# Patient Record
Sex: MALE | Race: BLACK OR AFRICAN AMERICAN | Employment: OTHER | URBAN - METROPOLITAN AREA
[De-identification: names, ages, dates, MRNs, and addresses within clinical notes are randomized per-mention and may not be internally consistent; named-entity substitution may affect disease eponyms.]

---

## 2017-03-21 ENCOUNTER — CLINICAL SUPPORT (OUTPATIENT)
Dept: CARDIOLOGY CLINIC | Age: 82
End: 2017-03-21

## 2017-03-21 DIAGNOSIS — I49.5 SSS (SICK SINUS SYNDROME) (HCC): ICD-10-CM

## 2017-03-21 DIAGNOSIS — Z95.0 S/P CARDIAC PACEMAKER PROCEDURE: Primary | ICD-10-CM

## 2017-03-21 DIAGNOSIS — I47.1 SVT (SUPRAVENTRICULAR TACHYCARDIA) (HCC): ICD-10-CM

## 2017-05-02 ENCOUNTER — HOSPITAL ENCOUNTER (OUTPATIENT)
Dept: CT IMAGING | Age: 82
Discharge: HOME | End: 2017-05-02
Attending: INTERNAL MEDICINE
Payer: MEDICARE

## 2017-05-02 DIAGNOSIS — R10.9 ABDOMINAL PAIN: ICD-10-CM

## 2017-05-02 LAB — CREAT BLD-MCNC: 1.7 MG/DL (ref 0.6–1.3)

## 2017-05-02 PROCEDURE — 82565 ASSAY OF CREATININE: CPT

## 2017-05-02 PROCEDURE — 74176 CT ABD & PELVIS W/O CONTRAST: CPT

## 2017-05-02 PROCEDURE — 74011000255 HC RX REV CODE- 255: Performed by: INTERNAL MEDICINE

## 2017-05-02 RX ORDER — SODIUM CHLORIDE 0.9 % (FLUSH) 0.9 %
10 SYRINGE (ML) INJECTION
Status: DISCONTINUED | OUTPATIENT
Start: 2017-05-02 | End: 2017-05-02

## 2017-05-02 RX ORDER — BARIUM SULFATE 20 MG/ML
900 SUSPENSION ORAL
Status: COMPLETED | OUTPATIENT
Start: 2017-05-02 | End: 2017-05-02

## 2017-05-02 RX ORDER — SODIUM CHLORIDE 9 MG/ML
50 INJECTION, SOLUTION INTRAVENOUS
Status: DISCONTINUED | OUTPATIENT
Start: 2017-05-02 | End: 2017-05-02

## 2017-05-02 RX ADMIN — BARIUM SULFATE 900 ML: 21 SUSPENSION ORAL at 19:09

## 2017-06-22 ENCOUNTER — HOSPITAL ENCOUNTER (OUTPATIENT)
Age: 82
Setting detail: OUTPATIENT SURGERY
Discharge: HOME | End: 2017-06-22
Attending: INTERNAL MEDICINE | Admitting: INTERNAL MEDICINE
Payer: MEDICARE

## 2017-06-22 ENCOUNTER — ANESTHESIA EVENT (OUTPATIENT)
Dept: ENDOSCOPY | Age: 82
End: 2017-06-22
Payer: MEDICARE

## 2017-06-22 ENCOUNTER — ANESTHESIA (OUTPATIENT)
Dept: ENDOSCOPY | Age: 82
End: 2017-06-22
Payer: MEDICARE

## 2017-06-22 VITALS
OXYGEN SATURATION: 98 % | RESPIRATION RATE: 10 BRPM | TEMPERATURE: 97.5 F | BODY MASS INDEX: 22.65 KG/M2 | DIASTOLIC BLOOD PRESSURE: 66 MMHG | HEIGHT: 67 IN | HEART RATE: 61 BPM | WEIGHT: 144.31 LBS | SYSTOLIC BLOOD PRESSURE: 128 MMHG

## 2017-06-22 PROCEDURE — 77030013992 HC SNR POLYP ENDOSC BSC -B: Performed by: INTERNAL MEDICINE

## 2017-06-22 PROCEDURE — 76060000032 HC ANESTHESIA 0.5 TO 1 HR: Performed by: INTERNAL MEDICINE

## 2017-06-22 PROCEDURE — 88342 IMHCHEM/IMCYTCHM 1ST ANTB: CPT | Performed by: INTERNAL MEDICINE

## 2017-06-22 PROCEDURE — 76040000007: Performed by: INTERNAL MEDICINE

## 2017-06-22 PROCEDURE — 74011000250 HC RX REV CODE- 250

## 2017-06-22 PROCEDURE — 74011250636 HC RX REV CODE- 250/636

## 2017-06-22 PROCEDURE — 74011250636 HC RX REV CODE- 250/636: Performed by: INTERNAL MEDICINE

## 2017-06-22 PROCEDURE — 88305 TISSUE EXAM BY PATHOLOGIST: CPT | Performed by: INTERNAL MEDICINE

## 2017-06-22 PROCEDURE — 77030019988 HC FCPS ENDOSC DISP BSC -B: Performed by: INTERNAL MEDICINE

## 2017-06-22 RX ORDER — FLUMAZENIL 0.1 MG/ML
0.2 INJECTION INTRAVENOUS
Status: DISCONTINUED | OUTPATIENT
Start: 2017-06-22 | End: 2017-06-22 | Stop reason: HOSPADM

## 2017-06-22 RX ORDER — DEXTROMETHORPHAN/PSEUDOEPHED 2.5-7.5/.8
1.2 DROPS ORAL
Status: DISCONTINUED | OUTPATIENT
Start: 2017-06-22 | End: 2017-06-22 | Stop reason: HOSPADM

## 2017-06-22 RX ORDER — GLYCOPYRROLATE 0.2 MG/ML
INJECTION INTRAMUSCULAR; INTRAVENOUS AS NEEDED
Status: DISCONTINUED | OUTPATIENT
Start: 2017-06-22 | End: 2017-06-22 | Stop reason: HOSPADM

## 2017-06-22 RX ORDER — MIDAZOLAM HYDROCHLORIDE 1 MG/ML
.25-5 INJECTION, SOLUTION INTRAMUSCULAR; INTRAVENOUS
Status: DISCONTINUED | OUTPATIENT
Start: 2017-06-22 | End: 2017-06-22 | Stop reason: HOSPADM

## 2017-06-22 RX ORDER — NALOXONE HYDROCHLORIDE 0.4 MG/ML
0.4 INJECTION, SOLUTION INTRAMUSCULAR; INTRAVENOUS; SUBCUTANEOUS
Status: DISCONTINUED | OUTPATIENT
Start: 2017-06-22 | End: 2017-06-22 | Stop reason: HOSPADM

## 2017-06-22 RX ORDER — EPINEPHRINE 0.1 MG/ML
1 INJECTION INTRACARDIAC; INTRAVENOUS
Status: DISCONTINUED | OUTPATIENT
Start: 2017-06-22 | End: 2017-06-22 | Stop reason: HOSPADM

## 2017-06-22 RX ORDER — FENTANYL CITRATE 50 UG/ML
25 INJECTION, SOLUTION INTRAMUSCULAR; INTRAVENOUS
Status: DISCONTINUED | OUTPATIENT
Start: 2017-06-22 | End: 2017-06-22 | Stop reason: HOSPADM

## 2017-06-22 RX ORDER — SODIUM CHLORIDE 0.9 % (FLUSH) 0.9 %
5-10 SYRINGE (ML) INJECTION AS NEEDED
Status: DISCONTINUED | OUTPATIENT
Start: 2017-06-22 | End: 2017-06-22 | Stop reason: HOSPADM

## 2017-06-22 RX ORDER — ATROPINE SULFATE 0.1 MG/ML
0.5 INJECTION INTRAVENOUS
Status: DISCONTINUED | OUTPATIENT
Start: 2017-06-22 | End: 2017-06-22 | Stop reason: HOSPADM

## 2017-06-22 RX ORDER — SODIUM CHLORIDE 9 MG/ML
75 INJECTION, SOLUTION INTRAVENOUS CONTINUOUS
Status: DISCONTINUED | OUTPATIENT
Start: 2017-06-22 | End: 2017-06-22 | Stop reason: HOSPADM

## 2017-06-22 RX ORDER — SODIUM CHLORIDE 0.9 % (FLUSH) 0.9 %
5-10 SYRINGE (ML) INJECTION EVERY 8 HOURS
Status: DISCONTINUED | OUTPATIENT
Start: 2017-06-22 | End: 2017-06-22 | Stop reason: HOSPADM

## 2017-06-22 RX ORDER — LIDOCAINE HYDROCHLORIDE 20 MG/ML
INJECTION, SOLUTION EPIDURAL; INFILTRATION; INTRACAUDAL; PERINEURAL AS NEEDED
Status: DISCONTINUED | OUTPATIENT
Start: 2017-06-22 | End: 2017-06-22 | Stop reason: HOSPADM

## 2017-06-22 RX ORDER — PHENYLEPHRINE HCL IN 0.9% NACL 0.4MG/10ML
SYRINGE (ML) INTRAVENOUS AS NEEDED
Status: DISCONTINUED | OUTPATIENT
Start: 2017-06-22 | End: 2017-06-22 | Stop reason: HOSPADM

## 2017-06-22 RX ORDER — PROPOFOL 10 MG/ML
INJECTION, EMULSION INTRAVENOUS AS NEEDED
Status: DISCONTINUED | OUTPATIENT
Start: 2017-06-22 | End: 2017-06-22 | Stop reason: HOSPADM

## 2017-06-22 RX ORDER — FENTANYL CITRATE 50 UG/ML
50 INJECTION, SOLUTION INTRAMUSCULAR; INTRAVENOUS
Status: DISCONTINUED | OUTPATIENT
Start: 2017-06-22 | End: 2017-06-22 | Stop reason: HOSPADM

## 2017-06-22 RX ADMIN — GLYCOPYRROLATE 0.1 MG: 0.2 INJECTION INTRAMUSCULAR; INTRAVENOUS at 08:32

## 2017-06-22 RX ADMIN — Medication 40 MCG: at 08:54

## 2017-06-22 RX ADMIN — SODIUM CHLORIDE 75 ML/HR: 900 INJECTION, SOLUTION INTRAVENOUS at 08:23

## 2017-06-22 RX ADMIN — LIDOCAINE HYDROCHLORIDE 50 MG: 20 INJECTION, SOLUTION EPIDURAL; INFILTRATION; INTRACAUDAL; PERINEURAL at 08:32

## 2017-06-22 RX ADMIN — PROPOFOL 210 MG: 10 INJECTION, EMULSION INTRAVENOUS at 09:01

## 2017-06-22 NOTE — PROCEDURES
NAME:  Santos Farfan Sr.   :   1931   MRN:   [id number]     Date/Time:  2017 9:27 AM    Colonoscopy Operative Report    Procedure Type:   Colonoscopy with polypectomy (cold snare)      Indications:     Abdominal pain, generalized  Pre-operative Diagnosis: see indication above  Post-operative Diagnosis:  See findings below  :  Andrew Mathur MD  Referring Provider:             -Hermelindo Sauer MD     Exam:  Airway: clear, no airway problems anticipated  Heart: RRR, without gallops or rubs  Lungs: clear bilaterally without wheezes, crackles, or rhonchi  Abdomen: soft, nontender, nondistended, bowel sounds present  Mental Status: awake, alert and oriented to person, place and time     Sedation:  MAC anesthesia Propofol 210mg IV  Procedure Details:  After informed consent was obtained with all risks and benefits of procedure explained and preoperative exam completed, the patient was taken to the endoscopy suite and placed in the left lateral decubitus position. Upon sequential sedation as per above, a digital rectal exam was performed demonstrating internal hemorrhoids. The Olympus videocolonoscope  was inserted in the rectum and carefully advanced to the cecum, which was identified by the ileocecal valve and appendiceal orifice. The distal terminal ileum was evaluated. The quality of preparation was adequate. The colonoscope was slowly withdrawn with careful evaluation between folds. Retroflexion in the rectum was completed demonstrating internal hemorrhoids.      Findings:     -Normal terminal ileum  -Two diminutive 2-3mm sessile polyps in ascending colon; removed with cold snare  -Single 3mm sessile polyp in transverse colon; removed with cold snare  -Extensive diverticulosis throughout colon  -Small internal hemorrhoids      Specimen Removed:  #3 transverse colon polyp; #4 asc colon polyp  Complications: None.    EBL:  None.     Impression:     -Normal terminal ileum  -Two diminutive 2-3mm sessile polyps in ascending colon; removed with cold snare  -Single 3mm sessile polyp in transverse colon; removed with cold snare  -Extensive diverticulosis throughout colon  -Small internal hemorrhoids      Recommendations: --Await pathology. , -Follow up with primary care physician. High fiber diet. Resume normal medication(s). NO aspirin for 10 days. You will receive a letter about the biopsy results in about 10 days. You may be asked to call your doctor's office for the results.        Discharge Disposition:  Home in the company of a  when able to ambulate.       Bernie Banegas MD

## 2017-06-22 NOTE — PROGRESS NOTES
The risks and benefits of the bite block have been explained to patient. Patient verbalizes understanding. Patient with various small chipped areas on his front teeth.

## 2017-06-22 NOTE — PROCEDURES
NAME:  Daron Farfan Sr.   :   1931   MRN:   [id number]     Date/Time:  2017 9:26 AM    Esophagogastroduodenoscopy (EGD) Procedure Note    Procedure: Esophagogastroduodenoscopy with biopsy     Indication:                 Abnormal weight loss  Pre-operative Diagnosis: see indication above  Post-operative Diagnosis: see findings below  :  Radha Loza MD  Referring Provider:   -Hamzah Bowden MD     Exam:  Airway: clear, no airway problems anticipated  Heart: RRR, without gallops or rubs  Lungs: clear bilaterally without wheezes, crackles, or rhonchi  Abdomen: soft, nontender, nondistended, bowel sounds present  Mental Status: awake, alert and oriented to person, place and time      Anethesia/Sedation:  MAC anesthesia Propofol as per colonoscopy  Procedure Details   After informed consent was obtained for the procedure, with all risks and benefits of procedure explained the patient was taken to the endoscopy suite and placed in the left lateral decubitus position. Following sequential administration of sedation as per above, the [id number] gastroscope was inserted into the mouth and advanced under direct vision to second portion of the duodenum. A careful inspection was made as the gastroscope was withdrawn, including a retroflexed view of the proximal stomach; findings and interventions are described below.                                                                                                                                                                                                                                                                                                                       Findings:    -Mild distal esophageal erythema to suggest esophagitis; biopsied  -Small sliding hiatal hernia from 37-39cm  -Normal gastric mucosa; biopsied to exclude inflammation  -Normal duodenal mucosa     Therapies:  biopsy of esophagus; biopsy of stomach   Specimens: #1 gastric; #2 g-e jxn  EBL:  None.          Complications:   None; patient tolerated the procedure well.      Impression:    -Mild distal esophageal erythema to suggest esophagitis; biopsied  -Small sliding hiatal hernia from 37-39cm  -Normal gastric mucosa; biopsied to exclude inflammation  -Normal duodenal mucosa     Recommendations:  -Await pathology.     Discharge disposition:  Home in the company of  when able to ambulate after colonoscopy    Tej Sylvester MD

## 2017-06-22 NOTE — ROUTINE PROCESS
Benito Smartalisa .  1/21/1931  [id number]    Situation:  Verbal report received from: David Weber Rn  Procedure: Procedure(s):  COLONOSCOPY  ESOPHAGOGASTRODUODENOSCOPY (EGD)  ESOPHAGOGASTRODUODENAL (EGD) BIOPSY  ENDOSCOPIC POLYPECTOMY    Background:    Preoperative diagnosis: ABDOMINAL/EPIGASTRIC PAIN/ABNORMAL WEIGHT LOSS/CHANGE IN BOWEL HABITS  Postoperative diagnosis: EGD - hiatal hernia, esophagitis  COLON - diverticulosis, colon polyps, hemorrhoids    :  Dr. Shayy Carlson  Assistant(s): Endoscopy Technician-1: Salomón Gutierrez  Endoscopy RN-1: Ignacia Maddox RN    Specimens:   ID Type Source Tests Collected by Time Destination   1 : Gastric bx Preservative Gastric  Milan Kurtz MD 6/22/2017 5008 Pathology   2 : GE Junction bx Preservative   Milan Kurtz MD 6/22/2017 4281 Pathology   3 : Transverse Colon Polyp Preservative   Milan Kurtz MD 6/22/2017 5757 Pathology   4 : Ascending Colon Polyps Preservative Colon, Ascending  Milan Kurtz MD 6/22/2017 4123 Pathology     H. Pylori  no    Assessment:  Intra-procedure medications       Anesthesia gave intra-procedure sedation and medications, see anesthesia flow sheet yes    Intravenous fluids: NS@ KVO     Vital signs stable       Abdominal assessment: round and soft       Recommendation:  Discharge patient per MD order  .     Family or Friend  Wife Adria Alexander to share finding with family or friend yes

## 2017-06-22 NOTE — DISCHARGE INSTRUCTIONS
Sonu Carias .  [id number]  1/21/1931    EGD/COLON DISCHARGE INSTRUCTIONS  Discomfort:  Redness at IV site- apply warm compress to area; if redness or soreness persist- contact your physician  There may be a slight amount of blood passed from the rectum  Gaseous discomfort- walking, belching will help relieve any discomfort  You may not operate a vehicle for 12 hours  You may not engage in an occupation involving machinery or appliances for rest of today  You may not drink alcoholic beverages for at least 12 hours  Avoid making any critical decisions for at least 24 hour  DIET:   High fiber diet. - however -  remember your colon is empty and a heavy meal will produce gas. Avoid these foods:  vegetables, fried / greasy foods, carbonated drinks for today  MEDICATION:         ACTIVITY:  You may not resume your normal daily activities until tomorrow AM; it is recommended that you spend the remainder of the day resting -  avoid any strenuous activity. CALL M.D. ANY SIGN OF:   Increasing pain, nausea, vomiting  Abdominal distension (swelling)  New increased bleeding (oral or rectal)  Fever (chills)  Pain in chest area  Bloody discharge from nose or mouth  Shortness of breath    You may not  take any Advil, Aspirin, Ibuprofen, Motrin, Aleve, or Goodys for 10 days, ONLY  Tylenol as needed for pain.     IMPRESSION:  -Normal terminal ileum  -Two diminutive 2-3mm sessile polyps in ascending colon; removed with cold snare  -Single 3mm sessile polyp in transverse colon; removed with cold snare  -Extensive diverticulosis throughout colon  -Small internal hemorrhoids   -Mild distal esophageal erythema to suggest esophagitis; biopsied  -Small sliding hiatal hernia from 37-39cm  -Normal gastric mucosa; biopsied to exclude inflammation  -Normal duodenal mucosa    Follow-up Instructions:   Call Dr. Kehinde Mendoza for the results of procedure / biopsy in 7-10 days  Telephone # [phone]    MD Reinaldo Griffin Activation    Thank you for requesting access to [employer]. Please follow the instructions below to securely access and download your online medical record. [employer] allows you to send messages to your doctor, view your test results, renew your prescriptions, schedule appointments, and more. How Do I Sign Up? 1. In your internet browser, go to [email or web address]  2. Click on the First Time User? Click Here link in the Sign In box. You will be redirect to the New Member Sign Up page. 3. Enter your [employer] Access Code exactly as it appears below. You will not need to use this code after youve completed the sign-up process. If you do not sign up before the expiration date, you must request a new code. [employer] Access Code: -[email or web address]  Expires: 2017  6:52 AM (This is the date your [employer] access code will )    4. Enter the last four digits of your Social Security Number (xxxx) and Date of Birth (mm/dd/yyyy) as indicated and click Submit. You will be taken to the next sign-up page. 5. Create a [employer] ID. This will be your [employer] login ID and cannot be changed, so think of one that is secure and easy to remember. 6. Create a [employer] password. You can change your password at any time. 7. Enter your Password Reset Question and Answer. This can be used at a later time if you forget your password. 8. Enter your e-mail address. You will receive e-mail notification when new information is available in [address] 9. Click Sign Up. You can now view and download portions of your medical record. 10. Click the Download Summary menu link to download a portable copy of your medical information. Additional Information    If you have questions, please visit the Frequently Asked Questions section of the [employer] website at [email or web address] Remember, [employer] is NOT to be used for urgent needs. For medical emergencies, dial 911.

## 2017-06-22 NOTE — ANESTHESIA PREPROCEDURE EVALUATION
Anesthetic History               Review of Systems / Medical History      Pulmonary                   Neuro/Psych              Cardiovascular    Hypertension        Dysrhythmias   Pacemaker and CAD         GI/Hepatic/Renal     GERD           Endo/Other        Arthritis     Other Findings              Physical Exam    Airway  Mallampati: II  TM Distance: 4 - 6 cm  Neck ROM: normal range of motion   Mouth opening: Normal     Cardiovascular  Regular rate and rhythm,  S1 and S2 normal,  no murmur, click, rub, or gallop             Dental    Dentition: Poor dentition     Pulmonary  Breath sounds clear to auscultation               Abdominal  GI exam deferred       Other Findings            Anesthetic Plan    ASA: 3  Anesthesia type: general and total IV anesthesia          Induction: Intravenous  Anesthetic plan and risks discussed with: Patient

## 2017-06-22 NOTE — ANESTHESIA POSTPROCEDURE EVALUATION
Post-Anesthesia Evaluation and Assessment    Patient: Ruddy Farfan Sr. MRN: [id number]  SSN: [id number]    YOB: 1931  Age: 80 y.o. Sex: male       Cardiovascular Function/Vital Signs  Visit Vitals    /66    Pulse 61    Temp 36.4 °C (97.5 °F)    Resp 10    Ht 5' 7\" (1.702 m)    Wt 65.5 kg (144 lb 5 oz)    SpO2 98%    BMI 22.6 kg/m2       Patient is status post general, total IV anesthesia anesthesia for Procedure(s):  COLONOSCOPY  ESOPHAGOGASTRODUODENOSCOPY (EGD)  ESOPHAGOGASTRODUODENAL (EGD) BIOPSY  ENDOSCOPIC POLYPECTOMY. Nausea/Vomiting: None    Postoperative hydration reviewed and adequate. Pain:  Pain Scale 1: Numeric (0 - 10) (06/22/17 0933)  Pain Intensity 1: 0 (06/22/17 4456)   Managed    Neurological Status: At baseline    Mental Status and Level of Consciousness: Arousable    Pulmonary Status:   O2 Device: Room air (06/22/17 9360)   Adequate oxygenation and airway patent    Complications related to anesthesia: None    Post-anesthesia assessment completed.  No concerns    Signed By: Mando Michaud MD     June 22, 2017

## 2017-06-22 NOTE — IP AVS SNAPSHOT
[address] [facility] 
[phone] Patient: Erinn Farfan Sr. MRN: [id number] NAD:6/76/6695 You are allergic to the following No active allergies Recent Documentation Height Weight BMI Smoking Status 1.702 m 65.5 kg 22.6 kg/m2 Former Smoker Emergency Contacts Name Discharge Info Relation Home Work Mobile Carmita Farfan  Spouse [3] [phone] Tina Johnson 1137  Other Relative [6] [phone] About your hospitalization You were admitted on:  June 22, 2017 You last received care in the:  [facility] ENDOSCOPY You were discharged on:  June 22, 2017 Unit phone number:  [phone] Why you were hospitalized Your primary diagnosis was:  Not on File Providers Seen During Your Hospitalizations Provider Role Specialty Primary office phone Turner Machado MD Attending Provider Gastroenterology [phone] Your Primary Care Physician (PCP) Primary Care Physician Office Phone Office Fax Yusuf Ross [phone] Follow-up Information None Your Appointments Tuesday July 18, 2017  1:20 PM EDT ROUTINE CARE with Meron El MD  
[facility] [place], [place] ([facility]) [address] [place] [phone] Current Discharge Medication List  
  
CONTINUE these medications which have NOT CHANGED Dose & Instructions Dispensing Information Comments Morning Noon Evening Bedtime  
 amLODIPine 10 mg tablet Commonly known as:  Monique Ferris Your last dose was: Your next dose is:    
   
   
 Dose:  10 mg Take 10 mg by mouth daily. Refills:  0  
     
   
   
   
  
 ASPIR-LOW 81 mg tablet Generic drug:  aspirin delayed-release Your last dose was: Your next dose is: Take  by mouth daily. Refills:  0  
     
   
   
   
  
 donepezil 10 mg tablet Commonly known as:  ARICEPT Your last dose was: Your next dose is:    
   
   
 Dose:  10 mg Take 10 mg by mouth nightly. Refills:  0  
     
   
   
   
  
 enalapril 20 mg tablet Commonly known as:  Melanie Dill Your last dose was: Your next dose is:    
   
   
 Dose:  20 mg Take 20 mg by mouth daily. Refills:  0  
     
   
   
   
  
 losartan-hydroCHLOROthiazide 100-25 mg per tablet Commonly known as:  HYZAAR Your last dose was: Your next dose is:    
   
   
 Dose:  1 Tab Take 1 Tab by mouth daily. Refills:  1 Discharge Instructions Lou Sosa . 
[id number] 
1/21/1931 EGD/COLON DISCHARGE INSTRUCTIONS Discomfort: 
Redness at IV site- apply warm compress to area; if redness or soreness persist- contact your physician There may be a slight amount of blood passed from the rectum Gaseous discomfort- walking, belching will help relieve any discomfort You may not operate a vehicle for 12 hours You may not engage in an occupation involving machinery or appliances for rest of today You may not drink alcoholic beverages for at least 12 hours Avoid making any critical decisions for at least 24 hour DIET: 
 High fiber diet.  however -  remember your colon is empty and a heavy meal will produce gas. Avoid these foods:  vegetables, fried / greasy foods, carbonated drinks for today MEDICATION: 
 
 
  
ACTIVITY: 
You may not resume your normal daily activities until tomorrow AM; it is recommended that you spend the remainder of the day resting -  avoid any strenuous activity. CALL M.D. ANY SIGN OF: Increasing pain, nausea, vomiting Abdominal distension (swelling) New increased bleeding (oral or rectal) Fever (chills) Pain in chest area Bloody discharge from nose or mouth Shortness of breath You may not  take any Advil, Aspirin, Ibuprofen, Motrin, Aleve, or Goodys for 10 days, ONLY  Tylenol as needed for pain. IMPRESSION: 
-Normal terminal ileum 
-Two diminutive 2-3mm sessile polyps in ascending colon; removed with cold snare 
-Single 3mm sessile polyp in transverse colon; removed with cold snare 
-Extensive diverticulosis throughout colon 
-Small internal hemorrhoids  
-Mild distal esophageal erythema to suggest esophagitis; biopsied 
-Small sliding hiatal hernia from 37-39cm 
-Normal gastric mucosa; biopsied to exclude inflammation 
-Normal duodenal mucosa Follow-up Instructions: 
 Call Dr. Dillon Faulkner for the results of procedure / biopsy in 7-10 days Telephone # [phone] Larose Bence, MD 
 
[employer] Activation Thank you for requesting access to [employer]. Please follow the instructions below to securely access and download your online medical record. [employer] allows you to send messages to your doctor, view your test results, renew your prescriptions, schedule appointments, and more. How Do I Sign Up? 1. In your internet browser, go to [email or web address] 
2. Click on the First Time User? Click Here link in the Sign In box. You will be redirect to the New Member Sign Up page. 3. Enter your [employer] Access Code exactly as it appears below. You will not need to use this code after youve completed the sign-up process. If you do not sign up before the expiration date, you must request a new code. [employer] Access Code: [email or web address] Expires: 2017  6:52 AM (This is the date your [employer] access code will ) 4. Enter the last four digits of your Social Security Number (xxxx) and Date of Birth (mm/dd/yyyy) as indicated and click Submit. You will be taken to the next sign-up page. 5. Create a [employer] ID. This will be your [employer] login ID and cannot be changed, so think of one that is secure and easy to remember. 6. Create a [employer] password. You can change your password at any time. 7. Enter your Password Reset Question and Answer. This can be used at a later time if you forget your password. 8. Enter your e-mail address. You will receive e-mail notification when new information is available in [address] 9. Click Sign Up. You can now view and download portions of your medical record. 10. Click the Download Summary menu link to download a portable copy of your medical information. Additional Information If you have questions, please visit the Frequently Asked Questions section of the [employer] website at [email or web address] Remember, [employer] is NOT to be used for urgent needs. For medical emergencies, dial 911. Discharge Orders None Introducing [facility]! Tom Martines introduces [employer] patient portal. Now you can access parts of your medical record, email your doctor's office, and request medication refills online. 1. In your internet browser, go to [email or web address] 2. Click on the First Time User? Click Here link in the Sign In box. You will see the New Member Sign Up page. 3. Enter your [employer] Access Code exactly as it appears below. You will not need to use this code after youve completed the sign-up process. If you do not sign up before the expiration date, you must request a new code. · [employer] Access Code: [email or web address] Expires: 9/20/2017  6:52 AM 
 
4. Enter the last four digits of your Social Security Number (xxxx) and Date of Birth (mm/dd/yyyy) as indicated and click Submit. You will be taken to the next sign-up page. 5. Create a [employer] ID. This will be your [employer] login ID and cannot be changed, so think of one that is secure and easy to remember. 6. Create a [employer] password. You can change your password at any time. 7. Enter your Password Reset Question and Answer.  This can be used at a later time if you forget your password. 8. Enter your e-mail address. You will receive e-mail notification when new information is available in [address] 9. Click Sign Up. You can now view and download portions of your medical record. 10. Click the Download Summary menu link to download a portable copy of your medical information. If you have questions, please visit the Frequently Asked Questions section of the [employer] website. Remember, [employer] is NOT to be used for urgent needs. For medical emergencies, dial 911. Now available from your iPhone and Android! General Information Please provide this summary of care documentation to your next provider. Patient Signature:  ____________________________________________________________ Date:  ____________________________________________________________  
  
Anitra Sherwood Provider Signature:  ____________________________________________________________ Date:  ____________________________________________________________

## 2017-07-18 PROBLEM — K29.90 GASTRITIS AND DUODENITIS: Status: ACTIVE | Noted: 2017-07-18

## 2017-07-18 PROBLEM — N18.2 CKD (CHRONIC KIDNEY DISEASE), STAGE II: Status: ACTIVE | Noted: 2017-07-18

## 2017-07-18 PROBLEM — R10.9 ABDOMINAL PAIN: Status: ACTIVE | Noted: 2017-07-18

## 2017-07-18 PROBLEM — K92.2 GI BLEED: Status: ACTIVE | Noted: 2017-07-18

## 2017-07-18 PROBLEM — K57.90 DIVERTICULOSIS: Status: ACTIVE | Noted: 2017-07-18

## 2017-07-18 PROBLEM — F02.80 ALZHEIMER DISEASE (HCC): Status: ACTIVE | Noted: 2017-07-18

## 2017-07-18 PROBLEM — E78.5 HYPERLIPIDEMIA: Status: ACTIVE | Noted: 2017-07-18

## 2017-07-18 PROBLEM — R00.1 BRADYCARDIA: Status: ACTIVE | Noted: 2017-07-18

## 2017-07-18 PROBLEM — F32.A DEPRESSION: Status: ACTIVE | Noted: 2017-07-18

## 2017-07-18 PROBLEM — K64.8 INTERNAL HEMORRHOIDS: Status: ACTIVE | Noted: 2017-07-18

## 2017-07-18 PROBLEM — G30.9 ALZHEIMER DISEASE (HCC): Status: ACTIVE | Noted: 2017-07-18

## 2017-07-18 PROBLEM — I12.9 HYPERTENSION WITH RENAL DISEASE: Status: ACTIVE | Noted: 2017-07-18

## 2017-07-18 PROBLEM — M19.90 DJD (DEGENERATIVE JOINT DISEASE): Status: ACTIVE | Noted: 2017-07-18

## 2017-07-18 PROBLEM — M54.9 BACK PAIN: Status: ACTIVE | Noted: 2017-07-18

## 2017-07-18 PROBLEM — Z79.899 ENCOUNTER FOR LONG-TERM (CURRENT) USE OF OTHER MEDICATIONS: Status: ACTIVE | Noted: 2017-07-18

## 2017-07-18 PROBLEM — D64.9 ANEMIA: Status: ACTIVE | Noted: 2017-07-18

## 2017-07-18 PROBLEM — E11.9 DIABETES MELLITUS (HCC): Status: ACTIVE | Noted: 2017-07-18

## 2017-07-18 RX ORDER — MEMANTINE HYDROCHLORIDE 10 MG/1
TABLET ORAL DAILY
COMMUNITY
End: 2017-09-01 | Stop reason: ALTCHOICE

## 2017-07-18 RX ORDER — OMEPRAZOLE 20 MG/1
20 CAPSULE, DELAYED RELEASE ORAL DAILY
COMMUNITY
End: 2017-11-01 | Stop reason: SDUPTHER

## 2017-09-01 ENCOUNTER — OFFICE VISIT (OUTPATIENT)
Dept: INTERNAL MEDICINE CLINIC | Age: 82
End: 2017-09-01

## 2017-09-01 VITALS
WEIGHT: 141.2 LBS | DIASTOLIC BLOOD PRESSURE: 74 MMHG | OXYGEN SATURATION: 98 % | BODY MASS INDEX: 22.16 KG/M2 | RESPIRATION RATE: 18 BRPM | HEART RATE: 64 BPM | HEIGHT: 67 IN | SYSTOLIC BLOOD PRESSURE: 122 MMHG

## 2017-09-01 DIAGNOSIS — I49.5 SSS (SICK SINUS SYNDROME) (HCC): ICD-10-CM

## 2017-09-01 DIAGNOSIS — G30.9 ALZHEIMER'S DEMENTIA WITHOUT BEHAVIORAL DISTURBANCE, UNSPECIFIED TIMING OF DEMENTIA ONSET: ICD-10-CM

## 2017-09-01 DIAGNOSIS — I25.10 ASCVD (ARTERIOSCLEROTIC CARDIOVASCULAR DISEASE): ICD-10-CM

## 2017-09-01 DIAGNOSIS — E11.9 TYPE 2 DIABETES MELLITUS WITHOUT COMPLICATION, WITHOUT LONG-TERM CURRENT USE OF INSULIN (HCC): ICD-10-CM

## 2017-09-01 DIAGNOSIS — E78.2 MIXED HYPERLIPIDEMIA: ICD-10-CM

## 2017-09-01 DIAGNOSIS — Z00.00 MEDICARE ANNUAL WELLNESS VISIT, INITIAL: ICD-10-CM

## 2017-09-01 DIAGNOSIS — F02.80 ALZHEIMER'S DEMENTIA WITHOUT BEHAVIORAL DISTURBANCE, UNSPECIFIED TIMING OF DEMENTIA ONSET: ICD-10-CM

## 2017-09-01 DIAGNOSIS — I12.9 HYPERTENSION WITH RENAL DISEASE: Primary | ICD-10-CM

## 2017-09-01 DIAGNOSIS — N18.2 CKD (CHRONIC KIDNEY DISEASE), STAGE II: ICD-10-CM

## 2017-09-01 DIAGNOSIS — Z95.0 S/P CARDIAC PACEMAKER PROCEDURE: ICD-10-CM

## 2017-09-01 LAB
ALBUMIN SERPL-MCNC: 3.9 G/DL (ref 3.9–5.4)
ALKALINE PHOS POC: 84 U/L (ref 38–126)
ALT SERPL-CCNC: 23 U/L (ref 9–52)
AST SERPL-CCNC: 27 U/L (ref 14–36)
BUN BLD-MCNC: 23 MG/DL (ref 9–20)
CALCIUM BLD-MCNC: 9.6 MG/DL (ref 8.4–10.2)
CHLORIDE BLD-SCNC: 105 MMOL/L (ref 98–107)
CHOLEST SERPL-MCNC: 185 MG/DL (ref 0–200)
CK (CPK) POC: 104 U/L (ref 30–135)
CO2 POC: 27 MMOL/L (ref 22–32)
CREAT BLD-MCNC: 1.7 MG/DL (ref 0.8–1.5)
EGFR (POC): 35.7
GLUCOSE POC: 77 MG/DL (ref 75–110)
HBA1C MFR BLD HPLC: 5.7 % (ref 4.5–5.7)
HDLC SERPL-MCNC: 58 MG/DL (ref 35–130)
LDL CHOLESTEROL POC: 99.4 MG/DL (ref 0–130)
MICROALBUMIN UR TEST STR-MCNC: 20 MG/L (ref 0–20)
POTASSIUM SERPL-SCNC: 4.4 MMOL/L (ref 3.6–5)
PROT SERPL-MCNC: 6.7 G/DL (ref 6.3–8.2)
SODIUM SERPL-SCNC: 146 MMOL/L (ref 137–145)
TCHOL/HDL RATIO (POC): 3.2 (ref 0–4)
TOTAL BILIRUBIN POC: 0.4 MG/DL (ref 0.2–1.3)
TRIGL SERPL-MCNC: 138 MG/DL (ref 0–200)
VLDLC SERPL CALC-MCNC: 27.6 MG/DL

## 2017-09-01 NOTE — MR AVS SNAPSHOT
Visit Information Date & Time Provider Department Dept. Phone Encounter #  
 9/1/2017  1:20 PM Krystyna Jones MD 20 [facility] [phone] [id number] Follow-up Instructions Return in about 3 months (around 12/1/2017). Follow-up and Disposition History Your Appointments 9/21/2017  1:45 PM  
PACEMAKER with PACEMAKER, [facility] Appt Note: MDT BiV PM 6mo check, annual with [facility] [address] [facility]  
[phone] [address] [facility]  
  
    
 9/21/2017  2:00 PM  
ANNUAL with Stephanie Dye NP [facility]) Appt Note: MDT BiV PM 6mo check, annual with [facility] [address] [facility]  
[phone] [address] [facility]  
  
    
 12/8/2017  1:20 PM  
FOLLOW UP 10 with Krystyna Jones MD  
[facility] ([facility]) Appt Note: [address] [facility] [address] [address] [address] [facility] [address] Upcoming Health Maintenance Date Due  
 EYE EXAM RETINAL OR DILATED Q1 1/21/1941 DTaP/Tdap/Td series (1 - Tdap) 1/21/1952 ZOSTER VACCINE AGE 60> 11/21/1990 GLAUCOMA SCREENING Q2Y 1/21/1996 INFLUENZA AGE 9 TO ADULT 8/1/2017 HEMOGLOBIN A1C Q6M 3/1/2018 FOOT EXAM Q1 9/1/2018 MICROALBUMIN Q1 9/1/2018 LIPID PANEL Q1 9/1/2018 MEDICARE YEARLY EXAM 9/2/2018 Allergies as of 9/1/2017  Review Complete On: 9/1/2017 By: Krystyna Jones MD  
 No Known Allergies Current Immunizations  Reviewed on 5/4/2015 Name Date Influenza Vaccine 1/18/2017, 10/18/2016, 12/15/2015 Pneumococcal Conjugate (PCV-13) 9/30/2015 Pneumococcal Polysaccharide (PPSV-23) 10/13/2006 Pneumococcal Vaccine (Unspecified Type) 10/13/2006 Not reviewed this visit You Were Diagnosed With   
  
 Codes Comments Hypertension with renal disease    -  Primary ICD-10-CM: I12.9 ICD-9-CM: 403.90 Type 2 diabetes mellitus without complication, without long-term current use of insulin (HCC)     ICD-10-CM: E11.9 ICD-9-CM: 250.00 ASCVD (arteriosclerotic cardiovascular disease)     ICD-10-CM: I25.10 ICD-9-CM: 429.2, 440.9 Mixed hyperlipidemia     ICD-10-CM: E78.2 ICD-9-CM: 272.2 Alzheimer's dementia without behavioral disturbance, unspecified timing of dementia onset     ICD-10-CM: G30.9, F02.80 ICD-9-CM: 331.0, 294.10 CKD (chronic kidney disease), stage II     ICD-10-CM: N18.2 ICD-9-CM: 585.2 SSS (sick sinus syndrome) ([facility])     ICD-10-CM: I49.5 ICD-9-CM: 427.81 S/P cardiac pacemaker procedure     ICD-10-CM: Z95.0 ICD-9-CM: V45.01 Medicare annual wellness visit, initial     ICD-10-CM: Z00.00 ICD-9-CM: V70.0 Vitals BP Pulse Resp Height(growth percentile) Weight(growth percentile) SpO2  
 122/74 (BP 1 Location: Right arm, BP Patient Position: Sitting) 64 18 5' 7\" (1.702 m) 141 lb 3.2 oz (64 kg) 98% BMI Smoking Status 22.12 kg/m2 Former Smoker BMI and BSA Data Body Mass Index Body Surface Area  
 22.12 kg/m 2 1.74 m 2 Your Updated Medication List  
  
   
This list is accurate as of: 9/1/17  2:44 PM.  Always use your most recent med list. amLODIPine 10 mg tablet Commonly known as:  Haig Jay Take 10 mg by mouth daily. donepezil 10 mg tablet Commonly known as:  ARICEPT Take 10 mg by mouth nightly. enalapril 20 mg tablet Commonly known as:  Melven Barter Take 20 mg by mouth daily. losartan-hydroCHLOROthiazide 100-25 mg per tablet Commonly known as:  HYZAAR Take 1 Tab by mouth daily. omeprazole 20 mg capsule Commonly known as:  PRILOSEC Take 20 mg by mouth daily. We Performed the Following AMB POC CK (CPK) [74273 CPT(R)] AMB POC COMPREHENSIVE METABOLIC PANEL [04010 CPT(R)] AMB POC HEMOGLOBIN A1C [87095 CPT(R)] AMB POC LIPID PROFILE [85514 CPT(R)] AMB POC URINE, MICROALBUMIN, SEMIQUANT (1 RESULT) [75489 CPT(R)] Follow-up Instructions Return in about 3 months (around 12/1/2017). Introducing [facility]! [facility] introduces [employer] patient portal. Now you can access parts of your medical record, email your doctor's office, and request medication refills online. 1. In your internet browser, go to [email or web address] 2. Click on the First Time User? Click Here link in the Sign In box. You will see the New Member Sign Up page. 3. Enter your [employer]healthrecord Access Code exactly as it appears below. You will not need to use this code after youve completed the sign-up process. If you do not sign up before the expiration date, you must request a new code. · [employer] Access Code: [email or web address] Expires: 9/20/2017  6:52 AM 
 
4. Enter the last four digits of your Social Security Number (xxxx) and Date of Birth (mm/dd/yyyy) as indicated and click Submit. You will be taken to the next sign-up page. 5. Create a [employer]healthrecord ID. This will be your [employer]healthrecord login ID and cannot be changed, so think of one that is secure and easy to remember. 6. Create a [employer] password. You can change your password at any time. 7. Enter your Password Reset Question and Answer. This can be used at a later time if you forget your password. 8. Enter your e-mail address. You will receive e-mail notification when new information is available in [address] 9. Click Sign Up. You can now view and download portions of your medical record. 10. Click the Download Summary menu link to download a portable copy of your medical information.  
 
If you have questions, please visit the Frequently Asked Questions section of the [employer]. Remember, [employer] is NOT to be used for urgent needs. For medical emergencies, dial 911. Now available from your iPhone and Android! Please provide this summary of care documentation to your next provider. Your primary care clinician is listed as Thuy. If you have any questions after today's visit, please call [phone].

## 2017-09-01 NOTE — PROGRESS NOTES
This is an Initial Medicare Annual Wellness Exam (AWV) (Performed 12 months after IPPE or effective date of Medicare Part B enrollment, Once in a lifetime)    I have reviewed the patient's medical history in detail and updated the computerized patient record. He presents today for his initial annual Medicare wellness examination. He is also here in follow-up of his medical problems include hypertension, diabetes, hyperlipidemia, ASCVD, GERD, DJD, and Alzheimer's dementia. His memory seems to be about the same according to his wife who is present with him today. He did stop the Namenda because of the expense and replaced it with Aricept which seems to be working okay according to both he and his wife. He denies any chest pain shortness of breath or cardiorespiratory complaints. There are no GI/ complaints. There are no other neurologic complaints other than the memory deficit. There are no other complaints on complete review of systems. He is taking all his medications with the changes noted above trying to follow his diet and get some exercise.     History     Past Medical History:   Diagnosis Date    Abdominal pain 7/18/2017    Alzheimer disease 7/18/2017    Anemia 7/18/2017    Arthritis     Back pain 7/18/2017    Bradycardia 7/18/2017    CAD (coronary artery disease)     hx of MI    CKD (chronic kidney disease), stage II 7/18/2017    constipation     Depression 7/18/2017    Diabetes mellitus ([facility] [address] 7/18/2017    Diverticulosis 7/18/2017    DJD (degenerative joint disease) 7/18/2017    Encounter for long-term (current) use of other medications 7/18/2017    Fatigue     Gastritis and duodenitis 7/18/2017    GERD (gastroesophageal reflux disease)     GI bleed 7/18/2017    Hearing loss     Hyperlipidemia 7/18/2017    Hypertension     Hypertension with renal disease 7/18/2017    Ill-defined condition     Dementia    Internal hemorrhoids 7/18/2017    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    S/P cardiac pacemaker procedure 2015    5/4/15 Medtronic dual chamber pacemaker implant     Weight loss     lost over 40 pounds last year- has no appetite      Past Surgical History:   Procedure Laterality Date    COLONOSCOPY N/A 2017    COLONOSCOPY performed by Suzy Godinez MD at [address] [facility]  2017         [address]  7/10/2014    right inguinal    HX OTHER SURGICAL      cystectomy from back    OR EGD TRANSORAL BIOPSY SINGLE/MULTIPLE  2012         UPPER GI ENDOSCOPY,BIOPSY  2017          Current Outpatient Prescriptions   Medication Sig Dispense Refill    omeprazole (PRILOSEC) 20 mg capsule Take 20 mg by mouth daily.  amLODIPine (NORVASC) 10 mg tablet Take 10 mg by mouth daily.  losartan-hydrochlorothiazide (HYZAAR) 100-25 mg per tablet Take 1 Tab by mouth daily. 1    donepezil (ARICEPT) 10 mg tablet Take 10 mg by mouth nightly.  enalapril (VASOTEC) 20 mg tablet Take 20 mg by mouth daily.        No Known Allergies  Family History   Problem Relation Age of Onset    Hypertension Mother     Heart Disease Father     Cancer Other      son-cancer? unknown what type      Social History   Substance Use Topics    Smoking status: Former Smoker     Packs/day: 0.50     Years: 10.00     Start date: 1991    Smokeless tobacco: Never Used      Comment: quit 50 years ago    Alcohol use 3.5 oz/week     7 Standard drinks or equivalent per week      Comment: 7/week     Patient Active Problem List   Diagnosis Code    Right inguinal hernia K40.90    ASCVD (arteriosclerotic cardiovascular disease) I25.10    Hypokalemia E87.6    Near syncope R55    SVT (supraventricular tachycardia) ([facility])--s/p RFA I47.1    S/P cardiac pacemaker procedure Z95.0    S/P ablation of accessory bypass tract Z98.890    SSS (sick sinus syndrome) ([facility] [address] I49.5    Diverticulosis K57.90    Gastritis and duodenitis K29.90    Internal hemorrhoids K64.8    Hypertension with renal disease I12.9    Hyperlipidemia E78.5    GI bleed K92.2    DJD (degenerative joint disease) M19.90    Diabetes mellitus (HCC) E11.9    Depression F32.9    CKD (chronic kidney disease), stage II N18.2    Back pain M54.9    Anemia D64.9    Alzheimer disease G30.9    Medicare annual wellness visit, initial Z00.00       Depression Risk Factor Screening:     PHQ over the last two weeks 9/1/2017   Little interest or pleasure in doing things Not at all   Feeling down, depressed or hopeless Not at all   Total Score PHQ 2 0     Alcohol Risk Factor Screening: You do not drink alcohol or very rarely. Functional Ability and Level of Safety:     Hearing Loss  Hearing is good. Activities of Daily Living  The home contains: no safety equipment  Patient does total self care    Fall RiskFall Risk Assessment, last 12 mths 9/1/2017   Able to walk? Yes   Fall in past 12 months? No       Abuse Screen  Patient is not abused    Cognitive Screening   Evaluation of Cognitive Function:  Has your family/caregiver stated any concerns about your memory: yes  Normal, Abnormal     ROS:    Constitutional: He denies fevers, weight loss, sweats. Eyes: No blurred or double vision. ENT: No difficulty with swallowing, taste, speech or smell. Neck: no stiffness or swelling  Respiratory: No cough wheezing or shortness of breath. Cardiovascular: Denies chest pain, palpitations, unexplained indigestion or syncope. Gastrointestinal:  No changes in bowel movements, no abdominal pain, no bloating. Genitourinary:  He denies frequency, nocturia or stranguria. Extremities: No joint pain, stiffness or swelling. Neurological:  No numbness, tingling, burring paresthesias or loss of motor strength.   No syncope, dizziness or frequent headache  Lymphatic: no adenopathy noted  Hematologic: no easy bruising or bleeding gums  Skin:  No recent rashes or mole changes. Psychiatric/Behavioral:  Negative for depression. Vitals:    09/01/17 1406   BP: 122/74   Pulse: 64   Resp: 18   SpO2: 98%   Weight: 141 lb 3.2 oz (64 kg)   Height: 5' 7\" (1.702 m)   PainSc:   4   PainLoc: Back        PHYSICAL EXAM:    General appearance - alert, well appearing, and in no distress  Mental status - alert, oriented to person, place, and time he was slow in the year but was able to get it he was able to remember 2 of 3 objects. He could not name the current president. HEENT:  Ears - bilateral TM's and external ear canals clear  Eyes - pupillary responses were normal.  Extraocular muscle function intact. Lids and conjunctiva not injected. Fundoscopic exam revealed sharp disc margins. eye movements intact  Pharynx- clear with teeth in good repair. No masses were noted  Neck - supple without thyromegaly or burit. No JVD noted  Lungs - clear to auscultation and percussion  Cardiac- normal rate, regular rhythm without murmurs. PMI not displaced. No gallop, rub or click  Abdomen - flat, soft, non-tender without palpable organomegaly or mass. No pulsatile mass was felt, and not bruit was heard. Bowel sounds were active  : Circumcised, Testes descended w/o masses  Rectal: Deferred at his request  Extremities -  no clubbing cyanosis or edema, no diabetic foot changes are noted  Lymphatics - no palpable lymphadenopathy, no hepatosplenomegaly  Hematologic: no petechiae or purpura  Peripheral vascular -Femoral, Dorsalis pedis and posterior tibial pulses felt without difficulty  Skin - no rash or unusual mole change noted  Neurological - Cranial nerves II-XII grossly intact. Motor strength 5/5. DTR's 2+ and symmetric.   Station and gait normal, normal distal sensation and proprioception in all toes  Back exam - full range of motion, no tenderness, palpable spasm or pain on motion  Musculoskeletal - no joint tenderness, deformity or swelling      Patient Care Team   Patient Care Team:  Krupa Cho MD as PCP - General (Internal Medicine)  Keith Rust MD as Physician (Cardiology)  Mo Velarde MD as Physician (Cardiology)    Assessment/Plan   Education and counseling provided:  Are appropriate based on today's review and evaluation     ASSESSMENT:   1. Hypertension with renal disease    2. Type 2 diabetes mellitus without complication, without long-term current use of insulin ([facility] [address]    3. ASCVD (arteriosclerotic cardiovascular disease)    4. Mixed hyperlipidemia    5. Alzheimer's dementia without behavioral disturbance, unspecified timing of dementia onset    6. CKD (chronic kidney disease), stage II    7. SSS (sick sinus syndrome) ([facility] [address]    8. S/P cardiac pacemaker procedure    9. Medicare annual wellness visit, initial      Impression  1. Hypertension that is adequately controlled  2. Diabetes we will see what that status is and make adjustments if necessary with the medications his last numbers were reviewed with he and his wife  3. Hyperlipidemia that status was reviewed from last visit repeat status is pending we will make adjustments if necessary once I see the lab  4. ASCVD clinically stable  5. Alzheimer's dementia he does have some memory deficit but is still able to carry on normal conversation he is not agitated as noted he could not tolerate the expense of Namenda and assess taken Aricept which he and his wife feel is helping  6. Chronic kidney disease status pending  7. Sick sinus syndrome status post pacemaker seems to be in a paced rhythm today  Medicare annual wellness examination and questionnaire performed on patient today results were reviewed with he and his wife and their questions were answered. Lifestyle recommendations modifications discussed and recommendations made. We will call her labs and make further recommendations based upon those.   Follow stable with the lab will make no changes in current medicines which were reviewed we will recheck him in 3 months unless is a problem I will see him sooner. PLAN:  .  Orders Placed This Encounter    AMB POC LIPID PROFILE    AMB POC HEMOGLOBIN A1C    AMB POC COMPREHENSIVE METABOLIC PANEL    AMB POC CK (CPK)    AMB POC URINE, MICROALBUMIN, SEMIQUANT (1 RESULT)         ATTENTION:   This medical record was transcribed using an electronic medical records system. Although proofread, it may and can contain electronic and spelling errors. Other human spelling and other errors may be present. Corrections may be executed at a later time. Please feel free to contact us for any clarifications as needed. Follow-up Disposition:  Return in about 3 months (around 12/1/2017).       Casi Marquez MD    Health Maintenance Due   Topic Date Due    EYE EXAM RETINAL OR DILATED Q1  01[birth year]/1941    DTaP/Tdap/Td series (1 - Tdap) 01/21/1952    ZOSTER VACCINE AGE 60>  11/21/1990    GLAUCOMA SCREENING Q2Y  01/21/1996    INFLUENZA AGE 9 TO ADULT  08/01/2017

## 2017-09-21 ENCOUNTER — CLINICAL SUPPORT (OUTPATIENT)
Dept: CARDIOLOGY CLINIC | Age: 82
End: 2017-09-21

## 2017-09-21 ENCOUNTER — OFFICE VISIT (OUTPATIENT)
Dept: CARDIOLOGY CLINIC | Age: 82
End: 2017-09-21

## 2017-09-21 VITALS
DIASTOLIC BLOOD PRESSURE: 62 MMHG | SYSTOLIC BLOOD PRESSURE: 110 MMHG | BODY MASS INDEX: 22.03 KG/M2 | OXYGEN SATURATION: 100 % | WEIGHT: 140.4 LBS | HEIGHT: 67 IN | HEART RATE: 76 BPM

## 2017-09-21 DIAGNOSIS — Z95.0 S/P CARDIAC PACEMAKER PROCEDURE: Primary | ICD-10-CM

## 2017-09-21 DIAGNOSIS — I49.5 SSS (SICK SINUS SYNDROME) (HCC): ICD-10-CM

## 2017-09-21 DIAGNOSIS — I49.5 SSS (SICK SINUS SYNDROME) (HCC): Primary | ICD-10-CM

## 2017-09-21 DIAGNOSIS — I47.1 SVT (SUPRAVENTRICULAR TACHYCARDIA) (HCC): ICD-10-CM

## 2017-09-21 DIAGNOSIS — Z95.0 S/P CARDIAC PACEMAKER PROCEDURE: ICD-10-CM

## 2017-09-21 DIAGNOSIS — Z98.890 S/P ABLATION OF ACCESSORY BYPASS TRACT: ICD-10-CM

## 2017-09-21 DIAGNOSIS — I12.9 HYPERTENSION WITH RENAL DISEASE: ICD-10-CM

## 2017-09-21 DIAGNOSIS — I25.10 ASCVD (ARTERIOSCLEROTIC CARDIOVASCULAR DISEASE): ICD-10-CM

## 2017-09-21 DIAGNOSIS — E78.2 MIXED HYPERLIPIDEMIA: ICD-10-CM

## 2017-09-21 DIAGNOSIS — E11.8 TYPE 2 DIABETES MELLITUS WITH COMPLICATION, WITHOUT LONG-TERM CURRENT USE OF INSULIN (HCC): ICD-10-CM

## 2017-09-21 NOTE — PROGRESS NOTES
Subjective/HPI:     July Yañez Sr is a 80 y.o. male is here for f/u appt regarding hx of SSS with pacemaker. He reports he has been more sob with exertion and tired. He has noticed dizziness often. He can be bending over and stands back up and will be dizzy. He can be walking and it will come on. It does seem to be related to position changes. The patient denies chest pain, orthopnea, PND, LE edema, denies worsening palpitations, syncope, presyncope.          PCP Provider  Damari Cabral MD  Past Medical History:   Diagnosis Date    Abdominal pain 7/18/2017    Alzheimer disease 7/18/2017    Anemia 7/18/2017    Arthritis     Back pain 7/18/2017    Bradycardia 7/18/2017    CAD (coronary artery disease)     hx of MI    CKD (chronic kidney disease), stage II 7/18/2017    constipation     Depression 7/18/2017    Diabetes mellitus ([facility] [address] [address] 7/18/2017    Diverticulosis 7/18/2017    DJD (degenerative joint disease) 7/18/2017    Encounter for long-term (current) use of other medications 7/18/2017    Fatigue     Gastritis and duodenitis 7/18/2017    GERD (gastroesophageal reflux disease)     GI bleed 7/18/2017    Hearing loss     Hyperlipidemia 7/18/2017    Hypertension     Hypertension with renal disease 7/18/2017    Ill-defined condition     Dementia    Internal hemorrhoids 7/18/2017    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    S/P cardiac pacemaker procedure 5/4/2015    5/4/15 Medtronic dual chamber pacemaker implant     Weight loss     lost over 40 pounds last year- has no appetite      Past Surgical History:   Procedure Laterality Date    COLONOSCOPY N/A 6/22/2017    COLONOSCOPY performed by Marysol Pratt MD at [address] [place] [address]  6/22/2017         [address]  7/10/2014    right inguinal    HX OTHER SURGICAL      cystectomy from back    LA EGD TRANSORAL BIOPSY SINGLE/MULTIPLE  2/14/2012         UPPER GI ENDOSCOPY,BIOPSY  2017          No Known Allergies   Family History   Problem Relation Age of Onset    Hypertension Mother     Heart Disease Father     Cancer Other      son-cancer? unknown what type       Current Outpatient Prescriptions   Medication Sig    omeprazole (PRILOSEC) 20 mg capsule Take 20 mg by mouth daily.  amLODIPine (NORVASC) 10 mg tablet Take 10 mg by mouth daily.  losartan-hydrochlorothiazide (HYZAAR) 100-25 mg per tablet Take 1 Tab by mouth daily.  donepezil (ARICEPT) 10 mg tablet Take 10 mg by mouth nightly.  enalapril (VASOTEC) 20 mg tablet Take 20 mg by mouth daily. No current facility-administered medications for this visit. Vitals:    17 1342   BP: 110/62   Pulse: 76   SpO2: 100%   Weight: 140 lb 6.4 oz (63.7 kg)   Height: 5' 7\" (1.702 m)     Social History     Social History    Marital status:      Spouse name: N/A    Number of children: N/A    Years of education: N/A     Occupational History    Not on file. Social History Main Topics    Smoking status: Former Smoker     Packs/day: 0.50     Years: 10.00     Start date: 1991    Smokeless tobacco: Never Used      Comment: quit 50 years ago    Alcohol use No    Drug use: No    Sexual activity: Not on file     Other Topics Concern    Not on file     Social History Narrative       I have reviewed the nurses notes, vitals, problem list, allergy list, medical history, family, social history and medications. Review of Symptoms:    General: Pt denies excessive weight gain or loss. Pt is able to conduct ADL's.+fatigue  HEENT: Denies blurred vision, headaches, epistaxis and difficulty swallowing. Respiratory: Denies shortness of breath, +GONGORA, denies wheezing or stridor.   Cardiovascular: Denies precordial pain, palpitations, edema or PND  Gastrointestinal: Denies poor appetite, indigestion, abdominal pain or blood in stool  Urinary: Denies dysuria, pyuria  Musculoskeletal: Denies pain or swelling from muscles or joints  Neurologic: Denies tremor, paresthesias, +dizziness  Skin: Denies rash, itching or texture change. Psych: Denies depression        Physical Exam:      General: Well developed, in no acute distress, cooperative and alert  HEENT: No carotid bruits, no JVD, trach is midline. Neck Supple, PEERL, EOM intact. Heart:  Normal S1/S2 negative S3 or S4. Regular,+NIKUNJ, no gallop or rub.   Respiratory: Clear bilaterally x 4, no wheezing or rales  Abdomen:   Soft, non-tender, no masses, bowel sounds are active.   Extremities:  No edema, normal cap refill, no cyanosis, atraumatic. Neuro: A&Ox3, speech clear, gait stable. Skin: Skin color is normal. No rashes or lesions.  Non diaphoretic  Vascular: 2+ pulses symmetric in all extremities    Cardiographics    ECG: V paced    Medtronic BIV pacemaker- 75% A paced 99.9% V paced  NSVT x2 lasting 1-4 sec  5 year battery life    Results for orders placed or performed during the hospital encounter of 05/06/15   EKG, 12 LEAD, INITIAL   Result Value Ref Range    Ventricular Rate 65 BPM    Atrial Rate 65 BPM    P-R Interval 150 ms    QRS Duration 166 ms    Q-T Interval 448 ms    QTC Calculation (Bezet) 465 ms    Calculated P Axis 28 degrees    Calculated R Axis 132 degrees    Calculated T Axis 32 degrees    Diagnosis         Atrial-sensed ventricular-paced rhythm  Confirmed by Rica Kang ([id number]) on 5/7/2015 10:30:21 PM           Cardiology Labs:  No results found for: CHOL, CHOLX, CHLST, CHOLV, 856437, HDL, LDL, LDLC, DLDLP, TGLX, TRIGL, TRIGP, CHHD, [facility]    Lab Results   Component Value Date/Time    Sodium 142 05/06/2015 06:37 PM    Potassium 3.6 05/06/2015 06:37 PM    Chloride 104 05/06/2015 06:37 PM    CO2 29 05/06/2015 06:37 PM    Anion gap 9 05/06/2015 06:37 PM    Glucose 85 05/06/2015 06:37 PM    BUN 20 05/06/2015 06:37 PM    Creatinine 1.19 05/06/2015 06:37 PM    BUN/Creatinine ratio 17 05/06/2015 06:37 PM    GFR est AA >60 05/06/2015 06:37 PM    GFR est non-AA 58 05/06/2015 06:37 PM    Calcium 8.8 05/06/2015 06:37 PM    AST (SGOT) 20 04/28/2015 03:03 PM    Alk. phosphatase 72 04/28/2015 03:03 PM    Protein, total 6.5 04/28/2015 03:03 PM    Albumin 3.9 04/28/2015 03:03 PM    A-G Ratio 1.5 04/28/2015 03:03 PM    ALT (SGPT) 13 04/28/2015 03:03 PM           Assessment:     Assessment:     Diagnoses and all orders for this visit:    1. SSS (sick sinus syndrome) ([facility] [address]    2. S/P cardiac pacemaker procedure    3. ASCVD (arteriosclerotic cardiovascular disease)  -     AMB POC EKG ROUTINE W/ 12 LEADS, INTER & REP  -     2D ECHO COMPLETE ADULT (TTE) W OR WO CONTR; Future  -     STRESS TEST LEXISCAN/CARDIOLITE; Future    4. Mixed hyperlipidemia    5. SVT (supraventricular tachycardia) (HCC)--s/p RFA    6. S/P ablation of accessory bypass tract    7. Hypertension with renal disease    8. Type 2 diabetes mellitus with complication, without long-term current use of insulin ([facility] [address]        ICD-10-CM ICD-9-CM    1. SSS (sick sinus syndrome) (HCC) I49.5 427.81    2. S/P cardiac pacemaker procedure Z95.0 V45.01    3. ASCVD (arteriosclerotic cardiovascular disease) I25.10 429.2 AMB POC EKG ROUTINE W/ 12 LEADS, INTER & REP     440.9 2D ECHO COMPLETE ADULT (TTE) W OR WO CONTR      STRESS TEST LEXISCAN/CARDIOLITE   4. Mixed hyperlipidemia E78.2 272.2    5. SVT (supraventricular tachycardia) (HCC)--s/p RFA I47.1 427.89    6. S/P ablation of accessory bypass tract Z98.890 V45.89    7. Hypertension with renal disease I12.9 403.90    8.  Type 2 diabetes mellitus with complication, without long-term current use of insulin (HCC) E11.8 250.90      Orders Placed This Encounter    AMB POC EKG ROUTINE W/ 12 LEADS, INTER & REP     Order Specific Question:   Reason for Exam:     Answer:   routine    STRESS TEST LEXISCAN/CARDIOLITE     Standing Status:   Future     Standing Expiration Date:   3/21/2018     Order Specific Question:   Reason for Exam:     Answer:   GONGORA, fatigue, dizziness    2D ECHO COMPLETE ADULT (TTE) W OR WO CONTR     Standing Status:   Future     Standing Expiration Date:   3/23/2018     Order Specific Question:   Reason for Exam:     Answer:   fatigue, GONGORA, dizziness        Plan:     Mr Tj Carranza is here today for f/u regarding history of SSS with pacemaker. He is reporting GONGORA, fatigue, and dizziness. He had no events on his device interrogation outside of 1-4 sec NSVT x2 episodes. I will evaluate with an echo and lexiscan stress test with heart murmur and hx of MI/CAD.  F/U with Dr Gonzalo Nicholson if results abnormal. If normal f/u with him per his previous instructions and  f/u in 1 year with Dr Meghann Petit, device clinic in 6 months    Magda Gamboa NP

## 2017-09-21 NOTE — MR AVS SNAPSHOT
Visit Information Date & Time Provider Department Dept. Phone Encounter #  
 9/21/2017  2:00 PM Magda Gamboa, [address] [facility] [id number] Your Appointments 10/19/2017  8:00 AM  
ECHO CARDIOGRAMS 2D with ECHO, [facility]) Appt Note: Angelica STRESS TEST LEXISCAN/CARDIOLITE [[id number]] (Order [id number]) 140pds  [address]  
[phone] [address] [facility] [address] [address]  
  
    
 10/19/2017  9:00 AM  
STRESS TEST with NUCLEAR, [facility]) Appt Note: Angelica STRESS TEST LEXISCAN/CARDIOLITE [[id number]] (Order [id number]) 140pds  [facility] [address]  
[phone] [address]  
  
    
 12/8/2017  1:20 PM  
FOLLOW UP 10 with MD EVA Joseph [facility] ([facility]) Appt Note: [address] [facility] [phone] [address] [address]  
  
    
 3/27/2018 11:30 AM  
PACEMAKER with PACEMAKER, [facility]) Appt Note: RADHA BiV PM 6mo [facility] [address] [facility] [address]  
[phone] [address] [facility] [address] Upcoming Health Maintenance Date Due  
 EYE EXAM RETINAL OR DILATED Q1 1/21/1941 DTaP/Tdap/Td series (1 - Tdap) 1/21/1952 ZOSTER VACCINE AGE 60> 11/21/1990 GLAUCOMA SCREENING Q2Y 1/21/1996 INFLUENZA AGE 9 TO ADULT 8/1/2017 HEMOGLOBIN A1C Q6M 3/1/2018 FOOT EXAM Q1 9/1/2018 MICROALBUMIN Q1 9/1/2018 LIPID PANEL Q1 9/1/2018 MEDICARE YEARLY EXAM 9/2/2018 Allergies as of 9/21/2017  Review Complete On: 9/21/2017 By: Magda Gamboa NP No Known Allergies Current Immunizations  Reviewed on 5/4/2015 Name Date Influenza Vaccine 1/18/2017, 10/18/2016, 12/15/2015 Pneumococcal Conjugate (PCV-13) 9/30/2015 Pneumococcal Polysaccharide (PPSV-23) 10/13/2006 Pneumococcal Vaccine (Unspecified Type) 10/13/2006 Not reviewed this visit You Were Diagnosed With   
  
 Codes Comments SSS (sick sinus syndrome) ([facility])    -  Primary ICD-10-CM: I49.5 ICD-9-CM: 427.81   
 ASCVD (arteriosclerotic cardiovascular disease)     ICD-10-CM: I25.10 ICD-9-CM: 429.2, 440.9 Mixed hyperlipidemia     ICD-10-CM: E78.2 ICD-9-CM: 272.2 SVT (supraventricular tachycardia) ([facility])     ICD-10-CM: I47.1 ICD-9-CM: 427.89 S/P ablation of accessory bypass tract     ICD-10-CM: Z98.890 ICD-9-CM: V45.89 Vitals BP Pulse Height(growth percentile) Weight(growth percentile) SpO2 BMI  
 110/62 (BP 1 Location: Right arm, BP Patient Position: Sitting) 76 5' 7\" (1.702 m) 140 lb 6.4 oz (63.7 kg) 100% 21.99 kg/m2 Smoking Status Former Smoker Vitals History BMI and BSA Data Body Mass Index Body Surface Area  
 21.99 kg/m 2 1.74 m 2 Your Updated Medication List  
  
   
This list is accurate as of: 9/21/17  2:22 PM.  Always use your most recent med list. amLODIPine 10 mg tablet Commonly known as:  Jean Baptiste Inks Take 10 mg by mouth daily. donepezil 10 mg tablet Commonly known as:  ARICEPT Take 10 mg by mouth nightly. enalapril 20 mg tablet Commonly known as:  Nevis Dings Take 20 mg by mouth daily. losartan-hydroCHLOROthiazide 100-25 mg per tablet Commonly known as:  HYZAAR Take 1 Tab by mouth daily. omeprazole 20 mg capsule Commonly known as:  PRILOSEC Take 20 mg by mouth daily. We Performed the Following AMB POC EKG ROUTINE W/ 12 LEADS, INTER & REP [77867 CPT(R)] To-Do List   
 Around 09/21/2017   ECG:  STRESS TEST LEXISCAN/CARDIOLITE   
  
 Around 09/24/2017 ECHO:  2D ECHO COMPLETE ADULT (TTE) W OR WO CONTR Introducing [facility] & HEALTH SERVICES! Yi Turner introduces [employer] patient portal. Now you can access parts of your medical record, email your doctor's office, and request medication refills online. 1. In your internet browser, go to [email or web address] 2. Click on the First Time User? Click Here link in the Sign In box. You will see the New Member Sign Up page. 3. Enter your [employer] Access Code exactly as it appears below. You will not need to use this code after youve completed the sign-up process. If you do not sign up before the expiration date, you must request a new code. · [employer] Access Code: [email or web address] Expires: 12/20/2017  1:40 PM 
 
4. Enter the last four digits of your Social Security Number (xxxx) and Date of Birth (mm/dd/yyyy) as indicated and click Submit. You will be taken to the next sign-up page. 5. Create a Sontra ID. This will be your Sontra login ID and cannot be changed, so think of one that is secure and easy to remember. 6. Create a S[employer]ra password. You can change your password at any time. 7. Enter your Password Reset Question and Answer. This can be used at a later time if you forget your password. 8. Enter your e-mail address. You will receive e-mail notification when new information is available in [address] 9. Click Sign Up. You can now view and download portions of your medical record. 10. Click the Download Summary menu link to download a portable copy of your medical information. If you have questions, please visit the Frequently Asked Questions section of the S[employer] website. Remember, S[employer]ra is NOT to be used for urgent needs. For medical emergencies, dial 911. Now available from your iPhone and Android! Please provide this summary of care documentation to your next provider. Your primary care clinician is listed as Thuy. If you have any questions after today's visit, please call [phone].

## 2017-10-19 ENCOUNTER — CLINICAL SUPPORT (OUTPATIENT)
Dept: CARDIOLOGY CLINIC | Age: 82
End: 2017-10-19

## 2017-10-19 DIAGNOSIS — I25.10 ASCVD (ARTERIOSCLEROTIC CARDIOVASCULAR DISEASE): ICD-10-CM

## 2017-10-20 ENCOUNTER — TELEPHONE (OUTPATIENT)
Dept: CARDIOLOGY CLINIC | Age: 82
End: 2017-10-20

## 2017-10-20 NOTE — TELEPHONE ENCOUNTER
----- Message from Meir Cedillo MD sent at 10/20/2017 10:36 AM EDT -----  pls let him know that his stress test was wnl    ----- Message -----     From: Federico Cabrera MD     Sent: 10/19/2017   4:24 PM       To:  Meir Cedillo MD

## 2017-10-23 NOTE — PROGRESS NOTES
Called patient reference stress test results. Patient not available until after 11am, will callback.

## 2017-10-23 NOTE — PROGRESS NOTES
Spoke with patient  Verified patient with 2 patient identifier  Informed per Hoang Jennings NP stress test is normal       Patient verbalized understanding.

## 2017-10-24 ENCOUNTER — TELEPHONE (OUTPATIENT)
Dept: CARDIOLOGY CLINIC | Age: 82
End: 2017-10-24

## 2017-10-24 NOTE — TELEPHONE ENCOUNTER
----- Message from Neema Chairez MD sent at 10/23/2017  2:41 PM EDT -----  pls let him know that his stress did not demonstrate ischemia but his EF is lower 45-50 and may be cause of fatigue. Would benefit from upgrade to biv pacemaker. pls schedule    ----- Message -----     From: Jorge Thibodeaux Result In     Sent: 10/23/2017  12:01 PM       To:  Neema Chairez MD

## 2017-10-24 NOTE — TELEPHONE ENCOUNTER
Called to discuss results with patient - no answer on home phone - unable to leave a message. Cell phone listed is an invalid number. Will try again at a later time.

## 2017-10-27 NOTE — TELEPHONE ENCOUNTER
Spoke with patient - scheduled next available with Dr. Monse Heck to discuss upgdrade to BiV PM. Mailed appt to patient per his request.

## 2017-11-01 DIAGNOSIS — K21.9 GASTROESOPHAGEAL REFLUX DISEASE WITHOUT ESOPHAGITIS: Primary | ICD-10-CM

## 2017-11-01 RX ORDER — OMEPRAZOLE 20 MG/1
20 CAPSULE, DELAYED RELEASE ORAL DAILY
Qty: 90 CAP | Refills: 3 | Status: SHIPPED | OUTPATIENT
Start: 2017-11-01 | End: 2020-10-22 | Stop reason: SDUPTHER

## 2017-11-01 NOTE — TELEPHONE ENCOUNTER
Requested Prescriptions     Pending Prescriptions Disp Refills    omeprazole (PRILOSEC) 20 mg capsule 90 Cap 3     Sig: Take 1 Cap by mouth daily.

## 2017-12-01 DIAGNOSIS — I10 ESSENTIAL HYPERTENSION: ICD-10-CM

## 2017-12-01 DIAGNOSIS — R63.4 UNEXPLAINED WEIGHT LOSS: ICD-10-CM

## 2017-12-01 DIAGNOSIS — R00.2 RAPID PALPITATIONS: ICD-10-CM

## 2017-12-01 DIAGNOSIS — R63.4 RAPID WEIGHT LOSS: ICD-10-CM

## 2017-12-01 DIAGNOSIS — I47.1 ATRIAL TACHYCARDIA (HCC): ICD-10-CM

## 2017-12-01 RX ORDER — LOSARTAN POTASSIUM AND HYDROCHLOROTHIAZIDE 25; 100 MG/1; MG/1
TABLET ORAL
Qty: 90 TAB | Refills: 1 | Status: SHIPPED | OUTPATIENT
Start: 2017-12-01 | End: 2018-04-16 | Stop reason: SDUPTHER

## 2017-12-01 RX ORDER — ENALAPRIL MALEATE 20 MG/1
TABLET ORAL
Qty: 90 TAB | Refills: 1 | Status: SHIPPED | OUTPATIENT
Start: 2017-12-01 | End: 2018-04-15

## 2017-12-01 NOTE — TELEPHONE ENCOUNTER
Requested Prescriptions     Pending Prescriptions Disp Refills    losartan-hydroCHLOROthiazide (HYZAAR) 100-25 mg per tablet [Pharmacy Med Name: LOSARTAN-HCTZ 100-25 MG TAB] 90 Tab 1     Sig: TAKE 1 TABLET EVERY DAY    enalapril (VASOTEC) 20 mg tablet [Pharmacy Med Name: ENALAPRIL MALEATE 20 MG TAB] 90 Tab 1     Sig: TAKE 1 TABLET BY MOUTH EVERY DAY

## 2017-12-07 PROBLEM — E78.2 MIXED HYPERLIPIDEMIA: Status: ACTIVE | Noted: 2017-07-18

## 2017-12-08 ENCOUNTER — OFFICE VISIT (OUTPATIENT)
Dept: INTERNAL MEDICINE CLINIC | Age: 82
End: 2017-12-08

## 2017-12-08 VITALS
BODY MASS INDEX: 21.82 KG/M2 | DIASTOLIC BLOOD PRESSURE: 72 MMHG | HEART RATE: 56 BPM | SYSTOLIC BLOOD PRESSURE: 139 MMHG | WEIGHT: 139 LBS | OXYGEN SATURATION: 98 % | RESPIRATION RATE: 16 BRPM | HEIGHT: 67 IN | TEMPERATURE: 97.5 F

## 2017-12-08 DIAGNOSIS — M15.9 PRIMARY OSTEOARTHRITIS INVOLVING MULTIPLE JOINTS: ICD-10-CM

## 2017-12-08 DIAGNOSIS — E11.8 TYPE 2 DIABETES MELLITUS WITH COMPLICATION, WITHOUT LONG-TERM CURRENT USE OF INSULIN (HCC): ICD-10-CM

## 2017-12-08 DIAGNOSIS — G30.9 ALZHEIMER'S DEMENTIA WITHOUT BEHAVIORAL DISTURBANCE, UNSPECIFIED TIMING OF DEMENTIA ONSET: ICD-10-CM

## 2017-12-08 DIAGNOSIS — F02.80 ALZHEIMER'S DEMENTIA WITHOUT BEHAVIORAL DISTURBANCE, UNSPECIFIED TIMING OF DEMENTIA ONSET: ICD-10-CM

## 2017-12-08 DIAGNOSIS — E78.2 MIXED HYPERLIPIDEMIA: ICD-10-CM

## 2017-12-08 DIAGNOSIS — N18.2 CKD (CHRONIC KIDNEY DISEASE), STAGE II: ICD-10-CM

## 2017-12-08 DIAGNOSIS — I12.9 HYPERTENSION WITH RENAL DISEASE: Primary | ICD-10-CM

## 2017-12-08 DIAGNOSIS — Z23 ENCOUNTER FOR IMMUNIZATION: ICD-10-CM

## 2017-12-08 DIAGNOSIS — I25.10 ASCVD (ARTERIOSCLEROTIC CARDIOVASCULAR DISEASE): ICD-10-CM

## 2017-12-08 LAB
ALBUMIN SERPL-MCNC: 4 G/DL (ref 3.9–5.4)
ALKALINE PHOS POC: 84 U/L (ref 38–126)
ALT SERPL-CCNC: 19 U/L (ref 9–52)
AST SERPL-CCNC: 27 U/L (ref 14–36)
BUN BLD-MCNC: 24 MG/DL (ref 9–20)
CALCIUM BLD-MCNC: 9.7 MG/DL (ref 8.4–10.2)
CHLORIDE BLD-SCNC: 103 MMOL/L (ref 98–107)
CHOLEST SERPL-MCNC: 214 MG/DL (ref 0–200)
CO2 POC: 28 MMOL/L (ref 22–32)
CREAT BLD-MCNC: 1.9 MG/DL (ref 0.8–1.5)
EGFR (POC): 31.2
GLUCOSE POC: 89 MG/DL (ref 75–110)
HBA1C MFR BLD HPLC: 5.4 % (ref 4.5–5.7)
HDLC SERPL-MCNC: 70 MG/DL (ref 35–130)
LDL CHOLESTEROL POC: 124.2 MG/DL (ref 0–130)
POTASSIUM SERPL-SCNC: 4.1 MMOL/L (ref 3.6–5)
PROT SERPL-MCNC: 7.3 G/DL (ref 6.3–8.2)
SODIUM SERPL-SCNC: 143 MMOL/L (ref 137–145)
TCHOL/HDL RATIO (POC): 3.1 (ref 0–4)
TOTAL BILIRUBIN POC: 0.8 MG/DL (ref 0.2–1.3)
TRIGL SERPL-MCNC: 99 MG/DL (ref 0–200)
VLDLC SERPL CALC-MCNC: 19.8 MG/DL

## 2017-12-08 RX ORDER — RISPERIDONE 0.5 MG/1
0.5 TABLET, FILM COATED ORAL
Qty: 30 TAB | Refills: 0 | Status: SHIPPED | OUTPATIENT
Start: 2017-12-08 | End: 2018-01-05 | Stop reason: SDUPTHER

## 2017-12-08 NOTE — MR AVS SNAPSHOT
Visit Information Date & Time Provider Department Dept. Phone Encounter #  
 12/8/2017  1:20 PM Samra Jones MD [facility] [id number] Your Appointments 12/21/2017 10:30 AM  
ESTABLISHED PATIENT with MD Marcell[facility]) Appt Note: Discuss upgrade to BiV PM from echo results [address] [facility]  
[phone] [address] [facility]  
  
    
 3/9/2018  1:10 PM  
FOLLOW UP 10 with MD FORREST Kurtz [facility] ([facility]) Appt Note: [address] [facility] [address] [phone] [address] [facility] [address]  
  
    
 3/27/2018 11:30 AM  
PACEMAKER with PACEMAKER, [facility] Appt Note: MDT BiV PM 6mo check [address] [facility]  
[phone] [address] [facility] Upcoming Health Maintenance Date Due  
 EYE EXAM RETINAL OR DILATED Q1 1/21/1941 DTaP/Tdap/Td series (1 - Tdap) 1/21/1952 ZOSTER VACCINE AGE 60> 11/21/1990 GLAUCOMA SCREENING Q2Y 1/21/1996 Influenza Age 5 to Adult 8/1/2017 HEMOGLOBIN A1C Q6M 3/1/2018 FOOT EXAM Q1 9/1/2018 MICROALBUMIN Q1 9/1/2018 LIPID PANEL Q1 9/1/2018 MEDICARE YEARLY EXAM 9/2/2018 Allergies as of 12/8/2017  Review Complete On: 12/8/2017 By: Jaylan BORGES Rash, LPN No Known Allergies Current Immunizations  Reviewed on 5/4/2015 Name Date Influenza Vaccine 1/18/2017, 10/18/2016, 12/15/2015 Pneumococcal Conjugate (PCV-13) 9/30/2015 Pneumococcal Polysaccharide (PPSV-23) 10/13/2006 Pneumococcal Vaccine (Unspecified Type) 10/13/2006 Not reviewed this visit Vitals BP Pulse Temp Resp Height(growth percentile) Weight(growth percentile) 139/72 (BP 1 Location: Left arm, BP Patient Position: Sitting) (!) 56 97.5 °F (36.4 °C) (Oral) 16 5' 7\" (1.702 m) 139 lb (63 kg) SpO2 BMI Smoking Status 98% 21.77 kg/m2 Former Smoker BMI and BSA Data Body Mass Index Body Surface Area 21.77 kg/m 2 1.73 m 2 Preferred Pharmacy Pharmacy Name Phone [id number] Phillips, [address] [place] [address] [phone] Your Updated Medication List  
  
   
This list is accurate as of: 12/8/17  1:54 PM.  Always use your most recent med list. amLODIPine 10 mg tablet Commonly known as:  Juan Ruths Take 10 mg by mouth daily. donepezil 10 mg tablet Commonly known as:  ARICEPT Take 10 mg by mouth nightly. enalapril 20 mg tablet Commonly known as:  VASOTEC  
TAKE 1 TABLET BY MOUTH EVERY DAY  
  
 losartan-hydroCHLOROthiazide 100-25 mg per tablet Commonly known as:  HYZAAR  
TAKE 1 TABLET EVERY DAY  
  
 omeprazole 20 mg capsule Commonly known as:  PRILOSEC Take 1 Cap by mouth daily. Introducing [facility] & HE[facility] SERVICES! Ann Mora introduces [employer] patient portal. Now you can access parts of your medical record, email your doctor's office, and request medication refills online. 1. In your internet browser, go to [email or web address] 2. Click on the First Time User? Click Here link in the Sign In box. You will see the New Member Sign Up page. 3. Enter your [employer] Access Code exactly as it appears below. You will not need to use this code after youve completed the sign-up process. If you do not sign up before the expiration date, you must request a new code. · [employer] Access Code: [email or web address] Expires: 12/20/2017 12:40 PM 
 
4.  Enter the last four digits of your Social Security Number (xxxx) and Date of Birth (mm/dd/yyyy) as indicated and click Submit. You will be taken to the next sign-up page. 5. Create a [employer] ID. This will be your [employer] login ID and cannot be changed, so think of one that is secure and easy to remember. 6. Create a [employer] password. You can change your password at any time. 7. Enter your Password Reset Question and Answer. This can be used at a later time if you forget your password. 8. Enter your e-mail address. You will receive e-mail notification when new information is available in [address] 9. Click Sign Up. You can now view and download portions of your medical record. 10. Click the Download Summary menu link to download a portable copy of your medical information. If you have questions, please visit the Frequently Asked Questions section of the [employer] website. Remember, [employer] is NOT to be used for urgent needs. For medical emergencies, dial 911. Now available from your iPhone and Android! Please provide this summary of care documentation to your next provider. Your primary care clinician is listed as Thuy. If you have any questions after today's visit, please call [phone].

## 2017-12-08 NOTE — PROGRESS NOTES
Bernabe Carmen is a 80 y.o. male  Chief Complaint   Patient presents with    Hypertension    ED Follow-up     ED [facility] ED, abdominal pain. 1. Have you been to an emergency room, urgent clinic, or hospitalized since your last visit? YES  If yes, where when, and reason for visit? See above note    2. Have seen or consulted any other health care provider since your last visit? NO  Please include any pap smears or colon screening in this section  If yes, where when, and reason for visit?

## 2017-12-08 NOTE — PROGRESS NOTES
Chief Complaint   Patient presents with    Hypertension    ED Follow-up     ED [facility] ED, abdominal pain. SUBJECTIVE:    Alexus Foster Sr is a 80 y.o. male who presents today in follow-up of his medical problems to include hypertension, diabetes, hyperlipidemia, CKD, Alzheimer's dementia, ASCVD, and history of SVT. He currently denies chest pain palpitations shortness of breath other cardiovascular complaints. There are no GI/ complaints. He denies any headaches or change in neurologic complaints. He says his memory stable I will not talk to his wife she does note that he says he might have some hallucinations at nighttime. He denies this. He denies any arthritic complaints other complaints complete review of systems. He is taking his medications and trying to follow his diet. Current Outpatient Prescriptions   Medication Sig Dispense Refill    risperiDONE (RISPERDAL) 0.5 mg tablet Take 1 Tab by mouth nightly. 30 Tab 0    losartan-hydroCHLOROthiazide (HYZAAR) 100-25 mg per tablet TAKE 1 TABLET EVERY DAY 90 Tab 1    enalapril (VASOTEC) 20 mg tablet TAKE 1 TABLET BY MOUTH EVERY DAY 90 Tab 1    omeprazole (PRILOSEC) 20 mg capsule Take 1 Cap by mouth daily. 90 Cap 3    amLODIPine (NORVASC) 10 mg tablet Take 10 mg by mouth daily.  donepezil (ARICEPT) 10 mg tablet Take 10 mg by mouth nightly.        Past Medical History:   Diagnosis Date    Abdominal pain 7/18/2017    Alzheimer disease 7/18/2017    Anemia 7/18/2017    Arthritis     Back pain 7/18/2017    Bradycardia 7/18/2017    CAD (coronary artery disease)     hx of MI    CKD (chronic kidney disease), stage II 7/18/2017    constipation     Depression 7/18/2017    Diabetes mellitus ([facility] [address] 7/18/2017    Diverticulosis 7/18/2017    DJD (degenerative joint disease) 7/18/2017    Encounter for long-term (current) use of other medications 7/18/2017    Fatigue     Gastritis and duodenitis 7/18/2017    GERD (gastroesophageal reflux disease)     GI bleed 7/18/2017    Hearing loss     Hyperlipidemia 7/18/2017    Hypertension     Hypertension with renal disease 7/18/2017    Ill-defined condition     Dementia    Internal hemorrhoids 7/18/2017    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    S/P cardiac pacemaker procedure 5/4/2015    5/4/15 Medtronic dual chamber pacemaker implant     Weight loss     lost over 40 pounds last year- has no appetite     Past Surgical History:   Procedure Laterality Date    COLONOSCOPY N/A 6/22/2017    COLONOSCOPY performed by Diana Chahal MD at [address] [place] [address]  6/22/2017         [address]  7/10/2014    right inguinal    HX OTHER SURGICAL      cystectomy from back    AL EGD TRANSORAL BIOPSY SINGLE/MULTIPLE  2/14/2012         UPPER GI ENDOSCOPY,BIOPSY  6/22/2017          No Known Allergies    REVIEW OF SYSTEMS:  General: negative for - chills or fever, or weight loss or gain  ENT: negative for - headaches, nasal congestion or tinnitus  Eyes: no blurred or visual changes  Neck: No stiffness or swollen nodes  Respiratory: negative for - cough, hemoptysis, shortness of breath or wheezing  Cardiovascular : negative for - chest pain, edema, palpitations or shortness of breath  Gastrointestinal: negative for - abdominal pain, blood in stools, heartburn or nausea/vomiting  Genito-Urinary: no dysuria, trouble voiding, or hematuria  Musculoskeletal: negative for - gait disturbance, joint pain, joint stiffness or joint swelling  Neurological: no TIA or stroke symptoms  Hematologic: no bruises, no bleeding  Lymphatic: no swollen glands  Integument: no lumps, mole changes, nail changes or rash  Endocrine:no malaise/lethargy poly uria or polydipsia or unexpected weight changes        Social History     Social History    Marital status:      Spouse name: N/A    Number of children: N/A    Years of education: N/A     Social History Main Topics    Smoking status: Former Smoker     Packs/day: 0.50     Years: 10.00     Start date: 1991    Smokeless tobacco: Never Used      Comment: quit 50 years ago    Alcohol use No    Drug use: No    Sexual activity: Not Asked     Other Topics Concern    None     Social History Narrative     Family History   Problem Relation Age of Onset    Hypertension Mother     Heart Disease Father     Cancer Other      son-cancer? unknown what type        OBJECTIVE:     Visit Vitals    /72 (BP 1 Location: Left arm, BP Patient Position: Sitting)    Pulse (!) 56    Temp 97.5 °F (36.4 °C) (Oral)    Resp 16    Ht 5' 7\" (1.702 m)    Wt 139 lb (63 kg)    SpO2 98%    BMI 21.77 kg/m2     CONSTITUTIONAL:   well nourished, appears age appropriate  EYES: sclera anicteric, PERRL, EOMI  ENMT:nars clear, moist mucous membranes, pharynx clear  NECK: supple. Thyroid normal, No JVD or bruits  RESPIRATORY: Chest: clear to ascultation and percussion, normal inspiratory effort  CARDIOVASCULAR: Heart: regular rate and rhythm no murmurs, rubs or gallops, PMI not displaced, No thrills  GASTROINTESTINAL: Abdomen: non distended, soft, non tender, bowel sounds normal  HEMATOLOGIC: no purpura, petechiae or bruising  LYMPHATIC: No lymph node enlargemant  MUSCULOSKELETAL: Extremities: no edema or active synovitis, pulse 1+   INTEGUMENT: No unusual rashes or suspicious skin lesions noted. Nails appear normal.  PERIPHERAL VASCULAR: normal pulses femoral, PT and DP  NEUROLOGIC: non-focal exam, A & O X 3 but easily confused  PSYCHIATRIC:, appropriate affect     ASSESSMENT:   1. Hypertension with renal disease    2. Mixed hyperlipidemia    3. Type 2 diabetes mellitus with complication, without long-term current use of insulin ([facility] [address]    4. Primary osteoarthritis involving multiple joints    5. CKD (chronic kidney disease), stage II    6. ASCVD (arteriosclerotic cardiovascular disease)    7.  Alzheimer's dementia without behavioral disturbance, unspecified timing of dementia onset    8. Encounter for immunization      Impression  1. Hypertension that seem to be well controlled continue current therapy  2. Diabetes we will see what that status is make further recommendations adjustments if necessary  3. Hyperlipidemia that status is pending we reviewed her prior lab is prior labs  4. DJD that is currently stable no changes needed  5. CKD continue current therapy pending results of today's lab reviewed prior labs  6. ASCVD is clinically stable  7. Alzheimer's dementia with increased confusion I discussed this with his wife and I discussed this with him at this point I will try adding low-dose Risperdal at bedtime and see if that will help  Flu shot given today. Labs are pending as noted will make further recommendation based on those. Follow stable continue same and follow-up 3 months or sooner should there be a problem. PLAN:  .  Orders Placed This Encounter    Influenza virus vaccine (FLUZONE HIGH-DOSE) 65 years and older (55[address])    AMB POC LIPID PROFILE    AMB POC HEMOGLOBIN A1C    AMB POC COMPREHENSIVE METABOLIC PANEL    risperiDONE (RISPERDAL) 0.5 mg tablet         ATTENTION:   This medical record was transcribed using an electronic medical records system. Although proofread, it may and can contain electronic and spelling errors. Other human spelling and other errors may be present. Corrections may be executed at a later time. Please feel free to contact us for any clarifications as needed. Follow-up Disposition:  Return in about 3 months (around 3/8/2018).     Results for orders placed or performed in visit on 12/08/17   AMB POC LIPID PROFILE   Result Value Ref Range    Cholesterol (POC)  0 - 200 mg/dL    Triglycerides (POC)  0 - 200 mg/dL    HDL Cholesterol (POC)  35 - 130 mg/dL    VLDL (POC)  MG/DL    LDL Cholesterol (POC)  0.0 - 130.0 MG/DL    TChol/HDL Ratio (POC)  0.0 - 4.0   AMB POC HEMOGLOBIN A1C Result Value Ref Range    Hemoglobin A1c (POC)  4.5 - 5.7 %   AMB POC COMPREHENSIVE METABOLIC PANEL   Result Value Ref Range    GLUCOSE  75 - 110 mg/dL    BUN  9 - 20 mg/dL    Creatinine (POC)  0.8 - 1.5 mg/dL    Sodium (POC)  137 - 145 MMOL/L    Potassium (POC)  3.6 - 5.0 MMOL/L    CHLORIDE  98 - 107 MMOL/L    CO2  22 - 32 MMOL/L    CALCIUM  8.4 - 10.2 mg/dL    TOTAL PROTEIN  6.3 - 8.2 g/dL    ALBUMIN  3.9 - 5.4 g/dL    AST (POC)  14 - 36 U/L    ALT (POC)  9 - 52 U/L    ALKALINE PHOS  38 - 126 U/L    TOTAL BILIRUBIN  0.2 - 1.3 mg/dL    eGFR (POC)         Oralia Douglass MD    The patient verbalized understanding of the problems and plans as explained.

## 2018-01-05 RX ORDER — RISPERIDONE 0.5 MG/1
TABLET, FILM COATED ORAL
Qty: 30 TAB | Refills: 0 | Status: SHIPPED | OUTPATIENT
Start: 2018-01-05 | End: 2018-02-24 | Stop reason: SDUPTHER

## 2018-02-15 ENCOUNTER — OFFICE VISIT (OUTPATIENT)
Dept: CARDIOLOGY CLINIC | Age: 83
End: 2018-02-15

## 2018-02-15 ENCOUNTER — CLINICAL SUPPORT (OUTPATIENT)
Dept: CARDIOLOGY CLINIC | Age: 83
End: 2018-02-15

## 2018-02-15 VITALS
WEIGHT: 139.3 LBS | SYSTOLIC BLOOD PRESSURE: 150 MMHG | RESPIRATION RATE: 16 BRPM | OXYGEN SATURATION: 98 % | DIASTOLIC BLOOD PRESSURE: 60 MMHG | BODY MASS INDEX: 21.87 KG/M2 | HEIGHT: 67 IN | HEART RATE: 77 BPM

## 2018-02-15 DIAGNOSIS — E11.8 TYPE 2 DIABETES MELLITUS WITH COMPLICATION, WITHOUT LONG-TERM CURRENT USE OF INSULIN (HCC): ICD-10-CM

## 2018-02-15 DIAGNOSIS — R55 NEAR SYNCOPE: ICD-10-CM

## 2018-02-15 DIAGNOSIS — I12.9 HYPERTENSION WITH RENAL DISEASE: ICD-10-CM

## 2018-02-15 DIAGNOSIS — Z95.0 S/P CARDIAC PACEMAKER PROCEDURE: Primary | ICD-10-CM

## 2018-02-15 DIAGNOSIS — I49.5 SSS (SICK SINUS SYNDROME) (HCC): Primary | ICD-10-CM

## 2018-02-15 DIAGNOSIS — I47.1 SVT (SUPRAVENTRICULAR TACHYCARDIA) (HCC): ICD-10-CM

## 2018-02-15 NOTE — MR AVS SNAPSHOT
[address] [facility] 
[phone] Patient: Dolores Land Sr 
MRN: [id number] FTQ:1/44/2101 Visit Information Date & Time Provider Department Dept. Phone Encounter #  
 2/15/2018  9:15 AM Lavinia Nunez, [address] [facility] [phone] [id number] Your Appointments 3/9/2018  1:10 PM  
FOLLOW UP 10 with MD COLLINS Mccabe [facility] ([facility]) Appt Note: [address] [address] [address] [address] 8/16/2018  9:30 AM  
PROCEDURE with PACEMAKER, [facility] Appt Note: 6mo mdt bivpm  
 [facility]  
[phone] [facility] Upcoming Health Maintenance Date Due  
 EYE EXAM RETINAL OR DILATED Q1 1/21/1941 DTaP/Tdap/Td series (1 - Tdap) 1/21/1952 ZOSTER VACCINE AGE 60> 11/21/1990 GLAUCOMA SCREENING Q2Y 1/21/1996 HEMOGLOBIN A1C Q6M 6/8/2018 FOOT EXAM Q1 9/1/2018 MICROALBUMIN Q1 9/1/2018 MEDICARE YEARLY EXAM 9/2/2018 LIPID PANEL Q1 12/8/2018 Allergies as of 2/15/2018  Review Complete On: 2/15/2018 By: Nimco Glaser No Known Allergies Current Immunizations  Reviewed on 5/4/2015 Name Date Influenza High Dose Vaccine PF 12/8/2017 Influenza Vaccine 1/18/2017, 10/18/2016, 12/15/2015 Pneumococcal Conjugate (PCV-13) 9/30/2015 Pneumococcal Polysaccharide (PPSV-23) 10/13/2006 Pneumococcal Vaccine (Unspecified Type) 10/13/2006 Not reviewed this visit You Were Diagnosed With   
  
 Codes Comments SSS (sick sinus syndrome) (HCC)    -  Primary ICD-10-CM: I49.5 ICD-9-CM: 427.81 Vitals BP Pulse Resp Height(growth percentile) Weight(growth percentile) SpO2 150/60 (BP 1 Location: Left arm, BP Patient Position: Sitting) 77 16 5' 7\" (1.702 m) 139 lb 4.8 oz (63.2 kg) 98% BMI Smoking Status 21.82 kg/m2 Former Smoker Vitals History BMI and BSA Data Body Mass Index Body Surface Area  
 21.82 kg/m 2 1.73 m 2 Preferred Pharmacy Pharmacy Name Phone [id number] Daniel[address] [phone] Your Updated Medication List  
  
   
This list is accurate as of: 2/15/18 10:11 AM.  Always use your most recent med list. amLODIPine 10 mg tablet Commonly known as:  Andrey Lords Take 10 mg by mouth daily. donepezil 10 mg tablet Commonly known as:  ARICEPT Take 10 mg by mouth nightly. enalapril 20 mg tablet Commonly known as:  VASOTEC  
TAKE 1 TABLET BY MOUTH EVERY DAY  
  
 losartan-hydroCHLOROthiazide 100-25 mg per tablet Commonly known as:  HYZAAR  
TAKE 1 TABLET EVERY DAY  
  
 omeprazole 20 mg capsule Commonly known as:  PRILOSEC Take 1 Cap by mouth daily. risperiDONE 0.5 mg tablet Commonly known as:  RisperDAL TAKE 1 TAB BY MOUTH NIGHTLY. We Performed the Following AMB POC EKG ROUTINE W/ 12 LEADS, INTER & REP [02267 CPT(R)] Introducing [facility]! Dakota Baez introduces [employer] patient portal. Now you can access parts of your medical record, email your doctor's office, and request medication refills online. 1. In your internet browser, go to [email or web address] 2. Click on the First Time User? Click Here link in the Sign In box. You will see the New Member Sign Up page. 3. Enter your [employer] Access Code exactly as it appears below. You will not need to use this code after youve completed the sign-up process. If you do not sign up before the expiration date, you must request a new code. · [employer] Access Code: [email or web address] Expires: 5/16/2018  9:07 AM 
 
 4. Enter the last four digits of your Social Security Number (xxxx) and Date of Birth (mm/dd/yyyy) as indicated and click Submit. You will be taken to the next sign-up page. 5. Create a [employer] ID. This will be your [employer] login ID and cannot be changed, so think of one that is secure and easy to remember. 6. Create a [employer] password. You can change your password at any time. 7. Enter your Password Reset Question and Answer. This can be used at a later time if you forget your password. 8. Enter your e-mail address. You will receive e-mail notification when new information is available in [address] 9. Click Sign Up. You can now view and download portions of your medical record. 10. Click the Download Summary menu link to download a portable copy of your medical information. If you have questions, please visit the Frequently Asked Questions section of the [employer] website. Remember, [employer] is NOT to be used for urgent needs. For medical emergencies, dial 911. Now available from your iPhone and Android! Please provide this summary of care documentation to your next provider. Your primary care clinician is listed as Thuy. If you have any questions after today's visit, please call [phone].

## 2018-02-15 NOTE — PROGRESS NOTES
1. Have you been to the ER, urgent care clinic since your last visit? Hospitalized since your last visit? No    2. Have you seen or consulted any other health care providers outside of the [address] [place] [address] since your last visit? Include any pap smears or colon screening.  No    Chief Complaint   Patient presents with    Irregular Heart Beat     routine follow up

## 2018-02-15 NOTE — PROGRESS NOTES
Subjective:      Chio Green Sr is a 80 y.o. male is here for device follow up. He is doing well. The patient denies chest pain/ shortness of breath, orthopnea, PND, LE edema, palpitations, syncope, presyncope or fatigue.        Patient Active Problem List    Diagnosis Date Noted    Medicare annual wellness visit, initial 09/01/2017    Diverticulosis 07/18/2017    Gastritis and duodenitis 07/18/2017    Internal hemorrhoids 07/18/2017    Hypertension with renal disease 07/18/2017    Mixed hyperlipidemia 07/18/2017    GI bleed 07/18/2017    DJD (degenerative joint disease) 07/18/2017    Diabetes mellitus ([facility] [address] 07/18/2017    Depression 07/18/2017    CKD (chronic kidney disease), stage II 07/18/2017    Back pain 07/18/2017    Anemia 07/18/2017    Alzheimer disease 07/18/2017    SSS (sick sinus syndrome) ([facility] [address] 06/02/2015    S/P cardiac pacemaker procedure 05/04/2015    S/P ablation of accessory bypass tract 05/04/2015    SVT (supraventricular tachycardia) ([facility]s/p RFA 04/28/2015    Near syncope 03/11/2015    ASCVD (arteriosclerotic cardiovascular disease) 07/11/2014    Hypokalemia 07/11/2014    Right inguinal hernia 06/12/2014      Haley Motta MD  Past Medical History:   Diagnosis Date    Abdominal pain 7/18/2017    Alzheimer disease 7/18/2017    Anemia 7/18/2017    Arthritis     Back pain 7/18/2017    Bradycardia 7/18/2017    CAD (coronary artery disease)     hx of MI    CKD (chronic kidney disease), stage II 7/18/2017    constipation     Depression 7/18/2017    Diabetes mellitus ([facility] [address] 7/18/2017    Diverticulosis 7/18/2017    DJD (degenerative joint disease) 7/18/2017    Encounter for long-term (current) use of other medications 7/18/2017    Fatigue     Gastritis and duodenitis 7/18/2017    GERD (gastroesophageal reflux disease)     GI bleed 7/18/2017    Hearing loss     Hyperlipidemia 7/18/2017    Hypertension     Hypertension with renal disease 2017    Ill-defined condition     Dementia    Internal hemorrhoids 2017    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    S/P cardiac pacemaker procedure 2015    5/4/15 Medtronic dual chamber pacemaker implant     Weight loss     lost over 40 pounds last year- has no appetite      Past Surgical History:   Procedure Laterality Date    COLONOSCOPY N/A 2017    COLONOSCOPY performed by Frieda Sherwood MD at [address] [facility]  2017         [address]  7/10/2014    right inguinal    HX OTHER SURGICAL      cystectomy from back    [place] EGD TRANSORAL BIOPSY SINGLE/MULTIPLE  2012         UPPER GI ENDOSCOPY,BIOPSY  2017          No Known Allergies   Family History   Problem Relation Age of Onset    Hypertension Mother     Heart Disease Father     Cancer Other      son-cancer? unknown what type     negative for cardiac disease  Social History     Social History    Marital status:      Spouse name: N/A    Number of children: N/A    Years of education: N/A     Social History Main Topics    Smoking status: Former Smoker     Packs/day: 0.50     Years: 10.00     Start date: 1991    Smokeless tobacco: Never Used      Comment: quit 50 years ago    Alcohol use No    Drug use: No    Sexual activity: Not Asked     Other Topics Concern    None     Social History Narrative     Current Outpatient Prescriptions   Medication Sig    losartan-hydroCHLOROthiazide (HYZAAR) 100-25 mg per tablet TAKE 1 TABLET EVERY DAY    enalapril (VASOTEC) 20 mg tablet TAKE 1 TABLET BY MOUTH EVERY DAY    omeprazole (PRILOSEC) 20 mg capsule Take 1 Cap by mouth daily.  amLODIPine (NORVASC) 10 mg tablet Take 10 mg by mouth daily.  donepezil (ARICEPT) 10 mg tablet Take 10 mg by mouth nightly.  risperiDONE (RISPERDAL) 0.5 mg tablet TAKE 1 TAB BY MOUTH NIGHTLY. No current facility-administered medications for this visit. Vitals:    02/15/18 0924   BP: 150/60   Pulse: 77   Resp: 16   SpO2: 98%   Weight: 139 lb 4.8 oz (63.2 kg)   Height: 5' 7\" (1.702 m)       I have reviewed the nurses notes, vitals, problem list, allergy list, medical history, family, social history and medications. Review of Symptoms:    General: Pt denies excessive weight gain or loss. Pt is able to conduct ADL's  HEENT: Denies blurred vision, headaches, epistaxis and difficulty swallowing. Respiratory: Denies shortness of breath, GONGORA, wheezing or stridor. Cardiovascular: Denies precordial pain, palpitations, edema or PND  Gastrointestinal: Denies poor appetite, indigestion, abdominal pain or blood in stool  Urinary: Denies dysuria, pyuria  Musculoskeletal: Denies pain or swelling from muscles or joints  Neurologic: Denies tremor, paresthesias, or sensory motor disturbance  Skin: Denies rash, itching or texture change. Psych: Denies depression      Physical Exam:      General: Well developed, in no acute distress. HEENT: Eyes - PERRL, no jvd  Heart:  Normal S1/S2 negative S3 or S4. Regular, no murmur, gallop or rub.   Respiratory: Clear bilaterally x 4, no wheezing or rales  Abdomen:   Soft, non-tender, bowel sounds are active.   Extremities:  No edema, normal cap refill, no cyanosis. Musculoskeletal: No clubbing  Neuro: A&Ox3, speech clear, gait stable. Skin: Skin color is normal. No rashes or lesions.  Non diaphoretic  Vascular: 2+ pulses symmetric in all extremities    Cardiographics    Ekg: ventricular pacing with PVCs  MDT BiVPM: 72.2% AP, 99.2% RVP    Results for orders placed or performed during the hospital encounter of 05/06/15   EKG, 12 LEAD, INITIAL   Result Value Ref Range    Ventricular Rate 65 BPM    Atrial Rate 65 BPM    P-R Interval 150 ms    QRS Duration 166 ms    Q-T Interval 448 ms    QTC Calculation (Bezet) 465 ms    Calculated P Axis 28 degrees    Calculated R Axis 132 degrees    Calculated T Axis 32 degrees    Diagnosis Atrial-sensed ventricular-paced rhythm  Confirmed by Zuleima Ventura ([id number]) on 5/7/2015 10:30:21 PM           Lab Results   Component Value Date/Time    WBC 6.8 05/06/2015 06:37 PM    Hemoglobin (POC) 15.6 07/10/2014 06:55 AM    HGB 10.5 (L) 05/06/2015 06:37 PM    Hematocrit (POC) 46 07/10/2014 06:55 AM    HCT 32.9 (L) 05/06/2015 06:37 PM    PLATELET 668 21/08/6263 06:37 PM    MCV 83.5 05/06/2015 06:37 PM      Lab Results   Component Value Date/Time    Sodium 142 05/06/2015 06:37 PM    Potassium 3.6 05/06/2015 06:37 PM    Chloride 104 05/06/2015 06:37 PM    CO2 29 05/06/2015 06:37 PM    Anion gap 9 05/06/2015 06:37 PM    Glucose 85 05/06/2015 06:37 PM    BUN 20 05/06/2015 06:37 PM    Creatinine 1.19 (H) 05/06/2015 06:37 PM    BUN/Creatinine ratio 17 05/06/2015 06:37 PM    GFR est AA >60 05/06/2015 06:37 PM    GFR est non-AA 58 (L) 05/06/2015 06:37 PM    Calcium 8.8 05/06/2015 06:37 PM    Bilirubin, total 0.2 04/28/2015 03:03 PM    AST (SGOT) 20 04/28/2015 03:03 PM    Alk. phosphatase 72 04/28/2015 03:03 PM    Protein, total 6.5 04/28/2015 03:03 PM    Albumin 3.9 04/28/2015 03:03 PM    A-G Ratio 1.5 04/28/2015 03:03 PM    ALT (SGPT) 13 04/28/2015 03:03 PM         Assessment:     Assessment:        ICD-10-CM ICD-9-CM    1. SSS (sick sinus syndrome) ([facility]) I49.5 427.81 AMB POC EKG ROUTINE W/ 12 LEADS, INTER & REP   2. Hypertension with renal disease I12.9 403.90    3. SVT (supraventricular tachycardia) ([facility])--s/p RFA I47.1 427.89    4. Type 2 diabetes mellitus with complication, without long-term current use of insulin (HCC) E11.8 250.90      Orders Placed This Encounter    AMB POC EKG ROUTINE W/ 12 LEADS, INTER & REP     Order Specific Question:   Reason for Exam:     Answer:   Routine        Plan:   Mr. Jamshid Oviedo is here for annual device follow up. He is doing well without cardiac complaints. EKG shows ventricular pacing with PVCs.  His device interrogation shows normal functioning (72.2% AP for his sick sinus, 99. 2%RVP for his chb, no episodes). Echocardiogram in 10/2017 revealed EF of 45-50%. He can continue current medical therapy and follow up in one year. Continue medical management for sss, chb, HTN, DM, SVT. Thank you for allowing me to participate in [address] 's care.     GET Baez MD, Bakari Rahman

## 2018-02-24 DIAGNOSIS — G30.9 ALZHEIMER'S DEMENTIA WITHOUT BEHAVIORAL DISTURBANCE, UNSPECIFIED TIMING OF DEMENTIA ONSET: Primary | ICD-10-CM

## 2018-02-24 DIAGNOSIS — F02.80 ALZHEIMER'S DEMENTIA WITHOUT BEHAVIORAL DISTURBANCE, UNSPECIFIED TIMING OF DEMENTIA ONSET: Primary | ICD-10-CM

## 2018-02-26 RX ORDER — RISPERIDONE 0.5 MG/1
TABLET, FILM COATED ORAL
Qty: 30 TAB | Refills: 5 | Status: SHIPPED | OUTPATIENT
Start: 2018-02-26 | End: 2018-03-09 | Stop reason: ALTCHOICE

## 2018-02-26 NOTE — TELEPHONE ENCOUNTER
RX refill request from the patient/pharmacy. Patient last seen 12- with labs, and next appt. scheduled for 03-.

## 2018-02-28 DIAGNOSIS — F02.80 ALZHEIMER'S DISEASE OF OTHER ONSET WITHOUT BEHAVIORAL DISTURBANCE: Primary | ICD-10-CM

## 2018-02-28 DIAGNOSIS — G30.8 ALZHEIMER'S DISEASE OF OTHER ONSET WITHOUT BEHAVIORAL DISTURBANCE: Primary | ICD-10-CM

## 2018-02-28 RX ORDER — DONEPEZIL HYDROCHLORIDE 10 MG/1
TABLET, FILM COATED ORAL
Qty: 90 TAB | Refills: 3 | Status: SHIPPED | OUTPATIENT
Start: 2018-02-28 | End: 2019-05-01 | Stop reason: SDUPTHER

## 2018-03-08 PROBLEM — M15.9 PRIMARY OSTEOARTHRITIS INVOLVING MULTIPLE JOINTS: Status: ACTIVE | Noted: 2017-07-18

## 2018-03-08 PROBLEM — N18.2 CONTROLLED TYPE 2 DIABETES MELLITUS WITH STAGE 2 CHRONIC KIDNEY DISEASE, WITHOUT LONG-TERM CURRENT USE OF INSULIN (HCC): Status: ACTIVE | Noted: 2017-07-18

## 2018-03-08 PROBLEM — E11.22 CONTROLLED TYPE 2 DIABETES MELLITUS WITH STAGE 2 CHRONIC KIDNEY DISEASE, WITHOUT LONG-TERM CURRENT USE OF INSULIN (HCC): Status: ACTIVE | Noted: 2017-07-18

## 2018-03-09 ENCOUNTER — OFFICE VISIT (OUTPATIENT)
Dept: INTERNAL MEDICINE CLINIC | Age: 83
End: 2018-03-09

## 2018-03-09 VITALS
DIASTOLIC BLOOD PRESSURE: 62 MMHG | HEART RATE: 66 BPM | TEMPERATURE: 99.1 F | WEIGHT: 137 LBS | OXYGEN SATURATION: 98 % | SYSTOLIC BLOOD PRESSURE: 114 MMHG | BODY MASS INDEX: 21.5 KG/M2 | HEIGHT: 67 IN | RESPIRATION RATE: 16 BRPM

## 2018-03-09 DIAGNOSIS — N18.2 CONTROLLED TYPE 2 DIABETES MELLITUS WITH STAGE 2 CHRONIC KIDNEY DISEASE, WITHOUT LONG-TERM CURRENT USE OF INSULIN (HCC): ICD-10-CM

## 2018-03-09 DIAGNOSIS — M15.9 PRIMARY OSTEOARTHRITIS INVOLVING MULTIPLE JOINTS: ICD-10-CM

## 2018-03-09 DIAGNOSIS — E11.22 CONTROLLED TYPE 2 DIABETES MELLITUS WITH STAGE 2 CHRONIC KIDNEY DISEASE, WITHOUT LONG-TERM CURRENT USE OF INSULIN (HCC): ICD-10-CM

## 2018-03-09 DIAGNOSIS — I12.9 HYPERTENSION WITH RENAL DISEASE: Primary | ICD-10-CM

## 2018-03-09 DIAGNOSIS — I25.10 ASCVD (ARTERIOSCLEROTIC CARDIOVASCULAR DISEASE): ICD-10-CM

## 2018-03-09 DIAGNOSIS — I47.1 SVT (SUPRAVENTRICULAR TACHYCARDIA) (HCC): ICD-10-CM

## 2018-03-09 DIAGNOSIS — I49.5 SSS (SICK SINUS SYNDROME) (HCC): ICD-10-CM

## 2018-03-09 DIAGNOSIS — G30.9 ALZHEIMER'S DEMENTIA WITHOUT BEHAVIORAL DISTURBANCE, UNSPECIFIED TIMING OF DEMENTIA ONSET: ICD-10-CM

## 2018-03-09 DIAGNOSIS — F02.80 ALZHEIMER'S DEMENTIA WITHOUT BEHAVIORAL DISTURBANCE, UNSPECIFIED TIMING OF DEMENTIA ONSET: ICD-10-CM

## 2018-03-09 DIAGNOSIS — N18.2 CKD (CHRONIC KIDNEY DISEASE), STAGE II: ICD-10-CM

## 2018-03-09 DIAGNOSIS — E78.2 MIXED HYPERLIPIDEMIA: ICD-10-CM

## 2018-03-09 LAB
ALBUMIN SERPL-MCNC: 3.5 G/DL (ref 3.9–5.4)
ALKALINE PHOS POC: 65 U/L (ref 38–126)
ALT SERPL-CCNC: 30 U/L (ref 9–52)
AST SERPL-CCNC: 34 U/L (ref 14–36)
BUN BLD-MCNC: 27 MG/DL (ref 9–20)
CALCIUM BLD-MCNC: 9.3 MG/DL (ref 8.4–10.2)
CHLORIDE BLD-SCNC: 104 MMOL/L (ref 98–107)
CHOLEST SERPL-MCNC: 175 MG/DL (ref 0–200)
CK (CPK) POC: 158 U/L (ref 30–135)
CO2 POC: 28 MMOL/L (ref 22–32)
CREAT BLD-MCNC: 1.6 MG/DL (ref 0.8–1.5)
EGFR (POC): 38.2
GLUCOSE POC: 96 MG/DL (ref 75–110)
HBA1C MFR BLD HPLC: 5 % (ref 4.5–5.7)
HDLC SERPL-MCNC: 72 MG/DL (ref 35–130)
LDL CHOLESTEROL POC: 85.4 MG/DL (ref 0–130)
POTASSIUM SERPL-SCNC: 3.9 MMOL/L (ref 3.6–5)
PROT SERPL-MCNC: 6.3 G/DL (ref 6.3–8.2)
SODIUM SERPL-SCNC: 139 MMOL/L (ref 137–145)
TCHOL/HDL RATIO (POC): 2.4 (ref 0–4)
TOTAL BILIRUBIN POC: 0.5 MG/DL (ref 0.2–1.3)
TRIGL SERPL-MCNC: 88 MG/DL (ref 0–200)
VLDLC SERPL CALC-MCNC: 17.6 MG/DL

## 2018-03-09 NOTE — PROGRESS NOTES
1. Have you been to the ER, urgent care clinic since your last visit? Hospitalized since your last visit? No    2. Have you seen or consulted any other health care providers outside of the [address] [place] [address] since your last visit? Include any pap smears or colon screening. No     Chief Complaint   Patient presents with    Hypertension     3 mo. f/u    Diabetes     3 mo. f/u    Cholesterol Problem     3 mo.  f/u       Fasting

## 2018-03-09 NOTE — PATIENT INSTRUCTIONS
Alzheimer's Disease: Care Instructions  Your Care Instructions    Alzheimer's disease is a type of dementia. It causes memory loss and affects judgment, language, and behavior. You may have trouble making decisions or may get lost in places that you used to know well. Alzheimer's disease is different than mild memory loss that occurs with aging. It is not clear what causes Alzheimer's disease, but it is the most common form of dementia in older adults. Finding out that you have this disease is a shock. You may be afraid and worried about how the condition will change your life. Although there is no cure at this time, medicine in some cases may slow memory loss for a while. Other medicines may be able to help you sleep or cope with depression and behavior changes. Alzheimer's disease is different for everyone. It may take many years to develop. In some cases, people can function well for a long time. In the early stage of the disease, you can do things at home to make life easier and safer. You also can keep doing your hobbies and other activities. Many people find comfort in planning now for their future needs. Follow-up care is a key part of your treatment and safety. Be sure to make and go to all appointments, and call your doctor if you are having problems. It's also a good idea to know your test results and keep a list of the medicines you take. How can you care for yourself at home? Taking care of yourself  · If your doctor gives you medicines, take them exactly as prescribed. Call your doctor if you think you are having a problem with your medicine. You will get more details on the medicines your doctor prescribes. · Eat a balanced diet. Get plenty of whole grains, fruits, and vegetables every day. If you are not hungry at mealtimes, eat snacks at midmorning and in the afternoon. Try drinks such as Boost, Ensure, or Sustacal if you are having trouble keeping your weight up. · Stay active.  Exercise such as walking may slow the decline of your mental abilities. Try to stay active mentally too. Read and work crossword puzzles if you enjoy these activities. · If you have trouble sleeping, do not nap during the day. Get regular exercise (but not within several hours of bedtime). Drink a glass of warm milk or caffeine-free herbal tea before going to bed. · Ask your doctor about support groups and other resources in your area. They can help people who have Alzheimer's disease and their families. · Be patient. You may find that a task takes you longer than it used to. · If you have not already done so, make a list of advance directives. Advance directives are instructions to your doctor and family members about what kind of care you want if you become unable to speak or express yourself. Talk to a  about making a will, if you do not already have one. Keeping schedules  · Develop a routine. You will feel less frustrated or confused if you have a clear, simple plan of what to do every day. ¨ Make lists of your medicines and when to take them. ¨ Write down appointments and other tasks in a calendar. ¨ Put sticky notes around the house to help you remember events and other things you have to do. ¨ Schedule activities and tasks for times of the day when you are best able to handle them. Staying safe  · Tell someone when you are going out and where you are going. Let the person know when you will be back. Before you go out alone, write down where you are going, how to get there, and how to get back home. Do this even if you have gone there many times before. Take someone along with you when possible. · Make your home safe. Tack down rugs, put no-slip tape in the tub, use handrails, and put safety switches on stoves and appliances. · Have a family member or other caregiver tell you whether you are driving badly. Deciding to stop driving is very hard for many people. Driving helps you feel independent.  Your state 's license bureau can do a driving test if there is any question. Plan for other means of getting around when you are no longer able to drive. · Use strong lighting, especially at night. Put night-lights in bedrooms, hallways, and bathrooms. · Lower the hot water temperature setting to 120°F or lower to avoid burns. When should you call for help? Call 911 anytime you think you may need emergency care. For example, call if:  ? · You are lost and do not know whom to call. ? · You are injured and do not know whom to call. ?Call your doctor now or seek immediate medical care if:  ? · Your symptoms suddenly get much worse. ? Watch closely for changes in your health, and be sure to contact your doctor if:  ? · You want more information about how you can take care of yourself. Where can you learn more? Go to http://zach-ehrlinda.info/. Enter Y179 in the search box to learn more about \"Alzheimer's Disease: Care Instructions. \"  Current as of: May 12, 2017  Content Version: 11.4  © 6912-5415 [employer]. Care instructions adapted under license by [employer] (which disclaims liability or warranty for this information). If you have questions about a medical condition or this instruction, always ask your healthcare professional. [address] any warranty or liability for your use of this information.

## 2018-03-09 NOTE — PROGRESS NOTES
Chief Complaint   Patient presents with    Hypertension     3 mo. f/u    Diabetes     3 mo. f/u    Cholesterol Problem     3 mo. f/u       SUBJECTIVE:    Ronna Farfan Sr is a 80 y.o. male returns in follow-up for his medical problems include hypertension, diabetes, hyperlipidemia, CKD, DJD, ASCVD, history of SVT, history of sick sinus syndrome status post pacemaker, and Alzheimer's dementia. He is taking his medications and trying to follow his diet and seems to be doing okay. He denies any chest pain, palpitations, shortness of breath, PND, orthopnea or other cardiovascular commands. He denies any GI or  complaints. He denies any headaches, dizziness or neurological planes. He denies any new arthritic complaints. He denies any other complaints on complete review of systems. His wife told me asking regarding drinking excessive alcohol although he denies it saying he has a beer once a day. Current Outpatient Prescriptions   Medication Sig Dispense Refill    donepezil (ARICEPT) 10 mg tablet TAKE 1 TABLET BY MOUTH EVERY DAY 90 Tab 3    losartan-hydroCHLOROthiazide (HYZAAR) 100-25 mg per tablet TAKE 1 TABLET EVERY DAY 90 Tab 1    enalapril (VASOTEC) 20 mg tablet TAKE 1 TABLET BY MOUTH EVERY DAY 90 Tab 1    omeprazole (PRILOSEC) 20 mg capsule Take 1 Cap by mouth daily. 90 Cap 3    amLODIPine (NORVASC) 10 mg tablet Take 10 mg by mouth daily.        Past Medical History:   Diagnosis Date    Abdominal pain 7/18/2017    Alzheimer disease 7/18/2017    Anemia 7/18/2017    Arthritis     Back pain 7/18/2017    Bradycardia 7/18/2017    CAD (coronary artery disease)     hx of MI    CKD (chronic kidney disease), stage II 7/18/2017    constipation     Depression 7/18/2017    Diabetes mellitus (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    S/P cardiac pacemaker procedure 5/4/2015    5/4/15 [employer]onic dual chamber pacemaker implant     Weight loss     lost over 40 pounds last year- has no appetite     Past Surgical History:   Procedure Laterality Date    COLONOSCOPY N/A 6/22/2017    COLONOSCOPY performed by Nj Mendez MD at [address] [place] [address]  6/22/2017         [address]  7/10/2014    right inguinal    HX OTHER SURGICAL      cystectomy from back    RI EGD TRANSORAL BIOPSY SINGLE/MULTIPLE  2/14/2012         UPPER GI ENDOSCOPY,BIOPSY  6/22/2017          No Known Allergies    REVIEW OF SYSTEMS:  General: negative for - chills or fever, or weight loss or gain  ENT: negative for - headaches, nasal congestion or tinnitus  Eyes: no blurred or visual changes  Neck: No stiffness or swollen nodes  Respiratory: negative for - cough, hemoptysis, shortness of breath or wheezing  Cardiovascular : negative for - chest pain, edema, palpitations or shortness of breath  Gastrointestinal: negative for - abdominal pain, blood in stools, heartburn or nausea/vomiting  Genito-Urinary: no dysuria, trouble voiding, or hematuria  Musculoskeletal: negative for - gait disturbance, joint pain, joint stiffness or joint swelling  Neurological: no TIA or stroke symptoms  Hematologic: no bruises, no bleeding  Lymphatic: no swollen glands  Integument: no lumps, mole changes, nail changes or rash  Endocrine:no malaise/lethargy poly uria or polydipsia or unexpected weight changes        Social History     Social History    Marital status:      Spouse name: N/A    Number of children: N/A    Years of education: N/A     Social History Main Topics    Smoking status: Former Smoker     Packs/day: 0.50     Years: 10.00     Start date: 1991    Smokeless tobacco: Never Used      Comment: quit 50 years ago    Alcohol use No    Drug use: No    Sexual activity: Not Asked     Other Topics Concern    None     Social History Narrative     Family History   Problem Relation Age of Onset    Hypertension Mother     Heart Disease Father     Cancer Other      son-cancer? unknown what type        OBJECTIVE:     Visit Vitals    /62 (BP 1 Location: Left arm, BP Patient Position: Sitting)    Pulse 66    Temp 99.1 °F (37.3 °C) (Oral)    Resp 16    Ht 5' 7\" (1.702 m)    Wt 137 lb (62.1 kg)    SpO2 98%    BMI 21.46 kg/m2     CONSTITUTIONAL:   well nourished, appears age appropriate  EYES: sclera anicteric, PERRL, EOMI  ENMT:nares clear, moist mucous membranes, pharynx clear  NECK: supple. Thyroid normal, No JVD or bruits  RESPIRATORY: Chest: clear to ascultation and percussion, normal inspiratory effort  CARDIOVASCULAR: Heart: regular rate and rhythm no murmurs, rubs or gallops, PMI not displaced, No thrills  GASTROINTESTINAL: Abdomen: non distended, soft, non tender, bowel sounds normal  HEMATOLOGIC: no purpura, petechiae or bruising  LYMPHATIC: No lymph node enlargemant  MUSCULOSKELETAL: Extremities: no edema or active synovitis, pulse 1+   INTEGUMENT: No unusual rashes or suspicious skin lesions noted. Nails appear normal.  PERIPHERAL VASCULAR: normal pulses femoral, PT and DP  NEUROLOGIC: non-focal exam, A & O X 3  PSYCHIATRIC:, appropriate affect     ASSESSMENT:   1. Hypertension with renal disease    2. 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, without long-term current use of insulin ([facility] [address]    3. Mixed hyperlipidemia    4. Primary osteoarthritis involving multiple joints    5. CKD (chronic kidney disease), stage II    6.  ASCVD (arteriosclerotic cardiovascular disease)    7. Alzheimer's dementia without behavioral disturbance, unspecified timing of dementia onset    8. SSS (sick sinus syndrome) ([facility])    9. SVT (supraventricular tachycardia) ([facility])--s/p RFA      Impression  1. Hypertension that is controlled continue current therapy reviewed with him  2. Type 2 diabetes repeat status pending reviewed prior labs will make adjustments if necessary I did set him up for diabetic eye exam since he apparently is quite overdue I  3. Hyperlipidemia repeat status pending will make adjustments if necessary reviewed prior labs with him  4. DJD that seems to be stable continue current therapy  5. CKD repeat status pending  6. ASCVD stable  7. Mild Alzheimer's dementia he does not want to take anything for this except for the Aricept which she is already on  Sick sinus syndrome status post pacemaker with history of SVT that all seems stable  I will call with lab and make further recommendations adjustments. I did caution regarding excessive alcohol intake. Follow-up schedule III months or sooner should there be a problem. PLAN:  .  Orders Placed This Encounter    REFERRAL TO OPHTHALMOLOGY    AMB POC LIPID PROFILE    AMB POC HEMOGLOBIN A1C    AMB POC COMPREHENSIVE METABOLIC PANEL    AMB POC CK (CPK)         ATTENTION:   This medical record was transcribed using an electronic medical records system. Although proofread, it may and can contain electronic and spelling errors. Other human spelling and other errors may be present. Corrections may be executed at a later time. Please feel free to contact us for any clarifications as needed. Follow-up Disposition:  Return in about 3 months (around 6/9/2018). No results found for any visits on 03/09/18. Diana Crowder MD    The patient verbalized understanding of the problems and plans as explained.

## 2018-04-03 DIAGNOSIS — R63.4 UNEXPLAINED WEIGHT LOSS: ICD-10-CM

## 2018-04-03 DIAGNOSIS — I47.1 ATRIAL TACHYCARDIA (HCC): ICD-10-CM

## 2018-04-03 DIAGNOSIS — I10 ESSENTIAL HYPERTENSION: ICD-10-CM

## 2018-04-03 DIAGNOSIS — R00.2 RAPID PALPITATIONS: ICD-10-CM

## 2018-04-03 DIAGNOSIS — R63.4 RAPID WEIGHT LOSS: ICD-10-CM

## 2018-04-03 RX ORDER — AMLODIPINE BESYLATE 10 MG/1
TABLET ORAL
Qty: 90 TAB | Refills: 3 | Status: SHIPPED | OUTPATIENT
Start: 2018-04-03 | End: 2019-05-01 | Stop reason: SDUPTHER

## 2018-04-03 NOTE — TELEPHONE ENCOUNTER
RX refill request from the patient/pharmacy. Patient last seen 03- with labs, and next appt. scheduled for 06-.

## 2018-04-12 ENCOUNTER — HOSPITAL ENCOUNTER (INPATIENT)
Age: 83
LOS: 2 days | Discharge: HOME | DRG: 069 | End: 2018-04-15
Attending: EMERGENCY MEDICINE | Admitting: HOSPITALIST
Payer: MEDICARE

## 2018-04-12 ENCOUNTER — APPOINTMENT (OUTPATIENT)
Dept: CT IMAGING | Age: 83
DRG: 069 | End: 2018-04-12
Attending: EMERGENCY MEDICINE
Payer: MEDICARE

## 2018-04-12 DIAGNOSIS — R47.1 DYSARTHRIA: ICD-10-CM

## 2018-04-12 DIAGNOSIS — R41.0 CONFUSION: ICD-10-CM

## 2018-04-12 DIAGNOSIS — R29.810 FACIAL DROOP: Primary | ICD-10-CM

## 2018-04-12 DIAGNOSIS — R47.9 SPEECH DISTURBANCE, UNSPECIFIED TYPE: ICD-10-CM

## 2018-04-12 PROBLEM — G45.9 TIA (TRANSIENT ISCHEMIC ATTACK): Status: ACTIVE | Noted: 2018-04-12

## 2018-04-12 LAB
ALBUMIN SERPL-MCNC: 3.4 G/DL (ref 3.5–5)
ALBUMIN/GLOB SERPL: 0.8 {RATIO} (ref 1.1–2.2)
ALP SERPL-CCNC: 77 U/L (ref 45–117)
ALT SERPL-CCNC: 26 U/L (ref 12–78)
AMPHET UR QL SCN: NEGATIVE
ANION GAP SERPL CALC-SCNC: 12 MMOL/L (ref 5–15)
APPEARANCE UR: CLEAR
AST SERPL-CCNC: 41 U/L (ref 15–37)
BACTERIA URNS QL MICRO: NEGATIVE /HPF
BARBITURATES UR QL SCN: NEGATIVE
BASOPHILS # BLD: 0.1 K/UL (ref 0–0.1)
BASOPHILS NFR BLD: 1 % (ref 0–1)
BENZODIAZ UR QL: NEGATIVE
BILIRUB SERPL-MCNC: 0.7 MG/DL (ref 0.2–1)
BILIRUB UR QL: NEGATIVE
BUN SERPL-MCNC: 27 MG/DL (ref 6–20)
BUN/CREAT SERPL: 18 (ref 12–20)
CALCIUM SERPL-MCNC: 9.3 MG/DL (ref 8.5–10.1)
CANNABINOIDS UR QL SCN: NEGATIVE
CHLORIDE SERPL-SCNC: 104 MMOL/L (ref 97–108)
CO2 SERPL-SCNC: 22 MMOL/L (ref 21–32)
COCAINE UR QL SCN: NEGATIVE
COLOR UR: NORMAL
CREAT SERPL-MCNC: 1.52 MG/DL (ref 0.7–1.3)
DIFFERENTIAL METHOD BLD: NORMAL
DRUG SCRN COMMENT,DRGCM: NORMAL
EOSINOPHIL # BLD: 0.1 K/UL (ref 0–0.4)
EOSINOPHIL NFR BLD: 1 % (ref 0–7)
EPITH CASTS URNS QL MICRO: NORMAL /LPF
ERYTHROCYTE [DISTWIDTH] IN BLOOD BY AUTOMATED COUNT: 14.4 % (ref 11.5–14.5)
ETHANOL SERPL-MCNC: 211 MG/DL
GLOBULIN SER CALC-MCNC: 4.3 G/DL (ref 2–4)
GLUCOSE SERPL-MCNC: 74 MG/DL (ref 65–100)
GLUCOSE UR STRIP.AUTO-MCNC: NEGATIVE MG/DL
HCT VFR BLD AUTO: 39.5 % (ref 36.6–50.3)
HGB BLD-MCNC: 12.5 G/DL (ref 12.1–17)
HGB UR QL STRIP: NEGATIVE
HYALINE CASTS URNS QL MICRO: NORMAL /LPF (ref 0–5)
IMM GRANULOCYTES # BLD: 0 K/UL (ref 0–0.04)
IMM GRANULOCYTES NFR BLD AUTO: 0 % (ref 0–0.5)
KETONES UR QL STRIP.AUTO: NEGATIVE MG/DL
LEUKOCYTE ESTERASE UR QL STRIP.AUTO: NEGATIVE
LYMPHOCYTES # BLD: 1.7 K/UL (ref 0.8–3.5)
LYMPHOCYTES NFR BLD: 22 % (ref 12–49)
MCH RBC QN AUTO: 27.7 PG (ref 26–34)
MCHC RBC AUTO-ENTMCNC: 31.6 G/DL (ref 30–36.5)
MCV RBC AUTO: 87.4 FL (ref 80–99)
METHADONE UR QL: NEGATIVE
MONOCYTES # BLD: 0.9 K/UL (ref 0–1)
MONOCYTES NFR BLD: 12 % (ref 5–13)
NEUTS SEG # BLD: 4.9 K/UL (ref 1.8–8)
NEUTS SEG NFR BLD: 64 % (ref 32–75)
NITRITE UR QL STRIP.AUTO: NEGATIVE
NRBC # BLD: 0 K/UL (ref 0–0.01)
NRBC BLD-RTO: 0 PER 100 WBC
OPIATES UR QL: NEGATIVE
PCP UR QL: NEGATIVE
PH UR STRIP: 5.5 [PH] (ref 5–8)
PLATELET # BLD AUTO: 201 K/UL (ref 150–400)
PMV BLD AUTO: 10.7 FL (ref 8.9–12.9)
POTASSIUM SERPL-SCNC: 4 MMOL/L (ref 3.5–5.1)
PROT SERPL-MCNC: 7.7 G/DL (ref 6.4–8.2)
PROT UR STRIP-MCNC: NEGATIVE MG/DL
RBC # BLD AUTO: 4.52 M/UL (ref 4.1–5.7)
RBC #/AREA URNS HPF: NORMAL /HPF (ref 0–5)
SODIUM SERPL-SCNC: 138 MMOL/L (ref 136–145)
SP GR UR REFRACTOMETRY: 1.01 (ref 1–1.03)
UR CULT HOLD, URHOLD: NORMAL
UROBILINOGEN UR QL STRIP.AUTO: 0.2 EU/DL (ref 0.2–1)
WBC # BLD AUTO: 7.7 K/UL (ref 4.1–11.1)
WBC URNS QL MICRO: NORMAL /HPF (ref 0–4)

## 2018-04-12 PROCEDURE — 70450 CT HEAD/BRAIN W/O DYE: CPT

## 2018-04-12 PROCEDURE — 36415 COLL VENOUS BLD VENIPUNCTURE: CPT | Performed by: EMERGENCY MEDICINE

## 2018-04-12 PROCEDURE — 80307 DRUG TEST PRSMV CHEM ANLYZR: CPT | Performed by: EMERGENCY MEDICINE

## 2018-04-12 PROCEDURE — 99284 EMERGENCY DEPT VISIT MOD MDM: CPT

## 2018-04-12 PROCEDURE — 81001 URINALYSIS AUTO W/SCOPE: CPT | Performed by: EMERGENCY MEDICINE

## 2018-04-12 PROCEDURE — 99218 HC RM OBSERVATION: CPT

## 2018-04-12 PROCEDURE — 93005 ELECTROCARDIOGRAM TRACING: CPT

## 2018-04-12 PROCEDURE — 80053 COMPREHEN METABOLIC PANEL: CPT | Performed by: EMERGENCY MEDICINE

## 2018-04-12 PROCEDURE — 85025 COMPLETE CBC W/AUTO DIFF WBC: CPT | Performed by: EMERGENCY MEDICINE

## 2018-04-12 RX ORDER — SODIUM CHLORIDE 9 MG/ML
100 INJECTION, SOLUTION INTRAVENOUS CONTINUOUS
Status: DISCONTINUED | OUTPATIENT
Start: 2018-04-12 | End: 2018-04-13

## 2018-04-12 NOTE — IP AVS SNAPSHOT
[address] 
[phone] Patient: Governor Jon Vences 
MRN: [id number] POV:3/70/4062 About your hospitalization You were admitted on:  April 12, 2018 You last received care in the:  [address] [place] [address] NEURO-SCI TELE You were discharged on:  April 15, 2018 Why you were hospitalized Your primary diagnosis was:  Tia (Transient Ischemic Attack) Your diagnoses also included:  Metabolic Encephalopathy Follow-up Information Follow up With Details Comments Contact Info Heidi Michael MD In 1 week discharge follow up  [address] 
[phone] 5353 [facility] 
[facility] Critical access hospital [address] 
[phone] Discharge Orders None A check josé miguel indicates which time of day the medication should be taken. My Medications START taking these medications Instructions Each Dose to Equal  
 Morning Noon Evening Bedtime  
 aspirin 81 mg chewable tablet Your last dose was: Your next dose is: Take 1 Tab by mouth daily. 81 mg CONTINUE taking these medications Instructions Each Dose to Equal  
 Morning Noon Evening Bedtime  
 amLODIPine 10 mg tablet Commonly known as:  Nallely Grebe Your last dose was: Your next dose is: TAKE 1 TABLET BY MOUTH EVERY DAY  
     
   
   
   
  
 donepezil 10 mg tablet Commonly known as:  ARICEPT Your last dose was: Your next dose is: TAKE 1 TABLET BY MOUTH EVERY DAY  
     
   
   
   
  
 losartan-hydroCHLOROthiazide 100-25 mg per tablet Commonly known as:  HYZAAR Your last dose was: Your next dose is: TAKE 1 TABLET EVERY DAY  
     
   
   
   
  
 omeprazole 20 mg capsule Commonly known as:  PRILOSEC  
   
 Your last dose was: Your next dose is: Take 1 Cap by mouth daily. 20 mg  
    
   
   
   
  
  
STOP taking these medications   
 enalapril 20 mg tablet Commonly known as:  Parish Kuhn Where to Get Your Medications Information on where to get these meds will be given to you by the nurse or doctor. ! Ask your nurse or doctor about these medications  
  aspirin 81 mg chewable tablet Discharge Instructions Please bring this form with you to show your primary care provider at your follow-up appointment. Primary care provider:  Dr. Jimmy Wilson MD 
 
Discharging provider:  Aretha Dunbar MD 
 
You have been admitted to the hospital with the following diagnoses: · TIA (transient ischemic attack) · Metabolic encephalopathy FOLLOW-UP CARE RECOMMENDATIONS: 
 
APPOINTMENTS: 
Follow-up Information Follow up With Details Comments Contact Info Jimmy Wilson MD In 1 week discharge follow up  [address]. [phone] FOLLOW-UP TESTS recommended: none PENDING TEST RESULTS: 
At the time of your discharge the following test results are still pending: none Please make sure you review these results with your outpatient follow-up provider(s). SYMPTOMS to watch for: chest pain, shortness of breath, fever, chills, nausea, vomiting, diarrhea, change in mentation, falling, weakness, bleeding. DIET/what to eat:  Cardiac Diet ACTIVITY:  Activity as tolerated WOUND CARE: NONE 
 
EQUIPMENT needed:  NONE What to do if new or unexpected symptoms occur? If you experience any of the above symptoms (or should other concerns or questions arise after discharge) please call your primary care physician. Return to the emergency room if you cannot get hold of your doctor. · It is very important that you keep your follow-up appointment(s). · Please bring discharge papers, medication list (and/or medication bottles) to your follow-up appointments for review by your outpatient provider(s). · Please check the list of medications and be sure it includes every medication (even non-prescription medications) that your provider wants you to take. · It is important that you take the medication exactly as they are prescribed. · Keep your medication in the bottles provided by the pharmacist and keep a list of the medication names, dosages, and times to be taken in your wallet. · Do not take other medications without consulting your doctor. · If you have any questions about your medications or other instructions, please talk to your nurse or care provider before you leave the hospital. 
 
I understand that if any problems occur once I am at home I am to contact my physician. These instructions were explained to me and I had the opportunity to ask questions. ACO Transitions of Care Introducing [employer] [address]ina offers a voluntary care coordination program to provide high quality service and care to Renae Figueroa fee-for-service beneficiaries. Jonathan Mccracken was designed to help you enhance your health and well-being through the following services: ? Transitions of Care  support for individuals who are transitioning from one care setting to another (example: Hospital to home). ? Chronic and Complex Care Coordination  support for individuals and caregivers of those with serious or chronic illnesses or with more than one chronic (ongoing) condition and those who take a number of different medications. If you meet specific medical criteria, a Critical access hospital [address] may call you directly to coordinate your care with your primary care physician and your other care providers. For questions about the [facility] programs, please, contact your physicians office. For general questions or additional information about Accountable Care Organizations: 
Please visit www.medicare.gov/acos. html or call 1-800-MEDICARE (1-404.164.3144) TTY users should call 2-249.930.9759. Paltalk Announcement We are excited to announce that we are making your provider's discharge notes available to you in Paltalk. You will see these notes when they are completed and signed by the physician that discharged you from your recent hospital stay. If you have any questions or concerns about any information you see in Paltalk, please call the Health Information Department where you were seen or reach out to your Primary Care Provider for more information about your plan of care. Introducing [facility]! Jen iLm introduces Paltalk patient portal. Now you can access parts of your medical record, email your doctor's office, and request medication refills online. 1. In your internet browser, go to [email or web address] 2. Click on the First Time User? Click Here link in the Sign In box. You will see the New Member Sign Up page. 3. Enter your Paltalk Access Code exactly as it appears below. You will not need to use this code after youve completed the sign-up process. If you do not sign up before the expiration date, you must request a new code. · Paltalk Access Code: [email or web address] Expires: 5/16/2018 10:07 AM 
 
4. Enter the last four digits of your Social Security Number (xxxx) and Date of Birth (mm/dd/yyyy) as indicated and click Submit. You will be taken to the next sign-up page. 5. Create a Paltalk ID. This will be your Paltalk login ID and cannot be changed, so think of one that is secure and easy to remember. 6. Create a Paltalk password. You can change your password at any time. 7. Enter your Password Reset Question and Answer. This can be used at a later time if you forget your password. 8. Enter your e-mail address. You will receive e-mail notification when new information is available in [address] 9. Click Sign Up. You can now view and download portions of your medical record. 10. Click the Download Summary menu link to download a portable copy of your medical information. If you have questions, please visit the Frequently Asked Questions section of the [employer] website. Remember, [employer] is NOT to be used for urgent needs. For medical emergencies, dial 911. Now available from your iPhone and Android! Introducing Derik Garcia As a [facility] patient, I wanted to make you aware of our electronic visit tool called Derik Garcia. [facility] 24/7 allows you to connect within minutes with a medical provider 24 hours a day, seven days a week via a mobile device or tablet or logging into a secure website from your computer. You can access Derik Garcia from anywhere in the [place]. A virtual visit might be right for you when you have a simple condition and feel like you just dont want to get out of bed, or cant get away from work for an appointment, when your regular [facility] provider is not available (evenings, weekends or holidays), or when youre out of town and need minor care. Electronic visits cost only $49 and if the [facility] 24/7 provider determines a prescription is needed to treat your condition, one can be electronically transmitted to a nearby pharmacy*. Please take a moment to enroll today if you have not already done so. The enrollment process is free and takes just a few minutes. To enroll, please download the [employer]/Giveo darryl to your tablet or phone, or visit [email or web address] to enroll on your computer.    
And, as an [address] [place] [address] patient with a [employer] account, the results of your visits will be scanned into your electronic medical record and your primary care provider will be able to view the scanned results. We urge you to continue to see your regular [employer] provider for your ongoing medical care. And while your primary care provider may not be the one available when you seek a [employer] virtual visit, the peace of mind you get from getting a real diagnosis real time can be priceless. For more information on [employer], view our Frequently Asked Questions (FAQs) at [email or web address]. Sincerely, 
 
Reynaldo Willis MD 
Chief Medical Officer Alexis[facility] Sandee Avina *:  certain medications cannot be prescribed via [employer] Providers Seen During Your Hospitalization Provider Specialty Primary office phone Jaene Craig DO Emergency Medicine [phone] Olga Barraza MD Internal Medicine [phone] Joann Heredia MD Internal Medicine [phone] Marko Frank MD Internal Medicine [phone] Your Primary Care Physician (PCP) Primary Care Physician Office Phone Office Fax Joni Melara [phone] You are allergic to the following No active allergies Recent Documentation Height Weight BMI Smoking Status 1.702 m 61.1 kg 21.11 kg/m2 Former Smoker Emergency Contacts Name Discharge Info Relation Home Work Mobile Carmita Farfan DISCHARGE CAREGIVER [3] Spouse [3] [phone] BhattiLonJess DISCHARGE CAREGIVER [3] Daughter [21] [phone] Patient Belongings The following personal items are in your possession at time of discharge: 
  Dental Appliances: None  Visual Aid: Glasses, With patient      Home Medications: None   Jewelry: None  Clothing: Pants, Slippers    Other Valuables: None Please provide this summary of care documentation to your next provider. Signatures-by signing, you are acknowledging that this After Visit Summary has been reviewed with you and you have received a copy. Patient Signature:  ____________________________________________________________ Date:  ____________________________________________________________  
  
Rexann Ports Provider Signature:  ____________________________________________________________ Date:  ____________________________________________________________

## 2018-04-12 NOTE — IP AVS SNAPSHOT
[address] 
[phone] Patient: July Yañez Sr 
MRN: [id number] CNU:4/54/9917 A check josé miguel indicates which time of day the medication should be taken. My Medications START taking these medications Instructions Each Dose to Equal  
 Morning Noon Evening Bedtime  
 aspirin 81 mg chewable tablet Your last dose was: Your next dose is: Take 1 Tab by mouth daily. 81 mg CONTINUE taking these medications Instructions Each Dose to Equal  
 Morning Noon Evening Bedtime  
 amLODIPine 10 mg tablet Commonly known as:  [place] Your last dose was: Your next dose is: TAKE 1 TABLET BY MOUTH EVERY DAY  
     
   
   
   
  
 donepezil 10 mg tablet Commonly known as:  ARICEPT Your last dose was: Your next dose is: TAKE 1 TABLET BY MOUTH EVERY DAY  
     
   
   
   
  
 losartan-hydroCHLOROthiazide 100-25 mg per tablet Commonly known as:  HYZAAR Your last dose was: Your next dose is: TAKE 1 TABLET EVERY DAY  
     
   
   
   
  
 omeprazole 20 mg capsule Commonly known as:  PRILOSEC Your last dose was: Your next dose is: Take 1 Cap by mouth daily. 20 mg  
    
   
   
   
  
  
STOP taking these medications   
 enalapril 20 mg tablet Commonly known as:  Guzman Soria Where to Get Your Medications Information on where to get these meds will be given to you by the nurse or doctor. ! Ask your nurse or doctor about these medications  
  aspirin 81 mg chewable tablet

## 2018-04-12 NOTE — IP AVS SNAPSHOT
Summary of Care Report The Summary of Care report has been created to help improve care coordination. Users with access to [employer] or [address] (Web-based application) may access additional patient information including the Discharge Summary. If you are not currently a [address] user and need more information, please call the number listed below in the [place] [address] section and ask to be connected with Medical Records. Facility Information Name Address Phone [address] [phone] [phone] Patient Information Patient Name Sex JENNYFER Robles ([id number]) Male 1931 Discharge Information Admitting Provider Service Area Unit Marlyn Escobedo MD / [address] [facility] / [phone] Discharge Provider Discharge Date/Time Discharge Disposition Destination (none) 2018 (Pending) AHR (none) Patient Language Language ENGLISH [13] Hospital Problems as of 4/15/2018  Reviewed: 2018  1:52 AM by Galileo Rodriguez MD  
  
  
  
 Class Noted - Resolved Last Modified POA Active Problems * (Principal)TIA (transient ischemic attack)  2018 - Present 2018 by Galileo Rodriguez MD Yes Entered by Galileo Rodriguez MD  
  Metabolic encephalopathy   - Present 2018 by Marlyn Escobedo MD Unknown Entered by Marlyn Escobedo MD  
  
Non-Hospital Problems as of 4/15/2018  Reviewed: 2018  1:52 AM by Galileo Rodriguez MD  
  
  
  
 Class Noted - Resolved Last Modified Active Problems   Right inguinal hernia  2014 - Present 2014 by Sergio Ruvalcaba MD  
  Entered by Sergio Ruvalcaba MD  
  ASCVD (arteriosclerotic cardiovascular disease)  2014 - Present 2017 by Jolly Hobbs MD  
  Entered by Jolly Hobbs MD  
 Hypokalemia  7/11/2014 - Present 7/11/2014 Entered by Kathryn Hernandez MD  
  Near syncope  3/11/2015 - Present 3/11/2015 by Ross Davis MD  
  Entered by Ross Davis MD  
  SVT (supraventricular tachycardia) (HCC)--s/p RFA  4/28/2015 - Present 8/31/2017 by Kathryn Hernandez MD  
  Entered by Claudia Covington MD  
  S/P cardiac pacemaker procedure  5/4/2015 - Present 8/31/2017 by Kathryn Hernandez MD  
  Entered by Arin Mukherjee NP Overview Signed 5/4/2015  2:46 PM by Arin Mukherjee NP  
   5/4/15 Medtronic dual chamber pacemaker implant S/P ablation of accessory bypass tract  5/4/2015 - Present 5/4/2015 by Arin Mukherjee NP Entered by Arin Mukherjee NP Overview Signed 5/4/2015  2:47 PM by Arin Mukherjee NP  
   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  
  Entered by Claudia Covington MD  
  Diverticulosis  7/18/2017 - Present 8/31/2017 by Kathryn Hernandez MD  
  Entered by Neo Dasilva Gastritis and duodenitis  7/18/2017 - Present 7/18/2017 by Neo Dasilva Entered by Neo Dasilva Internal hemorrhoids  7/18/2017 - Present 7/18/2017 by Neo Dasilva Entered by Neo Dasilva Hypertension with renal disease  7/18/2017 - Present 8/31/2017 by Kathryn Hernandez MD  
  Entered by Neo Dasilva Mixed hyperlipidemia  7/18/2017 - Present 12/7/2017 by Kathryn Hernandez MD  
  Entered by Neo Dasilva GI bleed  7/18/2017 - Present 7/18/2017 by Neo Dasilva Entered by Neo Dasilva Primary osteoarthritis involving multiple joints  7/18/2017 - Present 3/8/2018 by Kathryn Hernandez MD  
  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  
  Entered by Neo Dasilva Depression  7/18/2017 - Present 7/18/2017 by Rissa Agosto Entered by Rissa Agosto CKD (chronic kidney disease), stage II  7/18/2017 - Present 8/31/2017 by Krystyna Jones MD  
  Entered by Rissa Agosto Back pain  7/18/2017 - Present 7/18/2017 by Rissa Agosto Entered by Rissa Agosto Anemia  7/18/2017 - Present 7/18/2017 by Rissa Agosto Entered by Rissa Agosto Alzheimer disease  7/18/2017 - Present 8/31/2017 by Krystyna Jones MD  
  Entered by Rissa Agosto Medicare annual wellness visit, initial  9/1/2017 - Present 9/1/2017 by Krystyna Jones MD  
  Entered by Krystyna Jones MD  
  
You are allergic to the following No active allergies Current Discharge Medication List  
  
START taking these medications Dose & Instructions Dispensing Information Comments  
 aspirin 81 mg chewable tablet Dose:  81 mg Take 1 Tab by mouth daily. Quantity:  30 Tab Refills:  1 CONTINUE these medications which have NOT CHANGED Dose & Instructions Dispensing Information Comments  
 amLODIPine 10 mg tablet Commonly known as:  [place]renas Barges TAKE 1 TABLET BY MOUTH EVERY DAY Quantity:  90 Tab Refills:  3  
   
 donepezil 10 mg tablet Commonly known as:  ARICEPT  
 TAKE 1 TABLET BY MOUTH EVERY DAY Quantity:  90 Tab Refills:  3  
   
 losartan-hydroCHLOROthiazide 100-25 mg per tablet Commonly known as:  HYZAAR  
 TAKE 1 TABLET EVERY DAY Quantity:  90 Tab Refills:  1  
   
 omeprazole 20 mg capsule Commonly known as:  PRILOSEC Dose:  20 mg Take 1 Cap by mouth daily. Quantity:  90 Cap Refills:  3 STOP taking these medications Comments  
 enalapril 20 mg tablet Commonly known as:  Derek Ink Current Immunizations Name Date Influenza High Dose Vaccine PF 12/8/2017 Influenza Vaccine 1/18/2017, 10/18/2016, 12/15/2015 Pneumococcal Conjugate (PCV-13) 9/30/2015 Pneumococcal Polysaccharide (PPSV-23) 10/13/2006 Pneumococcal Vaccine (Unspecified Type) 10/13/2006 Follow-up Information Follow up With Details Comments Contact Info Mikayla Schilling MD In 1 week discharge follow up  [facility] 
[phone] [address] [facility] 
1st [facility] [place] [address] 
[phone] Discharge Instructions Please bring this form with you to show your primary care provider at your follow-up appointment. Primary care provider:  Dr. Mikayla Schilling MD 
 
Discharging provider:  Melodie Peters MD 
 
You have been admitted to the hospital with the following diagnoses: · TIA (transient ischemic attack) · Metabolic encephalopathy FOLLOW-UP CARE RECOMMENDATIONS: 
 
APPOINTMENTS: 
Follow-up Information Follow up With Details Comments Contact Info Mikayla Schilling MD In 1 week discharge follow up  [address] 
[phone] FOLLOW-UP TESTS recommended: none PENDING TEST RESULTS: 
At the time of your discharge the following test results are still pending: none Please make sure you review these results with your outpatient follow-up provider(s). SYMPTOMS to watch for: chest pain, shortness of breath, fever, chills, nausea, vomiting, diarrhea, change in mentation, falling, weakness, bleeding. DIET/what to eat:  Cardiac Diet ACTIVITY:  Activity as tolerated WOUND CARE: NONE 
 
EQUIPMENT needed:  NONE What to do if new or unexpected symptoms occur? If you experience any of the above symptoms (or should other concerns or questions arise after discharge) please call your primary care physician. Return to the emergency room if you cannot get hold of your doctor. · It is very important that you keep your follow-up appointment(s). · Please bring discharge papers, medication list (and/or medication bottles) to your follow-up appointments for review by your outpatient provider(s). · Please check the list of medications and be sure it includes every medication (even non-prescription medications) that your provider wants you to take. · It is important that you take the medication exactly as they are prescribed. · Keep your medication in the bottles provided by the pharmacist and keep a list of the medication names, dosages, and times to be taken in your wallet. · Do not take other medications without consulting your doctor. · If you have any questions about your medications or other instructions, please talk to your nurse or care provider before you leave the hospital. 
 
I understand that if any problems occur once I am at home I am to contact my physician. These instructions were explained to me and I had the opportunity to ask questions. Chart Review Routing History Recipient Method Report Sent By Candis Barakat MD  
Phone: [phone] In Basket IP Auto Routed Yin Perkins MD [[id number]] 4/14/2018  6:31 AM 04/14/2018 Hans Barakat MD  
Phone: [phone] In Basket IP Auto Routed Melody Pat MD [[id number]] 4/14/2018 11:07 AM 04/14/2018

## 2018-04-13 ENCOUNTER — APPOINTMENT (OUTPATIENT)
Dept: CT IMAGING | Age: 83
DRG: 069 | End: 2018-04-13
Attending: INTERNAL MEDICINE
Payer: MEDICARE

## 2018-04-13 PROBLEM — G93.41 METABOLIC ENCEPHALOPATHY: Status: ACTIVE | Noted: 2018-04-13

## 2018-04-13 LAB
ALBUMIN SERPL-MCNC: 3.4 G/DL (ref 3.5–5)
ALBUMIN/GLOB SERPL: 0.9 {RATIO} (ref 1.1–2.2)
ALP SERPL-CCNC: 72 U/L (ref 45–117)
ALT SERPL-CCNC: 22 U/L (ref 12–78)
ANION GAP SERPL CALC-SCNC: 9 MMOL/L (ref 5–15)
AST SERPL-CCNC: 28 U/L (ref 15–37)
ATRIAL RATE: 60 BPM
ATRIAL RATE: 63 BPM
BASOPHILS # BLD: 0.1 K/UL (ref 0–0.1)
BASOPHILS NFR BLD: 1 % (ref 0–1)
BILIRUB SERPL-MCNC: 0.5 MG/DL (ref 0.2–1)
BLASTS NFR BLD MANUAL: 0 %
BUN SERPL-MCNC: 24 MG/DL (ref 6–20)
BUN/CREAT SERPL: 18 (ref 12–20)
CALCIUM SERPL-MCNC: 9.1 MG/DL (ref 8.5–10.1)
CALCULATED R AXIS, ECG10: -172 DEGREES
CALCULATED R AXIS, ECG10: -174 DEGREES
CALCULATED T AXIS, ECG11: 19 DEGREES
CALCULATED T AXIS, ECG11: 36 DEGREES
CHLORIDE SERPL-SCNC: 107 MMOL/L (ref 97–108)
CHOLEST SERPL-MCNC: 206 MG/DL
CK MB CFR SERPL CALC: 3.1 % (ref 0–2.5)
CK MB SERPL-MCNC: 2.6 NG/ML (ref 5–25)
CK SERPL-CCNC: 85 U/L (ref 39–308)
CO2 SERPL-SCNC: 27 MMOL/L (ref 21–32)
CREAT SERPL-MCNC: 1.36 MG/DL (ref 0.7–1.3)
DIAGNOSIS, 93000: NORMAL
DIAGNOSIS, 93000: NORMAL
DIFFERENTIAL METHOD BLD: NORMAL
EOSINOPHIL # BLD: 0.2 K/UL (ref 0–0.4)
EOSINOPHIL NFR BLD: 4 % (ref 0–7)
ERYTHROCYTE [DISTWIDTH] IN BLOOD BY AUTOMATED COUNT: 14.5 % (ref 11.5–14.5)
EST. AVERAGE GLUCOSE BLD GHB EST-MCNC: 111 MG/DL
GLOBULIN SER CALC-MCNC: 3.6 G/DL (ref 2–4)
GLUCOSE SERPL-MCNC: 81 MG/DL (ref 65–100)
HBA1C MFR BLD: 5.5 % (ref 4.2–6.3)
HCT VFR BLD AUTO: 37.9 % (ref 36.6–50.3)
HDLC SERPL-MCNC: 101 MG/DL
HDLC SERPL: 2 {RATIO} (ref 0–5)
HGB BLD-MCNC: 12.3 G/DL (ref 12.1–17)
IMM GRANULOCYTES # BLD: 0 K/UL
IMM GRANULOCYTES NFR BLD AUTO: 0 %
LDLC SERPL CALC-MCNC: 85.2 MG/DL (ref 0–100)
LIPID PROFILE,FLP: ABNORMAL
LYMPHOCYTES # BLD: 1.4 K/UL (ref 0.8–3.5)
LYMPHOCYTES NFR BLD: 26 % (ref 12–49)
MAGNESIUM SERPL-MCNC: 1.6 MG/DL (ref 1.6–2.4)
MCH RBC QN AUTO: 28 PG (ref 26–34)
MCHC RBC AUTO-ENTMCNC: 32.5 G/DL (ref 30–36.5)
MCV RBC AUTO: 86.3 FL (ref 80–99)
METAMYELOCYTES NFR BLD MANUAL: 0 %
MONOCYTES # BLD: 0.4 K/UL (ref 0–1)
MONOCYTES NFR BLD: 8 % (ref 5–13)
MYELOCYTES NFR BLD MANUAL: 0 %
NEUTS BAND NFR BLD MANUAL: 0 % (ref 0–6)
NEUTS SEG # BLD: 3.3 K/UL (ref 1.8–8)
NEUTS SEG NFR BLD: 61 % (ref 32–75)
NRBC # BLD: 0 K/UL (ref 0–0.01)
NRBC BLD-RTO: 0 PER 100 WBC
OTHER CELLS NFR BLD MANUAL: 0 %
P-R INTERVAL, ECG05: 138 MS
PHOSPHATE SERPL-MCNC: 3.2 MG/DL (ref 2.6–4.7)
PLATELET # BLD AUTO: 212 K/UL (ref 150–400)
PMV BLD AUTO: 10 FL (ref 8.9–12.9)
POTASSIUM SERPL-SCNC: 3.8 MMOL/L (ref 3.5–5.1)
PROMYELOCYTES NFR BLD MANUAL: 0 %
PROT SERPL-MCNC: 7 G/DL (ref 6.4–8.2)
Q-T INTERVAL, ECG07: 488 MS
Q-T INTERVAL, ECG07: 498 MS
QRS DURATION, ECG06: 164 MS
QRS DURATION, ECG06: 176 MS
QTC CALCULATION (BEZET), ECG08: 499 MS
QTC CALCULATION (BEZET), ECG08: 513 MS
RBC # BLD AUTO: 4.39 M/UL (ref 4.1–5.7)
RBC MORPH BLD: NORMAL
SODIUM SERPL-SCNC: 143 MMOL/L (ref 136–145)
TRIGL SERPL-MCNC: 99 MG/DL (ref ?–150)
TROPONIN I SERPL-MCNC: <0.04 NG/ML
TSH SERPL DL<=0.05 MIU/L-ACNC: 2.93 UIU/ML (ref 0.36–3.74)
VENTRICULAR RATE, ECG03: 63 BPM
VENTRICULAR RATE, ECG03: 64 BPM
VLDLC SERPL CALC-MCNC: 19.8 MG/DL
WBC # BLD AUTO: 5.4 K/UL (ref 4.1–11.1)
WBC MORPH BLD: NORMAL

## 2018-04-13 PROCEDURE — 99218 HC RM OBSERVATION: CPT

## 2018-04-13 PROCEDURE — 74011250637 HC RX REV CODE- 250/637: Performed by: INTERNAL MEDICINE

## 2018-04-13 PROCEDURE — G8998 SWALLOW D/C STATUS: HCPCS

## 2018-04-13 PROCEDURE — 74011000250 HC RX REV CODE- 250: Performed by: INTERNAL MEDICINE

## 2018-04-13 PROCEDURE — 84100 ASSAY OF PHOSPHORUS: CPT | Performed by: INTERNAL MEDICINE

## 2018-04-13 PROCEDURE — 93306 TTE W/DOPPLER COMPLETE: CPT

## 2018-04-13 PROCEDURE — 80061 LIPID PANEL: CPT | Performed by: INTERNAL MEDICINE

## 2018-04-13 PROCEDURE — 72192 CT PELVIS W/O DYE: CPT

## 2018-04-13 PROCEDURE — 92610 EVALUATE SWALLOWING FUNCTION: CPT

## 2018-04-13 PROCEDURE — 83735 ASSAY OF MAGNESIUM: CPT | Performed by: INTERNAL MEDICINE

## 2018-04-13 PROCEDURE — G8997 SWALLOW GOAL STATUS: HCPCS

## 2018-04-13 PROCEDURE — 65660000000 HC RM CCU STEPDOWN

## 2018-04-13 PROCEDURE — 93005 ELECTROCARDIOGRAM TRACING: CPT

## 2018-04-13 PROCEDURE — 93880 EXTRACRANIAL BILAT STUDY: CPT

## 2018-04-13 PROCEDURE — 82550 ASSAY OF CK (CPK): CPT | Performed by: INTERNAL MEDICINE

## 2018-04-13 PROCEDURE — 36415 COLL VENOUS BLD VENIPUNCTURE: CPT | Performed by: INTERNAL MEDICINE

## 2018-04-13 PROCEDURE — 74011250636 HC RX REV CODE- 250/636: Performed by: INTERNAL MEDICINE

## 2018-04-13 PROCEDURE — 85027 COMPLETE CBC AUTOMATED: CPT | Performed by: INTERNAL MEDICINE

## 2018-04-13 PROCEDURE — 84484 ASSAY OF TROPONIN QUANT: CPT | Performed by: INTERNAL MEDICINE

## 2018-04-13 PROCEDURE — 80053 COMPREHEN METABOLIC PANEL: CPT | Performed by: INTERNAL MEDICINE

## 2018-04-13 PROCEDURE — G8996 SWALLOW CURRENT STATUS: HCPCS

## 2018-04-13 PROCEDURE — 83036 HEMOGLOBIN GLYCOSYLATED A1C: CPT | Performed by: INTERNAL MEDICINE

## 2018-04-13 PROCEDURE — 84443 ASSAY THYROID STIM HORMONE: CPT | Performed by: INTERNAL MEDICINE

## 2018-04-13 PROCEDURE — 72125 CT NECK SPINE W/O DYE: CPT

## 2018-04-13 RX ORDER — GUAIFENESIN 100 MG/5ML
81 LIQUID (ML) ORAL DAILY
Status: DISCONTINUED | OUTPATIENT
Start: 2018-04-13 | End: 2018-04-15 | Stop reason: HOSPADM

## 2018-04-13 RX ORDER — SODIUM CHLORIDE 0.9 % (FLUSH) 0.9 %
30 SYRINGE (ML) INJECTION
Status: COMPLETED | OUTPATIENT
Start: 2018-04-13 | End: 2018-04-13

## 2018-04-13 RX ORDER — SODIUM CHLORIDE 0.9 % (FLUSH) 0.9 %
10-20 SYRINGE (ML) INJECTION
Status: DISPENSED | OUTPATIENT
Start: 2018-04-13 | End: 2018-04-13

## 2018-04-13 RX ORDER — OMEPRAZOLE 20 MG/1
20 CAPSULE, DELAYED RELEASE ORAL DAILY
Status: DISCONTINUED | OUTPATIENT
Start: 2018-04-13 | End: 2018-04-13 | Stop reason: CLARIF

## 2018-04-13 RX ORDER — LORAZEPAM 1 MG/1
4 TABLET ORAL
Status: DISCONTINUED | OUTPATIENT
Start: 2018-04-13 | End: 2018-04-15 | Stop reason: HOSPADM

## 2018-04-13 RX ORDER — PANTOPRAZOLE SODIUM 40 MG/1
40 TABLET, DELAYED RELEASE ORAL
Status: DISCONTINUED | OUTPATIENT
Start: 2018-04-13 | End: 2018-04-15 | Stop reason: HOSPADM

## 2018-04-13 RX ORDER — AMLODIPINE BESYLATE 5 MG/1
10 TABLET ORAL DAILY
Status: DISCONTINUED | OUTPATIENT
Start: 2018-04-13 | End: 2018-04-15 | Stop reason: HOSPADM

## 2018-04-13 RX ORDER — DONEPEZIL HYDROCHLORIDE 10 MG/1
10 TABLET, FILM COATED ORAL DAILY
Status: DISCONTINUED | OUTPATIENT
Start: 2018-04-13 | End: 2018-04-15 | Stop reason: HOSPADM

## 2018-04-13 RX ORDER — HEPARIN SODIUM 5000 [USP'U]/ML
5000 INJECTION, SOLUTION INTRAVENOUS; SUBCUTANEOUS EVERY 8 HOURS
Status: DISCONTINUED | OUTPATIENT
Start: 2018-04-13 | End: 2018-04-15 | Stop reason: HOSPADM

## 2018-04-13 RX ORDER — SODIUM CHLORIDE 0.9 % (FLUSH) 0.9 %
5-10 SYRINGE (ML) INJECTION EVERY 8 HOURS
Status: CANCELLED | OUTPATIENT
Start: 2018-04-13

## 2018-04-13 RX ORDER — SODIUM CHLORIDE 0.9 % (FLUSH) 0.9 %
5-10 SYRINGE (ML) INJECTION AS NEEDED
Status: DISCONTINUED | OUTPATIENT
Start: 2018-04-13 | End: 2018-04-15 | Stop reason: HOSPADM

## 2018-04-13 RX ORDER — ONDANSETRON 2 MG/ML
4 INJECTION INTRAMUSCULAR; INTRAVENOUS
Status: DISCONTINUED | OUTPATIENT
Start: 2018-04-13 | End: 2018-04-15 | Stop reason: HOSPADM

## 2018-04-13 RX ORDER — LORAZEPAM 1 MG/1
2 TABLET ORAL
Status: DISCONTINUED | OUTPATIENT
Start: 2018-04-13 | End: 2018-04-15 | Stop reason: HOSPADM

## 2018-04-13 RX ORDER — SODIUM CHLORIDE 0.9 % (FLUSH) 0.9 %
5-10 SYRINGE (ML) INJECTION EVERY 8 HOURS
Status: DISCONTINUED | OUTPATIENT
Start: 2018-04-13 | End: 2018-04-15 | Stop reason: HOSPADM

## 2018-04-13 RX ADMIN — HEPARIN SODIUM 5000 UNITS: 5000 INJECTION, SOLUTION INTRAVENOUS; SUBCUTANEOUS at 21:45

## 2018-04-13 RX ADMIN — ASPIRIN 81 MG 81 MG: 81 TABLET ORAL at 10:57

## 2018-04-13 RX ADMIN — HEPARIN SODIUM 5000 UNITS: 5000 INJECTION, SOLUTION INTRAVENOUS; SUBCUTANEOUS at 07:42

## 2018-04-13 RX ADMIN — Medication 30 ML: at 09:04

## 2018-04-13 RX ADMIN — FOLIC ACID: 5 INJECTION, SOLUTION INTRAMUSCULAR; INTRAVENOUS; SUBCUTANEOUS at 13:32

## 2018-04-13 RX ADMIN — HEPARIN SODIUM 5000 UNITS: 5000 INJECTION, SOLUTION INTRAVENOUS; SUBCUTANEOUS at 14:01

## 2018-04-13 RX ADMIN — PANTOPRAZOLE SODIUM 40 MG: 40 TABLET, DELAYED RELEASE ORAL at 07:42

## 2018-04-13 RX ADMIN — AMLODIPINE BESYLATE 10 MG: 5 TABLET ORAL at 10:57

## 2018-04-13 RX ADMIN — Medication 10 ML: at 21:45

## 2018-04-13 RX ADMIN — SODIUM CHLORIDE 100 ML/HR: 900 INJECTION, SOLUTION INTRAVENOUS at 00:35

## 2018-04-13 RX ADMIN — DONEPEZIL HYDROCHLORIDE 10 MG: 10 TABLET, FILM COATED ORAL at 11:03

## 2018-04-13 RX ADMIN — Medication 10 ML: at 05:30

## 2018-04-13 NOTE — H&P
[facility]  ACUTE CARE HISTORY AND PHYSICAL    Name:ELIZABET ROQUE  MR#: [id number]  : 1931  ACCOUNT #: [de-identified]   DATE OF SERVICE: 2018    PRIMARY CARE PHYSICIAN:  Brianna Branch MD    SOURCE OF INFORMATION:  The patient and patient's daughter, who was present at the bedside. CHIEF COMPLAINT:  Difficulty with speech. HISTORY OF PRESENT ILLNESS:  This is an 80-year-old man with a past medical history significant for dementia, sick sinus syndrome, status post pacemaker insertion, hypertension, and depression. He was in his usual state of health until the day of presentation to the emergency room when it was reported that the patient developed a change in mental status. The patient's daughter called the patient at home, and over the phone, she realized that the patient was confused and was also having difficulty with speech. Patient was brought to the emergency room for further evaluation. Patient also sustained a ground level fall at home about a month ago for which the patient did not seek any medical treatment. It was also reported that the patient has been drinking alcohol. By the time the patient arrived by the emergency room, his speech had improved back to baseline. He was found to have elevated blood alcohol level. CT scan of the head was negative for acute pathology. Patient was subsequently referred to the hospitalist service for evaluation for admission. The patient has no prior history of stroke or TIA. REVIEW OF SYSTEMS:  No history of fever, no rigors, no chills. PAST MEDICAL HISTORY:  Alzheimer's dementia, hypertension, sick sinus syndrome, status post pacemaker insertion, depression. ALLERGIES:  NO KNOWN DRUG ALLERGIES. MEDICATIONS:  Norvasc 10 mg daily, Aricept 10 mg daily, Vasotec 20 mg daily, Hyzaar 100/25 one tablet daily, Prilosec 20 mg daily. FAMILY HISTORY:  This was reviewed. His mother had hypertension.   His father had heart disease. PAST SURGICAL HISTORY:  Significant for right inguinal hernia repair, and pacemaker insertion. SOCIAL HISTORY:  The patient is a former smoker. Quit tobacco abuse in 07/1991. Occasional alcohol abuse according to the daughter. REVIEW OF SYSTEMS  HEAD, EYES, EARS, NOSE, AND THROAT:  Positive for confusion. No headache, no blurring of vision, no photophobia. RESPIRATORY:  No cough, no shortness of breath, no hemoptysis. CARDIOVASCULAR:  No chest pain, no orthopnea, no palpitation. GASTROINTESTINAL:  No nausea, vomiting, no diarrhea, no constipation. GENITOURINARY:  No dysuria, no urgency and no frequency. All other systems are reviewed and are negative. PHYSICAL EXAMINATION  GENERAL:  Patient appeared ill in moderate distress. VITAL SIGNS:  On arrival at the emergency room, temperature 97.7, pulse 74, respiratory rate 16, blood pressure 134/71, oxygen saturation 100% on room air. HEAD:  Normocephalic, atraumatic. EYES:  Normal eye movement, no redness, no drainage, no discharge. EARS:  Normal external ears with no obvious drainage. NOSE:  No deformity, no drainage. MOUTH AND THROAT:  No visible oral lesion. Dry oral mucosa. NECK:  Supple, no JVD, no thyromegaly. CHEST:  Clear breath sounds, no wheezing, no crackles. HEART:  Normal S1 and S2, regular. No clinically appreciable murmur. ABDOMEN:  Soft, nontender, normal bowel sounds. CENTRAL NERVOUS SYSTEM:  Alert, oriented x3. No gross focal neurological deficit. EXTREMITIES:  No edema. Pulses 2+ bilaterally. MUSCULOSKELETAL:  No obvious joint deformity or swelling. SKIN:  No active skin lesion seen in the exposed part of the body. PSYCHIATRIC:  Normal mood and affect. LYMPHATIC:  No cervical lymphadenopathy. DIAGNOSTIC DATA:  EKG shows a pacemaker rhythm. CT scan of the head without contrast negative.   Chemistry:  Sodium 138, potassium 4.0, chloride 104, CO2 of 22, glucose 74, BUN 27, creatinine 1.52, calcium 9.3, bilirubin total 0.7, ALT 26, AST 41, alkaline phosphatase 77, total protein 7.7, albumin level 3.4, globulin 4.3. Urine drug screen negative. Urinalysis is significant for negative nitrite, negative leukocyte esterase, negative bacteria. Serum alcohol level 211. Hematology:  WBC 7.7, hemoglobin 12.5, hematocrit 39.5, platelet 042. ASSESSMENT  1. Suspected transient ischemic attack. 2.  Alcohol intoxication, delirium. 3.  Sick sinus syndrome, status post pacemaker insertion. 4.  Hypertension. 5.  Dementia. 6.  Dehydration. 7.  Fall at home. PLAN  1. Suspected transient ischemic attack. We will admit the patient for further evaluation and treatment. We will start the patient on aspirin. We will check echocardiogram and carotid ultrasound. Unable to obtain MRI of the brain because of the patient's pacemaker. Inpatient neurology consult will be requested to assist in evaluation and treatment. We will check a lipid panel. Also, check hemoglobin A1c level. 2.  Alcohol intoxication, delirium. The patient's CIWA score is zero. We will start the patient on a banana bag. If the patient's CIWA score becomes elevated, we will initiate CIWA protocol. This alcohol intoxication, delirium is probably contributed to the patient symptoms of TIA. 3.  Sick sinus syndrome, status post pacemaker insertion. Patient is stable. We will continue to monitor. 4.  Hypertension. We will resume preadmission medication. Monitor the patient's blood pressure closely. 5 .  Dementia. This is mild. We will continue with preadmission medication and supportive therapy. 6.  Dehydration. We will carry out hydration with normal saline. 7.  Fall at home. A CT scan of the head is negative for acute pathology, but will obtain a CT scan of the cervical spine and pelvis for fracture. 8.  Other issues. CODE STATUS:  THE PATIENT IS A FULL CODE.   We will place the patient on heparin for DVT prophylaxis.       MD KAREN Maxwell / TN  D: 04/13/2018 01:48     T: 04/13/2018 06:27  JOB #: [id number]  CC: Joaquim Lopez MD

## 2018-04-13 NOTE — CONSULTS
Consult dictated. Admitted for confusion, slurred speech and ?facial droop on the right. Now back to baseline. ETOH level elevated. Suspect symptoms related to alcohol intoxication. TIA w/u negative. Reasonable to start bay ASA.   Felipe Lang MD

## 2018-04-13 NOTE — ED NOTES
Rhythm change noted. EKG done and Dr Cassie Krishann made aware. Pt back in paced rhythm. Dr Cassie Krishnan stated she will look at the EKG in the chart. No new orders given.

## 2018-04-13 NOTE — PROGRESS NOTES
Orders received, chart reviewed and patient to have CT have pelvis and cervical spine. Will f/u with OT evaluation after patient medically stable / diagnosis and precautions/contraindications. Thank you.

## 2018-04-13 NOTE — CONSULTS
Radu  MR#: [id number]  : 1931  ACCOUNT #: [de-identified]   DATE OF SERVICE: 2018    REQUESTING PHYSICIAN:  Noelia Boeck, MD    REASON FOR EVALUATION:  Slurred speech and confusion. HISTORY OF PRESENT ILLNESS:  The patient is an 80-year-old male with past medical history of dementia, sinus syndrome, status post pacemaker, hypertension, depression, who was brought into the hospital because he was noted to be confused and was slurring his words. ER physician noted that his face was slightly droopy on the right side. He was admitted for further workup. His alcohol level was elevated, but the patient denies drinking alcohol. Per family, he may have been drinking alcohol prior to admission. By the time he was admitted, his speech had improved back to his baseline. He had a stroke/TIA workup which was negative as well. Denies any headache, changes in vision, numbness, motor weakness. He has been noticing some unsteadiness of gait and had a ground level fall about a month ago. Rest of review of systems is negative. PAST MEDICAL HISTORY:  As mentioned above. ALLERGIES:  NONE. MEDICATIONS:  Norvasc, Aricept, Vasotec, Hyzaar, Prilosec. FAMILY HISTORY:  Noncontributory. SOCIAL HISTORY:  Patient used to smoke in the past, quit smoking in . Lives in an apartment with his wife. Occasional alcohol use per patient. REVIEW OF SYSTEMS:  As mentioned above. PHYSICAL EXAMINATION  GENERAL:  Patient is lying in bed in no acute distress. NEUROLOGIC:  Alert and oriented to self, place, today's date, place where he lives and is able to answer simple questions. Pupils are equal, round, reactive. Extraocular movements are full. Face is symmetric. Tongue was midline. Muscle tone and bulk normal.  Strength normal in both upper and lower extremities. DTRs 2/2 and symmetric. Toes downgoing. Sensation intact.   Gait exam is deferred. HEART:  Regular rate and rhythm. CHEST:  Clear. ABDOMEN:  Soft, nontender, positive bowel sounds. EXTREMITIES:  No edema. LABORATORY DATA:  CBC is unremarkable. Urinalysis is negative. Chemistry:  Sodium 143, potassium 3.8, BUN 24, creatinine 1.36, AST 28, ALT 22. Lipid profile:  Triglycerides 88, LDL 85, cholesterol 206, hemoglobin A1c 5.5. Troponin less than 0.04. Alcohol level was 211. CT brain did not show any acute abnormalities. There is mild periventricular white matter ischemic change. Carotid ultrasound did not show any significant stenosis. Echocardiogram shows an ejection fraction of 55%-60%. ASSESSMENT AND PLAN:  An 44-year-old male with a history of hypertension, dementia, who is admitted after he was noted to be confused, slurring of speech and questionable facial droop on the right side. He was back to baseline by the time he was admitted in the emergency department. He did have a significantly elevated alcohol level and I suspect that his symptoms were quite possibly related to alcohol intoxication. He did have a TIA workup, which was negative. It would be reasonable to start him on baby aspirin and we can initiate discharge planning. Please feel free to contact me with any further questions. Thank you for this consultation.       MD ART Fraga / TN  D: 04/13/2018 10:37     T: 04/13/2018 11:07  JOB #: [id number]

## 2018-04-13 NOTE — ED NOTES
Purposeful rounding completed. Pt resting in no apparent distress. Daughter at bedside. Will continue to monitor.

## 2018-04-13 NOTE — ED TRIAGE NOTES
TRIAGE: Patient arrives by EMS from home. Patient's daughter reports he was mumbling and jumbling his words. EMS on scene report + ETOH. Patient does report drinking today. Confused to year.

## 2018-04-13 NOTE — PROGRESS NOTES
Admission Medication Reconciliation:    Information obtained from: Patient, office visit notes/refill requests, and Rx Query    Significant PMH/Disease States:   Past Medical History:   Diagnosis Date    Abdominal pain 7/18/2017    Alzheimer disease 7/18/2017    Anemia 7/18/2017    Arthritis     Back pain 7/18/2017    Bradycardia 7/18/2017    CAD (coronary artery disease)     hx of MI    CKD (chronic kidney disease), stage II 7/18/2017    constipation     Depression 7/18/2017    Diabetes mellitus ([facility] [address] 7/18/2017    Diverticulosis 7/18/2017    DJD (degenerative joint disease) 7/18/2017    Encounter for long-term (current) use of other medications 7/18/2017    Fatigue     Gastritis and duodenitis 7/18/2017    GERD (gastroesophageal reflux disease)     GI bleed 7/18/2017    Hearing loss     Hyperlipidemia 7/18/2017    Hypertension     Hypertension with renal disease 7/18/2017    Ill-defined condition     Dementia    Internal hemorrhoids 7/18/2017    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 Prior to Admission Medications:   Prior to Admission Medications   Prescriptions Last Dose Informant Patient Reported? Taking?    amLODIPine (NORVASC) 10 mg tablet   No Yes   Sig: TAKE 1 TABLET BY MOUTH EVERY DAY   donepezil (ARICEPT) 10 mg tablet   No Yes   Sig: TAKE 1 TABLET BY MOUTH EVERY DAY   enalapril (VASOTEC) 20 mg tablet   No Yes   Sig: TAKE 1 TABLET BY MOUTH EVERY DAY   losartan-hydroCHLOROthiazide (HYZAAR) 100-25 mg per tablet   No Yes   Sig: TAKE 1 TABLET EVERY DAY   omeprazole (PRILOSEC) 20 mg capsule   No Yes   Sig: Take 1 Cap by mouth daily. Facility-Administered Medications: None         Comments/Recommendations:     Spoke with patient regarding allergies and PTA medications. Patient was unfamiliar with his medication regimen so used information listed in Rx Query to update PTA list.    1) Confirmed NKDA. 2) Reviewed and confirmed PTA medication list.    Of note, office visit note from 3/9/18 indicated that prescription for risperidone 0.5 mg daily was discontinued. Rx Query shows a fill date of 4/7/18 for #30. Unable to confirm if patient is still taking medication at this time.       Tawnya Trimble, PharmD

## 2018-04-13 NOTE — PROGRESS NOTES
TRANSFER - OUT REPORT:    Verbal report given to Kiersten Tang RN (name) on Benito Farfan Sr  being transferred to [address] [place] [address](unit) for routine progression of care       Report consisted of patients Situation, Background, Assessment and   Recommendations(SBAR). Information from the following report(s) SBAR was reviewed with the receiving nurse. Lines:   Peripheral IV 04/12/18 Left Wrist (Active)   Site Assessment Clean, dry, & intact 4/12/2018  8:34 PM   Phlebitis Assessment 0 4/12/2018  8:34 PM   Infiltration Assessment 0 4/12/2018  8:34 PM   Dressing Status Clean, dry, & intact 4/12/2018  8:34 PM   Dressing Type Tape;Transparent 4/12/2018  8:34 PM   Hub Color/Line Status Pink;Capped;Flushed 4/12/2018  8:34 PM   Action Taken Blood drawn 4/12/2018  8:34 PM        Opportunity for questions and clarification was provided.       Patient transported with:   [employer]

## 2018-04-13 NOTE — PROGRESS NOTES
Hospitalist Progress Note  eLonor Poe MD  Answering service: [phone] from in house phone        Date of Service:  2018  NAME:  July Yañez Sr  :  1931  MRN:  [id number]      Admission Summary:   The patient is an 72-year-old male with past medical history of dementia, sinus syndrome, status post pacemaker, hypertension, depression, who was brought into the hospital because he was noted to be confused and was slurring his words. ER physician noted that his face was slightly droopy on the right side. He was admitted for further workup. His alcohol level was elevated, but the patient denies drinking alcohol. Per family, he may have been drinking alcohol prior to admission. By the time he was admitted, his speech had improved back to his baseline.       Interval history / Subjective:     Mental status close to baseline today per daughter  Some tremor noticed      Assessment & Plan:     1. Transient ischemic attack:  seen by neurologist  did have a TIA workup, Head CT, Echo, carotid artery doppler  which were negative. It would be reasonable to start him on baby aspirin   check a lipid panel. Also, check hemoglobin A1c level. Continue neuro check   2. Acute metabolic encephalopathy: possible due to  Alcohol intoxication, delirium. The patient's alcohol level was 211 on admission. Continue IVF, CIWA for withdrawal .    3. Sick sinus syndrome, status post pacemaker insertion. Patient is stable. We will continue to monitor. 4.  Hypertension. We will resume preadmission medication. Monitor the patient's blood pressure closely. 5 .  Dementia. This is mild. We will continue with preadmission medication and supportive therapy. 6.  Dehydration. We will carry out hydration with normal saline. 7.  Fall at home. A CT scan of the head, C spine and pelvic   negative for acute pathology,   8.  CKD stage 3: cr at baseline Code status: full   DVT prophylaxis: sc lovenox     Care Plan discussed with: Patient/Family and Nurse  Disposition: TBD, PT/OT. Daughter called and updated over the phone, per daughter, pt was living with his wife ( daughter's step mother). Pt's wife was not at home, per daughter, the wife was sick and living in another family members home. Hospital Problems  Date Reviewed: 4/13/2018          Codes Class Noted POA    Metabolic encephalopathy WFI-88-RH: G93.41  ICD-9-CM: 348.31  4/13/2018 Unknown        * (Principal)TIA (transient ischemic attack) ICD-10-CM: G45.9  ICD-9-CM: 435.9  4/12/2018 Yes                Review of Systems:   no fever,no cough, no chest pain        Vital Signs:    Last 24hrs VS reviewed since prior progress note. Most recent are:  Visit Vitals    /58 (BP 1 Location: Right arm, BP Patient Position: At rest)    Pulse 70    Temp 98.1 °F (36.7 °C)    Resp 14    Ht 5' 7\" (1.702 m)    Wt 60.4 kg (133 lb 1.6 oz)    SpO2 99%    BMI 20.85 kg/m2         Intake/Output Summary (Last 24 hours) at 04/13/18 1642  Last data filed at 04/13/18 0942   Gross per 24 hour   Intake                0 ml   Output              300 ml   Net             -300 ml        Physical Examination:             GENERAL:  Patient is lying in bed in no acute distress. NEUROLOGIC:  Alert and oriented to self, place, today's date, place where he lives and is able to answer simple questions. Pupils are equal, round, reactive. Extraocular movements are full. Face is symmetric. Tongue was midline. Muscle tone and bulk normal.  Strength normal in both upper and lower extremities. DTRs 2/2 and symmetric. Toes downgoing. Sensation intact. Gait exam is deferred. HEART:  Regular rate and rhythm. CHEST:  Clear. ABDOMEN:  Soft, nontender, positive bowel sounds. EXTREMITIES:  No edema.  Mild tremor noticed     Data Review:    Review and/or order of clinical lab test  Review and/or order of tests in the medicine section of [facility]      Labs:     Recent Labs      04/13/18   0745  04/12/18 2031   WBC  5.4  7.7   HGB  12.3  12.5   HCT  37.9  39.5   PLT  212  201     Recent Labs      04/13/18   0745  04/12/18 2031   NA  143  138   K  3.8  4.0   CL  107  104   CO2  27  22   BUN  24*  27*   CREA  1.36*  1.52*   GLU  81  74   CA  9.1  9.3   MG  1.6   --    PHOS  3.2   --      Recent Labs      04/13/18   0745  04/12/18 2031   SGOT  28  41*   ALT  22  26   AP  72  77   TBILI  0.5  0.7   TP  7.0  7.7   ALB  3.4*  3.4*   GLOB  3.6  4.3*     No results for input(s): INR, PTP, APTT in the last 72 hours. No lab exists for component: INREXT   No results for input(s): FE, TIBC, PSAT, FERR in the last 72 hours. No results found for: FOL, RBCF   No results for input(s): PH, PCO2, PO2 in the last 72 hours.   Recent Labs      04/13/18 0745   CPK  85   CKNDX  3.1*   TROIQ  <0.04     Lab Results   Component Value Date/Time    Cholesterol, total 206 (H) 04/13/2018 07:45 AM    HDL Cholesterol 101 04/13/2018 07:45 AM    LDL, calculated 85.2 04/13/2018 07:45 AM    Triglyceride 99 04/13/2018 07:45 AM    CHOL/HDL Ratio 2.0 04/13/2018 07:45 AM     Lab Results   Component Value Date/Time    Glucose (POC) 84 07/10/2014 06:55 AM     Lab Results   Component Value Date/Time    Color YELLOW/STRAW 04/12/2018 09:04 PM    Appearance CLEAR 04/12/2018 09:04 PM    Specific gravity 1.006 04/12/2018 09:04 PM    pH (UA) 5.5 04/12/2018 09:04 PM    Protein NEGATIVE  04/12/2018 09:04 PM    Glucose NEGATIVE  04/12/2018 09:04 PM    Ketone NEGATIVE  04/12/2018 09:04 PM    Bilirubin NEGATIVE  04/12/2018 09:04 PM    Urobilinogen 0.2 04/12/2018 09:04 PM    Nitrites NEGATIVE  04/12/2018 09:04 PM    Leukocyte Esterase NEGATIVE  04/12/2018 09:04 PM    Epithelial cells FEW 04/12/2018 09:04 PM    Bacteria NEGATIVE  04/12/2018 09:04 PM    WBC 0-4 04/12/2018 09:04 PM    RBC 0-5 04/12/2018 09:04 PM         Medications Reviewed:     Current Facility-Administered Medications   Medication Dose Route Frequency    amLODIPine (NORVASC) tablet 10 mg  10 mg Oral DAILY    donepezil (ARICEPT) tablet 10 mg  10 mg Oral DAILY    sodium chloride (NS) flush 5-10 mL  5-10 mL IntraVENous Q8H    sodium chloride (NS) flush 5-10 mL  5-10 mL IntraVENous PRN    ondansetron (ZOFRAN) injection 4 mg  4 mg IntraVENous Q6H PRN    heparin (porcine) injection 5,000 Units  5,000 Units SubCUTAneous Q8H    0.9% sodium chloride 9,486 mL with folic acid 1 mg, thiamine 100 mg, mvi, adult no. 4 with vit K 10 mL infusion   IntraVENous DAILY    aspirin chewable tablet 81 mg  81 mg Oral DAILY    pantoprazole (PROTONIX) tablet 40 mg  40 mg Oral ACB    sodium chloride (NS) flush 10-20 mL  10-20 mL IntraVENous RAD ONCE    LORazepam (ATIVAN) tablet 2 mg  2 mg Oral Q1H PRN    LORazepam (ATIVAN) tablet 4 mg  4 mg Oral Q1H PRN     ______________________________________________________________________  EXPECTED LENGTH OF STAY: - - -  ACTUAL LENGTH OF STAY:          0                 Chuck Wilson MD

## 2018-04-13 NOTE — PROGRESS NOTES
PT Note:    Orders received, chart reviewed and patient to have CT have pelvis and cervical spine. Will f/u with PT evaluation after patient medically stable / diagnosis and precautions/contraindications. Thank you.

## 2018-04-13 NOTE — PROCEDURES
[facility]  *** FINAL REPORT ***    Name: Aby Fox  MRN: [id number]    Outpatient  : 1931  HIS Order #: [id number]  [address] [facility] Visit #: [id number]  Date: 2018    TYPE OF TEST: Cerebrovascular Duplex    REASON FOR TEST  Transient ischemic attacks    Right Carotid:-             Proximal               Mid                 Distal  cm/s  Systolic  Diastolic  Systolic  Diastolic  Systolic  Diastolic  CCA:     40.8      15.0                            79.0      10.0  Bulb:  ECA:     70.0  ICA:     65.0      13.0                            57.0      14.0  ICA/CCA:  0.8       1.3    ICA Stenosis:    Right Vertebral:-  Finding: Antegrade  Sys:       37.0  Awilda:    Right Subclavian:    Left Carotid:-            Proximal                Mid                 Distal  cm/s  Systolic  Diastolic  Systolic  Diastolic  Systolic  Diastolic  CCA:     08.7      12.0                            81.0      10.0  Bulb:  ECA:     77.0  ICA:     73.0      14.0                            50.0      12.0  ICA/CCA:  0.9       1.4    ICA Stenosis:    Left Vertebral:-  Finding: Antegrade  Sys:       45.0  Awilda:    Left Subclavian:    INTERPRETATION/FINDINGS  PROCEDURE:  Color duplex ultrasound imaging of extracranial  cerebrovascular arteries. FINDINGS:       Right:  Internal carotid velocity is within normal limits. There   is narrowing of the internal carotid flow channel on color Doppler  imaging and mixed density plaque on B-mode imaging, consistent with  less than 50 percent stenosis. The common and external carotid  arteries are patent and without evidence of hemodynamically  significant stenosis. Mild common carotid wall thickening and plaque  seen. Left:   Internal carotid velocity is within normal limits. There   is narrowing of the internal carotid flow channel on color Doppler  imaging and mixed density plaque on B-mode imaging, consistent with  less than 50 percent stenosis.   The common and external carotid  arteries are patent and without evidence of hemodynamically  significant stenosis. Mild common carotid wall thickening seen. IMPRESSION:  Consistent with less than 50% stenosis of the internal  carotids. Vertebrals are patent with antegrade flow. ADDITIONAL COMMENTS    I have personally reviewed the data relevant to the interpretation of  this  study.     TECHNOLOGIST: Alva Diaz RVT  Signed: 04/13/2018 09:27 AM    PHYSICIAN: Dayanna Dias., MD  Signed: 04/13/2018 09:41 AM

## 2018-04-13 NOTE — PROGRESS NOTES
Speech Pathology bedside swallow evaluation/discharge  Patient: Ricky Xiong Sr (80 y.o. male)  Date: 4/13/2018  Primary Diagnosis: TIA (transient ischemic attack)        Precautions:        ASSESSMENT :  Based on the objective data described below, the patient presents with no oral or pharyngeal dysphagia. Oral phase characterized by timely and complete mastication of solids and timely posterior propulsion with complete oral clearance of all consistencies. Pharyngeal phase characterized by suspected timely swallow initiation and functional hyolaryngeal elevation/excursion via palpation. No overt s/s aspiration with any consistency. Recommend regular diet/thin liquids at this time. Of note, patient with slurred speech upon admission, however, per chart, this has since resolved. Patient in agreement and reports speech is back to baseline. Skilled therapy provided by a speech-language pathologist is not indicated at this time. PLAN :  Recommendations:  -- regular diet/thin liquids, straws ok  -- meds as tolerated  -- strict upright positioning with all PO    Discharge Recommendations: None     SUBJECTIVE:   Patient stated My memory is starting to go, but that comes with age.     OBJECTIVE:     Past Medical History:   Diagnosis Date    Abdominal pain 7/18/2017    Alzheimer disease 7/18/2017    Anemia 7/18/2017    Arthritis     Back pain 7/18/2017    Bradycardia 7/18/2017    CAD (coronary artery disease)     hx of MI    CKD (chronic kidney disease), stage II 7/18/2017    constipation     Depression 7/18/2017    Diabetes mellitus ([facility] [address] 7/18/2017    Diverticulosis 7/18/2017    DJD (degenerative joint disease) 7/18/2017    Encounter for long-term (current) use of other medications 7/18/2017    Fatigue     Gastritis and duodenitis 7/18/2017    GERD (gastroesophageal reflux disease)     GI bleed 7/18/2017    Hearing loss     Hyperlipidemia 7/18/2017    Hypertension     Hypertension with renal disease 7/18/2017    Ill-defined condition     Dementia    Internal hemorrhoids 7/18/2017    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    S/P cardiac pacemaker procedure 5/4/2015    5/4/15 Medtronic dual chamber pacemaker implant     Weight loss     lost over 40 pounds last year- has no appetite     Past Surgical History:   Procedure Laterality Date    COLONOSCOPY N/A 6/22/2017    COLONOSCOPY performed by Eliseo Perdomo MD at [address] [place] [address]  6/22/2017         [address]  7/10/2014    right inguinal    HX OTHER SURGICAL      cystectomy from back    NM EGD TRANSORAL BIOPSY SINGLE/MULTIPLE  2/14/2012         UPPER GI ENDOSCOPY,BIOPSY  6/22/2017          Prior Level of Function/Home Situation:      Diet prior to admission: regular/thin  Current Diet:  Regular/thin   Cognitive and Communication Status:  Neurologic State: Alert  Orientation Level: Oriented X4  Cognition: Follows commands  Perception: Appears intact  Perseveration: No perseveration noted  Safety/Judgement: Not assessed  Oral Assessment:  Oral Assessment  Labial: No impairment  Dentition: Natural;Poor  Oral Hygiene:  (clean, moist)  Lingual: No impairment  Velum: Unable to visualize  Mandible: No impairment  P.O. Trials:  Patient Position:  (upright in bed)  Vocal quality prior to P.O.: No impairment  Consistency Presented: Thin liquid; Solid  How Presented: Self-fed/presented;Cup/sip;Straw;Successive swallows     Bolus Acceptance: No impairment  Bolus Formation/Control: No impairment     Propulsion: No impairment  Oral Residue: None  Initiation of Swallow: No impairment  Laryngeal Elevation: Functional  Aspiration Signs/Symptoms: None  Pharyngeal Phase Characteristics: No impairment, issues, or problems              Oral Phase Severity: No impairment  Pharyngeal Phase Severity : No impairment  NOMS:   The NOMS functional outcome measure was used to quantify this patient's level of swallowing impairment. Based on the NOMS, the patient was determined to be at level 7 for swallow function     G Codes: In compliance with CMSs Claims Based Outcome Reporting, the following G-code set was chosen for this patient based the use of the NOMS functional outcome to quantify this patient's level of swallowing impairment. Using the NOMS, the patient was determined to be at level 7 for swallow function which correlates with the CH= 0% level of severity. Based on the objective assessment provided within this note, the current, goal, and discharge g-codes are as follows:    Swallow  Swallowing:   Swallow Current Status CH= 0%   Swallow Goal Status CH= 0%   Swallow D/C Status CH= 0%      NOMS Swallowing Levels:  Level 1 (CN): NPO  Level 2 (CM): NPO but takes consistency in therapy  Level 3 (CL): Takes less than 50% of nutrition p.o. and continues with nonoral feedings; and/or safe with mod cues; and/or max diet restriction  Level 4 (CK): Safe swallow but needs mod cues; and/or mod diet restriction; and/or still requires some nonoral feeding/supplements  Level 5 (CJ): Safe swallow with min diet restriction; and/or needs min cues  Level 6 (CI): Independent with p.o.; rare cues; usually self cues; may need to avoid some foods or needs extra time  Level 7 ([address] [place] [address]): Independent for all p.o.  FISH. (2003). National Outcomes Measurement System (NOMS): Adult Speech-Language Pathology User's Guide. After treatment:   [] Patient left in no apparent distress sitting up in chair  [x] Patient left in no apparent distress in bed  [x] Call bell left within reach  [x] Nursing notified  [] Caregiver present  [] Bed alarm activated    COMMUNICATION/EDUCATION:   The patients plan of care including findings, recommendations, and recommended diet changes were discussed with: Registered Nurse.  [] Posted safety precautions in patient's room.   [x] Patient/family have participated as able and agree with findings and recommendations. [] Patient is unable to participate in plan of care at this time. Thank you for this referral.  Steve LUNA Student SLP   Time Calculation: 9 mins        Regarding student involvement in patient care:  A student participated in this treatment session. Per CMS Medicare statements and APTA guidelines I certify that the following was true:  1. I was present and directly observed the entire session. 2. I made all skilled judgments and clinical decisions regarding care. 3. I am the practitioner responsible for assessment, treatment, and documentation.

## 2018-04-13 NOTE — ED PROVIDER NOTES
HPI Comments: 80 y.o. male with past medical history significant for CAD, Hypertension, GERD, Diabetes, Depression, and Alzheimer's Disease who presents via EMS with chief complaint of altered mental status. Per daughter, patient onset tonight at~1920 of altered mental status. Per daughter, the patient's granddaughter was with him \"4 or 5 hours today\" and notes the patient was normal and last seen at baseline at [phone]. Patient's daughter reports she called him at [facility] and states the patient was confused described as \"not knowing who she was\" as well as said \"he does not feel well. \" Patient states he is in no current pain or discomfort. Per daughter, patient has drank alcohol tonight but is unsure of how much. Per daughter, patient also had an unwitnessed GLF \"a month ago\" but is unsure of specific details. Pt denies fever, chills, cough, congestion, shortness of breath, chest pain, abdominal pain, nausea, vomiting, diarrhea, difficulty with urination or dysuria. Full history, physical exam, and ROS unable to be obtained due to:  dementia. There are no other acute medical concerns at this time. PCP: Krupa Cho MD    Note written by Ez Watts, as dictated by Dilip Main, DO 9:16 PM      The history is provided by the patient and a relative. The history is limited by the condition of the patient.         Past Medical History:   Diagnosis Date    Abdominal pain 7/18/2017    Alzheimer disease 7/18/2017    Anemia 7/18/2017    Arthritis     Back pain 7/18/2017    Bradycardia 7/18/2017    CAD (coronary artery disease)     hx of MI    CKD (chronic kidney disease), stage II 7/18/2017    constipation     Depression 7/18/2017    Diabetes mellitus ([facility] [address] 7/18/2017    Diverticulosis 7/18/2017    DJD (degenerative joint disease) 7/18/2017    Encounter for long-term (current) use of other medications 7/18/2017    Fatigue     Gastritis and duodenitis 7/18/2017    GERD (gastroesophageal reflux disease)     GI bleed 2017    Hearing loss     Hyperlipidemia 2017    Hypertension     Hypertension with renal disease 2017    Ill-defined condition     Dementia    Internal hemorrhoids 2017    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    S/P cardiac pacemaker procedure 2015    5/4/15 Medtronic dual chamber pacemaker implant     Weight loss     lost over 40 pounds last year- has no appetite       Past Surgical History:   Procedure Laterality Date    COLONOSCOPY N/A 2017    COLONOSCOPY performed by Eliseo Perdomo MD at [address] [place] [address]  2017         [address]  7/10/2014    right inguinal    HX OTHER SURGICAL      cystectomy from back    [place] EGD TRANSORAL BIOPSY SINGLE/MULTIPLE  2012         UPPER GI ENDOSCOPY,BIOPSY  2017              Family History:   Problem Relation Age of Onset    Hypertension Mother     Heart Disease Father     Cancer Other      son-cancer? unknown what type        Social History     Social History    Marital status:      Spouse name: N/A    Number of children: N/A    Years of education: N/A     Occupational History    Not on file. Social History Main Topics    Smoking status: Former Smoker     Packs/day: 0.50     Years: 10.00     Start date: 1991    Smokeless tobacco: Never Used      Comment: quit 50 years ago    Alcohol use No    Drug use: No    Sexual activity: Not on file     Other Topics Concern    Not on file     Social History Narrative         ALLERGIES: Review of patient's allergies indicates no known allergies. Review of Systems   Constitutional: Negative for chills and fever. HENT: Negative for congestion. Respiratory: Negative for cough and shortness of breath. Cardiovascular: Negative for chest pain. Gastrointestinal: Negative for abdominal pain, diarrhea, nausea and vomiting. Genitourinary: Negative for difficulty urinating and dysuria. Psychiatric/Behavioral: Positive for confusion. All other systems reviewed and are negative. Vitals:    04/12/18 2028 04/12/18 2030 04/12/18 2100   BP: 124/71 124/71 126/77   Pulse: 74     Resp: 16     Temp: 97.7 °F (36.5 °C)     SpO2: 100% 100% 98%   Weight: 63.5 kg (140 lb)     Height: 5' 7\" (1.702 m)              Physical Exam    Constitutional: Pt is awake and alert. Pt is elderly, frail, thin appearing  HENT:   Head: Normocephalic and atraumatic. Nose: Nose normal.   Mouth/Throat: Oropharynx is clear and moist. No oropharyngeal exudate. Eyes: Conjunctivae and extraocular motions are normal. Pupils are equal, round, and reactive to light. Right eye exhibits no discharge. Left eye exhibits no discharge. No scleral icterus. Neck: No tracheal deviation present. Supple neck. Cardiovascular: Normal rate, regular rhythm, normal heart sounds and intact distal pulses. Exam reveals no gallop and no friction rub. No murmur heard. Pulmonary/Chest: Effort normal and breath sounds normal.  Pt  has no wheezes. Pt  has no rales. Abdominal: Soft. Pt  exhibits no distension and no mass. No tenderness. Pt  has no rebound and no guarding. Musculoskeletal:  Pt  exhibits no edema and no tenderness. Ext: Normal ROM in all four extremities; not tender to palpation; distal pulses are normal, no edema. Neurological:  Patient has a slight facial droop. No pronator or leg droop. Slight dysphasia. No aphagia Pt is alert. Skin: Skin is warm and dry. Pt  is not diaphoretic. Psychiatric:  Pt  has a normal mood and affect.  Behavior is normal.   Note written by Ez Champion, as dictated by Kennedi Israel, DO 9:16 PM    [facility]      ED Course       Procedures      Labs Reviewed   METABOLIC PANEL, COMPREHENSIVE - Abnormal; Notable for the following:        Result Value    BUN 27 (*)     Creatinine 1.52 (*)     GFR est AA 53 (*)     GFR est non-AA 44 (*)     AST (SGOT) 41 (*)     Albumin 3.4 (*)     Globulin 4.3 (*)     A-G Ratio 0.8 (*)     All other components within normal limits   ETHYL ALCOHOL - Abnormal; Notable for the following:     ALCOHOL(ETHYL),SERUM 211 (*)     All other components within normal limits   URINE CULTURE HOLD SAMPLE   CBC WITH AUTOMATED DIFF   URINALYSIS W/MICROSCOPIC   DRUG SCREEN, URINE   SAMPLES BEING HELD            Unsure if this pt had a small stroke. He has a mild R facial droop. Also has dysarthria. Daughter also agrees that these are abnormal for him. I can't blame facial droop on the alcohol in his system. She would like him admitted which I agree with. D/w Dr Dana Herzog - will admit. ED EKG interpretation:  Rhythm: paced; and regular . Rate (approx.): 64; PVCs as well. This EKG was interpreted by Kimberly Monroy DO,ED Provider. Lorenzo Guajardo

## 2018-04-14 PROCEDURE — 74011000250 HC RX REV CODE- 250: Performed by: INTERNAL MEDICINE

## 2018-04-14 PROCEDURE — G8979 MOBILITY GOAL STATUS: HCPCS

## 2018-04-14 PROCEDURE — 74011250636 HC RX REV CODE- 250/636: Performed by: INTERNAL MEDICINE

## 2018-04-14 PROCEDURE — 65660000000 HC RM CCU STEPDOWN

## 2018-04-14 PROCEDURE — G8978 MOBILITY CURRENT STATUS: HCPCS

## 2018-04-14 PROCEDURE — 94760 N-INVAS EAR/PLS OXIMETRY 1: CPT

## 2018-04-14 PROCEDURE — 97116 GAIT TRAINING THERAPY: CPT

## 2018-04-14 PROCEDURE — 97161 PT EVAL LOW COMPLEX 20 MIN: CPT

## 2018-04-14 PROCEDURE — 74011250637 HC RX REV CODE- 250/637: Performed by: INTERNAL MEDICINE

## 2018-04-14 RX ORDER — GUAIFENESIN 100 MG/5ML
81 LIQUID (ML) ORAL DAILY
Qty: 30 TAB | Refills: 1 | Status: SHIPPED | OUTPATIENT
Start: 2018-04-15 | End: 2022-01-01

## 2018-04-14 RX ADMIN — HEPARIN SODIUM 5000 UNITS: 5000 INJECTION, SOLUTION INTRAVENOUS; SUBCUTANEOUS at 22:17

## 2018-04-14 RX ADMIN — DONEPEZIL HYDROCHLORIDE 10 MG: 10 TABLET, FILM COATED ORAL at 09:33

## 2018-04-14 RX ADMIN — PANTOPRAZOLE SODIUM 40 MG: 40 TABLET, DELAYED RELEASE ORAL at 06:20

## 2018-04-14 RX ADMIN — HEPARIN SODIUM 5000 UNITS: 5000 INJECTION, SOLUTION INTRAVENOUS; SUBCUTANEOUS at 14:03

## 2018-04-14 RX ADMIN — AMLODIPINE BESYLATE 10 MG: 5 TABLET ORAL at 09:34

## 2018-04-14 RX ADMIN — FOLIC ACID: 5 INJECTION, SOLUTION INTRAMUSCULAR; INTRAVENOUS; SUBCUTANEOUS at 09:33

## 2018-04-14 RX ADMIN — ASPIRIN 81 MG 81 MG: 81 TABLET ORAL at 09:34

## 2018-04-14 RX ADMIN — HEPARIN SODIUM 5000 UNITS: 5000 INJECTION, SOLUTION INTRAVENOUS; SUBCUTANEOUS at 06:19

## 2018-04-14 RX ADMIN — Medication 10 ML: at 14:03

## 2018-04-14 RX ADMIN — Medication 10 ML: at 06:19

## 2018-04-14 NOTE — PROGRESS NOTES
Primary Nurse Denver Kay, RN and Dorie Griffiths RN performed a dual skin assessment on this patient No impairment noted  Newton score is 21.

## 2018-04-14 NOTE — DISCHARGE INSTRUCTIONS
Please bring this form with you to show your primary care provider at your follow-up appointment. Primary care provider:  Dr. Jackie Kim MD    Discharging provider:  Vera Howe MD    You have been admitted to the hospital with the following diagnoses:  · TIA (transient ischemic attack)  · Metabolic encephalopathy    FOLLOW-UP CARE RECOMMENDATIONS:    APPOINTMENTS:  Follow-up Information     Follow up With Details Comments Contact Info    Jackie Kim MD In 1 week discharge follow up  [facility]  [phone]              FOLLOW-UP TESTS recommended: none    PENDING TEST RESULTS:  At the time of your discharge the following test results are still pending: none  Please make sure you review these results with your outpatient follow-up provider(s). SYMPTOMS to watch for: chest pain, shortness of breath, fever, chills, nausea, vomiting, diarrhea, change in mentation, falling, weakness, bleeding. DIET/what to eat:  Cardiac Diet    ACTIVITY:  Activity as tolerated    WOUND CARE: NONE    EQUIPMENT needed:  NONE      What to do if new or unexpected symptoms occur? If you experience any of the above symptoms (or should other concerns or questions arise after discharge) please call your primary care physician. Return to the emergency room if you cannot get hold of your doctor. · It is very important that you keep your follow-up appointment(s). · Please bring discharge papers, medication list (and/or medication bottles) to your follow-up appointments for review by your outpatient provider(s). · Please check the list of medications and be sure it includes every medication (even non-prescription medications) that your provider wants you to take. · It is important that you take the medication exactly as they are prescribed.    · Keep your medication in the bottles provided by the pharmacist and keep a list of the medication names, dosages, and times to be taken in your wallet. · Do not take other medications without consulting your doctor. · If you have any questions about your medications or other instructions, please talk to your nurse or care provider before you leave the hospital.    I understand that if any problems occur once I am at home I am to contact my physician. These instructions were explained to me and I had the opportunity to ask questions.

## 2018-04-14 NOTE — PROGRESS NOTES
Problem: Falls - Risk of  Goal: *Absence of Falls  Document Louis Fall Risk and appropriate interventions in the flowsheet.    Outcome: Progressing Towards Goal  Fall Risk Interventions:  Mobility Interventions: Patient to call before getting OOB    Mentation Interventions: Family/sitter at bedside         Elimination Interventions: Call light in reach

## 2018-04-14 NOTE — PROGRESS NOTES
Problem: Mobility Impaired (Adult and Pediatric)  Goal: *Acute Goals and Plan of Care (Insert Text)  Physical Therapy Goals  Initiated 4/14/2018  1. Patient will transfer from bed to chair and chair to bed with independence using the least restrictive device within 2 day(s). 2.  Patient will perform sit to stand with independence within 2 day(s). 3.  Patient will ambulate with modified independence for 300 feet with the least restrictive device within 2 day(s). physical Therapy EVALUATION  Patient: Gisela Rose Sr (80 y.o. male)  Date: 4/14/2018  Primary Diagnosis: TIA (transient ischemic attack)  Metabolic encephalopathy        Precautions:   Fall    ASSESSMENT :  Based on the objective data described below, the patient presents with decreased standing balance, decreased high level balance, and currently at low risk for falls. Pt is oriented x 4 however he does not recall recent situations and having any recent falls at home. Per chart pt had a fall 1 month ago. He lives with his wife but she is staying with a niece currently and pt is at home alone. Pt ambulated with CGA x 250 feet with a narrow base of support and occasionally had a mild path deviation due to crossing his feet. Pt was able to self correct but he is at risk for falls. Recommend trying a straight cane for balance. Pt will benefit from having family support and supervision at home and recommend home health PT for high level balance training and progressive gait training with possibly a straight cane. Patient will benefit from skilled intervention to address the above impairments.   Patients rehabilitation potential is considered to be Good  Factors which may influence rehabilitation potential include:   []         None noted  []         Mental ability/status  []         Medical condition  [x]         Home/family situation and support systems, living alone at this time  [x]         Safety awareness  []         Pain tolerance/management  [x]         Other: decreased memory      PLAN :  Recommendations and Planned Interventions:  [x]           Bed Mobility Training             []    Neuromuscular Re-Education  [x]           Transfer Training                   []    Orthotic/Prosthetic Training  [x]           Gait Training                         []    Modalities  []           Therapeutic Exercises           []    Edema Management/Control  []           Therapeutic Activities            [x]    Patient and Family Training/Education  []           Other (comment):    Frequency/Duration: Patient will be followed by physical therapy  5 times a week to address goals. Discharge Recommendations: Home Health  Further Equipment Recommendations for Discharge: straight cane, pt instructed to use a cane at home, spoke with RN to relay this to pt's family due to pt's decreased memory      SUBJECTIVE:   Patient stated My legs feel weak.     OBJECTIVE DATA SUMMARY:   HISTORY:    Past Medical History:   Diagnosis Date    Abdominal pain 7/18/2017    Alzheimer disease 7/18/2017    Anemia 7/18/2017    Arthritis     Back pain 7/18/2017    Bradycardia 7/18/2017    CAD (coronary artery disease)     hx of MI    CKD (chronic kidney disease), stage II 7/18/2017    constipation     Depression 7/18/2017    Diabetes mellitus ([facility] [address] 7/18/2017    Diverticulosis 7/18/2017    DJD (degenerative joint disease) 7/18/2017    Encounter for long-term (current) use of other medications 7/18/2017    Fatigue     Gastritis and duodenitis 7/18/2017    GERD (gastroesophageal reflux disease)     GI bleed 7/18/2017    Hearing loss     Hyperlipidemia 7/18/2017    Hypertension     Hypertension with renal disease 7/18/2017    Ill-defined condition     Dementia    Internal hemorrhoids 7/18/2017    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    S/P cardiac pacemaker procedure 5/4/2015    5/4/15 Medtronic dual chamber pacemaker implant     Weight loss     lost over 40 pounds last year- has no appetite     Past Surgical History:   Procedure Laterality Date    COLONOSCOPY N/A 6/22/2017    COLONOSCOPY performed by Nj Manriquez MD at [address] [place] [address]  6/22/2017         [address]  7/10/2014    right inguinal    HX OTHER SURGICAL      cystectomy from back    [place] EGD TRANSORAL BIOPSY SINGLE/MULTIPLE  2/14/2012         UPPER GI ENDOSCOPY,BIOPSY  6/22/2017          Prior Level of Function/Home Situation: Pt lives with his wife however she is currently living with her niece and pt is at home alone. Personal factors and/or comorbidities impacting plan of care:     Home Situation  Home Environment: Apartment  # Steps to Enter: 0  One/Two Story Residence: One story  Living Alone: No  Support Systems: Spouse/Significant Other/Partner  Patient Expects to be Discharged to[de-identified] Apartment  Current DME Used/Available at Home: None    EXAMINATION/PRESENTATION/DECISION MAKING:   Critical Behavior:  Neurologic State: Alert, Appropriate for age  Orientation Level: Oriented to person, Oriented to place, Disoriented to time, Oriented to situation  Cognition: Memory loss, Follows commands  Safety/Judgement: Lack of insight into deficits, decreased safety awareness   Hearing:   Auditory  Auditory Impairment: None  Skin:  intact    Range Of Motion:   within functional limits                        Strength:     generally decreased, functional                     Tone & Sensation:     intact                              Coordination:   mildly decreased       Functional Mobility:  Bed Mobility:     Supine to Sit: Independent     Scooting: Independent  Transfers:  Sit to Stand: Supervision  Stand to Sit: Supervision                       Balance:   Sitting: Intact  Standing: Impaired  Standing - Static: Good  Standing - Dynamic : Fair  Ambulation/Gait Training:  Distance (ft): 250 Feet (ft)  Assistive Device: Gait belt  Ambulation - Level of Assistance: Contact guard assistance;Assist x1     Gait Description (WDL): Exceptions to WDL  Gait Abnormalities: Path deviations,occasional crossing feet, pt corrects his balance without assistance from therapist        Base of Support: Narrowed                    Stairs:   Pt reports he only has a threshold to enter his home               Functional Measure:  Tinetti test:    Sitting Balance: 1  Arises: 2  Attempts to Rise: 2  Immediate Standing Balance: 2  Standing Balance: 1  Nudged: 2  Eyes Closed: 1  Turn 360 Degrees - Continuous/Discontinuous: 1  Turn 360 Degrees - Steady/Unsteady: 1  Sitting Down: 2  Balance Score: 15  Indication of Gait: 1  R Step Length/Height: 1  L Step Length/Height: 1  R Foot Clearance: 1  L Foot Clearance: 1  Step Symmetry: 1  Step Continuity: 1  Path: 1  Trunk: 2  Walking Time: 0  Gait Score: 10  Total Score: 25       Tinetti Test and G-code impairment scale:  Percentage of Impairment CH    0%   CI    1-19% CJ    20-39% CK    40-59% CL    60-79% CM    80-99% CN     100%   Tinetti  Score 0-28 28 23-27 17-22 12-16 6-11 1-5 0       Tinetti Tool Score Risk of Falls  <19 = High Fall Risk  19-24 = Moderate Fall Risk  25-28 = Low Fall Risk  Tinetti ME. Performance-Oriented Assessment of Mobility Problems in Elderly Patients. Berger 66; [id number]. (Scoring Description: PT Bulletin Feb. 10, 1993)    Older adults: Antonio Mtz et al, 2009; n = 1000 [facility] elderly evaluated with ABC, ART, ADL, and IADL)  · Mean ART score for males aged 69-68 years = 26.21(3.40)  · Mean ART score for females age 69-68 years = 25.16(4.30)  · Mean ART score for males over 80 years = 23.29(6.02)  · Mean ART score for females over 80 years = 17.20(8.32)       G codes: In compliance with CMSs Claims Based Outcome Reporting, the following G-code set was chosen for this patient based on their primary functional limitation being treated:     The outcome measure chosen to determine the severity of the functional limitation was the Tinetti with a score of 25/28 which was correlated with the impairment scale. ? Mobility - Walking and Moving Around:     - CURRENT STATUS: CI - 1%-19% impaired, limited or restricted    - GOAL STATUS: CH - 0% impaired, limited or restricted    - D/C STATUS:  ---------------To be determined---------------      Physical Therapy Evaluation Charge Determination   History Examination Presentation Decision-Making   MEDIUM  Complexity : 1-2 comorbidities / personal factors will impact the outcome/ POC  LOW Complexity : 1-2 Standardized tests and measures addressing body structure, function, activity limitation and / or participation in recreation  LOW Complexity : Stable, uncomplicated  LOW Complexity : FOTO score of       Based on the above components, the patient evaluation is determined to be of the following complexity level: LOW     Pain:  Pain Scale 1: Numeric (0 - 10)  Pain Intensity 1: 0              Activity Tolerance:   good    After treatment:   [x]         Patient left in no apparent distress sitting up in chair  []         Patient left in no apparent distress in bed  [x]         Call bell left within reach  [x]         Nursing notified  []         Caregiver present  [x]         Bed alarm activated    COMMUNICATION/EDUCATION:   The patients plan of care was discussed with: Registered Nurse. [x]         Fall prevention education was provided and the patient/caregiver indicated understanding. []         Patient/family have participated as able in goal setting and plan of care. [x]         Patient/family agree to work toward stated goals and plan of care. []         Patient understands intent and goals of therapy, but is neutral about his/her participation. []         Patient is unable to participate in goal setting and plan of care.     Thank you for this referral.  Meghan Ruth   Time Calculation: 11 mins

## 2018-04-14 NOTE — INTERDISCIPLINARY ROUNDS
IDR/SLIDR Summary          Patient: Krishan Farrell Sr MRN: [id number]    Age: 80 y.o. YOB: 1931 Room/Bed: [facility]   Admit Diagnosis: TIA (transient ischemic attack)  Metabolic encephalopathy  Principal Diagnosis: TIA (transient ischemic attack)   Goals: safety, CIWA  Readmission: NO  Quality Measure: CIWA  VTE Prophylaxis: Chemical  Influenza Vaccine screening completed? YES  Pneumococcal Vaccine screening completed? NO  Mobility needs: No   Nutrition plan:Yes  Consults:P.T, O.T. and Case Management    Financial concerns:No  Escalated to CM? YES  RRAT Score: 46   Interventions:  Testing due for pt today?  NO  LOS: 1 days Expected length of stay 2 days  Discharge plan: home   PCP: Krupa Cho MD  Transportation needs: No    Days before discharge:two or more days before discharge   Discharge disposition: Home    Signed:     Darshana Ly RN  4/14/2018  10:07 AM

## 2018-04-14 NOTE — PROGRESS NOTES
Bedside and Verbal shift change report given to RADHA Hensley (oncoming nurse) by Julia Friedman RN (offgoing nurse). Report included the following information SBAR, Kardex, Intake/Output, MAR, Recent Results and Cardiac Rhythm Paced.

## 2018-04-14 NOTE — ROUTINE PROCESS
Stroke Education documented in Patient Education: YES  Core Measures Documented in Connect Care:  Risk Factors: YES  Warning signs of stroke: YES  When to Activate 911: YES  Medication Education for Risk Factors: YES  Smoking cessation if applicable: YES  Written Education Given:  YES    Discharge NIH Completed: YES  Score: 0    BRAINS: YES    Follow Up Appointment Made: NO  Date/Time if applicable: n/a    Bedside RN performed patient education and medication education. Discharge concerns initiated and discussed with patient, including clarification on \"who\" assists the patient at their home and instructions for when the home going patient should call their provider after discharge. Opportunity for questions and clarification was provided. Patient receptive to education: YES  Patient stated: none  Barriers to Education: none  Diagnosis Education given:  YES    Length of stay: 1  Expected Day of Discharge: 1  Ask if they have \"Help at Home\" & add to white board?   YES    Education Day #: 1    Medication Education Given:  YES  M in the box Medication name: Aspirin    Pt aware of HCAHPS survey: YES

## 2018-04-14 NOTE — PROGRESS NOTES
Problem: Falls - Risk of  Goal: *Absence of Falls  Document Louis Fall Risk and appropriate interventions in the flowsheet.    Outcome: Progressing Towards Goal  Fall Risk Interventions:  Mobility Interventions: Bed/chair exit alarm, OT consult for ADLs, Patient to call before getting OOB, PT Consult for mobility concerns, PT Consult for assist device competence    Mentation Interventions: Bed/chair exit alarm, Increase mobility, More frequent rounding, Reorient patient, Room close to nurse's station, Toileting rounds    Medication Interventions: Evaluate medications/consider consulting pharmacy, Patient to call before getting OOB, Teach patient to arise slowly, Bed/chair exit alarm    Elimination Interventions: Call light in reach, Patient to call for help with toileting needs, Toileting schedule/hourly rounds, Bed/chair exit alarm

## 2018-04-14 NOTE — DISCHARGE SUMMARY
Discharge Summary     Patient:  Tracy Barber Sr       MRN: [id number]       YOB: 1931       Age: 80 y.o. Date of admission:  4/12/2018    Date of discharge:  4/14/2018    Primary care provider: Dr. Keith Greco MD    Admitting provider:  Olga Barraza MD    Discharging provider:  Marko Frank MD - [phone]  If unavailable, call [phone] and ask the  to page the triage hospitalist.    Consultations  · IP CONSULT TO NEUROLOGY    Procedures  · * No surgery found *    Discharge destination: HOME. The patient is stable for discharge. Admission diagnosis  · TIA (transient ischemic attack)  · Metabolic encephalopathy    Current Discharge Medication List      START taking these medications    Details   aspirin 81 mg chewable tablet Take 1 Tab by mouth daily. Qty: 30 Tab, Refills: 1         CONTINUE these medications which have NOT CHANGED    Details   amLODIPine (NORVASC) 10 mg tablet TAKE 1 TABLET BY MOUTH EVERY DAY  Qty: 90 Tab, Refills: 3    Associated Diagnoses: Rapid palpitations; Unexplained weight loss; Atrial tachycardia ([address] Essential hypertension; Rapid weight loss      donepezil (ARICEPT) 10 mg tablet TAKE 1 TABLET BY MOUTH EVERY DAY  Qty: 90 Tab, Refills: 3    Associated Diagnoses: Alzheimer's disease of other onset without behavioral disturbance      losartan-hydroCHLOROthiazide (HYZAAR) 100-25 mg per tablet TAKE 1 TABLET EVERY DAY  Qty: 90 Tab, Refills: 1    Associated Diagnoses: Essential hypertension      omeprazole (PRILOSEC) 20 mg capsule Take 1 Cap by mouth daily. Qty: 90 Cap, Refills: 3    Associated Diagnoses: Gastroesophageal reflux disease without esophagitis         STOP taking these medications       enalapril (VASOTEC) 20 mg tablet Comments:   Reason for Stopping:                Follow-up Information     Follow up With Details Comments [address] Matilde English MD In 1 week discharge follow up  [facility]  [phone]            Final discharge diagnoses and brief hospital course  Please also refer to the admission H&P for details on the presenting problem. 80-year-old male with past medical history of dementia, sinus syndrome, status post pacemaker, hypertension, depression, who was brought into the hospital because he was noted to be confused and was slurring his words.  ER physician noted that his face was slightly droopy on the right side.  He was admitted for further workup.  His alcohol level was elevated, but the patient denies drinking alcohol.  Per family, he may have been drinking alcohol prior to admission.  By the time he was admitted, his speech had improved back to his baseline    1. Transient ischemic attack:  seen by neurologist  did have a TIA workup, Head CT, Echo, carotid artery doppler  which were negative. Start ASA 81mg daily, LDL 85, A1c 5.5    2. Acute metabolic encephalopathy: possible due to  Alcohol intoxication, delirium.  The patient's alcohol level was 211 on admission. Improved with IVF     3.  Sick sinus syndrome, status post pacemaker insertion.  Patient is stable.  We will continue to monitor. 4.  Hypertension.  We will resume preadmission medication.  Monitor the patient's blood pressure closely. 5 .  Dementia.  This is mild.  We will continue with preadmission medication and supportive therapy. 6.  Dehydration.  We will carry out hydration with normal saline. 7.  Fall at home.  A CT scan of the head, C spine and pelvic   negative for acute pathology,     8.  CKD stage 3: cr at baseline     FOLLOW-UP TESTS recommended: none     PENDING TEST RESULTS:  At the time of your discharge the following test results are still pending: none  Please make sure you review these results with your outpatient follow-up provider(s).     SYMPTOMS to watch for: chest pain, shortness of breath, fever, chills, nausea, vomiting, diarrhea, change in mentation, falling, weakness, bleeding.     DIET/what to eat:  Cardiac Diet     ACTIVITY:  Activity as tolerated     WOUND CARE: NONE     EQUIPMENT needed:  NONE       Physical examination at discharge  Visit Vitals    /65    Pulse 67    Temp 97.9 °F (36.6 °C)    Resp 12    Ht 5' 7\" (1.702 m)    Wt 63.7 kg (140 lb 8 oz)    SpO2 100%    BMI 22.01 kg/m2     AO3  Follows directions  Moves all 4 extremities  Sensory intact  No facial asymmetry  Speech clear  CVS: RRR  Lung: CTA   Abd: Soft NT ND  Ext: no edema    Pertinent imaging studies:    See radiology    Recent Labs      04/13/18   0745  04/12/18 2031   WBC  5.4  7.7   HGB  12.3  12.5   HCT  37.9  39.5   PLT  212  201     Recent Labs      04/13/18 0745  04/12/18 2031   NA  143  138   K  3.8  4.0   CL  107  104   CO2  27  22   BUN  24*  27*   CREA  1.36*  1.52*   GLU  81  74   CA  9.1  9.3   MG  1.6   --    PHOS  3.2   --      Recent Labs      04/13/18 0745  04/12/18 2031   SGOT  28  41*   AP  72  77   TP  7.0  7.7   ALB  3.4*  3.4*   GLOB  3.6  4.3*     No results for input(s): INR, PTP, APTT in the last 72 hours. No lab exists for component: INREXT   No results for input(s): FE, TIBC, PSAT, FERR in the last 72 hours. No results for input(s): PH, PCO2, PO2 in the last 72 hours.   Recent Labs      04/13/18 0745   CPK  85   CKMB  2.6     No components found for: [place]    Chronic Diagnoses:    Problem List as of 4/14/2018  Date Reviewed: 4/13/2018          Codes Class Noted - Resolved    Metabolic encephalopathy UOR-99-QD: G93.41  ICD-9-CM: 348.31  4/13/2018 - Present        * (Principal)TIA (transient ischemic attack) ICD-10-CM: G45.9  ICD-9-CM: 435.9  4/12/2018 - Present        Medicare annual wellness visit, initial ICD-10-CM: Z00.00  ICD-9-CM: V70.0  9/1/2017 - Present        Diverticulosis ICD-10-CM: K57.90  ICD-9-CM: 562.10  7/18/2017 - Present        Gastritis and duodenitis ICD-10-CM: K29.90  ICD-9-CM: 535.50  7/18/2017 - Present        Internal hemorrhoids ICD-10-CM: K64.8  ICD-9-CM: 455.0  7/18/2017 - Present        Hypertension with renal disease ICD-10-CM: I12.9  ICD-9-CM: 403.90  7/18/2017 - Present        Mixed hyperlipidemia ICD-10-CM: E78.2  ICD-9-CM: 272.2  7/18/2017 - Present        GI bleed ICD-10-CM: K92.2  ICD-9-CM: 578.9  7/18/2017 - Present        Primary osteoarthritis involving multiple joints ICD-10-CM: M15.0  ICD-9-CM: 715.09  7/18/2017 - Present        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, without long-term current use of insulin (HCC) ICD-10-CM: E11.22, N18.2  ICD-9-CM: 250.40, 585.2  7/18/2017 - Present        Depression ICD-10-CM: F32.9  ICD-9-CM: 077  7/18/2017 - Present        CKD (chronic kidney disease), stage II ICD-10-CM: N18.2  ICD-9-CM: 585.2  7/18/2017 - Present        Back pain ICD-10-CM: M54.9  ICD-9-CM: 724.5  7/18/2017 - Present        Anemia ICD-10-CM: D64.9  ICD-9-CM: 285.9  7/18/2017 - Present        Alzheimer disease ICD-10-CM: G30.9, F02.80  ICD-9-CM: 331.0  7/18/2017 - Present        SSS (sick sinus syndrome) ([facility] [address] ICD-10-CM: I49.5  ICD-9-CM: 427.81  6/2/2015 - Present        S/P cardiac pacemaker procedure ICD-10-CM: Z95.0  ICD-9-CM: V45.01  5/4/2015 - Present    Overview Signed 5/4/2015  2:46 PM by Caroline Mitchell NP     5/4/15 Medtronic dual chamber pacemaker implant               S/P ablation of accessory bypass tract ICD-10-CM: Z98.890  ICD-9-CM: V45.89  5/4/2015 - Present    Overview Signed 5/4/2015  2:47 PM by Caroline Mitchell NP     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             SVT (supraventricular tachycardia) (HCC)--s/p RFA ICD-10-CM: I47.1  ICD-9-CM: 427.89  4/28/2015 - Present        Near syncope ICD-10-CM: R55  ICD-9-CM: 780.2  3/11/2015 - Present        ASCVD (arteriosclerotic cardiovascular disease) ICD-10-CM: I25.10  ICD-9-CM: 429.2, 440.9  7/11/2014 - Present        Hypokalemia ICD-10-CM: E87.6  ICD-9-CM: 276.8  7/11/2014 - Present        Right inguinal hernia ICD-10-CM: K40.90  ICD-9-CM: 550.90  6/12/2014 - Present              Time spent on discharge related activities today greater than 30 minutes.       Signed:  Vanessa Orr MD                 Hospitalist, Internal Medicine      Cc: Eve Caldwell MD

## 2018-04-14 NOTE — PROGRESS NOTES
Have discussed today's pending discharge with patient and he said he would need to wait for his daughter to leave work after 8 pm.    As of 1800, he has not spoken to his daughter and was unsure if he would be leaving tonight. I informed charged nurse of the situation. Pt's son, Alan Barakat is taking the pt home tonight. I discussed it with his daughter Shari Johnson. Gave report to Mayur Che, who will be discharging pt, when pt is ready.

## 2018-04-15 ENCOUNTER — HOME HEALTH ADMISSION (OUTPATIENT)
Dept: HOME HEALTH SERVICES | Facility: HOME HEALTH | Age: 83
End: 2018-04-15
Payer: MEDICARE

## 2018-04-15 VITALS
WEIGHT: 134.8 LBS | BODY MASS INDEX: 21.16 KG/M2 | HEIGHT: 67 IN | RESPIRATION RATE: 15 BRPM | OXYGEN SATURATION: 98 % | TEMPERATURE: 98 F | HEART RATE: 66 BPM | SYSTOLIC BLOOD PRESSURE: 110 MMHG | DIASTOLIC BLOOD PRESSURE: 46 MMHG

## 2018-04-15 PROCEDURE — 74011250637 HC RX REV CODE- 250/637: Performed by: INTERNAL MEDICINE

## 2018-04-15 PROCEDURE — 74011250636 HC RX REV CODE- 250/636: Performed by: INTERNAL MEDICINE

## 2018-04-15 RX ADMIN — HEPARIN SODIUM 5000 UNITS: 5000 INJECTION, SOLUTION INTRAVENOUS; SUBCUTANEOUS at 06:44

## 2018-04-15 RX ADMIN — ASPIRIN 81 MG 81 MG: 81 TABLET ORAL at 09:00

## 2018-04-15 RX ADMIN — DONEPEZIL HYDROCHLORIDE 10 MG: 10 TABLET, FILM COATED ORAL at 09:00

## 2018-04-15 RX ADMIN — AMLODIPINE BESYLATE 10 MG: 5 TABLET ORAL at 08:59

## 2018-04-15 RX ADMIN — PANTOPRAZOLE SODIUM 40 MG: 40 TABLET, DELAYED RELEASE ORAL at 07:30

## 2018-04-15 NOTE — PROGRESS NOTES
Stroke Education documented in Patient Education: YES  Core Measures Documented in Connect Care:  Risk Factors: YES  Warning signs of stroke: YES  When to Activate 911: YES  Medication Education for Risk Factors: YES  Smoking cessation if applicable: YES  Written Education Given:  YES    Discharge NIH Completed: YES  Score: 0    BRAINS: YES    Follow Up Appointment Made: NO  Date/Time if applicable: n/a    Bedside RN performed patient education and medication education. Discharge concerns initiated and discussed with patient, including clarification on \"who\" assists the patient at their home and instructions for when the home going patient should call their provider after discharge. Opportunity for questions and clarification was provided. Patient receptive to education: YES  Patient stated: \"I don't have any questions\"  Barriers to Education: none  Diagnosis Education given:  YES    Length of stay: 2  Expected Day of Discharge: 2    Ask if they have \"Help at Home\" & add to white board?   YES    Education Day #: 1, 2    Medication Education Given:  YES  M in the box Medication name: aspirin    Pt aware of HCAHPS survey: YES

## 2018-04-15 NOTE — PROGRESS NOTES
Pt was not seen by CM and I told the pt and his family that PT was recommending home health. This is the source of the miscommunication that we will work on today.

## 2018-04-15 NOTE — ROUTINE PROCESS
Went to complete discharge with Patient post report, patient family voiced concerns because he had not been set up for home therapy and had not gotten a cane which was recommended by Physical Therapy. There was miscommunication at shift change. Patient to stay night and wait for Case Management in [place]

## 2018-04-15 NOTE — PROGRESS NOTES
Problem: Discharge Planning  Goal: *Discharge to safe environment  Outcome: Resolved/Met Date Met: 04/15/18  Discharging home with home health

## 2018-04-15 NOTE — ROUTINE PROCESS
Bedside and Verbal shift change report given to [place] (oncoming nurse) by Sanjuana Block (offgoing nurse). Report included the following information SBAR, Kardex, Procedure Summary, Intake/Output, MAR, Accordion, Recent Results, Med Rec Status and Cardiac Rhythm Paced.

## 2018-04-15 NOTE — INTERDISCIPLINARY ROUNDS
IDR/SLIDR Summary          Patient: Ashley Wilburn Sr MRN: [id number]    Age: 80 y.o. YOB: 1931 Room/Bed: [facility]   Admit Diagnosis: TIA (transient ischemic attack)  Metabolic encephalopathy  Principal Diagnosis: TIA (transient ischemic attack)   Goals: discharge  Readmission: NO  Quality Measure: Not applicable  VTE Prophylaxis: Chemical  Influenza Vaccine screening completed? YES  Pneumococcal Vaccine screening completed? NO  Mobility needs: No   Nutrition plan:Yes  Consults:P.T, O.T. and Case Management    Financial concerns:No  Escalated to CM? YES  RRAT Score: 48   Interventions:  Testing due for pt today?  NO  LOS: 2 days Expected length of stay 2 days  Discharge plan: home   PCP: Yuliana Martines MD  Transportation needs: No    Days before discharge:discharge pending  Discharge disposition: Home    Signed:     José Miguel Mcdonough  4/15/2018  8:21 AM

## 2018-04-15 NOTE — ROUTINE PROCESS
Bedside shift change report given to Meri Luciano (oncoming nurse) by Cuate Paulson (offgoing nurse). Report included the following information SBAR, MAR, Accordion, Recent Results and Cardiac Rhythm NSR.

## 2018-04-15 NOTE — PROGRESS NOTES
Care Management Discharge    Received call from MD and RN. Orders for home health received, ACO patient. - Referral sent to [facility] via [facility]. Called daughter Alexa Brambila [phone], message left for daughter. RN Amanda Willis called back daughter plans to be at hospital around noon. **Unable to secure cane for patient**    Discharge orders written. Care Management Interventions  Mode of Transport at Discharge: Other (see comment) (Daughter to Transport)  Transition of Care Consult (CM Consult): Discharge Planning, [address]: Yes  Plan discussed with Pt/Family/Caregiver: Yes (Left voice message for daughter, she is planning to  patient around noon)  Discharge Location  Discharge Placement: Home with home health    Updated AVS with home health information.     Cayetano Smalls, CRM

## 2018-04-15 NOTE — ROUTINE PROCESS
Bedside and Verbal shift change report given to Alex Rich RN (oncoming nurse) by Tristin Castillo (offgoing nurse). Report included the following information SBAR, Kardex, Procedure Summary, Intake/Output, MAR, Accordion, Recent Results, Med Rec Status and Cardiac Rhythm Paced.

## 2018-04-16 ENCOUNTER — HOME CARE VISIT (OUTPATIENT)
Dept: HOME HEALTH SERVICES | Facility: HOME HEALTH | Age: 83
End: 2018-04-16

## 2018-04-16 DIAGNOSIS — I10 ESSENTIAL HYPERTENSION: ICD-10-CM

## 2018-04-16 RX ORDER — LOSARTAN POTASSIUM AND HYDROCHLOROTHIAZIDE 25; 100 MG/1; MG/1
TABLET ORAL
Qty: 90 TAB | Refills: 1 | Status: SHIPPED | OUTPATIENT
Start: 2018-04-16 | End: 2019-05-01 | Stop reason: SDUPTHER

## 2018-04-17 ENCOUNTER — HOME CARE VISIT (OUTPATIENT)
Dept: SCHEDULING | Facility: HOME HEALTH | Age: 83
End: 2018-04-17
Payer: MEDICARE

## 2018-04-17 ENCOUNTER — TELEPHONE (OUTPATIENT)
Dept: INTERNAL MEDICINE CLINIC | Age: 83
End: 2018-04-17

## 2018-04-17 VITALS
TEMPERATURE: 98.1 F | DIASTOLIC BLOOD PRESSURE: 70 MMHG | SYSTOLIC BLOOD PRESSURE: 150 MMHG | RESPIRATION RATE: 18 BRPM | HEART RATE: 61 BPM | OXYGEN SATURATION: 98 %

## 2018-04-17 PROCEDURE — 3331090001 HH PPS REVENUE CREDIT

## 2018-04-17 PROCEDURE — 400013 HH SOC

## 2018-04-17 PROCEDURE — G0151 HHCP-SERV OF PT,EA 15 MIN: HCPCS

## 2018-04-17 PROCEDURE — 3331090002 HH PPS REVENUE DEBIT

## 2018-04-17 NOTE — TELEPHONE ENCOUNTER
Made VITOR care to patient's home. Spoke with patient's granddaughter, Joe Emery, who stated patient is not there and she is not sure where he is. The family is not allowing her to speak with Mr. Harleen Jimenez. She stated she heard the patient was discharged to his son's home, but then went to the son's daughter's home. Heidi admitted she really doesn't know where Mr. Harleen Jimenez is. She told me she would see if she can find out and have him call this office.

## 2018-04-17 NOTE — TELEPHONE ENCOUNTER
Interactive contact made with patient today, and the following topics were addressed:    Does Patient have a family member or caretaker to assist? YES    Reason for hospitalization TIA. Symptoms discussed YES    Need for referral to any agencies or community services? YES If yes, I have assisted the patient and/or family in accessing needed care and services. Already set up thru [facility]. Visit expected today. Education provided to the beneficiary, family, guardian, and/or caretaker to support  self-management, independent living, and activities of daily living? 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 Is patient able to manage his or her own meds? NO, grand daughter is aware of his current medications. Reviewed discharge medications with the patient and/or caretaker, and she expresses understanding. Was patient prescribed antibiotics? NO  If so, was the prescription filled and is the patient taking it according to instructions? n/a    Patient does not need any safety equipment but he does need help with his ADL's. Now living with his daughter and grand daughter. Reminded patient of upcoming appointment on Monday, April 23, 2017.

## 2018-04-18 ENCOUNTER — HOME CARE VISIT (OUTPATIENT)
Dept: SCHEDULING | Facility: HOME HEALTH | Age: 83
End: 2018-04-18
Payer: MEDICARE

## 2018-04-18 PROCEDURE — G0155 HHCP-SVS OF CSW,EA 15 MIN: HCPCS

## 2018-04-18 PROCEDURE — 3331090001 HH PPS REVENUE CREDIT

## 2018-04-18 PROCEDURE — 3331090002 HH PPS REVENUE DEBIT

## 2018-04-19 ENCOUNTER — HOME CARE VISIT (OUTPATIENT)
Dept: SCHEDULING | Facility: HOME HEALTH | Age: 83
End: 2018-04-19
Payer: MEDICARE

## 2018-04-19 VITALS
HEART RATE: 74 BPM | TEMPERATURE: 98.5 F | RESPIRATION RATE: 18 BRPM | SYSTOLIC BLOOD PRESSURE: 120 MMHG | OXYGEN SATURATION: 98 % | DIASTOLIC BLOOD PRESSURE: 60 MMHG

## 2018-04-19 PROCEDURE — G0151 HHCP-SERV OF PT,EA 15 MIN: HCPCS

## 2018-04-19 PROCEDURE — 3331090002 HH PPS REVENUE DEBIT

## 2018-04-19 PROCEDURE — 3331090001 HH PPS REVENUE CREDIT

## 2018-04-20 PROCEDURE — 3331090002 HH PPS REVENUE DEBIT

## 2018-04-20 PROCEDURE — 3331090001 HH PPS REVENUE CREDIT

## 2018-04-21 PROCEDURE — 3331090001 HH PPS REVENUE CREDIT

## 2018-04-21 PROCEDURE — 3331090002 HH PPS REVENUE DEBIT

## 2018-04-22 PROCEDURE — 3331090002 HH PPS REVENUE DEBIT

## 2018-04-22 PROCEDURE — 3331090001 HH PPS REVENUE CREDIT

## 2018-04-23 ENCOUNTER — HOME CARE VISIT (OUTPATIENT)
Dept: SCHEDULING | Facility: HOME HEALTH | Age: 83
End: 2018-04-23
Payer: MEDICARE

## 2018-04-23 ENCOUNTER — OFFICE VISIT (OUTPATIENT)
Dept: INTERNAL MEDICINE CLINIC | Age: 83
End: 2018-04-23

## 2018-04-23 VITALS
OXYGEN SATURATION: 98 % | HEART RATE: 80 BPM | DIASTOLIC BLOOD PRESSURE: 60 MMHG | SYSTOLIC BLOOD PRESSURE: 125 MMHG | RESPIRATION RATE: 18 BRPM | TEMPERATURE: 98.4 F

## 2018-04-23 VITALS
HEART RATE: 52 BPM | SYSTOLIC BLOOD PRESSURE: 130 MMHG | BODY MASS INDEX: 21.6 KG/M2 | OXYGEN SATURATION: 100 % | HEIGHT: 67 IN | RESPIRATION RATE: 16 BRPM | TEMPERATURE: 98.3 F | WEIGHT: 137.6 LBS | DIASTOLIC BLOOD PRESSURE: 70 MMHG

## 2018-04-23 DIAGNOSIS — N18.2 CKD (CHRONIC KIDNEY DISEASE), STAGE II: ICD-10-CM

## 2018-04-23 DIAGNOSIS — F10.20 ALCOHOLISM (HCC): ICD-10-CM

## 2018-04-23 DIAGNOSIS — E11.22 CONTROLLED TYPE 2 DIABETES MELLITUS WITH STAGE 2 CHRONIC KIDNEY DISEASE, WITHOUT LONG-TERM CURRENT USE OF INSULIN (HCC): ICD-10-CM

## 2018-04-23 DIAGNOSIS — G30.9 ALZHEIMER'S DEMENTIA WITHOUT BEHAVIORAL DISTURBANCE, UNSPECIFIED TIMING OF DEMENTIA ONSET: ICD-10-CM

## 2018-04-23 DIAGNOSIS — G45.9 TRANSIENT CEREBRAL ISCHEMIA, UNSPECIFIED TYPE: Primary | ICD-10-CM

## 2018-04-23 DIAGNOSIS — N18.2 CONTROLLED TYPE 2 DIABETES MELLITUS WITH STAGE 2 CHRONIC KIDNEY DISEASE, WITHOUT LONG-TERM CURRENT USE OF INSULIN (HCC): ICD-10-CM

## 2018-04-23 DIAGNOSIS — I12.9 HYPERTENSION WITH RENAL DISEASE: ICD-10-CM

## 2018-04-23 DIAGNOSIS — F02.80 ALZHEIMER'S DEMENTIA WITHOUT BEHAVIORAL DISTURBANCE, UNSPECIFIED TIMING OF DEMENTIA ONSET: ICD-10-CM

## 2018-04-23 LAB
BUN BLD-MCNC: 19 MG/DL (ref 9–20)
CALCIUM BLD-MCNC: 9.7 MG/DL (ref 8.4–10.2)
CHLORIDE BLD-SCNC: 102 MMOL/L (ref 98–107)
CO2 POC: 31 MMOL/L (ref 22–32)
CREAT BLD-MCNC: 1.3 MG/DL (ref 0.8–1.5)
EGFR (POC): 49.1
GLUCOSE POC: 89 MG/DL (ref 75–110)
GRAN# POC: 3.9 K/UL (ref 2–7.8)
GRAN% POC: 73.4 % (ref 37–92)
HCT VFR BLD CALC: 37.4 % (ref 37–51)
HGB BLD-MCNC: 12.1 G/DL (ref 12–18)
LY# POC: 1 K/UL (ref 0.6–4.1)
LY% POC: 21.1 % (ref 10–58.5)
MCH RBC QN: 27.3 PG (ref 26–32)
MCHC RBC-ENTMCNC: 32.3 G/DL (ref 30–36)
MCV RBC: 84 FL (ref 80–97)
MID #, POC: 0.2 K/UL (ref 0–1.8)
MID% POC: 5.5 % (ref 0.1–24)
PLATELET # BLD: 292 K/UL (ref 140–440)
POTASSIUM SERPL-SCNC: 4.5 MMOL/L (ref 3.6–5)
RBC # BLD: 4.43 M/UL (ref 4.2–6.3)
SODIUM SERPL-SCNC: 142 MMOL/L (ref 137–145)
WBC # BLD: 5.1 K/UL (ref 4.1–10.9)

## 2018-04-23 PROCEDURE — G0151 HHCP-SERV OF PT,EA 15 MIN: HCPCS

## 2018-04-23 PROCEDURE — 3331090002 HH PPS REVENUE DEBIT

## 2018-04-23 PROCEDURE — 3331090001 HH PPS REVENUE CREDIT

## 2018-04-23 NOTE — MR AVS SNAPSHOT
[facility] 
 
 
 [address] [address] [phone] Patient: Nithin Becerril Sr 
MRN: [id number] MYD:[birth year] Visit Information Date & Time Provider Department Dept. Phone Encounter #  
 4/23/2018 11:00 AM Lauri Jimenez, [address] [facility] [phone] [id number] Follow-up Instructions Return in about 4 weeks (around 5/21/2018). Your Appointments 5/18/2018  1:20 PM  
FOLLOW UP 10 with MD EVA [facility] Appt Note: 4 week follow up [address] [address] [facility] [address] 6/12/2018  1:10 PM  
FOLLOW UP 10 with MD EVA [facility] Appt Note: 3 month fu  
 [address] [id number] [phone] 8/16/2018  9:30 AM  
PROCEDURE with PACEMAKER, [facility] Appt Note: 6mo mdt bivpm  
 [address] [facility]  
[phone] [address] [facility]  
  
    
 2/12/2019 10:00 AM  
ESTABLISHED PATIENT with 91 Maria Wilson MD  
[address]  [facility]iology [facility] Appt Note: annual also pace check kw  
 [address] [facility]  
[phone] [address] [facility]  
  
    
 2/12/2019 10:00 AM  
PROCEDURE with PACEMAKER, [facility] Appt Note: also seeing david annual  
 [address] [facility]  
[phone] Upcoming Health Maintenance Date Due  
 EYE EXAM RETINAL OR DILATED Q1 1/21/1941 DTaP/Tdap/Td series (1 - Tdap) 1/21/1952 ZOSTER VACCINE AGE 60> 11/21/1990 GLAUCOMA SCREENING Q2Y 1/21/1996 MICROALBUMIN Q1 9/1/2018 MEDICARE YEARLY EXAM 9/2/2018 HEMOGLOBIN A1C Q6M 10/13/2018 FOOT EXAM Q1 3/9/2019 LIPID PANEL Q1 4/13/2019 Allergies as of 4/23/2018  Review Complete On: 4/23/2018 By: Jackie Kim MD  
 No Known Allergies Current Immunizations  Reviewed on 5/4/2015 Name Date Influenza High Dose Vaccine PF 12/8/2017 Influenza Vaccine 1/18/2017, 10/18/2016, 12/15/2015 Pneumococcal Conjugate (PCV-13) 9/30/2015 Pneumococcal Polysaccharide (PPSV-23) 10/13/2006 Pneumococcal Vaccine (Unspecified Type) 10/13/2006 Not reviewed this visit You Were Diagnosed With   
  
 Codes Comments Transient cerebral ischemia, unspecified type    -  Primary ICD-10-CM: G45.9 ICD-9-CM: 435.9 Alcoholism ([facility] [address]     ICD-10-CM: A32.36 ICD-9-CM: 303.90 Alzheimer's dementia without behavioral disturbance, unspecified timing of dementia onset     ICD-10-CM: G30.9, F02.80 ICD-9-CM: 331.0, 294.10 CKD (chronic kidney disease), stage II     ICD-10-CM: N18.2 ICD-9-CM: 976. 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  
 21.55 kg/m 2 1.72 m 2 Preferred Pharmacy Pharmacy Name Phone [id number]- [place], [place] - [address] [facility] [address] [place] [address] [phone] Your Updated Medication List  
  
   
 This list is accurate as of 4/23/18 12:35 PM.  Always use your most recent med list. amLODIPine 10 mg tablet Commonly known as:  [place] TAKE 1 TABLET BY MOUTH EVERY DAY  
  
 aspirin 81 mg chewable tablet Take 1 Tab by mouth daily. donepezil 10 mg tablet Commonly known as:  ARICEPT  
TAKE 1 TABLET BY MOUTH EVERY DAY  
  
 losartan-hydroCHLOROthiazide 100-25 mg per tablet Commonly known as:  HYZAAR  
TAKE 1 TABLET EVERY DAY  
  
 omeprazole 20 mg capsule Commonly known as:  PRILOSEC Take 1 Cap by mouth daily. We Performed the Following AMB POC BASIC METABOLIC PANEL [05366 CPT(R)] AMB POC COMPLETE CBC,AUTOMATED ENTER [id number] CPT(R)] Follow-up Instructions Return in about 4 weeks (around 5/21/2018). To-Do List   
 04/23/2018 2:30 PM  
  Appointment with Shaan Barraza at [place] [address]  
  
 04/26/2018 9:00 AM  
  Appointment with Shaan Barraza at [place] [address]  
  
 04/30/2018 To Be Determined Appointment with Shaan Barraza at [place] [address]  
  
 05/03/2018 To Be Determined Appointment with Shaan Barraza at [place] [address] Patient Instructions Alzheimer's Disease: Care Instructions Your Care Instructions Alzheimer's disease is a type of dementia. It causes memory loss and affects judgment, language, and behavior. You may have trouble making decisions or may get lost in places that you used to know well. Alzheimer's disease is different than mild memory loss that occurs with aging. It is not clear what causes Alzheimer's disease, but it is the most common form of dementia in older adults. Finding out that you have this disease is a shock. You may be afraid and worried about how the condition will change your life.  Although there is no cure at this time, medicine in some cases may slow memory loss for a while. Other medicines may be able to help you sleep or cope with depression and behavior changes. Alzheimer's disease is different for everyone. It may take many years to develop. In some cases, people can function well for a long time. In the early stage of the disease, you can do things at home to make life easier and safer. You also can keep doing your hobbies and other activities. Many people find comfort in planning now for their future needs. Follow-up care is a key part of your treatment and safety. Be sure to make and go to all appointments, and call your doctor if you are having problems. It's also a good idea to know your test results and keep a list of the medicines you take. How can you care for yourself at home? Taking care of yourself · If your doctor gives you medicines, take them exactly as prescribed. Call your doctor if you think you are having a problem with your medicine. You will get more details on the medicines your doctor prescribes. · Eat a balanced diet. Get plenty of whole grains, fruits, and vegetables every day. If you are not hungry at mealtimes, eat snacks at midmorning and in the afternoon. Try drinks such as Boost, Ensure, or Sustacal if you are having trouble keeping your weight up. · Stay active. Exercise such as walking may slow the decline of your mental abilities. Try to stay active mentally too. Read and work crossword puzzles if you enjoy these activities. · If you have trouble sleeping, do not nap during the day. Get regular exercise (but not within several hours of bedtime). Drink a glass of warm milk or caffeine-free herbal tea before going to bed. · Ask your doctor about support groups and other resources in your area. They can help people who have Alzheimer's disease and their families. · Be patient. You may find that a task takes you longer than it used to. · If you have not already done so, make a list of advance directives. Advance directives are instructions to your doctor and family members about what kind of care you want if you become unable to speak or express yourself. Talk to a  about making a will, if you do not already have one. Keeping schedules · Develop a routine. You will feel less frustrated or confused if you have a clear, simple plan of what to do every day. ¨ Make lists of your medicines and when to take them. ¨ Write down appointments and other tasks in a calendar. ¨ Put sticky notes around the house to help you remember events and other things you have to do. ¨ Schedule activities and tasks for times of the day when you are best able to handle them. Staying safe · Tell someone when you are going out and where you are going. Let the person know when you will be back. Before you go out alone, write down where you are going, how to get there, and how to get back home. Do this even if you have gone there many times before. Take someone along with you when possible. · Make your home safe. Tack down rugs, put no-slip tape in the tub, use handrails, and put safety switches on stoves and appliances. · Have a family member or other caregiver tell you whether you are driving badly. Deciding to stop driving is very hard for many people. Driving helps you feel independent. Your state 's license bureau can do a driving test if there is any question. Plan for other means of getting around when you are no longer able to drive. · Use strong lighting, especially at night. Put night-lights in bedrooms, hallways, and bathrooms. · Lower the hot water temperature setting to 120°F or lower to avoid burns. When should you call for help? Call 911 anytime you think you may need emergency care. For example, call if: 
? · You are lost and do not know whom to call. ? · You are injured and do not know whom to call. ?Call your doctor now or seek immediate medical care if: 
? · Your symptoms suddenly get much worse. ? Watch closely for changes in your health, and be sure to contact your doctor if: 
? · You want more information about how you can take care of yourself. Where can you learn more? Go to http://zach-herlinda.info/. Enter Y179 in the search box to learn more about \"Alzheimer's Disease: Care Instructions. \" Current as of: May 12, 2017 Content Version: 11.4 © 5410-4357 [employer]. Care instructions adapted under license by [employer] (which disclaims liability or warranty for this information). If you have questions about a medical condition or this instruction, always ask your healthcare professional. [address] any warranty or liability for your use of this information. Introducing [address]! Get.com introduces [employer]iant[employer] com patient portal. Now you can access parts of your medical record, email your doctor's office, and request medication refills online. 1. In your internet browser, go to [email or web address] Between Friends 2. Click on the First Time User? Click Here link in the Sign In box. You will see the New Member Sign Up page. 3. Enter your Tiantian. com Access Code exactly as it appears below. You will not need to use this code after youve completed the sign-up process. If you do not sign up before the expiration date, you must request a new code. · [employer] com Access Code: [email or web address] Expires: 5/16/2018 10:07 AM 
 
4. Enter the last four digits of your Social Security Number (xxxx) and Date of Birth (mm/dd/yyyy) as indicated and click Submit. You will be taken to the next sign-up page. 5. Create a [facility] ID. This will be your Tiantian. com login ID and cannot be changed, so think of one that is secure and easy to remember. 6. Create a [facility] password. You can change your password at any time. 7. Enter your Password Reset Question and Answer. This can be used at a later time if you forget your password. 8. Enter your e-mail address. You will receive e-mail notification when new information is available in [address] 9. Click Sign Up. You can now view and download portions of your medical record. 10. Click the Download Summary menu link to download a portable copy of your medical information. If you have questions, please visit the Frequently Asked Questions section of the [employer] website. Remember, [employer] is NOT to be used for urgent needs. For medical emergencies, dial 911. Now available from your iPhone and Android! Please provide this summary of care documentation to your next provider. Your primary care clinician is listed as Thuy. If you have any questions after today's visit, please call [phone].

## 2018-04-23 NOTE — PATIENT INSTRUCTIONS
Alzheimer's Disease: Care Instructions  Your Care Instructions    Alzheimer's disease is a type of dementia. It causes memory loss and affects judgment, language, and behavior. You may have trouble making decisions or may get lost in places that you used to know well. Alzheimer's disease is different than mild memory loss that occurs with aging. It is not clear what causes Alzheimer's disease, but it is the most common form of dementia in older adults. Finding out that you have this disease is a shock. You may be afraid and worried about how the condition will change your life. Although there is no cure at this time, medicine in some cases may slow memory loss for a while. Other medicines may be able to help you sleep or cope with depression and behavior changes. Alzheimer's disease is different for everyone. It may take many years to develop. In some cases, people can function well for a long time. In the early stage of the disease, you can do things at home to make life easier and safer. You also can keep doing your hobbies and other activities. Many people find comfort in planning now for their future needs. Follow-up care is a key part of your treatment and safety. Be sure to make and go to all appointments, and call your doctor if you are having problems. It's also a good idea to know your test results and keep a list of the medicines you take. How can you care for yourself at home? Taking care of yourself  · If your doctor gives you medicines, take them exactly as prescribed. Call your doctor if you think you are having a problem with your medicine. You will get more details on the medicines your doctor prescribes. · Eat a balanced diet. Get plenty of whole grains, fruits, and vegetables every day. If you are not hungry at mealtimes, eat snacks at midmorning and in the afternoon. Try drinks such as Boost, Ensure, or Sustacal if you are having trouble keeping your weight up. · Stay active.  Exercise such as walking may slow the decline of your mental abilities. Try to stay active mentally too. Read and work crossword puzzles if you enjoy these activities. · If you have trouble sleeping, do not nap during the day. Get regular exercise (but not within several hours of bedtime). Drink a glass of warm milk or caffeine-free herbal tea before going to bed. · Ask your doctor about support groups and other resources in your area. They can help people who have Alzheimer's disease and their families. · Be patient. You may find that a task takes you longer than it used to. · If you have not already done so, make a list of advance directives. Advance directives are instructions to your doctor and family members about what kind of care you want if you become unable to speak or express yourself. Talk to a  about making a will, if you do not already have one. Keeping schedules  · Develop a routine. You will feel less frustrated or confused if you have a clear, simple plan of what to do every day. ¨ Make lists of your medicines and when to take them. ¨ Write down appointments and other tasks in a calendar. ¨ Put sticky notes around the house to help you remember events and other things you have to do. ¨ Schedule activities and tasks for times of the day when you are best able to handle them. Staying safe  · Tell someone when you are going out and where you are going. Let the person know when you will be back. Before you go out alone, write down where you are going, how to get there, and how to get back home. Do this even if you have gone there many times before. Take someone along with you when possible. · Make your home safe. Tack down rugs, put no-slip tape in the tub, use handrails, and put safety switches on stoves and appliances. · Have a family member or other caregiver tell you whether you are driving badly. Deciding to stop driving is very hard for many people. Driving helps you feel independent.  Your state 's license bureau can do a driving test if there is any question. Plan for other means of getting around when you are no longer able to drive. · Use strong lighting, especially at night. Put night-lights in bedrooms, hallways, and bathrooms. · Lower the hot water temperature setting to 120°F or lower to avoid burns. When should you call for help? Call 911 anytime you think you may need emergency care. For example, call if:  ? · You are lost and do not know whom to call. ? · You are injured and do not know whom to call. ?Call your doctor now or seek immediate medical care if:  ? · Your symptoms suddenly get much worse. ? Watch closely for changes in your health, and be sure to contact your doctor if:  ? · You want more information about how you can take care of yourself. Where can you learn more? Go to http://zach-herlinda.info/. Enter Y179 in the search box to learn more about \"Alzheimer's Disease: Care Instructions. \"  Current as of: May 12, 2017  Content Version: 11.4  © 9757-8496 [employer]. Care instructions adapted under license by [employer] (which disclaims liability or warranty for this information). If you have questions about a medical condition or this instruction, always ask your healthcare professional. [address] any warranty or liability for your use of this information.

## 2018-04-23 NOTE — PROGRESS NOTES
Chief Complaint   Patient presents with    Transitions Of Care     1. Have you been to the ER, urgent care clinic since your last visit? Hospitalized since your last visit? Yes, stroke, 4/16/18 [facility] to [facility].     2. Have you seen or consulted any other health care providers outside of the [address] [place] [address] since your last visit? Include any pap smears or colon screening.  No

## 2018-04-23 NOTE — PROGRESS NOTES
Transitions Of Care       HPI:  Wanda Farfan Sr is a 80y.o. year old male who is here for a follow up visit for hospitalization transition of care. He was last seen by me on 3/9/2018. Discharged on: 4/15/2018    Diagnosis in hospital: #1 TIA, #2 metabolic encephalopathy, #3 alcoholism    Complications in hospital: None    Medication changes: Start aspirin 81 mg daily which she was supposed have already been taking, discontinue Vasotec 20 mg daily. Discharge Summary reviewed. Today 4/23/2018      He reports the following: He says that since his hospitalization does not feel like his memory is as good as it had been before. He is now accompanied by his granddaughter who he is living with now. He no longer lives with his second wife which is where he was prior to his hospitalization. I pointed out and that we have extensively evaluated his memory issues before determined that he did have some Alzheimer's dementia and he is already on Aricept 10 mg a day. Outside of the memory issues he denies any other specific complaints. He denies any other neurologic complaints including no dizziness or headaches. He denies any GI or  complaints. He claims to not be drinking alcohol now. He denies any chest pain, shortness breath, palpitations or cardiorespiratory complaints. He has no current arthritic complaints.       Visit Vitals    /70 (BP 1 Location: Left arm, BP Patient Position: Sitting)    Pulse (!) 52    Temp 98.3 °F (36.8 °C) (Oral)    Resp 16    Ht 5' 7\" (1.702 m)    Wt 137 lb 9.6 oz (62.4 kg)    SpO2 100%    BMI 21.55 kg/m2       Historical Data    Past Medical History:   Diagnosis Date    Abdominal pain 7/18/2017    Alzheimer disease 7/18/2017    Anemia 7/18/2017    Arthritis     Back pain 7/18/2017    Bradycardia 7/18/2017    CAD (coronary artery disease)     hx of MI    CKD (chronic kidney disease), stage II 7/18/2017    constipation     Depression 7/18/2017    Diabetes mellitus ([facility] [address] 7/18/2017    Diverticulosis 7/18/2017    DJD (degenerative joint disease) 7/18/2017    Encounter for long-term (current) use of other medications 7/18/2017    Fatigue     Gastritis and duodenitis 7/18/2017    GERD (gastroesophageal reflux disease)     GI bleed 7/18/2017    Hearing loss     Hyperlipidemia 7/18/2017    Hypertension     Hypertension with renal disease 7/18/2017    Ill-defined condition     Dementia    Internal hemorrhoids 7/18/2017    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    S/P cardiac pacemaker procedure 5/4/2015    5/4/15 Medtronic dual chamber pacemaker implant     Weight loss     lost over 40 pounds last year- has no appetite       Past Surgical History:   Procedure Laterality Date    COLONOSCOPY N/A 6/22/2017    COLONOSCOPY performed by Nj Manriquez MD at [address] [place] [address]  6/22/2017         [address]  7/10/2014    right inguinal    HX OTHER SURGICAL      cystectomy from back    MO EGD TRANSORAL BIOPSY SINGLE/MULTIPLE  2/14/2012         UPPER GI ENDOSCOPY,BIOPSY  6/22/2017            Outpatient Encounter Prescriptions as of 4/23/2018   Medication Sig Dispense Refill    memantine (NAMENDA XR) 7-14-21-28 mg C24k Take 1 Cap by mouth daily. 1 Dose Pack 0    losartan-hydroCHLOROthiazide (HYZAAR) 100-25 mg per tablet TAKE 1 TABLET EVERY DAY 90 Tab 1    aspirin 81 mg chewable tablet Take 1 Tab by mouth daily. 30 Tab 1    amLODIPine (NORVASC) 10 mg tablet TAKE 1 TABLET BY MOUTH EVERY DAY 90 Tab 3    donepezil (ARICEPT) 10 mg tablet TAKE 1 TABLET BY MOUTH EVERY DAY 90 Tab 3    omeprazole (PRILOSEC) 20 mg capsule Take 1 Cap by mouth daily. 90 Cap 3     No facility-administered encounter medications on file as of 4/23/2018.          No Known Allergies     Social History     Social History    Marital status:      Spouse name: N/A    Number of children: N/A    Years of education: N/A     Occupational History    Not on file. Social History Main Topics    Smoking status: Former Smoker     Packs/day: 0.50     Years: 10.00     Start date: 7/12/1991    Smokeless tobacco: Never Used      Comment: quit 50 years ago    Alcohol use No    Drug use: No    Sexual activity: Not on file     Other Topics Concern    Not on file     Social History Narrative      REVIEW OF SYSTEMS:  General: negative for - chills or fever  ENT: negative for - headaches, nasal congestion or tinnitus  Eyes: no blurred or visual changes  Neck: No stiffness or swollen nodes  Respiratory: negative for - cough, hemoptysis, shortness of breath or wheezing  Cardiovascular : negative for - chest pain, edema, palpitations or shortness of breath  Gastrointestinal: negative for - abdominal pain, blood in stools, heartburn or nausea/vomiting  Genito-Urinary: no dysuria, trouble voiding, or hematuria  Musculoskeletal: negative for - gait disturbance, joint pain, joint stiffness or joint swelling  Neurological: no TIA or stroke symptoms. Memory not as good  Hematologic: no bruises, no bleeding  Lymphatic: no swollen glands  Integument: no lumps, mole changes, nail changes or rash  Endocrine:no malaise/lethargy poly uria or polydipsia or unexpected weight changes      Visit Vitals    /70 (BP 1 Location: Left arm, BP Patient Position: Sitting)    Pulse (!) 52    Temp 98.3 °F (36.8 °C) (Oral)    Resp 16    Ht 5' 7\" (1.702 m)    Wt 137 lb 9.6 oz (62.4 kg)    SpO2 100%    BMI 21.55 kg/m2     CONSTITUTIONAL: well , well nourished, appears age appropriate  HEAD: normocephalic, atraumatic  EYES: sclera anicteric, PERRL, EOMI  ENMT:moist mucous membranes, pharynx clear          Nares: w/o erythema or edema  NECK: supple.  Thyroid normal, No JVD or bruits  RESPIRATORY: Chest: clear to ascultation and percussion   CARDIOVASCULAR: Heart: regular rate and rhythm no murmurs, rubs or gallops, PMI not displaced, no thrill  GASTROINTESTINAL: Abdomen: non distended, soft, non-tender, bowel sounds normal  HEMATOLOGIC: no petechiae or purpura  LYMPHATIC: no lymphadenopathy  MUSCULOSKELETAL: Extremities: no edema or active synovitis, pulse 1+   BACK; no point or CVAT  INTEGUMENT: No unusual rashes or suspicious skin lesions noted. Nails appear normal.  NEUROLOGIC: non-focal exam   MENTAL STATUS: alert and oriented, appropriate affect   PSYCHIATRIC: normal affect    ASSESSMENT:   1. Transient cerebral ischemia, unspecified type    2. Alcoholism ([facility] [address]    3. Alzheimer's dementia without behavioral disturbance, unspecified timing of dementia onset    4. CKD (chronic kidney disease), stage II    5. 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, without long-term current use of insulin ([facility] [address]    6. Hypertension with renal disease      Impression  1. TIA with recent hospitalization continue aspirin daily 81 mg  2. Alcoholism I asked him to refrain from alcohol not discussed this with his granddaughter who is now living with  3. Alzheimer's dementia we will continue the Aricept 10 mg daily and start a Namenda starter pack 7 mg for a week then 14 mg for week then 20 mg a week for 1-20 mg and I will recheck him in 4 weeks and see how he is doing I clearly explained to him and his granddaughter that this does not reverse any memory loss is already been present but hopefully slow deterioration  4. CKD stage II repeat status pending  5. Diabetes we will see what the status of his blood sugars today  6. Hypertension that is control  I will call the lab and make further recommendations. I discussed this with his granddaughter present with him today. Follow-up scheduled again for 1 month or sooner should they be a problem.     PLAN:  .  Orders Placed This Encounter    AMB POC BASIC METABOLIC PANEL    AMB POC COMPLETE CBC,AUTOMATED ENTER    memantine (NAMENDA XR) 7-14-21-28 mg C24k         ATTENTION:   This medical record was transcribed using an electronic medical records system. Although proofread, it may and can contain electronic and spelling errors. Other human spelling and other errors may be present. Corrections may be executed at a later time. Please feel free to contact us for any clarifications as needed. Follow-up Disposition:  Return in about 4 weeks (around 5/21/2018). Krupa Cho MD    Orders Placed This Encounter    AMB POC BASIC METABOLIC PANEL    AMB POC COMPLETE CBC,AUTOMATED ENTER    memantine (NAMENDA XR) 7-14-21-28 mg C24k     Sig: Take 1 Cap by mouth daily. Dispense:  1 Dose Pack     Refill:  0          No results found for any visits on 04/23/18. I have reviewed the patient's medical history in detail and updated the computerized patient record. We had a prolonged discussion about these complex clinical issues and went over the various important aspects to consider. All questions were answered. Advised him to call back or return to office if symptoms do not improve, change in nature, or persist.    He was given an after visit summary or informed of [employer] Access which includes patient instructions, diagnoses, current medications, & vitals. He expressed understanding with the diagnosis and plan.

## 2018-04-24 PROCEDURE — 3331090001 HH PPS REVENUE CREDIT

## 2018-04-24 PROCEDURE — 3331090002 HH PPS REVENUE DEBIT

## 2018-04-25 PROCEDURE — 3331090001 HH PPS REVENUE CREDIT

## 2018-04-25 PROCEDURE — 3331090002 HH PPS REVENUE DEBIT

## 2018-04-26 ENCOUNTER — HOME CARE VISIT (OUTPATIENT)
Dept: SCHEDULING | Facility: HOME HEALTH | Age: 83
End: 2018-04-26
Payer: MEDICARE

## 2018-04-26 VITALS
RESPIRATION RATE: 18 BRPM | OXYGEN SATURATION: 98 % | HEART RATE: 61 BPM | TEMPERATURE: 98.8 F | SYSTOLIC BLOOD PRESSURE: 120 MMHG | DIASTOLIC BLOOD PRESSURE: 60 MMHG

## 2018-04-26 PROCEDURE — G0151 HHCP-SERV OF PT,EA 15 MIN: HCPCS

## 2018-04-26 PROCEDURE — 3331090002 HH PPS REVENUE DEBIT

## 2018-04-26 PROCEDURE — 3331090001 HH PPS REVENUE CREDIT

## 2018-04-27 PROCEDURE — 3331090002 HH PPS REVENUE DEBIT

## 2018-04-27 PROCEDURE — 3331090001 HH PPS REVENUE CREDIT

## 2018-04-28 PROCEDURE — 3331090002 HH PPS REVENUE DEBIT

## 2018-04-28 PROCEDURE — 3331090001 HH PPS REVENUE CREDIT

## 2018-04-29 PROCEDURE — 3331090001 HH PPS REVENUE CREDIT

## 2018-04-29 PROCEDURE — 3331090002 HH PPS REVENUE DEBIT

## 2018-04-30 ENCOUNTER — HOME CARE VISIT (OUTPATIENT)
Dept: SCHEDULING | Facility: HOME HEALTH | Age: 83
End: 2018-04-30
Payer: MEDICARE

## 2018-04-30 VITALS
OXYGEN SATURATION: 98 % | HEART RATE: 68 BPM | DIASTOLIC BLOOD PRESSURE: 70 MMHG | TEMPERATURE: 97.4 F | RESPIRATION RATE: 18 BRPM | SYSTOLIC BLOOD PRESSURE: 125 MMHG

## 2018-04-30 PROCEDURE — 3331090001 HH PPS REVENUE CREDIT

## 2018-04-30 PROCEDURE — G0151 HHCP-SERV OF PT,EA 15 MIN: HCPCS

## 2018-04-30 PROCEDURE — 3331090002 HH PPS REVENUE DEBIT

## 2018-05-01 PROCEDURE — 3331090002 HH PPS REVENUE DEBIT

## 2018-05-01 PROCEDURE — 3331090001 HH PPS REVENUE CREDIT

## 2018-05-02 PROCEDURE — 3331090002 HH PPS REVENUE DEBIT

## 2018-05-02 PROCEDURE — 3331090001 HH PPS REVENUE CREDIT

## 2018-05-03 ENCOUNTER — HOME CARE VISIT (OUTPATIENT)
Dept: SCHEDULING | Facility: HOME HEALTH | Age: 83
End: 2018-05-03
Payer: MEDICARE

## 2018-05-03 ENCOUNTER — HOME CARE VISIT (OUTPATIENT)
Dept: HOME HEALTH SERVICES | Facility: HOME HEALTH | Age: 83
End: 2018-05-03
Payer: MEDICARE

## 2018-05-03 VITALS
SYSTOLIC BLOOD PRESSURE: 110 MMHG | HEART RATE: 68 BPM | TEMPERATURE: 98.4 F | OXYGEN SATURATION: 98 % | RESPIRATION RATE: 18 BRPM | DIASTOLIC BLOOD PRESSURE: 60 MMHG

## 2018-05-03 PROCEDURE — G0151 HHCP-SERV OF PT,EA 15 MIN: HCPCS

## 2018-05-03 PROCEDURE — 3331090001 HH PPS REVENUE CREDIT

## 2018-05-03 PROCEDURE — 3331090002 HH PPS REVENUE DEBIT

## 2018-05-04 PROCEDURE — 3331090001 HH PPS REVENUE CREDIT

## 2018-05-04 PROCEDURE — 3331090002 HH PPS REVENUE DEBIT

## 2018-05-05 PROCEDURE — 3331090001 HH PPS REVENUE CREDIT

## 2018-05-05 PROCEDURE — 3331090002 HH PPS REVENUE DEBIT

## 2018-05-06 PROCEDURE — 3331090002 HH PPS REVENUE DEBIT

## 2018-05-06 PROCEDURE — 3331090001 HH PPS REVENUE CREDIT

## 2018-05-07 PROCEDURE — 3331090002 HH PPS REVENUE DEBIT

## 2018-05-07 PROCEDURE — 3331090001 HH PPS REVENUE CREDIT

## 2018-05-08 PROCEDURE — 3331090002 HH PPS REVENUE DEBIT

## 2018-05-08 PROCEDURE — 3331090001 HH PPS REVENUE CREDIT

## 2018-05-09 PROCEDURE — 3331090001 HH PPS REVENUE CREDIT

## 2018-05-09 PROCEDURE — 3331090002 HH PPS REVENUE DEBIT

## 2018-05-10 PROCEDURE — 3331090001 HH PPS REVENUE CREDIT

## 2018-05-10 PROCEDURE — 3331090002 HH PPS REVENUE DEBIT

## 2018-05-18 ENCOUNTER — OFFICE VISIT (OUTPATIENT)
Dept: INTERNAL MEDICINE CLINIC | Age: 83
End: 2018-05-18

## 2018-05-18 VITALS
WEIGHT: 139.6 LBS | TEMPERATURE: 97.8 F | OXYGEN SATURATION: 99 % | HEART RATE: 62 BPM | DIASTOLIC BLOOD PRESSURE: 62 MMHG | BODY MASS INDEX: 21.91 KG/M2 | SYSTOLIC BLOOD PRESSURE: 122 MMHG | RESPIRATION RATE: 15 BRPM | HEIGHT: 67 IN

## 2018-05-18 DIAGNOSIS — F02.80 ALZHEIMER'S DEMENTIA WITHOUT BEHAVIORAL DISTURBANCE, UNSPECIFIED TIMING OF DEMENTIA ONSET: ICD-10-CM

## 2018-05-18 DIAGNOSIS — F10.20 ALCOHOLISM (HCC): ICD-10-CM

## 2018-05-18 DIAGNOSIS — I12.9 HYPERTENSION WITH RENAL DISEASE: Primary | ICD-10-CM

## 2018-05-18 DIAGNOSIS — E11.22 CONTROLLED TYPE 2 DIABETES MELLITUS WITH STAGE 2 CHRONIC KIDNEY DISEASE, WITHOUT LONG-TERM CURRENT USE OF INSULIN (HCC): ICD-10-CM

## 2018-05-18 DIAGNOSIS — N18.2 CKD (CHRONIC KIDNEY DISEASE), STAGE II: ICD-10-CM

## 2018-05-18 DIAGNOSIS — N18.2 CONTROLLED TYPE 2 DIABETES MELLITUS WITH STAGE 2 CHRONIC KIDNEY DISEASE, WITHOUT LONG-TERM CURRENT USE OF INSULIN (HCC): ICD-10-CM

## 2018-05-18 DIAGNOSIS — G30.9 ALZHEIMER'S DEMENTIA WITHOUT BEHAVIORAL DISTURBANCE, UNSPECIFIED TIMING OF DEMENTIA ONSET: ICD-10-CM

## 2018-05-18 LAB
BUN BLD-MCNC: 19 MG/DL (ref 9–20)
CALCIUM BLD-MCNC: 9.4 MG/DL (ref 8.4–10.2)
CHLORIDE BLD-SCNC: 102 MMOL/L (ref 98–107)
CO2 POC: 30 MMOL/L (ref 22–32)
CREAT BLD-MCNC: 1.4 MG/DL (ref 0.8–1.5)
EGFR (POC): 44.9
GLUCOSE POC: 121 MG/DL (ref 75–110)
POTASSIUM SERPL-SCNC: 4 MMOL/L (ref 3.6–5)
SODIUM SERPL-SCNC: 144 MMOL/L (ref 137–145)

## 2018-05-18 RX ORDER — MEMANTINE HYDROCHLORIDE 10 MG/1
10 TABLET ORAL 2 TIMES DAILY
Qty: 60 TAB | Status: SHIPPED | OUTPATIENT
Start: 2018-05-18 | End: 2018-06-14 | Stop reason: SDUPTHER

## 2018-05-18 NOTE — PROGRESS NOTES
Chief Complaint   Patient presents with    Hypertension     4 week follow up     Diabetes    Chronic Kidney Disease    Anemia    Back Pain     1. Have you been to the ER, urgent care clinic since your last visit? Hospitalized since your last visit? No    2. Have you seen or consulted any other health care providers outside of the [address] [place] [address] since your last visit? Include any pap smears or colon screening.  No       Last eye exam-April 2018, Amado

## 2018-05-18 NOTE — MR AVS SNAPSHOT
[address] [facility] 
 
 
 [address] [address] [phone] Patient: Roxann Mchugh Sr 
MRN: [id number] NBS:[birth year]752 Visit Information Date & Time Provider Department Dept. Phone Encounter #  
 5/18/2018  1:20 PM Sylvester Hill MD [address] [place] [address] [phone] [id number] Follow-up Instructions Return in about 4 weeks (around 6/15/2018). Follow-up and Disposition History Your Appointments 6/12/2018  1:10 PM  
FOLLOW UP 10 with MD COLLINS Julien [facility] ([facility]) Appt Note: 3 month fu  
 [address] [address] [address] [facility] [address] 8/16/2018  9:30 AM  
PROCEDURE with PACEMAKER, [facility]) Appt Note: 6mo mdt bivpm  
 [address] [facility]  
[phone] [address] [facility]  
  
    
 2/12/2019 10:00 AM  
ESTABLISHED PATIENT with Jaydon Rodriguez MD  
[place]) Appt Note: annual also pace check kw  
 [address] [facility]  
[phone] [address] [facility]  
  
    
 2/12/2019 10:00 AM  
PROCEDURE with PACEMAKER, [facility]) Appt Note: also seeing david annual  
 [address] [facility]  
[phone] Upcoming Health Maintenance Date Due  
 EYE EXAM RETINAL OR DILATED Q1 1/21/1941 DTaP/Tdap/Td series (1 - Tdap) 1/21/1952 ZOSTER VACCINE AGE 60> 11/21/1990 GLAUCOMA SCREENING Q2Y 1/21/1996 Influenza Age 5 to Adult 8/1/2018 MICROALBUMIN Q1 9/1/2018 MEDICARE YEARLY EXAM 9/2/2018 HEMOGLOBIN A1C Q6M 10/13/2018 FOOT EXAM Q1 3/9/2019 LIPID PANEL Q1 4/13/2019 Allergies as of 5/18/2018  Review Complete On: 5/18/2018 By: Tej Garcia MD  
 No Known Allergies Current Immunizations  Reviewed on 5/4/2015 Name Date Influenza High Dose Vaccine PF 12/8/2017 Influenza Vaccine 1/18/2017, 10/18/2016, 12/15/2015 Pneumococcal Conjugate (PCV-13) 9/30/2015 Pneumococcal Polysaccharide (PPSV-23) 10/13/2006 Pneumococcal Vaccine (Unspecified Type) 10/13/2006 Not reviewed this visit You Were Diagnosed With   
  
 Codes Comments Hypertension with renal disease    -  Primary ICD-10-CM: I12.9 ICD-9-CM: 403.90 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, without long-term current use of insulin (HCC)     ICD-10-CM: E11.22, N18.2 ICD-9-CM: 250.40, 585.2 CKD (chronic kidney disease), stage II     ICD-10-CM: N18.2 ICD-9-CM: 543. 2 Alzheimer's dementia without behavioral disturbance, unspecified timing of dementia onset     ICD-10-CM: G30.9, F02.80 ICD-9-CM: 331.0, 294.10 Alcoholism ([facility] [address]     ICD-10-CM: R30.41 ICD-9-CM: 303.90 Vitals BP Pulse Temp Resp Height(growth percentile) Weight(growth percentile) 122/62 (BP 1 Location: Left arm, BP Patient Position: Sitting) 62 97.8 °F (36.6 °C) (Oral) 15 5' 7\" (1.702 m) 139 lb 9.6 oz (63.3 kg) SpO2 BMI Smoking Status 99% 21.86 kg/m2 Former Smoker Vitals History BMI and BSA Data Body Mass Index Body Surface Area  
 21.86 kg/m 2 1.73 m 2 Preferred Pharmacy Pharmacy Name Phone [facility][id number]- [place], [place] - [facility] AT [address] [place] [address] [phone] Your Updated Medication List  
  
   
This list is accurate as of 5/18/18  2:18 PM.  Always use your most recent med list. amLODIPine 10 mg tablet Commonly known as:  Sarai Tomas TAKE 1 TABLET BY MOUTH EVERY DAY  
  
 aspirin 81 mg chewable tablet Take 1 Tab by mouth daily. donepezil 10 mg tablet Commonly known as:  ARICEPT  
TAKE 1 TABLET BY MOUTH EVERY DAY  
  
 losartan-hydroCHLOROthiazide 100-25 mg per tablet Commonly known as:  HYZAAR  
TAKE 1 TABLET EVERY DAY  
  
 memantine 10 mg tablet Commonly known as:  Padilla Salter Take 1 Tab by mouth two (2) times a day. omeprazole 20 mg capsule Commonly known as:  PRILOSEC Take 1 Cap by mouth daily. ONE-A-DAY MEN 50 PLUS (GINKGO) [phone] mcg-mcg-mg Tab Generic drug:  mv-mins-folic-lycopene-ginkgo Take  by mouth. Prescriptions Sent to Pharmacy Refills  
 memantine (NAMENDA) 10 mg tablet prn Sig: Take 1 Tab by mouth two (2) times a day. Class: Normal  
 Pharmacy: [facility]/pharmacy #[id number] AT [address] [place] [address] #: [phone] Route: Oral  
  
Follow-up Instructions Return in about 4 weeks (around 6/15/2018). Patient Instructions Noninsulin Medicines for Type 2 Diabetes: Care Instructions Your Care Instructions There are different types of noninsulin medicines for diabetes. Each works in a different way. But they all help you control your blood sugar. Some types help your body make insulin to lower your blood sugar. Others lower how much insulin your body needs. Some can slow how fast your body digests sugars. And some can remove extra glucose through your urine. · Alpha-glucosidase inhibitors. These keep starches from breaking down. This means that they lower the amount of glucose absorbed when you eat. They don't help your body make more insulin. So they will not cause low blood sugar unless you use them with other medicines for diabetes. They include acarbose and miglitol. · DPP-4 inhibitors. These help your body raise the level of insulin after you eat. They also help your body make less of a hormone that raises blood sugar. They include linagliptin, saxagliptin, and sitagliptin. · Incretin hormones (GLP-1 receptor agonists). Your body makes a protein that can raise your insulin level. It also can lower your blood sugar and make you less hungry. You can get shots of hormones that work the same way. They include exenatide and liraglutide. · Meglitinides. These help your body release insulin. They also help slow how your body digests sugars. So they can keep your blood sugar from rising too fast after you eat. They include nateglinide and repaglinide. · Metformin. This lowers how much glucose your liver makes. And it helps you respond better to insulin. It also lowers the amount of stored sugar that your liver releases when you are not eating. · SGLT2 inhibitors. These help to remove extra glucose through your urine. They may also help some people lose weight. They include canagliflozin, dapagliflozin, and empagliflozin. · Sulfonylureas. These help your body release more insulin. Some work for many hours. They can cause low blood sugar if you don't eat as you planned. They include glipizide and glyburide. · Thiazolidinediones. These reduce the amount of blood glucose. They also help you respond better to insulin. They include pioglitazone and rosiglitazone. You may need to take more than one medicine for diabetes. Two or more medicines may work better to lower your blood sugar level than just one does. Follow-up care is a key part of your treatment and safety. Be sure to make and go to all appointments, and call your doctor if you are having problems. It's also a good idea to know your test results and keep a list of the medicines you take. How can you care for yourself at home? · Eat a healthy diet. Get some exercise each day. This may help you to reduce how much medicine you need.  
· Do not take other prescription or over-the-counter medicines, vitamins, herbal products, or supplements without talking to your doctor first. Some medicines for type 2 diabetes can cause problems with other medicines or supplements. · Tell your doctor if you plan to get pregnant. Some of these drugs are not safe for pregnant women. · Be safe with medicines. Take your medicines exactly as prescribed. Meglitinides and sulfonylureas can cause your blood sugar to drop very low. Call your doctor if you think you are having a problem with your medicine. · Check your blood sugar often. You can use a glucose monitor. Keeping track can help you know how certain foods, activities, and medicines affect your blood sugar. And it can help you keep your blood sugar from getting so low that it's not safe. When should you call for help? Call 911 anytime you think you may need emergency care. For example, call if: 
? · You passed out (lost consciousness). ? · You are confused or cannot think clearly. ? · Your blood sugar is very high or very low. ? Watch closely for changes in your health, and be sure to contact your doctor if: 
? · Your blood sugar stays outside the level your doctor set for you. ? · You have any problems. Where can you learn more? Go to http://zach-herlinda.info/. Enter H153 in the search box to learn more about \"Noninsulin Medicines for Type 2 Diabetes: Care Instructions. \" Current as of: March 13, 2017 Content Version: 11.4 © 2812-1237 [employer]. Care instructions adapted under license by [employer] (which disclaims liability or warranty for this information). If you have questions about a medical condition or this instruction, always ask your healthcare professional. [address] any warranty or liability for your use of this information. Patient Instructions History Introducing [facility]! Adali Ogden introduces [employer] patient portal. Now you can access parts of your medical record, email your doctor's office, and request medication refills online. 1. In your internet browser, go to [email or web address] 2. Click on the First Time User? Click Here link in the Sign In box. You will see the New Member Sign Up page. 3. Enter your [employer] Access Code exactly as it appears below. You will not need to use this code after youve completed the sign-up process. If you do not sign up before the expiration date, you must request a new code. · [employer] Access Code: [email or web address] Expires: 8/16/2018  2:18 PM 
 
4. Enter the last four digits of your Social Security Number (xxxx) and Date of Birth (mm/dd/yyyy) as indicated and click Submit. You will be taken to the next sign-up page. 5. Create a Blinkitt ID. This will be your [employer] login ID and cannot be changed, so think of one that is secure and easy to remember. 6. Create a [employer] password. You can change your password at any time. 7. Enter your Password Reset Question and Answer. This can be used at a later time if you forget your password. 8. Enter your e-mail address. You will receive e-mail notification when new information is available in [address] 9. Click Sign Up. You can now view and download portions of your medical record. 10. Click the Download Summary menu link to download a portable copy of your medical information. If you have questions, please visit the Frequently Asked Questions section of the [employer] website. Remember, [employer] is NOT to be used for urgent needs. For medical emergencies, dial 911. Now available from your iPhone and Android! Please provide this summary of care documentation to your next provider. Your primary care clinician is listed as Thuy. If you have any questions after today's visit, please call [phone].

## 2018-05-18 NOTE — PROGRESS NOTES
Chief Complaint   Patient presents with    Hypertension     4 week follow up     Diabetes    Chronic Kidney Disease    Anemia    Back Pain       SUBJECTIVE:    Moncho Farfan Sr is a 80 y.o. male who returns in follow-up of his medical problems include hypertension, diabetes, Alzheimer's dementia, CKD, recent hospitalization for metabolic encephalopathy with some underlying Alzheimer's dementia. He also had apparently a problem with alcoholism but is no longer drinking alcohol is currently staying with his granddaughter who is kept him from drinking anything. He currently denies any chest pain, shortness breath, palpitations or cardiorespiratory complaints. He denies any GI or  complaints. He denies any headaches, dizziness or neurological planes. There are no current arthritic complaints and no other complaints on complete review of systems. His granddaughter present with him confirms all of this. Current Outpatient Prescriptions   Medication Sig Dispense Refill    mv-mins-folic-lycopene-ginkgo (ONE-A-DAY MEN 50 PLUS, GINKGO,) [phone] mcg-mcg-mg tab Take  by mouth.  memantine (NAMENDA) 10 mg tablet Take 1 Tab by mouth two (2) times a day. 60 Tab prn    losartan-hydroCHLOROthiazide (HYZAAR) 100-25 mg per tablet TAKE 1 TABLET EVERY DAY 90 Tab 1    aspirin 81 mg chewable tablet Take 1 Tab by mouth daily. 30 Tab 1    amLODIPine (NORVASC) 10 mg tablet TAKE 1 TABLET BY MOUTH EVERY DAY 90 Tab 3    donepezil (ARICEPT) 10 mg tablet TAKE 1 TABLET BY MOUTH EVERY DAY 90 Tab 3    omeprazole (PRILOSEC) 20 mg capsule Take 1 Cap by mouth daily.  80 Cap 3     Past Medical History:   Diagnosis Date    Abdominal pain 7/18/2017    Alzheimer disease 7/18/2017    Anemia 7/18/2017    Arthritis     Back pain 7/18/2017    Bradycardia 7/18/2017    CAD (coronary artery disease)     hx of MI    CKD (chronic kidney disease), stage II 7/18/2017    constipation     Depression 7/18/2017    Diabetes mellitus ([facility] [address] 7/18/2017    Diverticulosis 7/18/2017    DJD (degenerative joint disease) 7/18/2017    Encounter for long-term (current) use of other medications 7/18/2017    Fatigue     Gastritis and duodenitis 7/18/2017    GERD (gastroesophageal reflux disease)     GI bleed 7/18/2017    Hearing loss     Hyperlipidemia 7/18/2017    Hypertension     Hypertension with renal disease 7/18/2017    Ill-defined condition     Dementia    Internal hemorrhoids 7/18/2017    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    S/P cardiac pacemaker procedure 5/4/2015    5/4/15 [employer]tronic dual chamber pacemaker implant     Weight loss     lost over 40 pounds last year- has no appetite     Past Surgical History:   Procedure Laterality Date    COLONOSCOPY N/A 6/22/2017    COLONOSCOPY performed by Kp Hernadez MD at [address] [place] [address]  6/22/2017         [address]  7/10/2014    right inguinal    HX OTHER SURGICAL      cystectomy from back    PA EGD TRANSORAL BIOPSY SINGLE/MULTIPLE  2/14/2012         UPPER GI ENDOSCOPY,BIOPSY  6/22/2017          No Known Allergies    REVIEW OF SYSTEMS:  General: negative for - chills or fever, or weight loss or gain  ENT: negative for - headaches, nasal congestion or tinnitus  Eyes: no blurred or visual changes  Neck: No stiffness or swollen nodes  Respiratory: negative for - cough, hemoptysis, shortness of breath or wheezing  Cardiovascular : negative for - chest pain, edema, palpitations or shortness of breath  Gastrointestinal: negative for - abdominal pain, blood in stools, heartburn or nausea/vomiting  Genito-Urinary: no dysuria, trouble voiding, or hematuria  Musculoskeletal: negative for - gait disturbance, joint pain, joint stiffness or joint swelling  Neurological: no TIA or stroke symptoms  Hematologic: no bruises, no bleeding  Lymphatic: no swollen glands  Integument: no lumps, mole changes, nail changes or rash  Endocrine:no malaise/lethargy poly uria or polydipsia or unexpected weight changes        Social History     Social History    Marital status:      Spouse name: N/A    Number of children: N/A    Years of education: N/A     Social History Main Topics    Smoking status: Former Smoker     Packs/day: 0.50     Years: 10.00     Start date: 1991    Smokeless tobacco: Never Used      Comment: quit 50 years ago    Alcohol use No    Drug use: No    Sexual activity: Not Asked     Other Topics Concern    None     Social History Narrative     Family History   Problem Relation Age of Onset    Hypertension Mother     Heart Disease Father     Cancer Other      son-cancer? unknown what type        OBJECTIVE:     Visit Vitals    /62 (BP 1 Location: Left arm, BP Patient Position: Sitting)    Pulse 62    Temp 97.8 °F (36.6 °C) (Oral)    Resp 15    Ht 5' 7\" (1.702 m)    Wt 139 lb 9.6 oz (63.3 kg)    SpO2 99%    BMI 21.86 kg/m2     CONSTITUTIONAL:   well nourished, appears age appropriate  EYES: sclera anicteric, PERRL, EOMI  ENMT:nares clear, moist mucous membranes, pharynx clear  NECK: supple. Thyroid normal, No JVD or bruits  RESPIRATORY: Chest: clear to ascultation and percussion, normal inspiratory effort  CARDIOVASCULAR: Heart: regular rate and rhythm no murmurs, rubs or gallops, PMI not displaced, No thrills  GASTROINTESTINAL: Abdomen: non distended, soft, non tender, bowel sounds normal  HEMATOLOGIC: no purpura, petechiae or bruising  LYMPHATIC: No lymph node enlargemant  MUSCULOSKELETAL: Extremities: no edema or active synovitis, pulse 1+   INTEGUMENT: No unusual rashes or suspicious skin lesions noted. Nails appear normal.  PERIPHERAL VASCULAR: normal pulses femoral, PT and DP  NEUROLOGIC: non-focal exam, A & O X 3  PSYCHIATRIC:, appropriate affect     ASSESSMENT:   1. Hypertension with renal disease    2.  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, without long-term current use of insulin ([facility] [address]    3. CKD (chronic kidney disease), stage II    4. Alzheimer's dementia without behavioral disturbance, unspecified timing of dementia onset    5. Alcoholism ([facility] [address]      Impression  1. Hypertension that is controlled continue current therapy reviewed with he and his granddaughter  2. Diabetes we will see what the blood sugar looks like on his metabolic panel although it is not fasting I reviewed his last A1c  3. CKD repeat status pending that was improved on last check  4. Alzheimer's dementia because of the cost of the Namenda XR we will switch him to Namenda 10 mg twice daily  5. Alcoholism he remains alcohol free at this point  I will recheck him at his prior schedule three-month evaluation for his other medical problems which is scheduled for mid-June with the understanding I will see him sooner should be a problem. PLAN:  .  Orders Placed This Encounter    AMB POC BASIC METABOLIC PANEL    mv-mins-folic-lycopene-ginkgo (ONE-A-DAY MEN 50 PLUS, GINKGO,) [phone] mcg-mcg-mg tab    memantine (NAMENDA) 10 mg tablet         ATTENTION:   This medical record was transcribed using an electronic medical records system. Although proofread, it may and can contain electronic and spelling errors. Other human spelling and other errors may be present. Corrections may be executed at a later time. Please feel free to contact us for any clarifications as needed. Follow-up Disposition:  Return in about 4 weeks (around 6/15/2018). No results found for any visits on 05/18/18. Kathryn Hernandez MD    The patient verbalized understanding of the problems and plans as explained.

## 2018-05-18 NOTE — PATIENT INSTRUCTIONS
Noninsulin Medicines for Type 2 Diabetes: Care Instructions  Your Care Instructions    There are different types of noninsulin medicines for diabetes. Each works in a different way. But they all help you control your blood sugar. Some types help your body make insulin to lower your blood sugar. Others lower how much insulin your body needs. Some can slow how fast your body digests sugars. And some can remove extra glucose through your urine. · Alpha-glucosidase inhibitors. These keep starches from breaking down. This means that they lower the amount of glucose absorbed when you eat. They don't help your body make more insulin. So they will not cause low blood sugar unless you use them with other medicines for diabetes. They include acarbose and miglitol. · DPP-4 inhibitors. These help your body raise the level of insulin after you eat. They also help your body make less of a hormone that raises blood sugar. They include linagliptin, saxagliptin, and sitagliptin. · Incretin hormones (GLP-1 receptor agonists). Your body makes a protein that can raise your insulin level. It also can lower your blood sugar and make you less hungry. You can get shots of hormones that work the same way. They include exenatide and liraglutide. · Meglitinides. These help your body release insulin. They also help slow how your body digests sugars. So they can keep your blood sugar from rising too fast after you eat. They include nateglinide and repaglinide. · Metformin. This lowers how much glucose your liver makes. And it helps you respond better to insulin. It also lowers the amount of stored sugar that your liver releases when you are not eating. · SGLT2 inhibitors. These help to remove extra glucose through your urine. They may also help some people lose weight. They include canagliflozin, dapagliflozin, and empagliflozin. · Sulfonylureas. These help your body release more insulin. Some work for many hours.  They can cause low blood sugar if you don't eat as you planned. They include glipizide and glyburide. · Thiazolidinediones. These reduce the amount of blood glucose. They also help you respond better to insulin. They include pioglitazone and rosiglitazone. You may need to take more than one medicine for diabetes. Two or more medicines may work better to lower your blood sugar level than just one does. Follow-up care is a key part of your treatment and safety. Be sure to make and go to all appointments, and call your doctor if you are having problems. It's also a good idea to know your test results and keep a list of the medicines you take. How can you care for yourself at home? · Eat a healthy diet. Get some exercise each day. This may help you to reduce how much medicine you need. · Do not take other prescription or over-the-counter medicines, vitamins, herbal products, or supplements without talking to your doctor first. Some medicines for type 2 diabetes can cause problems with other medicines or supplements. · Tell your doctor if you plan to get pregnant. Some of these drugs are not safe for pregnant women. · Be safe with medicines. Take your medicines exactly as prescribed. Meglitinides and sulfonylureas can cause your blood sugar to drop very low. Call your doctor if you think you are having a problem with your medicine. · Check your blood sugar often. You can use a glucose monitor. Keeping track can help you know how certain foods, activities, and medicines affect your blood sugar. And it can help you keep your blood sugar from getting so low that it's not safe. When should you call for help? Call 911 anytime you think you may need emergency care. For example, call if:  ? · You passed out (lost consciousness). ? · You are confused or cannot think clearly. ? · Your blood sugar is very high or very low. ? Watch closely for changes in your health, and be sure to contact your doctor if:  ? · Your blood sugar stays outside the level your doctor set for you. ? · You have any problems. Where can you learn more? Go to http://zach-herlinda.info/. Enter H153 in the search box to learn more about \"Noninsulin Medicines for Type 2 Diabetes: Care Instructions. \"  Current as of: March 13, 2017  Content Version: 11.4  © 9807-9081 [employer]. Care instructions adapted under license by [employer] (which disclaims liability or warranty for this information). If you have questions about a medical condition or this instruction, always ask your healthcare professional. [address] any warranty or liability for your use of this information.

## 2018-05-27 ENCOUNTER — HOSPITAL ENCOUNTER (EMERGENCY)
Age: 83
Discharge: HOME | End: 2018-05-27
Attending: EMERGENCY MEDICINE
Payer: MEDICARE

## 2018-05-27 ENCOUNTER — APPOINTMENT (OUTPATIENT)
Dept: CT IMAGING | Age: 83
End: 2018-05-27
Attending: EMERGENCY MEDICINE
Payer: MEDICARE

## 2018-05-27 VITALS
RESPIRATION RATE: 16 BRPM | SYSTOLIC BLOOD PRESSURE: 146 MMHG | HEIGHT: 67 IN | TEMPERATURE: 98.3 F | BODY MASS INDEX: 22.21 KG/M2 | HEART RATE: 63 BPM | WEIGHT: 141.54 LBS | DIASTOLIC BLOOD PRESSURE: 90 MMHG | OXYGEN SATURATION: 100 %

## 2018-05-27 DIAGNOSIS — M54.6 ACUTE THORACIC BACK PAIN, UNSPECIFIED BACK PAIN LATERALITY: Primary | ICD-10-CM

## 2018-05-27 LAB
ALBUMIN SERPL-MCNC: 3.3 G/DL (ref 3.5–5)
ALBUMIN/GLOB SERPL: 1 {RATIO} (ref 1.1–2.2)
ALP SERPL-CCNC: 68 U/L (ref 45–117)
ALT SERPL-CCNC: 21 U/L (ref 12–78)
ANION GAP SERPL CALC-SCNC: 5 MMOL/L (ref 5–15)
APPEARANCE UR: CLEAR
AST SERPL-CCNC: 18 U/L (ref 15–37)
BACTERIA URNS QL MICRO: NEGATIVE /HPF
BASOPHILS # BLD: 0.1 K/UL (ref 0–0.1)
BASOPHILS NFR BLD: 1 % (ref 0–1)
BILIRUB SERPL-MCNC: 0.3 MG/DL (ref 0.2–1)
BILIRUB UR QL: NEGATIVE
BUN SERPL-MCNC: 26 MG/DL (ref 6–20)
BUN/CREAT SERPL: 16 (ref 12–20)
CALCIUM SERPL-MCNC: 9 MG/DL (ref 8.5–10.1)
CHLORIDE SERPL-SCNC: 104 MMOL/L (ref 97–108)
CO2 SERPL-SCNC: 31 MMOL/L (ref 21–32)
COLOR UR: ABNORMAL
CREAT SERPL-MCNC: 1.62 MG/DL (ref 0.7–1.3)
DIFFERENTIAL METHOD BLD: ABNORMAL
EOSINOPHIL # BLD: 0.2 K/UL (ref 0–0.4)
EOSINOPHIL NFR BLD: 4 % (ref 0–7)
EPITH CASTS URNS QL MICRO: ABNORMAL /LPF
ERYTHROCYTE [DISTWIDTH] IN BLOOD BY AUTOMATED COUNT: 13.6 % (ref 11.5–14.5)
GLOBULIN SER CALC-MCNC: 3.4 G/DL (ref 2–4)
GLUCOSE SERPL-MCNC: 105 MG/DL (ref 65–100)
GLUCOSE UR STRIP.AUTO-MCNC: NEGATIVE MG/DL
HCT VFR BLD AUTO: 34.6 % (ref 36.6–50.3)
HGB BLD-MCNC: 11 G/DL (ref 12.1–17)
HGB UR QL STRIP: ABNORMAL
HYALINE CASTS URNS QL MICRO: ABNORMAL /LPF (ref 0–5)
IMM GRANULOCYTES # BLD: 0 K/UL (ref 0–0.04)
IMM GRANULOCYTES NFR BLD AUTO: 0 % (ref 0–0.5)
KETONES UR QL STRIP.AUTO: NEGATIVE MG/DL
LACTATE SERPL-SCNC: 1.3 MMOL/L (ref 0.4–2)
LEUKOCYTE ESTERASE UR QL STRIP.AUTO: NEGATIVE
LIPASE SERPL-CCNC: 155 U/L (ref 73–393)
LYMPHOCYTES # BLD: 1 K/UL (ref 0.8–3.5)
LYMPHOCYTES NFR BLD: 15 % (ref 12–49)
MCH RBC QN AUTO: 27.1 PG (ref 26–34)
MCHC RBC AUTO-ENTMCNC: 31.8 G/DL (ref 30–36.5)
MCV RBC AUTO: 85.2 FL (ref 80–99)
MONOCYTES # BLD: 0.6 K/UL (ref 0–1)
MONOCYTES NFR BLD: 9 % (ref 5–13)
NEUTS SEG # BLD: 4.5 K/UL (ref 1.8–8)
NEUTS SEG NFR BLD: 71 % (ref 32–75)
NITRITE UR QL STRIP.AUTO: NEGATIVE
NRBC # BLD: 0 K/UL (ref 0–0.01)
NRBC BLD-RTO: 0 PER 100 WBC
PH UR STRIP: 5.5 [PH] (ref 5–8)
PLATELET # BLD AUTO: 239 K/UL (ref 150–400)
PMV BLD AUTO: 9.8 FL (ref 8.9–12.9)
POTASSIUM SERPL-SCNC: 3.4 MMOL/L (ref 3.5–5.1)
PROT SERPL-MCNC: 6.7 G/DL (ref 6.4–8.2)
PROT UR STRIP-MCNC: NEGATIVE MG/DL
RBC # BLD AUTO: 4.06 M/UL (ref 4.1–5.7)
RBC #/AREA URNS HPF: ABNORMAL /HPF (ref 0–5)
SODIUM SERPL-SCNC: 140 MMOL/L (ref 136–145)
SP GR UR REFRACTOMETRY: 1.02 (ref 1–1.03)
UA: UC IF INDICATED,UAUC: ABNORMAL
UROBILINOGEN UR QL STRIP.AUTO: 1 EU/DL (ref 0.2–1)
WBC # BLD AUTO: 6.3 K/UL (ref 4.1–11.1)
WBC URNS QL MICRO: ABNORMAL /HPF (ref 0–4)

## 2018-05-27 PROCEDURE — 74011250636 HC RX REV CODE- 250/636: Performed by: EMERGENCY MEDICINE

## 2018-05-27 PROCEDURE — 99284 EMERGENCY DEPT VISIT MOD MDM: CPT

## 2018-05-27 PROCEDURE — 36415 COLL VENOUS BLD VENIPUNCTURE: CPT | Performed by: EMERGENCY MEDICINE

## 2018-05-27 PROCEDURE — 80053 COMPREHEN METABOLIC PANEL: CPT | Performed by: EMERGENCY MEDICINE

## 2018-05-27 PROCEDURE — 83605 ASSAY OF LACTIC ACID: CPT | Performed by: EMERGENCY MEDICINE

## 2018-05-27 PROCEDURE — 74011636320 HC RX REV CODE- 636/320: Performed by: EMERGENCY MEDICINE

## 2018-05-27 PROCEDURE — 74177 CT ABD & PELVIS W/CONTRAST: CPT

## 2018-05-27 PROCEDURE — 81001 URINALYSIS AUTO W/SCOPE: CPT | Performed by: EMERGENCY MEDICINE

## 2018-05-27 PROCEDURE — 85025 COMPLETE CBC W/AUTO DIFF WBC: CPT | Performed by: EMERGENCY MEDICINE

## 2018-05-27 PROCEDURE — 94762 N-INVAS EAR/PLS OXIMTRY CONT: CPT

## 2018-05-27 PROCEDURE — 83690 ASSAY OF LIPASE: CPT | Performed by: EMERGENCY MEDICINE

## 2018-05-27 RX ORDER — SODIUM CHLORIDE 9 MG/ML
50 INJECTION, SOLUTION INTRAVENOUS
Status: COMPLETED | OUTPATIENT
Start: 2018-05-27 | End: 2018-05-27

## 2018-05-27 RX ORDER — SODIUM CHLORIDE 0.9 % (FLUSH) 0.9 %
10 SYRINGE (ML) INJECTION
Status: COMPLETED | OUTPATIENT
Start: 2018-05-27 | End: 2018-05-27

## 2018-05-27 RX ORDER — LIDOCAINE 50 MG/G
2 PATCH TOPICAL
Status: DISCONTINUED | OUTPATIENT
Start: 2018-05-27 | End: 2018-05-27 | Stop reason: HOSPADM

## 2018-05-27 RX ADMIN — Medication 10 ML: at 01:58

## 2018-05-27 RX ADMIN — IOPAMIDOL 100 ML: 755 INJECTION, SOLUTION INTRAVENOUS at 01:58

## 2018-05-27 RX ADMIN — SODIUM CHLORIDE 50 ML/HR: 900 INJECTION, SOLUTION INTRAVENOUS at 01:58

## 2018-05-27 NOTE — ED PROVIDER NOTES
EMERGENCY DEPARTMENT HISTORY AND PHYSICAL EXAM           Date: 5/27/2018  Patient Name: Gisela Rose Sr    History of Presenting Illness     Chief Complaint   Patient presents with    Abdominal Pain     patient complain of lower abdominal pain for three days that has progressively gotten worse       History Provided By: Patient    HPI: Caleb Carpio is a 80 y.o. male, pmhx CAD, HTN, Arthritis, GERD, CKD, Anemia, Alzheimer's disease, who presents ambulatory to the ED c/o gradual onset bilateral mid back pain that radiates to the bilateral lower abdomen x 3-4 days. Pt reports his symptoms have been constant and worsening. He describes his pain as a \"sharp\" pain that is worse with standing and walking but much better with rest. Pt's last BM was yesterday and normal. Pt denies any CP, SOB, dysuria, hematuria, urinary frequency. PCP: Krystyna Jones MD    PMHx: Significant for  CAD, HTN, Arthritis, GERD, CKD, Anemia, Alzheimer's disease. PSHx: Significant for hernia repair, cystectomy. Social Hx: -tobacco (former smoker), -EtOH (-), -Illicit Drugs (-)    There are no other complaints, changes, or physical findings at this time. Current Facility-Administered Medications   Medication Dose Route Frequency Provider Last Rate Last Dose    lidocaine (LIDODERM) 5 % patch 2 Patch  2 Patch TransDERmal NOW Riri Ortiz MD         Current Outpatient Prescriptions   Medication Sig Dispense Refill    mv-mins-folic-lycopene-ginkgo (ONE-A-DAY MEN 50 PLUS, GINKGO,) [phone] mcg-mcg-mg tab Take  by mouth.  memantine (NAMENDA) 10 mg tablet Take 1 Tab by mouth two (2) times a day. 60 Tab prn    losartan-hydroCHLOROthiazide (HYZAAR) 100-25 mg per tablet TAKE 1 TABLET EVERY DAY 90 Tab 1    aspirin 81 mg chewable tablet Take 1 Tab by mouth daily.  30 Tab 1    amLODIPine (NORVASC) 10 mg tablet TAKE 1 TABLET BY MOUTH EVERY DAY 90 Tab 3    donepezil (ARICEPT) 10 mg tablet TAKE 1 TABLET BY MOUTH EVERY DAY 90 Tab 3    omeprazole (PRILOSEC) 20 mg capsule Take 1 Cap by mouth daily.  80 Cap 3       Past History     Past Medical History:  Past Medical History:   Diagnosis Date    Abdominal pain 2017    Alzheimer disease 2017    Anemia 2017    Arthritis     Back pain 2017    Bradycardia 2017    CAD (coronary artery disease)     hx of MI    CKD (chronic kidney disease), stage II 2017    constipation     Depression 2017    Diabetes mellitus ([facility] [address] 2017    Diverticulosis 2017    DJD (degenerative joint disease) 2017    Encounter for long-term (current) use of other medications 2017    Fatigue     Gastritis and duodenitis 2017    GERD (gastroesophageal reflux disease)     GI bleed 2017    Hearing loss     Hyperlipidemia 2017    Hypertension     Hypertension with renal disease 2017    Ill-defined condition     Dementia    Internal hemorrhoids 2017    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    S/P cardiac pacemaker procedure 2015    5/4/15 Medtronic dual chamber pacemaker implant     Weight loss     lost over 40 pounds last year- has no appetite       Past Surgical History:  Past Surgical History:   Procedure Laterality Date    COLONOSCOPY N/A 2017    COLONOSCOPY performed by Isai Leigh MD at [address] [place] [address]  2017         [address]  7/10/2014    right inguinal    HX OTHER SURGICAL      cystectomy from back    [place] EGD TRANSORAL BIOPSY SINGLE/MULTIPLE  2012         UPPER GI ENDOSCOPY,BIOPSY  2017            Family History:  Family History   Problem Relation Age of Onset    Hypertension Mother     Heart Disease Father     Cancer Other      son-cancer? unknown what type        Social History:  Social History   Substance Use Topics    Smoking status: Former Smoker     Packs/day: 0.50     Years: 10.00     Start date: 7/12/1991    Smokeless tobacco: Never Used      Comment: quit 50 years ago    Alcohol use No       Allergies:  No Known Allergies      Review of Systems   Review of Systems   Constitutional: Negative for chills and fever. HENT: Negative. Eyes: Negative. Respiratory: Negative for cough, chest tightness and shortness of breath. Cardiovascular: Negative for chest pain and leg swelling. Gastrointestinal: Positive for abdominal pain (lower). Negative for diarrhea, nausea and vomiting. Endocrine: Negative. Genitourinary: Negative for difficulty urinating and dysuria. Musculoskeletal: Positive for back pain (mid). Negative for myalgias. Skin: Negative. Neurological: Negative. Psychiatric/Behavioral: Negative. All other systems reviewed and are negative. Physical Exam   Physical Exam   Constitutional: He is oriented to person, place, and time. He appears well-developed and well-nourished. No distress. HENT:   Head: Normocephalic and atraumatic. Nose: Nose normal.   Mouth/Throat: No oropharyngeal exudate. Eyes: Conjunctivae and EOM are normal. Pupils are equal, round, and reactive to light. Neck: Normal range of motion. Neck supple. No JVD present. Cardiovascular: Normal rate, regular rhythm, normal heart sounds and intact distal pulses. Exam reveals no friction rub. No murmur heard. Pulmonary/Chest: Effort normal and breath sounds normal. No stridor. No respiratory distress. He has no wheezes. He has no rales. Abdominal: Soft. Bowel sounds are normal. He exhibits no distension. There is no hepatosplenomegaly. There is no tenderness. There is no rigidity, no rebound, no guarding, no CVA tenderness, no tenderness at McBurney's point and negative Acosta's sign. Musculoskeletal: Normal range of motion. He exhibits no tenderness. Lumbar back: He exhibits pain.  He exhibits normal range of motion, no tenderness, no bony tenderness, no swelling, no edema and no spasm.        Back:    Neurological: He is alert and oriented to person, place, and time. No cranial nerve deficit. Skin: Skin is warm and dry. No rash noted. He is not diaphoretic. Psychiatric: He has a normal mood and affect. His speech is normal and behavior is normal. Judgment and thought content normal. Cognition and memory are normal.   Nursing note and vitals reviewed. Diagnostic Study Results     Labs -     Recent Results (from the past 12 hour(s))   CBC WITH AUTOMATED DIFF    Collection Time: 05/27/18  1:03 AM   Result Value Ref Range    WBC 6.3 4.1 - 11.1 K/uL    RBC 4.06 (L) 4.10 - 5.70 M/uL    HGB 11.0 (L) 12.1 - 17.0 g/dL    HCT 34.6 (L) 36.6 - 50.3 %    MCV 85.2 80.0 - 99.0 FL    MCH 27.1 26.0 - 34.0 PG    MCHC 31.8 30.0 - 36.5 g/dL    RDW 13.6 11.5 - 14.5 %    PLATELET 882 018 - 645 K/uL    MPV 9.8 8.9 - 12.9 FL    NRBC 0.0 0  WBC    ABSOLUTE NRBC 0.00 0.00 - 0.01 K/uL    NEUTROPHILS 71 32 - 75 %    LYMPHOCYTES 15 12 - 49 %    MONOCYTES 9 5 - 13 %    EOSINOPHILS 4 0 - 7 %    BASOPHILS 1 0 - 1 %    IMMATURE GRANULOCYTES 0 0.0 - 0.5 %    ABS. NEUTROPHILS 4.5 1.8 - 8.0 K/UL    ABS. LYMPHOCYTES 1.0 0.8 - 3.5 K/UL    ABS. MONOCYTES 0.6 0.0 - 1.0 K/UL    ABS. EOSINOPHILS 0.2 0.0 - 0.4 K/UL    ABS. BASOPHILS 0.1 0.0 - 0.1 K/UL    ABS. IMM. GRANS. 0.0 0.00 - 0.04 K/UL    DF AUTOMATED     METABOLIC PANEL, COMPREHENSIVE    Collection Time: 05/27/18  1:03 AM   Result Value Ref Range    Sodium 140 136 - 145 mmol/L    Potassium 3.4 (L) 3.5 - 5.1 mmol/L    Chloride 104 97 - 108 mmol/L    CO2 31 21 - 32 mmol/L    Anion gap 5 5 - 15 mmol/L    Glucose 105 (H) 65 - 100 mg/dL    BUN 26 (H) 6 - 20 MG/DL    Creatinine 1.62 (H) 0.70 - 1.30 MG/DL    BUN/Creatinine ratio 16 12 - 20      GFR est AA 49 (L) >60 ml/min/1.73m2    GFR est non-AA 41 (L) >60 ml/min/1.73m2    Calcium 9.0 8.5 - 10.1 MG/DL    Bilirubin, total 0.3 0.2 - 1.0 MG/DL    ALT (SGPT) 21 12 - 78 U/L    AST (SGOT) 18 15 - 37 U/L    Alk. phosphatase 68 45 - 117 U/L    Protein, total 6.7 6.4 - 8.2 g/dL    Albumin 3.3 (L) 3.5 - 5.0 g/dL    Globulin 3.4 2.0 - 4.0 g/dL    A-G Ratio 1.0 (L) 1.1 - 2.2     LACTIC ACID    Collection Time: 05/27/18  1:03 AM   Result Value Ref Range    Lactic acid 1.3 0.4 - 2.0 MMOL/L   LIPASE    Collection Time: 05/27/18  1:03 AM   Result Value Ref Range    Lipase 155 73 - 393 U/L   URINALYSIS W/ REFLEX CULTURE    Collection Time: 05/27/18  1:03 AM   Result Value Ref Range    Color YELLOW/STRAW      Appearance CLEAR CLEAR      Specific gravity 1.016 1.003 - 1.030      pH (UA) 5.5 5.0 - 8.0      Protein NEGATIVE  NEG mg/dL    Glucose NEGATIVE  NEG mg/dL    Ketone NEGATIVE  NEG mg/dL    Bilirubin NEGATIVE  NEG      Blood TRACE (A) NEG      Urobilinogen 1.0 0.2 - 1.0 EU/dL    Nitrites NEGATIVE  NEG      Leukocyte Esterase NEGATIVE  NEG      WBC 0-4 0 - 4 /hpf    RBC 0-5 0 - 5 /hpf    Epithelial cells FEW FEW /lpf    Bacteria NEGATIVE  NEG /hpf    UA:UC IF INDICATED CULTURE NOT INDICATED BY UA RESULT CNI      Hyaline cast 0-2 0 - 5 /lpf       Radiologic Studies -   CT Results  (Last 48 hours)               05/27/18 0158  CT ABD PELV W CONT Final result    Impression:  IMPRESSION:    1. Circumferential urinary bladder wall thickening is nonspecific. Correlation   with urinalysis for infection is suggested. 2. There is no other acute abnormality in the abdomen or pelvis. Chronic/is in   the distal findings are unchanged as above. Narrative:  EXAM:  CT abdomen pelvis with contrast       INDICATION: Lower abdominal pain for 3 days. COMPARISON: CT 5/2/2017. TECHNIQUE: Helical CT of the abdomen  and pelvis  following the uneventful   intravenous administration of nonionic contrast.  Coronal and sagittal reformats   are performed. CT dose reduction was achieved through use of a standardized   protocol tailored for this examination and automatic exposure control for dose   modulation.        FINDINGS:    The visualized lung bases demonstrate no mass or consolidation. The heart size   is normal. ICD wires are partially visualized. There is no pericardial or   pleural effusion. There is stable low density liver lesions that likely represent cysts. The   spleen, pancreas, and adrenal glands are normal. The gall bladder is present    without intra- or extra-hepatic biliary dilatation. The kidneys are symmetric without hydronephrosis. A subcentimeter low-density   right kidney lesion is too small to characterize but likely represents a cyst.       There are no dilated bowel loops. The appendix is normal.  There is marked   diffuse colonic diverticulosis without focal adjacent inflammation. There is a   moderate amount of colonic stool. There are no enlarged lymph nodes. There is no free fluid or free air. The   aorta tapers without aneurysm. There is iliac atherosclerosis. There is circumferential urinary bladder wall thickening. There is no pelvic   mass. There is stable prostate enlargement. There are stable degenerative changes in the spine without aggressive bony   lesion. There is a stable small fat-containing left inguinal hernia. Surgical   changes are seen following right knee repair. A penile prosthesis is again   noted. Medical Decision Making   I am the first provider for this patient. I reviewed the vital signs, available nursing notes, past medical history, past surgical history, family history and social history. Vital Signs-Reviewed the patient's vital signs.   Patient Vitals for the past 12 hrs:   Temp Pulse Resp BP SpO2   05/27/18 0315 - 63 16 146/90 100 %   05/27/18 0300 - 64 19 140/66 100 %   05/27/18 0245 - 61 14 142/64 100 %   05/27/18 0230 - 60 15 147/66 100 %   05/27/18 0215 - 60 17 149/72 99 %   05/27/18 0205 - - - 163/73 -   05/27/18 0129 - 62 14 - 97 %   05/27/18 0125 - - - 151/67 -   05/27/18 0010 98.3 °F (36.8 °C) 88 16 157/78 100 % Pulse Oximetry Analysis - 100% on RA    Cardiac Monitor:   Rate: 66 bpm  Rhythm: Normal Sinus Rhythm      Records Reviewed: Nursing Notes, Old Medical Records, Previous Radiology Studies and Previous Laboratory Studies    Provider Notes (Medical Decision Making):     DDX:  Compression fracture, muscle strain, acute on chronic renal insuff, uti, pyelo, stone    Plan:  Labs, ct, analgesic    Impression:  Back pain    ED Course:   Initial assessment performed. The patients presenting problems have been discussed, and they are in agreement with the care plan formulated and outlined with them. I have encouraged them to ask questions as they arise throughout their visit. I reviewed our electronic medical record system for any past medical records that were available that may contribute to the patients current condition, the nursing notes and and vital signs from today's visit    Nursing notes will be reviewed as they become available in realtime while the pt has been in the ED. Tc Vargas MD    I personally reviewed pt's imaging. Official read by radiology noted above. Tc Vargas MD    PROGRESS NOTE:  3:22 AM  Pt is feeling great. He denies any pain. Ready for discharge, will provide Lidoderm patches for pain    Discussed lab and imaging findings with pt and/or family, specifically noting no acute fidings. Pt will follow up as instructed. All questions have been answered, pt voiced understanding and agreement with plan. If narcotics were prescribed, pt was advised not to drive or operate heavy machinery. If abx were prescribed, pt advised that diarrhea and rash are possible side effects of the medications. Specific return precautions provided in addition to instructions for pt to return to the ED immediately should sx worsen at any time. Tc Vargas MD      Critical Care Time:     none      Diagnosis     Clinical Impression:   1.  Acute thoracic back pain, unspecified back pain laterality        PLAN:  1. Current Discharge Medication List        2. Follow-up Information     Follow up With Details Comments Contact Info    Damari Cabral MD Schedule an appointment as soon as possible for a visit in 2 days  [place]  [phone]          Return to ED if worse     Disposition:    DISCHARGE NOTE  3:22 AM  The patient has been re-evaluated and is ready for discharge. Reviewed available results with patient. Counseled pt on diagnosis and care plan. Pt has expressed understanding, and all questions have been answered. Pt agrees with plan and agrees to F/U as recommended, or return to the ED if their sxs worsen. Discharge instructions have been provided and explained to the pt, along with reasons to return to the ED. Attestations: This note is prepared by Kasey Starks, acting as Scribe for MD Tommy Avery MD : The scribe's documentation has been prepared under my direction and personally reviewed by me in its entirety. I confirm that the note above accurately reflects all work, treatment, procedures, and medical decision making performed by me.

## 2018-05-27 NOTE — ED TRIAGE NOTES
Patient arrives ambulatory to ED with family with a report of mid back pain on both sides that goes to mid/lower abdomen for 3-4 days. Patient denies N/V/D.

## 2018-05-27 NOTE — DISCHARGE INSTRUCTIONS

## 2018-05-30 ENCOUNTER — OFFICE VISIT (OUTPATIENT)
Dept: INTERNAL MEDICINE CLINIC | Age: 83
End: 2018-05-30

## 2018-05-30 VITALS
BODY MASS INDEX: 22 KG/M2 | RESPIRATION RATE: 18 BRPM | DIASTOLIC BLOOD PRESSURE: 66 MMHG | SYSTOLIC BLOOD PRESSURE: 122 MMHG | TEMPERATURE: 98.5 F | WEIGHT: 140.2 LBS | OXYGEN SATURATION: 100 % | HEIGHT: 67 IN | HEART RATE: 71 BPM

## 2018-05-30 DIAGNOSIS — M15.9 PRIMARY OSTEOARTHRITIS INVOLVING MULTIPLE JOINTS: ICD-10-CM

## 2018-05-30 DIAGNOSIS — M54.50 ACUTE BILATERAL LOW BACK PAIN WITHOUT SCIATICA: Primary | ICD-10-CM

## 2018-05-30 LAB
BACTERIA UA POCT, BACTPOCT: NORMAL
BILIRUB UR QL STRIP: NEGATIVE
CASTS UA POCT: NORMAL
CLUE CELLS, CLUEPOCT: NEGATIVE
CRYSTALS UA POCT, CRYSPOCT: NEGATIVE
EPITHELIAL CELLS POCT: NEGATIVE
GLUCOSE UR-MCNC: NEGATIVE MG/DL
GRAN# POC: 4.7 K/UL (ref 2–7.8)
GRAN% POC: 73.3 % (ref 37–92)
HCT VFR BLD CALC: 37.7 % (ref 37–51)
HGB BLD-MCNC: 12.2 G/DL (ref 12–18)
KETONES P FAST UR STRIP-MCNC: NEGATIVE MG/DL
LY# POC: 1.3 K/UL (ref 0.6–4.1)
LY% POC: 21.2 % (ref 10–58.5)
MCH RBC QN: 27.5 PG (ref 26–32)
MCHC RBC-ENTMCNC: 32.5 G/DL (ref 30–36)
MCV RBC: 85 FL (ref 80–97)
MID #, POC: 0.3 K/UL (ref 0–1.8)
MID% POC: 5.5 % (ref 0.1–24)
MUCUS UA POCT, MUCPOCT: NORMAL
PH UR STRIP: 5 [PH] (ref 5–7)
PLATELET # BLD: 280 K/UL (ref 140–440)
PROT UR QL STRIP: NEGATIVE
RBC # BLD: 4.45 M/UL (ref 4.2–6.3)
RBC UA POCT, RBCPOCT: NORMAL
SP GR UR STRIP: 1.02 (ref 1.01–1.02)
TRICH UA POCT, TRICHPOC: NEGATIVE
UA UROBILINOGEN AMB POC: NORMAL (ref 0.2–1)
URINALYSIS CLARITY POC: CLEAR
URINALYSIS COLOR POC: NORMAL
URINE BLOOD POC: NORMAL
URINE CULT COMMENT, POCT: NORMAL
URINE LEUKOCYTES POC: NEGATIVE
URINE NITRITES POC: NEGATIVE
WBC # BLD: 6.3 K/UL (ref 4.1–10.9)
WBC UA POCT, WBCPOCT: 0
YEAST UA POCT, YEASTPOC: NEGATIVE

## 2018-05-30 RX ORDER — DICLOFENAC SODIUM 75 MG/1
75 TABLET, DELAYED RELEASE ORAL 2 TIMES DAILY
Qty: 60 TAB | Status: SHIPPED | OUTPATIENT
Start: 2018-05-30 | End: 2019-11-01

## 2018-05-30 NOTE — MR AVS SNAPSHOT
[address] [facility] 
 
 
 [address] [id number] [phone] Patient: Rodolfo Call Sr 
MRN: [id number] Visit Information Date & Time Provider Department Dept. Phone Encounter #  
 5/30/2018  4:00 PM Tony Sweet, [facility] [phone] [id number] Follow-up Instructions Return if symptoms worsen or fail to improve. Your Appointments 5/30/2018  4:00 PM  
ACUTE CARE with MD EVA Leyva [facility] ([address]) Appt Note: back pain  
 [address] [phone] [address] [facility] [id number] 6/12/2018  1:10 PM  
FOLLOW UP 10 with MD EVA Leyva [facility] ([address]) Appt Note: 3 month fu  
 [address] [address] [address] [facility] [address] 8/16/2018  9:30 AM  
PROCEDURE with PACEMAKER, [facility] [address]) Appt Note: 6mo mdt bivpm  
 [address] [place] [address]  
[phone] [address] [place] [address]  
  
    
 2/12/2019 10:00 AM  
ESTABLISHED PATIENT with Desitnee Foss MD  
[facility] [address]) Appt Note: annual also pace check kw  
 [address] [place] [address]  
[phone] [address]  
  
    
 2/12/2019 10:00 AM  
PROCEDURE with PACEMAKER, [facility] [address]) Appt Note: also seeing david annual  
 [address]  
[phone] Upcoming Health Maintenance Date Due  
 EYE EXAM RETINAL OR DILATED Q1 1/21/1941 DTaP/Tdap/Td series (1 - Tdap) 1/21/1952 ZOSTER VACCINE AGE 60> 11/21/1990 GLAUCOMA SCREENING Q2Y 1/21/1996 Influenza Age 5 to Adult 8/1/2018 MICROALBUMIN Q1 9/1/2018 MEDICARE YEARLY EXAM 9/2/2018 HEMOGLOBIN A1C Q6M 10/13/2018 FOOT EXAM Q1 3/9/2019 LIPID PANEL Q1 4/13/2019 Allergies as of 5/30/2018  Review Complete On: 5/30/2018 By: Jackie Kim MD  
 No Known Allergies Current Immunizations  Reviewed on 5/4/2015 Name Date Influenza High Dose Vaccine PF 12/8/2017 Influenza Vaccine 1/18/2017, 10/18/2016, 12/15/2015 Pneumococcal Conjugate (PCV-13) 9/30/2015 Pneumococcal Polysaccharide (PPSV-23) 10/13/2006 Pneumococcal Vaccine (Unspecified Type) 10/13/2006 Not reviewed this visit You Were Diagnosed With   
  
 Codes Comments Acute bilateral low back pain without sciatica    -  Primary ICD-10-CM: M54.5 ICD-9-CM: 724.2, 338.19 Primary osteoarthritis involving multiple joints     ICD-10-CM: M15.0 ICD-9-CM: 715.09 Vitals BP Pulse Temp Resp Height(growth percentile) Weight(growth percentile) 122/66 (BP 1 Location: Left arm, BP Patient Position: Sitting) 71 98.5 °F (36.9 °C) (Oral) 18 5' 7\" (1.702 m) 140 lb 3.2 oz (63.6 kg) SpO2 BMI Smoking Status 100% 21.96 kg/m2 Former Smoker Vitals History BMI and BSA Data Body Mass Index Body Surface Area  
 21.96 kg/m 2 1.73 m 2 Preferred Pharmacy Pharmacy Name Phone [facility]/PHARMACY #[id number] [id number] [facility] AT [address] [place] [address] [phone] Your Updated Medication List  
  
   
This list is accurate as of 5/30/18  2:13 PM.  Always use your most recent med list. amLODIPine 10 mg tablet Commonly known as:  Jacqueanton Couch TAKE 1 TABLET BY MOUTH EVERY DAY  
  
 aspirin 81 mg chewable tablet Take 1 Tab by mouth daily. diclofenac EC 75 mg EC tablet Commonly known as:  VOLTAREN Take 1 Tab by mouth two (2) times a day. donepezil 10 mg tablet Commonly known as:  ARICEPT  
TAKE 1 TABLET BY MOUTH EVERY DAY  
  
 losartan-hydroCHLOROthiazide 100-25 mg per tablet Commonly known as:  HYZAAR  
TAKE 1 TABLET EVERY DAY  
  
 memantine 10 mg tablet Commonly known as:  Maebelle Plume Take 1 Tab by mouth two (2) times a day. omeprazole 20 mg capsule Commonly known as:  PRILOSEC Take 1 Cap by mouth daily. ONE-A-DAY MEN 50 PLUS (GINKGO) [phone] mcg-mcg-mg Tab Generic drug:  mv-mins-folic-lycopene-ginkgo Take  by mouth. Prescriptions Sent to Pharmacy Refills  
 diclofenac EC (VOLTAREN) 75 mg EC tablet prn Sig: Take 1 Tab by mouth two (2) times a day. Class: Normal  
 Pharmacy: [facility]/pharmacy #[id number] AT [address] [place] [address] Ph #: [phone] Route: Oral  
  
We Performed the Following AMB POC COMPLETE CBC,AUTOMATED ENTER [id number] CPT(R)] AMB POC URINALYSIS DIP STICK AUTO W/ MICRO  [26338 CPT(R)] Follow-up Instructions Return if symptoms worsen or fail to improve. To-Do List   
 05/30/2018 Imaging:  XR SPINE LUMB 2 OR 3 V   
  
 05/30/2018 Imaging:  XR SPINE THORAC 2 V Patient Instructions Back Pain: Care Instructions Your Care Instructions Back pain has many possible causes. It is often related to problems with muscles and ligaments of the back. It may also be related to problems with the nerves, discs, or bones of the back. Moving, lifting, standing, sitting, or sleeping in an awkward way can strain the back. Sometimes you don't notice the injury until later. Arthritis is another common cause of back pain. Although it may hurt a lot, back pain usually improves on its own within several weeks. Most people recover in 12 weeks or less. Using good home treatment and being careful not to stress your back can help you feel better sooner. Follow-up care is a key part of your treatment and safety.  Be sure to make and go to all appointments, and call your doctor if you are having problems. It's also a good idea to know your test results and keep a list of the medicines you take. How can you care for yourself at home? · Sit or lie in positions that are most comfortable and reduce your pain. Try one of these positions when you lie down: ¨ Lie on your back with your knees bent and supported by large pillows. ¨ Lie on the floor with your legs on the seat of a sofa or chair. Blima Naegeli on your side with your knees and hips bent and a pillow between your legs. ¨ Lie on your stomach if it does not make pain worse. · Do not sit up in bed, and avoid soft couches and twisted positions. Bed rest can help relieve pain at first, but it delays healing. Avoid bed rest after the first day of back pain. · Change positions every 30 minutes. If you must sit for long periods of time, take breaks from sitting. Get up and walk around, or lie in a comfortable position. · Try using a heating pad on a low or medium setting for 15 to 20 minutes every 2 or 3 hours. Try a warm shower in place of one session with the heating pad. · You can also try an ice pack for 10 to 15 minutes every 2 to 3 hours. Put a thin cloth between the ice pack and your skin. · Take pain medicines exactly as directed. ¨ If the doctor gave you a prescription medicine for pain, take it as prescribed. ¨ If you are not taking a prescription pain medicine, ask your doctor if you can take an over-the-counter medicine. · Take short walks several times a day. You can start with 5 to 10 minutes, 3 or 4 times a day, and work up to longer walks. Walk on level surfaces and avoid hills and stairs until your back is better. · Return to work and other activities as soon as you can. Continued rest without activity is usually not good for your back.  
· To prevent future back pain, do exercises to stretch and strengthen your back and stomach. Learn how to use good posture, safe lifting techniques, and proper body mechanics. When should you call for help? Call your doctor now or seek immediate medical care if: 
? · You have new or worsening numbness in your legs. ? · You have new or worsening weakness in your legs. (This could make it hard to stand up.) ? · You lose control of your bladder or bowels. ? Watch closely for changes in your health, and be sure to contact your doctor if: 
? · Your pain gets worse. ? · You are not getting better after 2 weeks. Where can you learn more? Go to http://zach-herlinda.info/. Enter Q280 in the search box to learn more about \"Back Pain: Care Instructions. \" Current as of: March 21, 2017 Content Version: 11.4 © 2408-7683 [employer]. Care instructions adapted under license by [employer] (which disclaims liability or warranty for this information). If you have questions about a medical condition or this instruction, always ask your healthcare professional. [address] any warranty or liability for your use of this information. Introducing [facility]! Marcelo Christy introduces ZangZing patient portal. Now you can access parts of your medical record, email your doctor's office, and request medication refills online. 1. In your internet browser, go to [email or web address] 2. Click on the First Time User? Click Here link in the Sign In box. You will see the New Member Sign Up page. 3. Enter your ZangZing Access Code exactly as it appears below. You will not need to use this code after youve completed the sign-up process. If you do not sign up before the expiration date, you must request a new code. · ZangZing Access Code: [email or web address] Expires: 8/16/2018  2:18 PM 
 
4.  Enter the last four digits of your Social Security Number (xxxx) and Date of Birth (mm/dd/yyyy) as indicated and click Submit. You will be taken to the next sign-up page. 5. Create a Bluetest ID. This will be your Bluetest login ID and cannot be changed, so think of one that is secure and easy to remember. 6. Create a Bluetest password. You can change your password at any time. 7. Enter your Password Reset Question and Answer. This can be used at a later time if you forget your password. 8. Enter your e-mail address. You will receive e-mail notification when new information is available in [address] 9. Click Sign Up. You can now view and download portions of your medical record. 10. Click the Download Summary menu link to download a portable copy of your medical information. If you have questions, please visit the Frequently Asked Questions section of the Bluetest website. Remember, Bluetest is NOT to be used for urgent needs. For medical emergencies, dial 911. Now available from your iPhone and Android! Please provide this summary of care documentation to your next provider. Your primary care clinician is listed as Thuy. If you have any questions after today's visit, please call [phone].

## 2018-05-30 NOTE — PROGRESS NOTES
Chief Complaint   Patient presents with    Back Pain     lower back pain      1. Have you been to the ER, urgent care clinic since your last visit? Hospitalized since your last visit? Yes, [facility] on 5/27/18 for back pain. 2. Have you seen or consulted any other health care providers outside of the [facility] since your last visit? Include any pap smears or colon screening.  No

## 2018-05-30 NOTE — PATIENT INSTRUCTIONS

## 2018-05-30 NOTE — PROGRESS NOTES
Chief Complaint   Patient presents with    Back Pain     lower back pain        SUBJECTIVE:    Benito Farfan Sr is a 80 y.o. male who presents today complaining of some back pain is back pain seem to be more in the upper lumbar lower thoracic area where he points to it seems ago into both flank areas. He notes no problems passing his urine next was seen in the emergency room on the 27th for evaluation of this at which time I reviewed those records they were marked for hemoglobin 11 which was down from his baseline as well as a urine that looked normal as well as a CT abdomen pelvis which was normal except for question thickening of the urinary bladder. He noted that the pain persist and he was not able to give anything for pain at that time. He notes no radiation to his legs and no numbness or paresthesias in his legs. Current Outpatient Prescriptions   Medication Sig Dispense Refill    diclofenac EC (VOLTAREN) 75 mg EC tablet Take 1 Tab by mouth two (2) times a day. 60 Tab prn    mv-mins-folic-lycopene-ginkgo (ONE-A-DAY MEN 50 PLUS, GINKGO,) [phone] mcg-mcg-mg tab Take  by mouth.  memantine (NAMENDA) 10 mg tablet Take 1 Tab by mouth two (2) times a day. 60 Tab prn    losartan-hydroCHLOROthiazide (HYZAAR) 100-25 mg per tablet TAKE 1 TABLET EVERY DAY 90 Tab 1    aspirin 81 mg chewable tablet Take 1 Tab by mouth daily. 30 Tab 1    amLODIPine (NORVASC) 10 mg tablet TAKE 1 TABLET BY MOUTH EVERY DAY 90 Tab 3    donepezil (ARICEPT) 10 mg tablet TAKE 1 TABLET BY MOUTH EVERY DAY 90 Tab 3    omeprazole (PRILOSEC) 20 mg capsule Take 1 Cap by mouth daily.  80 Cap 3     Past Medical History:   Diagnosis Date    Abdominal pain 7/18/2017    Alzheimer disease 7/18/2017    Anemia 7/18/2017    Arthritis     Back pain 7/18/2017    Bradycardia 7/18/2017    CAD (coronary artery disease)     hx of MI    CKD (chronic kidney disease), stage II 7/18/2017    constipation     Depression 7/18/2017    Diabetes mellitus ([facility] [address] 7/18/2017    Diverticulosis 7/18/2017    DJD (degenerative joint disease) 7/18/2017    Encounter for long-term (current) use of other medications 7/18/2017    Fatigue     Gastritis and duodenitis 7/18/2017    GERD (gastroesophageal reflux disease)     GI bleed 7/18/2017    Hearing loss     Hyperlipidemia 7/18/2017    Hypertension     Hypertension with renal disease 7/18/2017    Ill-defined condition     Dementia    Internal hemorrhoids 7/18/2017    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    S/P cardiac pacemaker procedure 5/4/2015    5/4/15 Medtronic dual chamber pacemaker implant     Weight loss     lost over 40 pounds last year- has no appetite     Past Surgical History:   Procedure Laterality Date    COLONOSCOPY N/A 6/22/2017    COLONOSCOPY performed by Ubaldo Vargas MD at [address] [place] [address]  6/22/2017         [address]  7/10/2014    right inguinal    HX OTHER SURGICAL      cystectomy from back    ID EGD TRANSORAL BIOPSY SINGLE/MULTIPLE  2/14/2012         UPPER GI ENDOSCOPY,BIOPSY  6/22/2017          No Known Allergies    REVIEW OF SYSTEMS:  General: negative for - chills or fever, or weight loss or gain  ENT: negative for - headaches, nasal congestion or tinnitus  Eyes: no blurred or visual changes  Neck: No stiffness or swollen nodes  Respiratory: negative for - cough, hemoptysis, shortness of breath or wheezing  Cardiovascular : negative for - chest pain, edema, palpitations or shortness of breath  Gastrointestinal: negative for - abdominal pain, blood in stools, heartburn or nausea/vomiting  Genito-Urinary: no dysuria, trouble voiding, or hematuria  Musculoskeletal: negative for - gait disturbance, joint pain, joint stiffness or joint swelling.   Positive for low back pain  Neurological: no TIA or stroke symptoms  Hematologic: no bruises, no bleeding  Lymphatic: no swollen glands  Integument: no lumps, mole changes, nail changes or rash  Endocrine:no malaise/lethargy poly uria or polydipsia or unexpected weight changes        Social History     Social History    Marital status:      Spouse name: N/A    Number of children: N/A    Years of education: N/A     Social History Main Topics    Smoking status: Former Smoker     Packs/day: 0.50     Years: 10.00     Start date: 1991    Smokeless tobacco: Never Used      Comment: quit 50 years ago    Alcohol use No    Drug use: No    Sexual activity: Not Asked     Other Topics Concern    None     Social History Narrative     Family History   Problem Relation Age of Onset    Hypertension Mother     Heart Disease Father     Cancer Other      son-cancer? unknown what type        OBJECTIVE:     Visit Vitals    /66 (BP 1 Location: Left arm, BP Patient Position: Sitting)    Pulse 71    Temp 98.5 °F (36.9 °C) (Oral)    Resp 18    Ht 5' 7\" (1.702 m)    Wt 140 lb 3.2 oz (63.6 kg)    SpO2 100%    BMI 21.96 kg/m2     CONSTITUTIONAL:   well nourished, appears age appropriate  EYES: sclera anicteric, PERRL, EOMI  ENMT:nares clear, moist mucous membranes, pharynx clear  NECK: supple. Thyroid normal, No JVD or bruits  RESPIRATORY: Chest: clear to ascultation and percussion, normal inspiratory effort  CARDIOVASCULAR: Heart: regular rate and rhythm no murmurs, rubs or gallops, PMI not displaced, No thrills  GASTROINTESTINAL: Abdomen: non distended, soft, non tender, bowel sounds normal  HEMATOLOGIC: no purpura, petechiae or bruising  LYMPHATIC: No lymph node enlargemant  MUSCULOSKELETAL: Extremities: no edema or active synovitis, pulse 1+   INTEGUMENT: No unusual rashes or suspicious skin lesions noted. Nails appear normal.  PERIPHERAL VASCULAR: normal pulses femoral, PT and DP  NEUROLOGIC: non-focal exam, A & O X 3  PSYCHIATRIC:, appropriate affect     ASSESSMENT:   1. Acute bilateral low back pain without sciatica    2.  Primary osteoarthritis involving multiple joints      Impression  1. Acute bilateral low back pain I did x-rays thoracic spine and lumbar spine both which does show severe degenerative changes I did a urinalysis which is unremarkable I did a stat CBC and hemoglobin is actually improved from what it was at the emergency room as I think that was the error in the emergency room  2. DJD based upon the above I will place him on diclofenac 75 mg twice daily with food and he will continue his Prilosec  I will recheck him per previous schedule for his other medical problems or sooner should they be a problem for the back. PLAN:  .  Orders Placed This Encounter    XR SPINE LUMB 2 OR 3 V    XR SPINE THORAC 2 V    AMB POC COMPLETE CBC,AUTOMATED ENTER    AMB POC URINALYSIS DIP STICK AUTO W/ MICRO     diclofenac EC (VOLTAREN) 75 mg EC tablet         ATTENTION:   This medical record was transcribed using an electronic medical records system. Although proofread, it may and can contain electronic and spelling errors. Other human spelling and other errors may be present. Corrections may be executed at a later time. Please feel free to contact us for any clarifications as needed. Follow-up Disposition:  Return if symptoms worsen or fail to improve. Results for orders placed or performed in visit on 05/30/18   AMB POC COMPLETE CBC,AUTOMATED ENTER   Result Value Ref Range    WBC (POC) 6.3 4.1 - 10.9 K/uL    RBC (POC) 4.45 4.20 - 6.30 M/uL    HGB (POC) 12.2 12.0 - 18.0 g/dL    HCT (POC) 37.7 37.0 - 51.0 %    MCV (POC) 85.0 80.0 - 97.0 fL    MCH (POC) 27.5 26.0 - 32.0 pg    MCHC (POC) 32.5 30.0 - 36.0 g/dL    PLATELET (POC) 354.4 140.0 - 440.0 K/uL    ABS. LYMPHS (POC) 1.3 0.6 - 4.1 K/uL    LYMPHOCYTES (POC) 21.2 10.0 - 58.5 %    Mid # (POC) 0.3 0.0 - 1.8 K/uL    MID% POC 5.5 0.1 - 24.0 %    ABS.  GRANS (POC) 4.7 2.0 - 7.8 K/uL    GRANULOCYTES (POC) 73.3 37.0 - 92.0 %   AMB POC URINALYSIS DIP STICK AUTO W/ MICRO    Result Value Ref Range Color (UA POC)      Clarity (UA POC)      Glucose (UA POC)  Negative    Bilirubin (UA POC)  Negative    Ketones (UA POC)  Negative    Specific gravity (UA POC)  1.010 - 1.025    Blood (UA POC)  Negative    pH (UA POC)  5.0 - 7.0    Protein (UA POC)  Negative    Urobilinogen (UA POC)  0.2 - 1    Nitrites (UA POC)  Negative    Leukocyte esterase (UA POC)  Negative    Epithelial cells (UA POC)      Mucus (UA POC)      WBCs (UA POC)      RBCs (UA POC)      Casts (UA POC)  Negative    Crystals (UA POC)  Negative    Clue Cells (UA POC)      Trichomonas (UA POC)      Yeast (UA POC)      Bacteria (UA POC)  Negative    URINE CULT COMMENT (UA POC)         Mikayla Schilling MD    The patient verbalized understanding of the problems and plans as explained.

## 2018-06-12 ENCOUNTER — OFFICE VISIT (OUTPATIENT)
Dept: INTERNAL MEDICINE CLINIC | Age: 83
End: 2018-06-12

## 2018-06-12 VITALS
SYSTOLIC BLOOD PRESSURE: 130 MMHG | HEIGHT: 67 IN | WEIGHT: 144.4 LBS | DIASTOLIC BLOOD PRESSURE: 78 MMHG | BODY MASS INDEX: 22.66 KG/M2 | HEART RATE: 85 BPM | RESPIRATION RATE: 15 BRPM | TEMPERATURE: 97.7 F | OXYGEN SATURATION: 99 %

## 2018-06-12 DIAGNOSIS — N18.2 CONTROLLED TYPE 2 DIABETES MELLITUS WITH STAGE 2 CHRONIC KIDNEY DISEASE, WITHOUT LONG-TERM CURRENT USE OF INSULIN (HCC): ICD-10-CM

## 2018-06-12 DIAGNOSIS — M15.9 PRIMARY OSTEOARTHRITIS INVOLVING MULTIPLE JOINTS: ICD-10-CM

## 2018-06-12 DIAGNOSIS — N18.2 CKD (CHRONIC KIDNEY DISEASE), STAGE II: ICD-10-CM

## 2018-06-12 DIAGNOSIS — I25.10 ASCVD (ARTERIOSCLEROTIC CARDIOVASCULAR DISEASE): ICD-10-CM

## 2018-06-12 DIAGNOSIS — G30.9 ALZHEIMER'S DEMENTIA WITHOUT BEHAVIORAL DISTURBANCE, UNSPECIFIED TIMING OF DEMENTIA ONSET: ICD-10-CM

## 2018-06-12 DIAGNOSIS — F10.20 ALCOHOLISM (HCC): ICD-10-CM

## 2018-06-12 DIAGNOSIS — E78.2 MIXED HYPERLIPIDEMIA: ICD-10-CM

## 2018-06-12 DIAGNOSIS — E11.22 CONTROLLED TYPE 2 DIABETES MELLITUS WITH STAGE 2 CHRONIC KIDNEY DISEASE, WITHOUT LONG-TERM CURRENT USE OF INSULIN (HCC): ICD-10-CM

## 2018-06-12 DIAGNOSIS — I49.5 SSS (SICK SINUS SYNDROME) (HCC): ICD-10-CM

## 2018-06-12 DIAGNOSIS — Z95.0 S/P CARDIAC PACEMAKER PROCEDURE: ICD-10-CM

## 2018-06-12 DIAGNOSIS — F02.80 ALZHEIMER'S DEMENTIA WITHOUT BEHAVIORAL DISTURBANCE, UNSPECIFIED TIMING OF DEMENTIA ONSET: ICD-10-CM

## 2018-06-12 DIAGNOSIS — I12.9 HYPERTENSION WITH RENAL DISEASE: Primary | ICD-10-CM

## 2018-06-12 PROBLEM — F32.A MILD DEPRESSION: Status: ACTIVE | Noted: 2018-06-12

## 2018-06-12 LAB
ALBUMIN SERPL-MCNC: 3.5 G/DL (ref 3.9–5.4)
ALKALINE PHOS POC: 71 U/L (ref 38–126)
ALT SERPL-CCNC: 36 U/L (ref 9–52)
AST SERPL-CCNC: 36 U/L (ref 14–36)
BUN BLD-MCNC: 21 MG/DL (ref 9–20)
CALCIUM BLD-MCNC: 9.5 MG/DL (ref 8.4–10.2)
CHLORIDE BLD-SCNC: 103 MMOL/L (ref 98–107)
CHOLEST SERPL-MCNC: 197 MG/DL (ref 0–200)
CK (CPK) POC: 67 U/L (ref 30–135)
CO2 POC: 29 MMOL/L (ref 22–32)
CREAT BLD-MCNC: 1.3 MG/DL (ref 0.8–1.5)
EGFR (POC): 49.1
GLUCOSE POC: 117 MG/DL (ref 75–110)
GRAN# POC: 4.4 K/UL (ref 2–7.8)
GRAN% POC: 72 % (ref 37–92)
HBA1C MFR BLD HPLC: 5.6 % (ref 4.5–5.7)
HCT VFR BLD CALC: 36.1 % (ref 37–51)
HDLC SERPL-MCNC: 66 MG/DL (ref 35–130)
HGB BLD-MCNC: 11.8 G/DL (ref 12–18)
LDL CHOLESTEROL POC: 101.8 MG/DL (ref 0–130)
LY# POC: 1.2 K/UL (ref 0.6–4.1)
LY% POC: 21.5 % (ref 10–58.5)
MCH RBC QN: 27.3 PG (ref 26–32)
MCHC RBC-ENTMCNC: 32.8 G/DL (ref 30–36)
MCV RBC: 83 FL (ref 80–97)
MID #, POC: 0.3 K/UL (ref 0–1.8)
MID% POC: 6.5 % (ref 0.1–24)
PLATELET # BLD: 266 K/UL (ref 140–440)
POTASSIUM SERPL-SCNC: 4.2 MMOL/L (ref 3.6–5)
PROT SERPL-MCNC: 6.6 G/DL (ref 6.3–8.2)
RBC # BLD: 4.34 M/UL (ref 4.2–6.3)
SODIUM SERPL-SCNC: 143 MMOL/L (ref 137–145)
TCHOL/HDL RATIO (POC): 3 (ref 0–4)
TOTAL BILIRUBIN POC: 0.6 MG/DL (ref 0.2–1.3)
TRIGL SERPL-MCNC: 146 MG/DL (ref 0–200)
VLDLC SERPL CALC-MCNC: 29.2 MG/DL
WBC # BLD: 5.9 K/UL (ref 4.1–10.9)

## 2018-06-12 NOTE — MR AVS SNAPSHOT
[address] [facility] 
 
 
 [address] [id number] [phone] Patient: Linh Barrios Sr 
MRN: [id number] Visit Information Date & Time Provider Department Dept. Phone Encounter #  
 6/12/2018  1:10 PM Jorge A Erickson MD [facility] [id number] Follow-up Instructions Return in about 3 months (around 9/12/2018). Follow-up and Disposition History Your Appointments 8/16/2018  9:30 AM  
PROCEDURE with PACEMAKER, [facility]) Appt Note: 6mo mdt bivpm  
 [address]  
[phone] [address]  
  
    
 9/11/2018  1:10 PM  
FOLLOW UP 10 [facility] Appt Note: 3 MO FLP   yearly [address] [phone] [address] [facility] [phone]  
  
    
 2/12/2019 10:00 AM  
ESTABLISHED PATIENT with Mary Saul MD  
[place] [facility]) Appt Note: annual also pace check kw  
 [address]  
[phone] [address]  
  
    
 2/12/2019 10:00 AM  
PROCEDURE with PACEMAKER, [facility]) Appt Note: also seeing david annual  
 [address]  
[phone] Upcoming Health Maintenance Date Due  
 EYE EXAM RETINAL OR DILATED Q1 1/21/1941 DTaP/Tdap/Td series (1 - Tdap) 1/21/1952 ZOSTER VACCINE AGE 60> 11/21/1990 GLAUCOMA SCREENING Q2Y 1/21/1996 Influenza Age 5 to Adult 8/1/2018 MICROALBUMIN Q1 9/1/2018 MEDICARE YEARLY EXAM 9/2/2018 HEMOGLOBIN A1C Q6M 10/13/2018 FOOT EXAM Q1 3/9/2019 LIPID PANEL Q1 4/13/2019 Allergies as of 6/12/2018  Review Complete On: 6/12/2018 By: Sundeep Henning MD  
 No Known Allergies Current Immunizations  Reviewed on 5/4/2015 Name Date Influenza High Dose Vaccine PF 12/8/2017 Influenza Vaccine 1/18/2017, 10/18/2016, 12/15/2015 Pneumococcal Conjugate (PCV-13) 9/30/2015 Pneumococcal Polysaccharide (PPSV-23) 10/13/2006 Pneumococcal Vaccine (Unspecified Type) 10/13/2006 Not reviewed this visit You Were Diagnosed With   
  
 Codes Comments Hypertension with renal disease    -  Primary ICD-10-CM: I12.9 ICD-9-CM: 403.90 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, without long-term current use of insulin (HCC)     ICD-10-CM: E11.22, N18.2 ICD-9-CM: 250.40, 585.2 Mixed hyperlipidemia     ICD-10-CM: E78.2 ICD-9-CM: 272.2 CKD (chronic kidney disease), stage II     ICD-10-CM: N18.2 ICD-9-CM: 585.2 ASCVD (arteriosclerotic cardiovascular disease)     ICD-10-CM: I25.10 ICD-9-CM: 429.2, 440.9 Alzheimer's dementia without behavioral disturbance, unspecified timing of dementia onset     ICD-10-CM: G30.9, F02.80 ICD-9-CM: 331.0, 294.10 Primary osteoarthritis involving multiple joints     ICD-10-CM: M15.0 ICD-9-CM: 715.09   
 SSS (sick sinus syndrome) (HCC)     ICD-10-CM: I49.5 ICD-9-CM: 427.81 S/P cardiac pacemaker procedure     ICD-10-CM: Z95.0 ICD-9-CM: V45.01 Alcoholism ([address]     ICD-10-CM: Y19.54 ICD-9-CM: 303.90 Vitals BP Pulse Temp Resp Height(growth percentile) Weight(growth percentile) 130/78 (BP 1 Location: Left arm, BP Patient Position: Sitting) 85 97.7 °F (36.5 °C) (Oral) 15 5' 7\" (1.702 m) 144 lb 6.4 oz (65.5 kg) SpO2 BMI Smoking Status 99% 22.62 kg/m2 Former Smoker Vitals History BMI and BSA Data Body Mass Index Body Surface Area  
 22.62 kg/m 2 1.76 m 2 Preferred Pharmacy Pharmacy Name Phone [id number], [place] - [address] [facility] AT [address] [place] [address] [phone] Your Updated Medication List  
  
   
This list is accurate as of 6/12/18  2:08 PM.  Always use your most recent med list. amLODIPine 10 mg tablet Commonly known as:  Jean Baptiste Inks TAKE 1 TABLET BY MOUTH EVERY DAY  
  
 aspirin 81 mg chewable tablet Take 1 Tab by mouth daily. diclofenac EC 75 mg EC tablet Commonly known as:  VOLTAREN Take 1 Tab by mouth two (2) times a day. donepezil 10 mg tablet Commonly known as:  ARICEPT  
TAKE 1 TABLET BY MOUTH EVERY DAY  
  
 losartan-hydroCHLOROthiazide 100-25 mg per tablet Commonly known as:  HYZAAR  
TAKE 1 TABLET EVERY DAY  
  
 memantine 10 mg tablet Commonly known as:  Ramone Coupe Take 1 Tab by mouth two (2) times a day. omeprazole 20 mg capsule Commonly known as:  PRILOSEC Take 1 Cap by mouth daily. ONE-A-DAY MEN 50 PLUS (GINKGO) [phone] mcg-mcg-mg Tab Generic drug:  mv-mins-folic-lycopene-ginkgo Take  by mouth. We Performed the Following AMB POC CK (CPK) [37192 CPT(R)] AMB POC COMPLETE CBC,AUTOMATED ENTER [id number] CPT(R)] AMB POC COMPREHENSIVE METABOLIC PANEL [82006 CPT(R)] AMB POC HEMOGLOBIN A1C [32179 CPT(R)] AMB POC LIPID PROFILE [93259 CPT(R)] Follow-up Instructions Return in about 3 months (around 9/12/2018). Introducing [facility]! [employer] introduces [employer] patient portal. Now you can access parts of your medical record, email your doctor's office, and request medication refills online. 1. In your internet browser, go to [email or web address] 2. Click on the First Time User? Click Here link in the Sign In box. You will see the New Member Sign Up page. 3. Enter your [employer] Access Code exactly as it appears below. You will not need to use this code after youve completed the sign-up process.  If you do not sign up before the expiration date, you must request a new code. · [employer] Access Code: [email or web address] Expires: 8/16/2018  2:18 PM 
 
4. Enter the last four digits of your Social Security Number (xxxx) and Date of Birth (mm/dd/yyyy) as indicated and click Submit. You will be taken to the next sign-up page. 5. Create a [employer] ID. This will be your [employer] login ID and cannot be changed, so think of one that is secure and easy to remember. 6. Create a [employer] password. You can change your password at any time. 7. Enter your Password Reset Question and Answer. This can be used at a later time if you forget your password. 8. Enter your e-mail address. You will receive e-mail notification when new information is available in [address] 9. Click Sign Up. You can now view and download portions of your medical record. 10. Click the Download Summary menu link to download a portable copy of your medical information. If you have questions, please visit the Frequently Asked Questions section of the [employer] website. Remember, [employer] is NOT to be used for urgent needs. For medical emergencies, dial 911. Now available from your iPhone and Android! Please provide this summary of care documentation to your next provider. Your primary care clinician is listed as Thuy. If you have any questions after today's visit, please call [phone].

## 2018-06-12 NOTE — PROGRESS NOTES
Chief Complaint   Patient presents with    Hypertension     3 month follow up     Diabetes    Chronic Kidney Disease    Depression       SUBJECTIVE:    Racquel Farfan Sr is a 80 y.o. male who returns in follow-up of his medical problems include hypertension, diabetes, hyperlipidemia, prior TIA, sick sinus syndrome status post pacemaker placement, ASCVD, history of depression, history of alcoholism and other medical problems. He currently is living with his granddaughter and staying away from alcohol and taken his medications and following his diet. He currently denies any chest pain, shortness breath, palpitations or cardiorespiratory complaints. There are no GI or  complaints. He denies any headaches, dizziness or neurologic complaint. There are no current arthritic complaints and no other complaints on complete review of systems. Current Outpatient Prescriptions   Medication Sig Dispense Refill    diclofenac EC (VOLTAREN) 75 mg EC tablet Take 1 Tab by mouth two (2) times a day. 60 Tab prn    mv-mins-folic-lycopene-ginkgo (ONE-A-DAY MEN 50 PLUS, GINKGO,) [phone] mcg-mcg-mg tab Take  by mouth.  memantine (NAMENDA) 10 mg tablet Take 1 Tab by mouth two (2) times a day. 60 Tab prn    losartan-hydroCHLOROthiazide (HYZAAR) 100-25 mg per tablet TAKE 1 TABLET EVERY DAY 90 Tab 1    aspirin 81 mg chewable tablet Take 1 Tab by mouth daily. 30 Tab 1    amLODIPine (NORVASC) 10 mg tablet TAKE 1 TABLET BY MOUTH EVERY DAY 90 Tab 3    donepezil (ARICEPT) 10 mg tablet TAKE 1 TABLET BY MOUTH EVERY DAY 90 Tab 3    omeprazole (PRILOSEC) 20 mg capsule Take 1 Cap by mouth daily.  80 Cap 3     Past Medical History:   Diagnosis Date    Abdominal pain 7/18/2017    Alzheimer disease 7/18/2017    Anemia 7/18/2017    Arthritis     Back pain 7/18/2017    Bradycardia 7/18/2017    CAD (coronary artery disease)     hx of MI    CKD (chronic kidney disease), stage II 7/18/2017    constipation     Depression 7/18/2017    Diabetes mellitus ([facility] [address] 7/18/2017    Diverticulosis 7/18/2017    DJD (degenerative joint disease) 7/18/2017    Encounter for long-term (current) use of other medications 7/18/2017    Fatigue     Gastritis and duodenitis 7/18/2017    GERD (gastroesophageal reflux disease)     GI bleed 7/18/2017    Hearing loss     Hyperlipidemia 7/18/2017    Hypertension     Hypertension with renal disease 7/18/2017    Ill-defined condition     Dementia    Internal hemorrhoids 7/18/2017    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  6/22/2017         [address]  7/10/2014    right inguinal    HX OTHER SURGICAL      cystectomy from back    [place] EGD TRANSORAL BIOPSY SINGLE/MULTIPLE  2/14/2012         UPPER GI ENDOSCOPY,BIOPSY  6/22/2017          No Known Allergies    REVIEW OF SYSTEMS:  General: negative for - chills or fever, or weight loss or gain  ENT: negative for - headaches, nasal congestion or tinnitus  Eyes: no blurred or visual changes  Neck: No stiffness or swollen nodes  Respiratory: negative for - cough, hemoptysis, shortness of breath or wheezing  Cardiovascular : negative for - chest pain, edema, palpitations or shortness of breath  Gastrointestinal: negative for - abdominal pain, blood in stools, heartburn or nausea/vomiting  Genito-Urinary: no dysuria, trouble voiding, or hematuria  Musculoskeletal: negative for - gait disturbance, joint pain, joint stiffness or joint swelling  Neurological: no TIA or stroke symptoms  Hematologic: no bruises, no bleeding  Lymphatic: no swollen glands  Integument: no lumps, mole changes, nail changes or rash  Endocrine:no malaise/lethargy poly uria or polydipsia or unexpected weight changes        Social History     Social History    Marital status:      Spouse name: N/A    Number of children: N/A    Years of education: N/A     Social History Main Topics    Smoking status: Former Smoker     Packs/day: 0.50     Years: 10.00     Start date: 1991    Smokeless tobacco: Never Used      Comment: quit 50 years ago    Alcohol use No    Drug use: No    Sexual activity: Not Asked     Other Topics Concern    None     Social History Narrative     Family History   Problem Relation Age of Onset    Hypertension Mother     Heart Disease Father     Cancer Other      son-cancer? unknown what type        OBJECTIVE:     Visit Vitals    /78 (BP 1 Location: Left arm, BP Patient Position: Sitting)    Pulse 85    Temp 97.7 °F (36.5 °C) (Oral)    Resp 15    Ht 5' 7\" (1.702 m)    Wt 144 lb 6.4 oz (65.5 kg)    SpO2 99%    BMI 22.62 kg/m2     CONSTITUTIONAL:   well nourished, appears age appropriate  EYES: sclera anicteric, PERRL, EOMI  ENMT:nares clear, moist mucous membranes, pharynx clear  NECK: supple. Thyroid normal, No JVD or bruits  RESPIRATORY: Chest: clear to ascultation and percussion, normal inspiratory effort  CARDIOVASCULAR: Heart: regular rate and rhythm no murmurs, rubs or gallops, PMI not displaced, No thrills  GASTROINTESTINAL: Abdomen: non distended, soft, non tender, bowel sounds normal  HEMATOLOGIC: no purpura, petechiae or bruising  LYMPHATIC: No lymph node enlargemant  MUSCULOSKELETAL: Extremities: no edema or active synovitis, pulse 1+   INTEGUMENT: No unusual rashes or suspicious skin lesions noted. Nails appear normal.  PERIPHERAL VASCULAR: normal pulses femoral, PT and DP  NEUROLOGIC: non-focal exam, A & O X 3  PSYCHIATRIC:, appropriate affect     ASSESSMENT:   1. Hypertension with renal disease    2.  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 Mixed hyperlipidemia    4. CKD (chronic kidney disease), stage II    5. ASCVD (arteriosclerotic cardiovascular disease)    6. Alzheimer's dementia without behavioral disturbance, unspecified timing of dementia onset    7. Primary osteoarthritis involving multiple joints    8. SSS (sick sinus syndrome) (HCC)    9. S/P cardiac pacemaker procedure    10. Alcoholism ([facility] [address]      Impression  1. Hypertension that is controlled continue current therapy reviewed with him  2. Diabetes repeat status pending reviewed prior labs and make adjustments if necessary. I did inform him that we need to get a copy of his diabetic eye exam which he says was done in April so we will try to get that  3. Hyperlipidemia repeat status pending reviewed prior labs make adjustments if necessary  4. CKD repeat status pending  5. ASCVD clinically stable, continue aspirin daily  6. Alzheimer's dementia that seems to be stable currently not taking any medications except for the Aricept for that  7. DJD that is stable  8. Sick sinus syndrome status post pacemaker stable  9. Alcoholism apparently he is alcohol free now  I will call the lab and make further recommendations adjustments if necessary. Follow stable continue same and follow-up scheduled with me again in 3 months or sooner should be a problem. PLAN:  .  Orders Placed This Encounter    AMB POC COMPLETE CBC,AUTOMATED ENTER    AMB POC COMPREHENSIVE METABOLIC PANEL    AMB POC LIPID PROFILE    AMB POC HEMOGLOBIN A1C    AMB POC CK (CPK)         ATTENTION:   This medical record was transcribed using an electronic medical records system. Although proofread, it may and can contain electronic and spelling errors. Other human spelling and other errors may be present. Corrections may be executed at a later time. Please feel free to contact us for any clarifications as needed.       Follow-up Disposition:  Return in about 3 months (around 9/12/2018). No results found for any visits on 06/12/18. Lauri Jimenez MD    The patient verbalized understanding of the problems and plans as explained.

## 2018-06-12 NOTE — PROGRESS NOTES
Chief Complaint   Patient presents with    Hypertension     3 month follow up     Diabetes    Chronic Kidney Disease    Depression     1. Have you been to the ER, urgent care clinic since your last visit? Hospitalized since your last visit? No    2. Have you seen or consulted any other health care providers outside of the [address] [place] [address] since your last visit? Include any pap smears or colon screening.  No     Not Fasting

## 2018-06-14 RX ORDER — MEMANTINE HYDROCHLORIDE 10 MG/1
TABLET ORAL
Qty: 60 TAB | Refills: 5 | Status: SHIPPED | OUTPATIENT
Start: 2018-06-14 | End: 2018-12-21 | Stop reason: SDUPTHER

## 2018-06-14 NOTE — TELEPHONE ENCOUNTER
Requested Prescriptions     Pending Prescriptions Disp Refills    memantine (NAMENDA) 10 mg tablet [Pharmacy Med Name: MEMANTINE HCL 10 MG TABLET] 60 Tab 5     Sig: TAKE 1 TABLET BY MOUTH TWICE A DAY       Patient Last Seen:  06- with labs    Next appointment:  09-

## 2018-07-11 ENCOUNTER — APPOINTMENT (OUTPATIENT)
Dept: CT IMAGING | Age: 83
End: 2018-07-11
Attending: EMERGENCY MEDICINE
Payer: MEDICARE

## 2018-07-11 ENCOUNTER — HOSPITAL ENCOUNTER (EMERGENCY)
Age: 83
Discharge: HOME | End: 2018-07-11
Attending: EMERGENCY MEDICINE
Payer: MEDICARE

## 2018-07-11 VITALS
WEIGHT: 142.64 LBS | TEMPERATURE: 98.7 F | SYSTOLIC BLOOD PRESSURE: 146 MMHG | HEIGHT: 67 IN | HEART RATE: 80 BPM | OXYGEN SATURATION: 96 % | BODY MASS INDEX: 22.39 KG/M2 | RESPIRATION RATE: 14 BRPM | DIASTOLIC BLOOD PRESSURE: 68 MMHG

## 2018-07-11 DIAGNOSIS — R10.84 ABDOMINAL PAIN, GENERALIZED: Primary | ICD-10-CM

## 2018-07-11 DIAGNOSIS — K59.00 CONSTIPATION, UNSPECIFIED CONSTIPATION TYPE: ICD-10-CM

## 2018-07-11 LAB
ALBUMIN SERPL-MCNC: 3.2 G/DL (ref 3.5–5)
ALBUMIN/GLOB SERPL: 0.9 {RATIO} (ref 1.1–2.2)
ALP SERPL-CCNC: 87 U/L (ref 45–117)
ALT SERPL-CCNC: 21 U/L (ref 12–78)
ANION GAP SERPL CALC-SCNC: 5 MMOL/L (ref 5–15)
APPEARANCE UR: CLEAR
AST SERPL-CCNC: 17 U/L (ref 15–37)
BACTERIA URNS QL MICRO: NEGATIVE /HPF
BASOPHILS # BLD: 0.1 K/UL (ref 0–0.1)
BASOPHILS NFR BLD: 1 % (ref 0–1)
BILIRUB SERPL-MCNC: 0.4 MG/DL (ref 0.2–1)
BILIRUB UR QL: NEGATIVE
BUN SERPL-MCNC: 22 MG/DL (ref 6–20)
BUN/CREAT SERPL: 14 (ref 12–20)
CALCIUM SERPL-MCNC: 9 MG/DL (ref 8.5–10.1)
CHLORIDE SERPL-SCNC: 107 MMOL/L (ref 97–108)
CO2 SERPL-SCNC: 30 MMOL/L (ref 21–32)
COLOR UR: ABNORMAL
CREAT SERPL-MCNC: 1.54 MG/DL (ref 0.7–1.3)
DIFFERENTIAL METHOD BLD: ABNORMAL
EOSINOPHIL # BLD: 0.3 K/UL (ref 0–0.4)
EOSINOPHIL NFR BLD: 4 % (ref 0–7)
EPITH CASTS URNS QL MICRO: ABNORMAL /LPF
ERYTHROCYTE [DISTWIDTH] IN BLOOD BY AUTOMATED COUNT: 13.2 % (ref 11.5–14.5)
GLOBULIN SER CALC-MCNC: 3.5 G/DL (ref 2–4)
GLUCOSE SERPL-MCNC: 92 MG/DL (ref 65–100)
GLUCOSE UR STRIP.AUTO-MCNC: NEGATIVE MG/DL
HCT VFR BLD AUTO: 33.9 % (ref 36.6–50.3)
HGB BLD-MCNC: 10.6 G/DL (ref 12.1–17)
HGB UR QL STRIP: NEGATIVE
IMM GRANULOCYTES # BLD: 0 K/UL (ref 0–0.04)
IMM GRANULOCYTES NFR BLD AUTO: 0 % (ref 0–0.5)
KETONES UR QL STRIP.AUTO: ABNORMAL MG/DL
LACTATE SERPL-SCNC: 0.8 MMOL/L (ref 0.4–2)
LEUKOCYTE ESTERASE UR QL STRIP.AUTO: NEGATIVE
LIPASE SERPL-CCNC: 108 U/L (ref 73–393)
LYMPHOCYTES # BLD: 1.3 K/UL (ref 0.8–3.5)
LYMPHOCYTES NFR BLD: 19 % (ref 12–49)
MCH RBC QN AUTO: 27.1 PG (ref 26–34)
MCHC RBC AUTO-ENTMCNC: 31.3 G/DL (ref 30–36.5)
MCV RBC AUTO: 86.7 FL (ref 80–99)
MONOCYTES # BLD: 0.6 K/UL (ref 0–1)
MONOCYTES NFR BLD: 8 % (ref 5–13)
MUCOUS THREADS URNS QL MICRO: ABNORMAL /LPF
NEUTS SEG # BLD: 4.7 K/UL (ref 1.8–8)
NEUTS SEG NFR BLD: 68 % (ref 32–75)
NITRITE UR QL STRIP.AUTO: NEGATIVE
NRBC # BLD: 0 K/UL (ref 0–0.01)
NRBC BLD-RTO: 0 PER 100 WBC
PH UR STRIP: 5.5 [PH] (ref 5–8)
PLATELET # BLD AUTO: 260 K/UL (ref 150–400)
PMV BLD AUTO: 9.8 FL (ref 8.9–12.9)
POTASSIUM SERPL-SCNC: 3.6 MMOL/L (ref 3.5–5.1)
PROT SERPL-MCNC: 6.7 G/DL (ref 6.4–8.2)
PROT UR STRIP-MCNC: 30 MG/DL
RBC # BLD AUTO: 3.91 M/UL (ref 4.1–5.7)
RBC #/AREA URNS HPF: ABNORMAL /HPF (ref 0–5)
SODIUM SERPL-SCNC: 142 MMOL/L (ref 136–145)
SP GR UR REFRACTOMETRY: 1.02 (ref 1–1.03)
TROPONIN I SERPL-MCNC: <0.05 NG/ML
UA: UC IF INDICATED,UAUC: ABNORMAL
UROBILINOGEN UR QL STRIP.AUTO: 1 EU/DL (ref 0.2–1)
WBC # BLD AUTO: 7 K/UL (ref 4.1–11.1)
WBC URNS QL MICRO: ABNORMAL /HPF (ref 0–4)

## 2018-07-11 PROCEDURE — 74011250636 HC RX REV CODE- 250/636: Performed by: EMERGENCY MEDICINE

## 2018-07-11 PROCEDURE — 36415 COLL VENOUS BLD VENIPUNCTURE: CPT | Performed by: EMERGENCY MEDICINE

## 2018-07-11 PROCEDURE — 83605 ASSAY OF LACTIC ACID: CPT | Performed by: EMERGENCY MEDICINE

## 2018-07-11 PROCEDURE — 93005 ELECTROCARDIOGRAM TRACING: CPT

## 2018-07-11 PROCEDURE — 83690 ASSAY OF LIPASE: CPT | Performed by: EMERGENCY MEDICINE

## 2018-07-11 PROCEDURE — 74177 CT ABD & PELVIS W/CONTRAST: CPT

## 2018-07-11 PROCEDURE — 81001 URINALYSIS AUTO W/SCOPE: CPT | Performed by: EMERGENCY MEDICINE

## 2018-07-11 PROCEDURE — 85025 COMPLETE CBC W/AUTO DIFF WBC: CPT | Performed by: EMERGENCY MEDICINE

## 2018-07-11 PROCEDURE — 84484 ASSAY OF TROPONIN QUANT: CPT | Performed by: EMERGENCY MEDICINE

## 2018-07-11 PROCEDURE — 80053 COMPREHEN METABOLIC PANEL: CPT | Performed by: EMERGENCY MEDICINE

## 2018-07-11 PROCEDURE — 74011636320 HC RX REV CODE- 636/320: Performed by: EMERGENCY MEDICINE

## 2018-07-11 PROCEDURE — 99285 EMERGENCY DEPT VISIT HI MDM: CPT

## 2018-07-11 RX ORDER — SODIUM CHLORIDE 0.9 % (FLUSH) 0.9 %
10 SYRINGE (ML) INJECTION
Status: COMPLETED | OUTPATIENT
Start: 2018-07-11 | End: 2018-07-11

## 2018-07-11 RX ORDER — SODIUM CHLORIDE 9 MG/ML
50 INJECTION, SOLUTION INTRAVENOUS
Status: COMPLETED | OUTPATIENT
Start: 2018-07-11 | End: 2018-07-11

## 2018-07-11 RX ORDER — MAGNESIUM CITRATE
296 SOLUTION, ORAL ORAL
Qty: 1 BOTTLE | Refills: 0 | Status: SHIPPED | OUTPATIENT
Start: 2018-07-11 | End: 2018-07-11

## 2018-07-11 RX ADMIN — SODIUM CHLORIDE 50 ML/HR: 900 INJECTION, SOLUTION INTRAVENOUS at 18:51

## 2018-07-11 RX ADMIN — IOPAMIDOL 100 ML: 755 INJECTION, SOLUTION INTRAVENOUS at 18:51

## 2018-07-11 RX ADMIN — Medication 10 ML: at 18:51

## 2018-07-11 NOTE — DISCHARGE INSTRUCTIONS

## 2018-07-11 NOTE — ED NOTES
Pt arrived via ambulatory to room #16 from triage. Pt with c/o of constipation and slight abd pain. Patient states last bm was 6 days ago and ever since, he has had slight abd pain that has since resolved. Patient mentioned that he regularly has bm daily and has noted 6 days since last one. Patient denies N/V at this time. Pt resting in position of comfort. Call bell within reach. Family at bedside.

## 2018-07-11 NOTE — ED PROVIDER NOTES
EMERGENCY DEPARTMENT HISTORY AND PHYSICAL EXAM 
 
 
Date: 7/11/2018 Patient Name: Ken Charles Sr 
 
History of Presenting Illness Chief Complaint Patient presents with  Constipation Ambulatory into the ED with c/o abdominal pain and constipation x several weeks. Last BM was 2 weeks ago. He has taken laxatives without relief. Sent to the ED to r/o SBO. No obvious distress noted.  Abdominal Pain History Provided By: Patient and Patient's Daughter HPI: Renata Farfan Sr, 80 y.o. male with PMHx significant for CAD (MI), HTN, GERD, HLD, GI bleed, DM, CKD, depression, anemia, and Alzheimer's, presents ambulatory to the ED with cc of persistent, intermittent diffuse abdominal cramping x2-3 weeks. Pt reports associated sx of constipation with LBM 4-5 days ago and mid back pain radiating to the B/L sides of his abdomen as well. He expresses over the last 2 weeks he has been having intermittent abdominal discomfort noting he was seen by his PCP and placed on Diclofenac for his sx. Pt reports he has been taking Miralax and another laxative for constipation WNR and reports his normal bowel regimen to be Q2 days. He ensures he is passing flatus. Pt denies any exacerbating or relieving factors to his discomfort. He denies any fevers, chills, chest pain, SOB, nausea, vomiting, diarrhea, dysuria, or hematuria. There are no other complaints, changes, or physical findings at this time. PCP: Kun De Santiago MD 
 
Current Outpatient Prescriptions Medication Sig Dispense Refill  memantine (NAMENDA) 10 mg tablet TAKE 1 TABLET BY MOUTH TWICE A DAY 60 Tab 5  
 diclofenac EC (VOLTAREN) 75 mg EC tablet Take 1 Tab by mouth two (2) times a day. 60 Tab prn  
 mv-mins-folic-lycopene-ginkgo (ONE-A-DAY MEN 50 PLUS, GINKGO,) [phone] mcg-mcg-mg tab Take  by mouth.     
 losartan-hydroCHLOROthiazide (HYZAAR) 100-25 mg per tablet TAKE 1 TABLET EVERY DAY 90 Tab 1  
 aspirin 81 mg chewable tablet Take 1 Tab by mouth daily. 30 Tab 1  
 amLODIPine (NORVASC) 10 mg tablet TAKE 1 TABLET BY MOUTH EVERY DAY 90 Tab 3  
 donepezil (ARICEPT) 10 mg tablet TAKE 1 TABLET BY MOUTH EVERY DAY 90 Tab 3  
 omeprazole (PRILOSEC) 20 mg capsule Take 1 Cap by mouth daily. 90 Cap 3 Past History Past Medical History: 
Past Medical History:  
Diagnosis Date  Abdominal pain 7/18/2017  Alzheimer disease 7/18/2017  Anemia 7/18/2017  Arthritis  Back pain 7/18/2017  Bradycardia 7/18/2017  CAD (coronary artery disease)   
 hx of MI  
 CKD (chronic kidney disease), stage II 7/18/2017  constipation  Depression 7/18/2017  Diabetes mellitus ([facility] [address] 7/18/2017  Diverticulosis 7/18/2017  DJD (degenerative joint disease) 7/18/2017  Encounter for long-term (current) use of other medications 7/18/2017  Fatigue  Gastritis and duodenitis 7/18/2017  GERD (gastroesophageal reflux disease)  GI bleed 7/18/2017  Hearing loss  Hyperlipidemia 7/18/2017  Hypertension  Hypertension with renal disease 7/18/2017  Ill-defined condition Dementia [facility] Internal hemorrhoids 7/18/2017  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   
 lost over 40 pounds last year- has no appetite Past Surgical History: 
Past Surgical History:  
Procedure Laterality Date  COLONOSCOPY N/A 6/22/2017 COLONOSCOPY performed by Hussain Ferris MD at hospitals ENDOSCOPY  COLONOSCOPY,MAXINE DIGGS,SNARE  6/22/2017  HX HERNIA REPAIR  7/10/2014  
 right inguinal  
 HX OTHER SURGICAL    
 cystectomy from back  SC EGD TRANSORAL BIOPSY SINGLE/MULTIPLE  2/14/2012  UPPER GI ENDOSCOPY,BIOPSY  6/22/2017 Family History: 
Family History Problem Relation Age of Onset  Hypertension Mother  Heart Disease Father  Cancer Other son-cancer? unknown what type  Social History: 
Social History Substance Use Topics  Smoking status: Former Smoker Packs/day: 0.50 Years: 10.00 Start date: 1991  Smokeless tobacco: Never Used Comment: quit 50 years ago  Alcohol use No  
 
 
Allergies: 
No Known Allergies Review of Systems Review of Systems Constitutional: Negative for chills, fatigue and fever. HENT: Negative for congestion, rhinorrhea and sore throat. Eyes: Negative for pain, discharge and visual disturbance. Respiratory: Negative for cough, chest tightness, shortness of breath and wheezing. Cardiovascular: Negative for chest pain, palpitations and leg swelling. Gastrointestinal: Positive for abdominal pain (intermittent; diffuse ) and constipation (LBM 4-5 days ago ). Negative for diarrhea, nausea and vomiting. Genitourinary: Negative for dysuria, frequency and hematuria. Musculoskeletal: Positive for back pain (mid back radiating to B/L sides of abdomen ). Negative for arthralgias and myalgias. Skin: Negative for rash. Neurological: Negative for dizziness, weakness, light-headedness and headaches. Psychiatric/Behavioral: Negative. Physical Exam  
Physical Exam  
Constitutional: He is oriented to person, place, and time. He appears well-developed and well-nourished. No distress. HENT:  
Head: Normocephalic and atraumatic. Eyes: EOM are normal. Right eye exhibits no discharge. Left eye exhibits no discharge. No scleral icterus. Neck: Normal range of motion. Neck supple. No tracheal deviation present. Cardiovascular: Normal rate, regular rhythm, normal heart sounds and intact distal pulses. Exam reveals no gallop and no friction rub. No murmur heard. Pulmonary/Chest: Effort normal and breath sounds normal. No respiratory distress. He has no wheezes. He has no rales. Abdominal: Soft. He exhibits no distension. There is generalized tenderness.  There is no rebound and no guarding. Musculoskeletal: Normal range of motion. He exhibits no edema. Lymphadenopathy:  
  He has no cervical adenopathy. Neurological: He is alert and oriented to person, place, and time. Skin: Skin is warm and dry. No rash noted. Psychiatric: He has a normal mood and affect. Nursing note and vitals reviewed. Diagnostic Study Results Labs - Recent Results (from the past 12 hour(s)) URINALYSIS W/ REFLEX CULTURE Collection Time: 07/11/18  3:56 PM  
Result Value Ref Range Color YELLOW/STRAW Appearance CLEAR CLEAR Specific gravity 1.020 1.003 - 1.030    
 pH (UA) 5.5 5.0 - 8.0 Protein 30 (A) NEG mg/dL Glucose NEGATIVE  NEG mg/dL Ketone TRACE (A) NEG mg/dL Bilirubin NEGATIVE  NEG Blood NEGATIVE  NEG Urobilinogen 1.0 0.2 - 1.0 EU/dL Nitrites NEGATIVE  NEG Leukocyte Esterase NEGATIVE  NEG    
 WBC 0-4 0 - 4 /hpf  
 RBC 0-5 0 - 5 /hpf Epithelial cells FEW FEW /lpf Bacteria NEGATIVE  NEG /hpf  
 UA:UC IF INDICATED CULTURE NOT INDICATED BY UA RESULT CNI Mucus 1+ (A) NEG /lpf  
CBC WITH AUTOMATED DIFF Collection Time: 07/11/18  4:16 PM  
Result Value Ref Range WBC 7.0 4.1 - 11.1 K/uL  
 RBC 3.91 (L) 4.10 - 5.70 M/uL  
 HGB 10.6 (L) 12.1 - 17.0 g/dL HCT 33.9 (L) 36.6 - 50.3 % MCV 86.7 80.0 - 99.0 FL  
 MCH 27.1 26.0 - 34.0 PG  
 MCHC 31.3 30.0 - 36.5 g/dL  
 RDW 13.2 11.5 - 14.5 % PLATELET 896 373 - 180 K/uL MPV 9.8 8.9 - 12.9 FL  
 NRBC 0.0 0  WBC ABSOLUTE NRBC 0.00 0.00 - 0.01 K/uL NEUTROPHILS 68 32 - 75 % LYMPHOCYTES 19 12 - 49 % MONOCYTES 8 5 - 13 % EOSINOPHILS 4 0 - 7 % BASOPHILS 1 0 - 1 % IMMATURE GRANULOCYTES 0 0.0 - 0.5 % ABS. NEUTROPHILS 4.7 1.8 - 8.0 K/UL  
 ABS. LYMPHOCYTES 1.3 0.8 - 3.5 K/UL  
 ABS. MONOCYTES 0.6 0.0 - 1.0 K/UL  
 ABS. EOSINOPHILS 0.3 0.0 - 0.4 K/UL  
 ABS. BASOPHILS 0.1 0.0 - 0.1 K/UL  
 ABS. IMM.  GRANS. 0.0 0.00 - 0.04 K/UL  
 DF AUTOMATED METABOLIC PANEL, COMPREHENSIVE Collection Time: 07/11/18  4:16 PM  
Result Value Ref Range Sodium 142 136 - 145 mmol/L Potassium 3.6 3.5 - 5.1 mmol/L Chloride 107 97 - 108 mmol/L  
 CO2 30 21 - 32 mmol/L Anion gap 5 5 - 15 mmol/L Glucose 92 65 - 100 mg/dL BUN 22 (H) 6 - 20 MG/DL Creatinine 1.54 (H) 0.70 - 1.30 MG/DL  
 BUN/Creatinine ratio 14 12 - 20 GFR est AA 52 (L) >60 ml/min/1.73m2 GFR est non-AA 43 (L) >60 ml/min/1.73m2 Calcium 9.0 8.5 - 10.1 MG/DL Bilirubin, total 0.4 0.2 - 1.0 MG/DL  
 ALT (SGPT) 21 12 - 78 U/L  
 AST (SGOT) 17 15 - 37 U/L Alk. phosphatase 87 45 - 117 U/L Protein, total 6.7 6.4 - 8.2 g/dL Albumin 3.2 (L) 3.5 - 5.0 g/dL Globulin 3.5 2.0 - 4.0 g/dL A-G Ratio 0.9 (L) 1.1 - 2.2 EKG, 12 LEAD, INITIAL Collection Time: 07/11/18  5:35 PM  
Result Value Ref Range Ventricular Rate 66 BPM  
 Atrial Rate 66 BPM  
 P-R Interval 136 ms QRS Duration 172 ms Q-T Interval 476 ms QTC Calculation (Bezet) 499 ms Calculated R Axis -160 degrees Calculated T Axis 11 degrees Diagnosis AV dual-paced rhythm with occasional premature ventricular complexes When compared with ECG of 13-APR-2018 08:07, 
premature ventricular complexes are now present Vent. rate has increased BY   3 BPM 
  
TROPONIN I Collection Time: 07/11/18  6:23 PM  
Result Value Ref Range Troponin-I, Qt. <0.05 <0.05 ng/mL LACTIC ACID Collection Time: 07/11/18  6:23 PM  
Result Value Ref Range Lactic acid 0.8 0.4 - 2.0 MMOL/L  
LIPASE Collection Time: 07/11/18  6:23 PM  
Result Value Ref Range Lipase 108 73 - 393 U/L Radiologic Studies -  
CT Results  (Last 48 hours) 07/11/18 1851  CT ABD PELV W CONT Final result Impression:  IMPRESSION:   
1. No small bowel obstruction. 2. Small hepatic cysts. 3. Cardiac pacemaker. 4. Atherosclerotic abdominal aorta without aneurysm.   
5. Enlarged prostate gland with mild urinary bladder wall thickening. 6. Penile prosthesis. 7. Levoconvex lumbar scoliosis and spondylosis. Narrative:  EXAM: CT ABDOMEN PELVIS WITH CONTRAST INDICATION: Constipation for 4 days with abdominal pain and nausea, evaluate for  
small bowel obstruction. COMPARISON: 5/27/2018. CONTRAST: 100 mL of Isovue-370. TECHNIQUE:   
Multislice helical CT was performed from the diaphragm to the symphysis pubis  
during uneventful rapid bolus intravenous contrast administration. Oral contrast  
was not administered. Contiguous 5 mm axial images were reconstructed and lung  
and soft tissue windows were generated. Coronal and sagittal reformations were  
generated. CT dose reduction was achieved through use of a standardized protocol  
tailored for this examination and automatic exposure control for dose  
modulation. FINDINGS:  
There is a cardiac pacemaker in place. LOWER CHEST: The visualized portions of the lung bases are clear. ABDOMEN:  
Liver: The liver is normal in size and there are small cysts within the liver. Gallbladder and bile ducts: There is no calcified gallstone or biliary  
dilatation. Spleen: No abnormality. Pancreas: No abnormality. Adrenal glands: No abnormality. Kidneys: There is a small cyst in the upper pole of the right kidney. There is  
no abnormality of the left kidney. PELVIS:  
Reproductive organs: The prostate gland is enlarged and the seminal vesicles are  
symmetrical. There is a penile prosthesis. Bladder: The urinary bladder wall is mildly thickened. BOWEL AND MESENTERY: The small bowel is normal.  There is no mesenteric mass or  
adenopathy. The appendix is not identified. There is gas and stool throughout  
the colon. PERITONEUM: There is no ascites or free intraperitoneal air. RETROPERITONEUM: The aorta is atherosclerotic and tapers without aneurysm. There  
is no retroperitoneal adenopathy or mass.  There is no pelvic mass or adenopathy. BONES AND SOFT TISSUES: There is levoconvex scoliosis and degenerative change of  
the lumbar spine. Medical Decision Making I am the first provider for this patient. I reviewed the vital signs, available nursing notes, past medical history, past surgical history, family history and social history. Vital Signs-Reviewed the patient's vital signs. Patient Vitals for the past 12 hrs: 
 Temp Pulse Resp BP SpO2  
07/11/18 2000 - - - 146/68 96 % 07/11/18 1945 - - - 142/69 96 % 07/11/18 1930 - - - 137/72 96 % 07/11/18 1915 - - - 137/73 97 % 07/11/18 1904 - - - 157/73 -  
07/11/18 1830 - - - 141/86 -  
07/11/18 1815 - - - 147/72 -  
07/11/18 1800 - - - 141/72 -  
07/11/18 1549 98.7 °F (37.1 °C) 80 14 147/81 100 % Pulse Oximetry Analysis - 100% on room air Cardiac Monitor:  
Rate: 80 bpm 
Rhythm: Normal Sinus Rhythm EKG interpretation: (Preliminary) 1735 Rhythm: paced; and regular . Rate (approx.): 66; Axis: normal; WA interval: normal; QRS interval: prolonged; ST/T wave: normal; Other findings: unchanged from previous ekg. Records Reviewed: Nursing Notes, Old Medical Records, Previous Radiology Studies and Previous Laboratory Studies Provider Notes (Medical Decision Making): DDx: SBO, Ileus, AAA, Ischemia, Atypical ACS, UTI. Disposition: Patient is an 81 yo male who presents to ED with constipation and abdominal pain. Labs including lactate and troponin unremarkable. CT a/p negative for acute process. Patient is tolerating po. Will discharge with follow up. ED Course:  
Initial assessment performed. The patients presenting problems have been discussed, and they are in agreement with the care plan formulated and outlined with them. I have encouraged them to ask questions as they arise throughout their visit.  
 
Progress Notes: 
7:45 PM  
CT negative for obstruction but indicates stool throughout colon, will tx with magnesium citrate, discussed titration of Miralax dose as needed. PCP follow up. Discussed results, prescriptions and follow up plan with patient. Provided customary return to ED instructions. Patient expressed understanding. Laura Franklin MD 
 
Critical Care Time: 0 minutes Disposition: 
Discharge Note: 
7:46 PM 
The patient is ready for discharge. The patient's signs, symptoms, diagnosis, and discharge instruction have been discussed and the patient has conveyed their understanding. The patient is to follow up as recommended or return to the ER should their symptoms worsen. Plan has been discussed and the patient is in agreement. Written by Cherie Rod ED Scribe, as dictated by Kathia Paige MD 
 
PLAN: 
1. Current Discharge Medication List  
  
START taking these medications Details  
magnesium citrate solution Take 296 mL by mouth now for 1 dose. Qty: 1 Bottle, Refills: 0  
  
  
 
2. Follow-up Information Follow up With Details Comments Contact Info Myrtha Bence, MD  As needed [address] 
[phone] [facility] EMERGENCY DEPT  As needed, If symptoms worsen [facility] [address] 
[phone] Return to ED if worse Diagnosis Clinical Impression: 1. Abdominal pain, generalized 2. Constipation, unspecified constipation type Attestations: 
 
Attestation: This note is prepared by Shyann Rod, acting as Scribe for Kathia Paige MD. 
 
 
Kathia Paige MD: The scribe's documentation has been prepared under my direction and personally reviewed by me in its entirety. I confirm that the note above accurately reflects all work, treatment, procedures, and medical decision making performed by me.

## 2018-07-12 LAB
ATRIAL RATE: 66 BPM
CALCULATED R AXIS, ECG10: -160 DEGREES
CALCULATED T AXIS, ECG11: 11 DEGREES
DIAGNOSIS, 93000: NORMAL
P-R INTERVAL, ECG05: 136 MS
Q-T INTERVAL, ECG07: 476 MS
QRS DURATION, ECG06: 172 MS
QTC CALCULATION (BEZET), ECG08: 499 MS
VENTRICULAR RATE, ECG03: 66 BPM

## 2018-07-12 NOTE — ED NOTES
Discharge instructions given to patient by Baldo Mosquera MD. Patient verbalized understanding of discharge instructions. Pt discharged without difficulty. Pt discharged in stable condition via ambulation, accompanied by son.

## 2018-08-08 ENCOUNTER — OFFICE VISIT (OUTPATIENT)
Dept: INTERNAL MEDICINE CLINIC | Age: 83
End: 2018-08-08

## 2018-08-08 VITALS
WEIGHT: 144 LBS | RESPIRATION RATE: 16 BRPM | OXYGEN SATURATION: 98 % | HEIGHT: 67 IN | DIASTOLIC BLOOD PRESSURE: 60 MMHG | SYSTOLIC BLOOD PRESSURE: 128 MMHG | HEART RATE: 39 BPM | BODY MASS INDEX: 22.6 KG/M2

## 2018-08-08 DIAGNOSIS — E11.22 CONTROLLED TYPE 2 DIABETES MELLITUS WITH STAGE 2 CHRONIC KIDNEY DISEASE, WITHOUT LONG-TERM CURRENT USE OF INSULIN (HCC): ICD-10-CM

## 2018-08-08 DIAGNOSIS — F02.80 ALZHEIMER'S DEMENTIA WITHOUT BEHAVIORAL DISTURBANCE, UNSPECIFIED TIMING OF DEMENTIA ONSET: ICD-10-CM

## 2018-08-08 DIAGNOSIS — G30.9 ALZHEIMER'S DEMENTIA WITHOUT BEHAVIORAL DISTURBANCE, UNSPECIFIED TIMING OF DEMENTIA ONSET: ICD-10-CM

## 2018-08-08 DIAGNOSIS — M54.50 ACUTE BILATERAL LOW BACK PAIN WITHOUT SCIATICA: Primary | ICD-10-CM

## 2018-08-08 DIAGNOSIS — N18.2 CONTROLLED TYPE 2 DIABETES MELLITUS WITH STAGE 2 CHRONIC KIDNEY DISEASE, WITHOUT LONG-TERM CURRENT USE OF INSULIN (HCC): ICD-10-CM

## 2018-08-08 RX ORDER — PREDNISONE 5 MG/1
TABLET ORAL
Qty: 48 TAB | Refills: 0 | Status: SHIPPED | OUTPATIENT
Start: 2018-08-08 | End: 2018-09-12 | Stop reason: ALTCHOICE

## 2018-08-08 NOTE — PROGRESS NOTES
Subjective:   Napoleon Costello Sr is a 80 y.o. male      Chief Complaint   Patient presents with    Back Pain     pain in back around waistline and up the back x 2-3 months        History of present illness: He presents with recurrence of his mid lower back pain seems to be waistline and down is been present for 2-3 months. He notes no radiation in the legs and hips, no weakness in his legs as well as no numbness in his legs. He denies any falls and has not had any other abnormalities such as bowel or bladder dysfunction. We previously x-ray that showed degenerative joint disease and degenerative disc disease but no evidence of fracture. He has been taken diclofenac according himself and that is not been effective.     Patient Active Problem List   Diagnosis Code    Right inguinal hernia K40.90    ASCVD (arteriosclerotic cardiovascular disease) I25.10    Hypokalemia E87.6    Near syncope R55    SVT (supraventricular tachycardia) ([facility])--s/p RFA I47.1    S/P cardiac pacemaker procedure Z95.0    S/P ablation of accessory bypass tract Z98.890    SSS (sick sinus syndrome) ([facility]) I49.5    Diverticulosis K57.90    Gastritis and duodenitis K29.90    Internal hemorrhoids K64.8    Hypertension with renal disease I12.9    Mixed hyperlipidemia E78.2    GI bleed K92.2    Primary osteoarthritis involving multiple joints M15.0    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, without long-term current use of insulin ([facility]) E11.22, N18.2    Depression F32.9    CKD (chronic kidney disease), stage II N18.2    Back pain M54.9    Anemia D64.9    Alzheimer disease G30.9, F02.80    Medicare annual wellness visit, initial Z00.00    TIA (transient ischemic attack) K72.0    Metabolic encephalopathy B36.35    Alcoholism ([address] F10.20    Acute bilateral low back pain without sciatica M54.5    Mild depression ([facility]) F32.0      Past Medical History:   Diagnosis Date    Abdominal pain 2017    Alzheimer disease 2017    Anemia 2017    Arthritis     Back pain 2017    Bradycardia 2017    CAD (coronary artery disease)     hx of MI    CKD (chronic kidney disease), stage II 2017    constipation     Depression 2017    Diabetes mellitus ([facility] [address] 2017    Diverticulosis 2017    DJD (degenerative joint disease) 2017    Encounter for long-term (current) use of other medications 2017    Fatigue     Gastritis and duodenitis 2017    GERD (gastroesophageal reflux disease)     GI bleed 2017    Hearing loss     Hyperlipidemia 2017    Hypertension     Hypertension with renal disease 2017    Ill-defined condition     Dementia    Internal hemorrhoids 2017    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    S/P cardiac pacemaker procedure 2015    5/4/15 Medtronic dual chamber pacemaker implant     Weight loss     lost over 40 pounds last year- has no appetite      No Known Allergies   Family History   Problem Relation Age of Onset    Hypertension Mother     Heart Disease Father     Cancer Other      son-cancer? unknown what type       Social History     Social History    Marital status:      Spouse name: N/A    Number of children: N/A    Years of education: N/A     Occupational History    Not on file. Social History Main Topics    Smoking status: Former Smoker     Packs/day: 0.50     Years: 10.00     Start date: 1991    Smokeless tobacco: Never Used      Comment: quit 50 years ago    Alcohol use No    Drug use: No    Sexual activity: Not on file     Other Topics Concern    Not on file     Social History Narrative     Prior to Admission medications    Medication Sig Start Date End Date Taking?  Authorizing Provider   predniSONE (STERAPRED) 5 mg dose pack See administration instruction per 5mg dose pack 18  Yes Vijay Henning MD   memantine (NAMENDA) 10 mg tablet TAKE 1 TABLET BY MOUTH TWICE A DAY 6/14/18  Yes Kun De Santiaog MD   diclofenac EC (VOLTAREN) 75 mg EC tablet Take 1 Tab by mouth two (2) times a day. 5/30/18  Yes Kun De Santiago MD   mv-mins-folic-lycopene-ginkgo (ONE-A-DAY MEN 50 PLUS, GINKGO,) [phone] mcg-mcg-mg tab Take  by mouth. Yes Historical Provider   losartan-hydroCHLOROthiazide (HYZAAR) 100-25 mg per tablet TAKE 1 TABLET EVERY DAY 4/16/18  Yes Kun De Santiago MD   aspirin 81 mg chewable tablet Take 1 Tab by mouth daily. 4/15/18  Yes Donovan Brown MD   amLODIPine (NORVASC) 10 mg tablet TAKE 1 TABLET BY MOUTH EVERY DAY 4/3/18  Yes Kun De Santiago MD   donepezil (ARICEPT) 10 mg tablet TAKE 1 TABLET BY MOUTH EVERY DAY 2/28/18  Yes Kun De Santiago MD   omeprazole (PRILOSEC) 20 mg capsule Take 1 Cap by mouth daily. 11/1/17  Yes Kun De Santiago MD        Review of Systems              Constitutional:  He denies fever, weight loss, sweats or fatigue. EYES: No blurred or double vision,               ENT: no nasal congestion, no headache or dizziness. No difficulty with               swallowing, taste, speech or smell. Respiratory:  No cough, wheezing or shortness of breath. No sputum production. Cardiac:  Denies chest pain, palpitations, unexplained indigestion, syncope, edema, PND or orthopnea. GI:  No changes in bowel movements, no abdominal pain, no bloating, anorexia, nausea, vomiting or heartburn. :  No frequency or dysuria. Denies incontinence or sexual dysfunction. Extremities:  No joint pain, stiffness or swelling  Back:. Mid lower back pain is noted  Skin:  No recent rashes or mole changes. Neurological:  No numbness, tingling, burning paresthesias or loss of motor strength. No syncope, dizziness, frequent headaches or memory loss.   Hematologic:  No easy bruising  Lymphatic: No lymph node enlargement    Objective:     Vitals:    08/08/18 1632   BP: 128/60   Pulse: (!) 39 Resp: 16   SpO2: 98%   Weight: 144 lb (65.3 kg)   Height: 5' 7\" (1.702 m)   PainSc:   8   PainLoc: Back       Body mass index is 22.55 kg/(m^2). Physical Examination:              General Appearance:  Well-developed, well-nourished, no acute distress. HEENT:      Ears:  The TMs and ear canals were clear. Eyes:  The pupillary responses were normal.  Extraocular muscle function intact. Lids and conjunctiva not injected. Funduscopic exam revealed sharp disc margins. Nares: Clear w/o edema or erythema  Pharynx:  Clear with teeth in good repair. No masses were noted. Neck:  Supple without thyromegaly or adenopathy. No JVD noted. No carotid                bruits. Lungs:  Clear to auscultation and percussion. Cardiac:  Regular rate and rhythm without murmur. PMI not displaced. No gallop, rub or click. Abdominal: Soft, non-tender, no hepata-spleenomegally or masses  Extremities:  No clubbing, cyanosis or edema. Back: No tenderness to palpation and straight leg raising limited to about 60° because of patient stiffness in his legs. Skin:  No rash or unusual mole changes noted. Lymph Nodes:  None felt in the cervical, supraclavicular, axillary or inguinal region. Neurological: . DTRs 2+ and symmetric. Station and gait normal.   Hematologic:   No purpura or petechiae        Assessment/Plan:         1. Acute bilateral low back pain without sciatica    2. Alzheimer's dementia without behavioral disturbance, unspecified timing of dementia onset    3. 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, without long-term current use of insulin ([facility])        Impressions/Plan:  Impression  1. Bilateral low back pain unclear etiology at this point I am going to schedule an MRI to further evaluate this and I did place him on prednisone 5 mg 12 day Dosepak. 2.  Alzheimer's dementia could be playing some role in being able to get an adequate history from him.   3.  I did caution regarding the potential elevation of blood sugar associated with the steroids. Recheck to be determined pending results of the MRI. Orders Placed This Encounter    MRI LUMB SPINE WO CONT    predniSONE (STERAPRED) 5 mg dose pack       Follow-up Disposition:  Return for At prior appointment. No results found for any visits on 08/08/18. Ruthy Anderson MD    The patient was given after the visit summary the patient verbalized an understanding of the plans and problems as explained.

## 2018-08-08 NOTE — PATIENT INSTRUCTIONS

## 2018-08-08 NOTE — MR AVS SNAPSHOT
[address] [facility] 
 
 
 [address] [id number] [phone] Patient: Magdi Feliciano Sr 
MRN: [id number] YID:[id number] Visit Information Date & Time Provider Department Dept. Phone Encounter #  
 8/8/2018  3:40 PM Linnea Carver Opal Ragsdale [id number] Your Appointments 8/16/2018  9:30 AM  
PROCEDURE with PACEMAKER, [facility] Appt Note: 6mo mdt bivpm  
 [id number] [facility]  
[phone] [address] [facility]  
  
    
 9/11/2018  1:10 PM  
FOLLOW UP 10 with Akhil Pérez MD  
[facility] ([facility] Appt Note: 3 MO FLP   yearly [address] [phone] [address] [facility] [phone]  
  
    
 2/12/2019 10:00 AM  
ESTABLISHED PATIENT with Shannon Reid MD  
[address] [facility] Appt Note: annual also pace check kw  
 [address] [facility]  
[phone] [address] [facility]  
  
    
 2/12/2019 10:00 AM  
PROCEDURE with PACEMAKER, [facility] Appt Note: also seeing david annual  
 [address] [facility]  
[phone] Upcoming Health Maintenance Date Due  
 EYE EXAM RETINAL OR DILATED Q1 1/21/1941 DTaP/Tdap/Td series (1 - Tdap) 1/21/1952 ZOSTER VACCINE AGE 60> 11/21/1990 GLAUCOMA SCREENING Q2Y 1/21/1996 Influenza Age 5 to Adult 8/1/2018 MICROALBUMIN Q1 9/1/2018 MEDICARE YEARLY EXAM 9/2/2018 HEMOGLOBIN A1C Q6M 12/12/2018 FOOT EXAM Q1 3/9/2019 LIPID PANEL Q1 6/12/2019 Allergies as of 8/8/2018  Review Complete On: 8/8/2018 By: Humble Castillo LPN  
 No Known Allergies Current Immunizations  Reviewed on 5/4/2015 Name Date Influenza High Dose Vaccine PF 12/8/2017 Influenza Vaccine 1/18/2017, 10/18/2016, 12/15/2015 Pneumococcal Conjugate (PCV-13) 9/30/2015 Pneumococcal Polysaccharide (PPSV-23) 10/13/2006 Pneumococcal Vaccine (Unspecified Type) 10/13/2006 Not reviewed this visit Vitals BP Pulse Resp Height(growth percentile) Weight(growth percentile) SpO2  
 128/60 (BP 1 Location: Right arm, BP Patient Position: Sitting) (!) 39 16 5' 7\" (1.702 m) 144 lb (65.3 kg) 98% BMI Smoking Status 22.55 kg/m2 Former Smoker Vitals History BMI and BSA Data Body Mass Index Body Surface Area  
 22.55 kg/m 2 1.76 m 2 Preferred Pharmacy Pharmacy Name Phone [id number] [address] [phone] Your Updated Medication List  
  
   
This list is accurate as of 8/8/18  4:50 PM.  Always use your most recent med list. amLODIPine 10 mg tablet Commonly known as:  Ebony Citron TAKE 1 TABLET BY MOUTH EVERY DAY  
  
 aspirin 81 mg chewable tablet Take 1 Tab by mouth daily. diclofenac EC 75 mg EC tablet Commonly known as:  VOLTAREN Take 1 Tab by mouth two (2) times a day. donepezil 10 mg tablet Commonly known as:  ARICEPT  
TAKE 1 TABLET BY MOUTH EVERY DAY  
  
 losartan-hydroCHLOROthiazide 100-25 mg per tablet Commonly known as:  HYZAAR  
TAKE 1 TABLET EVERY DAY  
  
 memantine 10 mg tablet Commonly known as:  Coralee Shelly TAKE 1 TABLET BY MOUTH TWICE A DAY  
  
 omeprazole 20 mg capsule Commonly known as:  PRILOSEC Take 1 Cap by mouth daily. ONE-A-DAY MEN 50 PLUS (GINKGO) [phone] mcg-mcg-mg Tab Generic drug:  mv-mins-folic-lycopene-ginkgo Take  by mouth. Introducing \[facility]\"" & HEALTH SERVICES!    
 Lorena Root introduces [employer]mit patient portal. Now you can access parts of your medical record, email your doctor's office, and request medication refills online. 1. In your internet browser, go to [email or web address] 2. Click on the First Time User? Click Here link in the Sign In box. You will see the New Member Sign Up page. 3. Enter your Smore Access Code exactly as it appears below. You will not need to use this code after youve completed the sign-up process. If you do not sign up before the expiration date, you must request a new code. · Smore Access Code: [email or web address] Expires: 8/16/2018  2:18 PM 
 
4. Enter the last four digits of your Social Security Number (xxxx) and Date of Birth (mm/dd/yyyy) as indicated and click Submit. You will be taken to the next sign-up page. 5. Create a Smore ID. This will be your Smore login ID and cannot be changed, so think of one that is secure and easy to remember. 6. Create a Smore password. You can change your password at any time. 7. Enter your Password Reset Question and Answer. This can be used at a later time if you forget your password. 8. Enter your e-mail address. You will receive e-mail notification when new information is available in [address] 9. Click Sign Up. You can now view and download portions of your medical record. 10. Click the Download Summary menu link to download a portable copy of your medical information. If you have questions, please visit the Frequently Asked Questions section of the Smore website. Remember, Smore is NOT to be used for urgent needs. For medical emergencies, dial 911. Now available from your iPhone and Android! Please provide this summary of care documentation to your next provider. Your primary care clinician is listed as Thuy. If you have any questions after today's visit, please call [phone].

## 2018-08-16 ENCOUNTER — CLINICAL SUPPORT (OUTPATIENT)
Dept: CARDIOLOGY CLINIC | Age: 83
End: 2018-08-16

## 2018-08-16 DIAGNOSIS — Z95.0 S/P CARDIAC PACEMAKER PROCEDURE: Primary | ICD-10-CM

## 2018-08-16 DIAGNOSIS — I47.1 SVT (SUPRAVENTRICULAR TACHYCARDIA) (HCC): ICD-10-CM

## 2018-08-16 DIAGNOSIS — I49.5 SSS (SICK SINUS SYNDROME) (HCC): ICD-10-CM

## 2018-08-16 NOTE — PROGRESS NOTES
See device report  - MDT BiV PM in office device check, next check due in 6 months in office with annual visit.

## 2018-08-23 ENCOUNTER — HOSPITAL ENCOUNTER (OUTPATIENT)
Dept: MRI IMAGING | Age: 83
Discharge: HOME | End: 2018-08-23
Attending: INTERNAL MEDICINE

## 2018-08-23 DIAGNOSIS — M54.50 ACUTE BILATERAL LOW BACK PAIN WITHOUT SCIATICA: ICD-10-CM

## 2018-09-12 ENCOUNTER — OFFICE VISIT (OUTPATIENT)
Dept: INTERNAL MEDICINE CLINIC | Age: 83
End: 2018-09-12

## 2018-09-12 VITALS
HEIGHT: 67 IN | DIASTOLIC BLOOD PRESSURE: 78 MMHG | BODY MASS INDEX: 22.29 KG/M2 | OXYGEN SATURATION: 98 % | HEART RATE: 88 BPM | WEIGHT: 142 LBS | SYSTOLIC BLOOD PRESSURE: 136 MMHG | RESPIRATION RATE: 16 BRPM

## 2018-09-12 DIAGNOSIS — E78.2 MIXED HYPERLIPIDEMIA: ICD-10-CM

## 2018-09-12 DIAGNOSIS — I49.5 SSS (SICK SINUS SYNDROME) (HCC): ICD-10-CM

## 2018-09-12 DIAGNOSIS — N18.2 CKD (CHRONIC KIDNEY DISEASE), STAGE II: ICD-10-CM

## 2018-09-12 DIAGNOSIS — Z00.00 MEDICARE ANNUAL WELLNESS VISIT, SUBSEQUENT: ICD-10-CM

## 2018-09-12 DIAGNOSIS — F10.20 ALCOHOLISM (HCC): ICD-10-CM

## 2018-09-12 DIAGNOSIS — E11.22 CONTROLLED TYPE 2 DIABETES MELLITUS WITH STAGE 2 CHRONIC KIDNEY DISEASE, WITHOUT LONG-TERM CURRENT USE OF INSULIN (HCC): ICD-10-CM

## 2018-09-12 DIAGNOSIS — I25.10 ASCVD (ARTERIOSCLEROTIC CARDIOVASCULAR DISEASE): ICD-10-CM

## 2018-09-12 DIAGNOSIS — F02.80 ALZHEIMER'S DEMENTIA WITHOUT BEHAVIORAL DISTURBANCE, UNSPECIFIED TIMING OF DEMENTIA ONSET: ICD-10-CM

## 2018-09-12 DIAGNOSIS — N18.2 CONTROLLED TYPE 2 DIABETES MELLITUS WITH STAGE 2 CHRONIC KIDNEY DISEASE, WITHOUT LONG-TERM CURRENT USE OF INSULIN (HCC): ICD-10-CM

## 2018-09-12 DIAGNOSIS — G30.9 ALZHEIMER'S DEMENTIA WITHOUT BEHAVIORAL DISTURBANCE, UNSPECIFIED TIMING OF DEMENTIA ONSET: ICD-10-CM

## 2018-09-12 DIAGNOSIS — I12.9 HYPERTENSION WITH RENAL DISEASE: Primary | ICD-10-CM

## 2018-09-12 DIAGNOSIS — Z95.0 S/P CARDIAC PACEMAKER PROCEDURE: ICD-10-CM

## 2018-09-12 DIAGNOSIS — M54.50 CHRONIC MIDLINE LOW BACK PAIN WITHOUT SCIATICA: ICD-10-CM

## 2018-09-12 DIAGNOSIS — G89.29 CHRONIC MIDLINE LOW BACK PAIN WITHOUT SCIATICA: ICD-10-CM

## 2018-09-12 DIAGNOSIS — M15.9 PRIMARY OSTEOARTHRITIS INVOLVING MULTIPLE JOINTS: ICD-10-CM

## 2018-09-12 LAB
BACTERIA UA POCT, BACTPOCT: NORMAL
BILIRUB UR QL STRIP: NEGATIVE
CASTS UA POCT: 0
CLUE CELLS, CLUEPOCT: NEGATIVE
CRYSTALS UA POCT, CRYSPOCT: NEGATIVE
EPITHELIAL CELLS POCT: NORMAL
GLUCOSE UR-MCNC: NORMAL MG/DL
GRAN# POC: 4.1 K/UL (ref 2–7.8)
GRAN% POC: 72.4 % (ref 37–92)
HCT VFR BLD CALC: 38.1 % (ref 37–51)
HGB BLD-MCNC: 12.5 G/DL (ref 12–18)
KETONES P FAST UR STRIP-MCNC: NEGATIVE MG/DL
LY# POC: 1.2 K/UL (ref 0.6–4.1)
LY% POC: 22.4 % (ref 10–58.5)
MCH RBC QN: 27.6 PG (ref 26–32)
MCHC RBC-ENTMCNC: 32.9 G/DL (ref 30–36)
MCV RBC: 84 FL (ref 80–97)
MICROALBUMIN UR TEST STR-MCNC: 20 MG/L (ref 0–20)
MID #, POC: 0.2 K/UL (ref 0–1.8)
MID% POC: 5.2 % (ref 0.1–24)
MUCUS UA POCT, MUCPOCT: NORMAL
PH UR STRIP: 5 [PH] (ref 5–7)
PLATELET # BLD: 306 K/UL (ref 140–440)
PROT UR QL STRIP: NORMAL
RBC # BLD: 4.55 M/UL (ref 4.2–6.3)
RBC UA POCT, RBCPOCT: NORMAL
SP GR UR STRIP: 1.02 (ref 1.01–1.02)
TRICH UA POCT, TRICHPOC: NEGATIVE
UA UROBILINOGEN AMB POC: NORMAL (ref 0.2–1)
URINALYSIS CLARITY POC: CLEAR
URINALYSIS COLOR POC: NORMAL
URINE BLOOD POC: NEGATIVE
URINE CULT COMMENT, POCT: NORMAL
URINE LEUKOCYTES POC: NEGATIVE
URINE NITRITES POC: NEGATIVE
WBC # BLD: 5.5 K/UL (ref 4.1–10.9)
WBC UA POCT, WBCPOCT: NORMAL
YEAST UA POCT, YEASTPOC: NEGATIVE

## 2018-09-12 NOTE — PROGRESS NOTES
This is a Subsequent Medicare Annual Wellness Visit providing Personalized Prevention Plan Services (PPPS) (Performed 12 months after initial AWV and PPPS ) I have reviewed the patient's medical history in detail and updated the computerized patient record. He presents today for subsequent annual Medicare examination and screening questionnaire. He is also here in follow-up of his medical problems include hypertension, diabetes, hyperlipidemia, ASCVD, history of sick sinus syndrome status post pacemaker placement, prior TIA, alcoholism, history of gastritis, and history of depression. He is accompanied by his granddaughter today. He says he no longer drinks alcohol and his granddaughter seems to concur with that. He was recently hospitalized at [address] because of alcohol related symptoms. He currently denies any chest pain, shortness breath, palpitations or cardiorespiratory complaints. He denies any GI or  complaints. He denies any headaches, dizziness or neurologic complaints. He does have a lot of low back pain as one was causing that but he has no other arthritic complaints. He has had no history of falls or trauma. He is taking his medications but does not get a whole lot of exercise. He does try to follow his diet. History Past Medical History:  
Diagnosis Date  Abdominal pain 7/18/2017  Alzheimer disease 7/18/2017  Anemia 7/18/2017  Arthritis  Back pain 7/18/2017  Bradycardia 7/18/2017  CAD (coronary artery disease)   
 hx of MI  
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   
 lost over 40 pounds last year- has no appetite Past Surgical History:  
Procedure Laterality Date  COLONOSCOPY N/A 6/22/2017 COLONOSCOPY performed by Shilpa Barraza MD at [facility] ENDOSCOPY  COLONOSCOPY,MAXINE DIGGS,SNARE  6/22/2017  HX HERNIA REPAIR  7/10/2014  
 right inguinal  
 HX OTHER SURGICAL    
 cystectomy from back  [place] EGD TRANSORAL BIOPSY SINGLE/MULTIPLE  2/14/2012  UPPER GI ENDOSCOPY,BIOPSY  6/22/2017 Social History Substance Use Topics  Smoking status: Former Smoker Packs/day: 0.50 Years: 10.00 Start date: 7/12/1991  Smokeless tobacco: Never Used Comment: quit 50 years ago  Alcohol use No  
 
Current Outpatient Prescriptions Medication Sig Dispense Refill  memantine (NAMENDA) 10 mg tablet TAKE 1 TABLET BY MOUTH TWICE A DAY 60 Tab 5  
 diclofenac EC (VOLTAREN) 75 mg EC tablet Take 1 Tab by mouth two (2) times a day. 60 Tab prn  
 mv-mins-folic-lycopene-ginkgo (ONE-A-DAY MEN 50 PLUS, GINKGO,) [phone] mcg-mcg-mg tab Take  by mouth.  losartan-hydroCHLOROthiazide (HYZAAR) 100-25 mg per tablet TAKE 1 TABLET EVERY DAY 90 Tab 1  
 aspirin 81 mg chewable tablet Take 1 Tab by mouth daily. 30 Tab 1  
 amLODIPine (NORVASC) 10 mg tablet TAKE 1 TABLET BY MOUTH EVERY DAY 90 Tab 3  
 donepezil (ARICEPT) 10 mg tablet TAKE 1 TABLET BY MOUTH EVERY DAY 90 Tab 3  
 omeprazole (PRILOSEC) 20 mg capsule Take 1 Cap by mouth daily. 90 Cap 3 No Known Allergies Family History Problem Relation Age of Onset  Hypertension Mother  Heart Disease Father  Cancer Other son-cancer? unknown what type  Patient Active Problem List  
 Diagnosis  Hypertension with renal disease  Mixed hyperlipidemia  Primary osteoarthritis involving multiple joints  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, without long-term current use of insulin ([facility] [address]  CKD (chronic kidney disease), stage II  
 Alzheimer disease  ASCVD (arteriosclerotic cardiovascular disease)  Diverticulosis  SSS (sick sinus syndrome) ([address]  S/P cardiac pacemaker procedure 5/4/15 Medtronic dual chamber pacemaker implant  SVT (supraventricular tachycardia) ([facility])--s/p RFA  Mild depression ([address]  Acute bilateral low back pain without sciatica  Alcoholism ([facility] [address]  Metabolic encephalopathy  TIA (transient ischemic attack)  Medicare annual wellness visit, subsequent  Gastritis and duodenitis  Internal hemorrhoids  GI bleed  Depression  Back pain  Anemia  S/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 
Vijay Henning MD as PCP - General (Internal Medicine) Tom Kaur MD as Physician (Cardiology) Ana Laura Madrid MD as Physician (Cardiology) Jerardo Devries NP (Cardiology) Depression Risk Factor Screening: PHQ over the last two weeks 2017 Little interest or pleasure in doing things Not at all Feeling down, depressed, irritable, or hopeless Not at all Total Score PHQ 2 0 Alcohol Risk Factor Screening: You do not drink alcohol or very rarely. Functional Ability and Level of Safety:  
 
Fall Risk Fall Risk Assessment, last 12 mths 2018 Able to walk? Yes Fall in past 12 months? No  
 
 
Hearing Loss  
mild Activities of Daily Living Self-care. ADL Assessment 2018 Feeding yourself No Help Needed Getting from bed to chair No Help Needed Getting dressed No Help Needed Bathing or showering No Help Needed Walk across the room (includes cane/walker) No Help Needed Using the telphone No Help Needed Taking your medications No Help Needed Preparing meals No Help Needed Managing money (expenses/bills) No Help Needed Moderately strenuous housework (laundry) No Help Needed Shopping for personal items (toiletries/medicines) No Help Needed Shopping for groceries No Help Needed Driving Help Needed Climbing a flight of stairs No Help Needed Getting to places beyond walking distances No Help Needed Abuse Screen Patient is not abused Social History Social History Narrative Review of Systems ROS: 
 
Constitutional: He denies fevers, weight loss, sweats. Eyes: No blurred or double vision. ENT: No difficulty with swallowing, taste, speech or smell. Neck: no stiffness or swelling Respiratory: No cough wheezing or shortness of breath. Cardiovascular: Denies chest pain, palpitations, unexplained indigestion or syncope. Gastrointestinal:  No changes in bowel movements, no abdominal pain, no bloating. Genitourinary:  He denies frequency, nocturia or stranguria. Extremities: No joint pain, stiffness or swelling. Back: Low back pain without radiation to the legs Neurological:  No numbness, tingling, burring paresthesias or loss of motor strength. No syncope, dizziness or frequent headache Lymphatic: no adenopathy noted Hematologic: no easy bruising or bleeding gums Skin:  No recent rashes or mole changes. Psychiatric/Behavioral:  Negative for depression. Physical Examination Evaluation of Cognitive Function: 
Mood/affect:  happy Appearance: age appropriate Family member/caregiver input: Granddaughter Visit Vitals  /78  Pulse 88  Resp 16  
 Ht 5' 7\" (1.702 m)  Wt 142 lb (64.4 kg)  SpO2 98%  BMI 22.24 kg/m2 Vitals:  
 09/12/18 1602 09/12/18 1714 BP: 140/80 136/78 Pulse: 88 Resp: 16 SpO2: 98% Weight: 142 lb (64.4 kg) Height: 5' 7\" (1.702 m) PainSc:   0 - No pain PHYSICAL EXAM: 
 
General appearance - alert, well appearing, and in no distress Mental status - alert, oriented to person, place, and time HEENT: 
Ears - bilateral TM's and external ear canals clear Eyes - pupillary responses were normal.  Extraocular muscle function intact. Lids and conjunctiva not injected. Fundoscopic exam revealed sharp disc margins. eye movements intact Pharynx- clear with teeth in good repair. No masses were noted Neck - supple without thyromegaly or burit. No JVD noted Lungs - clear to auscultation and percussion Cardiac- normal rate, regular rhythm without murmurs. PMI not displaced. No gallop, rub or click Abdomen - flat, soft, non-tender without palpable organomegaly or mass. No pulsatile mass was felt, and not bruit was heard. Bowel sounds were active : Circumcised, Testes descended w/o masses Rectal: normal sphincter tone, prostate normal, no masses, stool brown and hemacult negative Extremities -  no clubbing cyanosis or edema Lymphatics - no palpable lymphadenopathy, no hepatosplenomegaly Hematologic: no petechiae or purpura Peripheral vascular -Femoral, Dorsalis pedis and posterior tibial pulses felt without difficulty Skin - no rash or unusual mole change noted Neurological - Cranial nerves II-XII grossly intact. Motor strength 5/5. DTR's 2+ and symmetric. Station and gait normal 
Back exam - full range of motion, no tenderness, palpable spasm or pain on motion. Paraspinal lumbar tenderness Musculoskeletal - no joint tenderness, deformity or swelling. Results for orders placed or performed in visit on 09/12/18 AMB POC COMPLETE CBC,AUTOMATED ENTER Result Value Ref Range WBC (POC)  4.1 - 10.9 K/uL  
 RBC (POC)  4.20 - 6.30 M/uL  
 HGB (POC)  12.0 - 18.0 g/dL  HCT (POC)  37.0 - 51.0 %  
 MCV (POC)  80.0 - 97.0 fL  
 MCH (POC)  26.0 - 32.0 pg  
 MCHC (POC)  30.0 - 36.0 g/dL PLATELET (POC)  455.4 - 440.0 K/uL  
 ABS. LYMPHS (POC)  0.6 - 4.1 K/uL LYMPHOCYTES (POC)  10.0 - 58.5 % Mid # (POC)  0.0 - 1.8 K/uL MID% POC  0.1 - 24.0 %  
 ABS. GRANS (POC)  2.0 - 7.8 K/uL GRANULOCYTES (POC)  37.0 - 92.0 % AMB POC URINALYSIS DIP STICK AUTO W/ MICRO Result Value Ref Range Color (UA POC) Clarity (UA POC) Glucose (UA POC)  Negative Bilirubin (UA POC)  Negative Ketones (UA POC)  Negative Specific gravity (UA POC)  1.010 - 1.025 Blood (UA POC)  Negative pH (UA POC)  5.0 - 7.0 Protein (UA POC)  Negative Urobilinogen (UA POC)  0.2 - 1 Nitrites (UA POC)  Negative Leukocyte esterase (UA POC)  Negative Epithelial cells (UA POC) Mucus (UA POC) WBCs (UA POC) RBCs (UA POC) Casts (UA POC)  Negative Crystals (UA POC)  Negative Clue Cells (UA POC) Trichomonas (UA POC) Yeast (UA POC) Bacteria (UA POC)  Negative URINE CULT COMMENT (UA POC) AMB POC URINE, MICROALBUMIN, SEMIQUANT (1 RESULT) Result Value Ref Range Microalbumin urine (POC)  0 - 20 MG/L Advice/Referrals/Counseling Education and counseling provided: 
Are appropriate based on today's review and evaluation End-of-Life planning (with patient's consent) Pneumococcal Vaccine Influenza Vaccine Colorectal cancer screening tests Assessment/Plan ASSESSMENT:  
1. Hypertension with renal disease 2. 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 Mixed hyperlipidemia 4. CKD (chronic kidney disease), stage II   
5. ASCVD (arteriosclerotic cardiovascular disease) 6. Alzheimer's dementia without behavioral disturbance, unspecified timing of dementia onset 7. Primary osteoarthritis involving multiple joints 8. SSS (sick sinus syndrome) ([address] 9. S/P cardiac pacemaker procedure 10. Alcoholism ([facility] 11. Medicare annual wellness visit, subsequent 12. Chronic midline low back pain without sciatica Impression 1. Hypertension that is controlled so continue current therapy reviewed with him 2. Diabetes repeat status pending and prior labs reviewed and I will make adjustments if necessary. 3.  Hyperlipidemia prior labs reviewed and repeat status pending I will adjust if needed. 4.  CKD stage II repeat status is pending 5. ASCVD clinically stable and EKG today reveals no acute process he will continue aspirin daily 6. Mild Alzheimer's dementia that seems to be stable and he still is on Aricept and Namenda 7. DJD stable except for the low back pain he does take diclofenac and I did suggest that and Tylenol arthritis to this regimen 8. Sick sinus syndrome status post pacemaker EKG today does reveal a paced rhythm at 80 
9. Alcoholism he was recently hospitalized for alcoholism but he swears he does not drink now and his granddaughter seem to confirm this and apparently he does live with her exam.  At this point I have had a long discussion with him regarding the absolute need to refrain from further alcohol use. 10.  Chronic low back pain x-ray lumbar spine today reveal severe arthritic changes and that is why I have recommended adding Tylenol arthritis along with the diclofenac Medicare annual wellness examination screening questionnaire is completed today. The results were discussed with he and his granddaughter and their questions were answered. Lifestyle recommendations and modifications discussed and made. I will call the lab results and make further recommendations adjustments if necessary. If all is stable we will continue the same and I will recheck him in 3 months or sooner should there be a problem. PLAN: 
. Orders Placed This Encounter  XR SPINE LUMB 2 OR 3 V  
 METABOLIC PANEL, COMPREHENSIVE  T4, FREE  
 TSH 3RD GENERATION  
 LIPID PANEL  
  CK  
 HEMOGLOBIN A1C WITH EAG  
 AMB POC COMPLETE CBC,AUTOMATED ENTER  AMB POC URINALYSIS DIP STICK AUTO W/ MICRO  AMB POC URINE, MICROALBUMIN, SEMIQUANT (1 RESULT)  AMB POC EKG ROUTINE W/ 12 LEADS, INTER & REP  
 
 
 
ATTENTION:  
This medical record was transcribed using an electronic medical records system. Although proofread, it may and can contain electronic and spelling errors. Other human spelling and other errors may be present. Corrections may be executed at a later time. Please feel free to contact us for any clarifications as needed. Follow-up Disposition: 
Return in about 4 weeks (around 10/10/2018). Wayne Howell MD 
 
Recommended healthy diet low in carbohydrates, fats, sodium and cholesterol. Recommended regular cardiovascular exercise 3-6 times per week for 30-60 minutes daily. Current Outpatient Prescriptions Medication Sig Dispense Refill  memantine (NAMENDA) 10 mg tablet TAKE 1 TABLET BY MOUTH TWICE A DAY 60 Tab 5  
 diclofenac EC (VOLTAREN) 75 mg EC tablet Take 1 Tab by mouth two (2) times a day. 60 Tab prn  
 mv-mins-folic-lycopene-ginkgo (ONE-A-DAY MEN 50 PLUS, GINKGO,) [phone] mcg-mcg-mg tab Take  by mouth.  losartan-hydroCHLOROthiazide (HYZAAR) 100-25 mg per tablet TAKE 1 TABLET EVERY DAY 90 Tab 1  
 aspirin 81 mg chewable tablet Take 1 Tab by mouth daily. 30 Tab 1  
 amLODIPine (NORVASC) 10 mg tablet TAKE 1 TABLET BY MOUTH EVERY DAY 90 Tab 3  
 donepezil (ARICEPT) 10 mg tablet TAKE 1 TABLET BY MOUTH EVERY DAY 90 Tab 3  
 omeprazole (PRILOSEC) 20 mg capsule Take 1 Cap by mouth daily. 90 Cap 3 Results for orders placed or performed in visit on 09/12/18 XR SPINE LUMB 2 OR 3 V Narrative INDICATION: back pain EXAM: Lumbar spine radiographs, 3 views. COMPARISON: 5/30/2018 . FINDINGS: A three-view examination of the lumbar spine reveals stable 
levocurvature centered in the mid lumbar spine. . Bone mineral content is normal 
for age . There is no obvious acute fracture or dislocation. Vertebral body 
heights are preserved. Disc space heights are diminished at all levels with 
endplate sclerosis and spondylosis which is stable. .  Pedicles and sacroiliac 
joints are intact, as are visualized sacral foramina. Aortic calcification is 
heavy and stable, without obvious aneurysm. The bowel gas pattern is 
unremarkable. Incidentally noted transvenous pacemaker projecting over the right 
ventricle. Impression IMPRESSION: No acute bony abnormality of the lumbar spine. Stable multilevel 
degenerative change and levocurvature. Results for orders placed or performed in visit on 09/12/18 AMB POC COMPLETE CBC,AUTOMATED ENTER Result Value Ref Range WBC (POC)  4.1 - 10.9 K/uL  
 RBC (POC)  4.20 - 6.30 M/uL  
 HGB (POC)  12.0 - 18.0 g/dL HCT (POC)  37.0 - 51.0 %  
 MCV (POC)  80.0 - 97.0 fL  
 MCH (POC)  26.0 - 32.0 pg  
 MCHC (POC)  30.0 - 36.0 g/dL PLATELET (POC)  591.5 - 440.0 K/uL  
 ABS. LYMPHS (POC)  0.6 - 4.1 K/uL LYMPHOCYTES (POC)  10.0 - 58.5 % Mid # (POC)  0.0 - 1.8 K/uL MID% POC  0.1 - 24.0 %  
 ABS. GRANS (POC)  2.0 - 7.8 K/uL GRANULOCYTES (POC)  37.0 - 92.0 % AMB POC URINALYSIS DIP STICK AUTO W/ MICRO Result Value Ref Range Color (UA POC) Clarity (UA POC) Glucose (UA POC)  Negative Bilirubin (UA POC)  Negative Ketones (UA POC)  Negative Specific gravity (UA POC)  1.010 - 1.025 Blood (UA POC)  Negative pH (UA POC)  5.0 - 7.0 Protein (UA POC)  Negative Urobilinogen (UA POC)  0.2 - 1 Nitrites (UA POC)  Negative Leukocyte esterase (UA POC)  Negative Epithelial cells (UA POC) Mucus (UA POC) WBCs (UA POC) RBCs (UA POC) Casts (UA POC)  Negative Crystals (UA POC)  Negative Clue Cells (UA POC) Trichomonas (UA POC) Yeast (UA POC) Bacteria (UA POC)  Negative URINE CULT COMMENT (UA POC) AMB POC URINE, MICROALBUMIN, SEMIQUANT (1 RESULT) Result Value Ref Range Microalbumin urine (POC)  0 - 20 MG/L Verbal and written instructions (see AVS) provided. Patient expresses understanding of diagnosis and treatment plan.  
 
Braden Trimble MD

## 2018-09-12 NOTE — PROGRESS NOTES
Chief Complaint Patient presents with [address] [place] [address] Annual Wellness Visit Yearly follow up 1. Have you been to the ER, urgent care clinic since your last visit? Hospitalized since your last visit? No 
 
2. Have you seen or consulted any other health care providers outside of the [address] [place] [address] since your last visit? Include any pap smears or colon screening. No 
Visit Vitals  /80 (BP 1 Location: Left arm, BP Patient Position: Sitting)  Pulse 88  Resp 16  
 Ht 5' 7\" (1.702 m)  Wt 142 lb (64.4 kg)  SpO2 98%  BMI 22.24 kg/m2

## 2018-09-12 NOTE — PATIENT INSTRUCTIONS
Medicare Wellness Visit, Male The best way to live healthy is to have a lifestyle where you eat a well-balanced diet, exercise regularly, limit alcohol use, and quit all forms of tobacco/nicotine, if applicable. Regular preventive services are another way to keep healthy. Preventive services (vaccines, screening tests, monitoring & exams) can help personalize your care plan, which helps you manage your own care. Screening tests can find health problems at the earliest stages, when they are easiest to treat. [address] [facility] follows the current, evidence-based guidelines published by the [facility] Car[facility] ([facility]) when recommending preventive services for our patients. Because we follow these guidelines, sometimes recommendations change over time as research supports it. (For example, a prostate screening blood test is no longer routinely recommended for men with no symptoms.) Of course, you and your doctor may decide to screen more often for some diseases, based on your risk and co-morbidities (chronic disease you are already diagnosed with). Preventive services for you include: - Medicare offers their members a free annual wellness visit, which is time for you and your primary care provider to discuss and plan for your preventive service needs. Take advantage of this benefit every year! 
-All adults over age 72 should receive the recommended pneumonia vaccines. Current USPSTF guidelines recommend a series of two vaccines for the best pneumonia protection.  
-All adults should have a flu vaccine yearly and an ECG.  All adults age 61 and older should receive a shingles vaccine once in their lifetime.   
-All adults age 38-68 who are overweight should have a diabetes screening test once every three years.  
-Other screening tests & preventive services for persons with diabetes include: an eye exam to screen for diabetic retinopathy, a kidney function test, a foot exam, and stricter control over your cholesterol.  
-Cardiovascular screening for adults with routine risk involves an electrocardiogram (ECG) at intervals determined by the provider.  
-Colorectal cancer screening should be done for adults age 54-65 with no increased risk factors for colorectal cancer. There are a number of acceptable methods of screening for this type of cancer. Each test has its own benefits and drawbacks. Discuss with your provider what is most appropriate for you during your annual wellness visit. The different tests include: colonoscopy (considered the best screening method), a fecal occult blood test, a fecal DNA test, and sigmoidoscopy. 
-All adults born between [facility] should be screened once for Hepatitis C. 
-An Abdominal Aortic Aneurysm (AAA) Screening is recommended for men age 73-68 who has ever smoked in their lifetime. Here is a list of your current Health Maintenance items (your personalized list of preventive services) with a due date: 
Health Maintenance Due Topic Date Due  Eye Exam  01/21/1941  
 DTaP/Tdap/Td  (1 - Tdap) 01/21/1952  Shingles Vaccine  11/21/1990  Glaucoma Screening   01/21/1996  Flu Vaccine  08/01/2018

## 2018-09-12 NOTE — MR AVS SNAPSHOT
[address] [facility] [address] [id number] [phone] Patient: Prince Cristina Vences 
MRN: [id number] YUD:[birth year] Visit Information Date & Time Provider Department Dept. Phone Encounter #  
 9/12/2018  3:30 PM Myrtha Bence, [address] [facility] [phone] [id number] Follow-up Instructions Return in about 4 weeks (around 10/10/2018). Your Appointments 2/28/2019  1:00 PM  
PROCEDURE with PACEMAKER, [facility] [address]) Appt Note: MDT DCPM 6mo check, ANNUAL VISIT  
 [id number] [facility]  
[phone] [address] [place] [address]  
  
    
 2/28/2019  1:15 PM  
ESTABLISHED PATIENT with Floy Montclair, ANP [facility] [address]) Appt Note: MDT BivPM 6mo check, ANNUAL VISIT  
 [address] [facility]  
[phone] [address] [facility] Upcoming Health Maintenance Date Due  
 EYE EXAM RETINAL OR DILATED Q1 1/21/1941 DTaP/Tdap/Td series (1 - Tdap) 1/21/1952 ZOSTER VACCINE AGE 60> 11/21/1990 GLAUCOMA SCREENING Q2Y 1/21/1996 Influenza Age 5 to Adult 8/1/2018 HEMOGLOBIN A1C Q6M 12/12/2018 FOOT EXAM Q1 3/9/2019 LIPID PANEL Q1 6/12/2019 MICROALBUMIN Q1 9/12/2019 MEDICARE YEARLY EXAM 9/13/2019 Allergies as of 9/12/2018  Review Complete On: 9/12/2018 By: Myrtha Bence, MD  
 No Known Allergies Current Immunizations  Reviewed on 5/4/2015 Name Date Influenza High Dose Vaccine PF 12/8/2017 Influenza Vaccine 1/18/2017, 10/18/2016, 12/15/2015 Pneumococcal Conjugate (PCV-13) 9/30/2015 Pneumococcal Polysaccharide (PPSV-23) 10/13/2006 Pneumococcal Vaccine (Unspecified Type) 10/13/2006 Not reviewed this visit You Were Diagnosed With   
  
 Codes Comments Hypertension with renal disease    -  Primary ICD-10-CM: I12.9 ICD-9-CM: 403.90 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, without long-term current use of insulin (HCC)     ICD-10-CM: E11.22, N18.2 ICD-9-CM: 250.40, 585.2 Mixed hyperlipidemia     ICD-10-CM: E78.2 ICD-9-CM: 272.2 CKD (chronic kidney disease), stage II     ICD-10-CM: N18.2 ICD-9-CM: 585.2 ASCVD (arteriosclerotic cardiovascular disease)     ICD-10-CM: I25.10 ICD-9-CM: 429.2, 440.9 Alzheimer's dementia without behavioral disturbance, unspecified timing of dementia onset     ICD-10-CM: G30.9, F02.80 ICD-9-CM: 331.0, 294.10 Primary osteoarthritis involving multiple joints     ICD-10-CM: M15.0 ICD-9-CM: 715.09   
 SSS (sick sinus syndrome) (HCC)     ICD-10-CM: I49.5 ICD-9-CM: 427.81 S/P cardiac pacemaker procedure     ICD-10-CM: Z95.0 ICD-9-CM: V45.01 Alcoholism ([address]     ICD-10-CM: M14.17 ICD-9-CM: 303.90 Medicare annual wellness visit, subsequent     ICD-10-CM: Z00.00 ICD-9-CM: V70.0 Chronic midline low back pain without sciatica     ICD-10-CM: M54.5, G89.29 ICD-9-CM: 724.2, 338.29 Vitals BP Pulse Resp Height(growth percentile) Weight(growth percentile) SpO2  
 140/80 (BP 1 Location: Left arm, BP Patient Position: Sitting) 88 16 5' 7\" (1.702 m) 142 lb (64.4 kg) 98% BMI Smoking Status 22.24 kg/m2 Former Smoker BMI and BSA Data Body Mass Index Body Surface Area  
 22.24 kg/m 2 1.74 m 2 Preferred Pharmacy Pharmacy Name Phone [id number] [phone] Your Updated Medication List  
  
   
This list is accurate as of 9/12/18  4:49 PM.  Always use your most recent med list. amLODIPine 10 mg tablet Commonly known as:  Jj Ponce TAKE 1 TABLET BY MOUTH EVERY DAY  
  
 aspirin 81 mg chewable tablet Take 1 Tab by mouth daily. diclofenac EC 75 mg EC tablet Commonly known as:  VOLTAREN Take 1 Tab by mouth two (2) times a day. donepezil 10 mg tablet Commonly known as:  ARICEPT  
TAKE 1 TABLET BY MOUTH EVERY DAY  
  
 losartan-hydroCHLOROthiazide 100-25 mg per tablet Commonly known as:  HYZAAR  
TAKE 1 TABLET EVERY DAY  
  
 memantine 10 mg tablet Commonly known as:  Merrilyn Ree TAKE 1 TABLET BY MOUTH TWICE A DAY  
  
 omeprazole 20 mg capsule Commonly known as:  PRILOSEC Take 1 Cap by mouth daily. ONE-A-DAY MEN 50 PLUS (GINKGO) [phone] mcg-mcg-mg Tab Generic drug:  mv-mins-folic-lycopene-ginkgo Take  by mouth. We Performed the Following AMB POC COMPLETE CBC,AUTOMATED ENTER [id number] CPT(R)] AMB POC EKG ROUTINE W/ 12 LEADS, INTER & REP [73188 CPT(R)] AMB POC URINALYSIS DIP STICK AUTO W/ MICRO  [59301 CPT(R)] AMB POC URINE, MICROALBUMIN, SEMIQUANT (1 RESULT) [11178 CPT(R)] CK [id number] CPT(R)] HEMOGLOBIN A1C WITH EAG [99826 CPT(R)] LIPID PANEL [50530 CPT(R)] METABOLIC PANEL, COMPREHENSIVE [57286 CPT(R)] OK ANNUAL ALCOHOL SCREEN 15 MIN [id number] [facility]] T4, FREE [id number] CPT(R)] TSH 3RD GENERATION [91995 CPT(R)] Follow-up Instructions Return in about 4 weeks (around 10/10/2018). To-Do List   
 09/12/2018 Imaging:  XR SPINE LUMB 2 OR 3 V   
  
 09/12/2018 4:50 PM  
  Appointment with CARLA FIZTGERALD XR ROOM 1 at [facility] ([phone]) Introducing [facility]! [employer] introduces [employer] patient portal. Now you can access parts of your medical record, email your doctor's office, and request medication refills online. 1. In your internet browser, go to [email or web address] 2. Click on the First Time User? Click Here link in the Sign In box. You will see the New Member Sign Up page. 3. Enter your [employer] Access Code exactly as it appears below.  You will not need to use this code after youve completed the sign-up process. If you do not sign up before the expiration date, you must request a new code. · [employer] Access Code: [email or web address] Expires: 11/18/2018  5:20 AM 
 
4. Enter the last four digits of your Social Security Number (xxxx) and Date of Birth (mm/dd/yyyy) as indicated and click Submit. You will be taken to the next sign-up page. 5. Create a Fire[employer] ID. This will be your [employer] login ID and cannot be changed, so think of one that is secure and easy to remember. 6. Create a [employer] password. You can change your password at any time. 7. Enter your Password Reset Question and Answer. This can be used at a later time if you forget your password. 8. Enter your e-mail address. You will receive e-mail notification when new information is available in [address] 9. Click Sign Up. You can now view and download portions of your medical record. 10. Click the Download Summary menu link to download a portable copy of your medical information. If you have questions, please visit the Frequently Asked Questions section of the [employer] website. Remember, [employer] is NOT to be used for urgent needs. For medical emergencies, dial 911. Now available from your iPhone and Android! Please provide this summary of care documentation to your next provider. Your primary care clinician is listed as Thuy. If you have any questions after today's visit, please call [phone].

## 2018-09-13 LAB
ALBUMIN SERPL-MCNC: 4.1 G/DL (ref 3.5–4.7)
ALBUMIN/GLOB SERPL: 1.7 {RATIO} (ref 1.2–2.2)
ALP SERPL-CCNC: 69 IU/L (ref 39–117)
ALT SERPL-CCNC: 18 IU/L (ref 0–44)
AST SERPL-CCNC: 27 IU/L (ref 0–40)
BILIRUB SERPL-MCNC: 0.2 MG/DL (ref 0–1.2)
BUN SERPL-MCNC: 18 MG/DL (ref 8–27)
BUN/CREAT SERPL: 13 (ref 10–24)
CALCIUM SERPL-MCNC: 9.4 MG/DL (ref 8.6–10.2)
CHLORIDE SERPL-SCNC: 104 MMOL/L (ref 96–106)
CHOLEST SERPL-MCNC: 199 MG/DL (ref 100–199)
CK SERPL-CCNC: 73 U/L (ref 24–204)
CO2 SERPL-SCNC: 24 MMOL/L (ref 20–29)
CREAT SERPL-MCNC: 1.4 MG/DL (ref 0.76–1.27)
EST. AVERAGE GLUCOSE BLD GHB EST-MCNC: 103 MG/DL
GLOBULIN SER CALC-MCNC: 2.4 G/DL (ref 1.5–4.5)
GLUCOSE SERPL-MCNC: 93 MG/DL (ref 65–99)
HBA1C MFR BLD: 5.2 % (ref 4.8–5.6)
HDLC SERPL-MCNC: 61 MG/DL
LDLC SERPL CALC-MCNC: 119 MG/DL (ref 0–99)
POTASSIUM SERPL-SCNC: 3.9 MMOL/L (ref 3.5–5.2)
PROT SERPL-MCNC: 6.5 G/DL (ref 6–8.5)
SODIUM SERPL-SCNC: 145 MMOL/L (ref 134–144)
T4 FREE SERPL-MCNC: 1.21 NG/DL (ref 0.82–1.77)
TRIGL SERPL-MCNC: 95 MG/DL (ref 0–149)
TSH SERPL DL<=0.005 MIU/L-ACNC: 3.46 UIU/ML (ref 0.45–4.5)
VLDLC SERPL CALC-MCNC: 19 MG/DL (ref 5–40)

## 2018-12-21 RX ORDER — MEMANTINE HYDROCHLORIDE 10 MG/1
TABLET ORAL
Qty: 180 TAB | Refills: 3 | Status: SHIPPED | OUTPATIENT
Start: 2018-12-21 | End: 2019-05-15 | Stop reason: SDUPTHER

## 2018-12-21 NOTE — TELEPHONE ENCOUNTER
RX refill request from the patient/pharmacy. Patient last seen 12- with labs, and does not have a f/up appointment scheduled.   Requested Prescriptions     Pending Prescriptions Disp Refills    memantine (NAMENDA) 10 mg tablet 180 Tab 3     Sig: TAKE 1 TABLET BY MOUTH TWICE A DAY

## 2019-01-09 ENCOUNTER — OFFICE VISIT (OUTPATIENT)
Dept: INTERNAL MEDICINE CLINIC | Age: 84
End: 2019-01-09

## 2019-01-09 VITALS
BODY MASS INDEX: 21.6 KG/M2 | OXYGEN SATURATION: 98 % | WEIGHT: 137.6 LBS | SYSTOLIC BLOOD PRESSURE: 130 MMHG | DIASTOLIC BLOOD PRESSURE: 70 MMHG | HEIGHT: 67 IN | HEART RATE: 87 BPM | RESPIRATION RATE: 18 BRPM

## 2019-01-09 DIAGNOSIS — I25.10 ASCVD (ARTERIOSCLEROTIC CARDIOVASCULAR DISEASE): ICD-10-CM

## 2019-01-09 DIAGNOSIS — R55 SYNCOPE, UNSPECIFIED SYNCOPE TYPE: ICD-10-CM

## 2019-01-09 DIAGNOSIS — F02.80 ALZHEIMER'S DEMENTIA WITHOUT BEHAVIORAL DISTURBANCE, UNSPECIFIED TIMING OF DEMENTIA ONSET: ICD-10-CM

## 2019-01-09 DIAGNOSIS — N18.2 CONTROLLED TYPE 2 DIABETES MELLITUS WITH STAGE 2 CHRONIC KIDNEY DISEASE, WITHOUT LONG-TERM CURRENT USE OF INSULIN (HCC): ICD-10-CM

## 2019-01-09 DIAGNOSIS — G30.9 ALZHEIMER'S DEMENTIA WITHOUT BEHAVIORAL DISTURBANCE, UNSPECIFIED TIMING OF DEMENTIA ONSET: ICD-10-CM

## 2019-01-09 DIAGNOSIS — I49.5 SSS (SICK SINUS SYNDROME) (HCC): ICD-10-CM

## 2019-01-09 DIAGNOSIS — F51.01 PRIMARY INSOMNIA: ICD-10-CM

## 2019-01-09 DIAGNOSIS — E78.2 MIXED HYPERLIPIDEMIA: ICD-10-CM

## 2019-01-09 DIAGNOSIS — E11.22 CONTROLLED TYPE 2 DIABETES MELLITUS WITH STAGE 2 CHRONIC KIDNEY DISEASE, WITHOUT LONG-TERM CURRENT USE OF INSULIN (HCC): ICD-10-CM

## 2019-01-09 DIAGNOSIS — Z95.0 S/P CARDIAC PACEMAKER PROCEDURE: ICD-10-CM

## 2019-01-09 DIAGNOSIS — I12.9 HYPERTENSION WITH RENAL DISEASE: Primary | ICD-10-CM

## 2019-01-09 DIAGNOSIS — M15.9 PRIMARY OSTEOARTHRITIS INVOLVING MULTIPLE JOINTS: ICD-10-CM

## 2019-01-09 DIAGNOSIS — N18.2 CKD (CHRONIC KIDNEY DISEASE), STAGE II: ICD-10-CM

## 2019-01-09 LAB
GRAN# POC: 4.1 K/UL (ref 2–7.8)
GRAN% POC: 71.4 % (ref 37–92)
HCT VFR BLD CALC: 39 % (ref 37–51)
HGB BLD-MCNC: 13 G/DL (ref 12–18)
LY# POC: 1.2 K/UL (ref 0.6–4.1)
LY% POC: 23 % (ref 10–58.5)
MCH RBC QN: 27.7 PG (ref 26–32)
MCHC RBC-ENTMCNC: 33.3 G/DL (ref 30–36)
MCV RBC: 83 FL (ref 80–97)
MID #, POC: 0.3 K/UL (ref 0–1.8)
MID% POC: 5.6 % (ref 0.1–24)
PLATELET # BLD: 277 K/UL (ref 140–440)
RBC # BLD: 4.68 M/UL (ref 4.2–6.3)
WBC # BLD: 5.6 K/UL (ref 4.1–10.9)

## 2019-01-09 RX ORDER — TEMAZEPAM 15 MG/1
15 CAPSULE ORAL
Qty: 30 CAP | Refills: 0 | Status: SHIPPED | OUTPATIENT
Start: 2019-01-09 | End: 2019-05-01 | Stop reason: SDUPTHER

## 2019-01-09 NOTE — PROGRESS NOTES
Chief Complaint Patient presents with  Hypertension 3 month follow up  Diabetes  
  not feeling well 1. Have you been to the ER, urgent care clinic since your last visit? Hospitalized since your last visit? No 
 
2. Have you seen or consulted any other health care providers outside of the [address] [place] [address] since your last visit? Include any pap smears or colon screening. No 
Visit Vitals /70 (BP 1 Location: Left arm, BP Patient Position: Sitting) Pulse 87 Resp 18 Ht 5' 7\" (1.702 m) Wt 137 lb 9.6 oz (62.4 kg) SpO2 98% BMI 21.55 kg/m²

## 2019-01-09 NOTE — PROGRESS NOTES
Chief Complaint Patient presents with  Hypertension 3 month follow up  Diabetes  
  not feeling well SUBJECTIVE: 
 
Hali Essex is a 80 y.o. male who presents today in follow-up for his medical problems to include hypertension, diabetes, hyperlipidemia, ASCVD, history of SVT, history of sick sinus syndrome status post pacemaker, prior TIA, Alzheimer's disease, stage II CKD, DJD and other multiple medical problems. He is accompanied by a relative today who said that she was notified by him that he had a spell where he passed out and fell to the floor yesterday apparently he was washing some closed and does not remember anything except being on the floor afterwards. He has had no dizziness prior has had no dizziness since. He denies associated headache or visual symptoms. He denies arm or leg weakness. He says he does drink some beer but not heavily. He denies any chest pain, palpitations, PND, orthopnea or cardiorespiratory complaints. He denies any GI or  complaints. He denies any headaches, dizziness or other neurologic complaints except for the episode of syncope yesterday. He denies any current change of his chronic arthritic complaints. He has no other complaints on complete review of systems. Current Outpatient Medications Medication Sig Dispense Refill  temazepam (RESTORIL) 15 mg capsule Take 1 Cap by mouth nightly as needed for Sleep. Max Daily Amount: 15 mg. 30 Cap 0  
 memantine (NAMENDA) 10 mg tablet TAKE 1 TABLET BY MOUTH TWICE A  Tab 3  
 diclofenac EC (VOLTAREN) 75 mg EC tablet Take 1 Tab by mouth two (2) times a day. 60 Tab prn  
 mv-mins-folic-lycopene-ginkgo (ONE-A-DAY MEN 50 PLUS, GINKGO,) [phone] mcg-mcg-mg tab Take  by mouth.  losartan-hydroCHLOROthiazide (HYZAAR) 100-25 mg per tablet TAKE 1 TABLET EVERY DAY 90 Tab 1  
 aspirin 81 mg chewable tablet Take 1 Tab by mouth daily.  30 Tab 1  
  amLODIPine (NORVASC) 10 mg tablet TAKE 1 TABLET BY MOUTH EVERY DAY 90 Tab 3  
 donepezil (ARICEPT) 10 mg tablet TAKE 1 TABLET BY MOUTH EVERY DAY 90 Tab 3  
 omeprazole (PRILOSEC) 20 mg capsule Take 1 Cap by mouth daily. 90 Cap 3 Past Medical History:  
Diagnosis Date  Abdominal pain 7/18/2017  Alzheimer disease 7/18/2017  Anemia 7/18/2017  Arthritis  Back pain 7/18/2017  Bradycardia 7/18/2017  CAD (coronary artery disease)   
 hx of MI  
 CKD (chronic kidney disease), stage II 7/18/2017  constipation  Depression 7/18/2017  Diabetes mellitus ([facility] [address] 7/18/2017  Diverticulosis 7/18/2017  DJD (degenerative joint disease) 7/18/2017  Encounter for long-term (current) use of other medications 7/18/2017  Fatigue  Gastritis and duodenitis 7/18/2017  GERD (gastroesophageal reflux disease)  GI bleed 7/18/2017  Hearing loss  Hyperlipidemia 7/18/2017  Hypertension  Hypertension with renal disease 7/18/2017  Ill-defined condition Dementia Dolan Internal hemorrhoids 7/18/2017  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   
 lost over 40 pounds last year- has no appetite Past Surgical History:  
Procedure Laterality Date  COLONOSCOPY N/A 6/22/2017 COLONOSCOPY performed by Iglesia Gamboa MD at [facility] ENDOSCOPY  COLONOSCOPY,REMFAZAL DIGGS,SNARE  6/22/2017  HX HERNIA REPAIR  7/10/2014  
 right inguinal  
 HX OTHER SURGICAL    
 cystectomy from back  WA EGD TRANSORAL BIOPSY SINGLE/MULTIPLE  2/14/2012  UPPER GI ENDOSCOPY,BIOPSY  6/22/2017 No Known Allergies REVIEW OF SYSTEMS: 
General: negative for - chills or fever, or weight loss or gain ENT: negative for - headaches, nasal congestion or tinnitus Eyes: no blurred or visual changes Neck: No stiffness or swollen nodes Respiratory: negative for - cough, hemoptysis, shortness of breath or wheezing Cardiovascular : negative for - chest pain, edema, palpitations or shortness of breath Gastrointestinal: negative for - abdominal pain, blood in stools, heartburn or nausea/vomiting Genito-Urinary: no dysuria, trouble voiding, or hematuria Musculoskeletal: negative for - gait disturbance, change of his chronic joint pain, joint stiffness or joint swelling Neurological: no TIA or stroke symptoms. Episode yesterday it sounds like syncope although the history is a little difficult from him because of his Alzheimer's dementia. Hematologic: no bruises, no bleeding Lymphatic: no swollen glands Integument: no lumps, mole changes, nail changes or rash Endocrine:no malaise/lethargy poly uria or polydipsia or unexpected weight changes Social History Socioeconomic History  Marital status: LEGALLY  Spouse name: Not on file  Number of children: Not on file  Years of education: Not on file  Highest education level: Not on file Tobacco Use  Smoking status: Former Smoker Packs/day: 0.50 Years: 10.00 Pack years: 5.00 Start date: 1991  Smokeless tobacco: Never Used  Tobacco comment: quit 50 years ago Substance and Sexual Activity  Alcohol use: No  
 Drug use: No  
 
Family History Problem Relation Age of Onset  Hypertension Mother  Heart Disease Father  Cancer Other   
     son-cancer? unknown what type  OBJECTIVE:  
 
Visit Vitals /70 (BP 1 Location: Left arm, BP Patient Position: Sitting) Pulse 87 Resp 18 Ht 5' 7\" (1.702 m) Wt 137 lb 9.6 oz (62.4 kg) SpO2 98% BMI 21.55 kg/m² CONSTITUTIONAL: Thin black male, appears age appropriate EYES: sclera anicteric, PERRL, EOMI 
ENMT:nares clear, moist mucous membranes, pharynx clear NECK: supple. Thyroid normal, No JVD or bruits but transmitted murmur into both carotids RESPIRATORY: Chest: clear to ascultation and percussion, normal inspiratory effort CARDIOVASCULAR: Heart: regular rate and rhythm with 2/6 to 3/6 holosystolic murmur without rubs or gallops, PMI not displaced, No thrills. He does have a sternal he/thrust 
GASTROINTESTINAL: Abdomen: non distended, soft, non tender, bowel sounds normal 
HEMATOLOGIC: no purpura, petechiae or bruising LYMPHATIC: No lymph node enlargemant MUSCULOSKELETAL: Extremities: no edema or active synovitis, pulse 1+ INTEGUMENT: No unusual rashes or suspicious skin lesions noted. Nails appear normal. 
PERIPHERAL VASCULAR: normal pulses femoral, PT and DP NEUROLOGIC: non-focal exam, A & O X 3 although somewhat confused PSYCHIATRIC:, appropriate affect ASSESSMENT:  
1. Hypertension with renal disease 2. 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 Mixed hyperlipidemia 4. ASCVD (arteriosclerotic cardiovascular disease) 5. Primary osteoarthritis involving multiple joints 6. CKD (chronic kidney disease), stage II   
7. Alzheimer's dementia without behavioral disturbance, unspecified timing of dementia onset 8. Syncope, unspecified syncope type 9. SSS (sick sinus syndrome) ([address] 10. S/P cardiac pacemaker procedure 11. Primary insomnia Impression 1. Hypertension that is controlled so continue current therapy reviewed with he and his family member 2. Diabetes repeat status is pending and prior labs reviewed no make adjustments if necessary. 3.  Hyperlipidemia prior lab reviewed repeat status pending I will adjust if needed. 4 ASCVD clinically stable EKG done today reveals a paced rhythm left bundle branch block and probable LVH but no acute process. 5. DJD that is stable 6. CKD stage II repeat status pending 7. Alzheimer's dementia that seems to be stable 8.   Syncope of undetermined etiology in light of the question of the sternal heave I am going to get a CT because that appears to be a slight widening of the mediastinum 9. Sick sinus syndrome status post pacemaker 10. Insomnia we will treat this with temazepam 15 mg I will recheck him again myself in a week after his head CT scan or sooner should there be problems. I did discuss that if he has any more dizziness lightheadedness or syncopal spells to go to the emergency room. I will call the lab results in the interim. PLAN: 
. Orders Placed This Encounter  XR CHEST PA LAT  CT CHEST W WO CONT  METABOLIC PANEL, COMPREHENSIVE  LIPID PANEL  
 CK  
 HEMOGLOBIN A1C WITH EAG  
 AMB POC COMPLETE CBC,AUTOMATED ENTER  AMB POC EKG ROUTINE W/ 12 LEADS, INTER & REP  temazepam (RESTORIL) 15 mg capsule ATTENTION:  
This medical record was transcribed using an electronic medical records system. Although proofread, it may and can contain electronic and spelling errors. Other human spelling and other errors may be present. Corrections may be executed at a later time. Please feel free to contact us for any clarifications as needed. Follow-up Disposition: 
Return in about 3 months (around 4/9/2019). Results for orders placed or performed in visit on 01/09/19 AMB POC COMPLETE CBC,AUTOMATED ENTER Result Value Ref Range WBC (POC) 5.6 4.1 - 10.9 K/uL  
 RBC (POC) 4.68 4.20 - 6.30 M/uL  
 HGB (POC) 13.0 12.0 - 18.0 g/dL HCT (POC) 39.0 37.0 - 51.0 %  
 MCV (POC) 83.0 80.0 - 97.0 fL  
 MCH (POC) 27.7 26.0 - 32.0 pg  
 MCHC (POC) 33.3 30.0 - 36.0 g/dL PLATELET (POC) 973.4 140.0 - 440.0 K/uL  
 ABS. LYMPHS (POC) 1.2 0.6 - 4.1 K/uL LYMPHOCYTES (POC) 23.0 10.0 - 58.5 % Mid # (POC) 0.3 0.0 - 1.8 K/uL MID% POC 5.6 0.1 - 24.0 %  
 ABS. GRANS (POC) 4.1 2.0 - 7.8 K/uL GRANULOCYTES (POC) 71.4 37.0 - 92.0 % Jorge A Erickson MD 
 
The patient verbalized understanding of the problems and plans as explained.

## 2019-01-09 NOTE — PATIENT INSTRUCTIONS
Alzheimer's Disease: Care Instructions Your Care Instructions Alzheimer's disease is a type of dementia. It causes memory loss and affects judgment, language, and behavior. You may have trouble making decisions or may get lost in places that you used to know well. Alzheimer's disease is different than mild memory loss that occurs with aging. It is not clear what causes Alzheimer's disease, but it is the most common form of dementia in older adults. Finding out that you have this disease is a shock. You may be afraid and worried about how the condition will change your life. Although there is no cure at this time, medicine in some cases may slow memory loss for a while. Other medicines may be able to help you sleep or cope with depression and behavior changes. Alzheimer's disease is different for everyone. It may take many years to develop. In some cases, people can function well for a long time. In the early stage of the disease, you can do things at home to make life easier and safer. You also can keep doing your hobbies and other activities. Many people find comfort in planning now for their future needs. Follow-up care is a key part of your treatment and safety. Be sure to make and go to all appointments, and call your doctor if you are having problems. It's also a good idea to know your test results and keep a list of the medicines you take. How can you care for yourself at home? Taking care of yourself · If your doctor gives you medicines, take them exactly as prescribed. Call your doctor if you think you are having a problem with your medicine. You will get more details on the medicines your doctor prescribes. · Eat a balanced diet. Get plenty of whole grains, fruits, and vegetables every day. If you are not hungry at mealtimes, eat snacks at midmorning and in the afternoon. Try drinks such as Boost, Ensure, or Sustacal if you are having trouble keeping your weight up. · Stay active. Exercise such as walking may slow the decline of your mental abilities. Try to stay active mentally too. Read and work crossword puzzles if you enjoy these activities. · If you have trouble sleeping, do not nap during the day. Get regular exercise (but not within several hours of bedtime). Drink a glass of warm milk or caffeine-free herbal tea before going to bed. · Ask your doctor about support groups and other resources in your area. They can help people who have Alzheimer's disease and their families. · Be patient. You may find that a task takes you longer than it used to. · If you have not already done so, make a list of advance directives. Advance directives are instructions to your doctor and family members about what kind of care you want if you become unable to speak or express yourself. Talk to a  about making a will, if you do not already have one. Keeping schedules · Develop a routine. You will feel less frustrated or confused if you have a clear, simple plan of what to do every day. ? Make lists of your medicines and when to take them. ? Write down appointments and other tasks in a calendar. ? Put sticky notes around the house to help you remember events and other things you have to do. ? Schedule activities and tasks for times of the day when you are best able to handle them. Staying safe · Tell someone when you are going out and where you are going. Let the person know when you will be back. Before you go out alone, write down where you are going, how to get there, and how to get back home. Do this even if you have gone there many times before. Take someone along with you when possible. · Make your home safe. Tack down rugs, put no-slip tape in the tub, use handrails, and put safety switches on stoves and appliances. · Have a family member or other caregiver tell you whether you are driving badly. Deciding to stop driving is very hard for many people.  Driving helps you feel independent. Your state 's license bureau can do a driving test if there is any question. Plan for other means of getting around when you are no longer able to drive. · Use strong lighting, especially at night. Put night-lights in bedrooms, hallways, and bathrooms. · Lower the hot water temperature setting to 120°F or lower to avoid burns. When should you call for help? Call 911 anytime you think you may need emergency care. For example, call if: 
  · You are lost and do not know whom to call.  
  · You are injured and do not know whom to call.  
 Call your doctor now or seek immediate medical care if: 
  · Your symptoms suddenly get much worse.  
 Watch closely for changes in your health, and be sure to contact your doctor if: 
  · You want more information about how you can take care of yourself. Where can you learn more? Go to http://zach-herlinda.info/. Enter Y179 in the search box to learn more about \"Alzheimer's Disease: Care Instructions. \" Current as of: December 7, 2017 Content Version: 11.8 © 4506-0505 [employer]. Care instructions adapted under license by [employer] (which disclaims liability or warranty for this information). If you have questions about a medical condition or this instruction, always ask your healthcare professional. [address] any warranty or liability for your use of this information.

## 2019-01-10 LAB
ALBUMIN SERPL-MCNC: 4.1 G/DL (ref 3.5–4.7)
ALBUMIN/GLOB SERPL: 1.6 {RATIO} (ref 1.2–2.2)
ALP SERPL-CCNC: 82 IU/L (ref 39–117)
ALT SERPL-CCNC: 29 IU/L (ref 0–44)
AST SERPL-CCNC: 38 IU/L (ref 0–40)
BILIRUB SERPL-MCNC: 0.4 MG/DL (ref 0–1.2)
BUN SERPL-MCNC: 21 MG/DL (ref 8–27)
BUN/CREAT SERPL: 13 (ref 10–24)
CALCIUM SERPL-MCNC: 9.8 MG/DL (ref 8.6–10.2)
CHLORIDE SERPL-SCNC: 99 MMOL/L (ref 96–106)
CHOLEST SERPL-MCNC: 178 MG/DL (ref 100–199)
CK SERPL-CCNC: 96 U/L (ref 24–204)
CO2 SERPL-SCNC: 22 MMOL/L (ref 20–29)
CREAT SERPL-MCNC: 1.62 MG/DL (ref 0.76–1.27)
EST. AVERAGE GLUCOSE BLD GHB EST-MCNC: 114 MG/DL
GLOBULIN SER CALC-MCNC: 2.6 G/DL (ref 1.5–4.5)
GLUCOSE SERPL-MCNC: 119 MG/DL (ref 65–99)
HBA1C MFR BLD: 5.6 % (ref 4.8–5.6)
HDLC SERPL-MCNC: 76 MG/DL
LDLC SERPL CALC-MCNC: 76 MG/DL (ref 0–99)
POTASSIUM SERPL-SCNC: 3.7 MMOL/L (ref 3.5–5.2)
PROT SERPL-MCNC: 6.7 G/DL (ref 6–8.5)
SODIUM SERPL-SCNC: 144 MMOL/L (ref 134–144)
TRIGL SERPL-MCNC: 128 MG/DL (ref 0–149)
VLDLC SERPL CALC-MCNC: 26 MG/DL (ref 5–40)

## 2019-03-18 ENCOUNTER — OFFICE VISIT (OUTPATIENT)
Dept: INTERNAL MEDICINE CLINIC | Age: 84
End: 2019-03-18

## 2019-03-18 VITALS
TEMPERATURE: 97.5 F | WEIGHT: 138.8 LBS | HEIGHT: 67 IN | OXYGEN SATURATION: 98 % | DIASTOLIC BLOOD PRESSURE: 80 MMHG | RESPIRATION RATE: 18 BRPM | SYSTOLIC BLOOD PRESSURE: 138 MMHG | HEART RATE: 72 BPM | BODY MASS INDEX: 21.79 KG/M2

## 2019-03-18 DIAGNOSIS — N18.2 CKD (CHRONIC KIDNEY DISEASE), STAGE II: ICD-10-CM

## 2019-03-18 DIAGNOSIS — G30.9 ALZHEIMER'S DEMENTIA WITHOUT BEHAVIORAL DISTURBANCE, UNSPECIFIED TIMING OF DEMENTIA ONSET: ICD-10-CM

## 2019-03-18 DIAGNOSIS — I25.10 ASCVD (ARTERIOSCLEROTIC CARDIOVASCULAR DISEASE): ICD-10-CM

## 2019-03-18 DIAGNOSIS — F02.80 ALZHEIMER'S DEMENTIA WITHOUT BEHAVIORAL DISTURBANCE, UNSPECIFIED TIMING OF DEMENTIA ONSET: ICD-10-CM

## 2019-03-18 DIAGNOSIS — F10.20 ALCOHOLISM (HCC): ICD-10-CM

## 2019-03-18 DIAGNOSIS — I49.5 SSS (SICK SINUS SYNDROME) (HCC): ICD-10-CM

## 2019-03-18 DIAGNOSIS — I47.1 SVT (SUPRAVENTRICULAR TACHYCARDIA) (HCC): ICD-10-CM

## 2019-03-18 DIAGNOSIS — G89.29 CHRONIC MIDLINE LOW BACK PAIN WITH RIGHT-SIDED SCIATICA: ICD-10-CM

## 2019-03-18 DIAGNOSIS — F32.A MILD DEPRESSION: ICD-10-CM

## 2019-03-18 DIAGNOSIS — I12.9 HYPERTENSION WITH RENAL DISEASE: Primary | ICD-10-CM

## 2019-03-18 DIAGNOSIS — E78.2 MIXED HYPERLIPIDEMIA: ICD-10-CM

## 2019-03-18 DIAGNOSIS — M54.41 CHRONIC MIDLINE LOW BACK PAIN WITH RIGHT-SIDED SCIATICA: ICD-10-CM

## 2019-03-18 DIAGNOSIS — N18.2 CONTROLLED TYPE 2 DIABETES MELLITUS WITH STAGE 2 CHRONIC KIDNEY DISEASE, WITHOUT LONG-TERM CURRENT USE OF INSULIN (HCC): ICD-10-CM

## 2019-03-18 DIAGNOSIS — M15.9 PRIMARY OSTEOARTHRITIS INVOLVING MULTIPLE JOINTS: ICD-10-CM

## 2019-03-18 DIAGNOSIS — E11.22 CONTROLLED TYPE 2 DIABETES MELLITUS WITH STAGE 2 CHRONIC KIDNEY DISEASE, WITHOUT LONG-TERM CURRENT USE OF INSULIN (HCC): ICD-10-CM

## 2019-03-18 LAB
A-G RATIO,AGRAT: 1.3 RATIO
ALBUMIN SERPL-MCNC: 4 G/DL (ref 3.9–5.4)
ALP SERPL-CCNC: 173 U/L (ref 38–126)
ALT SERPL-CCNC: 22 U/L (ref 9–52)
ANION GAP SERPL CALC-SCNC: 12 MMOL/L
AST SERPL W P-5'-P-CCNC: 30 U/L (ref 14–36)
BILIRUB SERPL-MCNC: 0.5 MG/DL (ref 0.2–1.3)
BUN SERPL-MCNC: 19 MG/DL (ref 9–20)
BUN/CREATININE RATIO,BUCR: 14 RATIO
CALCIUM SERPL-MCNC: 9.9 MG/DL (ref 8.4–10.2)
CHLORIDE SERPL-SCNC: 106 MMOL/L (ref 98–107)
CHOL/HDL RATIO,CHHD: 3 RATIO (ref 0–4)
CHOLEST SERPL-MCNC: 194 MG/DL (ref 0–200)
CO2 SERPL-SCNC: 27 MMOL/L (ref 22–32)
CREAT SERPL-MCNC: 1.4 MG/DL (ref 0.8–1.5)
GLOBULIN,GLOB: 3.2
GLUCOSE SERPL-MCNC: 86 MG/DL (ref 75–110)
HDLC SERPL-MCNC: 75 MG/DL (ref 35–130)
LDL/HDL RATIO,LDHD: 1 RATIO
LDLC SERPL CALC-MCNC: 98 MG/DL (ref 0–130)
POTASSIUM SERPL-SCNC: 4.3 MMOL/L (ref 3.6–5)
PROT SERPL-MCNC: 7.2 G/DL (ref 6.3–8.2)
SODIUM SERPL-SCNC: 145 MMOL/L (ref 137–145)
TRIGL SERPL-MCNC: 107 MG/DL (ref 0–200)
VLDLC SERPL CALC-MCNC: 21 MG/DL

## 2019-03-18 NOTE — PROGRESS NOTES
Chief Complaint   Patient presents with    Hypertension     3 month follow up    Back Pain     x3 weeks       SUBJECTIVE:    Saima Hobbs Sr is a 80 y.o. male who returns in follow-up of his medical problems include hypertension, diabetes, hyperlipidemia, ASCVD, history of SVT, history of sick sinus syndrome status post pacemaker, DJD, CKD stage II and other medical problems. He still noting a lot of back pain neck and the back pain is seeming to go to the right leg were before with some more just in the back without radiation. He notes no history of trauma or falls. He currently denies any chest pain, shortness of breath, palpitations, PND, orthopnea or other cardiorespiratory complaints. There are no GI or  complaints. He denies any headaches, dizziness or neurologic complaints. Other than the back pain there are no other real arthritic complaints and there are no other complaints on complete review of systems. Current Outpatient Medications   Medication Sig Dispense Refill    temazepam (RESTORIL) 15 mg capsule Take 1 Cap by mouth nightly as needed for Sleep. Max Daily Amount: 15 mg. 30 Cap 0    memantine (NAMENDA) 10 mg tablet TAKE 1 TABLET BY MOUTH TWICE A  Tab 3    diclofenac EC (VOLTAREN) 75 mg EC tablet Take 1 Tab by mouth two (2) times a day. 60 Tab prn    mv-mins-folic-lycopene-ginkgo (ONE-A-DAY MEN 50 PLUS, GINKGO,) [phone] mcg-mcg-mg tab Take  by mouth.  losartan-hydroCHLOROthiazide (HYZAAR) 100-25 mg per tablet TAKE 1 TABLET EVERY DAY 90 Tab 1    aspirin 81 mg chewable tablet Take 1 Tab by mouth daily. 30 Tab 1    amLODIPine (NORVASC) 10 mg tablet TAKE 1 TABLET BY MOUTH EVERY DAY 90 Tab 3    donepezil (ARICEPT) 10 mg tablet TAKE 1 TABLET BY MOUTH EVERY DAY 90 Tab 3    omeprazole (PRILOSEC) 20 mg capsule Take 1 Cap by mouth daily.  80 Cap 3     Past Medical History:   Diagnosis Date    Abdominal pain 7/18/2017    Alzheimer disease 7/18/2017    Anemia 7/18/2017    Arthritis     Back pain 7/18/2017    Bradycardia 7/18/2017    CAD (coronary artery disease)     hx of MI    CKD (chronic kidney disease), stage II 7/18/2017    constipation     Depression 7/18/2017    Diabetes mellitus ([facility] [address] 7/18/2017    Diverticulosis 7/18/2017    DJD (degenerative joint disease) 7/18/2017    Encounter for long-term (current) use of other medications 7/18/2017    Fatigue     Gastritis and duodenitis 7/18/2017    GERD (gastroesophageal reflux disease)     GI bleed 7/18/2017    Hearing loss     Hyperlipidemia 7/18/2017    Hypertension     Hypertension with renal disease 7/18/2017    Ill-defined condition     Dementia    Internal hemorrhoids 7/18/2017    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    S/P cardiac pacemaker procedure 5/4/2015    5/4/15 Medtronic dual chamber pacemaker implant     Weight loss     lost over 40 pounds last year- has no appetite     Past Surgical History:   Procedure Laterality Date    COLONOSCOPY N/A 6/22/2017    COLONOSCOPY performed by Joselin Castillo MD at [address] [place] [address]  6/22/2017         [address]  7/10/2014    right inguinal    HX OTHER SURGICAL      cystectomy from back    VT EGD TRANSORAL BIOPSY SINGLE/MULTIPLE  2/14/2012         UPPER GI ENDOSCOPY,BIOPSY  6/22/2017          No Known Allergies    REVIEW OF SYSTEMS:  General: negative for - chills or fever, or weight loss or gain  ENT: negative for - headaches, nasal congestion or tinnitus  Eyes: no blurred or visual changes  Neck: No stiffness or swollen nodes  Respiratory: negative for - cough, hemoptysis, shortness of breath or wheezing  Cardiovascular : negative for - chest pain, edema, palpitations or shortness of breath  Gastrointestinal: negative for - abdominal pain, blood in stools, heartburn or nausea/vomiting  Genito-Urinary: no dysuria, trouble voiding, or hematuria  Musculoskeletal: negative for - gait disturbance, joint pain, joint stiffness or joint swelling  Back: Low back pain radiating to right  Neurological: no TIA or stroke symptoms  Hematologic: no bruises, no bleeding  Lymphatic: no swollen glands  Integument: no lumps, mole changes, nail changes or rash  Endocrine:no malaise/lethargy poly uria or polydipsia or unexpected weight changes        Social History     Socioeconomic History    Marital status: LEGALLY      Spouse name: Not on file    Number of children: Not on file    Years of education: Not on file    Highest education level: Not on file   Tobacco Use    Smoking status: Former Smoker     Packs/day: 0.50     Years: 10.00     Pack years: 5.00     Start date: 1991    Smokeless tobacco: Never Used    Tobacco comment: quit 50 years ago   Substance and Sexual Activity    Alcohol use: No    Drug use: No     Family History   Problem Relation Age of Onset    Hypertension Mother     Heart Disease Father     Cancer Other         son-cancer? unknown what type        OBJECTIVE:     Visit Vitals  /80 (BP 1 Location: Left arm, BP Patient Position: Sitting)   Pulse 72   Temp 97.5 °F (36.4 °C) (Oral)   Resp 18   Ht 5' 7\" (1.702 m)   Wt 138 lb 12.8 oz (63 kg)   SpO2 98%   BMI 21.74 kg/m²     CONSTITUTIONAL:   well nourished, appears age appropriate  EYES: sclera anicteric, PERRL, EOMI  ENMT:nares clear, moist mucous membranes, pharynx clear  NECK: supple.  Thyroid normal, No JVD or bruits  RESPIRATORY: Chest: clear to ascultation and percussion, normal inspiratory effort  CARDIOVASCULAR: Heart: regular rate and rhythm no murmurs, rubs or gallops, PMI not displaced, No thrills  GASTROINTESTINAL: Abdomen: non distended, soft, non tender, bowel sounds normal  HEMATOLOGIC: no purpura, petechiae or bruising  LYMPHATIC: No lymph node enlargemant  MUSCULOSKELETAL: Extremities: no edema or active synovitis, pulse 1+   INTEGUMENT: No unusual rashes or suspicious skin lesions noted. Nails appear normal. No diabetic foot changes noted. PERIPHERAL VASCULAR: normal pulses femoral, PT and DP  NEUROLOGIC: non-focal exam, A & O X 3. Normal distal sensation and proprioception all toes both feet. PSYCHIATRIC:, appropriate affect     ASSESSMENT:   1. Hypertension with renal disease    2. 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, without long-term current use of insulin ([facility] [address]    3. Mixed hyperlipidemia    4. ASCVD (arteriosclerotic cardiovascular disease)    5. Primary osteoarthritis involving multiple joints    6. CKD (chronic kidney disease), stage II    7. Alzheimer's dementia without behavioral disturbance, unspecified timing of dementia onset    8. SVT (supraventricular tachycardia) (HCC)--s/p RFA    9. SSS (sick sinus syndrome) ([facility] [address]    10. Alcoholism ([facility] [address]    11. Mild depression ([facility] [address]    12. Chronic midline low back pain with right-sided sciatica      Impression  1. Hypertension that is controlled so continue current therapy reviewed with him  2. Diabetes repeat status pending and prior labs reviewed on make adjustments if necessary. 3.  Hyperlipidemia prior lab reviewed repeat status pending I will adjust if needed. 4   ASCVD clinically stable  5. DJD stable except for the low back  6. CKD stage II repeat status pending  7. Alzheimer's dementia seems to be stable  8. History of SVT stable  9. Sick sinus syndrome status post pacemaker which precludes doing an MRI of his lumbar spine  10. History of alcoholism he claims to be alcohol free now  11. History of mild depression currently he seems to be stable and does not feel like he needs anything for that. 12.  Midline back pain now radiating to right leg I will schedule a CT since I cannot do an MRI. All of the above discussed with his granddaughter who is now living with and is present within the office visit today.   Follow-up scheduled for 3 months or sooner should the need to based upon any of the above findings or further problems. I will call the lab results in interim. PLAN:  .  Orders Placed This Encounter    CT SPINE LUMB WO CONT    HEMOGLOBIN A1C WITH EAG    METABOLIC PANEL, COMPREHENSIVE ([facility])    LIPID PANEL ([facility])    CK ([facility])         ATTENTION:   This medical record was transcribed using an electronic medical records system. Although proofread, it may and can contain electronic and spelling errors. Other human spelling and other errors may be present. Corrections may be executed at a later time. Please feel free to contact us for any clarifications as needed. Follow-up Disposition:  Return in about 3 months (around 6/18/2019). No results found for any visits on 03/18/19. Jackie Kim MD    The patient verbalized understanding of the problems and plans as explained.

## 2019-03-18 NOTE — PROGRESS NOTES
Identified pt with two pt identifiers(name and ). Reviewed record in preparation for visit and have obtained necessary documentation. Chief Complaint   Patient presents with    Hypertension     3 month follow up    Back Pain     x3 weeks        Health Maintenance Due   Topic    EYE EXAM RETINAL OR DILATED     DTaP/Tdap/Td series (1 - Tdap)    Shingrix Vaccine Age 50> (1 of 2)    GLAUCOMA SCREENING Q2Y     Influenza Age 5 to Adult     FOOT EXAM Q1        Coordination of Care Questionnaire:  :   1) Have you been to an emergency room, urgent care, or hospitalized since your last visit? If yes, where when, and reason for visit? no       2. Have seen or consulted any other health care provider since your last visit? If yes, where when, and reason for visit? NO      3) Do you have an Advanced Directive/ Living Will in place? YES  If yes, do we have a copy on file No,but will bring copy for chart. If no, would you like information NO    Patient is accompanied by self  And grandaughterI have received verbal consent from [address] to discuss any/all medical information while they are present in the room.

## 2019-03-18 NOTE — PATIENT INSTRUCTIONS
Alzheimer's Disease: Care Instructions  Your Care Instructions    Alzheimer's disease is a type of dementia. It causes memory loss and affects judgment, language, and behavior. You may have trouble making decisions or may get lost in places that you used to know well. Alzheimer's disease is different than mild memory loss that occurs with aging. It is not clear what causes Alzheimer's disease, but it is the most common form of dementia in older adults. Finding out that you have this disease is a shock. You may be afraid and worried about how the condition will change your life. Although there is no cure at this time, medicine in some cases may slow memory loss for a while. Other medicines may be able to help you sleep or cope with depression and behavior changes. Alzheimer's disease is different for everyone. It may take many years to develop. In some cases, people can function well for a long time. In the early stage of the disease, you can do things at home to make life easier and safer. You also can keep doing your hobbies and other activities. Many people find comfort in planning now for their future needs. Follow-up care is a key part of your treatment and safety. Be sure to make and go to all appointments, and call your doctor if you are having problems. It's also a good idea to know your test results and keep a list of the medicines you take. How can you care for yourself at home? Taking care of yourself  · If your doctor gives you medicines, take them exactly as prescribed. Call your doctor if you think you are having a problem with your medicine. You will get more details on the medicines your doctor prescribes. · Eat a balanced diet. Get plenty of whole grains, fruits, and vegetables every day. If you are not hungry at mealtimes, eat snacks at midmorning and in the afternoon. Try drinks such as Boost, Ensure, or Sustacal if you are having trouble keeping your weight up. · Stay active.  Exercise such as walking may slow the decline of your mental abilities. Try to stay active mentally too. Read and work crossword puzzles if you enjoy these activities. · If you have trouble sleeping, do not nap during the day. Get regular exercise (but not within several hours of bedtime). Drink a glass of warm milk or caffeine-free herbal tea before going to bed. · Ask your doctor about support groups and other resources in your area. They can help people who have Alzheimer's disease and their families. · Be patient. You may find that a task takes you longer than it used to. · If you have not already done so, make a list of advance directives. Advance directives are instructions to your doctor and family members about what kind of care you want if you become unable to speak or express yourself. Talk to a  about making a will, if you do not already have one. Keeping schedules  · Develop a routine. You will feel less frustrated or confused if you have a clear, simple plan of what to do every day. ? Make lists of your medicines and when to take them. ? Write down appointments and other tasks in a calendar. ? Put sticky notes around the house to help you remember events and other things you have to do. ? Schedule activities and tasks for times of the day when you are best able to handle them. Staying safe  · Tell someone when you are going out and where you are going. Let the person know when you will be back. Before you go out alone, write down where you are going, how to get there, and how to get back home. Do this even if you have gone there many times before. Take someone along with you when possible. · Make your home safe. Tack down rugs, put no-slip tape in the tub, use handrails, and put safety switches on stoves and appliances. · Have a family member or other caregiver tell you whether you are driving badly. Deciding to stop driving is very hard for many people. Driving helps you feel independent.  Your state 's license bureau can do a driving test if there is any question. Plan for other means of getting around when you are no longer able to drive. · Use strong lighting, especially at night. Put night-lights in bedrooms, hallways, and bathrooms. · Lower the hot water temperature setting to 120°F or lower to avoid burns. When should you call for help? Call 911 anytime you think you may need emergency care. For example, call if:    · You are lost and do not know whom to call.     · You are injured and do not know whom to call.    Call your doctor now or seek immediate medical care if:    · Your symptoms suddenly get much worse.    Watch closely for changes in your health, and be sure to contact your doctor if:    · You want more information about how you can take care of yourself. Where can you learn more? Go to http://zach-herlinda.info/. Enter Y179 in the search box to learn more about \"Alzheimer's Disease: Care Instructions. \"  Current as of: September 11, 2018  Content Version: 11.9  © 2163-0881 [employer]. Care instructions adapted under license by [employer] (which disclaims liability or warranty for this information). If you have questions about a medical condition or this instruction, always ask your healthcare professional. [address] any warranty or liability for your use of this information.

## 2019-03-19 LAB
CK SERPL-CCNC: 108 U/L (ref 30–135)
EST. AVERAGE GLUCOSE BLD GHB EST-MCNC: 114 MG/DL
HBA1C MFR BLD: 5.6 % (ref 4.8–5.6)

## 2019-03-19 NOTE — PROGRESS NOTES
Labs are okay except for a significantly elevated alkaline phosphatase which could be from either liver or bone I would repeat his liver enzymes in 2-3 weeks.

## 2019-03-20 NOTE — PROGRESS NOTES
Discussed lab with patient's grand daughter. Scheduled to come back for f/up Alk phos and LFT's in 2-3 weeks.

## 2019-04-10 ENCOUNTER — LAB ONLY (OUTPATIENT)
Dept: INTERNAL MEDICINE CLINIC | Age: 84
End: 2019-04-10

## 2019-04-10 DIAGNOSIS — R79.89 ELEVATED LFTS: Primary | ICD-10-CM

## 2019-04-11 LAB
ALBUMIN SERPL-MCNC: 3.7 G/DL (ref 3.5–4.7)
ALP SERPL-CCNC: 203 IU/L (ref 39–117)
ALT SERPL-CCNC: 17 IU/L (ref 0–44)
AST SERPL-CCNC: 28 IU/L (ref 0–40)
BILIRUB DIRECT SERPL-MCNC: 0.07 MG/DL (ref 0–0.4)
BILIRUB SERPL-MCNC: 0.2 MG/DL (ref 0–1.2)
PROT SERPL-MCNC: 6.5 G/DL (ref 6–8.5)

## 2019-04-12 DIAGNOSIS — R74.8 ELEVATED ALKALINE PHOSPHATASE LEVEL: Primary | ICD-10-CM

## 2019-04-12 NOTE — PROGRESS NOTES
Alkaline phosphatase is higher so we will get an abdominal ultrasound also see if the lab can run GGTP

## 2019-04-23 ENCOUNTER — HOSPITAL ENCOUNTER (OUTPATIENT)
Dept: ULTRASOUND IMAGING | Age: 84
Discharge: HOME | End: 2019-04-23
Attending: INTERNAL MEDICINE
Payer: MEDICARE

## 2019-04-23 DIAGNOSIS — R74.8 ELEVATED ALKALINE PHOSPHATASE LEVEL: ICD-10-CM

## 2019-04-23 PROCEDURE — 76705 ECHO EXAM OF ABDOMEN: CPT

## 2019-05-01 DIAGNOSIS — I47.1 ATRIAL TACHYCARDIA (HCC): ICD-10-CM

## 2019-05-01 DIAGNOSIS — I10 ESSENTIAL HYPERTENSION: ICD-10-CM

## 2019-05-01 DIAGNOSIS — R63.4 RAPID WEIGHT LOSS: ICD-10-CM

## 2019-05-01 DIAGNOSIS — F02.80 ALZHEIMER'S DISEASE OF OTHER ONSET WITHOUT BEHAVIORAL DISTURBANCE: ICD-10-CM

## 2019-05-01 DIAGNOSIS — F51.01 PRIMARY INSOMNIA: ICD-10-CM

## 2019-05-01 DIAGNOSIS — G30.8 ALZHEIMER'S DISEASE OF OTHER ONSET WITHOUT BEHAVIORAL DISTURBANCE: ICD-10-CM

## 2019-05-01 DIAGNOSIS — R00.2 RAPID PALPITATIONS: ICD-10-CM

## 2019-05-01 DIAGNOSIS — R63.4 UNEXPLAINED WEIGHT LOSS: ICD-10-CM

## 2019-05-01 RX ORDER — TEMAZEPAM 15 MG/1
15 CAPSULE ORAL
Qty: 30 CAP | Refills: 0 | Status: SHIPPED | OUTPATIENT
Start: 2019-05-01 | End: 2020-10-22 | Stop reason: SDUPTHER

## 2019-05-01 NOTE — TELEPHONE ENCOUNTER
PCP: Chetna Qureshi MD    Last appt: 4/3/2019  Future Appointments   Date Time Provider Sonia Irizarry   7/1/2019 10:20 AM Chetna Qureshi MD [address]       Requested Prescriptions     Pending Prescriptions Disp Refills    temazepam (RESTORIL) 15 mg capsule 30 Cap 0     Sig: Take 1 Cap by mouth nightly as needed for Sleep. Max Daily Amount: 15 mg.

## 2019-05-02 RX ORDER — AMLODIPINE BESYLATE 10 MG/1
TABLET ORAL
Qty: 90 TAB | Refills: 1 | Status: ON HOLD | OUTPATIENT
Start: 2019-05-02 | End: 2019-11-01 | Stop reason: SDUPTHER

## 2019-05-02 RX ORDER — LOSARTAN POTASSIUM AND HYDROCHLOROTHIAZIDE 25; 100 MG/1; MG/1
TABLET ORAL
Qty: 90 TAB | Refills: 1 | Status: SHIPPED | OUTPATIENT
Start: 2019-05-02 | End: 2020-06-23

## 2019-05-02 RX ORDER — DONEPEZIL HYDROCHLORIDE 10 MG/1
TABLET, FILM COATED ORAL
Qty: 90 TAB | Refills: 1 | Status: SHIPPED | OUTPATIENT
Start: 2019-05-02 | End: 2020-06-23

## 2019-05-02 NOTE — TELEPHONE ENCOUNTER
RX refill request from the patient/pharmacy. Patient last seen 04- with labs, and next appt. scheduled for 07-  Requested Prescriptions     Pending Prescriptions Disp Refills    losartan-hydroCHLOROthiazide (HYZAAR) 100-25 mg per tablet [Pharmacy Med Name: LOSARTAN-HCTZ 100-25 MG TAB] 90 Tab 1     Sig: TAKE 1 TABLET EVERY DAY    amLODIPine (NORVASC) 10 mg tablet [Pharmacy Med Name: AMLODIPINE BESYLATE 10 MG TAB] 90 Tab 1     Sig: TAKE 1 TABLET BY MOUTH DAILY    donepezil (ARICEPT) 10 mg tablet [Pharmacy Med Name: DONEPEZIL HCL 10 MG TABLET] 90 Tab 1     Sig: TAKE 1 TABLET BY MOUTH EVERY DAY   .

## 2019-05-15 RX ORDER — MEMANTINE HYDROCHLORIDE 10 MG/1
TABLET ORAL
Qty: 180 TAB | Refills: 3 | Status: SHIPPED | OUTPATIENT
Start: 2019-05-15 | End: 2020-06-23 | Stop reason: SDUPTHER

## 2019-05-15 NOTE — TELEPHONE ENCOUNTER
PCP: Macho Jenkins MD    Last appt: 4/3/2019  Future Appointments   Date Time Provider Sonia Irizarry   7/1/2019 10:20 AM Macho Jenkins MD [facility] SCHED       Requested Prescriptions     Pending Prescriptions Disp Refills    memantine (NAMENDA) 10 mg tablet 180 Tab 3     Sig: TAKE 1 TABLET BY MOUTH TWICE A DAY

## 2019-09-04 NOTE — PROGRESS NOTES
This is a Subsequent Medicare Annual Wellness Visit providing Personalized Prevention Plan Services (PPPS) (Performed 12 months after initial AWV and PPPS )    I have reviewed the patient's medical history in detail and updated the computerized patient record. He returns today for his Medicare subsequent annual wellness examination and screening questionnaire. He is also here in follow-up of his multiple medical problems include hypertension, diabetes, hyperlipidemia, ASCVD as well as a history of SVT and sick sinus syndrome, prior TIA, prior alcoholism, mild Alzheimer's dementia, CKD stage II and other multiple medical problems. He currently denies any chest pain, shortness of breath, palpitations, PND, orthopnea or other cardiorespiratory complaints. He denies any GI or  complaints. He denies any headaches, dizziness or neurologic complaints. He currently denies any change of his chronic arthritic complaints. He denies any other complaints on complete review of systems. He does claim to be taking his medications and remains physically active.     History     Past Medical History:   Diagnosis Date    Abdominal pain 7/18/2017    Alzheimer disease 7/18/2017    Anemia 7/18/2017    Arthritis     Back pain 7/18/2017    Bradycardia 7/18/2017    CAD (coronary artery disease)     hx of MI    CKD (chronic kidney disease), stage II 7/18/2017    constipation     Depression 7/18/2017    Diabetes mellitus ([facility] [address] 7/18/2017    Diverticulosis 7/18/2017    DJD (degenerative joint disease) 7/18/2017    Encounter for long-term (current) use of other medications 7/18/2017    Fatigue     Gastritis and duodenitis 7/18/2017    GERD (gastroesophageal reflux disease)     GI bleed 7/18/2017    Hearing loss     Hyperlipidemia 7/18/2017    Hypertension     Hypertension with renal disease 7/18/2017    Ill-defined condition     Dementia    Internal hemorrhoids 7/18/2017    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    S/P cardiac pacemaker procedure 2015    5/4/15 Medtronic dual chamber pacemaker implant     Weight loss     lost over 40 pounds last year- has no appetite      Past Surgical History:   Procedure Laterality Date    COLONOSCOPY N/A 2017    COLONOSCOPY performed by Estelita Mcpherson MD at [facility][note 3] [facility]  2017         [address]  7/10/2014    right inguinal    HX OTHER SURGICAL      cystectomy from back    NH EGD TRANSORAL BIOPSY SINGLE/MULTIPLE  2012         UPPER GI ENDOSCOPY,BIOPSY  2017          Social History     Tobacco Use    Smoking status: Former Smoker     Packs/day: 0.50     Years: 10.00     Pack years: 5.00     Start date: 1991    Smokeless tobacco: Never Used    Tobacco comment: quit 50 years ago   Substance Use Topics    Alcohol use: No    Drug use: No     Current Outpatient Medications   Medication Sig Dispense Refill    memantine (NAMENDA) 10 mg tablet TAKE 1 TABLET BY MOUTH TWICE A  Tab 3    losartan-hydroCHLOROthiazide (HYZAAR) 100-25 mg per tablet TAKE 1 TABLET EVERY DAY 90 Tab 1    amLODIPine (NORVASC) 10 mg tablet TAKE 1 TABLET BY MOUTH DAILY 90 Tab 1    donepezil (ARICEPT) 10 mg tablet TAKE 1 TABLET BY MOUTH EVERY DAY 90 Tab 1    temazepam (RESTORIL) 15 mg capsule Take 1 Cap by mouth nightly as needed for Sleep. Max Daily Amount: 15 mg. 30 Cap 0    diclofenac EC (VOLTAREN) 75 mg EC tablet Take 1 Tab by mouth two (2) times a day. 60 Tab prn    mv-mins-folic-lycopene-ginkgo (ONE-A-DAY MEN 50 PLUS, GINKGO,) [phone] mcg-mcg-mg tab Take  by mouth.  aspirin 81 mg chewable tablet Take 1 Tab by mouth daily. 30 Tab 1    omeprazole (PRILOSEC) 20 mg capsule Take 1 Cap by mouth daily.  90 Cap 3     No Known Allergies  Family History   Problem Relation Age of Onset    Hypertension Mother     Heart Disease Father     Cancer Other         son-cancer? unknown what type  Patient Active Problem List    Diagnosis    Hypertension with renal disease    Mixed hyperlipidemia    Primary osteoarthritis involving multiple joints    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, without long-term current use of insulin (HCC)    CKD (chronic kidney disease), stage II    Alzheimer disease    ASCVD (arteriosclerotic cardiovascular disease)    Diverticulosis    [facility]SS [facility]sick [facility] syndrome) ([facility])    S/P cardiac pacemaker procedure     5/4/15 Medtronic dual chamber pacemaker implant        SVT (supraventricular tachycardia) ([facility])--s/p RFA    Syncope    Primary insomnia    Mild depression ([address]    Acute bilateral low back pain without sciatica    Alcoholism ([facility] [address]    Metabolic encephalopathy    TIA (transient ischemic attack)    Medicare annual wellness visit, subsequent    Gastritis and duodenitis    Internal hemorrhoids    GI bleed    Back pain    Anemia    S/P ablation of accessory bypass tract     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      Near syncope    Hypokalemia    Right inguinal hernia       Patient Care Team:  Jinny Lanza MD as PCP - General (Internal Medicine)  Yariel Dumont MD as Physician (Cardiology)  Castro Bejarano MD as Physician (Cardiology)  Janene Serrano NP (Cardiology)    Depression Risk Factor Screening:     3 most recent PHQ Screens 9/5/2019   Little interest or pleasure in doing things Not at all   Feeling down, depressed, irritable, or hopeless Not at all   Total Score PHQ 2 0     Alcohol Risk Factor Screening: You do not drink alcohol or very rarely. Functional Ability and Level of Safety:     Fall Risk     Fall Risk Assessment, last 12 mths 9/5/2019   Able to walk? Yes   Fall in past 12 months? Yes   Fall with injury? No   Number of falls in past 12 months 1   Fall Risk Score 1       Hearing Loss   mild    Activities of Daily Living   Self-care.    ADL Assessment 9/5/2019   Feeding yourself No Help Needed Getting from bed to chair No Help Needed   Getting dressed No Help Needed   Bathing or showering No Help Needed   Walk across the room (includes cane/walker) No Help Needed   Using the telphone No Help Needed   Taking your medications Help Needed   Preparing meals Help Needed   Managing money (expenses/bills) Help Needed   Moderately strenuous housework (laundry) No Help Needed   Shopping for personal items (toiletries/medicines) Help Needed   Shopping for groceries Help Needed   Driving Help Needed   Climbing a flight of stairs No Help Needed   Getting to places beyond walking distances No Help Needed       Abuse Screen   Patient is not abused    Social History     Social History Narrative    Not on file       Review of Systems      ROS:    Constitutional: He denies fevers, weight loss, sweats. Eyes: No blurred or double vision. ENT: No difficulty with swallowing, taste, speech or smell. Neck: no stiffness or swelling  Respiratory: No cough wheezing or shortness of breath. Cardiovascular: Denies chest pain, palpitations, unexplained indigestion or syncope. Gastrointestinal:  No changes in bowel movements, no abdominal pain, no bloating. Genitourinary:  He denies frequency, nocturia or stranguria. Extremities: No change of his chronic joint pain, stiffness or swelling. Neurological:  No numbness, tingling, burring paresthesias or loss of motor strength. No syncope, dizziness or frequent headache  Lymphatic: no adenopathy noted  Hematologic: no easy bruising or bleeding gums  Skin:  No recent rashes or mole changes. Psychiatric/Behavioral:  Negative for depression.       Physical Examination     Evaluation of Cognitive Function:  Mood/affect:  happy  Appearance: age appropriate  Family member/caregiver input: none    Visit Vitals  /88 (BP 1 Location: Left arm, BP Patient Position: Sitting)   Pulse 81   Temp 97.5 °F (36.4 °C) (Oral)   Resp 18   Ht 5' 7\" (1.702 m)   Wt 136 lb (61.7 kg)   SpO2 98% BMI 21.30 kg/m²     Vitals:    09/05/19 1034   BP: 128/88   Pulse: 81   Resp: 18   Temp: 97.5 °F (36.4 °C)   TempSrc: Oral   SpO2: 98%   Weight: 136 lb (61.7 kg)   Height: 5' 7\" (1.702 m)   PainSc:   0 - No pain        PHYSICAL EXAM:    General appearance - alert, well appearing, and in no distress  Mental status - alert, oriented to person, place, and time  HEENT:  Ears - bilateral TM's and external ear canals clear  Eyes - pupillary responses were normal.  Extraocular muscle function intact. Lids and conjunctiva not injected. Fundoscopic exam revealed sharp disc margins. eye movements intact  Pharynx- clear with teeth in good repair. No masses were noted  Neck - supple without thyromegaly or burit. No JVD noted  Lungs - clear to auscultation and percussion  Cardiac- normal rate, regular rhythm without murmurs. PMI not displaced. No gallop, rub or click  Abdomen - flat, soft, non-tender without palpable organomegaly or mass. No pulsatile mass was felt, and not bruit was heard. Bowel sounds were active  : Circumcised, Testes descended w/o masses  Rectal: Deferred at his request  Extremities -  no clubbing cyanosis or edema  Lymphatics - no palpable lymphadenopathy, no hepatosplenomegaly  Hematologic: no petechiae or purpura  Peripheral vascular -Femoral, Dorsalis pedis and posterior tibial pulses felt without difficulty  Skin - no rash or unusual mole change noted  Neurological - Cranial nerves II-XII grossly intact. Motor strength 5/5. DTR's 2+ and symmetric. Station and gait normal  Back exam - full range of motion, no tenderness, palpable spasm or pain on motion  Musculoskeletal - no joint tenderness, deformity or swelling        Results for orders placed or performed in visit on 04/10/19   HEPATIC FUNCTION PANEL   Result Value Ref Range    Protein, total 6.5 6.0 - 8.5 g/dL    Albumin 3.7 3.5 - 4.7 g/dL    Bilirubin, total 0.2 0.0 - 1.2 mg/dL    Bilirubin, direct 0.07 0.00 - 0.40 mg/dL    Alk. phosphatase 203 (H) 39 - 117 IU/L    AST (SGOT) 28 0 - 40 IU/L    ALT (SGPT) 17 0 - 44 IU/L       Advice/Referrals/Counseling   Education and counseling provided:  Are appropriate based on today's review and evaluation  End-of-Life planning (with patient's consent)  Pneumococcal Vaccine  Influenza Vaccine  Colorectal cancer screening tests      Assessment/Plan     ASSESSMENT:   1. Hypertension with renal disease    2. 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, without long-term current use of insulin ([facility] [address]    3. Mixed hyperlipidemia    4. ASCVD (arteriosclerotic cardiovascular disease)    5. Primary osteoarthritis involving multiple joints    6. CKD (chronic kidney disease), stage II    7. Alzheimer's dementia without behavioral disturbance, unspecified timing of dementia onset    8. SSS (sick sinus syndrome) (HCC)    9. S/P cardiac pacemaker procedure    10. TIA (transient ischemic attack)    11. Alcoholism ([facility] [address]    12. Mild depression ([facility] [address]    13. Medicare annual wellness visit, subsequent      Impression  1. Hypertension that is controlled so continue current therapy reviewed with him. 2.  Diabetes repeat status pending and prior lab reviewed no make adjustments if necessary. 3   Hyperlipidemia prior lab reviewed and repeat status pending and I will adjust if needed. 4.  ASCVD clinically stable continue aspirin daily an EKG obtained today reveals no acute process. 5. DJD that is stable  6. CKD stage II repeat status pending  7. Alzheimer's dementia he is alert and oriented to the year as well as his name and knows me but does seem a little confused to time. 8.  Sick sinus syndrome stable  9. Status post pacemaker placement EKG obtained today reveals normal function  10. Prior TIA continue aspirin as noted  11.   Alcoholism by history he claims to drink rarely now and says I \"cannot room her last time I had a drink \"I did caution regarding excessive alcohol intake as has been his history in the past including increased risk of liver disease and other GI problems and increased cardiovascular risk particular cardiac arrhythmias and cardiomyopathy. 12.  Depression that is stable  Medicare subsequent annual wellness examination and screening questionnaire is completed today. The results were reviewed with him and his questions were answered. Lifestyle recommendations modifications discussed and made. I will call with lab results and make further recommendations or adjustments if necessary. Follow-up in 3 months or sooner if there is a problem. PLAN:  .  Orders Placed This Encounter    HEMOGLOBIN A1C WITH EAG    T4, FREE    TSH 3RD GENERATION    CBC WITH AUTOMATED DIFF    METABOLIC PANEL, COMPREHENSIVE ([facility])    LIPID PANEL ([facility])    CK ([facility])    URINALYSIS W/O MICRO ([facility])    URINE, MICROALBUMIN, SEMIQUANTITATIVE ([facility])    AMB POC EKG ROUTINE W/ 12 LEADS, INTER & REP         ATTENTION:   This medical record was transcribed using an electronic medical records system. Although proofread, it may and can contain electronic and spelling errors. Other human spelling and other errors may be present. Corrections may be executed at a later time. Please feel free to contact us for any clarifications as needed. Follow-up and Dispositions    · Return in about 3 months (around 12/5/2019). Daron Crump MD    Recommended healthy diet low in carbohydrates, fats, sodium and cholesterol. Recommended regular cardiovascular exercise 3-6 times per week for 30-60 minutes daily.       Current Outpatient Medications   Medication Sig Dispense Refill    memantine (NAMENDA) 10 mg tablet TAKE 1 TABLET BY MOUTH TWICE A  Tab 3    losartan-hydroCHLOROthiazide (HYZAAR) 100-25 mg per tablet TAKE 1 TABLET EVERY DAY 90 Tab 1    amLODIPine (NORVASC) 10 mg tablet TAKE 1 TABLET BY MOUTH DAILY 90 Tab 1    donepezil (ARICEPT) 10 mg tablet TAKE 1 TABLET BY MOUTH EVERY DAY 90 Tab 1    temazepam (RESTORIL) 15 mg capsule Take 1 Cap by mouth nightly as needed for Sleep. Max Daily Amount: 15 mg. 30 Cap 0    diclofenac EC (VOLTAREN) 75 mg EC tablet Take 1 Tab by mouth two (2) times a day. 60 Tab prn    mv-mins-folic-lycopene-ginkgo (ONE-A-DAY MEN 50 PLUS, GINKGO,) [phone] mcg-mcg-mg tab Take  by mouth.  aspirin 81 mg chewable tablet Take 1 Tab by mouth daily. 30 Tab 1    omeprazole (PRILOSEC) 20 mg capsule Take 1 Cap by mouth daily. 90 Cap 3       No results found for any visits on 09/05/19. Verbal and written instructions (see AVS) provided. Patient expresses understanding of diagnosis and treatment plan.     Tomeka Bahena MD

## 2019-09-05 ENCOUNTER — OFFICE VISIT (OUTPATIENT)
Dept: INTERNAL MEDICINE CLINIC | Age: 84
End: 2019-09-05

## 2019-09-05 VITALS
OXYGEN SATURATION: 98 % | RESPIRATION RATE: 18 BRPM | SYSTOLIC BLOOD PRESSURE: 128 MMHG | WEIGHT: 136 LBS | TEMPERATURE: 97.5 F | HEIGHT: 67 IN | HEART RATE: 81 BPM | BODY MASS INDEX: 21.35 KG/M2 | DIASTOLIC BLOOD PRESSURE: 88 MMHG

## 2019-09-05 DIAGNOSIS — G45.9 TIA (TRANSIENT ISCHEMIC ATTACK): ICD-10-CM

## 2019-09-05 DIAGNOSIS — N18.2 CONTROLLED TYPE 2 DIABETES MELLITUS WITH STAGE 2 CHRONIC KIDNEY DISEASE, WITHOUT LONG-TERM CURRENT USE OF INSULIN (HCC): ICD-10-CM

## 2019-09-05 DIAGNOSIS — N18.2 CKD (CHRONIC KIDNEY DISEASE), STAGE II: ICD-10-CM

## 2019-09-05 DIAGNOSIS — F02.80 ALZHEIMER'S DEMENTIA WITHOUT BEHAVIORAL DISTURBANCE, UNSPECIFIED TIMING OF DEMENTIA ONSET: ICD-10-CM

## 2019-09-05 DIAGNOSIS — F32.A MILD DEPRESSION: ICD-10-CM

## 2019-09-05 DIAGNOSIS — I49.5 SSS (SICK SINUS SYNDROME) (HCC): ICD-10-CM

## 2019-09-05 DIAGNOSIS — Z95.0 S/P CARDIAC PACEMAKER PROCEDURE: ICD-10-CM

## 2019-09-05 DIAGNOSIS — N39.0 URINARY TRACT INFECTION WITHOUT HEMATURIA, SITE UNSPECIFIED: ICD-10-CM

## 2019-09-05 DIAGNOSIS — E11.22 CONTROLLED TYPE 2 DIABETES MELLITUS WITH STAGE 2 CHRONIC KIDNEY DISEASE, WITHOUT LONG-TERM CURRENT USE OF INSULIN (HCC): ICD-10-CM

## 2019-09-05 DIAGNOSIS — F10.20 ALCOHOLISM (HCC): ICD-10-CM

## 2019-09-05 DIAGNOSIS — Z00.00 MEDICARE ANNUAL WELLNESS VISIT, SUBSEQUENT: ICD-10-CM

## 2019-09-05 DIAGNOSIS — I12.9 HYPERTENSION WITH RENAL DISEASE: Primary | ICD-10-CM

## 2019-09-05 DIAGNOSIS — G30.9 ALZHEIMER'S DEMENTIA WITHOUT BEHAVIORAL DISTURBANCE, UNSPECIFIED TIMING OF DEMENTIA ONSET: ICD-10-CM

## 2019-09-05 DIAGNOSIS — M15.9 PRIMARY OSTEOARTHRITIS INVOLVING MULTIPLE JOINTS: ICD-10-CM

## 2019-09-05 DIAGNOSIS — I25.10 ASCVD (ARTERIOSCLEROTIC CARDIOVASCULAR DISEASE): ICD-10-CM

## 2019-09-05 DIAGNOSIS — E78.2 MIXED HYPERLIPIDEMIA: ICD-10-CM

## 2019-09-05 LAB
A-G RATIO,AGRAT: 1.3 RATIO
ALBUMIN SERPL-MCNC: 4.1 G/DL (ref 3.9–5.4)
ALP SERPL-CCNC: 90 U/L (ref 38–126)
ALT SERPL-CCNC: 16 U/L (ref 0–50)
ANION GAP SERPL CALC-SCNC: 12 MMOL/L
AST SERPL W P-5'-P-CCNC: 30 U/L (ref 14–36)
BILIRUB SERPL-MCNC: 0.5 MG/DL (ref 0.2–1.3)
BILIRUB UR QL: NEGATIVE
BUN SERPL-MCNC: 19 MG/DL (ref 9–20)
BUN/CREATININE RATIO,BUCR: 14 RATIO
CALCIUM SERPL-MCNC: 9.4 MG/DL (ref 8.4–10.2)
CHLORIDE SERPL-SCNC: 105 MMOL/L (ref 98–107)
CHOL/HDL RATIO,CHHD: 2 RATIO (ref 0–4)
CHOLEST SERPL-MCNC: 187 MG/DL (ref 0–200)
CK SERPL-CCNC: 111 U/L (ref 30–135)
CLARITY: CLEAR
CO2 SERPL-SCNC: 28 MMOL/L (ref 22–32)
COLOR UR: ABNORMAL
CREAT SERPL-MCNC: 1.4 MG/DL (ref 0.8–1.5)
GLOBULIN,GLOB: 3.1
GLUCOSE 24H UR-MRATE: NEGATIVE G/(24.H)
GLUCOSE SERPL-MCNC: 94 MG/DL (ref 75–110)
HDLC SERPL-MCNC: 75 MG/DL (ref 35–130)
HGB UR QL STRIP: ABNORMAL
KETONES UR QL STRIP.AUTO: NEGATIVE
LDL/HDL RATIO,LDHD: 1 RATIO
LDLC SERPL CALC-MCNC: 98 MG/DL (ref 0–130)
LEUKOCYTE ESTERASE: NEGATIVE
MICROALBUMIN, URINE: NEGATIVE MG/L (ref 0–20)
NITRITE UR QL STRIP.AUTO: NEGATIVE
PH UR STRIP: 6 [PH] (ref 5–7)
POTASSIUM SERPL-SCNC: 4.2 MMOL/L (ref 3.6–5)
PROT SERPL-MCNC: 7.2 G/DL (ref 6.3–8.2)
PROT UR STRIP-MCNC: NEGATIVE MG/DL
RBC #/AREA URNS HPF: ABNORMAL #/HPF
SODIUM SERPL-SCNC: 145 MMOL/L (ref 137–145)
SP GR UR REFRACTOMETRY: 1.01 (ref 1–1.03)
TRIGL SERPL-MCNC: 70 MG/DL (ref 0–200)
UROBILINOGEN UR QL STRIP.AUTO: NEGATIVE
VLDLC SERPL CALC-MCNC: 14 MG/DL
WBC URNS QL MICRO: 0 #/HPF

## 2019-09-05 NOTE — PROGRESS NOTES
Chief Complaint   Patient presents with   [facility] Annual Wellness Visit     Visit Vitals  /88 (BP 1 Location: Left arm, BP Patient Position: Sitting)   Pulse 81   Temp 97.5 °F (36.4 °C) (Oral)   Resp 18   Ht 5' 7\" (1.702 m)   Wt 136 lb (61.7 kg)   SpO2 98%   BMI 21.30 kg/m²     1. Have you been to the ER, urgent care clinic since your last visit? Hospitalized since your last visit? No    2. Have you seen or consulted any other health care providers outside of the [address] [place] [address] since your last visit? Include any pap smears or colon screening.  No

## 2019-09-05 NOTE — PATIENT INSTRUCTIONS
Alzheimer's Disease: Care Instructions  Your Care Instructions    Alzheimer's disease is a type of dementia. It causes memory loss and affects judgment, language, and behavior. You may have trouble making decisions or may get lost in places that you used to know well. Alzheimer's disease is different than mild memory loss that occurs with aging. It is not clear what causes Alzheimer's disease, but it is the most common form of dementia in older adults. Finding out that you have this disease is a shock. You may be afraid and worried about how the condition will change your life. Although there is no cure at this time, medicine in some cases may slow memory loss for a while. Other medicines may be able to help you sleep or cope with depression and behavior changes. Alzheimer's disease is different for everyone. It may take many years to develop. In some cases, people can function well for a long time. In the early stage of the disease, you can do things at home to make life easier and safer. You also can keep doing your hobbies and other activities. Many people find comfort in planning now for their future needs. Follow-up care is a key part of your treatment and safety. Be sure to make and go to all appointments, and call your doctor if you are having problems. It's also a good idea to know your test results and keep a list of the medicines you take. How can you care for yourself at home? Taking care of yourself  · If your doctor gives you medicines, take them exactly as prescribed. Call your doctor if you think you are having a problem with your medicine. You will get more details on the medicines your doctor prescribes. · Eat a balanced diet. Get plenty of whole grains, fruits, and vegetables every day. If you are not hungry at mealtimes, eat snacks at midmorning and in the afternoon. Try drinks such as Boost, Ensure, or Sustacal if you are having trouble keeping your weight up. · Stay active.  Exercise such as walking may slow the decline of your mental abilities. Try to stay active mentally too. Read and work crossword puzzles if you enjoy these activities. · If you have trouble sleeping, do not nap during the day. Get regular exercise (but not within several hours of bedtime). Drink a glass of warm milk or caffeine-free herbal tea before going to bed. · Ask your doctor about support groups and other resources in your area. They can help people who have Alzheimer's disease and their families. · Be patient. You may find that a task takes you longer than it used to. · If you have not already done so, make a list of advance directives. Advance directives are instructions to your doctor and family members about what kind of care you want if you become unable to speak or express yourself. Talk to a  about making a will, if you do not already have one. Keeping schedules  · Develop a routine. You will feel less frustrated or confused if you have a clear, simple plan of what to do every day. ? Make lists of your medicines and when to take them. ? Write down appointments and other tasks in a calendar. ? Put sticky notes around the house to help you remember events and other things you have to do. ? Schedule activities and tasks for times of the day when you are best able to handle them. Staying safe  · Tell someone when you are going out and where you are going. Let the person know when you will be back. Before you go out alone, write down where you are going, how to get there, and how to get back home. Do this even if you have gone there many times before. Take someone along with you when possible. · Make your home safe. Tack down rugs, put no-slip tape in the tub, use handrails, and put safety switches on stoves and appliances. · Have a family member or other caregiver tell you whether you are driving badly. Deciding to stop driving is very hard for many people. Driving helps you feel independent.  Your state 's license bureau can do a driving test if there is any question. Plan for other means of getting around when you are no longer able to drive. · Use strong lighting, especially at night. Put night-lights in bedrooms, hallways, and bathrooms. · Lower the hot water temperature setting to 120°F or lower to avoid burns. When should you call for help? Call 911 anytime you think you may need emergency care. For example, call if:    · You are lost and do not know whom to call.     · You are injured and do not know whom to call.    Call your doctor now or seek immediate medical care if:    · Your symptoms suddenly get much worse.    Watch closely for changes in your health, and be sure to contact your doctor if:    · You want more information about how you can take care of yourself. Where can you learn more? Go to http://zach-herlinda.info/. Enter Y179 in the search box to learn more about \"Alzheimer's Disease: Care Instructions. \"  Current as of: September 11, 2018  Content Version: 12.1  © 1711-0338 [employer]. Care instructions adapted under license by [employer] (which disclaims liability or warranty for this information). If you have questions about a medical condition or this instruction, always ask your healthcare professional. [address] any warranty or liability for your use of this information.

## 2019-09-06 LAB
BASOPHILS # BLD AUTO: 0.1 X10E3/UL (ref 0–0.2)
BASOPHILS NFR BLD AUTO: 1 %
EOSINOPHIL # BLD AUTO: 0.2 X10E3/UL (ref 0–0.4)
EOSINOPHIL NFR BLD AUTO: 3 %
ERYTHROCYTE [DISTWIDTH] IN BLOOD BY AUTOMATED COUNT: 12.2 % (ref 12.3–15.4)
EST. AVERAGE GLUCOSE BLD GHB EST-MCNC: 111 MG/DL
HBA1C MFR BLD: 5.5 % (ref 4.8–5.6)
HCT VFR BLD AUTO: 44.2 % (ref 37.5–51)
HGB BLD-MCNC: 13.6 G/DL (ref 13–17.7)
IMM GRANULOCYTES # BLD AUTO: 0 X10E3/UL (ref 0–0.1)
IMM GRANULOCYTES NFR BLD AUTO: 0 %
LYMPHOCYTES # BLD AUTO: 1.2 X10E3/UL (ref 0.7–3.1)
LYMPHOCYTES NFR BLD AUTO: 27 %
MCH RBC QN AUTO: 26.1 PG (ref 26.6–33)
MCHC RBC AUTO-ENTMCNC: 30.8 G/DL (ref 31.5–35.7)
MCV RBC AUTO: 85 FL (ref 79–97)
MONOCYTES # BLD AUTO: 0.3 X10E3/UL (ref 0.1–0.9)
MONOCYTES NFR BLD AUTO: 7 %
NEUTROPHILS # BLD AUTO: 2.8 X10E3/UL (ref 1.4–7)
NEUTROPHILS NFR BLD AUTO: 62 %
PLATELET # BLD AUTO: 240 X10E3/UL (ref 150–450)
RBC # BLD AUTO: 5.22 X10E6/UL (ref 4.14–5.8)
T4 FREE SERPL-MCNC: 1.05 NG/DL (ref 0.82–1.77)
TSH SERPL DL<=0.005 MIU/L-ACNC: 4.71 UIU/ML (ref 0.45–4.5)
WBC # BLD AUTO: 4.6 X10E3/UL (ref 3.4–10.8)

## 2019-09-07 LAB — BACTERIA UR CULT: NO GROWTH

## 2019-09-09 NOTE — PROGRESS NOTES
Lab results are good except for the TSH is slightly elevated but the thyroid otherwise is normal so I would not make any medicine.

## 2019-09-10 NOTE — PROGRESS NOTES
Lab results are good except for the TSH is slightly elevated but the thyroid otherwise is normal so I would not make any medicine. Note sent to patient regarding.

## 2019-10-27 ENCOUNTER — APPOINTMENT (OUTPATIENT)
Dept: CT IMAGING | Age: 84
End: 2019-10-27
Attending: EMERGENCY MEDICINE
Payer: MEDICARE

## 2019-10-27 ENCOUNTER — APPOINTMENT (OUTPATIENT)
Dept: CT IMAGING | Age: 84
DRG: 308 | End: 2019-10-27
Attending: INTERNAL MEDICINE
Payer: MEDICARE

## 2019-10-27 ENCOUNTER — APPOINTMENT (OUTPATIENT)
Dept: NON INVASIVE DIAGNOSTICS | Age: 84
DRG: 308 | End: 2019-10-27
Attending: INTERNAL MEDICINE
Payer: MEDICARE

## 2019-10-27 ENCOUNTER — HOSPITAL ENCOUNTER (EMERGENCY)
Age: 84
Discharge: ANOTHER HOSPITAL | End: 2019-10-27
Attending: EMERGENCY MEDICINE
Payer: MEDICARE

## 2019-10-27 ENCOUNTER — APPOINTMENT (OUTPATIENT)
Dept: GENERAL RADIOLOGY | Age: 84
End: 2019-10-27
Attending: EMERGENCY MEDICINE
Payer: MEDICARE

## 2019-10-27 ENCOUNTER — HOSPITAL ENCOUNTER (INPATIENT)
Age: 84
LOS: 5 days | Discharge: HOME | DRG: 308 | End: 2019-11-01
Attending: INTERNAL MEDICINE | Admitting: INTERNAL MEDICINE
Payer: MEDICARE

## 2019-10-27 VITALS
BODY MASS INDEX: 21.27 KG/M2 | WEIGHT: 143.6 LBS | SYSTOLIC BLOOD PRESSURE: 165 MMHG | TEMPERATURE: 97.8 F | OXYGEN SATURATION: 100 % | HEART RATE: 71 BPM | DIASTOLIC BLOOD PRESSURE: 95 MMHG | HEIGHT: 69 IN | RESPIRATION RATE: 18 BRPM

## 2019-10-27 DIAGNOSIS — M15.9 PRIMARY OSTEOARTHRITIS INVOLVING MULTIPLE JOINTS: ICD-10-CM

## 2019-10-27 DIAGNOSIS — F10.20 ALCOHOLISM (HCC): ICD-10-CM

## 2019-10-27 DIAGNOSIS — I49.5 SSS (SICK SINUS SYNDROME) (HCC): ICD-10-CM

## 2019-10-27 DIAGNOSIS — E11.22 CONTROLLED TYPE 2 DIABETES MELLITUS WITH STAGE 2 CHRONIC KIDNEY DISEASE, WITHOUT LONG-TERM CURRENT USE OF INSULIN (HCC): ICD-10-CM

## 2019-10-27 DIAGNOSIS — N18.2 CKD (CHRONIC KIDNEY DISEASE), STAGE II: ICD-10-CM

## 2019-10-27 DIAGNOSIS — R63.4 RAPID WEIGHT LOSS: ICD-10-CM

## 2019-10-27 DIAGNOSIS — I47.1 ATRIAL TACHYCARDIA (HCC): ICD-10-CM

## 2019-10-27 DIAGNOSIS — R11.10 ACUTE VOMITING: ICD-10-CM

## 2019-10-27 DIAGNOSIS — J90 BILATERAL PLEURAL EFFUSION: ICD-10-CM

## 2019-10-27 DIAGNOSIS — I47.1 SVT (SUPRAVENTRICULAR TACHYCARDIA) (HCC): ICD-10-CM

## 2019-10-27 DIAGNOSIS — G30.9 ALZHEIMER'S DEMENTIA WITH BEHAVIORAL DISTURBANCE, UNSPECIFIED TIMING OF DEMENTIA ONSET: ICD-10-CM

## 2019-10-27 DIAGNOSIS — J81.0 ACUTE PULMONARY EDEMA (HCC): ICD-10-CM

## 2019-10-27 DIAGNOSIS — I50.9 ACUTE CONGESTIVE HEART FAILURE, UNSPECIFIED HEART FAILURE TYPE (HCC): ICD-10-CM

## 2019-10-27 DIAGNOSIS — R07.9 ACUTE CHEST PAIN: ICD-10-CM

## 2019-10-27 DIAGNOSIS — I21.4 NSTEMI (NON-ST ELEVATED MYOCARDIAL INFARCTION) (HCC): Primary | ICD-10-CM

## 2019-10-27 DIAGNOSIS — E78.2 MIXED HYPERLIPIDEMIA: ICD-10-CM

## 2019-10-27 DIAGNOSIS — R77.8 ELEVATED TROPONIN: ICD-10-CM

## 2019-10-27 DIAGNOSIS — Z95.0 S/P CARDIAC PACEMAKER PROCEDURE: ICD-10-CM

## 2019-10-27 DIAGNOSIS — I10 ACCELERATED HYPERTENSION: ICD-10-CM

## 2019-10-27 DIAGNOSIS — I12.9 HYPERTENSION WITH RENAL DISEASE: ICD-10-CM

## 2019-10-27 DIAGNOSIS — F02.81 ALZHEIMER'S DEMENTIA WITH BEHAVIORAL DISTURBANCE, UNSPECIFIED TIMING OF DEMENTIA ONSET: ICD-10-CM

## 2019-10-27 DIAGNOSIS — R63.4 UNEXPLAINED WEIGHT LOSS: ICD-10-CM

## 2019-10-27 DIAGNOSIS — R00.2 RAPID PALPITATIONS: ICD-10-CM

## 2019-10-27 DIAGNOSIS — I25.10 ASCVD (ARTERIOSCLEROTIC CARDIOVASCULAR DISEASE): ICD-10-CM

## 2019-10-27 DIAGNOSIS — N18.2 CONTROLLED TYPE 2 DIABETES MELLITUS WITH STAGE 2 CHRONIC KIDNEY DISEASE, WITHOUT LONG-TERM CURRENT USE OF INSULIN (HCC): ICD-10-CM

## 2019-10-27 DIAGNOSIS — I48.0 PAROXYSMAL ATRIAL FIBRILLATION (HCC): ICD-10-CM

## 2019-10-27 DIAGNOSIS — I10 ESSENTIAL HYPERTENSION: ICD-10-CM

## 2019-10-27 PROBLEM — I48.91 A-FIB (HCC): Status: ACTIVE | Noted: 2019-10-27

## 2019-10-27 LAB
ALBUMIN SERPL-MCNC: 3.4 G/DL (ref 3.5–5)
ALBUMIN/GLOB SERPL: 0.9 {RATIO} (ref 1.1–2.2)
ALP SERPL-CCNC: 70 U/L (ref 45–117)
ALT SERPL-CCNC: 41 U/L (ref 12–78)
ANION GAP SERPL CALC-SCNC: 10 MMOL/L (ref 5–15)
APTT PPP: 122.1 SEC (ref 22.1–32)
APTT PPP: 24.3 SEC (ref 22.1–32)
AST SERPL-CCNC: 50 U/L (ref 15–37)
AV PEAK GRADIENT: 81.89 MMHG
AV VELOCITY RATIO: 0.58
BASOPHILS # BLD: 0.1 K/UL (ref 0–0.1)
BASOPHILS NFR BLD: 1 % (ref 0–1)
BILIRUB SERPL-MCNC: 0.8 MG/DL (ref 0.2–1)
BNP SERPL-MCNC: ABNORMAL PG/ML (ref 0–450)
BUN SERPL-MCNC: 21 MG/DL (ref 6–20)
BUN/CREAT SERPL: 13 (ref 12–20)
CALCIUM SERPL-MCNC: 9.1 MG/DL (ref 8.5–10.1)
CHLORIDE SERPL-SCNC: 106 MMOL/L (ref 97–108)
CK MB CFR SERPL CALC: 3.7 % (ref 0–2.5)
CK MB SERPL-MCNC: 3.4 NG/ML (ref 5–25)
CK SERPL-CCNC: 103 U/L (ref 39–308)
CK SERPL-CCNC: 91 U/L (ref 39–308)
CO2 SERPL-SCNC: 26 MMOL/L (ref 21–32)
CREAT SERPL-MCNC: 1.67 MG/DL (ref 0.7–1.3)
DIFFERENTIAL METHOD BLD: NORMAL
ECHO AV AREA PEAK VELOCITY: 2.2 CM2
ECHO AV AREA/BSA PEAK VELOCITY: 1.2 CM2/M2
ECHO AV PEAK GRADIENT: 5.2 MMHG
ECHO AV PEAK VELOCITY: 113.63 CM/S
ECHO AV REGURGITANT PHT: 732.2 CM
ECHO EST RA PRESSURE: 10 MMHG
ECHO LA AREA 4C: 20.2 CM2
ECHO LA MAJOR AXIS: 1.76 CM
ECHO LA VOL 2C: 59.13 ML (ref 18–58)
ECHO LA VOL 4C: 51.68 ML (ref 18–58)
ECHO LA VOL BP: 63.19 ML (ref 18–58)
ECHO LA VOL/BSA BIPLANE: 35.28 ML/M2 (ref 16–28)
ECHO LA VOLUME INDEX A2C: 33.01 ML/M2 (ref 16–28)
ECHO LA VOLUME INDEX A4C: 28.85 ML/M2 (ref 16–28)
ECHO LV E' LATERAL VELOCITY: 3.19 CM/S
ECHO LV E' SEPTAL VELOCITY: 3.08 CM/S
ECHO LV EDV A4C: 101.3 ML
ECHO LV EDV INDEX A4C: 56.6 ML/M2
ECHO LV EDV TEICHHOLZ: 0.61 ML
ECHO LV EJECTION FRACTION A4C: 28 %
ECHO LV ESV A4C: 72.5 ML
ECHO LV ESV INDEX A4C: 40.5 ML/M2
ECHO LV ESV TEICHHOLZ: 0.59 ML
ECHO LV INTERNAL DIMENSION DIASTOLIC: 4.8 CM (ref 4.2–5.9)
ECHO LV INTERNAL DIMENSION SYSTOLIC: 4.74 CM
ECHO LV IVSD: 1.19 CM (ref 0.6–1)
ECHO LV MASS 2D: 246.5 G (ref 88–224)
ECHO LV MASS INDEX 2D: 137.6 G/M2 (ref 49–115)
ECHO LV POSTERIOR WALL DIASTOLIC: 1.12 CM (ref 0.6–1)
ECHO LVOT DIAM: 2.19 CM
ECHO LVOT PEAK GRADIENT: 1.8 MMHG
ECHO LVOT PEAK VELOCITY: 66.23 CM/S
ECHO MV A VELOCITY: 84.38 CM/S
ECHO MV E DECELERATION TIME (DT): 335.8 MS
ECHO MV E VELOCITY: 46.36 CM/S
ECHO MV E/A RATIO: 0.55
ECHO MV E/E' LATERAL: 14.53
ECHO MV E/E' RATIO (AVERAGED): 14.79
ECHO MV E/E' SEPTAL: 15.05
ECHO MV REGURGITANT PEAK GRADIENT: 93.7 MMHG
ECHO MV REGURGITANT PEAK VELOCITY: 484.02 CM/S
ECHO PULMONARY ARTERY SYSTOLIC PRESSURE (PASP): 45.4 MMHG
ECHO RA AREA 4C: 16.41 CM2
ECHO RIGHT VENTRICULAR SYSTOLIC PRESSURE (RVSP): 45.4 MMHG
ECHO TV REGURGITANT MAX VELOCITY: 297.53 CM/S
ECHO TV REGURGITANT PEAK GRADIENT: 35.4 MMHG
EOSINOPHIL # BLD: 0.3 K/UL (ref 0–0.4)
EOSINOPHIL NFR BLD: 4 % (ref 0–7)
ERYTHROCYTE [DISTWIDTH] IN BLOOD BY AUTOMATED COUNT: 14.2 % (ref 11.5–14.5)
EST. AVERAGE GLUCOSE BLD GHB EST-MCNC: 111 MG/DL
GLOBULIN SER CALC-MCNC: 3.6 G/DL (ref 2–4)
GLUCOSE BLD STRIP.AUTO-MCNC: 112 MG/DL (ref 65–100)
GLUCOSE BLD STRIP.AUTO-MCNC: 98 MG/DL (ref 65–100)
GLUCOSE SERPL-MCNC: 172 MG/DL (ref 65–100)
HBA1C MFR BLD: 5.5 % (ref 4.2–6.3)
HCT VFR BLD AUTO: 43.2 % (ref 36.6–50.3)
HGB BLD-MCNC: 13.3 G/DL (ref 12.1–17)
IMM GRANULOCYTES # BLD AUTO: 0 K/UL (ref 0–0.04)
IMM GRANULOCYTES NFR BLD AUTO: 0 % (ref 0–0.5)
INR PPP: 1.1 (ref 0.9–1.1)
LIPASE SERPL-CCNC: 71 U/L (ref 73–393)
LVFS 2D: 1.38 %
LVSV (MOD SINGLE 4C): 16.19 ML
LVSV (TEICH): 1.93 ML
LYMPHOCYTES # BLD: 2.1 K/UL (ref 0.8–3.5)
LYMPHOCYTES NFR BLD: 31 % (ref 12–49)
MAGNESIUM SERPL-MCNC: 1.9 MG/DL (ref 1.6–2.4)
MCH RBC QN AUTO: 27.3 PG (ref 26–34)
MCHC RBC AUTO-ENTMCNC: 30.8 G/DL (ref 30–36.5)
MCV RBC AUTO: 88.7 FL (ref 80–99)
MONOCYTES # BLD: 0.5 K/UL (ref 0–1)
MONOCYTES NFR BLD: 8 % (ref 5–13)
MV DEC SLOPE: 1.38
NEUTS SEG # BLD: 3.8 K/UL (ref 1.8–8)
NEUTS SEG NFR BLD: 56 % (ref 32–75)
NRBC # BLD: 0 K/UL (ref 0–0.01)
NRBC BLD-RTO: 0 PER 100 WBC
PISA AR MAX VEL: 452.48 CM/S
PLATELET # BLD AUTO: 244 K/UL (ref 150–400)
PMV BLD AUTO: 11.1 FL (ref 8.9–12.9)
POTASSIUM SERPL-SCNC: 3.4 MMOL/L (ref 3.5–5.1)
PROT SERPL-MCNC: 7 G/DL (ref 6.4–8.2)
PROTHROMBIN TIME: 10.6 SEC (ref 9–11.1)
PULMONARY ARTERY END DIASTOLIC PRESSURE: 19.1 MMHG
PULMONARY ARTERY MEAN PRESURE: 27.9 MMHG
PV END DIASTOLIC VELOCITY: 1.5 MMHG
RBC # BLD AUTO: 4.87 M/UL (ref 4.1–5.7)
SERVICE CMNT-IMP: ABNORMAL
SERVICE CMNT-IMP: NORMAL
SODIUM SERPL-SCNC: 142 MMOL/L (ref 136–145)
THERAPEUTIC RANGE,PTTT: ABNORMAL SECS (ref 58–77)
THERAPEUTIC RANGE,PTTT: NORMAL SECS (ref 58–77)
TROPONIN I BLD-MCNC: 0.05 NG/ML (ref 0–0.08)
TROPONIN I BLD-MCNC: 0.16 NG/ML (ref 0–0.08)
WBC # BLD AUTO: 6.8 K/UL (ref 4.1–11.1)

## 2019-10-27 PROCEDURE — 36415 COLL VENOUS BLD VENIPUNCTURE: CPT

## 2019-10-27 PROCEDURE — 74011250637 HC RX REV CODE- 250/637: Performed by: INTERNAL MEDICINE

## 2019-10-27 PROCEDURE — 93306 TTE W/DOPPLER COMPLETE: CPT

## 2019-10-27 PROCEDURE — 74177 CT ABD & PELVIS W/CONTRAST: CPT

## 2019-10-27 PROCEDURE — 74011250636 HC RX REV CODE- 250/636: Performed by: EMERGENCY MEDICINE

## 2019-10-27 PROCEDURE — 85610 PROTHROMBIN TIME: CPT

## 2019-10-27 PROCEDURE — 71275 CT ANGIOGRAPHY CHEST: CPT

## 2019-10-27 PROCEDURE — 74011000250 HC RX REV CODE- 250: Performed by: INTERNAL MEDICINE

## 2019-10-27 PROCEDURE — 74011250637 HC RX REV CODE- 250/637: Performed by: EMERGENCY MEDICINE

## 2019-10-27 PROCEDURE — 96366 THER/PROPH/DIAG IV INF ADDON: CPT

## 2019-10-27 PROCEDURE — 83880 ASSAY OF NATRIURETIC PEPTIDE: CPT

## 2019-10-27 PROCEDURE — 85025 COMPLETE CBC W/AUTO DIFF WBC: CPT

## 2019-10-27 PROCEDURE — 83036 HEMOGLOBIN GLYCOSYLATED A1C: CPT

## 2019-10-27 PROCEDURE — 82550 ASSAY OF CK (CPK): CPT

## 2019-10-27 PROCEDURE — 74011250636 HC RX REV CODE- 250/636: Performed by: INTERNAL MEDICINE

## 2019-10-27 PROCEDURE — 85730 THROMBOPLASTIN TIME PARTIAL: CPT

## 2019-10-27 PROCEDURE — 83690 ASSAY OF LIPASE: CPT

## 2019-10-27 PROCEDURE — 65660000000 HC RM CCU STEPDOWN

## 2019-10-27 PROCEDURE — 74011000258 HC RX REV CODE- 258: Performed by: EMERGENCY MEDICINE

## 2019-10-27 PROCEDURE — 93005 ELECTROCARDIOGRAM TRACING: CPT

## 2019-10-27 PROCEDURE — 83735 ASSAY OF MAGNESIUM: CPT

## 2019-10-27 PROCEDURE — 74011636320 HC RX REV CODE- 636/320: Performed by: EMERGENCY MEDICINE

## 2019-10-27 PROCEDURE — 70450 CT HEAD/BRAIN W/O DYE: CPT

## 2019-10-27 PROCEDURE — 82962 GLUCOSE BLOOD TEST: CPT

## 2019-10-27 PROCEDURE — 96375 TX/PRO/DX INJ NEW DRUG ADDON: CPT

## 2019-10-27 PROCEDURE — 96365 THER/PROPH/DIAG IV INF INIT: CPT

## 2019-10-27 PROCEDURE — 99285 EMERGENCY DEPT VISIT HI MDM: CPT

## 2019-10-27 PROCEDURE — 71045 X-RAY EXAM CHEST 1 VIEW: CPT

## 2019-10-27 PROCEDURE — 74011250636 HC RX REV CODE- 250/636

## 2019-10-27 PROCEDURE — 80053 COMPREHEN METABOLIC PANEL: CPT

## 2019-10-27 PROCEDURE — 74011000250 HC RX REV CODE- 250: Performed by: EMERGENCY MEDICINE

## 2019-10-27 PROCEDURE — 82553 CREATINE MB FRACTION: CPT

## 2019-10-27 PROCEDURE — 84484 ASSAY OF TROPONIN QUANT: CPT

## 2019-10-27 RX ORDER — SODIUM CHLORIDE 0.9 % (FLUSH) 0.9 %
5-40 SYRINGE (ML) INJECTION AS NEEDED
Status: DISCONTINUED | OUTPATIENT
Start: 2019-10-27 | End: 2019-11-01 | Stop reason: HOSPADM

## 2019-10-27 RX ORDER — TEMAZEPAM 15 MG/1
15 CAPSULE ORAL
Status: DISCONTINUED | OUTPATIENT
Start: 2019-10-27 | End: 2019-11-01 | Stop reason: HOSPADM

## 2019-10-27 RX ORDER — FAMOTIDINE 20 MG/1
20 TABLET, FILM COATED ORAL 2 TIMES DAILY
Status: DISCONTINUED | OUTPATIENT
Start: 2019-10-27 | End: 2019-10-27 | Stop reason: ALTCHOICE

## 2019-10-27 RX ORDER — MEMANTINE HYDROCHLORIDE 10 MG/1
10 TABLET ORAL 2 TIMES DAILY
Status: DISCONTINUED | OUTPATIENT
Start: 2019-10-27 | End: 2019-11-01 | Stop reason: HOSPADM

## 2019-10-27 RX ORDER — AMLODIPINE BESYLATE 5 MG/1
5 TABLET ORAL DAILY
Status: DISCONTINUED | OUTPATIENT
Start: 2019-10-28 | End: 2019-11-01 | Stop reason: HOSPADM

## 2019-10-27 RX ORDER — FUROSEMIDE 40 MG/1
40 TABLET ORAL DAILY
Status: DISCONTINUED | OUTPATIENT
Start: 2019-10-28 | End: 2019-10-31

## 2019-10-27 RX ORDER — ONDANSETRON 4 MG/1
4 TABLET, ORALLY DISINTEGRATING ORAL
Status: DISCONTINUED | OUTPATIENT
Start: 2019-10-27 | End: 2019-11-01 | Stop reason: HOSPADM

## 2019-10-27 RX ORDER — WATER FOR INJECTION,STERILE
VIAL (ML) INJECTION
Status: DISPENSED
Start: 2019-10-27 | End: 2019-10-28

## 2019-10-27 RX ORDER — HALOPERIDOL 5 MG/ML
2 INJECTION INTRAMUSCULAR
Status: DISCONTINUED | OUTPATIENT
Start: 2019-10-27 | End: 2019-10-29

## 2019-10-27 RX ORDER — GUAIFENESIN 100 MG/5ML
324 LIQUID (ML) ORAL
Status: COMPLETED | OUTPATIENT
Start: 2019-10-27 | End: 2019-10-27

## 2019-10-27 RX ORDER — ONDANSETRON 4 MG/1
4 TABLET, ORALLY DISINTEGRATING ORAL
Qty: 20 TAB | Refills: 0 | Status: SHIPPED | OUTPATIENT
Start: 2019-10-27 | End: 2019-11-01

## 2019-10-27 RX ORDER — LORAZEPAM 2 MG/ML
INJECTION INTRAMUSCULAR
Status: COMPLETED
Start: 2019-10-27 | End: 2019-10-27

## 2019-10-27 RX ORDER — ONDANSETRON 2 MG/ML
4 INJECTION INTRAMUSCULAR; INTRAVENOUS
Status: COMPLETED | OUTPATIENT
Start: 2019-10-27 | End: 2019-10-27

## 2019-10-27 RX ORDER — INSULIN LISPRO 100 [IU]/ML
INJECTION, SOLUTION INTRAVENOUS; SUBCUTANEOUS
Status: DISCONTINUED | OUTPATIENT
Start: 2019-10-27 | End: 2019-11-01 | Stop reason: HOSPADM

## 2019-10-27 RX ORDER — MORPHINE SULFATE 10 MG/ML
2 INJECTION, SOLUTION INTRAMUSCULAR; INTRAVENOUS
Status: DISCONTINUED | OUTPATIENT
Start: 2019-10-27 | End: 2019-11-01 | Stop reason: HOSPADM

## 2019-10-27 RX ORDER — POTASSIUM CHLORIDE 750 MG/1
40 TABLET, FILM COATED, EXTENDED RELEASE ORAL
Status: COMPLETED | OUTPATIENT
Start: 2019-10-27 | End: 2019-10-27

## 2019-10-27 RX ORDER — HEPARIN SODIUM 5000 [USP'U]/ML
5000 INJECTION, SOLUTION INTRAVENOUS; SUBCUTANEOUS EVERY 12 HOURS
Status: DISCONTINUED | OUTPATIENT
Start: 2019-10-27 | End: 2019-11-01 | Stop reason: HOSPADM

## 2019-10-27 RX ORDER — DEXTROSE 50 % IN WATER (D50W) INTRAVENOUS SYRINGE
25-50 AS NEEDED
Status: DISCONTINUED | OUTPATIENT
Start: 2019-10-27 | End: 2019-11-01 | Stop reason: HOSPADM

## 2019-10-27 RX ORDER — DONEPEZIL HYDROCHLORIDE 5 MG/1
10 TABLET, FILM COATED ORAL
Status: DISCONTINUED | OUTPATIENT
Start: 2019-10-27 | End: 2019-11-01 | Stop reason: HOSPADM

## 2019-10-27 RX ORDER — HEPARIN SODIUM 5000 [USP'U]/ML
60 INJECTION, SOLUTION INTRAVENOUS; SUBCUTANEOUS ONCE
Status: COMPLETED | OUTPATIENT
Start: 2019-10-27 | End: 2019-10-27

## 2019-10-27 RX ORDER — LORAZEPAM 1 MG/1
1 TABLET ORAL
Status: DISCONTINUED | OUTPATIENT
Start: 2019-10-27 | End: 2019-11-01 | Stop reason: HOSPADM

## 2019-10-27 RX ORDER — AMLODIPINE BESYLATE 5 MG/1
10 TABLET ORAL DAILY
Status: DISCONTINUED | OUTPATIENT
Start: 2019-10-27 | End: 2019-10-27

## 2019-10-27 RX ORDER — FUROSEMIDE 10 MG/ML
20 INJECTION INTRAMUSCULAR; INTRAVENOUS
Status: COMPLETED | OUTPATIENT
Start: 2019-10-27 | End: 2019-10-27

## 2019-10-27 RX ORDER — POTASSIUM CHLORIDE 20 MEQ/1
20 TABLET, EXTENDED RELEASE ORAL ONCE
Status: COMPLETED | OUTPATIENT
Start: 2019-10-27 | End: 2019-10-27

## 2019-10-27 RX ORDER — MORPHINE SULFATE 4 MG/ML
4 INJECTION INTRAVENOUS
Status: COMPLETED | OUTPATIENT
Start: 2019-10-27 | End: 2019-10-27

## 2019-10-27 RX ORDER — SODIUM CHLORIDE 0.9 % (FLUSH) 0.9 %
5-40 SYRINGE (ML) INJECTION EVERY 8 HOURS
Status: DISCONTINUED | OUTPATIENT
Start: 2019-10-27 | End: 2019-11-01 | Stop reason: HOSPADM

## 2019-10-27 RX ORDER — GUAIFENESIN 100 MG/5ML
81 LIQUID (ML) ORAL DAILY
Status: DISCONTINUED | OUTPATIENT
Start: 2019-10-28 | End: 2019-11-01 | Stop reason: HOSPADM

## 2019-10-27 RX ORDER — LORAZEPAM 2 MG/ML
2 INJECTION INTRAMUSCULAR
Status: COMPLETED | OUTPATIENT
Start: 2019-10-27 | End: 2019-10-27

## 2019-10-27 RX ORDER — PANTOPRAZOLE SODIUM 40 MG/1
40 TABLET, DELAYED RELEASE ORAL
Status: DISCONTINUED | OUTPATIENT
Start: 2019-10-28 | End: 2019-11-01 | Stop reason: HOSPADM

## 2019-10-27 RX ORDER — FAMOTIDINE 20 MG/1
20 TABLET, FILM COATED ORAL 2 TIMES DAILY
Qty: 20 TAB | Refills: 0 | Status: SHIPPED | OUTPATIENT
Start: 2019-10-27 | End: 2019-11-01

## 2019-10-27 RX ORDER — NAPROXEN 500 MG/1
500 TABLET ORAL 2 TIMES DAILY WITH MEALS
Qty: 20 TAB | Refills: 0 | Status: SHIPPED | OUTPATIENT
Start: 2019-10-27 | End: 2019-11-01

## 2019-10-27 RX ORDER — POLYETHYLENE GLYCOL 3350 17 G/17G
17 POWDER, FOR SOLUTION ORAL DAILY
Status: DISCONTINUED | OUTPATIENT
Start: 2019-10-28 | End: 2019-11-01 | Stop reason: HOSPADM

## 2019-10-27 RX ORDER — HEPARIN SODIUM 10000 [USP'U]/100ML
12-25 INJECTION, SOLUTION INTRAVENOUS
Status: DISCONTINUED | OUTPATIENT
Start: 2019-10-27 | End: 2019-10-27 | Stop reason: HOSPADM

## 2019-10-27 RX ORDER — METOPROLOL TARTRATE 25 MG/1
25 TABLET, FILM COATED ORAL EVERY 12 HOURS
Status: DISCONTINUED | OUTPATIENT
Start: 2019-10-27 | End: 2019-10-31

## 2019-10-27 RX ORDER — MAGNESIUM SULFATE 100 %
4 CRYSTALS MISCELLANEOUS AS NEEDED
Status: DISCONTINUED | OUTPATIENT
Start: 2019-10-27 | End: 2019-11-01 | Stop reason: HOSPADM

## 2019-10-27 RX ORDER — HYDRALAZINE HYDROCHLORIDE 20 MG/ML
10 INJECTION INTRAMUSCULAR; INTRAVENOUS
Status: DISCONTINUED | OUTPATIENT
Start: 2019-10-27 | End: 2019-11-01 | Stop reason: HOSPADM

## 2019-10-27 RX ADMIN — LORAZEPAM 2 MG: 2 INJECTION INTRAMUSCULAR; INTRAVENOUS at 19:01

## 2019-10-27 RX ADMIN — METOPROLOL TARTRATE 25 MG: 25 TABLET ORAL at 14:48

## 2019-10-27 RX ADMIN — FAMOTIDINE 20 MG: 10 INJECTION, SOLUTION INTRAVENOUS at 04:25

## 2019-10-27 RX ADMIN — ONDANSETRON 4 MG: 2 SOLUTION INTRAMUSCULAR; INTRAVENOUS at 04:25

## 2019-10-27 RX ADMIN — FUROSEMIDE 20 MG: 10 INJECTION, SOLUTION INTRAVENOUS at 08:30

## 2019-10-27 RX ADMIN — Medication 10 ML: at 14:49

## 2019-10-27 RX ADMIN — HEPARIN SODIUM 3900 UNITS: 5000 INJECTION INTRAVENOUS; SUBCUTANEOUS at 10:42

## 2019-10-27 RX ADMIN — HEPARIN SODIUM 5000 UNITS: 5000 INJECTION INTRAVENOUS; SUBCUTANEOUS at 14:48

## 2019-10-27 RX ADMIN — POTASSIUM CHLORIDE 20 MEQ: 20 TABLET, EXTENDED RELEASE ORAL at 14:48

## 2019-10-27 RX ADMIN — SODIUM CHLORIDE 100 ML: 900 INJECTION, SOLUTION INTRAVENOUS at 06:38

## 2019-10-27 RX ADMIN — AMLODIPINE BESYLATE 10 MG: 5 TABLET ORAL at 14:48

## 2019-10-27 RX ADMIN — NITROGLYCERIN 1 INCH: 20 OINTMENT TOPICAL at 22:19

## 2019-10-27 RX ADMIN — ASPIRIN 81 MG 324 MG: 81 TABLET ORAL at 04:18

## 2019-10-27 RX ADMIN — MEMANTINE HYDROCHLORIDE 10 MG: 10 TABLET ORAL at 18:17

## 2019-10-27 RX ADMIN — MORPHINE SULFATE 4 MG: 4 INJECTION INTRAVENOUS at 04:34

## 2019-10-27 RX ADMIN — Medication 10 ML: at 22:23

## 2019-10-27 RX ADMIN — HEPARIN SODIUM 12 UNITS/KG/HR: 10000 INJECTION, SOLUTION INTRAVENOUS at 10:43

## 2019-10-27 RX ADMIN — POTASSIUM CHLORIDE 40 MEQ: 750 TABLET, EXTENDED RELEASE ORAL at 08:29

## 2019-10-27 RX ADMIN — IOPAMIDOL 100 ML: 755 INJECTION, SOLUTION INTRAVENOUS at 06:38

## 2019-10-27 RX ADMIN — WATER 10 MG: 1 INJECTION INTRAMUSCULAR; INTRAVENOUS; SUBCUTANEOUS at 18:59

## 2019-10-27 RX ADMIN — NITROGLYCERIN 1 INCH: 20 OINTMENT TOPICAL at 14:48

## 2019-10-27 RX ADMIN — LORAZEPAM 2 MG: 2 INJECTION INTRAMUSCULAR at 19:01

## 2019-10-27 NOTE — PROGRESS NOTES
RAPID RESPONSE    Responded to Code Jaskaran to room 2290. Pt combative and trying to leave AMA. Pt has history of dementia and is oriented x1. Spoke with Dr. Abel Rojas about patient condition. Verbal orders received: ativan 2 mg IVP. Pt still combative. New orders received: geodon 20 mg IM and pt placed in posey vest.    Pt placed back in bed and on monitor x3. VSS. No further rapid response interventions indicated.         Alexus Jamison RN  Rapid Response Team, [id number]

## 2019-10-27 NOTE — PROGRESS NOTES
Problem: Pressure Injury - Risk of  Goal: *Prevention of pressure injury  Description  Document Newton Scale and appropriate interventions in the flowsheet.   Note:   Pressure Injury Interventions:  Sensory Interventions: Assess need for specialty bed    Moisture Interventions: Absorbent underpads    Activity Interventions: Increase time out of bed, PT/OT evaluation    Mobility Interventions: HOB 30 degrees or less, PT/OT evaluation    Nutrition Interventions: Document food/fluid/supplement intake

## 2019-10-27 NOTE — ED NOTES
Daughter leaving bedside to check on the children, can be reached by telephone.         [facility] [phone]

## 2019-10-27 NOTE — DISCHARGE INSTRUCTIONS
Thank you for allowing us to take care of you today! We hope we addressed all of your concerns and needs. We strive to provide excellent quality care in the Emergency Department. You will receive a survey after your visit to evaluate the care you were provided. Should you receive a survey from us, we invite you to share your experience and tell us what made it excellent. It was a pleasure serving you, we invite you to share your experience with us, in our pursuit for excellence, should you be selected to receive a survey. The exam and treatment you received in the Emergency Department were for an urgent problem and are not intended as complete care. It is important that you follow up with a doctor, nurse practitioner, or physician assistant for ongoing care. If your symptoms become worse or you do not improve as expected and you are unable to reach your usual health care provider, you should return to the Emergency Department. We are available 24 hours a day. Please take your discharge instructions with you when you go to your follow-up appointment. If you have any problem arranging a follow-up appointment, contact the Emergency Department immediately. If a prescription has been provided, please have it filled as soon as possible to prevent a delay in treatment. Read the entire medication instruction sheet provided to you by the pharmacy. If you have any questions or reservations about taking the medication due to side effects or interactions with other medications, please call your primary care physician or contact the ER to speak with the charge nurse. Make an appointment with your family doctor or the physician you were referred to for follow-up of this visit as instructed on your discharge paperwork, as this is mandatory follow-up. Return to the ER if you are unable to be seen or if you are unable to be seen in a timely manner.     If you have any problem arranging the follow-up visit, contact the Emergency Department immediately. I hope you feel better and thank you again for allow us to provide you with excellent care today at [facility]! Warmest regards,    Alberto Mendoza MD  Emergency Medicine Physician  [facility]        _____________________________________________________________________________________________________________    Vitals:    10/27/19 0515 10/27/19 0530 10/27/19 0545 10/27/19 0600   BP: 148/77 158/78 143/81 139/86   BP 1 Location:       BP Patient Position:       Pulse: 62 73 74 65   Resp: 15 20 16 14   Temp:       SpO2: 100% 100% 100% 100%   Weight:       Height:           Recent Results (from the past 12 hour(s))   EKG, 12 LEAD, INITIAL    Collection Time: 10/27/19  4:07 AM   Result Value Ref Range    Ventricular Rate 62 BPM    Atrial Rate 267 BPM    QRS Duration 164 ms    Q-T Interval 478 ms    QTC Calculation (Bezet) 485 ms    Calculated R Axis 178 degrees    Calculated T Axis 54 degrees    Diagnosis       Ventricular-paced rhythm  Abnormal ECG  When compared with ECG of 11-JUL-2018 17:35,  premature ventricular complexes are no longer present  Vent. rate has decreased BY   4 BPM     CBC WITH AUTOMATED DIFF    Collection Time: 10/27/19  4:11 AM   Result Value Ref Range    WBC 6.8 4.1 - 11.1 K/uL    RBC 4.87 4.10 - 5.70 M/uL    HGB 13.3 12.1 - 17.0 g/dL    HCT 43.2 36.6 - 50.3 %    MCV 88.7 80.0 - 99.0 FL    MCH 27.3 26.0 - 34.0 PG    MCHC 30.8 30.0 - 36.5 g/dL    RDW 14.2 11.5 - 14.5 %    PLATELET 040 181 - 016 K/uL    MPV 11.1 8.9 - 12.9 FL    NRBC 0.0 0  WBC    ABSOLUTE NRBC 0.00 0.00 - 0.01 K/uL    NEUTROPHILS 56 32 - 75 %    LYMPHOCYTES 31 12 - 49 %    MONOCYTES 8 5 - 13 %    EOSINOPHILS 4 0 - 7 %    BASOPHILS 1 0 - 1 %    IMMATURE GRANULOCYTES 0 0.0 - 0.5 %    ABS. NEUTROPHILS 3.8 1.8 - 8.0 K/UL    ABS. LYMPHOCYTES 2.1 0.8 - 3.5 K/UL    ABS. MONOCYTES 0.5 0.0 - 1.0 K/UL    ABS. EOSINOPHILS 0.3 0.0 - 0.4 K/UL    ABS. BASOPHILS 0.1 0.0 - 0.1 K/UL    ABS. IMM. GRANS. 0.0 0.00 - 0.04 K/UL    DF AUTOMATED     METABOLIC PANEL, COMPREHENSIVE    Collection Time: 10/27/19  4:11 AM   Result Value Ref Range    Sodium 142 136 - 145 mmol/L    Potassium 3.4 (L) 3.5 - 5.1 mmol/L    Chloride 106 97 - 108 mmol/L    CO2 26 21 - 32 mmol/L    Anion gap 10 5 - 15 mmol/L    Glucose 172 (H) 65 - 100 mg/dL    BUN 21 (H) 6 - 20 MG/DL    Creatinine 1.67 (H) 0.70 - 1.30 MG/DL    BUN/Creatinine ratio 13 12 - 20      GFR est AA 47 (L) >60 ml/min/1.73m2    GFR est non-AA 39 (L) >60 ml/min/1.73m2    Calcium 9.1 8.5 - 10.1 MG/DL    Bilirubin, total 0.8 0.2 - 1.0 MG/DL    ALT (SGPT) 41 12 - 78 U/L    AST (SGOT) 50 (H) 15 - 37 U/L    Alk. phosphatase 70 45 - 117 U/L    Protein, total 7.0 6.4 - 8.2 g/dL    Albumin 3.4 (L) 3.5 - 5.0 g/dL    Globulin 3.6 2.0 - 4.0 g/dL    A-G Ratio 0.9 (L) 1.1 - 2.2     NT-PRO BNP    Collection Time: 10/27/19  4:11 AM   Result Value Ref Range    NT pro-BNP 11,038 (H) 0 - 450 PG/ML   CK W/ REFLX CKMB    Collection Time: 10/27/19  4:11 AM   Result Value Ref Range     39 - 308 U/L   LIPASE    Collection Time: 10/27/19  4:11 AM   Result Value Ref Range    Lipase 71 (L) 73 - 393 U/L   POC TROPONIN-I    Collection Time: 10/27/19  4:16 AM   Result Value Ref Range    Troponin-I (POC) 0.05 0.00 - 0.08 ng/mL       XR CHEST PORT   Final Result   IMPRESSION:    Pulmonary vascular congestion and probable interstitial edema.       CTA CHEST W OR W WO CONT    (Results Pending)   CT ABD PELV W CONT    (Results Pending)     CT Results  (Last 48 hours)    None          Local Primary Care Physicians   [address] [facility] [phone]  MD Ronit Coley MD Ozell Poche, MD [facility] [place] 123 [phone]  Mehnaz Sanchez MD Armand Mahan MD Jeanetta Rife, MD [facility] 83 [phone]  MD Eryn Cates MD [employer] [facility] [phone]  MD Marycarmen Osorio, MD Jo-Ann Dias, MD Oswaldo Almendarez MD   [facility] [phone]  EUSEBIO PEREZ, MD Taye Cruz, MD Sandy eMlendez, NP [address] [phone]  Sabine Muller, MD Nish Harrington, MD Michelle Ward, MD Luzmaria Scott, MD Karlo Shea, MD Indy Conroy, MD Angelia Irvin MD   33 69 [facility] MD Rosario [facility] [phone]  Freya Steele, MD Marisa Arias, NP  Lino Fernandez, MD Judson Car, MD Maddison Villeda, MD Quan Hearn, MD Carolina Goodman MD   [address] [facility] [phone]  Miguelito Nance, MD Julianne Hill, [facility], GET Galeas, MD Bubba Wynne, MD Gail Chowdhury MD EPHRAIM [facility] [phone]  Dave Monahan, MD Ruthanna Samuel, MD Caroll Songster, MD Ygnacio Seip, MD Anniece Habermann, MD   [address] [phone]  MD Brandan Marion MD [facility] [phone]  MD Millicent Mitchell MD Marybeth Cargo, MD   [facility] [phone]  MD Susanne Power MD Boneta Deforest, MD Whitney Garcia, MD Marianela Mojica, MD Kristen Ramirez, NP  Cristian Breen MD 1619 [facility]   [phone]  MD Alice Gregory MD Dariel Cirri, MD     2102 [facility] [phone]  [address], MD Que Foster, FNP  Danii Dee, GISSELL Dee, FNP  Josphine Huddle, PA-C  Wesley Re, MD Raynell Holstein, NP Threasa Coppersmith, DO   Miscellaneous:  Morteza Pena MD [facility] Departments   For adult and child immunizations, family planning, TB screening, STD testing and women's health services.    Isai: Neal [phone]     [address] [facility] 1822   [phone]   S VKF   [phone]   [place]: Daisy [address] [phone]     [address] [facility]        [address] [place] [address]  For primary care services, woman and child wellness, and some clinics providing specialty care. [facility] -- [facility] [facility] [address] [facility] [phone]   [address] [place] [address] 200 [facility] 3614 [facility] [phone]   [address] [facility] [address] [phone] 118[address] [facility] [address] [place] [address] 5850  Community  [phone]   [address] [phone]   [place] [facility] [phone]   [facility] [address] [place] [address] [phone]   [facility]: [address]     Normal 6014 9444 Richie Ortiz 5850  Community  [phone]   [employer]  [address] ([email or web address])         Sexual Health/Woman Wellness Clinics   For STD/HIV testing and treatment, pregnancy testing and services, men's health, birth control services, LGBT services, and hepatitis/HPV vaccine services. Bobby [employer] for [place] [employer] [address] [facility] [address] [facility] [address] [facility] 1579 [address] [facility] [phone]   [facility] [address] 5th floor [phone]   Pregnancy [address] for Women [address]  Carmela Marte [phone]        [place] 9919 [facility]   [facility] -- [address]   [phone]   Dorinda   [phone]   Women, Infant and Children's Services: [address] [phone]       [address] [facility]       Patient Education        Chest Pain: Care Instructions  Your Care Instructions    There are many things that can cause chest pain. Some are not serious and will get better on their own in a few days. But some kinds of chest pain need more testing and treatment. Your doctor may have recommended a follow-up visit in the next 8 to 12 hours. If you are not getting better, you may need more tests or treatment. Even though your doctor has released you, you still need to watch for any problems. The doctor carefully checked you, but sometimes problems can develop later. If you have new symptoms or if your symptoms do not get better, get medical care right away. If you have worse or different chest pain or pressure that lasts more than 5 minutes or you passed out (lost consciousness), call 911 or seek other emergency help right away. A medical visit is only one step in your treatment. Even if you feel better, you still need to do what your doctor recommends, such as going to all suggested follow-up appointments and taking medicines exactly as directed. This will help you recover and help prevent future problems. How can you care for yourself at home? · Rest until you feel better. · Take your medicine exactly as prescribed. Call your doctor if you think you are having a problem with your medicine. · Do not drive after taking a prescription pain medicine. When should you call for help? Call 911 if:    · You passed out (lost consciousness).     · You have severe difficulty breathing.     · You have symptoms of a heart attack. These may include:  ? Chest pain or pressure, or a strange feeling in your chest.  ? Sweating. ? Shortness of breath. ? Nausea or vomiting.   ? Pain, pressure, or a strange feeling in your back, neck, jaw, or upper belly or in one or both shoulders or arms.  ? Lightheadedness or sudden weakness. ? A fast or irregular heartbeat. After you call 911, the  may tell you to chew 1 adult-strength or 2 to 4 low-dose aspirin. Wait for an ambulance. Do not try to drive yourself.    Call your doctor today if:    · You have any trouble breathing.     · Your chest pain gets worse.     · You are dizzy or lightheaded, or you feel like you may faint.     · You are not getting better as expected.     · You are having new or different chest pain. Where can you learn more? Go to http://zach-herlinda.info/. Enter A120 in the search box to learn more about \"Chest Pain: Care Instructions. \"  Current as of: June 26, 2019  Content Version: 12.2  © 6280-9550 [employer]. Care instructions adapted under license by [employer] (which disclaims liability or warranty for this information). If you have questions about a medical condition or this instruction, always ask your healthcare professional. [place] [address] any warranty or liability for your use of this information. Patient Education        Nausea and Vomiting: Care Instructions  Your Care Instructions    When you are nauseated, you may feel weak and sweaty and notice a lot of saliva in your mouth. Nausea often leads to vomiting. Most of the time you do not need to worry about nausea and vomiting, but they can be signs of other illnesses. Two common causes of nausea and vomiting are stomach flu and food poisoning. Nausea and vomiting from viral stomach flu will usually start to improve within 24 hours. Nausea and vomiting from food poisoning may last from 12 to 48 hours. The doctor has checked you carefully, but problems can develop later. If you notice any problems or new symptoms, get medical treatment right away. Follow-up care is a key part of your treatment and safety.  Be sure to make and go to all appointments, and call your doctor if you are having problems. It's also a good idea to know your test results and keep a list of the medicines you take. How can you care for yourself at home? · To prevent dehydration, drink plenty of fluids, enough so that your urine is light yellow or clear like water. Choose water and other caffeine-free clear liquids until you feel better. If you have kidney, heart, or liver disease and have to limit fluids, talk with your doctor before you increase the amount of fluids you drink. · Rest in bed until you feel better. · When you are able to eat, try clear soups, mild foods, and liquids until all symptoms are gone for 12 to 48 hours. Other good choices include dry toast, crackers, cooked cereal, and gelatin dessert, such as Jell-O. When should you call for help? Call 911 anytime you think you may need emergency care. For example, call if:    · You passed out (lost consciousness).    Call your doctor now or seek immediate medical care if:    · You have symptoms of dehydration, such as:  ? Dry eyes and a dry mouth. ? Passing only a little dark urine. ? Feeling thirstier than usual.     · You have new or worsening belly pain.     · You have a new or higher fever.     · You vomit blood or what looks like coffee grounds.    Watch closely for changes in your health, and be sure to contact your doctor if:    · You have ongoing nausea and vomiting.     · Your vomiting is getting worse.     · Your vomiting lasts longer than 2 days.     · You are not getting better as expected. Where can you learn more? Go to http://zach-herlinda.info/. Enter 25 [id number] in the search box to learn more about \"Nausea and Vomiting: Care Instructions. \"  Current as of: June 26, 2019  Content Version: 12.2  © 0084-0587 [employer]. Care instructions adapted under license by [employer] (which disclaims liability or warranty for this information).  If you have questions about a medical condition or this instruction, always ask your healthcare professional. [place] [address] any warranty or liability for your use of this information.

## 2019-10-27 NOTE — ED NOTES
7:20 AM-discussed case with on-call cardiology Dr. Iram Jacinto. He will see the patient in consult. 8:15 AM-patient and daughter made aware of plan to have cardiology consult. He also made aware of the need for admission for further evaluation. Did discuss with him potential testing that may be needed. They will discuss whether they would want him to have cardiac intervention going forward. Patient does have signs of CHF on CT so we will give a low-dose of Lasix as he did just receive IV contrast.  He is otherwise stable. 9:11 AM Dr. Iram Jacinto evaluated the patient and results. The EKG possibly shows new onset atrial flutter. He discussed anticoagulation. He recommends transfer to [facility] as the patient's primary cardiologist is there. I will discuss that with them whether they would like us to initiate heparin. The family has been made aware of the plan to transfer. 9:53 AM Dr Jason Lakhani audiology made aware of patient's presentation, labs and EKG and does recommend transfer to [facility]. He also agrees with heparinization with 4000 unit bolus and infusion. He recommends admission to the hospitalist service. We are waiting conversation with the hospitalist to finish disposition    10:26 AM DR Mykel Burleson hospitalist at [facility] made aware of the patient's symptoms and presentation and he will accept the patient to a telemetry bed.   12:10 PM patient remained stable upon discharge and transfer via EMS to [facility]

## 2019-10-27 NOTE — ED NOTES
TRANSFER - OUT REPORT:    Omidd Jes report given to [facility] RN (name) on Benito Farfan Sr  being transferred to [address] [place] [address] (unit) for routine progression of care       Report consisted of patients Situation, Background, Assessment and   Recommendations(SBAR). Information from the following report(s) SBAR, Kardex, ED Summary, Intake/Output, MAR and Recent Results was reviewed with the receiving nurse. Lines:   Peripheral IV 10/27/19 Right Forearm (Active)   Site Assessment Clean, dry, & intact 10/27/2019  4:14 AM   Phlebitis Assessment 0 10/27/2019  4:14 AM   Infiltration Assessment 0 10/27/2019  4:14 AM   Dressing Status Clean, dry, & intact 10/27/2019  4:14 AM   Dressing Type Transparent;Tape 10/27/2019  4:14 AM   Hub Color/Line Status Green;Flushed 10/27/2019  4:14 AM   Action Taken Blood drawn 10/27/2019  4:14 AM   Alcohol Cap Used No 10/27/2019  4:14 AM       Peripheral IV 10/27/19 Left Forearm (Active)   Site Assessment Clean, dry, & intact 10/27/2019 10:16 AM   Phlebitis Assessment 0 10/27/2019 10:16 AM   Infiltration Assessment 0 10/27/2019 10:16 AM   Hub Color/Line Status Blue 10/27/2019 10:16 AM        Opportunity for questions and clarification was provided.       Patient transported with:   EMS

## 2019-10-27 NOTE — ED NOTES
Bedside shift change report given to RADHA GANDARA (oncoming nurse) by Jerry Hull RN (offgoing nurse). Report included the following information SBAR, ED Summary, Procedure Summary, MAR and Recent Results.

## 2019-10-27 NOTE — ED NOTES
Pt arrived to ED via EMS with c/o midsternal chest pain x30 min PTA. Granddaughter reports patient had emesis x1 PTA and states patient woke from sleep with c/o chest pain described as a pressure 5/10. Granddaughter also reports patient has SOB. Pt. Warm/dry to touch and has a  medictronic ventricular pacer. Pt is in no acute distress. Will continue to monitor. See nursing assessment. Safety precautions in place; call light within reach. Emergency Department Nursing Plan of Care       The Nursing Plan of Care is developed from the Nursing assessment and Emergency Department Attending provider initial evaluation. The plan of care may be reviewed in the ED Provider note.     The Plan of Care was developed with the following considerations:   Patient / Family readiness to learn indicated by:verbalized understanding  Persons(s) to be included in education: patient  Barriers to Learning/Limitations:No    Signed     Ines Chavis RN    10/27/2019   4:10 AM

## 2019-10-27 NOTE — PROGRESS NOTES
Cardiology:    I was called by Dr. Jean Mejias about this patient because of unexpected increased troponin. (0.16). Echo last year showed poor endocardial visualization with possible apical area hypokinesis. EKG shows biventricular (dual lead system) ventricular pacing with probable atypical atrial flutter. Hard to say if the pacer is VAT tracking the flutter in a 5:1 or 4:1 ratio or just mode switched in to V pacing. . 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 His recent prior EKGs show sinus or at times low atrial rhythm with 1:1 VAT tracking. He had normal myocardial perfusion imaging with drug challenge in 2017, done because of dyspnea. He had been believed in the past to have CAD. Biventricular pacer was placed in 2015. ([employer]tr[employer]). Ambulatory medications include antihypertensives, and Namenda but he is not on an anticoagulant. I have asked for a CK level and a repeat EKG to revisit the atrial rhythm.      Shaina Vaz MD [facility]

## 2019-10-27 NOTE — PROGRESS NOTES
Orders received, chart reviewed. Spoke to PT who spoke with RN. RN reports that pt just arrived as a transfer from [address] [place] [address] and is awaiting cardiology consult and further medical work up. RN requesting therapy follow back tomorrow.  Thank you     Casandra Will OTR/L

## 2019-10-27 NOTE — H&P
Hospitalist Admission Note    NAME: Lillia Lefort Sr   :  1931   MRN:  [id number]     Date/Time:  10/27/2019 1:28 PM    Patient PCP: Samuel Jamison MD  ______________________________________________________________________  Given the patient's current clinical presentation, I have a high level of concern for decompensation if discharged from the emergency department. Complex decision making was performed, which includes reviewing the patient's available past medical records, laboratory results, and x-ray films. My assessment of this patient's clinical condition and my plan of care is as follows. Assessment / Plan:      Chest pain  NSTEMI ([address] POA   Essential HTN POA   Atypical sx in a demented pt  and troponin 0.16,  CP  denies c/p now  Favor conservative treatment in pt-  NO PE per CTA  Cardiology following   dc Heparin   ASA check lipids in am  Add lipitor if needed but he is 88  Metoprolol cont bp meds at home  NO Further  cardiac enzymes   PRN NTG paste  Echo     Abdomainal pain  ?etiology lipiase nl  lft mil elev trend  No sx now ct   1. No evidence of pulmonary embolism. 2. Moderate bilateral pleural effusions and bilateral lower lobe subsegmental  atelectasis. 3. Pulmonary alveolar edema. 4. Scattered mucous plugging throughout the lungs. 5. Mild cardiomegaly.     Abdomen/pelvis:  1. No evidence of acute process in the abdomen or pelvis.   2. Severe calcific atherosclerosis.     Cont with Protonix for now exam non revealing  ua ordered  miralax added     Hypokalemia  replace prn  Mag 1.9       Hypertension  controlled so continue current therapy per pcp  Add Metoprolol per cardiology  IV Hydralazine prn     Diabetes not on meds ISS  a1c in am     Hyperlipidemia resume meds    DJD that is stable prn tylenol   CKD stage II labs in am Cr at base line  Alzheimer's dementia he is alert and oriented x1  Resume meds  CT head ordered    Sick sinus syndrome stable Status post pacemaker placement   Echo ordered  Cardiology following   Prior TIA continue aspirin as noted/lipids in am       Code Status: Full Code - PCP Dr Verna Nageotte to address in am   Surrogate Decision Maker:DTR    DVT Prophylaxis: Heparin   GI Prophylaxis: not indicated    Baseline: lives with DTR      Subjective:   CHIEF COMPLAINT: chest pain    HISTORY OF PRESENT ILLNESS:     Kamlesh Orr Sr is a 80 y.o.  male with past medical history as documented below presents to the ED with c/o of acute onset of chest pain with associated vomiting onset around midnight. Patient reports pain currently 7 out of 10 localized in the lower chest, epigastric region. significant cardiac history of SVT status post ablation, sick sinus syndrome status post pacemaker placement. F/B Dr. Jacek Inman.  Granddaughter is at bedside and states that patient does have intermittent alcohol use but none recently. there has been no recent prolonged travel, history of DVT or PE. Pt denies any other alleviating or exacerbating factors. Additionally, pt specifically denies any recent fever, chills, headache, SOB, lightheadedness, dizziness, numbness, weakness, BLE swelling, heart palpitations, urinary sxs, diarrhea, constipation, melena, hematochezia, cough, or congestion.    no falls/pt has sig h/o dementia-    We were asked to admit for work up and evaluation of the above problems.      Past Medical History:   Diagnosis Date    Abdominal pain 7/18/2017    [address] disease [address] [address] 7/18/2017    Anemia 7/18/2017    Arthritis     Back pain 7/18/2017    Bradycardia 7/18/2017    CAD (coronary artery disease)     hx of MI    CKD (chronic kidney disease), stage II 7/18/2017    constipation     Depression 7/18/2017    Diabetes mellitus ([facility] [address] 7/18/2017    Diverticulosis 7/18/2017    DJD (degenerative joint disease) 7/18/2017    Encounter for long-term (current) use of other medications 7/18/2017    Fatigue     Gastritis and duodenitis 2017    GERD (gastroesophageal reflux disease)     GI bleed 2017    Hearing loss     Hyperlipidemia 2017    Hypertension     Hypertension with renal disease 2017    Ill-defined condition     Dementia    Internal hemorrhoids 2017    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    S/P cardiac pacemaker procedure 2015    5/4/15 Medtronic dual chamber pacemaker implant     Weight loss     lost over 40 pounds last year- has no appetite        Past Surgical History:   Procedure Laterality Date    COLONOSCOPY N/A 2017    COLON[facility]ell [facility]  2017         [address]  7/10/2014    right inguinal    HX OTHER SURGICAL      cystectomy from back    IA EGD TRANSORAL BIOPSY SINGLE/MULTIPLE  2012         UPPER GI ENDOSCOPY,BIOPSY  2017            Social History     Tobacco Use    Smoking status: Former Smoker     Packs/day: 0.50     Years: 10.00     Pack years: 5.00     Start date: 1991    Smokeless tobacco: Never Used    Tobacco comment: quit 50 years ago   Substance Use Topics    Alcohol use: Yes     Comment: Occasional beer        Family History   Problem Relation Age of Onset    Hypertension Mother     Heart Disease Father     Cancer Other         son-cancer? unknown what type      No Known Allergies     Prior to Admission medications    Medication Sig Start Date End Date Taking? Authorizing Provider   ondansetron (ZOFRAN ODT) 4 mg disintegrating tablet Take 1 Tab by mouth every eight (8) hours as needed for Nausea. 10/27/19   Ivonne Fitzpatrick MD   famotidine (PEPCID) 20 mg tablet Take 1 Tab by mouth two (2) times a day for 10 days. 10/27/19 11/6/19  Ivonne Fitzpatrick MD   naproxen (NAPROSYN) 500 mg tablet Take 1 Tab by mouth two (2) times daily (with meals).  10/27/19   Ivonne Fitzpatrick MD   memantine (NAMENDA) 10 mg tablet TAKE 1 TABLET BY MOUTH TWICE A DAY 5/15/19   Kay Arevalo MD   losartan-hydroCHLOROthiazide [facility]) 100-25 mg per tablet TAKE 1 TABLET EVERY DAY 5/2/19   Kay Arevalo MD   amLODIPine (NORVASC) 10 mg tablet TAKE 1 TABLET BY MOUTH DAILY 5/2/19   Kay Arevalo MD   donepezil (ARICEPT) 10 mg tablet TAKE 1 TABLET BY MOUTH EVERY DAY 5/2/19   Kay Arevalo MD   temazepam (RESTORIL) 15 mg capsule Take 1 Cap by mouth nightly as needed for Sleep. Max Daily Amount: 15 mg. 5/1/19   Kay Arevalo MD   diclofenac EC (VOLTAREN) 75 mg EC tablet Take 1 Tab by mouth two (2) times a day. 5/30/18   Kay Arevalo MD   mv-mins-folic-lycopene-ginkgo (ONE-A-DAY MEN 50 PLUS, GINKGO,) [phone] mcg-mcg-mg tab Take  by mouth. Provider, Historical   aspirin 81 mg chewable tablet Take 1 Tab by mouth daily. 4/15/18   Maximo Arboleda MD   omeprazole (PRILOSEC) 20 mg capsule Take 1 Cap by mouth daily. 11/1/17   Kay Arevalo MD       REVIEW OF SYSTEMS:     I am not able to complete the review of systems because: The patient is intubated and sedated    The patient has altered mental status due to his acute medical problems    The patient has baseline aphasia from prior stroke(s)   x The patient has baseline dementia and is not reliable historian    The patient is in acute medical distress and unable to provide information             Objective:   VITALS:    Visit Vitals  BP (!) 175/100   Pulse 69   Temp 97.6 °F (36.4 °C)   Resp 18   Ht 5' 9\" (1.753 m)   Wt 64.9 kg (143 lb)   SpO2 97%   BMI 21.12 kg/m²       PHYSICAL EXAM:  General appearance - alert, well appearing, and in no distress  Mental status - alert, oriented to person, place, and time  HEENT:  Ears - bilateral TM's and external ear canals clear  Eyes - pupillary responses were normal.  Extraocular muscle function intact. Lids and conjunctiva not injected. Pharynx- clear with teeth in good repair. Neck - supple without thyromegaly or burit.   No JVD noted no meningismus   Lungs - clear to auscultation and percussion  Cardiac- normal rate, regular rhythm without murmurs  Abdomen - flat, soft, non-tender without palpable organomegaly or mass. No pulsatile mass was felt, and not bruit was heard. Bowel sounds were active  Extremities -  no clubbing cyanosis or edema  Lymphatics - no palpable lymphadenopathy, no hepatosplenomegaly  Hematologic: no petechiae or purpura  Peripheral vascular -Femoral, Dorsalis pedis and posterior tibial pulses felt without difficulty  Skin - no rash or unusual mole change noted  Neurological - Cranial nerves II-XII grossly intact. Motor strength 5/5. DTR's 2+ and symmetric. Station and gait normal a/ox 1  Back exam - full range of motion, no tenderness, palpable spasm or pain on motion  Musculoskeletal - no joint tenderness, deformity or swelling  _______________________________________________________________________  Care Plan discussed with:    Comments   Patient x    Family      RN x    Care Manager                    Consultant:  x    _______________________________________________________________________  Expected  Disposition:   Home with Family x   HH/PT/OT/RN    SNF/LTC    COMPA    ________________________________________________________________________  TOTAL TIME:  61  Minutes    Critical Care Provided     Minutes non procedure based      Comments    xx Reviewed previous records   >50% of visit spent in counseling and coordination of care x Discussion with patient and/or family and questions answered       ________________________________________________________________________  Signed: Ian Schumacher MD    Procedures: see electronic medical records for all procedures/Xrays and details which were not copied into this note but were reviewed prior to creation of Plan.     LAB DATA REVIEWED:    Recent Results (from the past 24 hour(s))   EKG, 12 LEAD, INITIAL    Collection Time: 10/27/19  4:07 AM   Result Value Ref Range Ventricular Rate 62 BPM    Atrial Rate 267 BPM    QRS Duration 164 ms    Q-T Interval 478 ms    QTC Calculation (Bezet) 485 ms    Calculated R Axis 178 degrees    Calculated T Axis 54 degrees    Diagnosis       Ventricular-paced rhythm  Abnormal ECG  When compared with ECG of 11-JUL-2018 17:35,  premature ventricular complexes are no longer present  Vent. rate has decreased BY   4 BPM     CBC WITH AUTOMATED DIFF    Collection Time: 10/27/19  4:11 AM   Result Value Ref Range    WBC 6.8 4.1 - 11.1 K/uL    RBC 4.87 4.10 - 5.70 M/uL    HGB 13.3 12.1 - 17.0 g/dL    HCT 43.2 36.6 - 50.3 %    MCV 88.7 80.0 - 99.0 FL    MCH 27.3 26.0 - 34.0 PG    MCHC 30.8 30.0 - 36.5 g/dL    RDW 14.2 11.5 - 14.5 %    PLATELET 117 088 - 141 K/uL    MPV 11.1 8.9 - 12.9 FL    NRBC 0.0 0  WBC    ABSOLUTE NRBC 0.00 0.00 - 0.01 K/uL    NEUTROPHILS 56 32 - 75 %    LYMPHOCYTES 31 12 - 49 %    MONOCYTES 8 5 - 13 %    EOSINOPHILS 4 0 - 7 %    BASOPHILS 1 0 - 1 %    IMMATURE GRANULOCYTES 0 0.0 - 0.5 %    ABS. NEUTROPHILS 3.8 1.8 - 8.0 K/UL    ABS. LYMPHOCYTES 2.1 0.8 - 3.5 K/UL    ABS. MONOCYTES 0.5 0.0 - 1.0 K/UL    ABS. EOSINOPHILS 0.3 0.0 - 0.4 K/UL    ABS. BASOPHILS 0.1 0.0 - 0.1 K/UL    ABS. IMM. GRANS. 0.0 0.00 - 0.04 K/UL    DF AUTOMATED     METABOLIC PANEL, COMPREHENSIVE    Collection Time: 10/27/19  4:11 AM   Result Value Ref Range    Sodium 142 136 - 145 mmol/L    Potassium 3.4 (L) 3.5 - 5.1 mmol/L    Chloride 106 97 - 108 mmol/L    CO2 26 21 - 32 mmol/L    Anion gap 10 5 - 15 mmol/L    Glucose 172 (H) 65 - 100 mg/dL    BUN 21 (H) 6 - 20 MG/DL    Creatinine 1.67 (H) 0.70 - 1.30 MG/DL    BUN/Creatinine ratio 13 12 - 20      GFR est AA 47 (L) >60 ml/min/1.73m2    GFR est non-AA 39 (L) >60 ml/min/1.73m2    Calcium 9.1 8.5 - 10.1 MG/DL    Bilirubin, total 0.8 0.2 - 1.0 MG/DL    ALT (SGPT) 41 12 - 78 U/L    AST (SGOT) 50 (H) 15 - 37 U/L    Alk.  phosphatase 70 45 - 117 U/L    Protein, total 7.0 6.4 - 8.2 g/dL    Albumin 3.4 (L) 3.5 - 5.0 g/dL    Globulin 3.6 2.0 - 4.0 g/dL    A-G Ratio 0.9 (L) 1.1 - 2.2     NT-PRO BNP    Collection Time: 10/27/19  4:11 AM   Result Value Ref Range    NT pro-BNP 11,038 (H) 0 - 450 PG/ML   CK W/ REFLX CKMB    Collection Time: 10/27/19  4:11 AM   Result Value Ref Range     39 - 308 U/L   LIPASE    Collection Time: 10/27/19  4:11 AM   Result Value Ref Range    Lipase 71 (L) 73 - 393 U/L   POC TROPONIN-I    Collection Time: 10/27/19  4:16 AM   Result Value Ref Range    Troponin-I (POC) 0.05 0.00 - 0.08 ng/mL   POC TROPONIN-I    Collection Time: 10/27/19  6:58 AM   Result Value Ref Range    Troponin-I (POC) 0.16 (H) 0.00 - 0.08 ng/mL   CK W/ CKMB & INDEX    Collection Time: 10/27/19  7:45 AM   Result Value Ref Range    CK 91 39 - 308 U/L    CK - MB 3.4 <3.6 NG/ML    CK-MB Index 3.7 (H) 0.0 - 2.5     MAGNESIUM    Collection Time: 10/27/19  7:45 AM   Result Value Ref Range    Magnesium 1.9 1.6 - 2.4 mg/dL   EKG, 12 LEAD, INITIAL    Collection Time: 10/27/19  7:58 AM   Result Value Ref Range    Ventricular Rate 67 BPM    Atrial Rate 67 BPM    QRS Duration 162 ms    Q-T Interval 494 ms    QTC Calculation (Bezet) 521 ms    Calculated P Axis 64 degrees    Calculated R Axis -165 degrees    Calculated T Axis 71 degrees    Diagnosis       Ventricular-paced rhythm with occasional premature ventricular complexes  Abnormal ECG  When compared with ECG of 27-OCT-2019 04:07,  MANUAL COMPARISON REQUIRED, DATA IS UNCONFIRMED     PTT    Collection Time: 10/27/19  9:27 AM   Result Value Ref Range    aPTT 24.3 22.1 - 32.0 sec    aPTT, therapeutic range     58.0 - 77.0 SECS   PROTHROMBIN TIME + INR    Collection Time: 10/27/19  9:27 AM   Result Value Ref Range    INR 1.1 0.9 - 1.1      Prothrombin time 10.6 9.0 - 11.1 sec

## 2019-10-27 NOTE — PROGRESS NOTES
Orders received, chart reviewed. Spoke to RN who reports that pt just arrived as a transfer from [facility] and is awaiting cardiology consult and further medical work up. RN requesting therapy follow back tomorrow.  Thank you    Livier Lebron, PT, DPT

## 2019-10-27 NOTE — PROGRESS NOTES
11:09 AM Received verbal report from RADHA Galeano,for a patient transferring from [facility]. ETA 1200. Report included SBAR, Kardex, recent results , ER procedures and cardiac rhythm. 1:52 PM verbal order recevied for cardiac monitoring and ptt order. 3:24 PM critical value reported to Dr. Corina Pinon who is covering for Dr. Andreas Chaidez at the moment. No orders were received. Dr. Andreas Chaidez ordered repeat ptt for tomorrow [facility]    4:55 PM Pateint not sure about the flu shot. Will have to verfiy with the daughter whenever she comes to the hospital. The phone number in the chart is not accurate. 5:30 PM    Patient is getting more confused but easily redirectable. He is trying to get up from his bed all the time and is restless. couldnot get the consent for the AVAsys as the contact information on the chart  Is wrong. Will get the consent when the daughter gets here in the evening. For now Avasys is put in pateint's room for safety. 6:30 PM Code at[facility] called. patient confused and agitated and wants to leave the hospital. MD notifed. 2mg IV ativan given. Patient got 20 mg geodon as the pateint is not yet calm. Verbal order received for restraints. pateint is on posey vest. VSS. 7:15 PM Spoke with Dr. Andreas Chaidez about doing face to face assesment for restaints. MD already off the hospital.  Spoke with Dr. Deepa Daugherty in ER who will come to do the assessment within 4 hours.

## 2019-10-27 NOTE — ED PROVIDER NOTES
EMERGENCY DEPARTMENT HISTORY AND PHYSICAL EXAM      Please note that this dictation was completed with [employer], the computer voice recognition software. Quite often unanticipated grammatical, syntax, homophones, and other interpretive errors are inadvertently transcribed by the computer software. Please disregard these errors and any errors that have escaped final proofreading. Thank you. Date: 10/27/2019  Patient Name: Sammy Alfred  Patient Age and Sex: 80 y.o. male    History of Presenting Illness     Chief Complaint   Patient presents with    Chest Pain       History Provided By: Patient and Patient's Granddaughter    HPI: Messi Jeff Sr, 80 y.o. male with past medical history as documented below presents to the ED with c/o of acute onset of chest pain with associated vomiting onset around midnight. Patient reports pain currently 7 out of 10 localized in the lower chest, epigastric region. Patient does have a significant cardiac history of SVT status post ablation, sick sinus syndrome status post pacemaker placement. He does follow [id number] Dr. Mary Bajwa.  Granddaughter is at bedside and states that patient does have intermittent alcohol use but none recently. Patient denies any history of liver problems, cirrhosis. He denies any associated diarrhea or fevers. He did take a baby aspirin prior to arrival.  According to family and patient he has had no history of CAD or stent placement. Additionally, there has been no recent prolonged travel, history of DVT or PE. Pt denies any other alleviating or exacerbating factors. Additionally, pt specifically denies any recent fever, chills, headache, SOB, lightheadedness, dizziness, numbness, weakness, BLE swelling, heart palpitations, urinary sxs, diarrhea, constipation, melena, hematochezia, cough, or congestion. There are no other complaints, changes or physical findings at this time.      PCP: Yao Guillen MD    Past History   Past Medical History:  Past Medical History:   Diagnosis Date    Abdominal pain 2017    Alzheimer disease ([facility] 2017    Anemia 2017    Arthritis     Back pain 2017    Bradycardia 2017    CAD (coronary artery disease)     hx of MI    CKD (chronic kidney disease), stage II 2017    constipation     Depression 2017    Diabetes mellitus ([facility] 2017    Diverticulosis 2017    DJD (degenerative joint disease) 2017    Encounter for long-term (current) use of other medications 2017    Fatigue     Gastritis and duodenitis 2017    GERD (gastroesophageal reflux disease)     GI bleed 2017    Hearing loss     Hyperlipidemia 2017    Hypertension     Hypertension with renal disease 2017    Ill-defined condition     Dementia    Internal hemorrhoids 2017    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    S/P cardiac pacemaker procedure 2015    5/4/15 Medtronic dual chamber pacemaker implant     Weight loss     lost over 40 pounds last year- has no appetite       Past Surgical History:  Past Surgical History:   Procedure Laterality Date    COLONOSCOPY N/A 2017    COLONOSCOPY performed by Dell Wong MD at [address] [place] [address]  2017         HX HERNIA REPAIR  7/10/2014    right inguinal    HX OTHER SURGICAL      cystectomy from back    NM EGD TRANSORAL BIOPSY SINGLE/MULTIPLE  2012         UPPER GI ENDOSCOPY,BIOPSY  2017            Family History:  Family History   Problem Relation Age of Onset    Hypertension Mother     Heart Disease Father     Cancer Other         son-cancer? unknown what type        Social History:  Social History     Tobacco Use    Smoking status: Former Smoker     Packs/day: 0.50     Years: 10.00     Pack years: 5.00     Start date: 1991    Smokeless tobacco: Never Used    Tobacco comment: quit 50 years ago   Substance Use Topics    Alcohol use: Yes     Comment: Occasional beer    Drug use: No       Allergies:  No Known Allergies    Current Medications:  No current facility-administered medications on file prior to encounter. Current Outpatient Medications on File Prior to Encounter   Medication Sig Dispense Refill    memantine (NAMENDA) 10 mg tablet TAKE 1 TABLET BY MOUTH TWICE A  Tab 3    losartan-hydroCHLOROthiazide (HYZAAR) 100-25 mg per tablet TAKE 1 TABLET EVERY DAY 90 Tab 1    amLODIPine (NORVASC) 10 mg tablet TAKE 1 TABLET BY MOUTH DAILY 90 Tab 1    donepezil (ARICEPT) 10 mg tablet TAKE 1 TABLET BY MOUTH EVERY DAY 90 Tab 1    temazepam (RESTORIL) 15 mg capsule Take 1 Cap by mouth nightly as needed for Sleep. Max Daily Amount: 15 mg. 30 Cap 0    diclofenac EC (VOLTAREN) 75 mg EC tablet Take 1 Tab by mouth two (2) times a day. 60 Tab prn    mv-mins-folic-lycopene-ginkgo (ONE-A-DAY MEN 50 PLUS, GINKGO,) [phone] mcg-mcg-mg tab Take  by mouth.  aspirin 81 mg chewable tablet Take 1 Tab by mouth daily. 30 Tab 1    omeprazole (PRILOSEC) 20 mg capsule Take 1 Cap by mouth daily. 90 Cap 3       Review of Systems   Review of Systems   Constitutional: Negative. Negative for chills and fever. HENT: Negative. Negative for congestion, facial swelling, rhinorrhea, sore throat, trouble swallowing and voice change. Eyes: Negative. Respiratory: Negative. Negative for apnea, cough, chest tightness, shortness of breath and wheezing. Cardiovascular: Positive for chest pain. Negative for palpitations and leg swelling. Gastrointestinal: Positive for abdominal pain, nausea and vomiting. Negative for abdominal distention, blood in stool, constipation and diarrhea. Endocrine: Negative. Negative for cold intolerance, heat intolerance and polyuria. Genitourinary: Negative. Negative for difficulty urinating, dysuria, flank pain, frequency, hematuria and urgency.    Musculoskeletal: Negative. Negative for arthralgias, back pain, myalgias, neck pain and neck stiffness. Skin: Negative. Negative for color change and rash. Neurological: Negative. Negative for dizziness, syncope, facial asymmetry, speech difficulty, weakness, light-headedness, numbness and headaches. Hematological: Negative. Does not bruise/bleed easily. Psychiatric/Behavioral: Negative. Negative for confusion and self-injury. The patient is not nervous/anxious. Physical Exam   Physical Exam   Constitutional: He is oriented to person, place, and time. He appears well-developed and well-nourished. He is cooperative. He appears toxic. He has a sickly appearance. He appears ill. He appears distressed. HENT:   Head: Normocephalic and atraumatic. Mouth/Throat: Mucous membranes are normal. No posterior oropharyngeal erythema. Eyes: Pupils are equal, round, and reactive to light. Conjunctivae and EOM are normal.   Neck: Normal range of motion. Cardiovascular: Normal rate, regular rhythm, normal heart sounds and intact distal pulses. Exam reveals no gallop and no friction rub. No murmur heard. Pulmonary/Chest: Effort normal and breath sounds normal. No respiratory distress. He has no wheezes. He has no rales. He exhibits no tenderness. Pacemaker over chest wall   Abdominal: Soft. Bowel sounds are normal. He exhibits no distension and no mass. There is tenderness (Minimal epigastric tenderness, no rebound, no guarding, nontender at McBurney's. Negative Acosta's). There is no rebound and no guarding. Musculoskeletal: Normal range of motion. He exhibits edema (+1 edema BLE). He exhibits no tenderness or deformity. Neurological: He is alert and oriented to person, place, and time. He displays normal reflexes. No cranial nerve deficit. He exhibits normal muscle tone. Coordination normal.   Skin: Skin is warm. No rash noted. Psychiatric: He has a normal mood and affect.    Nursing note and vitals reviewed. Diagnostic Study Results     Labs -  Recent Results (from the past 24 hour(s))   EKG, 12 LEAD, INITIAL    Collection Time: 10/27/19  4:07 AM   Result Value Ref Range    Ventricular Rate 62 BPM    Atrial Rate 267 BPM    QRS Duration 164 ms    Q-T Interval 478 ms    QTC Calculation (Bezet) 485 ms    Calculated R Axis 178 degrees    Calculated T Axis 54 degrees    Diagnosis       Ventricular-paced rhythm  Abnormal ECG  When compared with ECG of 11-JUL-2018 17:35,  premature ventricular complexes are no longer present  Vent. rate has decreased BY   4 BPM     CBC WITH AUTOMATED DIFF    Collection Time: 10/27/19  4:11 AM   Result Value Ref Range    WBC 6.8 4.1 - 11.1 K/uL    RBC 4.87 4.10 - 5.70 M/uL    HGB 13.3 12.1 - 17.0 g/dL    HCT 43.2 36.6 - 50.3 %    MCV 88.7 80.0 - 99.0 FL    MCH 27.3 26.0 - 34.0 PG    MCHC 30.8 30.0 - 36.5 g/dL    RDW 14.2 11.5 - 14.5 %    PLATELET 698 386 - 290 K/uL    MPV 11.1 8.9 - 12.9 FL    NRBC 0.0 0  WBC    ABSOLUTE NRBC 0.00 0.00 - 0.01 K/uL    NEUTROPHILS 56 32 - 75 %    LYMPHOCYTES 31 12 - 49 %    MONOCYTES 8 5 - 13 %    EOSINOPHILS 4 0 - 7 %    BASOPHILS 1 0 - 1 %    IMMATURE GRANULOCYTES 0 0.0 - 0.5 %    ABS. NEUTROPHILS 3.8 1.8 - 8.0 K/UL    ABS. LYMPHOCYTES 2.1 0.8 - 3.5 K/UL    ABS. MONOCYTES 0.5 0.0 - 1.0 K/UL    ABS. EOSINOPHILS 0.3 0.0 - 0.4 K/UL    ABS. BASOPHILS 0.1 0.0 - 0.1 K/UL    ABS. IMM.  GRANS. 0.0 0.00 - 0.04 K/UL    DF AUTOMATED     METABOLIC PANEL, COMPREHENSIVE    Collection Time: 10/27/19  4:11 AM   Result Value Ref Range    Sodium 142 136 - 145 mmol/L    Potassium 3.4 (L) 3.5 - 5.1 mmol/L    Chloride 106 97 - 108 mmol/L    CO2 26 21 - 32 mmol/L    Anion gap 10 5 - 15 mmol/L    Glucose 172 (H) 65 - 100 mg/dL    BUN 21 (H) 6 - 20 MG/DL    Creatinine 1.67 (H) 0.70 - 1.30 MG/DL    BUN/Creatinine ratio 13 12 - 20      GFR est AA 47 (L) >60 ml/min/1.73m2    GFR est non-AA 39 (L) >60 ml/min/1.73m2    Calcium 9.1 8.5 - 10.1 MG/DL    Bilirubin, total 0.8 0.2 - 1.0 MG/DL    ALT (SGPT) 41 12 - 78 U/L    AST (SGOT) 50 (H) 15 - 37 U/L    Alk. phosphatase 70 45 - 117 U/L    Protein, total 7.0 6.4 - 8.2 g/dL    Albumin 3.4 (L) 3.5 - 5.0 g/dL    Globulin 3.6 2.0 - 4.0 g/dL    A-G Ratio 0.9 (L) 1.1 - 2.2     NT-PRO BNP    Collection Time: 10/27/19  4:11 AM   Result Value Ref Range    NT pro-BNP 11,038 (H) 0 - 450 PG/ML   CK W/ REFLX CKMB    Collection Time: 10/27/19  4:11 AM   Result Value Ref Range     39 - 308 U/L   LIPASE    Collection Time: 10/27/19  4:11 AM   Result Value Ref Range    Lipase 71 (L) 73 - 393 U/L   POC TROPONIN-I    Collection Time: 10/27/19  4:16 AM   Result Value Ref Range    Troponin-I (POC) 0.05 0.00 - 0.08 ng/mL   POC TROPONIN-I    Collection Time: 10/27/19  6:58 AM   Result Value Ref Range    Troponin-I (POC) 0.16 (H) 0.00 - 0.08 ng/mL   EKG, 12 LEAD, INITIAL    Collection Time: 10/27/19  7:58 AM   Result Value Ref Range    Ventricular Rate 67 BPM    Atrial Rate 67 BPM    QRS Duration 162 ms    Q-T Interval 494 ms    QTC Calculation (Bezet) 521 ms    Calculated P Axis 64 degrees    Calculated R Axis -165 degrees    Calculated T Axis 71 degrees    Diagnosis       Ventricular-paced rhythm with occasional premature ventricular complexes  Abnormal ECG  When compared with ECG of 27-OCT-2019 04:07,  MANUAL COMPARISON REQUIRED, DATA IS UNCONFIRMED         Radiologic Studies -   CT ABD PELV W CONT   Final Result   IMPRESSION:   Chest:   1. No evidence of pulmonary embolism. 2. Moderate bilateral pleural effusions and bilateral lower lobe subsegmental   atelectasis. 3. Pulmonary alveolar edema. 4. Scattered mucous plugging throughout the lungs. 5. Mild cardiomegaly. Abdomen/pelvis:   1. No evidence of acute process in the abdomen or pelvis. 2. Severe calcific atherosclerosis. CTA CHEST W OR W WO CONT   Final Result   IMPRESSION:   Chest:   1. No evidence of pulmonary embolism.    2. Moderate bilateral pleural effusions and bilateral lower lobe subsegmental   atelectasis. 3. Pulmonary alveolar edema. 4. Scattered mucous plugging throughout the lungs. 5. Mild cardiomegaly. Abdomen/pelvis:   1. No evidence of acute process in the abdomen or pelvis. 2. Severe calcific atherosclerosis. XR CHEST PORT   Final Result   IMPRESSION:    Pulmonary vascular congestion and probable interstitial edema. CT Results  (Last 48 hours)               10/27/19 0635  CT ABD PELV W CONT Final result    Impression:  IMPRESSION:   Chest:   1. No evidence of pulmonary embolism. 2. Moderate bilateral pleural effusions and bilateral lower lobe subsegmental   atelectasis. 3. Pulmonary alveolar edema. 4. Scattered mucous plugging throughout the lungs. 5. Mild cardiomegaly. Abdomen/pelvis:   1. No evidence of acute process in the abdomen or pelvis. 2. Severe calcific atherosclerosis. Narrative:  EXAM:  CTA CHEST W OR W WO CONT, CT ABD PELV W CONT       INDICATION: chest pain. Abdominal pain. COMPARISON: CT abdomen pelvis 7/11/2018. CONTRAST:  100 mL of Isovue-370. TECHNIQUE:    Following the uneventful intravenous administration of contrast, thin axial   images were obtained through the chest, abdomen and pelvis. Coronal and sagittal   reconstructions were generated. MIP reconstructions of the chest were generated. Oral contrast was not administered. CT dose reduction was achieved through use   of a standardized protocol tailored for this examination and automatic exposure   control for dose modulation. FINDINGS:    Chest:   Vascular:   No evidence of acute or chronic pulmonary embolism. The pulmonary arteries are   normal in size. The thoracic aorta is normal in size. Lungs/Pleura: Moderate bilateral pleural effusions with overlying bilateral   lower lobe subsegmental atelectasis. There is scattered mucous plugging noted   throughout the bilateral lower lobes.  There are scattered bilateral   peribronchovascular groundglass opacities. No pneumothorax. Axilla/Soft Tissue: No pathologic axillary adenopathy. Mediastinum: Left chest pacemaker with leads in right atrium, right ventricle   and coronary sinus. Mild cardiomegaly. No pericardial effusion. Mild coronary   artery calcifications. No pathologic mediastinal or hilar adenopathy. Bones: No evidence of acute fracture, dislocation, or aggressive osseous   abnormality. Dextrocurvature of the lower thoracic spine. Abdomen/Pelvis:   Liver:  A 17 mm simple cyst is noted in the superior left hepatic lobe. There   are other scattered smaller simple cysts throughout the liver. Biliary system: Gallbladder is unremarkable. No intrahepatic or extrahepatic   biliary ductal dilatation. Spleen: Normal.       Pancreas: Normal.       Kidneys/Ureters/Bladder: Scattered areas of cortical scarring in the kidneys. No   worrisome renal masses. No renal or ureteral calculi. No hydronephrosis or   hydroureter. The bladder is normal.       Adrenals: Normal.       Stomach/bowel: No dilation or abnormal wall thickening is present. No free   intraperitoneal air noted. Diverticulosis of the descending and sigmoid colon. Normal appendix. Reproductive Organs: Mildly enlarged prostate noted. Vasculature: Normal caliber arteries. Severe calcific atherosclerosis of the   abdominal aorta and iliac vasculature. Portal vein, SMV, and splenic vein are   patent. Nodes: No pathologically enlarged lymph nodes. Fluid: No free fluid. Bones/Soft Tissue: No acute fractures or aggressive osseous lesions are seen. Levocurvature of the lumbar spine centered at L3. Scattered degenerative disc   disease throughout the lumbar spine with advanced lower lumbar facet   arthropathy. 10/27/19 3904  CTA [facility] Final result    Impression:  IMPRESSION:   Chest:   1. No evidence of pulmonary embolism.    2. Moderate bilateral pleural effusions and bilateral lower lobe subsegmental   atelectasis. 3. Pulmonary alveolar edema. 4. Scattered mucous plugging throughout the lungs. 5. Mild cardiomegaly. Abdomen/pelvis:   1. No evidence of acute process in the abdomen or pelvis. 2. Severe calcific atherosclerosis. Narrative:  EXAM:  CTA CHEST W OR W WO CONT, CT ABD PELV W CONT       INDICATION: chest pain. Abdominal pain. COMPARISON: CT abdomen pelvis 7/11/2018. CONTRAST:  100 mL of Isovue-370. TECHNIQUE:    Following the uneventful intravenous administration of contrast, thin axial   images were obtained through the chest, abdomen and pelvis. Coronal and sagittal   reconstructions were generated. MIP reconstructions of the chest were generated. Oral contrast was not administered. CT dose reduction was achieved through use   of a standardized protocol tailored for this examination and automatic exposure   control for dose modulation. FINDINGS:    Chest:   Vascular:   No evidence of acute or chronic pulmonary embolism. The pulmonary arteries are   normal in size. The thoracic aorta is normal in size. Lungs/Pleura: Moderate bilateral pleural effusions with overlying bilateral   lower lobe subsegmental atelectasis. There is scattered mucous plugging noted   throughout the bilateral lower lobes. There are scattered bilateral   peribronchovascular groundglass opacities. No pneumothorax. Axilla/Soft Tissue: No pathologic axillary adenopathy. Mediastinum: Left chest pacemaker with leads in right atrium, right ventricle   and coronary sinus. Mild cardiomegaly. No pericardial effusion. Mild coronary   artery calcifications. No pathologic mediastinal or hilar adenopathy. Bones: No evidence of acute fracture, dislocation, or aggressive osseous   abnormality. Dextrocurvature of the lower thoracic spine.        Abdomen/Pelvis:   Liver:  A 17 mm simple cyst is noted in the superior left hepatic lobe. There   are other scattered smaller simple cysts throughout the liver. Biliary system: Gallbladder is unremarkable. No intrahepatic or extrahepatic   biliary ductal dilatation. Spleen: Normal.       Pancreas: Normal.       Kidneys/Ureters/Bladder: Scattered areas of cortical scarring in the kidneys. No   worrisome renal masses. No renal or ureteral calculi. No hydronephrosis or   hydroureter. The bladder is normal.       Adrenals: Normal.       Stomach/bowel: No dilation or abnormal wall thickening is present. No free   intraperitoneal air noted. Diverticulosis of the descending and sigmoid colon. Normal appendix. Reproductive Organs: Mildly enlarged prostate noted. Vasculature: Normal caliber arteries. Severe calcific atherosclerosis of the   abdominal aorta and iliac vasculature. Portal vein, SMV, and splenic vein are   patent. Nodes: No pathologically enlarged lymph nodes. Fluid: No free fluid. Bones/Soft Tissue: No acute fractures or aggressive osseous lesions are seen. Levocurvature of the lumbar spine centered at L3. Scattered degenerative disc   disease throughout the lumbar spine with advanced lower lumbar facet   arthropathy. CXR Results  (Last 48 hours)               10/27/19 0446  XR CHEST PORT Final result    Impression:  IMPRESSION:    Pulmonary vascular congestion and probable interstitial edema. Narrative:  PORTABLE CHEST RADIOGRAPH/S: 10/27/2019 4:46 AM       INDICATION: Acute chest pain. COMPARISON: 1/9/2019, 5/6/2013. TECHNIQUE: Portable frontal upright radiograph/s of the chest.       FINDINGS:    There is pulmonary vascular congestion and probable interstitial edema. The   central airways are patent. No pneumothorax or pleural effusion. A pacemaker is   in place. Medical Decision Making   I am the first provider for this patient.     I reviewed the vital signs, available nursing notes, past medical history, past surgical history, family history and social history. Vital Signs-Reviewed the patient's vital signs. Patient Vitals for the past 24 hrs:   Temp Pulse Resp BP SpO2   10/27/19 0600  65 14 139/86 100 %   10/27/19 0545  74 16 143/81 100 %   10/27/19 0530  73 20 158/78 100 %   10/27/19 0515  62 15 148/77 100 %   10/27/19 0500  60 19 153/81 100 %   10/27/19 0445  61 20 157/78 100 %   10/27/19 0430  60 22 175/86 98 %   10/27/19 0415  67 26 173/88 97 %   10/27/19 0402  67 25  96 %   10/27/19 0401    181/85    10/27/19 0400     100 %   10/27/19 0359 97.8 °F (36.6 °C) 66 22 181/85 99 %       Pulse Oximetry Analysis - 99% on RA    Cardiac Monitor:   Rate: 66 bpm  Rhythm: Normal Sinus Rhythm      ED EKG interpretation:  Rhythm: paced; and regular . Rate (approx.): 62; Axis: normal; P wave: normal; QRS interval: normal ; ST/T wave: normal; Other findings: normal. This EKG was interpreted by Larry Mahan M.D. Records Reviewed: Nursing Notes, Old Medical Records, Previous electrocardiograms, Previous Radiology Studies and Previous Laboratory Studies    Provider Notes (Medical Decision Making):   DDx includes STEMI, NSTEMI, Angina, PE, Aortic Pathology, Chest Wall Pain, Pleurisy, Pneumonia, GERD/esophagitis, Anxiety. No cough/fever or focal lung findings to suggest pneumonia. No tachycardia, hypoxia or pleuritic component to suggest PE. Pulses symmetric and no extremely elevated BP/asymmetry or classic tearing sensation to suggest Aortic Dissection. Also, no neuro findings. No wretching/forceful vomiting to suggest esophageal disaster. Denies IV drug abuse, has native valves, no fevers/murmurs or skin lesions to suggest endocarditis. Will evaluate with EKG, labs, cardiac enzymes, chest x-ray. Will provide pain control and reassess.     Additionally, pt presents with acute abdominal pain; vital signs stable with currently a non-peritoneal exam; DDx includes: Gastroenteritis, hepatitis, pancreatitis, obstruction, appendicitis, viral illness, IBD, diverticulitis, mesenteric ischemia, AAA or descending dissection, ACS, kidney stone. Will get labs, treat symptomatically and obtain serial abdominal exams to determine if additional imaging is indicated. Will reassess and monitor closely. ED Course:   Initial assessment performed. The patients presenting problems have been discussed, and they are in agreement with the care plan formulated and outlined with them. I have encouraged them to ask questions as they arise throughout their visit. ALCOHOL/SUBSTANCE ABUSE COUNSELING:  Upon evaluation, pt endorsed recent alcohol/illicit drug use. For approximately 15 minutes, pt has been counseled on the dangers of alcohol and illicit drug use on their health, and they were encouraged to quit as soon as possible in order to decrease further risks to their health. Pt has conveyed their understanding of the risks involved should they continue to use these products. HYPERTENSION COUNSELING   Education was provided to the patient today regarding their hypertension. Patient is made aware of their elevated blood pressure and is instructed to follow up this week with their Primary Care for a recheck. Patient is counseled regarding consequences of chronic, uncontrolled hypertension including kidney disease, heart disease, stroke or even death. Patient states their understanding and agrees to follow up this week. Additionally, during their visit, I discussed sodium restriction, maintaining ideal body weight and regular exercise program as physiologic means to achieve blood pressure control. The patient will strive towards this. I reviewed our electronic medical record system for any past medical records that were available that may contribute to the patient's current condition, the nursing notes and vital signs from today's visit.   Candy Ortiz MD    ED Orders Placed :  Orders Placed This Encounter    XR CHEST PORT    CTA CHEST W AND W/O CONTRAST (use for PE)    CT ABDOMEN PELVIS WITH CONTRAST    CBC WITH AUTOMATED DIFF    METABOLIC PANEL, COMPREHENSIVE    NT-PRO BNP    CK W/REFLEX CKMB    LIPASE    CK W/ CKMB & INDEX    MAGNESIUM    PTT    PT    CARDIAC MONITOR - ED ONLY    NURSING-MISCELLANEOUS: npo until we get second POC trop at [address] - ED ONLY    POC TROPONIN-I    POC TROPONIN-I    POC TROPONIN-I    EKG 12 LEAD INITIAL    EKG, 12 LEAD, INITIAL    INSERT PERIPHERAL IV ONE TIME STAT    aspirin chewable tablet 324 mg    ondansetron (ZOFRAN) injection 4 mg    morphine injection 4 mg    famotidine (PF) (PEPCID) 20 mg in sodium chloride 0.9% 10 mL injection    iopamidol (ISOVUE-370) 76 % injection 100 mL    sodium chloride 0.9 % bolus infusion 100 mL    ondansetron (ZOFRAN ODT) 4 mg disintegrating tablet    famotidine (PEPCID) 20 mg tablet    naproxen (NAPROSYN) 500 mg tablet    furosemide (LASIX) injection 20 mg    potassium chloride SR (KLOR-CON 10) tablet 40 mEq    IP CONSULT TO CARDIOLOGY     ED Medications Administered:  Medications   aspirin chewable tablet 324 mg (324 mg Oral Given 10/27/19 0418)   ondansetron (ZOFRAN) injection 4 mg (4 mg IntraVENous Given 10/27/19 0425)   morphine injection 4 mg (4 mg IntraVENous Given 10/27/19 0434)   famotidine (PF) (PEPCID) 20 mg in sodium chloride 0.9% 10 mL injection (20 mg IntraVENous Given 10/27/19 0425)   iopamidol (ISOVUE-370) 76 % injection 100 mL (100 mL IntraVENous Given 10/27/19 0638)   sodium chloride 0.9 % bolus infusion 100 mL (0 mL IntraVENous IV Completed 10/27/19 0857)   furosemide (LASIX) injection 20 mg (20 mg IntraVENous Given 10/27/19 0830)   potassium chloride SR (KLOR-CON 10) tablet 40 mEq (40 mEq Oral Given 10/27/19 0829)   heparin (porcine) injection 3,900 Units (3,900 Units IntraVENous Given 10/27/19 1042)         Progress Note:  Pt feeling better after IV meds, will repeat 2nd trop, elevated BNP with CXR findings showing pulmonary vascular congestion and probable interstitial edema. ?right heart strain, check CTA imaging. Progress Note:  Repeat POC trop elevated; given aspirin earlier, will consult Cardiology. Pt remains chest pain free. Progress Note:  Pt's last ECHO in our system 4/2018, EF 55-60%, CXR with vascular congestion/edema, ?new onset CHF, will give kelsea diuresis with 20 mg IV Lasix given renal function and pt's currently in no resp distress without tachypnea or hypoxia. Mr. Swapna Monaco remains chest pain free. Will continue to monitor, will likely need admission, ECHO, Cards eval.    Consult Note:  Morteza Vargas MD spoke with Dr. Garcia,   Specialty: Cardiology  Discussed pt's hx, disposition, and available diagnostic and imaging results. Reviewed care plans. Agree with management and plan thus far. Consultant will evaluate pt. Progress Note:  Pt given IV Lasix given concerns for CHF, elevated BNP    Progress Note:  Dr. Garcia evaluated the patient and results. The EKG possibly shows new onset atrial flutter. He discussed anticoagulation. He recommends transfer to [facility] as the patient's primary cardiologist is there. Progress Note:  Pt given IV heparin for anticoagulation, chest pain resolved. Progress Note:  Pt to be evaluated by Dr. Bishop Mckeon and Dr. Bull Tsang at [facility].     CRITICAL CARE NOTE :    IMPENDING DETERIORATION -Airway, Respiratory, Cardiovascular, Metabolic and Renal  ASSOCIATED RISK FACTORS - Hypotension, Shock, Hypoxia, Dysrhythmia, Metabolic changes and CNS Decompensation  MANAGEMENT- Bedside Assessment, Supervision of Care and Transfer  INTERPRETATION -  Xrays, CT Scan, ECG, Blood Pressure and Cardiac Output Measures   INTERVENTIONS - hemodynamic mngmt and Metobolic interventions  CASE REVIEW - Hospitalist, Medical Sub-Specialist, Nursing and Family  TREATMENT RESPONSE -Improved  PERFORMED BY - Self    NOTES :    I have spent 95 minutes of critical care time involved in lab review, consultations with specialist, family decision- making, bedside attention and documentation. During this entire length of time I was immediately available to the patient . Critical Care: The reason for providing this level of medical care for this critically ill patient was due to a critical illness that impaired one or more vital organ systems, such that there was a high probability of imminent or life threatening deterioration in the patient's condition. This care involved high complexity decision making to assess, manipulate, and support vital system functions, to treat this degree of vital organ system failure, and to prevent further life threatening deterioration of the patients condition. Karen Barraza MD    Transfer Note:   Patient is being transferred to [facility]. Transfer was accepted by Dr. Tre Mixon. The reasons for their transfer have been discussed with them and available family. They convey agreement and understanding for the need to be transferred as explained to them by me. Karen Barraza MD      Diagnosis     Clinical Impression:   1. NSTEMI (non-ST elevated myocardial infarction) ([facility] [address]    2. Acute chest pain    3. Accelerated hypertension    4. Acute congestive heart failure, unspecified heart failure type (HCC)    5. Elevated troponin    6. Acute vomiting    7. Bilateral pleural effusion        Attestation:  I personally performed the services described in this documentation on this date 10/27/2019 for patient, Carmen Alanis . Karen Barraza MD      This note will not be viewable in [address]

## 2019-10-27 NOTE — PROGRESS NOTES
Pharmacy Monitor of Heparin Drip    Pharmacy monitoring heparin for this  80 y.o. male ordered heparin for ACS    Goal PTT: 58-77    Is this a restart? : NO  (When restarting heparin infusions after a procedure restart at the previous rate unless otherwise directed by the physician)  Has it been 6 hours since the last heparin drip adjustment and the newest PTT? NO  Was the previous PTT >111? NO  Two consecutive therapeutic PPTs?: NO  Labs reviewed? YES  Progress notes reviewed? YES    Rate before lastest PTT(units/kg/hour): N/A    New Rate if adjustment needed (units/kg/hour) : N/A    Plan:   Start heparin infusion at 781.2 units/hr. Draw PTT 6 hours post dose initiation (at ~1630 on 10/27/19). Thank you,  Hugo Robison, Pharm. D.  [phone]    Weight Ht Readings from Last 1 Encounters:   10/27/19 175.3 cm (69\")        Height Wt Readings from Last 1 Encounters:   10/27/19 65.1 kg (143 lb 9.6 oz)        BMI Body mass index is 21.21 kg/m². INR (0.9-1.1) Recent Labs     10/27/19  0927   INR 1.1      aPTT Recent Labs     10/27/19  0927   APTT 24.3      Hgb Recent Labs     10/27/19  0411   HGB 13.3      Platelets Recent Labs     10/27/19  0411         Albumin (3.5-5 g/dl) Recent Labs     10/27/19  0411   ALB 3.4*      AST (15-37 U/L) Recent Labs     10/27/19  0411   SGOT 50*      ALT (12-78 U/L) No results for input(s): GPT in the last 72 hours. Total Bilirubin (0.2-1 mg/dl) No results for input(s): TBIL in the last 72 hours. Serum Creatinine Recent Labs     10/27/19  0411   CREA 1.67*      BUN Recent Labs     10/27/19  0411   BUN 21*            Instructions for Heparin infusions     Initial order  a. Evaluate appropriateness of heparin order  b. Open up monitoring event  c. Enter reason for hepairn  e. Initial rate       Daily monitoring from 7 am - 330 pm when ptt populates the inbox  a. Check MAR for current rate  b.  If adjusted already, make sure rate adjusted appropriately according to protocol  c. If not adjusted, remind nurse that ptt has resulted and discuss adjustment if needed  d. Record ptt in ivent  e. Record initial rate and adjustment rate if needed    Draw aPTT 6 hours after initiation of infusion or dosage change until 2 consecutive aPTTs are therapeutic and then monitor daily. aPTT (seconds)   less-than or equal to 40   41-49   50-57   58-77   78-91      111 or higher   DVT/PE Protocol   80 unit/kg bolus (maximum 10,000 units) and increase dose by 4 units/kg/hr 40 units/kg bolus (maximum 5,000 units) and increase dose by 2 units/kg/hr   Increase dose by 1 unit/kg/hr THERAPEUTIC - No change required Reduce dose by 2 units/kg/hr Hold Heparin infusion for 1 hr. Resume & reduce prior dose by  3 units/kg/hr Hold Heparin and notify MD. Check aPTT every 2 hr  until less-than 103 sec. Resume infusion when aPTT  less-than 91 sec. and reduce prior dose by 3 units/kg/hr   Cardiac - AMI, ACS, AF  Protocol   60 unit/kg bolus (maximum 4,000 units) and increase dose by 4 units/kg/hr 30 units/kg bolus (maximum 2000 units) and increase dose by 2 units/kg/hr        This form is a permanent part of the medical record. Starting rate is based on weight and is not to be adjusted based on baseline PTT results. If the baseline aPTT is abnormally high call the physician. Maximum initial infusion rate for ACS protocol is 1000 units/hour and 1500 units/hour for DVT/PE protocol. Back calculate the rate in units/kg/hour if the patient weights more than 83.3 kg. When restarting heparin infusions after a procedure restart at the previous rate unless otherwise directed by the physician. If aPTT is greater than 111, hold infusion and notify MD. Check the aPPT every 2 hours until less than 91. When restarting heparin decrease the prior infusion rate by 3 units/kg/hour. DO NOT increase the dose from the previous infusion. Draw aPTTs 6 hours after rate changes, not every 6 hours around the clock.    aPTTs should be drawn peripherally. Do not draw from a central line unless absolutely necessary as the results are often elevated erroneously. All aPTTs are to be rounded to the closest whole number before making dosage adjustments. During the hours 7:00 AM to 3:00 PM Monday through Friday please call your pharmacist when you have a new heparin infusion or an aPTT result.

## 2019-10-27 NOTE — PROGRESS NOTES
1235: ED registration notified of pt's admission.   -Attending paged. 1244: dr Sancho Raines to see pt presently.    -dr Belle Slice states continue hep gtt per order from previous hospital.

## 2019-10-27 NOTE — PROGRESS NOTES
Patient arrived from [facility] with heparin infusing. Nurse is calling Dr. Chandler Syed to get new orders for heparin.

## 2019-10-28 LAB
ATRIAL RATE: 267 BPM
ATRIAL RATE: 67 BPM
CALCULATED P AXIS, ECG09: 64 DEGREES
CALCULATED R AXIS, ECG10: -165 DEGREES
CALCULATED R AXIS, ECG10: 178 DEGREES
CALCULATED T AXIS, ECG11: 54 DEGREES
CALCULATED T AXIS, ECG11: 71 DEGREES
DIAGNOSIS, 93000: NORMAL
DIAGNOSIS, 93000: NORMAL
ERYTHROCYTE [DISTWIDTH] IN BLOOD BY AUTOMATED COUNT: 14.6 % (ref 11.5–14.5)
GLUCOSE BLD STRIP.AUTO-MCNC: 106 MG/DL (ref 65–100)
GLUCOSE BLD STRIP.AUTO-MCNC: 106 MG/DL (ref 65–100)
GLUCOSE BLD STRIP.AUTO-MCNC: 126 MG/DL (ref 65–100)
GLUCOSE BLD STRIP.AUTO-MCNC: 85 MG/DL (ref 65–100)
HCT VFR BLD AUTO: 40.6 % (ref 36.6–50.3)
HGB BLD-MCNC: 12.5 G/DL (ref 12.1–17)
MCH RBC QN AUTO: 26.8 PG (ref 26–34)
MCHC RBC AUTO-ENTMCNC: 30.8 G/DL (ref 30–36.5)
MCV RBC AUTO: 86.9 FL (ref 80–99)
NRBC # BLD: 0 K/UL (ref 0–0.01)
NRBC BLD-RTO: 0 PER 100 WBC
PLATELET # BLD AUTO: 270 K/UL (ref 150–400)
PMV BLD AUTO: 11.2 FL (ref 8.9–12.9)
Q-T INTERVAL, ECG07: 478 MS
Q-T INTERVAL, ECG07: 494 MS
QRS DURATION, ECG06: 162 MS
QRS DURATION, ECG06: 164 MS
QTC CALCULATION (BEZET), ECG08: 485 MS
QTC CALCULATION (BEZET), ECG08: 521 MS
RBC # BLD AUTO: 4.67 M/UL (ref 4.1–5.7)
SERVICE CMNT-IMP: ABNORMAL
SERVICE CMNT-IMP: NORMAL
VENTRICULAR RATE, ECG03: 62 BPM
VENTRICULAR RATE, ECG03: 67 BPM
WBC # BLD AUTO: 7.2 K/UL (ref 4.1–11.1)

## 2019-10-28 PROCEDURE — 74011250637 HC RX REV CODE- 250/637: Performed by: INTERNAL MEDICINE

## 2019-10-28 PROCEDURE — 65660000000 HC RM CCU STEPDOWN

## 2019-10-28 PROCEDURE — 36415 COLL VENOUS BLD VENIPUNCTURE: CPT

## 2019-10-28 PROCEDURE — 85027 COMPLETE CBC AUTOMATED: CPT

## 2019-10-28 PROCEDURE — 82962 GLUCOSE BLOOD TEST: CPT

## 2019-10-28 PROCEDURE — 4B02XSZ MEASUREMENT OF CARDIAC PACEMAKER, EXTERNAL APPROACH: ICD-10-PCS | Performed by: INTERNAL MEDICINE

## 2019-10-28 PROCEDURE — 74011000250 HC RX REV CODE- 250: Performed by: INTERNAL MEDICINE

## 2019-10-28 PROCEDURE — 74011250636 HC RX REV CODE- 250/636: Performed by: INTERNAL MEDICINE

## 2019-10-28 PROCEDURE — 94760 N-INVAS EAR/PLS OXIMETRY 1: CPT

## 2019-10-28 RX ORDER — DIAZEPAM 10 MG/2ML
10 INJECTION INTRAMUSCULAR
Status: DISCONTINUED | OUTPATIENT
Start: 2019-10-28 | End: 2019-10-28

## 2019-10-28 RX ORDER — DIAZEPAM 10 MG/2ML
20 INJECTION INTRAMUSCULAR
Status: DISCONTINUED | OUTPATIENT
Start: 2019-10-28 | End: 2019-10-28

## 2019-10-28 RX ORDER — LORAZEPAM 2 MG/ML
4 INJECTION INTRAMUSCULAR
Status: DISCONTINUED | OUTPATIENT
Start: 2019-10-28 | End: 2019-11-01 | Stop reason: HOSPADM

## 2019-10-28 RX ORDER — LORAZEPAM 2 MG/ML
2 INJECTION INTRAMUSCULAR
Status: DISCONTINUED | OUTPATIENT
Start: 2019-10-28 | End: 2019-11-01 | Stop reason: HOSPADM

## 2019-10-28 RX ADMIN — Medication 10 ML: at 05:19

## 2019-10-28 RX ADMIN — ASPIRIN 81 MG 81 MG: 81 TABLET ORAL at 11:38

## 2019-10-28 RX ADMIN — Medication 10 ML: at 13:25

## 2019-10-28 RX ADMIN — HEPARIN SODIUM 5000 UNITS: 5000 INJECTION INTRAVENOUS; SUBCUTANEOUS at 14:02

## 2019-10-28 RX ADMIN — METOPROLOL TARTRATE 25 MG: 25 TABLET ORAL at 21:39

## 2019-10-28 RX ADMIN — METOPROLOL TARTRATE 25 MG: 25 TABLET ORAL at 11:41

## 2019-10-28 RX ADMIN — AMLODIPINE BESYLATE 5 MG: 5 TABLET ORAL at 11:38

## 2019-10-28 RX ADMIN — NITROGLYCERIN 1 INCH: 20 OINTMENT TOPICAL at 21:38

## 2019-10-28 RX ADMIN — HYDROCHLOROTHIAZIDE: 25 TABLET ORAL at 11:42

## 2019-10-28 RX ADMIN — FUROSEMIDE 40 MG: 40 TABLET ORAL at 11:39

## 2019-10-28 RX ADMIN — HALOPERIDOL LACTATE 2 MG: 5 INJECTION, SOLUTION INTRAMUSCULAR at 17:52

## 2019-10-28 RX ADMIN — NITROGLYCERIN 1 INCH: 20 OINTMENT TOPICAL at 05:15

## 2019-10-28 RX ADMIN — LORAZEPAM 1 MG: 1 TABLET ORAL at 17:37

## 2019-10-28 RX ADMIN — MEMANTINE HYDROCHLORIDE 10 MG: 10 TABLET ORAL at 11:38

## 2019-10-28 RX ADMIN — WATER 20 MG: 1 INJECTION INTRAMUSCULAR; INTRAVENOUS; SUBCUTANEOUS at 18:57

## 2019-10-28 RX ADMIN — MEMANTINE HYDROCHLORIDE 10 MG: 10 TABLET ORAL at 17:37

## 2019-10-28 RX ADMIN — Medication 10 ML: at 21:40

## 2019-10-28 RX ADMIN — PANTOPRAZOLE SODIUM 40 MG: 40 TABLET, DELAYED RELEASE ORAL at 11:40

## 2019-10-28 RX ADMIN — NITROGLYCERIN 1 INCH: 20 OINTMENT TOPICAL at 11:43

## 2019-10-28 RX ADMIN — DONEPEZIL HYDROCHLORIDE 10 MG: 5 TABLET, FILM COATED ORAL at 21:39

## 2019-10-28 NOTE — PROGRESS NOTES
[address] [place] [address]  [phone]      Cardiology Progress Note      10/28/2019 9:00AM    Admit Date: 10/27/2019    Admit Diagnosis:   A-fib [facility]) [I48.91]    Subjective:     Benito Farfan Sr is very confused today requiring posey vest.  Interrogation of the Medtronic BIVpacemaker shows fib/flutter, and elevated Optiva reading on device, this reflects volume overload. Last interrogation was 9/2018, no mention of afib/flutter. 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       Visit Vitals  /74 (BP 1 Location: Left arm, BP Patient Position: At rest)   Pulse 68   Temp 97.7 °F (36.5 °C)   Resp 16   Ht 5' 9\" (1.753 m)   Wt 64.9 kg (143 lb)   SpO2 92%   BMI 21.12 kg/m²       Current Facility-Administered Medications   Medication Dose Route Frequency    sodium chloride 0.45 % in dextrose 10% 1,019.6 mL infusion   IntraVENous CONTINUOUS    LORazepam (ATIVAN) injection 2 mg  2 mg IntraVENous Q1H PRN    LORazepam (ATIVAN) injection 4 mg  4 mg IntraVENous Q1H PRN    glucose chewable tablet 16 g  4 Tab Oral PRN    dextrose (D50W) injection syrg 12.5-25 g  25-50 mL IntraVENous PRN    glucagon (GLUCAGEN) injection 1 mg  1 mg IntraMUSCular PRN    insulin lispro (HUMALOG) injection   SubCUTAneous AC&HS    metoprolol tartrate (LOPRESSOR) tablet 25 mg  25 mg Oral Q12H    nitroglycerin (NITROBID) 2 % ointment 1 Inch  1 Inch Topical Q6H    sodium chloride (NS) flush 5-40 mL  5-40 mL IntraVENous Q8H    sodium chloride (NS) flush 5-40 mL  5-40 mL IntraVENous PRN    morphine 10 mg/ml injection 2 mg  2 mg IntraVENous Q4H PRN    heparin (porcine) injection 5,000 Units  5,000 Units SubCUTAneous Q12H    losartan (COZAAR) 100 mg, hydroCHLOROthiazide (HYDRODIURIL) 25 mg   Oral DAILY    aspirin chewable tablet 81 mg  81 mg Oral DAILY    pantoprazole (PROTONIX) tablet 40 mg  40 mg Oral ACB    donepezil (ARICEPT) tablet 10 mg  10 mg Oral QHS    memantine [facility]) tablet 10 mg  10 mg Oral BID    temazepam (RESTORIL) capsule 15 mg  15 mg Oral QHS PRN    ondansetron (ZOFRAN ODT) tablet 4 mg  4 mg Oral Q8H PRN    polyethylene glycol (MIRALAX) packet 17 g  17 g Oral DAILY    hydrALAZINE (APRESOLINE) 20 mg/mL injection 10 mg  10 mg IntraVENous Q6H PRN    haloperidol lactate (HALDOL) injection 2 mg  2 mg IntraMUSCular Q6H PRN    LORazepam (ATIVAN) tablet 1 mg  1 mg Oral Q4H PRN    furosemide (LASIX) tablet 40 mg  40 mg Oral DAILY    amLODIPine (NORVASC) tablet 5 mg  5 mg Oral DAILY       Objective:      Physical Exam:  General Appearance:  elderly AAM in no acute distress  Chest:   Clear  Cardiovascular: tachy, no murmur. Abdomen:   Soft, non-tender, bowel sounds are active. Extremities: no peripheral edema  Skin:  Warm and dry.      Data Review:   Recent Labs     10/28/19  1125 10/27/19  0411   WBC 7.2 6.8   HGB 12.5 13.3   HCT 40.6 43.2    244     Recent Labs     10/27/19  0927 10/27/19  0745 10/27/19  0411   NA  --   --  142   K  --   --  3.4*   CL  --   --  106   CO2  --   --  26   GLU  --   --  172*   BUN  --   --  21*   CREA  --   --  1.67*   CA  --   --  9.1   MG  --  1.9  --    ALB  --   --  3.4*   TBILI  --   --  0.8   SGOT  --   --  50*   ALT  --   --  41   INR 1.1  --   --        Recent Labs     10/27/19  0745   CPK 91   CKMB 3.4       No intake or output data in the 24 hours ending 10/28/19 1403     Telemetry: v paced     Assessment:     Principal Problem:    A-fib ([facility] (10/27/2019)    Active Problems:    SVT (supraventricular tachycardia) ([facility])--s/p RFA (4/28/2015)      S/P cardiac pacemaker procedure (5/4/2015)      Overview: 5/4/15 Medtronic dual chamber pacemaker implant            SSS (sick sinus syndrome) ([facility] (6/2/2015)      Mixed hyperlipidemia (7/18/2017)      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 Did have fib/flutter on interrogation, which may have caused exacerbation of symptoms  · Interrogation shows afib/flutter. Continues with BIV paced rhythm. Is he a candidate for DOAC? Currently very confused, and would be a high risk for falls. · Recheck echo pending. Previous echo 4/18 EF 60%. · Continue ASA, Norvasc, metorpolol and Imdur. BP much improved. · Not likely a lot of benefit to statin medication at this age, lipids near goal  · Suspect noncompliance with medication had a lot to do with his decompensation, considering severe hypertension on admission   · I discussed aggressiveness of care with the patient's daughter Ankit Marvin who states that at this point the patient would want aggressive medical management, but she does not believe he would want invasive cardiac procedures unless absolutely necessary  · We will not pursue stress testing at this time for that reason  · Need to further address CODE STATUS (I touched       Josey Montoya [facility]  Cardiology    Patient seen and examined by me with the above nurse practitioner. I personally performed all components of the history, physical, and medical decision making and agree with the assessment and plan with minor modifications as noted. Newly diagnosed systolic heart failure, LVEF about 25%, likely with significant contribution from medication noncompliance. Now well compensated. Added Lasix. Blood pressure better controlled on medications. Continue with conservative management.

## 2019-10-28 NOTE — PROGRESS NOTES
PROGRESS NOTE    NAME:  Alexis Farfan Sr   :   1931   MRN:   [id number]     Date/Time:  10/28/2019 5:12 AM  Subjective:   History:  Chart reviewed and patient seen and examined and D/W his nurse this AM and all events noted. He was admitted yesterday after transfer [facility] where he presented with CP and pulmonary edema with mild elevation of Troponin and accelerated HTN in the face of medical noncompliance with his anti-HTN medications. He was very agitated overnight and required Ativan and Geodon for sedation and currently remains sedated. I can get him to open eyes to verbal response but no history from patient today. All history from his nurse and chart review.       Medications reviewed:  Current Facility-Administered Medications   Medication Dose Route Frequency    glucose chewable tablet 16 g  4 Tab Oral PRN    dextrose (D50W) injection syrg 12.5-25 g  25-50 mL IntraVENous PRN    glucagon (GLUCAGEN) injection 1 mg  1 mg IntraMUSCular PRN    insulin lispro (HUMALOG) injection   SubCUTAneous AC&HS    metoprolol tartrate (LOPRESSOR) tablet 25 mg  25 mg Oral Q12H    nitroglycerin (NITROBID) 2 % ointment 1 Inch  1 Inch Topical Q6H    sodium chloride (NS) flush 5-40 mL  5-40 mL IntraVENous Q8H    sodium chloride (NS) flush 5-40 mL  5-40 mL IntraVENous PRN    morphine 10 mg/ml injection 2 mg  2 mg IntraVENous Q4H PRN    heparin (porcine) injection 5,000 Units  5,000 Units SubCUTAneous Q12H    losartan (COZAAR) 100 mg, hydroCHLOROthiazide (HYDRODIURIL) 25 mg   Oral DAILY    aspirin chewable tablet 81 mg  81 mg Oral DAILY    pantoprazole (PROTONIX) tablet 40 mg  40 mg Oral ACB    donepezil (ARICEPT) tablet 10 mg  10 mg Oral QHS    memantine (NAMENDA) tablet 10 mg  10 mg Oral BID    temazepam (RESTORIL) capsule 15 mg  15 mg Oral QHS PRN    ondansetron (ZOFRAN ODT) tablet 4 mg  4 mg Oral Q8H PRN    polyethylene glycol (MIRALAX) packet 17 g  17 g Oral DAILY    hydrALAZINE (APRESOLINE) 20 mg/mL injection 10 mg  10 mg IntraVENous Q6H PRN    haloperidol lactate (HALDOL) injection 2 mg  2 mg IntraMUSCular Q6H PRN    LORazepam (ATIVAN) tablet 1 mg  1 mg Oral Q4H PRN    sterile water (preservative free) injection        furosemide (LASIX) tablet 40 mg  40 mg Oral DAILY    amLODIPine (NORVASC) tablet 5 mg  5 mg Oral DAILY        Objective:   Vitals:  Visit Vitals  /71   Pulse 60   Temp 98 °F (36.7 °C)   Resp 16   Ht 5' 9\" (1.753 m)   Wt 143 lb (64.9 kg)   SpO2 91%   BMI 21.12 kg/m²      O2 Device: Room air Temp (24hrs), Av.7 °F (36.5 °C), Min:97.2 °F (36.2 °C), Max:98 °F (36.7 °C)      Last 24hr Input/Output:  No intake or output data in the 24 hours ending 10/28/19 0512     PHYSICAL EXAM:  General:     Somnolent, cooperative, no distress, appears stated age. Head:    Normocephalic, without obvious abnormality, atraumatic. Eyes:    Conjunctivae/corneas clear. PERRLA  Nose:   Nares normal. No drainage or sinus tenderness. Throat:     Lips, mucosa, and tongue normal.  No Thrush  Neck:   Supple, symmetrical,  no adenopathy, thyroid: non tender     no carotid bruit and no JVD. Back:     Symmetric,  No CVA tenderness. Lungs:    Clear to auscultation bilaterally. No Wheezing or Rhonchi. No rales. Heart:    Regular rate and rhythm,  no murmur, rub or gallop. Abdomen:    Soft, non-tender. Not distended. Bowel sounds normal. No masses  Extremities:  Extremities normal, atraumatic, No cyanosis. No edema. No clubbing  Lymph nodes:  Cervical, supraclavicular normal.  Neurologic:  Normal strength, Somnolent.    Skin:                 No rash      Lab Data Reviewed:    Recent Results (from the past 24 hour(s))   POC TROPONIN-I    Collection Time: 10/27/19  6:58 AM   Result Value Ref Range    Troponin-I (POC) 0.16 (H) 0.00 - 0.08 ng/mL   CK W/ CKMB & INDEX    Collection Time: 10/27/19  7:45 AM   Result Value Ref Range    CK 91 39 - 308 U/L    CK - MB 3.4 <3.6 NG/ML    CK-MB Index 3.7 (H) 0.0 - 2.5 MAGNESIUM    Collection Time: 10/27/19  7:45 AM   Result Value Ref Range    Magnesium 1.9 1.6 - 2.4 mg/dL   EKG, 12 LEAD, INITIAL    Collection Time: 10/27/19  7:58 AM   Result Value Ref Range    Ventricular Rate 67 BPM    Atrial Rate 67 BPM    QRS Duration 162 ms    Q-T Interval 494 ms    QTC Calculation (Bezet) 521 ms    Calculated P Axis 64 degrees    Calculated R Axis -165 degrees    Calculated T Axis 71 degrees    Diagnosis       Ventricular-paced rhythm with occasional premature ventricular complexes  Abnormal ECG  When compared with ECG of 27-OCT-2019 04:07,  MANUAL COMPARISON REQUIRED, DATA IS UNCONFIRMED     PTT    Collection Time: 10/27/19  9:27 AM   Result Value Ref Range    aPTT 24.3 22.1 - 32.0 sec    aPTT, therapeutic range     58.0 - 77.0 SECS   PROTHROMBIN TIME + INR    Collection Time: 10/27/19  9:27 AM   Result Value Ref Range    INR 1.1 0.9 - 1.1      Prothrombin time 10.6 9.0 - 11.1 sec   HEMOGLOBIN A1C WITH EAG    Collection Time: 10/27/19  2:05 PM   Result Value Ref Range    Hemoglobin A1c 5.5 4.2 - 6.3 %    Est. average glucose 111 mg/dL   PTT    Collection Time: 10/27/19  2:05 PM   Result Value Ref Range    aPTT 122.1 (HH) 22.1 - 32.0 sec    aPTT, therapeutic range     58.0 - 77.0 SECS   ECHO ADULT COMPLETE    Collection Time: 10/27/19  2:32 PM   Result Value Ref Range    LA Volume 63.19 18 - 58 mL    Right Atrial Area 4C 16.41 cm2    LV E' Lateral Velocity 3.19 cm/s    LV E' Septal Velocity 3.08 cm/s    Aortic Valve Systolic Peak Velocity 094.55 cm/s    Aortic Valve Area by Continuity of Peak Velocity 2.2 cm2    AoV PG 5.2 mmHg    LVIDd 4.80 4.2 - 5.9 cm    LVPWd 1.12 (A) 0.6 - 1.0 cm    LVIDs 4.74 cm    IVSd 1.19 (A) 0.6 - 1.0 cm    LV ES Vol A4C 72.5 mL    LVOT d 2.19 cm    LVOT Peak Velocity 66.23 cm/s    LVOT Peak Gradient 1.8 mmHg    MV A Venu 84.38 cm/s    MV E Venu 46.36 cm/s    MV E/A 0.55     LV Ejection Fraction MOD 4C 28 %    LA Vol 4C 51.68 18 - 58 mL    LA Vol 2C 59.13 (A) 18 - 58 mL    LA Area 4C 20.2 cm2    LV Mass .5 (A) 88 - 224 g    LV Mass AL Index 137.6 (A) 49 - 115 g/m2    E/E' lateral 14.53     E/E' septal 15.05     RVSP 45.4 mmHg    E/E' ratio (averaged) 14.79     LV ED Vol A4C 101.3 mL    Est. RA Pressure 10.0 mmHg    Mitral Regurgitant Peak Velocity 484.02 cm/s    Mitral Valve E Wave Deceleration Time 335.8 ms    Left Atrium Major Axis 1.76 cm    Triscuspid Valve Regurgitation Peak Gradient 35.4 mmHg    Aortic Regurgitant Pressure Half-time 732.2 cm    TR Max Velocity 297.53 cm/s    LA Vol Index 35.28 16 - 28 ml/m2    PASP 45.4 mmHg    LA Vol Index 33.01 16 - 28 ml/m2    LA Vol Index 28.85 16 - 28 ml/m2    LVED Vol Index A4C 56.6 mL/m2    LVES Vol Index A4C 40.5 mL/m2    MR Peak Gradient 93.7 mmHg    TIFFANIE/BSA Pk Venu 1.2 cm2/m2    AR Max Venu 452.48 cm/s    Left Ventricular Fractional Shortening by 2D 6.1569712650 %    PV End Diastolic Velocity 1.5 mmHg    Mitral Valve Deceleration [place] 4.3437884230333     AV Velocity Ratio 0.58     Pulmonary Artery Mean Presure 27.9 mmHg    PI End Diastolic Pressure 95.2 mmHg    AV peak gradient 07.127254698 mmHg    Left Ventricular End Diastolic Volume by Teichholz Method 7.90496162129 mL    Left Ventricular End Systolic Volume by Teichholz Method 8.00153376321 mL    Left Ventricular Stroke Volume by Teichholz Method 8.5030486903523 mL    Left Ventricular Stroke Volume by 2-D Single Plane- MOD 16.549690912 mL   GLUCOSE, POC    Collection Time: 10/27/19  4:16 PM   Result Value Ref Range    Glucose (POC) 98 65 - 100 mg/dL    Performed by Linde Meigs (PCT)    GLUCOSE, POC    Collection Time: 10/27/19 10:18 PM   Result Value Ref Range    Glucose (POC) 112 (H) 65 - 100 mg/dL    Performed by Mouna Solis          Assessment/Plan:     Principal Problem:    A-fib ([address] (10/27/2019)    Active Problems:    Mixed hyperlipidemia (7/18/2017)      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, without long-term current use of insulin ([facility] (7/18/2017)      Alzheimer disease ([facility] (7/18/2017)      SVT (supraventricular tachycardia) (HCC)--s/p RFA (4/28/2015)      S/P cardiac pacemaker procedure (5/4/2015)      Overview: 5/4/15 Medtronic dual chamber pacemaker implant            SSS (sick sinus syndrome) ([facility] (6/2/2015)      Alcoholism ([facility] (4/23/2018)      Acute pulmonary edema ([facility] (10/27/2019)       ___________________________________________________  PLAN:    1. Resume Losartan, Hydrodiuril, and Norvasc  2. Low dose Metoprolol added  3. Diurese with Lasix as needed  4. Nitrobid started on admission  5. Aricept and Namenda resumed  6. No need for Statin at this situation Alzheimer's at 88   7. Haldol or Ativan PRN for agitation  8. Follow BS but has not recently required diabetic meds for DM with his weight loss  9. I will watch for Alcohol WD with history of Alcoholism  10. Requires Posey vest due to combativeness. This is for patient's own safety.       40 minutes spent in direct care of this patient today      ___________________________________________________    Attending Physician: Rick Jenkins MD

## 2019-10-28 NOTE — PROGRESS NOTES
Attempted to see pt for OT services. Pt is lethargic d/t previous sedation. Will defer and follow up later.

## 2019-10-28 NOTE — DIABETES MGMT
[facility]    D[facility] Consult Note    Recommendations/ Comments: Please consider changing correctional insulin to Lispro Correctional insulin with high sensitivity given age, habitus, and renal function at this time    Current hospital DM medication: Lispro Correctional insulin with normal sensitivity      Consult received for:  [x]             Assessment of home management        Chart reviewed and initial evaluation complete on Benito Farfan Sr. Note patient with Alzheimer's dementia and is only oriented to self at this time. Patient does not appear appropriate for education. A1c suggestive of well controlled diabetes. Patient is a 80 y.o. male with known diabetes- no noted medications at home for diabetes. A1c:   Lab Results   Component Value Date/Time    Hemoglobin A1c 5.5 10/27/2019 02:05 PM       Recent Glucose Results:   Lab Results   Component Value Date/Time    GLUCPOC 85 10/28/2019 07:57 AM    GLUCPOC 112 (H) 10/27/2019 10:18 PM    GLUCPOC 98 10/27/2019 04:16 PM        Lab Results   Component Value Date/Time    Creatinine 1.67 (H) 10/27/2019 04:11 AM     Estimated Creatinine Clearance: 28.1 mL/min (A) (based on SCr of 1.67 mg/dL (H)). Active Orders   Diet    DIET CARDIAC Regular        PO intake: No data found. Will continue to follow as needed. Thank you.   Merline Ferris, RD, [address]  Office:  [phone]    Time spent: 10 minutes

## 2019-10-28 NOTE — CONSULTS
CARDIOLOGY CONSULT       Date of  Admission: 10/27/2019 12:38 PM     Admission type:Urgent    Consult for: Chest pain, mild pulmonary edema, minimal troponin elevation. Consulted by:  Dr. Valdez Limb:     Gertrude Franklin is an 80 y.o. male with past medical history as documented below presents to the ED with c/o of acute onset of chest pain with associated vomiting onset around midnight. Patient reports pain currently 7 out of 10 localized in the lower chest, epigastric region. Patient does have a significant cardiac history of SVT status post ablation, complete heart block after ablation status post pacemaker placement. He does follow [id number] Dr. Jacek Inman.    I spoke with the patient's daughter who states that prior to admission, he called her into the room complaining of sudden chest tightness and shortness of breath. She offered to taken to the emergency room but he declined, so she observed but he appeared to be getting more short of breath over the next few minutes. She took him to the emergency room after about 25 minutes. There he was found to have mild pulmonary edema on chest x-ray, received diuresis with Lasix. Troponin was minimally elevated at 0.1.  ? Atrial flutter with V-pacing on one EKG. I asked her how aggressive her father would want to be at his advanced age and with dementia. She states he said he would not want any kind of surgery or invasive procedures unless we could not control his symptoms with medications. The patient's daughter states that she recently got power of  after she found out that the patient was not taking his medications regularly and she did not feel like she could completely rely on her mother to assure medication compliance. She states she will be able to assure compliance in the future.     Patient Active Problem List    Diagnosis Date Noted    A-fib [facility]) 10/27/2019    Acute pulmonary edema ([address] 10/27/2019    Syncope 01/09/2019    Primary insomnia 01/09/2019    Mild depression (HCC) 06/12/2018    Acute bilateral low back pain without sciatica 05/30/2018    Alcoholism ([address] 10/10/5289    Metabolic encephalopathy 32/79/8414    TIA (transient ischemic attack) 04/12/2018    Medicare annual wellness visit, subsequent 09/01/2017    Diverticulosis 07/18/2017    Gastritis and duodenitis 07/18/2017    Internal hemorrhoids 07/18/2017    Hypertension with renal disease 07/18/2017    Mixed hyperlipidemia 07/18/2017    GI bleed 07/18/2017    Primary osteoarthritis involving multiple joints 07/18/2017    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, without long-term current use of insulin ([address] 07/18/2017    CKD (chronic kidney disease), stage II 07/18/2017    Back pain 07/18/2017    Anemia 07/18/2017    Alzheimer disease ([address] 07/18/2017    SSS (sick sinus syndrome) ([address] 06/02/2015    S/P cardiac pacemaker procedure 05/04/2015    S/P ablation of accessory bypass tract 05/04/2015    SVT (supraventricular tachycardia) ([facility])--s/p RFA 04/28/2015    Near syncope 03/11/2015    ASCVD (arteriosclerotic cardiovascular disease) 07/11/2014    Hypokalemia 07/11/2014    Right inguinal hernia 06/12/2014      Silvestre Duran MD  Past Medical History:   Diagnosis Date    Abdominal pain 7/18/2017    Alzheimer disease ([address] 7/18/2017    Anemia 7/18/2017    Arthritis     Back pain 7/18/2017    Bradycardia 7/18/2017    CAD (coronary artery disease)     hx of MI    CKD (chronic kidney disease), stage II 7/18/2017    constipation     Depression 7/18/2017    Diabetes mellitus ([address] 7/18/2017    Diverticulosis 7/18/2017    DJD (degenerative joint disease) 7/18/2017    Encounter for long-term (current) use of other medications 7/18/2017    Fatigue     Gastritis and duodenitis 7/18/2017    GERD (gastroesophageal reflux disease)     GI bleed 7/18/2017    Hearing loss  Hyperlipidemia 2017    Hypertension     Hypertension with renal disease 2017    Ill-defined condition     Dementia    Internal hemorrhoids 2017    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    S/P cardiac pacemaker procedure 2015    5/4/15 Medtronic dual chamber pacemaker implant     Weight loss     lost over 40 pounds last year- has no appetite       Social History     Socioeconomic History    Marital status: LEGALLY      Spouse name: Not on file    Number of children: Not on file    Years of education: Not on file    Highest education level: Not on file   Tobacco Use    Smoking status: Former Smoker     Packs/day: 0.50     Years: 10.00     Pack years: 5.00     Start date: 1991    Smokeless tobacco: Never Used    Tobacco comment: quit 50 years ago   Substance and Sexual Activity    Alcohol use: Yes     Comment: Occasional beer    Drug use: No    Sexual activity: Not Currently     No Known Allergies   Family History   Problem Relation Age of Onset    Hypertension Mother     Heart Disease Father     Cancer Other         son-cancer? unknown what type       Current Facility-Administered Medications   Medication Dose Route Frequency    glucose chewable tablet 16 g  4 Tab Oral PRN    dextrose (D50W) injection syrg 12.5-25 g  25-50 mL IntraVENous PRN    glucagon (GLUCAGEN) injection 1 mg  1 mg IntraMUSCular PRN    insulin lispro (HUMALOG) injection   SubCUTAneous AC&HS    metoprolol tartrate (LOPRESSOR) tablet 25 mg  25 mg Oral Q12H    nitroglycerin (NITROBID) 2 % ointment 1 Inch  1 Inch Topical Q6H    sodium chloride (NS) flush 5-40 mL  5-40 mL IntraVENous Q8H    sodium chloride (NS) flush 5-40 mL  5-40 mL IntraVENous PRN    morphine 10 mg/ml injection 2 mg  2 mg IntraVENous Q4H PRN    heparin (porcine) injection 5,000 Units  5,000 Units SubCUTAneous Q12H    amLODIPine (NORVASC) tablet 10 mg  10 mg Oral DAILY    [START ON 10/28/2019] losartan (COZAAR) 100 mg, hydroCHLOROthiazide (HYDRODIURIL) 25 mg   Oral DAILY    [START ON 10/28/2019] aspirin chewable tablet 81 mg  81 mg Oral DAILY    [START ON 10/28/2019] pantoprazole (PROTONIX) tablet 40 mg  40 mg Oral ACB    donepezil (ARICEPT) tablet 10 mg  10 mg Oral QHS    memantine (NAMENDA) tablet 10 mg  10 mg Oral BID    temazepam (RESTORIL) capsule 15 mg  15 mg Oral QHS PRN    ondansetron (ZOFRAN ODT) tablet 4 mg  4 mg Oral Q8H PRN    [START ON 10/28/2019] polyethylene glycol (MIRALAX) packet 17 g  17 g Oral DAILY    hydrALAZINE (APRESOLINE) 20 mg/mL injection 10 mg  10 mg IntraVENous Q6H PRN    haloperidol lactate (HALDOL) injection 2 mg  2 mg IntraMUSCular Q6H PRN    LORazepam (ATIVAN) tablet 1 mg  1 mg Oral Q4H PRN    sterile water (preservative free) injection             Review of Symptoms:  11 systems reviewed, negative other than as stated in HPI     Subjective:        Visit Vitals  /59   Pulse 60   Temp 97.6 °F (36.4 °C)   Resp 18   Ht 5' 9\" (1.753 m)   Wt 143 lb (64.9 kg)   SpO2 92%   BMI 21.12 kg/m²       Physical:  Gen:  NAD  Heart: regular rhythm, no murmur, gallop or rub  Lungs: clear to auscultation bilaterally  Neck: supple, symmetrical, trachea midline, no adenopathy, thyroid: not enlarged, symmetric, no tenderness/mass/nodules, no carotid bruit and no JVD  Abdomen: soft, non-tender.  Bowel sounds normal. No masses,  no organomegaly  Extremities: extremities normal, atraumatic, no cyanosis or edema, positive femorals without bruits, normal dp pulses  Neurologic: CN, motor and speech grossly normal    Data Review:   Recent Labs     10/27/19  0411   WBC 6.8   HGB 13.3   HCT 43.2        Recent Labs     10/27/19  0927 10/27/19  0745 10/27/19  0411   NA  --   --  142   K  --   --  3.4*   CL  --   --  106   CO2  --   --  26   GLU  --   --  172*   BUN  --   --  21*   CREA  --   --  1.67*   CA  --   --  9.1   MG  --  1.9  --    ALB  --   --  3.4* TBILI  --   --  0.8   SGOT  --   --  50*   ALT  --   --  41   INR 1.1  --   --        Recent Labs     10/27/19  0745   CPK 91   CKMB 3.4       Lab Results   Component Value Date/Time    Cholesterol, total 187 09/05/2019 11:17 AM    Cholesterol (POC) 197.0 06/12/2018 01:54 PM    HDL Cholesterol 75 09/05/2019 11:17 AM    HDL Cholesterol (POC) 66.0 06/12/2018 01:54 PM    LDL Cholesterol (POC) 101.8 06/12/2018 01:54 PM    LDL, calculated 98 09/05/2019 11:17 AM    VLDL, calculated 26 01/09/2019 04:26 PM    VLDL 14 09/05/2019 11:17 AM    Triglyceride 70 09/05/2019 11:17 AM    Triglycerides (POC) 146.0 06/12/2018 01:54 PM    CHOL/HDL Ratio 2 09/05/2019 11:17 AM         No intake or output data in the 24 hours ending 10/27/19 2102     Cardiographics    Telemetry: Atrial sensed ventricular paced rhythm    ECG: ASVP  One EKG at OSH appeared to show atrial flutter with underlying v-pacing    Echocardiogram: ordered     Assessment:       Principal Problem:    A-fib ([address] (10/27/2019)    Active Problems:    SVT (supraventricular tachycardia) (HCC)--s/p RFA (4/28/2015)      S/P cardiac pacemaker procedure (5/4/2015)      Overview: 5/4/15 Medtronic dual chamber pacemaker implant            SSS (sick sinus syndrome) ([address] (6/2/2015)      Mixed hyperlipidemia (7/18/2017)      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, without long-term current use of insulin ([address] (7/18/2017)      Alzheimer disease ([address] (7/18/2017)      Alcoholism ([address] (4/23/2018)      Acute pulmonary edema ([address] (10/27/2019)         Plan:     Mr. Nereida Haynes is a very pleasant 20-year-old with Alzheimer's dementia, history of SVT status post ablation and subsequent complete heart block with pacemaker in 2015 who presents with acute onset of chest discomfort and shortness of breath, with mild pulmonary edema on chest x-ray.     Acute chest discomfort, mild pulmonary edema, uncontrolled hypertension in the setting of noncompliance of medications:  · Story with acute onset and relatively rapid resolution is suspicious for a dysrhythmia. · Interrogate pacemaker-? A flutter on OSH EKG- ? AC if so? · Recheck echo  · Reinitiate home blood pressure medications, decide on a daily basis if further diuretics are needed  · Continue aspirin, amlodipine (1/2 dose with new meds), add metoprolol and isosorbide mononitrate. · Not likely a lot of benefit to statin medication at this age, lipids near goal  · Suspect noncompliance with medication had a lot to do with his decompensation, considering severe hypertension today  · I discussed aggressiveness of care with the patient's daughter Rachana Angelo who states that at this point the patient would want aggressive medical management, but she does not believe he would want invasive cardiac procedures unless absolutely necessary  · We will not pursue stress testing at this time for that reason  · Need to further address CODE STATUS (I touched upon this) prior to discharge    Thanks for the consult. I appreciate the opportunity to participate in this patient's care. I will follow along with you. Discussed with Dr. Gisela Castellon.

## 2019-10-28 NOTE — PROGRESS NOTES
Zoe Majeste Telesitter Monitor initiated on 10/27/2019 at [phone] for the following reason(s): pateint being agitated and restless . Patient educated on use of camera and is in agreement. If patient unable to verbalize understanding of camera necessity, the responsible party notified and educated:  debbie Mckeon [facility])      8:35 PM Granddaughter also notified about the restraints and agitation pateint had this evening.

## 2019-10-28 NOTE — PROGRESS NOTES
Reason for Admission:   A-Fib; New atrial flutter               RRAT Score:    51 High Risk              Resources/supports as identified by patient/family:  Pt has supportive family                  Top Challenges facing patient (as identified by patient/family and CM): Finances/Medication cost?     No issues with finances/medication cost. Pt has VA Medicare A/B and [employer]               Transportation? Pt does not drive. Pt utilizes public transportation and Lyft. Support system or lack thereof? Pt has supportive family to include her granddaughter                     Living arrangements? Pt resides with his granddaughter and other family members in an one level home with 4/5 HELEN. Self-care/ADLs/Cognition? Pt is independent with ADL's and IADL's. Current Advanced Directive/Advance Care Plan:  Pt is FULL code status. Pt does not have an ACP on file. Plan for utilizing home health:    Pt has home health in the past. Pt's granddaughter is receptive to home health at d/c. Transition of Care Plan:    Home vs Home with home health  Follow-up Appointments  2nd IM Letter    CM met with pt at bedside to discuss d/c plan. Pt was confused. CM called pt's granddaughter Gabi Valdez via phone to complete initial assessment  CM verified pt's demographics, insurance and PCP. Pt is a 81 y/o -American male admitted to [facility] on 10/27/19 for A-Fib and new atrial flutter. Pt's PCP is Dr. Erickson Clay. Pt sees PCP once a month. [facility] on [address] for Rx. Pt resides with his granddaughter and other family members in an one level home with 4/5 HELEN. Pt is independent with ADL's and IADL's. Pt does not drive. Pt utilizes public transportation and Lyft. No DME's. Pt has had home health in the past. No acute inpatient rehab or SNF in the past. Pt is FULL code status.  Pt does not have an ACP on file. Pt will need transportation at d/c. Care Management Interventions  PCP Verified by CM: Yes(Pt's PCP is Dr. Lencho Jaeger. Pt sees PCP once a month)  Palliative Care Criteria Met (RRAT>21 & CHF Dx)?: Yes  Palliative Consult Recommended?: No  Mode of Transport at Discharge: Other (see comment)(Pt will need transport at d/c. )  Discharge Durable Medical Equipment: No(No DME's)  Physical Therapy Consult: Yes  Occupational Therapy Consult: Yes  Speech Therapy Consult: No  Current Support Network: Relative's Home, Lives with Caregiver(Pt resides with his granddaughter and other family members in an one level home with 4/5 HELEN.)  Confirm Follow Up Transport: Other (see comment)(Pt does not drive. Pt utilizes public transportation and Lyft for transportation. )  Plan discussed with Pt/Family/Caregiver: Yes  Discharge Location  Discharge Placement: (Home with follow-up appointments)    CM will continue to follow patient for discharge planning needs and arrange for services as deemed necessary. Cheryl Almaguer [address]  [phone]      .

## 2019-10-28 NOTE — PROGRESS NOTES
Attempted to see pt for OT services. Pt is restrained with [place] Vest and KEI sow d/t combative behavior. Pt is attended by bedside sitter. Will defer and follow up at a later date.

## 2019-10-28 NOTE — PROGRESS NOTES
RAPID RESPONSE    Responded to call from primary nurse regarding patient in 2290. RN reports patient is agitated and pulled out bilateral PIV's and condom cath. On arrival patient is in bed in [place] vest very agitated with several staff members at bedside. This writer calmed patient down and offered orange juice. Pt drank cup of water and OJ. Dr. Tyra Redmond was notified of patient condition and verbal order received to place patient in mitts. New PIV placed. No further rapid response interventions indicated. Will continue to monitor patient. Dima Matamoros RN  Rapid Response Team, [id number]    ADDENDUM:    1990 Responded to call by primary nurse d/t patient agitation and attempting to get out of non-violent restraints. On arrival patient is in [place] vest and mitts. Patient is chewing straps off mitts. Patient had similar episode same time yesterday. Patients becomes more agitated and confused in the evening. Dr. Tyra Redmond updated on patient status. Telephone orders received to d/c posey vest and mitts. New order for non-violent bilateral wrist restraints and geodon 20 mg IM q12h first dose now.

## 2019-10-28 NOTE — PROGRESS NOTES
Attempted to see pt for PT Eval. Pt is restrained with [place] Vest and B mitts d/t combative behavior. Pt is attended by bedside sitter. Will defer and follow up tomorrow.     Almas Dates, PT

## 2019-10-28 NOTE — PROGRESS NOTES
0099: Unable to draw morning labs, pt refused. Pt resting in bed all night. Reece vest in place. Pt becomes agitated and combative when RN attempts to work with him (medication administration/blood draw/etc).

## 2019-10-28 NOTE — PROGRESS NOTES
[facility] Introduced myself as primary RN. Assumed care of patient. Instructed patient to call for assistance prior to getting up. Pt verbalized understanding. Call bell within reach.     7:19 AM Attending paged re: face to face evaluation for [place] Vest orders. [place] Vest to be renewed within 24 hours. Scheduled to  at 7pm.  Per night shift RN patient was seen and examined by attending. Awaiting telephone call back. Patient lethargic. Alert and oriented to person. Will keep patient NPO until orientation level improves. NPO signed paced on patient door. Condom cath placed for toileting needs. 8:29 AM Attending paged. Message left with Dr. Elyssa Miller Nurse to return phone call. 8:31 AM Dr. Eyad Arreguin at bedside performing face to face. 9:24 AM Patient reassessed. Patient remains lethargic. All AM medications held due to lethargy. Primary RN to notify attending when phone call is returned. 9:45 AM Labs obtained by Rapid Response Nurse and sent to lab.     9:50 AM Informed by , patient will need to have labs redrawn per protocol as there was a non-labeled specimen sent down with all other labeled specimens. Lab unable to run specimens with correct labels per lab protocol. Informed incident will be written up. Charge RN and CCL informed of incident. 9:52 AM Dr. Elyssa Miller returned page. Attending informed to document face to face for restraints. Attending agrees to document. Primary RN inquiring about hx of ETOH abuse. Per attending, patients caregiver confirmed that he has not had alcohol for quite some time. Dr. Elyssa Miller states patient does not need CIWA monitoring at this time. Attending informed restraints scheduled to  at 7pm and will need orders renewed. Suggested to attending to order Geodon for agitation if needed. Attending prefers to keep Haldol order in place. Will continue to monitor. 10:30 AM Patient combative, attempting to get up out of bed. Patient pulled out two PIVs and condom cath. Patient removed telemetry monitoring. Multiple nurses and staff at bedside assisting primary RN with patient. Rapid response RN called for assistance. Telemetry reapplied. Obtained PIV and labs. Attending paged for Mitt restraints orders. 11:30 AM Mitts placed. Attending paged again for orders, awaiting telephone call back. 11:45 AM Patient resting comfortably in bed with sitter at bedside. AM medications administered. Patient was able to swallow meds with sips of orange juice. 11:50 AM Received phone call from lab. Informed BMP/PTT/Trop hemolyzed. Informed  labs will be obtained tomorrow with AM labs as patient has been stuck twice and becomes agitated with lab draws. 11:53 AM Received phone call from Addie Bean, lead PSR from Dr. Corinne Seat office. Informed of orders needed. Message to be relayed to Dr. Corinne Seat so orders can be placed. Given patient presents with poor appetite. Primary RN to start continuous IVFs, D10 NS @25 ml/hr. RRT aware. Chart reviewed, no hx of CHF. Will continue to monitor. Order acknowledged to initiate CIWA monitoring however order is not complete. Charge RN and CCL informed, primary RN requested for assistance with reaching attending and updating order. 1:32 PM Patient now eating and drinking. Will hold off starting IVFs with dextrose as patient is a diabetic. 5:35 PM  Patient becoming agitated and attempting to get out of posey vest and mitts. PO ativan administered with applesauce. Will continue to monitor. 5:55 PM Patient becoming increasingly agitated and trying to chew straps off mitts. Haldol administered IM. Sitter remains at bedside. Will continue to monitor. 6:30 PM Rapid response nurse paged for assistance. Attending paged. Dr. Corinne Seat updated on patient status. Telephone orders received to discontinue posey vest and mitts.  New order for non-violent bilateral wrist restraints and Geodon 20 mg IM q12h with first dose now. Bedside and Verbal shift change report given to Kayden Donohue (oncoming nurse) by Charles aMs (offgoing nurse). Report included the following information SBAR, Kardex, MAR, Recent Results, Med Rec Status and Cardiac Rhythm paced.

## 2019-10-28 NOTE — PROGRESS NOTES
1345: Called and clarified order for CIWA protocol with Dr. uH Benjamin. Per MD, place order for prn medications per CIWA protocol. Called pharmacist to verify the correct prn medications for CIWA protocol. Primary care RN aware.

## 2019-10-28 NOTE — PROGRESS NOTES
Initial Nutrition Assessment:    INTERVENTIONS/RECOMMENDATIONS:   · Consider regular diet due to advanced age  · If pt is NPO, please change in diet order  · Ensure enlive BID  · Could check vit D and B12 due to advanced age    ASSESSMENT:   Chart reviewed, medically noted for Acute chest discomfort, mild pulmonary edema, uncontrolled hypertension in the setting of noncompliance of medications and PMH shown below. Nutrition referral triggered due to MST score. Pt unable to provide nutrition history and no family present. Weight history shows no significant weight loss PTA. He has no obvious signs of orbital fat or temporal muscle wasting. Noted for NPO sign on door however still has diet order in place. SLP note from April 2018 recommended regular diet with thins liquids. Past Medical History:   Diagnosis Date    Abdominal pain 7/18/2017    Alzheimer disease ([facility] [address] 7/18/2017    Anemia 7/18/2017    Arthritis     Back pain 7/18/2017    Bradycardia 7/18/2017    CAD (coronary artery disease)     hx of MI    CKD (chronic kidney disease), stage II 7/18/2017    constipation     Depression 7/18/2017    Diabetes mellitus ([facility] [address] 7/18/2017    Diverticulosis 7/18/2017    DJD (degenerative joint disease) 7/18/2017    Encounter for long-term (current) use of other medications 7/18/2017    Fatigue     Gastritis and duodenitis 7/18/2017    GERD (gastroesophageal reflux disease)     GI bleed 7/18/2017    Hearing loss     Hyperlipidemia 7/18/2017    Hypertension     Hypertension with renal disease 7/18/2017    Ill-defined condition     Dementia    Internal hemorrhoids 7/18/2017    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   Pertinent Medications: [x]Reviewed: lasix, humalog, PPI, miralax,   Pertinent Labs: [x]Reviewed:   Food Allergies: [x]NKFA  []Other   Last BM: 10/26  Edema:   n/a     []RUE   []LUE   []RLE   []LLE      Pressure Injury:  n/a    [] Stage I   [] Stage II   [] Stage III   [] Stage IV      Wt Readings from Last 30 Encounters:   10/27/19 64.9 kg (143 lb)   10/27/19 65.1 kg (143 lb 9.6 oz)   09/05/19 61.7 kg (136 lb)   03/18/19 63 kg (138 lb 12.8 oz)   01/09/19 62.4 kg (137 lb 9.6 oz)   09/12/18 64.4 kg (142 lb)   08/08/18 65.3 kg (144 lb)   07/11/18 64.7 kg (142 lb 10.2 oz)   06/12/18 65.5 kg (144 lb 6.4 oz)   05/30/18 63.6 kg (140 lb 3.2 oz)   05/27/18 64.2 kg (141 lb 8.6 oz)   05/18/18 63.3 kg (139 lb 9.6 oz)   04/23/18 62.4 kg (137 lb 9.6 oz)   04/15/18 61.1 kg (134 lb 12.8 oz)   03/09/18 62.1 kg (137 lb)   02/15/18 63.2 kg (139 lb 4.8 oz)   12/08/17 63 kg (139 lb)   09/21/17 63.7 kg (140 lb 6.4 oz)   09/01/17 64 kg (141 lb 3.2 oz)   06/22/17 65.5 kg (144 lb 5 oz)   09/15/16 68.9 kg (151 lb 12.8 oz)   01/21/16 60.3 kg (133 lb)   09/01/15 59.2 kg (130 lb 9.6 oz)   07/09/15 58.9 kg (129 lb 14.4 oz)   06/02/15 58.2 kg (128 lb 4.8 oz)   05/06/15 59 kg (130 lb)   05/04/15 63 kg (139 lb)   04/28/15 58.6 kg (129 lb 1.6 oz)   04/20/15 58.1 kg (128 lb)   03/11/15 58.6 kg (129 lb 3.2 oz)       Anthropometrics:   Height: 5' 9\" (175.3 cm) Weight: 64.9 kg (143 lb)   IBW (%IBW):   ( ) UBW (%UBW):   (  %)   Last Weight Metrics:  Weight Loss Metrics 10/27/2019 10/27/2019 9/5/2019 3/18/2019 1/9/2019 9/12/2018 8/8/2018   Today's Wt 143 lb 143 lb 9.6 oz 136 lb 138 lb 12.8 oz 137 lb 9.6 oz 142 lb 144 lb   BMI 21.12 kg/m2 21.21 kg/m2 21.3 kg/m2 21.74 kg/m2 21.55 kg/m2 22.24 kg/m2 22.55 kg/m2       BMI: Body mass index is 21.12 kg/m². This BMI is indicative of:   []Underweight    [x]Normal    []Overweight    [] Obesity   [] Extreme Obesity (BMI>40)     Estimated Nutrition Needs (Based on):   1700 Kcals/day(BMR: 1300 x 1.3) , 65 g(1 g/kg) Protein  Carbohydrate:  At Least 130 g/day Fluids: 1700 mL/day (1ml/kcal) or per primary team    NUTRITION DIAGNOSES:   Problem:  No nutritional diagnosis at this time      Etiology: related to       Signs/Symptoms: as evidenced by        NUTRITION INTERVENTIONS:  Meals/Snacks: General/healthful diet   Supplements: Commercial supplement              GOAL:   consume >50% of meals and ONS in 3-5 days    LEARNING NEEDS (Diet, Food/Nutrient-Drug Interaction):    [x] None Identified   [] Identified and Education Provided/Documented   [] Identified and Pt declined/was not appropriate     Cultureal, Episcopal, OR Ethnic Dietary Needs:    [x] None Identified   [] Identified and Addressed     [x] Interdisciplinary Care Plan Reviewed/Documented    [x] Discharge Planning: General healthy diet       MONITORING /EVALUATION:      Food/Nutrient Intake Outcomes:  Total energy intake  Physical Signs/Symptoms Outcomes: Weight/weight change, Electrolyte and renal profile, GI    NUTRITION RISK:    [x] High      to        [x] Moderate           []  Low  []  Minimal/Uncompromised    PT SEEN FOR:    []  MD Consult: []Calorie Count      []Diabetic Diet Education        []Diet Education     []Electrolyte Management     []General Nutrition Management and Supplements     []Management of Tube Feeding     []TPN Recommendations    [x]  RN Referral:  [x]MST score >=2     []Enteral/Parenteral Nutrition PTA     []Pregnant: Gestational DM or Multigestation     []Pressure Ulcer/Wound Care needs        []  Low BMI  []  LOS Referral       Bean Dobbs RDN  Pager [phone]  Weekend Pager [phone]

## 2019-10-29 LAB
ANION GAP SERPL CALC-SCNC: 7 MMOL/L (ref 5–15)
APTT PPP: 27.8 SEC (ref 22.1–32)
BUN SERPL-MCNC: 21 MG/DL (ref 6–20)
BUN/CREAT SERPL: 13 (ref 12–20)
CALCIUM SERPL-MCNC: 9 MG/DL (ref 8.5–10.1)
CHLORIDE SERPL-SCNC: 108 MMOL/L (ref 97–108)
CHOLEST SERPL-MCNC: 171 MG/DL
CO2 SERPL-SCNC: 27 MMOL/L (ref 21–32)
CREAT SERPL-MCNC: 1.56 MG/DL (ref 0.7–1.3)
ERYTHROCYTE [DISTWIDTH] IN BLOOD BY AUTOMATED COUNT: 14.2 % (ref 11.5–14.5)
GLUCOSE BLD STRIP.AUTO-MCNC: 103 MG/DL (ref 65–100)
GLUCOSE BLD STRIP.AUTO-MCNC: 110 MG/DL (ref 65–100)
GLUCOSE BLD STRIP.AUTO-MCNC: 119 MG/DL (ref 65–100)
GLUCOSE BLD STRIP.AUTO-MCNC: 85 MG/DL (ref 65–100)
GLUCOSE BLD STRIP.AUTO-MCNC: 93 MG/DL (ref 65–100)
GLUCOSE BLD STRIP.AUTO-MCNC: 95 MG/DL (ref 65–100)
GLUCOSE SERPL-MCNC: 91 MG/DL (ref 65–100)
HCT VFR BLD AUTO: 38.3 % (ref 36.6–50.3)
HDLC SERPL-MCNC: 64 MG/DL
HDLC SERPL: 2.7 {RATIO} (ref 0–5)
HGB BLD-MCNC: 11.4 G/DL (ref 12.1–17)
LDLC SERPL CALC-MCNC: 90.6 MG/DL (ref 0–100)
LIPID PROFILE,FLP: NORMAL
MCH RBC QN AUTO: 26 PG (ref 26–34)
MCHC RBC AUTO-ENTMCNC: 29.8 G/DL (ref 30–36.5)
MCV RBC AUTO: 87.4 FL (ref 80–99)
NRBC # BLD: 0 K/UL (ref 0–0.01)
NRBC BLD-RTO: 0 PER 100 WBC
PLATELET # BLD AUTO: 225 K/UL (ref 150–400)
PMV BLD AUTO: 11.1 FL (ref 8.9–12.9)
POTASSIUM SERPL-SCNC: 4.4 MMOL/L (ref 3.5–5.1)
RBC # BLD AUTO: 4.38 M/UL (ref 4.1–5.7)
SERVICE CMNT-IMP: ABNORMAL
SERVICE CMNT-IMP: NORMAL
SODIUM SERPL-SCNC: 142 MMOL/L (ref 136–145)
THERAPEUTIC RANGE,PTTT: NORMAL SECS (ref 58–77)
TRIGL SERPL-MCNC: 82 MG/DL (ref ?–150)
TROPONIN I SERPL-MCNC: 0.12 NG/ML
TSH SERPL DL<=0.05 MIU/L-ACNC: 7.03 UIU/ML (ref 0.36–3.74)
VLDLC SERPL CALC-MCNC: 16.4 MG/DL
WBC # BLD AUTO: 6.3 K/UL (ref 4.1–11.1)

## 2019-10-29 PROCEDURE — 80061 LIPID PANEL: CPT

## 2019-10-29 PROCEDURE — 74011250636 HC RX REV CODE- 250/636: Performed by: INTERNAL MEDICINE

## 2019-10-29 PROCEDURE — 65660000000 HC RM CCU STEPDOWN

## 2019-10-29 PROCEDURE — 74011250637 HC RX REV CODE- 250/637: Performed by: INTERNAL MEDICINE

## 2019-10-29 PROCEDURE — 80048 BASIC METABOLIC PNL TOTAL CA: CPT

## 2019-10-29 PROCEDURE — 82962 GLUCOSE BLOOD TEST: CPT

## 2019-10-29 PROCEDURE — 85730 THROMBOPLASTIN TIME PARTIAL: CPT

## 2019-10-29 PROCEDURE — 84443 ASSAY THYROID STIM HORMONE: CPT

## 2019-10-29 PROCEDURE — 84484 ASSAY OF TROPONIN QUANT: CPT

## 2019-10-29 PROCEDURE — 85027 COMPLETE CBC AUTOMATED: CPT

## 2019-10-29 PROCEDURE — 36415 COLL VENOUS BLD VENIPUNCTURE: CPT

## 2019-10-29 RX ORDER — HYDROCHLOROTHIAZIDE 25 MG/1
25 TABLET ORAL DAILY
Status: DISCONTINUED | OUTPATIENT
Start: 2019-10-29 | End: 2019-11-01 | Stop reason: HOSPADM

## 2019-10-29 RX ORDER — LOSARTAN POTASSIUM 100 MG/1
100 TABLET ORAL DAILY
Status: DISCONTINUED | OUTPATIENT
Start: 2019-10-29 | End: 2019-11-01 | Stop reason: HOSPADM

## 2019-10-29 RX ADMIN — LOSARTAN POTASSIUM 100 MG: 100 TABLET ORAL at 08:27

## 2019-10-29 RX ADMIN — MEMANTINE HYDROCHLORIDE 10 MG: 10 TABLET ORAL at 08:27

## 2019-10-29 RX ADMIN — Medication 10 ML: at 21:42

## 2019-10-29 RX ADMIN — METOPROLOL TARTRATE 25 MG: 25 TABLET ORAL at 22:34

## 2019-10-29 RX ADMIN — NITROGLYCERIN 1 INCH: 20 OINTMENT TOPICAL at 21:42

## 2019-10-29 RX ADMIN — DONEPEZIL HYDROCHLORIDE 10 MG: 5 TABLET, FILM COATED ORAL at 21:42

## 2019-10-29 RX ADMIN — NITROGLYCERIN 1 INCH: 20 OINTMENT TOPICAL at 15:19

## 2019-10-29 RX ADMIN — HEPARIN SODIUM 5000 UNITS: 5000 INJECTION INTRAVENOUS; SUBCUTANEOUS at 02:17

## 2019-10-29 RX ADMIN — MEMANTINE HYDROCHLORIDE 10 MG: 10 TABLET ORAL at 18:51

## 2019-10-29 RX ADMIN — POLYETHYLENE GLYCOL 3350 17 G: 17 POWDER, FOR SOLUTION ORAL at 08:26

## 2019-10-29 RX ADMIN — Medication 10 ML: at 05:47

## 2019-10-29 RX ADMIN — ASPIRIN 81 MG 81 MG: 81 TABLET ORAL at 08:27

## 2019-10-29 RX ADMIN — HYDROCHLOROTHIAZIDE 25 MG: 25 TABLET ORAL at 08:26

## 2019-10-29 RX ADMIN — HEPARIN SODIUM 5000 UNITS: 5000 INJECTION INTRAVENOUS; SUBCUTANEOUS at 15:19

## 2019-10-29 RX ADMIN — METOPROLOL TARTRATE 25 MG: 25 TABLET ORAL at 08:27

## 2019-10-29 RX ADMIN — AMLODIPINE BESYLATE 5 MG: 5 TABLET ORAL at 08:27

## 2019-10-29 RX ADMIN — LORAZEPAM 2 MG: 2 INJECTION INTRAMUSCULAR; INTRAVENOUS at 08:26

## 2019-10-29 RX ADMIN — PANTOPRAZOLE SODIUM 40 MG: 40 TABLET, DELAYED RELEASE ORAL at 08:26

## 2019-10-29 RX ADMIN — FUROSEMIDE 40 MG: 40 TABLET ORAL at 08:27

## 2019-10-29 RX ADMIN — LORAZEPAM 1 MG: 1 TABLET ORAL at 02:14

## 2019-10-29 RX ADMIN — NITROGLYCERIN 1 INCH: 20 OINTMENT TOPICAL at 02:18

## 2019-10-29 RX ADMIN — NITROGLYCERIN 1 INCH: 20 OINTMENT TOPICAL at 08:28

## 2019-10-29 NOTE — PROGRESS NOTES
Problem: Falls - Risk of  Goal: *Absence of Falls  Description  Document Usha Patches Fall Risk and appropriate interventions in the flowsheet.   Outcome: Progressing Towards Goal  Note:   Fall Risk Interventions:  Mobility Interventions: Bed/chair exit alarm, Communicate number of staff needed for ambulation/transfer, OT consult for ADLs, Patient to call before getting OOB, PT Consult for mobility concerns, PT Consult for assist device competence, Utilize walker, cane, or other assistive device, Utilize gait belt for transfers/ambulation    Mentation Interventions: Adequate sleep, hydration, pain control, Bed/chair exit alarm, Door open when patient unattended, Reorient patient, More frequent rounding, Increase mobility, Room close to nurse's station, Self-releasing belt, Toileting rounds, Update white board, Evaluate medications/consider consulting pharmacy    Medication Interventions: Bed/chair exit alarm, Evaluate medications/consider consulting pharmacy, Patient to call before getting OOB, Teach patient to arise slowly, Utilize gait belt for transfers/ambulation    Elimination Interventions: Bed/chair exit alarm, Call light in reach, Patient to call for help with toileting needs, Elevated toilet seat, Toilet paper/wipes in reach, Toileting schedule/hourly rounds, Urinal in reach, Stay With Me (per policy)    History of Falls Interventions: Bed/chair exit alarm, Consult care management for discharge planning, Door open when patient unattended, Evaluate medications/consider consulting pharmacy, Investigate reason for fall, Room close to nurse's station, Utilize gait belt for transfer/ambulation

## 2019-10-29 NOTE — PROGRESS NOTES
Attempted to see pt for OT services. Pts has elevated CIWA and pt was just given ativan for agitation. Per sitter at bedside pt has not been combative today.   Will follow up later today for eval.

## 2019-10-29 NOTE — PROGRESS NOTES
Patients  CIWA was 8 and pt was just given ativan for agitation. Per sitter at bedside pt has not been combative today. Will follow up later today for eval.      Ana Champagne, PT    3445: patient still too sedated to allow PT evaluation. Will follow up tomorrow.     Ana Champagne, PT

## 2019-10-29 NOTE — PROGRESS NOTES
Attempted to see pt for OT services. Pt is still sedated from earlier dose of ativan. Will defer and follow up at a later date.

## 2019-10-29 NOTE — PROGRESS NOTES
[address] [place] [address]  [phone]      Cardiology Progress Note      10/29/2019 9:00AM    Admit Date: 10/27/2019    Admit Diagnosis:   A-fib [facility]) [I48.91]    Subjective:     Benito Farfan Sr is very confused, requiring sedation, currently sleeping.   Echo with EF 20-25%      Visit Vitals  /82   Pulse 60   Temp 97.8 °F (36.6 °C)   Resp 18   Ht 5' 9\" (1.753 m)   Wt 61.3 kg (135 lb 3.2 oz)   SpO2 98%   BMI 19.97 kg/m²       Current Facility-Administered Medications   Medication Dose Route Frequency    losartan (COZAAR) tablet 100 mg  100 mg Oral DAILY    hydroCHLOROthiazide (HYDRODIURIL) tablet 25 mg  25 mg Oral DAILY    sodium chloride 0.45 % in dextrose 10% 1,019.6 mL infusion   IntraVENous CONTINUOUS    LORazepam (ATIVAN) injection 2 mg  2 mg IntraVENous Q1H PRN    LORazepam (ATIVAN) injection 4 mg  4 mg IntraVENous Q1H PRN    ziprasidone (GEODON) 20 mg in sterile water (preservative free) 1 mL injection  20 mg IntraMUSCular Q12H PRN    glucose chewable tablet 16 g  4 Tab Oral PRN    dextrose (D50W) injection syrg 12.5-25 g  25-50 mL IntraVENous PRN    glucagon (GLUCAGEN) injection 1 mg  1 mg IntraMUSCular PRN    insulin lispro (HUMALOG) injection   SubCUTAneous AC&HS    metoprolol tartrate (LOPRESSOR) tablet 25 mg  25 mg Oral Q12H    nitroglycerin (NITROBID) 2 % ointment 1 Inch  1 Inch Topical Q6H    sodium chloride (NS) flush 5-40 mL  5-40 mL IntraVENous Q8H    sodium chloride (NS) flush 5-40 mL  5-40 mL IntraVENous PRN    morphine 10 mg/ml injection 2 mg  2 mg IntraVENous Q4H PRN    heparin (porcine) injection 5,000 Units  5,000 Units SubCUTAneous Q12H    aspirin chewable tablet 81 mg  81 mg Oral DAILY    pantoprazole (PROTONIX) tablet 40 mg  40 mg Oral ACB    donepezil (ARICEPT) tablet 10 mg  10 mg Oral QHS    memantine (NAMENDA) tablet 10 mg  10 mg Oral BID    temazepam (RESTORIL) capsule 15 mg  15 mg Oral QHS PRN    ondansetron (ZOFRAN ODT) tablet 4 mg  4 mg Oral Q8H PRN    polyethylene glycol (MIRALAX) packet 17 g  17 g Oral DAILY    hydrALAZINE (APRESOLINE) 20 mg/mL injection 10 mg  10 mg IntraVENous Q6H PRN    LORazepam (ATIVAN) tablet 1 mg  1 mg Oral Q4H PRN    furosemide (LASIX) tablet 40 mg  40 mg Oral DAILY    amLODIPine (NORVASC) tablet 5 mg  5 mg Oral DAILY       Objective:      Physical Exam:  General Appearance:  elderly AAM in no acute distress  Chest:   Clear  Cardiovascular: tachy, no murmur. Abdomen:   Soft, non-tender, bowel sounds are active. Extremities: no peripheral edema  Skin:  Warm and dry. Data Review:   Recent Labs     10/29/19  0221 10/28/19  1125 10/27/19  0411   WBC 6.3 7.2 6.8   HGB 11.4* 12.5 13.3   HCT 38.3 40.6 43.2    270 244     Recent Labs     10/29/19  0221 10/27/19  0927 10/27/19  0745 10/27/19  0411     --   --  142   K 4.4  --   --  3.4*     --   --  106   CO2 27  --   --  26   GLU 91  --   --  172*   BUN 21*  --   --  21*   CREA 1.56*  --   --  1.67*   CA 9.0  --   --  9.1   MG  --   --  1.9  --    ALB  --   --   --  3.4*   TBILI  --   --   --  0.8   SGOT  --   --   --  50*   ALT  --   --   --  41   INR  --  1.1  --   --        Recent Labs     10/29/19  0221 10/27/19  0745   TROIQ 0.12*  --    CPK  --  91   CKMB  --  3.4         Intake/Output Summary (Last 24 hours) at 10/29/2019 1633  Last data filed at 10/29/2019 1230  Gross per 24 hour   Intake 415 ml   Output 900 ml   Net -485 ml        Telemetry: v paced     Echo  · Left Ventricle: Normal cavity size. Mild concentric hypertrophy. Severe systolic dysfunction. Estimated left ventricular ejection fraction is 21 - 25%. Left ventricular global hypokinesis. Severe (grade 3) left ventricular diastolic dysfunction. · Left Atrium: Mildly dilated left atrium. · Mitral Valve: Mitral valve non-specific thickening. Mild mitral valve regurgitation is present. · Tricuspid Valve: Mild tricuspid valve regurgitation is present.  Tricuspid valve prolapse. 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 Did have fib/flutter on interrogation, which may have caused exacerbation of symptoms  · Is he a candidate for DOAC? Currently very confused, and would be a high risk for falls. · Echo with EF 20-25%, Previous echo 4/18 EF 60%. · Monitor I/Os, daily weights, labs  · Continue ASA, Norvasc, metorpolol and Imdur. BP much improved. Will change to Coreg with new dx HF  · Not likely a lot of benefit to statin medication at this age, lipids near goal  · Suspect noncompliance with medication had a lot to do with his decompensation, considering severe hypertension on admission   · We will not pursue stress testing at this time for that reason  · Need to further address Janae Travis Been to address with patient family. Phuong Tovar [facility]  Cardiology    Patient seen and examined by me with the above nurse practitioner. I personally performed all components of the history, physical, and medical decision making and agree with the assessment and plan with minor modifications as noted. Cardiac status stable. Continue with conservative mangement.

## 2019-10-29 NOTE — CDMP QUERY
Patient admitted with Chest pain. Documentation reflects NSTEMI in H&P. If possible, please specify in the progress notes and d/c summary if NSTEMI was: 
 
? NSTEMI Ruled out after study ? NSTEMI Still Suspected after study ? NSTEMI Confirmed after study The medical record reflects the following: 
  Risk Factors: 88 BM w/hx: CAD, CKD2, DM, HTN Clinical Indicators: Admitted with Chest pain and per H&P \"NSTEMI\" and per cardiology consult \"Story with acute onset and relatively rapid resolution is suspicious for a dysrhythmia. \", trop: 0.12-0.16 Treatment: Cardiology consult, pacemaker interrogation, echo, ASA, norvasc, metoprolol, isosorbide Thank you, [email or web address] 
[phone]

## 2019-10-29 NOTE — CDMP QUERY
Patient admitted with Chest pain, noted to have dementia. If possible, please document in progress notes and d/c summary if you are evaluating and/or treating any of the following: 
 
=>Dementia with behavioral disturbance =>Dementia without behavioral disturbance 
=>Other Explanation of clinical findings =>Clinically Undetermined (no explanation for clinical findings) The medical record reflects the following: 
 
   Risk Factors: 88 BM w/hx: dementia and alcoholism Clinical Indicators: Pt agitated and attempting to chew straps off mitts, removing telemetry leads, IV's, and condom catheter Treatment: Butts vest, mitts, CIWA protocol, geodon or Ativan PRN, namenda, aricept Thank you, [email or web address] 
[phone]

## 2019-10-29 NOTE — PROGRESS NOTES
Pt's breakfast tray arrived. Pt sleeping. Sitter woke pt up and asked if he wanted to eat. Pt said no.  Tray will remain in room and can be heated up later

## 2019-10-29 NOTE — PROGRESS NOTES
PROGRESS NOTE    NAME:  Monroe Farfan Sr   :   1931   MRN:   [id number]     Date/Time:  10/29/2019 7:28 AM  Subjective:   History:  Chart reviewed and patient seen and examined and D/W his nurse this AM and his granddaughter whom he lives with yesterday and all events noted. He was admitted on 10/27 after transfer from [facility] where he presented with CP and pulmonary edema with mild elevation of Troponin and accelerated HTN in the face of medical noncompliance with his anti-HTN medications. He has been very agitated since admission and required Ativan and Geodon for sedation and currently remains sedated. I can get him to open eyes to verbal response and he is oriented to his name but no history from patient today. All history from his nurse and chart review as well as granddaughter.  Per her \"he doesn't drink alcohol but drinks about 4 beers a day\"      Medications reviewed:  Current Facility-Administered Medications   Medication Dose Route Frequency    losartan (COZAAR) tablet 100 mg  100 mg Oral DAILY    hydroCHLOROthiazide (HYDRODIURIL) tablet 25 mg  25 mg Oral DAILY    sodium chloride 0.45 % in dextrose 10% 1,019.6 mL infusion   IntraVENous CONTINUOUS    LORazepam (ATIVAN) injection 2 mg  2 mg IntraVENous Q1H PRN    LORazepam (ATIVAN) injection 4 mg  4 mg IntraVENous Q1H PRN    ziprasidone (GEODON) 20 mg in sterile water (preservative free) 1 mL injection  20 mg IntraMUSCular Q12H PRN    glucose chewable tablet 16 g  4 Tab Oral PRN    dextrose (D50W) injection syrg 12.5-25 g  25-50 mL IntraVENous PRN    glucagon (GLUCAGEN) injection 1 mg  1 mg IntraMUSCular PRN    insulin lispro (HUMALOG) injection   SubCUTAneous AC&HS    metoprolol tartrate (LOPRESSOR) tablet 25 mg  25 mg Oral Q12H    nitroglycerin (NITROBID) 2 % ointment 1 Inch  1 Inch Topical Q6H    sodium chloride (NS) flush 5-40 mL  5-40 mL IntraVENous Q8H    sodium chloride (NS) flush 5-40 mL  5-40 mL IntraVENous PRN    morphine 10 mg/ml injection 2 mg  2 mg IntraVENous Q4H PRN    heparin (porcine) injection 5,000 Units  5,000 Units SubCUTAneous Q12H    aspirin chewable tablet 81 mg  81 mg Oral DAILY    pantoprazole (PROTONIX) tablet 40 mg  40 mg Oral ACB    donepezil (ARICEPT) tablet 10 mg  10 mg Oral QHS    memantine (NAMENDA) tablet 10 mg  10 mg Oral BID    temazepam (RESTORIL) capsule 15 mg  15 mg Oral QHS PRN    ondansetron (ZOFRAN ODT) tablet 4 mg  4 mg Oral Q8H PRN    polyethylene glycol (MIRALAX) packet 17 g  17 g Oral DAILY    hydrALAZINE (APRESOLINE) 20 mg/mL injection 10 mg  10 mg IntraVENous Q6H PRN    haloperidol lactate (HALDOL) injection 2 mg  2 mg IntraMUSCular Q6H PRN    LORazepam (ATIVAN) tablet 1 mg  1 mg Oral Q4H PRN    furosemide (LASIX) tablet 40 mg  40 mg Oral DAILY    amLODIPine (NORVASC) tablet 5 mg  5 mg Oral DAILY        Objective:   Vitals:  Visit Vitals  /54 (BP 1 Location: Left arm, BP Patient Position: Supine)   Pulse 93   Temp 97.8 °F (36.6 °C)   Resp 18   Ht 5' 9\" (1.753 m)   Wt 135 lb 3.2 oz (61.3 kg)   SpO2 96%   BMI 19.97 kg/m²      O2 Device: Room air Temp (24hrs), Av.7 °F (36.5 °C), Min:97.1 °F (36.2 °C), Max:98.1 °F (36.7 °C)      Last 24hr Input/Output:    Intake/Output Summary (Last 24 hours) at 10/29/2019 0728  Last data filed at 10/29/2019 9296  Gross per 24 hour   Intake 100 ml   Output 1250 ml   Net -1150 ml        PHYSICAL EXAM:  General:     less somnolent today, cooperative to me, no distress, appears stated age. Requiring restraints to protect himself and staff due to agitation  Head:    Normocephalic, without obvious abnormality, atraumatic. Eyes:    Conjunctivae/corneas clear. PERRLA  Nose:   Nares normal. No drainage or sinus tenderness. Throat:     Lips, mucosa, and tongue normal.  No Thrush  Neck:   Supple, symmetrical,  no adenopathy, thyroid: non tender     no carotid bruit and no JVD. Back:     Symmetric,  No CVA tenderness.   Lungs:    Clear to auscultation bilaterally. No Wheezing or Rhonchi. No rales. Heart:    Regular rate and rhythm,  no murmur, rub or gallop. Abdomen:    Soft, non-tender. Not distended. Bowel sounds normal. No masses  Extremities:  Extremities normal, atraumatic, No cyanosis. No edema. No clubbing  Lymph nodes:  Cervical, supraclavicular normal.  Neurologic:  Normal strength, somewhat somnolent. Intermittently extremely agitated and combative.  Oriented to name only   Skin:                 No rash      Lab Data Reviewed:    Recent Results (from the past 24 hour(s))   GLUCOSE, POC    Collection Time: 10/28/19  7:57 AM   Result Value Ref Range    Glucose (POC) 85 65 - 100 mg/dL    Performed by Marcello Blancas (PCT)    CBC W/O DIFF    Collection Time: 10/28/19 11:25 AM   Result Value Ref Range    WBC 7.2 4.1 - 11.1 K/uL    RBC 4.67 4.10 - 5.70 M/uL    HGB 12.5 12.1 - 17.0 g/dL    HCT 40.6 36.6 - 50.3 %    MCV 86.9 80.0 - 99.0 FL    MCH 26.8 26.0 - 34.0 PG    MCHC 30.8 30.0 - 36.5 g/dL    RDW 14.6 (H) 11.5 - 14.5 %    PLATELET 349 352 - 397 K/uL    MPV 11.2 8.9 - 12.9 FL    NRBC 0.0 0  WBC    ABSOLUTE NRBC 0.00 0.00 - 0.01 K/uL   GLUCOSE, POC    Collection Time: 10/28/19 11:44 AM   Result Value Ref Range    Glucose (POC) 126 (H) 65 - 100 mg/dL    Performed by Desire Mcdowell (PCT)    GLUCOSE, POC    Collection Time: 10/28/19  6:03 PM   Result Value Ref Range    Glucose (POC) 106 (H) 65 - 100 mg/dL    Performed by Marcello Blancas (PCT)    GLUCOSE, POC    Collection Time: 10/28/19  9:10 PM   Result Value Ref Range    Glucose (POC) 106 (H) 65 - 100 mg/dL    Performed by Nestor Garcia (RN)    METABOLIC PANEL, BASIC    Collection Time: 10/29/19  2:21 AM   Result Value Ref Range    Sodium 142 136 - 145 mmol/L    Potassium 4.4 3.5 - 5.1 mmol/L    Chloride 108 97 - 108 mmol/L    CO2 27 21 - 32 mmol/L    Anion gap 7 5 - 15 mmol/L    Glucose 91 65 - 100 mg/dL    BUN 21 (H) 6 - 20 MG/DL    Creatinine 1.56 (H) 0.70 - 1.30 MG/DL    BUN/Creatinine ratio 13 12 - 20      GFR est AA 51 (L) >60 ml/min/1.73m2    GFR est non-AA 42 (L) >60 ml/min/1.73m2    Calcium 9.0 8.5 - 10.1 MG/DL   PTT    Collection Time: 10/29/19  2:21 AM   Result Value Ref Range    aPTT 27.8 22.1 - 32.0 sec    aPTT, therapeutic range     58.0 - 77.0 SECS   TROPONIN I    Collection Time: 10/29/19  2:21 AM   Result Value Ref Range    Troponin-I, Qt. 0.12 (H) <0.05 ng/mL   CBC W/O DIFF    Collection Time: 10/29/19  2:21 AM   Result Value Ref Range    WBC 6.3 4.1 - 11.1 K/uL    RBC 4.38 4.10 - 5.70 M/uL    HGB 11.4 (L) 12.1 - 17.0 g/dL    HCT 38.3 36.6 - 50.3 %    MCV 87.4 80.0 - 99.0 FL    MCH 26.0 26.0 - 34.0 PG    MCHC 29.8 (L) 30.0 - 36.5 g/dL    RDW 14.2 11.5 - 14.5 %    PLATELET 660 547 - 243 K/uL    MPV 11.1 8.9 - 12.9 FL    NRBC 0.0 0  WBC    ABSOLUTE NRBC 0.00 0.00 - 0.01 K/uL   LIPID PANEL    Collection Time: 10/29/19  2:21 AM   Result Value Ref Range    LIPID PROFILE          Cholesterol, total 171 <200 MG/DL    Triglyceride 82 <150 MG/DL    HDL Cholesterol 64 MG/DL    LDL, calculated 90.6 0 - 100 MG/DL    VLDL, calculated 16.4 MG/DL    CHOL/HDL Ratio 2.7 0.0 - 5.0     TSH 3RD GENERATION    Collection Time: 10/29/19  2:21 AM   Result Value Ref Range    TSH 7.03 (H) 0.36 - 3.74 uIU/mL   GLUCOSE, POC    Collection Time: 10/29/19  7:04 AM   Result Value Ref Range    Glucose (POC) 119 (H) 65 - 100 mg/dL    Performed by Reba Haskins (PCT)          Assessment/Plan:     Principal Problem:    A-fib ([address] (10/27/2019)    Active Problems:    Mixed hyperlipidemia (7/18/2017)      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, without long-term current use of insulin ([facility] (7/18/2017)      Alzheimer disease ([facility] (7/18/2017)      SVT (supraventricular tachycardia) (HCC)--s/p RFA (4/28/2015)      S/P cardiac pacemaker procedure (5/4/2015)      Overview: 5/4/15 Medtronic dual chamber pacemaker implant            SSS (sick sinus syndrome) ([facility] (6/2/2015)      Alcoholism ([facility] (4/23/2018)      Acute pulmonary edema ([address] (10/27/2019)       ___________________________________________________  PLAN:    1. Continue Losartan, Hydrodiuril, and Norvasc  2. Low dose Metoprolol added  3. Diurese with Lasix as needed  4. Nitrobid started on admission  5. Aricept and Namenda resumed  6. No need for Statin at this situation Alzheimer's at 88   7. Geodon or Ativan PRN for agitation  8. Follow BS but has not recently required diabetic meds for DM with his weight loss, currently on low dose D 10  9. I will watch for Alcohol WD with history of Alcoholism, could be part of current agitation and confusion although also with Alzheimer's   10. Requires Posey vest due to combativeness. This is for patient's own safety.       35 minutes spent in direct care of this patient today      ___________________________________________________    Attending Physician: Kimble Lundborg, MD

## 2019-10-29 NOTE — PROGRESS NOTES
[address] INTERDISCIPLINARY ROUNDS    Cardiopulmonary Care Interdisciplinary Rounds were held today to discuss patient's plan of care and outcomes. The following members were present: Pharmacy, Nursing and Case Management.   Expected Length of Stay:  3d 14h    PLAN OF CARE:   Continue current treatment plan

## 2019-10-29 NOTE — PROGRESS NOTES
Daughter, Bridgette Case, called the unit. She stated she had just gotten off of the phone with him. She was instantly irate and verbally abusive. She accused his care team of denying him food and water. I attempted to reassure her that he has been routinely eating and drinking and to explain how his mental status may be altering his perception of reality. Further, he has a sitter with him at all times to ensure his safety. I invited her to come and visit her father to witness the care he is receiving. She declined and stated, \"Y'all do not know who y'all dealing with. Y'all don't want me to come up there. \" I reassured her that I would follow up with the care team to ensure her father was receiving quality, compassionate care. Further, I would escalate her concern to our advocacy team. After our conversation, I visited Mr. Marcy Edwards. He was resting comfortably with his sitter at the bedside ensuring his safety. I have paged Dr. Osiris Tripp for his awareness.

## 2019-10-30 LAB
ANION GAP SERPL CALC-SCNC: 8 MMOL/L (ref 5–15)
BASOPHILS # BLD: 0.1 K/UL (ref 0–0.1)
BASOPHILS NFR BLD: 1 % (ref 0–1)
BUN SERPL-MCNC: 25 MG/DL (ref 6–20)
BUN/CREAT SERPL: 14 (ref 12–20)
CALCIUM SERPL-MCNC: 9 MG/DL (ref 8.5–10.1)
CHLORIDE SERPL-SCNC: 104 MMOL/L (ref 97–108)
CO2 SERPL-SCNC: 27 MMOL/L (ref 21–32)
CREAT SERPL-MCNC: 1.73 MG/DL (ref 0.7–1.3)
DIFFERENTIAL METHOD BLD: ABNORMAL
EOSINOPHIL # BLD: 0.3 K/UL (ref 0–0.4)
EOSINOPHIL NFR BLD: 5 % (ref 0–7)
ERYTHROCYTE [DISTWIDTH] IN BLOOD BY AUTOMATED COUNT: 14.3 % (ref 11.5–14.5)
GLUCOSE BLD STRIP.AUTO-MCNC: 110 MG/DL (ref 65–100)
GLUCOSE BLD STRIP.AUTO-MCNC: 114 MG/DL (ref 65–100)
GLUCOSE BLD STRIP.AUTO-MCNC: 124 MG/DL (ref 65–100)
GLUCOSE BLD STRIP.AUTO-MCNC: 150 MG/DL (ref 65–100)
GLUCOSE SERPL-MCNC: 92 MG/DL (ref 65–100)
HCT VFR BLD AUTO: 38.5 % (ref 36.6–50.3)
HGB BLD-MCNC: 11.8 G/DL (ref 12.1–17)
IMM GRANULOCYTES # BLD AUTO: 0 K/UL (ref 0–0.04)
IMM GRANULOCYTES NFR BLD AUTO: 1 % (ref 0–0.5)
LYMPHOCYTES # BLD: 1.2 K/UL (ref 0.8–3.5)
LYMPHOCYTES NFR BLD: 22 % (ref 12–49)
MCH RBC QN AUTO: 26.6 PG (ref 26–34)
MCHC RBC AUTO-ENTMCNC: 30.6 G/DL (ref 30–36.5)
MCV RBC AUTO: 86.7 FL (ref 80–99)
MONOCYTES # BLD: 0.6 K/UL (ref 0–1)
MONOCYTES NFR BLD: 10 % (ref 5–13)
NEUTS SEG # BLD: 3.5 K/UL (ref 1.8–8)
NEUTS SEG NFR BLD: 61 % (ref 32–75)
NRBC # BLD: 0 K/UL (ref 0–0.01)
NRBC BLD-RTO: 0 PER 100 WBC
PLATELET # BLD AUTO: 234 K/UL (ref 150–400)
PMV BLD AUTO: 11.2 FL (ref 8.9–12.9)
POTASSIUM SERPL-SCNC: 4 MMOL/L (ref 3.5–5.1)
RBC # BLD AUTO: 4.44 M/UL (ref 4.1–5.7)
SERVICE CMNT-IMP: ABNORMAL
SODIUM SERPL-SCNC: 139 MMOL/L (ref 136–145)
WBC # BLD AUTO: 5.6 K/UL (ref 4.1–11.1)

## 2019-10-30 PROCEDURE — 97535 SELF CARE MNGMENT TRAINING: CPT | Performed by: OCCUPATIONAL THERAPIST

## 2019-10-30 PROCEDURE — 74011250637 HC RX REV CODE- 250/637: Performed by: INTERNAL MEDICINE

## 2019-10-30 PROCEDURE — 85025 COMPLETE CBC W/AUTO DIFF WBC: CPT

## 2019-10-30 PROCEDURE — 97165 OT EVAL LOW COMPLEX 30 MIN: CPT | Performed by: OCCUPATIONAL THERAPIST

## 2019-10-30 PROCEDURE — 36415 COLL VENOUS BLD VENIPUNCTURE: CPT

## 2019-10-30 PROCEDURE — 82962 GLUCOSE BLOOD TEST: CPT

## 2019-10-30 PROCEDURE — 74011250636 HC RX REV CODE- 250/636: Performed by: INTERNAL MEDICINE

## 2019-10-30 PROCEDURE — 74011636637 HC RX REV CODE- 636/637: Performed by: INTERNAL MEDICINE

## 2019-10-30 PROCEDURE — 80048 BASIC METABOLIC PNL TOTAL CA: CPT

## 2019-10-30 PROCEDURE — 97116 GAIT TRAINING THERAPY: CPT

## 2019-10-30 PROCEDURE — 97161 PT EVAL LOW COMPLEX 20 MIN: CPT

## 2019-10-30 PROCEDURE — 65660000000 HC RM CCU STEPDOWN

## 2019-10-30 RX ADMIN — ASPIRIN 81 MG 81 MG: 81 TABLET ORAL at 08:52

## 2019-10-30 RX ADMIN — Medication 10 ML: at 06:35

## 2019-10-30 RX ADMIN — FUROSEMIDE 40 MG: 40 TABLET ORAL at 08:53

## 2019-10-30 RX ADMIN — INSULIN LISPRO 2 UNITS: 100 INJECTION, SOLUTION INTRAVENOUS; SUBCUTANEOUS at 11:55

## 2019-10-30 RX ADMIN — METOPROLOL TARTRATE 25 MG: 25 TABLET ORAL at 08:52

## 2019-10-30 RX ADMIN — Medication 10 ML: at 14:15

## 2019-10-30 RX ADMIN — HYDROCHLOROTHIAZIDE 25 MG: 25 TABLET ORAL at 08:49

## 2019-10-30 RX ADMIN — MEMANTINE HYDROCHLORIDE 10 MG: 10 TABLET ORAL at 17:25

## 2019-10-30 RX ADMIN — POLYETHYLENE GLYCOL 3350 17 G: 17 POWDER, FOR SOLUTION ORAL at 08:55

## 2019-10-30 RX ADMIN — NITROGLYCERIN 1 INCH: 20 OINTMENT TOPICAL at 21:58

## 2019-10-30 RX ADMIN — PANTOPRAZOLE SODIUM 40 MG: 40 TABLET, DELAYED RELEASE ORAL at 08:52

## 2019-10-30 RX ADMIN — AMLODIPINE BESYLATE 5 MG: 5 TABLET ORAL at 08:52

## 2019-10-30 RX ADMIN — NITROGLYCERIN 1 INCH: 20 OINTMENT TOPICAL at 08:54

## 2019-10-30 RX ADMIN — HEPARIN SODIUM 5000 UNITS: 5000 INJECTION INTRAVENOUS; SUBCUTANEOUS at 03:02

## 2019-10-30 RX ADMIN — Medication 10 ML: at 22:11

## 2019-10-30 RX ADMIN — MEMANTINE HYDROCHLORIDE 10 MG: 10 TABLET ORAL at 08:53

## 2019-10-30 RX ADMIN — DONEPEZIL HYDROCHLORIDE 10 MG: 5 TABLET, FILM COATED ORAL at 21:57

## 2019-10-30 RX ADMIN — METOPROLOL TARTRATE 25 MG: 25 TABLET ORAL at 21:57

## 2019-10-30 RX ADMIN — LOSARTAN POTASSIUM 100 MG: 100 TABLET ORAL at 08:51

## 2019-10-30 RX ADMIN — NITROGLYCERIN 1 INCH: 20 OINTMENT TOPICAL at 14:11

## 2019-10-30 RX ADMIN — HEPARIN SODIUM 5000 UNITS: 5000 INJECTION INTRAVENOUS; SUBCUTANEOUS at 14:09

## 2019-10-30 RX ADMIN — NITROGLYCERIN 1 INCH: 20 OINTMENT TOPICAL at 03:01

## 2019-10-30 NOTE — PROGRESS NOTES
Problem: Self Care Deficits Care Plan (Adult)  Goal: *Acute Goals and Plan of Care (Insert Text)  Description    FUNCTIONAL STATUS PRIOR TO ADMISSION: Patient was independent and active without use of DME. Pt was independent with ADLs PTA. Pt is confused and not a reliable historian to PLOF. HOME SUPPORT: The patient lived with granddaughter, but was unable to accurately recall his living situation. Pt informed OT that \"I live alone. \"    Occupational Therapy Goals  Initiated 10/30/2019  1. Patient will perform grooming with independence within 7 day(s). 2.  Patient will perform upper body dressing with supervision/set-up within 7 day(s). 3.  Patient will perform lower body dressing with supervision/set-up within 7 day(s). 4.  Patient will perform toilet transfers with modified independence within 7 day(s). 5.  Patient will perform all aspects of toileting with independence within 7 day(s). 6.  Patient will utilize energy conservation techniques during functional activities with verbal cues within 7 day(s). Outcome: Progressing Towards Goal   OCCUPATIONAL THERAPY EVALUATION  Patient: Luzmaria Farfan  (80 y.o. male)  Date: 10/30/2019  Primary Diagnosis: A-fib [facility]) [I48.91]       Precautions:   Fall, Bed Alarm, Other (comment)(confusion)    ASSESSMENT  Based on the objective data described below, the patient presents with pleasant demeanor, but is only oriented to self and place. He is disoriented to time and situation, and perseverates during conversation asking \"How long have I been here? \" Pt showed no aggression or hostility throughout session and was grateful to therapist, following all commands and answering all inquiries to his best ability. Pt's confusion is apparent and he was unable to accurately recall his living situation, reporting to OT that \"I live alone. \" When therapist questioned pt about his granddaughter pt mused, \"Does she stay with me? Yes I guess she does sometimes. \" Pt was able to mobilize in bed, independently don socks, and transfer to chair. Pt's VSS stable throughout session and recorded below. Time         BP       MAP   HR    Location              Pt position  0932     158/81      107     61       L arm      Supine, pre-activity, at rest  0935     156/92      113     73       L arm           Pre-activity, sitting  0943     146/95      112     80       L arm           Pre-activity, sitting  0947     144/83      103     71       L arm           Post activity, sitting    Current Level of Function Impacting Discharge (ADLs/self-care): Pt is independent with feeding, and needs supervision for UB dressing. Pt is able to perform grooming with Set-up if seated, otherwise he would need CGA to do it standing at sink. Pt also needs CGA for LB dressing, toileting, and bathing. Functional Outcome Measure: The patient scored 45/100 on the Barthel outcome measure which is indicative of significant impairment to independent completion of ADLs. Other factors to consider for discharge: level of assistance available at home     Patient will benefit from skilled therapy intervention to address the above noted impairments. PLAN :  Recommendations and Planned Interventions: self care training, functional mobility training, therapeutic exercise, balance training, therapeutic activities, endurance activities, patient education, home safety training and family training/education    Frequency/Duration: Patient will be followed by occupational therapy 4 times a week to address goals.     Recommendation for discharge: (in order for the patient to meet his/her long term goals)  Occupational therapy at least 2 days/week in the home AND ensure assist and/or supervision for safety with functional mobility and completion of ADLs    This discharge recommendation:  Has not yet been discussed the attending provider and/or case management    IF patient discharges home will need the following DME: none       SUBJECTIVE:   Patient stated \"How long have I been here?  x3    OBJECTIVE DATA SUMMARY:   HISTORY:   Past Medical History:   Diagnosis Date    Abdominal pain 7/18/2017    Alzheimer disease ([facility] 7/18/2017    Anemia 7/18/2017    Arthritis     Back pain 7/18/2017    Bradycardia 7/18/2017    CAD (coronary artery disease)     hx of MI    CKD (chronic kidney disease), stage II 7/18/2017    constipation     Depression 7/18/2017    Diabetes mellitus ([facility] 7/18/2017    Diverticulosis 7/18/2017    DJD (degenerative joint disease) 7/18/2017    Encounter for long-term (current) use of other medications 7/18/2017    Fatigue     Gastritis and duodenitis 7/18/2017    GERD (gastroesophageal reflux disease)     GI bleed 7/18/2017    Hearing loss     Hyperlipidemia 7/18/2017    Hypertension     Hypertension with renal disease 7/18/2017    Ill-defined condition     Dementia    Internal hemorrhoids 7/18/2017    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    S/P cardiac pacemaker procedure 5/4/2015    5/4/15 Medtronic dual chamber pacemaker implant     Weight loss     lost over 40 pounds last year- has no appetite     Past Surgical History:   Procedure Laterality Date    COLONOSCOPY N/A 6/22/2017    COLONOSCOPY performed by Imer Landa MD at [address] [place] [address]  6/22/2017         [address]  7/10/2014    right inguinal    HX OTHER SURGICAL      cystectomy from back    [place] EGD TRANSORAL BIOPSY SINGLE/MULTIPLE  2/14/2012         UPPER GI ENDOSCOPY,BIOPSY  6/22/2017            Expanded or extensive additional review of patient history:     Home Situation  Home Environment: Apartment  # Steps to Enter: 5(5-6 pt is unsure)  Rails to Enter: Yes  Hand Rails : Left  Wheelchair Ramp: No  One/Two Story Residence: One story  Living Alone: No  Support Systems: Family member(s)  Patient Expects to be Discharged to[de-identified] Apartment  Current DME Used/Available at Home: Walker  Tub or Shower Type: Tub/Shower combination        EXAMINATION OF PERFORMANCE DEFICITS:  Cognitive/Behavioral Status:  Neurologic State: Alert  Orientation Level: Disoriented to person;Disoriented to place  Cognition: Decreased attention/concentration  Perception: Appears intact  Perseveration: No perseveration noted  Safety/Judgement: Awareness of environment;Good awareness of safety precautions; Fall prevention(weakness and decreased balance)        Hearing: Auditory  Auditory Impairment: None    Vision/Perceptual:                           Acuity: Within Defined Limits         Range of Motion:    AROM: Within functional limits  PROM: Within functional limits                      Strength:    Strength: Generally decreased, functional(4-/5 grossly bilateral LE)                Coordination:  Coordination: Generally decreased, functional  Fine Motor Skills-Upper: Left Intact; Right Intact    Gross Motor Skills-Upper: Right Intact; Left Intact    Tone & Sensation:    Tone: Normal  Sensation: Impaired(decreased lighttouch accuracy bilateral feet)                      Balance:  Sitting: Intact  Standing: Impaired  Standing - Static: Good;Constant support  Standing - Dynamic : Fair;Constant support    Functional Mobility and Transfers for ADLs:  Bed Mobility:  Rolling: (up in chair when PT arrived)  Supine to Sit: Stand-by assistance  Scooting: Stand-by assistance    Transfers:  Sit to Stand: Minimum assistance;Contact guard assistance;Assist x2  Stand to Sit: Minimum assistance;Assist x1  Bed to Chair: Minimum assistance;Contact guard assistance;Assist x1;Adaptive equipment(RW)  Bathroom Mobility: Contact guard assistance  Toilet Transfer : Contact guard assistance  Assistive Device : Walker, rolling    ADL Assessment:  Feeding: Independent    Oral Facial Hygiene/Grooming: Setup;Contact guard assistance(seated; standing at sink)    Bathing: Contact guard assistance    Upper Body Dressing: Supervision    Lower Body Dressing: Contact guard assistance    Toileting: Contact guard assistance                ADL Intervention and task modifications:       Lower Body Dressing Assistance  Socks: Independent  Leg Crossed Method Used: Yes(for L leg)  Position Performed: Seated edge of bed         Cognitive Retraining  Safety/Judgement: Awareness of environment;Good awareness of safety precautions; Fall prevention(weakness and decreased balance)      Functional Measure:  Barthel Index:    Bathin  Bladder: 5  Bowels: 5  Groomin  Dressin  Feeding: 10  Mobility: 0  Stairs: 0  Toilet Use: 5  Transfer (Bed to Chair and Back): 10  Total: 45/100        The Barthel ADL Index: Guidelines  1. The index should be used as a record of what a patient does, not as a record of what a patient could do. 2. The main aim is to establish degree of independence from any help, physical or verbal, however minor and for whatever reason. 3. The need for supervision renders the patient not independent. 4. A patient's performance should be established using the best available evidence. Asking the patient, friends/relatives and nurses are the usual sources, but direct observation and common sense are also important. However direct testing is not needed. 5. Usually the patient's performance over the preceding 24-48 hours is important, but occasionally longer periods will be relevant. 6. Middle categories imply that the patient supplies over 50 per cent of the effort. 7. Use of aids to be independent is allowed. Daiana Langley., Barthel, DJustynW. (1877). Functional evaluation: the Barthel Index. [address] [employer] (14)2. LINDY Malcolm, Roscoe Snow., Dorian Harris., [place], [address] [place] [address] (). Measuring the change indisability after inpatient rehabilitation; comparison of the responsiveness of the Barthel Index and Functional [place] Measure. Journal of Neurology, Neurosurgery, and Psychiatry, 66(4), 738-238.   Aron Hernandez NUNU, INES Albright, & Ulices Hutton M.A. (2004.) Assessment of post-stroke quality of life in cost-effectiveness studies: The usefulness of the Barthel Index and the EuroQoL-5D. Quality of Life Research, 15, 898-14         Occupational Therapy Evaluation Charge Determination   History Examination Decision-Making   LOW Complexity : Brief history review  LOW Complexity : 1-3 performance deficits relating to physical, cognitive , or psychosocial skils that result in activity limitations and / or participation restrictions  LOW Complexity : No comorbidities that affect functional and no verbal or physical assistance needed to complete eval tasks       Based on the above components, the patient evaluation is determined to be of the following complexity level: LOW   Pain Rating:  No complaints    Activity Tolerance:   Good  Please refer to the flowsheet for vital signs taken during this treatment. After treatment patient left in no apparent distress:    Sitting in chair, Call bell within reach and Bed / chair alarm activated    COMMUNICATION/EDUCATION:   The patients plan of care was discussed with: Registered Nurse. Patient/family agree to work toward stated goals and plan of care. This patients plan of care is appropriate for delegation to [facility]. Thank you for this referral.  Roxane Shock  Time Calculation: 25 mins    Regarding student involvement in patient care:  A student participated in this treatment session. Per CMS Medicare statements and AOTA guidelines I certify that the following was true:  1. I was present and directly observed the entire session. 2. I made all skilled judgments and clinical decisions regarding care. 3. I am the practitioner responsible for assessment, treatment, and documentation.

## 2019-10-30 NOTE — PROGRESS NOTES
This writer responded to a Code [place] for this patient's room and upon arrival was informed the code was called on the patient's daughter. She is being very verbally aggressive, the staff expressed they felt threatened and in fear of being harmed. The patient's daughter had slammed the door shut and was in the room yelling out \"you are all trying to kill my daddy,\" and that \"I know you are talking about me, I can hear everything you are saying. \"    7946 At this time I requested Officer Vidya London to please call [place]  and request he or she come to this code. I explained that their services would be needed for visitors or family members since they themselves are not in-patients. 1620 After knocking on the patient's door I entered the room and attempted to introduce myself. The female visitor at the bedside was screaming that we are all trying to kill her daddy, he is the only man in her life, he is her life, and that he had called her to tell her we would not give him anything to eat or drink. I called her by name, Adriana Iniguez (she was wearing an ID badge from [facility] with the name Beverly Castellon,  printed on it). She immediately began yelling at me and stated she did not want to speak with anyone other than \"nurse KP\" and that she had requested to speak with the nurse in charge and not the nurse taking care of her father. When I entered the room she was on the left side of the bed nearest the window, and now had walked around the bed to where I was standing continuing to yell the whole time. I was standing on the right side of the bed and had come up to assess the patient as I listened to her. Sylvia, the RR nurse had also entered the room and was standing at the [facility] station reviewing the patients MAR. We were attempting to explain what was happening but could never get past \"Enrico\" before she would start screaming at us again.    400 Dashawn Yadav entered the room along with the [place] . Fidel Babin continued to yell and barely acknowledged their entry. Officer Bianca Sahu attempted to speak and Fidel Babin informed her that she was not talking to her. I attempted to speak and Fidel Babin cut me off again. I informed her that since she was not willing to allow me to inform her of what was happening with her dad, that she was continuing to be so unruly, loud, and threatening then this Officer (pointing to the [employer]) would be escorting her out of the hospital as we cannot tolerated this behavior. She was yelling the whole time I spoke, the officer tried to talk and she cut him off as well saying the we were trying to kill her daddy. The officer firmly informed her that she needed to stop speaking and to allow the nurse (pointing to me) to talk to her, and pointed out that she would not even allow him to speak. [id number] I explained the events of the previous two days and how his behavior was and that we had to call a code atlas on him both days. Also noted that it was between [phone] that we had to call these codes. I added that we had attempted to call her, verifying the phone number from the chart with her and that we could not reach her and the phone number we called would not even allow us to leave a message. Fidel Babin informed me that we had the incorrect phone number and pointed to her number that she had just written on the wipe-off board in the room. Registration was contacted and they came to the room with a HEATHER Maldonado 70 and corrected all contact information in the chart. After encouraging her to stay around so when her father wakes up he will see a familiar face she began to calm down and said that she would. During this time period another female visitor entered the room and Fidel Babin introduced her as a niece to her and said that her father had raised this girl.  Both of these ladies asked about the sedation and why we did not call family first. Both Sylvia & I had explained a few times already, but reiterated that during a Code Marcella safety must come first. Patient first and then staff. We attempt everything possible to avoid using physical or chemical restraints and did make every attempt to call family but we had wrong phone number for daughter Mechelle Alba, and no other names or phone numbers to call. By the time I left the room Mechelle Alba had remained calm, agreed with plan of care for her dad, agreed to sit with him for awhile, and verbalized understanding that no further outbursts would be tolerated. In the event that she behaves in an aggressive manor, or the staff feel threatened by her, she would be escorted out by the [employer]

## 2019-10-30 NOTE — PROGRESS NOTES
Orders received, chart reviewed and patient evaluated by physical therapy. Pending progression with skilled acute physical therapy, recommend:  Physical therapy at least 2 days/week in the home AND ensure assist and/or supervision for safety with ADLs and mobility    Recommend with nursing patient to complete as able in order to maintain strength, endurance and independence: OOB to chair 3x/day with assistance x 1 and ambulating with RW. Thank you for your assistance. Full evaluation to follow.      Cecilia Sutherland, PT

## 2019-10-30 NOTE — PROGRESS NOTES
Bedside shift change report GIVEN TO Hoda Chu RN. Report included the following information SBAR and Kardex. SIGNIFICANT CHANGES DURING SHIFT:   0228- pt had 9 beats of Vtach      CONCERNS TO ADDRESS WITH MD:            [facility] NURSING NOTE   Admission Date 10/27/2019   Admission Diagnosis A-fib ([address] [I48.91]   Consults IP CONSULT TO CARDIOLOGY      Cardiac Monitoring [x] Yes [] No      Purposeful Hourly Rounding [x] Yes    Louis Score Total Score: 5   Louis score 3 or > [x] Bed Alarm [] Avasys [] 1:1 sitter [] Patient refused (Signed refusal form in chart)   Newton Score Newton Score: 17   Newton score 14 or < [] PMT consult [] Wound Care consult    []  Specialty bed  [] Nutrition consult      Influenza Vaccine             Oxygen needs? [x] Room air Oxygen @  []1L    []2L    []3L   []4L    []5L   []6L via  NC   Chronic home O2 use?  [] Yes [] No  Perform O2 challenge test and document in progress note using [employer] (.Homeoxygen)      Last bowel movement Last Bowel Movement Date: 10/26/19      Urinary Catheter Condom Catheter 10/28/19-Indications for Use: Accurate measurement of urinary output     Condom Catheter 10/28/19-Urine Output (mL): 900 ml     LDAs               Peripheral IV 10/28/19 Right Forearm (Active)   Site Assessment Clean, dry, & intact 10/29/2019 11:31 PM   Phlebitis Assessment 0 10/29/2019 11:31 PM   Infiltration Assessment 0 10/29/2019 11:31 PM   Dressing Status Clean, dry, & intact 10/29/2019 11:31 PM   Dressing Type Transparent 10/29/2019 11:31 PM   Hub Color/Line Status Infusing 10/29/2019 11:31 PM          Condom Catheter 10/28/19 (Active)   Indications for Use Accurate measurement of urinary output 10/29/2019  3:37 AM   Site Condition No abnormalities 10/29/2019  3:37 AM   Drainage Tube Clipped to Bed Yes 10/29/2019  3:37 AM   Catheter Secured to Thigh No 10/29/2019  3:37 AM   Bag Below Bladder/Not on Floor Yes 10/29/2019  3:37 AM   Lack of Dependent Loop in Tubing Yes 10/29/2019 3: 37 AM   Drainage Bag Less Than Half Full Yes 10/29/2019  3:37 AM   Urine Output (mL) 900 ml 10/29/2019  3:37 AM                   Readmission Risk Assessment Tool Score High Risk            51       Total Score        3 Has Seen PCP in Last 6 Months (Yes=3, No=0)    5 Pt. Coverage (Medicare=5 , Medicaid, or Self-Pay=4)    43 Charlson Comorbidity Score (Age + Comorbid Conditions)        Criteria that do not apply:    . Living with Significant Other. Assisted Living. LTAC. SNF.  or   Rehab    Patient Length of Stay (>5 days = 3)    IP Visits Last 12 Months (1-3=4, 4=9, >4=11)       Expected Length of Stay 3d 14h   Actual Length of Stay 3

## 2019-10-30 NOTE — PROGRESS NOTES
Problem: Mobility Impaired (Adult and Pediatric)  Goal: *Acute Goals and Plan of Care (Insert Text)  Description  FUNCTIONAL STATUS PRIOR TO ADMISSION: Patient required minimal assistance for basic and instrumental ADLs. HOME SUPPORT PRIOR TO ADMISSION: The patient lived with family who assisted patient with ADLs and mobility as needed. [facility] Physical Therapy Goals  Initiated 10/30/2019  1. Patient will move from supine to sit and sit to supine , scoot up and down and roll side to side in bed with independence within 7 day(s). 2.  Patient will transfer from bed to chair and chair to bed with supervision/set-up using the least restrictive device within 7 day(s). 3.  Patient will perform sit to stand with supervision/set-up within 7 day(s). 4.  Patient will ambulate with supervision/set-up for 350 feet with the least restrictive device within 7 day(s). 5.  Patient will ascend/descend 5 stairs with 1 handrail(s) with minimal assistance/contact guard assist within 7 day(s). Outcome: Progressing Towards Goal   PHYSICAL THERAPY EVALUATION  Patient: Frederic Farfan  (80 y.o. male)  Date: 10/30/2019  Primary Diagnosis: A-fib [facility]) [I48.91]        Precautions: Fall, Bed Alarm, Other (comment)(confusion)      ASSESSMENT  Based on the objective data described below, the patient presents with generalized weakness, decreased standing balance, decreased activity tolerance and impaired mobility skills following admission on 10/27/19 with AMS preventing patient from getting out of bed. Today he is alert, pleasant and cooperative, although he does not know why he is in the hospital. Tolerated transfer and gait activities with intermittent rests needed due to fatigue. O2 sats on RA were stable at rest and during activity. VSS throughout session. Current Level of Function Impacting Discharge (mobility/balance): min/cg assistance x 2 sit to stand.  Min/cg assist bed to chair transfers with RW and to ambulate 90 feet with RW. Gait with decreased speed, decreased step lengths and increased unsteadiness with turns. Functional Outcome Measure: The patient scored 45/100 on the Barthel outcome measure which is indicative of moderate to high functional impairment. Other factors to consider for discharge: lives with family, dementia may benefit from returning to familiar setting. Patient will benefit from skilled therapy intervention to address the above noted impairments. PLAN :  Recommendations and Planned Interventions: bed mobility training, transfer training, gait training, therapeutic exercises, neuromuscular re-education, patient and family training/education and therapeutic activities      Frequency/Duration: Patient will be followed by physical therapy:  3 times a week to address goals. Recommendation for discharge: (in order for the patient to meet his/her long term goals)  Physical therapy at least 2 days/week in the home AND ensure assist and/or supervision for safety with ADLs and mobility.      This discharge recommendation:  Has not yet been discussed the attending provider and/or case management    IF patient discharges home will need the following DME: patient owns DME required for discharge         SUBJECTIVE:   Patient stated Why am I in the hospital?    OBJECTIVE DATA SUMMARY:   HISTORY:    Past Medical History:   Diagnosis Date    Abdominal pain 7/18/2017    Alzheimer disease ([facility] 7/18/2017    Anemia 7/18/2017    Arthritis     Back pain 7/18/2017    Bradycardia 7/18/2017    CAD (coronary artery disease)     hx of MI    CKD (chronic kidney disease), stage II 7/18/2017    constipation     Depression 7/18/2017    Diabetes mellitus ([facility] 7/18/2017    Diverticulosis 7/18/2017    DJD (degenerative joint disease) 7/18/2017    Encounter for long-term (current) use of other medications 7/18/2017    Fatigue     Gastritis and duodenitis 7/18/2017    GERD (gastroesophageal reflux disease)     GI bleed 7/18/2017    Hearing loss     Hyperlipidemia 7/18/2017    Hypertension     Hypertension with renal disease 7/18/2017    Ill-defined condition     Dementia    Internal hemorrhoids 7/18/2017    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    S/P cardiac pacemaker procedure 5/4/2015    5/4/15 Medtronic dual chamber pacemaker implant     Weight loss     lost over 40 pounds last year- has no appetite     Past Surgical History:   Procedure Laterality Date    COLONOSCOPY N/A 6/22/2017    COLONOSCOPY performed by Shama Dunaway MD at [address] [place] [address],[address]  6/22/2017         HX HERNIA REPAIR  7/10/2014    right inguinal    HX OTHER SURGICAL      cystectomy from back    MN EGD TRANSORAL BIOPSY SINGLE/MULTIPLE  2/14/2012         UPPER GI ENDOSCOPY,BIOPSY  6/22/2017            Personal factors and/or comorbidities impacting plan of care: SSS with PPM, dm2, dementia. Home Situation  Home Environment: Apartment  # Steps to Enter: 5(5-6 pt is unsure)  Rails to Enter: Yes  Hand Rails : Left  Wheelchair Ramp: No  One/Two Story Residence: One story  Living Alone: No  Support Systems: Family member(s)  Patient Expects to be Discharged to[de-identified] Apartment  Current DME Used/Available at Home: Ella Karissa or Shower Type: Tub/Shower combination    EXAMINATION/PRESENTATION/DECISION MAKING:   Critical Behavior:  Neurologic State: Alert  Orientation Level: Disoriented to person, Disoriented to place  Cognition: Decreased attention/concentration  Safety/Judgement: Awareness of environment, Good awareness of safety precautions, Fall prevention(weakness and decreased balance)  Hearing:   Auditory  Auditory Impairment: None  Skin:    Edema:   Range Of Motion:  AROM: Within functional limits           PROM: Within functional limits           Strength:    Strength: Generally decreased, functional(4-/5 grossly bilateral LE)                    Tone & Sensation:   Tone: Normal              Sensation: Impaired(decreased lighttouch accuracy bilateral feet)               Coordination:  Coordination: Generally decreased, functional  Vision:   Acuity: Within Defined Limits  Functional Mobility:  Bed Mobility:  Rolling: (up in chair when PT arrived)  Supine to Sit: Stand-by assistance     Scooting: Stand-by assistance  Transfers:  Sit to Stand: Minimum assistance;Contact guard assistance;Assist x2  Stand to Sit: Minimum assistance;Assist x1        Bed to Chair: Minimum assistance;Contact guard assistance;Assist x1;Adaptive equipment(RW)              Balance:   Sitting: Intact  Standing: Impaired  Standing - Static: Good;Constant support  Standing - Dynamic : Fair;Constant support  Ambulation/Gait Training:  Distance (ft): 90 Feet (ft)  Assistive Device: Gait belt;Walker, rolling  Ambulation - Level of Assistance: Contact guard assistance;Minimal assistance;Assist x1     Gait Description (WDL): Exceptions to WDL  Gait Abnormalities: Decreased step clearance; Path deviations;Trunk sway increased(more unsteady with turns; slight fwd trunk lean)  Right Side Weight Bearing: Full  Left Side Weight Bearing: Full  Base of Support: Widened     Speed/Maureen: Pace decreased (<100 feet/min)  Step Length: Right shortened;Left shortened                 Functional Measure:  Barthel Index:    Bathin  Bladder: 5  Bowels: 5  Groomin  Dressin  Feeding: 10  Mobility: 0  Stairs: 0  Toilet Use: 5  Transfer (Bed to Chair and Back): 10  Total: 45/100       The Barthel ADL Index: Guidelines  1. The index should be used as a record of what a patient does, not as a record of what a patient could do. 2. The main aim is to establish degree of independence from any help, physical or verbal, however minor and for whatever reason. 3. The need for supervision renders the patient not independent. 4. A patient's performance should be established using the best available evidence.  Asking the patient, friends/relatives and nurses are the usual sources, but direct observation and common sense are also important. However direct testing is not needed. 5. Usually the patient's performance over the preceding 24-48 hours is important, but occasionally longer periods will be relevant. 6. Middle categories imply that the patient supplies over 50 per cent of the effort. 7. Use of aids to be independent is allowed. He Marcelino., Barthel, D.W. (5234). Functional evaluation: the Barthel Index. [facility] (14)2. Dariana Elise sara LINDY eCe, Paul Rodriguez., Wesley Savannahshelby., Andre, [facility] [facility] (1999). Measuring the change indisability after inpatient rehabilitation; comparison of the responsiveness of the Barthel Index and Functional [place] Measure. Journal of Neurology, Neurosurgery, and Psychiatry, 66(4), 804-792. Betsy Waddell, NUNU, INES Albright, & Driss Serna MJustynA. (2004.) Assessment of post-stroke quality of life in cost-effectiveness studies: The usefulness of the Barthel Index and the EuroQoL-5D. Quality of Life Research, 15, 145-17            Physical Therapy Evaluation Charge Determination   History Examination Presentation Decision-Making   HIGH Complexity :3+ comorbidities / personal factors will impact the outcome/ POC  HIGH Complexity : 4+ Standardized tests and measures addressing body structure, function, activity limitation and / or participation in recreation  MEDIUM Complexity : Evolving with changing characteristics  Other outcome measures Barthel  MEDIUM      Based on the above components, the patient evaluation is determined to be of the following complexity level: MEDIUM    Pain Rating:  None reported. Activity Tolerance:   Fair, SpO2 stable on RA and requires rest breaks  Please refer to the flowsheet for vital signs taken during this treatment.     After treatment patient left in no apparent distress:   Sitting in chair, Call bell within reach and Bed / chair alarm activated    COMMUNICATION/EDUCATION:   The patients plan of care was discussed with: Occupational Therapist, Registered Nurse and Rehabilitation Attendant. Fall prevention education was provided and the patient/caregiver indicated understanding., Patient/family have participated as able in goal setting and plan of care. and Patient/family agree to work toward stated goals and plan of care.     Thank you for this referral.  Anu Carnes, PT   Time Calculation: 13 mins

## 2019-10-30 NOTE — PROGRESS NOTES
Brief Nutrition Note    Chart reviewed. RN reports poor PO intake. Pt able to engage in conversation today. He was able to provide ensure flavor preference. He was encouraged to consume TID. Will follow-up tomorrow to see how he does.     Kwan Woods RDN  Pager: [phone]

## 2019-10-30 NOTE — PROGRESS NOTES
[address] [place] [address]  [phone]      Cardiology Progress Note      10/30/2019 1:30PM  Admit Date: 10/27/2019    Admit Diagnosis:   A-fib [facility]) [I48.91]    Subjective:     Benito Farfan Sr is sitting in chair, awake, alert, pleasantly confused.     Echo with EF 20-25%      Visit Vitals  /77 (BP 1 Location: Left arm, BP Patient Position: Sitting)   Pulse 64   Temp 97.4 °F (36.3 °C)   Resp 18   Ht 5' 9\" (1.753 m)   Wt 61.6 kg (135 lb 14.4 oz)   SpO2 97%   BMI 20.07 kg/m²       Current Facility-Administered Medications   Medication Dose Route Frequency    losartan (COZAAR) tablet 100 mg  100 mg Oral DAILY    hydroCHLOROthiazide (HYDRODIURIL) tablet 25 mg  25 mg Oral DAILY    sodium chloride 0.45 % in dextrose 10% 1,019.6 mL infusion   IntraVENous CONTINUOUS    LORazepam (ATIVAN) injection 2 mg  2 mg IntraVENous Q1H PRN    LORazepam (ATIVAN) injection 4 mg  4 mg IntraVENous Q1H PRN    ziprasidone (GEODON) 20 mg in sterile water (preservative free) 1 mL injection  20 mg IntraMUSCular Q12H PRN    glucose chewable tablet 16 g  4 Tab Oral PRN    dextrose (D50W) injection syrg 12.5-25 g  25-50 mL IntraVENous PRN    glucagon (GLUCAGEN) injection 1 mg  1 mg IntraMUSCular PRN    insulin lispro (HUMALOG) injection   SubCUTAneous AC&HS    metoprolol tartrate (LOPRESSOR) tablet 25 mg  25 mg Oral Q12H    nitroglycerin (NITROBID) 2 % ointment 1 Inch  1 Inch Topical Q6H    sodium chloride (NS) flush 5-40 mL  5-40 mL IntraVENous Q8H    sodium chloride (NS) flush 5-40 mL  5-40 mL IntraVENous PRN    morphine 10 mg/ml injection 2 mg  2 mg IntraVENous Q4H PRN    heparin (porcine) injection 5,000 Units  5,000 Units SubCUTAneous Q12H    aspirin chewable tablet 81 mg  81 mg Oral DAILY    pantoprazole (PROTONIX) tablet 40 mg  40 mg Oral ACB    donepezil (ARICEPT) tablet 10 mg  10 mg Oral QHS    memantine (NAMENDA) tablet 10 mg  10 mg Oral BID    temazepam (RESTORIL) capsule 15 mg  15 mg Oral QHS PRN    ondansetron (ZOFRAN ODT) tablet 4 mg  4 mg Oral Q8H PRN    polyethylene glycol (MIRALAX) packet 17 g  17 g Oral DAILY    hydrALAZINE (APRESOLINE) 20 mg/mL injection 10 mg  10 mg IntraVENous Q6H PRN    LORazepam (ATIVAN) tablet 1 mg  1 mg Oral Q4H PRN    furosemide (LASIX) tablet 40 mg  40 mg Oral DAILY    amLODIPine (NORVASC) tablet 5 mg  5 mg Oral DAILY       Objective:      Physical Exam:  General Appearance:  elderly AAM in no acute distress  Chest:   Clear  Cardiovascular: tachy, no murmur. Abdomen:   Soft, non-tender, bowel sounds are active. Extremities: no peripheral edema  Skin:  Warm and dry. Data Review:   Recent Labs     10/30/19  0310 10/29/19  0221 10/28/19  1125   WBC 5.6 6.3 7.2   HGB 11.8* 11.4* 12.5   HCT 38.5 38.3 40.6    225 270     Recent Labs     10/30/19  0310 10/29/19  0221    142   K 4.0 4.4    108   CO2 27 27   GLU 92 91   BUN 25* 21*   CREA 1.73* 1.56*   CA 9.0 9.0       Recent Labs     10/29/19  0221   TROIQ 0.12*         Intake/Output Summary (Last 24 hours) at 10/30/2019 1409  Last data filed at 10/30/2019 1100  Gross per 24 hour   Intake 1078 ml   Output 550 ml   Net 528 ml        Telemetry: v paced     Echo  · Left Ventricle: Normal cavity size. Mild concentric hypertrophy. Severe systolic dysfunction. Estimated left ventricular ejection fraction is 21 - 25%. Left ventricular global hypokinesis. Severe (grade 3) left ventricular diastolic dysfunction. · Left Atrium: Mildly dilated left atrium. · Mitral Valve: Mitral valve non-specific thickening. Mild mitral valve regurgitation is present. · Tricuspid Valve: Mild tricuspid valve regurgitation is present. Tricuspid valve prolapse. · Pulmonary Artery:  Moderate pulmonary hypertension    Assessment:     Principal Problem:    A-fib ([address] (10/27/2019)    Active Problems:    SVT (supraventricular tachycardia) (HCC)--s/p RFA (4/28/2015)      S/P cardiac pacemaker procedure (5/4/2015)      Overview: 5/4/15 Medtronic dual chamber pacemaker implant            SSS (sick sinus syndrome) ([facility] [address] (6/2/2015)      Mixed hyperlipidemia (7/18/2017)      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, without long-term current use of insulin ([address] (7/18/2017)      Alzheimer disease ([address] (7/18/2017)      Alcoholism ([address] (4/23/2018)      Acute pulmonary edema ([address] (10/27/2019)        Plan:     Acute chest discomfort, mild pulmonary edema, uncontrolled hypertension in the setting of noncompliance of medications:  · Story with acute onset and relatively rapid resolution is suspicious for a dysrhythmia. Did have fib/flutter on interrogation, which may have caused exacerbation of symptoms  · Is he a candidate for DOAC? With his advanced dementia concerns for higher risk of falls. Dr. Miguel Dural to further discuss with family  · Echo with EF 20-25%, Previous echo 4/18 EF 60%. · Monitor I/Os, daily weights, labs  · Continue ASA, Norvasc, metorpolol and Imdur. BP much improved. Will change to Coreg with new dx HF  · Not likely a lot of benefit to statin medication at this age, lipids near goal  · Suspect noncompliance with medication had a lot to do with his decompensation, considering severe hypertension on admission   · We will not pursue stress testing at this time for that reason along with dementia and advanced age  · Dr. Elyssa Miller addressing code status with patient family. Esme Alexander [facility]  Cardiology    Patient seen and examined by me with the above nurse practitioner. I personally performed all components of the history, physical, and medical decision making and agree with the assessment and plan with minor modifications as noted. Less confused today, remains compensated from a heart failure standpoint. Blood pressure is better controlled.

## 2019-10-30 NOTE — PROGRESS NOTES
RAPID RESPONSE TEAM- Follow Up    Rounded on patient due to recent rapid response (10/28/19). Discussed with primary RN, Heidy Robertson. No acute concerns, VSS. No RRT interventions indicated at this time. Please call with any questions or concerns.      Kevin Gallo  Rapid Response RN  Jailyn Rodriguez

## 2019-10-30 NOTE — PROGRESS NOTES
1615   Informed while in another pt room by tech that Pt daughter was present in the room upset and requesting to talk to staff informed the tech to inform the Pt advocate about the daughter's arrival.  When walking out into the workman way saw pt daughter in workman screaming \" Shyanne Herman are trying to kill my daddy\" and behaving a threaten manner then proceeded to slam the pts room door extremely hard to keep staff away. Code linda called, along with the nursing supervisor, and security/ all arrived within a timely manner to deescalate the situation. Pt granddaughter who is his care giver called requesting update on patient and was apologizing for the family member actions. Also requested that pt be transferred back to [facility] but I informed her that I would inform the MD but the chances of him transferring back was slim due to pt having to be transferred for his level of care. Granddaughter stated understanding. Another granddaughter arrived and was at the bedside with the pt's daughter and that appeared help calm the daughter down as well. Was informed by Nursing Supervisor and Pt advocate to hold off entering the pt room at the moment. Paged Dr. Eliza Carlos and made him aware of the situation along with the granddaughter wanting to transfer him back to [facility], granddaughter phone number was provided and per Dr. Eliza Carlos he would call and speak with her. Family is currently the bedside with door closed and sitter was removed from room for safety purposes. Will continue to monitor. [address] not charted d/t to the situation mentioned above. Will attempt again later. Bedside shift change report GIVEN TO Shayna Brock RN. Report included the following information SBAR.          SIGNIFICANT CHANGES DURING SHIFT:  None       CONCERNS TO ADDRESS WITH MD:  None          [facility] NURSING NOTE   Admission Date 10/27/2019   Admission Diagnosis A-fib ([address] [I48.91]   Consults IP CONSULT TO CARDIOLOGY      Cardiac Monitoring [x] Yes [] No      Purposeful Hourly Rounding [x] Yes    Louis Score Total Score: 5   Louis score 3 or > [x] Bed Alarm [] Avasys [] 1:1 sitter [] Patient refused (Signed refusal form in chart)   Newton Score Newton Score: 17   Newton score 14 or < [] PMT consult [] Wound Care consult    []  Specialty bed  [] Nutrition consult      Influenza Vaccine             Oxygen needs? [x] Room air Oxygen @  []1L    []2L    []3L   []4L    []5L   []6L via  NC   Chronic home O2 use? [] Yes [] No  Perform O2 challenge test and document in progress note using smartphEdumedicse (.Homeoxygen)      Last bowel movement Last Bowel Movement Date: 10/26/19      Urinary Catheter Condom Catheter 10/28/19-Indications for Use: Accurate measurement of urinary output     Condom Catheter 10/28/19-Urine Output (mL): 900 ml     LDAs               Peripheral IV 10/28/19 Right Forearm (Active)   Site Assessment Clean, dry, & intact 10/29/2019  7:49 PM   Phlebitis Assessment 0 10/29/2019  7:49 PM   Infiltration Assessment 0 10/29/2019  7:49 PM   Dressing Status Clean, dry, & intact 10/29/2019  7:49 PM   Dressing Type Transparent 10/29/2019  7:49 PM   Hub Color/Line Status Flushed 10/29/2019  7:49 PM          Condom Catheter 10/28/19 (Active)   Indications for Use Accurate measurement of urinary output 10/29/2019  3:37 AM   Site Condition No abnormalities 10/29/2019  3:37 AM   Drainage Tube Clipped to Bed Yes 10/29/2019  3:37 AM   Catheter Secured to Thigh No 10/29/2019  3:37 AM   Bag Below Bladder/Not on Floor Yes 10/29/2019  3:37 AM   Lack of Dependent Loop in Tubing Yes 10/29/2019  3:37 AM   Drainage Bag Less Than Half Full Yes 10/29/2019  3:37 AM   Urine Output (mL) 900 ml 10/29/2019  3:37 AM                   Readmission Risk Assessment Tool Score High Risk            51       Total Score        3 Has Seen PCP in Last 6 Months (Yes=3, No=0)    5 Pt.  Coverage (Medicare=5 , Medicaid, or Self-Pay=4)    43 Charlson Comorbidity Score (Age + Comorbid Conditions) Criteria that do not apply:    . Living with Significant Other. Assisted Living. LTAC. SNF. or   Rehab    Patient Length of Stay (>5 days = 3)    IP Visits Last 12 Months (1-3=4, 4=9, >4=11)       Expected Length of Stay 3d 14h   Actual Length of Stay 2          .

## 2019-10-30 NOTE — PROGRESS NOTES
RAPID RESPONSE TEAM-FOLLOW UP  Rounding on patient due to rapid response on 10/28 for staff concern over agitation. Patient is sitting up in chair, NAD, now out of restraints and more cooperative. D/W Poli Thao RN    No RRT interventions appear to be currently indicated. Please call back if needed.     Alvera Leventhal, RN  Ext. [phone]    Vitals w/ MEWS Score (last day)     Date/Time MEWS Score Pulse Resp Temp BP Level of Consciousness SpO2    10/30/19 1059  1  64  18  97.4 °F (36.3 °C)  148/77  Alert  97 %    10/30/19 0947  --  71  --  --  144/83  --  97 %    10/30/19 0943  --  80  --  --  (!) 146/95  --  --    10/30/19 0935  --  73  --  --  (!) 156/92  --  99 %    10/30/19 0932  --  61  --  --  158/81  --  98 %    10/30/19 0849  --  69  18  --  (!) 152/93  --  93 %    10/30/19 0729  1  60  20  96.9 °F (36.1 °C)  140/76  Alert  98 %    10/30/19 0651  --  60  18  --  125/75  --  99 %    10/30/19 0500  --  60  16  --  128/78  --  96 %    10/30/19 0301  1  94  18  97.5 °F (36.4 °C)  136/87  Alert  100 %    10/30/19 0121  --  60  20  --  131/78  --  99 %    10/29/19 2331  --  65  18  --  133/71  Alert  95 %    10/29/19 2234  --  65  --  --  144/87  --  --    10/29/19 2147  --  66  18  --  120/75  --  96 %    10/29/19 1959  --  70  18  --  147/85  --  96 %    10/29/19 1949  1  70  18  97.4 °F (36.3 °C)  147/85  Alert  96 %    10/29/19 1800  --  --  --  --  (!) 153/99  --  --    10/29/19 1516  --  60  18  97.8 °F (36.6 °C)  145/82  --  98 %    10/29/19 1319  1  66  20  97.5 °F (36.4 °C)  144/89  Alert  99 %    10/29/19 1117  2  66  22  98.1 °F (36.7 °C)  138/84  Alert  99 %    10/29/19 0919  2  62  18  98.6 °F (37 °C)  117/75  Responds to Voice  98 %    10/29/19 0805  --  89  21  --  178/71  --  91 %    10/29/19 0752  --  88  26  --  (!) 185/114  Alert  --    10/29/19 0726  2  88  22  99.1 °F (37.3 °C)  (!) 170/110  Alert  96 %    10/29/19 0530  --  93  18  --  --  --  --    10/29/19 0337  1  78  18  97.8 °F (36.6 °C) 139/54  Alert  96 %    10/29/19 0151  --  70  17  97.1 °F (36.2 °C)  146/70  --  97 %

## 2019-10-30 NOTE — PROGRESS NOTES
PROGRESS NOTE    NAME:  Mayela Farfan    :   1931   MRN:   [id number]     Date/Time:  10/30/2019 7:26 AM  Subjective:   History:  Chart reviewed and patient seen and examined and D/W his nurse this AM and again yesterday afternoon with his granddaughter whom he lives with and all events noted. He was admitted on 10/27 after transfer from [facility] where he presented with CP and pulmonary edema with mild elevation of Troponin and accelerated HTN in the face of medical noncompliance with his anti-HTN medications. He had been very agitated since admission and required Ativan and Geodon for sedation until during the night last night and no longer needing to be sedated. This AM he is alert and confused and knows his name and that I am his doctor. All other history from his nurse and chart review as well as granddaughter.  Per her \"he doesn't drink alcohol but drinks about 4 beers a day\" although had not drank for 2 days PTA      Medications reviewed:  Current Facility-Administered Medications   Medication Dose Route Frequency    losartan (COZAAR) tablet 100 mg  100 mg Oral DAILY    hydroCHLOROthiazide (HYDRODIURIL) tablet 25 mg  25 mg Oral DAILY    sodium chloride 0.45 % in dextrose 10% 1,019.6 mL infusion   IntraVENous CONTINUOUS    LORazepam (ATIVAN) injection 2 mg  2 mg IntraVENous Q1H PRN    LORazepam (ATIVAN) injection 4 mg  4 mg IntraVENous Q1H PRN    ziprasidone (GEODON) 20 mg in sterile water (preservative free) 1 mL injection  20 mg IntraMUSCular Q12H PRN    glucose chewable tablet 16 g  4 Tab Oral PRN    dextrose (D50W) injection syrg 12.5-25 g  25-50 mL IntraVENous PRN    glucagon (GLUCAGEN) injection 1 mg  1 mg IntraMUSCular PRN    insulin lispro (HUMALOG) injection   SubCUTAneous AC&HS    metoprolol tartrate (LOPRESSOR) tablet 25 mg  25 mg Oral Q12H    nitroglycerin (NITROBID) 2 % ointment 1 Inch  1 Inch Topical Q6H    sodium chloride (NS) flush 5-40 mL  5-40 mL IntraVENous Q8H    sodium chloride (NS) flush 5-40 mL  5-40 mL IntraVENous PRN    morphine 10 mg/ml injection 2 mg  2 mg IntraVENous Q4H PRN    heparin (porcine) injection 5,000 Units  5,000 Units SubCUTAneous Q12H    aspirin chewable tablet 81 mg  81 mg Oral DAILY    pantoprazole (PROTONIX) tablet 40 mg  40 mg Oral ACB    donepezil (ARICEPT) tablet 10 mg  10 mg Oral QHS    memantine (NAMENDA) tablet 10 mg  10 mg Oral BID    temazepam (RESTORIL) capsule 15 mg  15 mg Oral QHS PRN    ondansetron (ZOFRAN ODT) tablet 4 mg  4 mg Oral Q8H PRN    polyethylene glycol (MIRALAX) packet 17 g  17 g Oral DAILY    hydrALAZINE (APRESOLINE) 20 mg/mL injection 10 mg  10 mg IntraVENous Q6H PRN    LORazepam (ATIVAN) tablet 1 mg  1 mg Oral Q4H PRN    furosemide (LASIX) tablet 40 mg  40 mg Oral DAILY    amLODIPine (NORVASC) tablet 5 mg  5 mg Oral DAILY        Objective:   Vitals:  Visit Vitals  /75   Pulse 60   Temp 97.5 °F (36.4 °C)   Resp 18   Ht 5' 9\" (1.753 m)   Wt 135 lb 14.4 oz (61.6 kg)   SpO2 99%   BMI 20.07 kg/m²      O2 Device: Room air Temp (24hrs), Av.8 °F (36.6 °C), Min:97.4 °F (36.3 °C), Max:98.6 °F (37 °C)      Last 24hr Input/Output:    Intake/Output Summary (Last 24 hours) at 10/30/2019 0726  Last data filed at 10/29/2019 2331  Gross per 24 hour   Intake 673 ml   Output 250 ml   Net 423 ml        PHYSICAL EXAM:  General:     less somnolent today, cooperative to me, no distress, appears stated age. Requiring restraints to protect himself and staff due to agitation  Head:    Normocephalic, without obvious abnormality, atraumatic. Eyes:    Conjunctivae/corneas clear. PERRLA  Nose:   Nares normal. No drainage or sinus tenderness. Throat:     Lips, mucosa, and tongue normal.  No Thrush  Neck:   Supple, symmetrical,  no adenopathy, thyroid: non tender     no carotid bruit and no JVD. Back:     Symmetric,  No CVA tenderness. Lungs:    Clear to auscultation bilaterally. No Wheezing or Rhonchi. No rales.   Heart:    Regular rate and rhythm,  no murmur, rub or gallop. Abdomen:    Soft, non-tender. Not distended. Bowel sounds normal. No masses  Extremities:  Extremities normal, atraumatic, No cyanosis. No edema. No clubbing  Lymph nodes:  Cervical, supraclavicular normal.  Neurologic:  Normal strength, somewhat somnolent. Intermittently extremely agitated and combative.  Oriented to name only   Skin:                 No rash      Lab Data Reviewed:    Recent Results (from the past 24 hour(s))   GLUCOSE, POC    Collection Time: 10/29/19  7:39 AM   Result Value Ref Range    Glucose (POC) 110 (H) 65 - 100 mg/dL    Performed by Krupa Baker (CON)    GLUCOSE, POC    Collection Time: 10/29/19 11:26 AM   Result Value Ref Range    Glucose (POC) 93 65 - 100 mg/dL    Performed by Fabio Ramon (PCT)    GLUCOSE, POC    Collection Time: 10/29/19  2:02 PM   Result Value Ref Range    Glucose (POC) 85 65 - 100 mg/dL    Performed by Fabio Ramon (PCT)    GLUCOSE, POC    Collection Time: 10/29/19  6:27 PM   Result Value Ref Range    Glucose (POC) 95 65 - 100 mg/dL    Performed by Fabio Ramno (PCT)    GLUCOSE, POC    Collection Time: 10/29/19  8:12 PM   Result Value Ref Range    Glucose (POC) 103 (H) 65 - 100 mg/dL    Performed by Laender Pyle (PCT)    METABOLIC PANEL, BASIC    Collection Time: 10/30/19  3:10 AM   Result Value Ref Range    Sodium 139 136 - 145 mmol/L    Potassium 4.0 3.5 - 5.1 mmol/L    Chloride 104 97 - 108 mmol/L    CO2 27 21 - 32 mmol/L    Anion gap 8 5 - 15 mmol/L    Glucose 92 65 - 100 mg/dL    BUN 25 (H) 6 - 20 MG/DL    Creatinine 1.73 (H) 0.70 - 1.30 MG/DL    BUN/Creatinine ratio 14 12 - 20      GFR est AA 45 (L) >60 ml/min/1.73m2    GFR est non-AA 37 (L) >60 ml/min/1.73m2    Calcium 9.0 8.5 - 10.1 MG/DL   CBC WITH AUTOMATED DIFF    Collection Time: 10/30/19  3:10 AM   Result Value Ref Range    WBC 5.6 4.1 - 11.1 K/uL    RBC 4.44 4.10 - 5.70 M/uL    HGB 11.8 (L) 12.1 - 17.0 g/dL    HCT 38.5 36.6 - 50.3 %    MCV 86.7 80.0 - 99.0 FL MCH 26.6 26.0 - 34.0 PG    MCHC 30.6 30.0 - 36.5 g/dL    RDW 14.3 11.5 - 14.5 %    PLATELET 923 951 - 807 K/uL    MPV 11.2 8.9 - 12.9 FL    NRBC 0.0 0  WBC    ABSOLUTE NRBC 0.00 0.00 - 0.01 K/uL    NEUTROPHILS 61 32 - 75 %    LYMPHOCYTES 22 12 - 49 %    MONOCYTES 10 5 - 13 %    EOSINOPHILS 5 0 - 7 %    BASOPHILS 1 0 - 1 %    IMMATURE GRANULOCYTES 1 (H) 0.0 - 0.5 %    ABS. NEUTROPHILS 3.5 1.8 - 8.0 K/UL    ABS. LYMPHOCYTES 1.2 0.8 - 3.5 K/UL    ABS. MONOCYTES 0.6 0.0 - 1.0 K/UL    ABS. EOSINOPHILS 0.3 0.0 - 0.4 K/UL    ABS. BASOPHILS 0.1 0.0 - 0.1 K/UL    ABS. IMM. GRANS. 0.0 0.00 - 0.04 K/UL    DF AUTOMATED     GLUCOSE, POC    Collection Time: 10/30/19  7:12 AM   Result Value Ref Range    Glucose (POC) 110 (H) 65 - 100 mg/dL    Performed by Deandre Schwarz (PCT)          Assessment/Plan:     Principal Problem:    A-fib ([facility] [address] (10/27/2019)    Active Problems:    Mixed hyperlipidemia (7/18/2017)      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, without long-term current use of insulin ([address] (7/18/2017)      Alzheimer disease ([facility] (7/18/2017)      SVT (supraventricular tachycardia) (HCC)--s/p RFA (4/28/2015)      S/P cardiac pacemaker procedure (5/4/2015)      Overview: 5/4/15 Medtronic dual chamber pacemaker implant            SSS (sick sinus syndrome) ([facility] (6/2/2015)      Alcoholism ([facility] (4/23/2018)      Acute pulmonary edema ([address] (10/27/2019)       ___________________________________________________  PLAN:    1. Continue Losartan, Hydrodiuril, and Norvasc  2. Low dose Metoprolol added  3. Diurese with Lasix as needed  4. Nitrobid started on admission  5. Aricept and Namenda resumed  6. No need for Statin at this situation Alzheimer's at 88   7. Geodon or Ativan PRN for agitation  8. Follow BS but has not recently required diabetic meds for DM with his weight loss, currently on low dose D 10  9.   Appears that his agitation was due to Alcohol WD with history of Alcoholism, and has baseline confusion also with Alzheimer's   10. Change Posey vest due to combativeness to PRN. This is for patient's own safety. 11. Discussed DNR with his granddaughter who is his medical POA and she is to bring in his advanced directives today  15.  Try to mobilize with PT            ___________________________________________________    Attending Physician: Val Wiggins MD

## 2019-10-30 NOTE — PROGRESS NOTES
0700: Bedside shift change report given to Kai Mejia RN (oncoming nurse) by Stu Calle RN (offgoing nurse). Report included the following information SBAR, Kardex and ED Summary. 0700: One to one face to face done with patient and Dr. Rocco Awad and per Dr. Rocco Awad may re-order restraints and use PRN. 0848: Patient a/o x3, periordic confusion, otherwise patient is calm and cooperative. Following commands. Restraints removed. Will continue to monitor. 1600: Pt has a palpable pulsation to the left upper quadrant region. Denies any pain in this area. D/w Ronel Powell, NP with cardiology and will continue to monitor. Discussed patient's rhythm paced with pvcs.

## 2019-10-31 LAB
ALBUMIN SERPL-MCNC: 3 G/DL (ref 3.5–5)
ALBUMIN/GLOB SERPL: 0.9 {RATIO} (ref 1.1–2.2)
ALP SERPL-CCNC: 70 U/L (ref 45–117)
ALT SERPL-CCNC: 31 U/L (ref 12–78)
ANION GAP SERPL CALC-SCNC: 7 MMOL/L (ref 5–15)
AST SERPL-CCNC: 30 U/L (ref 15–37)
BASOPHILS # BLD: 0.1 K/UL (ref 0–0.1)
BASOPHILS NFR BLD: 1 % (ref 0–1)
BILIRUB SERPL-MCNC: 0.3 MG/DL (ref 0.2–1)
BUN SERPL-MCNC: 32 MG/DL (ref 6–20)
BUN/CREAT SERPL: 17 (ref 12–20)
CALCIUM SERPL-MCNC: 9.1 MG/DL (ref 8.5–10.1)
CHLORIDE SERPL-SCNC: 101 MMOL/L (ref 97–108)
CO2 SERPL-SCNC: 30 MMOL/L (ref 21–32)
CREAT SERPL-MCNC: 1.83 MG/DL (ref 0.7–1.3)
DIFFERENTIAL METHOD BLD: NORMAL
EOSINOPHIL # BLD: 0.3 K/UL (ref 0–0.4)
EOSINOPHIL NFR BLD: 4 % (ref 0–7)
ERYTHROCYTE [DISTWIDTH] IN BLOOD BY AUTOMATED COUNT: 14.2 % (ref 11.5–14.5)
GLOBULIN SER CALC-MCNC: 3.5 G/DL (ref 2–4)
GLUCOSE BLD STRIP.AUTO-MCNC: 101 MG/DL (ref 65–100)
GLUCOSE BLD STRIP.AUTO-MCNC: 104 MG/DL (ref 65–100)
GLUCOSE BLD STRIP.AUTO-MCNC: 110 MG/DL (ref 65–100)
GLUCOSE BLD STRIP.AUTO-MCNC: 116 MG/DL (ref 65–100)
GLUCOSE BLD STRIP.AUTO-MCNC: 140 MG/DL (ref 65–100)
GLUCOSE SERPL-MCNC: 101 MG/DL (ref 65–100)
HCT VFR BLD AUTO: 38.6 % (ref 36.6–50.3)
HGB BLD-MCNC: 12.4 G/DL (ref 12.1–17)
IMM GRANULOCYTES # BLD AUTO: 0 K/UL (ref 0–0.04)
IMM GRANULOCYTES NFR BLD AUTO: 0 % (ref 0–0.5)
LYMPHOCYTES # BLD: 1.3 K/UL (ref 0.8–3.5)
LYMPHOCYTES NFR BLD: 19 % (ref 12–49)
MAGNESIUM SERPL-MCNC: 2.2 MG/DL (ref 1.6–2.4)
MCH RBC QN AUTO: 27.3 PG (ref 26–34)
MCHC RBC AUTO-ENTMCNC: 32.1 G/DL (ref 30–36.5)
MCV RBC AUTO: 84.8 FL (ref 80–99)
MONOCYTES # BLD: 0.6 K/UL (ref 0–1)
MONOCYTES NFR BLD: 9 % (ref 5–13)
NEUTS SEG # BLD: 4.5 K/UL (ref 1.8–8)
NEUTS SEG NFR BLD: 67 % (ref 32–75)
NRBC # BLD: 0 K/UL (ref 0–0.01)
NRBC BLD-RTO: 0 PER 100 WBC
PLATELET # BLD AUTO: 261 K/UL (ref 150–400)
PMV BLD AUTO: 11.4 FL (ref 8.9–12.9)
POTASSIUM SERPL-SCNC: 4.1 MMOL/L (ref 3.5–5.1)
PROT SERPL-MCNC: 6.5 G/DL (ref 6.4–8.2)
RBC # BLD AUTO: 4.55 M/UL (ref 4.1–5.7)
SERVICE CMNT-IMP: ABNORMAL
SODIUM SERPL-SCNC: 138 MMOL/L (ref 136–145)
WBC # BLD AUTO: 6.9 K/UL (ref 4.1–11.1)

## 2019-10-31 PROCEDURE — 97535 SELF CARE MNGMENT TRAINING: CPT | Performed by: OCCUPATIONAL THERAPIST

## 2019-10-31 PROCEDURE — 74011250636 HC RX REV CODE- 250/636: Performed by: INTERNAL MEDICINE

## 2019-10-31 PROCEDURE — 83735 ASSAY OF MAGNESIUM: CPT

## 2019-10-31 PROCEDURE — 82962 GLUCOSE BLOOD TEST: CPT

## 2019-10-31 PROCEDURE — 74011250637 HC RX REV CODE- 250/637: Performed by: INTERNAL MEDICINE

## 2019-10-31 PROCEDURE — 94760 N-INVAS EAR/PLS OXIMETRY 1: CPT

## 2019-10-31 PROCEDURE — 74011000258 HC RX REV CODE- 258: Performed by: INTERNAL MEDICINE

## 2019-10-31 PROCEDURE — 97110 THERAPEUTIC EXERCISES: CPT

## 2019-10-31 PROCEDURE — 97530 THERAPEUTIC ACTIVITIES: CPT

## 2019-10-31 PROCEDURE — 36415 COLL VENOUS BLD VENIPUNCTURE: CPT

## 2019-10-31 PROCEDURE — 74011250637 HC RX REV CODE- 250/637: Performed by: NURSE PRACTITIONER

## 2019-10-31 PROCEDURE — 85025 COMPLETE CBC W/AUTO DIFF WBC: CPT

## 2019-10-31 PROCEDURE — 97116 GAIT TRAINING THERAPY: CPT

## 2019-10-31 PROCEDURE — 80053 COMPREHEN METABOLIC PANEL: CPT

## 2019-10-31 PROCEDURE — 65660000000 HC RM CCU STEPDOWN

## 2019-10-31 RX ORDER — CARVEDILOL 6.25 MG/1
6.25 TABLET ORAL 2 TIMES DAILY
Status: DISCONTINUED | OUTPATIENT
Start: 2019-10-31 | End: 2019-11-01 | Stop reason: HOSPADM

## 2019-10-31 RX ORDER — METOPROLOL TARTRATE 50 MG/1
50 TABLET ORAL EVERY 12 HOURS
Status: DISCONTINUED | OUTPATIENT
Start: 2019-10-31 | End: 2019-10-31

## 2019-10-31 RX ORDER — FUROSEMIDE 20 MG/1
20 TABLET ORAL DAILY
Status: DISCONTINUED | OUTPATIENT
Start: 2019-10-31 | End: 2019-11-01 | Stop reason: HOSPADM

## 2019-10-31 RX ADMIN — HEPARIN SODIUM 5000 UNITS: 5000 INJECTION INTRAVENOUS; SUBCUTANEOUS at 14:10

## 2019-10-31 RX ADMIN — LORAZEPAM 1 MG: 1 TABLET ORAL at 20:34

## 2019-10-31 RX ADMIN — POLYETHYLENE GLYCOL 3350 17 G: 17 POWDER, FOR SOLUTION ORAL at 09:09

## 2019-10-31 RX ADMIN — LOSARTAN POTASSIUM 100 MG: 100 TABLET ORAL at 09:08

## 2019-10-31 RX ADMIN — LORAZEPAM 2 MG: 2 INJECTION INTRAMUSCULAR; INTRAVENOUS at 14:11

## 2019-10-31 RX ADMIN — CARVEDILOL 6.25 MG: 6.25 TABLET, FILM COATED ORAL at 20:35

## 2019-10-31 RX ADMIN — METOPROLOL TARTRATE 50 MG: 50 TABLET, FILM COATED ORAL at 09:08

## 2019-10-31 RX ADMIN — HEPARIN SODIUM 5000 UNITS: 5000 INJECTION INTRAVENOUS; SUBCUTANEOUS at 02:59

## 2019-10-31 RX ADMIN — Medication 10 ML: at 14:11

## 2019-10-31 RX ADMIN — FUROSEMIDE 20 MG: 20 TABLET ORAL at 09:23

## 2019-10-31 RX ADMIN — PANTOPRAZOLE SODIUM 40 MG: 40 TABLET, DELAYED RELEASE ORAL at 09:08

## 2019-10-31 RX ADMIN — NITROGLYCERIN 1 INCH: 20 OINTMENT TOPICAL at 20:38

## 2019-10-31 RX ADMIN — SODIUM CHLORIDE: 234 INJECTION INTRAMUSCULAR; INTRAVENOUS; SUBCUTANEOUS at 03:54

## 2019-10-31 RX ADMIN — AMLODIPINE BESYLATE 5 MG: 5 TABLET ORAL at 09:08

## 2019-10-31 RX ADMIN — MEMANTINE HYDROCHLORIDE 10 MG: 10 TABLET ORAL at 09:08

## 2019-10-31 RX ADMIN — DONEPEZIL HYDROCHLORIDE 10 MG: 5 TABLET, FILM COATED ORAL at 22:00

## 2019-10-31 RX ADMIN — ASPIRIN 81 MG 81 MG: 81 TABLET ORAL at 09:08

## 2019-10-31 RX ADMIN — MEMANTINE HYDROCHLORIDE 10 MG: 10 TABLET ORAL at 20:35

## 2019-10-31 RX ADMIN — Medication 10 ML: at 22:00

## 2019-10-31 RX ADMIN — NITROGLYCERIN 1 INCH: 20 OINTMENT TOPICAL at 14:10

## 2019-10-31 RX ADMIN — NITROGLYCERIN 1 INCH: 20 OINTMENT TOPICAL at 09:08

## 2019-10-31 RX ADMIN — NITROGLYCERIN 1 INCH: 20 OINTMENT TOPICAL at 02:59

## 2019-10-31 RX ADMIN — CARVEDILOL 6.25 MG: 6.25 TABLET, FILM COATED ORAL at 19:03

## 2019-10-31 RX ADMIN — HYDROCHLOROTHIAZIDE 25 MG: 25 TABLET ORAL at 09:08

## 2019-10-31 NOTE — PROGRESS NOTES
Bedside shift change report GIVEN TO Breanne Hall RN. Report included the following information SBAR. SIGNIFICANT CHANGES DURING SHIFT:        CONCERNS TO ADDRESS WITH MD:            [facility] NURSING NOTE   Admission Date 10/27/2019   Admission Diagnosis A-fib ([address] [I48.91]   Consults IP CONSULT TO CARDIOLOGY      Cardiac Monitoring [x] Yes [] No      Purposeful Hourly Rounding [x] Yes    Louis Score Total Score: 5   Louis score 3 or > [x] Bed Alarm [] Avasys [] 1:1 sitter [] Patient refused (Signed refusal form in chart)   Newton Score Newton Score: 16   Newton score 14 or < [] PMT consult [] Wound Care consult    []  Specialty bed  [] Nutrition consult      Influenza Vaccine             Oxygen needs? [x] Room air Oxygen @  []1L    []2L    []3L   []4L    []5L   []6L via  NC   Chronic home O2 use? [] Yes [x] No  Perform O2 challenge test and document in progress note using smartphrase (.Homeoxygen)      Last bowel movement Last Bowel Movement Date: 10/30/19      Urinary Catheter [REMOVED] Condom Catheter 10/28/19-Indications for Use: Accurate measurement of urinary output     [REMOVED] Condom Catheter 10/28/19-Urine Output (mL): 900 ml     LDAs               Peripheral IV 10/28/19 Right Forearm (Active)   Site Assessment Clean, dry, & intact 10/31/2019  4:05 AM   Phlebitis Assessment 0 10/31/2019  4:05 AM   Infiltration Assessment 0 10/31/2019  4:05 AM   Dressing Status Clean, dry, & intact 10/31/2019  4:05 AM   Dressing Type Transparent 10/31/2019  4:05 AM   Hub Color/Line Status Infusing 10/31/2019  4:05 AM                         Readmission Risk Assessment Tool Score High Risk            51       Total Score        3 Has Seen PCP in Last 6 Months (Yes=3, No=0)    5 Pt. Coverage (Medicare=5 , Medicaid, or Self-Pay=4)    43 Charlson Comorbidity Score (Age + Comorbid Conditions)        Criteria that do not apply:    . Living with Significant Other. Assisted Living. LTAC. SNF.  or   Rehab    Patient Length of Stay (>5 days = 3)    IP Visits Last 12 Months (1-3=4, 4=9, >4=11)       Expected Length of Stay 3d 14h   Actual Length of Stay 4

## 2019-10-31 NOTE — PROGRESS NOTES
Bedside and Verbal shift change report GIVEN TO Beata Beauchamp RN. Report included the following information SBAR, Kardex, ED Summary, Procedure Summary, Intake/Output, MAR, Recent Results and Cardiac Rhythm (Paced). SIGNIFICANT CHANGES DURING SHIFT:  1430- Patient became restless, unable to stay seated in recliner. CIWA 8, administered 2mg Ativan. 1500- Patient drowsy but not restless. VSS, will continue to monitor. 1730- Patient in recliner, restless. Noncompliant with medical staff, ambulated patient to bathroom and then returned to bed. 1830- Patient sleeping, drowsy, unable to administer 1800 meds will hold for now. [address] NURSING NOTE   Admission Date 10/27/2019   Admission Diagnosis A-fib ([address] [I48.91]   Consults IP CONSULT TO CARDIOLOGY      Cardiac Monitoring [x] Yes [] No      Purposeful Hourly Rounding [x] Yes    Louis Score Total Score: 5   Louis score 3 or > [x] Bed Alarm [] Avasys [] 1:1 sitter [] Patient refused (Signed refusal form in chart)   Newton Score Newton Score: 18   Newton score 14 or < [] PMT consult [] Wound Care consult    []  Specialty bed  [] Nutrition consult      Influenza Vaccine             Oxygen needs? [x] Room air Oxygen @  []1L    []2L    []3L   []4L    []5L   []6L via  NC   Chronic home O2 use?  [] Yes [x] No  Perform O2 challenge test and document in progress note using smartphrase (.Homeoxygen)      Last bowel movement Last Bowel Movement Date: 10/30/19      Urinary Catheter [REMOVED] Condom Catheter 10/28/19-Indications for Use: Accurate measurement of urinary output     [REMOVED] Condom Catheter 10/28/19-Urine Output (mL): 900 ml     LDAs               Peripheral IV 10/31/19 Right Arm (Active)   Site Assessment Clean, dry, & intact 10/31/2019  3:39 PM   Phlebitis Assessment 0 10/31/2019  3:39 PM   Infiltration Assessment 0 10/31/2019  3:39 PM   Dressing Status Clean, dry, & intact 10/31/2019  3:39 PM   Dressing Type Transparent 10/31/2019  3:39 PM   Hub Color/Line Status Blue;Flushed;Patent 10/31/2019  3:39 PM       Peripheral IV 10/31/19 Left Arm (Active)   Site Assessment Clean, dry, & intact 10/31/2019  3:39 PM   Phlebitis Assessment 0 10/31/2019  3:39 PM   Infiltration Assessment 0 10/31/2019  3:39 PM   Dressing Status Clean, dry, & intact 10/31/2019  3:39 PM   Dressing Type Transparent 10/31/2019  3:39 PM   Hub Color/Line Status Blue;Flushed;Patent 10/31/2019  3:39 PM                         Readmission Risk Assessment Tool Score High Risk            51       Total Score        3 Has Seen PCP in Last 6 Months (Yes=3, No=0)    5 Pt. Coverage (Medicare=5 , Medicaid, or Self-Pay=4)    43 Charlson Comorbidity Score (Age + Comorbid Conditions)        Criteria that do not apply:    . Living with Significant Other. Assisted Living. LTAC. SNF.  or   Rehab    Patient Length of Stay (>5 days = 3)    IP Visits Last 12 Months (1-3=4, 4=9, >4=11)       Expected Length of Stay 3d 14h   Actual Length of Stay 4

## 2019-10-31 NOTE — PROGRESS NOTES
[address] [place] [address]  [phone]      Cardiology Progress Note      10/31/2019 1:30PM  Admit Date: 10/27/2019    Admit Diagnosis:   A-fib [facility]) [I48.91]    Subjective:     Benito Farfan Sr is sitting in chair, awake, alert, pleasantly confused. Echo with EF 20-25%. Overnight did have 20 beats of NSVT.         Visit Vitals  /79 (BP Patient Position: Sitting)   Pulse 60   Temp 97.5 °F (36.4 °C)   Resp 18   Ht 5' 9\" (1.753 m)   Wt 60.9 kg (134 lb 4.8 oz)   SpO2 98%   BMI 19.83 kg/m²       Current Facility-Administered Medications   Medication Dose Route Frequency    metoprolol tartrate (LOPRESSOR) tablet 50 mg  50 mg Oral Q12H    furosemide (LASIX) tablet 20 mg  20 mg Oral DAILY    losartan (COZAAR) tablet 100 mg  100 mg Oral DAILY    hydroCHLOROthiazide (HYDRODIURIL) tablet 25 mg  25 mg Oral DAILY    sodium chloride 0.45 % in dextrose 10% 1,019.6 mL infusion   IntraVENous CONTINUOUS    LORazepam (ATIVAN) injection 2 mg  2 mg IntraVENous Q1H PRN    LORazepam (ATIVAN) injection 4 mg  4 mg IntraVENous Q1H PRN    ziprasidone (GEODON) 20 mg in sterile water (preservative free) 1 mL injection  20 mg IntraMUSCular Q12H PRN    glucose chewable tablet 16 g  4 Tab Oral PRN    dextrose (D50W) injection syrg 12.5-25 g  25-50 mL IntraVENous PRN    glucagon (GLUCAGEN) injection 1 mg  1 mg IntraMUSCular PRN    insulin lispro (HUMALOG) injection   SubCUTAneous AC&HS    nitroglycerin (NITROBID) 2 % ointment 1 Inch  1 Inch Topical Q6H    sodium chloride (NS) flush 5-40 mL  5-40 mL IntraVENous Q8H    sodium chloride (NS) flush 5-40 mL  5-40 mL IntraVENous PRN    morphine 10 mg/ml injection 2 mg  2 mg IntraVENous Q4H PRN    heparin (porcine) injection 5,000 Units  5,000 Units SubCUTAneous Q12H    aspirin chewable tablet 81 mg  81 mg Oral DAILY    pantoprazole (PROTONIX) tablet 40 mg  40 mg Oral ACB    donepezil (ARICEPT) tablet 10 mg  10 mg Oral QHS    memantine [facility]) tablet 10 mg  10 mg Oral BID    temazepam (RESTORIL) capsule 15 mg  15 mg Oral QHS PRN    ondansetron (ZOFRAN ODT) tablet 4 mg  4 mg Oral Q8H PRN    polyethylene glycol (MIRALAX) packet 17 g  17 g Oral DAILY    hydrALAZINE (APRESOLINE) 20 mg/mL injection 10 mg  10 mg IntraVENous Q6H PRN    LORazepam (ATIVAN) tablet 1 mg  1 mg Oral Q4H PRN    amLODIPine (NORVASC) tablet 5 mg  5 mg Oral DAILY       Objective:      Physical Exam:  General Appearance:  elderly AAM in no acute distress  Chest:   Clear  Cardiovascular: tachy, no murmur. Abdomen:   Soft, non-tender, bowel sounds are active. Extremities: no peripheral edema  Skin:  Warm and dry. Data Review:   Recent Labs     10/31/19  0237 10/30/19  0310 10/29/19  0221   WBC 6.9 5.6 6.3   HGB 12.4 11.8* 11.4*   HCT 38.6 38.5 38.3    234 225     Recent Labs     10/31/19  1124 10/31/19  0237 10/30/19  0310 10/29/19  0221   NA  --  138 139 142   K  --  4.1 4.0 4.4   CL  --  101 104 108   CO2  --  30 27 27   GLU  --  101* 92 91   BUN  --  32* 25* 21*   CREA  --  1.83* 1.73* 1.56*   CA  --  9.1 9.0 9.0   MG 2.2  --   --   --    ALB  --  3.0*  --   --    TBILI  --  0.3  --   --    SGOT  --  30  --   --    ALT  --  31  --   --        Recent Labs     10/29/19  0221   TROIQ 0.12*         Intake/Output Summary (Last 24 hours) at 10/31/2019 1255  Last data filed at 10/31/2019 1004  Gross per 24 hour   Intake 740 ml   Output 975 ml   Net -235 ml        Telemetry: v paced     Echo  · Left Ventricle: Normal cavity size. Mild concentric hypertrophy. Severe systolic dysfunction. Estimated left ventricular ejection fraction is 21 - 25%. Left ventricular global hypokinesis. Severe (grade 3) left ventricular diastolic dysfunction. · Left Atrium: Mildly dilated left atrium. · Mitral Valve: Mitral valve non-specific thickening. Mild mitral valve regurgitation is present. · Tricuspid Valve: Mild tricuspid valve regurgitation is present.  Tricuspid valve prolapse. 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 Did have fib/flutter on interrogation, which may have caused exacerbation of symptoms. Noted NSVT of 20 beats overnight while patient sleeping. May be the culprit underlying arrhythmia. Continues on BB. Amio? · Is he a candidate for DOAC? With his advanced dementia concerns for higher risk of falls. · Echo with EF 20-25%, Previous echo 4/18 EF 60%. · Monitor I/Os, daily weights, labs  · Continue ASA, Norvasc, coreg and Imdur. · Not likely a lot of benefit to statin medication at this age, lipids near goal  · Suspect noncompliance with medication had a lot to do with his decompensation, considering severe hypertension on admission   · We will not pursue stress testing at this time for that reason along with dementia and advanced age  · Dr. Tonny Huber addressing code status with patient family. [facility]  Cardiology    Patient seen and examined by me with the above nurse practitioner.   I personally performed all components of the history, physical, and medical decision making and agree with the assessment and plan with minor modifications as noted. Mr. Katerina Hendricks is a very pleasant 69-year-old with dementia and advanced cardiomyopathy with a long run of ventricular tachycardia today. I spoke with Dr. Linda Núñez who has known him for a long time. He is in agreement that the patient would most likely benefit for conservative medical management considering advanced age, comorbidities, and dementia. I agree with increasing metoprolol. Dr. Linda Núñez is planning on addressing goals of care and CODE STATUS prior to discharge.

## 2019-10-31 NOTE — PROGRESS NOTES
Nutrition Assessment:    INTERVENTIONS/RECOMMENDATIONS:   Continue current diet  Continue ensure    ASSESSMENT:   Chart reviewed. Pt reports good appetite and consuming the majority of his meals. This is confirmed by flowsheet documentation that shows pt consumed 100% of his meals yesterday. Diet Order: Cardiac  % Eaten:    Patient Vitals for the past 72 hrs:   % Diet Eaten   10/30/19 1846 100 %   10/30/19 1057 100 %   10/29/19 1514 10 %   10/29/19 0919 0 %     Pertinent Medications: [x]Reviewed: lasix, humalog, PPI, miralax,   Pertinent Labs: [x]Reviewed:   Food Allergies: [x]NKFA  []Other   Last BM:  10/30  Edema:  n/a    []RUE   []LUE   []RLE   []LLE      Pressure Ulcer:   n/a   [] Stage I   [] Stage II   [] Stage III   [] Stage IV      Anthropometrics: Height: 5' 9\" (175.3 cm) Weight: 60.9 kg (134 lb 4.8 oz)    IBW (%IBW):   ( ) UBW (%UBW):   (  %)    BMI: Body mass index is 19.83 kg/m². This BMI is indicative of:  []Underweight   [x]Normal   []Overweight   [] Obesity   [] Extreme Obesity (BMI>40)  Last Weight Metrics:  Weight Loss Metrics 10/31/2019 10/27/2019 10/27/2019 10/27/2019 9/5/2019 3/18/2019 1/9/2019   Today's Wt 134 lb 4.8 oz - 143 lb 9.6 oz - 136 lb 138 lb 12.8 oz 137 lb 9.6 oz   BMI - 19.83 kg/m2 - 21.21 kg/m2 21.3 kg/m2 21.74 kg/m2 21.55 kg/m2       Estimated Nutrition Needs (Based on): 1700 Kcals/day(BMR: 1300 x 1.3) , 65 g(1 g/kg) Protein  Carbohydrate: At Least 130 g/day  Fluids: 1700 mL/day or per primary team    NUTRITION DIAGNOSES:   Problem:  No nutritional diagnosis at this time      Etiology: related to       Signs/Symptoms: as evidenced by      Previous Nutrition Dx:  [] Resolved  [] Unresolved           [] Progressing    NUTRITION INTERVENTIONS:  Meals/Snacks: General/healthful diet   Supplements: Commercial supplement              GOAL:   consume >50% of meals and ONS in 3-5 days    NUTRITION MONITORING AND EVALUATION      Food/Nutrient Intake Outcomes:  Total energy intake  Physical Signs/Symptoms Outcomes: Weight/weight change, Electrolyte and renal profile, GI    Previous Goal Met:   [x] Met              [] Progressing Towards Goal              [] Not Progressing Towards Goal   Previous Recommendations:   [x] Implemented          [] Not Implemented          [] Not Applicable    LEARNING NEEDS (Diet, Food/Nutrient-Drug Interaction):    [x] None Identified   [] Identified and Education Provided/Documented   [] Identified and Pt declined/was not appropriate     Cultural, Anabaptism, OR Ethnic Dietary Needs:    [x] None Identified   [] Identified and Addressed     [x] Interdisciplinary Care Plan Reviewed/Documented    [x] Discharge Planning: General healthy diet    [] Participated in Interdisciplinary Rounds    NUTRITION RISK:    [] High              [x] Moderate           []  Low  []  Minimal/Uncompromised      Kwan Woods RDN  Pager [phone]  Weekend Pager [phone]

## 2019-10-31 NOTE — PROGRESS NOTES
PROGRESS NOTE    NAME:  Marina Farfan    :   1931   MRN:   [id number]     Date/Time:  10/31/2019 7:36 AM  Subjective:   History:  Chart reviewed and patient seen and examined and D/W his nurse this AM and again yesterday afternoon with his granddaughter whom he lives with and all events noted. He was admitted on 10/27 after transfer from [facility] where he presented with CP and pulmonary edema with mild elevation of Troponin and accelerated HTN in the face of medical noncompliance with his anti-HTN medications. He had been very agitated since admission and required Ativan and Geodon for sedation until during the night 10/29-10/30 and no longer needing to be sedated. This AM he is alert and confused and knows his name and that I am his doctor as well as that he is in the hospital. All other history from his nurse and chart review as well as granddaughter. Per her Acacia Travis doesn't drink alcohol but drinks about 4 beers a day\" although had not drank for 2 days PTA.  Had 23 beat run of VT while asleep early this AM.      Medications reviewed:  Current Facility-Administered Medications   Medication Dose Route Frequency    metoprolol tartrate (LOPRESSOR) tablet 50 mg  50 mg Oral Q12H    losartan (COZAAR) tablet 100 mg  100 mg Oral DAILY    hydroCHLOROthiazide (HYDRODIURIL) tablet 25 mg  25 mg Oral DAILY    sodium chloride 0.45 % in dextrose 10% 1,019.6 mL infusion   IntraVENous CONTINUOUS    LORazepam (ATIVAN) injection 2 mg  2 mg IntraVENous Q1H PRN    LORazepam (ATIVAN) injection 4 mg  4 mg IntraVENous Q1H PRN    ziprasidone (GEODON) 20 mg in sterile water (preservative free) 1 mL injection  20 mg IntraMUSCular Q12H PRN    glucose chewable tablet 16 g  4 Tab Oral PRN    dextrose (D50W) injection syrg 12.5-25 g  25-50 mL IntraVENous PRN    glucagon (GLUCAGEN) injection 1 mg  1 mg IntraMUSCular PRN    insulin lispro (HUMALOG) injection   SubCUTAneous AC&HS    nitroglycerin (NITROBID) 2 % ointment 1 Inch  1 Inch Topical Q6H    sodium chloride (NS) flush 5-40 mL  5-40 mL IntraVENous Q8H    sodium chloride (NS) flush 5-40 mL  5-40 mL IntraVENous PRN    morphine 10 mg/ml injection 2 mg  2 mg IntraVENous Q4H PRN    heparin (porcine) injection 5,000 Units  5,000 Units SubCUTAneous Q12H    aspirin chewable tablet 81 mg  81 mg Oral DAILY    pantoprazole (PROTONIX) tablet 40 mg  40 mg Oral ACB    donepezil (ARICEPT) tablet 10 mg  10 mg Oral QHS    memantine (NAMENDA) tablet 10 mg  10 mg Oral BID    temazepam (RESTORIL) capsule 15 mg  15 mg Oral QHS PRN    ondansetron (ZOFRAN ODT) tablet 4 mg  4 mg Oral Q8H PRN    polyethylene glycol (MIRALAX) packet 17 g  17 g Oral DAILY    hydrALAZINE (APRESOLINE) 20 mg/mL injection 10 mg  10 mg IntraVENous Q6H PRN    LORazepam (ATIVAN) tablet 1 mg  1 mg Oral Q4H PRN    furosemide (LASIX) tablet 40 mg  40 mg Oral DAILY    amLODIPine (NORVASC) tablet 5 mg  5 mg Oral DAILY        Objective:   Vitals:  Visit Vitals  /85 (BP 1 Location: Left arm, BP Patient Position: At rest)   Pulse 61   Temp 97.9 °F (36.6 °C)   Resp 18   Ht 5' 9\" (1.753 m)   Wt 134 lb 4.8 oz (60.9 kg)   SpO2 96%   BMI 19.83 kg/m²      O2 Device: Room air Temp (24hrs), Av.8 °F (36.6 °C), Min:97.4 °F (36.3 °C), Max:98 °F (36.7 °C)      Last 24hr Input/Output:    Intake/Output Summary (Last 24 hours) at 10/31/2019 0736  Last data filed at 10/31/2019 0300  Gross per 24 hour   Intake 1160 ml   Output 1075 ml   Net 85 ml        PHYSICAL EXAM:  General:     less somnolent today, cooperative to me, no distress, appears stated age. Requiring restraints to protect himself and staff due to agitation  Head:    Normocephalic, without obvious abnormality, atraumatic. Eyes:    Conjunctivae/corneas clear. PERRLA  Nose:   Nares normal. No drainage or sinus tenderness.   Throat:     Lips, mucosa, and tongue normal.  No Thrush  Neck:   Supple, symmetrical,  no adenopathy, thyroid: non tender     no carotid bruit and no JVD.  Back:     Symmetric,  No CVA tenderness. Lungs:    Clear to auscultation bilaterally. No Wheezing or Rhonchi. No rales. Heart:    Regular rate and rhythm,  no murmur, rub or gallop. Abdomen:    Soft, non-tender. Not distended. Bowel sounds normal. No masses  Extremities:  Extremities normal, atraumatic, No cyanosis. No edema. No clubbing  Lymph nodes:  Cervical, supraclavicular normal.  Neurologic:  Normal strength, somewhat somnolent. Intermittently extremely agitated and combative. Oriented to name only   Skin:                 No rash      Lab Data Reviewed:    Recent Results (from the past 24 hour(s))   GLUCOSE, POC    Collection Time: 10/30/19 11:35 AM   Result Value Ref Range    Glucose (POC) 150 (H) 65 - 100 mg/dL    Performed by Patricia CHUN    GLUCOSE, POC    Collection Time: 10/30/19  4:45 PM   Result Value Ref Range    Glucose (POC) 124 (H) 65 - 100 mg/dL    Performed by Patricia CHUN    GLUCOSE, POC    Collection Time: 10/30/19  9:33 PM   Result Value Ref Range    Glucose (POC) 114 (H) 65 - 100 mg/dL    Performed by Arzella Rock    METABOLIC PANEL, COMPREHENSIVE    Collection Time: 10/31/19  2:37 AM   Result Value Ref Range    Sodium 138 136 - 145 mmol/L    Potassium 4.1 3.5 - 5.1 mmol/L    Chloride 101 97 - 108 mmol/L    CO2 30 21 - 32 mmol/L    Anion gap 7 5 - 15 mmol/L    Glucose 101 (H) 65 - 100 mg/dL    BUN 32 (H) 6 - 20 MG/DL    Creatinine 1.83 (H) 0.70 - 1.30 MG/DL    BUN/Creatinine ratio 17 12 - 20      GFR est AA 43 (L) >60 ml/min/1.73m2    GFR est non-AA 35 (L) >60 ml/min/1.73m2    Calcium 9.1 8.5 - 10.1 MG/DL    Bilirubin, total 0.3 0.2 - 1.0 MG/DL    ALT (SGPT) 31 12 - 78 U/L    AST (SGOT) 30 15 - 37 U/L    Alk.  phosphatase 70 45 - 117 U/L    Protein, total 6.5 6.4 - 8.2 g/dL    Albumin 3.0 (L) 3.5 - 5.0 g/dL    Globulin 3.5 2.0 - 4.0 g/dL    A-G Ratio 0.9 (L) 1.1 - 2.2     CBC WITH AUTOMATED DIFF    Collection Time: 10/31/19  2:37 AM   Result Value Ref Range    WBC 6.9 4.1 - 11.1 K/uL    RBC 4.55 4.10 - 5.70 M/uL    HGB 12.4 12.1 - 17.0 g/dL    HCT 38.6 36.6 - 50.3 %    MCV 84.8 80.0 - 99.0 FL    MCH 27.3 26.0 - 34.0 PG    MCHC 32.1 30.0 - 36.5 g/dL    RDW 14.2 11.5 - 14.5 %    PLATELET 881 347 - 503 K/uL    MPV 11.4 8.9 - 12.9 FL    NRBC 0.0 0  WBC    ABSOLUTE NRBC 0.00 0.00 - 0.01 K/uL    NEUTROPHILS 67 32 - 75 %    LYMPHOCYTES 19 12 - 49 %    MONOCYTES 9 5 - 13 %    EOSINOPHILS 4 0 - 7 %    BASOPHILS 1 0 - 1 %    IMMATURE GRANULOCYTES 0 0.0 - 0.5 %    ABS. NEUTROPHILS 4.5 1.8 - 8.0 K/UL    ABS. LYMPHOCYTES 1.3 0.8 - 3.5 K/UL    ABS. MONOCYTES 0.6 0.0 - 1.0 K/UL    ABS. EOSINOPHILS 0.3 0.0 - 0.4 K/UL    ABS. BASOPHILS 0.1 0.0 - 0.1 K/UL    ABS. IMM. GRANS. 0.0 0.00 - 0.04 K/UL    DF AUTOMATED     GLUCOSE, POC    Collection Time: 10/31/19  6:56 AM   Result Value Ref Range    Glucose (POC) 104 (H) 65 - 100 mg/dL    Performed by Guille Henry (PCT)          Assessment/Plan:     Principal Problem:    A-fib ([facility] (10/27/2019)    Active Problems:    Mixed hyperlipidemia (7/18/2017)      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, without long-term current use of insulin ([facility] (7/18/2017)      Alzheimer disease ([facility] (7/18/2017)      SVT (supraventricular tachycardia) (HCC)--s/p RFA (4/28/2015)      S/P cardiac pacemaker procedure (5/4/2015)      Overview: 5/4/15 Medtronic dual chamber pacemaker implant            SSS (sick sinus syndrome) ([facility] (6/2/2015)      Alcoholism ([facility] (4/23/2018)      Ac[facility] pulmonary edema ([facility] (10/27/2019)       ___________________________________________________  PLAN:    1. Continue Losartan, Hydrodiuril, and Norvasc  2. Low dose Metoprolol added, Increase to 50 BID with VT this AM  3. Diurese with Lasix as needed  4. Nitrobid started on admission  5. Aricept and Namenda resumed  6. No need for Statin at this situation Alzheimer's at 88   7. Geodon or Ativan PRN for agitation  8.   Follow BS but has not recently required diabetic meds for DM with his weight loss, currently on low dose D 10  9. Appears that his agitation was due to Alcohol WD with history of Alcoholism, and has baseline confusion also with Alzheimer's   10. Change Posey vest due to combativeness to PRN. This is for patient's own safety. 11. Discussed DNR with his granddaughter who is his medical POA and she is to bring in his advanced directives   12. Try to mobilize with PT  13.  Hopefully home soon            ___________________________________________________    Attending Physician: Ashish Carvalho MD

## 2019-10-31 NOTE — PROGRESS NOTES
Problem: Falls - Risk of  Goal: *Absence of Falls  Description  Document Zahra Montaqua Fall Risk and appropriate interventions in the flowsheet. Outcome: Progressing Towards Goal  Note:   Fall Risk Interventions:  Mobility Interventions: Bed/chair exit alarm, OT consult for ADLs, Patient to call before getting OOB    Mentation Interventions: Adequate sleep, hydration, pain control, Bed/chair exit alarm, Door open when patient unattended    Medication Interventions: Bed/chair exit alarm, Evaluate medications/consider consulting pharmacy, Patient to call before getting OOB, Teach patient to arise slowly    Elimination Interventions: Bed/chair exit alarm, Patient to call for help with toileting needs    History of Falls Interventions: Bed/chair exit alarm, Evaluate medications/consider consulting pharmacy, Investigate reason for fall         Problem: Patient Education: Go to Patient Education Activity  Goal: Patient/Family Education  Outcome: Progressing Towards Goal     Problem: Pressure Injury - Risk of  Goal: *Prevention of pressure injury  Description  Document Newton Scale and appropriate interventions in the flowsheet.   Outcome: Progressing Towards Goal  Note:   Pressure Injury Interventions:  Sensory Interventions: Assess changes in LOC, Keep linens dry and wrinkle-free, Minimize linen layers, Monitor skin under medical devices    Moisture Interventions: Absorbent underpads, Check for incontinence Q2 hours and as needed    Activity Interventions: Increase time out of bed, PT/OT evaluation    Mobility Interventions: HOB 30 degrees or less, PT/OT evaluation    Nutrition Interventions: Document food/fluid/supplement intake    Friction and Shear Interventions: Apply protective barrier, creams and emollients, Minimize layers                Problem: Patient Education: Go to Patient Education Activity  Goal: Patient/Family Education  Outcome: Progressing Towards Goal

## 2019-10-31 NOTE — PROGRESS NOTES
Problem: Self Care Deficits Care Plan (Adult)  Goal: *Acute Goals and Plan of Care (Insert Text)  Description    FUNCTIONAL STATUS PRIOR TO ADMISSION: Patient was independent and active without use of DME. Pt was independent with ADLs PTA. Pt is confused and not a reliable historian to PLOF. HOME SUPPORT: The patient lived with granddaughter, but was unable to accurately recall his living situation. Pt informed OT that \"I live alone. \"    Occupational Therapy Goals  Initiated 10/30/2019  1. Patient will perform grooming with independence within 7 day(s). 2.  Patient will perform upper body dressing with supervision/set-up within 7 day(s). 3.  Patient will perform lower body dressing with supervision/set-up within 7 day(s). 4.  Patient will perform toilet transfers with modified independence within 7 day(s). 5.  Patient will perform all aspects of toileting with independence within 7 day(s). 6.  Patient will utilize energy conservation techniques during functional activities with verbal cues within 7 day(s). Outcome: Progressing Towards Goal   OCCUPATIONAL THERAPY TREATMENT  Patient: Monroe Farfan Sr (80 y.o. male)  Date: 10/31/2019  Diagnosis: A-fib ([facility]) [I48.91] A-fib ([facility]       Precautions: Fall, Bed Alarm, Other (comment)(confusion)  Chart, occupational therapy assessment, plan of care, and goals were reviewed. ASSESSMENT  Patient continues with skilled OT services and is progressing towards goals. Pt received sitting in chair and greeted therapist upon entry with a pleasant, friendly disposition. Pt was agreeable and cooperative with therapist, and eager to complete suggested self-care tasks. Pt was able to mobilize to the bathroom and brush his teeth and shave. Pt reports that he still feels weak, but is showing improvement from previous session. Pt was able to perform self-care tasks while standing at sink unsupported and with no rest breaks for a total of ~20 minutes.  Pt demonstrated only one slight LOB, but was able to self correct. Therapist provided education on energy conservation, and compensatory strategy to maintain balance while standing at sink. Pt demonstrated fine motor difficulties when attempting to remove toothbrush from cellophane, remove top from shaving cream, and remove blade guard from razor. Pt does have O2 sensor on fingertip, but should be able to compensate. Pt also demonstrated sequencing or perceptual deficits during shaving task. After applying shaving cream pt looked around with a confused expression, and hesitantly ask for a razor. Therapist point to the pair of razors provided earlier that were sitting on the shelf. Therapist will continue to investigate and determine the exact nature of pt's deficits. Current Level of Function Impacting Discharge (ADLs): Pt was able to transfer sit <> stand, mobilize to bathroom, brush his teeth, and shave with just CGA. Other factors to consider for discharge: none         PLAN :  Patient continues to benefit from skilled intervention to address the above impairments. Continue treatment per established plan of care. to address goals. Recommend with staff: [place] for all meals and most of day,     Recommend next OT session: dressing with clothing retrieval    Recommendation for discharge: (in order for the patient to meet his/her long term goals)  Occupational therapy at least 2 days/week in the home AND ensure assist and/or supervision for safety with functional mobility/transfers and ADLs. This discharge recommendation:  Has been made in collaboration with the attending provider and/or case management    IF patient discharges home will need the following DME: patient owns DME required for discharge       SUBJECTIVE:   Patient stated Oh, that feels so much better.  - after brushing teeth and shaving.     OBJECTIVE DATA SUMMARY:   Cognitive/Behavioral Status:  Neurologic State: Alert  Orientation Level: Oriented to person  Cognition: Follows commands  Perception: Appears intact  Perseveration: No perseveration noted  Safety/Judgement: Awareness of environment    Functional Mobility and Transfers for ADLs:  Bed Mobility:  Rolling: (receivedinchair)  Supine to Sit: Minimum assistance;Assist x1  Scooting: Minimum assistance;Assist x1    Transfers:  Sit to Stand: Contact guard assistance  Functional Transfers  Bathroom Mobility: Contact guard assistance  Bed to Chair: Contact guard assistance    Balance:  Sitting: Intact; Without support  Standing: Intact; Without support  Standing - Static: Good; Unsupported  Standing - Dynamic : Good; Unsupported    ADL Intervention:       Grooming  Washing Face: Contact guard assistance  Brushing Teeth: Contact guard assistance  Shaving: Contact guard assistance                             Cognitive Retraining  Safety/Judgement: Awareness of environment    Pain:  No complaints. Activity Tolerance:   Good and tolerates ADLs without rest breaks  Please refer to the flowsheet for vital signs taken during this treatment. After treatment patient left in no apparent distress:   Sitting in chair, Call bell within reach and Bed / chair alarm activated    COMMUNICATION/COLLABORATION:   The patients plan of care was discussed with: Registered Nurse    Karen Abbott  Time Calculation: 33 mins    Regarding student involvement in patient care:  A student participated in this treatment session. Per CMS Medicare statements and AOTA guidelines I certify that the following was true:  1. I was present and directly observed the entire session. 2. I made all skilled judgments and clinical decisions regarding care. 3. I am the practitioner responsible for assessment, treatment, and documentation.

## 2019-10-31 NOTE — PROGRESS NOTES
0630 Pt had a 23 beat run of V-tach. Asymptomatic. Patient sleeping. /79. Maggie Contreras NP notified. No orders received.

## 2019-10-31 NOTE — PROGRESS NOTES
VITOR- Home with Follow-up appointments    CM met with pt to discuss d/c plan. No new needs at this time. CM will continue to follow pt for d/c planning needs.      Cheryl Clarke [address]  [phone]

## 2019-10-31 NOTE — PROGRESS NOTES
Problem: Falls - Risk of  Goal: *Absence of Falls  Description  Document Carmen Barnes Fall Risk and appropriate interventions in the flowsheet. Outcome: Progressing Towards Goal  Note:   Fall Risk Interventions:  Mobility Interventions: Assess mobility with egress test, Bed/chair exit alarm, Patient to call before getting OOB    Mentation Interventions: Adequate sleep, hydration, pain control, Bed/chair exit alarm, Toileting rounds    Medication Interventions: Bed/chair exit alarm, Patient to call before getting OOB, Teach patient to arise slowly    Elimination Interventions: Bed/chair exit alarm, Call light in reach, Urinal in reach    History of Falls Interventions: Bed/chair exit alarm         Problem: Pressure Injury - Risk of  Goal: *Prevention of pressure injury  Description  Document Newton Scale and appropriate interventions in the flowsheet. Outcome: Progressing Towards Goal  Note:   Pressure Injury Interventions:  Sensory Interventions: Assess changes in LOC, Keep linens dry and wrinkle-free, Minimize linen layers, Monitor skin under medical devices    Moisture Interventions: Absorbent underpads, Minimize layers, Moisture barrier, Offer toileting Q_hr    Activity Interventions: Increase time out of bed    Mobility Interventions: Assess need for specialty bed, Turn and reposition approx.  every two hours(pillow and wedges)    Nutrition Interventions: Document food/fluid/supplement intake    Friction and Shear Interventions: Apply protective barrier, creams and emollients, Minimize layers                Problem: Patient Education: Go to Patient Education Activity  Goal: Patient/Family Education  Outcome: Progressing Towards Goal     Problem: Breathing Pattern - Ineffective  Goal: *Absence of hypoxia  Outcome: Progressing Towards Goal

## 2019-11-01 ENCOUNTER — HOME HEALTH ADMISSION (OUTPATIENT)
Dept: HOME HEALTH SERVICES | Facility: HOME HEALTH | Age: 84
End: 2019-11-01
Payer: MEDICARE

## 2019-11-01 ENCOUNTER — TELEPHONE (OUTPATIENT)
Dept: INTERNAL MEDICINE CLINIC | Age: 84
End: 2019-11-01

## 2019-11-01 VITALS
HEART RATE: 62 BPM | WEIGHT: 134.3 LBS | SYSTOLIC BLOOD PRESSURE: 121 MMHG | BODY MASS INDEX: 19.89 KG/M2 | DIASTOLIC BLOOD PRESSURE: 70 MMHG | HEIGHT: 69 IN | RESPIRATION RATE: 20 BRPM | OXYGEN SATURATION: 99 % | TEMPERATURE: 97.3 F

## 2019-11-01 PROBLEM — F10.931 ALCOHOL WITHDRAWAL SYNDROME, WITH DELIRIUM (HCC): Status: ACTIVE | Noted: 2019-11-01

## 2019-11-01 LAB
GLUCOSE BLD STRIP.AUTO-MCNC: 137 MG/DL (ref 65–100)
GLUCOSE BLD STRIP.AUTO-MCNC: 90 MG/DL (ref 65–100)
SERVICE CMNT-IMP: ABNORMAL
SERVICE CMNT-IMP: NORMAL

## 2019-11-01 PROCEDURE — 74011250636 HC RX REV CODE- 250/636: Performed by: INTERNAL MEDICINE

## 2019-11-01 PROCEDURE — 74011250637 HC RX REV CODE- 250/637: Performed by: INTERNAL MEDICINE

## 2019-11-01 PROCEDURE — 74011250637 HC RX REV CODE- 250/637: Performed by: NURSE PRACTITIONER

## 2019-11-01 PROCEDURE — 82962 GLUCOSE BLOOD TEST: CPT

## 2019-11-01 PROCEDURE — 90471 IMMUNIZATION ADMIN: CPT

## 2019-11-01 PROCEDURE — 90686 IIV4 VACC NO PRSV 0.5 ML IM: CPT | Performed by: INTERNAL MEDICINE

## 2019-11-01 RX ORDER — CARVEDILOL 6.25 MG/1
6.25 TABLET ORAL 2 TIMES DAILY
Qty: 180 TAB | Status: SHIPPED | OUTPATIENT
Start: 2019-11-01 | End: 2021-01-15 | Stop reason: SDUPTHER

## 2019-11-01 RX ORDER — FUROSEMIDE 20 MG/1
20 TABLET ORAL DAILY
Qty: 90 TAB | Status: SHIPPED | OUTPATIENT
Start: 2019-11-01 | End: 2020-08-03

## 2019-11-01 RX ORDER — AMLODIPINE BESYLATE 10 MG/1
5 TABLET ORAL DAILY
Qty: 90 TAB | Refills: 1 | Status: SHIPPED | OUTPATIENT
Start: 2019-11-01 | End: 2020-08-03

## 2019-11-01 RX ADMIN — PANTOPRAZOLE SODIUM 40 MG: 40 TABLET, DELAYED RELEASE ORAL at 08:36

## 2019-11-01 RX ADMIN — ASPIRIN 81 MG 81 MG: 81 TABLET ORAL at 08:36

## 2019-11-01 RX ADMIN — POLYETHYLENE GLYCOL 3350 17 G: 17 POWDER, FOR SOLUTION ORAL at 08:35

## 2019-11-01 RX ADMIN — AMLODIPINE BESYLATE 5 MG: 5 TABLET ORAL at 08:35

## 2019-11-01 RX ADMIN — INFLUENZA VIRUS VACCINE 0.5 ML: 15; 15; 15; 15 SUSPENSION INTRAMUSCULAR at 12:12

## 2019-11-01 RX ADMIN — MEMANTINE HYDROCHLORIDE 10 MG: 10 TABLET ORAL at 08:36

## 2019-11-01 RX ADMIN — HYDROCHLOROTHIAZIDE 25 MG: 25 TABLET ORAL at 08:35

## 2019-11-01 RX ADMIN — LOSARTAN POTASSIUM 100 MG: 100 TABLET ORAL at 08:36

## 2019-11-01 RX ADMIN — NITROGLYCERIN 1 INCH: 20 OINTMENT TOPICAL at 08:36

## 2019-11-01 RX ADMIN — NITROGLYCERIN 1 INCH: 20 OINTMENT TOPICAL at 02:46

## 2019-11-01 RX ADMIN — CARVEDILOL 6.25 MG: 6.25 TABLET, FILM COATED ORAL at 08:35

## 2019-11-01 RX ADMIN — FUROSEMIDE 20 MG: 20 TABLET ORAL at 08:35

## 2019-11-01 RX ADMIN — HEPARIN SODIUM 5000 UNITS: 5000 INJECTION INTRAVENOUS; SUBCUTANEOUS at 02:47

## 2019-11-01 NOTE — TELEPHONE ENCOUNTER
Spoke with Leigh- at [facility] and she stated Mr. Garner Kawasaki has advanced directives but his granddaughter has them and we need to get them from her and have them put into his medical record.

## 2019-11-01 NOTE — DISCHARGE INSTRUCTIONS
[address]  [phone]      Patient Discharge Instructions    Lillia Lefort  / [id number] : 1931    Admitted 10/27/2019 Discharged: 2019     Principal Problem:    A-fib ([address] (10/27/2019)    Active Problems:    Hypertension with renal disease (2017)      Mixed hyperlipidemia (2017)      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, without long-term current use of insulin ([address] (2017)      Alzheimer disease ([address] (2017)      SVT (supraventricular tachycardia) (HCC)--s/p RFA (2015)      S/P cardiac pacemaker procedure (2015)      Overview: 5/4/15 Medtronic dual chamber pacemaker implant            SSS (sick sinus syndrome) ([address] (2015)      Alcoholism ([address] (2018)      Acute pulmonary edema ([address] (10/27/2019)      Alcohol withdrawal syndrome, with delirium ([address] (2019)          No Known Allergies    · It is important that you take the medication exactly as they are prescribed. · Do not take other medications without consulting your doctor. What to do at Next Level of Care    Disposition:  Home    Recommended diet: Cardiac    Recommended activity: Usual          Information obtained by :  I understand that if any problems occur once I am at home I am to contact my physician. I understand and acknowledge receipt of the instructions indicated above.                                                                                                                                            Physician's or R.N.'s Signature                                                                  Date/Time                                                                                                                                              Patient or Representative Signature                                                          Date/Time

## 2019-11-01 NOTE — PROGRESS NOTES
Bedside shift change report GIVEN TO Kareen Dickerson RN. Report included the following information SBAR, Kardex, Intake/Output, MAR, Recent Results and Cardiac Rhythm AV paced. Vj Hanley SIGNIFICANT CHANGES DURING SHIFT:  Pt confused, however pleasantly, throughout shift. Pt assisted with calling daughter and granddaughter at beginning of shift to facilitate orientation to place and to help pt feel more security. PO Ativan provided times x 1. While pt had multiple periods of 30min-1hr sleep, he also was rather active and restless throughout the night--at least 6 times pt was physically assisted to prevent from falling. Pt has very unsteady gait and poor environmental awareness, and can be very impulsive and forgetful. Pt safety maintained through frequent rounding and full staff participation in monitoring. By 8400 pt pulled out his L arm IV; his R arm IV was removed earlier in the shift 2/2 occlusion/infiltration. CONCERNS TO ADDRESS WITH MD: Coordinate plan of care and discharge plans with Yesenia Huerta (pt's granddaughter and POA; [phone])           [facility] NURSING NOTE   Admission Date 10/27/2019   Admission Diagnosis A-fib ([address] [I48.91]   Consults IP CONSULT TO CARDIOLOGY      Cardiac Monitoring [x] Yes [] No      Purposeful Hourly Rounding [x] Yes    Louis Score Total Score: 5   Louis score 3 or > [x] Bed Alarm [] Avasys [] 1:1 sitter [] Patient refused (Signed refusal form in chart)   Newton Score Newton Score: 19   Newton score 14 or < [] PMT consult [] Wound Care consult    []  Specialty bed  [] Nutrition consult      Influenza Vaccine             Oxygen needs? [x] Room air Oxygen @  []1L    []2L    []3L   []4L    []5L   []6L via  NC   Chronic home O2 use?  [] Yes [x] No  Perform O2 challenge test and document in progress note using smartphrase (.Homeoxygen)      Last bowel movement Last Bowel Movement Date: 10/30/19      Urinary Catheter [REMOVED] Condom Catheter 10/28/19-Indications for Use: Accurate measurement of urinary output     [REMOVED] Condom Catheter 10/28/19-Urine Output (mL): 900 ml     LDAs               Peripheral IV 10/31/19 Left Arm (Active)   Site Assessment Clean, dry, & intact 11/1/2019  4:58 AM   Phlebitis Assessment 0 11/1/2019  4:58 AM   Infiltration Assessment 0 11/1/2019  4:58 AM   Dressing Status Clean, dry, & intact 11/1/2019  4:58 AM   Dressing Type Transparent 11/1/2019  4:58 AM   Hub Color/Line Status Blue; Infusing 11/1/2019  4:58 AM   Alcohol Cap Used No 11/1/2019  4:58 AM                         Readmission Risk Assessment Tool Score High Risk            51       Total Score        3 Has Seen PCP in Last 6 Months (Yes=3, No=0)    5 Pt. Coverage (Medicare=5 , Medicaid, or Self-Pay=4)    43 Charlson Comorbidity Score (Age + Comorbid Conditions)        Criteria that do not apply:    . Living with Significant Other. Assisted Living. LTAC. SNF.  or   Rehab    Patient Length of Stay (>5 days = 3)    IP Visits Last 12 Months (1-3=4, 4=9, >4=11)       Expected Length of Stay 3d 14h   Actual Length of Stay 5

## 2019-11-01 NOTE — PROGRESS NOTES
Granddaughter:  Michel Green is Medical POA and I D/W her this AM and decision is DNR  I will D/C him home today to her care with f/u in office 11/5 afternoon.

## 2019-11-01 NOTE — PROGRESS NOTES
Problem: Patient Education: Go to Patient Education Activity  Goal: Patient/Family Education  Outcome: Progressing Towards Goal     Problem: Unstable Angina/NSTEMI: Discharge Outcomes  Goal: *Hemodynamically stable  Outcome: Progressing Towards Goal  Goal: *Stable cardiac rhythm  Outcome: Progressing Towards Goal  Goal: *Lungs clear or at baseline  Outcome: Progressing Towards Goal  Goal: *Optimal pain control at patient's stated goal  Outcome: Progressing Towards Goal  Goal: *Identifies cardiac risk factors  Outcome: Progressing Towards Goal  Goal: *Verbalizes home exercise program, activity guidelines, cardiac precautions  Outcome: Progressing Towards Goal  Goal: *Verbalizes understanding and describes prescribed diet  Outcome: Progressing Towards Goal  Goal: *Verbalizes name, dosage, time, side effects, and number of days to continue medications  Outcome: Progressing Towards Goal  Goal: *Anxiety reduced or absent  Outcome: Progressing Towards Goal  Goal: *Understands and describes signs and symptoms to report to providers(Stroke Metric)  Outcome: Progressing Towards Goal  Goal: *Describes follow-up/return visits to physicians  Outcome: Progressing Towards Goal  Goal: *Describes available resources and support systems  Outcome: Progressing Towards Goal  Goal: *Influenza immunization  Outcome: Progressing Towards Goal  Goal: *Pneumococcal immunization  Outcome: Progressing Towards Goal  Goal: *Describes smoking cessation resources  Outcome: Progressing Towards Goal     Problem: Patient Education: Go to Patient Education Activity  Goal: Patient/Family Education  Outcome: Progressing Towards Goal     Problem: Afib: Discharge Outcomes (not in CAT)  Goal: *Hemodynamically stable  Outcome: Progressing Towards Goal  Goal: *Stable cardiac rhythm  Outcome: Progressing Towards Goal  Goal: *Lungs clear or at baseline  Outcome: Progressing Towards Goal  Goal: *Optimal pain control at patient's stated goal  Outcome: Progressing Towards Goal  Goal: *Identifies cardiac risk factors  Outcome: Progressing Towards Goal  Goal: *Verbalizes home exercise program, activity guidelines, cardiac precautions  Outcome: Progressing Towards Goal  Goal: *Verbalizes understanding and describes prescribed diet  Outcome: Progressing Towards Goal  Goal: *Verbalizes understanding and describes medication purposes and frequencies  Outcome: Progressing Towards Goal  Goal: *Anxiety reduced or absent  Outcome: Progressing Towards Goal  Goal: *Understands and describes signs and symptoms to report to providers(Stroke Metric)  Outcome: Progressing Towards Goal  Goal: *Describes follow-up/return visits to physicians  Outcome: Progressing Towards Goal  Goal: *Describes available resources and support systems  Outcome: Progressing Towards Goal  Goal: *Influenza immunization  Outcome: Progressing Towards Goal  Goal: *Pneumococcal immunization  Outcome: Progressing Towards Goal  Goal: *Describes smoking cessation resources  Outcome: Progressing Towards Goal

## 2019-11-01 NOTE — PROGRESS NOTES
[address]  [phone]      Cardiology Progress Note      11/1/2019 1:30PM  Admit Date: 10/27/2019    Admit Diagnosis:   A-fib [facility]) [I48.91]    Subjective:     Benito Farfan Sr is sitting in chair, awake, alert. No more dysrhythmias. Denies chest pain or shortness of breath.          Visit Vitals  /70 (BP 1 Location: Right arm, BP Patient Position: Sitting)   Pulse 62   Temp 97.3 °F (36.3 °C)   Resp 20   Ht 5' 9\" (1.753 m)   Wt 134 lb 4.8 oz (60.9 kg)   SpO2 99%   BMI 19.83 kg/m²       Current Facility-Administered Medications   Medication Dose Route Frequency    furosemide (LASIX) tablet 20 mg  20 mg Oral DAILY    carvedilol (COREG) tablet 6.25 mg  6.25 mg Oral BID    losartan (COZAAR) tablet 100 mg  100 mg Oral DAILY    hydroCHLOROthiazide (HYDRODIURIL) tablet 25 mg  25 mg Oral DAILY    sodium chloride 0.45 % in dextrose 10% 1,019.6 mL infusion   IntraVENous CONTINUOUS    LORazepam (ATIVAN) injection 2 mg  2 mg IntraVENous Q1H PRN    LORazepam (ATIVAN) injection 4 mg  4 mg IntraVENous Q1H PRN    ziprasidone (GEODON) 20 mg in sterile water (preservative free) 1 mL injection  20 mg IntraMUSCular Q12H PRN    glucose chewable tablet 16 g  4 Tab Oral PRN    dextrose (D50W) injection syrg 12.5-25 g  25-50 mL IntraVENous PRN    glucagon (GLUCAGEN) injection 1 mg  1 mg IntraMUSCular PRN    insulin lispro (HUMALOG) injection   SubCUTAneous AC&HS    nitroglycerin (NITROBID) 2 % ointment 1 Inch  1 Inch Topical Q6H    sodium chloride (NS) flush 5-40 mL  5-40 mL IntraVENous Q8H    sodium chloride (NS) flush 5-40 mL  5-40 mL IntraVENous PRN    morphine 10 mg/ml injection 2 mg  2 mg IntraVENous Q4H PRN    heparin (porcine) injection 5,000 Units  5,000 Units SubCUTAneous Q12H    aspirin chewable tablet 81 mg  81 mg Oral DAILY    pantoprazole (PROTONIX) tablet 40 mg  40 mg Oral ACB    donepezil (ARICEPT) tablet 10 mg  10 mg Oral QHS    memantine [facility]) tablet 10 mg  10 mg Oral BID    temazepam (RESTORIL) capsule 15 mg  15 mg Oral QHS PRN    ondansetron (ZOFRAN ODT) tablet 4 mg  4 mg Oral Q8H PRN    polyethylene glycol (MIRALAX) packet 17 g  17 g Oral DAILY    hydrALAZINE (APRESOLINE) 20 mg/mL injection 10 mg  10 mg IntraVENous Q6H PRN    LORazepam (ATIVAN) tablet 1 mg  1 mg Oral Q4H PRN    amLODIPine (NORVASC) tablet 5 mg  5 mg Oral DAILY     Current Outpatient Medications   Medication Sig    amLODIPine (NORVASC) 10 mg tablet Take 0.5 Tabs by mouth daily.  carvedilol (COREG) 6.25 mg tablet Take 1 Tab by mouth two (2) times a day.  furosemide (LASIX) 20 mg tablet Take 1 Tab by mouth daily.  memantine (NAMENDA) 10 mg tablet TAKE 1 TABLET BY MOUTH TWICE A DAY    losartan-hydroCHLOROthiazide (HYZAAR) 100-25 mg per tablet TAKE 1 TABLET EVERY DAY    donepezil (ARICEPT) 10 mg tablet TAKE 1 TABLET BY MOUTH EVERY DAY    temazepam (RESTORIL) 15 mg capsule Take 1 Cap by mouth nightly as needed for Sleep. Max Daily Amount: 15 mg.    aspirin 81 mg chewable tablet Take 1 Tab by mouth daily.  omeprazole (PRILOSEC) 20 mg capsule Take 1 Cap by mouth daily. Objective:      Physical Exam:  General Appearance:  elderly AAM in no acute distress  Chest:   Clear  Cardiovascular: tachy, no murmur. Abdomen:   Soft, non-tender, bowel sounds are active. Extremities: no peripheral edema  Skin:  Warm and dry. Data Review:   Recent Labs     10/31/19  0237 10/30/19  0310   WBC 6.9 5.6   HGB 12.4 11.8*   HCT 38.6 38.5    234     Recent Labs     10/31/19  1124 10/31/19  0237 10/30/19  0310   NA  --  138 139   K  --  4.1 4.0   CL  --  101 104   CO2  --  30 27   GLU  --  101* 92   BUN  --  32* 25*   CREA  --  1.83* 1.73*   CA  --  9.1 9.0   MG 2.2  --   --    ALB  --  3.0*  --    TBILI  --  0.3  --    SGOT  --  30  --    ALT  --  31  --        No results for input(s): TROIQ, CPK, CKMB in the last 72 hours.     No intake or output data in the 24 hours ending 11/01/19 1458     Telemetry: v paced     Echo  · Left Ventricle: Normal cavity size. Mild concentric hypertrophy. Severe systolic dysfunction. Estimated left ventricular ejection fraction is 21 - 25%. Left ventricular global hypokinesis. Severe (grade 3) left ventricular diastolic dysfunction. · Left Atrium: Mildly dilated left atrium. · Mitral Valve: Mitral valve non-specific thickening. Mild mitral valve regurgitation is present. · Tricuspid Valve: Mild tricuspid valve regurgitation is present. Tricuspid valve prolapse. · Pulmonary Artery: Moderate pulmonary hypertension    Assessment:     Principal Problem:    A-fib ([address] (10/27/2019)    Active Problems:    SVT (supraventricular tachycardia) (HCC)--s/p RFA (4/28/2015)      S/P cardiac pacemaker procedure (5/4/2015)      Overview: 5/4/15 Medtronic dual chamber pacemaker implant            SSS (sick sinus syndrome) ([address] (6/2/2015)      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)      Mixed hyperlipidemia (7/18/2017)      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, without long-term current use of insulin ([address] (7/18/2017)      Alzheimer disease ([address] (7/18/2017)      Alcoholism ([address] (4/23/2018)      Acute pulmonary edema ([address] (10/27/2019)      Alcohol withdrawal syndrome, with delirium ([address] (11/1/2019)        Plan:     Acute chest discomfort, mild pulmonary edema, cardiomyopathy, ejection fraction 20 to 25%, uncontrolled hypertension in the setting of noncompliance of medications:  · Echo with EF 20-25%, Previous echo 4/18 EF 60%. · NSVT during admission. BB increased. · Monitor I/Os, daily weights, labs  · Continue ASA, Norvasc, coreg and Imdur.     · Not likely a lot of benefit to statin medication at this age, lipids near goal  · Suspect noncompliance with medication had a lot to do with his decompensation, considering severe hypertension on admission   · We will not pursue stress testing with dementia and advanced age, family/ POA desire for conservative medical management. · I spoke with Dr. Tyra Redmond who has known him for a long time. He is in agreement that the patient would most likely benefit for conservative medical management considering advanced age, comorbidities, and dementia. Dr. Tyra Redmond discussed with the patient's medical power of  on 11/1/2019 who confirmed conservative medical management, DO NOT RESUSCITATE status. Noted plans for discharge. Thanks for the consult. Follow-up with Dr. Sangita Cason within 1 month after discharge.

## 2019-11-01 NOTE — PROGRESS NOTES
DISCHARGE SUMMARY FROM NURSE    The patient is stable for discharge. I have reviewed the discharge instructions with the patient and caregiver. The patient and caregiver verbalized understanding. All questions were fully answered. The  patient and caregiver denies any complaints. There were no personal belongings, valuables or home medications left at patients bedside,  or safe. Hard scripts and medication handouts were given and reviewed with the patient and caregiver. Appropriate educational materials and medication side effects teaching were provided. Cardiac monitor and IV line(s) were removed.

## 2019-11-01 NOTE — ROUTINE PROCESS
The following appointments have been successfully scheduled:    Date/time Friday, November 08, 2019 11:10 AM  Patient  Gavino BORGES 01/21/1931 (21IP M) #[id number]  Department [facility]  Appointment type Transitional Care  Provider Ovidio May

## 2019-11-01 NOTE — PROGRESS NOTES
VITOR:   1) Home with HH (RN, PT/OT, JHON, CNA)   2) Pt family (Granddaughter Armando Au) will be here shortly to pick pt up via private vehicle. 3) CM will complete a UAI and send to [facility]    12:41 PM- CM has still not heard by from [facility], they are reviewing and attempting to see if pt has had a RW in the past. Per chart review pt has discharged. CM contacted [place] via [employer] to cancel the referral.     12:06 PM- CM informed by RN that pt granddaughter states he needs a RW. CM will send a referral through [facility] for review. 11:51 AM- AVS updated with [facility] who has accepted pt.  attempted to notify Dr. Vanessa Jon NN, Sarah Obrien but was informed by the  that she has retired. CM in contact with Dr. Christa Issa and provided her with a handoff informing her of discharge today and the concerns with AMD. Hope stated she would inform  and assist in getting the AMD on file. Pt is cleared to discharge from CM perspective. 10:27 PM- Discharge order noted. CM contacted the dtr due to there being no AMD on file. CM later learned that pt's dtr is not the primary caregiver for pt and there are family dynamics that have caused tension between the daughter and granddaughter Funmilayo Cummings. Pt's granddaughter is faxing AMD listing her as the MPOA as well as pt's living will. Pt granddaughter gave CM [facility] (RN, PT/OT, JHON, CNA). Pt granddaughter states she needs respite care and assistance taking care of pt. CM inquired about Medicaid application, pt granddaughter states she is attempting to complete one. Pt dtr is requesting CM assist. CM included  JHON to assist with community resources. CM will complete a UAI as pt will likely need assistance as he is declining. Medicare pt has received, reviewed, and signed 2nd IM letter informing them of their right to appeal the discharge. Signed copy has been placed on pt bedside chart (verbal consent from Granddaughter).  Pt's other granddaughter Janet Molina will pick pt up, waiting on a return phone call regarding the time.      Tom Davis, MSW, [address] [facility]   [phone]

## 2019-11-01 NOTE — PROGRESS NOTES
"[employer] Telesitter Monitor initiated on 11/1/2019 at 0745 for the following reason(s): Patient safety, patient at high risk for falls d/t impulsiveness, confusion, and unsteady gait . Patient educated on use of camera and is in agreement.     If patient unable to verbalize understanding of camera necessity, the responsible party notified and educated:  N/A

## 2019-11-02 ENCOUNTER — HOME CARE VISIT (OUTPATIENT)
Dept: SCHEDULING | Facility: HOME HEALTH | Age: 84
End: 2019-11-02
Payer: MEDICARE

## 2019-11-02 VITALS
RESPIRATION RATE: 18 BRPM | SYSTOLIC BLOOD PRESSURE: 120 MMHG | OXYGEN SATURATION: 96 % | HEART RATE: 63 BPM | TEMPERATURE: 98.3 F | DIASTOLIC BLOOD PRESSURE: 80 MMHG

## 2019-11-02 PROCEDURE — 3331090001 HH PPS REVENUE CREDIT

## 2019-11-02 PROCEDURE — 400013 HH SOC

## 2019-11-02 PROCEDURE — 3331090002 HH PPS REVENUE DEBIT

## 2019-11-02 PROCEDURE — G0299 HHS/HOSPICE OF RN EA 15 MIN: HCPCS

## 2019-11-02 NOTE — DISCHARGE SUMMARY
[address] SUMMARY    Name:  Alexandra Swan  MR#:  [id number]  :  1931  ACCOUNT #:  [de-identified]  ADMIT DATE:  10/27/2019  DISCHARGE DATE:  2019      FINAL DIAGNOSES:  1. Atrial fibrillation, rapid ventricular response. 2.  Acute pulmonary edema. 3.  Alcohol withdrawal syndrome. 4.  Alcoholism. 5.  Nonsustained ventricular tachycardia. 6.  Hypertension. 7.  Hyperlipidemia. 8.  Diabetes, diet controlled. 9.  Alzheimer's dementia. 10.  History of supraventricular tachycardia. 11.  Prior cardiac pace maker procedure 2015. 12.  Sick sinus syndrome. 13.  Cardiomyopathy. CONSULTATIONS:  Cardiology, Dr. Robert Mcarthur. PROCEDURE:  None. For details of admission history and physical, please see admit note. HISTORY OF PRESENT ILLNESS:  The patient was admitted to the hospital after presenting initially to [facility] with rapid atrial fibrillation and transferred to [facility] for more intensive care. He was admitted to the hospital and his atrial fibrillation was controlled. He does have baseline Alzheimer's dementia, was extremely confused for 2-3 days of hospitalization, this is secondary to alcohol withdrawal.  Upon talking with his granddaughter who he lives with, he normally drinks about 4 beers plus per day and had not drunken any for couple of days prior to admission. Once his alcohol withdrawal was over, he returned to his baseline alert, but confused, pleasant dementia and the only cardiac rhythm issues after that were some episodes of nonsustained ventricular tachycardia. He did have an echocardiogram done that revealed ejection fraction of 20-25% consistent with cardiomyopathy. It was decided in light of his advanced age of 80 with a significant Alzheimer's dementia that placement of an AICD was not the right way to go and that was discussed with his granddaughter, who was in agreement with that.   She is the medical power of . She also was in agreement of making him do not resuscitate should there be further problems. At this point, he is back to his baseline, alert, but confused and his pulmonary edema has resolved with no dysrhythmias over the last 24 hours and at this point it is felt that he has reached maximal hospital benefit and he will be discharged home. DISCHARGE MEDICATIONS:  His discharge medications will consist of the addition of Coreg 6.25 b.i.d., Lasix 20 mg daily, and his Norvasc dose was changed to 5 mg daily from 10 on admission. His diclofenac, Pepcid, Zofran and ginkgo biloba was discontinued. His discharge medicines were thus consists of losartan /25 one tablet daily, aspirin 81 mg daily, Restoril 15 mg at bedtime p.r.n., Norvasc 5 mg daily, Aricept 10 mg daily, Namenda 10 mg b.i.d., Prilosec 20 mg daily, Coreg 6.25 b.i.d. and Lasix 20 mg daily. I did discuss with his granddaughter of the alcohol intake and encouraged her not to drink any alcohol or let him drink any alcohol.       Morteza Espinosa MD      [id number]  D:  11/01/2019 7:33  T:  11/02/2019 4:45  JOB #:  [id number]  CC:  Fransico Real MD

## 2019-11-03 PROCEDURE — 3331090001 HH PPS REVENUE CREDIT

## 2019-11-03 PROCEDURE — 3331090002 HH PPS REVENUE DEBIT

## 2019-11-04 ENCOUNTER — HOME CARE VISIT (OUTPATIENT)
Dept: SCHEDULING | Facility: HOME HEALTH | Age: 84
End: 2019-11-04
Payer: MEDICARE

## 2019-11-04 VITALS
TEMPERATURE: 97.5 F | SYSTOLIC BLOOD PRESSURE: 118 MMHG | DIASTOLIC BLOOD PRESSURE: 64 MMHG | RESPIRATION RATE: 18 BRPM | HEART RATE: 63 BPM | OXYGEN SATURATION: 98 %

## 2019-11-04 PROCEDURE — 3331090002 HH PPS REVENUE DEBIT

## 2019-11-04 PROCEDURE — G0151 HHCP-SERV OF PT,EA 15 MIN: HCPCS

## 2019-11-04 PROCEDURE — 3331090001 HH PPS REVENUE CREDIT

## 2019-11-05 ENCOUNTER — PATIENT OUTREACH (OUTPATIENT)
Dept: INTERNAL MEDICINE CLINIC | Age: 84
End: 2019-11-05

## 2019-11-05 ENCOUNTER — HOME CARE VISIT (OUTPATIENT)
Dept: SCHEDULING | Facility: HOME HEALTH | Age: 84
End: 2019-11-05
Payer: MEDICARE

## 2019-11-05 VITALS
SYSTOLIC BLOOD PRESSURE: 120 MMHG | TEMPERATURE: 98 F | RESPIRATION RATE: 16 BRPM | DIASTOLIC BLOOD PRESSURE: 62 MMHG | OXYGEN SATURATION: 99 % | HEART RATE: 69 BPM

## 2019-11-05 PROCEDURE — 3331090001 HH PPS REVENUE CREDIT

## 2019-11-05 PROCEDURE — G0299 HHS/HOSPICE OF RN EA 15 MIN: HCPCS

## 2019-11-05 PROCEDURE — 3331090002 HH PPS REVENUE DEBIT

## 2019-11-05 NOTE — PROGRESS NOTES
11-5-19 at 2:46pm: Care Transitions Nurse attempted to call patient; however patient's granddaughter, Michel Green (on PHI, answered the phone. Penelope Morales states she is currently away from their home and is unable to have a private conversation; however she states she will call CTN back this afternoon. Hospital Discharge Follow-Up    Date/Time:  11/6/2019 9:57 AM    Patient was admitted to [facility] on 10-27-19 and discharged on 11-1-19 for:    FINAL DIAGNOSES:  1. Atrial fibrillation, rapid ventricular response. 2.  Acute pulmonary edema. 3.  Alcohol withdrawal syndrome. 4.  Alcoholism. 5.  Nonsustained ventricular tachycardia. 6.  Hypertension. 7.  Hyperlipidemia. 8.  Diabetes, diet controlled. 9.  Alzheimer's dementia. 10.  History of supraventricular tachycardia. 11.  Prior cardiac pace maker procedure 05/04/2015. 12.  Sick sinus syndrome. 13.  Cardiomyopathy. [facility] physician discharge summary was available at the time of outreach. Patient's granddaughter, Michel Green, was contacted within two business days of discharge. Challenges reviewed with the provider   - Granddaughter reports patient's \"right foot drags\" and PT is working with patient on this problem. - Granddaughter reports patient utilizes a regular diet at home, not a diabetic diet. - Granddaughter reports patient is doing well and back to his baseline activity level. - Granddaughter reports patient has not had any alcohol intake since discharge on 11-1-19.  - Granddaughter states she plans to purchase a home scale today and begin weighing patient daily on 11-7-19. Education provided to granddaughter regarding the correct procedure for daily home weights and encouraged granddaughter to log weights daily in a notebook. Granddaughter verbalizes an understanding and states she will do so. - Granddaughter prepares weekly pill box and patient takes medications from pill box only.      Advance Care Planning:   Does patient have an Advance Directive:  not on file; education provided to patient's granddaughter. Granddaughter reports patient currently has a clear mind almost all the time.       Component      Latest Ref Rng & Units 10/31/2019 10/30/2019 10/29/2019 10/29/2019           2:37 AM  3:10 AM  2:21 AM  2:21 AM   Glucose      65 - 100 mg/dL 101 (H) 92  91   BUN      6 - 20 MG/DL 32 (H) 25 (H)  21 (H)   Creatinine      0.70 - 1.30 MG/DL 1.83 (H) 1.73 (H)  1.56 (H)     Component      Latest Ref Rng & Units 10/27/2019 10/27/2019           7:45 AM  4:11 AM   Glucose      65 - 100 mg/dL  172 (H)   BUN      6 - 20 MG/DL  21 (H)   Creatinine      0.70 - 1.30 MG/DL  1.67 (H)     Component      Latest Ref Rng & Units 10/31/2019 10/30/2019 10/29/2019 10/29/2019           2:37 AM  3:10 AM  2:21 AM  2:21 AM   GFR est AA      >60 ml/min/1.73m2 43 (L) 45 (L)  51 (L)   GFR est non-AA      >60 ml/min/1.73m2 35 (L) 37 (L)  42 (L)     Component      Latest Ref Rng & Units 10/27/2019 10/27/2019           7:45 AM  4:11 AM   GFR est AA      >60 ml/min/1.73m2  47 (L)   GFR est non-AA      >60 ml/min/1.73m2  39 (L)     Component      Latest Ref Rng & Units 10/31/2019 10/30/2019 10/29/2019 10/29/2019           2:37 AM  3:10 AM  2:21 AM  2:21 AM   Albumin      3.5 - 5.0 g/dL 3.0 (L)        Component      Latest Ref Rng & Units 10/27/2019 10/27/2019           7:45 AM  4:11 AM   Albumin      3.5 - 5.0 g/dL  3.4 (L)     Component      Latest Ref Rng & Units 10/31/2019 10/30/2019 10/29/2019 10/29/2019           2:37 AM  3:10 AM  2:21 AM  2:21 AM   A-G Ratio      1.1 - 2.2   0.9 (L)        Component      Latest Ref Rng & Units 10/27/2019 10/27/2019           7:45 AM  4:11 AM   A-G Ratio      1.1 - 2.2    0.9 (L)     Component      Latest Ref Rng & Units 10/31/2019 10/30/2019           2:37 AM  3:10 AM   RBC      4.10 - 5.70 M/uL 4.55 4.44   HGB      12.1 - 17.0 g/dL 12.4 11.8 (L)   HCT      36.6 - 50.3 % 38.6 38.5     Component Latest Ref Rng & Units 10/29/2019           2:21 AM   Cholesterol, total      <200 MG/   Triglyceride      <150 MG/DL 82   HDL Cholesterol      MG/DL 64   LDL, calculated      0 - 100 MG/DL 90.6   VLDL, calculated      MG/DL 16.4   CHOL/HDL Ratio      0.0 - 5.0   2.7     Component      Latest Ref Rng & Units 10/29/2019 10/29/2019 10/29/2019           2:21 AM  2:21 AM  2:21 AM   aPTT      22.1 - 32.0 sec   27.8   Troponin-I, Qt.      <0.05 ng/mL  0.12 (H)    TSH      0.36 - 3.74 uIU/mL 7.03 (H)       Method of communication with provider :chart routing    Inpatient RRAT score: 51 on 19. Was this a readmission? no   Patient stated reason for the readmission: n/a    Care Transition Nurse (CTN) contacted the patient's granddaughter, Norma Houston,  by telephone to perform post hospital discharge assessment. Verified name and  with granddaughter as identifiers. Provided introduction to self, and explanation of the CTN role. Granddaughter, Ulises Santiago, received hospital discharge instructions. CTN reviewed discharge instructions and red flags with granddaughter who verbalized understanding. Granddaughter given an opportunity to ask questions and does not have any further questions or concerns at this time. The granddaughter agrees to contact the PCP office for questions related to patient's healthcare. CTN provided contact information for future reference. Disease Specific:   N/A    Patients top risk factors for readmission:  medical condition    Home Health orders at discharge: PT, [address]: [facility] IN[facility]  Date of initial visit: SN admit on 19 and PT visited patient on 19.      Durable Medical Equipment ordered at discharge: none  [address] received: n/a    Medications per [facility] After Visit Summary dated 19:   New Medications at Discharge:   carvedilol 6.25 mg tablet  Commonly known as: COREG  Take 1 Tab by mouth two (2) times a day. 6.25 mg.    furosemide 20 mg tablet  Commonly known as: LASIX  Take 1 Tab by mouth daily. 20 mg. Changed Medications at Discharge:   amLODIPine 10 mg tablet  Commonly known as: NORVASC  Take 0.5 Tabs by mouth daily. 5 mg. Discontinued Medications at Discharge:   diclofenac EC 75 mg EC tablet  Commonly known as: VOLTAREN  famotidine 20 mg tablet  Commonly known as: PEPCID  naproxen 500 mg tablet  Commonly known as: NAPROSYN  ondansetron 4 mg disintegrating tablet  Commonly known as: ZOFRAN ODT  ONE-A-DAY MEN 50 PLUS (GINKGO) 400-300-120 mcg-mcg-mg Tab  Generic drug: mv-mins-folic-lycopene-ginkgo    Medication reconciliation was performed with patient's granddaughter, Gabi Valdez, who verbalizes understanding of administration of home medications. There were no barriers to obtaining medications identified at this time and granddaughter states patient's medications are affordable. Patient's granddaughter states she prepared patient's medications utilizing a weekly pill box and patient takes medications from the prepared pill box only. Referral to Pharm D needed: no     Current Outpatient Medications   Medication Sig    multivitamin, tx-iron-ca-min (THERA-M W/ IRON) 9 mg iron-400 mcg tab tablet Take 1 Tab by mouth daily.  amLODIPine (NORVASC) 10 mg tablet Take 0.5 Tabs by mouth daily.  carvedilol (COREG) 6.25 mg tablet Take 1 Tab by mouth two (2) times a day.  furosemide (LASIX) 20 mg tablet Take 1 Tab by mouth daily.  memantine (NAMENDA) 10 mg tablet TAKE 1 TABLET BY MOUTH TWICE A DAY    losartan-hydroCHLOROthiazide (HYZAAR) 100-25 mg per tablet TAKE 1 TABLET EVERY DAY    donepezil (ARICEPT) 10 mg tablet TAKE 1 TABLET BY MOUTH EVERY DAY    temazepam (RESTORIL) 15 mg capsule Take 1 Cap by mouth nightly as needed for Sleep. Max Daily Amount: 15 mg.    aspirin 81 mg chewable tablet Take 1 Tab by mouth daily.     omeprazole (PRILOSEC) 20 mg capsule Take 1 Cap by mouth daily. No current facility-administered medications for this visit. There are no discontinued medications. [facility] follow up appointment(s):   Future Appointments   Date Time Provider Sonia Irizarry   11/7/2019 To Be Determined Pranav Mckay [address] [facility] [address]   11/7/2019  1:00 PM Niurka Underwood OT [facility] [place] [address]   11/7/2019  4:00 PM Indigo Taylor [place] [address]   11/8/2019 11:10 AM Hao Molina MD [facility] MORGAN SCHED   11/12/2019 To Be Determined Deberah Brandon [place] [address]   11/12/2019 To Be Determined Rosado Snowball [place] [address]   11/14/2019 To Be Determined Deberah Brandon [place] [address]   11/14/2019 To Be Determined Rosado Snowball [place] [address]   11/19/2019  9:00 AM Sascha Borja MD [facility] [place] SCHED   11/19/2019 To Be Determined Deberah Brandon [place] [address]   11/19/2019 To Be Determined Rosado Snowball [place] [address]   11/20/2019 To Be Determined [facility] [place] [address]   11/21/2019 To Be Determined Deberah Brandon [place] [address]   11/21/2019 To Be Determined Rosado Snowball [place] [address]   11/27/2019 To Be Determined Rosado Snowball [place] [address]   12/4/2019 To Be Determined Rosado Snowball [place] [address]   12/6/2019 10:00 AM Hao Molina MD [facility] [place]ORGAN SCHED   12/11/2019 To Be Determined Rosado Snowball [place] [facility] [address]   12/18/2019 To Be Determined Rosado Snowball [place] [facility] [address]   12/25/2019 To Be Determined Rosado Snowball [place] [address]   12/31/2019 To Be Determined Rosado Snowball [facility]      Non-[facility] follow up appointment(s): Dr. Osmin Jurado on 11-19-19. [facility]:  information provided as a resource     Goals      Attends follow-up appointments as directed. 11-6-19: Patient has VITOR visit scheduled with Dr. Jay Isabel on 11-8-19 and cardio follow-up scheduled with Dr. Xiomara Sanz on 11-19-19. Marcia Bai Understands red flags post discharge. 11-6-19: Red flags of afib/vtac and alcohol abuse reviewed with granddaughter, Horacio Armijo, and granddaughter verbalizes an understanding. Granddaughter states patient has not had any alcohol intake since discharge on 11-1-19. Granddaughter reports patient is \"doing well and is back to himself. \" Care Transitions Nurse will review red flags again on next phone conversation with granddaughter.  Bairon Smith

## 2019-11-06 ENCOUNTER — HOME CARE VISIT (OUTPATIENT)
Dept: HOME HEALTH SERVICES | Facility: HOME HEALTH | Age: 84
End: 2019-11-06
Payer: MEDICARE

## 2019-11-06 PROCEDURE — 3331090001 HH PPS REVENUE CREDIT

## 2019-11-06 PROCEDURE — 3331090002 HH PPS REVENUE DEBIT

## 2019-11-07 ENCOUNTER — HOME CARE VISIT (OUTPATIENT)
Dept: SCHEDULING | Facility: HOME HEALTH | Age: 84
End: 2019-11-07
Payer: MEDICARE

## 2019-11-07 ENCOUNTER — HOME CARE VISIT (OUTPATIENT)
Dept: HOME HEALTH SERVICES | Facility: HOME HEALTH | Age: 84
End: 2019-11-07
Payer: MEDICARE

## 2019-11-07 VITALS
OXYGEN SATURATION: 98 % | DIASTOLIC BLOOD PRESSURE: 62 MMHG | HEART RATE: 94 BPM | SYSTOLIC BLOOD PRESSURE: 128 MMHG | RESPIRATION RATE: 16 BRPM | TEMPERATURE: 97.9 F

## 2019-11-07 PROBLEM — I47.29 PAROXYSMAL VT: Status: ACTIVE | Noted: 2019-11-07

## 2019-11-07 PROBLEM — I42.9 CARDIOMYOPATHY (HCC): Status: ACTIVE | Noted: 2019-11-07

## 2019-11-07 PROCEDURE — G0299 HHS/HOSPICE OF RN EA 15 MIN: HCPCS

## 2019-11-07 PROCEDURE — 3331090002 HH PPS REVENUE DEBIT

## 2019-11-07 PROCEDURE — 3331090001 HH PPS REVENUE CREDIT

## 2019-11-07 PROCEDURE — G0151 HHCP-SERV OF PT,EA 15 MIN: HCPCS

## 2019-11-07 NOTE — CDMP QUERY
Patient admitted with Afib with RVR, noted to have Acute pulmonary edema. If possible, please document in progress notes and d/c summary if the etiology of the acute pulmonary edema be further specified as: 
 
=>Acute systolic heart failure =>Left heart failure =>Acute pulmonary edema secondary to atrial fib 
=>Other Explanation of clinical findings =>Clinically Undetermined (no explanation for clinical findings) The medical record reflects the following: 
 
   Risk Factors: 88 BM Clinical Indicators: Echo: EF: 20-25%, cardiology documented a new diagnosis of heart failure, pt with \"acute pulmonary edema\" documented Treatment: Coreg, cardiology consult Thank you, [email or web address] 
[phone]

## 2019-11-08 ENCOUNTER — HOME CARE VISIT (OUTPATIENT)
Dept: HOME HEALTH SERVICES | Facility: HOME HEALTH | Age: 84
End: 2019-11-08
Payer: MEDICARE

## 2019-11-08 VITALS
SYSTOLIC BLOOD PRESSURE: 110 MMHG | TEMPERATURE: 97.7 F | OXYGEN SATURATION: 100 % | RESPIRATION RATE: 16 BRPM | DIASTOLIC BLOOD PRESSURE: 60 MMHG | HEART RATE: 60 BPM

## 2019-11-08 PROCEDURE — 3331090001 HH PPS REVENUE CREDIT

## 2019-11-08 PROCEDURE — 3331090002 HH PPS REVENUE DEBIT

## 2019-11-08 NOTE — PROGRESS NOTES
Transitions Of Care (10/27-11/5/19)       HPI:  Gay Salazar Sr is a 80y.o. year old male who is here for a follow up visit for hospitalization transition of care. He was last seen by me on 9/5/2019 at the office and on 11/1/2019 at time of hospital discharge. Discharged on: 11/1/2019    Diagnosis in hospital:  1. Atrial fibrillation rapid ventricular response  2. Acute alcohol withdrawal syndrome  3. Acute pulmonary edema secondary to #1 resolved  4. Alcoholism  5. Nonsustained ventricular tachycardia  6. Cardiomyopathy  7. Hypertension  8. Hyperlipidemia  9. Diabetes  10. Alzheimer's dementia  6. History of SVT  12. Prior cardiac pacemaker procedure  13. Sick sinus syndrome    Complications in hospital: No complications although he did develop delirium tremens from alcohol withdrawal    Medication changes: Addition of Coreg 6.25 twice daily, Lasix 20 daily her Norvasc dose was changed from 5 mg daily to 10 mg daily, diclofenac, Pepcid, Zofran and ginkgo biloba were discontinued    Discharge Summary reviewed. Today 11/11/2019      He reports the following: Since discharge from hospital he claims to be feeling well and denies any chest pain, shortness of breath, palpitations, PND, orthopnea or other cardiorespiratory complaints. He claims to remain alcohol free since hospital discharge. He denies any chest pain, shortness of breath, palpitations, PND, orthopnea or other cardiorespiratory complaints. He notes no GI or  complaints. He notes no headaches, dizziness or neurologic complaints. He denies any change of his chronic arthritic complaints and there are no other complaints on complete review of systems.           Visit Vitals  /84 (BP 1 Location: Left arm, BP Patient Position: Sitting)   Pulse 87   Temp 98 °F (36.7 °C) (Oral)   Resp 19   Ht 5' 9\" (1.753 m)   Wt 136 lb 8 oz (61.9 kg)   SpO2 98%   BMI 20.16 kg/m²       Historical Data    Past Medical History:   Diagnosis Date  Abdominal pain 7/18/2017    Alzheimer disease ([facility] 7/18/2017    Anemia 7/18/2017    Arthritis     Back pain 7/18/2017    Bradycardia 7/18/2017    CAD (coronary artery disease)     hx of MI    CKD (chronic kidney disease), stage II 7/18/2017    constipation     Depression 7/18/2017    Diabetes mellitus ([facility] 7/18/2017    Diverticulosis 7/18/2017    DJD (degenerative joint disease) 7/18/2017    Encounter for long-term (current) use of other medications 7/18/2017    Fatigue     Gastritis and duodenitis 7/18/2017    GERD (gastroesophageal reflux disease)     GI bleed 7/18/2017    Hearing loss     Hyperlipidemia 7/18/2017    Hypertension     Hypertension with renal disease 7/18/2017    Ill-defined condition     Dementia    Internal hemorrhoids 7/18/2017    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    S/P cardiac pacemaker procedure 5/4/2015    5/4/15 Medtronic dual chamber pacemaker implant     Weight loss     lost over 40 pounds last year- has no appetite       Past Surgical History:   Procedure Laterality Date    COLONOSCOPY N/A 6/22/2017    COLONOSCOPY performed by Lester Kenyon MD at [address] [place] [address]  6/22/2017         [address]  7/10/2014    right inguinal    HX OTHER SURGICAL      cystectomy from back    DC EGD TRANSORAL BIOPSY SINGLE/MULTIPLE  2/14/2012         UPPER GI ENDOSCOPY,BIOPSY  6/22/2017            Outpatient Encounter Medications as of 11/11/2019   Medication Sig Dispense Refill    multivitamin, tx-iron-ca-min (THERA-M W/ IRON) 9 mg iron-400 mcg tab tablet Take 1 Tab by mouth daily.  amLODIPine (NORVASC) 10 mg tablet Take 0.5 Tabs by mouth daily. 90 Tab 1    carvedilol (COREG) 6.25 mg tablet Take 1 Tab by mouth two (2) times a day. 180 Tab prn    furosemide (LASIX) 20 mg tablet Take 1 Tab by mouth daily.  90 Tab prn    memantine (NAMENDA) 10 mg tablet TAKE 1 TABLET BY MOUTH TWICE A  Tab 3  losartan-hydroCHLOROthiazide (HYZAAR) 100-25 mg per tablet TAKE 1 TABLET EVERY DAY 90 Tab 1    donepezil (ARICEPT) 10 mg tablet TAKE 1 TABLET BY MOUTH EVERY DAY 90 Tab 1    temazepam (RESTORIL) 15 mg capsule Take 1 Cap by mouth nightly as needed for Sleep. Max Daily Amount: 15 mg. 30 Cap 0    aspirin 81 mg chewable tablet Take 1 Tab by mouth daily. 30 Tab 1    omeprazole (PRILOSEC) 20 mg capsule Take 1 Cap by mouth daily. 90 Cap 3     No facility-administered encounter medications on file as of 11/11/2019.          No Known Allergies     Social History     Socioeconomic History    Marital status: LEGALLY      Spouse name: Not on file    Number of children: Not on file    Years of education: Not on file    Highest education level: Not on file   Occupational History    Not on file   Social Needs    Financial resource strain: Not on file    Food insecurity:     Worry: Not on file     Inability: Not on file    Transportation needs:     Medical: Not on file     Non-medical: Not on file   Tobacco Use    Smoking status: Former Smoker     Packs/day: 0.50     Years: 10.00     Pack years: 5.00     Start date: 7/12/1991    Smokeless tobacco: Never Used    Tobacco comment: quit 50 years ago   Substance and Sexual Activity    Alcohol use: Yes     Comment: Occasional beer    Drug use: No    Sexual activity: Not Currently   Lifestyle    Physical activity:     Days per week: Not on file     Minutes per session: Not on file    Stress: Not on file   Relationships    Social connections:     Talks on phone: Not on file     Gets together: Not on file     Attends Sabianism service: Not on file     Active member of club or organization: Not on file     Attends meetings of clubs or organizations: Not on file     Relationship status: Not on file    Intimate partner violence:     Fear of current or ex partner: Not on file     Emotionally abused: Not on file     Physically abused: Not on file     Forced sexual activity: Not on file   Other Topics Concern    Not on file   Social History Narrative    Not on file      REVIEW OF SYSTEMS:  General: negative for - chills or fever  ENT: negative for - headaches, nasal congestion or tinnitus  Eyes: no blurred or visual changes  Neck: No stiffness or swollen nodes  Respiratory: negative for - cough, hemoptysis, shortness of breath or wheezing  Cardiovascular : negative for - chest pain, edema, palpitations or shortness of breath  Gastrointestinal: negative for - abdominal pain, blood in stools, heartburn or nausea/vomiting  Genito-Urinary: no dysuria, trouble voiding, or hematuria  Musculoskeletal: negative for - gait disturbance, joint pain, joint stiffness or joint swelling  Neurological: no TIA or stroke symptoms  Hematologic: no bruises, no bleeding  Lymphatic: no swollen glands  Integument: no lumps, mole changes, nail changes or rash  Endocrine:no malaise/lethargy poly uria or polydipsia or unexpected weight changes      Visit Vitals  /84 (BP 1 Location: Left arm, BP Patient Position: Sitting)   Pulse 87   Temp 98 °F (36.7 °C) (Oral)   Resp 19   Ht 5' 9\" (1.753 m)   Wt 136 lb 8 oz (61.9 kg)   SpO2 98%   BMI 20.16 kg/m²     CONSTITUTIONAL: well , well nourished, appears age appropriate  HEAD: normocephalic, atraumatic  EYES: sclera anicteric, PERRL, EOMI  ENMT:moist mucous membranes, pharynx clear          Nares: w/o erythema or edema  NECK: supple. Thyroid normal, No JVD or bruits  RESPIRATORY: Chest: clear to ascultation and percussion   CARDIOVASCULAR: Heart: regular rate and rhythm no murmurs, rubs or gallops, PMI not displaced, no thrill  GASTROINTESTINAL: Abdomen: non distended, soft, non-tender, bowel sounds normal  HEMATOLOGIC: no petechiae or purpura  LYMPHATIC: no lymphadenopathy  MUSCULOSKELETAL: Extremities: no edema or active synovitis, pulse 1+   BACK; no point or CVAT  INTEGUMENT: No unusual rashes or suspicious skin lesions noted.  Nails appear normal.  NEUROLOGIC: non-focal exam   MENTAL STATUS: alert and oriented, appropriate affect   PSYCHIATRIC: normal affect    ASSESSMENT:   1. Paroxysmal atrial fibrillation (HCC)    2. Acute pulmonary edema (HCC)    3. Alcohol withdrawal syndrome, with delirium (HCC)    4. Paroxysmal VT ([address]    5. Alcoholism ([facility]    6. Anemia, unspecified type    7. Cardiomyopathy, unspecified type ([facility]    8. SSS (sick sinus syndrome) (HCC)    9. S/P cardiac pacemaker procedure    10. Hypertension with renal disease    11. CKD (chronic kidney disease), stage II    12. 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, without long-term current use of insulin ([facility])      Impression  1. Paroxysmal atrial fibrillation appears to be in sinus rhythm today and confirmed by EKG  2. Pulmonary edema related to cardiac arrhythmia that has resolved he was noted to have a cardiomyopathy in the hospital and started on Coreg which I assume he is taking although he did not bring his medicines today. 3.  Alcohol withdrawal syndrome I discussed the absolute need to abstain from alcohol  4. Paroxysmal ventricular tachycardia during hospitalization in the hospitalization we discussed proceeding further with a AICD however he and his family did not want to proceed with that  5. Alcoholism I discussed strongly abstinence  6. Anemia repeat status pending next #7 cardiomyopathy currently stable  8. Sick sinus syndrome with pacemaker in  9. Hypertension that is controlled  10. CKD status pending  11. Diabetes blood sugars pending on his BMP  At this point he is medically stable and I stressed the importance of staying away from alcohol and at this point I would not make any change in medications. He has an appointment to see me in December and we will keep that appointment although I will see him sooner should to be a problem.     PLAN:  .  Orders Placed This Encounter    CBC WITH AUTOMATED DIFF    METABOLIC PANEL, BASIC ([employer])    AMB POC EKG ROUTINE W/ 12 LEADS, INTER & REP         ATTENTION:   This medical record was transcribed using an electronic medical records system. Although proofread, it may and can contain electronic and spelling errors. Other human spelling and other errors may be present. Corrections may be executed at a later time. Please feel free to contact us for any clarifications as needed. Follow-up and Dispositions    · Return At prior appt. Steven Celaya MD    Orders Placed This Encounter    CBC WITH AUTOMATED DIFF    METABOLIC PANEL, BASIC ([employer])    AMB POC EKG ROUTINE W/ 12 LEADS, INTER & REP     Order Specific Question:   Reason for Exam:     Answer:   A fib          No results found for any visits on 11/11/19. I have reviewed the patient's medical history in detail and updated the computerized patient record. We had a prolonged discussion about these complex clinical issues and went over the various important aspects to consider. All questions were answered. Advised him to call back or return to office if symptoms do not improve, change in nature, or persist.    He was given an after visit summary or informed of [employer] [facility] which includes patient instructions, diagnoses, current medications, & vitals. He expressed understanding with the diagnosis and plan.

## 2019-11-09 PROCEDURE — 3331090002 HH PPS REVENUE DEBIT

## 2019-11-09 PROCEDURE — 3331090001 HH PPS REVENUE CREDIT

## 2019-11-10 PROCEDURE — 3331090002 HH PPS REVENUE DEBIT

## 2019-11-10 PROCEDURE — 3331090001 HH PPS REVENUE CREDIT

## 2019-11-11 ENCOUNTER — OFFICE VISIT (OUTPATIENT)
Dept: INTERNAL MEDICINE CLINIC | Age: 84
End: 2019-11-11

## 2019-11-11 VITALS
WEIGHT: 136.5 LBS | OXYGEN SATURATION: 98 % | DIASTOLIC BLOOD PRESSURE: 84 MMHG | HEART RATE: 87 BPM | TEMPERATURE: 98 F | SYSTOLIC BLOOD PRESSURE: 120 MMHG | RESPIRATION RATE: 19 BRPM | HEIGHT: 69 IN | BODY MASS INDEX: 20.22 KG/M2

## 2019-11-11 DIAGNOSIS — F10.931 ALCOHOL WITHDRAWAL SYNDROME, WITH DELIRIUM (HCC): ICD-10-CM

## 2019-11-11 DIAGNOSIS — N18.2 CONTROLLED TYPE 2 DIABETES MELLITUS WITH STAGE 2 CHRONIC KIDNEY DISEASE, WITHOUT LONG-TERM CURRENT USE OF INSULIN (HCC): ICD-10-CM

## 2019-11-11 DIAGNOSIS — E11.22 CONTROLLED TYPE 2 DIABETES MELLITUS WITH STAGE 2 CHRONIC KIDNEY DISEASE, WITHOUT LONG-TERM CURRENT USE OF INSULIN (HCC): ICD-10-CM

## 2019-11-11 DIAGNOSIS — I12.9 HYPERTENSION WITH RENAL DISEASE: ICD-10-CM

## 2019-11-11 DIAGNOSIS — I49.5 SSS (SICK SINUS SYNDROME) (HCC): ICD-10-CM

## 2019-11-11 DIAGNOSIS — Z95.0 S/P CARDIAC PACEMAKER PROCEDURE: ICD-10-CM

## 2019-11-11 DIAGNOSIS — D64.9 ANEMIA, UNSPECIFIED TYPE: ICD-10-CM

## 2019-11-11 DIAGNOSIS — J81.0 ACUTE PULMONARY EDEMA (HCC): ICD-10-CM

## 2019-11-11 DIAGNOSIS — I48.0 PAROXYSMAL ATRIAL FIBRILLATION (HCC): Primary | ICD-10-CM

## 2019-11-11 DIAGNOSIS — F10.20 ALCOHOLISM (HCC): ICD-10-CM

## 2019-11-11 DIAGNOSIS — N18.2 CKD (CHRONIC KIDNEY DISEASE), STAGE II: ICD-10-CM

## 2019-11-11 DIAGNOSIS — I42.9 CARDIOMYOPATHY, UNSPECIFIED TYPE (HCC): ICD-10-CM

## 2019-11-11 DIAGNOSIS — I47.29 PAROXYSMAL VT: ICD-10-CM

## 2019-11-11 LAB
ANION GAP SERPL CALC-SCNC: 7 MMOL/L
BUN SERPL-MCNC: 19 MG/DL (ref 9–20)
CALCIUM SERPL-MCNC: 9.9 MG/DL (ref 8.4–10.2)
CHLORIDE SERPL-SCNC: 103 MMOL/L (ref 98–107)
CO2 SERPL-SCNC: 31 MMOL/L (ref 22–32)
CREAT SERPL-MCNC: 1.4 MG/DL (ref 0.8–1.5)
GLUCOSE SERPL-MCNC: 105 MG/DL (ref 75–110)
POTASSIUM SERPL-SCNC: 4.2 MMOL/L (ref 3.6–5)
SODIUM SERPL-SCNC: 141 MMOL/L (ref 137–145)

## 2019-11-11 PROCEDURE — 3331090002 HH PPS REVENUE DEBIT

## 2019-11-11 PROCEDURE — 3331090001 HH PPS REVENUE CREDIT

## 2019-11-11 NOTE — PATIENT INSTRUCTIONS
Alzheimer's Disease: Care Instructions Your Care Instructions Alzheimer's disease is a type of dementia. It causes memory loss and affects judgment, language, and behavior. You may have trouble making decisions or may get lost in places that you used to know well. Alzheimer's disease is different than mild memory loss that occurs with aging. It is not clear what causes Alzheimer's disease, but it is the most common form of dementia in older adults. Finding out that you have this disease is a shock. You may be afraid and worried about how the condition will change your life. Although there is no cure at this time, medicine in some cases may slow memory loss for a while. Other medicines may be able to help you sleep or cope with depression and behavior changes. Alzheimer's disease is different for everyone. It may take many years to develop. In some cases, people can function well for a long time. In the early stage of the disease, you can do things at home to make life easier and safer. You also can keep doing your hobbies and other activities. Many people find comfort in planning now for their future needs. Follow-up care is a key part of your treatment and safety. Be sure to make and go to all appointments, and call your doctor if you are having problems. It's also a good idea to know your test results and keep a list of the medicines you take. How can you care for yourself at home? Taking care of yourself · If your doctor gives you medicines, take them exactly as prescribed. Call your doctor if you think you are having a problem with your medicine. You will get more details on the medicines your doctor prescribes. · Eat a balanced diet. Get plenty of whole grains, fruits, and vegetables every day. If you are not hungry at mealtimes, eat snacks at midmorning and in the afternoon. Try drinks such as Boost, Ensure, or Sustacal if you are having trouble keeping your weight up. · Stay active. Exercise such as walking may slow the decline of your mental abilities. Try to stay active mentally too. Read and work crossword puzzles if you enjoy these activities. · If you have trouble sleeping, do not nap during the day. Get regular exercise (but not within several hours of bedtime). Drink a glass of warm milk or caffeine-free herbal tea before going to bed. · Ask your doctor about support groups and other resources in your area. They can help people who have Alzheimer's disease and their families. · Be patient. You may find that a task takes you longer than it used to. · If you have not already done so, make a list of advance directives. Advance directives are instructions to your doctor and family members about what kind of care you want if you become unable to speak or express yourself. Talk to a  about making a will, if you do not already have one. Keeping schedules · Develop a routine. You will feel less frustrated or confused if you have a clear, simple plan of what to do every day. ? Make lists of your medicines and when to take them. ? Write down appointments and other tasks in a calendar. ? Put sticky notes around the house to help you remember events and other things you have to do. ? Schedule activities and tasks for times of the day when you are best able to handle them. Staying safe · Tell someone when you are going out and where you are going. Let the person know when you will be back. Before you go out alone, write down where you are going, how to get there, and how to get back home. Do this even if you have gone there many times before. Take someone along with you when possible. · Make your home safe. Tack down rugs, put no-slip tape in the tub, use handrails, and put safety switches on stoves and appliances. · Have a family member or other caregiver tell you whether you are driving badly. Deciding to stop driving is very hard for many people.  Driving helps you feel independent. Your state 's license bureau can do a driving test if there is any question. Plan for other means of getting around when you are no longer able to drive. · Use strong lighting, especially at night. Put night-lights in bedrooms, hallways, and bathrooms. · Lower the hot water temperature setting to 120°F or lower to avoid burns. When should you call for help? Call 911 anytime you think you may need emergency care. For example, call if: 
  · You are lost and do not know whom to call.  
  · You are injured and do not know whom to call.  
 Call your doctor now or seek immediate medical care if: 
  · Your symptoms suddenly get much worse.  
 Watch closely for changes in your health, and be sure to contact your doctor if: 
  · You want more information about how you can take care of yourself. Where can you learn more? Go to http://zach-herlinda.info/. Enter Y179 in the search box to learn more about \"Alzheimer's Disease: Care Instructions. \" Current as of: May 28, 2019 Content Version: 12.2 © 1665-4822 [employer]. Care instructions adapted under license by [employer] (which disclaims liability or warranty for this information). If you have questions about a medical condition or this instruction, always ask your healthcare professional. [address] any warranty or liability for your use of this information.

## 2019-11-11 NOTE — PROGRESS NOTES
Chief Complaint   Patient presents with    Transitions Of Care     10/27-11/5/19     Visit Vitals  /84 (BP 1 Location: Left arm, BP Patient Position: Sitting)   Pulse 87   Temp 98 °F (36.7 °C) (Oral)   Resp 19   Ht 5' 9\" (1.753 m)   Wt 136 lb 8 oz (61.9 kg)   SpO2 98%   BMI 20.16 kg/m²     1. Have you been to the ER, urgent care clinic since your last visit? Hospitalized since your last visit? 10/27-11/5/19    2. Have you seen or consulted any other health care providers outside of the [address] [place] [address] since your last visit? Include any pap smears or colon screening.  No

## 2019-11-12 ENCOUNTER — HOME CARE VISIT (OUTPATIENT)
Dept: SCHEDULING | Facility: HOME HEALTH | Age: 84
End: 2019-11-12
Payer: MEDICARE

## 2019-11-12 VITALS
RESPIRATION RATE: 16 BRPM | TEMPERATURE: 98.4 F | HEART RATE: 50 BPM | OXYGEN SATURATION: 98 % | DIASTOLIC BLOOD PRESSURE: 66 MMHG | SYSTOLIC BLOOD PRESSURE: 148 MMHG

## 2019-11-12 LAB
BASOPHILS # BLD AUTO: 0.1 X10E3/UL (ref 0–0.2)
BASOPHILS NFR BLD AUTO: 1 %
EOSINOPHIL # BLD AUTO: 0.2 X10E3/UL (ref 0–0.4)
EOSINOPHIL NFR BLD AUTO: 3 %
ERYTHROCYTE [DISTWIDTH] IN BLOOD BY AUTOMATED COUNT: 12.5 % (ref 12.3–15.4)
HCT VFR BLD AUTO: 39 % (ref 37.5–51)
HGB BLD-MCNC: 12.3 G/DL (ref 13–17.7)
IMM GRANULOCYTES # BLD AUTO: 0 X10E3/UL (ref 0–0.1)
IMM GRANULOCYTES NFR BLD AUTO: 0 %
LYMPHOCYTES # BLD AUTO: 1.1 X10E3/UL (ref 0.7–3.1)
LYMPHOCYTES NFR BLD AUTO: 20 %
MCH RBC QN AUTO: 26.8 PG (ref 26.6–33)
MCHC RBC AUTO-ENTMCNC: 31.5 G/DL (ref 31.5–35.7)
MCV RBC AUTO: 85 FL (ref 79–97)
MONOCYTES # BLD AUTO: 0.4 X10E3/UL (ref 0.1–0.9)
MONOCYTES NFR BLD AUTO: 7 %
NEUTROPHILS # BLD AUTO: 3.9 X10E3/UL (ref 1.4–7)
NEUTROPHILS NFR BLD AUTO: 69 %
PLATELET # BLD AUTO: 317 X10E3/UL (ref 150–450)
RBC # BLD AUTO: 4.59 X10E6/UL (ref 4.14–5.8)
WBC # BLD AUTO: 5.7 X10E3/UL (ref 3.4–10.8)

## 2019-11-12 PROCEDURE — 3331090001 HH PPS REVENUE CREDIT

## 2019-11-12 PROCEDURE — 3331090002 HH PPS REVENUE DEBIT

## 2019-11-12 PROCEDURE — G0151 HHCP-SERV OF PT,EA 15 MIN: HCPCS

## 2019-11-13 PROCEDURE — 3331090002 HH PPS REVENUE DEBIT

## 2019-11-13 PROCEDURE — 3331090001 HH PPS REVENUE CREDIT

## 2019-11-14 ENCOUNTER — HOME CARE VISIT (OUTPATIENT)
Dept: SCHEDULING | Facility: HOME HEALTH | Age: 84
End: 2019-11-14
Payer: MEDICARE

## 2019-11-14 VITALS
TEMPERATURE: 97.8 F | OXYGEN SATURATION: 99 % | RESPIRATION RATE: 16 BRPM | HEART RATE: 64 BPM | DIASTOLIC BLOOD PRESSURE: 64 MMHG | SYSTOLIC BLOOD PRESSURE: 124 MMHG

## 2019-11-14 VITALS
RESPIRATION RATE: 16 BRPM | DIASTOLIC BLOOD PRESSURE: 76 MMHG | TEMPERATURE: 98.4 F | SYSTOLIC BLOOD PRESSURE: 110 MMHG | OXYGEN SATURATION: 99 % | HEART RATE: 94 BPM

## 2019-11-14 PROCEDURE — 3331090002 HH PPS REVENUE DEBIT

## 2019-11-14 PROCEDURE — G0299 HHS/HOSPICE OF RN EA 15 MIN: HCPCS

## 2019-11-14 PROCEDURE — 3331090001 HH PPS REVENUE CREDIT

## 2019-11-14 PROCEDURE — G0151 HHCP-SERV OF PT,EA 15 MIN: HCPCS

## 2019-11-15 PROCEDURE — 3331090002 HH PPS REVENUE DEBIT

## 2019-11-15 PROCEDURE — 3331090001 HH PPS REVENUE CREDIT

## 2019-11-16 PROCEDURE — 3331090001 HH PPS REVENUE CREDIT

## 2019-11-16 PROCEDURE — 3331090002 HH PPS REVENUE DEBIT

## 2019-11-17 PROCEDURE — 3331090001 HH PPS REVENUE CREDIT

## 2019-11-17 PROCEDURE — 3331090002 HH PPS REVENUE DEBIT

## 2019-11-18 PROCEDURE — 3331090002 HH PPS REVENUE DEBIT

## 2019-11-18 PROCEDURE — 3331090001 HH PPS REVENUE CREDIT

## 2019-11-19 ENCOUNTER — HOME CARE VISIT (OUTPATIENT)
Dept: SCHEDULING | Facility: HOME HEALTH | Age: 84
End: 2019-11-19
Payer: MEDICARE

## 2019-11-19 ENCOUNTER — HOME CARE VISIT (OUTPATIENT)
Dept: HOME HEALTH SERVICES | Facility: HOME HEALTH | Age: 84
End: 2019-11-19
Payer: MEDICARE

## 2019-11-19 VITALS
DIASTOLIC BLOOD PRESSURE: 70 MMHG | RESPIRATION RATE: 16 BRPM | OXYGEN SATURATION: 99 % | SYSTOLIC BLOOD PRESSURE: 124 MMHG | TEMPERATURE: 97.7 F | HEART RATE: 82 BPM

## 2019-11-19 PROCEDURE — 3331090002 HH PPS REVENUE DEBIT

## 2019-11-19 PROCEDURE — G0155 HHCP-SVS OF CSW,EA 15 MIN: HCPCS

## 2019-11-19 PROCEDURE — G0151 HHCP-SERV OF PT,EA 15 MIN: HCPCS

## 2019-11-19 PROCEDURE — 3331090001 HH PPS REVENUE CREDIT

## 2019-11-20 PROCEDURE — 3331090002 HH PPS REVENUE DEBIT

## 2019-11-20 PROCEDURE — 3331090001 HH PPS REVENUE CREDIT

## 2019-11-21 ENCOUNTER — HOME CARE VISIT (OUTPATIENT)
Dept: SCHEDULING | Facility: HOME HEALTH | Age: 84
End: 2019-11-21
Payer: MEDICARE

## 2019-11-21 PROCEDURE — G0151 HHCP-SERV OF PT,EA 15 MIN: HCPCS

## 2019-11-21 PROCEDURE — 3331090002 HH PPS REVENUE DEBIT

## 2019-11-21 PROCEDURE — 3331090001 HH PPS REVENUE CREDIT

## 2019-11-21 PROCEDURE — 3331090003 HH PPS REVENUE ADJ

## 2019-11-25 VITALS
HEART RATE: 66 BPM | SYSTOLIC BLOOD PRESSURE: 124 MMHG | TEMPERATURE: 98.1 F | DIASTOLIC BLOOD PRESSURE: 64 MMHG | RESPIRATION RATE: 16 BRPM | OXYGEN SATURATION: 99 %

## 2019-12-05 NOTE — PROGRESS NOTES
Chief Complaint   Patient presents with    Diabetes     3 month follow up    Hypertension     3 month follow up       SUBJECTIVE:    Messi Jeff Sr is a 80 y.o. male who returns in follow-up for his medical problems include hypertension, diabetes, hyperlipidemia, ASCVD, history of SVT, history of sick sinus syndrome, cardiomyopathy, alcoholism, Alzheimer's disease and other medical problems. He is noting some problems with back pain which he has had a problem with before and we previously have done lumbar spine films which I reviewed from 2018 showing multilevel degenerative change as well as we have also done while he scheduled a CT of his lumbar spine and an MRI of his lumbar spine  He notes no history of falls or trauma. He denies any chest pain, shortness of breath, palpitations, PND, orthopnea or other cardiorespiratory complaints. He notes no GI or  complaints. He notes no headaches, dizziness or neurologic complaints. He has no other arthritic complaints other than the back pain. He notes no other complaints on complete review of systems. Current Outpatient Medications   Medication Sig Dispense Refill    predniSONE (STERAPRED) 5 mg dose pack See administration instruction per 5mg dose pack 21 Tab 0    multivitamin, tx-iron-ca-min (THERA-M W/ IRON) 9 mg iron-400 mcg tab tablet Take 1 Tab by mouth daily.  amLODIPine (NORVASC) 10 mg tablet Take 0.5 Tabs by mouth daily. 90 Tab 1    carvedilol (COREG) 6.25 mg tablet Take 1 Tab by mouth two (2) times a day. 180 Tab prn    furosemide (LASIX) 20 mg tablet Take 1 Tab by mouth daily. 90 Tab prn    memantine (NAMENDA) 10 mg tablet TAKE 1 TABLET BY MOUTH TWICE A  Tab 3    losartan-hydroCHLOROthiazide (HYZAAR) 100-25 mg per tablet TAKE 1 TABLET EVERY DAY 90 Tab 1    donepezil (ARICEPT) 10 mg tablet TAKE 1 TABLET BY MOUTH EVERY DAY 90 Tab 1    temazepam (RESTORIL) 15 mg capsule Take 1 Cap by mouth nightly as needed for Sleep.  Max Daily Amount: 15 mg. 30 Cap 0    aspirin 81 mg chewable tablet Take 1 Tab by mouth daily. 30 Tab 1    omeprazole (PRILOSEC) 20 mg capsule Take 1 Cap by mouth daily.  80 Cap 3     Past Medical History:   Diagnosis Date    Abdominal pain 7/18/2017    Alzheimer disease ([facility] 7/18/2017    Anemia 7/18/2017    Arthritis     Back pain 7/18/2017    Bradycardia 7/18/2017    CAD (coronary artery disease)     hx of MI    CKD (chronic kidney disease), stage II 7/18/2017    constipation     Depression 7/18/2017    Diabetes mellitus ([facility] 7/18/2017    Diverticulosis 7/18/2017    DJD (degenerative joint disease) 7/18/2017    Encounter for long-term (current) use of other medications 7/18/2017    Fatigue     Gastritis and duodenitis 7/18/2017    GERD (gastroesophageal reflux disease)     GI bleed 7/18/2017    Hearing loss     Hyperlipidemia 7/18/2017    Hypertension     Hypertension with renal disease 7/18/2017    Ill-defined condition     Dementia    Internal hemorrhoids 7/18/2017    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    S/P cardiac pacemaker procedure 5/4/2015    5/4/15 Medtronic dual chamber pacemaker implant     Weight loss     lost over 40 pounds last year- has no appetite     Past Surgical History:   Procedure Laterality Date    COLONOSCOPY N/A 6/22/2017    COLONOSCOPY performed by Tre Asencio MD at [address] [place] [address]  6/22/2017         [address]  7/10/2014    right inguinal    HX OTHER SURGICAL      cystectomy from back    [place] EGD TRANSORAL BIOPSY SINGLE/MULTIPLE  2/14/2012         UPPER GI ENDOSCOPY,BIOPSY  6/22/2017          No Known Allergies    REVIEW OF SYSTEMS:  General: negative for - chills or fever, or weight loss or gain  ENT: negative for - headaches, nasal congestion or tinnitus  Eyes: no blurred or visual changes  Neck: No stiffness or swollen nodes  Respiratory: negative for - cough, hemoptysis, shortness of breath or wheezing  Cardiovascular : negative for - chest pain, edema, palpitations or shortness of breath  Gastrointestinal: negative for - abdominal pain, blood in stools, heartburn or nausea/vomiting  Genito-Urinary: no dysuria, trouble voiding, or hematuria  Musculoskeletal: negative for - gait disturbance, joint pain, joint stiffness or joint swelling. Positive for recurrent low back pain  Neurological: no TIA or stroke symptoms  Hematologic: no bruises, no bleeding  Lymphatic: no swollen glands  Integument: no lumps, mole changes, nail changes or rash  Endocrine:no malaise/lethargy poly uria or polydipsia or unexpected weight changes        Social History     Socioeconomic History    Marital status: LEGALLY      Spouse name: Not on file    Number of children: Not on file    Years of education: Not on file    Highest education level: Not on file   Tobacco Use    Smoking status: Former Smoker     Packs/day: 0.50     Years: 10.00     Pack years: 5.00     Start date: 1991    Smokeless tobacco: Never Used    Tobacco comment: quit 50 years ago   Substance and Sexual Activity    Alcohol use: Yes     Comment: Occasional beer    Drug use: No    Sexual activity: Not Currently     Family History   Problem Relation Age of Onset    Hypertension Mother     Heart Disease Father     Cancer Other         son-cancer? unknown what type        OBJECTIVE:     Visit Vitals  /74 (BP 1 Location: Right arm, BP Patient Position: Sitting)   Pulse 66   Temp 97.8 °F (36.6 °C) (Oral)   Resp 16   Ht 5' 9\" (1.753 m)   Wt 134 lb 1.6 oz (60.8 kg)   SpO2 98%   BMI 19.80 kg/m²     CONSTITUTIONAL:   well nourished, appears age appropriate  EYES: sclera anicteric, PERRL, EOMI  ENMT:nares clear, moist mucous membranes, pharynx clear  NECK: supple.  Thyroid normal, No JVD or bruits  RESPIRATORY: Chest: clear to ascultation and percussion, normal inspiratory effort  CARDIOVASCULAR: Heart: regular rate and rhythm no murmurs, rubs or gallops, PMI not displaced, No thrills  GASTROINTESTINAL: Abdomen: non distended, soft, non tender, bowel sounds normal  HEMATOLOGIC: no purpura, petechiae or bruising  LYMPHATIC: No lymph node enlargemant  MUSCULOSKELETAL: Extremities: no edema or active synovitis, pulse 1+   INTEGUMENT: No unusual rashes or suspicious skin lesions noted. Nails appear normal.  PERIPHERAL VASCULAR: normal pulses femoral, PT and DP  NEUROLOGIC: non-focal exam, A & O X 3  PSYCHIATRIC:, appropriate affect     ASSESSMENT:   1. Hypertension with renal disease    2. 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 Mixed hyperlipidemia    4. CKD (chronic kidney disease), stage II    5. ASCVD (arteriosclerotic cardiovascular disease)    6. Alzheimer's dementia with behavioral disturbance, unspecified timing of dementia onset ([address]    7. Primary osteoarthritis involving multiple joints    8. Cardiomyopathy, unspecified type ([address]    9. Chronic midline low back pain with right-sided sciatica      Impression  1. Hypertension is controlled so continue current therapy reviewed with him. 2.  Diabetes repeat status pending and prior lab reviewed no make adjustments if necessary. 3.  Hyperlipidemia prior lab reviewed and repeat status pending and I will adjust if needed. 4.  CKD stage III 2 repeat status pending  5   ASCVD clinically stable continue aspirin daily  6. Alzheimer's dementia that seems to be stable although he has no one with him today in the exam room to discuss this. 7. DJD that is stable except for the back pain  8. Cardiomyopathy stable  9.   Midline low back pain as noted reviewed x-rays from 2018 showing multilevel degenerative changes and previous MRI and CT scan were not done because patient did not go for the appointment I will put him on a 5 mg 6-day steroid Dosepak and see if this helps  I will recheck him in 3 months or sooner should to be a problem. I did ask him to get an interim appointment in 6 weeks for follow-up of his hypertension and heart disease. I will see him sooner if there are any problems. PLAN:  .  Orders Placed This Encounter    METABOLIC PANEL, COMPREHENSIVE ([facility])    LIPID PANEL ([facility])    CK ([facility])    HEMOGLOBIN A1C W/O EAG ([facility])    predniSONE (STERAPRED) 5 mg dose pack         ATTENTION:   This medical record was transcribed using an electronic medical records system. Although proofread, it may and can contain electronic and spelling errors. Other human spelling and other errors may be present. Corrections may be executed at a later time. Please feel free to contact us for any clarifications as needed. Follow-up and Dispositions    · Return in about 3 months (around 3/6/2020). No results found for any visits on 12/06/19. Rd Oh MD    The patient verbalized understanding of the problems and plans as explained.

## 2019-12-06 ENCOUNTER — OFFICE VISIT (OUTPATIENT)
Dept: INTERNAL MEDICINE CLINIC | Age: 84
End: 2019-12-06

## 2019-12-06 VITALS
SYSTOLIC BLOOD PRESSURE: 132 MMHG | WEIGHT: 134.1 LBS | OXYGEN SATURATION: 98 % | HEIGHT: 69 IN | BODY MASS INDEX: 19.86 KG/M2 | HEART RATE: 66 BPM | RESPIRATION RATE: 16 BRPM | TEMPERATURE: 97.8 F | DIASTOLIC BLOOD PRESSURE: 74 MMHG

## 2019-12-06 DIAGNOSIS — N18.2 CONTROLLED TYPE 2 DIABETES MELLITUS WITH STAGE 2 CHRONIC KIDNEY DISEASE, WITHOUT LONG-TERM CURRENT USE OF INSULIN (HCC): ICD-10-CM

## 2019-12-06 DIAGNOSIS — E78.2 MIXED HYPERLIPIDEMIA: ICD-10-CM

## 2019-12-06 DIAGNOSIS — M15.9 PRIMARY OSTEOARTHRITIS INVOLVING MULTIPLE JOINTS: ICD-10-CM

## 2019-12-06 DIAGNOSIS — I25.10 ASCVD (ARTERIOSCLEROTIC CARDIOVASCULAR DISEASE): ICD-10-CM

## 2019-12-06 DIAGNOSIS — E11.22 CONTROLLED TYPE 2 DIABETES MELLITUS WITH STAGE 2 CHRONIC KIDNEY DISEASE, WITHOUT LONG-TERM CURRENT USE OF INSULIN (HCC): ICD-10-CM

## 2019-12-06 DIAGNOSIS — G89.29 CHRONIC MIDLINE LOW BACK PAIN WITH RIGHT-SIDED SCIATICA: ICD-10-CM

## 2019-12-06 DIAGNOSIS — M54.41 CHRONIC MIDLINE LOW BACK PAIN WITH RIGHT-SIDED SCIATICA: ICD-10-CM

## 2019-12-06 DIAGNOSIS — I42.9 CARDIOMYOPATHY, UNSPECIFIED TYPE (HCC): ICD-10-CM

## 2019-12-06 DIAGNOSIS — F02.81 ALZHEIMER'S DEMENTIA WITH BEHAVIORAL DISTURBANCE, UNSPECIFIED TIMING OF DEMENTIA ONSET: ICD-10-CM

## 2019-12-06 DIAGNOSIS — G30.9 ALZHEIMER'S DEMENTIA WITH BEHAVIORAL DISTURBANCE, UNSPECIFIED TIMING OF DEMENTIA ONSET: ICD-10-CM

## 2019-12-06 DIAGNOSIS — N18.2 CKD (CHRONIC KIDNEY DISEASE), STAGE II: ICD-10-CM

## 2019-12-06 DIAGNOSIS — I12.9 HYPERTENSION WITH RENAL DISEASE: Primary | ICD-10-CM

## 2019-12-06 LAB
A-G RATIO,AGRAT: 1.2 RATIO
ALBUMIN SERPL-MCNC: 3.8 G/DL (ref 3.9–5.4)
ALP SERPL-CCNC: 67 U/L (ref 38–126)
ALT SERPL-CCNC: 15 U/L (ref 0–50)
ANION GAP SERPL CALC-SCNC: 10 MMOL/L
AST SERPL W P-5'-P-CCNC: 27 U/L (ref 14–36)
BILIRUB SERPL-MCNC: 0.8 MG/DL (ref 0.2–1.3)
BUN SERPL-MCNC: 23 MG/DL (ref 9–20)
BUN/CREATININE RATIO,BUCR: 18 RATIO
CALCIUM SERPL-MCNC: 10.2 MG/DL (ref 8.4–10.2)
CHLORIDE SERPL-SCNC: 103 MMOL/L (ref 98–107)
CHOL/HDL RATIO,CHHD: 4 RATIO (ref 0–4)
CHOLEST SERPL-MCNC: 216 MG/DL (ref 0–200)
CK SERPL-CCNC: 73 U/L (ref 30–135)
CO2 SERPL-SCNC: 29 MMOL/L (ref 22–32)
CREAT SERPL-MCNC: 1.3 MG/DL (ref 0.8–1.5)
GLOBULIN,GLOB: 3.2
GLUCOSE SERPL-MCNC: 95 MG/DL (ref 75–110)
HBA1C MFR BLD HPLC: 5.2 % (ref 4–5.7)
HDLC SERPL-MCNC: 61 MG/DL (ref 35–130)
LDL/HDL RATIO,LDHD: 2 RATIO
LDLC SERPL CALC-MCNC: 139 MG/DL (ref 0–130)
POTASSIUM SERPL-SCNC: 4.5 MMOL/L (ref 3.6–5)
PROT SERPL-MCNC: 7 G/DL (ref 6.3–8.2)
SODIUM SERPL-SCNC: 142 MMOL/L (ref 137–145)
TRIGL SERPL-MCNC: 79 MG/DL (ref 0–200)
VLDLC SERPL CALC-MCNC: 16 MG/DL

## 2019-12-06 RX ORDER — PREDNISONE 5 MG/1
TABLET ORAL
Qty: 21 TAB | Refills: 0 | Status: SHIPPED | OUTPATIENT
Start: 2019-12-06 | End: 2020-07-20 | Stop reason: ALTCHOICE

## 2019-12-06 NOTE — PROGRESS NOTES
Elena Case Sr is a 80 y.o. male     Chief Complaint   Patient presents with    Diabetes     3 month follow up    Hypertension     3 month follow up       Visit Vitals  /74 (BP 1 Location: Right arm, BP Patient Position: Sitting)   Pulse 66   Temp 97.8 °F (36.6 °C) (Oral)   Resp 16   Ht 5' 9\" (1.753 m)   Wt 134 lb 1.6 oz (60.8 kg)   SpO2 98%   BMI 19.80 kg/m²       Health Maintenance Due   Topic Date Due    EYE EXAM RETINAL OR DILATED  01/21/1941    DTaP/Tdap/Td series (1 - Tdap) 01/21/1942    Shingrix Vaccine Age 50> (1 of 2) 01/21/1981    GLAUCOMA SCREENING Q2Y  01/21/1996       1. Have you been to the ER, urgent care clinic since your last visit? Hospitalized since your last visit? No    2. Have you seen or consulted any other health care providers outside of the [address] [place] [address] since your last visit? Include any pap smears or colon screening.  No

## 2019-12-06 NOTE — PATIENT INSTRUCTIONS
Anemia: Care Instructions Your Care Instructions Anemia is a low level of red blood cells, which carry oxygen throughout your body. Many things can cause anemia. Lack of iron is one of the most common causes. Your body needs iron to make hemoglobin, a substance in red blood cells that carries oxygen from the lungs to your body's cells. Without enough iron, the body produces fewer and smaller red blood cells. As a result, your body's cells do not get enough oxygen, and you feel tired and weak. And you may have trouble concentrating. Bleeding is the most common cause of a lack of iron. You may have heavy menstrual bleeding or bleeding caused by conditions such as ulcers, hemorrhoids, or cancer. Regular use of aspirin or other anti-inflammatory medicines (such as ibuprofen) also can cause bleeding in some people. A lack of iron in your diet also can cause anemia, especially at times when the body needs more iron, such as during pregnancy, infancy, and the teen years. Your doctor may have prescribed iron pills. It may take several months of treatment for your iron levels to return to normal. Your doctor also may suggest that you eat foods that are rich in iron, such as meat and beans. There are many other causes of anemia. It is not always due to a lack of iron. Finding the specific cause of your anemia will help your doctor find the right treatment for you. Follow-up care is a key part of your treatment and safety. Be sure to make and go to all appointments, and call your doctor if you are having problems. It's also a good idea to know your test results and keep a list of the medicines you take. How can you care for yourself at home? · Take your medicines exactly as prescribed. Call your doctor if you think you are having a problem with your medicine. · If your doctor recommends iron pills, take them as directed: ? Try to take the pills on an empty stomach about 1 hour before or 2 hours after meals. But you may need to take iron with food to avoid an upset stomach. ? Do not take antacids or drink milk or caffeine drinks (such as coffee, tea, or cola) at the same time or within 2 hours of the time that you take your iron. They can make it hard for your body to absorb the iron. ? Vitamin C (from food or supplements) helps your body absorb iron. Try taking iron pills with a glass of orange juice or some other food that is high in vitamin C, such as citrus fruits. ? Iron pills may cause stomach problems, such as heartburn, nausea, diarrhea, constipation, and cramps. Be sure to drink plenty of fluids, and include fruits, vegetables, and fiber in your diet each day. Iron pills often make your bowel movements dark or green. ? If you forget to take an iron pill, do not take a double dose of iron the next time you take a pill. ? Keep iron pills out of the reach of small children. An overdose of iron can be very dangerous. · Follow your doctor's advice about eating iron-rich foods. These include red meat, shellfish, poultry, eggs, beans, raisins, whole-grain bread, and leafy green vegetables. · Steam vegetables to help them keep their iron content. When should you call for help? Call 911 anytime you think you may need emergency care. For example, call if: 
  · You have symptoms of a heart attack. These may include: 
? Chest pain or pressure, or a strange feeling in the chest. 
? Sweating. ? Shortness of breath. ? Nausea or vomiting. ? Pain, pressure, or a strange feeling in the back, neck, jaw, or upper belly or in one or both shoulders or arms. ? Lightheadedness or sudden weakness. ? A fast or irregular heartbeat. After you call 911, the  may tell you to chew 1 adult-strength or 2 to 4 low-dose aspirin. Wait for an ambulance. Do not try to drive yourself.  
  · You passed out (lost consciousness).  
 Call your doctor now or seek immediate medical care if:   · You have new or increased shortness of breath.  
  · You are dizzy or lightheaded, or you feel like you may faint.  
  · Your fatigue and weakness continue or get worse.  
  · You have any abnormal bleeding, such as: 
? Nosebleeds. ? Vaginal bleeding that is different (heavier, more frequent, at a different time of the month) than what you are used to. 
? Bloody or black stools, or rectal bleeding. ? Bloody or pink urine.  
 Watch closely for changes in your health, and be sure to contact your doctor if: 
  · You do not get better as expected. Where can you learn more? Go to http://zach-herlinda.info/. Enter R301 in the search box to learn more about \"Anemia: Care Instructions. \" Current as of: March 28, 2019 Content Version: 12.2 © 6431-2429 [employer]. Care instructions adapted under license by [employer] (which disclaims liability or warranty for this information). If you have questions about a medical condition or this instruction, always ask your healthcare professional. [place] [address] any warranty or liability for your use of this information.

## 2020-03-10 PROBLEM — J81.0 ACUTE PULMONARY EDEMA (HCC): Status: RESOLVED | Noted: 2019-10-27 | Resolved: 2020-03-10

## 2020-05-05 ENCOUNTER — PATIENT OUTREACH (OUTPATIENT)
Dept: OTHER | Age: 85
End: 2020-05-05

## 2020-05-05 NOTE — PROGRESS NOTES
Benito Farfan  was contacted to set up a video visit with Kael Barney MD.     Sophie and schedule an apt with office.     Arjun Monahan RN

## 2020-05-06 DIAGNOSIS — G30.8 ALZHEIMER'S DISEASE OF OTHER ONSET WITHOUT BEHAVIORAL DISTURBANCE: ICD-10-CM

## 2020-05-06 DIAGNOSIS — F02.80 ALZHEIMER'S DISEASE OF OTHER ONSET WITHOUT BEHAVIORAL DISTURBANCE: ICD-10-CM

## 2020-05-06 DIAGNOSIS — I10 ESSENTIAL HYPERTENSION: ICD-10-CM

## 2020-05-06 RX ORDER — LOSARTAN POTASSIUM AND HYDROCHLOROTHIAZIDE 25; 100 MG/1; MG/1
TABLET ORAL
Qty: 90 TAB | Refills: 1 | OUTPATIENT
Start: 2020-05-06

## 2020-05-06 RX ORDER — DONEPEZIL HYDROCHLORIDE 10 MG/1
TABLET, FILM COATED ORAL
Qty: 90 TAB | Refills: 1 | OUTPATIENT
Start: 2020-05-06

## 2020-05-06 NOTE — TELEPHONE ENCOUNTER
RX refill request from the patient/pharmacy. Patient last seen 12- with labs, and does not have a f/up appointment.   Requested Prescriptions     Pending Prescriptions Disp Refills    donepeziL (ARICEPT) 10 mg tablet 90 Tab 1     Sig: TAKE 1 TABLET BY MOUTH EVERY DAY    losartan-hydroCHLOROthiazide (HYZAAR) 100-25 mg per tablet 90 Tab 1     Sig: TAKE 1 TABLET EVERY DAY

## 2020-05-07 DIAGNOSIS — I10 ESSENTIAL HYPERTENSION: ICD-10-CM

## 2020-05-07 DIAGNOSIS — G30.8 ALZHEIMER'S DISEASE OF OTHER ONSET WITHOUT BEHAVIORAL DISTURBANCE: ICD-10-CM

## 2020-05-07 DIAGNOSIS — F02.80 ALZHEIMER'S DISEASE OF OTHER ONSET WITHOUT BEHAVIORAL DISTURBANCE: ICD-10-CM

## 2020-05-07 NOTE — TELEPHONE ENCOUNTER
PCP: Sher Vega MD    Last appt: 3/11/2020  No future appointments.     Requested Prescriptions     Pending Prescriptions Disp Refills    losartan-hydroCHLOROthiazide (HYZAAR) 100-25 mg per tablet 90 Tab 0     Sig: TAKE 1 TABLET EVERY DAY    donepeziL (ARICEPT) 10 mg tablet 90 Tab 0     Sig: TAKE 1 TABLET BY MOUTH EVERY DAY

## 2020-05-08 RX ORDER — DONEPEZIL HYDROCHLORIDE 10 MG/1
TABLET, FILM COATED ORAL
Qty: 90 TAB | Refills: 0 | OUTPATIENT
Start: 2020-05-08

## 2020-05-08 RX ORDER — LOSARTAN POTASSIUM AND HYDROCHLOROTHIAZIDE 25; 100 MG/1; MG/1
TABLET ORAL
Qty: 90 TAB | Refills: 0 | OUTPATIENT
Start: 2020-05-08

## 2020-05-11 DIAGNOSIS — G30.8 ALZHEIMER'S DISEASE OF OTHER ONSET WITHOUT BEHAVIORAL DISTURBANCE: ICD-10-CM

## 2020-05-11 DIAGNOSIS — F02.80 ALZHEIMER'S DISEASE OF OTHER ONSET WITHOUT BEHAVIORAL DISTURBANCE: ICD-10-CM

## 2020-05-11 RX ORDER — DONEPEZIL HYDROCHLORIDE 10 MG/1
TABLET, FILM COATED ORAL
Qty: 90 TAB | Refills: 1 | OUTPATIENT
Start: 2020-05-11

## 2020-05-11 NOTE — TELEPHONE ENCOUNTER
.RX refill request from the patient/pharmacy. Patient last seen 12- without labs, and does not have any f/up appointment.   Requested Prescriptions     Pending Prescriptions Disp Refills    donepeziL (ARICEPT) 10 mg tablet 90 Tab 1     Sig: TAKE 1 TABLET BY MOUTH EVERY DAY

## 2020-06-11 ENCOUNTER — TELEPHONE (OUTPATIENT)
Dept: CARDIOLOGY CLINIC | Age: 85
End: 2020-06-11

## 2020-06-11 NOTE — TELEPHONE ENCOUNTER
Spoke with Laurel Fields. She reports that she is on her way with patient to ED because he is short of breath and she thinks he is retaining fluid.   Last device check 2018

## 2020-06-11 NOTE — TELEPHONE ENCOUNTER
Please give Shyann Allen a call regarding the pt, he is saying his pacemaker feels weird,     thanks

## 2020-06-23 DIAGNOSIS — G30.8 ALZHEIMER'S DISEASE OF OTHER ONSET WITHOUT BEHAVIORAL DISTURBANCE: ICD-10-CM

## 2020-06-23 DIAGNOSIS — I10 ESSENTIAL HYPERTENSION: ICD-10-CM

## 2020-06-23 DIAGNOSIS — F02.80 ALZHEIMER'S DISEASE OF OTHER ONSET WITHOUT BEHAVIORAL DISTURBANCE: ICD-10-CM

## 2020-06-23 RX ORDER — DONEPEZIL HYDROCHLORIDE 10 MG/1
TABLET, FILM COATED ORAL
Qty: 30 TAB | Refills: 0 | Status: SHIPPED | OUTPATIENT
Start: 2020-06-23 | End: 2020-10-22 | Stop reason: SDUPTHER

## 2020-06-23 RX ORDER — MEMANTINE HYDROCHLORIDE 10 MG/1
TABLET ORAL
Qty: 60 TAB | Refills: 0 | Status: SHIPPED | OUTPATIENT
Start: 2020-06-23 | End: 2020-10-22 | Stop reason: SDUPTHER

## 2020-06-23 RX ORDER — LOSARTAN POTASSIUM AND HYDROCHLOROTHIAZIDE 25; 100 MG/1; MG/1
TABLET ORAL
Qty: 30 TAB | Refills: 0 | Status: SHIPPED | OUTPATIENT
Start: 2020-06-23 | End: 2020-08-03

## 2020-06-23 NOTE — TELEPHONE ENCOUNTER
PCP: Alberto Garcia MD    Last appt: 6/19/2020  Future Appointments   Date Time Provider Sonia Irizarry   7/6/2020  3:10 PM Alberto Garcia MD [facility] SCHED       Requested Prescriptions     Pending Prescriptions Disp Refills    donepeziL (ARICEPT) 10 mg tablet [Pharmacy Med Name: DONEPEZIL HCL 10 MG TABLET] 90 Tab 1     Sig: TAKE 1 TABLET BY MOUTH EVERY DAY    losartan-hydroCHLOROthiazide (HYZAAR) 100-25 mg per tablet [Pharmacy Med Name: LOSARTAN-HCTZ 100-25 MG TAB] 90 Tab 1     Sig: TAKE 1 TABLET BY MOUTH EVERY DAY

## 2020-06-23 NOTE — TELEPHONE ENCOUNTER
RX refill request from the patient/pharmacy. Patient last seen 12- with labs, and next appt. scheduled for 07-  Requested Prescriptions     Pending Prescriptions Disp Refills    memantine (NAMENDA) 10 mg tablet 180 Tab 3     Sig: TAKE 1 TABLET BY MOUTH TWICE A DAY   .

## 2020-07-13 NOTE — PROGRESS NOTES
1. Have you been to the ER, urgent care clinic since your last visit? Hospitalized since your last visit? No    2. Have you seen or consulted any other health care providers outside of the [address] [place] [address] since your last visit? Include any pap smears or colon screening.  No     Chief Complaint   Patient presents with    Back Pain     pain in back around waistline and up the back x 2-3 months Ivermectin Pregnancy And Lactation Text: This medication is Pregnancy Category C and it isn't known if it is safe during pregnancy. It is also excreted in breast milk.

## 2020-07-17 NOTE — PROGRESS NOTES
Chief Complaint   Patient presents with    Hypertension     6 month visit    Diabetes       SUBJECTIVE:    Benito Teixeira Sr is a 80 y.o. male who presents today after a long absence not having been seen here since December 6, 2019. Today he is accompanied by his granddaughter and in follow-up of his medical problems to include hypertension, diabetes, hyperlipidemia, atherosclerotic coronary vascular disease, cardiomyopathy, Alzheimer's dementia, history of PAF, prior TIA, history of alcoholism and other multiple medical problems. In addition to his chronic problems he has a significant issue now off weight loss from 134 pounds at his last visit down to 115.7 pounds today. He has become very unsteady walking according to his granddaughter which is easily visible today on exam in the office. He also has developed a poor appetite and has hard time keeping food down and eating very little. He actually even today has had some difficulty keeping liquids down. He denies any current chest pain, shortness of breath, palpitations, PND, orthopnea or other cardiorespiratory complaints. He  has no current  complaints. As far as the GI complaints there is a poor appetite with the inability to keep some food down as well as liquids as well as some mild upper abdominal discomfort but no evidence of diarrhea. No fevers, chills or night sweats have been noted. From a neurologic standpoint he has had progressive decreased memory and progressive unsteadiness on his feet. There are no current active arthritic complaints. All of the above history is confirmed by his granddaughter present with him today. She is his primary caretaker.       Current Outpatient Medications   Medication Sig Dispense Refill    donepeziL (ARICEPT) 10 mg tablet TAKE 1 TABLET BY MOUTH EVERY DAY 30 Tab 0    losartan-hydroCHLOROthiazide (HYZAAR) 100-25 mg per tablet TAKE 1 TABLET BY MOUTH EVERY DAY 30 Tab 0    memantine (NAMENDA) 10 mg tablet TAKE 1 TABLET BY MOUTH TWICE A DAY 60 Tab 0    multivitamin, tx-iron-ca-min (THERA-M W/ IRON) 9 mg iron-400 mcg tab tablet Take 1 Tab by mouth daily.  amLODIPine (NORVASC) 10 mg tablet Take 0.5 Tabs by mouth daily. 90 Tab 1    carvedilol (COREG) 6.25 mg tablet Take 1 Tab by mouth two (2) times a day. 180 Tab prn    furosemide (LASIX) 20 mg tablet Take 1 Tab by mouth daily. 90 Tab prn    temazepam (RESTORIL) 15 mg capsule Take 1 Cap by mouth nightly as needed for Sleep. Max Daily Amount: 15 mg. 30 Cap 0    aspirin 81 mg chewable tablet Take 1 Tab by mouth daily. 30 Tab 1    omeprazole (PRILOSEC) 20 mg capsule Take 1 Cap by mouth daily.  80 Cap 3     Past Medical History:   Diagnosis Date    Abdominal pain 7/18/2017    Alzheimer disease ([facility] [address] 7/18/2017    Anemia 7/18/2017    Arthritis     Back pain 7/18/2017    Bradycardia 7/18/2017    CAD (coronary artery disease)     hx of MI    CKD (chronic kidney disease), stage II 7/18/2017    constipation     Depression 7/18/2017    Diabetes mellitus (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    S/P cardiac pacemaker procedure 5/4/2015    5/4/15 Medtronic dual chamber pacemaker implant     Weight loss     lost over 40 pounds last year- has no appetite     Past Surgical History:   Procedure Laterality Date    COLONOSCOPY N/A 6/22/2017    COLONOSCOPY performed by Adam Whyte MD at [address] [place] [address]  6/22/2017         HX HERNIA REPAIR  7/10/2014    right inguinal    HX OTHER SURGICAL      cystectomy from back    ID EGD TRANSORAL BIOPSY SINGLE/MULTIPLE  2/14/2012         UPPER GI ENDOSCOPY,BIOPSY  6/22/2017          No Known Allergies    REVIEW OF SYSTEMS:  General: negative for - chills or fever, or weight loss or gain  ENT: negative for - headaches, nasal congestion or tinnitus  Eyes: no blurred or visual changes  Neck: No stiffness or swollen nodes  Respiratory: negative for - cough, hemoptysis, shortness of breath or wheezing  Cardiovascular : negative for - chest pain, edema, palpitations or shortness of breath  Gastrointestinal: negative for - blood in stool. Positive for decreased appetite and some problems keeping food down even liquids today.    positive for some mild upper abdominal pain  Genito-Urinary: no dysuria, trouble voiding, or hematuria  Musculoskeletal: negative for - gait disturbance, joint pain, joint stiffness or joint swelling  Neurological: no TIA or stroke symptoms but progressive memory issues and unsteady on his feet  Hematologic: no bruises, no bleeding  Lymphatic: no swollen glands  Integument: no lumps, mole changes, nail changes or rash  Endocrine:no malaise/lethargy poly uria or polydipsia or unexpected weight changes        Social History     Socioeconomic History    Marital status: LEGALLY      Spouse name: Not on file    Number of children: Not on file    Years of education: Not on file    Highest education level: Not on file   Tobacco Use    Smoking status: Former Smoker     Packs/day: 0.50     Years: 10.00     Pack years: 5.00     Start date: 7/12/1991    Smokeless tobacco: Never Used    Tobacco comment: quit 50 years ago   Substance and Sexual Activity    Alcohol use: Yes     Comment: Occasional beer    Drug use: No    Sexual activity: Not Currently     Family History   Problem Relation Age of Onset    Hypertension Mother     Heart Disease Father     Cancer Other son-cancer? unknown what type        OBJECTIVE:     Visit Vitals  /62 (BP 1 Location: Left arm, BP Patient Position: Sitting)   Pulse 64   Temp 98 °F (36.7 °C) (Oral)   Ht 5' 9\" (1.753 m)   Wt 115 lb 11.2 oz (52.5 kg)   SpO2 98%   BMI 17.09 kg/m²     CONSTITUTIONAL: Frail appearing cachectic white male, appears age appropriate  EYES: sclera anicteric, PERRL, EOMI  ENMT:nares clear, moist mucous membranes, pharynx clear  NECK: supple. Thyroid normal, No JVD or bruits  RESPIRATORY: Chest: clear to ascultation and percussion, normal inspiratory effort  CARDIOVASCULAR: Heart: regular rate and rhythm no murmurs, rubs or gallops, PMI not displaced, No thrills, no peripheral edema  GASTROINTESTINAL: Abdomen: non distended, soft, non tender, bowel sounds normal  HEMATOLOGIC: no purpura, petechiae or bruising  LYMPHATIC: No lymph node enlargemant  MUSCULOSKELETAL: Extremities: no active synovitis, pulse 1+. No diabetic foot changes noted. INTEGUMENT: No unusual rashes or suspicious skin lesions noted. Nails appear normal.  PERIPHERAL VASCULAR: normal pulses femoral, PT and DP  NEUROLOGIC: non-focal exam, A & O X 1 and easily confused with very unsteady gait. Feet have decreased distal sensation and decreased proprioception although testing is difficult secondary to his dementia. PSYCHIATRIC:, appropriate affect     ASSESSMENT:   1. Hypertension with renal disease    2. 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 Mixed hyperlipidemia    4. Primary osteoarthritis involving multiple joints    5. CKD (chronic kidney disease), stage II    6. ASCVD (arteriosclerotic cardiovascular disease)    7. Alzheimer's dementia with behavioral disturbance, unspecified timing of dementia onset ([address]    8. Weight loss    9. Cardiomyopathy, unspecified type ([address]    10. Unsteady gait      Impression  1.   Hypertension relatively hypotensive today  2. Diabetes status is pending  3. Significant weight loss with about 15% of his body weight loss certainly with unsteadiness and gait and problems swallowing I think hospitalization is warranted. See admit note for details. No labs done here in office today. PLAN:  . No orders of the defined types were placed in this encounter. ATTENTION:   This medical record was transcribed using an electronic medical records system. Although proofread, it may and can contain electronic and spelling errors. Other human spelling and other errors may be present. Corrections may be executed at a later time. Please feel free to contact us for any clarifications as needed. No results found for any visits on 07/20/20. Ángel Husain MD    The patient verbalized understanding of the problems and plans as explained.

## 2020-07-20 ENCOUNTER — HOSPITAL ENCOUNTER (INPATIENT)
Age: 85
LOS: 14 days | Discharge: HOME HEALTH | DRG: 682 | End: 2020-08-03
Attending: EMERGENCY MEDICINE | Admitting: INTERNAL MEDICINE
Payer: MEDICARE

## 2020-07-20 ENCOUNTER — APPOINTMENT (OUTPATIENT)
Dept: GENERAL RADIOLOGY | Age: 85
DRG: 682 | End: 2020-07-20
Attending: EMERGENCY MEDICINE
Payer: MEDICARE

## 2020-07-20 ENCOUNTER — HOSPITAL ENCOUNTER (OUTPATIENT)
Age: 85
Discharge: OTHER INSTITUTION | DRG: 682 | End: 2020-07-20
Attending: INTERNAL MEDICINE | Admitting: INTERNAL MEDICINE
Payer: MEDICARE

## 2020-07-20 ENCOUNTER — OFFICE VISIT (OUTPATIENT)
Dept: INTERNAL MEDICINE CLINIC | Age: 85
End: 2020-07-20

## 2020-07-20 VITALS
HEIGHT: 69 IN | TEMPERATURE: 98 F | OXYGEN SATURATION: 98 % | DIASTOLIC BLOOD PRESSURE: 62 MMHG | HEART RATE: 64 BPM | BODY MASS INDEX: 17.14 KG/M2 | WEIGHT: 115.7 LBS | SYSTOLIC BLOOD PRESSURE: 104 MMHG

## 2020-07-20 DIAGNOSIS — N18.2 CONTROLLED TYPE 2 DIABETES MELLITUS WITH STAGE 2 CHRONIC KIDNEY DISEASE, WITHOUT LONG-TERM CURRENT USE OF INSULIN (HCC): ICD-10-CM

## 2020-07-20 DIAGNOSIS — N17.9 ACUTE RENAL FAILURE SUPERIMPOSED ON STAGE 3 CHRONIC KIDNEY DISEASE, UNSPECIFIED ACUTE RENAL FAILURE TYPE: ICD-10-CM

## 2020-07-20 DIAGNOSIS — R55 NEAR SYNCOPE: Primary | ICD-10-CM

## 2020-07-20 DIAGNOSIS — I25.10 ASCVD (ARTERIOSCLEROTIC CARDIOVASCULAR DISEASE): ICD-10-CM

## 2020-07-20 DIAGNOSIS — I49.5 SSS (SICK SINUS SYNDROME) (HCC): ICD-10-CM

## 2020-07-20 DIAGNOSIS — R63.4 WEIGHT LOSS: ICD-10-CM

## 2020-07-20 DIAGNOSIS — N18.2 CKD (CHRONIC KIDNEY DISEASE), STAGE II: ICD-10-CM

## 2020-07-20 DIAGNOSIS — G30.9 ALZHEIMER'S DEMENTIA WITH BEHAVIORAL DISTURBANCE, UNSPECIFIED TIMING OF DEMENTIA ONSET: ICD-10-CM

## 2020-07-20 DIAGNOSIS — I12.9 HYPERTENSION WITH RENAL DISEASE: Primary | ICD-10-CM

## 2020-07-20 DIAGNOSIS — F02.81 ALZHEIMER'S DEMENTIA WITH BEHAVIORAL DISTURBANCE, UNSPECIFIED TIMING OF DEMENTIA ONSET: ICD-10-CM

## 2020-07-20 DIAGNOSIS — I12.9 HYPERTENSION WITH RENAL DISEASE: ICD-10-CM

## 2020-07-20 DIAGNOSIS — N18.3 ACUTE RENAL FAILURE SUPERIMPOSED ON STAGE 3 CHRONIC KIDNEY DISEASE, UNSPECIFIED ACUTE RENAL FAILURE TYPE: ICD-10-CM

## 2020-07-20 DIAGNOSIS — E78.2 MIXED HYPERLIPIDEMIA: ICD-10-CM

## 2020-07-20 DIAGNOSIS — R26.81 UNSTEADY GAIT: ICD-10-CM

## 2020-07-20 DIAGNOSIS — M15.9 PRIMARY OSTEOARTHRITIS INVOLVING MULTIPLE JOINTS: ICD-10-CM

## 2020-07-20 DIAGNOSIS — E86.0 DEHYDRATION: ICD-10-CM

## 2020-07-20 DIAGNOSIS — R55 SYNCOPE, UNSPECIFIED SYNCOPE TYPE: ICD-10-CM

## 2020-07-20 DIAGNOSIS — I42.9 CARDIOMYOPATHY, UNSPECIFIED TYPE (HCC): ICD-10-CM

## 2020-07-20 DIAGNOSIS — E11.22 CONTROLLED TYPE 2 DIABETES MELLITUS WITH STAGE 2 CHRONIC KIDNEY DISEASE, WITHOUT LONG-TERM CURRENT USE OF INSULIN (HCC): ICD-10-CM

## 2020-07-20 PROBLEM — N18.30 ACUTE RENAL FAILURE SUPERIMPOSED ON STAGE 3 CHRONIC KIDNEY DISEASE (HCC): Status: ACTIVE | Noted: 2020-07-20

## 2020-07-20 LAB
ALBUMIN SERPL-MCNC: 2.7 G/DL (ref 3.5–5)
ALBUMIN/GLOB SERPL: 0.7 {RATIO} (ref 1.1–2.2)
ALP SERPL-CCNC: 78 U/L (ref 45–117)
ALT SERPL-CCNC: 25 U/L (ref 12–78)
ANION GAP SERPL CALC-SCNC: 3 MMOL/L (ref 5–15)
AST SERPL-CCNC: 27 U/L (ref 15–37)
BASOPHILS # BLD: 0.1 K/UL (ref 0–0.1)
BASOPHILS # BLD: 0.1 K/UL (ref 0–0.1)
BASOPHILS NFR BLD: 1 % (ref 0–1)
BASOPHILS NFR BLD: 1 % (ref 0–1)
BILIRUB SERPL-MCNC: 0.4 MG/DL (ref 0.2–1)
BUN SERPL-MCNC: 51 MG/DL (ref 6–20)
BUN/CREAT SERPL: 22 (ref 12–20)
CALCIUM SERPL-MCNC: 9 MG/DL (ref 8.5–10.1)
CHLORIDE SERPL-SCNC: 101 MMOL/L (ref 97–108)
CK MB CFR SERPL CALC: NORMAL % (ref 0–2.5)
CK MB SERPL-MCNC: <1 NG/ML (ref 5–25)
CK SERPL-CCNC: 73 U/L (ref 39–308)
CO2 SERPL-SCNC: 33 MMOL/L (ref 21–32)
CREAT SERPL-MCNC: 2.36 MG/DL (ref 0.7–1.3)
DIFFERENTIAL METHOD BLD: ABNORMAL
DIFFERENTIAL METHOD BLD: NORMAL
EOSINOPHIL # BLD: 0.2 K/UL (ref 0–0.4)
EOSINOPHIL # BLD: 0.2 K/UL (ref 0–0.4)
EOSINOPHIL NFR BLD: 3 % (ref 0–7)
EOSINOPHIL NFR BLD: 4 % (ref 0–7)
ERYTHROCYTE [DISTWIDTH] IN BLOOD BY AUTOMATED COUNT: 14.5 % (ref 11.5–14.5)
ERYTHROCYTE [DISTWIDTH] IN BLOOD BY AUTOMATED COUNT: 14.6 % (ref 11.5–14.5)
GLOBULIN SER CALC-MCNC: 3.8 G/DL (ref 2–4)
GLUCOSE BLD STRIP.AUTO-MCNC: 139 MG/DL (ref 65–100)
GLUCOSE BLD STRIP.AUTO-MCNC: 141 MG/DL (ref 65–100)
GLUCOSE SERPL-MCNC: 129 MG/DL (ref 65–100)
HCT VFR BLD AUTO: 38.2 % (ref 36.6–50.3)
HCT VFR BLD AUTO: 42 % (ref 36.6–50.3)
HGB BLD-MCNC: 12 G/DL (ref 12.1–17)
HGB BLD-MCNC: 12.7 G/DL (ref 12.1–17)
IMM GRANULOCYTES # BLD AUTO: 0 K/UL (ref 0–0.04)
IMM GRANULOCYTES # BLD AUTO: 0 K/UL (ref 0–0.04)
IMM GRANULOCYTES NFR BLD AUTO: 0 % (ref 0–0.5)
IMM GRANULOCYTES NFR BLD AUTO: 1 % (ref 0–0.5)
LYMPHOCYTES # BLD: 0.9 K/UL (ref 0.8–3.5)
LYMPHOCYTES # BLD: 1.2 K/UL (ref 0.8–3.5)
LYMPHOCYTES NFR BLD: 14 % (ref 12–49)
LYMPHOCYTES NFR BLD: 18 % (ref 12–49)
MCH RBC QN AUTO: 26.8 PG (ref 26–34)
MCH RBC QN AUTO: 27.1 PG (ref 26–34)
MCHC RBC AUTO-ENTMCNC: 30.2 G/DL (ref 30–36.5)
MCHC RBC AUTO-ENTMCNC: 31.4 G/DL (ref 30–36.5)
MCV RBC AUTO: 86.4 FL (ref 80–99)
MCV RBC AUTO: 88.6 FL (ref 80–99)
MONOCYTES # BLD: 0.5 K/UL (ref 0–1)
MONOCYTES # BLD: 0.7 K/UL (ref 0–1)
MONOCYTES NFR BLD: 11 % (ref 5–13)
MONOCYTES NFR BLD: 8 % (ref 5–13)
NEUTS SEG # BLD: 4.3 K/UL (ref 1.8–8)
NEUTS SEG # BLD: 4.5 K/UL (ref 1.8–8)
NEUTS SEG NFR BLD: 66 % (ref 32–75)
NEUTS SEG NFR BLD: 73 % (ref 32–75)
NRBC # BLD: 0 K/UL (ref 0–0.01)
NRBC # BLD: 0 K/UL (ref 0–0.01)
NRBC BLD-RTO: 0 PER 100 WBC
NRBC BLD-RTO: 0 PER 100 WBC
PLATELET # BLD AUTO: 214 K/UL (ref 150–400)
PLATELET # BLD AUTO: 228 K/UL (ref 150–400)
PMV BLD AUTO: 11 FL (ref 8.9–12.9)
PMV BLD AUTO: 11.2 FL (ref 8.9–12.9)
POTASSIUM SERPL-SCNC: 3.5 MMOL/L (ref 3.5–5.1)
PROT SERPL-MCNC: 6.5 G/DL (ref 6.4–8.2)
RBC # BLD AUTO: 4.42 M/UL (ref 4.1–5.7)
RBC # BLD AUTO: 4.74 M/UL (ref 4.1–5.7)
SERVICE CMNT-IMP: ABNORMAL
SERVICE CMNT-IMP: ABNORMAL
SODIUM SERPL-SCNC: 137 MMOL/L (ref 136–145)
TROPONIN I SERPL-MCNC: <0.05 NG/ML
WBC # BLD AUTO: 6.2 K/UL (ref 4.1–11.1)
WBC # BLD AUTO: 6.5 K/UL (ref 4.1–11.1)

## 2020-07-20 PROCEDURE — C9113 INJ PANTOPRAZOLE SODIUM, VIA: HCPCS | Performed by: INTERNAL MEDICINE

## 2020-07-20 PROCEDURE — 85025 COMPLETE CBC W/AUTO DIFF WBC: CPT

## 2020-07-20 PROCEDURE — 71045 X-RAY EXAM CHEST 1 VIEW: CPT

## 2020-07-20 PROCEDURE — 65660000000 HC RM CCU STEPDOWN

## 2020-07-20 PROCEDURE — 99285 EMERGENCY DEPT VISIT HI MDM: CPT

## 2020-07-20 PROCEDURE — 74011250636 HC RX REV CODE- 250/636: Performed by: INTERNAL MEDICINE

## 2020-07-20 PROCEDURE — 74011000250 HC RX REV CODE- 250: Performed by: INTERNAL MEDICINE

## 2020-07-20 PROCEDURE — 65270000029 HC RM PRIVATE

## 2020-07-20 PROCEDURE — 80053 COMPREHEN METABOLIC PANEL: CPT

## 2020-07-20 PROCEDURE — 84484 ASSAY OF TROPONIN QUANT: CPT

## 2020-07-20 PROCEDURE — 74011250637 HC RX REV CODE- 250/637: Performed by: INTERNAL MEDICINE

## 2020-07-20 PROCEDURE — 74011000258 HC RX REV CODE- 258: Performed by: INTERNAL MEDICINE

## 2020-07-20 PROCEDURE — 93005 ELECTROCARDIOGRAM TRACING: CPT

## 2020-07-20 PROCEDURE — 36415 COLL VENOUS BLD VENIPUNCTURE: CPT

## 2020-07-20 PROCEDURE — 82550 ASSAY OF CK (CPK): CPT

## 2020-07-20 PROCEDURE — 82962 GLUCOSE BLOOD TEST: CPT

## 2020-07-20 RX ORDER — CARVEDILOL 6.25 MG/1
6.25 TABLET ORAL 2 TIMES DAILY
Status: DISCONTINUED | OUTPATIENT
Start: 2020-07-20 | End: 2020-08-03 | Stop reason: HOSPADM

## 2020-07-20 RX ORDER — GUAIFENESIN 100 MG/5ML
81 LIQUID (ML) ORAL DAILY
Status: DISCONTINUED | OUTPATIENT
Start: 2020-07-21 | End: 2020-08-03 | Stop reason: HOSPADM

## 2020-07-20 RX ORDER — ACETAMINOPHEN 325 MG/1
650 TABLET ORAL
Status: DISCONTINUED | OUTPATIENT
Start: 2020-07-20 | End: 2020-08-03 | Stop reason: HOSPADM

## 2020-07-20 RX ORDER — SODIUM CHLORIDE 0.9 % (FLUSH) 0.9 %
5-40 SYRINGE (ML) INJECTION EVERY 8 HOURS
Status: DISCONTINUED | OUTPATIENT
Start: 2020-07-20 | End: 2020-08-03 | Stop reason: HOSPADM

## 2020-07-20 RX ORDER — IBUPROFEN 200 MG
1 TABLET ORAL EVERY 24 HOURS
Status: DISCONTINUED | OUTPATIENT
Start: 2020-07-20 | End: 2020-07-22

## 2020-07-20 RX ORDER — DONEPEZIL HYDROCHLORIDE 5 MG/1
10 TABLET, FILM COATED ORAL DAILY
Status: DISCONTINUED | OUTPATIENT
Start: 2020-07-21 | End: 2020-08-03 | Stop reason: HOSPADM

## 2020-07-20 RX ORDER — SODIUM CHLORIDE 0.9 % (FLUSH) 0.9 %
5-40 SYRINGE (ML) INJECTION AS NEEDED
Status: DISCONTINUED | OUTPATIENT
Start: 2020-07-20 | End: 2020-08-03 | Stop reason: HOSPADM

## 2020-07-20 RX ORDER — DEXTROSE MONOHYDRATE AND SODIUM CHLORIDE 5; .45 G/100ML; G/100ML
50 INJECTION, SOLUTION INTRAVENOUS CONTINUOUS
Status: DISCONTINUED | OUTPATIENT
Start: 2020-07-20 | End: 2020-07-27

## 2020-07-20 RX ORDER — MEMANTINE HYDROCHLORIDE 10 MG/1
10 TABLET ORAL 2 TIMES DAILY
Status: DISCONTINUED | OUTPATIENT
Start: 2020-07-20 | End: 2020-08-03 | Stop reason: HOSPADM

## 2020-07-20 RX ORDER — TEMAZEPAM 15 MG/1
15 CAPSULE ORAL
Status: DISCONTINUED | OUTPATIENT
Start: 2020-07-20 | End: 2020-08-03 | Stop reason: HOSPADM

## 2020-07-20 RX ADMIN — DEXTROSE MONOHYDRATE AND SODIUM CHLORIDE 125 ML/HR: 5; .45 INJECTION, SOLUTION INTRAVENOUS at 18:05

## 2020-07-20 RX ADMIN — Medication 10 ML: at 21:45

## 2020-07-20 RX ADMIN — CARVEDILOL 6.25 MG: 6.25 TABLET, FILM COATED ORAL at 21:45

## 2020-07-20 RX ADMIN — SODIUM CHLORIDE 40 MG: 9 INJECTION, SOLUTION INTRAMUSCULAR; INTRAVENOUS; SUBCUTANEOUS at 21:45

## 2020-07-20 RX ADMIN — MEMANTINE HYDROCHLORIDE 10 MG: 10 TABLET ORAL at 21:45

## 2020-07-20 NOTE — PATIENT INSTRUCTIONS
Alzheimer's Disease: Care Instructions  Your Care Instructions     Alzheimer's disease is a type of dementia. It causes memory loss and affects judgment, language, and behavior. You may have trouble making decisions or may get lost in places that you used to know well. Alzheimer's disease is different than mild memory loss that occurs with aging. It is not clear what causes Alzheimer's disease, but it is the most common form of dementia in older adults. Finding out that you have this disease is a shock. You may be afraid and worried about how the condition will change your life. Although there is no cure at this time, medicine in some cases may slow memory loss for a while. Other medicines may be able to help you sleep or cope with depression and behavior changes. Alzheimer's disease is different for everyone. It may take many years to develop. In some cases, people can function well for a long time. In the early stage of the disease, you can do things at home to make life easier and safer. You also can keep doing your hobbies and other activities. Many people find comfort in planning now for their future needs. Follow-up care is a key part of your treatment and safety. Be sure to make and go to all appointments, and call your doctor if you are having problems. It's also a good idea to know your test results and keep a list of the medicines you take. How can you care for yourself at home? Taking care of yourself  · If your doctor gives you medicines, take them exactly as prescribed. Call your doctor if you think you are having a problem with your medicine. You will get more details on the medicines your doctor prescribes. · Eat a balanced diet. Get plenty of whole grains, fruits, and vegetables every day. If you are not hungry at mealtimes, eat snacks at midmorning and in the afternoon. Try drinks such as Boost, Ensure, or Sustacal if you are having trouble keeping your weight up. · Stay active.  Exercise such as walking may slow the decline of your mental abilities. Try to stay active mentally too. Read and work crossword puzzles if you enjoy these activities. · If you have trouble sleeping, do not nap during the day. Get regular exercise (but not within several hours of bedtime). Drink a glass of warm milk or caffeine-free herbal tea before going to bed. · Ask your doctor about support groups and other resources in your area. They can help people who have Alzheimer's disease and their families. · Be patient. You may find that a task takes you longer than it used to. · If you have not already done so, make a list of advance directives. Advance directives are instructions to your doctor and family members about what kind of care you want if you become unable to speak or express yourself. Talk to a  about making a will, if you do not already have one. Keeping schedules  · Develop a routine. You will feel less frustrated or confused if you have a clear, simple plan of what to do every day. ? Make lists of your medicines and when to take them. ? Write down appointments and other tasks in a calendar. ? Put sticky notes around the house to help you remember events and other things you have to do. ? Schedule activities and tasks for times of the day when you are best able to handle them. Staying safe  · Tell someone when you are going out and where you are going. Let the person know when you will be back. Before you go out alone, write down where you are going, how to get there, and how to get back home. Do this even if you have gone there many times before. Take someone along with you when possible. · Make your home safe. Tack down rugs, put no-slip tape in the tub, use handrails, and put safety switches on stoves and appliances. · Have a family member or other caregiver tell you whether you are driving badly. Deciding to stop driving is very hard for many people. Driving helps you feel independent.  Your state 's license bureau can do a driving test if there is any question. Plan for other means of getting around when you are no longer able to drive. · Use strong lighting, especially at night. Put night-lights in bedrooms, hallways, and bathrooms. · Lower the hot water temperature setting to 120°F or lower to avoid burns. When should you call for help? [id number] anytime you think you may need emergency care. For example, call if:  · You are lost and do not know whom to call. · You are injured and do not know whom to call. Call your doctor now or seek immediate medical care if:  · Your symptoms suddenly get much worse. Watch closely for changes in your health, and be sure to contact your doctor if:  · You want more information about how you can take care of yourself. Where can you learn more? Go to http://www.gray.com/  Enter Y179 in the search box to learn more about \"Alzheimer's Disease: Care Instructions. \"  Current as of: January 31, 2020               Content Version: 12.5  © 6583-8540 [employer]. Care instructions adapted under license by [email or web address] (which disclaims liability or warranty for this information). If you have questions about a medical condition or this instruction, always ask your healthcare professional. [address] any warranty or liability for your use of this information.

## 2020-07-20 NOTE — H&P
ADMISSION NOTE    NAME:  Saida Farfan    :   1931   MRN:   [id number]     Date/Time:  2020 5:16 PM  Subjective:   CHIEF COMPLAINT: Weak and weight loss    HISTORY OF PRESENT ILLNESS:     Bernardo Rizvi is a 80 y.o.  black male who presents today after a long absence not having been seen here since 2019. Today he is accompanied by his granddaughter and he is here in follow-up of his medical problems to include hypertension, diabetes, hyperlipidemia, atherosclerotic coronary vascular disease, cardiomyopathy, Alzheimer's dementia, history of PAF, prior TIA, history of alcoholism and other multiple medical problems. In addition to his chronic problems he has a significant issue now off weight loss from 134 pounds at his last visit down to 115.7 pounds today. He has become very unsteady walking according to his granddaughter which is easily visible today on exam in the office. He also has developed a poor appetite and has hard time keeping food down and eating very little. He actually even today has had some difficulty keeping liquids down. He denies any current chest pain, shortness of breath, palpitations, PND, orthopnea or other cardiorespiratory complaints. He  has no current  complaints. As far as the GI complaints there is a poor appetite with the inability to keep some food down as well as liquids as well as some mild upper abdominal discomfort but no evidence of diarrhea. No fevers, chills or night sweats have been noted. From a neurologic standpoint he has had progressive decreased memory and progressive unsteadiness on his feet. There are no current active arthritic complaints. All of the above history is confirmed by his granddaughter present with him today. She is his primary caretaker.   With his history is felt prudent to admit him to the hospital for further work-up evaluation and treatment and he was sent to the hospital for direct admission however while going through admissions he became lightheaded and unresponsive and was emergently taken to the emergency room for admission. Once he was laid down his mental status came back to his baseline.         Past Medical History:   Diagnosis Date    Abdominal pain 7/18/2017    Alzheimer disease ([facility] 7/18/2017    Anemia 7/18/2017    Arthritis     Back pain 7/18/2017    Bradycardia 7/18/2017    CAD (coronary artery disease)     hx of MI    CKD (chronic kidney disease), stage II 7/18/2017    constipation     Depression 7/18/2017    Diabetes mellitus ([facility] 7/18/2017    Diverticulosis 7/18/2017    DJD (degenerative joint disease) 7/18/2017    Encounter for long-term (current) use of other medications 7/18/2017    Fatigue     Gastritis and duodenitis 7/18/2017    GERD (gastroesophageal reflux disease)     GI bleed 7/18/2017    Hearing loss     Hyperlipidemia 7/18/2017    Hypertension     Hypertension with renal disease 7/18/2017    Ill-defined condition     Dementia    Internal hemorrhoids 7/18/2017    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    S/P cardiac pacemaker procedure 5/4/2015    5/4/15 Medtronic dual chamber pacemaker implant     Weight loss     lost over 40 pounds last year- has no appetite        Past Surgical History:   Procedure Laterality Date    COLONOSCOPY N/A 6/22/2017    COLONOSCOPY performed by Octavio Archibald MD at [address] [place] [address]  6/22/2017         [address]  7/10/2014    right inguinal    HX OTHER SURGICAL      cystectomy from back    OR EGD TRANSORAL BIOPSY SINGLE/MULTIPLE  2/14/2012         UPPER GI ENDOSCOPY,BIOPSY  6/22/2017            Social History     Tobacco Use    Smoking status: Former Smoker     Packs/day: 0.50     Years: 10.00     Pack years: 5.00     Start date: 7/12/1991    Smokeless tobacco: Never Used    Tobacco comment: quit 50 years ago   Substance Use Topics    Alcohol use: Not Currently     Comment: Occasional beer        Family History   Problem Relation Age of Onset    Hypertension Mother     Heart Disease Father     Cancer Other         son-cancer? unknown what type         No Known Allergies     Prior to Admission medications    Medication Sig Start Date End Date Taking? Authorizing Provider   donepeziL (ARICEPT) 10 mg tablet TAKE 1 TABLET BY MOUTH EVERY DAY 20  Yes Willis Correa MD   losartan-hydroCHLOROthiazide [facility]) 100-25 mg per tablet TAKE 1 TABLET BY MOUTH EVERY DAY 20  Yes Willis Correa MD   memantine (NAMENDA) 10 mg tablet TAKE 1 TABLET BY MOUTH TWICE A DAY 20  Yes Willis Correa MD   multivitamin, tx-iron-ca-min (THERA-M W/ IRON) 9 mg iron-400 mcg tab tablet Take 1 Tab by mouth daily. Yes Provider, Historical   amLODIPine (NORVASC) 10 mg tablet Take 0.5 Tabs by mouth daily. 19  Yes Willis Correa MD   carvedilol (COREG) 6.25 mg tablet Take 1 Tab by mouth two (2) times a day. 19  Yes Willis Correa MD   furosemide (LASIX) 20 mg tablet Take 1 Tab by mouth daily. 19  Yes Willis Correa MD   temazepam (RESTORIL) 15 mg capsule Take 1 Cap by mouth nightly as needed for Sleep. Max Daily Amount: 15 mg. 19  Yes Willis Correa MD   aspirin 81 mg chewable tablet Take 1 Tab by mouth daily. 4/15/18  Yes Aysha Coleman MD   omeprazole (PRILOSEC) 20 mg capsule Take 1 Cap by mouth daily.  17  Yes Willis Correa MD       REVIEW OF SYSTEMS:    Constitutional:  negative for fevers, chills, but positive for anorexia and weight loss  Eyes:   negative for visual disturbance and irritation  ENT:   negative for hearing loss, tinnitus, nasal congestion, epistaxis, sore throat  Neck:              negative for stiffness or swollen glands  Respiratory:  negative for cough, hemoptysis, pleurisy/chest pain, wheezing or dyspnea on exertion  Cards:   negative for chest pain, palpitations, orthopnea, PND, lower extremity edema  GI:   negative for dysphagia, diarrhea, constipation and abdominal pain. Positive for dysphasia with some nausea vomiting  Genitourinary: negative for frequency, dysuria and hematuria  Integument:  negative for rash and pruritus  Hematologic:  negative for easy bruising and bleeding  Lymphatic:      negative for swollen lymph nodes or night sweats  Musculoskel: negative for myalgias, arthralgias, back pain but positive generalized muscle weakness  Neurological:  negative for headaches, dizziness, vertigo, memory problems and gait problems  Behavl/Psych:  negative for anxiety, depression and illegal drug usage      Objective:   VITALS:    Visit Vitals  BP 99/51 (BP 1 Location: Left arm, BP Patient Position: Supine)   Pulse 67   Temp 97.5 °F (36.4 °C)   Resp 18   Ht 5' 9\" (1.753 m)   Wt 114 lb 10.2 oz (52 kg)   SpO2 98%   BMI 16.93 kg/m²       PHYSICAL EXAM:   General:    Alert, cooperative, no distress, appears stated age. Frail and cachectic in appearance  Head:   Normocephalic, without obvious abnormality, atraumatic. Eyes:   Conjunctivae/corneas clear. PERRL  Nose:  Nares normal. No drainage or sinus tenderness. Throat:    Lips, mucosa, and tongue normal.  No Thrush  Neck:  Supple, symmetrical,  no adenopathy, thyroid: non tender    no carotid bruit and no JVD. Back:    Symmetric,  No CVA tenderness. Lungs:   Clear to auscultation bilaterally. No Wheezing or Rhonchi. No rales. Chest wall:  No tenderness or deformity. No Accessory muscle use. Heart:   Regular rate and rhythm,  no murmur, rub or gallop. Abdomen:   Soft, non-tender. Not distended. Bowel sounds normal. No masses  Extremities: Extremities normal, atraumatic, No cyanosis. No edema. No clubbing  Skin:     Texture, turgor normal. No rashes or lesions. Not Jaundiced  Lymph nodes: Cervical, supraclavicular normal.  Psych:  Good insight. Not depressed. Not anxious or agitated. Neurologic: EOMs intact. No facial asymmetry.  No aphasia or slurred speech. Unsteady gait and general decreased strength, Alert and oriented X 1. LAB DATA REVIEWED:    Recent Results (from the past 24 hour(s))   GLUCOSE, POC    Collection Time: 07/20/20  4:14 PM   Result Value Ref Range    Glucose (POC) 141 (H) 65 - 100 mg/dL    Performed by Monae Cameron    EKG, 12 LEAD, INITIAL    Collection Time: 07/20/20  4:34 PM   Result Value Ref Range    Ventricular Rate 63 BPM    Atrial Rate 63 BPM    P-R Interval 136 ms    QRS Duration 164 ms    Q-T Interval 496 ms    QTC Calculation (Bezet) 507 ms    Calculated P Axis 64 degrees    Calculated R Axis -132 degrees    Calculated T Axis 85 degrees    Diagnosis       AV dual-paced rhythm  When compared with ECG of 27-OCT-2019 07:58,  Previous ECG has undetermined rhythm, needs review     CBC WITH AUTOMATED DIFF    Collection Time: 07/20/20  4:42 PM   Result Value Ref Range    WBC 6.5 4.1 - 11.1 K/uL    RBC 4.74 4.10 - 5.70 M/uL    HGB 12.7 12.1 - 17.0 g/dL    HCT 42.0 36.6 - 50.3 %    MCV 88.6 80.0 - 99.0 FL    MCH 26.8 26.0 - 34.0 PG    MCHC 30.2 30.0 - 36.5 g/dL    RDW 14.5 11.5 - 14.5 %    PLATELET 168 747 - 470 K/uL    MPV 11.0 8.9 - 12.9 FL    NRBC 0.0 0  WBC    ABSOLUTE NRBC 0.00 0.00 - 0.01 K/uL    NEUTROPHILS 66 32 - 75 %    LYMPHOCYTES 18 12 - 49 %    MONOCYTES 11 5 - 13 %    EOSINOPHILS 4 0 - 7 %    BASOPHILS 1 0 - 1 %    IMMATURE GRANULOCYTES 0 0.0 - 0.5 %    ABS. NEUTROPHILS 4.3 1.8 - 8.0 K/UL    ABS. LYMPHOCYTES 1.2 0.8 - 3.5 K/UL    ABS. MONOCYTES 0.7 0.0 - 1.0 K/UL    ABS. EOSINOPHILS 0.2 0.0 - 0.4 K/UL    ABS. BASOPHILS 0.1 0.0 - 0.1 K/UL    ABS. IMM.  GRANS. 0.0 0.00 - 0.04 K/UL    DF AUTOMATED     METABOLIC PANEL, COMPREHENSIVE    Collection Time: 07/20/20  4:42 PM   Result Value Ref Range    Sodium 137 136 - 145 mmol/L    Potassium 3.5 3.5 - 5.1 mmol/L    Chloride 101 97 - 108 mmol/L    CO2 33 (H) 21 - 32 mmol/L    Anion gap 3 (L) 5 - 15 mmol/L    Glucose 129 (H) 65 - 100 mg/dL    BUN 51 (H) 6 - 20 MG/DL    Creatinine 2.36 (H) 0.70 - 1.30 MG/DL    BUN/Creatinine ratio 22 (H) 12 - 20      GFR est AA 32 (L) >60 ml/min/1.73m2    GFR est non-AA 26 (L) >60 ml/min/1.73m2    Calcium 9.0 8.5 - 10.1 MG/DL    Bilirubin, total 0.4 0.2 - 1.0 MG/DL    ALT (SGPT) 25 12 - 78 U/L    AST (SGOT) 27 15 - 37 U/L    Alk. phosphatase 78 45 - 117 U/L    Protein, total 6.5 6.4 - 8.2 g/dL    Albumin 2.7 (L) 3.5 - 5.0 g/dL    Globulin 3.8 2.0 - 4.0 g/dL    A-G Ratio 0.7 (L) 1.1 - 2.2     CK W/ CKMB & INDEX    Collection Time: 07/20/20  4:42 PM   Result Value Ref Range    CK - MB <1.0 <3.6 NG/ML    CK-MB Index Cannot be calculated 0.0 - 2.5      CK 73 39 - 308 U/L   TROPONIN I    Collection Time: 07/20/20  4:42 PM   Result Value Ref Range    Troponin-I, Qt. <0.05 <0.05 ng/mL       Assessment/Plan:      Active Problems:    Syncope (1/9/2019)       ___________________________________________________  PLAN:    Risk of deterioration:  []Low    []Moderate  [x]High    1. Admit to telemetry bed with monitoring  2. IV hydration D5 half-normal saline  3. Hold Lasix and all current hypertensive medications except continue Coreg with his cardiomyopathy  4. Start IV Protonix  5.  GI consult as it sounds like he needs an EGD and probable esophageal dilatation  6. Continue current Alzheimer's medications as ordered although may not be able to resume p.o. until after EGD  7. According to his granddaughter who is his caretaker has not drank alcohol in 3 months but will watch closely for any signs of withdrawal with known alcoholism and prior withdrawal  Further recommendations and orders pending initial findings and response to treatment.     Prophylaxis:  []Lovenox  []Coumadin  []Hep SQ  [x]SCDs  []H2B/PPI    Disposition:  [x]Home w/ Family   []HH PT,OT,RN   []SNF/LTC   []SAH/Rehab    Discussed Code Status:    []Full Code      []DNR     ___________________________________________________    Care Plan discussed with:    [x]Patient   [x]Family [x]ED Doc   []Specialist :       ___________________________________________________  Admitting Physician: Rick Jenkins MD

## 2020-07-20 NOTE — PROGRESS NOTES
Chief Complaint   Patient presents with    Hypertension     6 month visit    Diabetes     1. Have you been to the ER, urgent care clinic since your last visit? Hospitalized since your last visit? No    2. Have you seen or consulted any other health care providers outside of the [address] [place] [address] since your last visit? Include any pap smears or colon screening.  No

## 2020-07-20 NOTE — ROUTINE PROCESS
18:45 First attempt to receive report. No answer at ER POD. 18:54 Second attempt to get report. No answer at ER POD. 19:04. Patient arrived to room 3122. Report not received from ER.     19:12 ER Charge Nurse called to get report.  Report received from Rosalva Nichols RN     19:20 Report given to Alfred Bliss

## 2020-07-20 NOTE — ED NOTES
Pt arrives to the ED AAO to person after having a syncopal episode while waiting to be admitted into facility. Pt was noted hypotensive on arrival to ED, denies any dizziness or CP. Pt is now in stable condition with granddaughter at bedside, now in ED room with side rail up, bed to lowest position and call light within reach. Will continue to monitor.

## 2020-07-20 NOTE — ED NOTES
Attempted to give report, unable to connect phone call at this time. Will attempt at a later time. 18:15 - Second attempt to give report, unable to connect. 18:30 - Third attempt to give report, unable to connect phone call at this time. Charge RN notified. Pt will be transported to [facility] in stable condition.

## 2020-07-20 NOTE — ROUTINE PROCESS
Patient chart reviewed prior to admission to room 3119. Patient sent to the Emergency Room rather than room 3119.

## 2020-07-20 NOTE — ED PROVIDER NOTES
EMERGENCY DEPARTMENT HISTORY AND PHYSICAL EXAM          Date: 7/20/2020  Patient Name: Keren Sandhoff Sr  Attending of Record: George Bowser    History of Presenting Illness     Chief Complaint   Patient presents with    Dizziness     Patient was to be a directy admit and had a syncopal episode while waiting to be taken upstairs. History of dementia and hasn't been eating anything. History Provided By: Patient and Patient's Granddaughter    HPI: Anish Lazaro is a 80 y.o. male, pmhx hypertension, diabetes, hyperlipidemia, atherosclerotic coronary vascular disease, cardiomyopathy, Alzheimer's dementia, history of PAF, prior TIA, history of alcoholism resenting after a witnessed syncopal episode in the lobby of the hospital. Patient was in the lobby because he was being admitted for progressive generalized decompensation including recent weight loss, memory difficulty, and unsteadiness on his feet. Syncopal episode in the lobby described by his granddaughter is the patient slumping forward in his wheelchair becoming unresponsive. He he was then laid on the ground but recovered within about 30 seconds. He had some initial confusion upon awakening but was back to his baseline by the time he was roomed in the ED. Patient denied any preceding symptoms, and is currently oriented to self which is reported to be his baseline. He denies any specific complaint. No fall or head strike during this event. PCP: Titus Jameson MD    There are no other complaints, changes, or physical findings at this time.      Current Facility-Administered Medications   Medication Dose Route Frequency Provider Last Rate Last Dose    sodium chloride (NS) flush 5-40 mL  5-40 mL IntraVENous Q8H Titus Jameson MD        sodium chloride (NS) flush 5-40 mL  5-40 mL IntraVENous PRN Titus Jameson MD        nicotine (NICODERM CQ) 21 mg/24 hr patch 1 Patch  1 Patch TransDERmal Q24H Titus Jameson MD       [facility] acetaminophen (TYLENOL) tablet 650 mg  650 mg Oral Q4H PRN Silvia Garcia MD        dextrose 5 % - 0.45% NaCl infusion  125 mL/hr IntraVENous CONTINUOUS Silvia Garcia MD        pantoprazole (PROTONIX) 40 mg in 0.9% sodium chloride 10 mL injection  40 mg IntraVENous Q12H Silvia Garcia MD         Current Outpatient Medications   Medication Sig Dispense Refill    donepeziL (ARICEPT) 10 mg tablet TAKE 1 TABLET BY MOUTH EVERY DAY 30 Tab 0    losartan-hydroCHLOROthiazide (HYZAAR) 100-25 mg per tablet TAKE 1 TABLET BY MOUTH EVERY DAY 30 Tab 0    memantine (NAMENDA) 10 mg tablet TAKE 1 TABLET BY MOUTH TWICE A DAY 60 Tab 0    multivitamin, tx-iron-ca-min (THERA-M W/ IRON) 9 mg iron-400 mcg tab tablet Take 1 Tab by mouth daily.  amLODIPine (NORVASC) 10 mg tablet Take 0.5 Tabs by mouth daily. 90 Tab 1    carvedilol (COREG) 6.25 mg tablet Take 1 Tab by mouth two (2) times a day. 180 Tab prn    furosemide (LASIX) 20 mg tablet Take 1 Tab by mouth daily. 90 Tab prn    temazepam (RESTORIL) 15 mg capsule Take 1 Cap by mouth nightly as needed for Sleep. Max Daily Amount: 15 mg. 30 Cap 0    aspirin 81 mg chewable tablet Take 1 Tab by mouth daily. 30 Tab 1    omeprazole (PRILOSEC) 20 mg capsule Take 1 Cap by mouth daily.  80 Cap 3       Past History     Past Medical History:  Past Medical History:   Diagnosis Date    Abdominal pain 7/18/2017    Alzheimer disease ([facility] [address] 7/18/2017    Anemia 7/18/2017    Arthritis     Back pain 7/18/2017    Bradycardia 7/18/2017    CAD (coronary artery disease)     hx of MI    CKD (chronic kidney disease), stage II 7/18/2017    constipation     Depression 7/18/2017    Diabetes mellitus ([facility] [address] 7/18/2017    Diverticulosis 7/18/2017    DJD (degenerative joint disease) 7/18/2017    Encounter for long-term (current) use of other medications 7/18/2017    Fatigue     Gastritis and duodenitis 7/18/2017    GERD (gastroesophageal reflux disease)     GI bleed 7/18/2017  Hearing loss     Hyperlipidemia 2017    Hypertension     Hypertension with renal disease 2017    Ill-defined condition     Dementia    Internal hemorrhoids 2017    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    S/P cardiac pacemaker procedure 2015    5/4/15 Medtronic dual chamber pacemaker implant     Weight loss     lost over 40 pounds last year- has no appetite       Past Surgical History:  Past Surgical History:   Procedure Laterality Date    COLONOSCOPY N/A 2017    COLONOSCOPY performed by Eliane Mendoza MD at [address] [place] [address]  2017         [address]  7/10/2014    right inguinal    HX OTHER SURGICAL      cystectomy from back    [place] EGD TRANSORAL BIOPSY SINGLE/MULTIPLE  2012         UPPER GI ENDOSCOPY,BIOPSY  2017            Family History:  Family History   Problem Relation Age of Onset    Hypertension Mother     Heart Disease Father     Cancer Other         son-cancer? unknown what type        Social History:  Social History     Tobacco Use    Smoking status: Former Smoker     Packs/day: 0.50     Years: 10.00     Pack years: 5.00     Start date: 1991    Smokeless tobacco: Never Used    Tobacco comment: quit 50 years ago   Substance Use Topics    Alcohol use: Not Currently     Comment: Occasional beer    Drug use: No       Allergies:  No Known Allergies      Review of Systems   Review of Systems   CONSTITUTIONAL: Patient denies fevers, chills  EYES: Patient denies any visual symptoms. EARS, NOSE, AND THROAT: No difficulties with hearing. No symptoms of rhinitis or sore throat. CARDIOVASCULAR: Patient denies chest pains, palpitations  RESPIRATORY: No dyspnea on exertion, no wheezing or cough. GI: No nausea, vomiting, diarrhea, constipation, abdominal pain  : No dysuria or hematuria  MUSCULOSKELETAL: No myalgias or arthralgias.   NEUROLOGIC: No chronic headaches, no seizures. Patient denies numbness, tingling or weakness. DERMATOLOGIC: Patient denies any rashes or skin changes. Physical Exam   Physical Exam   GENERAL APPEARANCE: No acute distress, Very thin   HEENT: Normocephalic and atraumatic. No scleral icterus. PERRL. No conjunctival injection. Oropharynx is clear. Dry mucous membranes   NECK: Supple. Trachea is midline. CHEST: Symmetric. Nontender to palpation. LUNGS: Breath sounds are equal and clear bilaterally. No wheezes, rhonchi, or rales. HEART: Regular rate and rhythm with normal S1 and S2. No murmurs, gallops, or rubs. ABDOMEN: Soft, flat, and benign. No mass, tenderness, guarding, or rebound. EXTREMITIES: No cyanosis, clubbing, or edema. NEUROLOGIC: No focal sensory or motor deficits are noted. Cranial nerves II through XII are intact. PSYCHIATRIC: The patient is awake, alert, and oriented x1. Somewha confused and questioning required consistent redirection   SKIN: Warm, dry, and well perfused. Good turgor. No lesions, nodules or rashes are noted on exposed skin.       Diagnostic Study Results     Labs -     Recent Results (from the past 12 hour(s))   GLUCOSE, POC    Collection Time: 07/20/20  4:14 PM   Result Value Ref Range    Glucose (POC) 141 (H) 65 - 100 mg/dL    Performed by Tyree Dawson    EKG, 12 LEAD, INITIAL    Collection Time: 07/20/20  4:34 PM   Result Value Ref Range    Ventricular Rate 63 BPM    Atrial Rate 63 BPM    P-R Interval 136 ms    QRS Duration 164 ms    Q-T Interval 496 ms    QTC Calculation (Bezet) 507 ms    Calculated P Axis 64 degrees    Calculated R Axis -132 degrees    Calculated T Axis 85 degrees    Diagnosis       AV dual-paced rhythm  When compared with ECG of 27-OCT-2019 07:58,  Previous ECG has undetermined rhythm, needs review     CBC WITH AUTOMATED DIFF    Collection Time: 07/20/20  4:42 PM   Result Value Ref Range    WBC 6.5 4.1 - 11.1 K/uL    RBC 4.74 4.10 - 5.70 M/uL    HGB 12.7 12.1 - 17.0 g/dL HCT 42.0 36.6 - 50.3 %    MCV 88.6 80.0 - 99.0 FL    MCH 26.8 26.0 - 34.0 PG    MCHC 30.2 30.0 - 36.5 g/dL    RDW 14.5 11.5 - 14.5 %    PLATELET 453 620 - 841 K/uL    MPV 11.0 8.9 - 12.9 FL    NRBC 0.0 0  WBC    ABSOLUTE NRBC 0.00 0.00 - 0.01 K/uL    NEUTROPHILS 66 32 - 75 %    LYMPHOCYTES 18 12 - 49 %    MONOCYTES 11 5 - 13 %    EOSINOPHILS 4 0 - 7 %    BASOPHILS 1 0 - 1 %    IMMATURE GRANULOCYTES 0 0.0 - 0.5 %    ABS. NEUTROPHILS 4.3 1.8 - 8.0 K/UL    ABS. LYMPHOCYTES 1.2 0.8 - 3.5 K/UL    ABS. MONOCYTES 0.7 0.0 - 1.0 K/UL    ABS. EOSINOPHILS 0.2 0.0 - 0.4 K/UL    ABS. BASOPHILS 0.1 0.0 - 0.1 K/UL    ABS. IMM. GRANS. 0.0 0.00 - 0.04 K/UL    DF AUTOMATED     METABOLIC PANEL, COMPREHENSIVE    Collection Time: 07/20/20  4:42 PM   Result Value Ref Range    Sodium 137 136 - 145 mmol/L    Potassium 3.5 3.5 - 5.1 mmol/L    Chloride 101 97 - 108 mmol/L    CO2 33 (H) 21 - 32 mmol/L    Anion gap 3 (L) 5 - 15 mmol/L    Glucose 129 (H) 65 - 100 mg/dL    BUN 51 (H) 6 - 20 MG/DL    Creatinine 2.36 (H) 0.70 - 1.30 MG/DL    BUN/Creatinine ratio 22 (H) 12 - 20      GFR est AA 32 (L) >60 ml/min/1.73m2    GFR est non-AA 26 (L) >60 ml/min/1.73m2    Calcium 9.0 8.5 - 10.1 MG/DL    Bilirubin, total 0.4 0.2 - 1.0 MG/DL    ALT (SGPT) 25 12 - 78 U/L    AST (SGOT) 27 15 - 37 U/L    Alk. phosphatase 78 45 - 117 U/L    Protein, total 6.5 6.4 - 8.2 g/dL    Albumin 2.7 (L) 3.5 - 5.0 g/dL    Globulin 3.8 2.0 - 4.0 g/dL    A-G Ratio 0.7 (L) 1.1 - 2.2     CK W/ CKMB & INDEX    Collection Time: 07/20/20  4:42 PM   Result Value Ref Range    CK - MB <1.0 <3.6 NG/ML    CK-MB Index Cannot be calculated 0.0 - 2.5      CK 73 39 - 308 U/L   TROPONIN I    Collection Time: 07/20/20  4:42 PM   Result Value Ref Range    Troponin-I, Qt. <0.05 <0.05 ng/mL       Radiologic Studies -   XR CHEST PORT   Final Result   IMPRESSION:    Clear lungs.       XR CHEST SNGL V    (Results Pending)     CT Results  (Last 48 hours)    None        CXR Results  (Last 48 hours)               07/20/20 1658  XR CHEST PORT Final result    Impression:  IMPRESSION:    Clear lungs. Narrative:  PORTABLE CHEST RADIOGRAPH/S: 7/20/2020 4:58 PM       INDICATION: Syncope. COMPARISON: 10/27/2019, 1/9/2019, 5/4/2015. TECHNIQUE: Portable frontal upright radiograph/s of the chest.       FINDINGS:    The lungs are clear. The central airways are patent. No pneumothorax or pleural   effusion. A pacemaker is in place. Medical Decision Making   I am the first provider for this patient. I reviewed the vital signs, available nursing notes, past medical history, past surgical history, family history and social history. Vital Signs-Reviewed the patient's vital signs. Patient Vitals for the past 12 hrs:   Temp Pulse Resp BP SpO2   07/20/20 1636 97.5 °F (36.4 °C) 67 18 99/51 98 %       ECG Interpretation: AV dual-paced rhythm    Records Reviewed: Nursing Notes and Old Medical Records    Provider Notes (Medical Decision Making):   DDx: Hypotension, syncope, arrhythmia, vaso-vagal, orthostatic hypotension    Patient presenting after syncopal episode in the lobby while he was getting ready to be admitted for generalized decompensation and hypotension. Patient back to baseline in the ED with normal neurologic exam and no report of head strike during his syncopal episode. Spoke with patient's primary care provider, Dr. Tonny Huber, who plans to admit patient for work-up for decompensation and hypertension. ED Course and Progress Notes:   Initial assessment performed. The patients presenting problems have been discussed, and they are in agreement with the care plan formulated and outlined with them. I have encouraged them to ask questions as they arise throughout their visit. Diagnosis     Clinical Impression:   1.  Near syncope        Disposition:  Admit    DISCHARGE PLAN:          Resident Signature: Lucina Abreu MD

## 2020-07-21 PROBLEM — E43 SEVERE PROTEIN-CALORIE MALNUTRITION (HCC): Chronic | Status: ACTIVE | Noted: 2020-07-21

## 2020-07-21 LAB
ANION GAP SERPL CALC-SCNC: 4 MMOL/L (ref 5–15)
APPEARANCE UR: CLEAR
ATRIAL RATE: 63 BPM
BACTERIA URNS QL MICRO: NEGATIVE /HPF
BILIRUB UR QL: NEGATIVE
BUN SERPL-MCNC: 45 MG/DL (ref 6–20)
BUN/CREAT SERPL: 23 (ref 12–20)
CALCIUM SERPL-MCNC: 9.1 MG/DL (ref 8.5–10.1)
CALCULATED P AXIS, ECG09: 64 DEGREES
CALCULATED R AXIS, ECG10: -132 DEGREES
CALCULATED T AXIS, ECG11: 85 DEGREES
CHLORIDE SERPL-SCNC: 98 MMOL/L (ref 97–108)
CO2 SERPL-SCNC: 36 MMOL/L (ref 21–32)
COLOR UR: NORMAL
CREAT SERPL-MCNC: 1.96 MG/DL (ref 0.7–1.3)
DIAGNOSIS, 93000: NORMAL
EPITH CASTS URNS QL MICRO: NORMAL /LPF
ERYTHROCYTE [DISTWIDTH] IN BLOOD BY AUTOMATED COUNT: 14.4 % (ref 11.5–14.5)
GLUCOSE BLD STRIP.AUTO-MCNC: 133 MG/DL (ref 65–100)
GLUCOSE BLD STRIP.AUTO-MCNC: 90 MG/DL (ref 65–100)
GLUCOSE BLD STRIP.AUTO-MCNC: 92 MG/DL (ref 65–100)
GLUCOSE SERPL-MCNC: 98 MG/DL (ref 65–100)
GLUCOSE UR STRIP.AUTO-MCNC: NEGATIVE MG/DL
HCT VFR BLD AUTO: 40.8 % (ref 36.6–50.3)
HGB BLD-MCNC: 12.8 G/DL (ref 12.1–17)
HGB UR QL STRIP: NEGATIVE
HYALINE CASTS URNS QL MICRO: NORMAL /LPF (ref 0–5)
KETONES UR QL STRIP.AUTO: NEGATIVE MG/DL
LEUKOCYTE ESTERASE UR QL STRIP.AUTO: NEGATIVE
MAGNESIUM SERPL-MCNC: 2.4 MG/DL (ref 1.6–2.4)
MCH RBC QN AUTO: 26.8 PG (ref 26–34)
MCHC RBC AUTO-ENTMCNC: 31.4 G/DL (ref 30–36.5)
MCV RBC AUTO: 85.5 FL (ref 80–99)
NITRITE UR QL STRIP.AUTO: NEGATIVE
NRBC # BLD: 0 K/UL (ref 0–0.01)
NRBC BLD-RTO: 0 PER 100 WBC
P-R INTERVAL, ECG05: 136 MS
PH UR STRIP: 7.5 [PH] (ref 5–8)
PLATELET # BLD AUTO: 228 K/UL (ref 150–400)
PMV BLD AUTO: 10.8 FL (ref 8.9–12.9)
POTASSIUM SERPL-SCNC: 3.1 MMOL/L (ref 3.5–5.1)
PROT UR STRIP-MCNC: NEGATIVE MG/DL
Q-T INTERVAL, ECG07: 496 MS
QRS DURATION, ECG06: 164 MS
QTC CALCULATION (BEZET), ECG08: 507 MS
RBC # BLD AUTO: 4.77 M/UL (ref 4.1–5.7)
RBC #/AREA URNS HPF: NORMAL /HPF (ref 0–5)
SERVICE CMNT-IMP: ABNORMAL
SERVICE CMNT-IMP: NORMAL
SERVICE CMNT-IMP: NORMAL
SODIUM SERPL-SCNC: 138 MMOL/L (ref 136–145)
SP GR UR REFRACTOMETRY: 1.01 (ref 1–1.03)
UA: UC IF INDICATED,UAUC: NORMAL
UROBILINOGEN UR QL STRIP.AUTO: 1 EU/DL (ref 0.2–1)
VENTRICULAR RATE, ECG03: 63 BPM
WBC # BLD AUTO: 5.3 K/UL (ref 4.1–11.1)
WBC URNS QL MICRO: NORMAL /HPF (ref 0–4)

## 2020-07-21 PROCEDURE — 83735 ASSAY OF MAGNESIUM: CPT

## 2020-07-21 PROCEDURE — 74011000258 HC RX REV CODE- 258: Performed by: INTERNAL MEDICINE

## 2020-07-21 PROCEDURE — 82962 GLUCOSE BLOOD TEST: CPT

## 2020-07-21 PROCEDURE — 94760 N-INVAS EAR/PLS OXIMETRY 1: CPT

## 2020-07-21 PROCEDURE — 74011250637 HC RX REV CODE- 250/637: Performed by: INTERNAL MEDICINE

## 2020-07-21 PROCEDURE — 36415 COLL VENOUS BLD VENIPUNCTURE: CPT

## 2020-07-21 PROCEDURE — 74011000250 HC RX REV CODE- 250: Performed by: INTERNAL MEDICINE

## 2020-07-21 PROCEDURE — 80048 BASIC METABOLIC PNL TOTAL CA: CPT

## 2020-07-21 PROCEDURE — 65660000000 HC RM CCU STEPDOWN

## 2020-07-21 PROCEDURE — 74011250636 HC RX REV CODE- 250/636: Performed by: INTERNAL MEDICINE

## 2020-07-21 PROCEDURE — 85027 COMPLETE CBC AUTOMATED: CPT

## 2020-07-21 PROCEDURE — 81001 URINALYSIS AUTO W/SCOPE: CPT

## 2020-07-21 PROCEDURE — C9113 INJ PANTOPRAZOLE SODIUM, VIA: HCPCS | Performed by: INTERNAL MEDICINE

## 2020-07-21 RX ORDER — POTASSIUM CHLORIDE 7.45 MG/ML
10 INJECTION INTRAVENOUS ONCE
Status: COMPLETED | OUTPATIENT
Start: 2020-07-21 | End: 2020-07-21

## 2020-07-21 RX ORDER — POTASSIUM CHLORIDE 7.45 MG/ML
10 INJECTION INTRAVENOUS
Status: DISPENSED | OUTPATIENT
Start: 2020-07-21 | End: 2020-07-21

## 2020-07-21 RX ADMIN — POTASSIUM CHLORIDE 10 MEQ: 10 INJECTION, SOLUTION INTRAVENOUS at 06:36

## 2020-07-21 RX ADMIN — DONEPEZIL HYDROCHLORIDE 10 MG: 5 TABLET, FILM COATED ORAL at 09:55

## 2020-07-21 RX ADMIN — MEMANTINE HYDROCHLORIDE 10 MG: 10 TABLET ORAL at 09:55

## 2020-07-21 RX ADMIN — POTASSIUM CHLORIDE 10 MEQ: 10 INJECTION, SOLUTION INTRAVENOUS at 10:00

## 2020-07-21 RX ADMIN — MEMANTINE HYDROCHLORIDE 10 MG: 10 TABLET ORAL at 18:14

## 2020-07-21 RX ADMIN — POTASSIUM CHLORIDE 10 MEQ: 10 INJECTION, SOLUTION INTRAVENOUS at 09:00

## 2020-07-21 RX ADMIN — ASPIRIN 81 MG CHEWABLE TABLET 81 MG: 81 TABLET CHEWABLE at 09:55

## 2020-07-21 RX ADMIN — DEXTROSE MONOHYDRATE AND SODIUM CHLORIDE 125 ML/HR: 5; .45 INJECTION, SOLUTION INTRAVENOUS at 22:50

## 2020-07-21 RX ADMIN — SODIUM CHLORIDE 40 MG: 9 INJECTION, SOLUTION INTRAMUSCULAR; INTRAVENOUS; SUBCUTANEOUS at 09:56

## 2020-07-21 RX ADMIN — Medication 5 ML: at 14:00

## 2020-07-21 RX ADMIN — POTASSIUM CHLORIDE 10 MEQ: 10 INJECTION, SOLUTION INTRAVENOUS at 10:08

## 2020-07-21 RX ADMIN — Medication 10 ML: at 21:43

## 2020-07-21 RX ADMIN — CARVEDILOL 6.25 MG: 6.25 TABLET, FILM COATED ORAL at 09:56

## 2020-07-21 RX ADMIN — SODIUM CHLORIDE 40 MG: 9 INJECTION, SOLUTION INTRAMUSCULAR; INTRAVENOUS; SUBCUTANEOUS at 21:43

## 2020-07-21 RX ADMIN — CARVEDILOL 6.25 MG: 6.25 TABLET, FILM COATED ORAL at 18:14

## 2020-07-21 RX ADMIN — DEXTROSE MONOHYDRATE AND SODIUM CHLORIDE 125 ML/HR: 5; .45 INJECTION, SOLUTION INTRAVENOUS at 06:36

## 2020-07-21 RX ADMIN — Medication 10 ML: at 06:36

## 2020-07-21 RX ADMIN — POTASSIUM CHLORIDE 10 MEQ: 7.46 INJECTION, SOLUTION INTRAVENOUS at 13:18

## 2020-07-21 NOTE — PROGRESS NOTES
General Surgery End of Shift Nursing Note    Bedside shift change report given to Benjamin Redman (oncoming nurse) by Jeanine Donaldson (offgoing nurse). Report included the following information SBAR, Kardex, Intake/Output and Cardiac Rhythm Paced.     Nikky Lucero

## 2020-07-21 NOTE — PROGRESS NOTES
Pt states that he has been having diffficulty at times with swallowing some things,  MDaware and  has now ordered a barium swallow for in the am-

## 2020-07-21 NOTE — PROGRESS NOTES
Problem: Pressure Injury - Risk of  Goal: *Prevention of pressure injury  Description: Document Newton Scale and appropriate interventions in the flowsheet. Outcome: Progressing Towards Goal  Note: Pressure Injury Interventions:  Sensory Interventions: Pressure redistribution bed/mattress (bed type)    Moisture Interventions: Absorbent underpads    Activity Interventions: PT/OT evaluation, Pressure redistribution bed/mattress(bed type)    Mobility Interventions: PT/OT evaluation, Turn and reposition approx.  every two hours(pillow and wedges)    Nutrition Interventions: Document food/fluid/supplement intake                     Problem: Patient Education: Go to Patient Education Activity  Goal: Patient/Family Education  Outcome: Progressing Towards Goal

## 2020-07-21 NOTE — PROGRESS NOTES
Small amount of emesis with lunch, small amount of cough from pt, Dr. Mayen Cuff notified to see if speech needs to see pt.

## 2020-07-21 NOTE — PROGRESS NOTES
Reason for Admission:   Syncope                  RUR Score:     16             PCP: First and Last name:  Michelle Ambriz   Name of [facility]: [facility]   Are you a current patient: Yes/No: Yes   Approximate date of last visit: 7/20/2020   Can you participate in a virtual visit if needed: Yes    Do you (patient/family) have any concerns for transition/discharge? Pt has hearing issues,an hearing AID and a cane could help pt with his mobility and hearing            Plan for utilizing home health:   Yes  Current Advanced Directive/Advance Care Plan:  Has ACP docs            Transition of Care Plan:      Home with [address] Management Interventions  PCP Verified by CM: Yes(today)  Mode of Transport at Discharge: Self(GD will transport pt back to home)  Transition of Care Consult (CM Consult): [address]: Yes  Discharge Durable Medical Equipment: (Pt don't use any DME's ,pt may benefit from a cane and hearing aids.)  Current Support Network: Lives with Caregiver(Lives with grand daughter in a single level home has front step)  Confirm Follow Up Transport: Family  Discharge Location  Discharge Placement: Home with home health    CM spoke with PASCALE and provided DSS informations and requested to apply for medicaid, if pt eligible. PASCALE said se will complete application today.     Pharmacy- [place] [id number] MS[facility]  ED Case Manager   Ext [phone]

## 2020-07-21 NOTE — PROGRESS NOTES
Just received very rude phone call from [facility]- stating that she is patient's daughter. She was screaming and yelling about how she will be getting a - she does not believe that she should have to have a code to come and see her father. I tried to explain to her that her brother had just called and had stated that  he was  here to see  his father. I explained that our director had spoken to the patient's granddaughter ( Jess's daughter) and that director had verified she was a POA and gave her code as granddaughter stated she was coming up . I explained to the son to call and se if granddaughter and he could decide who was going to see his father today, due to the one person per day rule in place at this time. This gentleman must have called his sister because within 5 minutes I received the above mentioned phone call from her. I was trying to explain to call her daughter and work out who would come and see him- she would not let me speak- screaming at the top of her lungs, cussing , making extremely derogatory statements. She hung up on me as I was still trying to speak. Nursing supervisor Jb Do notified.

## 2020-07-21 NOTE — CDMP QUERY
Patient admitted with Weakness and weight loss. Patient noted to have a poor appetite. If possible, please document in progress notes and d/c summary if you are evaluating and/or treating any of the following:      ?      Acute Severe Malnutrition  ? Acute Moderate Malnutrition  ? Chronic Severe Malnutrition  ? Chronic Moderate Malnutrition    ? Other Explanation of clinical findings  ? Clinically Undetermined (no explanation for clinical findings)    The medical record reflects the following:       Risk Factors: Hx HTN; DM; HLD; CAD; Cardiomyopathy; Alzheimer's dementia; PAF; TIA; Alcoholism     Clinical Indicators: RD noted that patient meets the following ASPEN criteria: Context:  Chronic illness     Findings of the 6 clinical characteristics of malnutrition:   Energy Intake:   Unable to assess  Weight Loss:      >10% weight loss x 6 months  Body Fat Loss:   ,   Severe (buccal region, orbital)  Muscle Mass Loss:   ,  Severe (clavicles, temples)  Fluid Accumulation:   ,  no significant accumulation    Strength:    not performed   . .. Tresea Earl Tresea Earl Tresea Earl noted per RD: Chart review confirms a 14.9% weight loss x 7 months. Patient with noticeable muscle wasting and fat loss during visit     Treatment: RD consult; Liberalize to a regular diet;  Add Ensure enlive BID    Thank you,    Izzy Dias  [facility]

## 2020-07-21 NOTE — PROGRESS NOTES
Comprehensive Nutrition Assessment    Type and Reason for Visit: Initial(low BMI)    Nutrition Recommendations/Plan:   Liberalize to a regular diet  Add Ensure enlive BID    Nutrition Assessment:     Patient medically noted for syncope and acute renal failure on CKD. PMH HTN, DM, CM, and Alzheimer's dementia. H&P notes decreased intake and weight loss PTA. Chart review confirms a 14.9% weight loss x 7 months. Patient with noticeable muscle wasting and fat loss during visit. Patient meets criteria for chronic severe malnutrition; see below. He initially stated he was eating fine at home but then reported weight loss and was unsure why. Denies supplements use but RN notes memory issues. He doesn't remember getting a breakfast tray this morning; saying he is hungry at time of visit. RN states he received a tray but didn't want anything at the time. Will send AM snack now. Will liberalize to a regular diet given malnutrition, dementia, and advanced age and add ensure enlive BID. Encourage intake of meals and assist with PO as needed.      Malnutrition Assessment:  Malnutrition Status:  Severe malnutrition    Context:  Chronic illness     Findings of the 6 clinical characteristics of malnutrition:   Energy Intake:   Unable to assess  Weight Loss:      >10% weight loss x 6 months  Body Fat Loss:   ,   Severe (buccal region, orbital)  Muscle Mass Loss:   ,  Severe (clavicles, temples)  Fluid Accumulation:   ,  no significant accumulation    Strength:    not performed    Estimated Daily Nutrient Needs:  Energy (kcal):  1778 kcal (BMR (1175) x 1.3AF +250kcal)  Protein (g):  68-78g (1.3-1.5 g/kg bw)       Fluid (ml/day):  1800 mL    Nutrition Related Findings:       K+ 3.1  Medications: Coreg, Aricept, Namenda, Protonix, KCl  BM 7/20    Wounds:    None       Current Nutrition Therapies:   DIET CARDIAC Regular    Anthropometric Measures:  · Height:  5' 9\" (175.3 cm)  · Current Body Wt:  52 kg (114 lb 10.2 oz)   · BMI Categories:  Underweight (BMI less than 18.5)       Nutrition Diagnosis:   · Inadequate protein-energy intake related to (alzheimer's dementia, decreased Po intake) as evidenced by weight loss greater than or equal to 10% in 6 months, severe loss of subcutaneous fat, severe muscle loss      Nutrition Interventions:   Food and/or Nutrient Delivery: Modify current diet, Start oral nutrition supplement  Nutrition Education and Counseling: Education not indicated  Coordination of Nutrition Care: No recommendation at this time    Goals:  PO intake at least 50% of meals/supplements next 2-4 days       Nutrition Monitoring and Evaluation:   Behavioral-Environmental Outcomes: Knowledge or skill  Food/Nutrient Intake Outcomes: Food and nutrient intake, Supplement intake  Physical Signs/Symptoms Outcomes: Biochemical data, Weight    Discharge Planning:    (liberalized/regular diet + ONS)     Electronically signed by Bev Goldstein RD on 7/21/2020 at 10:33 AM    Contact: Dimas Gusman, Pager [phone]

## 2020-07-21 NOTE — PROGRESS NOTES
PROGRESS NOTE    NAME:  Teja Addison    :   1931   MRN:   [id number]     Date/Time:  2020 5:58 AM  Subjective:   History:  Chart reviewed and patient seen and examined and discussed with his nurse and all events noted. He presented to the office yesterday after a long absence not having been seen there since 2019. Roseann Cohn was accompanied by his granddaughter and was in follow-up of his medical problems to include hypertension, diabetes, hyperlipidemia, atherosclerotic coronary vascular disease, cardiomyopathy, Alzheimer's dementia, history of PAF, prior TIA, history of alcoholism and other multiple medical problems.  In addition to his chronic problems he had a significant issue now off weight loss from 134 pounds at his last visit down to 115.7 pounds. Roseann Cohn had become very unsteady walking according to his granddaughter which was easily visible on exam in the office. Roseann Cohn also had developed a poor appetite and has a hard time keeping food down and thus eating very little.  He actually even has had some difficulty keeping liquids down.      He denies any current chest pain, shortness of breath, palpitations, PND, orthopnea or other cardiorespiratory complaints.  He  has no current  complaints.  As far as the GI complaints there is a poor appetite with the inability to keep some food down as well as liquids as well as some mild upper abdominal discomfort but no evidence of diarrhea.  No fevers, chills or night sweats have been noted.  From a neurologic standpoint he has had progressive decreased memory and progressive unsteadiness on his feet.  There are no current active arthritic complaints.  All of the above history was confirmed by his granddaughter present with him. Octaviano Meredith is his primary caretaker.   Witht his history it was felt prudent to admit him to the hospital for further work-up evaluation and treatment and he was sent to the hospital for direct admission however while going through admissions he became lightheaded and unresponsive and was emergently taken to the emergency room for admission. Today he is more alert after hydration but only oriented to name. Medications reviewed:  Current Facility-Administered Medications   Medication Dose Route Frequency    aspirin chewable tablet 81 mg  81 mg Oral DAILY    carvediloL (COREG) tablet 6.25 mg  6.25 mg Oral BID    donepeziL (ARICEPT) tablet 10 mg  10 mg Oral DAILY    memantine (NAMENDA) tablet 10 mg  10 mg Oral BID    temazepam (RESTORIL) capsule 15 mg  15 mg Oral QHS PRN    sodium chloride (NS) flush 5-40 mL  5-40 mL IntraVENous Q8H    sodium chloride (NS) flush 5-40 mL  5-40 mL IntraVENous PRN    nicotine (NICODERM CQ) 21 mg/24 hr patch 1 Patch  1 Patch TransDERmal Q24H    acetaminophen (TYLENOL) tablet 650 mg  650 mg Oral Q4H PRN    dextrose 5 % - 0.45% NaCl infusion  125 mL/hr IntraVENous CONTINUOUS    pantoprazole (PROTONIX) 40 mg in 0.9% sodium chloride 10 mL injection  40 mg IntraVENous Q12H        Objective:   Vitals:  Visit Vitals  /66 (BP 1 Location: Left arm, BP Patient Position: At rest;Lying left side)   Pulse 65   Temp 97.2 °F (36.2 °C)   Resp 18   Ht 5' 9\" (1.753 m)   Wt 114 lb 10.2 oz (52 kg)   SpO2 100%   BMI 16.93 kg/m²      O2 Device: Room air Temp (24hrs), Av.6 °F (36.4 °C), Min:97.2 °F (36.2 °C), Max:98 °F (36.7 °C)      Last 24hr Input/Output:    Intake/Output Summary (Last 24 hours) at 2020 0504  Last data filed at 2020 0324  Gross per 24 hour   Intake 950 ml   Output    Net 950 ml        PHYSICAL EXAM:  General:     Alert, cooperative, no distress, appears stated age. Head:    Normocephalic, without obvious abnormality, atraumatic. Eyes:    Conjunctivae/corneas clear. PERRLA  Nose:   Nares normal. No drainage or sinus tenderness.   Throat:     Lips, mucosa, and tongue normal.  No Thrush  Neck:   Supple, symmetrical,  no adenopathy, thyroid: non tender     no carotid bruit and no JVD.  Back:     Symmetric,  No CVA tenderness. Lungs:    Clear to auscultation bilaterally. No Wheezing or Rhonchi. No rales. Heart:    Regular rate and rhythm,  no murmur, rub or gallop. Abdomen:    Soft, non-tender. Not distended. Bowel sounds normal. No masses  Extremities:  Extremities normal, atraumatic, No cyanosis. No edema. No clubbing  Lymph nodes:  Cervical, supraclavicular normal.  Neurologic:  General decreased strength, Alert and oriented X 1. Skin:                 No rash      Lab Data Reviewed:    Recent Results (from the past 24 hour(s))   GLUCOSE, POC    Collection Time: 07/20/20  4:14 PM   Result Value Ref Range    Glucose (POC) 141 (H) 65 - 100 mg/dL    Performed by Edmundo Hemphill    EKG, 12 LEAD, INITIAL    Collection Time: 07/20/20  4:34 PM   Result Value Ref Range    Ventricular Rate 63 BPM    Atrial Rate 63 BPM    P-R Interval 136 ms    QRS Duration 164 ms    Q-T Interval 496 ms    QTC Calculation (Bezet) 507 ms    Calculated P Axis 64 degrees    Calculated R Axis -132 degrees    Calculated T Axis 85 degrees    Diagnosis       AV dual-paced rhythm  When compared with ECG of 27-OCT-2019 07:58,  Previous ECG has undetermined rhythm, needs review     CBC WITH AUTOMATED DIFF    Collection Time: 07/20/20  4:42 PM   Result Value Ref Range    WBC 6.5 4.1 - 11.1 K/uL    RBC 4.74 4.10 - 5.70 M/uL    HGB 12.7 12.1 - 17.0 g/dL    HCT 42.0 36.6 - 50.3 %    MCV 88.6 80.0 - 99.0 FL    MCH 26.8 26.0 - 34.0 PG    MCHC 30.2 30.0 - 36.5 g/dL    RDW 14.5 11.5 - 14.5 %    PLATELET 660 285 - 203 K/uL    MPV 11.0 8.9 - 12.9 FL    NRBC 0.0 0  WBC    ABSOLUTE NRBC 0.00 0.00 - 0.01 K/uL    NEUTROPHILS 66 32 - 75 %    LYMPHOCYTES 18 12 - 49 %    MONOCYTES 11 5 - 13 %    EOSINOPHILS 4 0 - 7 %    BASOPHILS 1 0 - 1 %    IMMATURE GRANULOCYTES 0 0.0 - 0.5 %    ABS. NEUTROPHILS 4.3 1.8 - 8.0 K/UL    ABS. LYMPHOCYTES 1.2 0.8 - 3.5 K/UL    ABS. MONOCYTES 0.7 0.0 - 1.0 K/UL    ABS.  EOSINOPHILS 0.2 0.0 - 0.4 K/UL ABS. BASOPHILS 0.1 0.0 - 0.1 K/UL    ABS. IMM. GRANS. 0.0 0.00 - 0.04 K/UL    DF AUTOMATED     METABOLIC PANEL, COMPREHENSIVE    Collection Time: 07/20/20  4:42 PM   Result Value Ref Range    Sodium 137 136 - 145 mmol/L    Potassium 3.5 3.5 - 5.1 mmol/L    Chloride 101 97 - 108 mmol/L    CO2 33 (H) 21 - 32 mmol/L    Anion gap 3 (L) 5 - 15 mmol/L    Glucose 129 (H) 65 - 100 mg/dL    BUN 51 (H) 6 - 20 MG/DL    Creatinine 2.36 (H) 0.70 - 1.30 MG/DL    BUN/Creatinine ratio 22 (H) 12 - 20      GFR est AA 32 (L) >60 ml/min/1.73m2    GFR est non-AA 26 (L) >60 ml/min/1.73m2    Calcium 9.0 8.5 - 10.1 MG/DL    Bilirubin, total 0.4 0.2 - 1.0 MG/DL    ALT (SGPT) 25 12 - 78 U/L    AST (SGOT) 27 15 - 37 U/L    Alk.  phosphatase 78 45 - 117 U/L    Protein, total 6.5 6.4 - 8.2 g/dL    Albumin 2.7 (L) 3.5 - 5.0 g/dL    Globulin 3.8 2.0 - 4.0 g/dL    A-G Ratio 0.7 (L) 1.1 - 2.2     CK W/ CKMB & INDEX    Collection Time: 07/20/20  4:42 PM   Result Value Ref Range    CK - MB <1.0 <3.6 NG/ML    CK-MB Index Cannot be calculated 0.0 - 2.5      CK 73 39 - 308 U/L   TROPONIN I    Collection Time: 07/20/20  4:42 PM   Result Value Ref Range    Troponin-I, Qt. <0.05 <0.05 ng/mL   GLUCOSE, POC    Collection Time: 07/20/20  9:06 PM   Result Value Ref Range    Glucose (POC) 139 (H) 65 - 100 mg/dL    Performed by Palma Jennings (RYAN RN)    CBC WITH AUTOMATED DIFF    Collection Time: 07/20/20  9:28 PM   Result Value Ref Range    WBC 6.2 4.1 - 11.1 K/uL    RBC 4.42 4.10 - 5.70 M/uL    HGB 12.0 (L) 12.1 - 17.0 g/dL    HCT 38.2 36.6 - 50.3 %    MCV 86.4 80.0 - 99.0 FL    MCH 27.1 26.0 - 34.0 PG    MCHC 31.4 30.0 - 36.5 g/dL    RDW 14.6 (H) 11.5 - 14.5 %    PLATELET 143 584 - 388 K/uL    MPV 11.2 8.9 - 12.9 FL    NRBC 0.0 0  WBC    ABSOLUTE NRBC 0.00 0.00 - 0.01 K/uL    NEUTROPHILS 73 32 - 75 %    LYMPHOCYTES 14 12 - 49 %    MONOCYTES 8 5 - 13 %    EOSINOPHILS 3 0 - 7 %    BASOPHILS 1 0 - 1 %    IMMATURE GRANULOCYTES 1 (H) 0.0 - 0.5 % ABS. NEUTROPHILS 4.5 1.8 - 8.0 K/UL    ABS. LYMPHOCYTES 0.9 0.8 - 3.5 K/UL    ABS. MONOCYTES 0.5 0.0 - 1.0 K/UL    ABS. EOSINOPHILS 0.2 0.0 - 0.4 K/UL    ABS. BASOPHILS 0.1 0.0 - 0.1 K/UL    ABS. IMM. GRANS. 0.0 0.00 - 0.04 K/UL    DF AUTOMATED     METABOLIC PANEL, BASIC    Collection Time: 07/21/20  3:50 AM   Result Value Ref Range    Sodium 138 136 - 145 mmol/L    Potassium 3.1 (L) 3.5 - 5.1 mmol/L    Chloride 98 97 - 108 mmol/L    CO2 36 (H) 21 - 32 mmol/L    Anion gap 4 (L) 5 - 15 mmol/L    Glucose 98 65 - 100 mg/dL    BUN 45 (H) 6 - 20 MG/DL    Creatinine 1.96 (H) 0.70 - 1.30 MG/DL    BUN/Creatinine ratio 23 (H) 12 - 20      GFR est AA 39 (L) >60 ml/min/1.73m2    GFR est non-AA 32 (L) >60 ml/min/1.73m2    Calcium 9.1 8.5 - 10.1 MG/DL   CBC W/O DIFF    Collection Time: 07/21/20  3:50 AM   Result Value Ref Range    WBC 5.3 4.1 - 11.1 K/uL    RBC 4.77 4.10 - 5.70 M/uL    HGB 12.8 12.1 - 17.0 g/dL    HCT 40.8 36.6 - 50.3 %    MCV 85.5 80.0 - 99.0 FL    MCH 26.8 26.0 - 34.0 PG    MCHC 31.4 30.0 - 36.5 g/dL    RDW 14.4 11.5 - 14.5 %    PLATELET 564 662 - 578 K/uL    MPV 10.8 8.9 - 12.9 FL    NRBC 0.0 0  WBC    ABSOLUTE NRBC 0.00 0.00 - 0.01 K/uL         Assessment/Plan:     Principal Problem:    Syncope (1/9/2019)    Active Problems:    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)      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, without long-term current use of insulin (HCC) (7/18/2017)      SSS (sick sinus syndrome) ([facility] (6/2/2015)      Cardiomyopathy ([facility] (11/7/2019)      Alcoholism ([facility] (4/23/2018)      Weight loss (7/20/2020)      Unsteady gait (7/20/2020)      Dehydration (7/20/2020)      Acute renal failure superimposed on stage 3 chronic kidney disease ([facility] (7/20/2020)       ___________________________________________________  PLAN:  1. Continue  telemetry bed with monitoring  2. IV hydration D5 half-normal saline  3.   Hold Lasix and all current hypertensive medications except continue Coreg with his cardiomyopathy  4. Started IV Protonix  5.  GI consult as it sounds like he needs an EGD and probable esophageal dilatation  6. Continue current Alzheimer's medications as ordered although may not be able to resume p.o. until after EGD  7. According to his granddaughter who is his caretaker has not drank alcohol in 3 months but will watch closely for any signs of withdrawal with known alcoholism and prior withdrawal  8. Follow renal function, 51/2.36 on admission to 45/1.96 now  9. Replete potassium currently 3.1  10. Check magnesium level  11. Follow blood sugar and will cover with sliding scale if becomes elevated      40 minutes spent in direct care of this high complexity patient today.       If need to contact me use hospital  [phone], DO NOT USE PERFECT SERVE    ___________________________________________________    Attending Physician: Johnathon Jiménez MD

## 2020-07-21 NOTE — PROGRESS NOTES
Bedside shift change report given to Colin (oncoming nurse) by Shabana/Susanne (offgoing nurse). Report included the following information SBAR, Kardex, ED Summary and OR Summary. Zone Phone:   [phone]      Significant changes during shift:  GI came to see the pt and pt will have barium swallow study done tomorrow.          Patient Information    Chandana May Sr  80 y.o.  7/20/2020  4:31 PM by Lisa Smyth MD. [place] Jose Farfan Sr was admitted from Home    Problem List    Patient Active Problem List    Diagnosis Date Noted    Severe protein-calorie malnutrition ([address] 07/21/2020    Weight loss 07/20/2020    Unsteady gait 07/20/2020    Dehydration 07/20/2020    Acute renal failure superimposed on stage 3 chronic kidney disease ([address] 07/20/2020    Paroxysmal VT ([address] 11/07/2019    Cardiomyopathy ([address] 11/07/2019    Alcohol withdrawal syndrome, with delirium ([address] 11/01/2019    A-fib ([address] 10/27/2019    Syncope 01/09/2019    Primary insomnia 01/09/2019    Mild depression ([address] 06/12/2018    Acute bilateral low back pain without sciatica 05/30/2018    Alcoholism ([address] 27/89/4191    Metabolic encephalopathy 56/86/9162    TIA (transient ischemic attack) 04/12/2018    Medicare annual wellness visit, subsequent 09/01/2017    Diverticulosis 07/18/2017    Gastritis and duodenitis 07/18/2017    Internal hemorrhoids 07/18/2017    Hypertension with renal disease 07/18/2017    Mixed hyperlipidemia 07/18/2017    GI bleed 07/18/2017    Primary osteoarthritis involving multiple joints 07/18/2017    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, without long-term current use of insulin ([address] 07/18/2017    CKD (chronic kidney disease), stage II 07/18/2017    Back pain 07/18/2017    Anemia 07/18/2017    Alzheimer disease ([address] 07/18/2017    SSS (sick sinus syndrome) ([address] 06/02/2015    S/P cardiac pacemaker procedure 05/04/2015    S/P ablation of accessory bypass tract 05/04/2015    SVT (supraventricular tachycardia) ([facility])--s/p RFA 04/28/2015    Near syncope 03/11/2015    ASCVD (arteriosclerotic cardiovascular disease) 07/11/2014    Hypokalemia 07/11/2014    Right inguinal hernia 06/12/2014     Past Medical History:   Diagnosis Date    Abdominal pain 7/18/2017    Alzheimer disease ([facility] 7/18/2017    Anemia 7/18/2017    Arthritis     Back pain 7/18/2017    Bradycardia 7/18/2017    CAD (coronary artery disease)     hx of MI    CKD (chronic kidney disease), stage II 7/18/2017    constipation     Depression 7/18/2017    Diabetes mellitus ([facility] 7/18/2017    Diverticulosis 7/18/2017    DJD (degenerative joint disease) 7/18/2017    Encounter for long-term (current) use of other medications 7/18/2017    Fatigue     Gastritis and duodenitis 7/18/2017    GERD (gastroesophageal reflux disease)     GI bleed 7/18/2017    Hearing loss     Hyperlipidemia 7/18/2017    Hypertension     Hypertension with renal disease 7/18/2017    Ill-defined condition     Dementia    Internal hemorrhoids 7/18/2017    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    S/P cardiac pacemaker procedure 5/4/2015    5/4/15 [employer] dual chamber pacemaker implant     Weight loss     lost over 40 pounds last year- has no appetite         Core Measures:    CVA: Yes Yes    Activity Status:    OOB to Chair No  Ambulated this shift No   Bed Rest Yes    DVT prophylaxis:    DVT prophylaxis Med- No  DVT prophylaxis SCD or LISSY- Refused       Patient Safety:    Falls Score Total Score: 3  Safety Level_______  Bed Alarm On? Yes  Sitter?  Yes TeleSitter     Plan for upcoming shift: Modified barium swallow         Discharge Plan: no tbd     Active Consults:  IP CONSULT TO GASTROENTEROLOGY

## 2020-07-21 NOTE — H&P
GI Consultation Note Agustin Valentine)    NAME: Malou Mahan Sr : 1931 MRN: [id number]   ATTG: Pita Moses MD PCP: Silvestre Duran MD  Date/Time:  2020 1:23 PM  Subjective:   REASON FOR CONSULT:      Paco Machado is a 80 y.o.  male with hx of DM, CAD, hx of PAF, Alzheimer's dementia, prior TIA, hx EtOH abuse (last 3mo ago), who I was asked to see for evaluation of dysphagia. Pt is a poor historian and most of the history is garnered via chart review and d/w RN. He was admitted via ER after being seen in office by on 20 with noted unsteady gait. He resides with his granddaughter and is noted to hae significant weight loss from 134# (2019) to now 116#, with with caregiver reporting that the patient has a poor appetite, regurgitates food, and takes in little PO. He was reported have difficulty on DOA keeping liquids down, and since admission has had an episode of emesis for content of previously ingested food. He denies c/o abdominal pain, nausea, diarrhea, constipation, melena, BRBPR, hematochezia, change in bowel habit. He denies CP, SOB, palpitation, fevers or chills. His PCP notes progressive memory loss and and progressive unsteadiness on his feet.  Notably, during the admission process he became lightheaded and unresponsive and was emergently taken to the emergency room for admission. Once he was laid down his mental status came back to his baseline. Today he is noted to have tolerated a cereal and milk breakfast with later emesis. We are able to confirm significant weight loss of nearly 15% over the last 7 months. He is a poor historian stating now that he was eating well at home and does not understand why he lost weight. He states he is hungry, but does not recall receiving a breakfast tray today. He has undergone prior Endoscopic evaluation with me including EGD in 2017 noting a small sliding hiatal hernia, gastritis, and reflux esophagitis.   EGD in  noted duodenitis    Past Medical History:   Diagnosis Date    Abdominal pain 7/18/2017    Alzheimer disease ([facility] 7/18/2017    Anemia 7/18/2017    Arthritis     Back pain 7/18/2017    Bradycardia 7/18/2017    CAD (coronary artery disease)     hx of MI    CKD (chronic kidney disease), stage II 7/18/2017    constipation     Depression 7/18/2017    Diabetes mellitus ([facility] 7/18/2017    Diverticulosis 7/18/2017    DJD (degenerative joint disease) 7/18/2017    Encounter for long-term (current) use of other medications 7/18/2017    Fatigue     Gastritis and duodenitis 7/18/2017    GERD (gastroesophageal reflux disease)     GI bleed 7/18/2017    Hearing loss     Hyperlipidemia 7/18/2017    Hypertension     Hypertension with renal disease 7/18/2017    Ill-defined condition     Dementia    Internal hemorrhoids 7/18/2017    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    S/P cardiac pacemaker procedure 5/4/2015    5/4/15 Medtronic dual chamber pacemaker implant     Weight loss     lost over 40 pounds last year- has no appetite      Past Surgical History:   Procedure Laterality Date    COLONOSCOPY N/A 6/22/2017    COLONOSCOPY performed by Jimi Vizcarar MD at [address]  6/22/2017         [address]  7/10/2014    right inguinal    HX OTHER SURGICAL      cystectomy from back    LA EGD TRANSORAL BIOPSY SINGLE/MULTIPLE  2/14/2012         UPPER GI ENDOSCOPY,BIOPSY  6/22/2017          Social History     Tobacco Use    Smoking status: Former Smoker     Packs/day: 0.50     Years: 10.00     Pack years: 5.00     Start date: 7/12/1991    Smokeless tobacco: Never Used    Tobacco comment: quit 50 years ago   Substance Use Topics    Alcohol use: Not Currently     Comment: Occasional beer      Family History   Problem Relation Age of Onset    Hypertension Mother     Heart Disease Father     Cancer Other         son-cancer? unknown what type       No Known Allergies   Home Medications:  Prior to Admission Medications   Prescriptions Last Dose Informant Patient Reported? Taking? amLODIPine (NORVASC) 10 mg tablet 2020 at Unknown time  No Yes   Sig: Take 0.5 Tabs by mouth daily. aspirin 81 mg chewable tablet 2020 at Unknown time  No Yes   Sig: Take 1 Tab by mouth daily. carvedilol (COREG) 6.25 mg tablet 2020 at Unknown time  No Yes   Sig: Take 1 Tab by mouth two (2) times a day. donepeziL (ARICEPT) 10 mg tablet 2020 at Unknown time  No Yes   Sig: TAKE 1 TABLET BY MOUTH EVERY DAY   furosemide (LASIX) 20 mg tablet 2020 at Unknown time  No Yes   Sig: Take 1 Tab by mouth daily. losartan-hydroCHLOROthiazide (HYZAAR) 100-25 mg per tablet 2020 at Unknown time  No Yes   Sig: TAKE 1 TABLET BY MOUTH EVERY DAY   memantine (NAMENDA) 10 mg tablet 2020 at Unknown time  No Yes   Sig: TAKE 1 TABLET BY MOUTH TWICE A DAY   multivitamin, tx-iron-ca-min (THERA-M W/ IRON) 9 mg iron-400 mcg tab tablet 2020 at Unknown time  Yes Yes   Sig: Take 1 Tab by mouth daily. omeprazole (PRILOSEC) 20 mg capsule 2020 at Unknown time  No Yes   Sig: Take 1 Cap by mouth daily. temazepam (RESTORIL) 15 mg capsule 2020 at Unknown time  No Yes   Sig: Take 1 Cap by mouth nightly as needed for Sleep. Max Daily Amount: 15 mg.       Facility-Administered Medications: None     Hospital medications:  Current Facility-Administered Medications   Medication Dose Route Frequency    potassium chloride 10 mEq in 100 ml IVPB  10 mEq IntraVENous ONCE    aspirin chewable tablet 81 mg  81 mg Oral DAILY    carvediloL (COREG) tablet 6.25 mg  6.25 mg Oral BID    donepeziL (ARICEPT) tablet 10 mg  10 mg Oral DAILY    memantine (NAMENDA) tablet 10 mg  10 mg Oral BID    temazepam (RESTORIL) capsule 15 mg  15 mg Oral QHS PRN    sodium chloride (NS) flush 5-40 mL  5-40 mL IntraVENous Q8H    sodium chloride (NS) flush 5-40 mL  5-40 mL IntraVENous PRN    nicotine (NICODERM CQ) 21 mg/24 hr patch 1 Patch  1 Patch TransDERmal Q24H    acetaminophen (TYLENOL) tablet 650 mg  650 mg Oral Q4H PRN    dextrose 5 % - 0.45% NaCl infusion  125 mL/hr IntraVENous CONTINUOUS    pantoprazole (PROTONIX) 40 mg in 0.9% sodium chloride 10 mL injection  40 mg IntraVENous Q12H     REVIEW OF SYSTEMS:     [x]     Unable to obtain accurate ROS due to  [x]    dementia   [x]    Total of 11 systems reviewed as follows:  Const:  negative fever, negative chills, negative weight loss  Eyes:   negative diplopia or visual changes, negative eye pain  ENT:   negative coryza, negative sore throat  Resp:   negative cough, hemoptysis, dyspnea  Cards:  negative for chest pain, palpitations, lower extremity edema  :  negative for frequency, dysuria and hematuria  Skin:   negative for rash and pruritus  Heme:  negative for easy bruising and gum/nose bleeding  MS:  negative for myalgias, arthralgias, back pain and muscle weakness  Neurolo:  negative for headaches, dizziness, vertigo, memory problems   Psych:  negative for feelings of anxiety, depression     Pertinent Positives include :    Objective:   VITALS:    Visit Vitals  /81   Pulse 89   Temp 98 °F (36.7 °C)   Resp 16   Ht 5' 9\" (1.753 m)   Wt 52 kg (114 lb 10.2 oz)   SpO2 100%   BMI 16.93 kg/m²     Temp (24hrs), Av.6 °F (36.4 °C), Min:96.9 °F (36.1 °C), Max:98 °F (36.7 °C)    PHYSICAL EXAM:   General:    Alert, cooperative, no distress, appears stated age. Cachectic with temporal wasting and limb muscle loss   Head:   Normocephalic, without obvious abnormality, atraumatic. Eyes:   Conjunctivae clear, anicteric sclerae. Pupils are equal  Nose:  Nares normal. No drainage or sinus tenderness. Throat:    Lips, mucosa, and tongue normal.  No Thrush  Neck:  Supple, symmetrical,  no adenopathy, thyroid: non tender  Back:    Symmetric,  No CVA tenderness. Lungs:   CTA bilaterally. No wheezing/rhonchi/rales.   Chest wall:  No tenderness. ;+L CW pacer. No Accessory muscle use. Heart:   Regular rate and rhythm,  no murmur, rub or gallop. Abdomen:   Soft, non-tender. Not distended. Bowel sounds normal. No masses  Extremities: Atraumatic, No cyanosis. No edema. No clubbing  Skin:     Texture, turgor normal. No rashes/lesions/jaundice  Lymph: Cervical, supraclavicular normal.  Psych:  Poor insight. Not depressed. Not anxious or agitated. Neurologic: EOMs intact. No facial asymmetry/aphasia/slurred speech. Normal strength, A/O X name and distant memory only. LAB DATA REVIEWED:    Recent Results (from the past 48 hour(s))   GLUCOSE, POC    Collection Time: 07/20/20  4:14 PM   Result Value Ref Range    Glucose (POC) 141 (H) 65 - 100 mg/dL    Performed by [facility]    EKG, 12 LEAD, INITIAL    Collection Time: 07/20/20  4:34 PM   Result Value Ref Range    Ventricular Rate 63 BPM    Atrial Rate 63 BPM    P-R Interval 136 ms    QRS Duration 164 ms    Q-T Interval 496 ms    QTC Calculation (Bezet) 507 ms    Calculated P Axis 64 degrees    Calculated R Axis -132 degrees    Calculated T Axis 85 degrees    Diagnosis       AV dual-paced rhythm  When compared with ECG of 27-OCT-2019 07:58,  Previous ECG has undetermined rhythm, needs review     CBC WITH AUTOMATED DIFF    Collection Time: 07/20/20  4:42 PM   Result Value Ref Range    WBC 6.5 4.1 - 11.1 K/uL    RBC 4.74 4.10 - 5.70 M/uL    HGB 12.7 12.1 - 17.0 g/dL    HCT 42.0 36.6 - 50.3 %    MCV 88.6 80.0 - 99.0 FL    MCH 26.8 26.0 - 34.0 PG    MCHC 30.2 30.0 - 36.5 g/dL    RDW 14.5 11.5 - 14.5 %    PLATELET 675 824 - 597 K/uL    MPV 11.0 8.9 - 12.9 FL    NRBC 0.0 0  WBC    ABSOLUTE NRBC 0.00 0.00 - 0.01 K/uL    NEUTROPHILS 66 32 - 75 %    LYMPHOCYTES 18 12 - 49 %    MONOCYTES 11 5 - 13 %    EOSINOPHILS 4 0 - 7 %    BASOPHILS 1 0 - 1 %    IMMATURE GRANULOCYTES 0 0.0 - 0.5 %    ABS. NEUTROPHILS 4.3 1.8 - 8.0 K/UL    ABS. LYMPHOCYTES 1.2 0.8 - 3.5 K/UL    ABS.  MONOCYTES 0.7 0.0 - 1.0 K/UL    ABS. EOSINOPHILS 0.2 0.0 - 0.4 K/UL    ABS. BASOPHILS 0.1 0.0 - 0.1 K/UL    ABS. IMM. GRANS. 0.0 0.00 - 0.04 K/UL    DF AUTOMATED     METABOLIC PANEL, COMPREHENSIVE    Collection Time: 07/20/20  4:42 PM   Result Value Ref Range    Sodium 137 136 - 145 mmol/L    Potassium 3.5 3.5 - 5.1 mmol/L    Chloride 101 97 - 108 mmol/L    CO2 33 (H) 21 - 32 mmol/L    Anion gap 3 (L) 5 - 15 mmol/L    Glucose 129 (H) 65 - 100 mg/dL    BUN 51 (H) 6 - 20 MG/DL    Creatinine 2.36 (H) 0.70 - 1.30 MG/DL    BUN/Creatinine ratio 22 (H) 12 - 20      GFR est AA 32 (L) >60 ml/min/1.73m2    GFR est non-AA 26 (L) >60 ml/min/1.73m2    Calcium 9.0 8.5 - 10.1 MG/DL    Bilirubin, total 0.4 0.2 - 1.0 MG/DL    ALT (SGPT) 25 12 - 78 U/L    AST (SGOT) 27 15 - 37 U/L    Alk.  phosphatase 78 45 - 117 U/L    Protein, total 6.5 6.4 - 8.2 g/dL    Albumin 2.7 (L) 3.5 - 5.0 g/dL    Globulin 3.8 2.0 - 4.0 g/dL    A-G Ratio 0.7 (L) 1.1 - 2.2     CK W/ CKMB & INDEX    Collection Time: 07/20/20  4:42 PM   Result Value Ref Range    CK - MB <1.0 <3.6 NG/ML    CK-MB Index Cannot be calculated 0.0 - 2.5      CK 73 39 - 308 U/L   TROPONIN I    Collection Time: 07/20/20  4:42 PM   Result Value Ref Range    Troponin-I, Qt. <0.05 <0.05 ng/mL   GLUCOSE, POC    Collection Time: 07/20/20  9:06 PM   Result Value Ref Range    Glucose (POC) 139 (H) 65 - 100 mg/dL    Performed by Olya Alatorre (RYAN RN)    CBC WITH AUTOMATED DIFF    Collection Time: 07/20/20  9:28 PM   Result Value Ref Range    WBC 6.2 4.1 - 11.1 K/uL    RBC 4.42 4.10 - 5.70 M/uL    HGB 12.0 (L) 12.1 - 17.0 g/dL    HCT 38.2 36.6 - 50.3 %    MCV 86.4 80.0 - 99.0 FL    MCH 27.1 26.0 - 34.0 PG    MCHC 31.4 30.0 - 36.5 g/dL    RDW 14.6 (H) 11.5 - 14.5 %    PLATELET 833 104 - 878 K/uL    MPV 11.2 8.9 - 12.9 FL    NRBC 0.0 0  WBC    ABSOLUTE NRBC 0.00 0.00 - 0.01 K/uL    NEUTROPHILS 73 32 - 75 %    LYMPHOCYTES 14 12 - 49 %    MONOCYTES 8 5 - 13 %    EOSINOPHILS 3 0 - 7 %    BASOPHILS 1 0 - 1 %    IMMATURE GRANULOCYTES 1 (H) 0.0 - 0.5 %    ABS. NEUTROPHILS 4.5 1.8 - 8.0 K/UL    ABS. LYMPHOCYTES 0.9 0.8 - 3.5 K/UL    ABS. MONOCYTES 0.5 0.0 - 1.0 K/UL    ABS. EOSINOPHILS 0.2 0.0 - 0.4 K/UL    ABS. BASOPHILS 0.1 0.0 - 0.1 K/UL    ABS. IMM.  GRANS. 0.0 0.00 - 0.04 K/UL    DF AUTOMATED     METABOLIC PANEL, BASIC    Collection Time: 07/21/20  3:50 AM   Result Value Ref Range    Sodium 138 136 - 145 mmol/L    Potassium 3.1 (L) 3.5 - 5.1 mmol/L    Chloride 98 97 - 108 mmol/L    CO2 36 (H) 21 - 32 mmol/L    Anion gap 4 (L) 5 - 15 mmol/L    Glucose 98 65 - 100 mg/dL    BUN 45 (H) 6 - 20 MG/DL    Creatinine 1.96 (H) 0.70 - 1.30 MG/DL    BUN/Creatinine ratio 23 (H) 12 - 20      GFR est AA 39 (L) >60 ml/min/1.73m2    GFR est non-AA 32 (L) >60 ml/min/1.73m2    Calcium 9.1 8.5 - 10.1 MG/DL   CBC W/O DIFF    Collection Time: 07/21/20  3:50 AM   Result Value Ref Range    WBC 5.3 4.1 - 11.1 K/uL    RBC 4.77 4.10 - 5.70 M/uL    HGB 12.8 12.1 - 17.0 g/dL    HCT 40.8 36.6 - 50.3 %    MCV 85.5 80.0 - 99.0 FL    MCH 26.8 26.0 - 34.0 PG    MCHC 31.4 30.0 - 36.5 g/dL    RDW 14.4 11.5 - 14.5 %    PLATELET 919 708 - 754 K/uL    MPV 10.8 8.9 - 12.9 FL    NRBC 0.0 0  WBC    ABSOLUTE NRBC 0.00 0.00 - 0.01 K/uL   MAGNESIUM    Collection Time: 07/21/20  3:50 AM   Result Value Ref Range    Magnesium 2.4 1.6 - 2.4 mg/dL   GLUCOSE, POC    Collection Time: 07/21/20  7:52 AM   Result Value Ref Range    Glucose (POC) 90 65 - 100 mg/dL    Performed by Mehnaz Thao (PCT)    GLUCOSE, POC    Collection Time: 07/21/20 11:04 AM   Result Value Ref Range    Glucose (POC) 92 65 - 100 mg/dL    Performed by Mehnaz Thao (PCT)      IMAGING RESULTS:   [x]      I have personally reviewed the actual   [x]    CXR 7/20/20 clear lungs, pacemaker in place    Recommendations/Plan:      Principal Problem:    Syncope (1/9/2019)    Active Problems:    SSS (sick sinus syndrome) ([facility] (6/2/2015)      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) 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, without long-term current use of insulin ([address] (7/18/2017)      Alcoholism ([address] (4/23/2018)      Cardiomyopathy ([address] (11/7/2019)      Weight loss (7/20/2020)      Unsteady gait (7/20/2020)      Dehydration (7/20/2020)      Acute renal failure superimposed on stage 3 chronic kidney disease ([address] (7/20/2020)      Severe protein-calorie malnutrition ([address] (7/21/2020)       ___________________________________________________  RECOMMENDATIONS:    81yo M with WL in setting of dementia and episode regurgitation. He is not anemic to suggest chronic blood loss from mass lesion. I suspect his WL is related to decreased intake from his dementia. Plan:  1) Encourage PO with broadened diet and snacks  2) Encourage intake of meals and assist with PO as needed  3) Barium swallow tomorrow with pill to exclude obstruction or motility disturbance  4) Daily PPI  5) Will consider for EG with bx and dilatation as needed after BS reviewed.     6) Continue off of EtOH     Discussed Code Status:    [x]    Full Code      []    DNR    ___________________________________________________  Care Plan discussed with:    [x]    Patient   []    Family   [x]    Nursing   []    Attending  Total Time :  50   minutes   ___________________________________________________  GI: Daja Calvo MD

## 2020-07-22 ENCOUNTER — HOSPITAL ENCOUNTER (OUTPATIENT)
Dept: GENERAL RADIOLOGY | Age: 85
Discharge: HOME | DRG: 682 | End: 2020-07-22
Attending: INTERNAL MEDICINE
Payer: MEDICARE

## 2020-07-22 LAB
ANION GAP SERPL CALC-SCNC: 5 MMOL/L (ref 5–15)
BUN SERPL-MCNC: 38 MG/DL (ref 6–20)
BUN/CREAT SERPL: 26 (ref 12–20)
CALCIUM SERPL-MCNC: 8.7 MG/DL (ref 8.5–10.1)
CHLORIDE SERPL-SCNC: 102 MMOL/L (ref 97–108)
CO2 SERPL-SCNC: 31 MMOL/L (ref 21–32)
CREAT SERPL-MCNC: 1.48 MG/DL (ref 0.7–1.3)
ERYTHROCYTE [DISTWIDTH] IN BLOOD BY AUTOMATED COUNT: 14.5 % (ref 11.5–14.5)
GLUCOSE SERPL-MCNC: 103 MG/DL (ref 65–100)
HCT VFR BLD AUTO: 40.2 % (ref 36.6–50.3)
HGB BLD-MCNC: 12.5 G/DL (ref 12.1–17)
MCH RBC QN AUTO: 27.1 PG (ref 26–34)
MCHC RBC AUTO-ENTMCNC: 31.1 G/DL (ref 30–36.5)
MCV RBC AUTO: 87 FL (ref 80–99)
NRBC # BLD: 0 K/UL (ref 0–0.01)
NRBC BLD-RTO: 0 PER 100 WBC
PLATELET # BLD AUTO: 227 K/UL (ref 150–400)
PMV BLD AUTO: 10.9 FL (ref 8.9–12.9)
POTASSIUM SERPL-SCNC: 3.3 MMOL/L (ref 3.5–5.1)
RBC # BLD AUTO: 4.62 M/UL (ref 4.1–5.7)
SODIUM SERPL-SCNC: 138 MMOL/L (ref 136–145)
WBC # BLD AUTO: 4.9 K/UL (ref 4.1–11.1)

## 2020-07-22 PROCEDURE — 97530 THERAPEUTIC ACTIVITIES: CPT

## 2020-07-22 PROCEDURE — 97165 OT EVAL LOW COMPLEX 30 MIN: CPT

## 2020-07-22 PROCEDURE — 97116 GAIT TRAINING THERAPY: CPT | Performed by: PHYSICAL THERAPIST

## 2020-07-22 PROCEDURE — 36415 COLL VENOUS BLD VENIPUNCTURE: CPT

## 2020-07-22 PROCEDURE — 74011250636 HC RX REV CODE- 250/636: Performed by: INTERNAL MEDICINE

## 2020-07-22 PROCEDURE — 85027 COMPLETE CBC AUTOMATED: CPT

## 2020-07-22 PROCEDURE — 94760 N-INVAS EAR/PLS OXIMETRY 1: CPT

## 2020-07-22 PROCEDURE — 74011000250 HC RX REV CODE- 250: Performed by: INTERNAL MEDICINE

## 2020-07-22 PROCEDURE — 74011250637 HC RX REV CODE- 250/637: Performed by: INTERNAL MEDICINE

## 2020-07-22 PROCEDURE — 80048 BASIC METABOLIC PNL TOTAL CA: CPT

## 2020-07-22 PROCEDURE — 65660000000 HC RM CCU STEPDOWN

## 2020-07-22 PROCEDURE — 74220 X-RAY XM ESOPHAGUS 1CNTRST: CPT

## 2020-07-22 PROCEDURE — C9113 INJ PANTOPRAZOLE SODIUM, VIA: HCPCS | Performed by: INTERNAL MEDICINE

## 2020-07-22 PROCEDURE — 97161 PT EVAL LOW COMPLEX 20 MIN: CPT | Performed by: PHYSICAL THERAPIST

## 2020-07-22 RX ORDER — POTASSIUM CHLORIDE 7.45 MG/ML
10 INJECTION INTRAVENOUS
Status: DISPENSED | OUTPATIENT
Start: 2020-07-22 | End: 2020-07-22

## 2020-07-22 RX ADMIN — DONEPEZIL HYDROCHLORIDE 10 MG: 5 TABLET, FILM COATED ORAL at 16:13

## 2020-07-22 RX ADMIN — POTASSIUM CHLORIDE 10 MEQ: 10 INJECTION, SOLUTION INTRAVENOUS at 06:10

## 2020-07-22 RX ADMIN — ASPIRIN 81 MG CHEWABLE TABLET 81 MG: 81 TABLET CHEWABLE at 16:13

## 2020-07-22 RX ADMIN — SODIUM CHLORIDE 40 MG: 9 INJECTION, SOLUTION INTRAMUSCULAR; INTRAVENOUS; SUBCUTANEOUS at 21:32

## 2020-07-22 RX ADMIN — MEMANTINE HYDROCHLORIDE 10 MG: 10 TABLET ORAL at 18:10

## 2020-07-22 RX ADMIN — POTASSIUM CHLORIDE 10 MEQ: 10 INJECTION, SOLUTION INTRAVENOUS at 10:29

## 2020-07-22 RX ADMIN — CARVEDILOL 6.25 MG: 6.25 TABLET, FILM COATED ORAL at 18:10

## 2020-07-22 RX ADMIN — POTASSIUM CHLORIDE 10 MEQ: 10 INJECTION, SOLUTION INTRAVENOUS at 10:26

## 2020-07-22 RX ADMIN — Medication 10 ML: at 05:33

## 2020-07-22 RX ADMIN — Medication 10 ML: at 21:32

## 2020-07-22 NOTE — PROGRESS NOTES
Occupational Therapy    Orders received and acknowledged with patient chart reviewed. Patient currently off the floor and unavailable for evaluation. Will follow-up this afternoon or tomorrow as able and appropriate. Thank you.   Cindy French, OTR/L

## 2020-07-22 NOTE — PROGRESS NOTES
Problem: Pressure Injury - Risk of  Goal: *Prevention of pressure injury  Description: Document Newton Scale and appropriate interventions in the flowsheet. Outcome: Progressing Towards Goal  Note: Pressure Injury Interventions:  Sensory Interventions: Assess changes in LOC, Avoid rigorous massage over bony prominences, Keep linens dry and wrinkle-free    Moisture Interventions: Absorbent underpads, Apply protective barrier, creams and emollients, Check for incontinence Q2 hours and as needed, Maintain skin hydration (lotion/cream)    Activity Interventions: PT/OT evaluation    Mobility Interventions: PT/OT evaluation    Nutrition Interventions: Offer support with meals,snacks and hydration    Friction and Shear Interventions: Apply protective barrier, creams and emollients, Lift team/patient mobility team                Problem: Patient Education: Go to Patient Education Activity  Goal: Patient/Family Education  Outcome: Progressing Towards Goal     Problem: Falls - Risk of  Goal: *Absence of Falls  Description: Document Louis Fall Risk and appropriate interventions in the flowsheet.   Outcome: Progressing Towards Goal  Note: Fall Risk Interventions:  Mobility Interventions: Bed/chair exit alarm, PT Consult for mobility concerns, PT Consult for assist device competence    Mentation Interventions: Adequate sleep, hydration, pain control, Bed/chair exit alarm, Door open when patient unattended, More frequent rounding, Reorient patient    Medication Interventions: Bed/chair exit alarm, Patient to call before getting OOB    Elimination Interventions: Bed/chair exit alarm, Call light in reach              Problem: Patient Education: Go to Patient Education Activity  Goal: Patient/Family Education  Outcome: Progressing Towards Goal

## 2020-07-22 NOTE — PROGRESS NOTES
Problem: Mobility Impaired (Adult and Pediatric)  Goal: *Acute Goals and Plan of Care (Insert Text)  Description: FUNCTIONAL STATUS PRIOR TO ADMISSION: Patient poor historian. He reports independence at baseline without use of any assistive device. HOME SUPPORT PRIOR TO ADMISSION: The patient lived with granddaughter. Patient reports granddaughter works during the day. Physical Therapy Goals  Initiated 7/22/2020  1. Patient will move from supine to sit and sit to supine , scoot up and down, and roll side to side in bed with independence within 7 day(s). 2.  Patient will transfer from bed to chair and chair to bed with supervision/set-up using the least restrictive device within 7 day(s). 3.  Patient will perform sit to stand with supervision/set-up within 7 day(s). 4.  Patient will ambulate with supervision/set-up for 250 feet with the least restrictive device within 7 day(s). Outcome: Not Met   PHYSICAL THERAPY EVALUATION  Patient: Ailyn Pearce Sr (80 y.o. male)  Date: 7/22/2020  Primary Diagnosis: Syncope [R55]        Precautions: fall      ASSESSMENT  Based on the objective data described below, the patient presents with impairments in balance, cognition, and gait following admission for syncope. Patient is a poor historian but reports living with granddaughter. Patient oriented to person only. Patient came to EOB independently and transitioned to stand with CGA/min assist of 2. Patient initially ambulated with hand held assist, but presented with severely impaired balance. Patient was given a RW and balanced improved. Patient ambulated 75 feet with RW and min assist, decreased MATTHEW and balance noticed. Patient was left supine in bed at end of session. Patient reports living with granddaughter and expresses desire to return home following d/c. Patient would benefit from increased supervision in home. If 24 hours supervision is not available at home, would recommend d/c to SNF.      Current Level of Function Impacting Discharge (mobility/balance): Patient requires min assist for ambulation. Patient is currently independent with bed mobility. Functional Outcome Measure: The patient scored 7/20 on the elder mobility scale outcome measure which is indicative of dependence for mobility and ADLs. Other factors to consider for discharge: Patient is poor historian and unable to state how long granddaughter is at home during the day. Patient would benefit from 24 hours supervision. Patient will benefit from skilled therapy intervention to address the above noted impairments. PLAN :  Recommendations and Planned Interventions: bed mobility training, transfer training, gait training, therapeutic exercises, and therapeutic activities      Frequency/Duration: Patient will be followed by physical therapy:  4 times a week to address goals. Recommendation for discharge: (in order for the patient to meet his/her long term goals)  Physical therapy at least 2 days/week in the home AND ensure assist and/or supervision for safety with all out of bed mobility. This discharge recommendation:  Has not yet been discussed the attending provider and/or case management    IF patient discharges home will need the following DME: rolling walker         SUBJECTIVE:   Patient stated \"What is today.     OBJECTIVE DATA SUMMARY:   HISTORY:    Past Medical History:   Diagnosis Date    Abdominal pain 7/18/2017    Alzheimer disease ([facility] [address] [address] 7/18/2017    Anemia 7/18/2017    Arthritis     Back pain 7/18/2017    Bradycardia 7/18/2017    CAD (coronary artery disease)     hx of MI    CKD (chronic kidney disease), stage II 7/18/2017    constipation     Depression 7/18/2017    Diabetes mellitus ([facility] [address] 7/18/2017    Diverticulosis 7/18/2017    DJD (degenerative joint disease) 7/18/2017    Encounter for long-term (current) use of other medications 7/18/2017    Fatigue     Gastritis and duodenitis 7/18/2017    GERD (gastroesophageal reflux disease)     GI bleed 7/18/2017    Hearing loss     Hyperlipidemia 7/18/2017    Hypertension     Hypertension with renal disease 7/18/2017    Ill-defined condition     Dementia    Internal hemorrhoids 7/18/2017    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    S/P cardiac pacemaker procedure 5/4/2015    5/4/15 Medtronic dual chamber pacemaker implant     Weight loss     lost over 40 pounds last year- has no appetite     Past Surgical History:   Procedure Laterality Date    COLONOSCOPY N/A 6/22/2017    COLONOSCOPY performed by Eva Galdamez MD at [address] [place] [address],[address]  6/22/2017         HX HERNIA REPAIR  7/10/2014    right inguinal    HX OTHER SURGICAL      cystectomy from back    MT EGD TRANSORAL BIOPSY SINGLE/MULTIPLE  2/14/2012         UPPER GI ENDOSCOPY,BIOPSY  6/22/2017            Personal factors and/or comorbidities impacting plan of care: Patient has history of dementia and recent weight loss. Home Situation  Home Environment: Private residence  Living Alone: No  Support Systems: Child(turner)(lives with [facility])  Current DME Used/Available at Home: Walker, rolling, Grab bars  Tub or Shower Type: Tub/Shower combination    EXAMINATION/PRESENTATION/DECISION MAKING:   Critical Behavior:  Neurologic State: Alert  Orientation Level: Oriented to person, Disoriented to place, Disoriented to situation, Disoriented to time  Cognition: Impaired decision making     Hearing:   Auditory  Auditory Impairment: Hard of hearing, bilateral    Range Of Motion:  AROM: Within functional limits                       Strength:    Strength: Generally decreased, functional                    Tone & Sensation:   Tone: Normal                              Coordination:  Coordination: Generally decreased, functional  Vision:      Functional Mobility:  Bed Mobility:  Rolling: Supervision  Supine to Sit: Supervision  Sit to Supine: Supervision  Scooting: Supervision  Transfers:  Sit to Stand: Minimum assistance;Contact guard assistance;Assist x2  Stand to Sit: Minimum assistance;Contact guard assistance;Assist x2                       Balance:   Sitting: Intact  Standing: Impaired  Standing - Static: Fair  Standing - Dynamic : Fair(fair using RW, poor without device)  Ambulation/Gait Training:  Distance (ft): 75 Feet (ft)  Assistive Device: Walker, rolling;Gait belt  Ambulation - Level of Assistance: Minimal assistance;Assist x2        Gait Abnormalities: Decreased step clearance(path deviations without device)        Base of Support: Narrowed     Speed/Maureen: Pace decreased (<100 feet/min); Slow(steps were delayed with initial ambulation)  Step Length: Right shortened;Left shortened         Functional Measure:    Elder Mobility Scale    7/20         Scores under 10 - generally these patients are dependent in mobility maneuvers; require help with  basic ADL, such as transfers, toileting and dressing. Scores between 10 - 13 - generally these patients are borderline in terms of safe mobility and  independence in ADL i.e. they require some help with some mobility maneuvers. Scores over 14 - Generally these patients are able to perform mobility maneuvers alone and safely  and are independent in basic ADL. Activity Tolerance:   Good  Please refer to the flowsheet for vital signs taken during this treatment. After treatment patient left in no apparent distress:   Supine in bed, Call bell within reach, Bed / chair alarm activated, and Side rails x 3    COMMUNICATION/EDUCATION:   The patients plan of care was discussed with: Occupational therapist.     Fall prevention education was provided and the patient/caregiver indicated understanding. and Patient is unable to participate in goal setting and plan of care.     Thank you for this referral.  Otf Flood, PT   Time Calculation: 23 mins

## 2020-07-22 NOTE — PROGRESS NOTES
Physical Therapy    Consult received and chart reviewed. Attempting to see patient for PT evaluation. Patient currently off floor for testing. Will follow back later.      Charlie Galindo, PT

## 2020-07-22 NOTE — PROGRESS NOTES
GI Progress Note Agustin Gip)  NAME:Benito Farfan Sr :1931 BSV:[id number]   Radha Buck MD  PCP: Silvestre Duran MD  Date/Time:  2020 07   Assessment:   · Abnormal Weight Loss- suspect related to decreased intake from multifactorial etiology (Dementia, EtOH abuse, etc)  · Dysphagia- variable  · Vomiting     Plan:   · For Barium Esophagram today  · Will consider for utility of EGD tomorrow to exclude mass lesion or inflammation     Subjective:   Discussed with RN events overnight. Had difficulty with liquids yesterday, but tolerated solid intake brought by family yesterday. Complaint Y/N Description   Abdominal Pain n    Hematemesis n    Hematochezia n    Melena n    Constipation n    Diarrhea n    Dyspepsia n    Dysphagia n    Jaundiced n    Nausea/vomiting n      Review of Systems:  Symptom Y/N Comments  Symptom Y/N Comments   Fever/Chills n   Chest Pain n    Cough n   Headaches n    Sputum n   Joint Pain n    SOB/GONGORA n   Pruritis/Rash n    Tolerating Diet  NPO for test  Other       Could NOT obtain due to:      Objective:   VITALS:   Last 24hrs VS reviewed since prior progress note. Most recent are:  Visit Vitals  /66 (BP 1 Location: Left arm, BP Patient Position: Supine)   Pulse 61   Temp 97.9 °F (36.6 °C)   Resp 16   Ht 5' 9\" (1.753 m)   Wt 52 kg (114 lb 10.2 oz)   SpO2 99%   BMI 16.93 kg/m²       Intake/Output Summary (Last 24 hours) at 2020 0802  Last data filed at 2020 1411  Gross per 24 hour   Intake    Output 280 ml   Net -280 ml     PHYSICAL EXAM:  General: WD, WN. Alert, cooperative, no acute distress    HEENT: NC, Atraumatic. PERRL. Anicteric sclerae. Lungs:  CTA Bilaterally. No Wheezing/Rhonchi/Rales. Heart:  Regular  rhythm,  No murmur/Rubs/Gallops  Abdomen: Soft, Non distended, Non tender.  +BS  Extremities: No c/c/e  Neurologic:  CN 2-12 gi, A/O X name. No acute neurological distress   Psych:   Poor insight. Not anxious nor agitated.     Lab and Radiology Data Reviewed: (see below)    Medications Reviewed: (see below)  PMH/SH reviewed - no change compared to H&P  ________________________________________________________________________  Total time spent with patient: 15 minutes ________________________________________________________________________  Care Plan discussed with:  Patient y   Family     RN y              Consultant:  hannah Martinez MD     Procedures: see electronic medical records for all procedures/Xrays and details which were not copied into this note but were reviewed prior to creation of Plan. LABS:  Recent Labs     07/22/20 0238 07/21/20 0350   WBC 4.9 5.3   HGB 12.5 12.8   HCT 40.2 40.8    228     Recent Labs     07/22/20 0238 07/21/20  0350 07/20/20  1642    138 137   K 3.3* 3.1* 3.5    98 101   CO2 31 36* 33*   BUN 38* 45* 51*   CREA 1.48* 1.96* 2.36*   * 98 129*   CA 8.7 9.1 9.0   MG  --  2.4  --      Recent Labs     07/20/20  1642   AP 78   TP 6.5   ALB 2.7*   GLOB 3.8     No results for input(s): INR, PTP, APTT, INREXT in the last 72 hours. No results for input(s): FE, TIBC, PSAT, FERR in the last 72 hours. No results found for: FOL, RBCF  No results for input(s): PH, PCO2, PO2 in the last 72 hours.   Recent Labs     07/20/20 1642   CPK 73   CKMB <1.0     Lab Results   Component Value Date/Time    Color YELLOW/STRAW 07/21/2020 01:00 PM    Appearance CLEAR 07/21/2020 01:00 PM    Specific gravity 1.010 07/21/2020 01:00 PM    Specific gravity 1.015 09/05/2019 11:17 AM    pH (UA) 7.5 07/21/2020 01:00 PM    Protein Negative 07/21/2020 01:00 PM    Glucose Negative 07/21/2020 01:00 PM    Ketone Negative 07/21/2020 01:00 PM    Bilirubin Negative 07/21/2020 01:00 PM    Urobilinogen 1.0 07/21/2020 01:00 PM    Nitrites Negative 07/21/2020 01:00 PM    Leukocyte Esterase Negative 07/21/2020 01:00 PM    Epithelial cells FEW 07/21/2020 01:00 PM    Bacteria Negative 07/21/2020 01:00 PM    WBC 0-4 07/21/2020 01:00 PM    RBC 0-5 07/21/2020 01:00 PM       MEDICATIONS:  Current Facility-Administered Medications   Medication Dose Route Frequency    potassium chloride 10 mEq in 100 ml IVPB  10 mEq IntraVENous Q1H    aspirin chewable tablet 81 mg  81 mg Oral DAILY    carvediloL (COREG) tablet 6.25 mg  6.25 mg Oral BID    donepeziL (ARICEPT) tablet 10 mg  10 mg Oral DAILY    memantine (NAMENDA) tablet 10 mg  10 mg Oral BID    temazepam (RESTORIL) capsule 15 mg  15 mg Oral QHS PRN    sodium chloride (NS) flush 5-40 mL  5-40 mL IntraVENous Q8H    sodium chloride (NS) flush 5-40 mL  5-40 mL IntraVENous PRN    acetaminophen (TYLENOL) tablet 650 mg  650 mg Oral Q4H PRN    dextrose 5 % - 0.45% NaCl infusion  125 mL/hr IntraVENous CONTINUOUS    pantoprazole (PROTONIX) 40 mg in 0.9% sodium chloride 10 mL injection  40 mg IntraVENous Q12H

## 2020-07-22 NOTE — PROGRESS NOTES
* No surgery found *  * No surgery found *  Bedside shift change report given to Ross Tovar, RN (oncoming nurse) by RADHA Shaw (offgoing nurse). Report included the following information SBAR and Kardex.     Zone Phone:   [phone]      Significant changes during shift:  NPO since midnight         Patient Information    Benito Farfan Sr  80 y.o.  7/20/2020  4:31 PM by Esme Clay MD. Toi Farfan Sr was admitted from Home    Problem List    Patient Active Problem List    Diagnosis Date Noted    Severe protein-calorie malnutrition ([address] 07/21/2020    Weight loss 07/20/2020    Unsteady gait 07/20/2020    Dehydration 07/20/2020    Acute renal failure superimposed on stage 3 chronic kidney disease ([address] 07/20/2020    Paroxysmal VT ([address] 11/07/2019    Cardiomyopathy ([address] 11/07/2019    Alcohol withdrawal syndrome, with delirium ([address] 11/01/2019    A-fib ([address] 10/27/2019    Syncope 01/09/2019    Primary insomnia 01/09/2019    Mild depression ([address] 06/12/2018    Acute bilateral low back pain without sciatica 05/30/2018    Alcoholism ([address] 78/99/7928    Metabolic encephalopathy 45/06/8822    TIA (transient ischemic attack) 04/12/2018    Medicare annual wellness visit, subsequent 09/01/2017    Diverticulosis 07/18/2017    Gastritis and duodenitis 07/18/2017    Internal hemorrhoids 07/18/2017    Hypertension with renal disease 07/18/2017    Mixed hyperlipidemia 07/18/2017    GI bleed 07/18/2017    Primary osteoarthritis involving multiple joints 07/18/2017    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, without long-term current use of insulin ([address] 07/18/2017    CKD (chronic kidney disease), stage II 07/18/2017    Back pain 07/18/2017    Anemia 07/18/2017    Alzheimer disease ([address] 07/18/2017    SSS (sick sinus syndrome) ([address] 06/02/2015    S/P cardiac pacemaker procedure 05/04/2015    S/P ablation of accessory bypass tract 05/04/2015    SVT (supraventricular tachycardia) (HCC)--s/p RFA 04/28/2015    Near syncope 03/11/2015    ASCVD (arteriosclerotic cardiovascular disease) 07/11/2014    Hypokalemia 07/11/2014    Right inguinal hernia 06/12/2014     Past Medical History:   Diagnosis Date    Abdominal pain 7/18/2017    Alzheimer disease ([facility] 7/18/2017    Anemia 7/18/2017    Arthritis     Back pain 7/18/2017    Bradycardia 7/18/2017    CAD (coronary artery disease)     hx of MI    CKD (chronic kidney disease), stage II 7/18/2017    constipation     Depression 7/18/2017    Diabetes mellitus ([facility] 7/18/2017    Diverticulosis 7/18/2017    DJD (degenerative joint disease) 7/18/2017    Encounter for long-term (current) use of other medications 7/18/2017    Fatigue     Gastritis and duodenitis 7/18/2017    GERD (gastroesophageal reflux disease)     GI bleed 7/18/2017    Hearing loss     Hyperlipidemia 7/18/2017    Hypertension     Hypertension with renal disease 7/18/2017    Ill-defined condition     Dementia    Internal hemorrhoids 7/18/2017    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    S/P cardiac pacemaker procedure 5/4/2015    5/4/15 Medtronic dual chamber pacemaker implant     Weight loss     lost over 40 pounds last year- has no appetite         Core Measures:    CVA: No No  CHF:No No  PNA:No No      Activity Status:    OOB to Chair No  Ambulated this shift No   Bed Rest Yes      DVT prophylaxis:    DVT prophylaxis Med- Yes  DVT prophylaxis SCD or LISSY- No     Patient Safety:    Falls Score Total Score: 3  Safety Level_______  Bed Alarm On? Yes  Sitter?  No    Plan for upcoming shift: safety, barium swallow, PT/OT      Discharge Plan: No TBD    Active Consults:  IP CONSULT TO GASTROENTEROLOGY

## 2020-07-22 NOTE — PROGRESS NOTES
Problem: Self Care Deficits Care Plan (Adult)  Goal: *Acute Goals and Plan of Care (Insert Text)  Description:   FUNCTIONAL STATUS PRIOR TO ADMISSION: Patient was independent and active without use of DME. Reports no available equipment at home. HOME SUPPORT: The patient lived with granddaughter to provide assistance. Per patient, he was able to complete BADLs independently but depended on granddaughter for IADLs. Patient states granddaughter works during the day (?). Occupational Therapy Goals  Initiated 7/22/2020  1. Patient will perform standing grooming with SBA using RW prn within 7 day(s). 2.  Patient will collect ADL items from various surface levels with CGA for balance and using RW prn within 7 day(s). 4.  Patient will perform toilet transfers with CGA using RW prn within 7 day(s). 5.  Patient will perform all aspects of toileting with CGA using RW prn for balance within 7 day(s). 6.  Patient will participate in upper extremity therapeutic exercise/activities with supervision/set-up for 10 minutes within 7 day(s). Outcome: Progressing Towards Goal     OCCUPATIONAL THERAPY EVALUATION  Patient: Ronald Jane Sr (80 y.o. male)  Date: 7/22/2020  Primary Diagnosis: Syncope [R55]  Procedure(s) (LRB):  ESOPHAGOGASTRODUODENAL (EGD) BIOPSY (N/A)     Precautions: Fall, Pressure       ASSESSMENT  Based on the objective data described below, the patient presents with decreased functional mobility and balance, decreased insight and safety awareness, and executive functioning deficits. At baseline, patient with Alzheimer's dementia and living with granddaughter. Patient is an unreliable historian, providing questionable information for PLOF. Per patient, he was independent with self-care and did not use a device for mobility.  This session, patient with very poor balance during brief ambulation without device, improved with use of RW although cues needed for appropriate use and navigation of environment. Patient oriented x2, reporting current location as hospital. Unable to provide current month, year, or president without cues - identified correct year with field of 2. Despite repeated reorientation, continued to ask date intermittently throughout session. Patient is high fall risk and will require 24/7 supervision/assist for safety upon discharge. If needed supervision is available, recommend return to home with granddaughter's continued support. Current Level of Function Impacting Discharge (ADLs/self-care): CGA-min assist x2 with RW    Functional Outcome Measure: The patient scored 50/100 on the Barthel Index. Other factors to consider for discharge: AMS, high fall risk, decreased insight, Alzheimer's dementia     Patient will benefit from skilled therapy intervention to address the above noted impairments. PLAN :  Recommendations and Planned Interventions: self care training, functional mobility training, balance training, therapeutic activities, endurance activities, and patient education    Frequency/Duration: Patient will be followed by occupational therapy 3 times a week to address goals. Recommendation for discharge: (in order for the patient to meet his/her long term goals)  Occupational therapy at least 2 days/week in the home AND ensure assist and/or supervision for safety with      This discharge recommendation:  Has not yet been discussed the attending provider and/or case management    IF patient discharges home will need the following DME: bedside commode, gait belt, and walker: rolling       SUBJECTIVE:   Patient stated what's today?  Patient noted to ask the date 3x throughout session - reoriented after each but repeated questioning after period of approx. 5 minutes.     OBJECTIVE DATA SUMMARY:   HISTORY:   Past Medical History:   Diagnosis Date    Abdominal pain 7/18/2017    Alzheimer disease ([facility] [address] 7/18/2017    Anemia 7/18/2017    Arthritis     Back pain 7/18/2017 Bradycardia 7/18/2017    CAD (coronary artery disease)     hx of MI    CKD (chronic kidney disease), stage II 7/18/2017    constipation     Depression 7/18/2017    Diabetes mellitus ([facility] [address] 7/18/2017    Diverticulosis 7/18/2017    DJD (degenerative joint disease) 7/18/2017    Encounter for long-term (current) use of other medications 7/18/2017    Fatigue     Gastritis and duodenitis 7/18/2017    GERD (gastroesophageal reflux disease)     GI bleed 7/18/2017    Hearing loss     Hyperlipidemia 7/18/2017    Hypertension     Hypertension with renal disease 7/18/2017    Ill-defined condition     Dementia    Internal hemorrhoids 7/18/2017    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    S/P cardiac pacemaker procedure 5/4/2015    5/4/15 Medtronic dual chamber pacemaker implant     Weight loss     lost over 40 pounds last year- has no appetite     Past Surgical History:   Procedure Laterality Date    COLONOSCOPY N/A 6/22/2017    COLONOSCOPY performed by Jessica Rivera MD at [address] [place] [address]  6/22/2017         HX HERNIA REPAIR  7/10/2014    right inguinal    HX OTHER SURGICAL      cystectomy from back    MT EGD TRANSORAL BIOPSY SINGLE/MULTIPLE  2/14/2012         UPPER GI ENDOSCOPY,BIOPSY  6/22/2017            Expanded or extensive additional review of patient history:     Home Situation  Home Environment: Private residence  Living Alone: No  Support Systems: Child(turner)(lives with [facility])  Current DME Used/Available at Home: Walker, rolling, Grab bars  Tub or Shower Type: Tub/Shower combination    Hand dominance: Right    EXAMINATION OF PERFORMANCE DEFICITS:  Cognitive/Behavioral Status:  Neurologic State: Alert  Orientation Level: Oriented to person;Oriented to place; Disoriented to situation;Disoriented to time  Cognition: Decreased attention/concentration; Impaired decision making;Memory loss  Perception: Verbal;Visual;Tactile  Perseveration: No perseveration noted  Safety/Judgement: Decreased insight into deficits; Fall prevention;Home safety; Awareness of environment    Hearing: Auditory  Auditory Impairment: Hard of hearing, bilateral    Vision/Perceptual:     No overt deficits noted - not formally assessed. Range of Motion:  AROM: Within functional limits    Strength:  Strength: Generally decreased, functional    Coordination:  Coordination: Generally decreased, functional  Fine Motor Skills-Upper: Left Intact; Right Intact    Gross Motor Skills-Upper: Left Intact; Right Intact    Tone & Sensation:  Tone: Normal    Balance:  Sitting: Intact  Standing: Impaired  Standing - Static: Fair  Standing - Dynamic : Fair(fair using RW, poor without device)    Functional Mobility and Transfers for ADLs:  Bed Mobility:  Rolling: Supervision  Supine to Sit: Supervision  Sit to Supine: Supervision  Scooting: Supervision    Transfers:  Sit to Stand: Minimum assistance;Contact guard assistance;Assist x2  Stand to Sit: Minimum assistance;Contact guard assistance;Assist x2  Bed to Chair: Minimum assistance;Contact guard assistance;Assist x2  Bathroom Mobility: Minimum assistance;Contact guard assistance(x2 )  Toilet Transfer : Minimum assistance;Contact guard assistance;Assist x2    ADL Assessment:  Feeding: Setup;Stand-by assistance    Oral Facial Hygiene/Grooming: Setup;Contact guard assistance(if in standing)    Upper Body Dressing: Stand-by assistance;Setup    Lower Body Dressing: Contact guard assistance;Setup    Toileting: Contact guard assistance;Minimum assistance;Setup    ADL Intervention and task modifications:  Lower Body Dressing Assistance  Dressing Assistance: Contact guard assistance  Socks: Contact guard assistance  Leg Crossed Method Used: Yes  Position Performed: Seated edge of bed  Cues: Don;Doff;Verbal cues provided    Cognitive Retraining  Safety/Judgement: Decreased insight into deficits; Fall prevention;Home safety; Awareness of environment    Functional Measure:  Barthel Index:    Bathin  Bladder: 5  Bowels: 10  Groomin  Dressin  Feedin  Mobility: 5  Stairs: 0  Toilet Use: 5  Transfer (Bed to Chair and Back): 10  Total: 50/100        The Barthel ADL Index: Guidelines  1. The index should be used as a record of what a patient does, not as a record of what a patient could do. 2. The main aim is to establish degree of independence from any help, physical or verbal, however minor and for whatever reason. 3. The need for supervision renders the patient not independent. 4. A patient's performance should be established using the best available evidence. Asking the patient, friends/relatives and nurses are the usual sources, but direct observation and common sense are also important. However direct testing is not needed. 5. Usually the patient's performance over the preceding 24-48 hours is important, but occasionally longer periods will be relevant. 6. Middle categories imply that the patient supplies over 50 per cent of the effort. 7. Use of aids to be independent is allowed. Edward Calhoun., Barthel, D.W. (2186). Functional evaluation: the Barthel Index. [facility] (14)2. Ev Durant sara DAPHNE CeeJJustynMJustynF, Yisel العراقي., Dakota Vaca., [place], [address] [place] [address] (). Measuring the change indisability after inpatient rehabilitation; comparison of the responsiveness of the Barthel Index and Functional McRoberts Measure. Journal of Neurology, Neurosurgery, and Psychiatry, 66(4), 329-843. ZIGGY Jarrett.J.KATERINA, DAVIE AlbrightJ.HEATHER, & Carla Araujo MJustynA. (2004.) Assessment of post-stroke quality of life in cost-effectiveness studies: The usefulness of the Barthel Index and the EuroQoL-5D.  Quality of Life Research, 15, 755-88     Occupational Therapy Evaluation Charge Determination   History Examination Decision-Making   LOW Complexity : Brief history review  LOW Complexity : 1-3 performance deficits relating to physical, cognitive , or psychosocial skils that result in activity limitations and / or participation restrictions  LOW Complexity : No comorbidities that affect functional and no verbal or physical assistance needed to complete eval tasks       Based on the above components, the patient evaluation is determined to be of the following complexity level: LOW   Pain Rating:  No pain reported. Activity Tolerance:   Good, Fair, and requires rest breaks  Please refer to the flowsheet for vital signs taken during this treatment. After treatment patient left in no apparent distress:    Supine in bed, Call bell within reach, Bed / chair alarm activated, and Side rails x 3    COMMUNICATION/EDUCATION:   The patients plan of care was discussed with: Physical therapist and Registered nurse. Home safety education was provided and the patient/caregiver indicated understanding., Patient/family have participated as able in goal setting and plan of care. , and Patient/family agree to work toward stated goals and plan of care.     Thank you for this referral.  Ann Lawrence OT  Time Calculation: 22 mins

## 2020-07-22 NOTE — PROGRESS NOTES
PROGRESS NOTE    NAME:  Saida Farfan Sr   :   1931   MRN:   [id number]     Date/Time:  2020 6:00 AM  Subjective:   History:  Chart reviewed and patient seen and examined and discussed with his nurse and Dr. Filemon Mitchell this AM and all events noted. He presented to the office on  after a long absence not having been seen there since 2019. [facility] was accompanied by his granddaughter and was there  in follow-up of his medical problems to include hypertension, diabetes, hyperlipidemia, atherosclerotic coronary vascular disease, cardiomyopathy, Alzheimer's dementia, history of PAF, prior TIA, history of alcoholism and other multiple medical problems.  In addition to his chronic problems he had a significant issue now off weight loss from 134 pounds at his last visit down to 115.7 pounds. [facility] had become very unsteady walking according to his granddaughter which was easily visible on exam in the office. [facility] also had developed a poor appetite and has a hard time keeping food down and thus eating very little.  He actually even has had some difficulty keeping liquids down.      He denies any current chest pain, shortness of breath, palpitations, PND, orthopnea or other cardiorespiratory complaints.  He  has no current  complaints.  As far as the GI complaints there is a poor appetite with the inability to keep some food down as well as liquids as well as some mild upper abdominal discomfort but no evidence of diarrhea.  No fevers, chills or night sweats have been noted.  From a neurologic standpoint he has had progressive decreased memory and progressive unsteadiness on his feet.  There are no current active arthritic complaints.  All of the above history was confirmed by his granddaughter present with him. Amanuel Plata is his primary caretaker.   With this history it was felt prudent to admit him to the hospital for further work-up evaluation and treatment and he was sent to the hospital for direct admission however while going through admissions he became lightheaded and unresponsive and was emergently taken to the emergency room for admission. Today he is more alert after hydration but and oriented to name and knows me by name today. He denies Cardiac or respiratory c/o. Swallowing difficulty continues. He is generally weak w/o focal neurologic c/o and as noted memory is better than on admission. .    Medications reviewed:  Current Facility-Administered Medications   Medication Dose Route Frequency    potassium chloride 10 mEq in 100 ml IVPB  10 mEq IntraVENous Q1H    aspirin chewable tablet 81 mg  81 mg Oral DAILY    carvediloL (COREG) tablet 6.25 mg  6.25 mg Oral BID    donepeziL (ARICEPT) tablet 10 mg  10 mg Oral DAILY    memantine (NAMENDA) tablet 10 mg  10 mg Oral BID    temazepam (RESTORIL) capsule 15 mg  15 mg Oral QHS PRN    sodium chloride (NS) flush 5-40 mL  5-40 mL IntraVENous Q8H    sodium chloride (NS) flush 5-40 mL  5-40 mL IntraVENous PRN    acetaminophen (TYLENOL) tablet 650 mg  650 mg Oral Q4H PRN    dextrose 5 % - 0.45% NaCl infusion  125 mL/hr IntraVENous CONTINUOUS    pantoprazole (PROTONIX) 40 mg in 0.9% sodium chloride 10 mL injection  40 mg IntraVENous Q12H        Objective:   Vitals:  Visit Vitals  /59 (BP 1 Location: Left arm, BP Patient Position: At rest)   Pulse 60   Temp 97.4 °F (36.3 °C)   Resp 18   Ht 5' 9\" (1.753 m)   Wt 114 lb 10.2 oz (52 kg)   SpO2 100%   BMI 16.93 kg/m²      O2 Device: Room air Temp (24hrs), Av.8 °F (36.6 °C), Min:96.9 °F (36.1 °C), Max:98.4 °F (36.9 °C)      Last 24hr Input/Output:    Intake/Output Summary (Last 24 hours) at 2020 0600  Last data filed at 2020 1411  Gross per 24 hour   Intake    Output 280 ml   Net -280 ml        PHYSICAL EXAM:  General:     Alert, cooperative, no distress, appears stated age. Head:    Normocephalic, without obvious abnormality, atraumatic. Eyes:    Conjunctivae/corneas clear.   PERRLA  Nose: Nares normal. No drainage or sinus tenderness. Throat:     Lips, mucosa, and tongue normal.  No Thrush  Neck:   Supple, symmetrical,  no adenopathy, thyroid: non tender     no carotid bruit and no JVD. Back:     Symmetric,  No CVA tenderness. Lungs:    Clear to auscultation bilaterally. No Wheezing or Rhonchi. No rales. Heart:    Regular rate and rhythm,  no murmur, rub or gallop. Abdomen:    Soft, non-tender. Not distended. Bowel sounds normal. No masses  Extremities:  Extremities normal, atraumatic, No cyanosis. No edema. No clubbing  Lymph nodes:  Cervical, supraclavicular normal.  Neurologic:  General decreased strength, Alert and oriented X 1.  He does know me by name today  Skin:                 No rash      Lab Data Reviewed:    Recent Results (from the past 24 hour(s))   GLUCOSE, POC    Collection Time: 07/21/20  7:52 AM   Result Value Ref Range    Glucose (POC) 90 65 - 100 mg/dL    Performed by Sandra Mc (PCT)    GLUCOSE, POC    Collection Time: 07/21/20 11:04 AM   Result Value Ref Range    Glucose (POC) 92 65 - 100 mg/dL    Performed by Sandra Mc (PCT)    URINALYSIS W/ REFLEX CULTURE    Collection Time: 07/21/20  1:00 PM    Specimen: Urine   Result Value Ref Range    Color YELLOW/STRAW      Appearance CLEAR CLEAR      Specific gravity 1.010 1.003 - 1.030      pH (UA) 7.5 5.0 - 8.0      Protein Negative NEG mg/dL    Glucose Negative NEG mg/dL    Ketone Negative NEG mg/dL    Bilirubin Negative NEG      Blood Negative NEG      Urobilinogen 1.0 0.2 - 1.0 EU/dL    Nitrites Negative NEG      Leukocyte Esterase Negative NEG      WBC 0-4 0 - 4 /hpf    RBC 0-5 0 - 5 /hpf    Epithelial cells FEW FEW /lpf    Bacteria Negative NEG /hpf    UA:UC IF INDICATED CULTURE NOT INDICATED BY UA RESULT CNI      Hyaline cast 0-2 0 - 5 /lpf   GLUCOSE, POC    Collection Time: 07/21/20  5:18 PM   Result Value Ref Range    Glucose (POC) 133 (H) 65 - 100 mg/dL    Performed by Sandra Mc (PCT)    METABOLIC PANEL, BASIC    Collection Time: 07/22/20  2:38 AM   Result Value Ref Range    Sodium 138 136 - 145 mmol/L    Potassium 3.3 (L) 3.5 - 5.1 mmol/L    Chloride 102 97 - 108 mmol/L    CO2 31 21 - 32 mmol/L    Anion gap 5 5 - 15 mmol/L    Glucose 103 (H) 65 - 100 mg/dL    BUN 38 (H) 6 - 20 MG/DL    Creatinine 1.48 (H) 0.70 - 1.30 MG/DL    BUN/Creatinine ratio 26 (H) 12 - 20      GFR est AA 54 (L) >60 ml/min/1.73m2    GFR est non-AA 45 (L) >60 ml/min/1.73m2    Calcium 8.7 8.5 - 10.1 MG/DL   CBC W/O DIFF    Collection Time: 07/22/20  2:38 AM   Result Value Ref Range    WBC 4.9 4.1 - 11.1 K/uL    RBC 4.62 4.10 - 5.70 M/uL    HGB 12.5 12.1 - 17.0 g/dL    HCT 40.2 36.6 - 50.3 %    MCV 87.0 80.0 - 99.0 FL    MCH 27.1 26.0 - 34.0 PG    MCHC 31.1 30.0 - 36.5 g/dL    RDW 14.5 11.5 - 14.5 %    PLATELET 594 015 - 276 K/uL    MPV 10.9 8.9 - 12.9 FL    NRBC 0.0 0  WBC    ABSOLUTE NRBC 0.00 0.00 - 0.01 K/uL         Assessment/Plan:     Principal Problem:    Syncope (1/9/2019)    Active Problems:    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)      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, without long-term current use of insulin (HCC) (7/18/2017)      SSS (sick sinus syndrome) ([facility] [address] (6/2/2015)      Cardiomyopathy ([facility] [address] (11/7/2019)      Alcoholism ([facility] [address] (4/23/2018)      Weight loss (7/20/2020)      Unsteady gait (7/20/2020)      Dehydration (7/20/2020)      Acute renal failure superimposed on stage 3 chronic kidney disease ([facility] [address] (7/20/2020)      Severe protein-calorie malnutrition ([facility] [address] (7/21/2020)       ___________________________________________________  PLAN:  1. Continue  telemetry bed with monitoring  2. IV hydration D5 half-normal saline  3. Hold Lasix and all current hypertensive medications except continue Coreg with his cardiomyopathy  4. Continue IV Protonix  5.  GI consult and D/w Dr. Filemon Mitchell this AM and plans for Barium Swallow to check motility  6.   Continue current Alzheimer's medications as ordered although may not be able to resume PO until after EGD  7. According to his granddaughter who is his caretaker has not drank alcohol in 3 months but will watch closely for any signs of withdrawal with known alcoholism and prior withdrawal  8. Follow renal function, 51/2.36 on admission to 38/1.48 now  9. Replete potassium currently 3.3  10. Checked magnesium level - normal  11. Follow blood sugar and will cover with sliding scale if becomes elevated  12. Nurse's note by [place] reviewed and obviously unacceptable behavior by daughter    Addendum:  Phone call with his granddaughter (POA) this AM and I discussed the Ba Swallow scheduled for today and further plans pending results of this, I also discussed the rude behavior of his daughter (mother of the granddaughter whom I spoke with) and explained that this it totally unacceptable! This was also D/W his nurse Matt    35 minutes spent in direct care of this high complexity patient today.       If need to contact me use hospital  [phone], DO NOT USE PERFECT SERVE    ___________________________________________________    Attending Physician: Joan Martinez MD

## 2020-07-22 NOTE — PROGRESS NOTES
Bedside shift change report given to Colin (oncoming nurse) by Jaya Mcarthur (offgoing nurse). Report included the following information SBAR, Kardex, ED Summary and OR Summary. Zone Phone:   [phone]      Significant changes during shift:  Pt pulled out their IV with a Telesitter, no other significant changes to report.          Patient Information    Salomón Watson Sr  80 y.o.  7/20/2020  4:31 PM by Rd Oh MD. Danny Farfan Sr was admitted from Home    Problem List    Patient Active Problem List    Diagnosis Date Noted    Severe protein-calorie malnutrition ([address] 07/21/2020    Weight loss 07/20/2020    Unsteady gait 07/20/2020    Dehydration 07/20/2020    Acute renal failure superimposed on stage 3 chronic kidney disease ([address] 07/20/2020    Paroxysmal VT ([address] 11/07/2019    Cardiomyopathy ([address] 11/07/2019    Alcohol withdrawal syndrome, with delirium ([address] 11/01/2019    A-fib ([address] 10/27/2019    Syncope 01/09/2019    Primary insomnia 01/09/2019    Mild depression ([address] 06/12/2018    Acute bilateral low back pain without sciatica 05/30/2018    Alcoholism ([address] 34/58/0314    Metabolic encephalopathy 05/04/2412    TIA (transient ischemic attack) 04/12/2018    Medicare annual wellness visit, subsequent 09/01/2017    Diverticulosis 07/18/2017    Gastritis and duodenitis 07/18/2017    Internal hemorrhoids 07/18/2017    Hypertension with renal disease 07/18/2017    Mixed hyperlipidemia 07/18/2017    GI bleed 07/18/2017    Primary osteoarthritis involving multiple joints 07/18/2017    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, without long-term current use of insulin ([address] 07/18/2017    CKD (chronic kidney disease), stage II 07/18/2017    Back pain 07/18/2017    Anemia 07/18/2017    Alzheimer disease ([address] 07/18/2017    SSS (sick sinus syndrome) ([address] 06/02/2015    S/P cardiac pacemaker procedure 05/04/2015    S/P ablation of accessory bypass tract 05/04/2015    SVT (supraventricular tachycardia) ([facility])--s/p RFA 04/28/2015    Near syncope 03/11/2015    ASCVD (arteriosclerotic cardiovascular disease) 07/11/2014    Hypokalemia 07/11/2014    Right inguinal hernia 06/12/2014     Past Medical History:   Diagnosis Date    Abdominal pain 7/18/2017    Alzheimer disease ([facility] 7/18/2017    Anemia 7/18/2017    Arthritis     Back pain 7/18/2017    Bradycardia 7/18/2017    CAD (coronary artery disease)     hx of MI    CKD (chronic kidney disease), stage II 7/18/2017    constipation     Depression 7/18/2017    Diabetes mellitus ([facility] 7/18/2017    Diverticulosis 7/18/2017    DJD (degenerative joint disease) 7/18/2017    Encounter for long-term (current) use of other medications 7/18/2017    Fatigue     Gastritis and duodenitis 7/18/2017    GERD (gastroesophageal reflux disease)     GI bleed 7/18/2017    Hearing loss     Hyperlipidemia 7/18/2017    Hypertension     Hypertension with renal disease 7/18/2017    Ill-defined condition     Dementia    Internal hemorrhoids 7/18/2017    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    S/P cardiac pacemaker procedure 5/4/2015    5/4/15 Medtronic dual chamber pacemaker implant     Weight loss     lost over 40 pounds last year- has no appetite         Core Measures:    CVA: Yes Yes      Activity Status:    OOB to Chair No  Ambulated this shift Yes   Bed Rest Yes      DVT prophylaxis:    DVT prophylaxis Med- No  DVT prophylaxis SCD or LISSY- Yes  Pt refused         Patient Safety:    Falls Score Total Score: 4  Safety Level_______  Bed Alarm On? Yes  Sitter?  Yes Pt has telesitter     Plan for upcoming shift: Pt has EGD scheduled for tomorrow, NPO after midnight tonight, obtain informed consent prior to EGD         Discharge Plan: Yes pt discharge with home health     Active Consults:  IP CONSULT TO GASTROENTEROLOGY

## 2020-07-23 ENCOUNTER — ANESTHESIA (OUTPATIENT)
Dept: ENDOSCOPY | Age: 85
DRG: 682 | End: 2020-07-23
Payer: MEDICARE

## 2020-07-23 ENCOUNTER — ANESTHESIA EVENT (OUTPATIENT)
Dept: ENDOSCOPY | Age: 85
DRG: 682 | End: 2020-07-23
Payer: MEDICARE

## 2020-07-23 PROBLEM — K22.4 ESOPHAGEAL DYSKINESIA: Status: ACTIVE | Noted: 2020-07-23

## 2020-07-23 PROCEDURE — 76060000031 HC ANESTHESIA FIRST 0.5 HR: Performed by: INTERNAL MEDICINE

## 2020-07-23 PROCEDURE — 77030019988 HC FCPS ENDOSC DISP BSC -B: Performed by: INTERNAL MEDICINE

## 2020-07-23 PROCEDURE — 76040000019: Performed by: INTERNAL MEDICINE

## 2020-07-23 PROCEDURE — 77030003657 HC NDL SCLER BSC -B: Performed by: INTERNAL MEDICINE

## 2020-07-23 PROCEDURE — 74011250636 HC RX REV CODE- 250/636: Performed by: INTERNAL MEDICINE

## 2020-07-23 PROCEDURE — 74011000250 HC RX REV CODE- 250: Performed by: ANESTHESIOLOGY

## 2020-07-23 PROCEDURE — 88305 TISSUE EXAM BY PATHOLOGIST: CPT

## 2020-07-23 PROCEDURE — 74011000250 HC RX REV CODE- 250: Performed by: INTERNAL MEDICINE

## 2020-07-23 PROCEDURE — 74011000258 HC RX REV CODE- 258: Performed by: INTERNAL MEDICINE

## 2020-07-23 PROCEDURE — C9113 INJ PANTOPRAZOLE SODIUM, VIA: HCPCS | Performed by: INTERNAL MEDICINE

## 2020-07-23 PROCEDURE — 0DB48ZX EXCISION OF ESOPHAGOGASTRIC JUNCTION, VIA NATURAL OR ARTIFICIAL OPENING ENDOSCOPIC, DIAGNOSTIC: ICD-10-PCS | Performed by: INTERNAL MEDICINE

## 2020-07-23 PROCEDURE — 74011250636 HC RX REV CODE- 250/636: Performed by: ANESTHESIOLOGY

## 2020-07-23 PROCEDURE — 3E0G8GC INTRODUCTION OF OTHER THERAPEUTIC SUBSTANCE INTO UPPER GI, VIA NATURAL OR ARTIFICIAL OPENING ENDOSCOPIC: ICD-10-PCS | Performed by: INTERNAL MEDICINE

## 2020-07-23 PROCEDURE — 74011250637 HC RX REV CODE- 250/637: Performed by: INTERNAL MEDICINE

## 2020-07-23 PROCEDURE — 65660000000 HC RM CCU STEPDOWN

## 2020-07-23 PROCEDURE — 94760 N-INVAS EAR/PLS OXIMETRY 1: CPT

## 2020-07-23 RX ORDER — EPINEPHRINE 0.1 MG/ML
1 INJECTION INTRACARDIAC; INTRAVENOUS
Status: DISCONTINUED | OUTPATIENT
Start: 2020-07-23 | End: 2020-07-23 | Stop reason: HOSPADM

## 2020-07-23 RX ORDER — SODIUM CHLORIDE 9 MG/ML
75 INJECTION, SOLUTION INTRAVENOUS CONTINUOUS
Status: DISPENSED | OUTPATIENT
Start: 2020-07-23 | End: 2020-07-23

## 2020-07-23 RX ORDER — MIDAZOLAM HYDROCHLORIDE 1 MG/ML
.25-5 INJECTION INTRAMUSCULAR; INTRAVENOUS
Status: DISCONTINUED | OUTPATIENT
Start: 2020-07-23 | End: 2020-07-23

## 2020-07-23 RX ORDER — MIDAZOLAM HYDROCHLORIDE 1 MG/ML
.25-5 INJECTION, SOLUTION INTRAMUSCULAR; INTRAVENOUS
Status: DISCONTINUED | OUTPATIENT
Start: 2020-07-23 | End: 2020-07-23

## 2020-07-23 RX ORDER — FENTANYL CITRATE 50 UG/ML
25 INJECTION, SOLUTION INTRAMUSCULAR; INTRAVENOUS
Status: DISCONTINUED | OUTPATIENT
Start: 2020-07-23 | End: 2020-07-23 | Stop reason: HOSPADM

## 2020-07-23 RX ORDER — ATROPINE SULFATE 0.1 MG/ML
0.5 INJECTION INTRAVENOUS
Status: DISCONTINUED | OUTPATIENT
Start: 2020-07-23 | End: 2020-07-23 | Stop reason: HOSPADM

## 2020-07-23 RX ORDER — DEXTROMETHORPHAN/PSEUDOEPHED 2.5-7.5/.8
1.2 DROPS ORAL
Status: DISCONTINUED | OUTPATIENT
Start: 2020-07-23 | End: 2020-07-23 | Stop reason: HOSPADM

## 2020-07-23 RX ORDER — SODIUM CHLORIDE 0.9 % (FLUSH) 0.9 %
5-40 SYRINGE (ML) INJECTION EVERY 8 HOURS
Status: DISCONTINUED | OUTPATIENT
Start: 2020-07-23 | End: 2020-08-03 | Stop reason: HOSPADM

## 2020-07-23 RX ORDER — LIDOCAINE HYDROCHLORIDE 20 MG/ML
INJECTION, SOLUTION EPIDURAL; INFILTRATION; INTRACAUDAL; PERINEURAL AS NEEDED
Status: DISCONTINUED | OUTPATIENT
Start: 2020-07-23 | End: 2020-07-23 | Stop reason: HOSPADM

## 2020-07-23 RX ORDER — FLUMAZENIL 0.1 MG/ML
0.2 INJECTION INTRAVENOUS
Status: DISCONTINUED | OUTPATIENT
Start: 2020-07-23 | End: 2020-07-23 | Stop reason: HOSPADM

## 2020-07-23 RX ORDER — SODIUM CHLORIDE 0.9 % (FLUSH) 0.9 %
5-40 SYRINGE (ML) INJECTION AS NEEDED
Status: DISCONTINUED | OUTPATIENT
Start: 2020-07-23 | End: 2020-08-03 | Stop reason: HOSPADM

## 2020-07-23 RX ORDER — NALOXONE HYDROCHLORIDE 0.4 MG/ML
0.4 INJECTION, SOLUTION INTRAMUSCULAR; INTRAVENOUS; SUBCUTANEOUS
Status: DISCONTINUED | OUTPATIENT
Start: 2020-07-23 | End: 2020-07-23 | Stop reason: HOSPADM

## 2020-07-23 RX ORDER — PROPOFOL 10 MG/ML
INJECTION, EMULSION INTRAVENOUS AS NEEDED
Status: DISCONTINUED | OUTPATIENT
Start: 2020-07-23 | End: 2020-07-23 | Stop reason: HOSPADM

## 2020-07-23 RX ADMIN — SODIUM CHLORIDE 40 MG: 9 INJECTION, SOLUTION INTRAMUSCULAR; INTRAVENOUS; SUBCUTANEOUS at 21:25

## 2020-07-23 RX ADMIN — DEXTROSE MONOHYDRATE AND SODIUM CHLORIDE 125 ML/HR: 5; .45 INJECTION, SOLUTION INTRAVENOUS at 00:47

## 2020-07-23 RX ADMIN — ONABOTULINUMTOXINA 100 UNITS: 100 INJECTION, POWDER, LYOPHILIZED, FOR SOLUTION INTRADERMAL; INTRAMUSCULAR at 11:27

## 2020-07-23 RX ADMIN — SODIUM CHLORIDE 75 ML/HR: 900 INJECTION, SOLUTION INTRAVENOUS at 10:30

## 2020-07-23 RX ADMIN — CARVEDILOL 6.25 MG: 6.25 TABLET, FILM COATED ORAL at 12:48

## 2020-07-23 RX ADMIN — Medication 10 ML: at 21:26

## 2020-07-23 RX ADMIN — DONEPEZIL HYDROCHLORIDE 10 MG: 5 TABLET, FILM COATED ORAL at 17:49

## 2020-07-23 RX ADMIN — BENZOCAINE 1 SPRAY: 200 SPRAY DENTAL; ORAL; PERIODONTAL at 11:07

## 2020-07-23 RX ADMIN — DEXTROSE MONOHYDRATE AND SODIUM CHLORIDE 125 ML/HR: 5; .45 INJECTION, SOLUTION INTRAVENOUS at 20:47

## 2020-07-23 RX ADMIN — Medication 10 ML: at 05:51

## 2020-07-23 RX ADMIN — LIDOCAINE HYDROCHLORIDE 50 MG: 20 INJECTION, SOLUTION EPIDURAL; INFILTRATION; INTRACAUDAL; PERINEURAL at 11:08

## 2020-07-23 RX ADMIN — ASPIRIN 81 MG CHEWABLE TABLET 81 MG: 81 TABLET CHEWABLE at 12:47

## 2020-07-23 RX ADMIN — SODIUM CHLORIDE 40 MG: 9 INJECTION, SOLUTION INTRAMUSCULAR; INTRAVENOUS; SUBCUTANEOUS at 12:48

## 2020-07-23 RX ADMIN — Medication 5 ML: at 14:18

## 2020-07-23 RX ADMIN — PROPOFOL 60 MG: 10 INJECTION, EMULSION INTRAVENOUS at 11:31

## 2020-07-23 RX ADMIN — MEMANTINE HYDROCHLORIDE 10 MG: 10 TABLET ORAL at 17:50

## 2020-07-23 RX ADMIN — MEMANTINE HYDROCHLORIDE 10 MG: 10 TABLET ORAL at 12:47

## 2020-07-23 RX ADMIN — DEXTROSE MONOHYDRATE AND SODIUM CHLORIDE 125 ML/HR: 5; .45 INJECTION, SOLUTION INTRAVENOUS at 09:48

## 2020-07-23 NOTE — ANESTHESIA PREPROCEDURE EVALUATION
Anesthetic History   No history of anesthetic complications            Review of Systems / Medical History  Patient summary reviewed, nursing notes reviewed and pertinent labs reviewed    Pulmonary  Within defined limits                 Neuro/Psych       CVA  TIA, psychiatric history and dementia     Cardiovascular    Hypertension        Dysrhythmias : SVT  Pacemaker, past MI and CAD    Exercise tolerance: <4 METS  Comments: Severe systolic dysfunction. Estimated left ventricular ejection fraction is 21 - 25%.    Mod pulm HTN on echo October 2019   GI/Hepatic/Renal     GERD           Endo/Other    Diabetes    Arthritis     Other Findings              Physical Exam    Airway  Mallampati: I  TM Distance: 4 - 6 cm  Neck ROM: decreased range of motion   Mouth opening: Normal     Cardiovascular  Regular rate and rhythm,  S1 and S2 normal,  no murmur, click, rub, or gallop             Dental    Dentition: Poor dentition     Pulmonary  Breath sounds clear to auscultation               Abdominal  GI exam deferred       Other Findings            Anesthetic Plan    ASA: 3  Anesthesia type: total IV anesthesia          Induction: Intravenous  Anesthetic plan and risks discussed with: Patient

## 2020-07-23 NOTE — PROCEDURES
NAME:  Stephanie Farfan    :   1931   MRN:   [id number]     Date/Time:  2020 11:37 AM    Esophagogastroduodenoscopy (EGD) Procedure Note    Procedure: Esophagogastroduodenoscopy with biopsy, injection of Botox at gastroesophageal junction    Indication:  Esophageal dysphagia,  Abnormal GI x-ray (EUS too)  Pre-operative Diagnosis: see indication above  Post-operative Diagnosis: see findings below  :  Adam Whyte MD  Referring Provider:   Suzette Mcardle, MD    Exam:  Airway: clear, no airway problems anticipated  Heart: RRR, without gallops or rubs  Lungs: clear bilaterally without wheezes, crackles, or rhonchi  Abdomen: soft, nontender, nondistended, bowel sounds present  Mental Status: awake, alert and oriented to person, place and time     Anethesia/Sedation:  MAC anesthesia Propofol 60mg IV  Procedure Details   After informed consent was obtained for the procedure, with all risks and benefits of procedure explained the patient was taken to the endoscopy suite and placed in the left lateral decubitus position. Following sequential administration of sedation as per above, the [id number] gastroscope was inserted into the mouth and advanced under direct vision to second portion of the duodenum. A careful inspection was made as the gastroscope was withdrawn, including a retroflexed view of the proximal stomach; findings and interventions are described below. Findings:    -Severe esophageal dyskinesia with non-propulsed waves and retrograde contractions. Botox injected as 25units administered to to each quadrant as 4 separate injections at 40cm. The gastroesophageal junction is at 40.5cm.  -Minimal distal esophageal erythema to suggest esophagitis. No obstructing esophageal mass or stricture.   Biopsies obtained at gastroesophageal junction and of distal esophagus prior to botox injection.  -Small sliding hiatal hernia from 41-42.5cm  -Normal gastric mucosa  -Minimal duodenitis as noted in past EGD in 2017    Therapies:  biopsy of esophagus; injection of Botoz at gastroesophageal junction as above  Specimens: #1 g-e jxn and distal esophagus  EBL:  None. Complications:   None; patient tolerated the procedure well. Impression:    -Severe esophageal dyskinesia with non-propulsed waves and retrograde contractions. Botox injected as 25units administered to to each quadrant as 4 separate injections at 40cm. The gastroesophageal junction is at 40.5cm.  -Minimal distal esophageal erythema to suggest esophagitis. No obstructing esophageal mass or stricture. Biopsies obtained at gastroesophageal junction and of distal esophagus prior to botox injection.  -Small sliding hiatal hernia from 41-42.5cm  -Normal gastric mucosa  -Minimal duodenitis as noted in past EGD in 2017    Recommendations:  -Continue acid suppression. ,   -Await pathology. ,   -Follow symptoms. ,   -Resume diet as liquid and assess for tolerance    Discharge disposition:  To room after recovery in Endoscopy    Ivone Newell MD

## 2020-07-23 NOTE — PROGRESS NOTES
PROGRESS NOTE    NAME:  León Werner    :   1931   MRN:   [id number]     Date/Time:  2020 5:55 AM  Subjective:   History:  Chart reviewed and patient seen and examined and discussed with his nurse and all events noted. He presented to the office on  after a long absence not having been seen there since 2019. [facility] was accompanied by his granddaughter and was there  in follow-up of his medical problems to include hypertension, diabetes, hyperlipidemia, atherosclerotic coronary vascular disease, cardiomyopathy, Alzheimer's dementia, history of PAF, prior TIA, history of alcoholism and other multiple medical problems.  In addition to his chronic problems he had a significant issue now off weight loss from 134 pounds at his last visit down to 115.7 pounds. [facility] had become very unsteady walking according to his granddaughter which was easily visible on exam in the office. [facility] also had developed a poor appetite and has a hard time keeping food down and thus eating very little.  He actually even has had some difficulty keeping liquids down.      He denies any current chest pain, shortness of breath, palpitations, PND, orthopnea or other cardiorespiratory complaints.  He  has no current  complaints.  As far as the GI complaints there is a poor appetite with the inability to keep some food down as well as liquids as well as some mild upper abdominal discomfort but no evidence of diarrhea.  No fevers, chills or night sweats have been noted.  From a neurologic standpoint he has had progressive decreased memory and progressive unsteadiness on his feet.  There are no current active arthritic complaints.  All of the above history was confirmed by his granddaughter present with him. Merissa Todd is his primary caretaker.   With this history it was felt prudent to admit him to the hospital for further work-up evaluation and treatment and he was sent to the hospital for direct admission however while going through admissions he became lightheaded and unresponsive and was emergently taken to the emergency room for admission. Today he is more alert after hydration but and oriented to name and hospital todayand knows me by name today. He denies Cardiac or respiratory c/o. Swallowing difficulty continues. He is generally weak w/o focal neurologic c/o and as noted memory is better than on admission. .    Medications reviewed:  Current Facility-Administered Medications   Medication Dose Route Frequency    0.9% sodium chloride infusion  75 mL/hr IntraVENous CONTINUOUS    sodium chloride (NS) flush 5-40 mL  5-40 mL IntraVENous Q8H    sodium chloride (NS) flush 5-40 mL  5-40 mL IntraVENous PRN    midazolam (PF) (VERSED) injection 0.25-5 mg  0.25-5 mg IntraVENous Multiple    aspirin chewable tablet 81 mg  81 mg Oral DAILY    carvediloL (COREG) tablet 6.25 mg  6.25 mg Oral BID    donepeziL (ARICEPT) tablet 10 mg  10 mg Oral DAILY    memantine (NAMENDA) tablet 10 mg  10 mg Oral BID    temazepam (RESTORIL) capsule 15 mg  15 mg Oral QHS PRN    sodium chloride (NS) flush 5-40 mL  5-40 mL IntraVENous Q8H    sodium chloride (NS) flush 5-40 mL  5-40 mL IntraVENous PRN    acetaminophen (TYLENOL) tablet 650 mg  650 mg Oral Q4H PRN    dextrose 5 % - 0.45% NaCl infusion  125 mL/hr IntraVENous CONTINUOUS    pantoprazole (PROTONIX) 40 mg in 0.9% sodium chloride 10 mL injection  40 mg IntraVENous Q12H        Objective:   Vitals:  Visit Vitals  /68   Pulse 66   Temp 97.2 °F (36.2 °C)   Resp 12   Ht 5' 9\" (1.753 m)   Wt 114 lb 10.2 oz (52 kg)   SpO2 100%   BMI 16.93 kg/m²      O2 Device: Room air Temp (24hrs), Av.7 °F (36.5 °C), Min:97.2 °F (36.2 °C), Max:98.4 °F (36.9 °C)      Last 24hr Input/Output:    Intake/Output Summary (Last 24 hours) at 2020 1305  Last data filed at 2020 1133  Gross per 24 hour   Intake 400 ml   Output 900 ml   Net -500 ml        PHYSICAL EXAM:  General:     Alert, cooperative, no distress, appears stated age. Head:    Normocephalic, without obvious abnormality, atraumatic. Eyes:    Conjunctivae/corneas clear. PERRLA  Nose:   Nares normal. No drainage or sinus tenderness. Throat:     Lips, mucosa, and tongue normal.  No Thrush  Neck:   Supple, symmetrical,  no adenopathy, thyroid: non tender     no carotid bruit and no JVD. Back:     Symmetric,  No CVA tenderness. Lungs:    Clear to auscultation bilaterally. No Wheezing or Rhonchi. No rales. Heart:    Regular rate and rhythm,  no murmur, rub or gallop. Abdomen:    Soft, non-tender. Not distended. Bowel sounds normal. No masses  Extremities:  Extremities normal, atraumatic, No cyanosis. No edema. No clubbing  Lymph nodes:  Cervical, supraclavicular normal.  Neurologic:  General decreased strength, Alert and oriented X 1. He does know me by name today  Skin:                 No rash      Lab Data Reviewed:    No results found for this or any previous visit (from the past 24 hour(s)). Assessment/Plan:     Principal Problem:    Syncope (1/9/2019)    Active Problems:    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)      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, without long-term current use of insulin ([facility] (7/18/2017)      SSS (sick sinus syndrome) ([facility] (6/2/2015)      Cardiomyopathy ([facility] (11/7/2019)      Alcoholism ([address] (4/23/2018)      Weight loss (7/20/2020)      Unsteady gait (7/20/2020)      Dehydration (7/20/2020)      Acute renal failure superimposed on stage 3 chronic kidney disease ([address] (7/20/2020)      Severe protein-calorie malnutrition ([address] (7/21/2020)       ___________________________________________________  PLAN:  1. Continue  telemetry bed with monitoring  2. IV hydration D5 half-normal saline  3. Hold Lasix and all current hypertensive medications except continue Coreg with his cardiomyopathy  4.   Continue IV Protonix  5.  GI consult and D/W Dr. Lesley Ibarra and plans for Barium Swallow to check motility which revealed ? Stricture so now EGD  6. Continue current Alzheimer's medications as ordered although may not be able to resume PO until after EGD  7. According to his granddaughter who is his caretaker has not drank alcohol in 3 months but will watch closely for any signs of withdrawal with known alcoholism and prior withdrawal  8. Follow renal function, 51/2.36 on admission to 38/1.48 now  9. Replete potassium currently 3.3  10. Checked magnesium level - normal  11. Follow blood sugar and will cover with sliding scale if becomes elevated      Addendum:  Phone call with his granddaughter (POA) 7/22 AM and I discussed the Ba Swallow scheduled for today and further plans pending results of this, I also discussed the rude behavior of his daughter (mother of the granddaughter whom I spoke with) and explained that this it totally unacceptable!    This was also D/W his nurse Eliza Hutton          If need to contact me use hospital  [phone], DO NOT USE PERFECT SERVE    ___________________________________________________    Attending Physician: Val Wiggins MD

## 2020-07-23 NOTE — PROGRESS NOTES
Problem: Pressure Injury - Risk of  Goal: *Prevention of pressure injury  Description: Document Newton Scale and appropriate interventions in the flowsheet. Outcome: Progressing Towards Goal  Note: Pressure Injury Interventions:  Sensory Interventions: Avoid rigorous massage over bony prominences, Keep linens dry and wrinkle-free, Turn and reposition approx. every two hours (pillows and wedges if needed)    Moisture Interventions: Absorbent underpads, Apply protective barrier, creams and emollients, Maintain skin hydration (lotion/cream)    Activity Interventions: Increase time out of bed, PT/OT evaluation    Mobility Interventions: PT/OT evaluation    Nutrition Interventions: Offer support with meals,snacks and hydration    Friction and Shear Interventions: Apply protective barrier, creams and emollients, Lift team/patient mobility team                Problem: Patient Education: Go to Patient Education Activity  Goal: Patient/Family Education  Outcome: Progressing Towards Goal     Problem: Falls - Risk of  Goal: *Absence of Falls  Description: Document [facility] Fall Risk and appropriate interventions in the flowsheet.   Outcome: Progressing Towards Goal  Note: Fall Risk Interventions:  Mobility Interventions: Bed/chair exit alarm, Patient to call before getting OOB, PT Consult for mobility concerns, PT Consult for assist device competence    Mentation Interventions: Adequate sleep, hydration, pain control, Bed/chair exit alarm, Door open when patient unattended, More frequent rounding, Reorient patient    Medication Interventions: Bed/chair exit alarm, Patient to call before getting OOB    Elimination Interventions: Bed/chair exit alarm, Call light in reach              Problem: Patient Education: Go to Patient Education Activity  Goal: Patient/Family Education  Outcome: Progressing Towards Goal

## 2020-07-23 NOTE — PROGRESS NOTES
Patient is currently off the floor for a procedure. Will defer therapy at this time and continue to follow.     Marni Austin MS, OTR/L, CSRS

## 2020-07-23 NOTE — PROGRESS NOTES
Problem: Pressure Injury - Risk of  Goal: *Prevention of pressure injury  Description: Document Newton Scale and appropriate interventions in the flowsheet. Note: Pressure Injury Interventions:  Sensory Interventions: Assess changes in LOC, Avoid rigorous massage over bony prominences, Keep linens dry and wrinkle-free, Maintain/enhance activity level, Minimize linen layers, Monitor skin under medical devices    Moisture Interventions: Absorbent underpads, Apply protective barrier, creams and emollients, Maintain skin hydration (lotion/cream), Minimize layers, Moisture barrier    Activity Interventions: Increase time out of bed    Mobility Interventions: HOB 30 degrees or less, PT/OT evaluation    Nutrition Interventions: Offer support with meals,snacks and hydration    Friction and Shear Interventions: Apply protective barrier, creams and emollients, HOB 30 degrees or less, Minimize layers                Problem: Falls - Risk of  Goal: *Absence of Falls  Description: Document [facility] Fall Risk and appropriate interventions in the flowsheet.   7/23/2020 1913 by Cecilia Helton  Note: Fall Risk Interventions:  Mobility Interventions: Bed/chair exit alarm, Patient to call before getting OOB, Utilize walker, cane, or other assistive device    Mentation Interventions: Adequate sleep, hydration, pain control, Bed/chair exit alarm, Door open when patient unattended, Increase mobility, More frequent rounding, Toileting rounds, Update white board    Medication Interventions: Bed/chair exit alarm, Patient to call before getting OOB, Teach patient to arise slowly, Utilize gait belt for transfers/ambulation    Elimination Interventions: Bed/chair exit alarm, Call light in reach, Patient to call for help with toileting needs, Toilet paper/wipes in reach, Toileting schedule/hourly rounds, Urinal in reach           7/23/2020 1912 by Cecilia Helton  Note: Fall Risk Interventions:  Mobility Interventions: Bed/chair exit alarm, Patient to call before getting OOB, Utilize walker, cane, or other assistive device    Mentation Interventions: Adequate sleep, hydration, pain control, Bed/chair exit alarm, Door open when patient unattended, Increase mobility, More frequent rounding, Toileting rounds, Update white board    Medication Interventions: Bed/chair exit alarm, Patient to call before getting OOB, Teach patient to arise slowly, Utilize gait belt for transfers/ambulation    Elimination Interventions: Bed/chair exit alarm, Call light in reach, Patient to call for help with toileting needs, Toilet paper/wipes in reach, Toileting schedule/hourly rounds, Urinal in reach

## 2020-07-23 NOTE — PROGRESS NOTES
Physical Therapy  Patient is currently off the floor for a procedure. Will defer therapy at this time and continue to follow.   Thank you,  Jalil Limon, PT

## 2020-07-23 NOTE — ROUTINE PROCESS
.. Endoscope was pre-cleaned at bedside immediately following procedure by ASHOK Cary Anesthesia reports 60mg Propofol, 50mg Lidocaine and 400mL NS given during procedure. Received report from anesthesia staff on vital signs and status of patient.

## 2020-07-23 NOTE — PROGRESS NOTES
Patient back on unit and resting peacefully, watching tv. Vital signs are stable. Will continue to monitor and assist with feeding when ready.  Call bell in reach, bed in lowest position and bed alarm on

## 2020-07-23 NOTE — ROUTINE PROCESS
Mane Farfan   1/21/1931  [id number]    Situation:  Verbal report received from: Alvin Hayes RN  Procedure: Procedure(s):  ESOPHAGOGASTRODUODENOSCOPY (EGD)  ESOPHAGOGASTRODUODENAL (EGD) BIOPSY  INJECTION    Background:    Preoperative diagnosis: abnormal BA swallow  Postoperative diagnosis: Duodenitis, esophagitis    :  Dr. Marilee Wiley  Assistant(s): Endoscopy Technician-1: Sonali Post  Endoscopy RN-1: Nish Casas RN    Specimens:   ID Type Source Tests Collected by Time Destination   1 : GE Junction and distal esophagus bx Preservative   Stephanie Turner MD 7/23/2020 1113 Pathology     H. Pylori  no    Assessment:  Intra-procedure medications       Anesthesia gave intra-procedure sedation and medications, see anesthesia flow sheet     Intravenous fluids: NS@ KVO     Vital signs stable     Abdominal assessment: round and soft     Recommendation:  .   Return to floor   Family or Friend   Permission to share finding with family or friend

## 2020-07-23 NOTE — ANESTHESIA POSTPROCEDURE EVALUATION
Procedure(s):  ESOPHAGOGASTRODUODENOSCOPY (EGD)  ESOPHAGOGASTRODUODENAL (EGD) BIOPSY  INJECTION. total IV anesthesia    Anesthesia Post Evaluation        Patient location during evaluation: PACU  Note status: Adequate. Level of consciousness: responsive to verbal stimuli and sleepy but conscious  Pain management: satisfactory to patient  Airway patency: patent  Anesthetic complications: no  Cardiovascular status: acceptable  Respiratory status: acceptable  Hydration status: acceptable  Comments: +Post-Anesthesia Evaluation and Assessment    Patient: Gertrude Franklin MRN: [id number]  SSN: [id number]   YOB: 1931  Age: 80 y.o. Sex: male      Cardiovascular Function/Vital Signs    /68   Pulse 66   Temp 36.2 °C (97.2 °F)   Resp 12   Ht 5' 9\" (1.753 m)   Wt 52 kg (114 lb 10.2 oz)   SpO2 100%   BMI 16.93 kg/m²     Patient is status post Procedure(s):  ESOPHAGOGASTRODUODENOSCOPY (EGD)  ESOPHAGOGASTRODUODENAL (EGD) BIOPSY  INJECTION. Nausea/Vomiting: Controlled. Postoperative hydration reviewed and adequate. Pain:  Pain Scale 1: Numeric (0 - 10) (07/23/20 1218)  Pain Intensity 1: 0 (07/23/20 1218)   Managed. Neurological Status: At baseline. Mental Status and Level of Consciousness: Arousable. Pulmonary Status:   O2 Device: Room air (07/23/20 1218)   Adequate oxygenation and airway patent. Complications related to anesthesia: None    Post-anesthesia assessment completed. No concerns.     Signed By: Kapil Willard DO    7/23/2020  Post anesthesia nausea and vomiting:  controlled      INITIAL Post-op Vital signs:   Vitals Value Taken Time   /68 7/23/2020 12:18 PM   Temp 36.2 °C (97.2 °F) 7/23/2020 12:18 PM   Pulse 66 7/23/2020 12:18 PM   Resp 12 7/23/2020 12:18 PM   SpO2 100 % 7/23/2020 12:18 PM

## 2020-07-23 NOTE — PROGRESS NOTES
.. TRANSFER - OUT REPORT:    Verbal report given to Mitchel Coelho (name) on Benito Farfan Sr  being transferred to [facility](unit) for routine progression of care       Report consisted of patients Situation, Background, Assessment and   Recommendations(SBAR). Information from the following report(s) MAR, [facility] and Recent Results was reviewed with the receiving nurse. Lines:   Peripheral IV 07/21/20 (Active)       Peripheral IV 07/22/20 Anterior; Left Forearm (Active)   Site Assessment Clean, dry, & intact 07/23/20 0710   Phlebitis Assessment 0 07/23/20 0710   Infiltration Assessment 0 07/23/20 0710   Dressing Status Clean, dry, & intact 07/23/20 0710   Dressing Type Tape;Transparent 07/23/20 0710   Hub Color/Line Status Blue 07/23/20 0710   Alcohol Cap Used Yes 07/23/20 0710        Opportunity for questions and clarification was provided.       Patient transported with:   Registered Nurse

## 2020-07-23 NOTE — PROGRESS NOTES
Bedside [facility] by RADHA Sequeira (offgoing nurse). Report included the following information SBAR and Kardex.     Zone Phone:   [phone]        Significant changes during shift:  Completed EGD.  Full liquid diet           Patient Information     Toi Farfan Sr  80 y.o.  7/20/2020  4:31 PM by Esme Clay MD. Felipe Griffin was admitted from Home     Problem List          Patient Active Problem List     Diagnosis Date Noted    Severe protein-calorie malnutrition ([address] 07/21/2020    Weight loss 07/20/2020    Unsteady gait 07/20/2020    Dehydration 07/20/2020    Acute renal failure superimposed on stage 3 chronic kidney disease ([address] 07/20/2020    Paroxysmal VT ([address] 11/07/2019    Cardiomyopathy ([address] 11/07/2019    Alcohol withdrawal syndrome, with delirium ([address] 11/01/2019    A-fib ([address] 10/27/2019    Syncope 01/09/2019    Primary insomnia 01/09/2019    Mild depression ([address] 06/12/2018    Acute bilateral low back pain without sciatica 05/30/2018    Alcoholism ([address] 94/07/0947    Metabolic encephalopathy 22/09/2483    TIA (transient ischemic attack) 04/12/2018    Medicare annual wellness visit, subsequent 09/01/2017    Diverticulosis 07/18/2017    Gastritis and duodenitis 07/18/2017    Internal hemorrhoids 07/18/2017    Hypertension with renal disease 07/18/2017    Mixed hyperlipidemia 07/18/2017    GI bleed 07/18/2017    Primary osteoarthritis involving multiple joints 07/18/2017    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, without long-term current use of insulin ([address] 07/18/2017    CKD (chronic kidney disease), stage II 07/18/2017    Back pain 07/18/2017    Anemia 07/18/2017    Alzheimer disease ([address] 07/18/2017    SSS (sick sinus syndrome) ([address] 06/02/2015    S/P cardiac pacemaker procedure 05/04/2015    S/P ablation of accessory bypass tract 05/04/2015    SVT (supraventricular tachycardia) ([facility])--s/p RFA 04/28/2015    Near syncope 03/11/2015    ASCVD (arteriosclerotic cardiovascular disease) 07/11/2014    Hypokalemia 07/11/2014    Right inguinal hernia 06/12/2014          Past Medical History:   Diagnosis Date    Abdominal pain 7/18/2017    Alzheimer disease ([facility] 7/18/2017    Anemia 7/18/2017    Arthritis      Back pain 7/18/2017    Bradycardia 7/18/2017    CAD (coronary artery disease)       hx of MI    CKD (chronic kidney disease), stage II 7/18/2017    constipation      Depression 7/18/2017    Diabetes mellitus ([facility] 7/18/2017    Diverticulosis 7/18/2017    DJD (degenerative joint disease) 7/18/2017    Encounter for long-term (current) use of other medications 7/18/2017    Fatigue      Gastritis and duodenitis 7/18/2017    GERD (gastroesophageal reflux disease)      GI bleed 7/18/2017    Hearing loss      Hyperlipidemia 7/18/2017    Hypertension      Hypertension with renal disease 7/18/2017    Ill-defined condition       Dementia    Internal hemorrhoids 7/18/2017    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    S/P cardiac pacemaker procedure 5/4/2015     5/4/15 Medtronic dual chamber pacemaker implant     Weight loss       lost over 40 pounds last year- has no appetite           Core Measures:     CVA: No No  CHF:No No  PNA:No No     Activity Status:     OOB to Chair No  Ambulated this shift Yes   Bed Rest No        DVT prophylaxis:     DVT prophylaxis Med- Yes  DVT prophylaxis SCD or LISSY- No         Patient Safety:     Falls Score Total Score: 4  Safety Level_______  Bed Alarm On? Yes  Sitter?  No     Plan for upcoming shift: safety     Discharge Plan: Yes home with [facility]     Active Consults:

## 2020-07-23 NOTE — PROGRESS NOTES
* No post-op diagnosis entered *  * No post-op diagnosis entered *  Bedside shift change report given to [facility] (oncoming nurse) by Colin RN (offgoing nurse). Report included the following information SBAR and Kardex.     Zone Phone:   [phone]      Significant changes during shift:  NPO since midnight         Patient Information    Benito Farfan Sr  80 y.o.  7/20/2020  4:31 PM by Mary Lou Cardoza MD. Mayela Farfan Sr was admitted from Home    Problem List    Patient Active Problem List    Diagnosis Date Noted    Severe protein-calorie malnutrition ([address] 07/21/2020    Weight loss 07/20/2020    Unsteady gait 07/20/2020    Dehydration 07/20/2020    Acute renal failure superimposed on stage 3 chronic kidney disease ([address] 07/20/2020    Paroxysmal VT ([address] 11/07/2019    Cardiomyopathy ([address] 11/07/2019    Alcohol withdrawal syndrome, with delirium ([address] 11/01/2019    A-fib ([address] 10/27/2019    Syncope 01/09/2019    Primary insomnia 01/09/2019    Mild depression ([address] 06/12/2018    Acute bilateral low back pain without sciatica 05/30/2018    Alcoholism ([address] 77/72/5182    Metabolic encephalopathy 76/37/3584    TIA (transient ischemic attack) 04/12/2018    Medicare annual wellness visit, subsequent 09/01/2017    Diverticulosis 07/18/2017    Gastritis and duodenitis 07/18/2017    Internal hemorrhoids 07/18/2017    Hypertension with renal disease 07/18/2017    Mixed hyperlipidemia 07/18/2017    GI bleed 07/18/2017    Primary osteoarthritis involving multiple joints 07/18/2017    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, without long-term current use of insulin ([address] 07/18/2017    CKD (chronic kidney disease), stage II 07/18/2017    Back pain 07/18/2017    Anemia 07/18/2017    Alzheimer disease ([address] 07/18/2017    SSS (sick sinus syndrome) ([address] 06/02/2015    S/P cardiac pacemaker procedure 05/04/2015    S/P ablation of accessory bypass tract 05/04/2015  SVT (supraventricular tachycardia) ([facility])--s/p RFA 04/28/2015    Near syncope 03/11/2015    ASCVD (arteriosclerotic cardiovascular disease) 07/11/2014    Hypokalemia 07/11/2014    Right inguinal hernia 06/12/2014     Past Medical History:   Diagnosis Date    Abdominal pain 7/18/2017    Alzheimer disease ([facility] 7/18/2017    Anemia 7/18/2017    Arthritis     Back pain 7/18/2017    Bradycardia 7/18/2017    CAD (coronary artery disease)     hx of MI    CKD (chronic kidney disease), stage II 7/18/2017    constipation     Depression 7/18/2017    Diabetes mellitus ([facility] 7/18/2017    Diverticulosis 7/18/2017    DJD (degenerative joint disease) 7/18/2017    Encounter for long-term (current) use of other medications 7/18/2017    Fatigue     Gastritis and duodenitis 7/18/2017    GERD (gastroesophageal reflux disease)     GI bleed 7/18/2017    Hearing loss     Hyperlipidemia 7/18/2017    Hypertension     Hypertension with renal disease 7/18/2017    Ill-defined condition     Dementia    Internal hemorrhoids 7/18/2017    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    S/P cardiac pacemaker procedure 5/4/2015    5/4/15 Medtronic dual chamber pacemaker implant     Weight loss     lost over 40 pounds last year- has no appetite         Core Measures:    CVA: No No  CHF:No No  PNA:No No    Activity Status:    OOB to Chair No  Ambulated this shift No   Bed Rest No      DVT prophylaxis:    DVT prophylaxis Med- Yes  DVT prophylaxis SCD or LISSY- No       Patient Safety:    Falls Score Total Score: 4  Safety Level_______  Bed Alarm On? Yes  Sitter?  No    Plan for upcoming shift: EGD, PT/OT        Discharge Plan: Yes home with [facility]    Active Consults:  IP CONSULT TO GASTROENTEROLOGY

## 2020-07-24 LAB
ALBUMIN SERPL-MCNC: 2.3 G/DL (ref 3.5–5)
ALBUMIN/GLOB SERPL: 0.7 {RATIO} (ref 1.1–2.2)
ALP SERPL-CCNC: 72 U/L (ref 45–117)
ALT SERPL-CCNC: 20 U/L (ref 12–78)
ANION GAP SERPL CALC-SCNC: 6 MMOL/L (ref 5–15)
AST SERPL-CCNC: 24 U/L (ref 15–37)
BILIRUB SERPL-MCNC: 0.4 MG/DL (ref 0.2–1)
BUN SERPL-MCNC: 22 MG/DL (ref 6–20)
BUN/CREAT SERPL: 20 (ref 12–20)
CALCIUM SERPL-MCNC: 8.2 MG/DL (ref 8.5–10.1)
CHLORIDE SERPL-SCNC: 107 MMOL/L (ref 97–108)
CO2 SERPL-SCNC: 29 MMOL/L (ref 21–32)
CREAT SERPL-MCNC: 1.08 MG/DL (ref 0.7–1.3)
ERYTHROCYTE [DISTWIDTH] IN BLOOD BY AUTOMATED COUNT: 14.3 % (ref 11.5–14.5)
GLOBULIN SER CALC-MCNC: 3.2 G/DL (ref 2–4)
GLUCOSE SERPL-MCNC: 95 MG/DL (ref 65–100)
HCT VFR BLD AUTO: 36.2 % (ref 36.6–50.3)
HGB BLD-MCNC: 11.2 G/DL (ref 12.1–17)
MCH RBC QN AUTO: 27.3 PG (ref 26–34)
MCHC RBC AUTO-ENTMCNC: 30.9 G/DL (ref 30–36.5)
MCV RBC AUTO: 88.1 FL (ref 80–99)
NRBC # BLD: 0 K/UL (ref 0–0.01)
NRBC BLD-RTO: 0 PER 100 WBC
PLATELET # BLD AUTO: 219 K/UL (ref 150–400)
PMV BLD AUTO: 11.1 FL (ref 8.9–12.9)
POTASSIUM SERPL-SCNC: 3.6 MMOL/L (ref 3.5–5.1)
PROT SERPL-MCNC: 5.5 G/DL (ref 6.4–8.2)
RBC # BLD AUTO: 4.11 M/UL (ref 4.1–5.7)
SODIUM SERPL-SCNC: 142 MMOL/L (ref 136–145)
WBC # BLD AUTO: 5.8 K/UL (ref 4.1–11.1)

## 2020-07-24 PROCEDURE — 74011250637 HC RX REV CODE- 250/637: Performed by: INTERNAL MEDICINE

## 2020-07-24 PROCEDURE — 97530 THERAPEUTIC ACTIVITIES: CPT | Performed by: PHYSICAL THERAPIST

## 2020-07-24 PROCEDURE — 77030018846 HC SOL IRR STRL H20 ICUM -A

## 2020-07-24 PROCEDURE — 77030041076 HC DRSG AG OPTICELL MDII -A

## 2020-07-24 PROCEDURE — 97116 GAIT TRAINING THERAPY: CPT | Performed by: PHYSICAL THERAPIST

## 2020-07-24 PROCEDURE — 85027 COMPLETE CBC AUTOMATED: CPT

## 2020-07-24 PROCEDURE — 36415 COLL VENOUS BLD VENIPUNCTURE: CPT

## 2020-07-24 PROCEDURE — 74011000250 HC RX REV CODE- 250: Performed by: INTERNAL MEDICINE

## 2020-07-24 PROCEDURE — 80053 COMPREHEN METABOLIC PANEL: CPT

## 2020-07-24 PROCEDURE — 94760 N-INVAS EAR/PLS OXIMETRY 1: CPT

## 2020-07-24 PROCEDURE — 74011000258 HC RX REV CODE- 258: Performed by: INTERNAL MEDICINE

## 2020-07-24 PROCEDURE — 74011250636 HC RX REV CODE- 250/636: Performed by: INTERNAL MEDICINE

## 2020-07-24 PROCEDURE — 65660000000 HC RM CCU STEPDOWN

## 2020-07-24 PROCEDURE — 77030040393 HC DRSG OPTIFOAM GENT MDII -B

## 2020-07-24 PROCEDURE — C9113 INJ PANTOPRAZOLE SODIUM, VIA: HCPCS | Performed by: INTERNAL MEDICINE

## 2020-07-24 PROCEDURE — 97535 SELF CARE MNGMENT TRAINING: CPT

## 2020-07-24 RX ADMIN — Medication 10 ML: at 21:35

## 2020-07-24 RX ADMIN — DEXTROSE MONOHYDRATE AND SODIUM CHLORIDE 125 ML/HR: 5; .45 INJECTION, SOLUTION INTRAVENOUS at 07:48

## 2020-07-24 RX ADMIN — DONEPEZIL HYDROCHLORIDE 10 MG: 5 TABLET, FILM COATED ORAL at 08:30

## 2020-07-24 RX ADMIN — ASPIRIN 81 MG CHEWABLE TABLET 81 MG: 81 TABLET CHEWABLE at 08:29

## 2020-07-24 RX ADMIN — SODIUM CHLORIDE 40 MG: 9 INJECTION, SOLUTION INTRAMUSCULAR; INTRAVENOUS; SUBCUTANEOUS at 08:30

## 2020-07-24 RX ADMIN — Medication 5 ML: at 14:00

## 2020-07-24 RX ADMIN — CARVEDILOL 6.25 MG: 6.25 TABLET, FILM COATED ORAL at 08:29

## 2020-07-24 RX ADMIN — MEMANTINE HYDROCHLORIDE 10 MG: 10 TABLET ORAL at 17:55

## 2020-07-24 RX ADMIN — MEMANTINE HYDROCHLORIDE 10 MG: 10 TABLET ORAL at 08:30

## 2020-07-24 RX ADMIN — SODIUM CHLORIDE 40 MG: 9 INJECTION, SOLUTION INTRAMUSCULAR; INTRAVENOUS; SUBCUTANEOUS at 21:35

## 2020-07-24 RX ADMIN — CARVEDILOL 6.25 MG: 6.25 TABLET, FILM COATED ORAL at 17:55

## 2020-07-24 NOTE — PROGRESS NOTES
Problem: Self Care Deficits Care Plan (Adult)  Goal: *Acute Goals and Plan of Care (Insert Text)  Description:   FUNCTIONAL STATUS PRIOR TO ADMISSION: Patient was independent and active without use of DME. Reports no available equipment at home. HOME SUPPORT: The patient lived with granddaughter to provide assistance. Per patient, he was able to complete BADLs independently but depended on granddaughter for IADLs. Patient states granddaughter works during the day (?). Occupational Therapy Goals  Initiated 7/22/2020  1. Patient will perform standing grooming with SBA using RW prn within 7 day(s). 2.  Patient will collect ADL items from various surface levels with CGA for balance and using RW prn within 7 day(s). 4.  Patient will perform toilet transfers with CGA using RW prn within 7 day(s). 5.  Patient will perform all aspects of toileting with CGA using RW prn for balance within 7 day(s). 6.  Patient will participate in upper extremity therapeutic exercise/activities with supervision/set-up for 10 minutes within 7 day(s). Outcome: Progressing Towards Goal    OCCUPATIONAL THERAPY TREATMENT  Patient: Juany Hernadez Sr (80 y.o. male)  Date: 7/24/2020  Diagnosis: Syncope [R55]   Syncope  Procedure(s) (LRB):  ESOPHAGOGASTRODUODENOSCOPY (EGD) (N/A)  ESOPHAGOGASTRODUODENAL (EGD) BIOPSY (N/A)  INJECTION (N/A) 1 Day Post-Op  Precautions:    Chart, occupational therapy assessment, plan of care, and goals were reviewed. ASSESSMENT  Patient continues with skilled OT services and is progressing towards goals. Pt remains pleasantly confused throughout OT treatment, demonstrating progressive functional mobility/balance as evidenced by CGA level RW bathroom mobility and toilet transfer and SBA standing oral/hand hygiene, with no LOB, and pt continuing to benefit from cues for safe transfer technique, with emphasis on fully positioning self in-front of desired seated location prior to reaching back to sit.   Pt making good progress and continues to benefit from skilled OT to address functional deficits in an overall effort at maximizing pt's highest level of safe functional independence prior to discharge from acute OT services. Current Level of Function Impacting Discharge (ADLs): Min A-CGA    Other factors to consider for discharge: confusion         PLAN :  Patient continues to benefit from skilled intervention to address the above impairments. Continue treatment per established plan of care. to address goals. Recommend with staff: OOB meals, active ADL engagement, assist x1 to/from bathroom    Recommend next OT session: POC progression    Recommendation for discharge: (in order for the patient to meet his/her long term goals)  Occupational therapy at least 2 days/week in the home AND ensure assist and/or supervision for safety with ADL/IADL completion    This discharge recommendation:  Has not yet been discussed the attending provider and/or case management    IF patient discharges home will need the following DME: TBD       SUBJECTIVE:   Patient stated I can brush the few teeth I have.     OBJECTIVE DATA SUMMARY:   Cognitive/Behavioral Status:  Neurologic State: Alert;Confused  Orientation Level: Oriented to person;Oriented to place  Cognition: Follows commands  Perception: Appears intact  Perseveration: No perseveration noted  Safety/Judgement: Decreased awareness of need for assistance;Decreased awareness of need for safety;Decreased insight into deficits    Functional Mobility and Transfers for ADLs:  Bed Mobility:  Rolling: Supervision  Supine to Sit: Supervision  Sit to Supine: Supervision  Scooting: Supervision    Transfers:  Sit to Stand: Contact guard assistance  Functional Transfers  Bathroom Mobility: Contact guard assistance  Toilet Transfer : Contact guard assistance    Pt educated on safe transfer techniques, with specific emphasis on proper hand placement to push up from seated surface rather than attempt to pull self up, fully positioning self in-front of desired seated location, feeling chair on back of legs and reaching back with 1-2 UE to slowly lower self to seated position. Balance:  Sitting: Intact  Standing: Impaired  Standing - Static: Good;Constant support  Standing - Dynamic : Good;Constant support    ADL Intervention:    Grooming  Grooming Assistance: Stand-by assistance  Position Performed: Standing  Washing Hands: Stand-by assistance  Brushing Teeth: Stand-by assistance    Toileting  Toileting Assistance: Contact guard assistance  Bladder Hygiene: Modified independent  Bowel Hygiene: Modified indpendent  Clothing Management: Stand-by assistance  Cues: Verbal cues provided  Adaptive Equipment: Walker    Cognitive Retraining  Safety/Judgement: Decreased awareness of need for assistance;Decreased awareness of need for safety;Decreased insight into deficits    Pain:  No c/o pain    Activity Tolerance:   Good, Fair, and requires rest breaks  Please refer to the flowsheet for vital signs taken during this treatment. After treatment patient left in no apparent distress:   Supine in bed, Call bell within reach, Bed / chair alarm activated, and Side rails x 3    COMMUNICATION/COLLABORATION:   The patients plan of care was discussed with: Physical therapist and Registered nurse.      Margaret Ribera, OT  Time Calculation: 26 mins

## 2020-07-24 NOTE — PROGRESS NOTES
Problem: Pressure Injury - Risk of  Goal: *Prevention of pressure injury  Description: Document Newton Scale and appropriate interventions in the flowsheet. Note: Pressure Injury Interventions:  Sensory Interventions: Assess changes in LOC, Avoid rigorous massage over bony prominences, Keep linens dry and wrinkle-free, Maintain/enhance activity level, Minimize linen layers, Monitor skin under medical devices    Moisture Interventions: Absorbent underpads, Apply protective barrier, creams and emollients, Maintain skin hydration (lotion/cream), Minimize layers, Moisture barrier    Activity Interventions: Increase time out of bed    Mobility Interventions: HOB 30 degrees or less, PT/OT evaluation    Nutrition Interventions: Document food/fluid/supplement intake    Friction and Shear Interventions: Apply protective barrier, creams and emollients, HOB 30 degrees or less, Minimize layers                Problem: Falls - Risk of  Goal: *Absence of Falls  Description: Document Louis Fall Risk and appropriate interventions in the flowsheet.   Note: Fall Risk Interventions:  Mobility Interventions: Bed/chair exit alarm, Patient to call before getting OOB, Utilize walker, cane, or other assistive device    Mentation Interventions: Adequate sleep, hydration, pain control, Bed/chair exit alarm, Door open when patient unattended, More frequent rounding, Toileting rounds, Update white board    Medication Interventions: Bed/chair exit alarm, Patient to call before getting OOB, Teach patient to arise slowly    Elimination Interventions: Bed/chair exit alarm, Call light in reach, Patient to call for help with toileting needs, Toilet paper/wipes in reach, Toileting schedule/hourly rounds, Urinal in reach

## 2020-07-24 NOTE — PROGRESS NOTES
PROGRESS NOTE    NAME:  Juany Hernadez    :   1931   MRN:   [id number]     Date/Time:  2020 5:54 AM  Subjective:   History:  Chart reviewed and patient seen and examined and discussed with his nurses and all events noted. He presented to the office on  after a long absence not having been seen there since 2019. Roula Gallardo was accompanied by his granddaughter and was there  in follow-up of his medical problems to include hypertension, diabetes, hyperlipidemia, atherosclerotic coronary vascular disease, cardiomyopathy, Alzheimer's dementia, history of PAF, prior TIA, history of alcoholism and other multiple medical problems.  In addition to his chronic problems he had a significant issue now off weight loss from 134 pounds at his last visit down to 115.7 pounds. Roula Gallardo had become very unsteady walking according to his granddaughter which was easily visible on exam in the office. Roula Gallardo also had developed a poor appetite and has a hard time keeping food down and thus eating very little.  He actually even had had some difficulty keeping liquids down.      He denies any current chest pain, shortness of breath, palpitations, PND, orthopnea or other cardiorespiratory complaints.  He  has no current  complaints.  As far as the GI complaints there is a poor appetite with the inability to keep some food down as well as liquids as well as some mild upper abdominal discomfort but no evidence of diarrhea.  No fevers, chills or night sweats have been noted.  From a neurologic standpoint he has had progressive decreased memory and progressive unsteadiness on his feet.  There are no current active arthritic complaints.  All of the above history was confirmed by his granddaughter present with him. Evelyn Falk is his primary caretaker.   With this history it was felt prudent to admit him to the hospital for further work-up evaluation and treatment and he was sent to the hospital for direct admission however while going through admissions he became lightheaded and unresponsive and was emergently taken to the emergency room for admission. Today he is more alert after hydration and and oriented to name and hospital [facility]) today and knows me by name today. He denies Cardiac or respiratory c/o. Swallowing difficulty improved and tolerated full liquids after EGD with Botox yesterday. He is generally weak w/o focal neurologic c/o and as noted memory is better than on admission. .    Medications reviewed:  Current Facility-Administered Medications   Medication Dose Route Frequency    sodium chloride (NS) flush 5-40 mL  5-40 mL IntraVENous Q8H    sodium chloride (NS) flush 5-40 mL  5-40 mL IntraVENous PRN    aspirin chewable tablet 81 mg  81 mg Oral DAILY    carvediloL (COREG) tablet 6.25 mg  6.25 mg Oral BID    donepeziL (ARICEPT) tablet 10 mg  10 mg Oral DAILY    memantine (NAMENDA) tablet 10 mg  10 mg Oral BID    temazepam (RESTORIL) capsule 15 mg  15 mg Oral QHS PRN    sodium chloride (NS) flush 5-40 mL  5-40 mL IntraVENous Q8H    sodium chloride (NS) flush 5-40 mL  5-40 mL IntraVENous PRN    acetaminophen (TYLENOL) tablet 650 mg  650 mg Oral Q4H PRN    dextrose 5 % - 0.45% NaCl infusion  125 mL/hr IntraVENous CONTINUOUS    pantoprazole (PROTONIX) 40 mg in 0.9% sodium chloride 10 mL injection  40 mg IntraVENous Q12H        Objective:   Vitals:  Visit Vitals  /60 (BP 1 Location: Left arm, BP Patient Position: At rest)   Pulse 67   Temp 98.6 °F (37 °C)   Resp 16   Ht 5' 9\" (1.753 m)   Wt 114 lb 10.2 oz (52 kg)   SpO2 100%   BMI 16.93 kg/m²      O2 Device: Room air Temp (24hrs), Av.9 °F (36.6 °C), Min:97.2 °F (36.2 °C), Max:98.8 °F (37.1 °C)      Last 24hr Input/Output:    Intake/Output Summary (Last 24 hours) at 2020 0753  Last data filed at 2020 1844  Gross per 24 hour   Intake 2734 ml   Output 390 ml   Net 2344 ml        PHYSICAL EXAM:  General:     Alert, cooperative, no distress, appears stated age. Head:    Normocephalic, without obvious abnormality, atraumatic. Eyes:    Conjunctivae/corneas clear. PERRLA  Nose:   Nares normal. No drainage or sinus tenderness. Throat:     Lips, mucosa, and tongue normal.  No Thrush  Neck:   Supple, symmetrical,  no adenopathy, thyroid: non tender     no carotid bruit and no JVD. Back:     Symmetric,  No CVA tenderness. Lungs:    Clear to auscultation bilaterally. No Wheezing or Rhonchi. No rales. Heart:    Regular rate and rhythm,  no murmur, rub or gallop. Abdomen:    Soft, non-tender. Not distended. Bowel sounds normal. No masses  Extremities:  Extremities normal, atraumatic, No cyanosis. No edema. No clubbing  Lymph nodes:  Cervical, supraclavicular normal.  Neurologic:  General decreased strength, Alert and oriented X 1. He does know me by name today  Skin:                  No rash      Lab Data Reviewed:    Recent Results (from the past 24 hour(s))   CBC W/O DIFF    Collection Time: 07/24/20  4:10 AM   Result Value Ref Range    WBC 5.8 4.1 - 11.1 K/uL    RBC 4.11 4.10 - 5.70 M/uL    HGB 11.2 (L) 12.1 - 17.0 g/dL    HCT 36.2 (L) 36.6 - 50.3 %    MCV 88.1 80.0 - 99.0 FL    MCH 27.3 26.0 - 34.0 PG    MCHC 30.9 30.0 - 36.5 g/dL    RDW 14.3 11.5 - 14.5 %    PLATELET 734 886 - 360 K/uL    MPV 11.1 8.9 - 12.9 FL    NRBC 0.0 0  WBC    ABSOLUTE NRBC 0.00 0.00 - 1.29 K/uL   METABOLIC PANEL, COMPREHENSIVE    Collection Time: 07/24/20  4:10 AM   Result Value Ref Range    Sodium 142 136 - 145 mmol/L    Potassium 3.6 3.5 - 5.1 mmol/L    Chloride 107 97 - 108 mmol/L    CO2 29 21 - 32 mmol/L    Anion gap 6 5 - 15 mmol/L    Glucose 95 65 - 100 mg/dL    BUN 22 (H) 6 - 20 MG/DL    Creatinine 1.08 0.70 - 1.30 MG/DL    BUN/Creatinine ratio 20 12 - 20      GFR est AA >60 >60 ml/min/1.73m2    GFR est non-AA >60 >60 ml/min/1.73m2    Calcium 8.2 (L) 8.5 - 10.1 MG/DL    Bilirubin, total 0.4 0.2 - 1.0 MG/DL    ALT (SGPT) 20 12 - 78 U/L    AST (SGOT) 24 15 - 37 U/L    Alk. phosphatase 72 45 - 117 U/L    Protein, total 5.5 (L) 6.4 - 8.2 g/dL    Albumin 2.3 (L) 3.5 - 5.0 g/dL    Globulin 3.2 2.0 - 4.0 g/dL    A-G Ratio 0.7 (L) 1.1 - 2.2           Assessment/Plan:     Principal Problem:    Syncope (1/9/2019)    Active Problems:    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)      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, without long-term current use of insulin ([facility] (7/18/2017)      SSS (sick sinus syndrome) ([facility] (6/2/2015)      Cardiomyopathy ([facility] (11/7/2019)      Alcoholism ([facility] (4/23/2018)      Weight loss (7/20/2020)      Unsteady gait (7/20/2020)      Dehydration (7/20/2020)      Acute renal failure superimposed on stage 3 chronic kidney disease ([facility] (7/20/2020)      Severe protein-calorie malnutrition ([facility] (7/21/2020)      Esophageal dyskinesia (7/23/2020)       ___________________________________________________  PLAN:  1. Continue  telemetry bed with monitoring  2. Decrease IV hydration D5 half-normal saline  3. Hold Lasix and all current hypertensive medications except continue Coreg with his cardiomyopathy  4. Continue IV Protonix  5.  GI consult notes reviewed and note Botox in lower esophagus for marked dyskinesia   6. Continue current Alzheimer's medications as ordered   7. According to his granddaughter who is his caretaker has not drank alcohol in 3 months but will watch closely for any signs of withdrawal with known alcoholism and prior withdrawal  8. Follow renal function, 51/2.36 on admission to 22/1.08 now  9. Replete potassium currently 3.8, improved  10. Checked magnesium level - normal  11. Follow blood sugar and will cover with sliding scale if becomes elevated  12.   Home vs Rehab pending on Pt recommendations       Addendum:  Phone call with his granddaughter (POA) 7/22 AM and I discussed the Ba Nataliia scheduled for today and further plans pending results of this, I also discussed the rude behavior of his daughter (mother of the granddaughter whom I spoke with) and explained that this it totally unacceptable!    This was also D/W his nurse Norma Mail          If need to contact me use hospital  [phone], DO NOT USE PERFECT SERVE    ___________________________________________________    Attending Physician: Juan J Miller MD

## 2020-07-24 NOTE — PROGRESS NOTES
Problem: Mobility Impaired (Adult and Pediatric)  Goal: *Acute Goals and Plan of Care (Insert Text)  Description: FUNCTIONAL STATUS PRIOR TO ADMISSION: Patient poor historian. He reports independence at baseline without use of any assistive device. HOME SUPPORT PRIOR TO ADMISSION: The patient lived with granddaughter. Patient reports granddaughter works during the day. Physical Therapy Goals  Initiated 7/22/2020  1. Patient will move from supine to sit and sit to supine , scoot up and down, and roll side to side in bed with independence within 7 day(s). 2.  Patient will transfer from bed to chair and chair to bed with supervision/set-up using the least restrictive device within 7 day(s). 3.  Patient will perform sit to stand with supervision/set-up within 7 day(s). 4.  Patient will ambulate with supervision/set-up for 250 feet with the least restrictive device within 7 day(s). Outcome: Progressing Towards Goal   PHYSICAL THERAPY TREATMENT  Patient: Chalo Carreon Sr (80 y.o. male)  Date: 7/24/2020  Diagnosis: Syncope [R55]   Syncope  Procedure(s) (LRB):  ESOPHAGOGASTRODUODENOSCOPY (EGD) (N/A)  ESOPHAGOGASTRODUODENAL (EGD) BIOPSY (N/A)  INJECTION (N/A) 1 Day Post-Op  Precautions: falls, chair alarm  Chart, physical therapy assessment, plan of care and goals were reviewed. ASSESSMENT  Patient continues with skilled PT services and is progressing towards goals. Patient received in supine and agreeable to PT. Patient requiring supervision for bed mobility and transfer to EOB. Patient requiring CGA for transfer to standing with VC for proper hand placement. Patient ambulated 200 feet with RW and CGA, increased assistance and VC needed during turns for proper sequencing. Patient displayed balance checks while turning but was able to self correct. Patient transferred stand to sit with CGA, VC provided for correct hand placement.  Patient noted to have increased swelling in L hand and UE with open blisters. Nursing notified. Patient would continue from skilled therapy to address impairments. Continuing to recommend discharge home with home health if 24 hour supervision can be provided. If not, recommending discharge to SNF with eventual return home with increased assistance. Current Level of Function Impacting Discharge (mobility/balance): Patient requiring CGA for out of bed mobility. Other factors to consider for discharge: Patient has history of dementia and currently lives with granddaughter who is his caregiver. PLAN :  Patient continues to benefit from skilled intervention to address the above impairments. Continue treatment per established plan of care. to address goals. Recommendation for discharge: (in order for the patient to meet his/her long term goals)  Physical therapy at least 2 days/week in the home AND ensure assist and/or supervision for safety with mobility    This discharge recommendation:  Has been made in collaboration with the attending provider and/or case management    IF patient discharges home will need the following DME: rolling walker       SUBJECTIVE:   Patient stated It feels good to be moving.     OBJECTIVE DATA SUMMARY:   Critical Behavior:  Neurologic State: Alert, Confused  Orientation Level: Disoriented to time, Oriented to person, Oriented to place  Cognition: Follows commands  Safety/Judgement: Decreased insight into deficits, Fall prevention, Home safety, Awareness of environment  Functional Mobility Training:  Bed Mobility:  Rolling: Supervision  Supine to Sit: Supervision  Sit to Supine: Supervision  Scooting: Supervision        Transfers:  Sit to Stand: Contact guard assistance  Stand to Sit: Contact guard assistance                             Balance:  Sitting: Intact  Standing: Impaired  Standing - Static: Good;Constant support  Standing - Dynamic : Good;Constant support  Ambulation/Gait Training:  Distance (ft): 200 Feet (ft)  Assistive Device: Walker, rolling;Gait belt  Ambulation - Level of Assistance: Contact guard assistance(requiring increased assistance with turns)        Gait Abnormalities: Decreased step clearance        Base of Support: Narrowed     Speed/Maureen: Pace decreased (<100 feet/min)  Step Length: Right shortened;Left shortened    Activity Tolerance:   Good  Please refer to the flowsheet for vital signs taken during this treatment. After treatment patient left in no apparent distress:   Sitting in chair, Call bell within reach, and Bed / chair alarm activated    COMMUNICATION/COLLABORATION:   The patients plan of care was discussed with: Occupational therapist and Registered nurse.      Dot Horowitz, PT   Time Calculation: 24 mins

## 2020-07-24 NOTE — PROGRESS NOTES
VITOR PLAN:    1) Home w/ HH -will follow OT/PT recommendations - family offered freedom of choice with proper HH list and chose [address]    2) 2nd IM prior to discharge    3) Patient will need rolling walker prior to discharge if not going to SNF. 4) Granddaughter has applied for Medicaid on-line - will need a UAI prior to discharge. She would like in-home personal care support.     Britton Mitchell RN, BSN, [facility]    Coordinator  [phone]

## 2020-07-24 NOTE — PROGRESS NOTES
Bedside shift change report given to [address] by Mohini Anguiano RN (offgoing nurse).  Report included the following information SBAR and Kardex.     Zone Phone:   [phone]        Significant changes during shift:  GI Lite diet, Swollen left arm with 2 skin tears and blistering- continue to monitor            Patient Information     Andrew Law Sr  80 y.o.  7/20/2020  4:31 PM by Ramses España MD. Benito Farfan Sr was admitted from Home     Problem List             Patient Active Problem List     Diagnosis Date Noted    Severe protein-calorie malnutrition ([address] 07/21/2020    Weight loss 07/20/2020    Unsteady gait 07/20/2020    Dehydration 07/20/2020    Acute renal failure superimposed on stage 3 chronic kidney disease ([address] 07/20/2020    Paroxysmal VT ([address] 11/07/2019    Cardiomyopathy ([address] 11/07/2019    Alcohol withdrawal syndrome, with delirium ([address] 11/01/2019    A-fib ([address] 10/27/2019    Syncope 01/09/2019    Primary insomnia 01/09/2019    Mild depression ([address] 06/12/2018    Acute bilateral low back pain without sciatica 05/30/2018    Alcoholism ([address] 03/27/1116    Metabolic encephalopathy 37/16/8698    TIA (transient ischemic attack) 04/12/2018    Medicare annual wellness visit, subsequent 09/01/2017    Diverticulosis 07/18/2017    Gastritis and duodenitis 07/18/2017    Internal hemorrhoids 07/18/2017    Hypertension with renal disease 07/18/2017    Mixed hyperlipidemia 07/18/2017    GI bleed 07/18/2017    Primary osteoarthritis involving multiple joints 07/18/2017    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, without long-term current use of insulin ([address] 07/18/2017    CKD (chronic kidney disease), stage II 07/18/2017    Back pain 07/18/2017    Anemia 07/18/2017    Alzheimer disease ([address] 07/18/2017    SSS (sick sinus syndrome) ([address] 06/02/2015    S/P cardiac pacemaker procedure 05/04/2015    S/P ablation of accessory bypass tract 05/04/2015    SVT (supraventricular tachycardia) ([facility]s/p RFA 04/28/2015    Near syncope 03/11/2015    ASCVD (arteriosclerotic cardiovascular disease) 07/11/2014    Hypokalemia 07/11/2014    Right inguinal hernia 06/12/2014              Past Medical History:   Diagnosis Date    Abdominal pain 7/18/2017    Alzheimer disease ([facility] 7/18/2017    Anemia 7/18/2017    Arthritis      Back pain 7/18/2017    Bradycardia 7/18/2017    CAD (coronary artery disease)       hx of MI    CKD (chronic kidney disease), stage II 7/18/2017    constipation      Depression 7/18/2017    Diabetes mellitus ([facility] 7/18/2017    Diverticulosis 7/18/2017    DJD (degenerative joint disease) 7/18/2017    Encounter for long-term (current) use of other medications 7/18/2017    Fatigue      Gastritis and duodenitis 7/18/2017    GERD (gastroesophageal reflux disease)      GI bleed 7/18/2017    Hearing loss      Hyperlipidemia 7/18/2017    Hypertension      Hypertension with renal disease 7/18/2017    Ill-defined condition       Dementia    Internal hemorrhoids 7/18/2017    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    S/P cardiac pacemaker procedure 5/4/2015     5/4/15 Medtronic dual chamber pacemaker implant     Weight loss       lost over 40 pounds last year- has no appetite            Core Measures:     CVA: No No  CHF:No No  PNA:No No     Activity Status:     OOB to Chair Yes  Ambulated this shift Yes   Bed Rest No        DVT prophylaxis:     DVT prophylaxis Med- Yes  DVT prophylaxis SCD or LISSY- No         Patient Safety:     Falls Score Total Score: 4  Safety Level_______  Bed Alarm On? Yes  Sitter? No     Plan for upcoming shift: safety, monitor Right forearm IV site- patient tries to remove     Discharge Plan: Yes home with      Active Consults:

## 2020-07-24 NOTE — PROGRESS NOTES
Nutrition Assessment     Type and Reason for Visit: Reassess    Nutrition Recommendations/Plan:  Advance to regular diet as tolerated  Continue ensure enlive BID     Nutrition Assessment:     Chart reviewed; medically noted for esophageal dysmotility s/p EGD with botox. Diet advanced to GI lite. Patient reports a good appetite today. He had eaten >70% of his lunch tray and he had 2 empty ensure enlive bottles at bedside. He said he likes the [place]. \" Will continue supplement BID. Encourage intake of meals.      Malnutrition Assessment:  Malnutrition Status: Severe malnutrition     Estimated Daily Nutrient Needs:  Energy (kcal):  1778 kcal (BMR (1175) x 1.3AF +250kcal)  Protein (g):  68-78g (1.3-1.5 g/kg bw)       Fluid (ml/day):  1800 mL    Nutrition Related Findings:       Patient Vitals for the past 72 hrs:   % Diet Eaten   07/24/20 0848 80 %   07/23/20 1844 60 %   07/23/20 1537 100 %      Current Nutrition Therapies:  DIET NUTRITIONAL SUPPLEMENTS Lunch, Dinner; Ensure [employer]  DIET NUTRITIONAL SUPPLEMENTS Lunch, Dinner; [employer]  DIET GI LITE (POST SURGICAL)    Anthropometric Measures:  · Height:  5' 9\" (175.3 cm)  · Current Body Wt:  52 kg (114 lb 10.2 oz)  · BMI: 16.9    Nutrition Diagnosis:   · Inadequate protein-energy intake related to (alzheimer's dementia, decreased Po intake) as evidenced by weight loss greater than or equal to 10% in 6 months, severe loss of subcutaneous fat, severe muscle loss    Nutrition Intervention:  Food and/or Nutrient Delivery: Modify current diet, Continue oral nutrition supplement  Nutrition Education and Counseling: No recommendations at this time  Coordination of Nutrition Care: No recommendation at this time    Goals:  PO intake >70% of meals/supplements next 3-5 days       Nutrition Monitoring and Evaluation:   Behavioral-Environmental Outcomes: Knowledge or skill  Food/Nutrient Intake Outcomes: Food and nutrient intake, Supplement intake  Physical Signs/Symptoms Outcomes: Biochemical data, Weight    Discharge Planning:    (Regular diet + ONS 2-3x/day as needed)     Electronically signed by Christiano Salinas RD on 7/24/2020 at 1:39 PM    Contact Number: Ext [phone], Pager [phone]

## 2020-07-24 NOTE — PROGRESS NOTES
PROGRESS NOTE    NAME:  Teddy Henry    :   1931   MRN:   [id number]     Date/Time:  2020 5:54 AM  Subjective:   History:  Chart reviewed and patient seen and examined and discussed with his nurse and all events noted. He presented to the office on  after a long absence not having been seen there since 2019. [facility] was accompanied by his granddaughter and was there  in follow-up of his medical problems to include hypertension, diabetes, hyperlipidemia, atherosclerotic coronary vascular disease, cardiomyopathy, Alzheimer's dementia, history of PAF, prior TIA, history of alcoholism and other multiple medical problems.  In addition to his chronic problems he had a significant issue now off weight loss from 134 pounds at his last visit down to 115.7 pounds. [facility] had become very unsteady walking according to his granddaughter which was easily visible on exam in the office. [facility] also had developed a poor appetite and has a hard time keeping food down and thus eating very little.  He actually even has had some difficulty keeping liquids down.      He denies any current chest pain, shortness of breath, palpitations, PND, orthopnea or other cardiorespiratory complaints.  He  has no current  complaints.  As far as the GI complaints there is a poor appetite with the inability to keep some food down as well as liquids as well as some mild upper abdominal discomfort but no evidence of diarrhea.  No fevers, chills or night sweats have been noted.  From a neurologic standpoint he has had progressive decreased memory and progressive unsteadiness on his feet.  There are no current active arthritic complaints.  All of the above history was confirmed by his granddaughter present with him. Rad Lopez is his primary caretaker.   With this history it was felt prudent to admit him to the hospital for further work-up evaluation and treatment and he was sent to the hospital for direct admission however while going through admissions he became lightheaded and unresponsive and was emergently taken to the emergency room for admission. Today he is more alert after hydration but and oriented to name and hospital todayand knows me by name today. He denies Cardiac or respiratory c/o. Swallowing difficulty continues. He is generally weak w/o focal neurologic c/o and as noted memory is better than on admission. .    Medications reviewed:  Current Facility-Administered Medications   Medication Dose Route Frequency    sodium chloride (NS) flush 5-40 mL  5-40 mL IntraVENous Q8H    sodium chloride (NS) flush 5-40 mL  5-40 mL IntraVENous PRN    aspirin chewable tablet 81 mg  81 mg Oral DAILY    carvediloL (COREG) tablet 6.25 mg  6.25 mg Oral BID    donepeziL (ARICEPT) tablet 10 mg  10 mg Oral DAILY    memantine (NAMENDA) tablet 10 mg  10 mg Oral BID    temazepam (RESTORIL) capsule 15 mg  15 mg Oral QHS PRN    sodium chloride (NS) flush 5-40 mL  5-40 mL IntraVENous Q8H    sodium chloride (NS) flush 5-40 mL  5-40 mL IntraVENous PRN    acetaminophen (TYLENOL) tablet 650 mg  650 mg Oral Q4H PRN    dextrose 5 % - 0.45% NaCl infusion  125 mL/hr IntraVENous CONTINUOUS    pantoprazole (PROTONIX) 40 mg in 0.9% sodium chloride 10 mL injection  40 mg IntraVENous Q12H        Objective:   Vitals:  Visit Vitals  /68 (BP 1 Location: Left arm, BP Patient Position: At rest)   Pulse 79   Temp 98.8 °F (37.1 °C)   Resp 18   Ht 5' 9\" (1.753 m)   Wt 114 lb 10.2 oz (52 kg)   SpO2 100%   BMI 16.93 kg/m²      O2 Device: Room air Temp (24hrs), Av.9 °F (36.6 °C), Min:97.2 °F (36.2 °C), Max:98.8 °F (37.1 °C)      Last 24hr Input/Output:    Intake/Output Summary (Last 24 hours) at 2020 0554  Last data filed at 2020 1844  Gross per 24 hour   Intake 2734 ml   Output 390 ml   Net 2344 ml        PHYSICAL EXAM:  General:     Alert, cooperative, no distress, appears stated age.      Head:    Normocephalic, without obvious abnormality, atraumatic. Eyes:    Conjunctivae/corneas clear. PERRLA  Nose:   Nares normal. No drainage or sinus tenderness. Throat:     Lips, mucosa, and tongue normal.  No Thrush  Neck:   Supple, symmetrical,  no adenopathy, thyroid: non tender     no carotid bruit and no JVD. Back:     Symmetric,  No CVA tenderness. Lungs:    Clear to auscultation bilaterally. No Wheezing or Rhonchi. No rales. Heart:    Regular rate and rhythm,  no murmur, rub or gallop. Abdomen:    Soft, non-tender. Not distended. Bowel sounds normal. No masses  Extremities:  Extremities normal, atraumatic, No cyanosis. No edema. No clubbing  Lymph nodes:  Cervical, supraclavicular normal.  Neurologic:  General decreased strength, Alert and oriented X 1. He does know me by name today  Skin:                 No rash      Lab Data Reviewed:    Recent Results (from the past 24 hour(s))   CBC W/O DIFF    Collection Time: 07/24/20  4:10 AM   Result Value Ref Range    WBC 5.8 4.1 - 11.1 K/uL    RBC 4.11 4.10 - 5.70 M/uL    HGB 11.2 (L) 12.1 - 17.0 g/dL    HCT 36.2 (L) 36.6 - 50.3 %    MCV 88.1 80.0 - 99.0 FL    MCH 27.3 26.0 - 34.0 PG    MCHC 30.9 30.0 - 36.5 g/dL    RDW 14.3 11.5 - 14.5 %    PLATELET 475 885 - 157 K/uL    MPV 11.1 8.9 - 12.9 FL    NRBC 0.0 0  WBC    ABSOLUTE NRBC 0.00 0.00 - 0.63 K/uL   METABOLIC PANEL, COMPREHENSIVE    Collection Time: 07/24/20  4:10 AM   Result Value Ref Range    Sodium 142 136 - 145 mmol/L    Potassium 3.6 3.5 - 5.1 mmol/L    Chloride 107 97 - 108 mmol/L    CO2 29 21 - 32 mmol/L    Anion gap 6 5 - 15 mmol/L    Glucose 95 65 - 100 mg/dL    BUN 22 (H) 6 - 20 MG/DL    Creatinine 1.08 0.70 - 1.30 MG/DL    BUN/Creatinine ratio 20 12 - 20      GFR est AA >60 >60 ml/min/1.73m2    GFR est non-AA >60 >60 ml/min/1.73m2    Calcium 8.2 (L) 8.5 - 10.1 MG/DL    Bilirubin, total 0.4 0.2 - 1.0 MG/DL    ALT (SGPT) 20 12 - 78 U/L    AST (SGOT) 24 15 - 37 U/L    Alk.  phosphatase 72 45 - 117 U/L    Protein, total 5.5 (L) 6.4 - 8.2 g/dL    Albumin 2.3 (L) 3.5 - 5.0 g/dL    Globulin 3.2 2.0 - 4.0 g/dL    A-G Ratio 0.7 (L) 1.1 - 2.2           Assessment/Plan:     Principal Problem:    Syncope (1/9/2019)    Active Problems:    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)      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, without long-term current use of insulin ([address] (7/18/2017)      SSS (sick sinus syndrome) ([address] (6/2/2015)      Cardiomyopathy ([address] (11/7/2019)      Alcoholism ([address] (4/23/2018)      Weight loss (7/20/2020)      Unsteady gait (7/20/2020)      Dehydration (7/20/2020)      Acute renal failure superimposed on stage 3 chronic kidney disease ([address] (7/20/2020)      Severe protein-calorie malnutrition ([address] (7/21/2020)      Esophageal dyskinesia (7/23/2020)       ___________________________________________________  PLAN:  1. Continue  telemetry bed with monitoring  2. IV hydration D5 half-normal saline  3. Hold Lasix and all current hypertensive medications except continue Coreg with his cardiomyopathy  4. Continue IV Protonix  5.  GI consult and D/W Dr. Christal Toro and plans for Barium Swallow to check motility which revealed ? Stricture so now EGD  6. Continue current Alzheimer's medications as ordered although may not be able to resume PO until after EGD  7. According to his granddaughter who is his caretaker has not drank alcohol in 3 months but will watch closely for any signs of withdrawal with known alcoholism and prior withdrawal  8. Follow renal function, 51/2.36 on admission to 38/1.48 now  9. Replete potassium currently 3.3  10. Checked magnesium level - normal  11.   Follow blood sugar and will cover with sliding scale if becomes elevated      Addendum:  Phone call with his granddaughter (POA) 7/22 AM and I discussed the Ba Swallow scheduled for today and further plans pending results of this, I also discussed the rude behavior of his daughter (mother of the granddaughter whom I spoke with) and explained that this it totally unacceptable!    This was also D/W his nurse Marcelino Vuong          If need to contact me use hospital  [phone], DO NOT USE PERFECT SERVE    ___________________________________________________    Attending Physician: Camilo Stewart MD

## 2020-07-24 NOTE — PROGRESS NOTES
Received patient this morning (July 24, 2020) informed that the left forearm IV site had infiltrated over night and had 2 skin tears from removal. Night shift nurse said they provided a warm compress and applied a bandage to the skin tear. As of now, the site is swollen and blistered on the underside.  RN will continue to monitor

## 2020-07-24 NOTE — WOUND CARE
Wound Care consult for the skin tear that was secondary to an infiltration of D51/2 NS last evening. The site blistered and it is now torn with a white wound bed (dermal level). Patient stated, \"It smarts a little\" (painful). No edema or redness or excess pain and no s/sx of infection. Wound Arm lower Anterior;Distal;Left infiltrated site left wrist. blister / skin tear. white wound bed. (Active)   Dressing Status Removed 07/24/20 1637   Dressing Type Foam 07/24/20 1637   Non-staged Wound Description Partial thickness 07/24/20 1637   Shape round 07/24/20 1637   Wound Length (cm) 0.5 cm 07/24/20 1637   Wound Width (cm) 0.5 cm 07/24/20 1637   Wound Depth (cm) 0.1 cm 07/24/20 1637   Wound Surface Area (cm^2) 0.25 cm^2 07/24/20 1637   Wound Volume (cm^3) 0.02 cm^3 07/24/20 1637   Condition of Base White 07/24/20 1637   Condition of Edges Open 07/24/20 1637   Assessment Clean 07/24/20 1637   Drainage Amount Scant 07/24/20 1637   Drainage Color Serous 07/24/20 1637   Wound Odor None 07/24/20 1637   Perlita-wound Assessment Intact 07/24/20 1637   Cleansing and Cleansing Agents  Normal saline 07/24/20 1637   Dressing Changed Changed/New 07/24/20 1637   Dressing Type Applied Silicone;Silver products; Foam 07/24/20 1637   Procedure Tolerated Well 07/24/20 1637     Treatment: Cleansed with NS and wiped with gauze. Applied the silicone dressing and then silver and then a large foam. All non-stick and absorbent. This dressing should remain in place until Monday. Wound care orders written for it.      Moo Nunez RN, BSN, [employer]
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
## 2020-07-25 LAB
ANION GAP SERPL CALC-SCNC: 4 MMOL/L (ref 5–15)
BUN SERPL-MCNC: 24 MG/DL (ref 6–20)
BUN/CREAT SERPL: 21 (ref 12–20)
CALCIUM SERPL-MCNC: 8.4 MG/DL (ref 8.5–10.1)
CHLORIDE SERPL-SCNC: 108 MMOL/L (ref 97–108)
CO2 SERPL-SCNC: 30 MMOL/L (ref 21–32)
CREAT SERPL-MCNC: 1.15 MG/DL (ref 0.7–1.3)
GLUCOSE SERPL-MCNC: 96 MG/DL (ref 65–100)
POTASSIUM SERPL-SCNC: 3.8 MMOL/L (ref 3.5–5.1)
SODIUM SERPL-SCNC: 142 MMOL/L (ref 136–145)

## 2020-07-25 PROCEDURE — 80048 BASIC METABOLIC PNL TOTAL CA: CPT

## 2020-07-25 PROCEDURE — 94760 N-INVAS EAR/PLS OXIMETRY 1: CPT

## 2020-07-25 PROCEDURE — 74011000250 HC RX REV CODE- 250: Performed by: INTERNAL MEDICINE

## 2020-07-25 PROCEDURE — 74011250637 HC RX REV CODE- 250/637: Performed by: INTERNAL MEDICINE

## 2020-07-25 PROCEDURE — 65660000000 HC RM CCU STEPDOWN

## 2020-07-25 PROCEDURE — 74011250636 HC RX REV CODE- 250/636: Performed by: INTERNAL MEDICINE

## 2020-07-25 PROCEDURE — 36415 COLL VENOUS BLD VENIPUNCTURE: CPT

## 2020-07-25 PROCEDURE — C9113 INJ PANTOPRAZOLE SODIUM, VIA: HCPCS | Performed by: INTERNAL MEDICINE

## 2020-07-25 PROCEDURE — 74011000258 HC RX REV CODE- 258: Performed by: INTERNAL MEDICINE

## 2020-07-25 RX ADMIN — DONEPEZIL HYDROCHLORIDE 10 MG: 5 TABLET, FILM COATED ORAL at 08:04

## 2020-07-25 RX ADMIN — DEXTROSE MONOHYDRATE AND SODIUM CHLORIDE 50 ML/HR: 5; .45 INJECTION, SOLUTION INTRAVENOUS at 04:14

## 2020-07-25 RX ADMIN — SODIUM CHLORIDE 40 MG: 9 INJECTION, SOLUTION INTRAMUSCULAR; INTRAVENOUS; SUBCUTANEOUS at 21:05

## 2020-07-25 RX ADMIN — Medication 10 ML: at 21:06

## 2020-07-25 RX ADMIN — CARVEDILOL 6.25 MG: 6.25 TABLET, FILM COATED ORAL at 08:04

## 2020-07-25 RX ADMIN — MEMANTINE HYDROCHLORIDE 10 MG: 10 TABLET ORAL at 18:09

## 2020-07-25 RX ADMIN — SODIUM CHLORIDE 40 MG: 9 INJECTION, SOLUTION INTRAMUSCULAR; INTRAVENOUS; SUBCUTANEOUS at 08:04

## 2020-07-25 RX ADMIN — ASPIRIN 81 MG CHEWABLE TABLET 81 MG: 81 TABLET CHEWABLE at 08:04

## 2020-07-25 RX ADMIN — CARVEDILOL 6.25 MG: 6.25 TABLET, FILM COATED ORAL at 18:09

## 2020-07-25 RX ADMIN — Medication 10 ML: at 17:26

## 2020-07-25 RX ADMIN — MEMANTINE HYDROCHLORIDE 10 MG: 10 TABLET ORAL at 08:04

## 2020-07-25 NOTE — PROGRESS NOTES
INTERNAL MEDICINE ATTENDING NOTE    S: Mr. Kendell Hitchcock is a patient of Phyllis Velazco MD and was seen by me today during rounds. At this time, he is resting comfortably. The patient has no new complaints today. ROS otherwise unremarkable except as noted elsewhere. O: Blood pressure 136/60, pulse (!) 56, temperature 98.4 °F (36.9 °C), resp. rate 18, height 5' 9\" (1.753 m), weight 114 lb 10.2 oz (52 kg), SpO2 96 %. Gen: Patient is in no acute distress. Lungs: CTAB. Heart: RRR. Abd: S, NT, ND, BS present. Extremities: Warm. Recent Results (from the past 12 hour(s))   METABOLIC PANEL, BASIC    Collection Time: 07/25/20  4:11 AM   Result Value Ref Range    Sodium 142 136 - 145 mmol/L    Potassium 3.8 3.5 - 5.1 mmol/L    Chloride 108 97 - 108 mmol/L    CO2 30 21 - 32 mmol/L    Anion gap 4 (L) 5 - 15 mmol/L    Glucose 96 65 - 100 mg/dL    BUN 24 (H) 6 - 20 MG/DL    Creatinine 1.15 0.70 - 1.30 MG/DL    BUN/Creatinine ratio 21 (H) 12 - 20      GFR est AA >60 >60 ml/min/1.73m2    GFR est non-AA 60 (L) >60 ml/min/1.73m2    Calcium 8.4 (L) 8.5 - 10.1 MG/DL        A / P:  1. Syncope (1/9/2019): Telemetry. 2. SSS (sick sinus syndrome) ([facility] (6/2/2015)  3. 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): Holding meds at this time due to hypotension; add back as needed. 4. 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, without long-term current use of insulin ([address] (7/18/2017)  5. Alcoholism ([address] (4/23/2018): Watch for signs of withdrawal.   6. Cardiomyopathy ([address] (11/7/2019)  7. Weight loss (7/20/2020)  8. Unsteady gait (7/20/2020): PT / OT. 9. Dehydration (7/20/2020): Improved with IV hydration. 10. Acute renal failure superimposed on stage 3 chronic kidney disease ([facility] (7/20/2020): Improved with hydration. 11. Severe protein-calorie malnutrition ([facility] (7/21/2020)  12. Esophageal dyskinesia (7/23/2020): s/p Botox at GE junction. GI consulted--recommends advancing diet. Avinash Nevarez MD, FACP  Best contact is via Pager: [phone], or via hospital  at [phone]

## 2020-07-25 NOTE — PROGRESS NOTES
Problem: Pressure Injury - Risk of  Goal: *Prevention of pressure injury  Description: Document Newton Scale and appropriate interventions in the flowsheet. Outcome: Progressing Towards Goal  Note: Pressure Injury Interventions:  Sensory Interventions: Assess changes in LOC, Discuss PT/OT consult with provider, Keep linens dry and wrinkle-free, Maintain/enhance activity level, Minimize linen layers, Pressure redistribution bed/mattress (bed type), Sit a 90-degree angle/use footstool if needed, Turn and reposition approx. every two hours (pillows and wedges if needed)    Moisture Interventions: Minimize layers    Activity Interventions: Increase time out of bed, Pressure redistribution bed/mattress(bed type), PT/OT evaluation    Mobility Interventions: HOB 30 degrees or less, Pressure redistribution bed/mattress (bed type), PT/OT evaluation    Nutrition Interventions: Document food/fluid/supplement intake    Friction and Shear Interventions: Apply protective barrier, creams and emollients, HOB 30 degrees or less, Minimize layers                Problem: Falls - Risk of  Goal: *Absence of Falls  Description: Document Louis Fall Risk and appropriate interventions in the flowsheet.   Outcome: Progressing Towards Goal  Note: Fall Risk Interventions:  Mobility Interventions: Bed/chair exit alarm, Communicate number of staff needed for ambulation/transfer, OT consult for ADLs, Patient to call before getting OOB, PT Consult for mobility concerns, PT Consult for assist device competence, Utilize walker, cane, or other assistive device    Mentation Interventions: Adequate sleep, hydration, pain control, Bed/chair exit alarm, Door open when patient unattended, Evaluate medications/consider consulting pharmacy, More frequent rounding, Reorient patient, Room close to nurse's station, Update white board    Medication Interventions: Bed/chair exit alarm, Evaluate medications/consider consulting pharmacy, Patient to call before getting OOB, Teach patient to arise slowly    Elimination Interventions: Bed/chair exit alarm, Call light in reach, Urinal in reach, Toileting schedule/hourly rounds

## 2020-07-25 NOTE — PROGRESS NOTES
Patient called using call bell. Patient was sleeping and woke up because left arm was hurting. RN assessed IV site. IV site infiltrated. RN asked patient how long arm was hurting. Patient stated it started hurting 5 minutes ago. RN DC'ed IV fluids and removed IV. Patient now has skin tear where tape was and skin is starting to blister. Applied mepilex to skin tear. Elevated arm on pillow and applied ice pack.

## 2020-07-25 NOTE — PROGRESS NOTES
Bedside shift change report given to RADHA Benson (oncoming nurse) by Sal Tripp (offgoing nurse). Report included the following information SBAR and Kardex.

## 2020-07-26 LAB
ALBUMIN SERPL-MCNC: 2.3 G/DL (ref 3.5–5)
ALBUMIN/GLOB SERPL: 0.7 {RATIO} (ref 1.1–2.2)
ALP SERPL-CCNC: 62 U/L (ref 45–117)
ALT SERPL-CCNC: 20 U/L (ref 12–78)
ANION GAP SERPL CALC-SCNC: 4 MMOL/L (ref 5–15)
AST SERPL-CCNC: 20 U/L (ref 15–37)
BASOPHILS # BLD: 0.1 K/UL (ref 0–0.1)
BASOPHILS NFR BLD: 1 % (ref 0–1)
BILIRUB SERPL-MCNC: 0.7 MG/DL (ref 0.2–1)
BUN SERPL-MCNC: 23 MG/DL (ref 6–20)
BUN/CREAT SERPL: 19 (ref 12–20)
CALCIUM SERPL-MCNC: 8.4 MG/DL (ref 8.5–10.1)
CHLORIDE SERPL-SCNC: 107 MMOL/L (ref 97–108)
CO2 SERPL-SCNC: 30 MMOL/L (ref 21–32)
CREAT SERPL-MCNC: 1.18 MG/DL (ref 0.7–1.3)
DIFFERENTIAL METHOD BLD: ABNORMAL
EOSINOPHIL # BLD: 0.1 K/UL (ref 0–0.4)
EOSINOPHIL NFR BLD: 2 % (ref 0–7)
ERYTHROCYTE [DISTWIDTH] IN BLOOD BY AUTOMATED COUNT: 14.5 % (ref 11.5–14.5)
GLOBULIN SER CALC-MCNC: 3.2 G/DL (ref 2–4)
GLUCOSE SERPL-MCNC: 102 MG/DL (ref 65–100)
HCT VFR BLD AUTO: 33.1 % (ref 36.6–50.3)
HGB BLD-MCNC: 10.3 G/DL (ref 12.1–17)
IMM GRANULOCYTES # BLD AUTO: 0 K/UL (ref 0–0.04)
IMM GRANULOCYTES NFR BLD AUTO: 0 % (ref 0–0.5)
LYMPHOCYTES # BLD: 0.8 K/UL (ref 0.8–3.5)
LYMPHOCYTES NFR BLD: 11 % (ref 12–49)
MCH RBC QN AUTO: 27 PG (ref 26–34)
MCHC RBC AUTO-ENTMCNC: 31.1 G/DL (ref 30–36.5)
MCV RBC AUTO: 86.6 FL (ref 80–99)
MONOCYTES # BLD: 0.9 K/UL (ref 0–1)
MONOCYTES NFR BLD: 12 % (ref 5–13)
NEUTS SEG # BLD: 5.5 K/UL (ref 1.8–8)
NEUTS SEG NFR BLD: 74 % (ref 32–75)
NRBC # BLD: 0 K/UL (ref 0–0.01)
NRBC BLD-RTO: 0 PER 100 WBC
PLATELET # BLD AUTO: 190 K/UL (ref 150–400)
PMV BLD AUTO: 11.1 FL (ref 8.9–12.9)
POTASSIUM SERPL-SCNC: 3.9 MMOL/L (ref 3.5–5.1)
PROT SERPL-MCNC: 5.5 G/DL (ref 6.4–8.2)
RBC # BLD AUTO: 3.82 M/UL (ref 4.1–5.7)
RBC MORPH BLD: ABNORMAL
SODIUM SERPL-SCNC: 141 MMOL/L (ref 136–145)
WBC # BLD AUTO: 7.4 K/UL (ref 4.1–11.1)

## 2020-07-26 PROCEDURE — 94760 N-INVAS EAR/PLS OXIMETRY 1: CPT

## 2020-07-26 PROCEDURE — 74011250637 HC RX REV CODE- 250/637: Performed by: INTERNAL MEDICINE

## 2020-07-26 PROCEDURE — 85025 COMPLETE CBC W/AUTO DIFF WBC: CPT

## 2020-07-26 PROCEDURE — C9113 INJ PANTOPRAZOLE SODIUM, VIA: HCPCS | Performed by: INTERNAL MEDICINE

## 2020-07-26 PROCEDURE — 74011000258 HC RX REV CODE- 258: Performed by: INTERNAL MEDICINE

## 2020-07-26 PROCEDURE — 74011000250 HC RX REV CODE- 250: Performed by: INTERNAL MEDICINE

## 2020-07-26 PROCEDURE — 80053 COMPREHEN METABOLIC PANEL: CPT

## 2020-07-26 PROCEDURE — 74011250636 HC RX REV CODE- 250/636: Performed by: INTERNAL MEDICINE

## 2020-07-26 PROCEDURE — 65660000000 HC RM CCU STEPDOWN

## 2020-07-26 PROCEDURE — 36415 COLL VENOUS BLD VENIPUNCTURE: CPT

## 2020-07-26 RX ORDER — MAGNESIUM CITRATE
296 SOLUTION, ORAL ORAL
Status: COMPLETED | OUTPATIENT
Start: 2020-07-26 | End: 2020-07-26

## 2020-07-26 RX ORDER — POLYETHYLENE GLYCOL 3350 17 G/17G
17 POWDER, FOR SOLUTION ORAL DAILY
Status: DISCONTINUED | OUTPATIENT
Start: 2020-07-26 | End: 2020-08-03 | Stop reason: HOSPADM

## 2020-07-26 RX ADMIN — MEMANTINE HYDROCHLORIDE 10 MG: 10 TABLET ORAL at 09:00

## 2020-07-26 RX ADMIN — POLYETHYLENE GLYCOL 3350 17 G: 17 POWDER, FOR SOLUTION ORAL at 10:39

## 2020-07-26 RX ADMIN — ASPIRIN 81 MG CHEWABLE TABLET 81 MG: 81 TABLET CHEWABLE at 08:10

## 2020-07-26 RX ADMIN — DONEPEZIL HYDROCHLORIDE 10 MG: 5 TABLET, FILM COATED ORAL at 08:15

## 2020-07-26 RX ADMIN — MAGNESIUM CITRATE 296 ML: 1.75 LIQUID ORAL at 10:39

## 2020-07-26 RX ADMIN — MEMANTINE HYDROCHLORIDE 10 MG: 10 TABLET ORAL at 18:59

## 2020-07-26 RX ADMIN — SODIUM CHLORIDE 40 MG: 9 INJECTION, SOLUTION INTRAMUSCULAR; INTRAVENOUS; SUBCUTANEOUS at 08:16

## 2020-07-26 RX ADMIN — CARVEDILOL 6.25 MG: 6.25 TABLET, FILM COATED ORAL at 18:59

## 2020-07-26 RX ADMIN — Medication 10 ML: at 21:21

## 2020-07-26 RX ADMIN — SODIUM CHLORIDE 40 MG: 9 INJECTION, SOLUTION INTRAMUSCULAR; INTRAVENOUS; SUBCUTANEOUS at 21:21

## 2020-07-26 RX ADMIN — CARVEDILOL 6.25 MG: 6.25 TABLET, FILM COATED ORAL at 08:10

## 2020-07-26 RX ADMIN — DEXTROSE MONOHYDRATE AND SODIUM CHLORIDE 50 ML/HR: 5; .45 INJECTION, SOLUTION INTRAVENOUS at 04:51

## 2020-07-26 RX ADMIN — ACETAMINOPHEN 650 MG: 325 TABLET ORAL at 08:10

## 2020-07-26 NOTE — PROGRESS NOTES
Problem: Pressure Injury - Risk of  Goal: *Prevention of pressure injury  Description: Document Newton Scale and appropriate interventions in the flowsheet. Outcome: Progressing Towards Goal  Note: Pressure Injury Interventions:  Sensory Interventions: Assess changes in LOC, Avoid rigorous massage over bony prominences    Moisture Interventions: Apply protective barrier, creams and emollients, Absorbent underpads    Activity Interventions: Increase time out of bed    Mobility Interventions: Turn and reposition approx. every two hours(pillow and wedges)    Nutrition Interventions: Document food/fluid/supplement intake    Friction and Shear Interventions: Feet elevated on foot rest                Problem: Falls - Risk of  Goal: *Absence of Falls  Description: Document Louis Fall Risk and appropriate interventions in the flowsheet.   Outcome: Progressing Towards Goal  Note: Fall Risk Interventions:  Mobility Interventions: Bed/chair exit alarm    Mentation Interventions: Bed/chair exit alarm, Update white board    Medication Interventions: Bed/chair exit alarm    Elimination Interventions: Bed/chair exit alarm, Call light in reach, Patient to call for help with toileting needs    History of Falls Interventions: Bed/chair exit alarm

## 2020-07-26 NOTE — PROGRESS NOTES
I spoke to JAYDA Nam, pt's granddaughter, to advise her that Dr Gucci Manriquez placed an order for meds to alleviate constipation. She stated that she was working with case mgt to apply for medicaid so the pt can receive ot/pt since she noticed the pt's mobility declined during the pandemic. I advised Ms. Farfna that the pt sustained a skin tear on July 26, 2020 which was assessed by the wound nurse. Ms. Mikayla Bowser stated she was unaware of the pt's wound but thanked me for the info. Ms Mikayla Bowser stated she had no further questions at this time.

## 2020-07-26 NOTE — PROGRESS NOTES
Problem: Pressure Injury - Risk of  Goal: *Prevention of pressure injury  Description: Document Newton Scale and appropriate interventions in the flowsheet. Outcome: Progressing Towards Goal  Note: Pressure Injury Interventions:  Sensory Interventions: Avoid rigorous massage over bony prominences    Moisture Interventions: Absorbent underpads, Apply protective barrier, creams and emollients, Assess need for specialty bed    Activity Interventions: Increase time out of bed    Mobility Interventions: Chair cushion    Nutrition Interventions: Document food/fluid/supplement intake    Friction and Shear Interventions: Feet elevated on foot rest, Apply protective barrier, creams and emollients                Problem: Patient Education: Go to Patient Education Activity  Goal: Patient/Family Education  Outcome: Progressing Towards Goal     Problem: Falls - Risk of  Goal: *Absence of Falls  Description: Document Louis Fall Risk and appropriate interventions in the flowsheet.   Outcome: Progressing Towards Goal  Note: Fall Risk Interventions:  Mobility Interventions: Bed/chair exit alarm, Communicate number of staff needed for ambulation/transfer    Mentation Interventions: Bed/chair exit alarm    Medication Interventions: Bed/chair exit alarm    Elimination Interventions: Bed/chair exit alarm, Call light in reach    History of Falls Interventions: Bed/chair exit alarm         Problem: Patient Education: Go to Patient Education Activity  Goal: Patient/Family Education  Outcome: Progressing Towards Goal     Problem: Patient Education: Go to Patient Education Activity  Goal: Patient/Family Education  Outcome: Progressing Towards Goal     Problem: Patient Education: Go to Patient Education Activity  Goal: Patient/Family Education  Outcome: Progressing Towards Goal

## 2020-07-26 NOTE — PROGRESS NOTES
Bedside shift change report given to RADHA Liang (oncoming nurse) by Temi Waddell RN (offgoing nurse).  Report included the following information SBAR and Kardex.     Zone Phone:        Significant changes during shift: None            Patient Information     Juany Satya Sr  80 y.o.  7/20/2020  4:31 PM by Mara London MD. Benito Farfan Sr was admitted from Home     Problem List             Patient Active Problem List     Diagnosis Date Noted    Severe protein-calorie malnutrition ([address] 07/21/2020    Weight loss 07/20/2020    Unsteady gait 07/20/2020    Dehydration 07/20/2020    Acute renal failure superimposed on stage 3 chronic kidney disease ([address] 07/20/2020    Paroxysmal VT ([address] 11/07/2019    Cardiomyopathy ([address] 11/07/2019    Alcohol withdrawal syndrome, with delirium ([address] 11/01/2019    A-fib ([address] 10/27/2019    Syncope 01/09/2019    Primary insomnia 01/09/2019    Mild depression ([address] 06/12/2018    Acute bilateral low back pain without sciatica 05/30/2018    Alcoholism ([address] 04/92/3794    Metabolic encephalopathy 55/56/5706    TIA (transient ischemic attack) 04/12/2018    Medicare annual wellness visit, subsequent 09/01/2017    Diverticulosis 07/18/2017    Gastritis and duodenitis 07/18/2017    Internal hemorrhoids 07/18/2017    Hypertension with renal disease 07/18/2017    Mixed hyperlipidemia 07/18/2017    GI bleed 07/18/2017    Primary osteoarthritis involving multiple joints 07/18/2017    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, without long-term current use of insulin ([address] 07/18/2017    CKD (chronic kidney disease), stage II 07/18/2017    Back pain 07/18/2017    Anemia 07/18/2017    Alzheimer disease ([address] 07/18/2017    SSS (sick sinus syndrome) ([address] 06/02/2015    S/P cardiac pacemaker procedure 05/04/2015    S/P ablation of accessory bypass tract 05/04/2015    SVT (supraventricular tachycardia) (HCC)--s/p RFA 04/28/2015   [facility] syncope 03/11/2015    ASCVD (arteriosclerotic cardiovascular disease) 07/11/2014    Hypokalemia 07/11/2014    Right inguinal hernia 06/12/2014              Past Medical History:   Diagnosis Date    Abdominal pain 7/18/2017    Alzheimer disease ([facility] 7/18/2017    Anemia 7/18/2017    Arthritis      Back pain 7/18/2017    Bradycardia 7/18/2017    CAD (coronary artery disease)       hx of MI    CKD (chronic kidney disease), stage II 7/18/2017    constipation      Depression 7/18/2017    Diabetes mellitus ([facility] 7/18/2017    Diverticulosis 7/18/2017    DJD (degenerative joint disease) 7/18/2017    Encounter for long-term (current) use of other medications 7/18/2017    Fatigue      Gastritis and duodenitis 7/18/2017    GERD (gastroesophageal reflux disease)      GI bleed 7/18/2017    Hearing loss      Hyperlipidemia 7/18/2017    Hypertension      Hypertension with renal disease 7/18/2017    Ill-defined condition       Dementia    Internal hemorrhoids 7/18/2017    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    S/P cardiac pacemaker procedure 5/4/2015     5/4/15 Medtronic dual chamber pacemaker implant     Weight loss       lost over 40 pounds last year- has no appetite            Core Measures:     CVA: No No  CHF:No No  PNA:No No     Activity Status:     OOB to Chair Yes  Ambulated this shift Yes   Bed Rest No        DVT prophylaxis:     DVT prophylaxis Med- No  DVT prophylaxis SCD or LISSY- Yes        Patient Safety:     Falls Score Total Score: 4  Safety Level high  Bed Alarm On? Yes       Plan for upcoming shift: safety, monitor Right forearm IV site- patient tries to remove     Discharge Plan: Yes, home health     Active Consults:

## 2020-07-26 NOTE — PROGRESS NOTES
INTERNAL MEDICINE ATTENDING NOTE    S: Mr. Lucas Gamboa is a patient of Amber Parrish MD and was seen by me today during rounds. At this time, he is resting comfortably. The patient has no new complaints today. His nurse raised concern about constipation, with no BM in several days. ROS otherwise unremarkable except as noted elsewhere. O: Blood pressure 139/76, pulse 80, temperature 98.8 °F (37.1 °C), resp. rate 18, height 5' 9\" (1.753 m), weight 114 lb 10.2 oz (52 kg), SpO2 100 %. Gen: Patient is in no acute distress. Lungs: CTAB. Heart: RRR. Abd: S, NT, ND, BS present. Extremities: Warm. Recent Results (from the past 12 hour(s))   METABOLIC PANEL, COMPREHENSIVE    Collection Time: 07/26/20  4:52 AM   Result Value Ref Range    Sodium 141 136 - 145 mmol/L    Potassium 3.9 3.5 - 5.1 mmol/L    Chloride 107 97 - 108 mmol/L    CO2 30 21 - 32 mmol/L    Anion gap 4 (L) 5 - 15 mmol/L    Glucose 102 (H) 65 - 100 mg/dL    BUN 23 (H) 6 - 20 MG/DL    Creatinine 1.18 0.70 - 1.30 MG/DL    BUN/Creatinine ratio 19 12 - 20      GFR est AA >60 >60 ml/min/1.73m2    GFR est non-AA 58 (L) >60 ml/min/1.73m2    Calcium 8.4 (L) 8.5 - 10.1 MG/DL    Bilirubin, total 0.7 0.2 - 1.0 MG/DL    ALT (SGPT) 20 12 - 78 U/L    AST (SGOT) 20 15 - 37 U/L    Alk.  phosphatase 62 45 - 117 U/L    Protein, total 5.5 (L) 6.4 - 8.2 g/dL    Albumin 2.3 (L) 3.5 - 5.0 g/dL    Globulin 3.2 2.0 - 4.0 g/dL    A-G Ratio 0.7 (L) 1.1 - 2.2     CBC WITH AUTOMATED DIFF    Collection Time: 07/26/20  4:52 AM   Result Value Ref Range    WBC 7.4 4.1 - 11.1 K/uL    RBC 3.82 (L) 4.10 - 5.70 M/uL    HGB 10.3 (L) 12.1 - 17.0 g/dL    HCT 33.1 (L) 36.6 - 50.3 %    MCV 86.6 80.0 - 99.0 FL    MCH 27.0 26.0 - 34.0 PG    MCHC 31.1 30.0 - 36.5 g/dL    RDW 14.5 11.5 - 14.5 %    PLATELET 654 669 - 249 K/uL    MPV 11.1 8.9 - 12.9 FL    NRBC 0.0 0  WBC    ABSOLUTE NRBC 0.00 0.00 - 0.01 K/uL    NEUTROPHILS 74 32 - 75 %    LYMPHOCYTES 11 (L) 12 - 49 % MONOCYTES 12 5 - 13 %    EOSINOPHILS 2 0 - 7 %    BASOPHILS 1 0 - 1 %    IMMATURE GRANULOCYTES 0 0.0 - 0.5 %    ABS. NEUTROPHILS 5.5 1.8 - 8.0 K/UL    ABS. LYMPHOCYTES 0.8 0.8 - 3.5 K/UL    ABS. MONOCYTES 0.9 0.0 - 1.0 K/UL    ABS. EOSINOPHILS 0.1 0.0 - 0.4 K/UL    ABS. BASOPHILS 0.1 0.0 - 0.1 K/UL    ABS. IMM. GRANS. 0.0 0.00 - 0.04 K/UL    DF SMEAR SCANNED      RBC COMMENTS ANISOCYTOSIS  1+            A / P:  1. Syncope (1/9/2019): Telemetry. 2. SSS (sick sinus syndrome) ([facility] (6/2/2015)  3. 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): Holding meds at this time due to hypotension; add back as needed. 4. 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, without long-term current use of insulin ([facility] (7/18/2017): Gluc okay. 5. Alcoholism ([facility] (4/23/2018): Watch for signs of withdrawal.   6. Cardiomyopathy ([facility] (11/7/2019)  7. Weight loss (7/20/2020)  8. Unsteady gait (7/20/2020): PT / OT. 9. Dehydration (7/20/2020): Improved with IV hydration. 10. Acute renal failure superimposed on stage 3 chronic kidney disease ([address] (7/20/2020): Improved with hydration. 11. Severe protein-calorie malnutrition ([address] (7/21/2020)  12. Esophageal dyskinesia (7/23/2020): s/p Botox at GE junction. GI consulted--recommends advancing diet. 13. Constipation: One time mag citrate. I'll also add daily miralax.       Greg Garcia MD, FACP  Best contact is via Pager: [phone], or via hospital  at [phone]

## 2020-07-27 ENCOUNTER — APPOINTMENT (OUTPATIENT)
Dept: GENERAL RADIOLOGY | Age: 85
DRG: 682 | End: 2020-07-27
Attending: INTERNAL MEDICINE
Payer: MEDICARE

## 2020-07-27 LAB
ALBUMIN SERPL-MCNC: 2.3 G/DL (ref 3.5–5)
ALBUMIN/GLOB SERPL: 0.7 {RATIO} (ref 1.1–2.2)
ALP SERPL-CCNC: 68 U/L (ref 45–117)
ALT SERPL-CCNC: 20 U/L (ref 12–78)
ANION GAP SERPL CALC-SCNC: 3 MMOL/L (ref 5–15)
AST SERPL-CCNC: 24 U/L (ref 15–37)
BASOPHILS # BLD: 0 K/UL (ref 0–0.1)
BASOPHILS NFR BLD: 1 % (ref 0–1)
BILIRUB SERPL-MCNC: 0.5 MG/DL (ref 0.2–1)
BUN SERPL-MCNC: 24 MG/DL (ref 6–20)
BUN/CREAT SERPL: 22 (ref 12–20)
CALCIUM SERPL-MCNC: 8.1 MG/DL (ref 8.5–10.1)
CHLORIDE SERPL-SCNC: 107 MMOL/L (ref 97–108)
CO2 SERPL-SCNC: 30 MMOL/L (ref 21–32)
CREAT SERPL-MCNC: 1.09 MG/DL (ref 0.7–1.3)
DIFFERENTIAL METHOD BLD: ABNORMAL
EOSINOPHIL # BLD: 0.1 K/UL (ref 0–0.4)
EOSINOPHIL NFR BLD: 2 % (ref 0–7)
ERYTHROCYTE [DISTWIDTH] IN BLOOD BY AUTOMATED COUNT: 14.6 % (ref 11.5–14.5)
GLOBULIN SER CALC-MCNC: 3.2 G/DL (ref 2–4)
GLUCOSE SERPL-MCNC: 138 MG/DL (ref 65–100)
HCT VFR BLD AUTO: 33.5 % (ref 36.6–50.3)
HGB BLD-MCNC: 10.3 G/DL (ref 12.1–17)
IMM GRANULOCYTES # BLD AUTO: 0 K/UL (ref 0–0.04)
IMM GRANULOCYTES NFR BLD AUTO: 1 % (ref 0–0.5)
LYMPHOCYTES # BLD: 1 K/UL (ref 0.8–3.5)
LYMPHOCYTES NFR BLD: 16 % (ref 12–49)
MCH RBC QN AUTO: 26.8 PG (ref 26–34)
MCHC RBC AUTO-ENTMCNC: 30.7 G/DL (ref 30–36.5)
MCV RBC AUTO: 87.2 FL (ref 80–99)
MONOCYTES # BLD: 0.8 K/UL (ref 0–1)
MONOCYTES NFR BLD: 12 % (ref 5–13)
NEUTS SEG # BLD: 4.3 K/UL (ref 1.8–8)
NEUTS SEG NFR BLD: 68 % (ref 32–75)
NRBC # BLD: 0 K/UL (ref 0–0.01)
NRBC BLD-RTO: 0 PER 100 WBC
PLATELET # BLD AUTO: 186 K/UL (ref 150–400)
PMV BLD AUTO: 11.1 FL (ref 8.9–12.9)
POTASSIUM SERPL-SCNC: 4.1 MMOL/L (ref 3.5–5.1)
PROT SERPL-MCNC: 5.5 G/DL (ref 6.4–8.2)
RBC # BLD AUTO: 3.84 M/UL (ref 4.1–5.7)
SODIUM SERPL-SCNC: 140 MMOL/L (ref 136–145)
WBC # BLD AUTO: 6.3 K/UL (ref 4.1–11.1)

## 2020-07-27 PROCEDURE — 73630 X-RAY EXAM OF FOOT: CPT

## 2020-07-27 PROCEDURE — 74011250637 HC RX REV CODE- 250/637: Performed by: INTERNAL MEDICINE

## 2020-07-27 PROCEDURE — 65660000000 HC RM CCU STEPDOWN

## 2020-07-27 PROCEDURE — 97116 GAIT TRAINING THERAPY: CPT | Performed by: PHYSICAL THERAPIST

## 2020-07-27 PROCEDURE — 36415 COLL VENOUS BLD VENIPUNCTURE: CPT

## 2020-07-27 PROCEDURE — 85025 COMPLETE CBC W/AUTO DIFF WBC: CPT

## 2020-07-27 PROCEDURE — 94760 N-INVAS EAR/PLS OXIMETRY 1: CPT

## 2020-07-27 PROCEDURE — 80053 COMPREHEN METABOLIC PANEL: CPT

## 2020-07-27 RX ORDER — COLCHICINE 0.6 MG/1
0.6 TABLET ORAL DAILY
Status: DISCONTINUED | OUTPATIENT
Start: 2020-07-27 | End: 2020-08-03 | Stop reason: HOSPADM

## 2020-07-27 RX ORDER — PANTOPRAZOLE SODIUM 40 MG/1
40 TABLET, DELAYED RELEASE ORAL
Status: DISCONTINUED | OUTPATIENT
Start: 2020-07-27 | End: 2020-08-03 | Stop reason: HOSPADM

## 2020-07-27 RX ORDER — SODIUM CHLORIDE 0.9 % (FLUSH) 0.9 %
5-40 SYRINGE (ML) INJECTION AS NEEDED
Status: DISCONTINUED | OUTPATIENT
Start: 2020-07-27 | End: 2020-08-03 | Stop reason: HOSPADM

## 2020-07-27 RX ORDER — CEPHALEXIN 250 MG/1
500 CAPSULE ORAL EVERY 12 HOURS
Status: DISCONTINUED | OUTPATIENT
Start: 2020-07-27 | End: 2020-08-03 | Stop reason: HOSPADM

## 2020-07-27 RX ORDER — SODIUM CHLORIDE 0.9 % (FLUSH) 0.9 %
5-40 SYRINGE (ML) INJECTION EVERY 8 HOURS
Status: DISCONTINUED | OUTPATIENT
Start: 2020-07-27 | End: 2020-08-03 | Stop reason: HOSPADM

## 2020-07-27 RX ADMIN — CEPHALEXIN 500 MG: 250 CAPSULE ORAL at 09:21

## 2020-07-27 RX ADMIN — CARVEDILOL 6.25 MG: 6.25 TABLET, FILM COATED ORAL at 09:20

## 2020-07-27 RX ADMIN — COLCHICINE 0.6 MG: 0.6 TABLET ORAL at 14:56

## 2020-07-27 RX ADMIN — CARVEDILOL 6.25 MG: 6.25 TABLET, FILM COATED ORAL at 17:51

## 2020-07-27 RX ADMIN — MEMANTINE HYDROCHLORIDE 10 MG: 10 TABLET ORAL at 17:51

## 2020-07-27 RX ADMIN — POLYETHYLENE GLYCOL 3350 17 G: 17 POWDER, FOR SOLUTION ORAL at 09:20

## 2020-07-27 RX ADMIN — PANTOPRAZOLE SODIUM 40 MG: 40 TABLET, DELAYED RELEASE ORAL at 17:51

## 2020-07-27 RX ADMIN — ASPIRIN 81 MG CHEWABLE TABLET 81 MG: 81 TABLET CHEWABLE at 09:20

## 2020-07-27 RX ADMIN — CEPHALEXIN 500 MG: 250 CAPSULE ORAL at 21:14

## 2020-07-27 RX ADMIN — PANTOPRAZOLE SODIUM 40 MG: 40 TABLET, DELAYED RELEASE ORAL at 09:27

## 2020-07-27 RX ADMIN — MEMANTINE HYDROCHLORIDE 10 MG: 10 TABLET ORAL at 09:20

## 2020-07-27 RX ADMIN — DONEPEZIL HYDROCHLORIDE 10 MG: 5 TABLET, FILM COATED ORAL at 09:20

## 2020-07-27 NOTE — PROGRESS NOTES
Notified MD Javan Harris of pt's R foot, warm, swelling, tender to touch. at top of foot and joint of big toe

## 2020-07-27 NOTE — PROGRESS NOTES
Duodenitis, esophagitis  Duodenitis, esophagitis  [facility]  [facility] change report given to Johann De and Sophia, RN (oncoming nurse) by Abimael Murray, RADHA (offgoing nurse).  Report included the following information    Zone Phone:      Significant changes during shift:  NONE        Patient Information    Mane Farfan Sr  80 y.o.  7/20/2020  4:31 PM by Robi Avila MD. Mane Smartom Sr  Problem List    Patient Active Problem List    Diagnosis Date Noted    Esophageal dyskinesia 07/23/2020    Severe protein-calorie malnutrition ([address] 07/21/2020    Weight loss 07/20/2020    Unsteady gait 07/20/2020    Dehydration 07/20/2020    Acute renal failure superimposed on stage 3 chronic kidney disease ([address] 07/20/2020    Paroxysmal VT ([address] 11/07/2019    Cardiomyopathy ([address] 11/07/2019    Alcohol withdrawal syndrome, with delirium ([address] 11/01/2019    A-fib ([address] 10/27/2019    Syncope 01/09/2019    Primary insomnia 01/09/2019    Mild depression ([address] 06/12/2018    Acute bilateral low back pain without sciatica 05/30/2018    Alcoholism ([address] 69/05/1375    Metabolic encephalopathy 75/91/1456    TIA (transient ischemic attack) 04/12/2018    Medicare annual wellness visit, subsequent 09/01/2017    Diverticulosis 07/18/2017    Gastritis and duodenitis 07/18/2017    Internal hemorrhoids 07/18/2017    Hypertension with renal disease 07/18/2017    Mixed hyperlipidemia 07/18/2017    GI bleed 07/18/2017    Primary osteoarthritis involving multiple joints 07/18/2017    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, without long-term current use of insulin ([address] 07/18/2017    CKD (chronic kidney disease), stage II 07/18/2017    Back pain 07/18/2017    Anemia 07/18/2017    Alzheimer disease ([address] 07/18/2017    SSS (sick sinus syndrome) ([address] 06/02/2015    S/P cardiac pacemaker procedure 05/04/2015    S/P ablation of accessory bypass tract 05/04/2015    SVT (supraventricular tachycardia) (HCC)--s/p RFA 04/28/2015    Near syncope 03/11/2015    ASCVD (arteriosclerotic cardiovascular disease) 07/11/2014    Hypokalemia 07/11/2014    Right inguinal hernia 06/12/2014     Past Medical History:   Diagnosis Date    Abdominal pain 7/18/2017    Alzheimer disease ([facility] 7/18/2017    Anemia 7/18/2017    Arthritis     Back pain 7/18/2017    Bradycardia 7/18/2017    CAD (coronary artery disease)     hx of MI    CKD (chronic kidney disease), stage II 7/18/2017    constipation     Depression 7/18/2017    Diabetes mellitus ([facility] 7/18/2017    Diverticulosis 7/18/2017    DJD (degenerative joint disease) 7/18/2017    Encounter for long-term (current) use of other medications 7/18/2017    Fatigue     Gastritis and duodenitis 7/18/2017    GERD (gastroesophageal reflux disease)     GI bleed 7/18/2017    Hearing loss     Hyperlipidemia 7/18/2017    Hypertension     Hypertension with renal disease 7/18/2017    Ill-defined condition     Dementia    Internal hemorrhoids 7/18/2017    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    S/P cardiac pacemaker procedure 5/4/2015    5/4/15 Medtronic dual chamber pacemaker implant     Weight loss     lost over 40 pounds last year- has no appetite               Activity Status:    OOB to Chair YES  Ambulated this shift YES  Bed Rest NO                  Wounds: (If Applicable)    Wounds- SKIN TEAR FROM INFILTRATION    Location LEFT ARM    Patient Safety:    Falls Score Total Score: 4  Safety Level_______  Bed Alarm On?  YES      Plan for upcoming shift: Scans          Active Consults:  IP CONSULT TO GASTROENTEROLOGY

## 2020-07-27 NOTE — PROGRESS NOTES
VITOR:    Inpatient Rehab   FOC document  COVID test  EMTALA  2nd IM Medicare Letter    Medical Transport     CM: Pooja Peña is currently working with pt in the Neuro Unit. Pt is being recommended by MD for inpatient rehab. CM attempted contact pt's family: Mechelle Alba, via telephone regarding recommendations and referrals. However, attempt was unsuccessful. CM contact Cole Pruitt, via telephone to speak with her about recommendations and services. Cole Pruitt was agreeable to inpatient rehab and wanted CM to send referral to [facility]. CM sent referral (pending). Pt will require COVID negative prior to d/c before transitioning to facility. Pt will require EMTALA provided by pt's nurse and MD.  Pt will require 2nd IM Medicare letter and medial transfer at the time of d/c. CM will continue to follow. Jannie Delatorre, MSCLEO, [address] [place] [address]

## 2020-07-27 NOTE — PROGRESS NOTES
PROGRESS NOTE    NAME:  León Werner    :   1931   MRN:   [id number]     Date/Time:  2020 6:02 AM  Subjective:   History:  Chart reviewed and patient seen and examined and discussed with his nurses and all events noted. He presented to the office on  after a long absence not having been seen there since 2019. Hari Arnold was accompanied by his granddaughter and was there  in follow-up of his medical problems to include hypertension, diabetes, hyperlipidemia, atherosclerotic coronary vascular disease, cardiomyopathy, Alzheimer's dementia, history of PAF, prior TIA, history of alcoholism and other multiple medical problems.  In addition to his chronic problems he had a significant issue now off weight loss from 134 pounds at his last visit down to 115.7 pounds. Hari Arnold had become very unsteady walking according to his granddaughter which was easily visible on exam in the office. Hari Arnold also had developed a poor appetite and has a hard time keeping food down and thus eating very little.  He actually even had had some difficulty keeping liquids down.      He denies any current chest pain, shortness of breath, palpitations, PND, orthopnea or other cardiorespiratory complaints.  He  has no current  complaints.  As far as the GI complaints there is a poor appetite with the inability to keep some food down as well as liquids as well as some mild upper abdominal discomfort but no evidence of diarrhea.  No fevers, chills or night sweats have been noted.  From a neurologic standpoint he has had progressive decreased memory and progressive unsteadiness on his feet.  There are no current active arthritic complaints.  All of the above history was confirmed by his granddaughter present with him. Merissa Todd is his primary caretaker.   With this history it was felt prudent to admit him to the hospital for further work-up evaluation and treatment and he was sent to the hospital for direct admission however while going through admissions he became lightheaded and unresponsive and was emergently taken to the emergency room for admission. Today he is more alert after hydration and and oriented to name and hospital [facility]) today and knows me by name today. He denies Cardiac or respiratory c/o. Swallowing difficulty improved and tolerated full liquids after EGD with Botox on . He is generally weak w/o focal neurologic c/o and as noted memory is better than on admission. .    Medications reviewed:  Current Facility-Administered Medications   Medication Dose Route Frequency    polyethylene glycol (MIRALAX) packet 17 g  17 g Oral DAILY    sodium chloride (NS) flush 5-40 mL  5-40 mL IntraVENous Q8H    sodium chloride (NS) flush 5-40 mL  5-40 mL IntraVENous PRN    aspirin chewable tablet 81 mg  81 mg Oral DAILY    carvediloL (COREG) tablet 6.25 mg  6.25 mg Oral BID    donepeziL (ARICEPT) tablet 10 mg  10 mg Oral DAILY    memantine (NAMENDA) tablet 10 mg  10 mg Oral BID    temazepam (RESTORIL) capsule 15 mg  15 mg Oral QHS PRN    sodium chloride (NS) flush 5-40 mL  5-40 mL IntraVENous Q8H    sodium chloride (NS) flush 5-40 mL  5-40 mL IntraVENous PRN    acetaminophen (TYLENOL) tablet 650 mg  650 mg Oral Q4H PRN    dextrose 5 % - 0.45% NaCl infusion  50 mL/hr IntraVENous CONTINUOUS    pantoprazole (PROTONIX) 40 mg in 0.9% sodium chloride 10 mL injection  40 mg IntraVENous Q12H        Objective:   Vitals:  Visit Vitals  /68   Pulse 66   Temp 98.2 °F (36.8 °C)   Resp 16   Ht 5' 9\" (1.753 m)   Wt 114 lb 10.2 oz (52 kg)   SpO2 94%   BMI 16.93 kg/m²      O2 Device: Room air Temp (24hrs), Av.2 °F (36.8 °C), Min:97.3 °F (36.3 °C), Max:98.8 °F (37.1 °C)      Last 24hr Input/Output:  No intake or output data in the 24 hours ending 20 0601     PHYSICAL EXAM:  General:     Alert, cooperative, no distress, appears stated age. Head:    Normocephalic, without obvious abnormality, atraumatic.   Eyes: Conjunctivae/corneas clear. PERRLA  Nose:   Nares normal. No drainage or sinus tenderness. Throat:     Lips, mucosa, and tongue normal.  No Thrush  Neck:   Supple, symmetrical,  no adenopathy, thyroid: non tender     no carotid bruit and no JVD. Back:     Symmetric,  No CVA tenderness. Lungs:    Clear to auscultation bilaterally. No Wheezing or Rhonchi. No rales. Heart:    Regular rate and rhythm,  no murmur, rub or gallop. Abdomen:    Soft, non-tender. Not distended. Bowel sounds normal. No masses  Extremities:  Extremities normal, atraumatic, No cyanosis. No edema. No clubbing  Lymph nodes:  Cervical, supraclavicular normal.  Neurologic:  General decreased strength, Alert and oriented X 1. He does know me by name today  Skin:                  No rash      Lab Data Reviewed:    Recent Results (from the past 24 hour(s))   METABOLIC PANEL, COMPREHENSIVE    Collection Time: 07/27/20  3:33 AM   Result Value Ref Range    Sodium 140 136 - 145 mmol/L    Potassium 4.1 3.5 - 5.1 mmol/L    Chloride 107 97 - 108 mmol/L    CO2 30 21 - 32 mmol/L    Anion gap 3 (L) 5 - 15 mmol/L    Glucose 138 (H) 65 - 100 mg/dL    BUN 24 (H) 6 - 20 MG/DL    Creatinine 1.09 0.70 - 1.30 MG/DL    BUN/Creatinine ratio 22 (H) 12 - 20      GFR est AA >60 >60 ml/min/1.73m2    GFR est non-AA >60 >60 ml/min/1.73m2    Calcium 8.1 (L) 8.5 - 10.1 MG/DL    Bilirubin, total 0.5 0.2 - 1.0 MG/DL    ALT (SGPT) 20 12 - 78 U/L    AST (SGOT) 24 15 - 37 U/L    Alk.  phosphatase 68 45 - 117 U/L    Protein, total 5.5 (L) 6.4 - 8.2 g/dL    Albumin 2.3 (L) 3.5 - 5.0 g/dL    Globulin 3.2 2.0 - 4.0 g/dL    A-G Ratio 0.7 (L) 1.1 - 2.2     CBC WITH AUTOMATED DIFF    Collection Time: 07/27/20  3:33 AM   Result Value Ref Range    WBC 6.3 4.1 - 11.1 K/uL    RBC 3.84 (L) 4.10 - 5.70 M/uL    HGB 10.3 (L) 12.1 - 17.0 g/dL    HCT 33.5 (L) 36.6 - 50.3 %    MCV 87.2 80.0 - 99.0 FL    MCH 26.8 26.0 - 34.0 PG    MCHC 30.7 30.0 - 36.5 g/dL    RDW 14.6 (H) 11.5 - 14.5 % PLATELET 035 146 - 030 K/uL    MPV 11.1 8.9 - 12.9 FL    NRBC 0.0 0  WBC    ABSOLUTE NRBC 0.00 0.00 - 0.01 K/uL    NEUTROPHILS 68 32 - 75 %    LYMPHOCYTES 16 12 - 49 %    MONOCYTES 12 5 - 13 %    EOSINOPHILS 2 0 - 7 %    BASOPHILS 1 0 - 1 %    IMMATURE GRANULOCYTES 1 (H) 0.0 - 0.5 %    ABS. NEUTROPHILS 4.3 1.8 - 8.0 K/UL    ABS. LYMPHOCYTES 1.0 0.8 - 3.5 K/UL    ABS. MONOCYTES 0.8 0.0 - 1.0 K/UL    ABS. EOSINOPHILS 0.1 0.0 - 0.4 K/UL    ABS. BASOPHILS 0.0 0.0 - 0.1 K/UL    ABS. IMM. GRANS. 0.0 0.00 - 0.04 K/UL    DF AUTOMATED           Assessment/Plan:     Principal Problem:    Syncope (1/9/2019)    Active Problems:    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)      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, without long-term current use of insulin ([facility] (7/18/2017)      SSS (sick sinus syndrome) ([facility] (6/2/2015)      Cardiomyopathy ([facility] (11/7/2019)      Alcoholism ([address] (4/23/2018)      Weight loss (7/20/2020)      Unsteady gait (7/20/2020)      Dehydration (7/20/2020)      Acute renal failure superimposed on stage 3 chronic kidney disease ([address] (7/20/2020)      Severe protein-calorie malnutrition ([address] (7/21/2020)      Esophageal dyskinesia (7/23/2020)       ___________________________________________________  PLAN:  1. Continue  telemetry bed with monitoring  2. Decrease IV hydration D5 half-normal saline to saline lock now  3. Hold Lasix and all current hypertensive medications except continue Coreg with his cardiomyopathy  4. Continue IV Protonix  5.  GI consult notes reviewed and note Botox in lower esophagus for marked dyskinesia   6. Continue current Alzheimer's medications as ordered   7. According to his granddaughter who is his caretaker has not drank alcohol in 3 months but will watch closely for any signs of withdrawal with known alcoholism and prior withdrawal  8. Follow renal function, 51/2.36 on admission to 24/1.09 now  9.   Replete potassium currently 4.1, improved  10. Checked magnesium level - normal  11. Follow blood sugar and will cover with sliding scale if becomes elevated  12. Home vs Rehab pending on Pt recommendations       Addendum:  Phone call with his granddaughter (POA) 7/22 AM and I discussed the Ba Swallow and further plans pending results of this, I also discussed the rude behavior of his daughter (mother of the granddaughter whom I spoke with) and explained that this it totally unacceptable!    This was also D/W his nurse Eugenia Fuentes          If need to contact me use hospital  [phone], DO NOT USE PERFECT SERVE    ___________________________________________________    Attending Physician: Robi Avila MD

## 2020-07-27 NOTE — PROGRESS NOTES
Problem: Mobility Impaired (Adult and Pediatric)  Goal: *Acute Goals and Plan of Care (Insert Text)  Description: FUNCTIONAL STATUS PRIOR TO ADMISSION: Patient poor historian. He reports independence at baseline without use of any assistive device. HOME SUPPORT PRIOR TO ADMISSION: The patient lived with granddaughter. Patient reports granddaughter works during the day. Physical Therapy Goals  Initiated 7/22/2020  1. Patient will move from supine to sit and sit to supine , scoot up and down, and roll side to side in bed with independence within 7 day(s). 2.  Patient will transfer from bed to chair and chair to bed with supervision/set-up using the least restrictive device within 7 day(s). 3.  Patient will perform sit to stand with supervision/set-up within 7 day(s). 4.  Patient will ambulate with supervision/set-up for 250 feet with the least restrictive device within 7 day(s). Outcome: Progressing Towards Goal   PHYSICAL THERAPY TREATMENT  Patient: Domonique Rosario Sr (80 y.o. male)  Date: 7/27/2020  Diagnosis: Syncope [R55]   Syncope  Procedure(s) (LRB):  ESOPHAGOGASTRODUODENOSCOPY (EGD) (N/A)  ESOPHAGOGASTRODUODENAL (EGD) BIOPSY (N/A)  INJECTION (N/A) 4 Days Post-Op  Precautions:  falls  Chart, physical therapy assessment, plan of care and goals were reviewed. ASSESSMENT  Patient continues with skilled PT services. He reports increased pain in R foot which is limiting functional mobility and balance today. Patient able to complete bed mobility with supervision and requires CGA for transfers and ambulation. Gait is antalgic and unsteady today demonstrating widened base of support that is shifted to L and decreased weight bearing through R foot noted. Family is now agreeable to rehab, therefore recommend inpatient rehab following discharge.     Current Level of Function Impacting Discharge (mobility/balance): CGA            PLAN :  Patient continues to benefit from skilled intervention to address the above impairments. Continue treatment per established plan of care. to address goals. Recommendation for discharge: (in order for the patient to meet his/her long term goals)  Therapy 3 hours per day 5-7 days per week    This discharge recommendation:  Has been made in collaboration with the attending provider and/or case management    IF patient discharges home will need the following DME: rolling walker       SUBJECTIVE:   Patient stated Wow, my foot really hurts when I step on it.     OBJECTIVE DATA SUMMARY:   Critical Behavior:  Neurologic State: Alert  Orientation Level: Oriented to person, Oriented to place  Cognition: Appropriate safety awareness  Safety/Judgement: Decreased awareness of need for assistance, Decreased awareness of need for safety, Decreased insight into deficits  Functional Mobility Training:  Bed Mobility:  Rolling: Supervision  Supine to Sit: Supervision     Scooting: Supervision        Transfers:  Sit to Stand: Contact guard assistance  Stand to Sit: Contact guard assistance                             Balance:  Sitting: Intact  Standing: Impaired  Standing - Static: Fair;Constant support  Standing - Dynamic : Fair;Constant support  Ambulation/Gait Training:  Distance (ft): 15 Feet (ft)  Assistive Device: Walker, rolling;Gait belt  Ambulation - Level of Assistance: Contact guard assistance        Gait Abnormalities: Antalgic;Decreased step clearance        Base of Support: Widened;Shift to left     Speed/Maureen: Pace decreased (<100 feet/min); Slow  Step Length: Left shortened         Gait is slow and antalgic with decreased weight bearing through R foot       Pain Rating:  Patient reports increased pain in R foot but did not rate    Activity Tolerance:   Fair and increased R foot pain  Please refer to the flowsheet for vital signs taken during this treatment.     After treatment patient left in no apparent distress:   Sitting in chair, Call bell within reach, and Bed / chair alarm activated    COMMUNICATION/COLLABORATION:   The patients plan of care was discussed with: Registered nurse.      Bonny Vega, PT   Time Calculation: 9 mins

## 2020-07-27 NOTE — PROGRESS NOTES
Bedside shift change report given to Alberto Gonzalez RN (oncoming nurse) by Eric Golden RN  (offgoing nurse). Report included the following information SBAR, Kardex, ED Summary and OR Summary.     Zone Phone: [phone]        Significant changes during shift:   xray of foot completed, started on keflex and colchicine pt up to chair w/ PT. Dressing to arm changed.          Patient Information     Woody Farfan Sr  80 y.o.  7/20/2020  4:31 PM by Marlene Bermudez MD. Unknown Royal was admitted from Home     Problem List          Patient Active Problem List     Diagnosis Date Noted    Severe protein-calorie malnutrition ([address] 07/21/2020    Weight loss 07/20/2020    Unsteady gait 07/20/2020    Dehydration 07/20/2020    Acute renal failure superimposed on stage 3 chronic kidney disease ([address] 07/20/2020    Paroxysmal VT ([address] 11/07/2019    Cardiomyopathy ([address] 11/07/2019    Alcohol withdrawal syndrome, with delirium ([address] 11/01/2019    A-fib ([address] 10/27/2019    Syncope 01/09/2019    Primary insomnia 01/09/2019    Mild depression ([address] 06/12/2018    Acute bilateral low back pain without sciatica 05/30/2018    Alcoholism ([address] 90/37/4678    Metabolic encephalopathy 69/68/9124    TIA (transient ischemic attack) 04/12/2018    Medicare annual wellness visit, subsequent 09/01/2017    Diverticulosis 07/18/2017    Gastritis and duodenitis 07/18/2017    Internal hemorrhoids 07/18/2017    Hypertension with renal disease 07/18/2017    Mixed hyperlipidemia 07/18/2017    GI bleed 07/18/2017    Primary osteoarthritis involving multiple joints 07/18/2017    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, without long-term current use of insulin ([address] 07/18/2017    CKD (chronic kidney disease), stage II 07/18/2017    Back pain 07/18/2017    Anemia 07/18/2017    Alzheimer disease ([address] 07/18/2017    SSS (sick sinus syndrome) ([address] 06/02/2015    S/P cardiac pacemaker procedure 05/04/2015    S/P ablation of accessory bypass tract 05/04/2015    SVT (supraventricular tachycardia) ([facility])--s/p RFA 04/28/2015    Near syncope 03/11/2015    ASCVD (arteriosclerotic cardiovascular disease) 07/11/2014    Hypokalemia 07/11/2014    Right inguinal hernia 06/12/2014          Past Medical History:   Diagnosis Date    Abdominal pain 7/18/2017    [facility] 7/18/2017    Anemia 7/18/2017    Arthritis      Back pain 7/18/2017    Bradycardia 7/18/2017    CAD (coronary artery disease)       hx of MI    CKD (chronic kidney disease), stage II 7/18/2017    constipation      Depression 7/18/2017    Diabetes mellitus ([facility] 7/18/2017    Diverticulosis 7/18/2017    DJD (degenerative joint disease) 7/18/2017    Encounter for long-term (current) use of other medications 7/18/2017    Fatigue      Gastritis and duodenitis 7/18/2017    GERD (gastroesophageal reflux disease)      GI bleed 7/18/2017    Hearing loss      Hyperlipidemia 7/18/2017    Hypertension      Hypertension with renal disease 7/18/2017    Ill-defined condition       Dementia    Internal hemorrhoids 7/18/2017    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    S/P cardiac pacemaker procedure 5/4/2015     5/4/15 Medtronic dual chamber pacemaker implant     Weight loss       lost over 40 pounds last year- has no appetite           Core Measures:     CVA: Yes Yes     Activity Status:     OOB to Chair No  Ambulated this shift No   Bed Rest Yes     DVT prophylaxis:     DVT prophylaxis Med- No  DVT prophylaxis SCD or LISSY- Refused         Patient Safety:     Falls Score Total Score: 3  Safety Level_______  Bed Alarm On? Yes  Sitter?  Yes TeleSitter      Plan for upcoming shift: ? Placement for rehab           Discharge Plan: ? rehab     Active Consults:  IP CONSULT TO GASTROENTEROLOGY

## 2020-07-28 LAB
ALBUMIN SERPL-MCNC: 2.1 G/DL (ref 3.5–5)
ALBUMIN/GLOB SERPL: 0.7 {RATIO} (ref 1.1–2.2)
ALP SERPL-CCNC: 63 U/L (ref 45–117)
ALT SERPL-CCNC: 18 U/L (ref 12–78)
ANION GAP SERPL CALC-SCNC: 4 MMOL/L (ref 5–15)
AST SERPL-CCNC: 20 U/L (ref 15–37)
BASOPHILS # BLD: 0.1 K/UL (ref 0–0.1)
BASOPHILS NFR BLD: 1 % (ref 0–1)
BILIRUB SERPL-MCNC: 0.4 MG/DL (ref 0.2–1)
BUN SERPL-MCNC: 26 MG/DL (ref 6–20)
BUN/CREAT SERPL: 22 (ref 12–20)
CALCIUM SERPL-MCNC: 8.3 MG/DL (ref 8.5–10.1)
CHLORIDE SERPL-SCNC: 106 MMOL/L (ref 97–108)
CO2 SERPL-SCNC: 29 MMOL/L (ref 21–32)
CREAT SERPL-MCNC: 1.16 MG/DL (ref 0.7–1.3)
DIFFERENTIAL METHOD BLD: ABNORMAL
EOSINOPHIL # BLD: 0.1 K/UL (ref 0–0.4)
EOSINOPHIL NFR BLD: 2 % (ref 0–7)
ERYTHROCYTE [DISTWIDTH] IN BLOOD BY AUTOMATED COUNT: 14.6 % (ref 11.5–14.5)
GLOBULIN SER CALC-MCNC: 3.2 G/DL (ref 2–4)
GLUCOSE SERPL-MCNC: 89 MG/DL (ref 65–100)
HCT VFR BLD AUTO: 32.3 % (ref 36.6–50.3)
HGB BLD-MCNC: 10.1 G/DL (ref 12.1–17)
IMM GRANULOCYTES # BLD AUTO: 0 K/UL (ref 0–0.04)
IMM GRANULOCYTES NFR BLD AUTO: 0 % (ref 0–0.5)
LYMPHOCYTES # BLD: 0.9 K/UL (ref 0.8–3.5)
LYMPHOCYTES NFR BLD: 15 % (ref 12–49)
MCH RBC QN AUTO: 27.4 PG (ref 26–34)
MCHC RBC AUTO-ENTMCNC: 31.3 G/DL (ref 30–36.5)
MCV RBC AUTO: 87.8 FL (ref 80–99)
MONOCYTES # BLD: 0.7 K/UL (ref 0–1)
MONOCYTES NFR BLD: 11 % (ref 5–13)
NEUTS SEG # BLD: 4.3 K/UL (ref 1.8–8)
NEUTS SEG NFR BLD: 71 % (ref 32–75)
NRBC # BLD: 0 K/UL (ref 0–0.01)
NRBC BLD-RTO: 0 PER 100 WBC
PLATELET # BLD AUTO: 194 K/UL (ref 150–400)
PMV BLD AUTO: 11.8 FL (ref 8.9–12.9)
POTASSIUM SERPL-SCNC: 4.2 MMOL/L (ref 3.5–5.1)
PROT SERPL-MCNC: 5.3 G/DL (ref 6.4–8.2)
RBC # BLD AUTO: 3.68 M/UL (ref 4.1–5.7)
SODIUM SERPL-SCNC: 139 MMOL/L (ref 136–145)
WBC # BLD AUTO: 6.1 K/UL (ref 4.1–11.1)

## 2020-07-28 PROCEDURE — 94760 N-INVAS EAR/PLS OXIMETRY 1: CPT

## 2020-07-28 PROCEDURE — 65660000000 HC RM CCU STEPDOWN

## 2020-07-28 PROCEDURE — 97535 SELF CARE MNGMENT TRAINING: CPT

## 2020-07-28 PROCEDURE — 74011250637 HC RX REV CODE- 250/637: Performed by: INTERNAL MEDICINE

## 2020-07-28 PROCEDURE — 85025 COMPLETE CBC W/AUTO DIFF WBC: CPT

## 2020-07-28 PROCEDURE — 97116 GAIT TRAINING THERAPY: CPT | Performed by: PHYSICAL THERAPIST

## 2020-07-28 PROCEDURE — 36415 COLL VENOUS BLD VENIPUNCTURE: CPT

## 2020-07-28 PROCEDURE — 80053 COMPREHEN METABOLIC PANEL: CPT

## 2020-07-28 RX ORDER — MEMANTINE HYDROCHLORIDE 10 MG/1
TABLET ORAL
Qty: 180 TAB | Refills: 0 | OUTPATIENT
Start: 2020-07-28

## 2020-07-28 RX ADMIN — TEMAZEPAM 15 MG: 15 CAPSULE ORAL at 22:43

## 2020-07-28 RX ADMIN — PANTOPRAZOLE SODIUM 40 MG: 40 TABLET, DELAYED RELEASE ORAL at 17:29

## 2020-07-28 RX ADMIN — Medication 10 ML: at 22:33

## 2020-07-28 RX ADMIN — COLCHICINE 0.6 MG: 0.6 TABLET ORAL at 08:36

## 2020-07-28 RX ADMIN — CARVEDILOL 6.25 MG: 6.25 TABLET, FILM COATED ORAL at 17:29

## 2020-07-28 RX ADMIN — DONEPEZIL HYDROCHLORIDE 10 MG: 5 TABLET, FILM COATED ORAL at 08:36

## 2020-07-28 RX ADMIN — PANTOPRAZOLE SODIUM 40 MG: 40 TABLET, DELAYED RELEASE ORAL at 08:36

## 2020-07-28 RX ADMIN — MEMANTINE HYDROCHLORIDE 10 MG: 10 TABLET ORAL at 08:36

## 2020-07-28 RX ADMIN — MEMANTINE HYDROCHLORIDE 10 MG: 10 TABLET ORAL at 17:29

## 2020-07-28 RX ADMIN — POLYETHYLENE GLYCOL 3350 17 G: 17 POWDER, FOR SOLUTION ORAL at 08:36

## 2020-07-28 RX ADMIN — CARVEDILOL 6.25 MG: 6.25 TABLET, FILM COATED ORAL at 08:36

## 2020-07-28 RX ADMIN — ASPIRIN 81 MG CHEWABLE TABLET 81 MG: 81 TABLET CHEWABLE at 08:36

## 2020-07-28 RX ADMIN — CEPHALEXIN 500 MG: 250 CAPSULE ORAL at 08:36

## 2020-07-28 RX ADMIN — CEPHALEXIN 500 MG: 250 CAPSULE ORAL at 22:32

## 2020-07-28 RX ADMIN — Medication 10 ML: at 02:28

## 2020-07-28 NOTE — PROGRESS NOTES
PROGRESS NOTE    NAME:  Gay Salazar Sr   :   1931   MRN:   [id number]     Date/Time:  2020 6:09 AM  Subjective:   History:  Chart reviewed and patient seen and examined and discussed with his nurses and all events noted. He presented to the office on  after a long absence not having been seen there since 2019. [facility] was accompanied by his granddaughter and was there  in follow-up of his medical problems to include hypertension, diabetes, hyperlipidemia, atherosclerotic coronary vascular disease, cardiomyopathy, Alzheimer's dementia, history of PAF, prior TIA, history of alcoholism and other multiple medical problems.  In addition to his chronic problems he had a significant issue now off weight loss from 134 pounds at his last visit down to 115.7 pounds. [facility] had become very unsteady walking according to his granddaughter which was easily visible on exam in the office. [facility] also had developed a poor appetite and has a hard time keeping food down and thus eating very little.  He actually even had had some difficulty keeping liquids down.      He denies any current chest pain, shortness of breath, palpitations, PND, orthopnea or other cardiorespiratory complaints.  He  has no current  complaints.  As far as the GI complaints there is a poor appetite with the inability to keep some food down as well as liquids as well as some mild upper abdominal discomfort but no evidence of diarrhea.  No fevers, chills or night sweats have been noted.  From a neurologic standpoint he has had progressive decreased memory and progressive unsteadiness on his feet.  There are no current active arthritic complaints.  All of the above history was confirmed by his granddaughter present with him. Hugo Gastonk is his primary caretaker.   With this history it was felt prudent to admit him to the hospital for further work-up evaluation and treatment and he was sent to the hospital for direct admission however while going through admissions he became lightheaded and unresponsive and was emergently taken to the emergency room for admission. Today he remains more alert after hydration and and oriented to name and hospital [facility]) today and knows me by name today. He denies Cardiac or respiratory c/o. Swallowing difficulty improved and tolerated diet after EGD with Botox on . He is generally weak w/o focal neurologic c/o and as noted memory is better than on admission. He has no c/o of musculoskeletal until R foot examined at which times he says he has pain only to direct touch. .    Medications reviewed:  Current Facility-Administered Medications   Medication Dose Route Frequency    sodium chloride (NS) flush 5-40 mL  5-40 mL IntraVENous Q8H    sodium chloride (NS) flush 5-40 mL  5-40 mL IntraVENous PRN    pantoprazole (PROTONIX) tablet 40 mg  40 mg Oral ACB&D    cephALEXin (KEFLEX) capsule 500 mg  500 mg Oral Q12H    colchicine tablet 0.6 mg  0.6 mg Oral DAILY    polyethylene glycol (MIRALAX) packet 17 g  17 g Oral DAILY    sodium chloride (NS) flush 5-40 mL  5-40 mL IntraVENous Q8H    sodium chloride (NS) flush 5-40 mL  5-40 mL IntraVENous PRN    aspirin chewable tablet 81 mg  81 mg Oral DAILY    carvediloL (COREG) tablet 6.25 mg  6.25 mg Oral BID    donepeziL (ARICEPT) tablet 10 mg  10 mg Oral DAILY    memantine (NAMENDA) tablet 10 mg  10 mg Oral BID    temazepam (RESTORIL) capsule 15 mg  15 mg Oral QHS PRN    sodium chloride (NS) flush 5-40 mL  5-40 mL IntraVENous Q8H    sodium chloride (NS) flush 5-40 mL  5-40 mL IntraVENous PRN    acetaminophen (TYLENOL) tablet 650 mg  650 mg Oral Q4H PRN        Objective:   Vitals:  Visit Vitals  /57   Pulse 61   Temp 98.5 °F (36.9 °C)   Resp 18   Ht 5' 9\" (1.753 m)   Wt 114 lb 10.2 oz (52 kg)   SpO2 100%   BMI 16.93 kg/m²      O2 Device: Room air Temp (24hrs), Av.7 °F (37.1 °C), Min:98.2 °F (36.8 °C), Max:99.5 °F (37.5 °C)      Last 24hr Input/Output:    Intake/Output Summary (Last 24 hours) at 7/28/2020 0609  Last data filed at 7/28/2020 0209  Gross per 24 hour   Intake    Output 500 ml   Net -500 ml        PHYSICAL EXAM:  General:     Alert, cooperative, no distress, appears stated age. Head:    Normocephalic, without obvious abnormality, atraumatic. Eyes:    Conjunctivae/corneas clear. PERRLA  Nose:   Nares normal. No drainage or sinus tenderness. Throat:     Lips, mucosa, and tongue normal.  No Thrush  Neck:   Supple, symmetrical,  no adenopathy, thyroid: non tender     no carotid bruit and no JVD. Back:     Symmetric,  No CVA tenderness. Lungs:    Clear to auscultation bilaterally. No Wheezing or Rhonchi. No rales. Heart:    Regular rate and rhythm,  no murmur, rub or gallop. Abdomen:    Soft, non-tender. Not distended. Bowel sounds normal. No masses  Extremities:  Extremities normal except R foot with STS over dorsum and increased temp with tenderness most notable at 1st MTP joint, atraumatic, No cyanosis. No edema. No clubbing  Lymph nodes:  Cervical, supraclavicular normal.  Neurologic:  General decreased strength, Alert and oriented X 1. He does know me by name today  Skin:                  No rash      Lab Data Reviewed:    Recent Results (from the past 24 hour(s))   CBC WITH AUTOMATED DIFF    Collection Time: 07/28/20  2:20 AM   Result Value Ref Range    WBC 6.1 4.1 - 11.1 K/uL    RBC 3.68 (L) 4.10 - 5.70 M/uL    HGB 10.1 (L) 12.1 - 17.0 g/dL    HCT 32.3 (L) 36.6 - 50.3 %    MCV 87.8 80.0 - 99.0 FL    MCH 27.4 26.0 - 34.0 PG    MCHC 31.3 30.0 - 36.5 g/dL    RDW 14.6 (H) 11.5 - 14.5 %    PLATELET 662 837 - 786 K/uL    MPV 11.8 8.9 - 12.9 FL    NRBC 0.0 0  WBC    ABSOLUTE NRBC 0.00 0.00 - 0.01 K/uL    NEUTROPHILS 71 32 - 75 %    LYMPHOCYTES 15 12 - 49 %    MONOCYTES 11 5 - 13 %    EOSINOPHILS 2 0 - 7 %    BASOPHILS 1 0 - 1 %    IMMATURE GRANULOCYTES 0 0.0 - 0.5 %    ABS. NEUTROPHILS 4.3 1.8 - 8.0 K/UL    ABS. LYMPHOCYTES 0.9 0.8 - 3.5 K/UL    ABS. MONOCYTES 0.7 0.0 - 1.0 K/UL    ABS. EOSINOPHILS 0.1 0.0 - 0.4 K/UL    ABS. BASOPHILS 0.1 0.0 - 0.1 K/UL    ABS. IMM. GRANS. 0.0 0.00 - 0.04 K/UL    DF AUTOMATED     METABOLIC PANEL, COMPREHENSIVE    Collection Time: 07/28/20  2:20 AM   Result Value Ref Range    Sodium 139 136 - 145 mmol/L    Potassium 4.2 3.5 - 5.1 mmol/L    Chloride 106 97 - 108 mmol/L    CO2 29 21 - 32 mmol/L    Anion gap 4 (L) 5 - 15 mmol/L    Glucose 89 65 - 100 mg/dL    BUN 26 (H) 6 - 20 MG/DL    Creatinine 1.16 0.70 - 1.30 MG/DL    BUN/Creatinine ratio 22 (H) 12 - 20      GFR est AA >60 >60 ml/min/1.73m2    GFR est non-AA 59 (L) >60 ml/min/1.73m2    Calcium 8.3 (L) 8.5 - 10.1 MG/DL    Bilirubin, total 0.4 0.2 - 1.0 MG/DL    ALT (SGPT) 18 12 - 78 U/L    AST (SGOT) 20 15 - 37 U/L    Alk. phosphatase 63 45 - 117 U/L    Protein, total 5.3 (L) 6.4 - 8.2 g/dL    Albumin 2.1 (L) 3.5 - 5.0 g/dL    Globulin 3.2 2.0 - 4.0 g/dL    A-G Ratio 0.7 (L) 1.1 - 2.2           Assessment/Plan:     Principal Problem:    Syncope (1/9/2019)    Active Problems:    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)      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, without long-term current use of insulin ([facility] (7/18/2017)      SSS (sick sinus syndrome) ([facility] (6/2/2015)      Cardiomy[facility] (11/7/2019)      Alcoholism ([facility] (4/23/2018)      Weight loss (7/20/2020)      Unsteady gait (7/20/2020)      Dehydration (7/20/2020)      Acute renal failure superimposed on stage 3 chronic kidney disease ([facility] (7/20/2020)      Severe protein-calorie malnutrition ([facility] (7/21/2020)      Esophageal dyskinesia (7/23/2020)       ___________________________________________________  PLAN:  1. Continue  telemetry bed with monitoring  2. D/Patrick IV hydration to saline lock now  3. Hold Lasix and all current hypertensive medications except continue Coreg with his cardiomyopathy  4.   Continue IV Protonix  5.  GI consult notes reviewed and note Botox in lower esophagus for marked dyskinesia   6. Continue current Alzheimer's medications as ordered   7. According to his granddaughter who is his caretaker has not drank alcohol in 3 months but will watch closely for any signs of withdrawal with known alcoholism and prior withdrawal  8. Follow renal function, 51/2.36 on admission to 24/1.09 now  9. Replete potassium currently 4.2, improved  10. Checked magnesium level - normal  11. Follow blood sugar and will cover with sliding scale if becomes elevated  12. Home vs Rehab pending on Pt recommendations   13. X-ray R foot w/o acute process  14. Started Keflex and Colchicine for R foot yesterday 7/27      Addendum:  Phone call with his granddaughter (POA) 7/22 AM and I discussed the Ba Swallow and further plans pending results of this, I also discussed the rude behavior of his daughter (mother of the granddaughter whom I spoke with) and explained that this it totally unacceptable!    This was also D/W his nurse Radha Pedraza          If need to contact me use hospital  [phone], DO NOT USE PERFECT SERVE    ___________________________________________________    Attending Physician: Lisa Smyth MD

## 2020-07-28 NOTE — PROGRESS NOTES
Bedside shift change report given to Deepika RN (oncoming nurse) by Frances Powers RN  (offgoing nurse).  Report included the following information SBAR, Kardex, ED Summary and OR Summary.     Zone IGYNN: [phone]        Significant changes during shift:  none          Patient Information     Chandana May Sr  80 y.o.  7/20/2020  4:31 PM by Lisa Smyth MD. Benito Farfan Sr was admitted from Home     Problem List             Patient Active Problem List     Diagnosis Date Noted    Severe protein-calorie malnutrition ([address] 07/21/2020    Weight loss 07/20/2020    Unsteady gait 07/20/2020    Dehydration 07/20/2020    Acute renal failure superimposed on stage 3 chronic kidney disease ([address] 07/20/2020    Paroxysmal VT ([address] 11/07/2019    Cardiomyopathy ([address] 11/07/2019    Alcohol withdrawal syndrome, with delirium ([address] 11/01/2019    A-fib ([address] 10/27/2019    Syncope 01/09/2019    Primary insomnia 01/09/2019    Mild depression ([address] 06/12/2018    Acute bilateral low back pain without sciatica 05/30/2018    Alcoholism ([address] 16/76/3472    Metabolic encephalopathy 76/09/9929    TIA (transient ischemic attack) 04/12/2018    Medicare annual wellness visit, subsequent 09/01/2017    Diverticulosis 07/18/2017    Gastritis and duodenitis 07/18/2017    Internal hemorrhoids 07/18/2017    Hypertension with renal disease 07/18/2017    Mixed hyperlipidemia 07/18/2017    GI bleed 07/18/2017    Primary osteoarthritis involving multiple joints 07/18/2017    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, without long-term current use of insulin ([address] 07/18/2017    CKD (chronic kidney disease), stage II 07/18/2017    Back pain 07/18/2017    Anemia 07/18/2017    Alzheimer disease ([address] 07/18/2017    SSS (sick sinus syndrome) ([address] 06/02/2015    S/P cardiac pacemaker procedure 05/04/2015    S/P ablation of accessory bypass tract 05/04/2015    SVT (supraventricular tachycardia) ([facility])--s/p RFA 04/28/2015    Near syncope 03/11/2015    ASCVD (arteriosclerotic cardiovascular disease) 07/11/2014    Hypokalemia 07/11/2014    Right inguinal hernia 06/12/2014              Past Medical History:   Diagnosis Date    Abdominal pain 7/18/2017    Alzheimer disease ([facility] 7/18/2017    Anemia 7/18/2017    Arthritis      Back pain 7/18/2017    Bradycardia 7/18/2017    CAD (coronary artery disease)       hx of MI    CKD (chronic kidney disease), stage II 7/18/2017    constipation      Depression 7/18/2017    [facility] 7/18/2017    Diverticulosis 7/18/2017    DJD (degenerative joint disease) 7/18/2017    Encounter for long-term (current) use of other medications 7/18/2017    Fatigue      Gastritis and duodenitis 7/18/2017    GERD (gastroesophageal reflux disease)      GI bleed 7/18/2017    Hearing loss      Hyperlipidemia 7/18/2017    Hypertension      Hypertension with renal disease 7/18/2017    Ill-defined condition       Dementia    Internal hemorrhoids 7/18/2017    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    S/P cardiac pacemaker procedure 5/4/2015     5/4/15 Medtronic dual chamber pacemaker implant     Weight loss       lost over 40 pounds last year- has no appetite            Core Measures:     CVA: Yes Yes     Activity Status:     OOB to Chair No  Ambulated this shift No   Bed Rest Yes     DVT prophylaxis:     DVT prophylaxis Med- No  DVT prophylaxis SCD or LISSY- Refused         Patient Safety:     Falls Score Total Score: 3  Safety Level_______  Bed Alarm On? Yes  Sitter? Yes TeleSitter      Plan for upcoming shift: ? safety           Discharge Plan:probable home with [facility] with [facility]     Active Consults:  IP CONSULT TO GASTROENTEROLOGY

## 2020-07-28 NOTE — TELEPHONE ENCOUNTER
PCP: Cleophas Harada, MD    Last appt: 7/20/2020  No future appointments.     Requested Prescriptions     Pending Prescriptions Disp Refills    memantine (NAMENDA) 10 mg tablet 180 Tab 0     Sig: TAKE 1 TABLET BY MOUTH TWICE A DAY

## 2020-07-28 NOTE — PROGRESS NOTES
Problem: Mobility Impaired (Adult and Pediatric)  Goal: *Acute Goals and Plan of Care (Insert Text)  Description: FUNCTIONAL STATUS PRIOR TO ADMISSION: Patient poor historian. He reports independence at baseline without use of any assistive device. HOME SUPPORT PRIOR TO ADMISSION: The patient lived with granddaughter. Patient reports granddaughter works during the day. Physical Therapy Goals  Initiated 7/22/2020  1. Patient will move from supine to sit and sit to supine , scoot up and down, and roll side to side in bed with independence within 7 day(s). 2.  Patient will transfer from bed to chair and chair to bed with supervision/set-up using the least restrictive device within 7 day(s). 3.  Patient will perform sit to stand with supervision/set-up within 7 day(s). 4.  Patient will ambulate with supervision/set-up for 250 feet with the least restrictive device within 7 day(s). Outcome: Progressing Towards Goal   PHYSICAL THERAPY TREATMENT  Patient: Juany Hernadez Sr (80 y.o. male)  Date: 7/28/2020  Diagnosis: Syncope [R55]   Syncope  Procedure(s) (LRB):  ESOPHAGOGASTRODUODENOSCOPY (EGD) (N/A)  ESOPHAGOGASTRODUODENAL (EGD) BIOPSY (N/A)  INJECTION (N/A) 5 Days Post-Op  Precautions:    Chart, physical therapy assessment, plan of care and goals were reviewed. ASSESSMENT  Patient continues with skilled PT services and is progressing towards goals. Patient continues to report R foot pain but is able to mobilize despite pain. Patient requiring CGA for transfers and ambulation. Gait is slow and antalgic; slightly unsteady but no overt LOB noted. Only able to ambulate 30 feet today using RW for support. Patient has been denied inpatient rehab. Recommend further therapy following discharge.  If family is not able to provide increased assistance at home recommend SNF rehab following discharge    Current Level of Function Impacting Discharge (mobility/balance): CGA    Other factors to consider for discharge: R foot pain limiting functional mobility         PLAN :  Patient continues to benefit from skilled intervention to address the above impairments. Continue treatment per established plan of care. to address goals. Recommendation for discharge: (in order for the patient to meet his/her long term goals)  To be determined: HHPT with increased supervision vs SNF    This discharge recommendation:  Has been made in collaboration with the attending provider and/or case management    IF patient discharges home will need the following DME: rolling walker       SUBJECTIVE:   Patient stated My foot still hurts from my big toe to my ankle.     OBJECTIVE DATA SUMMARY:   Critical Behavior:  Neurologic State: Alert  Orientation Level: Oriented to person, Oriented to place  Cognition: Appropriate safety awareness  Safety/Judgement: Decreased awareness of need for assistance, Decreased awareness of need for safety, Decreased insight into deficits  Functional Mobility Training:  Bed Mobility:      Deferred; patient sitting in chair upon arrival              Transfers:  Sit to Stand: Contact guard assistance  Stand to Sit: Contact guard assistance                             Balance:  Sitting: Intact  Standing: Impaired  Standing - Static: Constant support;Good  Standing - Dynamic : Fair;Constant support  Ambulation/Gait Training:  Distance (ft): 30 Feet (ft)  Assistive Device: Walker, rolling;Gait belt  Ambulation - Level of Assistance: Contact guard assistance        Gait Abnormalities: Antalgic;Decreased step clearance(flexed posture)        Base of Support: Widened     Speed/Maureen: Pace decreased (<100 feet/min); Slow  Step Length: Left shortened;Right shortened      Gait is slow and antalgic, mildly unsteady but no overt LOB noted         Activity Tolerance:   Good  Please refer to the flowsheet for vital signs taken during this treatment.     After treatment patient left in no apparent distress:   Sitting in chair, Call bell within reach, and Bed / chair alarm activated    COMMUNICATION/COLLABORATION:   The patients plan of care was discussed with: Occupational therapist and Registered nurse.      Raegan Reeves, PT   Time Calculation: 10 mins

## 2020-07-28 NOTE — PROGRESS NOTES
Problem: Self Care Deficits Care Plan (Adult)  Goal: *Acute Goals and Plan of Care (Insert Text)  Description:   FUNCTIONAL STATUS PRIOR TO ADMISSION: Patient was independent and active without use of DME. Reports no available equipment at home. HOME SUPPORT: The patient lived with granddaughter to provide assistance. Per patient, he was able to complete BADLs independently but depended on granddaughter for IADLs. Patient states granddaughter works during the day (?). Occupational Therapy Goals  Initiated 7/22/2020  1. Patient will perform standing grooming with SBA using RW prn within 7 day(s). 2.  Patient will collect ADL items from various surface levels with CGA for balance and using RW prn within 7 day(s). 4.  Patient will perform toilet transfers with CGA using RW prn within 7 day(s). 5.  Patient will perform all aspects of toileting with CGA using RW prn for balance within 7 day(s). 6.  Patient will participate in upper extremity therapeutic exercise/activities with supervision/set-up for 10 minutes within 7 day(s). Outcome: Progressing Towards Goal     OCCUPATIONAL THERAPY TREATMENT  Patient: Ailyn Pearce  (80 y.o. male)  Date: 7/28/2020  Diagnosis: Syncope [R55]   Syncope  Procedure(s) (LRB):  ESOPHAGOGASTRODUODENOSCOPY (EGD) (N/A)  ESOPHAGOGASTRODUODENAL (EGD) BIOPSY (N/A)  INJECTION (N/A) 5 Days Post-Op  Precautions:    Chart, occupational therapy assessment, plan of care, and goals were reviewed. ASSESSMENT  Patient continues with skilled OT services and is progressing towards goals. Patient is progressing with activity tolerance but continues to report R foot pain with activity, reporting \"stiffness\" upon initially standing. Pt is received up walking with PT, agreeable to continue OOB activity in bathroom. He performs standing shaving and additional serial grooming tasks at sink with overall SBA for > 15 minutes.  Pt tends to have more flexed posture as he fatigues and requires cues for safety with rw mgmt. Continue to recommend home with family supervision/assistance and follow up [facility] therapy services. Current Level of Function Impacting Discharge (ADLs): overall SBA standing grooming tasks; SBA to min A lower body dressing    Other factors to consider for discharge: supportive family; fall risk         PLAN :  Patient continues to benefit from skilled intervention to address the above impairments. Continue treatment per established plan of care. to address goals. Recommend with staff: 1 person mobility to bathroom for toileting; OOB to chair for all meals; ADLs as able with supervision/assistance    Recommend next OT session: gathering ADL items in prep for ADLs    Recommendation for discharge: (in order for the patient to meet his/her long term goals)  Occupational therapy at least 2 days/week in the home AND ensure assist and/or supervision for safety with mobility, showering, IADLs    This discharge recommendation:  Has been made in collaboration with the attending provider and/or case management    IF patient discharges home will need the following DME: walker: rolling       SUBJECTIVE:   Patient stated I feel much better now.  - after shaving    OBJECTIVE DATA SUMMARY:   Cognitive/Behavioral Status:  Neurologic State: Alert  Orientation Level: Oriented to person;Oriented to place  Cognition: Follows commands  Perception: Appears intact  Perseveration: No perseveration noted  Safety/Judgement: Fall prevention;Home safety; Awareness of environment    Functional Mobility and Transfers for ADLs:  Bed Mobility:  Pt received OOB walking with PT    Transfers:  Sit to Stand: Contact guard assistance    Balance:  Sitting: Intact  Standing: Impaired  Standing - Static: Constant support;Good  Standing - Dynamic : Fair;Constant support    ADL Intervention:  Grooming  Grooming Assistance: Stand-by assistance  Position Performed: Standing(at sink with rolling walker)  Washing Face: Stand-by assistance  Washing Hands: Stand-by assistance  Brushing Teeth: Stand-by assistance  Applying Makeup: Stand-by assistance(applying deoderant)  Shaving: Stand-by assistance      Lower Body Dressing Assistance  Socks: Stand-by assistance  Leg Crossed Method Used: Yes  Position Performed: Seated in chair  Cues: Don    Cognitive Retraining  Safety/Judgement: Fall prevention;Home safety; Awareness of environment    Instructed patient to increase time sitting OOB in chair for pulmonary hygiene, skin integrity, and to increase endurance in preparation for ADLs. Instructed patient to sit OOB for all meals. Patient verbalized understanding of same. Rolling Walker Safety: Educated patient about importance of keeping hands free at all times when using rolling walker for fall prevention. Provided information about walker bags/baskets/trays to assist with transporting items safely while in the home to prevent falls. Patient indicated understanding of same. Instructed patient on safe and appropriate hand placement during transfers (push up from sitting surface when standing up, reach back for sitting surface when sitting down, use grab bars, etc.), including never to pull up on rolling walker for fall prevention and safety. Instructed patient to push rolling walker up to surface (countertop, sink, etc.) and  it as opposed to abandoning rolling walker on the side for safety. Patient verbalized understanding of same. Pain:  Pt reporting R foot discomfort/stiffness    Activity Tolerance:   Good, Fair, and SpO2 stable on RA  Please refer to the flowsheet for vital signs taken during this treatment. After treatment patient left in no apparent distress:   Sitting in chair, Call bell within reach, and Bed / chair alarm activated    COMMUNICATION/COLLABORATION:   The patients plan of care was discussed with: Physical therapist and Registered nurse.      Solomon Yu OT  Time Calculation: 17 mins

## 2020-07-28 NOTE — PROGRESS NOTES
Spiritual Care Assessment/Progress Note  [facility]      NAME: Kavitha Manuel Sr      MRN: [id number]  AGE: 80 y.o. SEX: male  Caodaism Affiliation: Jehovah's witness   Language: English     7/28/2020     Total Time (in minutes): 5     Spiritual Assessment begun in [facility] through conversation with:         []Patient        [] Family    [] Friend(s)        Reason for Consult: Initial visit     Spiritual beliefs: (Please include comment if needed)     [] Identifies with a cristhian tradition:         [] Supported by a cristhian community:            [] Claims no spiritual orientation:           [] Seeking spiritual identity:                [] Adheres to an individual form of spirituality:           [x] Not able to assess:                           Identified resources for coping:      [] Prayer                               [] Music                  [] Guided Imagery     [] Family/friends                 [] Pet visits     [] Devotional reading                         [x] Unknown     [] Other:                                          Interventions offered during this visit: (See comments for more details)                Plan of Care:     [] Support spiritual and/or cultural needs    [] Support AMD and/or advance care planning process      [] Support grieving process   [] Coordinate Rites and/or Rituals    [] Coordination with community clergy   [] No spiritual needs identified at this time   [] Detailed Plan of Care below (See Comments)  [] Make referral to Music Therapy  [] Make referral to Pet Therapy     [] Make referral to Addiction services  [] Make referral to [facility]  [] Make referral to Spiritual Care Partner  [] No future visits requested        [] Follow up visits as needed     Comments: Attempted Initial Spiritual Assessment for this pt in [id number], however pt was unable to be assessed at this time. No family present at time of visit.   Page pastoral care for any spiritual or emotional support needed. Federico Lockhart MDiv.  Staff   Request  Support/Spiritual Care Services via [address] [place] [address]

## 2020-07-28 NOTE — PROGRESS NOTES
Bedside shift change report given to Terri Massey RN (oncoming nurse) by Ariel Jaramillo RN  (offgoing nurse).  Report included the following information SBAR, Kardex, ED Summary and OR Summary.     Zone Phone: [phone]        Significant changes during shift:   none   Patient Information     Charley Farfan Sr  80 y.o.  7/20/2020  4:31 PM by Ramses España MD. Charley Farfan Sr was admitted from Home     Problem List          Patient Active Problem List     Diagnosis Date Noted    Severe protein-calorie malnutrition ([address] 07/21/2020    Weight loss 07/20/2020    Unsteady gait 07/20/2020    Dehydration 07/20/2020    Acute renal failure superimposed on stage 3 chronic kidney disease ([address] 07/20/2020    Paroxysmal VT ([address] 11/07/2019    Cardiomyopathy ([address] 11/07/2019    Alcohol withdrawal syndrome, with delirium ([address] 11/01/2019    A-fib ([address] 10/27/2019    Syncope 01/09/2019    Primary insomnia 01/09/2019    Mild depression ([address] 06/12/2018    Acute bilateral low back pain without sciatica 05/30/2018    Alcoholism ([address] 58/39/3401    Metabolic encephalopathy 18/49/5320    TIA (transient ischemic attack) 04/12/2018    Medicare annual wellness visit, subsequent 09/01/2017    Diverticulosis 07/18/2017    Gastritis and duodenitis 07/18/2017    Internal hemorrhoids 07/18/2017    Hypertension with renal disease 07/18/2017    Mixed hyperlipidemia 07/18/2017    GI bleed 07/18/2017    Primary osteoarthritis involving multiple joints 07/18/2017    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, without long-term current use of insulin ([address] 07/18/2017    CKD (chronic kidney disease), stage II 07/18/2017    Back pain 07/18/2017    Anemia 07/18/2017    Alzheimer disease ([address] 07/18/2017    SSS (sick sinus syndrome) ([address] 06/02/2015    S/P cardiac pacemaker procedure 05/04/2015    S/P ablation of accessory bypass tract 05/04/2015    SVT (supraventricular tachycardia) (HCC)--s/p RFA 04/28/2015    Near syncope 03/11/2015    ASCVD (arteriosclerotic cardiovascular disease) 07/11/2014    Hypokalemia 07/11/2014    Right inguinal hernia 06/12/2014          Past Medical History:   Diagnosis Date    Abdominal pain 7/18/2017    Alzheimer disease ([facility] 7/18/2017    Anemia 7/18/2017    Arthritis      Back pain 7/18/2017    Bradycardia 7/18/2017    CAD (coronary artery disease)       hx of MI    CKD (chronic kidney disease), stage II 7/18/2017    constipation      Depression 7/18/2017    Diabetes mellitus ([facility] 7/18/2017    Diverticulosis 7/18/2017    DJD (degenerative joint disease) 7/18/2017    Encounter for long-term (current) use of other medications 7/18/2017    Fatigue      Gastritis and duodenitis 7/18/2017    GERD (gastroesophageal reflux disease)      GI bleed 7/18/2017    Hearing loss      Hyperlipidemia 7/18/2017    Hypertension      Hypertension with renal disease 7/18/2017    Ill-defined condition       Dementia    Internal hemorrhoids 7/18/2017    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    S/P cardiac pacemaker procedure 5/4/2015     5/4/15 Medtronic dual chamber pacemaker implant     Weight loss       lost over 40 pounds last year- has no appetite           Core Measures:     CVA: Yes Yes     Activity Status:     OOB to Chair No  Ambulated this shift No   Bed Rest Yes     DVT prophylaxis:     DVT prophylaxis Med- No  DVT prophylaxis SCD or LISSY- Refused         Patient Safety:     Falls Score Total Score: 3  Safety Level_______  Bed Alarm On? Yes  Sitter?  Yes TeleSitter      Plan for upcoming shift: ? Placement for rehab           Discharge Plan: ? rehab     Active Consults:  IP CONSULT TO GASTROENTEROLOGY

## 2020-07-29 ENCOUNTER — HOME HEALTH ADMISSION (OUTPATIENT)
Dept: HOME HEALTH SERVICES | Facility: HOME HEALTH | Age: 85
End: 2020-07-29
Payer: MEDICARE

## 2020-07-29 LAB
ALBUMIN SERPL-MCNC: 2.1 G/DL (ref 3.5–5)
ALBUMIN/GLOB SERPL: 0.6 {RATIO} (ref 1.1–2.2)
ALP SERPL-CCNC: 68 U/L (ref 45–117)
ALT SERPL-CCNC: 24 U/L (ref 12–78)
ANION GAP SERPL CALC-SCNC: 5 MMOL/L (ref 5–15)
AST SERPL-CCNC: 28 U/L (ref 15–37)
BASOPHILS # BLD: 0.1 K/UL (ref 0–0.1)
BASOPHILS NFR BLD: 1 % (ref 0–1)
BILIRUB SERPL-MCNC: 0.4 MG/DL (ref 0.2–1)
BUN SERPL-MCNC: 30 MG/DL (ref 6–20)
BUN/CREAT SERPL: 27 (ref 12–20)
CALCIUM SERPL-MCNC: 8.5 MG/DL (ref 8.5–10.1)
CHLORIDE SERPL-SCNC: 107 MMOL/L (ref 97–108)
CO2 SERPL-SCNC: 29 MMOL/L (ref 21–32)
CREAT SERPL-MCNC: 1.13 MG/DL (ref 0.7–1.3)
DIFFERENTIAL METHOD BLD: ABNORMAL
EOSINOPHIL # BLD: 0.2 K/UL (ref 0–0.4)
EOSINOPHIL NFR BLD: 3 % (ref 0–7)
ERYTHROCYTE [DISTWIDTH] IN BLOOD BY AUTOMATED COUNT: 14.6 % (ref 11.5–14.5)
GLOBULIN SER CALC-MCNC: 3.3 G/DL (ref 2–4)
GLUCOSE SERPL-MCNC: 80 MG/DL (ref 65–100)
HCT VFR BLD AUTO: 34.9 % (ref 36.6–50.3)
HGB BLD-MCNC: 10.6 G/DL (ref 12.1–17)
IMM GRANULOCYTES # BLD AUTO: 0 K/UL (ref 0–0.04)
IMM GRANULOCYTES NFR BLD AUTO: 0 % (ref 0–0.5)
LYMPHOCYTES # BLD: 1.2 K/UL (ref 0.8–3.5)
LYMPHOCYTES NFR BLD: 24 % (ref 12–49)
MCH RBC QN AUTO: 26.8 PG (ref 26–34)
MCHC RBC AUTO-ENTMCNC: 30.4 G/DL (ref 30–36.5)
MCV RBC AUTO: 88.1 FL (ref 80–99)
MONOCYTES # BLD: 0.6 K/UL (ref 0–1)
MONOCYTES NFR BLD: 12 % (ref 5–13)
NEUTS SEG # BLD: 3.1 K/UL (ref 1.8–8)
NEUTS SEG NFR BLD: 60 % (ref 32–75)
NRBC # BLD: 0 K/UL (ref 0–0.01)
NRBC BLD-RTO: 0 PER 100 WBC
PLATELET # BLD AUTO: 217 K/UL (ref 150–400)
PMV BLD AUTO: 11.5 FL (ref 8.9–12.9)
POTASSIUM SERPL-SCNC: 4.2 MMOL/L (ref 3.5–5.1)
PROT SERPL-MCNC: 5.4 G/DL (ref 6.4–8.2)
RBC # BLD AUTO: 3.96 M/UL (ref 4.1–5.7)
SODIUM SERPL-SCNC: 141 MMOL/L (ref 136–145)
WBC # BLD AUTO: 5.1 K/UL (ref 4.1–11.1)

## 2020-07-29 PROCEDURE — 97535 SELF CARE MNGMENT TRAINING: CPT

## 2020-07-29 PROCEDURE — 94760 N-INVAS EAR/PLS OXIMETRY 1: CPT

## 2020-07-29 PROCEDURE — 65660000000 HC RM CCU STEPDOWN

## 2020-07-29 PROCEDURE — 74011250637 HC RX REV CODE- 250/637: Performed by: INTERNAL MEDICINE

## 2020-07-29 PROCEDURE — 97116 GAIT TRAINING THERAPY: CPT | Performed by: PHYSICAL THERAPIST

## 2020-07-29 PROCEDURE — 80053 COMPREHEN METABOLIC PANEL: CPT

## 2020-07-29 PROCEDURE — 85025 COMPLETE CBC W/AUTO DIFF WBC: CPT

## 2020-07-29 PROCEDURE — 36415 COLL VENOUS BLD VENIPUNCTURE: CPT

## 2020-07-29 RX ADMIN — PANTOPRAZOLE SODIUM 40 MG: 40 TABLET, DELAYED RELEASE ORAL at 08:28

## 2020-07-29 RX ADMIN — ASPIRIN 81 MG CHEWABLE TABLET 81 MG: 81 TABLET CHEWABLE at 08:28

## 2020-07-29 RX ADMIN — CARVEDILOL 6.25 MG: 6.25 TABLET, FILM COATED ORAL at 08:29

## 2020-07-29 RX ADMIN — CEPHALEXIN 500 MG: 250 CAPSULE ORAL at 08:28

## 2020-07-29 RX ADMIN — CEPHALEXIN 500 MG: 250 CAPSULE ORAL at 21:24

## 2020-07-29 RX ADMIN — DONEPEZIL HYDROCHLORIDE 10 MG: 5 TABLET, FILM COATED ORAL at 08:29

## 2020-07-29 RX ADMIN — POLYETHYLENE GLYCOL 3350 17 G: 17 POWDER, FOR SOLUTION ORAL at 08:29

## 2020-07-29 RX ADMIN — Medication 10 ML: at 21:24

## 2020-07-29 RX ADMIN — Medication 10 ML: at 16:06

## 2020-07-29 RX ADMIN — COLCHICINE 0.6 MG: 0.6 TABLET ORAL at 08:30

## 2020-07-29 RX ADMIN — CARVEDILOL 6.25 MG: 6.25 TABLET, FILM COATED ORAL at 17:17

## 2020-07-29 RX ADMIN — MEMANTINE HYDROCHLORIDE 10 MG: 10 TABLET ORAL at 09:00

## 2020-07-29 RX ADMIN — Medication 10 ML: at 05:01

## 2020-07-29 RX ADMIN — PANTOPRAZOLE SODIUM 40 MG: 40 TABLET, DELAYED RELEASE ORAL at 16:05

## 2020-07-29 RX ADMIN — MEMANTINE HYDROCHLORIDE 10 MG: 10 TABLET ORAL at 17:17

## 2020-07-29 NOTE — PROGRESS NOTES
Problem: Pressure Injury - Risk of  Goal: *Prevention of pressure injury  Description: Document Newton Scale and appropriate interventions in the flowsheet. Outcome: Progressing Towards Goal  Note: Pressure Injury Interventions:  Sensory Interventions: Assess changes in LOC, Check visual cues for pain, Float heels, Minimize linen layers    Moisture Interventions: Absorbent underpads    Activity Interventions: Increase time out of bed, Chair cushion    Mobility Interventions: Assess need for specialty bed    Nutrition Interventions: Document food/fluid/supplement intake    Friction and Shear Interventions: Feet elevated on foot rest, Apply protective barrier, creams and emollients                Problem: Patient Education: Go to Patient Education Activity  Goal: Patient/Family Education  Outcome: Progressing Towards Goal     Problem: Falls - Risk of  Goal: *Absence of Falls  Description: Document Louis Fall Risk and appropriate interventions in the flowsheet.   Outcome: Progressing Towards Goal  Note: Fall Risk Interventions:  Mobility Interventions: Bed/chair exit alarm, Patient to call before getting OOB, Utilize walker, cane, or other assistive device    Mentation Interventions: Bed/chair exit alarm, Door open when patient unattended, Update white board    Medication Interventions: Bed/chair exit alarm, Patient to call before getting OOB    Elimination Interventions: Bed/chair exit alarm, Call light in reach, Urinal in reach    History of Falls Interventions: Bed/chair exit alarm         Problem: Patient Education: Go to Patient Education Activity  Goal: Patient/Family Education  Outcome: Progressing Towards Goal     Problem: Patient Education: Go to Patient Education Activity  Goal: Patient/Family Education  Outcome: Progressing Towards Goal     Problem: Patient Education: Go to Patient Education Activity  Goal: Patient/Family Education  Outcome: Progressing Towards Goal

## 2020-07-29 NOTE — PROGRESS NOTES
Spiritual Care Assessment/Progress Note  [facility]      NAME: Jennifer Bahena Sr      MRN: [id number]  AGE: 80 y.o. SEX: male  Tenriism Affiliation: Christian   Language: English     7/29/2020     Total Time (in minutes): 5     Spiritual Assessment begun in [facility] through conversation with:         [x]Patient        [] Family    [] Friend(s)        Reason for Consult: Initial/Spiritual assessment, patient floor     Spiritual beliefs: (Please include comment if needed)     [] Identifies with a cristhian tradition:         [] Supported by a cristhian community:            [] Claims no spiritual orientation:           [] Seeking spiritual identity:                [] Adheres to an individual form of spirituality:           [x] Not able to assess:                           Identified resources for coping:      [] Prayer                               [] Music                  [] Guided Imagery     [] Family/friends                 [] Pet visits     [] Devotional reading                         [x] Unknown     [] Other:                                              Interventions offered during this visit: (See comments for more details)    Patient Interventions: Initial/Spiritual assessment, patient floor           Plan of Care:     [] Support spiritual and/or cultural needs    [] Support AMD and/or advance care planning process      [] Support grieving process   [] Coordinate Rites and/or Rituals    [] Coordination with community clergy   [x] No spiritual needs identified at this time   [] Detailed Plan of Care below (See Comments)  [] Make referral to Music Therapy  [] Make referral to Pet Therapy     [] Make referral to Addiction services  [] Make referral to [facility]  [] Make referral to Spiritual Care Partner  [] No future visits requested        [x] Follow up visits as needed     Comments: Provided supportive visit for this pt in [facility].   Pt was having lunch at time of this visit and wasn't interested in a visit at this time. Pt expressed no particular support needs at this time. Affirmed ongoing availability of support. Bruno Lennon MDiv.  Staff   Request  Support/Spiritual Care Services via [address] [place] [address]

## 2020-07-29 NOTE — PROGRESS NOTES
Nutrition Assessment     Type and Reason for Visit: Reassess    Nutrition Recommendations/Plan:   Advance to Regular diet as tolerated  Continue Ensure enlive BID     Nutrition Assessment:     Chart reviewed; patient continues on a GI lite diet. Tolerating well with good intake. Patient states he feels good today. Empty ensure bottle at bedside. He continues to drink supplements well. Encourage intake of meals/snacks.      Malnutrition Assessment:  Malnutrition Status: Severe malnutrition     Estimated Daily Nutrient Needs:  Energy (kcal):  1778 kcal (BMR (1175) x 1.3AF +250kcal)  Protein (g):  68-78g (1.3-1.5 g/kg bw)       Fluid (ml/day):  1800 mL    Nutrition Related Findings:       Last BM 7/26  Medications: Coreg, Aricept, namenda, Protonix, Miralax    Current Nutrition Therapies:  DIET NUTRITIONAL SUPPLEMENTS Lunch, Dinner; Ensure [employer]  DIET NUTRITIONAL SUPPLEMENTS Lunch, Dinner; [place]  DIET GI LITE (POST SURGICAL)    Anthropometric Measures:  · Height:  5' 9\" (175.3 cm)  · Current Body Wt:  52 kg (114 lb 10.2 oz)  · BMI: 16.9    Nutrition Diagnosis:   · Inadequate protein-energy intake related to (alzheimer's dementia, decreased Po intake) as evidenced by weight loss greater than or equal to 10% in 6 months, severe loss of subcutaneous fat, severe muscle loss  Previous diagnosis has resolved; PO intake much improved and taking ONS well    Nutrition Intervention:  Food and/or Nutrient Delivery: Modify current diet, Continue oral nutrition supplement  Nutrition Education and Counseling: No recommendations at this time  Coordination of Nutrition Care: No recommendation at this time    Goals:  PO intake >70% of meals/supplement next 5-7 days       Nutrition Monitoring and Evaluation:   Behavioral-Environmental Outcomes: Knowledge or skill  Food/Nutrient Intake Outcomes: Food and nutrient intake, Supplement intake  Physical Signs/Symptoms Outcomes: Biochemical data, Weight    Discharge Planning: (regular diet + ONS 2-3x/day)     Electronically signed by Jumana Guzmán RD on 7/29/2020 at 10:23 AM    Contact Number: Ext [phone], Pager [phone]

## 2020-07-29 NOTE — PROGRESS NOTES
Duodenitis, esophagitis  Duodenitis, esophagitis  Bedside and Verbal shift change report given to Jayde Torres (oncoming nurse) by Caleb Watts (offgoing nurse). Report included the following information [facility].     [facility] Phone:   [phone]      Significant changes during shift:  Patient will be discharged home once swelling in right foot goes down; goal is to have him discharged by the end of the week to home with home health services       Patient Information    Tej Farfan Sr  80 y.o.  7/20/2020  4:31 PM by Jackie Mobley MD. Tej Farfan Sr was admitted from Home    Problem List    Patient Active Problem List    Diagnosis Date Noted    Esophageal dyskinesia 07/23/2020    Severe protein-calorie malnutrition ([address] 07/21/2020    Weight loss 07/20/2020    Unsteady gait 07/20/2020    Dehydration 07/20/2020    Acute renal failure superimposed on stage 3 chronic kidney disease ([address] 07/20/2020    Paroxysmal VT ([address] 11/07/2019    Cardiomyopathy ([address] 11/07/2019    Alcohol withdrawal syndrome, with delirium ([address] 11/01/2019    A-fib ([address] 10/27/2019    Syncope 01/09/2019    Primary insomnia 01/09/2019    Mild depression ([address] 06/12/2018    Acute bilateral low back pain without sciatica 05/30/2018    Alcoholism ([address] 65/06/0529    Metabolic encephalopathy 84/74/9901    TIA (transient ischemic attack) 04/12/2018    Medicare annual wellness visit, subsequent 09/01/2017    Diverticulosis 07/18/2017    Gastritis and duodenitis 07/18/2017    Internal hemorrhoids 07/18/2017    Hypertension with renal disease 07/18/2017    Mixed hyperlipidemia 07/18/2017    GI bleed 07/18/2017    Primary osteoarthritis involving multiple joints 07/18/2017    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, without long-term current use of insulin ([address] 07/18/2017    CKD (chronic kidney disease), stage II 07/18/2017    Back pain 07/18/2017    Anemia 07/18/2017    Alzheimer disease ([address] 07/18/2017    SSS (sick sinus syndrome) ([facility] 06/02/2015    S/P cardiac pacemaker procedure 05/04/2015    S/P ablation of accessory bypass tract 05/04/2015    SVT (supraventricular tachycardia) ([facility])--s/p RFA 04/28/2015    Near syncope 03/11/2015    ASCVD (arteriosclerotic cardiovascular disease) 07/11/2014    Hypokalemia 07/11/2014    Right inguinal hernia 06/12/2014     Past Medical History:   Diagnosis Date    Abdominal pain 7/18/2017    Alzheimer disease ([facility] 7/18/2017    Anemia 7/18/2017    Arthritis     Back pain 7/18/2017    Bradycardia 7/18/2017    CAD (coronary artery disease)     hx of MI    CKD (chronic kidney disease), stage II 7/18/2017    constipation     Depression 7/18/2017    Diabetes mellitus ([facility] 7/18/2017    Diverticulosis 7/18/2017    DJD (degenerative joint disease) 7/18/2017    Encounter for long-term (current) use of other medications 7/18/2017    Fatigue     Gastritis and duodenitis 7/18/2017    GERD (gastroesophageal reflux disease)     GI bleed 7/18/2017    Hearing loss     Hyperlipidemia 7/18/2017    Hypertension     Hypertension with renal disease 7/18/2017    Ill-defined condition     Dementia    Internal hemorrhoids 7/18/2017    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    S/P cardiac pacemaker procedure 5/4/2015    5/4/15 Medtronic dual chamber pacemaker implant     Weight loss     lost over 40 pounds last year- has no appetite         Core Measures:    CVA: Not applicable  CHF:Not applicable  PNA:Not applicable Refused     Activity Status:    OOB to Chair No  Ambulated this shift Yes   Bed Rest No    Supplemental O2: (If Applicable)    NC Not applicable  NRB Not applicable  Venti-mask Not applicable    DVT prophylaxis:    DVT prophylaxis Med- No  DVT prophylaxis SCD or LISSY- Yes     Wounds: (If Applicable)    Wounds- Yes    Location Right foot is swollen and painful to touch     Patient Safety:    Falls Score Total Score: 4  Safety Level_______  Bed Alarm On? Yes  Sitter?  No    Plan for upcoming shift: Try to get swelling in foot down         Discharge Plan: Yes; discharge home with home health services    Active Consults:  [id number] [address] [place]

## 2020-07-29 NOTE — PROGRESS NOTES
PROGRESS NOTE    NAME:  Ailyn Pearce    :   1931   MRN:   [id number]     Date/Time:  2020 6:09 AM  Subjective:   History:  Chart reviewed and patient seen and examined and discussed with his nurses and all events noted. He presented to the office on  after a long absence not having been seen there since 2019. Sima Caballero was accompanied by his granddaughter and was there  in follow-up of his medical problems to include hypertension, diabetes, hyperlipidemia, atherosclerotic coronary vascular disease, cardiomyopathy, Alzheimer's dementia, history of PAF, prior TIA, history of alcoholism and other multiple medical problems.  In addition to his chronic problems he had a significant issue now off weight loss from 134 pounds at his last visit down to 115.7 pounds. Sima Caballero had become very unsteady walking according to his granddaughter which was easily visible on exam in the office. Sima Caballero also had developed a poor appetite and has a hard time keeping food down and thus eating very little.  He actually even had had some difficulty keeping liquids down.      He denies any current chest pain, shortness of breath, palpitations, PND, orthopnea or other cardiorespiratory complaints.  He  has no current  complaints.  As far as the GI complaints there is a poor appetite with the inability to keep some food down as well as liquids as well as some mild upper abdominal discomfort but no evidence of diarrhea.  No fevers, chills or night sweats have been noted.  From a neurologic standpoint he has had progressive decreased memory and progressive unsteadiness on his feet.  There are no current active arthritic complaints.  All of the above history was confirmed by his granddaughter present with him. Jonna Nikky is his primary caretaker.   With this history it was felt prudent to admit him to the hospital for further work-up evaluation and treatment and he was sent to the hospital for direct admission however while going through admissions he became lightheaded and unresponsive and was emergently taken to the emergency room for admission. Today he remains more alert after hydration and and oriented to name and hospital [facility]) today and knows me by name today. He denies Cardiac or respiratory c/o. Swallowing difficulty improved and tolerated diet after EGD with Botox on . He is generally weak w/o focal neurologic c/o and as noted memory is better than on admission. He has no c/o of musculoskeletal until R foot examined at which times he says he has pain only to direct touch although less tender today.     Medications reviewed:  Current Facility-Administered Medications   Medication Dose Route Frequency    sodium chloride (NS) flush 5-40 mL  5-40 mL IntraVENous Q8H    sodium chloride (NS) flush 5-40 mL  5-40 mL IntraVENous PRN    pantoprazole (PROTONIX) tablet 40 mg  40 mg Oral ACB&D    cephALEXin (KEFLEX) capsule 500 mg  500 mg Oral Q12H    colchicine tablet 0.6 mg  0.6 mg Oral DAILY    polyethylene glycol (MIRALAX) packet 17 g  17 g Oral DAILY    sodium chloride (NS) flush 5-40 mL  5-40 mL IntraVENous Q8H    sodium chloride (NS) flush 5-40 mL  5-40 mL IntraVENous PRN    aspirin chewable tablet 81 mg  81 mg Oral DAILY    carvediloL (COREG) tablet 6.25 mg  6.25 mg Oral BID    donepeziL (ARICEPT) tablet 10 mg  10 mg Oral DAILY    memantine (NAMENDA) tablet 10 mg  10 mg Oral BID    temazepam (RESTORIL) capsule 15 mg  15 mg Oral QHS PRN    sodium chloride (NS) flush 5-40 mL  5-40 mL IntraVENous Q8H    sodium chloride (NS) flush 5-40 mL  5-40 mL IntraVENous PRN    acetaminophen (TYLENOL) tablet 650 mg  650 mg Oral Q4H PRN        Objective:   Vitals:  Visit Vitals  /45 (BP 1 Location: Left arm, BP Patient Position: Lying right side)   Pulse 64   Temp 98.3 °F (36.8 °C)   Resp 18   Ht 5' 9\" (1.753 m)   Wt 114 lb 10.2 oz (52 kg)   SpO2 100%   BMI 16.93 kg/m²      O2 Device: Room air Temp (24hrs), Av.2 °F (36.8 °C), Min:97.8 °F (36.6 °C), Max:98.5 °F (36.9 °C)      Last 24hr Input/Output:    Intake/Output Summary (Last 24 hours) at 7/29/2020 0429  Last data filed at 7/29/2020 0432  Gross per 24 hour   Intake 553 ml   Output 1250 ml   Net -697 ml        PHYSICAL EXAM:  General:     Alert, cooperative, no distress, appears stated age. Head:    Normocephalic, without obvious abnormality, atraumatic. Eyes:    Conjunctivae/corneas clear. PERRLA  Nose:   Nares normal. No drainage or sinus tenderness. Throat:     Lips, mucosa, and tongue normal.  No Thrush  Neck:   Supple, symmetrical,  no adenopathy, thyroid: non tender     no carotid bruit and no JVD. Back:     Symmetric,  No CVA tenderness. Lungs:    Clear to auscultation bilaterally. No Wheezing or Rhonchi. No rales. Heart:    Regular rate and rhythm,  no murmur, rub or gallop. Abdomen:    Soft, non-tender. Not distended. Bowel sounds normal. No masses  Extremities:  Extremities normal except R foot with STS over dorsum and increased temp with tenderness most notable at 1st MTP joint all a little better than yesterday, atraumatic, No cyanosis. No edema. No clubbing  Lymph nodes:  Cervical, supraclavicular normal.  Neurologic:  General decreased strength, Alert and oriented X 1.  He does know me by name today  Skin:                  No rash      Lab Data Reviewed:    Recent Results (from the past 24 hour(s))   CBC WITH AUTOMATED DIFF    Collection Time: 07/29/20  4:59 AM   Result Value Ref Range    WBC 5.1 4.1 - 11.1 K/uL    RBC 3.96 (L) 4.10 - 5.70 M/uL    HGB 10.6 (L) 12.1 - 17.0 g/dL    HCT 34.9 (L) 36.6 - 50.3 %    MCV 88.1 80.0 - 99.0 FL    MCH 26.8 26.0 - 34.0 PG    MCHC 30.4 30.0 - 36.5 g/dL    RDW 14.6 (H) 11.5 - 14.5 %    PLATELET 199 162 - 267 K/uL    MPV 11.5 8.9 - 12.9 FL    NRBC 0.0 0  WBC    ABSOLUTE NRBC 0.00 0.00 - 0.01 K/uL    NEUTROPHILS 60 32 - 75 %    LYMPHOCYTES 24 12 - 49 %    MONOCYTES 12 5 - 13 %    EOSINOPHILS 3 0 - 7 %    BASOPHILS 1 0 - 1 %    IMMATURE GRANULOCYTES 0 0.0 - 0.5 %    ABS. NEUTROPHILS 3.1 1.8 - 8.0 K/UL    ABS. LYMPHOCYTES 1.2 0.8 - 3.5 K/UL    ABS. MONOCYTES 0.6 0.0 - 1.0 K/UL    ABS. EOSINOPHILS 0.2 0.0 - 0.4 K/UL    ABS. BASOPHILS 0.1 0.0 - 0.1 K/UL    ABS. IMM. GRANS. 0.0 0.00 - 0.04 K/UL    DF AUTOMATED           Assessment/Plan:     Principal Problem:    Syncope (1/9/2019)    Active Problems:    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)      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, without long-term current use of insulin ([facility] (7/18/2017)      SSS (sick sinus syndrome) ([facility] (6/2/2015)      Cardiomyopathy ([facility] (11/7/2019)      Alcoholism ([facility] (4/23/2018)      Weight loss (7/20/2020)      Unsteady gait (7/20/2020)      Dehydration (7/20/2020)      Acute renal failure superimposed on stage 3 chronic kidney disease ([address] (7/20/2020)      Severe protein-calorie malnutrition ([address] (7/21/2020)      Esophageal dyskinesia (7/23/2020)       ___________________________________________________  PLAN:  1. Continue  telemetry bed with monitoring  2. D/Patrick IV hydration to saline lock now  3. Hold Lasix and all current hypertensive medications except continue Coreg with his cardiomyopathy  4. Continue IV Protonix  5.  GI consult notes reviewed and note Botox in lower esophagus for marked dyskinesia   6. Continue current Alzheimer's medications as ordered   7. According to his granddaughter who is his caretaker has not drank alcohol in 3 months but will watch closely for any signs of withdrawal with known alcoholism and prior withdrawal  8. Follow renal function, 51/2.36 on admission to 30/1.13 today from26/1.16 yesterday  9. Replete potassium currently 4.2, improved  10. Checked magnesium level - normal  11. Follow blood sugar and will cover with sliding scale if becomes elevated  12. Home vs Rehab pending on Pt recommendations   13. X-ray R foot w/o acute process  14.   Started Keflex and Colchicine for R foot on 7/27. Add low dose Indocin for short period      Addendum:  Phone call with his granddaughter (POA) 7/22 AM and I discussed the Ba Swallow and further plans pending results of this, I also discussed the rude behavior of his daughter (mother of the granddaughter whom I spoke with) and explained that this it totally unacceptable!    This was also D/W his nurse Adilene Pa          If need to contact me use hospital  [phone], DO NOT USE PERFECT SERVE    ___________________________________________________    Attending Physician: Rick Jenkins MD

## 2020-07-29 NOTE — PROGRESS NOTES
Duodenitis, esophagitis  Duodenitis, esophagitis  Bedside and Verbal shift change report given to [facility] Nirav RN (oncoming nurse) by Tommy Marquez RN (offgoing nurse). Report included the following information SBAR, Kardex, MAR and Recent Results. Zone Phone:   [phone]      Significant changes during shift:  None.  Quiet night        Patient Information    Acacia Barnard Sr  80 y.o.  7/20/2020  4:31 PM by Tyra Alvarez MD. Jackie Farfan Sr was admitted from Home    Problem List    Patient Active Problem List    Diagnosis Date Noted    Esophageal dyskinesia 07/23/2020    Severe protein-calorie malnutrition ([address] 07/21/2020    Weight loss 07/20/2020    Unsteady gait 07/20/2020    Dehydration 07/20/2020    Acute renal failure superimposed on stage 3 chronic kidney disease ([address] 07/20/2020    Paroxysmal VT ([address] 11/07/2019    Cardiomyopathy ([address] 11/07/2019    Alcohol withdrawal syndrome, with delirium ([address] 11/01/2019    A-fib ([address] 10/27/2019    Syncope 01/09/2019    Primary insomnia 01/09/2019    Mild depression ([address] 06/12/2018    Acute bilateral low back pain without sciatica 05/30/2018    Alcoholism ([address] 87/95/6671    Metabolic encephalopathy 66/12/0105    TIA (transient ischemic attack) 04/12/2018    Medicare annual wellness visit, subsequent 09/01/2017    Diverticulosis 07/18/2017    Gastritis and duodenitis 07/18/2017    Internal hemorrhoids 07/18/2017    Hypertension with renal disease 07/18/2017    Mixed hyperlipidemia 07/18/2017    GI bleed 07/18/2017    Primary osteoarthritis involving multiple joints 07/18/2017    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, without long-term current use of insulin ([address] 07/18/2017    CKD (chronic kidney disease), stage II 07/18/2017    Back pain 07/18/2017    Anemia 07/18/2017    Alzheimer disease ([address] 07/18/2017    SSS (sick sinus syndrome) ([address] 06/02/2015    S/P cardiac pacemaker procedure 05/04/2015  S/P ablation of accessory bypass tract 05/04/2015    SVT (supraventricular tachycardia) ([facility])--s/p RFA 04/28/2015    Near syncope 03/11/2015    ASCVD (arteriosclerotic cardiovascular disease) 07/11/2014    Hypokalemia 07/11/2014    Right inguinal hernia 06/12/2014     Past Medical History:   Diagnosis Date    Abdominal pain 7/18/2017    Alzheimer disease ([facility] 7/18/2017    Anemia 7/18/2017    Arthritis     Back pain 7/18/2017    Bradycardia 7/18/2017    CAD (coronary artery disease)     hx of MI    CKD (chronic kidney disease), stage II 7/18/2017    constipation     Depression 7/18/2017    Diabetes mellitus ([facility] 7/18/2017    Diverticulosis 7/18/2017    DJD (degenerative joint disease) 7/18/2017    Encounter for long-term (current) use of other medications 7/18/2017    Fatigue     Gastritis and duodenitis 7/18/2017    GERD (gastroesophageal reflux disease)     GI bleed 7/18/2017    Hearing loss     Hyperlipidemia 7/18/2017    Hypertension     Hypertension with renal disease 7/18/2017    Ill-defined condition     Dementia    Internal hemorrhoids 7/18/2017    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    S/P cardiac pacemaker procedure 5/4/2015    5/4/15 Medtronic dual chamber pacemaker implant     Weight loss     lost over 40 pounds last year- has no appetite         Core Measures:    CVA: No Not applicable  CHF:No Not applicable  PNA:No Not applicable    Activity Status:    OOB to Chair No  Ambulated this shift Yes   Bed Rest No    Supplemental O2: (If Applicable)    NC No  NRB No  Venti-mask No  On room air Liters/min      LINES AND DRAINS:    PIV    DVT prophylaxis:    DVT prophylaxis Med- Yes  DVT prophylaxis SCD or LISSY- No     Wounds: (If Applicable)    Wounds- No    Location none (though small area of swelling/heat on right foot    Patient Safety:    Falls Score Total Score: 4  Safety Level_______  Bed Alarm On? Yes  Sitter?  No    Plan for upcoming shift: safety, PO abx        Discharge Plan: Yes CM following- per note of 7/27, patient recommended by MD  for inpatient rehab, referral sent to [facility].   Per PT note, patient was denied IP rehab; PT recommends SNF for rehab    Active Consults:  IP CONSULT TO GASTROENTEROLOGY

## 2020-07-29 NOTE — PROGRESS NOTES
VITOR:    HHC  FOC/2nd IM Medicare Letter   Home with Family    UPDATE: 1:31PM    CM informed that pt was accepted to [facility]. CM will inform pt and Joelle Matute of the following. CIERRA Wyatt, CM          CM received call from [facility] and was informed that pt has been accepted to their [place] location. However, therapy informed CM that they are recommending for pt to return home with [address] and rolling walker. CM contact pt's granddaughter: Joelle Matute, via telephone regarding pt's d/c needs and plans. CM informed Joelle Giovani of the following options: inpatient rehab or HHC. Joelle Matute reported that she would rather for pt to return home with [address] and DME. CM informed Joelle Matute of [facility] document (verbal consent) placed in chart. Pt referral was sent to [facility] (acceptance pending). CM sent referral to [place] for DME: rolling walker (acceptance pending). Pt will have transport home at the time of d/c.   Pt will require 2nd IM Medicare Letter at the time of d/c.    CIERRA Wyatt, [address] [place] [address]

## 2020-07-29 NOTE — PROGRESS NOTES
Problem: Mobility Impaired (Adult and Pediatric)  Goal: *Acute Goals and Plan of Care (Insert Text)  Description: FUNCTIONAL STATUS PRIOR TO ADMISSION: Patient poor historian. He reports independence at baseline without use of any assistive device. HOME SUPPORT PRIOR TO ADMISSION: The patient lived with granddaughter. Patient reports granddaughter works during the day. Physical Therapy Goals  Initiated 7/22/2020  1. Patient will move from supine to sit and sit to supine , scoot up and down, and roll side to side in bed with independence within 7 day(s). 2.  Patient will transfer from bed to chair and chair to bed with supervision/set-up using the least restrictive device within 7 day(s). 3.  Patient will perform sit to stand with supervision/set-up within 7 day(s). 4.  Patient will ambulate with supervision/set-up for 250 feet with the least restrictive device within 7 day(s). Outcome: Progressing Towards Goal   PHYSICAL THERAPY TREATMENT  Patient: Keren Sandhoff Sr (80 y.o. male)  Date: 7/29/2020  Diagnosis: Syncope [R55]   Syncope  Procedure(s) (LRB):  ESOPHAGOGASTRODUODENOSCOPY (EGD) (N/A)  ESOPHAGOGASTRODUODENAL (EGD) BIOPSY (N/A)  INJECTION (N/A) 6 Days Post-Op  Precautions:    Chart, physical therapy assessment, plan of care and goals were reviewed. ASSESSMENT  Patient continues with skilled PT services and is progressing towards goals. Patient continues to report R foot pain but is able to tolerate increased activity today. He was able to complete bed mobility with supervision, transfers with SBA and gait training with CGA. Gait is slow and antalgic but steady today using RW for support. Per nursing, patient's family now intends to take patient home following discharge. Recommend HHPT and increased supervision. Would also benefit from RW. .     Current Level of Function Impacting Discharge (mobility/balance): supervision to CGA           PLAN :  Patient continues to benefit from skilled intervention to address the above impairments. Continue treatment per established plan of care. to address goals. Recommendation for discharge: (in order for the patient to meet his/her long term goals)  Physical therapy at least 2 days/week in the home AND ensure assist and/or supervision for safety with OOB mobility    This discharge recommendation:  Has been made in collaboration with the attending provider and/or case management    IF patient discharges home will need the following DME: rolling walker       SUBJECTIVE:   Patient stated My foot still hurts but I can walk.     OBJECTIVE DATA SUMMARY:   Critical Behavior:  Neurologic State: Alert  Orientation Level: Oriented to person, Oriented to place  Cognition: Follows commands  Safety/Judgement: Fall prevention, Home safety, Awareness of environment  Functional Mobility Training:  Bed Mobility:  Rolling: Supervision  Supine to Sit: Supervision     Scooting: Supervision        Transfers:  Sit to Stand: Stand-by assistance(VC for safety)  Stand to Sit: Stand-by assistance(VC fro safety)                             Balance:  Sitting: Intact  Standing: Impaired  Standing - Static: Good;Constant support  Standing - Dynamic : Good;Constant support(using RW for support)  Ambulation/Gait Training:  Distance (ft): 150 Feet (ft)  Assistive Device: Walker, rolling;Gait belt  Ambulation - Level of Assistance: Contact guard assistance        Gait Abnormalities: Antalgic;Decreased step clearance        Base of Support: Widened     Speed/Maureen: Pace decreased (<100 feet/min); Slow  Step Length: Left shortened;Right shortened(L>R)         Gait is slow and antalgic but steady with no overt LOB noted         Activity Tolerance:   Fair  Please refer to the flowsheet for vital signs taken during this treatment.     After treatment patient left in no apparent distress:   Sitting in chair, Call bell within reach, and Bed / chair alarm activated    COMMUNICATION/COLLABORATION:   The patients plan of care was discussed with: Occupational therapist and Registered nurse.      Marie Parks, PT   Time Calculation: 16 mins

## 2020-07-29 NOTE — PROGRESS NOTES
Problem: Self Care Deficits Care Plan (Adult)  Goal: *Acute Goals and Plan of Care (Insert Text)  Description:   FUNCTIONAL STATUS PRIOR TO ADMISSION: Patient was independent and active without use of DME. Reports no available equipment at home. HOME SUPPORT: The patient lived with granddaughter to provide assistance. Per patient, he was able to complete BADLs independently but depended on granddaughter for IADLs. Patient states granddaughter works during the day (?). Occupational Therapy Goals  Initiated 7/22/2020; weekly re-assessment 7/29/2020, goals updated below. 1.  Patient will perform standing grooming with SBA using RW prn within 7 day(s). MET. Upgrade to mod I for at least 5 minutes  2. Patient will collect ADL items from various surface levels with CGA for balance and using RW prn within 7 day(s). Continue. 4.  Patient will perform toilet transfers with CGA using RW prn within 7 day(s). MET. Upgrade to MOD I  5. Patient will perform all aspects of toileting with CGA using RW prn for balance within 7 day(s). MET. Upgrade to MOD I  6. Patient will participate in upper extremity therapeutic exercise/activities with supervision/set-up for 10 minutes within 7 day(s). Continue. Goals added 7/29/2020:  7. Patient will perform bathing, standing and sitting PRN, with supervision/set-up within 7 days. 8.  Patient will perform lower body dressing with MOD I within 7 days. Outcome: Progressing Towards Goal     OCCUPATIONAL THERAPY TREATMENT/WEEKLY RE-ASSESSMENT  Patient: Messi Jeff Sr (80 y.o. male)  Date: 7/29/2020  Diagnosis: Syncope [R55]   Syncope  Procedure(s) (LRB):  ESOPHAGOGASTRODUODENOSCOPY (EGD) (N/A)  ESOPHAGOGASTRODUODENAL (EGD) BIOPSY (N/A)  INJECTION (N/A) 6 Days Post-Op  Precautions:    Chart, occupational therapy assessment, plan of care, and goals were reviewed. ASSESSMENT  Patient continues with skilled OT services and is progressing towards goals.   Patient has met 3/5 goals from initial evaluation and continues to present with R foot pain and slow pace with activity. Pt has progressed with activity tolerance, balance, and strength. He has been able to tolerate activity at sink for serial standing ADL tasks and is able to demonstrate reach to distal LEs during lower body dressing tasks. He continues to require cues for safety with rolling walker as he tends to pick it up as opposed to pushing it along but demonstrates improving balance using it with ADL transfers and mobility, this session able to gather ADL items from varying heights. Goals updated above and added to reflect pt's improvement and progress. Will continue to follow 3x/week to address listed goals to maximize pt's return to highest level of function. Per Nursing, pt's family intend to take pt home at discharge. Recommend follow up [facility] OT and increased supervision for standing dynamic ADL tasks, especially showering. Current Level of Function Impacting Discharge (ADLs): overall supervision to CGA for mobility and ADLs    Other factors to consider for discharge: supportive family; R foot pain (pain with palpation on top of R big toe)         PLAN :  Goals have been updated based on progression since last assessment. Patient continues to benefit from skilled intervention to address the above impairments. Continue to follow patient 3 times a week to address goals.     Recommend with staff: 1 person assist mobility to bathroom for toileting and standing ADLs; OOB to chair for all meals    Recommend next OT session: continue with gathering ADL items; standing bathing tasks    Recommendation for discharge: (in order for the patient to meet his/her long term goals)  Occupational therapy at least 2 days/week in the home AND ensure assist and/or supervision for safety with showering; mobility; IADLs    This discharge recommendation:  Has been made in collaboration with the attending provider and/or case management    IF patient discharges home will need the following DME: walker: rolling       SUBJECTIVE:   Patient stated my right foot hurts.     OBJECTIVE DATA SUMMARY:   Cognitive/Behavioral Status:  Neurologic State: Alert  Orientation Level: Oriented to person;Oriented to place  Cognition: Appropriate for age attention/concentration; Follows commands  Perception: Appears intact  Perseveration: No perseveration noted  Safety/Judgement: Awareness of environment; Fall prevention    Functional Mobility and Transfers for ADLs:  Bed Mobility:  Rolling: Supervision  Supine to Sit: (Pt received OOB in chair)  Scooting: Supervision    Transfers:  Sit to Stand: Stand-by assistance    Balance:  Sitting: Intact  Standing: Impaired  Standing - Static: Good  Standing - Dynamic : Good;Constant support(with rolling walker)    ADL Intervention:  Grooming  Grooming Assistance: Stand-by assistance  Position Performed: Standing(at sink with rolling walker)  Washing Hands: Stand-by assistance  Brushing Teeth: Stand-by assistance  Cues: Verbal cues provided(for RW mgmt at sink)    Lower Body Dressing Assistance  Socks: Stand-by assistance  Leg Crossed Method Used: Yes  Position Performed: Seated in chair  Cues: Don;Doff    Toileting  Toileting Assistance: (standing at sink to void)  Bladder Hygiene: Stand-by assistance(standing)  Clothing Management: Stand-by assistance  Cues: Verbal cues provided  Adaptive Equipment: Walker    Cognitive Retraining  Safety/Judgement: Awareness of environment; Fall prevention    Functional Outcome Measure:   Barthel Index:    Bathin  Bladder: 5  Bowels: 10  Groomin  Dressin  Feeding: 10  Mobility: 10  Stairs: 0  Toilet Use: 5  Transfer (Bed to Chair and Back): 10  Total: 60/100        The Barthel ADL Index: Guidelines  1. The index should be used as a record of what a patient does, not as a record of what a patient could do.   2. The main aim is to establish degree of independence from any help, physical or verbal, however minor and for whatever reason. 3. The need for supervision renders the patient not independent. 4. A patient's performance should be established using the best available evidence. Asking the patient, friends/relatives and nurses are the usual sources, but direct observation and common sense are also important. However direct testing is not needed. 5. Usually the patient's performance over the preceding 24-48 hours is important, but occasionally longer periods will be relevant. 6. Middle categories imply that the patient supplies over 50 per cent of the effort. 7. Use of aids to be independent is allowed. Ryanne Hernandez, Barthel, DJustynW. ([id number]). Functional evaluation: the Barthel Index. [facility] (14)2. Bijan Newell sara FREDRICK CeeF, Sofi Winston., Ashley Catalan., [address], [address] [place] [address] (1999). Measuring the change indisability after inpatient rehabilitation; comparison of the responsiveness of the Barthel Index and Functional [place]arnett Measure. Journal of Neurology, Neurosurgery, and Psychiatry, 66(4), 728-348. Celia Campuzano, [email or web address], INES Albright, & Elizabeth Ruiz, MJustynA. (2004.) Assessment of post-stroke quality of life in cost-effectiveness studies: The usefulness of the Barthel Index and the EuroQoL-5D. Quality of Life Research, 13, 427-43     Pain:  Pt reporting R foot pain    Activity Tolerance:   Good  Please refer to the flowsheet for vital signs taken during this treatment. After treatment patient left in no apparent distress:   Sitting in chair, Call bell within reach and Bed / chair alarm activated    COMMUNICATION/COLLABORATION:   The patients plan of care was discussed with: Physical therapist and Registered nurse.      Kali Casillas OT  Time Calculation: 17 mins

## 2020-07-30 LAB
ALBUMIN SERPL-MCNC: 2 G/DL (ref 3.5–5)
ALBUMIN/GLOB SERPL: 0.6 {RATIO} (ref 1.1–2.2)
ALP SERPL-CCNC: 73 U/L (ref 45–117)
ALT SERPL-CCNC: 26 U/L (ref 12–78)
ANION GAP SERPL CALC-SCNC: 3 MMOL/L (ref 5–15)
AST SERPL-CCNC: 24 U/L (ref 15–37)
BASOPHILS # BLD: 0.1 K/UL (ref 0–0.1)
BASOPHILS NFR BLD: 1 % (ref 0–1)
BILIRUB SERPL-MCNC: 0.3 MG/DL (ref 0.2–1)
BUN SERPL-MCNC: 38 MG/DL (ref 6–20)
BUN/CREAT SERPL: 30 (ref 12–20)
CALCIUM SERPL-MCNC: 8.1 MG/DL (ref 8.5–10.1)
CHLORIDE SERPL-SCNC: 105 MMOL/L (ref 97–108)
CO2 SERPL-SCNC: 32 MMOL/L (ref 21–32)
CREAT SERPL-MCNC: 1.25 MG/DL (ref 0.7–1.3)
DIFFERENTIAL METHOD BLD: ABNORMAL
EOSINOPHIL # BLD: 0.2 K/UL (ref 0–0.4)
EOSINOPHIL NFR BLD: 3 % (ref 0–7)
ERYTHROCYTE [DISTWIDTH] IN BLOOD BY AUTOMATED COUNT: 14.5 % (ref 11.5–14.5)
GLOBULIN SER CALC-MCNC: 3.3 G/DL (ref 2–4)
GLUCOSE SERPL-MCNC: 84 MG/DL (ref 65–100)
HCT VFR BLD AUTO: 32.4 % (ref 36.6–50.3)
HGB BLD-MCNC: 10 G/DL (ref 12.1–17)
IMM GRANULOCYTES # BLD AUTO: 0 K/UL (ref 0–0.04)
IMM GRANULOCYTES NFR BLD AUTO: 0 % (ref 0–0.5)
LYMPHOCYTES # BLD: 1.2 K/UL (ref 0.8–3.5)
LYMPHOCYTES NFR BLD: 22 % (ref 12–49)
MCH RBC QN AUTO: 27.2 PG (ref 26–34)
MCHC RBC AUTO-ENTMCNC: 30.9 G/DL (ref 30–36.5)
MCV RBC AUTO: 88 FL (ref 80–99)
MONOCYTES # BLD: 0.7 K/UL (ref 0–1)
MONOCYTES NFR BLD: 13 % (ref 5–13)
NEUTS SEG # BLD: 3.4 K/UL (ref 1.8–8)
NEUTS SEG NFR BLD: 61 % (ref 32–75)
NRBC # BLD: 0 K/UL (ref 0–0.01)
NRBC BLD-RTO: 0 PER 100 WBC
PLATELET # BLD AUTO: 208 K/UL (ref 150–400)
PMV BLD AUTO: 10.9 FL (ref 8.9–12.9)
POTASSIUM SERPL-SCNC: 4.4 MMOL/L (ref 3.5–5.1)
PROT SERPL-MCNC: 5.3 G/DL (ref 6.4–8.2)
RBC # BLD AUTO: 3.68 M/UL (ref 4.1–5.7)
SODIUM SERPL-SCNC: 140 MMOL/L (ref 136–145)
WBC # BLD AUTO: 5.6 K/UL (ref 4.1–11.1)

## 2020-07-30 PROCEDURE — 74011250637 HC RX REV CODE- 250/637: Performed by: INTERNAL MEDICINE

## 2020-07-30 PROCEDURE — 65660000000 HC RM CCU STEPDOWN

## 2020-07-30 PROCEDURE — 36415 COLL VENOUS BLD VENIPUNCTURE: CPT

## 2020-07-30 PROCEDURE — 80053 COMPREHEN METABOLIC PANEL: CPT

## 2020-07-30 PROCEDURE — 94760 N-INVAS EAR/PLS OXIMETRY 1: CPT

## 2020-07-30 PROCEDURE — 85025 COMPLETE CBC W/AUTO DIFF WBC: CPT

## 2020-07-30 RX ADMIN — MEMANTINE HYDROCHLORIDE 10 MG: 10 TABLET ORAL at 17:36

## 2020-07-30 RX ADMIN — DONEPEZIL HYDROCHLORIDE 10 MG: 5 TABLET, FILM COATED ORAL at 08:32

## 2020-07-30 RX ADMIN — CEPHALEXIN 500 MG: 250 CAPSULE ORAL at 21:01

## 2020-07-30 RX ADMIN — Medication 10 ML: at 03:24

## 2020-07-30 RX ADMIN — MEMANTINE HYDROCHLORIDE 10 MG: 10 TABLET ORAL at 08:33

## 2020-07-30 RX ADMIN — Medication 10 ML: at 14:00

## 2020-07-30 RX ADMIN — ASPIRIN 81 MG CHEWABLE TABLET 81 MG: 81 TABLET CHEWABLE at 08:33

## 2020-07-30 RX ADMIN — Medication 10 ML: at 21:01

## 2020-07-30 RX ADMIN — POLYETHYLENE GLYCOL 3350 17 G: 17 POWDER, FOR SOLUTION ORAL at 08:32

## 2020-07-30 RX ADMIN — Medication 10 ML: at 14:21

## 2020-07-30 RX ADMIN — PANTOPRAZOLE SODIUM 40 MG: 40 TABLET, DELAYED RELEASE ORAL at 17:37

## 2020-07-30 RX ADMIN — CEPHALEXIN 500 MG: 250 CAPSULE ORAL at 08:33

## 2020-07-30 RX ADMIN — CARVEDILOL 6.25 MG: 6.25 TABLET, FILM COATED ORAL at 17:37

## 2020-07-30 RX ADMIN — CARVEDILOL 6.25 MG: 6.25 TABLET, FILM COATED ORAL at 08:32

## 2020-07-30 RX ADMIN — COLCHICINE 0.6 MG: 0.6 TABLET ORAL at 08:40

## 2020-07-30 RX ADMIN — PANTOPRAZOLE SODIUM 40 MG: 40 TABLET, DELAYED RELEASE ORAL at 08:33

## 2020-07-30 NOTE — PROGRESS NOTES
Problem: Pressure Injury - Risk of  Goal: *Prevention of pressure injury  Description: Document Newton Scale and appropriate interventions in the flowsheet. Outcome: Progressing Towards Goal  Note: Pressure Injury Interventions:  Sensory Interventions: Assess changes in LOC    Moisture Interventions: Apply protective barrier, creams and emollients    Activity Interventions: PT/OT evaluation    Mobility Interventions: PT/OT evaluation    Nutrition Interventions: Document food/fluid/supplement intake    Friction and Shear Interventions: Apply protective barrier, creams and emollients                Problem: Falls - Risk of  Goal: *Absence of Falls  Description: Document Louis Fall Risk and appropriate interventions in the flowsheet.   Outcome: Progressing Towards Goal  Note: Fall Risk Interventions:  Mobility Interventions: Bed/chair exit alarm    Mentation Interventions: Adequate sleep, hydration, pain control, Bed/chair exit alarm    Medication Interventions: Bed/chair exit alarm    Elimination Interventions: Bed/chair exit alarm, Call light in reach    History of Falls Interventions: Bed/chair exit alarm

## 2020-07-30 NOTE — PROGRESS NOTES
PROGRESS NOTE    NAME:  Arminda Willis    :   1931   MRN:   [id number]     Date/Time:  2020 6:03 AM  Subjective:   History:  Chart reviewed and patient seen and examined and discussed with his nurses and all events noted. He presented to the office on  after a long absence not having been seen there since 2019. [facility] was accompanied by his granddaughter and was there  in follow-up of his medical problems to include hypertension, diabetes, hyperlipidemia, atherosclerotic coronary vascular disease, cardiomyopathy, Alzheimer's dementia, history of PAF, prior TIA, history of alcoholism and other multiple medical problems.  In addition to his chronic problems he had a significant issue now off weight loss from 134 pounds at his last visit down to 115.7 pounds. [facility] had become very unsteady walking according to his granddaughter which was easily visible on exam in the office. [facility] also had developed a poor appetite and has a hard time keeping food down and thus eating very little.  He actually even had had some difficulty keeping liquids down.      He denies any current chest pain, shortness of breath, palpitations, PND, orthopnea or other cardiorespiratory complaints.  He  has no current  complaints.  As far as the GI complaints there is a poor appetite with the inability to keep some food down as well as liquids as well as some mild upper abdominal discomfort but no evidence of diarrhea.  No fevers, chills or night sweats have been noted.  From a neurologic standpoint he has had progressive decreased memory and progressive unsteadiness on his feet.  There are no current active arthritic complaints.  All of the above history was confirmed by his granddaughter present with him. Soledad Gross is his primary caretaker.   With this history it was felt prudent to admit him to the hospital for further work-up evaluation and treatment and he was sent to the hospital for direct admission however while going through admissions he became lightheaded and unresponsive and was emergently taken to the emergency room for admission. Today he remains more alert after hydration and and oriented to name and hospital [facility], [facility]) today and knows me by name today. He denies Cardiac or respiratory c/o. Swallowing difficulty improved and tolerated diet after EGD with Botox on . He is generally weak w/o focal neurologic c/o and as noted memory is better than on admission. He has no c/o of musculoskeletal until R foot examined at which times he says he has pain only to direct touch although less tender daily.     Medications reviewed:  Current Facility-Administered Medications   Medication Dose Route Frequency    sodium chloride (NS) flush 5-40 mL  5-40 mL IntraVENous Q8H    sodium chloride (NS) flush 5-40 mL  5-40 mL IntraVENous PRN    pantoprazole (PROTONIX) tablet 40 mg  40 mg Oral ACB&D    cephALEXin (KEFLEX) capsule 500 mg  500 mg Oral Q12H    colchicine tablet 0.6 mg  0.6 mg Oral DAILY    polyethylene glycol (MIRALAX) packet 17 g  17 g Oral DAILY    sodium chloride (NS) flush 5-40 mL  5-40 mL IntraVENous Q8H    sodium chloride (NS) flush 5-40 mL  5-40 mL IntraVENous PRN    aspirin chewable tablet 81 mg  81 mg Oral DAILY    carvediloL (COREG) tablet 6.25 mg  6.25 mg Oral BID    donepeziL (ARICEPT) tablet 10 mg  10 mg Oral DAILY    memantine (NAMENDA) tablet 10 mg  10 mg Oral BID    temazepam (RESTORIL) capsule 15 mg  15 mg Oral QHS PRN    sodium chloride (NS) flush 5-40 mL  5-40 mL IntraVENous Q8H    sodium chloride (NS) flush 5-40 mL  5-40 mL IntraVENous PRN    acetaminophen (TYLENOL) tablet 650 mg  650 mg Oral Q4H PRN        Objective:   Vitals:  Visit Vitals  /71 (BP 1 Location: Left arm, BP Patient Position: Lying left side)   Pulse 71   Temp 98.4 °F (36.9 °C)   Resp 16   Ht 5' 9\" (1.753 m)   Wt 114 lb 10.2 oz (52 kg)   SpO2 100%   BMI 16.93 kg/m²      O2 Device: Room air Temp (24hrs), Av.2 °F (36.8 °C), Min:97.5 °F (36.4 °C), Max:99.1 °F (37.3 °C)      Last 24hr Input/Output:    Intake/Output Summary (Last 24 hours) at 7/30/2020 0603  Last data filed at 7/29/2020 2156  Gross per 24 hour   Intake    Output 1 ml   Net -1 ml        PHYSICAL EXAM:  General:     Alert, cooperative, no distress, appears stated age. Head:    Normocephalic, without obvious abnormality, atraumatic. Eyes:    Conjunctivae/corneas clear. PERRLA  Nose:   Nares normal. No drainage or sinus tenderness. Throat:     Lips, mucosa, and tongue normal.  No Thrush  Neck:   Supple, symmetrical,  no adenopathy, thyroid: non tender     no carotid bruit and no JVD. Back:     Symmetric,  No CVA tenderness. Lungs:    Clear to auscultation bilaterally. No Wheezing or Rhonchi. No rales. Heart:    Regular rate and rhythm,  no murmur, rub or gallop. Abdomen:    Soft, non-tender. Not distended. Bowel sounds normal. No masses  Extremities:  Extremities normal except R foot with STS over dorsum and increased temp with tenderness most notable at 1st MTP joint all a little better than yesterday, atraumatic, No cyanosis. No edema. No clubbing  Lymph nodes:  Cervical, supraclavicular normal.  Neurologic:  General decreased strength, Alert and oriented X 1.  He does know me by name today  Skin:                  No rash      Lab Data Reviewed:    Recent Results (from the past 24 hour(s))   CBC WITH AUTOMATED DIFF    Collection Time: 07/30/20  3:21 AM   Result Value Ref Range    WBC 5.6 4.1 - 11.1 K/uL    RBC 3.68 (L) 4.10 - 5.70 M/uL    HGB 10.0 (L) 12.1 - 17.0 g/dL    HCT 32.4 (L) 36.6 - 50.3 %    MCV 88.0 80.0 - 99.0 FL    MCH 27.2 26.0 - 34.0 PG    MCHC 30.9 30.0 - 36.5 g/dL    RDW 14.5 11.5 - 14.5 %    PLATELET 020 806 - 042 K/uL    MPV 10.9 8.9 - 12.9 FL    NRBC 0.0 0  WBC    ABSOLUTE NRBC 0.00 0.00 - 0.01 K/uL    NEUTROPHILS 61 32 - 75 %    LYMPHOCYTES 22 12 - 49 %    MONOCYTES 13 5 - 13 %    EOSINOPHILS 3 0 - 7 %    BASOPHILS 1 0 - 1 % IMMATURE GRANULOCYTES 0 0.0 - 0.5 %    ABS. NEUTROPHILS 3.4 1.8 - 8.0 K/UL    ABS. LYMPHOCYTES 1.2 0.8 - 3.5 K/UL    ABS. MONOCYTES 0.7 0.0 - 1.0 K/UL    ABS. EOSINOPHILS 0.2 0.0 - 0.4 K/UL    ABS. BASOPHILS 0.1 0.0 - 0.1 K/UL    ABS. IMM. GRANS. 0.0 0.00 - 0.04 K/UL    DF AUTOMATED     METABOLIC PANEL, COMPREHENSIVE    Collection Time: 07/30/20  3:21 AM   Result Value Ref Range    Sodium 140 136 - 145 mmol/L    Potassium 4.4 3.5 - 5.1 mmol/L    Chloride 105 97 - 108 mmol/L    CO2 32 21 - 32 mmol/L    Anion gap 3 (L) 5 - 15 mmol/L    Glucose 84 65 - 100 mg/dL    BUN 38 (H) 6 - 20 MG/DL    Creatinine 1.25 0.70 - 1.30 MG/DL    BUN/Creatinine ratio 30 (H) 12 - 20      GFR est AA >60 >60 ml/min/1.73m2    GFR est non-AA 54 (L) >60 ml/min/1.73m2    Calcium 8.1 (L) 8.5 - 10.1 MG/DL    Bilirubin, total 0.3 0.2 - 1.0 MG/DL    ALT (SGPT) 26 12 - 78 U/L    AST (SGOT) 24 15 - 37 U/L    Alk. phosphatase 73 45 - 117 U/L    Protein, total 5.3 (L) 6.4 - 8.2 g/dL    Albumin 2.0 (L) 3.5 - 5.0 g/dL    Globulin 3.3 2.0 - 4.0 g/dL    A-G Ratio 0.6 (L) 1.1 - 2.2           Assessment/Plan:     Principal Problem:    Syncope (1/9/2019)    Active Problems:    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)      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, without long-term current use of insulin ([facility] (7/18/2017)      SSS (sick sinus syndrome) ([facility] (6/2/2015)      Cardiomyopathy ([facility] (11/7/2019)      Alcoholism ([facility] (4/23/2018)      Weight loss (7/20/2020)      Unsteady gait (7/20/2020)      Dehydration (7/20/2020)      Acute renal failure superimposed on stage 3 chronic kidney disease ([facility] (7/20/2020)      Severe protein-calorie malnutrition ([facility] (7/21/2020)      Esophageal dyskinesia (7/23/2020)       ___________________________________________________  PLAN:  1. Continue  telemetry bed with monitoring  2. D/Patrick IV hydration to saline lock now  3.   Hold Lasix and all current hypertensive medications except continue Coreg with his cardiomyopathy  4. Continue IV Protonix  5.  GI consult notes reviewed and note Botox in lower esophagus for marked dyskinesia   6. Continue current Alzheimer's medications as ordered   7. According to his granddaughter who is his caretaker has not drank alcohol in 3 months but will watch closely for any signs of withdrawal with known alcoholism and prior withdrawal  8. Follow renal function, 51/2.36 on admission to 38/1.25 today   9. Replete potassium currently 4.4, improved  10. Checked magnesium level - normal  11. Follow blood sugar and will cover with sliding scale if becomes elevated  12. Home vs Rehab pending on Pt recommendations   13. X-ray R foot w/o acute process  14. Started Keflex and Colchicine for R foot on 7/27. Added low dose Indocin for short period starting 7/29      Addendum:  Phone call with his granddaughter (POA) 7/22 AM and I discussed the Ba Swallow and further plans pending results of this, I also discussed the rude behavior of his daughter (mother of the granddaughter whom I spoke with) and explained that this it totally unacceptable!    This was also D/W his nurse Keena Mendieta          If need to contact me use hospital  [phone], DO NOT USE PERFECT SERVE    ___________________________________________________    Attending Physician: Ramses España MD

## 2020-07-30 NOTE — PROGRESS NOTES
Problem: Falls - Risk of  Goal: *Absence of Falls  Description: Document Yeyo Tripathi Fall Risk and appropriate interventions in the flowsheet.   Outcome: Progressing Towards Goal  Note: Fall Risk Interventions:  Mobility Interventions: Communicate number of staff needed for ambulation/transfer    Mentation Interventions: Adequate sleep, hydration, pain control, Bed/chair exit alarm    Medication Interventions: Evaluate medications/consider consulting pharmacy, Bed/chair exit alarm    Elimination Interventions: Bed/chair exit alarm, Call light in reach    History of Falls Interventions: Bed/chair exit alarm, Room close to nurse's station

## 2020-07-30 NOTE — PROGRESS NOTES
Bedside and Verbal shift change report given to Ze John, RN by Juli Ni RN (offgoing nurse).  Report included the following information SBAR.     Zone Phone:         Significant changes during shift:  NONE        Patient Information     Benito Farfan Sr  80 y.o.  7/20/2020  4:31 PM by Joan Martinez MD. Ruthy Farfan Sr was admitted from Home     Problem List          Patient Active Problem List     Diagnosis Date Noted    Esophageal dyskinesia 07/23/2020    Severe protein-calorie malnutrition ([address] 07/21/2020    Weight loss 07/20/2020    Unsteady gait 07/20/2020    Dehydration 07/20/2020    Acute renal failure superimposed on stage 3 chronic kidney disease ([address] 07/20/2020    Paroxysmal VT ([address] 11/07/2019    Cardiomyopathy ([address] 11/07/2019    Alcohol withdrawal syndrome, with delirium ([address] 11/01/2019    A-fib ([address] 10/27/2019    Syncope 01/09/2019    Primary insomnia 01/09/2019    Mild depression ([address] 06/12/2018    Acute bilateral low back pain without sciatica 05/30/2018    Alcoholism ([address] 30/24/2464    Metabolic encephalopathy 20/34/9425    TIA (transient ischemic attack) 04/12/2018    Medicare annual wellness visit, subsequent 09/01/2017    Diverticulosis 07/18/2017    Gastritis and duodenitis 07/18/2017    Internal hemorrhoids 07/18/2017    Hypertension with renal disease 07/18/2017    Mixed hyperlipidemia 07/18/2017    GI bleed 07/18/2017    Primary osteoarthritis involving multiple joints 07/18/2017    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, without long-term current use of insulin ([address] 07/18/2017    CKD (chronic kidney disease), stage II 07/18/2017    Back pain 07/18/2017    Anemia 07/18/2017    Alzheimer disease ([address] 07/18/2017    SSS (sick sinus syndrome) ([address] 06/02/2015    S/P cardiac pacemaker procedure 05/04/2015    S/P ablation of accessory bypass tract 05/04/2015    SVT (supraventricular tachycardia) (HCC)--s/p RFA 04/28/2015    Near syncope 03/11/2015    ASCVD (arteriosclerotic cardiovascular disease) 07/11/2014    Hypokalemia 07/11/2014    Right inguinal hernia 06/12/2014          Past Medical History:   Diagnosis Date    Abdominal pain 7/18/2017    Alzheimer disease ([facility] 7/18/2017    Anemia 7/18/2017    Arthritis      Back pain 7/18/2017    Bradycardia 7/18/2017    CAD (coronary artery disease)       hx of MI    CKD (chronic kidney disease), stage II 7/18/2017    constipation      Depression 7/18/2017    Diabetes mellitus ([facility] 7/18/2017    Diverticulosis 7/18/2017    DJD (degenerative joint disease) 7/18/2017    Encounter for long-term (current) use of other medications 7/18/2017    Fatigue      Gastritis and duodenitis 7/18/2017    GERD (gastroesophageal reflux disease)      GI bleed 7/18/2017    Hearing loss      Hyperlipidemia 7/18/2017    Hypertension      Hypertension with renal disease 7/18/2017    Ill-defined condition       Dementia    Internal hemorrhoids 7/18/2017    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    S/P cardiac pacemaker procedure 5/4/2015     5/4/15 [employer]tronic dual chamber pacemaker implant     Weight loss       lost over 40 pounds last year- has no appetite           Core Measures:     CVA: Not applicable  CHF:Not applicable  PNA:Not applicable     Activity Status:     OOB to Chair No  Ambulated this shift Yes   Bed Rest No     Supplemental O2: (If Applicable)     NC Not applicable  NRB Not applicable  Venti-mask Not applicable     DVT prophylaxis:     DVT prophylaxis Med- No  DVT prophylaxis SCD or LISSY- Yes      Wounds: (If Applicable)     Wounds- Yes     Location Right foot is swollen and painful to touch      Patient Safety:     Falls Score Total Score: 4  Safety Level_______  Bed Alarm On? Yes  Sitter?  No     Plan for upcoming shift: Manage swelling in foot        Discharge Plan: Yes; discharge home with home health services     Active Consults:  IP CONSULT TO GASTROENTEROLOGY

## 2020-07-30 NOTE — PROGRESS NOTES
Bedside and Verbal shift change report given to Guillermina, RN by Shabana/Susanne, RN (offgoing nurse).  Report included the following information [facility].     [facility] Phone: [phone]        Significant changes during shift:  NONE        Patient Information     Carmen Alanis Sr  80 y.o.  7/20/2020  4:31 PM by Harsha Zepeda MD. Benito Farfan Sr was admitted from Home     Problem List             Patient Active Problem List     Diagnosis Date Noted    Esophageal dyskinesia 07/23/2020    Severe protein-calorie malnutrition ([address] 07/21/2020    Weight loss 07/20/2020    Unsteady gait 07/20/2020    Dehydration 07/20/2020    Acute renal failure superimposed on stage 3 chronic kidney disease ([address] 07/20/2020    Paroxysmal VT ([address] 11/07/2019    Cardiomyopathy ([address] 11/07/2019    Alcohol withdrawal syndrome, with delirium ([address] 11/01/2019    A-fib ([address] 10/27/2019    Syncope 01/09/2019    Primary insomnia 01/09/2019    Mild depression ([address] 06/12/2018    Acute bilateral low back pain without sciatica 05/30/2018    Alcoholism ([address] 04/58/0932    Metabolic encephalopathy 48/25/5427    TIA (transient ischemic attack) 04/12/2018    Medicare annual wellness visit, subsequent 09/01/2017    Diverticulosis 07/18/2017    Gastritis and duodenitis 07/18/2017    Internal hemorrhoids 07/18/2017    Hypertension with renal disease 07/18/2017    Mixed hyperlipidemia 07/18/2017    GI bleed 07/18/2017    Primary osteoarthritis involving multiple joints 07/18/2017    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, without long-term current use of insulin ([address] 07/18/2017    CKD (chronic kidney disease), stage II 07/18/2017    Back pain 07/18/2017    Anemia 07/18/2017    Alzheimer disease ([address] 07/18/2017    SSS (sick sinus syndrome) ([address] 06/02/2015    S/P cardiac pacemaker procedure 05/04/2015    S/P ablation of accessory bypass tract 05/04/2015    SVT (supraventricular tachycardia) ([facility])--s/p RFA 04/28/2015    Near syncope 03/11/2015    ASCVD (arteriosclerotic cardiovascular disease) 07/11/2014    Hypokalemia 07/11/2014    Right inguinal hernia 06/12/2014              Past Medical History:   Diagnosis Date    Abdominal pain 7/18/2017    Alzheimer disease ([facility] 7/18/2017    Anemia 7/18/2017    Arthritis      Back pain 7/18/2017    Bradycardia 7/18/2017    CAD (coronary artery disease)       hx of MI    CKD (chronic kidney disease), stage II 7/18/2017    constipation      Depression 7/18/2017    Diabetes mellitus ([facility] 7/18/2017    Diverticulosis 7/18/2017    DJD (degenerative joint disease) 7/18/2017    Encounter for long-term (current) use of other medications 7/18/2017    Fatigue      Gastritis and duodenitis 7/18/2017    GERD (gastroesophageal reflux disease)      GI bleed 7/18/2017    Hearing loss      Hyperlipidemia 7/18/2017    Hypertension      Hypertension with renal disease 7/18/2017    Ill-defined condition       Dementia    Internal hemorrhoids 7/18/2017    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    S/P cardiac pacemaker procedure 5/4/2015     5/4/15 Medtronic dual chamber pacemaker implant     Weight loss       lost over 40 pounds last year- has no appetite            Core Measures:     CVA: Not applicable  CHF:Not applicable  PNA:Not applicable     Activity Status:     OOB to Chair Yes  Ambulated this shift Yes   Bed Rest No     Supplemental Q3: (AF Applicable)     NC Not applicable  NRB Not applicable  Venti-mask Not applicable     DVT prophylaxis:     DVT prophylaxis Med- No  DVT prophylaxis SCD or LISSY- Yes pt refused       Wounds: (If Applicable)     Wounds- Yes     Location Right foot is swollen, wound on left forearm with dressing covering it      Patient Safety:     Falls Score Total Score: 4  Safety Level_______  Bed Alarm On? Yes  Sitter? No     Plan for upcoming shift: Manage swelling in foot        Discharge Plan: Yes; discharge home with home health services, pt has walker that he will be going home with to use.      Active Consults:  IP CONSULT TO GASTROENTEROLOGY

## 2020-07-30 NOTE — PROGRESS NOTES
Physical Therapy  Attempting to see patient x 3 this pm. With first attempt, patient eating lunch. On second attempt patient has just returned to bed after sitting in chair since early am. On third attempt, patient sleeping. Roused to verbalization but immediately returned to sleep. Will defer therapy and follow back tomorrow.   Thank you,  Sabine Moya, PT

## 2020-07-30 NOTE — PROGRESS NOTES
VITOR:    DME-Rolling Walker      CM provided pt with rolling walker from [place]. CM informed pt's nurse that rolling walker is by bedside.     CIERRA Olson, [address] [place] [address]

## 2020-07-31 LAB
ALBUMIN SERPL-MCNC: 2.2 G/DL (ref 3.5–5)
ALBUMIN/GLOB SERPL: 0.6 {RATIO} (ref 1.1–2.2)
ALP SERPL-CCNC: 70 U/L (ref 45–117)
ALT SERPL-CCNC: 24 U/L (ref 12–78)
ANION GAP SERPL CALC-SCNC: 3 MMOL/L (ref 5–15)
AST SERPL-CCNC: 23 U/L (ref 15–37)
BASOPHILS # BLD: 0 K/UL (ref 0–0.1)
BASOPHILS NFR BLD: 1 % (ref 0–1)
BILIRUB SERPL-MCNC: 0.6 MG/DL (ref 0.2–1)
BUN SERPL-MCNC: 37 MG/DL (ref 6–20)
BUN/CREAT SERPL: 32 (ref 12–20)
CALCIUM SERPL-MCNC: 8.6 MG/DL (ref 8.5–10.1)
CHLORIDE SERPL-SCNC: 106 MMOL/L (ref 97–108)
CO2 SERPL-SCNC: 31 MMOL/L (ref 21–32)
CREAT SERPL-MCNC: 1.14 MG/DL (ref 0.7–1.3)
DIFFERENTIAL METHOD BLD: ABNORMAL
EOSINOPHIL # BLD: 0.1 K/UL (ref 0–0.4)
EOSINOPHIL NFR BLD: 3 % (ref 0–7)
ERYTHROCYTE [DISTWIDTH] IN BLOOD BY AUTOMATED COUNT: 14.5 % (ref 11.5–14.5)
GLOBULIN SER CALC-MCNC: 3.4 G/DL (ref 2–4)
GLUCOSE SERPL-MCNC: 84 MG/DL (ref 65–100)
HCT VFR BLD AUTO: 32.8 % (ref 36.6–50.3)
HGB BLD-MCNC: 10.2 G/DL (ref 12.1–17)
IMM GRANULOCYTES # BLD AUTO: 0 K/UL (ref 0–0.04)
IMM GRANULOCYTES NFR BLD AUTO: 0 % (ref 0–0.5)
LYMPHOCYTES # BLD: 1.1 K/UL (ref 0.8–3.5)
LYMPHOCYTES NFR BLD: 24 % (ref 12–49)
MCH RBC QN AUTO: 27.1 PG (ref 26–34)
MCHC RBC AUTO-ENTMCNC: 31.1 G/DL (ref 30–36.5)
MCV RBC AUTO: 87.2 FL (ref 80–99)
MONOCYTES # BLD: 0.5 K/UL (ref 0–1)
MONOCYTES NFR BLD: 11 % (ref 5–13)
NEUTS SEG # BLD: 2.6 K/UL (ref 1.8–8)
NEUTS SEG NFR BLD: 61 % (ref 32–75)
NRBC # BLD: 0 K/UL (ref 0–0.01)
NRBC BLD-RTO: 0 PER 100 WBC
PLATELET # BLD AUTO: 204 K/UL (ref 150–400)
PMV BLD AUTO: 10.5 FL (ref 8.9–12.9)
POTASSIUM SERPL-SCNC: 4.2 MMOL/L (ref 3.5–5.1)
PROT SERPL-MCNC: 5.6 G/DL (ref 6.4–8.2)
RBC # BLD AUTO: 3.76 M/UL (ref 4.1–5.7)
SODIUM SERPL-SCNC: 140 MMOL/L (ref 136–145)
WBC # BLD AUTO: 4.3 K/UL (ref 4.1–11.1)

## 2020-07-31 PROCEDURE — 97110 THERAPEUTIC EXERCISES: CPT

## 2020-07-31 PROCEDURE — 74011250637 HC RX REV CODE- 250/637: Performed by: INTERNAL MEDICINE

## 2020-07-31 PROCEDURE — 97116 GAIT TRAINING THERAPY: CPT

## 2020-07-31 PROCEDURE — 65660000000 HC RM CCU STEPDOWN

## 2020-07-31 PROCEDURE — 80053 COMPREHEN METABOLIC PANEL: CPT

## 2020-07-31 PROCEDURE — 36415 COLL VENOUS BLD VENIPUNCTURE: CPT

## 2020-07-31 PROCEDURE — 85025 COMPLETE CBC W/AUTO DIFF WBC: CPT

## 2020-07-31 RX ADMIN — CEPHALEXIN 500 MG: 250 CAPSULE ORAL at 21:08

## 2020-07-31 RX ADMIN — CEPHALEXIN 500 MG: 250 CAPSULE ORAL at 09:09

## 2020-07-31 RX ADMIN — ASPIRIN 81 MG CHEWABLE TABLET 81 MG: 81 TABLET CHEWABLE at 09:09

## 2020-07-31 RX ADMIN — MEMANTINE HYDROCHLORIDE 10 MG: 10 TABLET ORAL at 09:09

## 2020-07-31 RX ADMIN — CARVEDILOL 6.25 MG: 6.25 TABLET, FILM COATED ORAL at 09:09

## 2020-07-31 RX ADMIN — Medication 10 ML: at 21:10

## 2020-07-31 RX ADMIN — PANTOPRAZOLE SODIUM 40 MG: 40 TABLET, DELAYED RELEASE ORAL at 09:09

## 2020-07-31 RX ADMIN — MEMANTINE HYDROCHLORIDE 10 MG: 10 TABLET ORAL at 17:29

## 2020-07-31 RX ADMIN — POLYETHYLENE GLYCOL 3350 17 G: 17 POWDER, FOR SOLUTION ORAL at 09:09

## 2020-07-31 RX ADMIN — Medication 10 ML: at 03:44

## 2020-07-31 RX ADMIN — DONEPEZIL HYDROCHLORIDE 10 MG: 5 TABLET, FILM COATED ORAL at 09:09

## 2020-07-31 RX ADMIN — Medication 10 ML: at 14:00

## 2020-07-31 RX ADMIN — CARVEDILOL 6.25 MG: 6.25 TABLET, FILM COATED ORAL at 17:31

## 2020-07-31 RX ADMIN — COLCHICINE 0.6 MG: 0.6 TABLET ORAL at 09:09

## 2020-07-31 RX ADMIN — PANTOPRAZOLE SODIUM 40 MG: 40 TABLET, DELAYED RELEASE ORAL at 17:29

## 2020-07-31 NOTE — PROGRESS NOTES
Problem: Mobility Impaired (Adult and Pediatric)  Goal: *Acute Goals and Plan of Care (Insert Text)  Description: FUNCTIONAL STATUS PRIOR TO ADMISSION: Patient poor historian. He reports independence at baseline without use of any assistive device. HOME SUPPORT PRIOR TO ADMISSION: The patient lived with granddaughter. Patient reports granddaughter works during the day. Physical Therapy Goals  Goals reviewed and remain appropriate 7/31/2020  Initiated 7/22/2020  1. Patient will move from supine to sit and sit to supine , scoot up and down, and roll side to side in bed with independence within 7 day(s). 2.  Patient will transfer from bed to chair and chair to bed with supervision/set-up using the least restrictive device within 7 day(s). 3.  Patient will perform sit to stand with supervision/set-up within 7 day(s). 4.  Patient will ambulate with supervision/set-up for 250 feet with the least restrictive device within 7 day(s). Outcome: Progressing Towards Goal   PHYSICAL THERAPY TREATMENT: WEEKLY REASSESSMENT  Patient: Mitesh Judd Sr (80 y.o. male)  Date: 7/31/2020  Primary Diagnosis: Syncope [R55]  Procedure(s) (LRB):  ESOPHAGOGASTRODUODENOSCOPY (EGD) (N/A)  ESOPHAGOGASTRODUODENAL (EGD) BIOPSY (N/A)  INJECTION (N/A) 8 Days Post-Op   Precautions:    Fall Risk      ASSESSMENT  Patient continues with skilled PT services and is progressing towards goals. Patient continues to demonstrate limited functional mobility and decreased independence secondary to generalized weakness, impaired balance, impaired gait mechanics, and decreased activity tolerance. VSS on RA throughout session. No pain complaints at rest, however did c/o stiffness and pain in R foot (reported improvement compared to prior session). Requires increased time for all mobility and VCs for safe hand placement with use of RW during transfers. Tolerated increased gait distance well.  Pt was left sitting in bedside chair with all needs met, RN aware, and chair alarm on following session. Family planning to take patient home, therefore recommend HHPT, use of RW, and 24/7 supervision/assistance. Patient's progression toward goals since last assessment: Slow progress towards goals, however goals remain appropriate    Current Level of Function Impacting Discharge (mobility/balance): supervision to CGA x 1    Functional Outcome Measure: The patient scored 60/100 on the Barthel Index outcome measure which is indicative of 40% impairment. Other factors to consider for discharge: fall risk; decline from baseline mobility; dementia         PLAN :  Goals have been updated based on progression since last assessment. Patient continues to benefit from skilled intervention to address the above impairments. Recommendations and Planned Interventions: bed mobility training, transfer training, gait training, therapeutic exercises, patient and family training/education, and therapeutic activities      Frequency/Duration: Patient will be followed by physical therapy:  4 times a week to address goals. Recommendation for discharge: (in order for the patient to meet his/her long term goals)  Physical therapy at least 2 days/week in the home AND ensure assist and/or supervision for safety with mobility and ADLs    This discharge recommendation:  Has been made in collaboration with the attending provider and/or case management    IF patient discharges home will need the following DME: patient owns DME required for discharge         SUBJECTIVE:   Patient stated It just stiff.     OBJECTIVE DATA SUMMARY:   HISTORY:    Past Medical History:   Diagnosis Date    Abdominal pain 7/18/2017    [facility] disease ([facility] 7/18/2017    Anemia 7/18/2017    Arthritis     Back pain 7/18/2017    Bradycardia 7/18/2017    CAD (coronary artery disease)     hx of MI    CKD (chronic kidney disease), stage II 7/18/2017    constipation     Depression 7/18/2017    Diabetes mellitus ([facility] 7/18/2017    Diverticulosis 7/18/2017    DJD (degenerative joint disease) 7/18/2017    Encounter for long-term (current) use of other medications 7/18/2017    Fatigue     Gastritis and duodenitis 7/18/2017    GERD (gastroesophageal reflux disease)     GI bleed 7/18/2017    Hearing loss     Hyperlipidemia 7/18/2017    Hypertension     Hypertension with renal disease 7/18/2017    Ill-defined condition     Dementia    Internal hemorrhoids 7/18/2017    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    S/P cardiac pacemaker procedure 5/4/2015    5/4/15 Medtronic dual chamber pacemaker implant     Weight loss     lost over 40 pounds last year- has no appetite     Past Surgical History:   Procedure Laterality Date    COLONOSCOPY N/A 6/22/2017    COLONOSCOPY performed by Jania Turner MD at [place] [id number]  6/22/2017         [address]  7/10/2014    right inguinal    HX OTHER SURGICAL      cystectomy from back    MN EGD TRANSORAL BIOPSY SINGLE/MULTIPLE  2/14/2012         MN UPPER GI ENDOSCOPY,W/DIR SUBMUC INJ  7/23/2020         UPPER GI ENDOSCOPY,BIOPSY  6/22/2017         UPPER GI ENDOSCOPY,BIOPSY  7/23/2020            Personal factors and/or comorbidities impacting plan of care: alzheimers disease; HTN; arthritis    Home Situation  Home Environment: Private residence  Living Alone: No  Support Systems: Child(turner)(lives with [facility])  Current DME Used/Available at Home: Walker, rolling, Grab bars  Tub or Shower Type: Tub/Shower combination    EXAMINATION/PRESENTATION/DECISION MAKING:   Critical Behavior:  Neurologic State: Alert, Eyes open spontaneously  Orientation Level: Oriented to person, Oriented to situation, Disoriented to time, Disoriented to place  Cognition: Poor safety awareness  Safety/Judgement: Awareness of environment, Fall prevention  Hearing:   Auditory  Auditory Impairment: Hard of hearing, bilateral  Skin:  Intact  Edema: None  Range Of Motion:  AROM: Within functional limits           PROM: Within functional limits           Strength:    Strength: Generally decreased, functional                    Tone & Sensation:   Tone: Normal                              Coordination:  Coordination: Generally decreased, functional  Vision:      Functional Mobility:  Bed Mobility:     Supine to Sit: Supervision; Additional time;Bed Modified(HOB elevated)     Scooting: Supervision; Additional time  Transfers:  Sit to Stand: Stand-by assistance  Stand to Sit: Stand-by assistance        Bed to Chair: Contact guard assistance;Stand-by assistance; Additional time; Adaptive equipment(use of RW)              Balance:   Sitting: Intact  Standing: Impaired  Standing - Static: Good  Standing - Dynamic : Good;Constant support  Ambulation/Gait Training:  Distance (ft): 170 Feet (ft)  Assistive Device: Gait belt;Walker, rolling  Ambulation - Level of Assistance: Contact guard assistance;Stand-by assistance; Additional time; Adaptive equipment        Gait Abnormalities: Antalgic;Decreased step clearance        Base of Support: Narrowed     Speed/Maureen: Slow  Step Length: Left shortened;Right shortened      Functional Measure:  Barthel Index:    Bathin  Bladder: 5  Bowels: 10  Groomin  Dressin  Feeding: 10  Mobility: 10  Stairs: 0  Toilet Use: 5  Transfer (Bed to Chair and Back): 10  Total: 60/100       The Barthel ADL Index: Guidelines  1. The index should be used as a record of what a patient does, not as a record of what a patient could do. 2. The main aim is to establish degree of independence from any help, physical or verbal, however minor and for whatever reason. 3. The need for supervision renders the patient not independent. 4. A patient's performance should be established using the best available evidence.  Asking the patient, friends/relatives and nurses are the usual sources, but direct observation and common sense are also important. However direct testing is not needed. 5. Usually the patient's performance over the preceding 24-48 hours is important, but occasionally longer periods will be relevant. 6. Middle categories imply that the patient supplies over 50 per cent of the effort. 7. Use of aids to be independent is allowed. Vini Zacarias., Barthel, D.W. (5523). Functional evaluation: the Barthel Index. [address] [facility] [employer] (14)2. Khushboo Vaz sara LINDY Cee, Elige Heimlich., Zahra Robledo., Andre, [facility] (1999). Measuring the change indisability after inpatient rehabilitation; comparison of the responsiveness of the Barthel Index and Functional Earling Measure. Journal of Neurology, Neurosurgery, and Psychiatry, 66(4), 934-351. NUNU Ray, INES Albright, & Lavinia Reyes M.A. (2004.) Assessment of post-stroke quality of life in cost-effectiveness studies: The usefulness of the Barthel Index and the EuroQoL-5D. Quality of Life Research, 13, 427-43          Pain Rating:  C/o mild R forefoot pain and stiffness with weightbearing and gait    Activity Tolerance:   Good  Please refer to the flowsheet for vital signs taken during this treatment. After treatment patient left in no apparent distress:   Sitting in chair, Call bell within reach, and Bed / chair alarm activated    COMMUNICATION/EDUCATION:   The patients plan of care was discussed with: Physical therapist, Occupational therapist, and Registered nurse. Fall prevention education was provided and the patient/caregiver indicated understanding., Patient/family have participated as able in goal setting and plan of care. , and Patient/family agree to work toward stated goals and plan of care.     Thank you for this referral.  Vik Mcdonnell, PT, DPT   Time Calculation: 21 mins

## 2020-07-31 NOTE — PROGRESS NOTES
Problem: Pressure Injury - Risk of  Goal: *Prevention of pressure injury  Description: Document Newton Scale and appropriate interventions in the flowsheet. Outcome: Progressing Towards Goal  Note: Pressure Injury Interventions:  Sensory Interventions: Assess changes in LOC    Moisture Interventions: Apply protective barrier, creams and emollients    Activity Interventions: PT/OT evaluation    Mobility Interventions: HOB 30 degrees or less, Turn and reposition approx. every two hours(pillow and wedges)    Nutrition Interventions: Document food/fluid/supplement intake    Friction and Shear Interventions: Apply protective barrier, creams and emollients, HOB 30 degrees or less                Problem: Falls - Risk of  Goal: *Absence of Falls  Description: Document Louis Fall Risk and appropriate interventions in the flowsheet.   Outcome: Progressing Towards Goal  Note: Fall Risk Interventions:  Mobility Interventions: Bed/chair exit alarm    Mentation Interventions: Adequate sleep, hydration, pain control, Bed/chair exit alarm    Medication Interventions: Bed/chair exit alarm    Elimination Interventions: Bed/chair exit alarm, Call light in reach    History of Falls Interventions: Bed/chair exit alarm

## 2020-07-31 NOTE — PROGRESS NOTES
Bedside and Verbal shift change report given to Shasta Macedo RN (offgoing nurse).  Report included the following information [facility].     [facility] Phone:        Significant changes during shift:  NONE        Patient Information     Ailyn Pearce Sr  80 y.o.  7/20/2020  4:31 PM by Maxine Basurto MD. Benito Farfan Sr was admitted from Home     Problem List             Patient Active Problem List     Diagnosis Date Noted    Esophageal dyskinesia 07/23/2020    Severe protein-calorie malnutrition ([address] 07/21/2020    Weight loss 07/20/2020    Unsteady gait 07/20/2020    Dehydration 07/20/2020    Acute renal failure superimposed on stage 3 chronic kidney disease ([address] 07/20/2020    Paroxysmal VT ([address] 11/07/2019    Cardiomyopathy ([address] 11/07/2019    Alcohol withdrawal syndrome, with delirium ([address] 11/01/2019    A-fib ([address] 10/27/2019    Syncope 01/09/2019    Primary insomnia 01/09/2019    Mild depression ([address] 06/12/2018    Acute bilateral low back pain without sciatica 05/30/2018    Alcoholism ([address] 80/59/8503    Metabolic encephalopathy 03/21/4193    TIA (transient ischemic attack) 04/12/2018    Medicare annual wellness visit, subsequent 09/01/2017    Diverticulosis 07/18/2017    Gastritis and duodenitis 07/18/2017    Internal hemorrhoids 07/18/2017    Hypertension with renal disease 07/18/2017    Mixed hyperlipidemia 07/18/2017    GI bleed 07/18/2017    Primary osteoarthritis involving multiple joints 07/18/2017    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, without long-term current use of insulin ([address] 07/18/2017    CKD (chronic kidney disease), stage II 07/18/2017    Back pain 07/18/2017    Anemia 07/18/2017    Alzheimer disease ([address] 07/18/2017    SSS (sick sinus syndrome) ([address] 06/02/2015    S/P cardiac pacemaker procedure 05/04/2015    S/P ablation of accessory bypass tract 05/04/2015    SVT (supraventricular tachycardia) ([facility])--s/p RFA 04/28/2015    Near syncope 03/11/2015    ASCVD (arteriosclerotic cardiovascular disease) 07/11/2014    Hypokalemia 07/11/2014    Right inguinal hernia 06/12/2014              Past Medical History:   Diagnosis Date    Abdominal pain 7/18/2017    Alzheimer disease ([facility] 7/18/2017    Anemia 7/18/2017    Arthritis      Back pain 7/18/2017    Bradycardia 7/18/2017    CAD (coronary artery disease)       hx of MI    CKD (chronic kidney disease), stage II 7/18/2017    constipation      Depression 7/18/2017    Diabetes mellitus ([facility] 7/18/2017    Diverticulosis 7/18/2017    DJD (degenerative joint disease) 7/18/2017    Encounter for long-term (current) use of other medications 7/18/2017    Fatigue      Gastritis and duodenitis 7/18/2017    GERD (gastroesophageal reflux disease)      GI bleed 7/18/2017    Hearing loss      Hyperlipidemia 7/18/2017    Hypertension      Hypertension with renal disease 7/18/2017    Ill-defined condition       Dementia    Internal hemorrhoids 7/18/2017    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    S/P cardiac pacemaker procedure 5/4/2015     5/4/15 Medtronic dual chamber pacemaker implant     Weight loss       lost over 40 pounds last year- has no appetite            Core Measures:     CVA: Not applicable  CHF:Not applicable  PNA:Not applicable     Activity Status:     OOB to Chair NO  Ambulated this shift Yes  Bed Rest No     Supplemental K6: (CD Applicable)     NC Not applicable  NRB Not applicable  Venti-mask Not applicable     DVT prophylaxis:     DVT prophylaxis Med- No  DVT prophylaxis SCD or LISSY- Yes pt refused       Wounds: (If Applicable)     Wounds- Yes     Location Right foot is swollen, wound on left forearm with dressing covering it      Patient Safety:     Falls Score Total Score: 4  Safety Level_______  Bed Alarm On? Yes  Sitter? No     Plan for upcoming shift: Manage swelling in foot        Discharge Plan: Yes; discharge home with home health services     Active Consults:  IP CONSULT TO GASTROENTEROLOGY

## 2020-07-31 NOTE — PROGRESS NOTES
Bedside and Verbal shift change report given to [facility] - GARRETT RN (oncoming nurse) , Susanne/Shabana RN (offgoing nurse).  Report included the following information [facility].     [facility] Phone: [phone]        Significant changes during shift:  NONE        Patient Information     Kavitha Manuel Sr  80 y.o.  7/20/2020  4:31 PM by Camilo Stewart MD. Benito Farfan Sr was admitted from Home     Problem List             Patient Active Problem List     Diagnosis Date Noted    Esophageal dyskinesia 07/23/2020    Severe protein-calorie malnutrition ([address] 07/21/2020    Weight loss 07/20/2020    Unsteady gait 07/20/2020    Dehydration 07/20/2020    Acute renal failure superimposed on stage 3 chronic kidney disease ([address] 07/20/2020    Paroxysmal VT ([address] 11/07/2019    Cardiomyopathy ([address] 11/07/2019    Alcohol withdrawal syndrome, with delirium ([address] 11/01/2019    A-fib ([address] 10/27/2019    Syncope 01/09/2019    Primary insomnia 01/09/2019    Mild depression ([address] 06/12/2018    Acute bilateral low back pain without sciatica 05/30/2018    Alcoholism ([address] 03/91/0963    Metabolic encephalopathy 11/60/6279    TIA (transient ischemic attack) 04/12/2018    Medicare annual wellness visit, subsequent 09/01/2017    Diverticulosis 07/18/2017    Gastritis and duodenitis 07/18/2017    Internal hemorrhoids 07/18/2017    Hypertension with renal disease 07/18/2017    Mixed hyperlipidemia 07/18/2017    GI bleed 07/18/2017    Primary osteoarthritis involving multiple joints 07/18/2017    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, without long-term current use of insulin ([address] 07/18/2017    CKD (chronic kidney disease), stage II 07/18/2017    Back pain 07/18/2017    Anemia 07/18/2017    Alzheimer disease ([address] 07/18/2017    SSS (sick sinus syndrome) ([address] 06/02/2015    S/P cardiac pacemaker procedure 05/04/2015    S/P ablation of accessory bypass tract 05/04/2015    SVT (supraventricular tachycardia) ([facility])--s/p RFA 04/28/2015    Near syncope 03/11/2015    ASCVD (arteriosclerotic cardiovascular disease) 07/11/2014    Hypokalemia 07/11/2014    Right inguinal hernia 06/12/2014              Past Medical History:   Diagnosis Date    Abdominal pain 7/18/2017    Alzheimer disease ([facility] 7/18/2017    Anemia 7/18/2017    Arthritis      Back pain 7/18/2017    Bradycardia 7/18/2017    CAD (coronary artery disease)       hx of MI    CKD (chronic kidney disease), stage II 7/18/2017    constipation      Depression 7/18/2017    Diabetes mellitus ([facility] 7/18/2017    Diverticulosis 7/18/2017    DJD (degenerative joint disease) 7/18/2017    Encounter for long-term (current) use of other medications 7/18/2017    Fatigue      Gastritis and duodenitis 7/18/2017    GERD (gastroesophageal reflux disease)      GI bleed 7/18/2017    Hearing loss      Hyperlipidemia 7/18/2017    Hypertension      Hypertension with renal disease 7/18/2017    Ill-defined condition       Dementia    Internal hemorrhoids 7/18/2017    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    S/P cardiac pacemaker procedure 5/4/2015     5/4/15 [employer]tronic dual chamber pacemaker implant     Weight loss       lost over 40 pounds last year- has no appetite            Core Measures:     CVA: Not applicable  CHF:Not applicable  PNA:Not applicable     Activity Status:     OOB to Chair YES  Ambulated this shift Yes  Bed Rest No     Supplemental D0: (NX Applicable)     NC Not applicable  NRB Not applicable  Venti-mask Not applicable     DVT prophylaxis:     DVT prophylaxis Med- No  DVT prophylaxis SCD or LISSY- Yes pt refused       Wounds: (If Applicable)     Wounds- Yes     Location Right foot is swollen, wound on left forearm with dressing covering it      Patient Safety:     Falls Score Total Score: 4  Safety Level_______  Bed Alarm On? Yes  Sitter? No     Plan for upcoming shift: Manage swelling in foot        Discharge Plan: Yes; discharge home with home health services     Active Consults:  IP CONSULT TO GASTROENTEROLOGY         Revision History

## 2020-07-31 NOTE — PROGRESS NOTES
PROGRESS NOTE    NAME:  Lillia Lefort    :   1931   MRN:   [id number]     Date/Time:  2020 7:46 AM  Subjective:   History:  Chart reviewed and patient seen and examined and discussed with his nurses and all events noted. He presented to the office on  after a long absence not having been seen there since 2019. Lucila Izaguirre was accompanied by his granddaughter and was there  in follow-up of his medical problems to include hypertension, diabetes, hyperlipidemia, atherosclerotic coronary vascular disease, cardiomyopathy, Alzheimer's dementia, history of PAF, prior TIA, history of alcoholism and other multiple medical problems.  In addition to his chronic problems he had a significant issue now off weight loss from 134 pounds at his last visit down to 115.7 pounds. Lucila Izaguirre had become very unsteady walking according to his granddaughter which was easily visible on exam in the office. Lucila Izaguirre also had developed a poor appetite and has a hard time keeping food down and thus eating very little.  He actually even had had some difficulty keeping liquids down.      He denies any current chest pain, shortness of breath, palpitations, PND, orthopnea or other cardiorespiratory complaints.  He  has no current  complaints.  As far as the GI complaints there is a poor appetite with the inability to keep some food down as well as liquids as well as some mild upper abdominal discomfort but no evidence of diarrhea.  No fevers, chills or night sweats have been noted.  From a neurologic standpoint he has had progressive decreased memory and progressive unsteadiness on his feet.  There are no current active arthritic complaints.  All of the above history was confirmed by his granddaughter present with him. Adele Damon is his primary caretaker.   With this history it was felt prudent to admit him to the hospital for further work-up evaluation and treatment and he was sent to the hospital for direct admission however while going through admissions he became lightheaded and unresponsive and was emergently taken to the emergency room for admission. Today he remains more alert after hydration and and oriented to name and hospital [facility]) today and knows me by name today. He denies Cardiac or respiratory c/o. Swallowing difficulty improved and tolerated diet after EGD with Botox on . He is generally weak w/o focal neurologic c/o and as noted memory is better than on admission. He has no c/o of musculoskeletal until R foot examined at which times he says he has pain only to direct touch although much less tender daily.     Medications reviewed:  Current Facility-Administered Medications   Medication Dose Route Frequency    sodium chloride (NS) flush 5-40 mL  5-40 mL IntraVENous Q8H    sodium chloride (NS) flush 5-40 mL  5-40 mL IntraVENous PRN    pantoprazole (PROTONIX) tablet 40 mg  40 mg Oral ACB&D    cephALEXin (KEFLEX) capsule 500 mg  500 mg Oral Q12H    colchicine tablet 0.6 mg  0.6 mg Oral DAILY    polyethylene glycol (MIRALAX) packet 17 g  17 g Oral DAILY    sodium chloride (NS) flush 5-40 mL  5-40 mL IntraVENous Q8H    sodium chloride (NS) flush 5-40 mL  5-40 mL IntraVENous PRN    aspirin chewable tablet 81 mg  81 mg Oral DAILY    carvediloL (COREG) tablet 6.25 mg  6.25 mg Oral BID    donepeziL (ARICEPT) tablet 10 mg  10 mg Oral DAILY    memantine (NAMENDA) tablet 10 mg  10 mg Oral BID    temazepam (RESTORIL) capsule 15 mg  15 mg Oral QHS PRN    sodium chloride (NS) flush 5-40 mL  5-40 mL IntraVENous Q8H    sodium chloride (NS) flush 5-40 mL  5-40 mL IntraVENous PRN    acetaminophen (TYLENOL) tablet 650 mg  650 mg Oral Q4H PRN        Objective:   Vitals:  Visit Vitals  /75 (BP 1 Location: Left arm, BP Patient Position: Lying right side)   Pulse 66   Temp 98.7 °F (37.1 °C)   Resp 18   Ht 5' 9\" (1.753 m)   Wt 114 lb 10.2 oz (52 kg)   SpO2 100%   BMI 16.93 kg/m²      O2 Device: Room air Temp (24hrs), Av.1 °F (36.7 °C), Min:97.6 °F (36.4 °C), Max:98.8 °F (37.1 °C)      Last 24hr Input/Output:    Intake/Output Summary (Last 24 hours) at 7/31/2020 0746  Last data filed at 7/31/2020 0203  Gross per 24 hour   Intake 240 ml   Output 200 ml   Net 40 ml        PHYSICAL EXAM:  General:     Alert, cooperative, no distress, appears stated age. Head:    Normocephalic, without obvious abnormality, atraumatic. Eyes:    Conjunctivae/corneas clear. PERRLA  Nose:   Nares normal. No drainage or sinus tenderness. Throat:     Lips, mucosa, and tongue normal.  No Thrush  Neck:   Supple, symmetrical,  no adenopathy, thyroid: non tender     no carotid bruit and no JVD. Back:     Symmetric,  No CVA tenderness. Lungs:    Clear to auscultation bilaterally. No Wheezing or Rhonchi. No rales. Heart:    Regular rate and rhythm,  no murmur, rub or gallop. Abdomen:    Soft, non-tender. Not distended. Bowel sounds normal. No masses  Extremities:  Extremities normal except R foot with STS over dorsum and increased temp with tenderness most notable at 1st MTP joint all much better than yesterday, atraumatic, No cyanosis. No edema. No clubbing  Lymph nodes:  Cervical, supraclavicular normal.  Neurologic:  General decreased strength, Alert and oriented X 1.  He does know me by name   Skin:                  No rash      Lab Data Reviewed:    Recent Results (from the past 24 hour(s))   CBC WITH AUTOMATED DIFF    Collection Time: 07/31/20  3:10 AM   Result Value Ref Range    WBC 4.3 4.1 - 11.1 K/uL    RBC 3.76 (L) 4.10 - 5.70 M/uL    HGB 10.2 (L) 12.1 - 17.0 g/dL    HCT 32.8 (L) 36.6 - 50.3 %    MCV 87.2 80.0 - 99.0 FL    MCH 27.1 26.0 - 34.0 PG    MCHC 31.1 30.0 - 36.5 g/dL    RDW 14.5 11.5 - 14.5 %    PLATELET 474 303 - 193 K/uL    MPV 10.5 8.9 - 12.9 FL    NRBC 0.0 0  WBC    ABSOLUTE NRBC 0.00 0.00 - 0.01 K/uL    NEUTROPHILS 61 32 - 75 %    LYMPHOCYTES 24 12 - 49 %    MONOCYTES 11 5 - 13 %    EOSINOPHILS 3 0 - 7 %    BASOPHILS 1 0 - 1 % IMMATURE GRANULOCYTES 0 0.0 - 0.5 %    ABS. NEUTROPHILS 2.6 1.8 - 8.0 K/UL    ABS. LYMPHOCYTES 1.1 0.8 - 3.5 K/UL    ABS. MONOCYTES 0.5 0.0 - 1.0 K/UL    ABS. EOSINOPHILS 0.1 0.0 - 0.4 K/UL    ABS. BASOPHILS 0.0 0.0 - 0.1 K/UL    ABS. IMM. GRANS. 0.0 0.00 - 0.04 K/UL    DF AUTOMATED     METABOLIC PANEL, COMPREHENSIVE    Collection Time: 07/31/20  3:10 AM   Result Value Ref Range    Sodium 140 136 - 145 mmol/L    Potassium 4.2 3.5 - 5.1 mmol/L    Chloride 106 97 - 108 mmol/L    CO2 31 21 - 32 mmol/L    Anion gap 3 (L) 5 - 15 mmol/L    Glucose 84 65 - 100 mg/dL    BUN 37 (H) 6 - 20 MG/DL    Creatinine 1.14 0.70 - 1.30 MG/DL    BUN/Creatinine ratio 32 (H) 12 - 20      GFR est AA >60 >60 ml/min/1.73m2    GFR est non-AA >60 >60 ml/min/1.73m2    Calcium 8.6 8.5 - 10.1 MG/DL    Bilirubin, total 0.6 0.2 - 1.0 MG/DL    ALT (SGPT) 24 12 - 78 U/L    AST (SGOT) 23 15 - 37 U/L    Alk. phosphatase 70 45 - 117 U/L    Protein, total 5.6 (L) 6.4 - 8.2 g/dL    Albumin 2.2 (L) 3.5 - 5.0 g/dL    Globulin 3.4 2.0 - 4.0 g/dL    A-G Ratio 0.6 (L) 1.1 - 2.2           Assessment/Plan:     Principal Problem:    Syncope (1/9/2019)    Active Problems:    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)      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, without long-term current use of insulin ([facility] (7/18/2017)      SSS (sick sinus syndrome) ([facility] (6/2/2015)      Cardiomyopathy ([facility] (11/7/2019)      Alcoholism ([facility] (4/23/2018)      Weight loss (7/20/2020)      Unsteady gait (7/20/2020)      Dehydration (7/20/2020)      Acute renal failure superimposed on stage 3 chronic kidney disease ([facility] (7/20/2020)      Severe protein-calorie malnutrition ([facility] (7/21/2020)      Esophageal dyskinesia (7/23/2020)       ___________________________________________________  PLAN:  1. Continue  telemetry bed with monitoring  2. D/Patrick IV hydration to saline lock now  3.   Hold Lasix and all current hypertensive medications except continue Coreg with his cardiomyopathy  4. Continue IV Protonix  5.  GI consult notes reviewed and note Botox in lower esophagus for marked dyskinesia   6. Continue current Alzheimer's medications as ordered   7. According to his granddaughter who is his caretaker has not drank alcohol in 3 months but will watch closely for any signs of withdrawal with known alcoholism and prior withdrawal  8. Follow renal function, 51/2.36 on admission to 37/1.14 today   9. Replete potassium currently 4.2, improved  10. Checked magnesium level - normal  11. Follow blood sugar and will cover with sliding scale if becomes elevated  12. Home vs Rehab pending on Pt recommendations   13. X-ray R foot w/o acute process  14. Started Keflex and Colchicine for R foot on 7/27. Added low dose Indocin for short period starting 7/29  15. Hopefully home tomorrow      Addendum:  Phone call with his granddaughter (POA) 7/22 AM and I discussed the Ba Swallow and further plans pending results of this, I also discussed the rude behavior of his daughter (mother of the granddaughter whom I spoke with) and explained that this it totally unacceptable!    This was also D/W his nurse Kylee Fitch          If need to contact me use hospital  [phone], DO NOT USE PERFECT SERVE    ___________________________________________________    Attending Physician: Harsha Zepeda MD

## 2020-08-01 LAB
ALBUMIN SERPL-MCNC: 2.2 G/DL (ref 3.5–5)
ALBUMIN/GLOB SERPL: 0.7 {RATIO} (ref 1.1–2.2)
ALP SERPL-CCNC: 79 U/L (ref 45–117)
ALT SERPL-CCNC: 24 U/L (ref 12–78)
ANION GAP SERPL CALC-SCNC: 3 MMOL/L (ref 5–15)
AST SERPL-CCNC: 27 U/L (ref 15–37)
BASOPHILS # BLD: 0 K/UL (ref 0–0.1)
BASOPHILS NFR BLD: 1 % (ref 0–1)
BILIRUB SERPL-MCNC: 0.3 MG/DL (ref 0.2–1)
BUN SERPL-MCNC: 43 MG/DL (ref 6–20)
BUN/CREAT SERPL: 35 (ref 12–20)
CALCIUM SERPL-MCNC: 8.2 MG/DL (ref 8.5–10.1)
CHLORIDE SERPL-SCNC: 105 MMOL/L (ref 97–108)
CO2 SERPL-SCNC: 32 MMOL/L (ref 21–32)
CREAT SERPL-MCNC: 1.22 MG/DL (ref 0.7–1.3)
DIFFERENTIAL METHOD BLD: ABNORMAL
EOSINOPHIL # BLD: 0.1 K/UL (ref 0–0.4)
EOSINOPHIL NFR BLD: 2 % (ref 0–7)
ERYTHROCYTE [DISTWIDTH] IN BLOOD BY AUTOMATED COUNT: 14.4 % (ref 11.5–14.5)
GLOBULIN SER CALC-MCNC: 3.3 G/DL (ref 2–4)
GLUCOSE SERPL-MCNC: 84 MG/DL (ref 65–100)
HCT VFR BLD AUTO: 31.7 % (ref 36.6–50.3)
HGB BLD-MCNC: 9.8 G/DL (ref 12.1–17)
IMM GRANULOCYTES # BLD AUTO: 0 K/UL (ref 0–0.04)
IMM GRANULOCYTES NFR BLD AUTO: 0 % (ref 0–0.5)
LYMPHOCYTES # BLD: 1 K/UL (ref 0.8–3.5)
LYMPHOCYTES NFR BLD: 22 % (ref 12–49)
MCH RBC QN AUTO: 26.8 PG (ref 26–34)
MCHC RBC AUTO-ENTMCNC: 30.9 G/DL (ref 30–36.5)
MCV RBC AUTO: 86.8 FL (ref 80–99)
MONOCYTES # BLD: 0.5 K/UL (ref 0–1)
MONOCYTES NFR BLD: 10 % (ref 5–13)
NEUTS SEG # BLD: 3 K/UL (ref 1.8–8)
NEUTS SEG NFR BLD: 65 % (ref 32–75)
NRBC # BLD: 0 K/UL (ref 0–0.01)
NRBC BLD-RTO: 0 PER 100 WBC
PLATELET # BLD AUTO: 205 K/UL (ref 150–400)
PMV BLD AUTO: 10.8 FL (ref 8.9–12.9)
POTASSIUM SERPL-SCNC: 4.3 MMOL/L (ref 3.5–5.1)
PROT SERPL-MCNC: 5.5 G/DL (ref 6.4–8.2)
RBC # BLD AUTO: 3.65 M/UL (ref 4.1–5.7)
SODIUM SERPL-SCNC: 140 MMOL/L (ref 136–145)
WBC # BLD AUTO: 4.6 K/UL (ref 4.1–11.1)

## 2020-08-01 PROCEDURE — 85025 COMPLETE CBC W/AUTO DIFF WBC: CPT

## 2020-08-01 PROCEDURE — 65660000000 HC RM CCU STEPDOWN

## 2020-08-01 PROCEDURE — 99232 SBSQ HOSP IP/OBS MODERATE 35: CPT | Performed by: INTERNAL MEDICINE

## 2020-08-01 PROCEDURE — 36415 COLL VENOUS BLD VENIPUNCTURE: CPT

## 2020-08-01 PROCEDURE — 74011250637 HC RX REV CODE- 250/637: Performed by: INTERNAL MEDICINE

## 2020-08-01 PROCEDURE — 80053 COMPREHEN METABOLIC PANEL: CPT

## 2020-08-01 RX ADMIN — PANTOPRAZOLE SODIUM 40 MG: 40 TABLET, DELAYED RELEASE ORAL at 16:35

## 2020-08-01 RX ADMIN — MEMANTINE HYDROCHLORIDE 10 MG: 10 TABLET ORAL at 17:39

## 2020-08-01 RX ADMIN — CEPHALEXIN 500 MG: 250 CAPSULE ORAL at 22:01

## 2020-08-01 RX ADMIN — CEPHALEXIN 500 MG: 250 CAPSULE ORAL at 08:08

## 2020-08-01 RX ADMIN — DONEPEZIL HYDROCHLORIDE 10 MG: 5 TABLET, FILM COATED ORAL at 08:08

## 2020-08-01 RX ADMIN — CARVEDILOL 6.25 MG: 6.25 TABLET, FILM COATED ORAL at 08:09

## 2020-08-01 RX ADMIN — Medication 10 ML: at 05:32

## 2020-08-01 RX ADMIN — COLCHICINE 0.6 MG: 0.6 TABLET ORAL at 08:08

## 2020-08-01 RX ADMIN — ASPIRIN 81 MG CHEWABLE TABLET 81 MG: 81 TABLET CHEWABLE at 08:08

## 2020-08-01 RX ADMIN — MEMANTINE HYDROCHLORIDE 10 MG: 10 TABLET ORAL at 08:08

## 2020-08-01 RX ADMIN — PANTOPRAZOLE SODIUM 40 MG: 40 TABLET, DELAYED RELEASE ORAL at 07:03

## 2020-08-01 RX ADMIN — POLYETHYLENE GLYCOL 3350 17 G: 17 POWDER, FOR SOLUTION ORAL at 08:09

## 2020-08-01 RX ADMIN — CARVEDILOL 6.25 MG: 6.25 TABLET, FILM COATED ORAL at 17:39

## 2020-08-01 RX ADMIN — Medication 10 ML: at 22:01

## 2020-08-01 RX ADMIN — Medication 10 ML: at 16:35

## 2020-08-01 NOTE — PROGRESS NOTES
Bedside shift change report given to Gomez Doherty (oncoming nurse) by Thomas Saucedo (offgoing nurse). Report included the following information SBAR, Kardex, ED Summary and OR Summary.

## 2020-08-01 NOTE — PROGRESS NOTES
Problem: Pressure Injury - Risk of  Goal: *Prevention of pressure injury  Description: Document Newton Scale and appropriate interventions in the flowsheet. Outcome: Progressing Towards Goal  Note: Pressure Injury Interventions:  Sensory Interventions: Assess changes in LOC    Moisture Interventions: Absorbent underpads    Activity Interventions: Increase time out of bed    Mobility Interventions: Float heels, PT/OT evaluation    Nutrition Interventions: Document food/fluid/supplement intake    Friction and Shear Interventions: Minimize layers                Problem: Falls - Risk of  Goal: *Absence of Falls  Description: Document Louis Fall Risk and appropriate interventions in the flowsheet.   Outcome: Progressing Towards Goal  Note: Fall Risk Interventions:  Mobility Interventions: Bed/chair exit alarm    Mentation Interventions: Adequate sleep, hydration, pain control    Medication Interventions: Bed/chair exit alarm    Elimination Interventions: Bed/chair exit alarm    History of Falls Interventions: Bed/chair exit alarm

## 2020-08-01 NOTE — ROUTINE PROCESS
Bedside shift change report given to 31 Mary Lee (oncoming nurse) by Carlos Alberto Soler (offgoing nurse). Report included the following information SBAR, Intake/Output, MAR and Recent Results.

## 2020-08-01 NOTE — PROGRESS NOTES
PROGRESS NOTE    NAME:  Karsten Leventhal Hansom    :   1931   MRN:   [id number]     Date/Time:  2020 11:27 AM  Subjective:   History:  Chart reviewed and patient seen and examined and discussed with his nurses and all events noted. He presented to the office on  after a long absence not having been seen there since 2019. Roseann Cohn was accompanied by his granddaughter and was there  in follow-up of his medical problems to include hypertension, diabetes, hyperlipidemia, atherosclerotic coronary vascular disease, cardiomyopathy, Alzheimer's dementia, history of PAF, prior TIA, history of alcoholism and other multiple medical problems.  In addition to his chronic problems he had a significant issue now off weight loss from 134 pounds at his last visit down to 115.7 pounds. Roseann Cohn had become very unsteady walking according to his granddaughter which was easily visible on exam in the office. Roseann Cohn also had developed a poor appetite and has a hard time keeping food down and thus eating very little.  He actually even had had some difficulty keeping liquids down.      He denies any current chest pain, shortness of breath, palpitations, PND, orthopnea or other cardiorespiratory complaints.  He  has no current  complaints.  As far as the GI complaints there is a poor appetite with the inability to keep some food down as well as liquids as well as some mild upper abdominal discomfort but no evidence of diarrhea.  No fevers, chills or night sweats have been noted.  From a neurologic standpoint he has had progressive decreased memory and progressive unsteadiness on his feet.  There are no current active arthritic complaints.  All of the above history was confirmed by his granddaughter present with him. Octaviano Meredith is his primary caretaker.   With this history it was felt prudent to admit him to the hospital for further work-up evaluation and treatment and he was sent to the hospital for direct admission however while going through admissions he became lightheaded and unresponsive and was emergently taken to the emergency room for admission. Today he remains more alert after hydration and and oriented to name and hospital [facility]) today and knows me by name today. He denies Cardiac or respiratory c/o. Swallowing difficulty improved and tolerated diet after EGD with Botox on . He is generally weak w/o focal neurologic c/o and as noted memory is better than on admission. He has no c/o of musculoskeletal until R foot examined at which times he says he has pain only to direct touch although much less tender daily.     Medications reviewed:  Current Facility-Administered Medications   Medication Dose Route Frequency    sodium chloride (NS) flush 5-40 mL  5-40 mL IntraVENous Q8H    sodium chloride (NS) flush 5-40 mL  5-40 mL IntraVENous PRN    pantoprazole (PROTONIX) tablet 40 mg  40 mg Oral ACB&D    cephALEXin (KEFLEX) capsule 500 mg  500 mg Oral Q12H    colchicine tablet 0.6 mg  0.6 mg Oral DAILY    polyethylene glycol (MIRALAX) packet 17 g  17 g Oral DAILY    sodium chloride (NS) flush 5-40 mL  5-40 mL IntraVENous Q8H    sodium chloride (NS) flush 5-40 mL  5-40 mL IntraVENous PRN    aspirin chewable tablet 81 mg  81 mg Oral DAILY    carvediloL (COREG) tablet 6.25 mg  6.25 mg Oral BID    donepeziL (ARICEPT) tablet 10 mg  10 mg Oral DAILY    memantine (NAMENDA) tablet 10 mg  10 mg Oral BID    temazepam (RESTORIL) capsule 15 mg  15 mg Oral QHS PRN    sodium chloride (NS) flush 5-40 mL  5-40 mL IntraVENous Q8H    sodium chloride (NS) flush 5-40 mL  5-40 mL IntraVENous PRN    acetaminophen (TYLENOL) tablet 650 mg  650 mg Oral Q4H PRN        Objective:   Vitals:  Visit Vitals  /61 (BP 1 Location: Left arm)   Pulse 61   Temp 98.6 °F (37 °C)   Resp 18   Ht 5' 9\" (1.753 m)   Wt 114 lb 10.2 oz (52 kg)   SpO2 99%   BMI 16.93 kg/m²      O2 Device: Room air Temp (24hrs), Av.3 °F (36.8 °C), Min:97.8 °F (36.6 °C), Max:99 °F (37.2 °C)      Last 24hr Input/Output:    Intake/Output Summary (Last 24 hours) at 8/1/2020 1127  Last data filed at 8/1/2020 0814  Gross per 24 hour   Intake 240 ml   Output 1200 ml   Net -960 ml        PHYSICAL EXAM:  General:     Alert, cooperative, no distress, appears stated age. Head:    Normocephalic, without obvious abnormality, atraumatic. Eyes:    Conjunctivae/corneas clear. PERRLA  Nose:   Nares normal. No drainage or sinus tenderness. Throat:     Lips, mucosa, and tongue normal.  No Thrush  Neck:   Supple, symmetrical,  no adenopathy, thyroid: non tender     no carotid bruit and no JVD. Back:     Symmetric,  No CVA tenderness. Lungs:    Clear to auscultation bilaterally. No Wheezing or Rhonchi. No rales. Heart:    Regular rate and rhythm,  no murmur, rub or gallop. Abdomen:    Soft, non-tender. Not distended. Bowel sounds normal. No masses  Extremities:  Extremities normal except R foot with STS over dorsum and increased temp with tenderness most notable at 1st MTP joint all much better than yesterday, atraumatic, No cyanosis. No edema. No clubbing  Lymph nodes:  Cervical, supraclavicular normal.  Neurologic:  General decreased strength, Alert and oriented X 1. He does know me by name   Skin:                  No rash      Lab Data Reviewed:    Recent Results (from the past 24 hour(s))   METABOLIC PANEL, COMPREHENSIVE    Collection Time: 08/01/20  2:38 AM   Result Value Ref Range    Sodium 140 136 - 145 mmol/L    Potassium 4.3 3.5 - 5.1 mmol/L    Chloride 105 97 - 108 mmol/L    CO2 32 21 - 32 mmol/L    Anion gap 3 (L) 5 - 15 mmol/L    Glucose 84 65 - 100 mg/dL    BUN 43 (H) 6 - 20 MG/DL    Creatinine 1.22 0.70 - 1.30 MG/DL    BUN/Creatinine ratio 35 (H) 12 - 20      GFR est AA >60 >60 ml/min/1.73m2    GFR est non-AA 56 (L) >60 ml/min/1.73m2    Calcium 8.2 (L) 8.5 - 10.1 MG/DL    Bilirubin, total 0.3 0.2 - 1.0 MG/DL    ALT (SGPT) 24 12 - 78 U/L    AST (SGOT) 27 15 - 37 U/L    Alk.  phosphatase 79 45 - 117 U/L    Protein, total 5.5 (L) 6.4 - 8.2 g/dL    Albumin 2.2 (L) 3.5 - 5.0 g/dL    Globulin 3.3 2.0 - 4.0 g/dL    A-G Ratio 0.7 (L) 1.1 - 2.2     CBC WITH AUTOMATED DIFF    Collection Time: 08/01/20  3:51 AM   Result Value Ref Range    WBC 4.6 4.1 - 11.1 K/uL    RBC 3.65 (L) 4.10 - 5.70 M/uL    HGB 9.8 (L) 12.1 - 17.0 g/dL    HCT 31.7 (L) 36.6 - 50.3 %    MCV 86.8 80.0 - 99.0 FL    MCH 26.8 26.0 - 34.0 PG    MCHC 30.9 30.0 - 36.5 g/dL    RDW 14.4 11.5 - 14.5 %    PLATELET 081 930 - 611 K/uL    MPV 10.8 8.9 - 12.9 FL    NRBC 0.0 0  WBC    ABSOLUTE NRBC 0.00 0.00 - 0.01 K/uL    NEUTROPHILS 65 32 - 75 %    LYMPHOCYTES 22 12 - 49 %    MONOCYTES 10 5 - 13 %    EOSINOPHILS 2 0 - 7 %    BASOPHILS 1 0 - 1 %    IMMATURE GRANULOCYTES 0 0.0 - 0.5 %    ABS. NEUTROPHILS 3.0 1.8 - 8.0 K/UL    ABS. LYMPHOCYTES 1.0 0.8 - 3.5 K/UL    ABS. MONOCYTES 0.5 0.0 - 1.0 K/UL    ABS. EOSINOPHILS 0.1 0.0 - 0.4 K/UL    ABS. BASOPHILS 0.0 0.0 - 0.1 K/UL    ABS. IMM. GRANS. 0.0 0.00 - 0.04 K/UL    DF AUTOMATED           Assessment/Plan:     Principal Problem:    Syncope (1/9/2019)    Active Problems:    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)      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, without long-term current use of insulin ([facility] (7/18/2017)      SSS (sick sinus syndrome) ([facility] (6/2/2015)      Cardiomyopathy ([facility] (11/7/2019)      Alcoholism ([facility] (4/23/2018)      Weight loss (7/20/2020)      Unsteady gait (7/20/2020)      Dehydration (7/20/2020)      Acute renal failure superimposed on stage 3 chronic kidney disease ([facility] (7/20/2020)      Severe protein-calorie malnutrition ([facility] (7/21/2020)      Esophageal dyskinesia (7/23/2020)       ___________________________________________________  PLAN:  1. Continue  telemetry bed with monitoring  2. D/Patrick IV hydration to saline lock now  3.   Hold Lasix and all current hypertensive medications except continue Coreg with his cardiomyopathy, blood pressure remains normal off meds  4. Continue IV Protonix  5.  GI consult notes reviewed and note Botox in lower esophagus for marked dyskinesia   6. Continue current Alzheimer's medications as ordered   7. According to his granddaughter who is his caretaker has not drank alcohol in 3 months but will watch closely for any signs of withdrawal with known alcoholism and prior withdrawal  8. Follow renal function, 51/2.36 on admission to 43/1.22 today just a little up from yesterday 37/1.14  9. Replete potassium currently 4.3, improved  10. Checked magnesium level - normal  11. Follow blood sugar and will cover with sliding scale if becomes elevated  12. Home vs Rehab pending on Pt recommendations   13. X-ray R foot w/o acute process  14. Started Keflex and Colchicine for R foot on 7/27. Added low dose Indocin for short period starting 7/29  15. Hopefully home soon      Addendum:  Phone call with his granddaughter (POA) 7/22 AM and I discussed the Ba Swallow and further plans pending results of this, I also discussed the rude behavior of his daughter (mother of the granddaughter whom I spoke with) and explained that this it totally unacceptable!    This was also D/W his nurse Ganga Cantu          If need to contact me use hospital  [phone], DO NOT USE PERFECT SERVE    ___________________________________________________    Attending Physician: Ashish Carvalho MD

## 2020-08-02 LAB
ANION GAP SERPL CALC-SCNC: 4 MMOL/L (ref 5–15)
BASOPHILS # BLD: 0 K/UL (ref 0–0.1)
BASOPHILS NFR BLD: 1 % (ref 0–1)
BUN SERPL-MCNC: 37 MG/DL (ref 6–20)
BUN/CREAT SERPL: 34 (ref 12–20)
CALCIUM SERPL-MCNC: 8.6 MG/DL (ref 8.5–10.1)
CHLORIDE SERPL-SCNC: 105 MMOL/L (ref 97–108)
CO2 SERPL-SCNC: 32 MMOL/L (ref 21–32)
CREAT SERPL-MCNC: 1.1 MG/DL (ref 0.7–1.3)
DIFFERENTIAL METHOD BLD: ABNORMAL
EOSINOPHIL # BLD: 0.2 K/UL (ref 0–0.4)
EOSINOPHIL NFR BLD: 4 % (ref 0–7)
ERYTHROCYTE [DISTWIDTH] IN BLOOD BY AUTOMATED COUNT: 14.6 % (ref 11.5–14.5)
GLUCOSE SERPL-MCNC: 81 MG/DL (ref 65–100)
HCT VFR BLD AUTO: 32.1 % (ref 36.6–50.3)
HGB BLD-MCNC: 9.8 G/DL (ref 12.1–17)
IMM GRANULOCYTES # BLD AUTO: 0 K/UL (ref 0–0.04)
IMM GRANULOCYTES NFR BLD AUTO: 0 % (ref 0–0.5)
LYMPHOCYTES # BLD: 1.2 K/UL (ref 0.8–3.5)
LYMPHOCYTES NFR BLD: 26 % (ref 12–49)
MCH RBC QN AUTO: 26.8 PG (ref 26–34)
MCHC RBC AUTO-ENTMCNC: 30.5 G/DL (ref 30–36.5)
MCV RBC AUTO: 87.7 FL (ref 80–99)
MONOCYTES # BLD: 0.5 K/UL (ref 0–1)
MONOCYTES NFR BLD: 10 % (ref 5–13)
NEUTS SEG # BLD: 2.8 K/UL (ref 1.8–8)
NEUTS SEG NFR BLD: 59 % (ref 32–75)
NRBC # BLD: 0 K/UL (ref 0–0.01)
NRBC BLD-RTO: 0 PER 100 WBC
PLATELET # BLD AUTO: 211 K/UL (ref 150–400)
PMV BLD AUTO: 10.9 FL (ref 8.9–12.9)
POTASSIUM SERPL-SCNC: 3.8 MMOL/L (ref 3.5–5.1)
RBC # BLD AUTO: 3.66 M/UL (ref 4.1–5.7)
SODIUM SERPL-SCNC: 141 MMOL/L (ref 136–145)
WBC # BLD AUTO: 4.7 K/UL (ref 4.1–11.1)

## 2020-08-02 PROCEDURE — 85025 COMPLETE CBC W/AUTO DIFF WBC: CPT

## 2020-08-02 PROCEDURE — 80048 BASIC METABOLIC PNL TOTAL CA: CPT

## 2020-08-02 PROCEDURE — 94760 N-INVAS EAR/PLS OXIMETRY 1: CPT

## 2020-08-02 PROCEDURE — 74011250637 HC RX REV CODE- 250/637: Performed by: INTERNAL MEDICINE

## 2020-08-02 PROCEDURE — 36415 COLL VENOUS BLD VENIPUNCTURE: CPT

## 2020-08-02 PROCEDURE — 99232 SBSQ HOSP IP/OBS MODERATE 35: CPT | Performed by: INTERNAL MEDICINE

## 2020-08-02 PROCEDURE — 65660000000 HC RM CCU STEPDOWN

## 2020-08-02 RX ADMIN — MEMANTINE HYDROCHLORIDE 10 MG: 10 TABLET ORAL at 08:20

## 2020-08-02 RX ADMIN — COLCHICINE 0.6 MG: 0.6 TABLET ORAL at 08:23

## 2020-08-02 RX ADMIN — MEMANTINE HYDROCHLORIDE 10 MG: 10 TABLET ORAL at 17:03

## 2020-08-02 RX ADMIN — Medication 10 ML: at 06:00

## 2020-08-02 RX ADMIN — DONEPEZIL HYDROCHLORIDE 10 MG: 5 TABLET, FILM COATED ORAL at 08:20

## 2020-08-02 RX ADMIN — ASPIRIN 81 MG CHEWABLE TABLET 81 MG: 81 TABLET CHEWABLE at 08:20

## 2020-08-02 RX ADMIN — CARVEDILOL 6.25 MG: 6.25 TABLET, FILM COATED ORAL at 17:03

## 2020-08-02 RX ADMIN — CEPHALEXIN 500 MG: 250 CAPSULE ORAL at 21:27

## 2020-08-02 RX ADMIN — Medication 10 ML: at 13:38

## 2020-08-02 RX ADMIN — PANTOPRAZOLE SODIUM 40 MG: 40 TABLET, DELAYED RELEASE ORAL at 17:03

## 2020-08-02 RX ADMIN — CARVEDILOL 6.25 MG: 6.25 TABLET, FILM COATED ORAL at 08:20

## 2020-08-02 RX ADMIN — PANTOPRAZOLE SODIUM 40 MG: 40 TABLET, DELAYED RELEASE ORAL at 07:38

## 2020-08-02 RX ADMIN — POLYETHYLENE GLYCOL 3350 17 G: 17 POWDER, FOR SOLUTION ORAL at 08:20

## 2020-08-02 RX ADMIN — CEPHALEXIN 500 MG: 250 CAPSULE ORAL at 08:20

## 2020-08-02 RX ADMIN — Medication 10 ML: at 21:27

## 2020-08-02 NOTE — PROGRESS NOTES
Problem: Pressure Injury - Risk of  Goal: *Prevention of pressure injury  Description: Document Netwon Scale and appropriate interventions in the flowsheet. Outcome: Progressing Towards Goal  Note: Pressure Injury Interventions:  Sensory Interventions: Assess changes in LOC    Moisture Interventions: Absorbent underpads    Activity Interventions: Increase time out of bed    Mobility Interventions: Float heels, PT/OT evaluation    Nutrition Interventions: Document food/fluid/supplement intake    Friction and Shear Interventions: Minimize layers                Problem: Patient Education: Go to Patient Education Activity  Goal: Patient/Family Education  Outcome: Progressing Towards Goal     Problem: Falls - Risk of  Goal: *Absence of Falls  Description: Document Louis Fall Risk and appropriate interventions in the flowsheet.   Outcome: Progressing Towards Goal  Note: Fall Risk Interventions:  Mobility Interventions: Bed/chair exit alarm, Patient to call before getting OOB    Mentation Interventions: Adequate sleep, hydration, pain control, Bed/chair exit alarm, Room close to nurse's station    Medication Interventions: Bed/chair exit alarm, Patient to call before getting OOB, Teach patient to arise slowly    Elimination Interventions: Bed/chair exit alarm, Call light in reach    History of Falls Interventions: Bed/chair exit alarm, Investigate reason for fall, Room close to nurse's station         Problem: Patient Education: Go to Patient Education Activity  Goal: Patient/Family Education  Outcome: Progressing Towards Goal     Problem: Patient Education: Go to Patient Education Activity  Goal: Patient/Family Education  Outcome: Progressing Towards Goal     Problem: Patient Education: Go to Patient Education Activity  Goal: Patient/Family Education  Outcome: Progressing Towards Goal

## 2020-08-02 NOTE — PROGRESS NOTES
Bedside shift change report given to Abbie Almanza  (oncoming nurse) by Robbie Castillo (offgoing nurse). Report included the following information SBAR, Kardex, ED Summary and OR Summary.

## 2020-08-02 NOTE — PROGRESS NOTES
Bedside shift change report given to [facility] NADIRA FORBES nurse) by Sherman wang).  Report included the following information SBAR, Kardex, ED Summary and OR Summary

## 2020-08-02 NOTE — PROGRESS NOTES
VITOR: 
Home health ( [facility]) Family transport at time of dc [facility] acceted pt. Pt likely to dc home on tomorrow 8/3/20. Home health information is located on AVS. 
 
2nd IM letter provided Care Management Interventions PCP Verified by CM: Yes(today) Mode of Transport at Discharge: Self(GD will transport pt back to home) Transition of Care Consult (CM Consult): Home Health([facility]) [facility] - INPATIENT: Yes Discharge Durable Medical Equipment: (Pt don't use any DME's ,pt may benefit from a cane and hearing aids.) Current Support Network: Lives with Caregiver(Lives with grand daughter in a single level home has front step) Confirm Follow Up Transport: Family Discharge Location Discharge Placement: Home with home health CIERRA Seymour 
PRN Care Manager [facility]

## 2020-08-02 NOTE — PROGRESS NOTES
PROGRESS NOTE    NAME:  Monroe Farfan    :   1931   MRN:   [id number]     Date/Time:  2020 11:29 AM  Subjective:   History:  Chart reviewed and patient seen and examined and discussed with his nurses and all events noted. He presented to the office on  after a long absence not having been seen there since 2019. Madeline Whitaker was accompanied by his granddaughter and was there  in follow-up of his medical problems to include hypertension, diabetes, hyperlipidemia, atherosclerotic coronary vascular disease, cardiomyopathy, Alzheimer's dementia, history of PAF, prior TIA, history of alcoholism and other multiple medical problems.  In addition to his chronic problems he had a significant issue now off weight loss from 134 pounds at his last visit down to 115.7 pounds. Madeline Whitaker had become very unsteady walking according to his granddaughter which was easily visible on exam in the office. Madeline Whitaker also had developed a poor appetite and has a hard time keeping food down and thus eating very little.  He actually even had had some difficulty keeping liquids down.      He denies any current chest pain, shortness of breath, palpitations, PND, orthopnea or other cardiorespiratory complaints.  He  has no current  complaints.  As far as the GI complaints there is a poor appetite with the inability to keep some food down as well as liquids as well as some mild upper abdominal discomfort but no evidence of diarrhea.  No fevers, chills or night sweats have been noted.  From a neurologic standpoint he has had progressive decreased memory and progressive unsteadiness on his feet.  There are no current active arthritic complaints.  All of the above history was confirmed by his granddaughter present with him. Markta Skiff is his primary caretaker.   With this history it was felt prudent to admit him to the hospital for further work-up evaluation and treatment and he was sent to the hospital for direct admission however while going through admissions he became lightheaded and unresponsive and was emergently taken to the emergency room for admission. Today he remains more alert after hydration and and oriented to name and hospital [facility]) today and knows me by name today. He denies Cardiac or respiratory c/o. Swallowing difficulty improved and tolerated diet after EGD with Botox on . He is generally weak w/o focal neurologic c/o and as noted memory is better than on admission. He has no c/o of musculoskeletal until R foot examined at which times he says he has pain only to direct touch although much less tender daily.     Medications reviewed:  Current Facility-Administered Medications   Medication Dose Route Frequency    sodium chloride (NS) flush 5-40 mL  5-40 mL IntraVENous Q8H    sodium chloride (NS) flush 5-40 mL  5-40 mL IntraVENous PRN    pantoprazole (PROTONIX) tablet 40 mg  40 mg Oral ACB&D    cephALEXin (KEFLEX) capsule 500 mg  500 mg Oral Q12H    colchicine tablet 0.6 mg  0.6 mg Oral DAILY    polyethylene glycol (MIRALAX) packet 17 g  17 g Oral DAILY    sodium chloride (NS) flush 5-40 mL  5-40 mL IntraVENous Q8H    sodium chloride (NS) flush 5-40 mL  5-40 mL IntraVENous PRN    aspirin chewable tablet 81 mg  81 mg Oral DAILY    carvediloL (COREG) tablet 6.25 mg  6.25 mg Oral BID    donepeziL (ARICEPT) tablet 10 mg  10 mg Oral DAILY    memantine (NAMENDA) tablet 10 mg  10 mg Oral BID    temazepam (RESTORIL) capsule 15 mg  15 mg Oral QHS PRN    sodium chloride (NS) flush 5-40 mL  5-40 mL IntraVENous Q8H    sodium chloride (NS) flush 5-40 mL  5-40 mL IntraVENous PRN    acetaminophen (TYLENOL) tablet 650 mg  650 mg Oral Q4H PRN        Objective:   Vitals:  Visit Vitals  /60   Pulse 60   Temp 99 °F (37.2 °C)   Resp 18   Ht 5' 9\" (1.753 m)   Wt 114 lb 10.2 oz (52 kg)   SpO2 100%   BMI 16.93 kg/m²      O2 Device: Room air Temp (24hrs), Av.4 °F (36.9 °C), Min:97.9 °F (36.6 °C), Max:99 °F (37.2 °C)      Last 24hr Input/Output:    Intake/Output Summary (Last 24 hours) at 8/2/2020 1129  Last data filed at 8/2/2020 0555  Gross per 24 hour   Intake    Output 650 ml   Net -650 ml        PHYSICAL EXAM:  General:     Alert, cooperative, no distress, appears stated age. Head:    Normocephalic, without obvious abnormality, atraumatic. Eyes:    Conjunctivae/corneas clear. PERRLA  Nose:   Nares normal. No drainage or sinus tenderness. Throat:     Lips, mucosa, and tongue normal.  No Thrush  Neck:   Supple, symmetrical,  no adenopathy, thyroid: non tender     no carotid bruit and no JVD. Back:     Symmetric,  No CVA tenderness. Lungs:    Clear to auscultation bilaterally. No Wheezing or Rhonchi. No rales. Heart:    Regular rate and rhythm,  no murmur, rub or gallop. Abdomen:    Soft, non-tender. Not distended. Bowel sounds normal. No masses  Extremities:  Extremities normal except R foot with STS over dorsum and increased temp with tenderness most notable at 1st MTP joint all much better than yesterday, atraumatic, No cyanosis. No edema. No clubbing  Lymph nodes:  Cervical, supraclavicular normal.  Neurologic:  General decreased strength, Alert and oriented X 1. He does know me by name   Skin:                  No rash      Lab Data Reviewed:    Recent Results (from the past 24 hour(s))   CBC WITH AUTOMATED DIFF    Collection Time: 08/02/20  6:10 AM   Result Value Ref Range    WBC 4.7 4.1 - 11.1 K/uL    RBC 3.66 (L) 4.10 - 5.70 M/uL    HGB 9.8 (L) 12.1 - 17.0 g/dL    HCT 32.1 (L) 36.6 - 50.3 %    MCV 87.7 80.0 - 99.0 FL    MCH 26.8 26.0 - 34.0 PG    MCHC 30.5 30.0 - 36.5 g/dL    RDW 14.6 (H) 11.5 - 14.5 %    PLATELET 743 477 - 388 K/uL    MPV 10.9 8.9 - 12.9 FL    NRBC 0.0 0  WBC    ABSOLUTE NRBC 0.00 0.00 - 0.01 K/uL    NEUTROPHILS 59 32 - 75 %    LYMPHOCYTES 26 12 - 49 %    MONOCYTES 10 5 - 13 %    EOSINOPHILS 4 0 - 7 %    BASOPHILS 1 0 - 1 %    IMMATURE GRANULOCYTES 0 0.0 - 0.5 %    ABS.  NEUTROPHILS 2.8 1.8 - 8.0 K/UL    ABS. LYMPHOCYTES 1.2 0.8 - 3.5 K/UL    ABS. MONOCYTES 0.5 0.0 - 1.0 K/UL    ABS. EOSINOPHILS 0.2 0.0 - 0.4 K/UL    ABS. BASOPHILS 0.0 0.0 - 0.1 K/UL    ABS. IMM. GRANS. 0.0 0.00 - 0.04 K/UL    DF AUTOMATED     METABOLIC PANEL, BASIC    Collection Time: 08/02/20  6:10 AM   Result Value Ref Range    Sodium 141 136 - 145 mmol/L    Potassium 3.8 3.5 - 5.1 mmol/L    Chloride 105 97 - 108 mmol/L    CO2 32 21 - 32 mmol/L    Anion gap 4 (L) 5 - 15 mmol/L    Glucose 81 65 - 100 mg/dL    BUN 37 (H) 6 - 20 MG/DL    Creatinine 1.10 0.70 - 1.30 MG/DL    BUN/Creatinine ratio 34 (H) 12 - 20      GFR est AA >60 >60 ml/min/1.73m2    GFR est non-AA >60 >60 ml/min/1.73m2    Calcium 8.6 8.5 - 10.1 MG/DL         Assessment/Plan:     Principal Problem:    Syncope (1/9/2019)    Active Problems:    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)      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, without long-term current use of insulin ([facility]) (7/18/2017)      SSS (sick sinus syndrome) ([facility] (6/2/2015)      Cardiomyopathy ([facility] (11/7/2019)      Alcoholism ([facility] (4/23/2018)      Weight loss (7/20/2020)      Unsteady gait (7/20/2020)      Dehydration (7/20/2020)      Acute renal failure superimposed on stage 3 chronic kidney disease (HCC) (7/20/2020)      Severe protein-calorie malnutrition ([facility] (7/21/2020)      Esophageal dyskinesia (7/23/2020)       ___________________________________________________  PLAN:  1. Discontinue  telemetry monitoring  2. D/Patrick IV hydration to saline lock now  3. Hold Lasix and all current hypertensive medications except continue Coreg with his cardiomyopathy, blood pressure remains normal off meds  4. Continue IV Protonix  5.  GI consult notes reviewed and note Botox in lower esophagus for marked dyskinesia   6. Continue current Alzheimer's medications as ordered   7.   According to his granddaughter who is his caretaker has not drank alcohol in 3 months but will watch closely for any signs of withdrawal with known alcoholism and prior withdrawal  8. Follow renal function, 51/2.36 on admission to 37/1.10 today just a little up from yesterday 43/1.22  9. Replete potassium currently 4.3, improved  10. Checked magnesium level - normal  11. Follow blood sugar and will cover with sliding scale if becomes elevated  12. Home vs Rehab pending on Pt recommendations   13. X-ray R foot w/o acute process  14. Started Keflex and Colchicine for R foot on 7/27. Added low dose Indocin for short period starting 7/29  15. Hopefully home soon, D/W his granddaughter this AM and plan for D/C tomorrow if stable      Addendum:  Phone call with his granddaughter (POA) 7/22 AM and I discussed the Ba Swallow and further plans pending results of this, I also discussed the rude behavior of his daughter (mother of the granddaughter whom I spoke with) and explained that this it totally unacceptable!    This was also D/W his nurse Gustav Mcburney          If need to contact me use hospital  [phone], DO NOT USE PERFECT SERVE    ___________________________________________________    Attending Physician: Mary Lou Cardoza MD

## 2020-08-03 VITALS
WEIGHT: 114.64 LBS | OXYGEN SATURATION: 100 % | DIASTOLIC BLOOD PRESSURE: 49 MMHG | BODY MASS INDEX: 16.98 KG/M2 | RESPIRATION RATE: 16 BRPM | HEART RATE: 65 BPM | TEMPERATURE: 98 F | HEIGHT: 69 IN | SYSTOLIC BLOOD PRESSURE: 122 MMHG

## 2020-08-03 PROCEDURE — 74011250637 HC RX REV CODE- 250/637: Performed by: INTERNAL MEDICINE

## 2020-08-03 PROCEDURE — 99239 HOSP IP/OBS DSCHRG MGMT >30: CPT | Performed by: INTERNAL MEDICINE

## 2020-08-03 PROCEDURE — 94760 N-INVAS EAR/PLS OXIMETRY 1: CPT

## 2020-08-03 RX ORDER — COLCHICINE 0.6 MG/1
0.6 TABLET ORAL DAILY
Qty: 30 TAB | Status: SHIPPED | OUTPATIENT
Start: 2020-08-03 | End: 2020-10-22 | Stop reason: SDUPTHER

## 2020-08-03 RX ADMIN — CEPHALEXIN 500 MG: 250 CAPSULE ORAL at 08:35

## 2020-08-03 RX ADMIN — MEMANTINE HYDROCHLORIDE 10 MG: 10 TABLET ORAL at 08:34

## 2020-08-03 RX ADMIN — POLYETHYLENE GLYCOL 3350 17 G: 17 POWDER, FOR SOLUTION ORAL at 08:33

## 2020-08-03 RX ADMIN — ASPIRIN 81 MG CHEWABLE TABLET 81 MG: 81 TABLET CHEWABLE at 08:34

## 2020-08-03 RX ADMIN — Medication 10 ML: at 07:25

## 2020-08-03 RX ADMIN — COLCHICINE 0.6 MG: 0.6 TABLET ORAL at 08:33

## 2020-08-03 RX ADMIN — CARVEDILOL 6.25 MG: 6.25 TABLET, FILM COATED ORAL at 08:42

## 2020-08-03 RX ADMIN — PANTOPRAZOLE SODIUM 40 MG: 40 TABLET, DELAYED RELEASE ORAL at 08:35

## 2020-08-03 RX ADMIN — DONEPEZIL HYDROCHLORIDE 10 MG: 5 TABLET, FILM COATED ORAL at 08:34

## 2020-08-03 NOTE — PROGRESS NOTES
VITOR:    [facility]  DME-Rolling Walker  2nd IM Medicare Letter  Home with Family      CM informed that pt will be d/c on today, and will transition home with family. Pt was arranged with Haylie Stoner at the time of d/c. Pt received DME-rolling walker, that assist with mobility. Pt received 2nd IM Medicare Letter (placed in chart). Pt granddaughter will provide transport home. CM completed the needs of the pt at this time.     CIERRA Dang, [address] [place] [address]

## 2020-08-03 NOTE — PROGRESS NOTES
Spoke to pt's granddaughter , let her know rx called in to pharmacy- she will be here this evening to  patient. I informed her we do not have his shoes- she will bring them. Patient home with- discharge papers, toiletry bag and walker all sent  Home with patient accompanied by granddaughter. She is aware of RX at pharmacy. HH to follow

## 2020-08-03 NOTE — DISCHARGE INSTRUCTIONS
Doctor Modesta [address]  [phone]      Patient Discharge Instructions    Jimi Cadena / [id number] : 1931    Admitted 2020 Discharged: 8/3/2020     Principal Problem:    Syncope (2019)    Active Problems:    Hypertension with renal disease (2017)      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, without long-term current use of insulin ([address] (2017)      SSS (sick sinus syndrome) ([address] (2015)      Cardiomyopathy ([address] (2019)      Alcoholism ([address] (2018)      Weight loss (2020)      Unsteady gait (2020)      Dehydration (2020)      Acute renal failure superimposed on stage 3 chronic kidney disease ([address] (2020)      Severe protein-calorie malnutrition ([address] (2020)      Esophageal dyskinesia (2020)          No Known Allergies    · It is important that you take the medication exactly as they are prescribed. · Do not take other medications without consulting your doctor. What to do at Next Level of Care    Disposition:  Home with home health PT    Recommended diet: General    Recommended activity: Up with assistance          Information obtained by :  I understand that if any problems occur once I am at home I am to contact my physician. I understand and acknowledge receipt of the instructions indicated above.                                                                                                                                            Physician's or R.N.'s Signature                                                                  Date/Time                                                                                                                                              Patient or Representative Signature                                                          Date/Time

## 2020-08-03 NOTE — PROGRESS NOTES
Bedside and Verbal shift change report given to [address] RN by Laura/Susanne, RN (offgoing nurse).  Report included the following information [facility].     [employer] VZCFM: [id number]        Significant changes during shift:  discharge orders written for him       Patient Information     Teddy Henry Sr  80 y.o.  7/20/2020  4:31 PM by Cathy Williamson MD. Benito Farfan Sr was admitted from Home     Problem List             Patient Active Problem List     Diagnosis Date Noted    Esophageal dyskinesia 07/23/2020    Severe protein-calorie malnutrition ([address] 07/21/2020    Weight loss 07/20/2020    Unsteady gait 07/20/2020    Dehydration 07/20/2020    Acute renal failure superimposed on stage 3 chronic kidney disease ([address] 07/20/2020    Paroxysmal VT ([address] 11/07/2019    Cardiomyopathy ([address] 11/07/2019    Alcohol withdrawal syndrome, with delirium ([address] 11/01/2019    A-fib ([address] 10/27/2019    Syncope 01/09/2019    Primary insomnia 01/09/2019    Mild depression ([address] 06/12/2018    Acute bilateral low back pain without sciatica 05/30/2018    Alcoholism ([address] 98/65/7992    Metabolic encephalopathy 93/16/3889    TIA (transient ischemic attack) 04/12/2018    Medicare annual wellness visit, subsequent 09/01/2017    Diverticulosis 07/18/2017    Gastritis and duodenitis 07/18/2017    Internal hemorrhoids 07/18/2017    Hypertension with renal disease 07/18/2017    Mixed hyperlipidemia 07/18/2017    GI bleed 07/18/2017    Primary osteoarthritis involving multiple joints 07/18/2017    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, without long-term current use of insulin ([address] 07/18/2017    CKD (chronic kidney disease), stage II 07/18/2017    Back pain 07/18/2017    Anemia 07/18/2017    Alzheimer disease ([address] 07/18/2017    SSS (sick sinus syndrome) ([address] 06/02/2015    S/P cardiac pacemaker procedure 05/04/2015    S/P ablation of accessory bypass tract 05/04/2015    SVT (supraventricular tachycardia) (HCC)--s/p RFA 04/28/2015    Near syncope 03/11/2015    ASCVD (arteriosclerotic cardiovascular disease) 07/11/2014    Hypokalemia 07/11/2014    Right inguinal hernia 06/12/2014              Past Medical History:   Diagnosis Date    Abdominal pain 7/18/2017    Alzheimer disease ([facility] 7/18/2017    Anemia 7/18/2017    Arthritis      Back pain 7/18/2017    Bradycardia 7/18/2017    CAD (coronary artery disease)       hx of MI    CKD (chronic kidney disease), stage II 7/18/2017    constipation      Depression 7/18/2017    Diabetes mellitus ([facility] 7/18/2017    Diverticulosis 7/18/2017    DJD (degenerative joint disease) 7/18/2017    Encounter for long-term (current) use of other medications 7/18/2017    Fatigue      Gastritis and duodenitis 7/18/2017    GERD (gastroesophageal reflux disease)      GI bleed 7/18/2017    Hearing loss      Hyperlipidemia 7/18/2017    Hypertension      Hypertension with renal disease 7/18/2017    Ill-defined condition       Dementia    Internal hemorrhoids 7/18/2017    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    S/P cardiac pacemaker procedure 5/4/2015     5/4/15 Medtronic dual chamber pacemaker implant     Weight loss       lost over 40 pounds last year- has no appetite            Core Measures:     CVA: Not applicable  CHF:Not applicable  PNA:Not applicable     Activity Status:     OOB to Chair Yes  Ambulated this shift Yes   Bed Rest No     Supplemental J7: (GX Applicable)     NC Not applicable  NRB Not applicable  Venti-mask Not applicable     DVT prophylaxis:     DVT prophylaxis Med- No  DVT prophylaxis SCD or LISSY- Yes pt refused       Wounds: (If Applicable)     Wounds- Yes     Location Right foot is swollen, wound on left forearm with dressing covering it      Patient Safety:     Falls Score Total Score: 4  Safety Level_______  Bed Alarm On? Yes  Sitter? No     Plan for upcoming shift: patients granddaughter coming to  the patient, d/c IV prior to discharge         Discharge Plan: Yes; discharge home with granddaughter,pt has walker that he will be going home with to use.      Active Consults:  IP CONSULT TO GASTROENTEROLOGY

## 2020-08-03 NOTE — PROGRESS NOTES
Bedside shift change report given to Ru Moses, [address] [facility] nurse) by Ang Helton RN (offgoing nurse).  Report included the following information SBAR, Kardex, ED Summary and OR Summary

## 2020-08-03 NOTE — PROGRESS NOTES
Problem: Pressure Injury - Risk of  Goal: *Prevention of pressure injury  Description: Document Newton Scale and appropriate interventions in the flowsheet. Outcome: Progressing Towards Goal  Note: Pressure Injury Interventions:  Sensory Interventions: Assess changes in LOC    Moisture Interventions: Absorbent underpads    Activity Interventions: Increase time out of bed    Mobility Interventions: Assess need for specialty bed    Nutrition Interventions: Document food/fluid/supplement intake    Friction and Shear Interventions: Minimize layers                Problem: Falls - Risk of  Goal: *Absence of Falls  Description: Document Louis Fall Risk and appropriate interventions in the flowsheet.   Outcome: Progressing Towards Goal  Note: Fall Risk Interventions:  Mobility Interventions: Bed/chair exit alarm    Mentation Interventions: Adequate sleep, hydration, pain control    Medication Interventions: Bed/chair exit alarm    Elimination Interventions: Bed/chair exit alarm    History of Falls Interventions: Bed/chair exit alarm

## 2020-08-04 ENCOUNTER — PATIENT OUTREACH (OUTPATIENT)
Dept: CASE MANAGEMENT | Age: 85
End: 2020-08-04

## 2020-08-04 NOTE — PROGRESS NOTES
8-4-20 at 3:42pm: Care Transitions Nurse left two messages for patient at phone numbers listed in chart for him and CTN left one message for PATIENTS' [facility] (on [facility]). Phone number for Gabi Valdez (on PHI) is the same phone number as listed for patient. CTN will await call back from patient and/or PATIENTS' [facility]. 8-10-20 at 4:32pm:  Patient was admitted to [facility] on 7-20-20 and discharged on 8-3-20 for:    FINAL DIAGNOSES:  1. Syncope. 2.  Hypotension. 3.  Protein calorie malnutrition. 4.  Marked weight loss. 5.  Unsteady gait. 6.  Dehydration. 7.  Acute renal failure superimposed on stage III chronic kidney disease. 8.  Esophageal dyskinesia. 9.  Alcoholism. 10.  Cardiomyopathy. 11.  Sick sinus syndrome. 12.  Diabetes mellitus. 15.  Hypertension at baseline    Patient's granddaughter, Gabi Valdez (on PHI), was contacted within one business day of discharge. Discharge Challenges to be reviewed by the provider:   Additional needs identified to be addressed with provider: yes  - Granddaughter states patient's appetite is good and he is eating well. Granddaughter reports ongoing fatigue and states patient refuses to use his walker for ambulation.    - Granddaughter reports patient is, \"Doing good. \"   - Granddaughter states she has applied for Medicaid and is awaiting the results of this application, applied approximately 2 weeks ago. [id number] testing was not completed during this hospitalization.   Method of communication with provider : chart routing       Component      Latest Ref Rng & Units 8/2/2020 8/1/2020 7/31/2020 7/30/2020           6:10 AM  2:38 AM  3:10 AM  3:21 AM   Anion gap      5 - 15 mmol/L 4 (L) 3 (L) 3 (L) 3 (L)     Component      Latest Ref Rng & Units 8/2/2020 8/1/2020 7/31/2020 7/30/2020           6:10 AM  2:38 AM  3:10 AM  3:21 AM   BUN      6 - 20 MG/DL 37 (H) 43 (H) 37 (H) 38 (H)     Component      Latest Ref Rng & Units 8/2/2020 8/1/2020 2020           6:10 AM  2:38 AM  3:10 AM  3:21 AM   BUN/Creatinine ratio      12 - 20   34 (H) 35 (H) 32 (H) 30 (H)     Component      Latest Ref Rng & Units 2020           2:38 AM  3:10 AM  3:21 AM   Protein, total      6.4 - 8.2 g/dL 5.5 (L) 5.6 (L) 5.3 (L)   Albumin      3.5 - 5.0 g/dL 2.2 (L) 2.2 (L) 2.0 (L)     Component      Latest Ref Rng & Units 2020           2:38 AM  3:10 AM  3:21 AM   A-G Ratio      1.1 - 2.2   0.7 (L) 0.6 (L) 0.6 (L)     Component      Latest Ref Rng & Units 2020           6:10 AM  3:51 AM  3:10 AM  3:21 AM   RBC      4.10 - 5.70 M/uL 3.66 (L) 3.65 (L) 3.76 (L) 3.68 (L)   HGB      12.1 - 17.0 g/dL 9.8 (L) 9.8 (L) 10.2 (L) 10.0 (L)   HCT      36.6 - 50.3 % 32.1 (L) 31.7 (L) 32.8 (L) 32.4 (L)     Component      Latest Ref Rng & Units 2020           6:10 AM  3:51 AM  3:10 AM  3:21 AM   RDW      11.5 - 14.5 % 14.6 (H) 14.4 14.5 14.5     Advance Care Planning:   Does patient have an Advance Directive:  reviewed and current per granddaughter. Was this a readmission? no   Granddaughter's stated reason for the admission: \"He was not eating much and didn't want any food. \"  Patients top risk factors for readmission: medical condition  Interventions to address risk factors: Education provided regarding malnutrition, syncope, falls and safety and granddaughter verbalizes an understanding. Care Transition Nurse (CTN) contacted the granddaughter, Aislinn Nicholson (on PHI), by telephone to perform post hospital discharge assessment. Verified name and  with granddaughter as identifiers. Provided introduction to self, and explanation of the CTN role. CTN reviewed discharge instructions, medical action plan and red flags with granddaughter who verbalized understanding.  Granddaughter given an opportunity to ask questions and does not have any further questions or concerns at this time. The granddaughter agrees to contact the PCP office for questions related to patient's healthcare. Medication reconciliation was not performed with granddaughter as she is not in the presence of patient's medications at this time. Granddaughter states she will take all of patient's medications to his [facility] appointment scheduled for 8-14-20 for review. Advised obtaining a 90-day supply of all daily and as-needed medications. Referral to Pharm D needed: no     Home Health/Outpatient orders at discharge: PT, OT and [address]: [address] Located within [facility]  Date of initial visit: SN and PT on 8-5-20. Durable Medical Equipment ordered at discharge: none  [address] [facility] -  [id number] received: n/a    Covid Risk Education    Patient has following risk factors of: protein-calorie malnutrition. Education provided regarding infection prevention, and signs and symptoms of COVID-19 and when to seek medical attention with granddaughter who verbalized understanding. Discussed exposure protocols and quarantine From CDC: Are you at higher risk for severe illness?  and given an opportunity for questions and concerns. The granddaughter agrees to contact the COVID-19 hotline 835-000-0411 or PCP office for questions related to COVID-19. For more information on steps you can take to protect yourself, see CDC's How to Protect Yourself     Patient/family/caregiver given information for [employer] and agrees to enroll no  Patient's preferred e-mail: declines  Patient's preferred phone number: declines    Discussed follow-up appointments.  If no appointment was previously scheduled, appointment scheduling offered: yes  [facility] follow up appointment(s):   Future Appointments   Date Time Provider Sonia Irizarry   8/11/2020 11:30 AM GLORIA Maza   8/11/2020  3:00 PM RADHA Paula   8/13/2020 To Be Determined Raheem Phan, PT [facility]   8/14/2020  8:00 AM Hao Molina MD [facility]   8/14/2020 To Be Determined Viji Cadena formerly [facility]   8/18/2020 [facility]   8/18/2020 To Be Determined [facility]   8/20/2020 To Be Determined [facility]   8/21/2020 To Be Determined Karl Byrnes   8/24/2020 To Be Determined Cathleharpal Lion RI [address]   8/25/2020 To Be Determined [facility]   8/27/2020 To Be Determined [facility]   8/31/2020 To Be Determined Francinelean Lion formerly [facility]     Non-[facility] follow up appointment(s): Dr. Viviana Darby in February of 2021 per granddaughter. Plan for follow-up call in 10-14 days based on severity of symptoms and risk factors. CTN provided contact information for future needs. Goals Addressed                 This Visit's Progress     Attends follow-up appointments as directed. 8-10-20: Patient has VITOR appointment with Dr. Caleb Ambriz. Tk/PCP on 8-14-20 and next cardio follow-up appointment is scheduled with Dr. Adilia Baker in February of 2021. Granddaughter, daughter, and Chayoyanci Kaba currently providing transportation to/from appointments per granddaughter. Marcia Bai Understands red flags post discharge. 8-10-20: Red flags of syncope, malnutrition, safety and falls reviewed with granddaughter and granddaughter verbalizes an understanding. Granddaughter states patient's appetite is good and he is eating well. Granddaughter reports ongoing fatigue and states patient refuses to use his walker for ambulation. Care Transitions Nurse will review red flags again on next phone conversation with granddaughter (Muna Hyman, on PHI).  Zach Stapleton

## 2020-08-04 NOTE — DISCHARGE SUMMARY
[address] SUMMARY    Name:  Telma Blair  MR#:  [id number]  :  1931  ACCOUNT #:  [de-identified]  ADMIT DATE:  2020  DISCHARGE DATE:  2020      FINAL DIAGNOSES:  1. Syncope. 2.  Hypotension. 3.  Protein calorie malnutrition. 4.  Marked weight loss. 5.  Unsteady gait. 6.  Dehydration. 7.  Acute renal failure superimposed on stage III chronic kidney disease. 8.  Esophageal dyskinesia. 9.  Alcoholism. 10.  Cardiomyopathy. 11.  Sick sinus syndrome. 12.  Diabetes mellitus. 13.  Hypertension at baseline    CONSULTATIONS:  GI, Dr. Tre Asencio. PROCEDURES:  EGD with findings of esophageal dyskinesia treated with Botox. COMPLICATIONS:  None. For details of admission history, please see admit note. HISTORY:  Briefly, the patient is an 40-year-old black male who had been lost to followup and presented to the office on day of admission with significant weight loss as well as weakness and was noted to be very unsteady in gait during the office visit with hypotension and it was felt prudent to admit him to the hospital for further workup and evaluation. HOSPITAL COURSE:  He was sent to the hospital for direct admission. Unfortunately, he did have a syncopal episode while waiting in admission area and was taken to the emergency room. He was found to have dehydration with acute renal failure, started on IV hydration and his hypertension medications were held, although his Coreg was continued because of his cardiomyopathy with dehydration, his renal function returned to baseline. He was seen in consultation for the weight loss by Dr. Wiliam Ngo with his swallowing difficulty and he had an EGD performed, which did reveal the esophageal dyskinesia and that was treated with Botox injections. Post that treatment, he was able to eat and keep his food down without difficulty and had no further problems with swallow during hospitalization.   He did have complications of development of right foot swelling and pain. X-ray obtained revealed no evidence of fracture and it was consistent with early gout versus a cellulitis, it was empirically covered for both with colchicine to cover the gout along with some Indocin as well as covered with Keflex. With that treatment, his pain gradually resolved. He was seen by physical therapy and ambulated, although he was still unsteady. He was making direct progress and since he was declined as far as rehabilitation, it was decided to discharge him home with home health. This was discussed with his granddaughter who is his power of  and primary caretaker. He will be discharged home today with close followup by me to be followed up in 2-4 days at the office. His discharge medications will consists of discontinuation of Norvasc, Lasix, Hyzaar, prednisone dose pack, multivitamin and he will be continued on aspirin 81 mg daily, Restoril 15 mg at bedtime p.r.n., Coreg 6.25 b.i.d., Aricept 10 mg daily, Namenda 10 mg b.i.d. and new medication will be colchicine 0.6 daily. He will have followup with me in the office and as noted 3-5 days. He will be discharged home on Prilosec and that will be continued at the time of discharge at 20 mg daily.         Brittany Sage MD      [id number]  D:  08/03/2020 8:00  T:  08/04/2020 1:58  JOB #:  [id number]  CC:  Sandy Gomez MD

## 2020-08-05 ENCOUNTER — HOME CARE VISIT (OUTPATIENT)
Dept: SCHEDULING | Facility: HOME HEALTH | Age: 85
End: 2020-08-05
Payer: MEDICARE

## 2020-08-05 VITALS
OXYGEN SATURATION: 99 % | RESPIRATION RATE: 18 BRPM | HEIGHT: 69 IN | DIASTOLIC BLOOD PRESSURE: 66 MMHG | SYSTOLIC BLOOD PRESSURE: 122 MMHG | HEART RATE: 60 BPM | BODY MASS INDEX: 16.93 KG/M2 | TEMPERATURE: 98.1 F

## 2020-08-05 PROCEDURE — 400013 HH SOC

## 2020-08-05 PROCEDURE — 3331090001 HH PPS REVENUE CREDIT

## 2020-08-05 PROCEDURE — G0151 HHCP-SERV OF PT,EA 15 MIN: HCPCS

## 2020-08-05 PROCEDURE — 3331090002 HH PPS REVENUE DEBIT

## 2020-08-05 PROCEDURE — G0299 HHS/HOSPICE OF RN EA 15 MIN: HCPCS

## 2020-08-06 ENCOUNTER — HOME CARE VISIT (OUTPATIENT)
Dept: SCHEDULING | Facility: HOME HEALTH | Age: 85
End: 2020-08-06
Payer: MEDICARE

## 2020-08-06 ENCOUNTER — HOME CARE VISIT (OUTPATIENT)
Dept: HOME HEALTH SERVICES | Facility: HOME HEALTH | Age: 85
End: 2020-08-06
Payer: MEDICARE

## 2020-08-06 VITALS
HEART RATE: 64 BPM | SYSTOLIC BLOOD PRESSURE: 128 MMHG | DIASTOLIC BLOOD PRESSURE: 68 MMHG | TEMPERATURE: 97.6 F | OXYGEN SATURATION: 97 %

## 2020-08-06 PROCEDURE — 3331090001 HH PPS REVENUE CREDIT

## 2020-08-06 PROCEDURE — 3331090002 HH PPS REVENUE DEBIT

## 2020-08-06 PROCEDURE — G0152 HHCP-SERV OF OT,EA 15 MIN: HCPCS

## 2020-08-07 ENCOUNTER — HOME CARE VISIT (OUTPATIENT)
Dept: SCHEDULING | Facility: HOME HEALTH | Age: 85
End: 2020-08-07
Payer: MEDICARE

## 2020-08-07 PROCEDURE — G0300 HHS/HOSPICE OF LPN EA 15 MIN: HCPCS

## 2020-08-07 PROCEDURE — 3331090001 HH PPS REVENUE CREDIT

## 2020-08-07 PROCEDURE — 3331090002 HH PPS REVENUE DEBIT

## 2020-08-08 VITALS
SYSTOLIC BLOOD PRESSURE: 124 MMHG | OXYGEN SATURATION: 98 % | RESPIRATION RATE: 14 BRPM | HEART RATE: 64 BPM | TEMPERATURE: 98.1 F | DIASTOLIC BLOOD PRESSURE: 60 MMHG

## 2020-08-08 PROCEDURE — 3331090001 HH PPS REVENUE CREDIT

## 2020-08-08 PROCEDURE — 3331090002 HH PPS REVENUE DEBIT

## 2020-08-09 VITALS
OXYGEN SATURATION: 98 % | TEMPERATURE: 98.1 F | DIASTOLIC BLOOD PRESSURE: 66 MMHG | SYSTOLIC BLOOD PRESSURE: 122 MMHG | HEART RATE: 60 BPM | RESPIRATION RATE: 18 BRPM

## 2020-08-09 PROCEDURE — 3331090002 HH PPS REVENUE DEBIT

## 2020-08-09 PROCEDURE — 3331090001 HH PPS REVENUE CREDIT

## 2020-08-10 PROCEDURE — 3331090002 HH PPS REVENUE DEBIT

## 2020-08-10 PROCEDURE — 3331090001 HH PPS REVENUE CREDIT

## 2020-08-11 ENCOUNTER — HOME CARE VISIT (OUTPATIENT)
Dept: SCHEDULING | Facility: HOME HEALTH | Age: 85
End: 2020-08-11
Payer: MEDICARE

## 2020-08-11 VITALS
DIASTOLIC BLOOD PRESSURE: 70 MMHG | RESPIRATION RATE: 17 BRPM | SYSTOLIC BLOOD PRESSURE: 134 MMHG | OXYGEN SATURATION: 99 % | HEART RATE: 69 BPM | TEMPERATURE: 97.8 F

## 2020-08-11 PROCEDURE — G0299 HHS/HOSPICE OF RN EA 15 MIN: HCPCS

## 2020-08-11 PROCEDURE — 3331090001 HH PPS REVENUE CREDIT

## 2020-08-11 PROCEDURE — G0151 HHCP-SERV OF PT,EA 15 MIN: HCPCS

## 2020-08-11 PROCEDURE — 3331090002 HH PPS REVENUE DEBIT

## 2020-08-12 VITALS
HEART RATE: 60 BPM | SYSTOLIC BLOOD PRESSURE: 120 MMHG | RESPIRATION RATE: 20 BRPM | DIASTOLIC BLOOD PRESSURE: 70 MMHG | TEMPERATURE: 98.2 F | OXYGEN SATURATION: 99 %

## 2020-08-12 PROCEDURE — 3331090002 HH PPS REVENUE DEBIT

## 2020-08-12 PROCEDURE — 3331090001 HH PPS REVENUE CREDIT

## 2020-08-13 ENCOUNTER — HOME CARE VISIT (OUTPATIENT)
Dept: SCHEDULING | Facility: HOME HEALTH | Age: 85
End: 2020-08-13
Payer: MEDICARE

## 2020-08-13 VITALS
DIASTOLIC BLOOD PRESSURE: 70 MMHG | SYSTOLIC BLOOD PRESSURE: 124 MMHG | RESPIRATION RATE: 17 BRPM | OXYGEN SATURATION: 87 % | TEMPERATURE: 97.5 F | HEART RATE: 88 BPM

## 2020-08-13 PROBLEM — I95.9 HYPOTENSION: Status: ACTIVE | Noted: 2020-08-13

## 2020-08-13 PROCEDURE — 3331090002 HH PPS REVENUE DEBIT

## 2020-08-13 PROCEDURE — G0151 HHCP-SERV OF PT,EA 15 MIN: HCPCS

## 2020-08-13 PROCEDURE — 3331090001 HH PPS REVENUE CREDIT

## 2020-08-14 ENCOUNTER — HOME CARE VISIT (OUTPATIENT)
Dept: SCHEDULING | Facility: HOME HEALTH | Age: 85
End: 2020-08-14
Payer: MEDICARE

## 2020-08-14 PROCEDURE — 3331090002 HH PPS REVENUE DEBIT

## 2020-08-14 PROCEDURE — 3331090001 HH PPS REVENUE CREDIT

## 2020-08-14 PROCEDURE — G0300 HHS/HOSPICE OF LPN EA 15 MIN: HCPCS

## 2020-08-15 PROCEDURE — 3331090001 HH PPS REVENUE CREDIT

## 2020-08-15 PROCEDURE — 3331090002 HH PPS REVENUE DEBIT

## 2020-08-16 VITALS
DIASTOLIC BLOOD PRESSURE: 74 MMHG | TEMPERATURE: 97.6 F | RESPIRATION RATE: 18 BRPM | SYSTOLIC BLOOD PRESSURE: 142 MMHG | OXYGEN SATURATION: 99 % | HEART RATE: 64 BPM

## 2020-08-16 PROCEDURE — 3331090002 HH PPS REVENUE DEBIT

## 2020-08-16 PROCEDURE — 3331090001 HH PPS REVENUE CREDIT

## 2020-08-17 PROCEDURE — 3331090002 HH PPS REVENUE DEBIT

## 2020-08-17 PROCEDURE — 3331090001 HH PPS REVENUE CREDIT

## 2020-08-18 ENCOUNTER — HOME CARE VISIT (OUTPATIENT)
Dept: SCHEDULING | Facility: HOME HEALTH | Age: 85
End: 2020-08-18
Payer: MEDICARE

## 2020-08-18 VITALS
DIASTOLIC BLOOD PRESSURE: 60 MMHG | SYSTOLIC BLOOD PRESSURE: 122 MMHG | RESPIRATION RATE: 16 BRPM | TEMPERATURE: 98.4 F | HEART RATE: 66 BPM | OXYGEN SATURATION: 99 %

## 2020-08-18 PROCEDURE — G0300 HHS/HOSPICE OF LPN EA 15 MIN: HCPCS

## 2020-08-18 PROCEDURE — 3331090001 HH PPS REVENUE CREDIT

## 2020-08-18 PROCEDURE — 3331090002 HH PPS REVENUE DEBIT

## 2020-08-18 PROCEDURE — G0151 HHCP-SERV OF PT,EA 15 MIN: HCPCS

## 2020-08-19 VITALS
DIASTOLIC BLOOD PRESSURE: 64 MMHG | HEART RATE: 68 BPM | SYSTOLIC BLOOD PRESSURE: 122 MMHG | RESPIRATION RATE: 18 BRPM | OXYGEN SATURATION: 98 % | TEMPERATURE: 98.3 F

## 2020-08-19 PROCEDURE — 3331090002 HH PPS REVENUE DEBIT

## 2020-08-19 PROCEDURE — 3331090001 HH PPS REVENUE CREDIT

## 2020-08-20 ENCOUNTER — HOME CARE VISIT (OUTPATIENT)
Dept: SCHEDULING | Facility: HOME HEALTH | Age: 85
End: 2020-08-20
Payer: MEDICARE

## 2020-08-20 VITALS
HEART RATE: 68 BPM | OXYGEN SATURATION: 97 % | DIASTOLIC BLOOD PRESSURE: 70 MMHG | RESPIRATION RATE: 18 BRPM | TEMPERATURE: 98.1 F | SYSTOLIC BLOOD PRESSURE: 126 MMHG

## 2020-08-20 PROCEDURE — G0300 HHS/HOSPICE OF LPN EA 15 MIN: HCPCS

## 2020-08-20 PROCEDURE — G0151 HHCP-SERV OF PT,EA 15 MIN: HCPCS

## 2020-08-20 PROCEDURE — 3331090001 HH PPS REVENUE CREDIT

## 2020-08-20 PROCEDURE — 3331090002 HH PPS REVENUE DEBIT

## 2020-08-21 PROCEDURE — 3331090002 HH PPS REVENUE DEBIT

## 2020-08-21 PROCEDURE — 3331090001 HH PPS REVENUE CREDIT

## 2020-08-22 PROCEDURE — 3331090001 HH PPS REVENUE CREDIT

## 2020-08-22 PROCEDURE — 3331090002 HH PPS REVENUE DEBIT

## 2020-08-23 ENCOUNTER — HOME CARE VISIT (OUTPATIENT)
Dept: SCHEDULING | Facility: HOME HEALTH | Age: 85
End: 2020-08-23
Payer: MEDICARE

## 2020-08-23 VITALS
RESPIRATION RATE: 18 BRPM | OXYGEN SATURATION: 98 % | SYSTOLIC BLOOD PRESSURE: 116 MMHG | HEART RATE: 56 BPM | DIASTOLIC BLOOD PRESSURE: 60 MMHG | TEMPERATURE: 97.8 F

## 2020-08-23 PROCEDURE — 3331090001 HH PPS REVENUE CREDIT

## 2020-08-23 PROCEDURE — G0151 HHCP-SERV OF PT,EA 15 MIN: HCPCS

## 2020-08-23 PROCEDURE — 3331090002 HH PPS REVENUE DEBIT

## 2020-08-24 PROCEDURE — 3331090001 HH PPS REVENUE CREDIT

## 2020-08-24 PROCEDURE — 3331090002 HH PPS REVENUE DEBIT

## 2020-08-25 ENCOUNTER — HOME CARE VISIT (OUTPATIENT)
Dept: SCHEDULING | Facility: HOME HEALTH | Age: 85
End: 2020-08-25
Payer: MEDICARE

## 2020-08-25 VITALS
TEMPERATURE: 97.4 F | OXYGEN SATURATION: 97 % | RESPIRATION RATE: 14 BRPM | DIASTOLIC BLOOD PRESSURE: 68 MMHG | HEART RATE: 68 BPM | SYSTOLIC BLOOD PRESSURE: 122 MMHG

## 2020-08-25 VITALS
DIASTOLIC BLOOD PRESSURE: 60 MMHG | HEART RATE: 67 BPM | TEMPERATURE: 98.2 F | SYSTOLIC BLOOD PRESSURE: 128 MMHG | RESPIRATION RATE: 18 BRPM | OXYGEN SATURATION: 98 %

## 2020-08-25 PROCEDURE — G0300 HHS/HOSPICE OF LPN EA 15 MIN: HCPCS

## 2020-08-25 PROCEDURE — 3331090002 HH PPS REVENUE DEBIT

## 2020-08-25 PROCEDURE — G0151 HHCP-SERV OF PT,EA 15 MIN: HCPCS

## 2020-08-25 PROCEDURE — 3331090001 HH PPS REVENUE CREDIT

## 2020-08-26 VITALS
SYSTOLIC BLOOD PRESSURE: 124 MMHG | RESPIRATION RATE: 16 BRPM | DIASTOLIC BLOOD PRESSURE: 66 MMHG | TEMPERATURE: 97.8 F | OXYGEN SATURATION: 97 % | HEART RATE: 73 BPM

## 2020-08-26 PROCEDURE — 3331090001 HH PPS REVENUE CREDIT

## 2020-08-26 PROCEDURE — 3331090002 HH PPS REVENUE DEBIT

## 2020-08-27 PROCEDURE — 3331090001 HH PPS REVENUE CREDIT

## 2020-08-27 PROCEDURE — 3331090002 HH PPS REVENUE DEBIT

## 2020-08-28 PROCEDURE — 3331090002 HH PPS REVENUE DEBIT

## 2020-08-28 PROCEDURE — 3331090001 HH PPS REVENUE CREDIT

## 2020-08-29 PROCEDURE — 3331090002 HH PPS REVENUE DEBIT

## 2020-08-29 PROCEDURE — 3331090001 HH PPS REVENUE CREDIT

## 2020-08-30 PROCEDURE — 3331090001 HH PPS REVENUE CREDIT

## 2020-08-30 PROCEDURE — 3331090002 HH PPS REVENUE DEBIT

## 2020-08-31 PROCEDURE — 3331090002 HH PPS REVENUE DEBIT

## 2020-08-31 PROCEDURE — 3331090001 HH PPS REVENUE CREDIT

## 2020-09-01 ENCOUNTER — HOME CARE VISIT (OUTPATIENT)
Dept: SCHEDULING | Facility: HOME HEALTH | Age: 85
End: 2020-09-01
Payer: MEDICARE

## 2020-09-01 VITALS
HEART RATE: 68 BPM | TEMPERATURE: 98.3 F | DIASTOLIC BLOOD PRESSURE: 68 MMHG | RESPIRATION RATE: 18 BRPM | SYSTOLIC BLOOD PRESSURE: 126 MMHG | OXYGEN SATURATION: 97 %

## 2020-09-01 PROCEDURE — 3331090002 HH PPS REVENUE DEBIT

## 2020-09-01 PROCEDURE — G0300 HHS/HOSPICE OF LPN EA 15 MIN: HCPCS

## 2020-09-01 PROCEDURE — 3331090001 HH PPS REVENUE CREDIT

## 2020-09-01 PROCEDURE — G0151 HHCP-SERV OF PT,EA 15 MIN: HCPCS

## 2020-09-02 ENCOUNTER — HOME CARE VISIT (OUTPATIENT)
Dept: SCHEDULING | Facility: HOME HEALTH | Age: 85
End: 2020-09-02
Payer: MEDICARE

## 2020-09-02 VITALS
DIASTOLIC BLOOD PRESSURE: 70 MMHG | OXYGEN SATURATION: 97 % | SYSTOLIC BLOOD PRESSURE: 138 MMHG | HEART RATE: 68 BPM | TEMPERATURE: 97.5 F | RESPIRATION RATE: 16 BRPM

## 2020-09-02 PROCEDURE — G0151 HHCP-SERV OF PT,EA 15 MIN: HCPCS

## 2020-09-02 PROCEDURE — 3331090001 HH PPS REVENUE CREDIT

## 2020-09-02 PROCEDURE — 3331090002 HH PPS REVENUE DEBIT

## 2020-09-03 VITALS
OXYGEN SATURATION: 97 % | HEART RATE: 62 BPM | TEMPERATURE: 98.4 F | DIASTOLIC BLOOD PRESSURE: 66 MMHG | SYSTOLIC BLOOD PRESSURE: 132 MMHG | RESPIRATION RATE: 18 BRPM

## 2020-09-03 PROCEDURE — 3331090001 HH PPS REVENUE CREDIT

## 2020-09-03 PROCEDURE — 3331090002 HH PPS REVENUE DEBIT

## 2020-09-04 PROCEDURE — 3331090001 HH PPS REVENUE CREDIT

## 2020-09-04 PROCEDURE — 3331090002 HH PPS REVENUE DEBIT

## 2020-09-05 PROCEDURE — 3331090002 HH PPS REVENUE DEBIT

## 2020-09-05 PROCEDURE — 3331090001 HH PPS REVENUE CREDIT

## 2020-09-06 PROCEDURE — 3331090001 HH PPS REVENUE CREDIT

## 2020-09-06 PROCEDURE — 3331090002 HH PPS REVENUE DEBIT

## 2020-09-07 PROCEDURE — 3331090002 HH PPS REVENUE DEBIT

## 2020-09-07 PROCEDURE — 3331090001 HH PPS REVENUE CREDIT

## 2020-09-08 ENCOUNTER — HOME CARE VISIT (OUTPATIENT)
Dept: SCHEDULING | Facility: HOME HEALTH | Age: 85
End: 2020-09-08
Payer: MEDICARE

## 2020-09-08 VITALS
TEMPERATURE: 98.2 F | DIASTOLIC BLOOD PRESSURE: 70 MMHG | RESPIRATION RATE: 18 BRPM | OXYGEN SATURATION: 98 % | SYSTOLIC BLOOD PRESSURE: 144 MMHG | HEART RATE: 67 BPM

## 2020-09-08 PROCEDURE — G0151 HHCP-SERV OF PT,EA 15 MIN: HCPCS

## 2020-09-08 PROCEDURE — 400013 HH SOC

## 2020-09-08 PROCEDURE — 3331090002 HH PPS REVENUE DEBIT

## 2020-09-08 PROCEDURE — 3331090001 HH PPS REVENUE CREDIT

## 2020-09-09 PROCEDURE — 3331090001 HH PPS REVENUE CREDIT

## 2020-09-09 PROCEDURE — 3331090002 HH PPS REVENUE DEBIT

## 2020-09-10 ENCOUNTER — HOME CARE VISIT (OUTPATIENT)
Dept: SCHEDULING | Facility: HOME HEALTH | Age: 85
End: 2020-09-10
Payer: MEDICARE

## 2020-09-10 VITALS
OXYGEN SATURATION: 97 % | TEMPERATURE: 97.7 F | SYSTOLIC BLOOD PRESSURE: 120 MMHG | DIASTOLIC BLOOD PRESSURE: 70 MMHG | HEART RATE: 60 BPM

## 2020-09-10 PROCEDURE — 3331090001 HH PPS REVENUE CREDIT

## 2020-09-10 PROCEDURE — G0151 HHCP-SERV OF PT,EA 15 MIN: HCPCS

## 2020-09-10 PROCEDURE — G0300 HHS/HOSPICE OF LPN EA 15 MIN: HCPCS

## 2020-09-10 PROCEDURE — 3331090002 HH PPS REVENUE DEBIT

## 2020-09-11 VITALS
HEART RATE: 71 BPM | RESPIRATION RATE: 18 BRPM | OXYGEN SATURATION: 98 % | SYSTOLIC BLOOD PRESSURE: 134 MMHG | TEMPERATURE: 97.7 F | DIASTOLIC BLOOD PRESSURE: 68 MMHG

## 2020-09-11 PROCEDURE — 3331090002 HH PPS REVENUE DEBIT

## 2020-09-11 PROCEDURE — 3331090001 HH PPS REVENUE CREDIT

## 2020-09-12 PROCEDURE — 3331090002 HH PPS REVENUE DEBIT

## 2020-09-12 PROCEDURE — 3331090001 HH PPS REVENUE CREDIT

## 2020-09-13 PROCEDURE — 3331090001 HH PPS REVENUE CREDIT

## 2020-09-13 PROCEDURE — 3331090002 HH PPS REVENUE DEBIT

## 2020-09-14 ENCOUNTER — HOME CARE VISIT (OUTPATIENT)
Dept: SCHEDULING | Facility: HOME HEALTH | Age: 85
End: 2020-09-14
Payer: MEDICARE

## 2020-09-14 VITALS
DIASTOLIC BLOOD PRESSURE: 60 MMHG | SYSTOLIC BLOOD PRESSURE: 124 MMHG | OXYGEN SATURATION: 97 % | HEART RATE: 67 BPM | TEMPERATURE: 97.4 F | RESPIRATION RATE: 18 BRPM

## 2020-09-14 PROCEDURE — 3331090001 HH PPS REVENUE CREDIT

## 2020-09-14 PROCEDURE — G0151 HHCP-SERV OF PT,EA 15 MIN: HCPCS

## 2020-09-14 PROCEDURE — 3331090002 HH PPS REVENUE DEBIT

## 2020-09-15 PROCEDURE — 3331090001 HH PPS REVENUE CREDIT

## 2020-09-15 PROCEDURE — 3331090002 HH PPS REVENUE DEBIT

## 2020-09-16 PROCEDURE — 3331090002 HH PPS REVENUE DEBIT

## 2020-09-16 PROCEDURE — 3331090001 HH PPS REVENUE CREDIT

## 2020-09-17 ENCOUNTER — HOME CARE VISIT (OUTPATIENT)
Dept: SCHEDULING | Facility: HOME HEALTH | Age: 85
End: 2020-09-17
Payer: MEDICARE

## 2020-09-17 PROCEDURE — G0151 HHCP-SERV OF PT,EA 15 MIN: HCPCS

## 2020-09-17 PROCEDURE — 3331090001 HH PPS REVENUE CREDIT

## 2020-09-17 PROCEDURE — 3331090002 HH PPS REVENUE DEBIT

## 2020-09-18 PROCEDURE — 3331090001 HH PPS REVENUE CREDIT

## 2020-09-18 PROCEDURE — 3331090002 HH PPS REVENUE DEBIT

## 2020-09-19 PROCEDURE — 3331090001 HH PPS REVENUE CREDIT

## 2020-09-19 PROCEDURE — 3331090002 HH PPS REVENUE DEBIT

## 2020-09-20 VITALS
TEMPERATURE: 97.7 F | OXYGEN SATURATION: 99 % | RESPIRATION RATE: 18 BRPM | SYSTOLIC BLOOD PRESSURE: 124 MMHG | DIASTOLIC BLOOD PRESSURE: 60 MMHG | HEART RATE: 59 BPM

## 2020-09-20 PROCEDURE — 3331090001 HH PPS REVENUE CREDIT

## 2020-09-20 PROCEDURE — 3331090002 HH PPS REVENUE DEBIT

## 2020-09-21 PROCEDURE — 3331090001 HH PPS REVENUE CREDIT

## 2020-09-21 PROCEDURE — 3331090002 HH PPS REVENUE DEBIT

## 2020-09-22 ENCOUNTER — HOME CARE VISIT (OUTPATIENT)
Dept: SCHEDULING | Facility: HOME HEALTH | Age: 85
End: 2020-09-22
Payer: MEDICARE

## 2020-09-22 VITALS
HEART RATE: 67 BPM | OXYGEN SATURATION: 97 % | DIASTOLIC BLOOD PRESSURE: 70 MMHG | RESPIRATION RATE: 18 BRPM | TEMPERATURE: 97.4 F | SYSTOLIC BLOOD PRESSURE: 124 MMHG

## 2020-09-22 PROCEDURE — 3331090002 HH PPS REVENUE DEBIT

## 2020-09-22 PROCEDURE — G0151 HHCP-SERV OF PT,EA 15 MIN: HCPCS

## 2020-09-22 PROCEDURE — 3331090001 HH PPS REVENUE CREDIT

## 2020-09-23 ENCOUNTER — HOME CARE VISIT (OUTPATIENT)
Dept: SCHEDULING | Facility: HOME HEALTH | Age: 85
End: 2020-09-23
Payer: MEDICARE

## 2020-09-23 PROCEDURE — 3331090001 HH PPS REVENUE CREDIT

## 2020-09-23 PROCEDURE — G0300 HHS/HOSPICE OF LPN EA 15 MIN: HCPCS

## 2020-09-23 PROCEDURE — 3331090002 HH PPS REVENUE DEBIT

## 2020-09-24 ENCOUNTER — HOME CARE VISIT (OUTPATIENT)
Dept: SCHEDULING | Facility: HOME HEALTH | Age: 85
End: 2020-09-24
Payer: MEDICARE

## 2020-09-24 VITALS
OXYGEN SATURATION: 97 % | HEART RATE: 64 BPM | SYSTOLIC BLOOD PRESSURE: 312 MMHG | RESPIRATION RATE: 16 BRPM | TEMPERATURE: 97.4 F | DIASTOLIC BLOOD PRESSURE: 66 MMHG

## 2020-09-24 PROCEDURE — G0151 HHCP-SERV OF PT,EA 15 MIN: HCPCS

## 2020-09-24 PROCEDURE — 3331090002 HH PPS REVENUE DEBIT

## 2020-09-24 PROCEDURE — 3331090001 HH PPS REVENUE CREDIT

## 2020-09-25 PROCEDURE — 3331090001 HH PPS REVENUE CREDIT

## 2020-09-25 PROCEDURE — 3331090002 HH PPS REVENUE DEBIT

## 2020-09-26 PROCEDURE — 3331090002 HH PPS REVENUE DEBIT

## 2020-09-26 PROCEDURE — 3331090001 HH PPS REVENUE CREDIT

## 2020-09-27 PROCEDURE — 3331090001 HH PPS REVENUE CREDIT

## 2020-09-27 PROCEDURE — 3331090002 HH PPS REVENUE DEBIT

## 2020-09-28 VITALS
DIASTOLIC BLOOD PRESSURE: 80 MMHG | TEMPERATURE: 97.4 F | OXYGEN SATURATION: 98 % | HEART RATE: 77 BPM | RESPIRATION RATE: 161 BRPM | SYSTOLIC BLOOD PRESSURE: 126 MMHG

## 2020-09-28 PROCEDURE — 3331090001 HH PPS REVENUE CREDIT

## 2020-09-28 PROCEDURE — 3331090002 HH PPS REVENUE DEBIT

## 2020-09-29 ENCOUNTER — HOME CARE VISIT (OUTPATIENT)
Dept: HOME HEALTH SERVICES | Facility: HOME HEALTH | Age: 85
End: 2020-09-29
Payer: MEDICARE

## 2020-09-29 PROCEDURE — 3331090002 HH PPS REVENUE DEBIT

## 2020-09-29 PROCEDURE — 3331090001 HH PPS REVENUE CREDIT

## 2020-09-30 PROCEDURE — 3331090002 HH PPS REVENUE DEBIT

## 2020-09-30 PROCEDURE — 3331090001 HH PPS REVENUE CREDIT

## 2020-10-01 ENCOUNTER — HOME CARE VISIT (OUTPATIENT)
Dept: SCHEDULING | Facility: HOME HEALTH | Age: 85
End: 2020-10-01
Payer: MEDICARE

## 2020-10-01 VITALS
SYSTOLIC BLOOD PRESSURE: 140 MMHG | DIASTOLIC BLOOD PRESSURE: 82 MMHG | RESPIRATION RATE: 16 BRPM | HEART RATE: 52 BPM | TEMPERATURE: 97.5 F | OXYGEN SATURATION: 97 %

## 2020-10-01 PROCEDURE — G0299 HHS/HOSPICE OF RN EA 15 MIN: HCPCS

## 2020-10-01 PROCEDURE — 3331090001 HH PPS REVENUE CREDIT

## 2020-10-01 PROCEDURE — 3331090002 HH PPS REVENUE DEBIT

## 2020-10-02 PROCEDURE — 3331090001 HH PPS REVENUE CREDIT

## 2020-10-02 PROCEDURE — 3331090002 HH PPS REVENUE DEBIT

## 2020-10-03 PROCEDURE — 3331090001 HH PPS REVENUE CREDIT

## 2020-10-03 PROCEDURE — 3331090002 HH PPS REVENUE DEBIT

## 2020-10-21 NOTE — PROGRESS NOTES
This is a Subsequent Medicare Annual Wellness Visit providing Personalized Prevention Plan Services (PPPS) (Performed 12 months after initial AWV and PPPS )    I have reviewed the patient's medical history in detail and updated the computerized patient record. He presents today accompanied by his granddaughter for his Medicare subsequent annual wellness examination and screening questionnaire. He is also here in follow-up of his multiple medical problems to include hypertension, diabetes, hyperlipidemia, ASCVD, history of SVT, sick sinus syndrome status post pacemaker, paroxysmal V. tach, cardiomyopathy, prior TIA, diverticulosis, esophageal dyskinesia with significant weight loss, Alzheimer's dementia, prior alcoholism with alcohol withdrawal syndrome and delirium, DJD, CKD stage II, recent hospitalization when he had acute renal failure, marked protein calorie malnutrition during that hospitalization, depression and other multiple medical problems. He was last seen by me at time of hospital discharge August 3 and failed to return for follow-up until he comes in today. He was working with physical therapy and home health and did remarkably well according to his granddaughter and he concurs. He currently denies any chest pain, shortness of breath, palpitations, PND, orthopnea or other cardiac or respiratory complaints. He denies any GI or  complaints and says he has a good appetite. He notes no nausea vomiting and no problems swallowing. He notes no headaches, dizziness or neurologic complaints and does note that he is much more steady with his walking now after having physical therapy. He does have some chronic arthritic complaints that are not changed. He has no other complaints on complete review of systems.     History     Past Medical History:   Diagnosis Date    Abdominal pain 7/18/2017    Alzheimer disease ([facility] [address] 7/18/2017    Anemia 7/18/2017    Arthritis     Back pain 7/18/2017    Bradycardia 7/18/2017    CAD (coronary artery disease)     hx of MI    CKD (chronic kidney disease), stage II 7/18/2017    constipation     Depression 7/18/2017    Diabetes mellitus ([facility] [address] 7/18/2017    Diverticulosis 7/18/2017    DJD (degenerative joint disease) 7/18/2017    Encounter for long-term (current) use of other medications 7/18/2017    Fatigue     Gastritis and duodenitis 7/18/2017    GERD (gastroesophageal reflux disease)     GI bleed 7/18/2017    Hearing loss     Hyperlipidemia 7/18/2017    Hypertension     Hypertension with renal disease 7/18/2017    Ill-defined condition     Dementia    Internal hemorrhoids 7/18/2017    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    S/P cardiac pacemaker procedure 5/4/2015    5/4/15 Medtronic dual chamber pacemaker implant     Weight loss     lost over 40 pounds last year- has no appetite      Past Surgical History:   Procedure Laterality Date    COLONOSCOPY N/A 6/22/2017    COLONOSCOPY performed by Emilie Patel MD at [address] [place] [address]  6/22/2017         [address]  7/10/2014    right inguinal    HX OTHER SURGICAL      cystectomy from back    SD EGD TRANSORAL BIOPSY SINGLE/MULTIPLE  2/14/2012         SD UPPER GI ENDOSCOPY,W/DIR SUBMUC INJ  7/23/2020         UPPER GI ENDOSCOPY,BIOPSY  6/22/2017         UPPER GI ENDOSCOPY,BIOPSY  7/23/2020          Social History     Tobacco Use    Smoking status: Former Smoker     Packs/day: 0.50     Years: 10.00     Pack years: 5.00     Start date: 7/12/1991    Smokeless tobacco: Never Used    Tobacco comment: quit 50 years ago   Substance Use Topics    Alcohol use: Not Currently     Comment: Occasional beer    Drug use: No     Current Outpatient Medications   Medication Sig Dispense Refill    colchicine 0.6 mg tablet Take 1 Tab by mouth daily.  30 Tab prn    donepeziL (ARICEPT) 10 mg tablet TAKE 1 TABLET BY MOUTH EVERY DAY 30 Tab 2    memantine (NAMENDA) 10 mg tablet TAKE 1 TABLET BY MOUTH TWICE A DAY 60 Tab 2    omeprazole (PRILOSEC) 20 mg capsule Take 1 Cap by mouth daily. 90 Cap 3    temazepam (RESTORIL) 15 mg capsule Take 1 Cap by mouth nightly as needed for Sleep. Max Daily Amount: 15 mg. 30 Cap 0    losartan-hydroCHLOROthiazide (HYZAAR) 100-25 mg per tablet TAKE 1 TABLET BY MOUTH EVERY DAY 30 Tab 0    multivitamin, tx-iron-ca-min (THERA-M w/ IRON) 9 mg iron-400 mcg tab tablet Take 1 Tab by mouth daily.  carvedilol (COREG) 6.25 mg tablet Take 1 Tab by mouth two (2) times a day. 180 Tab prn    aspirin 81 mg chewable tablet Take 1 Tab by mouth daily.  27 Tab 1     No Known Allergies  Family History   Problem Relation Age of Onset    Hypertension Mother     Heart Disease Father     Cancer Other         son-cancer? unknown what type        Patient Active Problem List    Diagnosis    Hypertension with renal disease    Mixed hyperlipidemia    Primary osteoarthritis involving multiple joints    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, without long-term current use of insulin ([address]    CKD (chronic kidney disease), stage II    Alzheimer disease ([address]    ASCVD (arteriosclerotic cardiovascular disease)    Cardiomyopathy ([facility]    Diverticulosis    SSS (sick sinus syndrome) ([facility])    S/P cardiac pacemaker procedure     5/4/15 Medtronic dual chamber pacemaker implant        SVT (supraventricular tachycardia) ([facility])--s/p RFA    Hypotension    Esophageal dyskinesia    Severe protein-calorie malnutrition (HCC)    Weight loss    Unsteady gait    Dehydration    Acute renal failure superimposed on stage 3 chronic kidney disease (HCC)    Paroxysmal VT ([facility])    Alcohol withdrawal syndrome, with delirium ([facility])    A-fib ([facility])    Syncope    Primary insomnia    Mild depression ([facility])    Acute bilateral low back pain without sciatica    Alcoholism ([address]    Metabolic encephalopathy    TIA (transient ischemic attack)    Medicare annual wellness visit, subsequent    Gastritis and duodenitis    Internal hemorrhoids    GI bleed    Back pain    Anemia    S/P ablation of accessory bypass tract     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      Near syncope    Hypokalemia    Right inguinal hernia       Patient Care Team:  Marli Vaca MD as PCP - General (Internal Medicine)  Marli Vaca MD as PCP - [facility]  Luc Jarrett MD as Physician (Cardiology)  Martha Gutierres MD as Physician (Cardiology)  Deacon Thomas NP (Cardiology)    Depression Risk Factor Screening:     3 most recent PHQ Screens 10/22/2020   Little interest or pleasure in doing things Not at all   Feeling down, depressed, irritable, or hopeless Not at all   Total Score PHQ 2 0     Alcohol Risk Factor Screening: You do not drink alcohol or very rarely. Functional Ability and Level of Safety:     Fall Risk     Fall Risk Assessment, last 12 mths 10/22/2020   Able to walk? Yes   Fall in past 12 months? No   Fall with injury? -   Number of falls in past 12 months -   Fall Risk Score -       Hearing Loss   mild    Activities of Daily Living   Partial assistance.    ADL Assessment 10/22/2020   Feeding yourself No Help Needed   Getting from bed to chair No Help Needed   Getting dressed No Help Needed   Bathing or showering No Help Needed   Walk across the room (includes cane/walker) No Help Needed   Using the telphone No Help Needed   Taking your medications Help Needed   Preparing meals Help Needed   Managing money (expenses/bills) Help Needed   Moderately strenuous housework (laundry) Help Needed   Shopping for personal items (toiletries/medicines) Help Needed   Shopping for groceries Help Needed   Driving Help Needed   Climbing a flight of stairs No Help Needed   Getting to places beyond walking distances Help Needed       Abuse Screen   Patient is not abused    Social History     Social History Narrative    Not on file Review of Systems      ROS:    Constitutional: He denies fevers, weight loss, sweats. Eyes: No blurred or double vision. ENT: No difficulty with swallowing, taste, speech or smell. Neck: no stiffness or swelling  Respiratory: No cough wheezing or shortness of breath. Cardiovascular: Denies chest pain, palpitations, unexplained indigestion or syncope. Gastrointestinal:  No changes in bowel movements, no abdominal pain, no bloating. Genitourinary:  He denies frequency, nocturia or stranguria. Extremities: No joint pain, stiffness or swelling. Neurological:  No numbness, tingling, burring paresthesias or loss of motor strength. No syncope, dizziness or frequent headache. Gait a little unsteady but better since physical therapy  Lymphatic: no adenopathy noted  Hematologic: no easy bruising or bleeding gums  Skin:  No recent rashes or mole changes. Psychiatric/Behavioral:  Negative for depression. Physical Examination     Evaluation of Cognitive Function:  Mood/affect:  happy  Appearance: age appropriate  Family member/caregiver input: Granddaughter    Visit Vitals  /82 (BP 1 Location: Left arm, BP Patient Position: Sitting)   Pulse 69   Temp 97.5 °F (36.4 °C) (Oral)   Resp 18   Ht 5' 9\" (1.753 m)   Wt 131 lb 3.2 oz (59.5 kg)   SpO2 97%   BMI 19.37 kg/m²     Vitals:    10/22/20 1031   BP: 136/82   Pulse: 69   Resp: 18   Temp: 97.5 °F (36.4 °C)   TempSrc: Oral   SpO2: 97%   Weight: 131 lb 3.2 oz (59.5 kg)   Height: 5' 9\" (1.753 m)   PainSc:   0 - No pain        PHYSICAL EXAM:    General appearance - alert, well appearing, and in no distress  Mental status - alert, oriented to person, place, and time  HEENT:  Ears - bilateral TM's and external ear canals clear  Eyes - pupillary responses were normal.  Extraocular muscle function intact. Lids and conjunctiva not injected. Fundoscopic exam revealed sharp disc margins. eye movements intact  Pharynx- clear with teeth in good repair.   No masses were noted  Neck - supple without thyromegaly or burit. No JVD noted  Lungs - clear to auscultation and percussion  Cardiac- normal rate, regular rhythm without murmurs. PMI not displaced. No gallop, rub or click  Abdomen - flat, soft, non-tender without palpable organomegaly or mass. No pulsatile mass was felt, and not bruit was heard. Bowel sounds were active  : Circumcised, Testes descended w/o masses  Rectal: normal sphincter tone, prostate normal, no masses, stool brown and hemacult negative  Extremities -  no clubbing cyanosis or edema  Lymphatics - no palpable lymphadenopathy, no hepatosplenomegaly  Hematologic: no petechiae or purpura  Peripheral vascular -Femoral, Dorsalis pedis and posterior tibial pulses felt without difficulty  Skin - no rash or unusual mole change noted  Neurological - Cranial nerves II-XII grossly intact. Motor strength 5/5. DTR's 2+ and symmetric. Station and gait low and deliberate without assistive device. Alert and oriented to name and place and knows me  Back exam - full range of motion, no tenderness, palpable spasm or pain on motion  Musculoskeletal - no joint tenderness, deformity or swelling        Results for orders placed or performed during the hospital encounter of 07/20/20   CBC WITH AUTOMATED DIFF   Result Value Ref Range    WBC 6.5 4.1 - 11.1 K/uL    RBC 4.74 4.10 - 5.70 M/uL    HGB 12.7 12.1 - 17.0 g/dL    HCT 42.0 36.6 - 50.3 %    MCV 88.6 80.0 - 99.0 FL    MCH 26.8 26.0 - 34.0 PG    MCHC 30.2 30.0 - 36.5 g/dL    RDW 14.5 11.5 - 14.5 %    PLATELET 153 442 - 885 K/uL    MPV 11.0 8.9 - 12.9 FL    NRBC 0.0 0  WBC    ABSOLUTE NRBC 0.00 0.00 - 0.01 K/uL    NEUTROPHILS 66 32 - 75 %    LYMPHOCYTES 18 12 - 49 %    MONOCYTES 11 5 - 13 %    EOSINOPHILS 4 0 - 7 %    BASOPHILS 1 0 - 1 %    IMMATURE GRANULOCYTES 0 0.0 - 0.5 %    ABS. NEUTROPHILS 4.3 1.8 - 8.0 K/UL    ABS. LYMPHOCYTES 1.2 0.8 - 3.5 K/UL    ABS. MONOCYTES 0.7 0.0 - 1.0 K/UL    ABS.  EOSINOPHILS 0.2 0.0 - 0.4 K/UL    ABS. BASOPHILS 0.1 0.0 - 0.1 K/UL    ABS. IMM. GRANS. 0.0 0.00 - 0.04 K/UL    DF AUTOMATED     METABOLIC PANEL, COMPREHENSIVE   Result Value Ref Range    Sodium 137 136 - 145 mmol/L    Potassium 3.5 3.5 - 5.1 mmol/L    Chloride 101 97 - 108 mmol/L    CO2 33 (H) 21 - 32 mmol/L    Anion gap 3 (L) 5 - 15 mmol/L    Glucose 129 (H) 65 - 100 mg/dL    BUN 51 (H) 6 - 20 MG/DL    Creatinine 2.36 (H) 0.70 - 1.30 MG/DL    BUN/Creatinine ratio 22 (H) 12 - 20      GFR est AA 32 (L) >60 ml/min/1.73m2    GFR est non-AA 26 (L) >60 ml/min/1.73m2    Calcium 9.0 8.5 - 10.1 MG/DL    Bilirubin, total 0.4 0.2 - 1.0 MG/DL    ALT (SGPT) 25 12 - 78 U/L    AST (SGOT) 27 15 - 37 U/L    Alk. phosphatase 78 45 - 117 U/L    Protein, total 6.5 6.4 - 8.2 g/dL    Albumin 2.7 (L) 3.5 - 5.0 g/dL    Globulin 3.8 2.0 - 4.0 g/dL    A-G Ratio 0.7 (L) 1.1 - 2.2     CK W/ CKMB & INDEX   Result Value Ref Range    CK - MB <1.0 <3.6 NG/ML    CK-MB Index Cannot be calculated 0.0 - 2.5      CK 73 39 - 308 U/L   TROPONIN I   Result Value Ref Range    Troponin-I, Qt. <0.05 <0.05 ng/mL   CBC WITH AUTOMATED DIFF   Result Value Ref Range    WBC 6.2 4.1 - 11.1 K/uL    RBC 4.42 4.10 - 5.70 M/uL    HGB 12.0 (L) 12.1 - 17.0 g/dL    HCT 38.2 36.6 - 50.3 %    MCV 86.4 80.0 - 99.0 FL    MCH 27.1 26.0 - 34.0 PG    MCHC 31.4 30.0 - 36.5 g/dL    RDW 14.6 (H) 11.5 - 14.5 %    PLATELET 358 975 - 653 K/uL    MPV 11.2 8.9 - 12.9 FL    NRBC 0.0 0  WBC    ABSOLUTE NRBC 0.00 0.00 - 0.01 K/uL    NEUTROPHILS 73 32 - 75 %    LYMPHOCYTES 14 12 - 49 %    MONOCYTES 8 5 - 13 %    EOSINOPHILS 3 0 - 7 %    BASOPHILS 1 0 - 1 %    IMMATURE GRANULOCYTES 1 (H) 0.0 - 0.5 %    ABS. NEUTROPHILS 4.5 1.8 - 8.0 K/UL    ABS. LYMPHOCYTES 0.9 0.8 - 3.5 K/UL    ABS. MONOCYTES 0.5 0.0 - 1.0 K/UL    ABS. EOSINOPHILS 0.2 0.0 - 0.4 K/UL    ABS. BASOPHILS 0.1 0.0 - 0.1 K/UL    ABS. IMM.  GRANS. 0.0 0.00 - 0.04 K/UL    DF AUTOMATED     METABOLIC PANEL, BASIC   Result Value Ref Range    Sodium 138 136 - 145 mmol/L    Potassium 3.1 (L) 3.5 - 5.1 mmol/L    Chloride 98 97 - 108 mmol/L    CO2 36 (H) 21 - 32 mmol/L    Anion gap 4 (L) 5 - 15 mmol/L    Glucose 98 65 - 100 mg/dL    BUN 45 (H) 6 - 20 MG/DL    Creatinine 1.96 (H) 0.70 - 1.30 MG/DL    BUN/Creatinine ratio 23 (H) 12 - 20      GFR est AA 39 (L) >60 ml/min/1.73m2    GFR est non-AA 32 (L) >60 ml/min/1.73m2    Calcium 9.1 8.5 - 10.1 MG/DL   CBC W/O DIFF   Result Value Ref Range    WBC 5.3 4.1 - 11.1 K/uL    RBC 4.77 4.10 - 5.70 M/uL    HGB 12.8 12.1 - 17.0 g/dL    HCT 40.8 36.6 - 50.3 %    MCV 85.5 80.0 - 99.0 FL    MCH 26.8 26.0 - 34.0 PG    MCHC 31.4 30.0 - 36.5 g/dL    RDW 14.4 11.5 - 14.5 %    PLATELET 823 060 - 189 K/uL    MPV 10.8 8.9 - 12.9 FL    NRBC 0.0 0  WBC    ABSOLUTE NRBC 0.00 0.00 - 0.01 K/uL   MAGNESIUM   Result Value Ref Range    Magnesium 2.4 1.6 - 2.4 mg/dL   URINALYSIS W/ REFLEX CULTURE    Specimen: Urine   Result Value Ref Range    Color YELLOW/STRAW      Appearance CLEAR CLEAR      Specific gravity 1.010 1.003 - 1.030      pH (UA) 7.5 5.0 - 8.0      Protein Negative NEG mg/dL    Glucose Negative NEG mg/dL    Ketone Negative NEG mg/dL    Bilirubin Negative NEG      Blood Negative NEG      Urobilinogen 1.0 0.2 - 1.0 EU/dL    Nitrites Negative NEG      Leukocyte Esterase Negative NEG      WBC 0-4 0 - 4 /hpf    RBC 0-5 0 - 5 /hpf    Epithelial cells FEW FEW /lpf    Bacteria Negative NEG /hpf    UA:UC IF INDICATED CULTURE NOT INDICATED BY UA RESULT CNI      Hyaline cast 0-2 0 - 5 /lpf   METABOLIC PANEL, BASIC   Result Value Ref Range    Sodium 138 136 - 145 mmol/L    Potassium 3.3 (L) 3.5 - 5.1 mmol/L    Chloride 102 97 - 108 mmol/L    CO2 31 21 - 32 mmol/L    Anion gap 5 5 - 15 mmol/L    Glucose 103 (H) 65 - 100 mg/dL    BUN 38 (H) 6 - 20 MG/DL    Creatinine 1.48 (H) 0.70 - 1.30 MG/DL    BUN/Creatinine ratio 26 (H) 12 - 20      GFR est AA 54 (L) >60 ml/min/1.73m2    GFR est non-AA 45 (L) >60 ml/min/1.73m2    Calcium 8.7 8.5 - 10.1 MG/DL   CBC W/O DIFF   Result Value Ref Range    WBC 4.9 4.1 - 11.1 K/uL    RBC 4.62 4.10 - 5.70 M/uL    HGB 12.5 12.1 - 17.0 g/dL    HCT 40.2 36.6 - 50.3 %    MCV 87.0 80.0 - 99.0 FL    MCH 27.1 26.0 - 34.0 PG    MCHC 31.1 30.0 - 36.5 g/dL    RDW 14.5 11.5 - 14.5 %    PLATELET 776 243 - 400 K/uL    MPV 10.9 8.9 - 12.9 FL    NRBC 0.0 0  WBC    ABSOLUTE NRBC 0.00 0.00 - 0.01 K/uL   CBC W/O DIFF   Result Value Ref Range    WBC 5.8 4.1 - 11.1 K/uL    RBC 4.11 4.10 - 5.70 M/uL    HGB 11.2 (L) 12.1 - 17.0 g/dL    HCT 36.2 (L) 36.6 - 50.3 %    MCV 88.1 80.0 - 99.0 FL    MCH 27.3 26.0 - 34.0 PG    MCHC 30.9 30.0 - 36.5 g/dL    RDW 14.3 11.5 - 14.5 %    PLATELET 772 245 - 549 K/uL    MPV 11.1 8.9 - 12.9 FL    NRBC 0.0 0  WBC    ABSOLUTE NRBC 0.00 0.00 - 1.91 K/uL   METABOLIC PANEL, COMPREHENSIVE   Result Value Ref Range    Sodium 142 136 - 145 mmol/L    Potassium 3.6 3.5 - 5.1 mmol/L    Chloride 107 97 - 108 mmol/L    CO2 29 21 - 32 mmol/L    Anion gap 6 5 - 15 mmol/L    Glucose 95 65 - 100 mg/dL    BUN 22 (H) 6 - 20 MG/DL    Creatinine 1.08 0.70 - 1.30 MG/DL    BUN/Creatinine ratio 20 12 - 20      GFR est AA >60 >60 ml/min/1.73m2    GFR est non-AA >60 >60 ml/min/1.73m2    Calcium 8.2 (L) 8.5 - 10.1 MG/DL    Bilirubin, total 0.4 0.2 - 1.0 MG/DL    ALT (SGPT) 20 12 - 78 U/L    AST (SGOT) 24 15 - 37 U/L    Alk.  phosphatase 72 45 - 117 U/L    Protein, total 5.5 (L) 6.4 - 8.2 g/dL    Albumin 2.3 (L) 3.5 - 5.0 g/dL    Globulin 3.2 2.0 - 4.0 g/dL    A-G Ratio 0.7 (L) 1.1 - 2.2     METABOLIC PANEL, BASIC   Result Value Ref Range    Sodium 142 136 - 145 mmol/L    Potassium 3.8 3.5 - 5.1 mmol/L    Chloride 108 97 - 108 mmol/L    CO2 30 21 - 32 mmol/L    Anion gap 4 (L) 5 - 15 mmol/L    Glucose 96 65 - 100 mg/dL    BUN 24 (H) 6 - 20 MG/DL    Creatinine 1.15 0.70 - 1.30 MG/DL    BUN/Creatinine ratio 21 (H) 12 - 20      GFR est AA >60 >60 ml/min/1.73m2    GFR est non-AA 60 (L) >60 ml/min/1.73m2    Calcium 8.4 (L) 8.5 - 75.3 MG/DL   METABOLIC PANEL, COMPREHENSIVE   Result Value Ref Range    Sodium 141 136 - 145 mmol/L    Potassium 3.9 3.5 - 5.1 mmol/L    Chloride 107 97 - 108 mmol/L    CO2 30 21 - 32 mmol/L    Anion gap 4 (L) 5 - 15 mmol/L    Glucose 102 (H) 65 - 100 mg/dL    BUN 23 (H) 6 - 20 MG/DL    Creatinine 1.18 0.70 - 1.30 MG/DL    BUN/Creatinine ratio 19 12 - 20      GFR est AA >60 >60 ml/min/1.73m2    GFR est non-AA 58 (L) >60 ml/min/1.73m2    Calcium 8.4 (L) 8.5 - 10.1 MG/DL    Bilirubin, total 0.7 0.2 - 1.0 MG/DL    ALT (SGPT) 20 12 - 78 U/L    AST (SGOT) 20 15 - 37 U/L    Alk. phosphatase 62 45 - 117 U/L    Protein, total 5.5 (L) 6.4 - 8.2 g/dL    Albumin 2.3 (L) 3.5 - 5.0 g/dL    Globulin 3.2 2.0 - 4.0 g/dL    A-G Ratio 0.7 (L) 1.1 - 2.2     CBC WITH AUTOMATED DIFF   Result Value Ref Range    WBC 7.4 4.1 - 11.1 K/uL    RBC 3.82 (L) 4.10 - 5.70 M/uL    HGB 10.3 (L) 12.1 - 17.0 g/dL    HCT 33.1 (L) 36.6 - 50.3 %    MCV 86.6 80.0 - 99.0 FL    MCH 27.0 26.0 - 34.0 PG    MCHC 31.1 30.0 - 36.5 g/dL    RDW 14.5 11.5 - 14.5 %    PLATELET 796 630 - 653 K/uL    MPV 11.1 8.9 - 12.9 FL    NRBC 0.0 0  WBC    ABSOLUTE NRBC 0.00 0.00 - 0.01 K/uL    NEUTROPHILS 74 32 - 75 %    LYMPHOCYTES 11 (L) 12 - 49 %    MONOCYTES 12 5 - 13 %    EOSINOPHILS 2 0 - 7 %    BASOPHILS 1 0 - 1 %    IMMATURE GRANULOCYTES 0 0.0 - 0.5 %    ABS. NEUTROPHILS 5.5 1.8 - 8.0 K/UL    ABS. LYMPHOCYTES 0.8 0.8 - 3.5 K/UL    ABS. MONOCYTES 0.9 0.0 - 1.0 K/UL    ABS. EOSINOPHILS 0.1 0.0 - 0.4 K/UL    ABS. BASOPHILS 0.1 0.0 - 0.1 K/UL    ABS. IMM.  GRANS. 0.0 0.00 - 0.04 K/UL    DF SMEAR SCANNED      RBC COMMENTS ANISOCYTOSIS  1+       METABOLIC PANEL, COMPREHENSIVE   Result Value Ref Range    Sodium 140 136 - 145 mmol/L    Potassium 4.1 3.5 - 5.1 mmol/L    Chloride 107 97 - 108 mmol/L    CO2 30 21 - 32 mmol/L    Anion gap 3 (L) 5 - 15 mmol/L    Glucose 138 (H) 65 - 100 mg/dL    BUN 24 (H) 6 - 20 MG/DL    Creatinine 1.09 0.70 - 1.30 MG/DL    BUN/Creatinine ratio 22 (H) 12 - 20      GFR est AA >60 >60 ml/min/1.73m2    GFR est non-AA >60 >60 ml/min/1.73m2    Calcium 8.1 (L) 8.5 - 10.1 MG/DL    Bilirubin, total 0.5 0.2 - 1.0 MG/DL    ALT (SGPT) 20 12 - 78 U/L    AST (SGOT) 24 15 - 37 U/L    Alk. phosphatase 68 45 - 117 U/L    Protein, total 5.5 (L) 6.4 - 8.2 g/dL    Albumin 2.3 (L) 3.5 - 5.0 g/dL    Globulin 3.2 2.0 - 4.0 g/dL    A-G Ratio 0.7 (L) 1.1 - 2.2     CBC WITH AUTOMATED DIFF   Result Value Ref Range    WBC 6.3 4.1 - 11.1 K/uL    RBC 3.84 (L) 4.10 - 5.70 M/uL    HGB 10.3 (L) 12.1 - 17.0 g/dL    HCT 33.5 (L) 36.6 - 50.3 %    MCV 87.2 80.0 - 99.0 FL    MCH 26.8 26.0 - 34.0 PG    MCHC 30.7 30.0 - 36.5 g/dL    RDW 14.6 (H) 11.5 - 14.5 %    PLATELET 303 182 - 533 K/uL    MPV 11.1 8.9 - 12.9 FL    NRBC 0.0 0  WBC    ABSOLUTE NRBC 0.00 0.00 - 0.01 K/uL    NEUTROPHILS 68 32 - 75 %    LYMPHOCYTES 16 12 - 49 %    MONOCYTES 12 5 - 13 %    EOSINOPHILS 2 0 - 7 %    BASOPHILS 1 0 - 1 %    IMMATURE GRANULOCYTES 1 (H) 0.0 - 0.5 %    ABS. NEUTROPHILS 4.3 1.8 - 8.0 K/UL    ABS. LYMPHOCYTES 1.0 0.8 - 3.5 K/UL    ABS. MONOCYTES 0.8 0.0 - 1.0 K/UL    ABS. EOSINOPHILS 0.1 0.0 - 0.4 K/UL    ABS. BASOPHILS 0.0 0.0 - 0.1 K/UL    ABS. IMM. GRANS. 0.0 0.00 - 0.04 K/UL    DF AUTOMATED     CBC WITH AUTOMATED DIFF   Result Value Ref Range    WBC 6.1 4.1 - 11.1 K/uL    RBC 3.68 (L) 4.10 - 5.70 M/uL    HGB 10.1 (L) 12.1 - 17.0 g/dL    HCT 32.3 (L) 36.6 - 50.3 %    MCV 87.8 80.0 - 99.0 FL    MCH 27.4 26.0 - 34.0 PG    MCHC 31.3 30.0 - 36.5 g/dL    RDW 14.6 (H) 11.5 - 14.5 %    PLATELET 380 798 - 698 K/uL    MPV 11.8 8.9 - 12.9 FL    NRBC 0.0 0  WBC    ABSOLUTE NRBC 0.00 0.00 - 0.01 K/uL    NEUTROPHILS 71 32 - 75 %    LYMPHOCYTES 15 12 - 49 %    MONOCYTES 11 5 - 13 %    EOSINOPHILS 2 0 - 7 %    BASOPHILS 1 0 - 1 %    IMMATURE GRANULOCYTES 0 0.0 - 0.5 %    ABS. NEUTROPHILS 4.3 1.8 - 8.0 K/UL    ABS. LYMPHOCYTES 0.9 0.8 - 3.5 K/UL    ABS. MONOCYTES 0.7 0.0 - 1.0 K/UL    ABS. EOSINOPHILS 0.1 0.0 - 0.4 K/UL    ABS. BASOPHILS 0.1 0.0 - 0.1 K/UL    ABS. IMM. GRANS. 0.0 0.00 - 0.04 K/UL    DF AUTOMATED     METABOLIC PANEL, COMPREHENSIVE   Result Value Ref Range    Sodium 139 136 - 145 mmol/L    Potassium 4.2 3.5 - 5.1 mmol/L    Chloride 106 97 - 108 mmol/L    CO2 29 21 - 32 mmol/L    Anion gap 4 (L) 5 - 15 mmol/L    Glucose 89 65 - 100 mg/dL    BUN 26 (H) 6 - 20 MG/DL    Creatinine 1.16 0.70 - 1.30 MG/DL    BUN/Creatinine ratio 22 (H) 12 - 20      GFR est AA >60 >60 ml/min/1.73m2    GFR est non-AA 59 (L) >60 ml/min/1.73m2    Calcium 8.3 (L) 8.5 - 10.1 MG/DL    Bilirubin, total 0.4 0.2 - 1.0 MG/DL    ALT (SGPT) 18 12 - 78 U/L    AST (SGOT) 20 15 - 37 U/L    Alk. phosphatase 63 45 - 117 U/L    Protein, total 5.3 (L) 6.4 - 8.2 g/dL    Albumin 2.1 (L) 3.5 - 5.0 g/dL    Globulin 3.2 2.0 - 4.0 g/dL    A-G Ratio 0.7 (L) 1.1 - 2.2     CBC WITH AUTOMATED DIFF   Result Value Ref Range    WBC 5.1 4.1 - 11.1 K/uL    RBC 3.96 (L) 4.10 - 5.70 M/uL    HGB 10.6 (L) 12.1 - 17.0 g/dL    HCT 34.9 (L) 36.6 - 50.3 %    MCV 88.1 80.0 - 99.0 FL    MCH 26.8 26.0 - 34.0 PG    MCHC 30.4 30.0 - 36.5 g/dL    RDW 14.6 (H) 11.5 - 14.5 %    PLATELET 728 481 - 861 K/uL    MPV 11.5 8.9 - 12.9 FL    NRBC 0.0 0  WBC    ABSOLUTE NRBC 0.00 0.00 - 0.01 K/uL    NEUTROPHILS 60 32 - 75 %    LYMPHOCYTES 24 12 - 49 %    MONOCYTES 12 5 - 13 %    EOSINOPHILS 3 0 - 7 %    BASOPHILS 1 0 - 1 %    IMMATURE GRANULOCYTES 0 0.0 - 0.5 %    ABS. NEUTROPHILS 3.1 1.8 - 8.0 K/UL    ABS. LYMPHOCYTES 1.2 0.8 - 3.5 K/UL    ABS. MONOCYTES 0.6 0.0 - 1.0 K/UL    ABS. EOSINOPHILS 0.2 0.0 - 0.4 K/UL    ABS. BASOPHILS 0.1 0.0 - 0.1 K/UL    ABS. IMM.  GRANS. 0.0 0.00 - 0.04 K/UL    DF AUTOMATED     METABOLIC PANEL, COMPREHENSIVE   Result Value Ref Range    Sodium 141 136 - 145 mmol/L    Potassium 4.2 3.5 - 5.1 mmol/L    Chloride 107 97 - 108 mmol/L    CO2 29 21 - 32 mmol/L    Anion gap 5 5 - 15 mmol/L    Glucose 80 65 - 100 mg/dL    BUN 30 (H) 6 - 20 MG/DL    Creatinine 1.13 0.70 - 1.30 MG/DL    BUN/Creatinine ratio 27 (H) 12 - 20      GFR est AA >60 >60 ml/min/1.73m2    GFR est non-AA >60 >60 ml/min/1.73m2    Calcium 8.5 8.5 - 10.1 MG/DL    Bilirubin, total 0.4 0.2 - 1.0 MG/DL    ALT (SGPT) 24 12 - 78 U/L    AST (SGOT) 28 15 - 37 U/L    Alk. phosphatase 68 45 - 117 U/L    Protein, total 5.4 (L) 6.4 - 8.2 g/dL    Albumin 2.1 (L) 3.5 - 5.0 g/dL    Globulin 3.3 2.0 - 4.0 g/dL    A-G Ratio 0.6 (L) 1.1 - 2.2     CBC WITH AUTOMATED DIFF   Result Value Ref Range    WBC 5.6 4.1 - 11.1 K/uL    RBC 3.68 (L) 4.10 - 5.70 M/uL    HGB 10.0 (L) 12.1 - 17.0 g/dL    HCT 32.4 (L) 36.6 - 50.3 %    MCV 88.0 80.0 - 99.0 FL    MCH 27.2 26.0 - 34.0 PG    MCHC 30.9 30.0 - 36.5 g/dL    RDW 14.5 11.5 - 14.5 %    PLATELET 662 109 - 143 K/uL    MPV 10.9 8.9 - 12.9 FL    NRBC 0.0 0  WBC    ABSOLUTE NRBC 0.00 0.00 - 0.01 K/uL    NEUTROPHILS 61 32 - 75 %    LYMPHOCYTES 22 12 - 49 %    MONOCYTES 13 5 - 13 %    EOSINOPHILS 3 0 - 7 %    BASOPHILS 1 0 - 1 %    IMMATURE GRANULOCYTES 0 0.0 - 0.5 %    ABS. NEUTROPHILS 3.4 1.8 - 8.0 K/UL    ABS. LYMPHOCYTES 1.2 0.8 - 3.5 K/UL    ABS. MONOCYTES 0.7 0.0 - 1.0 K/UL    ABS. EOSINOPHILS 0.2 0.0 - 0.4 K/UL    ABS. BASOPHILS 0.1 0.0 - 0.1 K/UL    ABS. IMM. GRANS. 0.0 0.00 - 0.04 K/UL    DF AUTOMATED     METABOLIC PANEL, COMPREHENSIVE   Result Value Ref Range    Sodium 140 136 - 145 mmol/L    Potassium 4.4 3.5 - 5.1 mmol/L    Chloride 105 97 - 108 mmol/L    CO2 32 21 - 32 mmol/L    Anion gap 3 (L) 5 - 15 mmol/L    Glucose 84 65 - 100 mg/dL    BUN 38 (H) 6 - 20 MG/DL    Creatinine 1.25 0.70 - 1.30 MG/DL    BUN/Creatinine ratio 30 (H) 12 - 20      GFR est AA >60 >60 ml/min/1.73m2    GFR est non-AA 54 (L) >60 ml/min/1.73m2    Calcium 8.1 (L) 8.5 - 10.1 MG/DL    Bilirubin, total 0.3 0.2 - 1.0 MG/DL    ALT (SGPT) 26 12 - 78 U/L    AST (SGOT) 24 15 - 37 U/L    Alk.  phosphatase 73 45 - 117 U/L    Protein, total 5.3 (L) 6.4 - 8.2 g/dL    Albumin 2.0 (L) 3.5 - 5.0 g/dL    Globulin 3.3 2.0 - 4.0 g/dL    A-G Ratio 0.6 (L) 1.1 - 2.2     CBC WITH AUTOMATED DIFF   Result Value Ref Range    WBC 4.3 4.1 - 11.1 K/uL    RBC 3.76 (L) 4.10 - 5.70 M/uL    HGB 10.2 (L) 12.1 - 17.0 g/dL    HCT 32.8 (L) 36.6 - 50.3 %    MCV 87.2 80.0 - 99.0 FL    MCH 27.1 26.0 - 34.0 PG    MCHC 31.1 30.0 - 36.5 g/dL    RDW 14.5 11.5 - 14.5 %    PLATELET 603 779 - 227 K/uL    MPV 10.5 8.9 - 12.9 FL    NRBC 0.0 0  WBC    ABSOLUTE NRBC 0.00 0.00 - 0.01 K/uL    NEUTROPHILS 61 32 - 75 %    LYMPHOCYTES 24 12 - 49 %    MONOCYTES 11 5 - 13 %    EOSINOPHILS 3 0 - 7 %    BASOPHILS 1 0 - 1 %    IMMATURE GRANULOCYTES 0 0.0 - 0.5 %    ABS. NEUTROPHILS 2.6 1.8 - 8.0 K/UL    ABS. LYMPHOCYTES 1.1 0.8 - 3.5 K/UL    ABS. MONOCYTES 0.5 0.0 - 1.0 K/UL    ABS. EOSINOPHILS 0.1 0.0 - 0.4 K/UL    ABS. BASOPHILS 0.0 0.0 - 0.1 K/UL    ABS. IMM. GRANS. 0.0 0.00 - 0.04 K/UL    DF AUTOMATED     METABOLIC PANEL, COMPREHENSIVE   Result Value Ref Range    Sodium 140 136 - 145 mmol/L    Potassium 4.2 3.5 - 5.1 mmol/L    Chloride 106 97 - 108 mmol/L    CO2 31 21 - 32 mmol/L    Anion gap 3 (L) 5 - 15 mmol/L    Glucose 84 65 - 100 mg/dL    BUN 37 (H) 6 - 20 MG/DL    Creatinine 1.14 0.70 - 1.30 MG/DL    BUN/Creatinine ratio 32 (H) 12 - 20      GFR est AA >60 >60 ml/min/1.73m2    GFR est non-AA >60 >60 ml/min/1.73m2    Calcium 8.6 8.5 - 10.1 MG/DL    Bilirubin, total 0.6 0.2 - 1.0 MG/DL    ALT (SGPT) 24 12 - 78 U/L    AST (SGOT) 23 15 - 37 U/L    Alk.  phosphatase 70 45 - 117 U/L    Protein, total 5.6 (L) 6.4 - 8.2 g/dL    Albumin 2.2 (L) 3.5 - 5.0 g/dL    Globulin 3.4 2.0 - 4.0 g/dL    A-G Ratio 0.6 (L) 1.1 - 2.2     METABOLIC PANEL, COMPREHENSIVE   Result Value Ref Range    Sodium 140 136 - 145 mmol/L    Potassium 4.3 3.5 - 5.1 mmol/L    Chloride 105 97 - 108 mmol/L    CO2 32 21 - 32 mmol/L    Anion gap 3 (L) 5 - 15 mmol/L    Glucose 84 65 - 100 mg/dL    BUN 43 (H) 6 - 20 MG/DL    Creatinine 1.22 0.70 - 1.30 MG/DL BUN/Creatinine ratio 35 (H) 12 - 20      GFR est AA >60 >60 ml/min/1.73m2    GFR est non-AA 56 (L) >60 ml/min/1.73m2    Calcium 8.2 (L) 8.5 - 10.1 MG/DL    Bilirubin, total 0.3 0.2 - 1.0 MG/DL    ALT (SGPT) 24 12 - 78 U/L    AST (SGOT) 27 15 - 37 U/L    Alk. phosphatase 79 45 - 117 U/L    Protein, total 5.5 (L) 6.4 - 8.2 g/dL    Albumin 2.2 (L) 3.5 - 5.0 g/dL    Globulin 3.3 2.0 - 4.0 g/dL    A-G Ratio 0.7 (L) 1.1 - 2.2     CBC WITH AUTOMATED DIFF   Result Value Ref Range    WBC 4.6 4.1 - 11.1 K/uL    RBC 3.65 (L) 4.10 - 5.70 M/uL    HGB 9.8 (L) 12.1 - 17.0 g/dL    HCT 31.7 (L) 36.6 - 50.3 %    MCV 86.8 80.0 - 99.0 FL    MCH 26.8 26.0 - 34.0 PG    MCHC 30.9 30.0 - 36.5 g/dL    RDW 14.4 11.5 - 14.5 %    PLATELET 845 752 - 674 K/uL    MPV 10.8 8.9 - 12.9 FL    NRBC 0.0 0  WBC    ABSOLUTE NRBC 0.00 0.00 - 0.01 K/uL    NEUTROPHILS 65 32 - 75 %    LYMPHOCYTES 22 12 - 49 %    MONOCYTES 10 5 - 13 %    EOSINOPHILS 2 0 - 7 %    BASOPHILS 1 0 - 1 %    IMMATURE GRANULOCYTES 0 0.0 - 0.5 %    ABS. NEUTROPHILS 3.0 1.8 - 8.0 K/UL    ABS. LYMPHOCYTES 1.0 0.8 - 3.5 K/UL    ABS. MONOCYTES 0.5 0.0 - 1.0 K/UL    ABS. EOSINOPHILS 0.1 0.0 - 0.4 K/UL    ABS. BASOPHILS 0.0 0.0 - 0.1 K/UL    ABS. IMM. GRANS. 0.0 0.00 - 0.04 K/UL    DF AUTOMATED     CBC WITH AUTOMATED DIFF   Result Value Ref Range    WBC 4.7 4.1 - 11.1 K/uL    RBC 3.66 (L) 4.10 - 5.70 M/uL    HGB 9.8 (L) 12.1 - 17.0 g/dL    HCT 32.1 (L) 36.6 - 50.3 %    MCV 87.7 80.0 - 99.0 FL    MCH 26.8 26.0 - 34.0 PG    MCHC 30.5 30.0 - 36.5 g/dL    RDW 14.6 (H) 11.5 - 14.5 %    PLATELET 164 175 - 552 K/uL    MPV 10.9 8.9 - 12.9 FL    NRBC 0.0 0  WBC    ABSOLUTE NRBC 0.00 0.00 - 0.01 K/uL    NEUTROPHILS 59 32 - 75 %    LYMPHOCYTES 26 12 - 49 %    MONOCYTES 10 5 - 13 %    EOSINOPHILS 4 0 - 7 %    BASOPHILS 1 0 - 1 %    IMMATURE GRANULOCYTES 0 0.0 - 0.5 %    ABS. NEUTROPHILS 2.8 1.8 - 8.0 K/UL    ABS. LYMPHOCYTES 1.2 0.8 - 3.5 K/UL    ABS. MONOCYTES 0.5 0.0 - 1.0 K/UL    ABS. EOSINOPHILS 0.2 0.0 - 0.4 K/UL    ABS. BASOPHILS 0.0 0.0 - 0.1 K/UL    ABS. IMM. GRANS. 0.0 0.00 - 0.04 K/UL    DF AUTOMATED     METABOLIC PANEL, BASIC   Result Value Ref Range    Sodium 141 136 - 145 mmol/L    Potassium 3.8 3.5 - 5.1 mmol/L    Chloride 105 97 - 108 mmol/L    CO2 32 21 - 32 mmol/L    Anion gap 4 (L) 5 - 15 mmol/L    Glucose 81 65 - 100 mg/dL    BUN 37 (H) 6 - 20 MG/DL    Creatinine 1.10 0.70 - 1.30 MG/DL    BUN/Creatinine ratio 34 (H) 12 - 20      GFR est AA >60 >60 ml/min/1.73m2    GFR est non-AA >60 >60 ml/min/1.73m2    Calcium 8.6 8.5 - 10.1 MG/DL   GLUCOSE, POC   Result Value Ref Range    Glucose (POC) 139 (H) 65 - 100 mg/dL    Performed by Connie Mota (RYAN RN)    GLUCOSE, POC   Result Value Ref Range    Glucose (POC) 90 65 - 100 mg/dL    Performed by Nimo Clay (PCT)    GLUCOSE, POC   Result Value Ref Range    Glucose (POC) 92 65 - 100 mg/dL    Performed by daanila Clay (PCT)    GLUCOSE, POC   Result Value Ref Range    Glucose (POC) 133 (H) 65 - 100 mg/dL    Performed by Nimo Clay (PCT)    EKG, 12 LEAD, INITIAL   Result Value Ref Range    Ventricular Rate 63 BPM    Atrial Rate 63 BPM    P-R Interval 136 ms    QRS Duration 164 ms    Q-T Interval 496 ms    QTC Calculation (Bezet) 507 ms    Calculated P Axis 64 degrees    Calculated R Axis -132 degrees    Calculated T Axis 85 degrees    Diagnosis       AV dual-paced rhythm  Confirmed by Rosalva May ([id number]) on 7/21/2020 7:29:54 PM         Advice/Referrals/Counseling   Education and counseling provided:  Are appropriate based on today's review and evaluation  End-of-Life planning (with patient's consent)  Pneumococcal Vaccine  Influenza Vaccine  Colorectal cancer screening tests      Assessment/Plan     ASSESSMENT:   1. Hypertension with renal disease    2. 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 Mixed hyperlipidemia    4.  Primary osteoarthritis involving multiple joints    5. ASCVD (arteriosclerotic cardiovascular disease)    6. CKD (chronic kidney disease), stage II    7. Alzheimer's dementia with behavioral disturbance, unspecified timing of dementia onset ([facility]    8. SVT (supraventricular tachycardia) (HCC)--s/p RFA    9. SSS (sick sinus syndrome) ([facility]    10. S/P cardiac pacemaker procedure    11. Cardiomyopathy, unspecified type ([facility]    12. Diverticulosis    13. Severe protein-calorie malnutrition ([facility]    14. Mild depression ([facility]    15. Alcoholism ([facility]    16. Alcohol withdrawal syndrome, with delirium ([facility]    17. Paroxysmal atrial fibrillation (HCC)    18. Medicare annual wellness visit, subsequent    19. Alzheimer's disease of other onset without behavioral disturbance ([facility]    20. Gastroesophageal reflux disease without esophagitis    21. Primary insomnia    22. Needs flu shot      Impression  1. Hypertension that is well controlled so continue current therapy reviewed with he and his granddaughter. 2.  Diabetes mellitus repeat status pending a prior lab review not make adjustments if necessary. 3.  Hyperlipidemia prior lab reviewed and repeat status pending and I will adjust if needed. 4. DJD that is stable   5. ASCVD clinically stable and EKG obtained today does reveal paced rhythm with some ectopy but no acute process. 6.  CKD stage II repeat status is pending  7. Alzheimer's dementia that seems stable and I renewed his Aricept and Namenda. 8.  History of SVT no evidence of recurrence on EKG and no symptoms  9. Sick sinus syndrome as noted status post pacemaker  10. Cardiomyopathy that is currently stable  11. Diverticulosis with no recent symptoms  12. Malnutrition that is improved as his weight has gone up since he was discharged from the hospital  13. Depression that is stable  14. History of alcoholism and he claims not to drink alcohol at all now. This is cautioned by his granddaughter.   I did caution regarding more than 2 drinks per day with increased cardiovascular risk and increased risk of liver disease and other GI effects. 15.  Prior alcohol withdrawal syndrome but should not be a problem as he is no longer drinker  16. Paroxysmal atrial fibrillation now with pacemaker as noted  17. GERD that is stable and renewed Prilosec  18. Insomnia renewed temazepam  Flu shot given today. Medicare subsequent annual wellness examination and screening questionnaire is completed today. The results were reviewed with him and his granddaughter and the questions were answered. Lifestyle recommendations and modifications discussed and made. I will call with lab results and make further recommendations or adjustments if necessary. Follow-up in 3 months or sooner if there is a problem. 45 minutes spent in direct care of this patient today not inclusive of time spent on Medicare wellness examination. Greater than 50% in counseling and coordination of care. PLAN:  .  Orders Placed This Encounter    Influenza Vaccine, QUAD, 65 Yrs +  IM  (Fluad 24624 )    CBC WITH AUTOMATED DIFF ([facility])    METABOLIC PANEL, COMPREHENSIVE ([facility])    LIPID PANEL ([facility])    CK ([facility])    HEMOGLOBIN A1C W/O EAG ([facility])    T4, FREE ([facility])    TSH 3RD GENERATION ([facility])    URINALYSIS W/O MICRO ([facility])    URINE, MICROALBUMIN, SEMIQUANTITATIVE ([facility])    AMB POC EKG ROUTINE W/ 12 LEADS, INTER & REP    colchicine 0.6 mg tablet    donepeziL (ARICEPT) 10 mg tablet    memantine (NAMENDA) 10 mg tablet    omeprazole (PRILOSEC) 20 mg capsule    temazepam (RESTORIL) 15 mg capsule         ATTENTION:   This medical record was transcribed using an electronic medical records system. Although proofread, it may and can contain electronic and spelling errors. Other human spelling and other errors may be present.   Corrections may be executed at a later time. Please feel free to contact us for any clarifications as needed. Follow-up and Dispositions    · Return in about 3 months (around 1/22/2021). John Tate MD    Recommended healthy diet low in carbohydrates, fats, sodium and cholesterol. Recommended regular cardiovascular exercise 3-6 times per week for 30-60 minutes daily. Current Outpatient Medications   Medication Sig Dispense Refill    colchicine 0.6 mg tablet Take 1 Tab by mouth daily. 30 Tab prn    donepeziL (ARICEPT) 10 mg tablet TAKE 1 TABLET BY MOUTH EVERY DAY 30 Tab 2    memantine (NAMENDA) 10 mg tablet TAKE 1 TABLET BY MOUTH TWICE A DAY 60 Tab 2    omeprazole (PRILOSEC) 20 mg capsule Take 1 Cap by mouth daily. 90 Cap 3    temazepam (RESTORIL) 15 mg capsule Take 1 Cap by mouth nightly as needed for Sleep. Max Daily Amount: 15 mg. 30 Cap 0    losartan-hydroCHLOROthiazide (HYZAAR) 100-25 mg per tablet TAKE 1 TABLET BY MOUTH EVERY DAY 30 Tab 0    multivitamin, tx-iron-ca-min (THERA-M w/ IRON) 9 mg iron-400 mcg tab tablet Take 1 Tab by mouth daily.  carvedilol (COREG) 6.25 mg tablet Take 1 Tab by mouth two (2) times a day. 180 Tab prn    aspirin 81 mg chewable tablet Take 1 Tab by mouth daily. 30 Tab 1       No results found for any visits on 10/22/20. Verbal and written instructions (see AVS) provided. Patient expresses understanding of diagnosis and treatment plan.     John Tate MD

## 2020-10-22 ENCOUNTER — OFFICE VISIT (OUTPATIENT)
Dept: INTERNAL MEDICINE CLINIC | Age: 85
End: 2020-10-22
Payer: MEDICARE

## 2020-10-22 VITALS
BODY MASS INDEX: 19.43 KG/M2 | RESPIRATION RATE: 18 BRPM | DIASTOLIC BLOOD PRESSURE: 82 MMHG | SYSTOLIC BLOOD PRESSURE: 136 MMHG | HEART RATE: 69 BPM | OXYGEN SATURATION: 97 % | WEIGHT: 131.2 LBS | TEMPERATURE: 97.5 F | HEIGHT: 69 IN

## 2020-10-22 DIAGNOSIS — N18.2 CKD (CHRONIC KIDNEY DISEASE), STAGE II: ICD-10-CM

## 2020-10-22 DIAGNOSIS — K57.90 DIVERTICULOSIS: ICD-10-CM

## 2020-10-22 DIAGNOSIS — I48.0 PAROXYSMAL ATRIAL FIBRILLATION (HCC): ICD-10-CM

## 2020-10-22 DIAGNOSIS — F32.A MILD DEPRESSION: ICD-10-CM

## 2020-10-22 DIAGNOSIS — Z95.0 S/P CARDIAC PACEMAKER PROCEDURE: ICD-10-CM

## 2020-10-22 DIAGNOSIS — E11.22 CONTROLLED TYPE 2 DIABETES MELLITUS WITH STAGE 2 CHRONIC KIDNEY DISEASE, WITHOUT LONG-TERM CURRENT USE OF INSULIN (HCC): ICD-10-CM

## 2020-10-22 DIAGNOSIS — Z23 NEEDS FLU SHOT: ICD-10-CM

## 2020-10-22 DIAGNOSIS — F02.80 ALZHEIMER'S DISEASE OF OTHER ONSET WITHOUT BEHAVIORAL DISTURBANCE: ICD-10-CM

## 2020-10-22 DIAGNOSIS — M15.9 PRIMARY OSTEOARTHRITIS INVOLVING MULTIPLE JOINTS: ICD-10-CM

## 2020-10-22 DIAGNOSIS — I12.9 HYPERTENSION WITH RENAL DISEASE: Primary | ICD-10-CM

## 2020-10-22 DIAGNOSIS — I47.1 SVT (SUPRAVENTRICULAR TACHYCARDIA) (HCC): ICD-10-CM

## 2020-10-22 DIAGNOSIS — E43 SEVERE PROTEIN-CALORIE MALNUTRITION (HCC): Chronic | ICD-10-CM

## 2020-10-22 DIAGNOSIS — F51.01 PRIMARY INSOMNIA: ICD-10-CM

## 2020-10-22 DIAGNOSIS — G30.9 ALZHEIMER'S DEMENTIA WITH BEHAVIORAL DISTURBANCE, UNSPECIFIED TIMING OF DEMENTIA ONSET: ICD-10-CM

## 2020-10-22 DIAGNOSIS — F02.81 ALZHEIMER'S DEMENTIA WITH BEHAVIORAL DISTURBANCE, UNSPECIFIED TIMING OF DEMENTIA ONSET: ICD-10-CM

## 2020-10-22 DIAGNOSIS — K21.9 GASTROESOPHAGEAL REFLUX DISEASE WITHOUT ESOPHAGITIS: ICD-10-CM

## 2020-10-22 DIAGNOSIS — G30.8 ALZHEIMER'S DISEASE OF OTHER ONSET WITHOUT BEHAVIORAL DISTURBANCE: ICD-10-CM

## 2020-10-22 DIAGNOSIS — Z00.00 MEDICARE ANNUAL WELLNESS VISIT, SUBSEQUENT: ICD-10-CM

## 2020-10-22 DIAGNOSIS — I42.9 CARDIOMYOPATHY, UNSPECIFIED TYPE (HCC): ICD-10-CM

## 2020-10-22 DIAGNOSIS — F10.20 ALCOHOLISM (HCC): ICD-10-CM

## 2020-10-22 DIAGNOSIS — N18.2 CONTROLLED TYPE 2 DIABETES MELLITUS WITH STAGE 2 CHRONIC KIDNEY DISEASE, WITHOUT LONG-TERM CURRENT USE OF INSULIN (HCC): ICD-10-CM

## 2020-10-22 DIAGNOSIS — I25.10 ASCVD (ARTERIOSCLEROTIC CARDIOVASCULAR DISEASE): ICD-10-CM

## 2020-10-22 DIAGNOSIS — F10.931 ALCOHOL WITHDRAWAL SYNDROME, WITH DELIRIUM (HCC): ICD-10-CM

## 2020-10-22 DIAGNOSIS — I49.5 SSS (SICK SINUS SYNDROME) (HCC): ICD-10-CM

## 2020-10-22 DIAGNOSIS — E78.2 MIXED HYPERLIPIDEMIA: ICD-10-CM

## 2020-10-22 DIAGNOSIS — N39.0 URINARY TRACT INFECTION WITHOUT HEMATURIA, SITE UNSPECIFIED: ICD-10-CM

## 2020-10-22 LAB
A-G RATIO,AGRAT: 1.5 RATIO
ALBUMIN SERPL-MCNC: 4.1 G/DL (ref 3.9–5.4)
ALP SERPL-CCNC: 61 U/L (ref 38–126)
ALT SERPL-CCNC: 12 U/L (ref 0–50)
ANION GAP SERPL CALC-SCNC: 9 MMOL/L
AST SERPL W P-5'-P-CCNC: 27 U/L (ref 14–36)
BILIRUB SERPL-MCNC: 0.7 MG/DL (ref 0.2–1.3)
BILIRUB UR QL: NEGATIVE
BUN SERPL-MCNC: 24 MG/DL (ref 9–20)
BUN/CREATININE RATIO,BUCR: 15 RATIO
CALCIUM SERPL-MCNC: 9.1 MG/DL (ref 8.4–10.2)
CHLORIDE SERPL-SCNC: 109 MMOL/L (ref 98–107)
CHOL/HDL RATIO,CHHD: 3 RATIO (ref 0–4)
CHOLEST SERPL-MCNC: 201 MG/DL (ref 0–200)
CK SERPL-CCNC: 87 U/L (ref 30–135)
CLARITY: CLEAR
CO2 SERPL-SCNC: 28 MMOL/L (ref 22–32)
COLOR UR: ABNORMAL
CREAT SERPL-MCNC: 1.6 MG/DL (ref 0.8–1.5)
ERYTHROCYTE [DISTWIDTH] IN BLOOD BY AUTOMATED COUNT: 14.4 %
GLOBULIN,GLOB: 2.7
GLUCOSE 24H UR-MRATE: NEGATIVE G/(24.H)
GLUCOSE SERPL-MCNC: 88 MG/DL (ref 75–110)
HBA1C MFR BLD HPLC: 5.4 % (ref 4–5.7)
HCT VFR BLD AUTO: 37.4 % (ref 37–51)
HDLC SERPL-MCNC: 70 MG/DL (ref 35–130)
HGB BLD-MCNC: 11.9 G/DL (ref 12–18)
HGB UR QL STRIP: ABNORMAL
KETONES UR QL STRIP.AUTO: NEGATIVE
LDL/HDL RATIO,LDHD: 2 RATIO
LDLC SERPL CALC-MCNC: 116 MG/DL (ref 0–130)
LEUKOCYTE ESTERASE: NEGATIVE
LYMPHOCYTES ABSOLUTE: 1.5 K/UL (ref 0.6–4.1)
LYMPHOCYTES NFR BLD: 29.8 % (ref 10–58.5)
MCH RBC QN AUTO: 26.7 PG (ref 26–32)
MCHC RBC AUTO-ENTMCNC: 31.8 G/DL (ref 30–36)
MCV RBC AUTO: 84 FL (ref 80–97)
MICROALBUMIN, URINE: 50 MG/L (ref 0–20)
MONOCYTES ABS-DIF,2141: 0.4 K/UL (ref 0–1.8)
MONOCYTES NFR BLD: 7.4 % (ref 0.1–24)
NEUTROPHILS # BLD: 62.8 % (ref 37–92)
NEUTROPHILS ABS,2156: 3.1 K/UL (ref 2–7.8)
NITRITE UR QL STRIP.AUTO: NEGATIVE
PH UR STRIP: 7 [PH] (ref 5–7)
PLATELET # BLD AUTO: 219 K/UL (ref 140–440)
POTASSIUM SERPL-SCNC: 4.6 MMOL/L (ref 3.6–5)
PROT SERPL-MCNC: 6.8 G/DL (ref 6.3–8.2)
PROT UR STRIP-MCNC: NEGATIVE MG/DL
RBC # BLD AUTO: 4.45 M/UL (ref 4.2–6.3)
RBC #/AREA URNS HPF: ABNORMAL #/HPF
SODIUM SERPL-SCNC: 146 MMOL/L (ref 137–145)
SP GR UR REFRACTOMETRY: 1.01 (ref 1–1.03)
T4 FREE SERPL-MCNC: 1.01 NG/DL (ref 0.58–2.3)
TRIGL SERPL-MCNC: 74 MG/DL (ref 0–200)
TSH SERPL DL<=0.05 MIU/L-ACNC: 5.17 UIU/ML (ref 0.34–5.6)
UROBILINOGEN UR QL STRIP.AUTO: NEGATIVE
VLDLC SERPL CALC-MCNC: 15 MG/DL
WBC # BLD AUTO: 5 K/UL (ref 4.1–10.9)
WBC URNS QL MICRO: 0 #/HPF

## 2020-10-22 PROCEDURE — G8420 CALC BMI NORM PARAMETERS: HCPCS | Performed by: INTERNAL MEDICINE

## 2020-10-22 PROCEDURE — 84443 ASSAY THYROID STIM HORMONE: CPT | Performed by: INTERNAL MEDICINE

## 2020-10-22 PROCEDURE — 80053 COMPREHEN METABOLIC PANEL: CPT | Performed by: INTERNAL MEDICINE

## 2020-10-22 PROCEDURE — G0439 PPPS, SUBSEQ VISIT: HCPCS | Performed by: INTERNAL MEDICINE

## 2020-10-22 PROCEDURE — G9717 DOC PT DX DEP/BP F/U NT REQ: HCPCS | Performed by: INTERNAL MEDICINE

## 2020-10-22 PROCEDURE — 99215 OFFICE O/P EST HI 40 MIN: CPT | Performed by: INTERNAL MEDICINE

## 2020-10-22 PROCEDURE — 93000 ELECTROCARDIOGRAM COMPLETE: CPT | Performed by: INTERNAL MEDICINE

## 2020-10-22 PROCEDURE — 90694 VACC AIIV4 NO PRSRV 0.5ML IM: CPT

## 2020-10-22 PROCEDURE — 1101F PT FALLS ASSESS-DOCD LE1/YR: CPT | Performed by: INTERNAL MEDICINE

## 2020-10-22 PROCEDURE — 83036 HEMOGLOBIN GLYCOSYLATED A1C: CPT | Performed by: INTERNAL MEDICINE

## 2020-10-22 PROCEDURE — 84439 ASSAY OF FREE THYROXINE: CPT | Performed by: INTERNAL MEDICINE

## 2020-10-22 PROCEDURE — 82044 UR ALBUMIN SEMIQUANTITATIVE: CPT | Performed by: INTERNAL MEDICINE

## 2020-10-22 PROCEDURE — G8536 NO DOC ELDER MAL SCRN: HCPCS | Performed by: INTERNAL MEDICINE

## 2020-10-22 PROCEDURE — 80061 LIPID PANEL: CPT | Performed by: INTERNAL MEDICINE

## 2020-10-22 PROCEDURE — G8427 DOCREV CUR MEDS BY ELIG CLIN: HCPCS | Performed by: INTERNAL MEDICINE

## 2020-10-22 PROCEDURE — 85025 COMPLETE CBC W/AUTO DIFF WBC: CPT | Performed by: INTERNAL MEDICINE

## 2020-10-22 PROCEDURE — G0008 ADMIN INFLUENZA VIRUS VAC: HCPCS | Performed by: INTERNAL MEDICINE

## 2020-10-22 PROCEDURE — 81003 URINALYSIS AUTO W/O SCOPE: CPT | Performed by: INTERNAL MEDICINE

## 2020-10-22 PROCEDURE — 82550 ASSAY OF CK (CPK): CPT | Performed by: INTERNAL MEDICINE

## 2020-10-22 RX ORDER — MEMANTINE HYDROCHLORIDE 10 MG/1
TABLET ORAL
Qty: 60 TAB | Refills: 2 | Status: SHIPPED | OUTPATIENT
Start: 2020-10-22 | End: 2020-11-30 | Stop reason: SDUPTHER

## 2020-10-22 RX ORDER — TEMAZEPAM 15 MG/1
15 CAPSULE ORAL
Qty: 30 CAP | Refills: 0 | Status: SHIPPED | OUTPATIENT
Start: 2020-10-22 | End: 2020-12-01

## 2020-10-22 RX ORDER — DONEPEZIL HYDROCHLORIDE 10 MG/1
TABLET, FILM COATED ORAL
Qty: 30 TAB | Refills: 2 | Status: SHIPPED | OUTPATIENT
Start: 2020-10-22 | End: 2020-12-02 | Stop reason: SDUPTHER

## 2020-10-22 RX ORDER — COLCHICINE 0.6 MG/1
0.6 TABLET ORAL DAILY
Qty: 30 TAB | Status: SHIPPED | OUTPATIENT
Start: 2020-10-22 | End: 2020-12-01

## 2020-10-22 RX ORDER — OMEPRAZOLE 20 MG/1
20 CAPSULE, DELAYED RELEASE ORAL DAILY
Qty: 90 CAP | Refills: 3 | Status: SHIPPED | OUTPATIENT
Start: 2020-10-22 | End: 2022-01-01 | Stop reason: SDUPTHER

## 2020-10-22 NOTE — PATIENT INSTRUCTIONS
Alzheimer's Disease: Care Instructions Your Care Instructions Alzheimer's disease is a type of dementia. It causes memory loss and affects judgment, language, and behavior. You may have trouble making decisions or may get lost in places that you used to know well. Alzheimer's disease is different than mild memory loss that occurs with aging. It is not clear what causes Alzheimer's disease, but it is the most common form of dementia in older adults. Finding out that you have this disease is a shock. You may be afraid and worried about how the condition will change your life. Although there is no cure at this time, medicine in some cases may slow memory loss for a while. Other medicines may be able to help you sleep or cope with depression and behavior changes. Alzheimer's disease is different for everyone. It may take many years to develop. In some cases, people can function well for a long time. In the early stage of the disease, you can do things at home to make life easier and safer. You also can keep doing your hobbies and other activities. Many people find comfort in planning now for their future needs. Follow-up care is a key part of your treatment and safety. Be sure to make and go to all appointments, and call your doctor if you are having problems. It's also a good idea to know your test results and keep a list of the medicines you take. How can you care for yourself at home? Taking care of yourself · If your doctor gives you medicines, take them exactly as prescribed. Call your doctor if you think you are having a problem with your medicine. You will get more details on the medicines your doctor prescribes. · Eat a balanced diet. Get plenty of whole grains, fruits, and vegetables every day. If you are not hungry at mealtimes, eat snacks at midmorning and in the afternoon. Try drinks such as Boost, Ensure, or Sustacal if you are having trouble keeping your weight up. · Stay active. Exercise such as walking may slow the decline of your mental abilities. Try to stay active mentally too. Read and work crossword puzzles if you enjoy these activities. · If you have trouble sleeping, do not nap during the day. Get regular exercise (but not within several hours of bedtime). Drink a glass of warm milk or caffeine-free herbal tea before going to bed. · Ask your doctor about support groups and other resources in your area. They can help people who have Alzheimer's disease and their families. · Be patient. You may find that a task takes you longer than it used to. · If you have not already done so, make a list of advance directives. Advance directives are instructions to your doctor and family members about what kind of care you want if you become unable to speak or express yourself. Talk to a  about making a will, if you do not already have one. Keeping schedules · Develop a routine. You will feel less frustrated or confused if you have a clear, simple plan of what to do every day. ? Make lists of your medicines and when to take them. ? Write down appointments and other tasks in a calendar. ? Put sticky notes around the house to help you remember events and other things you have to do. ? Schedule activities and tasks for times of the day when you are best able to handle them. Staying safe · Tell someone when you are going out and where you are going. Let the person know when you will be back. Before you go out alone, write down where you are going, how to get there, and how to get back home. Do this even if you have gone there many times before. Take someone along with you when possible. · Make your home safe. Tack down rugs, put no-slip tape in the tub, use handrails, and put safety switches on stoves and appliances. · Have a family member or other caregiver tell you whether you are driving badly. Deciding to stop driving is very hard for many people.  Driving helps you feel independent. Your state 's license bureau can do a driving test if there is any question. Plan for other means of getting around when you are no longer able to drive. · Use strong lighting, especially at night. Put night-lights in bedrooms, hallways, and bathrooms. · Lower the hot water temperature setting to 120°F or lower to avoid burns. When should you call for help? Call 911 anytime you think you may need emergency care. For example, call if: 
  · You are lost and do not know whom to call.  
  · You are injured and do not know whom to call. Call your doctor now or seek immediate medical care if: 
  · Your symptoms suddenly get much worse. Watch closely for changes in your health, and be sure to contact your doctor if: 
  · You want more information about how you can take care of yourself. Where can you learn more? Go to http://www.gray.com/ Enter Y179 in the search box to learn more about \"Alzheimer's Disease: Care Instructions. \" Current as of: January 31, 2020               Content Version: 12.6 © 8348-8547 [employer]. Care instructions adapted under license by [employer] (which disclaims liability or warranty for this information). If you have questions about a medical condition or this instruction, always ask your healthcare professional. [address] any warranty or liability for your use of this information.

## 2020-10-22 NOTE — PROGRESS NOTES
Chief Complaint   Patient presents with   Jose Annual Wellness Visit     Visit Vitals  /82 (BP 1 Location: Left arm, BP Patient Position: Sitting)   Pulse 69   Temp 97.5 °F (36.4 °C) (Oral)   Resp 18   Ht 5' 9\" (1.753 m)   Wt 131 lb 3.2 oz (59.5 kg)   SpO2 97%   BMI 19.37 kg/m²     1. Have you been to the ER, urgent care clinic since your last visit? Hospitalized since your last visit? No    2. Have you seen or consulted any other health care providers outside of the [address] [place] [address] since your last visit? Include any pap smears or colon screening. No     After obtaining consent and per verbal order from Dr. Khoi Vick, patient received influenza vaccine given by Panfilo Khan and verified by Herlinda Murray. Fluad Influenza 0.5ml was given IM in right deltoid. Patient tolerated injection and was observed for 10 minutes post injection. VIS was given.

## 2020-10-24 LAB — BACTERIA UR CULT: NORMAL

## 2020-10-27 RX ORDER — LOSARTAN POTASSIUM AND HYDROCHLOROTHIAZIDE 25; 100 MG/1; MG/1
TABLET ORAL
Qty: 30 TAB | Refills: 2 | Status: SHIPPED | OUTPATIENT
Start: 2020-10-27 | End: 2020-12-02 | Stop reason: ALTCHOICE

## 2020-10-27 NOTE — TELEPHONE ENCOUNTER
PCP: Spencer Chu MD    Last appt: 10/22/2020  Future Appointments   Date Time Provider Sonia Irizarry   1/26/2021  9:40 AM Spencer Chu MD [facility]       Requested Prescriptions     Pending Prescriptions Disp Refills    losartan-hydroCHLOROthiazide (HYZAAR) 100-25 mg per tablet [Pharmacy Med Name: LOSARTAN-HCTZ 100-25 MG TAB] 30 Tab 2     Sig: TAKE 1 TABLET BY MOUTH EVERY DAY

## 2020-11-30 RX ORDER — MEMANTINE HYDROCHLORIDE 10 MG/1
TABLET ORAL
Qty: 60 TAB | Refills: 2 | Status: SHIPPED | OUTPATIENT
Start: 2020-11-30 | End: 2020-12-01 | Stop reason: SDUPTHER

## 2020-11-30 NOTE — TELEPHONE ENCOUNTER
PCP: Mary Beth Carmona MD    Last appt: 10/22/2020  Future Appointments   Date Time Provider Sonia Irizarry   1/26/2021  9:40 AM Mary Beth Carmona MD [facility]       Requested Prescriptions     Pending Prescriptions Disp Refills    memantine (NAMENDA) 10 mg tablet 60 Tab 2     Sig: TAKE 1 TABLET BY MOUTH TWICE A DAY

## 2020-12-01 ENCOUNTER — CLINICAL SUPPORT (OUTPATIENT)
Dept: CARDIOLOGY CLINIC | Age: 85
End: 2020-12-01
Payer: MEDICARE

## 2020-12-01 ENCOUNTER — OFFICE VISIT (OUTPATIENT)
Dept: CARDIOLOGY CLINIC | Age: 85
End: 2020-12-01
Payer: MEDICARE

## 2020-12-01 VITALS
OXYGEN SATURATION: 95 % | HEART RATE: 74 BPM | RESPIRATION RATE: 16 BRPM | HEIGHT: 69 IN | DIASTOLIC BLOOD PRESSURE: 70 MMHG | SYSTOLIC BLOOD PRESSURE: 120 MMHG | WEIGHT: 125 LBS | BODY MASS INDEX: 18.51 KG/M2

## 2020-12-01 DIAGNOSIS — I49.5 SSS (SICK SINUS SYNDROME) (HCC): ICD-10-CM

## 2020-12-01 DIAGNOSIS — I10 ESSENTIAL HYPERTENSION: ICD-10-CM

## 2020-12-01 DIAGNOSIS — Z95.0 CARDIAC PACEMAKER IN SITU: Primary | ICD-10-CM

## 2020-12-01 DIAGNOSIS — I42.9 CARDIOMYOPATHY, UNSPECIFIED TYPE (HCC): ICD-10-CM

## 2020-12-01 DIAGNOSIS — I47.1 SVT (SUPRAVENTRICULAR TACHYCARDIA) (HCC): ICD-10-CM

## 2020-12-01 DIAGNOSIS — E78.2 MIXED HYPERLIPIDEMIA: ICD-10-CM

## 2020-12-01 DIAGNOSIS — I48.0 PAROXYSMAL ATRIAL FIBRILLATION (HCC): Primary | ICD-10-CM

## 2020-12-01 PROCEDURE — G8427 DOCREV CUR MEDS BY ELIG CLIN: HCPCS | Performed by: INTERNAL MEDICINE

## 2020-12-01 PROCEDURE — 93281 PM DEVICE PROGR EVAL MULTI: CPT | Performed by: INTERNAL MEDICINE

## 2020-12-01 PROCEDURE — G8419 CALC BMI OUT NRM PARAM NOF/U: HCPCS | Performed by: INTERNAL MEDICINE

## 2020-12-01 PROCEDURE — 1101F PT FALLS ASSESS-DOCD LE1/YR: CPT | Performed by: INTERNAL MEDICINE

## 2020-12-01 PROCEDURE — G0463 HOSPITAL OUTPT CLINIC VISIT: HCPCS | Performed by: INTERNAL MEDICINE

## 2020-12-01 PROCEDURE — G8536 NO DOC ELDER MAL SCRN: HCPCS | Performed by: INTERNAL MEDICINE

## 2020-12-01 PROCEDURE — 93010 ELECTROCARDIOGRAM REPORT: CPT | Performed by: INTERNAL MEDICINE

## 2020-12-01 PROCEDURE — G9717 DOC PT DX DEP/BP F/U NT REQ: HCPCS | Performed by: INTERNAL MEDICINE

## 2020-12-01 PROCEDURE — 99214 OFFICE O/P EST MOD 30 MIN: CPT | Performed by: INTERNAL MEDICINE

## 2020-12-01 PROCEDURE — 93005 ELECTROCARDIOGRAM TRACING: CPT | Performed by: INTERNAL MEDICINE

## 2020-12-01 RX ORDER — DEXTROMETHORPHAN HYDROBROMIDE, GUAIFENESIN 5; 100 MG/5ML; MG/5ML
650 LIQUID ORAL 2 TIMES DAILY
COMMUNITY
End: 2022-01-01

## 2020-12-01 RX ORDER — MEMANTINE HYDROCHLORIDE 10 MG/1
TABLET ORAL
Qty: 180 TAB | Refills: 3 | Status: SHIPPED | OUTPATIENT
Start: 2020-12-01 | End: 2021-01-01 | Stop reason: SDUPTHER

## 2020-12-01 NOTE — TELEPHONE ENCOUNTER
RX refill request from the patient/pharmacy. Patient last seen 10- with labs, and next appt. scheduled for 12-  Requested Prescriptions     Pending Prescriptions Disp Refills    memantine (NAMENDA) 10 mg tablet 180 Tab 3     Sig: TAKE 1 TABLET BY MOUTH TWICE A DAY   .

## 2020-12-01 NOTE — LETTER
12/1/20 Patient: Zack Farfan Sr  
YOB: 1931 Date of Visit: 12/1/2020 Ramya Meza MD 
[id number] VIA In Basket Dear Ramya Meza MD, Thank you for referring Mr. Kasi Emery Sr to [address] for evaluation. My notes for this consultation are attached. If you have questions, please do not hesitate to call me. I look forward to following your patient along with you. Sincerely, Dov Spencer MD

## 2020-12-01 NOTE — PROGRESS NOTES
Chief Complaint   Patient presents with    Follow-up    Irregular Heart Beat    Cardiomyopathy    Hypertension    Cholesterol Problem       1. Have you been to the ER, urgent care clinic since your last visit? Hospitalized since your last visit? Yes     2. Have you seen or consulted any other health care providers outside of the [address] [place] [address] since your last visit? Include any pap smears or colon screening. Patient has had syncope episodes had noted low BP.

## 2020-12-01 NOTE — PROGRESS NOTES
ELECTROPHYSIOLOGY        Subjective:      Benito Farfan Sr is a 80 y.o. male is here for EP follow up. The patient denies chest pain/ shortness of breath, orthopnea, PND, LE edema, palpitations, syncope, presyncope or fatigue. Family reports he is experiencing dyspea, weakness, unstable gait and decreased appetitie and intake in the last 4 days. He is having hallucinations with addition of Namenda.      Patient Active Problem List    Diagnosis Date Noted    Hypotension 08/13/2020    Esophageal dyskinesia 07/23/2020    Severe protein-calorie malnutrition ([address] 07/21/2020    Weight loss 07/20/2020    Unsteady gait 07/20/2020    Dehydration 07/20/2020    Acute renal failure superimposed on stage 3 chronic kidney disease ([address] 07/20/2020    Paroxysmal VT ([address] 11/07/2019    Cardiomyopathy ([address] 11/07/2019    Alcohol withdrawal syndrome, with delirium ([address] 11/01/2019    A-fib ([address] 10/27/2019    Syncope 01/09/2019    Primary insomnia 01/09/2019    Mild depression ([address] 06/12/2018    Acute bilateral low back pain without sciatica 05/30/2018    Alcoholism ([address] 91/02/1201    Metabolic encephalopathy 56/39/2262    TIA (transient ischemic attack) 04/12/2018    Medicare annual wellness visit, subsequent 09/01/2017    Diverticulosis 07/18/2017    Gastritis and duodenitis 07/18/2017    Internal hemorrhoids 07/18/2017    Hypertension with renal disease 07/18/2017    Mixed hyperlipidemia 07/18/2017    GI bleed 07/18/2017    Primary osteoarthritis involving multiple joints 07/18/2017    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, without long-term current use of insulin ([address] 07/18/2017    CKD (chronic kidney disease), stage II 07/18/2017    Back pain 07/18/2017    Anemia 07/18/2017    Alzheimer disease ([address] 07/18/2017    SSS (sick sinus syndrome) ([address] 06/02/2015    S/P cardiac pacemaker procedure 05/04/2015    S/P ablation of accessory bypass tract 05/04/2015    SVT (supraventricular tachycardia) ([facility])--s/p RFA 04/28/2015    Near syncope 03/11/2015    ASCVD (arteriosclerotic cardiovascular disease) 07/11/2014    Hypokalemia 07/11/2014    Right inguinal hernia 06/12/2014      Breana Hyman MD  Past Medical History:   Diagnosis Date    Abdominal pain 7/18/2017    Alzheimer disease ([facility] 7/18/2017    Anemia 7/18/2017    Arthritis     Back pain 7/18/2017    Bradycardia 7/18/2017    CAD (coronary artery disease)     hx of MI    CKD (chronic kidney disease), stage II 7/18/2017    constipation     Depression 7/18/2017    Diabetes mellitus ([facility] 7/18/2017    Diverticulosis 7/18/2017    DJD (degenerative joint disease) 7/18/2017    Encounter for long-term (current) use of other medications 7/18/2017    Fatigue     Gastritis and duodenitis 7/18/2017    GERD (gastroesophageal reflux disease)     GI bleed 7/18/2017    Hearing loss     Hyperlipidemia 7/18/2017    Hypertension     Hypertension with renal disease 7/18/2017    Ill-defined condition     Dementia    Internal hemorrhoids 7/18/2017    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    S/P cardiac pacemaker procedure 5/4/2015    5/4/15 Medtronic dual chamber pacemaker implant     Weight loss     lost over 40 pounds last year- has no appetite      Past Surgical History:   Procedure Laterality Date    COLONOSCOPY N/A 6/22/2017    COLONOSCOPY performed by Emilie Patel MD at [address] [place] [address]  6/22/2017         [address]  7/10/2014    right inguinal    HX OTHER SURGICAL      cystectomy from back    [place] EGD TRANSORAL BIOPSY SINGLE/MULTIPLE  2/14/2012         [place] UPPER GI ENDOSCOPY,W/DIR SUBMUC INJ  7/23/2020         UPPER GI ENDOSCOPY,BIOPSY  6/22/2017         UPPER GI ENDOSCOPY,BIOPSY  7/23/2020          No Known Allergies   Family History   Problem Relation Age of Onset    Hypertension Mother     Heart Disease Father     Cancer Other         son-cancer? unknown what type     negative for cardiac disease  Social History     Socioeconomic History    Marital status: LEGALLY      Spouse name: Not on file    Number of children: Not on file    Years of education: Not on file    Highest education level: Not on file   Tobacco Use    Smoking status: Former Smoker     Packs/day: 0.50     Years: 10.00     Pack years: 5.00     Start date: 1991    Smokeless tobacco: Never Used    Tobacco comment: quit 50 years ago   Substance and Sexual Activity    Alcohol use: Not Currently     Comment: Occasional beer    Drug use: No    Sexual activity: Not Currently     Current Outpatient Medications   Medication Sig    acetaminophen (TYLENOL) 650 mg TbER Take 650 mg by mouth two (2) times a day.  memantine (NAMENDA) 10 mg tablet TAKE 1 TABLET BY MOUTH TWICE A DAY    losartan-hydroCHLOROthiazide (HYZAAR) 100-25 mg per tablet TAKE 1 TABLET BY MOUTH EVERY DAY    donepeziL (ARICEPT) 10 mg tablet TAKE 1 TABLET BY MOUTH EVERY DAY    omeprazole (PRILOSEC) 20 mg capsule Take 1 Cap by mouth daily.  multivitamin, tx-iron-ca-min (THERA-M w/ IRON) 9 mg iron-400 mcg tab tablet Take 1 Tab by mouth daily.  carvedilol (COREG) 6.25 mg tablet Take 1 Tab by mouth two (2) times a day.  aspirin 81 mg chewable tablet Take 1 Tab by mouth daily. No current facility-administered medications for this visit. Vitals:    20 1426 20 1427 20 1428   BP: (!) 140/70 120/70 120/70   Pulse:  74    Resp:  16    SpO2:  95%    Weight:  125 lb (56.7 kg)    Height:  5' 9\" (1.753 m)        I have reviewed the nurses notes, vitals, problem list, allergy list, medical history, family, social history and medications. Review of Symptoms:    General: Pt denies excessive weight gain or loss. Pt is able to conduct ADL's  HEENT: Denies blurred vision, headaches, epistaxis and difficulty swallowing.   Respiratory: Denies shortness of breath, GONGORA, wheezing or stridor. Cardiovascular: Denies precordial pain, palpitations, edema or PND  Gastrointestinal: Denies poor appetite, indigestion, abdominal pain or blood in stool  Urinary: Denies dysuria, pyuria  Musculoskeletal: Denies pain or swelling from muscles or joints  Neurologic: Denies tremor, paresthesias, or sensory motor disturbance  Skin: Denies rash, itching or texture change. Psych: Denies depression      Physical Exam:      General: Well developed, in no acute distress. HEENT: Eyes - PERRL, no jvd  Heart:  Normal S1/S2 negative S3 or S4. Regular, no murmur, gallop or rub. Respiratory: Clear bilaterally x 4, no wheezing or rales  Extremities:  No edema, normal cap refill, no cyanosis. Musculoskeletal: No clubbing  Neuro: A&Ox3, speech clear, gait stable. Skin: Skin color is normal. No rashes or lesions.  Non diaphoretic. no ulcers  Vascular: 2+ pulses symmetric in all extremities  Psych - judgement intact and orientation is wnl       Cardiographics    Ekg: sinus with V paced, pvcs    Results for orders placed or performed during the hospital encounter of 07/20/20   EKG, 12 LEAD, INITIAL   Result Value Ref Range    Ventricular Rate 63 BPM    Atrial Rate 63 BPM    P-R Interval 136 ms    QRS Duration 164 ms    Q-T Interval 496 ms    QTC Calculation (Bezet) 507 ms    Calculated P Axis 64 degrees    Calculated R Axis -132 degrees    Calculated T Axis 85 degrees    Diagnosis       AV dual-paced rhythm  Confirmed by Sánchez Clay ([id number]) on 7/21/2020 7:29:54 PM           Lab Results   Component Value Date/Time    WBC 5.0 10/22/2020 01:35 PM    Hemoglobin (POC) 15.6 07/10/2014 06:55 AM    HGB (POC) 13.0 01/09/2019 04:44 PM    HGB 11.9 (L) 10/22/2020 01:35 PM    Hematocrit (POC) 46 07/10/2014 06:55 AM    HCT (POC) 39.0 01/09/2019 04:44 PM    HCT 37.4 10/22/2020 01:35 PM    PLATELET 471.5 39/91/8302 01:35 PM    MCV 84.0 10/22/2020 01:35 PM      Lab Results   Component Value Date/Time    Sodium 146 (H) 10/22/2020 01:35 PM    Potassium 4.6 10/22/2020 01:35 PM    Chloride 109 (H) 10/22/2020 01:35 PM    CO2 28.0 10/22/2020 01:35 PM    Anion gap 9 10/22/2020 01:35 PM    Glucose 88 10/22/2020 01:35 PM    BUN 24.0 (H) 10/22/2020 01:35 PM    Creatinine 1.6 (H) 10/22/2020 01:35 PM    BUN/Creatinine ratio 15 10/22/2020 01:35 PM    GFR est AA 50 10/22/2020 01:35 PM    GFR est non-AA 41 10/22/2020 01:35 PM    Calcium 9.1 10/22/2020 01:35 PM    Bilirubin, total 0.7 10/22/2020 01:35 PM    Alk. phosphatase 61 10/22/2020 01:35 PM    Protein, total 6.8 10/22/2020 01:35 PM    Albumin 4.1 10/22/2020 01:35 PM    Globulin 2.70 10/22/2020 01:35 PM    A-G Ratio 1.5 10/22/2020 01:35 PM    ALT (SGPT) 12 10/22/2020 01:35 PM      Lab Results   Component Value Date/Time    TSH 7.03 (H) 10/29/2019 02:21 AM    TSH, 3rd generation 5.17 10/22/2020 01:35 PM           Assessment:           ICD-10-CM ICD-9-CM    1. Paroxysmal atrial fibrillation (HCC)  I48.0 427.31 AMB POC EKG ROUTINE W/ 12 LEADS, INTER & REP   2. Cardiomyopathy, unspecified type ([facility]  I42.9 425.4    3. SSS (sick sinus syndrome) ([facility])  I49.5 427.81    4. SVT (supraventricular tachycardia) ([facility])--s/p RFA  I47.1 427.89    5. Mixed hyperlipidemia  E78.2 272.2    6. Essential hypertension  I10 401.9      Orders Placed This Encounter    AMB POC EKG ROUTINE W/ 12 LEADS, INTER & REP     Order Specific Question:   Reason for Exam:     Answer:   routine    acetaminophen (TYLENOL) 650 mg TbER     Sig: Take 650 mg by mouth two (2) times a day. Plan:     Mr. Marv Rai is here for annual device follow up. He is doing well without cardiac complaints. EKG shows ventricular pacing with PVCs. His device interrogation shows normal functioning (74.3% AP for his sick sinus, 99.5%RVP for his chb. He has had up to 4 hours of atrial flutter with brief NSVT. Echocardiogram 10/19 with EF 21-25%.     Given his dementia, DNR status, will not pursue aggressive management of AFL or NSVT with known cardiomyopathy. Reviewed with pt and family. I have asked them to discuss ARB/HCTZ with PCP. He can continue current medical therapy and follow up in one year.      Continue medical management for sss, chb, HTN, DM, SVT.       Thank you for allowing me to participate in [address] 's care. Nilton Yang NP    Patient seen and examined by me with nurse practitioner. I personally performed all components of the history, physical, and medical decision making and agree with the assessment and plan with minor modifications as noted. A paced 74% for sick sinus. V paced 100% for chb. He has a cardiomyopathy but given his dementia, dnr status and life expectancy less than 1 year, he is not considered a candidate for an upgrade to an ICD, initiation of oac for his afl or invasive w/u of his afl. Cont med rx for htn and dm. F/u in one year.     Ann Henning MD, Porter Strange

## 2020-12-02 ENCOUNTER — OFFICE VISIT (OUTPATIENT)
Dept: INTERNAL MEDICINE CLINIC | Age: 85
End: 2020-12-02
Payer: MEDICARE

## 2020-12-02 VITALS
HEART RATE: 60 BPM | SYSTOLIC BLOOD PRESSURE: 142 MMHG | BODY MASS INDEX: 18.22 KG/M2 | DIASTOLIC BLOOD PRESSURE: 82 MMHG | RESPIRATION RATE: 20 BRPM | TEMPERATURE: 96.9 F | OXYGEN SATURATION: 90 % | WEIGHT: 123 LBS | HEIGHT: 69 IN

## 2020-12-02 DIAGNOSIS — F02.81 ALZHEIMER'S DEMENTIA WITH BEHAVIORAL DISTURBANCE, UNSPECIFIED TIMING OF DEMENTIA ONSET: ICD-10-CM

## 2020-12-02 DIAGNOSIS — F51.5 BAD DREAMS: ICD-10-CM

## 2020-12-02 DIAGNOSIS — E86.0 DEHYDRATION: ICD-10-CM

## 2020-12-02 DIAGNOSIS — F02.80 ALZHEIMER'S DISEASE OF OTHER ONSET WITHOUT BEHAVIORAL DISTURBANCE: ICD-10-CM

## 2020-12-02 DIAGNOSIS — G30.9 ALZHEIMER'S DEMENTIA WITH BEHAVIORAL DISTURBANCE, UNSPECIFIED TIMING OF DEMENTIA ONSET: ICD-10-CM

## 2020-12-02 DIAGNOSIS — R63.4 WEIGHT LOSS: ICD-10-CM

## 2020-12-02 DIAGNOSIS — N39.0 URINARY TRACT INFECTION WITHOUT HEMATURIA, SITE UNSPECIFIED: ICD-10-CM

## 2020-12-02 DIAGNOSIS — R53.1 WEAKNESS: Primary | ICD-10-CM

## 2020-12-02 DIAGNOSIS — I42.9 CARDIOMYOPATHY, UNSPECIFIED TYPE (HCC): ICD-10-CM

## 2020-12-02 DIAGNOSIS — I12.9 HYPERTENSION WITH RENAL DISEASE: ICD-10-CM

## 2020-12-02 DIAGNOSIS — G30.8 ALZHEIMER'S DISEASE OF OTHER ONSET WITHOUT BEHAVIORAL DISTURBANCE: ICD-10-CM

## 2020-12-02 LAB
A-G RATIO,AGRAT: 1.4 RATIO
ALBUMIN SERPL-MCNC: 4 G/DL (ref 3.9–5.4)
ALP SERPL-CCNC: 82 U/L (ref 38–126)
ALT SERPL-CCNC: 25 U/L (ref 0–50)
ANION GAP SERPL CALC-SCNC: 12 MMOL/L
AST SERPL W P-5'-P-CCNC: 31 U/L (ref 14–36)
BACTERIA,BACTU: ABNORMAL
BILIRUB SERPL-MCNC: 1.1 MG/DL (ref 0.2–1.3)
BILIRUB UR QL: NEGATIVE
BUN SERPL-MCNC: 41 MG/DL (ref 9–20)
BUN/CREATININE RATIO,BUCR: 23 RATIO
CALCIUM SERPL-MCNC: 9.9 MG/DL (ref 8.4–10.2)
CHLORIDE SERPL-SCNC: 107 MMOL/L (ref 98–107)
CLARITY: CLEAR
CO2 SERPL-SCNC: 27 MMOL/L (ref 22–32)
COLOR UR: ABNORMAL
CREAT SERPL-MCNC: 1.8 MG/DL (ref 0.8–1.5)
ERYTHROCYTE [DISTWIDTH] IN BLOOD BY AUTOMATED COUNT: 13.6 %
GLOBULIN,GLOB: 2.9
GLUCOSE 24H UR-MRATE: NEGATIVE G/(24.H)
GLUCOSE SERPL-MCNC: 103 MG/DL (ref 75–110)
HCT VFR BLD AUTO: 43.4 % (ref 37–51)
HGB BLD-MCNC: 13.9 G/DL (ref 12–18)
HGB UR QL STRIP: ABNORMAL
KETONES UR QL STRIP.AUTO: NEGATIVE
LEUKOCYTE ESTERASE: NEGATIVE
LYMPHOCYTES ABSOLUTE: 1.5 K/UL (ref 0.6–4.1)
LYMPHOCYTES NFR BLD: 22.7 % (ref 10–58.5)
MCH RBC QN AUTO: 27.6 PG (ref 26–32)
MCHC RBC AUTO-ENTMCNC: 32 G/DL (ref 30–36)
MCV RBC AUTO: 86.1 FL (ref 80–97)
MONOCYTES ABS-DIF,2141: 0.5 K/UL (ref 0–1.8)
MONOCYTES NFR BLD: 7.6 % (ref 0.1–24)
NEUTROPHILS # BLD: 69.7 % (ref 37–92)
NEUTROPHILS ABS,2156: 4.5 K/UL (ref 2–7.8)
NITRITE UR QL STRIP.AUTO: NEGATIVE
PH UR STRIP: 5 [PH] (ref 5–7)
PLATELET # BLD AUTO: 229 K/UL (ref 140–440)
POTASSIUM SERPL-SCNC: 4.2 MMOL/L (ref 3.6–5)
PROT SERPL-MCNC: 6.9 G/DL (ref 6.3–8.2)
PROT UR STRIP-MCNC: ABNORMAL MG/DL
RBC # BLD AUTO: 5.04 M/UL (ref 4.2–6.3)
RBC #/AREA URNS HPF: ABNORMAL #/HPF
SODIUM SERPL-SCNC: 146 MMOL/L (ref 137–145)
SP GR UR REFRACTOMETRY: 1.02 (ref 1–1.03)
UROBILINOGEN UR QL STRIP.AUTO: NEGATIVE
WBC # BLD AUTO: 6.4 K/UL (ref 4.1–10.9)
WBC URNS QL MICRO: ABNORMAL #/HPF

## 2020-12-02 PROCEDURE — G8419 CALC BMI OUT NRM PARAM NOF/U: HCPCS | Performed by: INTERNAL MEDICINE

## 2020-12-02 PROCEDURE — 81001 URINALYSIS AUTO W/SCOPE: CPT | Performed by: INTERNAL MEDICINE

## 2020-12-02 PROCEDURE — 1101F PT FALLS ASSESS-DOCD LE1/YR: CPT | Performed by: INTERNAL MEDICINE

## 2020-12-02 PROCEDURE — 80053 COMPREHEN METABOLIC PANEL: CPT | Performed by: INTERNAL MEDICINE

## 2020-12-02 PROCEDURE — 99214 OFFICE O/P EST MOD 30 MIN: CPT | Performed by: INTERNAL MEDICINE

## 2020-12-02 PROCEDURE — 85025 COMPLETE CBC W/AUTO DIFF WBC: CPT | Performed by: INTERNAL MEDICINE

## 2020-12-02 PROCEDURE — G8536 NO DOC ELDER MAL SCRN: HCPCS | Performed by: INTERNAL MEDICINE

## 2020-12-02 PROCEDURE — G9717 DOC PT DX DEP/BP F/U NT REQ: HCPCS | Performed by: INTERNAL MEDICINE

## 2020-12-02 PROCEDURE — G8427 DOCREV CUR MEDS BY ELIG CLIN: HCPCS | Performed by: INTERNAL MEDICINE

## 2020-12-02 RX ORDER — DONEPEZIL HYDROCHLORIDE 10 MG/1
TABLET, FILM COATED ORAL
Qty: 30 TAB | Refills: 2 | OUTPATIENT
Start: 2020-12-02 | End: 2021-01-01 | Stop reason: ALTCHOICE

## 2020-12-02 RX ORDER — LOSARTAN POTASSIUM 100 MG/1
100 TABLET ORAL DAILY
Qty: 30 TAB | Status: SHIPPED | OUTPATIENT
Start: 2020-12-02 | End: 2020-12-23

## 2020-12-02 NOTE — PROGRESS NOTES
Chief Complaint   Patient presents with    Discuss Medications     Saw his Cardiiologist yesterday and thinks he is on too much medicine    Fatigue    Decreased Appetite       SUBJECTIVE:    Val Pinto is a 80 y.o. male who has multiple medical problems and was seen yesterday by Dr. Husam Arnold from cardiology in follow-up regarding his pacemaker due to dysrhythmia which time his blood pressure was 120/70 at their office and they were concerned about dehydration. His granddaughter who is with him is also concerned about the fact that he was off of the Aricept for about 2 weeks because the medicine ran out and he seemed to get a little bit more confused so she started him back on it 10 mg a day and it did seem to perk him up but he started apparently having some problems with bad dreams going back on the medicine. He has continued his other medications as directed. He denies any chest pain, shortness of breath, palpitations, PND, orthopnea or cardiac or respiratory complaints. He notes no nausea or vomiting or GI complaints but his granddaughter notes that his oral intake has decreased. He denies any neurologic complaints except generalized weakness and his granddaughter said he could not walk however he was able to stand unassisted up from the wheelchair today and take his jacket off with no one holding onto him. He denies any other complaints other than \"I do not feel well \". He gives no other specific complaints. Current Outpatient Medications   Medication Sig Dispense Refill    losartan (COZAAR) 100 mg tablet Take 1 Tab by mouth daily. 30 Tab prn    donepeziL (ARICEPT) 10 mg tablet TAKE 1/2 TABLET BY MOUTH EVERY DAY 30 Tab 2    acetaminophen (TYLENOL) 650 mg TbER Take 650 mg by mouth two (2) times a day.  memantine (NAMENDA) 10 mg tablet TAKE 1 TABLET BY MOUTH TWICE A  Tab 3    omeprazole (PRILOSEC) 20 mg capsule Take 1 Cap by mouth daily.  90 Cap 3    multivitamin, tx-iron-ca-min (THERA-M w/ IRON) 9 mg iron-400 mcg tab tablet Take 1 Tab by mouth daily.  carvedilol (COREG) 6.25 mg tablet Take 1 Tab by mouth two (2) times a day. 180 Tab prn    aspirin 81 mg chewable tablet Take 1 Tab by mouth daily. 27 Tab 1     Past Medical History:   Diagnosis Date    Abdominal pain 7/18/2017    Alzheimer disease ([facility] 7/18/2017    Anemia 7/18/2017    Arthritis     Back pain 7/18/2017    Bradycardia 7/18/2017    CAD (coronary artery disease)     hx of MI    CKD (chronic kidney disease), stage II 7/18/2017    constipation     Depression 7/18/2017    Diabetes mellitus ([facility] 7/18/2017    Diverticulosis 7/18/2017    DJD (degenerative joint disease) 7/18/2017    Encounter for long-term (current) use of other medications 7/18/2017    Fatigue     Gastritis and duodenitis 7/18/2017    GERD (gastroesophageal reflux disease)     GI bleed 7/18/2017    Hearing loss     Hyperlipidemia 7/18/2017    Hypertension     Hypertension with renal disease 7/18/2017    Ill-defined condition     Dementia    Internal hemorrhoids 7/18/2017    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    S/P cardiac pacemaker procedure 5/4/2015    5/4/15 Medtronic dual chamber pacemaker implant     Weight loss     lost over 40 pounds last year- has no appetite     Past Surgical History:   Procedure Laterality Date    COLONOSCOPY N/A 6/22/2017    COLONOSCOPY performed by Robert Rodriguez MD at [address] [place] [address]  6/22/2017         [address]  7/10/2014    right inguinal    HX OTHER SURGICAL      cystectomy from back    [place] EGD TRANSORAL BIOPSY SINGLE/MULTIPLE  2/14/2012         [place] UPPER GI ENDOSCOPY,W/DIR SUBMUC INJ  7/23/2020         UPPER GI ENDOSCOPY,BIOPSY  6/22/2017         UPPER GI ENDOSCOPY,BIOPSY  7/23/2020          No Known Allergies    REVIEW OF SYSTEMS:  General: negative for - chills or fever, or weight loss or gain.   Positive generalized weakness  ENT: negative for - headaches, nasal congestion or tinnitus  Eyes: no blurred or visual changes  Neck: No stiffness or swollen nodes  Respiratory: negative for - cough, hemoptysis, shortness of breath or wheezing  Cardiovascular : negative for - chest pain, edema, palpitations or shortness of breath  Gastrointestinal: negative for - abdominal pain, blood in stools, heartburn or nausea/vomiting.   Decreased oral intake  Genito-Urinary: no dysuria, trouble voiding, or hematuria  Musculoskeletal: negative for - gait disturbance, joint pain, joint stiffness or joint swelling  Neurological: no TIA or stroke symptoms  Hematologic: no bruises, no bleeding  Lymphatic: no swollen glands  Integument: no lumps, mole changes, nail changes or rash  Endocrine:no malaise/lethargy poly uria or polydipsia or unexpected weight changes        Social History     Socioeconomic History    Marital status: LEGALLY      Spouse name: Not on file    Number of children: Not on file    Years of education: Not on file    Highest education level: Not on file   Tobacco Use    Smoking status: Former Smoker     Packs/day: 0.50     Years: 10.00     Pack years: 5.00     Start date: 1991    Smokeless tobacco: Never Used    Tobacco comment: quit 50 years ago   Substance and Sexual Activity    Alcohol use: Not Currently     Comment: Occasional beer    Drug use: No    Sexual activity: Not Currently     Family History   Problem Relation Age of Onset    Hypertension Mother     Heart Disease Father     Cancer Other         son-cancer? unknown what type        OBJECTIVE:     Visit Vitals  BP (!) 142/82 (BP 1 Location: Left arm, BP Patient Position: Sitting)   Pulse 60   Temp 96.9 °F (36.1 °C)   Resp 20   Ht 5' 9\" (1.753 m)   Wt 123 lb (55.8 kg)   SpO2 90%   BMI 18.16 kg/m²     CONSTITUTIONAL:   well nourished, appears age appropriate  EYES: sclera anicteric, PERRL, EOMI  ENMT:nares clear, moist mucous membranes, pharynx clear  NECK: supple. Thyroid normal, No JVD or bruits  RESPIRATORY: Chest: clear to ascultation and percussion, normal inspiratory effort  CARDIOVASCULAR: Heart: regular rate and rhythm no murmurs, rubs or gallops, PMI not displaced, No thrills, no peripheral edema  GASTROINTESTINAL: Abdomen: non distended, soft, non tender, bowel sounds normal  HEMATOLOGIC: no purpura, petechiae or bruising  LYMPHATIC: No lymph node enlargemant  MUSCULOSKELETAL: Extremities: no active synovitis, pulse 1+   INTEGUMENT: No unusual rashes or suspicious skin lesions noted. Nails appear normal.  PERIPHERAL VASCULAR: normal pulses femoral, PT and DP  NEUROLOGIC: non-focal exam, A & O X 3  PSYCHIATRIC:, appropriate affect     ASSESSMENT:   1. Weakness    2. Hypertension with renal disease    3. Alzheimer's dementia with behavioral disturbance, unspecified timing of dementia onset ([address]    4. Cardiomyopathy, unspecified type ([address]    5. Weight loss    6. Bad dreams    7. Dehydration    8. Alzheimer's disease of other onset without behavioral disturbance ([address]      Impression  1. Weakness blood pressure was actually little on the high side here although was 120/70 yesterday so at this point I will go ahead and switch his losartan HCT to plain losartan 100 mg daily. I will also check his electrolytes and his blood count. 2.  Hypertension we will see what the blood pressure does with the change in medicine  3. Alzheimer's dementia that is probably the underlying disturbance is causing a problem with all of the issues. 4   Cardiomyopathy had cardiac evaluation yesterday  5. Weight loss I think this could be related to lack of hydration and may be there may be some dehydration. Thus getting around the fluid pill may be beneficial there. We will see what the BMP looks like.   6.  Bad dreams probably indeed related to the Aricept although since his granddaughter thought that perked him up we will try cutting the dose in half to half a tablet a day until she sees how it works and if it works okay a new prescription for 5 mg tablets. 7.  History of dehydration we will thus stop the diuretic  Tentative follow-up as per previous schedule but I will clearly see him sooner should the need to based upon any progression of symptoms or lack of improvement with the change in medicine. This is all discussed in detail with his granddaughter present with him today. 30 minutes spent in direct care of this patient they were greater than 50% in consultation and coordination of care. I will call with lab results. PLAN:  .  Orders Placed This Encounter    CBC WITH AUTOMATED DIFF ([facility])    METABOLIC PANEL, COMPREHENSIVE ([facility])    URINALYSIS W/MICROSCOPIC ([facility])    losartan (COZAAR) 100 mg tablet    donepeziL (ARICEPT) 10 mg tablet         ATTENTION:   This medical record was transcribed using an electronic medical records system. Although proofread, it may and can contain electronic and spelling errors. Other human spelling and other errors may be present. Corrections may be executed at a later time. Please feel free to contact us for any clarifications as needed. Follow-up and Dispositions    · Return At prior scheduled appointment. No results found for any visits on 12/02/20. Duane Plaza, MD    The patient verbalized understanding of the problems and plans as explained.

## 2020-12-02 NOTE — PROGRESS NOTES
Chief Complaint   Patient presents with    Discuss Medications     Saw his Cardiiologist yesterday and thinks he is on too much medicine    Fatigue    Decreased Appetite     1. Have you been to the ER, urgent care clinic since your last visit? Hospitalized since your last visit? Saw Dr. Sueellen Goltz yesterday    2. Have you seen or consulted any other health care providers outside of the [address] [place] [address] since your last visit? Include any pap smears or colon screening.  No

## 2020-12-02 NOTE — PATIENT INSTRUCTIONS

## 2020-12-04 LAB — BACTERIA UR CULT: NORMAL

## 2020-12-07 NOTE — PROGRESS NOTES
Labs okay except a little dehydrated so make sure to take plenty of fluids and. Discussed with patient's grand daughter.

## 2020-12-14 ENCOUNTER — APPOINTMENT (OUTPATIENT)
Dept: GENERAL RADIOLOGY | Age: 85
DRG: 291 | End: 2020-12-14
Attending: EMERGENCY MEDICINE
Payer: MEDICARE

## 2020-12-14 ENCOUNTER — APPOINTMENT (OUTPATIENT)
Dept: CT IMAGING | Age: 85
DRG: 291 | End: 2020-12-14
Attending: EMERGENCY MEDICINE
Payer: MEDICARE

## 2020-12-14 ENCOUNTER — HOSPITAL ENCOUNTER (INPATIENT)
Age: 85
LOS: 9 days | Discharge: HOME HEALTH | DRG: 291 | End: 2020-12-23
Attending: EMERGENCY MEDICINE | Admitting: INTERNAL MEDICINE
Payer: MEDICARE

## 2020-12-14 DIAGNOSIS — N18.30 ACUTE RENAL FAILURE SUPERIMPOSED ON STAGE 3 CHRONIC KIDNEY DISEASE, UNSPECIFIED ACUTE RENAL FAILURE TYPE, UNSPECIFIED WHETHER STAGE 3A OR 3B CKD (HCC): ICD-10-CM

## 2020-12-14 DIAGNOSIS — E43 SEVERE PROTEIN-CALORIE MALNUTRITION (HCC): Chronic | ICD-10-CM

## 2020-12-14 DIAGNOSIS — I49.5 SSS (SICK SINUS SYNDROME) (HCC): ICD-10-CM

## 2020-12-14 DIAGNOSIS — N17.9 ACUTE RENAL FAILURE SUPERIMPOSED ON STAGE 3 CHRONIC KIDNEY DISEASE, UNSPECIFIED ACUTE RENAL FAILURE TYPE, UNSPECIFIED WHETHER STAGE 3A OR 3B CKD (HCC): ICD-10-CM

## 2020-12-14 DIAGNOSIS — I50.23 ACUTE ON CHRONIC SYSTOLIC HEART FAILURE (HCC): ICD-10-CM

## 2020-12-14 DIAGNOSIS — N17.9 AKI (ACUTE KIDNEY INJURY) (HCC): ICD-10-CM

## 2020-12-14 DIAGNOSIS — J90 PLEURAL EFFUSION: ICD-10-CM

## 2020-12-14 DIAGNOSIS — R53.81 DEBILITY: ICD-10-CM

## 2020-12-14 DIAGNOSIS — R79.89 INCREASE IN CREATININE: ICD-10-CM

## 2020-12-14 DIAGNOSIS — E11.22 CONTROLLED TYPE 2 DIABETES MELLITUS WITH STAGE 2 CHRONIC KIDNEY DISEASE, WITHOUT LONG-TERM CURRENT USE OF INSULIN (HCC): ICD-10-CM

## 2020-12-14 DIAGNOSIS — N18.2 CONTROLLED TYPE 2 DIABETES MELLITUS WITH STAGE 2 CHRONIC KIDNEY DISEASE, WITHOUT LONG-TERM CURRENT USE OF INSULIN (HCC): ICD-10-CM

## 2020-12-14 DIAGNOSIS — Z71.89 GOALS OF CARE, COUNSELING/DISCUSSION: ICD-10-CM

## 2020-12-14 DIAGNOSIS — Z71.89 ADVANCED CARE PLANNING/COUNSELING DISCUSSION: ICD-10-CM

## 2020-12-14 DIAGNOSIS — R60.1 ANASARCA: ICD-10-CM

## 2020-12-14 DIAGNOSIS — I25.10 ASCVD (ARTERIOSCLEROTIC CARDIOVASCULAR DISEASE): ICD-10-CM

## 2020-12-14 DIAGNOSIS — G30.9 ALZHEIMER'S DEMENTIA WITH BEHAVIORAL DISTURBANCE, UNSPECIFIED TIMING OF DEMENTIA ONSET: ICD-10-CM

## 2020-12-14 DIAGNOSIS — I42.9 CARDIOMYOPATHY, UNSPECIFIED TYPE (HCC): ICD-10-CM

## 2020-12-14 DIAGNOSIS — F02.81 ALZHEIMER'S DEMENTIA WITH BEHAVIORAL DISTURBANCE, UNSPECIFIED TIMING OF DEMENTIA ONSET: ICD-10-CM

## 2020-12-14 DIAGNOSIS — J96.01 ACUTE RESPIRATORY FAILURE WITH HYPOXIA (HCC): ICD-10-CM

## 2020-12-14 LAB
ALBUMIN SERPL-MCNC: 3.9 G/DL (ref 3.5–5)
ALBUMIN/GLOB SERPL: 1.1 {RATIO} (ref 1.1–2.2)
ALP SERPL-CCNC: 90 U/L (ref 45–117)
ALT SERPL-CCNC: 38 U/L (ref 12–78)
ANION GAP SERPL CALC-SCNC: 5 MMOL/L (ref 5–15)
APPEARANCE UR: CLEAR
AST SERPL-CCNC: 29 U/L (ref 15–37)
ATRIAL RATE: 73 BPM
BACTERIA URNS QL MICRO: NEGATIVE /HPF
BASOPHILS # BLD: 0 K/UL (ref 0–0.1)
BASOPHILS NFR BLD: 1 % (ref 0–1)
BILIRUB SERPL-MCNC: 0.8 MG/DL (ref 0.2–1)
BILIRUB UR QL: NEGATIVE
BNP SERPL-MCNC: ABNORMAL PG/ML
BUN SERPL-MCNC: 50 MG/DL (ref 6–20)
BUN/CREAT SERPL: 23 (ref 12–20)
CALCIUM SERPL-MCNC: 9.8 MG/DL (ref 8.5–10.1)
CALCULATED R AXIS, ECG10: -150 DEGREES
CALCULATED T AXIS, ECG11: 71 DEGREES
CHLORIDE SERPL-SCNC: 107 MMOL/L (ref 97–108)
CO2 SERPL-SCNC: 28 MMOL/L (ref 21–32)
COLOR UR: ABNORMAL
CREAT SERPL-MCNC: 2.14 MG/DL (ref 0.7–1.3)
DIAGNOSIS, 93000: NORMAL
DIFFERENTIAL METHOD BLD: ABNORMAL
EOSINOPHIL # BLD: 0.1 K/UL (ref 0–0.4)
EOSINOPHIL NFR BLD: 1 % (ref 0–7)
EPITH CASTS URNS QL MICRO: ABNORMAL /LPF
ERYTHROCYTE [DISTWIDTH] IN BLOOD BY AUTOMATED COUNT: 16 % (ref 11.5–14.5)
GLOBULIN SER CALC-MCNC: 3.5 G/DL (ref 2–4)
GLUCOSE SERPL-MCNC: 96 MG/DL (ref 65–100)
GLUCOSE UR STRIP.AUTO-MCNC: NEGATIVE MG/DL
HCT VFR BLD AUTO: 42 % (ref 36.6–50.3)
HGB BLD-MCNC: 13.2 G/DL (ref 12.1–17)
HGB UR QL STRIP: NEGATIVE
IMM GRANULOCYTES # BLD AUTO: 0 K/UL (ref 0–0.04)
IMM GRANULOCYTES NFR BLD AUTO: 1 % (ref 0–0.5)
KETONES UR QL STRIP.AUTO: NEGATIVE MG/DL
LEUKOCYTE ESTERASE UR QL STRIP.AUTO: NEGATIVE
LIPASE SERPL-CCNC: 138 U/L (ref 73–393)
LYMPHOCYTES # BLD: 1.1 K/UL (ref 0.8–3.5)
LYMPHOCYTES NFR BLD: 17 % (ref 12–49)
MCH RBC QN AUTO: 27 PG (ref 26–34)
MCHC RBC AUTO-ENTMCNC: 31.4 G/DL (ref 30–36.5)
MCV RBC AUTO: 85.9 FL (ref 80–99)
MONOCYTES # BLD: 0.5 K/UL (ref 0–1)
MONOCYTES NFR BLD: 7 % (ref 5–13)
NEUTS SEG # BLD: 4.8 K/UL (ref 1.8–8)
NEUTS SEG NFR BLD: 73 % (ref 32–75)
NITRITE UR QL STRIP.AUTO: NEGATIVE
NRBC # BLD: 0 K/UL (ref 0–0.01)
NRBC BLD-RTO: 0 PER 100 WBC
PH UR STRIP: 5 [PH] (ref 5–8)
PLATELET # BLD AUTO: 258 K/UL (ref 150–400)
PMV BLD AUTO: 12.3 FL (ref 8.9–12.9)
POTASSIUM SERPL-SCNC: 3.9 MMOL/L (ref 3.5–5.1)
PROT SERPL-MCNC: 7.4 G/DL (ref 6.4–8.2)
PROT UR STRIP-MCNC: ABNORMAL MG/DL
Q-T INTERVAL, ECG07: 480 MS
QRS DURATION, ECG06: 178 MS
QTC CALCULATION (BEZET), ECG08: 536 MS
RBC # BLD AUTO: 4.89 M/UL (ref 4.1–5.7)
RBC #/AREA URNS HPF: ABNORMAL /HPF (ref 0–5)
SODIUM SERPL-SCNC: 140 MMOL/L (ref 136–145)
SP GR UR REFRACTOMETRY: 1.02 (ref 1–1.03)
TROPONIN I SERPL-MCNC: 0.06 NG/ML
TROPONIN I SERPL-MCNC: 0.07 NG/ML
UA: UC IF INDICATED,UAUC: ABNORMAL
UROBILINOGEN UR QL STRIP.AUTO: 0.2 EU/DL (ref 0.2–1)
VENTRICULAR RATE, ECG03: 75 BPM
WBC # BLD AUTO: 6.5 K/UL (ref 4.1–11.1)
WBC URNS QL MICRO: ABNORMAL /HPF (ref 0–4)

## 2020-12-14 PROCEDURE — 74177 CT ABD & PELVIS W/CONTRAST: CPT

## 2020-12-14 PROCEDURE — 96374 THER/PROPH/DIAG INJ IV PUSH: CPT

## 2020-12-14 PROCEDURE — 65270000029 HC RM PRIVATE

## 2020-12-14 PROCEDURE — 83880 ASSAY OF NATRIURETIC PEPTIDE: CPT

## 2020-12-14 PROCEDURE — 36415 COLL VENOUS BLD VENIPUNCTURE: CPT

## 2020-12-14 PROCEDURE — 99285 EMERGENCY DEPT VISIT HI MDM: CPT

## 2020-12-14 PROCEDURE — 81001 URINALYSIS AUTO W/SCOPE: CPT

## 2020-12-14 PROCEDURE — 84484 ASSAY OF TROPONIN QUANT: CPT

## 2020-12-14 PROCEDURE — 74011250636 HC RX REV CODE- 250/636: Performed by: EMERGENCY MEDICINE

## 2020-12-14 PROCEDURE — 71045 X-RAY EXAM CHEST 1 VIEW: CPT

## 2020-12-14 PROCEDURE — 74011250636 HC RX REV CODE- 250/636: Performed by: INTERNAL MEDICINE

## 2020-12-14 PROCEDURE — 74011250637 HC RX REV CODE- 250/637: Performed by: INTERNAL MEDICINE

## 2020-12-14 PROCEDURE — 83690 ASSAY OF LIPASE: CPT

## 2020-12-14 PROCEDURE — 80053 COMPREHEN METABOLIC PANEL: CPT

## 2020-12-14 PROCEDURE — 85025 COMPLETE CBC W/AUTO DIFF WBC: CPT

## 2020-12-14 PROCEDURE — 93005 ELECTROCARDIOGRAM TRACING: CPT

## 2020-12-14 PROCEDURE — 74011000636 HC RX REV CODE- 636: Performed by: EMERGENCY MEDICINE

## 2020-12-14 RX ORDER — MEMANTINE HYDROCHLORIDE 10 MG/1
5 TABLET ORAL DAILY
Status: DISCONTINUED | OUTPATIENT
Start: 2020-12-15 | End: 2020-12-23 | Stop reason: HOSPADM

## 2020-12-14 RX ORDER — ACETAMINOPHEN 325 MG/1
650 TABLET ORAL
Status: DISCONTINUED | OUTPATIENT
Start: 2020-12-14 | End: 2020-12-23 | Stop reason: HOSPADM

## 2020-12-14 RX ORDER — DONEPEZIL HYDROCHLORIDE 5 MG/1
5 TABLET, FILM COATED ORAL DAILY
Status: DISCONTINUED | OUTPATIENT
Start: 2020-12-14 | End: 2020-12-23 | Stop reason: HOSPADM

## 2020-12-14 RX ORDER — FUROSEMIDE 10 MG/ML
20 INJECTION INTRAMUSCULAR; INTRAVENOUS
Status: COMPLETED | OUTPATIENT
Start: 2020-12-14 | End: 2020-12-14

## 2020-12-14 RX ORDER — POLYETHYLENE GLYCOL 3350 17 G/17G
17 POWDER, FOR SOLUTION ORAL DAILY PRN
Status: DISCONTINUED | OUTPATIENT
Start: 2020-12-14 | End: 2020-12-16

## 2020-12-14 RX ORDER — DEXTROSE MONOHYDRATE 50 MG/ML
50 INJECTION, SOLUTION INTRAVENOUS CONTINUOUS
Status: DISCONTINUED | OUTPATIENT
Start: 2020-12-14 | End: 2020-12-23 | Stop reason: HOSPADM

## 2020-12-14 RX ORDER — FUROSEMIDE 10 MG/ML
20 INJECTION INTRAMUSCULAR; INTRAVENOUS 2 TIMES DAILY
Status: DISCONTINUED | OUTPATIENT
Start: 2020-12-14 | End: 2020-12-21

## 2020-12-14 RX ORDER — HEPARIN SODIUM 5000 [USP'U]/ML
5000 INJECTION, SOLUTION INTRAVENOUS; SUBCUTANEOUS EVERY 12 HOURS
Status: DISCONTINUED | OUTPATIENT
Start: 2020-12-14 | End: 2020-12-23 | Stop reason: HOSPADM

## 2020-12-14 RX ORDER — ENOXAPARIN SODIUM 100 MG/ML
30 INJECTION SUBCUTANEOUS DAILY
Status: DISCONTINUED | OUTPATIENT
Start: 2020-12-15 | End: 2020-12-14 | Stop reason: CLARIF

## 2020-12-14 RX ORDER — SODIUM CHLORIDE 0.9 % (FLUSH) 0.9 %
5-40 SYRINGE (ML) INJECTION EVERY 8 HOURS
Status: DISCONTINUED | OUTPATIENT
Start: 2020-12-14 | End: 2020-12-23 | Stop reason: HOSPADM

## 2020-12-14 RX ORDER — SODIUM CHLORIDE 0.9 % (FLUSH) 0.9 %
5-40 SYRINGE (ML) INJECTION AS NEEDED
Status: DISCONTINUED | OUTPATIENT
Start: 2020-12-14 | End: 2020-12-23 | Stop reason: HOSPADM

## 2020-12-14 RX ORDER — PANTOPRAZOLE SODIUM 40 MG/1
40 TABLET, DELAYED RELEASE ORAL
Status: DISCONTINUED | OUTPATIENT
Start: 2020-12-15 | End: 2020-12-23 | Stop reason: HOSPADM

## 2020-12-14 RX ORDER — ONDANSETRON 2 MG/ML
4 INJECTION INTRAMUSCULAR; INTRAVENOUS
Status: DISCONTINUED | OUTPATIENT
Start: 2020-12-14 | End: 2020-12-23 | Stop reason: HOSPADM

## 2020-12-14 RX ORDER — ACETAMINOPHEN 650 MG/1
650 SUPPOSITORY RECTAL
Status: DISCONTINUED | OUTPATIENT
Start: 2020-12-14 | End: 2020-12-23 | Stop reason: HOSPADM

## 2020-12-14 RX ORDER — CARVEDILOL 6.25 MG/1
6.25 TABLET ORAL 2 TIMES DAILY
Status: DISCONTINUED | OUTPATIENT
Start: 2020-12-14 | End: 2020-12-23 | Stop reason: HOSPADM

## 2020-12-14 RX ORDER — PROMETHAZINE HYDROCHLORIDE 25 MG/1
12.5 TABLET ORAL
Status: DISCONTINUED | OUTPATIENT
Start: 2020-12-14 | End: 2020-12-23 | Stop reason: HOSPADM

## 2020-12-14 RX ORDER — GUAIFENESIN 100 MG/5ML
81 LIQUID (ML) ORAL DAILY
Status: DISCONTINUED | OUTPATIENT
Start: 2020-12-15 | End: 2020-12-23 | Stop reason: HOSPADM

## 2020-12-14 RX ADMIN — HEPARIN SODIUM 5000 UNITS: 5000 INJECTION INTRAVENOUS; SUBCUTANEOUS at 18:22

## 2020-12-14 RX ADMIN — Medication 10 ML: at 22:19

## 2020-12-14 RX ADMIN — CARVEDILOL 6.25 MG: 6.25 TABLET, FILM COATED ORAL at 18:16

## 2020-12-14 RX ADMIN — DONEPEZIL HYDROCHLORIDE 5 MG: 5 TABLET, FILM COATED ORAL at 13:51

## 2020-12-14 RX ADMIN — IOPAMIDOL 100 ML: 755 INJECTION, SOLUTION INTRAVENOUS at 10:27

## 2020-12-14 RX ADMIN — FUROSEMIDE 20 MG: 10 INJECTION, SOLUTION INTRAMUSCULAR; INTRAVENOUS at 12:06

## 2020-12-14 RX ADMIN — FUROSEMIDE 20 MG: 10 INJECTION, SOLUTION INTRAMUSCULAR; INTRAVENOUS at 18:22

## 2020-12-14 RX ADMIN — DEXTROSE MONOHYDRATE 50 ML/HR: 5 INJECTION, SOLUTION INTRAVENOUS at 17:03

## 2020-12-14 NOTE — PROGRESS NOTES
Spiritual Care Assessment/Progress Note  [facility]      NAME: Libby Kaminski Sr      MRN: [id number]  AGE: 80 y.o. SEX: male  Baptism Affiliation: [employer]ar[employer]   Language: English     12/14/2020     Total Time (in minutes): 5     Spiritual Assessment begun in [facility] EMERGENCY DEPT through conversation with:         []Patient        [] Family    [] Friend(s)        Reason for Consult: Palliative Care, Initial/Spiritual Assessment     Spiritual beliefs: (Please include comment if needed)     [] Identifies with a cristhian tradition:         [] Supported by a cristhian community:            [] Claims no spiritual orientation:           [] Seeking spiritual identity:                [] Adheres to an individual form of spirituality:           [x] Not able to assess:                           Identified resources for coping:      [] Prayer                               [] Music                  [] Guided Imagery     [] Family/friends                 [] Pet visits     [] Devotional reading                         [x] Unknown     [] Other:                                               Interventions offered during this visit: (See comments for more details)                Plan of Care:     [] Support spiritual and/or cultural needs    [] Support AMD and/or advance care planning process      [] Support grieving process   [] Coordinate Rites and/or Rituals    [] Coordination with community clergy   [] No spiritual needs identified at this time   [] Detailed Plan of Care below (See Comments)  [] Make referral to Music Therapy  [] Make referral to Pet Therapy     [] Make referral to Addiction services  [] Make referral to [facility]  [] Make referral to Spiritual Care Partner  [] No future visits requested        [] Follow up upon further referrals     Comments: Attempted Palliative Initial Spiritual Assessment for this pt in [facility] ED33.  Pt was receiving medical attention and therefore was unable to be assessed at this time. Contact Spiritual Care Services for any spiritual or emotional support needs. Nohemy Haynes MDiv.  Staff   Request  Support/Spiritual Care Services via Kasie Adkins

## 2020-12-14 NOTE — PROGRESS NOTES
Pharmacy - Enoxaparin (Lovenox®) Monitoring      Indication: VTE px     Current Dose: Enoxaparin 30 mg subcutaneously every 24 hours    Creatinine Clearance (mL/min): 18.8    Current Weight: 56.7 Kg    Labs:  Recent Labs     12/14/20  0927   CREA 2.14*   HGB 13.2        Wt Readings from Last 1 Encounters:   12/14/20 56.7 kg (125 lb)     Ht Readings from Last 1 Encounters:   12/14/20 172.7 cm (68\")       Impression/Plan:   Due to weight < 60kg, will change Enoxaparin to Heparin 5000 units SQ q12h, per protcol       Thanks,  Dearl Sergio      [email or web address]/Pharmacy/Clinical%20Companion/Lovenox%20Dose%20Adjustment%20protocol. pdf

## 2020-12-14 NOTE — ED NOTES
TRANSFER - OUT REPORT:    Verbal report given to Ameena(name) on Benito Farfan Sr  being transferred to [facility]) for routine progression of care       Report consisted of patients Situation, Background, Assessment and   Recommendations(SBAR). Information from the following report(s) SBAR, ED Summary and MAR was reviewed with the receiving nurse. Lines:   Peripheral IV 25/69/26 Right Basilic (Active)       Peripheral IV 12/14/20 Left Antecubital (Active)        Opportunity for questions and clarification was provided.       Patient transported with:   O2 @ 2 liters

## 2020-12-14 NOTE — ED PROVIDER NOTES
EMERGENCY DEPARTMENT HISTORY AND PHYSICAL EXAM      Date: 12/14/2020  Patient Name: Patricia Sarmiento Sr    History of Presenting Illness     Chief Complaint   Patient presents with    Cough     when laying down    Eating Concern     not eating for two weeks    Abdominal Pain     has acid reflux and concerns for increase in issues       History Provided By: Patient and family member    HPI: Elisa Perla, 80 y.o. male presents to the ED with cc of cough, decreased intake and abdominal pain. Patient symptoms started approximately 2 weeks ago. He was seen by his PCP last week and had lab work performed. Patient was told he was dehydrated. Family thought they could take care of him at home, but he has lost 10 pounds in 3 weeks and is much weaker than normal.  Normally he ambulates without assistance, but required a wheelchair to be brought to the ER today. His abdominal pain is intermittent and located diffusely. He denies any pain currently, but states that when it does occur it is severe. He denies fever, chills or chest pain. He has a cough which is nonproductive. Sometimes when he eats, he has reflux symptoms and he vomited yesterday after eating something. He denies dysuria or constipation. There are no other complaints, changes, or physical findings at this time. PCP: Alberto Cook MD    No current facility-administered medications on file prior to encounter. Current Outpatient Medications on File Prior to Encounter   Medication Sig Dispense Refill    losartan (COZAAR) 100 mg tablet Take 1 Tab by mouth daily. 30 Tab prn    donepeziL (ARICEPT) 10 mg tablet TAKE 1/2 TABLET BY MOUTH EVERY DAY 30 Tab 2    acetaminophen (TYLENOL) 650 mg TbER Take 650 mg by mouth two (2) times a day.  memantine (NAMENDA) 10 mg tablet TAKE 1 TABLET BY MOUTH TWICE A  Tab 3    omeprazole (PRILOSEC) 20 mg capsule Take 1 Cap by mouth daily.  90 Cap 3    multivitamin, tx-iron-ca-min (THERA-M w/ IRON) 9 mg iron-400 mcg tab tablet Take 1 Tab by mouth daily.  carvedilol (COREG) 6.25 mg tablet Take 1 Tab by mouth two (2) times a day. 180 Tab prn    aspirin 81 mg chewable tablet Take 1 Tab by mouth daily.  30 Tab 1       Past History     Past Medical History:  Past Medical History:   Diagnosis Date    Abdominal pain 7/18/2017    [facility] 7/18/2017    Anemia 7/18/2017    Arthritis     Back pain 7/18/2017    Bradycardia 7/18/2017    CAD (coronary artery disease)     hx of MI    CKD (chronic kidney disease), stage II 7/18/2017    constipation     Depression 7/18/2017    Diabetes mellitus ([facility] 7/18/2017    Diverticulosis 7/18/2017    DJD (degenerative joint disease) 7/18/2017    Encounter for long-term (current) use of other medications 7/18/2017    Fatigue     Gastritis and duodenitis 7/18/2017    GERD (gastroesophageal reflux disease)     GI bleed 7/18/2017    Hearing loss     Hyperlipidemia 7/18/2017    Hypertension     Hypertension with renal disease 7/18/2017    Ill-defined condition     Dementia    Internal hemorrhoids 7/18/2017    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    S/P cardiac pacemaker procedure 5/4/2015    5/4/15 Medtronic dual chamber pacemaker implant     Weight loss     lost over 40 pounds last year- has no appetite       Past Surgical History:  Past Surgical History:   Procedure Laterality Date    COLONOSCOPY N/A 6/22/2017    COLONOSCOPY performed by Charu Anglin MD at [address] [place] [address]  6/22/2017         [address]  7/10/2014    right inguinal    HX OTHER SURGICAL      cystectomy from back    ID EGD TRANSORAL BIOPSY SINGLE/MULTIPLE  2/14/2012         ID UPPER GI ENDOSCOPY,W/DIR SUBMUC INJ  7/23/2020         UPPER GI ENDOSCOPY,BIOPSY  6/22/2017         UPPER GI ENDOSCOPY,BIOPSY  7/23/2020            Family History:  Family History   Problem Relation Age of Onset    Hypertension Mother     Heart Disease Father     Cancer Other         son-cancer? unknown what type        Social History:  Social History     Tobacco Use    Smoking status: Former Smoker     Packs/day: 0.50     Years: 10.00     Pack years: 5.00     Start date: 1991    Smokeless tobacco: Never Used    Tobacco comment: quit 50 years ago   Substance Use Topics    Alcohol use: Not Currently     Comment: Occasional beer    Drug use: No       Allergies:  No Known Allergies      Review of Systems   Review of Systems   Constitutional: Positive for appetite change. Negative for fever. HENT: Negative for congestion. Eyes: Negative. Respiratory: Positive for cough. Cardiovascular: Negative for chest pain. Gastrointestinal: Positive for abdominal pain. Endocrine: Negative for heat intolerance. Genitourinary: Negative. Musculoskeletal: Negative for back pain. Skin: Negative for rash. Allergic/Immunologic: Negative for immunocompromised state. Neurological: Negative for dizziness and headaches. Hematological: Does not bruise/bleed easily. Psychiatric/Behavioral: Negative. All other systems reviewed and are negative. Physical Exam   Physical Exam  Vitals signs and nursing note reviewed. Constitutional:       General: He is not in acute distress. Appearance: He is well-developed. HENT:      Head: Normocephalic and atraumatic. Neck:      Musculoskeletal: Normal range of motion. Cardiovascular:      Rate and Rhythm: Normal rate and regular rhythm. Heart sounds: Normal heart sounds. Pulmonary:      Effort: Pulmonary effort is normal.      Breath sounds: Normal breath sounds. Abdominal:      General: Bowel sounds are normal.      Palpations: Abdomen is soft. Tenderness: There is generalized abdominal tenderness. Skin:     General: Skin is warm and dry. Neurological:      General: No focal deficit present.       Mental Status: He is alert and oriented to person, place, and time. Coordination: Coordination normal.   Psychiatric:         Mood and Affect: Mood normal.         Behavior: Behavior normal.         Diagnostic Study Results     Labs -     Recent Results (from the past 12 hour(s))   EKG, 12 LEAD, INITIAL    Collection Time: 12/14/20  8:36 AM   Result Value Ref Range    Ventricular Rate 75 BPM    Atrial Rate 73 BPM    QRS Duration 178 ms    Q-T Interval 480 ms    QTC Calculation (Bezet) 536 ms    Calculated R Axis -150 degrees    Calculated T Axis 71 degrees    Diagnosis       AV dual-paced rhythm with occasional ventricular-paced complexes     Confirmed by Teja Snow M.D. ([id number]) on 12/14/2020 9:03:37 AM     CBC WITH AUTOMATED DIFF    Collection Time: 12/14/20  9:27 AM   Result Value Ref Range    WBC 6.5 4.1 - 11.1 K/uL    RBC 4.89 4. 10 - 5.70 M/uL    HGB 13.2 12.1 - 17.0 g/dL    HCT 42.0 36.6 - 50.3 %    MCV 85.9 80.0 - 99.0 FL    MCH 27.0 26.0 - 34.0 PG    MCHC 31.4 30.0 - 36.5 g/dL    RDW 16.0 (H) 11.5 - 14.5 %    PLATELET 149 573 - 653 K/uL    MPV 12.3 8.9 - 12.9 FL    NRBC 0.0 0  WBC    ABSOLUTE NRBC 0.00 0.00 - 0.01 K/uL    NEUTROPHILS 73 32 - 75 %    LYMPHOCYTES 17 12 - 49 %    MONOCYTES 7 5 - 13 %    EOSINOPHILS 1 0 - 7 %    BASOPHILS 1 0 - 1 %    IMMATURE GRANULOCYTES 1 (H) 0.0 - 0.5 %    ABS. NEUTROPHILS 4.8 1.8 - 8.0 K/UL    ABS. LYMPHOCYTES 1.1 0.8 - 3.5 K/UL    ABS. MONOCYTES 0.5 0.0 - 1.0 K/UL    ABS. EOSINOPHILS 0.1 0.0 - 0.4 K/UL    ABS. BASOPHILS 0.0 0.0 - 0.1 K/UL    ABS. IMM.  GRANS. 0.0 0.00 - 0.04 K/UL    DF AUTOMATED     METABOLIC PANEL, COMPREHENSIVE    Collection Time: 12/14/20  9:27 AM   Result Value Ref Range    Sodium 140 136 - 145 mmol/L    Potassium 3.9 3.5 - 5.1 mmol/L    Chloride 107 97 - 108 mmol/L    CO2 28 21 - 32 mmol/L    Anion gap 5 5 - 15 mmol/L    Glucose 96 65 - 100 mg/dL    BUN 50 (H) 6 - 20 MG/DL    Creatinine 2.14 (H) 0.70 - 1.30 MG/DL    BUN/Creatinine ratio 23 (H) 12 - 20      GFR est AA 35 (L) >60 ml/min/1.73m2    GFR est non-AA 29 (L) >60 ml/min/1.73m2    Calcium 9.8 8.5 - 10.1 MG/DL    Bilirubin, total 0.8 0.2 - 1.0 MG/DL    ALT (SGPT) 38 12 - 78 U/L    AST (SGOT) 29 15 - 37 U/L    Alk. phosphatase 90 45 - 117 U/L    Protein, total 7.4 6.4 - 8.2 g/dL    Albumin 3.9 3.5 - 5.0 g/dL    Globulin 3.5 2.0 - 4.0 g/dL    A-G Ratio 1.1 1.1 - 2.2     TROPONIN I    Collection Time: 12/14/20  9:27 AM   Result Value Ref Range    Troponin-I, Qt. 0.07 (H) <0.05 ng/mL   LIPASE    Collection Time: 12/14/20  9:27 AM   Result Value Ref Range    Lipase 138 73 - 393 U/L   TROPONIN I    Collection Time: 12/14/20 10:51 AM   Result Value Ref Range    Troponin-I, Qt. 0.06 (H) <0.05 ng/mL   NT-PRO BNP    Collection Time: 12/14/20 10:51 AM   Result Value Ref Range    NT pro-BNP >35,000 (H) <450 PG/ML   URINALYSIS W/ REFLEX CULTURE    Collection Time: 12/14/20  1:54 PM    Specimen: Urine   Result Value Ref Range    Color YELLOW/STRAW      Appearance CLEAR CLEAR      Specific gravity 1.025 1.003 - 1.030      pH (UA) 5.0 5.0 - 8.0      Protein TRACE (A) NEG mg/dL    Glucose Negative NEG mg/dL    Ketone Negative NEG mg/dL    Bilirubin Negative NEG      Blood Negative NEG      Urobilinogen 0.2 0.2 - 1.0 EU/dL    Nitrites Negative NEG      Leukocyte Esterase Negative NEG      WBC 0-4 0 - 4 /hpf    RBC 0-5 0 - 5 /hpf    Epithelial cells FEW FEW /lpf    Bacteria Negative NEG /hpf    UA:UC IF INDICATED CULTURE NOT INDICATED BY UA RESULT CNI         Radiologic Studies -   XR CHEST PORT   Final Result   IMPRESSION: Mild central congestion. CT ABD PELV W CONT    (Results Pending)     CT Results  (Last 48 hours)               12/14/20 1028  CT ABD PELV W CONT Final result    Impression:  IMPRESSION:   Bilateral pleural effusion and anasarca.    No acute intra-abdominal findings with incidentals stable as above       Narrative:  EXAM: CT ABD PELV W CONT       INDICATION: pain anorexia       COMPARISON: October 2019 CONTRAST: 100 mL of Isovue-370. TECHNIQUE:    Following the uneventful intravenous administration of contrast, thin axial   images were obtained through the abdomen and pelvis. Coronal and sagittal   reconstructions were generated. Oral contrast was not administered. CT dose   reduction was achieved through use of a standardized protocol tailored for this   examination and automatic exposure control for dose modulation. FINDINGS:    LOWER THORAX: Large bilateral pleural effusions. LIVER: No mass. No change with   cysts. BILIARY TREE: Gallbladder is within normal limits. CBD is not dilated. SPLEEN: within normal limits. PANCREAS: No mass or ductal dilatation. ADRENALS: Unremarkable. KIDNEYS: No mass, calculus, or hydronephrosis. STOMACH: Unremarkable. SMALL BOWEL: No dilatation or wall thickening. COLON: No dilatation or wall thickening. Sigmoid diverticulosis . PERITONEUM: No ascites or pneumoperitoneum. RETROPERITONEUM: No lymphadenopathy or aortic aneurysm. URINARY BLADDER: No mass or calculus. Enlarged prostate. BONES: No destructive bone lesion. ABDOMINAL WALL: Right inguinal hernia surgery   ADDITIONAL COMMENTS mild anasarca. Penile prosthesis. CXR Results  (Last 48 hours)               12/14/20 0906  XR CHEST PORT Final result    Impression:  IMPRESSION: Mild central congestion. Narrative:  EXAM: XR CHEST PORT       INDICATION: cough       COMPARISON: 7/20/2020       FINDINGS: A portable AP radiograph of the chest was obtained at 0900 hours. The   patient is on a cardiac monitor. There is cardiomegaly with mild central   pulmonary congestion. There is severe subchondral cyst formation in the left   greater tuberosity. Medical Decision Making   I am the first provider for this patient. I reviewed the vital signs, available nursing notes, past medical history, past surgical history, family history and social history.     Vital Signs-Reviewed the patient's vital signs. Patient Vitals for the past 12 hrs:   Pulse Resp BP   12/14/20 0825 (!) 18 18 124/84       EKG interpretation: (Preliminary)  Rhythm: paced and PVC's; and irregular. Rate (approx.): 75; Axis: normal; WA interval: ; QRS interval: prolonged; ST/T wave: non-specific changes; Other findings: .    Records Reviewed: Nursing Notes, Old Medical Records, Previous electrocardiograms, Previous Radiology Studies and Previous Laboratory Studies    Provider Notes (Medical Decision Making): Malignancy, reflux, pancreatitis, diverticulitis, dehydration, electrolyte abnormality, UTI    ED Course:   Initial assessment performed. The patients presenting problems have been discussed, and they are in agreement with the care plan formulated and outlined with them. I have encouraged them to ask questions as they arise throughout their visit. Consult note: The patient is being admitted by the hospitalist             Critical Care Time:   CRITICAL CARE NOTE :    9:17 AM    IMPENDING DETERIORATION -Metabolic and Renal  ASSOCIATED RISK FACTORS - Hypotension, Metabolic changes and Dehydration  MANAGEMENT- Bedside Assessment and Supervision of Care  INTERPRETATION -  Xrays, CT Scan, ECG and Blood Pressure  INTERVENTIONS - hemodynamic mngmt and Metobolic interventions  CASE REVIEW - Hospitalist, Nursing and Family  TREATMENT RESPONSE -Unchanged   PERFORMED BY - Self    NOTES   :  I have spent 30 minutes of critical care time involved in lab review, consultations with specialist, family decision- making, bedside attention and documentation. This time excludes time spent in any separate billed procedures. During this entire length of time I was immediately available to the patient . Wilton Alcantara MD      Disposition:  admit    DISCHARGE PLAN:  1. Current Discharge Medication List        2. Follow-up Information    None       3.   Return to ED if worse     Diagnosis     Clinical Impression:   1. Acute on chronic systolic heart failure (HCC)    2. Cardiomyopathy, unspecified type ([facility]    3. Acute renal failure superimposed on stage 3 chronic kidney disease, unspecified acute renal failure type, unspecified whether stage 3a or 3b CKD ([facility]    4. Alzheimer's dementia with behavioral disturbance, unspecified timing of dementia onset ([facility]    5. 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, without long-term current use of insulin ([facility]    6. SSS (sick sinus syndrome) ([facility]    7. Acute respiratory failure with hypoxia (HCC)    8. SUBHA (acute [facility] injury) ([facility]    9. Anasarca    10. Pleural effusion    11. Severe protein-calorie malnutrition ([facility]    12. ASCVD (arteriosclerotic cardiovascular disease)    13. Debility    14. Increase in creatinine        Attestations:    Bautista Andres MD    Please note that this dictation was completed with [employer], the computer voice recognition software. Quite often unanticipated grammatical, syntax, homophones, and other interpretive errors are inadvertently transcribed by the computer software. Please disregard these errors. Please excuse any errors that have escaped final proofreading. Thank you.

## 2020-12-15 ENCOUNTER — APPOINTMENT (OUTPATIENT)
Dept: GENERAL RADIOLOGY | Age: 85
DRG: 291 | End: 2020-12-15
Attending: INTERNAL MEDICINE
Payer: MEDICARE

## 2020-12-15 ENCOUNTER — APPOINTMENT (OUTPATIENT)
Dept: NON INVASIVE DIAGNOSTICS | Age: 85
DRG: 291 | End: 2020-12-15
Attending: INTERNAL MEDICINE
Payer: MEDICARE

## 2020-12-15 PROBLEM — N18.2 CKD (CHRONIC KIDNEY DISEASE), STAGE II: Status: RESOLVED | Noted: 2017-07-18 | Resolved: 2020-12-15

## 2020-12-15 PROBLEM — R60.1 ANASARCA: Status: ACTIVE | Noted: 2020-12-15

## 2020-12-15 LAB
ANION GAP SERPL CALC-SCNC: 5 MMOL/L (ref 5–15)
AV R PG: 54.76 MMHG
BUN SERPL-MCNC: 54 MG/DL (ref 6–20)
BUN/CREAT SERPL: 26 (ref 12–20)
CALCIUM SERPL-MCNC: 9.2 MG/DL (ref 8.5–10.1)
CHLORIDE SERPL-SCNC: 106 MMOL/L (ref 97–108)
CO2 SERPL-SCNC: 27 MMOL/L (ref 21–32)
COMMENT, HOLDF: NORMAL
CREAT SERPL-MCNC: 2.05 MG/DL (ref 0.7–1.3)
ECHO AO ROOT DIAM: 2.99 CM
ECHO AR MAX VEL PISA: 370 CM/S
ECHO AV CUSP MM: 2.01 CM
ECHO AV PEAK GRADIENT: 2.55 MMHG
ECHO AV PEAK VELOCITY: 79.82 CM/S
ECHO AV REGURGITANT PHT: 406.12 MS
ECHO EST RA PRESSURE: 10 MMHG
ECHO LA AREA 4C: 29.36 CM2
ECHO LA MAJOR AXIS: 3.04 CM
ECHO LA MINOR AXIS: 1.8 CM
ECHO LA TO AORTIC ROOT RATIO: 1.21
ECHO LA TO AORTIC ROOT RATIO: 1.21
ECHO LA VOL 2C: 88.34 ML (ref 18–58)
ECHO LA VOL 4C: 100.77 ML (ref 18–58)
ECHO LA VOL BP: 100.73 ML (ref 18–58)
ECHO LA VOL/BSA BIPLANE: 59.73 ML/M2 (ref 16–28)
ECHO LA VOLUME INDEX A2C: 52.39 ML/M2 (ref 16–28)
ECHO LA VOLUME INDEX A4C: 59.76 ML/M2 (ref 16–28)
ECHO LV E' LATERAL VELOCITY: 3.69 CM/S
ECHO LV E' SEPTAL VELOCITY: 2.85 CM/S
ECHO LV INTERNAL DIMENSION DIASTOLIC MMODE: 5.59 CM
ECHO LV INTERNAL DIMENSION DIASTOLIC: 5.73 CM (ref 4.2–5.9)
ECHO LV INTERNAL DIMENSION SYSTOLIC MMODE: 5.16 CM
ECHO LV INTERNAL DIMENSION SYSTOLIC: 5.23 CM
ECHO LV IVSD MMODE: 1.78 CM
ECHO LV IVSD: 1.32 CM (ref 0.6–1)
ECHO LV IVSS MMODE: 2.43 CM
ECHO LV IVSS: 1.52 CM
ECHO LV MASS 2D: 263.7 G (ref 88–224)
ECHO LV MASS INDEX 2D: 156.4 G/M2 (ref 49–115)
ECHO LV POSTERIOR WALL DIASTOLIC MMODE: 1.41 CM
ECHO LV POSTERIOR WALL DIASTOLIC: 0.91 CM (ref 0.6–1)
ECHO LV POSTERIOR WALL SYSTOLIC: 1.06 CM
ECHO LVOT DIAM: 2.09 CM
ECHO MV A VELOCITY: 39.98 CM/S
ECHO MV E DECELERATION TIME (DT): 286.59 MS
ECHO MV E VELOCITY: 80.4 CM/S
ECHO MV E/A RATIO: 2.01
ECHO MV E/E' LATERAL: 21.79
ECHO MV E/E' RATIO (AVERAGED): 25
ECHO MV E/E' SEPTAL: 28.21
ECHO MV EROA PISA: 0.26 CM2
ECHO MV REGURGITANT RADIUS PISA: 0.81 CM
ECHO MV REGURGITANT VOLUME: 49.87 ML
ECHO MV REGURGITANT VTIA: 189.73 CM
ECHO PV MAX VELOCITY: 32.91 CM/S
ECHO PV PEAK INSTANTANEOUS GRADIENT SYSTOLIC: 0.43 MMHG
ECHO PV REGURGITANT MAX VELOCITY: 131.94 CM/S
ECHO RA AREA 4C: 22.51 CM2
ECHO RIGHT VENTRICULAR SYSTOLIC PRESSURE (RVSP): 40.99 MMHG
ECHO RV INTERNAL DIMENSION: 1.97 CM
ECHO TV REGURGITANT MAX VELOCITY: 277.04 CM/S
ECHO TV REGURGITANT MAX VELOCITY: 500.43 CM/S
ECHO TV REGURGITANT PEAK GRADIENT: 30.99 MMHG
ERYTHROCYTE [DISTWIDTH] IN BLOOD BY AUTOMATED COUNT: 15.9 % (ref 11.5–14.5)
GLUCOSE SERPL-MCNC: 102 MG/DL (ref 65–100)
HCT VFR BLD AUTO: 42.6 % (ref 36.6–50.3)
HGB BLD-MCNC: 13.4 G/DL (ref 12.1–17)
MAGNESIUM SERPL-MCNC: 2.4 MG/DL (ref 1.6–2.4)
MCH RBC QN AUTO: 27.2 PG (ref 26–34)
MCHC RBC AUTO-ENTMCNC: 31.5 G/DL (ref 30–36.5)
MCV RBC AUTO: 86.6 FL (ref 80–99)
MR PISA PV: 506.34 CM/S
NRBC # BLD: 0 K/UL (ref 0–0.01)
NRBC BLD-RTO: 0 PER 100 WBC
PLATELET # BLD AUTO: 248 K/UL (ref 150–400)
PMV BLD AUTO: 12.2 FL (ref 8.9–12.9)
POTASSIUM SERPL-SCNC: 3.8 MMOL/L (ref 3.5–5.1)
RBC # BLD AUTO: 4.92 M/UL (ref 4.1–5.7)
SAMPLES BEING HELD,HOLD: NORMAL
SODIUM SERPL-SCNC: 138 MMOL/L (ref 136–145)
TROPONIN I SERPL-MCNC: 0.06 NG/ML
WBC # BLD AUTO: 7.6 K/UL (ref 4.1–11.1)

## 2020-12-15 PROCEDURE — 80048 BASIC METABOLIC PNL TOTAL CA: CPT

## 2020-12-15 PROCEDURE — 99223 1ST HOSP IP/OBS HIGH 75: CPT | Performed by: INTERNAL MEDICINE

## 2020-12-15 PROCEDURE — 93306 TTE W/DOPPLER COMPLETE: CPT

## 2020-12-15 PROCEDURE — 71045 X-RAY EXAM CHEST 1 VIEW: CPT

## 2020-12-15 PROCEDURE — 94760 N-INVAS EAR/PLS OXIMETRY 1: CPT

## 2020-12-15 PROCEDURE — 74011000258 HC RX REV CODE- 258: Performed by: INTERNAL MEDICINE

## 2020-12-15 PROCEDURE — 83735 ASSAY OF MAGNESIUM: CPT

## 2020-12-15 PROCEDURE — 84484 ASSAY OF TROPONIN QUANT: CPT

## 2020-12-15 PROCEDURE — 77010033678 HC OXYGEN DAILY

## 2020-12-15 PROCEDURE — 85027 COMPLETE CBC AUTOMATED: CPT

## 2020-12-15 PROCEDURE — 36415 COLL VENOUS BLD VENIPUNCTURE: CPT

## 2020-12-15 PROCEDURE — 74011250636 HC RX REV CODE- 250/636: Performed by: INTERNAL MEDICINE

## 2020-12-15 PROCEDURE — 65270000029 HC RM PRIVATE

## 2020-12-15 PROCEDURE — 99233 SBSQ HOSP IP/OBS HIGH 50: CPT | Performed by: INTERNAL MEDICINE

## 2020-12-15 PROCEDURE — 74011250637 HC RX REV CODE- 250/637: Performed by: INTERNAL MEDICINE

## 2020-12-15 RX ADMIN — CARVEDILOL 6.25 MG: 6.25 TABLET, FILM COATED ORAL at 09:03

## 2020-12-15 RX ADMIN — PANTOPRAZOLE SODIUM 40 MG: 40 TABLET, DELAYED RELEASE ORAL at 06:53

## 2020-12-15 RX ADMIN — DEXTROSE MONOHYDRATE 50 ML/HR: 5 INJECTION, SOLUTION INTRAVENOUS at 21:58

## 2020-12-15 RX ADMIN — HEPARIN SODIUM 5000 UNITS: 5000 INJECTION INTRAVENOUS; SUBCUTANEOUS at 17:04

## 2020-12-15 RX ADMIN — MEMANTINE HYDROCHLORIDE 5 MG: 10 TABLET ORAL at 09:02

## 2020-12-15 RX ADMIN — Medication 10 ML: at 21:49

## 2020-12-15 RX ADMIN — Medication 10 ML: at 05:54

## 2020-12-15 RX ADMIN — DONEPEZIL HYDROCHLORIDE 5 MG: 5 TABLET, FILM COATED ORAL at 09:03

## 2020-12-15 RX ADMIN — ASPIRIN 81 MG CHEWABLE TABLET 81 MG: 81 TABLET CHEWABLE at 09:03

## 2020-12-15 RX ADMIN — HEPARIN SODIUM 5000 UNITS: 5000 INJECTION INTRAVENOUS; SUBCUTANEOUS at 05:53

## 2020-12-15 RX ADMIN — FUROSEMIDE 20 MG: 10 INJECTION, SOLUTION INTRAMUSCULAR; INTRAVENOUS at 17:04

## 2020-12-15 NOTE — CONSULTS
Palliative Medicine Consult  [place]: [phone] ([phone])    Patient Name: Viky Jacobs Sr  YOB: 1931    Date of Initial Consult: 12/15/20  Reason for Consult: care decisions  Requesting Provider: Rylee Ellis MD   Primary Care Physician: Adi Brandon MD     SUMMARY:   Noah Higuear is a 80 y.o. female  with a past history of heart failure with most recent EF of 25% in 2019, s/p PPM /AICD, Dementia, CKD stage 3,  who was admitted on 12/14/2020 from ER  with a diagnosis of acute hypoxic respiratory failure due to acute on chronic respiratory failure,  SUBHA on CKD stage III . He presented with decreased, PO intake and abdominal distension , they went to his primary care doctor who stopped his diuresis due to concern of dehydration after that patient was not eating well and his abdomen was distended. Patient denied any shortness of breath, chest pain, nausea, vomiting, fever, chills, diarrhea,     His renal function is creeping up , LVEF is 15 - 20%. Dilated left ventricle. Mild septal wall hypertrophy. Severely reduced systolic function; Worse function in the inferior and lateral walls. Current medical issues leading to Palliative Medicine involvement include: care decisions in a setting of advance cardiomyopathy with SUBHA. Social :  , lives with grand daughter Brandan You x 2 years, he has 3 children , at base line needs assistance with ADLS, has some confusion, he is declined in function x3. PALLIATIVE DIAGNOSES:   1. Altered mental status. 2. Debility  3. Acute on chronic heart failure   4. SUBHA   5. Dementia        PLAN:   1. I Reviewed  Chart before visit . 2. Spoke to bed side Rn , patient was up this morning ate breakfast , oriented time x2, it, patient is sleepy , aroused briefly went back to sleep. 3. I spoke to his  Granddaughter Brandan You, who is care giver , patient lives with her x 2 years .   4. Advance directives : advance directives currently in system is not valid because it does not have two witness signature , patient has three children/ next of kin . 5. I have plan a meeting with his grand daughter at 6 am tomorrow. 6. Initial consult note routed to primary continuity provider and/or primary health care team members  7. Communicated plan of care with: Palliative IDTRaisa [facility] Team     GOALS OF CARE / TREATMENT PREFERENCES:     GOALS OF CARE:  Patient/Health Care Proxy Stated Goals: (not discussed yet.)    TREATMENT PREFERENCES:   Code Status: Full Code    Advance Care Planning:  [x] [facility] Interdisciplinary Team has updated the ACP Navigator with Health Care Decision Maker and Patient Capacity    Primary Decision Maker (Active): Paxton Nation - [phone]    Advance Care Planning 12/14/2020   Patient's Healthcare Decision Maker is: -   Primary Decision Maker Name -   Primary Decision Maker Phone Number -   Primary Decision Maker Relationship to Patient -   Confirm Advance Directive Yes, not on file   Patient Would Like to Complete Advance Directive -             Other Instructions: Other:    As far as possible, the palliative care team has discussed with patient / health care proxy about goals of care / treatment preferences for patient.      HISTORY:     History obtained from: justin, [facility]    CHIEF COMPLAINT:     HPI/SUBJECTIVE:    The patient is:   [] Verbal and participatory   Sleepy,briefly arousable    Clinical Pain Assessment (nonverbal scale for severity on nonverbal patients):   Clinical Pain Assessment  Severity: 0          Duration: for how long has pt been experiencing pain (e.g., 2 days, 1 month, years)  Frequency: how often pain is an issue (e.g., several times per day, once every few days, constant)     FUNCTIONAL ASSESSMENT:     Palliative Performance Scale (PPS):  PPS: 30       PSYCHOSOCIAL/SPIRITUAL SCREENING:     Palliative IDT has assessed this patient for cultural preferences / practices and a referral made as appropriate to needs (Cultural Services, Patient Advocacy, Ethics, etc.)    Any spiritual / Faith concerns:  [] Yes /  [] No    Caregiver Burnout:  [] Yes /  [] No /  [] No Caregiver Present      Anticipatory grief assessment:   [] Normal  / [] Maladaptive       ESAS Anxiety:      ESAS Depression:     Unable to evaluate because of patient factors. REVIEW OF SYSTEMS:     Positive and pertinent negative findings in ROS are noted above in HPI. The following systems were [] reviewed / [x] unable to be reviewed as noted in HPI  Other findings are noted below. Systems: constitutional, ears/nose/mouth/throat, respiratory, gastrointestinal, genitourinary, musculoskeletal, integumentary, neurologic, psychiatric, endocrine. Positive findings noted below. Modified ESAS Completed by: provider           Pain: 0     Nausea: 0     Dyspnea: 0           Stool Occurrence(s): 0        PHYSICAL EXAM:     From RN flowsheet:  Wt Readings from Last 3 Encounters:   12/16/20 127 lb 3.3 oz (57.7 kg)   12/02/20 123 lb (55.8 kg)   12/01/20 125 lb (56.7 kg)     Blood pressure (!) 115/55, pulse 77, temperature 97.4 °F (36.3 °C), resp. rate 16, height 5' 8\" (1.727 m), weight 127 lb 3.3 oz (57.7 kg), SpO2 100 %. Pain Scale 1: Numeric (0 - 10)  Pain Intensity 1: 0                 Last bowel movement, if known:     Constitutional: frail , resting , arouses on calling name , went back to sleep. Eyes: pupils equal, anicteric  ENMT: no nasal discharge, moist mucous membranes  Cardiovascular: regular rhythm, distal pulses intact  Respiratory: breathing not labored, symmetric  Gastrointestinal: soft non-tender, +bowel sounds  Musculoskeletal: no deformity, no tenderness to palpation  Skin: warm, dry  Neurologic: sleepy   Psychiatric: unable to evaluate , because of patient condition .   Other:       HISTORY:     Principal Problem:    Acute on chronic systolic heart failure ([facility] (12/14/2020)    Active Problems:    SSS (sick sinus syndrome) ([address] (6/2/2015)      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, without long-term current use of insulin ([address] (7/18/2017)      Alzheimer disease ([address] (7/18/2017)      Cardiomyopathy ([address] (11/7/2019)      Acute renal failure superimposed on stage 3 chronic kidney disease ([address] (7/20/2020)      Severe protein-calorie malnutrition ([address] (7/21/2020)      Pleural effusion (12/14/2020)      SUBHA (acute kidney injury) ([address] (12/14/2020)      Acute respiratory failure with hypoxia ([address] (12/14/2020)      Anasarca (12/15/2020)      Past Medical History:   Diagnosis Date    Abdominal pain 7/18/2017    Alzheimer disease ([address] 7/18/2017    Anemia 7/18/2017    Arthritis     Back pain 7/18/2017    Bradycardia 7/18/2017    CAD (coronary artery disease)     hx of MI    CKD (chronic kidney disease), stage II 7/18/2017    constipation     Depression 7/18/2017    Diabetes mellitus ([address] 7/18/2017    Diverticulosis 7/18/2017    DJD (degenerative joint disease) 7/18/2017    Encounter for long-term (current) use of other medications 7/18/2017    Fatigue     Gastritis and duodenitis 7/18/2017    GERD (gastroesophageal reflux disease)     GI bleed 7/18/2017    Hearing loss     Hyperlipidemia 7/18/2017    Hypertension     Hypertension with renal disease 7/18/2017    Ill-defined condition     Dementia    Internal hemorrhoids 7/18/2017    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    S/P cardiac pacemaker procedure 5/4/2015    5/4/15 Medtronic dual chamber pacemaker implant     Weight loss     lost over 40 pounds last year- has no appetite      Past Surgical History:   Procedure Laterality Date    COLONOSCOPY N/A 6/22/2017    COLONOSCOPY performed by Bala Coronado MD at [address] [place] [address]  6/22/2017         [address]  7/10/2014    right inguinal    HX OTHER SURGICAL      cystectomy from back    GA EGD TRANSORAL BIOPSY SINGLE/MULTIPLE  2012         MT UPPER GI ENDOSCOPY,W/DIR SUBMUC INJ  2020         UPPER GI ENDOSCOPY,BIOPSY  2017         UPPER GI ENDOSCOPY,BIOPSY  2020           Family History   Problem Relation Age of Onset    Hypertension Mother     Heart Disease Father     Cancer Other         son-cancer? unknown what type       History reviewed, no pertinent family history.   Social History     Tobacco Use    Smoking status: Former Smoker     Packs/day: 0.50     Years: 10.00     Pack years: 5.00     Start date: 1991    Smokeless tobacco: Never Used    Tobacco comment: quit 50 years ago   Substance Use Topics    Alcohol use: Not Currently     Comment: Occasional beer     No Known Allergies   Current Facility-Administered Medications   Medication Dose Route Frequency    aspirin chewable tablet 81 mg  81 mg Oral DAILY    carvediloL (COREG) tablet 6.25 mg  6.25 mg Oral BID    donepeziL (ARICEPT) tablet 5 mg  5 mg Oral DAILY    memantine (NAMENDA) tablet 5 mg  5 mg Oral DAILY    pantoprazole (PROTONIX) tablet 40 mg  40 mg Oral ACB    sodium chloride (NS) flush 5-40 mL  5-40 mL IntraVENous Q8H    sodium chloride (NS) flush 5-40 mL  5-40 mL IntraVENous PRN    acetaminophen (TYLENOL) tablet 650 mg  650 mg Oral Q6H PRN    Or    acetaminophen (TYLENOL) suppository 650 mg  650 mg Rectal Q6H PRN    polyethylene glycol (MIRALAX) packet 17 g  17 g Oral DAILY PRN    promethazine (PHENERGAN) tablet 12.5 mg  12.5 mg Oral Q6H PRN    Or    ondansetron (ZOFRAN) injection 4 mg  4 mg IntraVENous Q6H PRN    furosemide (LASIX) injection 20 mg  20 mg IntraVENous BID    heparin (porcine) injection 5,000 Units  5,000 Units SubCUTAneous Q12H    dextrose 5% infusion  50 mL/hr IntraVENous CONTINUOUS          LAB AND IMAGING FINDINGS:     Lab Results   Component Value Date/Time    WBC 7.6 12/15/2020 12:47 AM    HGB 13.4 12/15/2020 12:47 AM    PLATELET 750  12:47 AM     Lab Results   Component Value Date/Time    Sodium 140 12/16/2020 02:18 AM    Potassium 3.6 12/16/2020 02:18 AM    Chloride 107 12/16/2020 02:18 AM    CO2 28 12/16/2020 02:18 AM    BUN 47 (H) 12/16/2020 02:18 AM    Creatinine 1.86 (H) 12/16/2020 02:18 AM    Calcium 8.8 12/16/2020 02:18 AM    Magnesium 2.4 12/15/2020 12:47 AM    Phosphorus 3.2 04/13/2018 07:45 AM      Lab Results   Component Value Date/Time    Alk. phosphatase 90 12/14/2020 09:27 AM    Protein, total 7.4 12/14/2020 09:27 AM    Albumin 3.9 12/14/2020 09:27 AM    Globulin 3.5 12/14/2020 09:27 AM     Lab Results   Component Value Date/Time    INR 1.1 10/27/2019 09:27 AM    Prothrombin time 10.6 10/27/2019 09:27 AM    aPTT 27.8 10/29/2019 02:21 AM      No results found for: IRON, FE, TIBC, IBCT, PSAT, FERR   No results found for: PH, PCO2, PO2  No components found for: [place]   Lab Results   Component Value Date/Time    CK 87.00 10/22/2020 01:35 PM    CK - MB <1.0 07/20/2020 04:42 PM                Total time:   Counseling / coordination time, spent as noted above:   > 50% counseling / coordination?:     Prolonged service was provided for  []30 min   []75 min in face to face time in the presence of the patient, spent as noted above. Time Start:   Time End:   Note: this can only be billed with 80756 (initial) or 54667 (follow up). If multiple start / stop times, list each separately.

## 2020-12-15 NOTE — PROGRESS NOTES
Reason for Admission:   Acute respiratory failure                  RUR Score:   20               PCP: First and Last name: Dasha Lakhani MD    Name of Practice:    Are you a current patient: Yes/No: Yes   Approximate date of last visit: 12/3/20   Can you participate in a virtual visit if needed:     Do you (patient/family) have any concerns for transition/discharge? None at this time                 Plan for utilizing home health:   TBD    Current Advanced Directive/Advance Care Plan: On file            Transition of Care Plan:  CM verified pt info via phone w/pt's granddaughter. Per granddaughter, pt is able to dress & bath self and currently does not use any DME or home oxygen. Patient has had [facility] in the past & daughter would like pt to have [facility] at d/c. No history of Rehab. Patients granddaughter provides transportation. Patients support system includes, granddaughter, 2 teenage great grandchildren and daughter. Granddaughter is requesting pt be screened for Medicaid. CM will send a referral to [employer]. No other needs or concerns identified at this time. CM will continue to follow. PCP- Dasha Lakhani MD  Pharmacy[facility] on [address] [place] [address] Road Management Interventions  PCP Verified by CM: Yes(Martine Frey MD)  Last Visit to PCP: 12/03/20  Mode of Transport at Discharge:  Other (see comment)(granddaughter)  Discharge Durable Medical Equipment: No(no DME use)  Physical Therapy Consult: No  Occupational Therapy Consult: No  Speech Therapy Consult: No  Current Support Network: Relative's Home, Family Lives Nearby(Lives with granddaughter and two teenage great grandchildren in a one story home with 3 maria antonia)  Confirm Follow Up Transport: Family(granddaughter)  Discharge Location  Discharge Placement: (Anticipate home w/granddaughter)      Liz Kimce  Ext [phone]    VITOR Plan:     *Home w/granddaughter vs HH   *granddaughter to transport at d/c

## 2020-12-15 NOTE — PROGRESS NOTES
Comprehensive Nutrition Assessment    Type and Reason for Visit: Initial, Positive nutrition screen    Nutrition Recommendations/Plan:   Continue Cardiac diet and Magic Cup as tolerated  RD to add Ensure Enlive BID     Nutrition Assessment:   Pt admitted with respiratory failure, heart failure and FTT. PMH: CKD stage 3, dementia, CHF, GERD, HTN. Chart reviewed, pt sleeping soundly under covers. No family in the room to speak with. Even under blankets, it is obvious that pt is extremely cachectic. Noted untouched lunch tray. MST triggered for poor appetite and wt loss PTA. No wt source for current wt so may not be reliable. Will add additional PO supplements BID. Palliative has been consulted. Patient Vitals for the past 72 hrs:   % Diet Eaten   12/14/20 1848 75 %       Malnutrition Assessment:  Malnutrition Status:  Severe malnutrition    Context:  Chronic illness     Findings of the 6 clinical characteristics of malnutrition:   Energy Intake:  Mild decrease in energy intake (specify)  Weight Loss:  Unable to assess     Body Fat Loss:  7 - Severe body fat loss, Fat overlying ribs, Triceps, Orbital   Muscle Mass Loss:  7 - Severe muscle mass loss, Temples (temporalis), Clavicles (pectoralis &deltoids), Thigh (quadraceps), Scapula (trapezius)  Fluid Accumulation:  Unable to assess,     Strength:  Not performed         Estimated Daily Nutrient Needs:  Energy (kcal): MSJ 1900 (1216 x 1.3 +300 for wt gain); Weight Used for Energy Requirements: Current  Protein (g): 69-75g (1.2-1.3gPro/kg); Weight Used for Protein Requirements: Current  Fluid (ml/day): 1900mL; Method Used for Fluid Requirements: 1 ml/kcal      Nutrition Related Findings:  Meds: lasix, [email or web address].   BM 12/12      Wounds:    None       Current Nutrition Therapies:  DIET CARDIAC Regular  DIET NUTRITIONAL SUPPLEMENTS Breakfast; Magic Cups  DIET NUTRITIONAL SUPPLEMENTS Lunch, Dinner; Ensure [employer]    Anthropometric Measures:  · Height: 5' 8\" (172.7 cm)  · Current Body Wt:  57.7 kg (127 lb 3.3 oz)   · Ideal Body Wt:  154 lbs:  82.6 %   · BMI Category:  Underweight (BMI less than 22) age over 72       Nutrition Diagnosis:   · Inadequate protein-energy intake related to cognitive or neurological impairment, other (specify)(poor appetite PTA) as evidenced by BMI, severe muscle loss, severe loss of subcutaneous fat      Nutrition Interventions:   Food and/or Nutrient Delivery: Continue current diet, Start oral nutrition supplement, Continue oral nutrition supplement  Nutrition Education and Counseling: No recommendations at this time  Coordination of Nutrition Care: Continue to monitor while inpatient    Goals:  Pt will consume >70% of meals/supplements in 2-4 days. Nutrition Monitoring and Evaluation:   Behavioral-Environmental Outcomes:    Food/Nutrient Intake Outcomes: Diet advancement/tolerance, Food and nutrient intake  Physical Signs/Symptoms Outcomes: Biochemical data, Fluid status or edema, Nutrition focused physical findings, GI status    Discharge Planning:     Too soon to determine     Electronically signed by Rhina Sanchez RD, [address]  on 12/15/2020 at 3:31 PM    Contact: [facility]-[phone]

## 2020-12-15 NOTE — PROGRESS NOTES
End of Shift Note    Bedside shift change report given to Hari (oncoming nurse) by Yamilex Dickinson (offgoing nurse). Report included the following information SBAR, Kardex and MAR    Shift worked:  7a-7p     Shift summary and any significant changes:     Patient to the floor at 1800. No acute events patient settled in and stable. Informed next shift of troponin needed to be drawn. Concerns for physician to address:  none     Zone phone for oncoming shift:   [phone]       Activity:  Activity Level: Up with Assistance  Number times ambulated in hallways past shift: 0  Number of times OOB to chair past shift: 0    Cardiac:   Cardiac Monitoring: Yes           Access:   Current line(s): PIV     Genitourinary:   Urinary status: voiding    Respiratory:   O2 Device: Nasal cannula  Chronic home O2 use?: NO  Incentive spirometer at bedside: YES     GI:  Last Bowel Movement Date: 12/12/20  Current diet:  DIET CARDIAC Regular  DIET NUTRITIONAL SUPPLEMENTS Breakfast; Magic Cups  Passing flatus: YES  Tolerating current diet: YES  % Diet Eaten: 75 %    Pain Management:   Patient states pain is manageable on current regimen: N/A    Skin:  Newton Score: 17  Interventions: turn team and increase time out of bed    Patient Safety:  Fall Score:  Total Score: 3  Interventions: bed/chair alarm, gripper socks and pt to call before getting OOB  High Fall Risk: Yes    Length of Stay:  Expected LOS: - - -  Actual LOS: 0      Yamilex Dickinson

## 2020-12-15 NOTE — PROGRESS NOTES
PROGRESS NOTE    NAME:  Charlotte Alcaraz    :   1931   MRN:   [id number]     Date/Time:  12/15/2020 6:26 AM  Subjective:   History:  Chart reviewed and patient seen and examined this AM and D/W his nurse and all events noted. He was admitted yesterday with increased SOB, CHF, anasarca and generalized failure to thrive. He has Dementia, Cardiomyopathy, CKD st 3 and other multiple medical problems and was recently taken off diuretics due to poor PO intake and deteriorating renal function. He this AM is lying flat in bed w/o oxygen and denies SOB, CP or other cardia or respiratory c/o. He has no GI/ c/o. He has no neurologic c/o except he is \"weak and I don't feel good\". When asked for specifics he was able to give none. His history is limited due to his dementia. He knows his name and knows that I'm his doctor and is correct on ; however otherwise confused and thinks he is at [facility].       Medications reviewed:  Current Facility-Administered Medications   Medication Dose Route Frequency    aspirin chewable tablet 81 mg  81 mg Oral DAILY    carvediloL (COREG) tablet 6.25 mg  6.25 mg Oral BID    donepeziL (ARICEPT) tablet 5 mg  5 mg Oral DAILY    memantine (NAMENDA) tablet 5 mg  5 mg Oral DAILY    pantoprazole (PROTONIX) tablet 40 mg  40 mg Oral ACB    sodium chloride (NS) flush 5-40 mL  5-40 mL IntraVENous Q8H    sodium chloride (NS) flush 5-40 mL  5-40 mL IntraVENous PRN    acetaminophen (TYLENOL) tablet 650 mg  650 mg Oral Q6H PRN    Or    acetaminophen (TYLENOL) suppository 650 mg  650 mg Rectal Q6H PRN    polyethylene glycol (MIRALAX) packet 17 g  17 g Oral DAILY PRN    promethazine (PHENERGAN) tablet 12.5 mg  12.5 mg Oral Q6H PRN    Or    ondansetron (ZOFRAN) injection 4 mg  4 mg IntraVENous Q6H PRN    furosemide (LASIX) injection 20 mg  20 mg IntraVENous BID    heparin (porcine) injection 5,000 Units  5,000 Units SubCUTAneous Q12H    dextrose 5% infusion  50 mL/hr IntraVENous CONTINUOUS        Objective:   Vitals:  Visit Vitals  /83 (BP 1 Location: Right arm, BP Patient Position: At rest)   Pulse 82   Temp 97.8 °F (36.6 °C)   Resp 21   Ht 5' 8\" (1.727 m)   Wt 125 lb (56.7 kg)   SpO2 91%   BMI 19.01 kg/m²    O2 Flow Rate (L/min): 2 l/min O2 Device: Nasal cannula Temp (24hrs), Av.7 °F (36.5 °C), Min:97.4 °F (36.3 °C), Max:97.8 °F (36.6 °C)      Last 24hr Input/Output:    Intake/Output Summary (Last 24 hours) at 12/15/2020 6710  Last data filed at 2020  Gross per 24 hour   Intake 551.67 ml   Output    Net 551.67 ml        PHYSICAL EXAM:  General:     Alert, cooperative, no distress, appears stated age. Head:    Normocephalic, without obvious abnormality, atraumatic. Eyes:    Conjunctivae/corneas clear. PERRLA  Nose:   Nares normal. No drainage or sinus tenderness. Throat:     Lips, mucosa, and tongue normal.  No Thrush  Neck:   Supple, symmetrical,  no adenopathy, thyroid: non tender     no carotid bruit and no JVD. Back:     Symmetric,  No CVA tenderness. Lungs:    Clear to auscultation bilaterally. No Wheezing or Rhonchi. No rales. Heart:    Regular rate and rhythm,  no murmur, rub or gallop. Abdomen:    Soft, non-tender. Not distended. Bowel sounds normal. No masses  Extremities:  Extremities normal, atraumatic, No cyanosis. No edema. No clubbing  Lymph nodes:  Cervical, supraclavicular normal.  Neurologic:  Normal strength, Alert and oriented X 1-2.    Skin:                 No rash      Lab Data Reviewed:    Recent Results (from the past 24 hour(s))   EKG, 12 LEAD, INITIAL    Collection Time: 20  8:36 AM   Result Value Ref Range    Ventricular Rate 75 BPM    Atrial Rate 73 BPM    QRS Duration 178 ms    Q-T Interval 480 ms    QTC Calculation (Bezet) 536 ms    Calculated R Axis -150 degrees    Calculated T Axis 71 degrees    Diagnosis       AV dual-paced rhythm with occasional ventricular-paced complexes     Confirmed by John Webber M.D. ([id number]) on 12/14/2020 9:03:37 AM     CBC WITH AUTOMATED DIFF    Collection Time: 12/14/20  9:27 AM   Result Value Ref Range    WBC 6.5 4.1 - 11.1 K/uL    RBC 4.89 4. 10 - 5.70 M/uL    HGB 13.2 12.1 - 17.0 g/dL    HCT 42.0 36.6 - 50.3 %    MCV 85.9 80.0 - 99.0 FL    MCH 27.0 26.0 - 34.0 PG    MCHC 31.4 30.0 - 36.5 g/dL    RDW 16.0 (H) 11.5 - 14.5 %    PLATELET 145 790 - 773 K/uL    MPV 12.3 8.9 - 12.9 FL    NRBC 0.0 0  WBC    ABSOLUTE NRBC 0.00 0.00 - 0.01 K/uL    NEUTROPHILS 73 32 - 75 %    LYMPHOCYTES 17 12 - 49 %    MONOCYTES 7 5 - 13 %    EOSINOPHILS 1 0 - 7 %    BASOPHILS 1 0 - 1 %    IMMATURE GRANULOCYTES 1 (H) 0.0 - 0.5 %    ABS. NEUTROPHILS 4.8 1.8 - 8.0 K/UL    ABS. LYMPHOCYTES 1.1 0.8 - 3.5 K/UL    ABS. MONOCYTES 0.5 0.0 - 1.0 K/UL    ABS. EOSINOPHILS 0.1 0.0 - 0.4 K/UL    ABS. BASOPHILS 0.0 0.0 - 0.1 K/UL    ABS. IMM. GRANS. 0.0 0.00 - 0.04 K/UL    DF AUTOMATED     METABOLIC PANEL, COMPREHENSIVE    Collection Time: 12/14/20  9:27 AM   Result Value Ref Range    Sodium 140 136 - 145 mmol/L    Potassium 3.9 3.5 - 5.1 mmol/L    Chloride 107 97 - 108 mmol/L    CO2 28 21 - 32 mmol/L    Anion gap 5 5 - 15 mmol/L    Glucose 96 65 - 100 mg/dL    BUN 50 (H) 6 - 20 MG/DL    Creatinine 2.14 (H) 0.70 - 1.30 MG/DL    BUN/Creatinine ratio 23 (H) 12 - 20      GFR est AA 35 (L) >60 ml/min/1.73m2    GFR est non-AA 29 (L) >60 ml/min/1.73m2    Calcium 9.8 8.5 - 10.1 MG/DL    Bilirubin, total 0.8 0.2 - 1.0 MG/DL    ALT (SGPT) 38 12 - 78 U/L    AST (SGOT) 29 15 - 37 U/L    Alk.  phosphatase 90 45 - 117 U/L    Protein, total 7.4 6.4 - 8.2 g/dL    Albumin 3.9 3.5 - 5.0 g/dL    Globulin 3.5 2.0 - 4.0 g/dL    A-G Ratio 1.1 1.1 - 2.2     TROPONIN I    Collection Time: 12/14/20  9:27 AM   Result Value Ref Range    Troponin-I, Qt. 0.07 (H) <0.05 ng/mL   LIPASE    Collection Time: 12/14/20  9:27 AM   Result Value Ref Range    Lipase 138 73 - 393 U/L   TROPONIN I    Collection Time: 12/14/20 10:51 AM   Result Value Ref Range Troponin-I, Qt. 0.06 (H) <0.05 ng/mL   NT-PRO BNP    Collection Time: 12/14/20 10:51 AM   Result Value Ref Range    NT pro-BNP >35,000 (H) <450 PG/ML   URINALYSIS W/ REFLEX CULTURE    Collection Time: 12/14/20  1:54 PM    Specimen: Urine   Result Value Ref Range    Color YELLOW/STRAW      Appearance CLEAR CLEAR      Specific gravity 1.025 1.003 - 1.030      pH (UA) 5.0 5.0 - 8.0      Protein TRACE (A) NEG mg/dL    Glucose Negative NEG mg/dL    Ketone Negative NEG mg/dL    Bilirubin Negative NEG      Blood Negative NEG      Urobilinogen 0.2 0.2 - 1.0 EU/dL    Nitrites Negative NEG      Leukocyte Esterase Negative NEG      WBC 0-4 0 - 4 /hpf    RBC 0-5 0 - 5 /hpf    Epithelial cells FEW FEW /lpf    Bacteria Negative NEG /hpf    UA:UC IF INDICATED CULTURE NOT INDICATED BY UA RESULT CNI     TROPONIN I    Collection Time: 12/15/20 12:47 AM   Result Value Ref Range    Troponin-I, Qt. 0.06 (H) <9.87 ng/mL   METABOLIC PANEL, BASIC    Collection Time: 12/15/20 12:47 AM   Result Value Ref Range    Sodium 138 136 - 145 mmol/L    Potassium 3.8 3.5 - 5.1 mmol/L    Chloride 106 97 - 108 mmol/L    CO2 27 21 - 32 mmol/L    Anion gap 5 5 - 15 mmol/L    Glucose 102 (H) 65 - 100 mg/dL    BUN 54 (H) 6 - 20 MG/DL    Creatinine 2.05 (H) 0.70 - 1.30 MG/DL    BUN/Creatinine ratio 26 (H) 12 - 20      GFR est AA 37 (L) >60 ml/min/1.73m2    GFR est non-AA 31 (L) >60 ml/min/1.73m2    Calcium 9.2 8.5 - 10.1 MG/DL   MAGNESIUM    Collection Time: 12/15/20 12:47 AM   Result Value Ref Range    Magnesium 2.4 1.6 - 2.4 mg/dL   CBC W/O DIFF    Collection Time: 12/15/20 12:47 AM   Result Value Ref Range    WBC 7.6 4.1 - 11.1 K/uL    RBC 4.92 4.10 - 5.70 M/uL    HGB 13.4 12.1 - 17.0 g/dL    HCT 42.6 36.6 - 50.3 %    MCV 86.6 80.0 - 99.0 FL    MCH 27.2 26.0 - 34.0 PG    MCHC 31.5 30.0 - 36.5 g/dL    RDW 15.9 (H) 11.5 - 14.5 %    PLATELET 426 049 - 384 K/uL    MPV 12.2 8.9 - 12.9 FL    NRBC 0.0 0  WBC    ABSOLUTE NRBC 0.00 0.00 - 0.01 K/uL SAMPLES BEING HELD    Collection Time: 12/15/20 12:47 AM   Result Value Ref Range    SAMPLES BEING HELD  PST     COMMENT        Add-on orders for these samples will be processed based on acceptable specimen integrity and analyte stability, which may vary by analyte. Assessment/Plan:     Principal Problem:    Acute on chronic systolic heart failure ([address] (12/14/2020)    Active Problems:    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, without long-term current use of insulin ([address] (7/18/2017)      Alzheimer disease ([address] (7/18/2017)      SSS (sick sinus syndrome) ([address] (6/2/2015)      Cardiomyopathy ([facility] (11/7/2019)      Acute renal failure superimposed on stage 3 chronic kidney disease ([facility] (7/20/2020)      Severe protein-calorie malnutrition ([facility] (7/21/2020)      Pleural effusion (12/14/2020)      SUBHA (acute kidney injury) ([facility] [facility] (12/14/2020)      Acute respiratory failure with hypoxia ([facility] (12/14/2020)      Anasarca (12/15/2020)       ___________________________________________________  PLAN:    1. Continue diuresis with low dose lasix IV  2. Follow renal function (baseline 21/1.6 in Oct), (41/1.8 on 12/2 at office), (50/2.14 on adm) and today 54/2.05  3. Repeat Echo ordered, baseline Efx on Echo 25%  4. Follow CXR, efussion  5. Nasal oxygen as needed  6. Continue low dose Coreg with CHF, Hold on ACE/ARB for now with renal function deterioration  7. Continue Aricept and namenda with dementia  8.   Palliative consult    45 minutes spent in direct care of this patient today    If need to contact me use hospital  [phone], DO NOT USE PERFECT SERVE    ___________________________________________________    Attending Physician: Christine Light MD

## 2020-12-15 NOTE — PROGRESS NOTES
End of Shift Note    Bedside shift change report given to Hari (oncoming nurse) by Rin Vigil (offgoing nurse). Report included the following information SBAR, Kardex and MAR    Shift worked:  7a-7p     Shift summary and any significant changes:     Patient slept much of the day after breakfast. Patient walked to the bathroom this morning and voided. 1614: Patient is now up in the chair eating. Concerns for physician to address:  none     Zone phone for oncoming shift:   [phone]       Activity:  Activity Level: Up with Assistance  Number times ambulated in hallways past shift: 0  Number of times OOB to chair past shift: 2    Cardiac:   Cardiac Monitoring: Yes      Cardiac Rhythm: Paced    Access:   Current line(s): PIV     Genitourinary:   Urinary status: voiding    Respiratory:   O2 Device: Nasal cannula  Chronic home O2 use?: NO  Incentive spirometer at bedside: NO     GI:  Last Bowel Movement Date: 12/12/20  Current diet:  DIET CARDIAC Regular  DIET NUTRITIONAL SUPPLEMENTS Breakfast; Magic Cups  DIET NUTRITIONAL SUPPLEMENTS Lunch, Dinner; [employer]  Passing flatus: YES  Tolerating current diet: YES  % Diet Eaten: 75 %    Pain Management:   Patient states pain is manageable on current regimen: N/A    Skin:  Newton Score: 17  Interventions: increase time out of bed and PT/OT consult    Patient Safety:  Fall Score:  Total Score: 3  Interventions: bed/chair alarm, assistive device (walker, cane, etc), gripper socks and pt to call before getting OOB  High Fall Risk: Yes    Length of Stay:  Expected LOS: 4d 2h  Actual LOS: [address] [facility]

## 2020-12-15 NOTE — PROGRESS NOTES
End of Shift Note    Bedside shift change report given to Angélica Davidson RN (oncoming nurse) by Galilea Schwarz RN (offgoing nurse). Report included the following information SBAR, Kardex and MAR    Shift worked:  7p-7a     Shift summary and any significant changes:     Pt IV infiltrated overnight. Multiple tries by multiple nurses still no access obtained. Pt rested well overnight. Minor confusion. Concerns for physician to address:  none     Zone phone for oncoming shift:   [phone]       Activity:  Activity Level: Up with Assistance  Number times ambulated in hallways past shift: 0  Number of times OOB to chair past shift: 0    Cardiac:   Cardiac Monitoring: Yes           Access:   Current line(s): PIV     Genitourinary:   Urinary status: voiding    Respiratory:   O2 Device: Nasal cannula  Chronic home O2 use?: NO  Incentive spirometer at bedside: YES     GI:  Last Bowel Movement Date: 12/12/20  Current diet:  DIET CARDIAC Regular  DIET NUTRITIONAL SUPPLEMENTS Breakfast; Magic Cups  Passing flatus: YES  Tolerating current diet: YES  % Diet Eaten: 75 %    Pain Management:   Patient states pain is manageable on current regimen: N/A    Skin:  Newton Score: 17  Interventions: turn team and increase time out of bed    Patient Safety:  Fall Score:  Total Score: 3  Interventions: bed/chair alarm, gripper socks and pt to call before getting OOB  High Fall Risk: Yes    Length of Stay:  Expected LOS: - - -  Actual LOS: 1      Hari Mares RN

## 2020-12-16 LAB
ANION GAP SERPL CALC-SCNC: 5 MMOL/L (ref 5–15)
BASOPHILS # BLD: 0.1 K/UL (ref 0–0.1)
BASOPHILS NFR BLD: 1 % (ref 0–1)
BUN SERPL-MCNC: 47 MG/DL (ref 6–20)
BUN/CREAT SERPL: 25 (ref 12–20)
CALCIUM SERPL-MCNC: 8.8 MG/DL (ref 8.5–10.1)
CHLORIDE SERPL-SCNC: 107 MMOL/L (ref 97–108)
CO2 SERPL-SCNC: 28 MMOL/L (ref 21–32)
CREAT SERPL-MCNC: 1.86 MG/DL (ref 0.7–1.3)
DIFFERENTIAL METHOD BLD: ABNORMAL
EOSINOPHIL # BLD: 0.1 K/UL (ref 0–0.4)
EOSINOPHIL NFR BLD: 2 % (ref 0–7)
ERYTHROCYTE [DISTWIDTH] IN BLOOD BY AUTOMATED COUNT: 15.7 % (ref 11.5–14.5)
GLUCOSE SERPL-MCNC: 77 MG/DL (ref 65–100)
HCT VFR BLD AUTO: 35.6 % (ref 36.6–50.3)
HGB BLD-MCNC: 11.2 G/DL (ref 12.1–17)
IMM GRANULOCYTES # BLD AUTO: 0.1 K/UL (ref 0–0.04)
IMM GRANULOCYTES NFR BLD AUTO: 1 % (ref 0–0.5)
LYMPHOCYTES # BLD: 0.7 K/UL (ref 0.8–3.5)
LYMPHOCYTES NFR BLD: 12 % (ref 12–49)
MCH RBC QN AUTO: 27.3 PG (ref 26–34)
MCHC RBC AUTO-ENTMCNC: 31.5 G/DL (ref 30–36.5)
MCV RBC AUTO: 86.6 FL (ref 80–99)
MONOCYTES # BLD: 0.4 K/UL (ref 0–1)
MONOCYTES NFR BLD: 8 % (ref 5–13)
NEUTS SEG # BLD: 4.1 K/UL (ref 1.8–8)
NEUTS SEG NFR BLD: 76 % (ref 32–75)
NRBC # BLD: 0 K/UL (ref 0–0.01)
NRBC BLD-RTO: 0 PER 100 WBC
PLATELET # BLD AUTO: 211 K/UL (ref 150–400)
PMV BLD AUTO: 12.3 FL (ref 8.9–12.9)
POTASSIUM SERPL-SCNC: 3.6 MMOL/L (ref 3.5–5.1)
RBC # BLD AUTO: 4.11 M/UL (ref 4.1–5.7)
RBC MORPH BLD: ABNORMAL
RBC MORPH BLD: ABNORMAL
SODIUM SERPL-SCNC: 140 MMOL/L (ref 136–145)
WBC # BLD AUTO: 5.5 K/UL (ref 4.1–11.1)

## 2020-12-16 PROCEDURE — 65270000029 HC RM PRIVATE

## 2020-12-16 PROCEDURE — 74011250637 HC RX REV CODE- 250/637: Performed by: INTERNAL MEDICINE

## 2020-12-16 PROCEDURE — 94760 N-INVAS EAR/PLS OXIMETRY 1: CPT

## 2020-12-16 PROCEDURE — 77010033678 HC OXYGEN DAILY

## 2020-12-16 PROCEDURE — 80048 BASIC METABOLIC PNL TOTAL CA: CPT

## 2020-12-16 PROCEDURE — 74011250636 HC RX REV CODE- 250/636: Performed by: INTERNAL MEDICINE

## 2020-12-16 PROCEDURE — 99233 SBSQ HOSP IP/OBS HIGH 50: CPT | Performed by: INTERNAL MEDICINE

## 2020-12-16 PROCEDURE — 36415 COLL VENOUS BLD VENIPUNCTURE: CPT

## 2020-12-16 PROCEDURE — 85025 COMPLETE CBC W/AUTO DIFF WBC: CPT

## 2020-12-16 RX ORDER — POLYETHYLENE GLYCOL 3350 17 G/17G
17 POWDER, FOR SOLUTION ORAL DAILY
Status: DISCONTINUED | OUTPATIENT
Start: 2020-12-17 | End: 2020-12-23 | Stop reason: HOSPADM

## 2020-12-16 RX ORDER — LISINOPRIL 5 MG/1
2.5 TABLET ORAL DAILY
Status: DISCONTINUED | OUTPATIENT
Start: 2020-12-17 | End: 2020-12-23 | Stop reason: HOSPADM

## 2020-12-16 RX ADMIN — HEPARIN SODIUM 5000 UNITS: 5000 INJECTION INTRAVENOUS; SUBCUTANEOUS at 17:19

## 2020-12-16 RX ADMIN — DEXTROSE MONOHYDRATE 50 ML/HR: 5 INJECTION, SOLUTION INTRAVENOUS at 16:47

## 2020-12-16 RX ADMIN — CARVEDILOL 6.25 MG: 6.25 TABLET, FILM COATED ORAL at 17:19

## 2020-12-16 RX ADMIN — FUROSEMIDE 20 MG: 10 INJECTION, SOLUTION INTRAMUSCULAR; INTRAVENOUS at 17:19

## 2020-12-16 RX ADMIN — HEPARIN SODIUM 5000 UNITS: 5000 INJECTION INTRAVENOUS; SUBCUTANEOUS at 06:00

## 2020-12-16 RX ADMIN — MEMANTINE HYDROCHLORIDE 5 MG: 10 TABLET ORAL at 10:19

## 2020-12-16 RX ADMIN — PANTOPRAZOLE SODIUM 40 MG: 40 TABLET, DELAYED RELEASE ORAL at 06:48

## 2020-12-16 RX ADMIN — Medication 10 ML: at 06:49

## 2020-12-16 RX ADMIN — Medication 10 ML: at 16:42

## 2020-12-16 RX ADMIN — CARVEDILOL 6.25 MG: 6.25 TABLET, FILM COATED ORAL at 10:20

## 2020-12-16 RX ADMIN — Medication 10 ML: at 21:12

## 2020-12-16 RX ADMIN — ASPIRIN 81 MG CHEWABLE TABLET 81 MG: 81 TABLET CHEWABLE at 10:18

## 2020-12-16 RX ADMIN — DONEPEZIL HYDROCHLORIDE 5 MG: 5 TABLET, FILM COATED ORAL at 10:19

## 2020-12-16 NOTE — PROGRESS NOTES
Bedside and Verbal shift change report given to Carmen Queen RN (oncoming nurse) by Ottoniel Mazno RN (offgoing nurse). Report included the following information SBAR, Kardex, Intake/Output and MAR.

## 2020-12-16 NOTE — PROGRESS NOTES
PROGRESS NOTE    NAME:  Viky Jacobs Sr   :   1931   MRN:   [id number]     Date/Time:  2020 6:20 AM  Subjective:   History:  Chart reviewed and patient seen and examined this AM and D/W his nurse and all events noted. He was admitted yesterday with increased SOB, CHF, anasarca and generalized failure to thrive. He has Dementia, Cardiomyopathy, CKD st 3 and other multiple medical problems and was recently taken off diuretics due to poor PO intake and deteriorating renal function. He this AM is lying flat in bed w/o oxygen and denies SOB, CP or other cardia or respiratory c/o. He has no GI/ c/o. He has no neurologic c/o except he is \"weak and I don't feel good\" which is unchanged from yesterday. When asked for specifics he was able to give none. His history is limited due to his dementia. He knows his name and knows that I'm his doctor and is correct on ; however otherwise confused and thinks he is at [facility].       Medications reviewed:  Current Facility-Administered Medications   Medication Dose Route Frequency    aspirin chewable tablet 81 mg  81 mg Oral DAILY    carvediloL (COREG) tablet 6.25 mg  6.25 mg Oral BID    donepeziL (ARICEPT) tablet 5 mg  5 mg Oral DAILY    memantine (NAMENDA) tablet 5 mg  5 mg Oral DAILY    pantoprazole (PROTONIX) tablet 40 mg  40 mg Oral ACB    sodium chloride (NS) flush 5-40 mL  5-40 mL IntraVENous Q8H    sodium chloride (NS) flush 5-40 mL  5-40 mL IntraVENous PRN    acetaminophen (TYLENOL) tablet 650 mg  650 mg Oral Q6H PRN    Or    acetaminophen (TYLENOL) suppository 650 mg  650 mg Rectal Q6H PRN    polyethylene glycol (MIRALAX) packet 17 g  17 g Oral DAILY PRN    promethazine (PHENERGAN) tablet 12.5 mg  12.5 mg Oral Q6H PRN    Or    ondansetron (ZOFRAN) injection 4 mg  4 mg IntraVENous Q6H PRN    furosemide (LASIX) injection 20 mg  20 mg IntraVENous BID    heparin (porcine) injection 5,000 Units  5,000 Units SubCUTAneous Q12H    dextrose 5% infusion  50 mL/hr IntraVENous CONTINUOUS        Objective:   Vitals:  Visit Vitals  BP (!) 116/57   Pulse 75   Temp 97.8 °F (36.6 °C)   Resp 16   Ht 5' 8\" (1.727 m)   Wt 127 lb 3.3 oz (57.7 kg)   SpO2 94%   BMI 19.34 kg/m²    O2 Flow Rate (L/min): 2 l/min O2 Device: Nasal cannula Temp (24hrs), Av.6 °F (36.4 °C), Min:97.2 °F (36.2 °C), Max:97.8 °F (36.6 °C)      Last 24hr Input/Output:    Intake/Output Summary (Last 24 hours) at 2020 0620  Last data filed at 12/15/2020 1837  Gross per 24 hour   Intake 1263.83 ml   Output    Net 1263.83 ml        PHYSICAL EXAM:  General:     Alert, cooperative, no distress, appears stated age. Head:    Normocephalic, without obvious abnormality, atraumatic. Eyes:    Conjunctivae/corneas clear. PERRLA  Nose:   Nares normal. No drainage or sinus tenderness. Throat:     Lips, mucosa, and tongue normal.  No Thrush  Neck:   Supple, symmetrical,  no adenopathy, thyroid: non tender     no carotid bruit and no JVD. Back:     Symmetric,  No CVA tenderness. Lungs:    Clear to auscultation bilaterally. No Wheezing or Rhonchi. No rales. Heart:    Regular rate and rhythm,  no murmur, rub or gallop. Abdomen:    Soft, non-tender. Not distended. Bowel sounds normal. No masses  Extremities:  Extremities normal, atraumatic, No cyanosis. No edema. No clubbing  Lymph nodes:  Cervical, supraclavicular normal.  Neurologic:  Generalized decreased strength, Alert and oriented X 1-2.    Skin:                 No rash      Lab Data Reviewed:    Recent Results (from the past 24 hour(s))   ECHO ADULT COMPLETE    Collection Time: 12/15/20  9:36 AM   Result Value Ref Range    IVSd 1.32 (A) 0.6 - 1.0 cm    IVSs 1.52 cm    LVIDd 5.73 4.2 - 5.9 cm    LVIDs 5.23 cm    LVOT d 2.09 cm    LVPWd 0.91 0.6 - 1.0 cm    LVPWs 1.06 cm    IVSd (M-mode) 1.78 (A) cm    IVSs (M-mode) 2.43 cm    LVIDd (M-mode) 5.59 cm    LVIDs (M-mode) 5.16 cm    LVPWd (M-mode) 1.41 (A) cm    RVIDd 1.97 cm    RVSP 40.99 mmHg Left Atrium Major Axis 3.04 cm    LA Volume 100.73 18 - 58 mL    LA Area 4C 29.36 cm2    LA Vol 2C 88.34 (A) 18 - 58 mL    LA Vol 4C 100.77 (A) 18 - 58 mL    Left Atrium to Aortic Root Ratio 1.21     Left Atrium to Aortic Root Ratio 1.21     Right Atrial Area 4C 22.51 cm2    Est. RA Pressure 10.00 mmHg    AV Cusp 2.01 cm    AV R PG 54.76 mmHg    Aortic Regurgitant Pressure Half-time 406.12 ms    AR Max Venu 370.00 cm/s    AoV PG 2.55 mmHg    Aortic Valve Systolic Peak Velocity 14.41 cm/s    PISA MR Rad 0.81 cm    MV A Venu 39.98 cm/s    Mitral Valve E Wave Deceleration Time 286.59 ms    MV E Venu 80.40 cm/s    E/E' ratio (averaged) 25.00     E/E' lateral 21.79     E/E' septal 28.21     LV E' Lateral Velocity 3.69 cm/s    LV E' Septal Velocity 2.85 cm/s    Mitral Effective Regurgitant Orifice Area 0.26 cm2    MV regurgitant volume 49.87 mL    TR Max Velocity 500.43 cm/s    Mitral Regurgitant PISA Peak Velocity 506.34 cm/s    Mitral Regurgitant Velocity Time Integral 189.73 cm    Pulmonic Regurgitant End Max Velocity 131.94 cm/s    Pulmonic Valve Systolic Peak Instantaneous Gradient 0.43 mmHg    Pulmonic Valve Max Velocity 32.91 cm/s    Triscuspid Valve Regurgitation Peak Gradient 30.99 mmHg    TR Max Velocity 277.04 cm/s    Ao Root D 2.99 cm    MV E/A 2.01     LV Mass .7 88 - 224 g    LV Mass AL Index 156.4 49 - 115 g/m2    Left Atrium Minor Axis 1.80 cm    LA Vol Index 59.73 16 - 28 ml/m2    LA Vol Index 52.39 16 - 28 ml/m2    LA Vol Index 59.76 16 - 28 ml/m2   METABOLIC PANEL, BASIC    Collection Time: 12/16/20  2:18 AM   Result Value Ref Range    Sodium 140 136 - 145 mmol/L    Potassium 3.6 3.5 - 5.1 mmol/L    Chloride 107 97 - 108 mmol/L    CO2 28 21 - 32 mmol/L    Anion gap 5 5 - 15 mmol/L    Glucose 77 65 - 100 mg/dL    BUN 47 (H) 6 - 20 MG/DL    Creatinine 1.86 (H) 0.70 - 1.30 MG/DL    BUN/Creatinine ratio 25 (H) 12 - 20      GFR est AA 42 (L) >60 ml/min/1.73m2    GFR est non-AA 34 (L) >60 ml/min/1.73m2 Calcium 8.8 8.5 - 10.1 MG/DL         Assessment/Plan:     Principal Problem:    Acute on chronic systolic heart failure ([facility] (12/14/2020)    Active Problems:    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, without long-term current use of insulin ([address] (7/18/2017)      Alzheimer disease ([address] (7/18/2017)      SSS (sick sinus syndrome) ([facility] (6/2/2015)      Cardiomyopathy ([facility] (11/7/2019)      Acute renal failure superimposed on stage 3 chronic kidney disease ([facility] (7/20/2020)      Severe protein-calorie malnutrition ([facility] (7/21/2020)      Pleural effusion (12/14/2020)      SUBHA (acute kidney injury) ([facility] (12/14/2020)      Acute respiratory failure with hypoxia ([facility] (12/14/2020)      Anasarca (12/15/2020)       ___________________________________________________  PLAN:    1. Continue diuresis with low dose lasix IV  2. Follow renal function (baseline 21/1.6 in Oct), (41/1.8 on 12/2 at office), (50/2.14 on adm) and today 47/1.86  3. Repeat Echo, Prior Efx on Echo 25%  ·     LV: Estimated LVEF is 15 - 20%. Dilated left ventricle. Mild septal wall hypertrophy. Severely reduced systolic function; Worse function in the inferior and lateral walls. · RV: Borderline low systolic function. Pacer/ICD present. · RA: Dilated right atrium. · AV: Mild to moderate aortic valve regurgitation is present. · MV: Mitral valve thickening. Moderate to severe mitral valve regurgitation is present. · TV: Moderate tricuspid valve regurgitation is present. · PV: Mild pulmonic valve regurgitation is present. PA: Pulmonary arterial systolic pressure is 50 mmHg. 4.  Follow CXR, effusion stable on chest x-ray yesterday  5. Nasal oxygen as needed  6. Continue low dose Coreg with CHF, resume low-dose ACE with decreased EF on echo and follow renal function closely  7. Continue Aricept and Namenda with dementia  8. Palliative consult note reviewed and I agree with DNR  9.   We will try to mobilize with PT    35 minutes spent in direct care of this patient today    If need to contact me use hospital  [phone], DO NOT USE PERFECT SERVE    ___________________________________________________    Attending Physician: Christine Light MD

## 2020-12-16 NOTE — CONSULTS
Palliative Medicine Consult  [place]: [phone] ([phone])    Patient Name: Joaquín Carlos Sr  YOB: 1931    Date of Initial Consult: 12/15/20  Reason for Consult: care decisions  Requesting Provider: Bryan Green MD   Primary Care Physician: Patricio Wood MD     SUMMARY:   Ericka Hobbs is a 80 y.o. female  with a past history of heart failure with most recent EF of 25% in 2019, s/p PPM /AICD, Dementia, CKD stage 3,  who was admitted on 12/14/2020 from ER  with a diagnosis of acute hypoxic respiratory failure due to acute on chronic respiratory failure,  SUBHA on CKD stage III . He presented with decreased, PO intake and abdominal distension , they went to his primary care doctor who stopped his diuresis due to concern of dehydration after that patient was not eating well and his abdomen was distended. Patient denied any shortness of breath, chest pain, nausea, vomiting, fever, chills, diarrhea,     His renal function is creeping up , LVEF is 15 - 20%. Dilated left ventricle. Mild septal wall hypertrophy. Severely reduced systolic function; Worse function in the inferior and lateral walls. Current medical issues leading to Palliative Medicine involvement include: care decisions in a setting of advance cardiomyopathy with SUBHA. Social :  , lives with grand daughter Tal Gan x 2 years, he has 3 children , at base line independent for  ADLS, has some confusion, he is declined in function x3 weeks. PALLIATIVE DIAGNOSES:   1. Goals of care   2. Advance care planning counseling and discussion   3. Debility  4. Acute on chronic heart failure   5. SUBHA   6. Mild dementia        PLAN:   1. I Reviewed  Chart before visit . 2. Decision making capacity : he is alert oriented , engages in conversation , he can make simple decisions . 3. Met with patient and his grand daughter Oracio Mis giver, Tal Gan, along with Flavio Garsia ( [facility]).   4. We went over his medical issues , now with weak heart , and slowing down of kidney function, we discussed to have close follow up with cardiologist , after discharge , to adjust his medication in order to prevent fluid overload . 5. His grand daughter is very mind ful  have coordinated with his  for post d/c follow up with cardiologist .  6. We talked about , he may not bounce back to his base line function /ambulatory , because of his advance age and weak heart and kidney, he is expected to have a slow recovery , they are considering discharge home with home health . 7. Advance directives : patient completed MPOA section,  refer to [facility] note . 8. DNR discussion : he notes he has \" lived long \" and at his \" age \" he would not want heroic measures , he signed DDNR , pink sheet placed . 9. Goals are clear for best possible recovery and DNAR and DNI. 10. Thank you for allowing us to participate in the care of Mr Farfan , very pleasant individual , we will follow peripherally . 11. Initial consult note routed to primary continuity provider and/or primary health care team members  12.  Communicated plan of care with: Palliative IDT, [address] Team     GOALS OF CARE / TREATMENT PREFERENCES:     GOALS OF CARE:  Patient/Health Care Proxy Stated Goals: Prolong life    TREATMENT PREFERENCES:   Code Status: Full Code    Advance Care Planning:  [x] [facility] Interdisciplinary Team has updated the ACP Navigator with [employer] and Patient Capacity    Primary Decision Maker (Active): Angie Evans - [phone]    Secondary Decision Maker: Julienne Jameson - [place] - [phone]    Advance Care Planning 12/14/2020   Patient's Healthcare Decision Maker is: -   Primary Decision Maker Name -   Primary Decision Maker Phone Number -   Primary Decision Maker Relationship to Patient -   Confirm Advance Directive Yes, not on file   Patient Would Like to Complete Advance Directive -             Other Instructions: Other:    As far as possible, the palliative care team has discussed with patient / health care proxy about goals of care / treatment preferences for patient. HISTORY:     History obtained from: alcira anderson    CHIEF COMPLAINT: \" I am feeling better then yesterday \"    HPI/SUBJECTIVE:    The patient is:   [] Verbal and participatory  12/ Sleepy,briefly aroused. 12/    Clinical Pain Assessment (nonverbal scale for severity on nonverbal patients):   Clinical Pain Assessment  Severity: 0          Duration: for how long has pt been experiencing pain (e.g., 2 days, 1 month, years)  Frequency: how often pain is an issue (e.g., several times per day, once every few days, constant)     FUNCTIONAL ASSESSMENT:     Palliative Performance Scale (PPS):  PPS: 50       PSYCHOSOCIAL/SPIRITUAL SCREENING:     Palliative IDT has assessed this patient for cultural preferences / practices and a referral made as appropriate to needs (Cultural Services, Patient Advocacy, Ethics, etc.)    Any spiritual / Evangelical concerns:  [] Yes /  [] No    Caregiver Burnout:  [] Yes /  [] No /  [] No Caregiver Present      Anticipatory grief assessment:   [] Normal  / [] Maladaptive       ESAS Anxiety: Anxiety: 0    ESAS Depression: Depression: 0   Unable to evaluate because of patient factors. REVIEW OF SYSTEMS:     Positive and pertinent negative findings in ROS are noted above in HPI. The following systems were [] reviewed / [x] unable to be reviewed as noted in HPI  Other findings are noted below. Systems: constitutional, ears/nose/mouth/throat, respiratory, gastrointestinal, genitourinary, musculoskeletal, integumentary, neurologic, psychiatric, endocrine. Positive findings noted below.   Modified ESAS Completed by: provider   Fatigue: 6 Drowsiness: 0   Depression: 0 Pain: 0   Anxiety: 0 Nausea: 0   Anorexia: 3 Dyspnea: 2     Constipation: No     Stool Occurrence(s): 0        PHYSICAL EXAM:     From RN flowsheet:  Wt Readings from Last 3 Encounters:   12/16/20 127 lb 3.3 oz (57.7 kg)   12/02/20 123 lb (55.8 kg)   12/01/20 125 lb (56.7 kg)     Blood pressure (!) 115/55, pulse 77, temperature 97.4 °F (36.3 °C), resp. rate 16, height 5' 8\" (1.727 m), weight 127 lb 3.3 oz (57.7 kg), SpO2 100 %. Pain Scale 1: Numeric (0 - 10)  Pain Intensity 1: 0                 Last bowel movement, if known:     Constitutional: frail, alert , oriented x2, engages in conversation , pleasant   Eyes: pupils equal, anicteric  ENMT: no nasal discharge, moist mucous membranes  Cardiovascular: regular rhythm, no edema   Respiratory: breathing not labored, symmetric  Gastrointestinal: soft non-tender, +bowel sounds  Musculoskeletal: no deformity, no tenderness to palpation  Skin: warm, dry  Neurologic: alert , oriented x2.   Psychiatry : normal affect   Other:       HISTORY:     Principal Problem:    Acute on chronic systolic heart failure ([address] (12/14/2020)    Active Problems:    SSS (sick sinus syndrome) ([address] (6/2/2015)      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, without long-term current use of insulin ([address] (7/18/2017)      Alzheimer disease ([address] (7/18/2017)      Cardiomyopathy ([address] (11/7/2019)      Acute renal failure superimposed on stage 3 chronic kidney disease ([address] (7/20/2020)      Severe protein-calorie malnutrition ([address] (7/21/2020)      Pleural effusion (12/14/2020)      SUBHA (acute kidney injury) ([address] (12/14/2020)      Acute respiratory failure with hypoxia ([address] (12/14/2020)      Anasarca (12/15/2020)      Past Medical History:   Diagnosis Date    Abdominal pain 7/18/2017    Alzheimer disease ([address] 7/18/2017    Anemia 7/18/2017    Arthritis     Back pain 7/18/2017    Bradycardia 7/18/2017    CAD (coronary artery disease)     hx of MI    CKD (chronic kidney disease), stage II 7/18/2017    constipation     Depression 7/18/2017    Diabetes mellitus ([facility] 7/18/2017    Diverticulosis 2017    DJD (degenerative joint disease) 2017    Encounter for long-term (current) use of other medications 2017    Fatigue     Gastritis and duodenitis 2017    GERD (gastroesophageal reflux disease)     GI bleed 2017    Hearing loss     Hyperlipidemia 2017    Hypertension     Hypertension with renal disease 2017    Ill-defined condition     Dementia    Internal hemorrhoids 2017    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    S/P cardiac pacemaker procedure 2015    5/4/15 Medtronic dual chamber pacemaker implant     Weight loss     lost over 40 pounds last year- has no appetite      Past Surgical History:   Procedure Laterality Date    COLONOSCOPY N/A 2017    COLONOSCOPY performed by Marcus Rucker MD at [address] [place] [address]  2017         [address]  7/10/2014    right inguinal    HX OTHER SURGICAL      cystectomy from back    [place] EGD TRANSORAL BIOPSY SINGLE/MULTIPLE  2012         [place] UPPER GI ENDOSCOPY,W/DIR SUBMUC INJ  2020         UPPER GI ENDOSCOPY,BIOPSY  2017         UPPER GI ENDOSCOPY,BIOPSY  2020           Family History   Problem Relation Age of Onset    Hypertension Mother     Heart Disease Father     Cancer Other         son-cancer? unknown what type       History reviewed, no pertinent family history.   Social History     Tobacco Use    Smoking status: Former Smoker     Packs/day: 0.50     Years: 10.00     Pack years: 5.00     Start date: 1991    Smokeless tobacco: Never Used    Tobacco comment: quit 50 years ago   Substance Use Topics    Alcohol use: Not Currently     Comment: Occasional beer     No Known Allergies   Current Facility-Administered Medications   Medication Dose Route Frequency    aspirin chewable tablet 81 mg  81 mg Oral DAILY    carvediloL (COREG) tablet 6.25 mg  6.25 mg Oral BID    donepeziL (ARICEPT) tablet 5 mg  5 mg Oral DAILY    memantine (NAMENDA) tablet 5 mg  5 mg Oral DAILY    pantoprazole (PROTONIX) tablet 40 mg  40 mg Oral ACB    sodium chloride (NS) flush 5-40 mL  5-40 mL IntraVENous Q8H    sodium chloride (NS) flush 5-40 mL  5-40 mL IntraVENous PRN    acetaminophen (TYLENOL) tablet 650 mg  650 mg Oral Q6H PRN    Or    acetaminophen (TYLENOL) suppository 650 mg  650 mg Rectal Q6H PRN    polyethylene glycol (MIRALAX) packet 17 g  17 g Oral DAILY PRN    promethazine (PHENERGAN) tablet 12.5 mg  12.5 mg Oral Q6H PRN    Or    ondansetron (ZOFRAN) injection 4 mg  4 mg IntraVENous Q6H PRN    furosemide (LASIX) injection 20 mg  20 mg IntraVENous BID    heparin (porcine) injection 5,000 Units  5,000 Units SubCUTAneous Q12H    dextrose 5% infusion  50 mL/hr IntraVENous CONTINUOUS          LAB AND IMAGING FINDINGS:     Lab Results   Component Value Date/Time    WBC 7.6 12/15/2020 12:47 AM    HGB 13.4 12/15/2020 12:47 AM    PLATELET 460 88/58/9169 12:47 AM     Lab Results   Component Value Date/Time    Sodium 140 12/16/2020 02:18 AM    Potassium 3.6 12/16/2020 02:18 AM    Chloride 107 12/16/2020 02:18 AM    CO2 28 12/16/2020 02:18 AM    BUN 47 (H) 12/16/2020 02:18 AM    Creatinine 1.86 (H) 12/16/2020 02:18 AM    Calcium 8.8 12/16/2020 02:18 AM    Magnesium 2.4 12/15/2020 12:47 AM    Phosphorus 3.2 04/13/2018 07:45 AM      Lab Results   Component Value Date/Time    Alk.  phosphatase 90 12/14/2020 09:27 AM    Protein, total 7.4 12/14/2020 09:27 AM    Albumin 3.9 12/14/2020 09:27 AM    Globulin 3.5 12/14/2020 09:27 AM     Lab Results   Component Value Date/Time    INR 1.1 10/27/2019 09:27 AM    Prothrombin time 10.6 10/27/2019 09:27 AM    aPTT 27.8 10/29/2019 02:21 AM      No results found for: IRON, FE, TIBC, IBCT, PSAT, FERR   No results found for: PH, PCO2, PO2  No components found for: [place]   Lab Results   Component Value Date/Time    CK 87.00 10/22/2020 01:35 PM    CK - MB <1.0 07/20/2020 04:42 PM                Total time:   Counseling / coordination time, spent as noted above:   > 50% counseling / coordination?:     Prolonged service was provided for  []30 min   []75 min in face to face time in the presence of the patient, spent as noted above. Time Start:   Time End:   Note: this can only be billed with 93653 (initial) or 44409 (follow up). If multiple start / stop times, list each separately.

## 2020-12-16 NOTE — ACP (ADVANCE CARE PLANNING)
Primary Decision Maker (Active): [place] - [phone]    Secondary Decision Maker: Daniela Szymanski - Daughter - [phone]  Advance Care Planning 12/16/2020   Patient's Healthcare Decision Maker is: Named in scanned ACP document   Primary Decision Maker Name -   Primary Decision Maker Phone Number -   Primary Decision Maker Relationship to Patient -   Confirm Advance Directive Yes, on file   Patient Would Like to Complete Advance Directive -   Does the patient have other document types Do Not Resuscitate     Palliative team of Dr Jody Gonzalez and this writer met with patient and his grand daughter (who he calls daughter, Wiliam Griffiths was raised by him). Today patient is alert, engaging, notes he \"feels better\" than when he first came in to the hospital. He is a very pleasant and polite man, a bit [place] but able to understand what was being said. He is oriented to person, place, not time. He is not sure what brought him to the hospital, but was able to hear and understand that his heart is very weak and that causes SOB for him. Patient also stated, when asked about EOL, \"I have lived a long time, I don't want all that\" (being on life support, machines). He feels that he has lived well all these years. Patient was able to name his grand daughter and daughter (named above) as mPOA. We did not go into the healthcare portion of this as it was too detailed for patient to understand. He was very clear however that he would not want CPR if he dies, he was able to sign a DDNR and grand daughter Wiliam Griffiths understands this and accepts it. Copies of all documents made and given to Wiliam Griffiths and patient. Copies left in chart for scanning. Patient is eager to get back home when stable.

## 2020-12-16 NOTE — CARDIO/PULMONARY
Cardiac Rehab Note: chart review Acute respiratory failure CHF BUNDLE   
 
EF 15-20%  on 12/15/20 per echo Smoking history assessed. Patient is a former smoker. Smoking Cessation Program link has not been added to the AVS.  
 
Palliative meeting with family tomorrow. Patient AMS, debility, acute on chronic HF, SUBHA, dementia per palliative note 12/15/20.

## 2020-12-17 ENCOUNTER — HOME HEALTH ADMISSION (OUTPATIENT)
Dept: HOME HEALTH SERVICES | Facility: HOME HEALTH | Age: 85
End: 2020-12-17
Payer: MEDICARE

## 2020-12-17 LAB
ANION GAP SERPL CALC-SCNC: 5 MMOL/L (ref 5–15)
BUN SERPL-MCNC: 40 MG/DL (ref 6–20)
BUN/CREAT SERPL: 22 (ref 12–20)
CALCIUM SERPL-MCNC: 9.1 MG/DL (ref 8.5–10.1)
CHLORIDE SERPL-SCNC: 104 MMOL/L (ref 97–108)
CO2 SERPL-SCNC: 31 MMOL/L (ref 21–32)
CREAT SERPL-MCNC: 1.8 MG/DL (ref 0.7–1.3)
ERYTHROCYTE [DISTWIDTH] IN BLOOD BY AUTOMATED COUNT: 15.7 % (ref 11.5–14.5)
GLUCOSE SERPL-MCNC: 88 MG/DL (ref 65–100)
HCT VFR BLD AUTO: 34.7 % (ref 36.6–50.3)
HGB BLD-MCNC: 10.9 G/DL (ref 12.1–17)
MCH RBC QN AUTO: 27.3 PG (ref 26–34)
MCHC RBC AUTO-ENTMCNC: 31.4 G/DL (ref 30–36.5)
MCV RBC AUTO: 87 FL (ref 80–99)
NRBC # BLD: 0 K/UL (ref 0–0.01)
NRBC BLD-RTO: 0 PER 100 WBC
PLATELET # BLD AUTO: 196 K/UL (ref 150–400)
PMV BLD AUTO: 11.9 FL (ref 8.9–12.9)
POTASSIUM SERPL-SCNC: 3.6 MMOL/L (ref 3.5–5.1)
RBC # BLD AUTO: 3.99 M/UL (ref 4.1–5.7)
SODIUM SERPL-SCNC: 140 MMOL/L (ref 136–145)
WBC # BLD AUTO: 5 K/UL (ref 4.1–11.1)

## 2020-12-17 PROCEDURE — 74011250637 HC RX REV CODE- 250/637: Performed by: INTERNAL MEDICINE

## 2020-12-17 PROCEDURE — 97116 GAIT TRAINING THERAPY: CPT

## 2020-12-17 PROCEDURE — 99232 SBSQ HOSP IP/OBS MODERATE 35: CPT | Performed by: INTERNAL MEDICINE

## 2020-12-17 PROCEDURE — 80048 BASIC METABOLIC PNL TOTAL CA: CPT

## 2020-12-17 PROCEDURE — 97161 PT EVAL LOW COMPLEX 20 MIN: CPT

## 2020-12-17 PROCEDURE — 36415 COLL VENOUS BLD VENIPUNCTURE: CPT

## 2020-12-17 PROCEDURE — 94760 N-INVAS EAR/PLS OXIMETRY 1: CPT

## 2020-12-17 PROCEDURE — 85027 COMPLETE CBC AUTOMATED: CPT

## 2020-12-17 PROCEDURE — 65270000029 HC RM PRIVATE

## 2020-12-17 PROCEDURE — 74011250636 HC RX REV CODE- 250/636: Performed by: INTERNAL MEDICINE

## 2020-12-17 PROCEDURE — 77010033678 HC OXYGEN DAILY

## 2020-12-17 RX ADMIN — DONEPEZIL HYDROCHLORIDE 5 MG: 5 TABLET, FILM COATED ORAL at 09:26

## 2020-12-17 RX ADMIN — MEMANTINE HYDROCHLORIDE 5 MG: 10 TABLET ORAL at 09:38

## 2020-12-17 RX ADMIN — HEPARIN SODIUM 5000 UNITS: 5000 INJECTION INTRAVENOUS; SUBCUTANEOUS at 06:42

## 2020-12-17 RX ADMIN — FUROSEMIDE 20 MG: 10 INJECTION, SOLUTION INTRAMUSCULAR; INTRAVENOUS at 09:38

## 2020-12-17 RX ADMIN — FUROSEMIDE 20 MG: 10 INJECTION, SOLUTION INTRAMUSCULAR; INTRAVENOUS at 17:39

## 2020-12-17 RX ADMIN — HEPARIN SODIUM 5000 UNITS: 5000 INJECTION INTRAVENOUS; SUBCUTANEOUS at 17:40

## 2020-12-17 RX ADMIN — Medication 10 ML: at 22:47

## 2020-12-17 RX ADMIN — Medication 10 ML: at 14:00

## 2020-12-17 RX ADMIN — CARVEDILOL 6.25 MG: 6.25 TABLET, FILM COATED ORAL at 09:26

## 2020-12-17 RX ADMIN — LISINOPRIL 2.5 MG: 5 TABLET ORAL at 09:26

## 2020-12-17 RX ADMIN — CARVEDILOL 6.25 MG: 6.25 TABLET, FILM COATED ORAL at 17:39

## 2020-12-17 RX ADMIN — PANTOPRAZOLE SODIUM 40 MG: 40 TABLET, DELAYED RELEASE ORAL at 06:42

## 2020-12-17 RX ADMIN — ASPIRIN 81 MG CHEWABLE TABLET 81 MG: 81 TABLET CHEWABLE at 09:27

## 2020-12-17 RX ADMIN — Medication 10 ML: at 06:42

## 2020-12-17 RX ADMIN — POLYETHYLENE GLYCOL 3350 17 G: 17 POWDER, FOR SOLUTION ORAL at 09:25

## 2020-12-17 NOTE — PROGRESS NOTES
Problem: Mobility Impaired (Adult and Pediatric)  Goal: *Acute Goals and Plan of Care (Insert Text)  Description: FUNCTIONAL STATUS PRIOR TO ADMISSION: Pt reports independence w/ ambulation and denies history of falls. States he is independent w/ ADLs. Denies home O2 use. Pt oriented x2, admitting that his \"mind is going,\" therefore unsure accuracy of patient's report. HOME SUPPORT PRIOR TO ADMISSION: The patient lived with granddaughter however states granddaughter works full-time. Physical Therapy Goals  Initiated 12/17/2020  1. Patient will move from supine to sit and sit to supine , scoot up and down, and roll side to side in bed with independence within 7 day(s). 2.  Patient will transfer from bed to chair and chair to bed with supervision/set-up using the least restrictive device within 7 day(s). 3.  Patient will perform sit to stand with supervision/set-up within 7 day(s). 4.  Patient will ambulate with supervision/set-up for 200 feet with the least restrictive device within 7 day(s). 5.  Patient will ascend/descend 3 stairs with 1 handrail(s) with supervision/set-up within 7 day(s). Outcome: Progressing Towards Goal   PHYSICAL THERAPY EVALUATION  Patient: Charlotte Alcaraz Sr (80 y.o. male)  Date: 12/17/2020  Primary Diagnosis: Acute respiratory failure with hypoxia (HCC) [J96.01]  Acute on chronic systolic heart failure (HCC) [I50.23]  SUBHA (acute kidney injury) ([facility] [address] [N17.9]  Pleural effusion [J90]        Precautions:        ASSESSMENT  Based on the objective data described below, the patient presents with mild deficits in memory due to baseline dementia, generalized weakness, impaired activity tolerance, impaired balance, and overall impaired functional mobility. Pt tolerated therapy session well, however fatigued quickly during ambulation, requiring standing and seated rest breaks during ambulation.  Pt with minor path deviations in addition to mild increase in trunk sway and multiple, minor LOB, requiring SB/CGA for balance checks. Pt likely not far from his baseline level however would benefit from additional skilled intervention to optimize safe functional mobility. Recommend pt return home w/ assist of family and HHPT. Current Level of Function Impacting Discharge (mobility/balance): SB/CGAx1 during ambulation    Functional Outcome Measure: The patient scored 50/100 on the Barthel Index outcome measure which is indicative of moderate impairment in ADLs and functional mobility. Other factors to consider for discharge: dementia, falls risk, caregiver burden     Patient will benefit from skilled therapy intervention to address the above noted impairments. PLAN :  Recommendations and Planned Interventions: bed mobility training, transfer training, gait training, therapeutic exercises, patient and family training/education, and therapeutic activities      Frequency/Duration: Patient will be followed by physical therapy:  3 times a week to address goals. Recommendation for discharge: (in order for the patient to meet his/her long term goals)  Physical therapy at least 2 days/week in the home AND ensure assist and/or supervision for safety with functional mobility and ADLs    This discharge recommendation:  Has been made in collaboration with the attending provider and/or case management    IF patient discharges home will need the following DME: none         SUBJECTIVE:   Patient stated I can't really remember, my mind is going.     OBJECTIVE DATA SUMMARY:   HISTORY:    Past Medical History:   Diagnosis Date    Abdominal pain 7/18/2017    Alzheimer disease ([facility] [address] 7/18/2017    Anemia 7/18/2017    Arthritis     Back pain 7/18/2017    Bradycardia 7/18/2017    CAD (coronary artery disease)     hx of MI    CKD (chronic kidney disease), stage II 7/18/2017    constipation     Depression 7/18/2017    Diabetes mellitus ([facility] [address] 7/18/2017    Diverticulosis 7/18/2017    DJD (degenerative joint disease) 7/18/2017    Encounter for long-term (current) use of other medications 7/18/2017    Fatigue     Gastritis and duodenitis 7/18/2017    GERD (gastroesophageal reflux disease)     GI bleed 7/18/2017    Hearing loss     Hyperlipidemia 7/18/2017    Hypertension     Hypertension with renal disease 7/18/2017    Ill-defined condition     Dementia    Internal hemorrhoids 7/18/2017    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    S/P cardiac pacemaker procedure 5/4/2015    5/4/15 Medtronic dual chamber pacemaker implant     Weight loss     lost over 40 pounds last year- has no appetite     Past Surgical History:   Procedure Laterality Date    COLONOSCOPY N/A 6/22/2017    COLONOSCOPY performed by Robert Rodriguez MD at [address] [place] [address]  6/22/2017         HX HERNIA REPAIR  7/10/2014    right inguinal    HX OTHER SURGICAL      cystectomy from back    WI EGD TRANSORAL BIOPSY SINGLE/MULTIPLE  2/14/2012         WI UPPER GI ENDOSCOPY,W/DIR SUBMUC INJ  7/23/2020         UPPER GI ENDOSCOPY,BIOPSY  6/22/2017         UPPER GI ENDOSCOPY,BIOPSY  7/23/2020            Personal factors and/or comorbidities impacting plan of care:     Home Situation  Home Environment: Private residence  # Steps to Enter: 3  Rails to Enter: Yes  Hand Rails : Right  One/Two Story Residence: One story  Living Alone: No  Support Systems: Family member(s)  Patient Expects to be Discharged to[de-identified] Private residence  Current DME Used/Available at Home: None    EXAMINATION/PRESENTATION/DECISION MAKING:   Critical Behavior:  Neurologic State: Alert  Orientation Level: Disoriented to place, Disoriented to situation, Oriented to person, Oriented to place  Cognition: Decreased attention/concentration, Follows commands     Hearing:   Auditory  Auditory Impairment: Hard of hearing, bilateral  Skin:  intact  Edema: none noted   Range Of Motion:  AROM: Within functional limits                       Strength: Strength: Generally decreased, functional                    Tone & Sensation:   Tone: Normal              Sensation: Intact               Coordination:  Coordination: Within functional limits  Vision:      Functional Mobility:  Bed Mobility:  Rolling: Other (comment)(seated in bedside chair)           Transfers:  Sit to Stand: Stand-by assistance  Stand to Sit: Stand-by assistance                       Balance:   Sitting: Intact  Standing: Impaired  Standing - Static: Good  Standing - Dynamic : Fair  Ambulation/Gait Training:  Distance (ft): 160 Feet (ft)  Assistive Device: Gait belt  Ambulation - Level of Assistance: Stand-by assistance;Contact guard assistance        Gait Abnormalities: Decreased step clearance;Trunk sway increased; Path deviations        Base of Support: Narrowed     Speed/Maureen: Pace decreased (<100 feet/min)  Step Length: Left shortened;Right shortened                      Functional Measure:  Barthel Index:    Bathin  Bladder: 5  Bowels: 5  Groomin  Dressin  Feeding: 10  Mobility: 10  Stairs: 0  Toilet Use: 5  Transfer (Bed to Chair and Back): 10  Total: 50/100       The Barthel ADL Index: Guidelines  1. The index should be used as a record of what a patient does, not as a record of what a patient could do. 2. The main aim is to establish degree of independence from any help, physical or verbal, however minor and for whatever reason. 3. The need for supervision renders the patient not independent. 4. A patient's performance should be established using the best available evidence. Asking the patient, friends/relatives and nurses are the usual sources, but direct observation and common sense are also important. However direct testing is not needed. 5. Usually the patient's performance over the preceding 24-48 hours is important, but occasionally longer periods will be relevant. 6. Middle categories imply that the patient supplies over 50 per cent of the effort.   7. Use of aids to be independent is allowed. Brodie Monroy., Barthel, DJustynW. (0115). Functional evaluation: the Barthel Index. [address] (14)2. LINDY Coleman, Marvin Birch., He Pozo., Andre, [address] (1999). Measuring the change indisability after inpatient rehabilitation; comparison of the responsiveness of the Barthel Index and Functional [place] Measure. Journal of Neurology, Neurosurgery, and Psychiatry, 66(4), 751-886. NUNU Mack, INES Albright, & Spike Shanks M.A. (2004.) Assessment of post-stroke quality of life in cost-effectiveness studies: The usefulness of the Barthel Index and the EuroQoL-5D. Quality of Life Research, 15, 148-32           Physical Therapy Evaluation Charge Determination   History Examination Presentation Decision-Making   MEDIUM  Complexity : 1-2 comorbidities / personal factors will impact the outcome/ POC  MEDIUM Complexity : 3 Standardized tests and measures addressing body structure, function, activity limitation and / or participation in recreation  MEDIUM Complexity : Evolving with changing characteristics  MEDIUM Complexity : FOTO score of 26-74      Based on the above components, the patient evaluation is determined to be of the following complexity level: MEDIUM    Pain Rating:  Denied complaints of pain    Activity Tolerance:   Good    After treatment patient left in no apparent distress:   Sitting in chair, Call bell within reach, and Bed / chair alarm activated    COMMUNICATION/EDUCATION:   The patients plan of care was discussed with: Registered nurse. Fall prevention education was provided and the patient/caregiver indicated understanding., Patient/family have participated as able in goal setting and plan of care. , and Patient/family agree to work toward stated goals and plan of care.     Thank you for this referral.  Lavonne Baker, PT, DPT   Time Calculation: 15 mins

## 2020-12-17 NOTE — PROGRESS NOTES
End of Shift Note    Bedside shift change report given to Rachel Hennessy RN (oncoming nurse) by Jack Jenkins RN (offgoing nurse). Report included the following information SBAR, Kardex, Intake/Output, MAR, Accordion, Recent Results and Med Rec Status    Shift worked:  7a-7p     Shift summary and any significant changes:     Pts granddaughter visited, had meeting with Dr Damir Zhang. Pt is a difficult stick, did not have IV access until approximately 14:30 and missed one dose of lasix this am. Otherwise uneventful shift. Concerns for physician to address:  none     Zone phone for oncoming shift:   2004       Activity:  Activity Level: Up with Assistance  Number times ambulated in hallways past shift: 0  Number of times OOB to chair past shift: 0    Cardiac:   Cardiac Monitoring: Yes      Cardiac Rhythm: Paced    Access:   Current line(s): PIV     Genitourinary:   Urinary status: voiding    Respiratory:   O2 Device: Nasal cannula  Chronic home O2 use?: NO  Incentive spirometer at bedside: NO     GI:  Last Bowel Movement Date: 12/12/20  Current diet:  DIET CARDIAC Regular  DIET NUTRITIONAL SUPPLEMENTS Breakfast; Magic Cups  DIET NUTRITIONAL SUPPLEMENTS Lunch, Dinner; Ensure [employer]  Passing flatus: YES  Tolerating current diet: YES  % Diet Eaten: 45 %    Pain Management:   Patient states pain is manageable on current regimen: N/A    Skin:  Newton Score: 19  Interventions: nutritional support     Patient Safety:  Fall Score:  Total Score: 3  Interventions: bed/chair alarm, gripper socks, pt to call before getting OOB and stay with me (per policy)  High Fall Risk: Yes    Length of Stay:  Expected LOS: 4d 2h  Actual LOS: 2      Jack Jenkins RN

## 2020-12-17 NOTE — PROGRESS NOTES
Orders received, chart reviewed and patient evaluated by physical therapy. Pending progression with skilled acute physical therapy, recommend:  Physical therapy at least 2 days/week in the home and assist of niece. Full evaluation to follow.

## 2020-12-17 NOTE — PROGRESS NOTES
Problem: Pressure Injury - Risk of  Goal: *Prevention of pressure injury  Description: Document Newton Scale and appropriate interventions in the flowsheet. 12/17/2020 0323 by Shayan Schrader  Outcome: Progressing Towards Goal  Note: Pressure Injury Interventions:  Sensory Interventions: Assess changes in LOC, Minimize linen layers, Keep linens dry and wrinkle-free    Moisture Interventions: Absorbent underpads, Apply protective barrier, creams and emollients, Minimize layers    Activity Interventions: Increase time out of bed    Mobility Interventions: HOB 30 degrees or less    Nutrition Interventions: Document food/fluid/supplement intake                  12/17/2020 0323 by Shayan Schrader  Outcome: Progressing Towards Goal  Note: Pressure Injury Interventions:  Sensory Interventions: Assess changes in LOC, Minimize linen layers, Keep linens dry and wrinkle-free    Moisture Interventions: Absorbent underpads, Apply protective barrier, creams and emollients, Minimize layers    Activity Interventions: Increase time out of bed    Mobility Interventions: HOB 30 degrees or less    Nutrition Interventions: Document food/fluid/supplement intake                     Problem: Patient Education: Go to Patient Education Activity  Goal: Patient/Family Education  12/17/2020 0323 by Shayan Schrader  Outcome: Progressing Towards Goal  12/17/2020 0323 by Shayan Schrader  Outcome: Progressing Towards Goal     Problem: Falls - Risk of  Goal: *Absence of Falls  Description: Document Louis Fall Risk and appropriate interventions in the flowsheet.   12/17/2020 0323 by Shayan Schrader  Outcome: Progressing Towards Goal  Note: Fall Risk Interventions:  Mobility Interventions: Bed/chair exit alarm    Mentation Interventions: Bed/chair exit alarm    Medication Interventions: Teach patient to arise slowly, Patient to call before getting OOB    Elimination Interventions: Call light in reach, Bed/chair exit alarm           12/17/2020 0323 by Christos Roberts  Outcome: Progressing Towards Goal  Note: Fall Risk Interventions:  Mobility Interventions: Bed/chair exit alarm    Mentation Interventions: Bed/chair exit alarm    Medication Interventions: Teach patient to arise slowly, Patient to call before getting OOB    Elimination Interventions: Call light in reach, Bed/chair exit alarm              Problem: Patient Education: Go to Patient Education Activity  Goal: Patient/Family Education  12/17/2020 0323 by Christos Roberts  Outcome: Progressing Towards Goal  12/17/2020 0323 by Christos Roberts  Outcome: Progressing Towards Goal   Patient is alert and oriented; able to voice needs. Able to ambulate to bathroom with 1 standby assist. No acute distress noted.

## 2020-12-17 NOTE — PROGRESS NOTES
PROGRESS NOTE    NAME:  Reba Farfan    :   1931   MRN:   [id number]     Date/Time:  2020 5:59 AM  Subjective:   History:  Chart reviewed and patient seen and examined this AM and D/W his nurse and all events noted. He was admitted on  with increased SOB, CHF, anasarca and generalized failure to thrive. He has Dementia, Cardiomyopathy, CKD st 3 and other multiple medical problems and was recently taken off diuretics due to poor PO intake and deteriorating renal function. He this AM is lying flat in bed w/o oxygen and denies SOB, CP or other cardiac or respiratory c/o. He has no GI/ c/o. He has no neurologic c/o except he is \"weak and I don't feel good or the same when I'm up\". When asked for specifics he was unable to give any. His history is limited due to his dementia. He knows his name and knows that I'm his doctor and did finally remember my name and is correct on  and now knows that he is in [facility]; however otherwise confused.       Medications reviewed:  Current Facility-Administered Medications   Medication Dose Route Frequency    lisinopriL (PRINIVIL, ZESTRIL) tablet 2.5 mg  2.5 mg Oral DAILY    polyethylene glycol (MIRALAX) packet 17 g  17 g Oral DAILY    aspirin chewable tablet 81 mg  81 mg Oral DAILY    carvediloL (COREG) tablet 6.25 mg  6.25 mg Oral BID    donepeziL (ARICEPT) tablet 5 mg  5 mg Oral DAILY    memantine (NAMENDA) tablet 5 mg  5 mg Oral DAILY    pantoprazole (PROTONIX) tablet 40 mg  40 mg Oral ACB    sodium chloride (NS) flush 5-40 mL  5-40 mL IntraVENous Q8H    sodium chloride (NS) flush 5-40 mL  5-40 mL IntraVENous PRN    acetaminophen (TYLENOL) tablet 650 mg  650 mg Oral Q6H PRN    Or    acetaminophen (TYLENOL) suppository 650 mg  650 mg Rectal Q6H PRN    promethazine (PHENERGAN) tablet 12.5 mg  12.5 mg Oral Q6H PRN    Or    ondansetron (ZOFRAN) injection 4 mg  4 mg IntraVENous Q6H PRN    furosemide (LASIX) injection 20 mg  20 mg IntraVENous BID    heparin (porcine) injection 5,000 Units  5,000 Units SubCUTAneous Q12H    dextrose 5% infusion  50 mL/hr IntraVENous CONTINUOUS        Objective:   Vitals:  Visit Vitals  BP (!) 123/55   Pulse 61   Temp 97.7 °F (36.5 °C)   Resp 17   Ht 5' 8\" (1.727 m)   Wt 127 lb 3.3 oz (57.7 kg)   SpO2 99%   BMI 19.34 kg/m²    O2 Flow Rate (L/min): 2 l/min O2 Device: Room air Temp (24hrs), Av.8 °F (36.6 °C), Min:97.4 °F (36.3 °C), Max:98.5 °F (36.9 °C)      Last 24hr Input/Output:    Intake/Output Summary (Last 24 hours) at 2020 0559  Last data filed at 2020 2112  Gross per 24 hour   Intake 1161.67 ml   Output 325 ml   Net 836.67 ml        PHYSICAL EXAM:  General:     Alert, cooperative, no distress, appears stated age. Head:    Normocephalic, without obvious abnormality, atraumatic. Eyes:    Conjunctivae/corneas clear. PERRLA  Nose:   Nares normal. No drainage or sinus tenderness. Throat:     Lips, mucosa, and tongue normal.  No Thrush  Neck:   Supple, symmetrical,  no adenopathy, thyroid: non tender     no carotid bruit and no JVD. Back:     Symmetric,  No CVA tenderness. Lungs:    Clear to auscultation bilaterally. No Wheezing or Rhonchi. No rales. Heart:    Regular rate and rhythm,  no murmur, rub or gallop. Abdomen:    Soft, non-tender. Not distended. Bowel sounds normal. No masses  Extremities:  Extremities normal, atraumatic, No cyanosis. No edema. No clubbing  Lymph nodes:  Cervical, supraclavicular normal.  Neurologic:  Generalized decreased strength, Alert and oriented X 3 but still confused.    Skin:                 No rash      Lab Data Reviewed:    Recent Results (from the past 24 hour(s))   CBC WITH AUTOMATED DIFF    Collection Time: 20  1:45 PM   Result Value Ref Range    WBC 5.5 4.1 - 11.1 K/uL    RBC 4.11 4.10 - 5.70 M/uL    HGB 11.2 (L) 12.1 - 17.0 g/dL    HCT 35.6 (L) 36.6 - 50.3 %    MCV 86.6 80.0 - 99.0 FL    MCH 27.3 26.0 - 34.0 PG    MCHC 31.5 30.0 - 36.5 g/dL    RDW 15.7 (H) 11.5 - 14.5 %    PLATELET 216 202 - 224 K/uL    MPV 12.3 8.9 - 12.9 FL    NRBC 0.0 0  WBC    ABSOLUTE NRBC 0.00 0.00 - 0.01 K/uL    NEUTROPHILS 76 (H) 32 - 75 %    LYMPHOCYTES 12 12 - 49 %    MONOCYTES 8 5 - 13 %    EOSINOPHILS 2 0 - 7 %    BASOPHILS 1 0 - 1 %    IMMATURE GRANULOCYTES 1 (H) 0.0 - 0.5 %    ABS. NEUTROPHILS 4.1 1.8 - 8.0 K/UL    ABS. LYMPHOCYTES 0.7 (L) 0.8 - 3.5 K/UL    ABS. MONOCYTES 0.4 0.0 - 1.0 K/UL    ABS. EOSINOPHILS 0.1 0.0 - 0.4 K/UL    ABS. BASOPHILS 0.1 0.0 - 0.1 K/UL    ABS. IMM. GRANS. 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, without long-term current use of insulin ([facility] (7/18/2017)      Alzheimer disease ([facility] (7/18/2017)      SSS (sick sinus syndrome) ([facility] (6/2/2015)      Cardiomyopathy ([facility] (11/7/2019)      Acute renal failure superimposed on stage 3 chronic kidney disease ([facility] (7/20/2020)      Severe protein-calorie malnutrition ([facility] (7/21/2020)      Pleural effusion (12/14/2020)      SUBHA (acute kidney injury) ([facility] (12/14/2020)      Acute respiratory failure with hypoxia ([facility] (12/14/2020)      Anasarca (12/15/2020)       ___________________________________________________  PLAN:    1. Continue diuresis with low dose lasix IV  2. Follow renal function (baseline 21/1.6 in Oct), (41/1.8 on 12/2 at office), (50/2.14 on adm) and today 40/1.80  3. Repeat Echo, Prior Efx on Echo 25% now 15-20% as below  ·     LV: Estimated LVEF is 15 - 20%. Dilated left ventricle. Mild septal wall hypertrophy. Severely reduced systolic function; Worse function in the inferior and lateral walls. · RV: Borderline low systolic function. Pacer/ICD present.   · RA: Dilated right atrium. · AV: Mild to moderate aortic valve regurgitation is present. · MV: Mitral valve thickening. Moderate to severe mitral valve regurgitation is present. · TV: Moderate tricuspid valve regurgitation is present. · PV: Mild pulmonic valve regurgitation is present. PA: Pulmonary arterial systolic pressure is 50 mmHg. 4.  Follow CXR, effusion stable on chest x-ray 12/15  5. Nasal oxygen as needed  6. Continue low dose Coreg with CHF, resume low-dose ACE today with decreased EF on echo and follow renal function closely  7. Continue Aricept and Namenda with dementia  8. Palliative consult note reviewed and I agree with DNR  9.   We will try to mobilize with PT        If need to contact me use hospital  [phone], DO NOT USE PERFECT SERVE    ___________________________________________________    Attending Physician: Lauri Mcardle, MD

## 2020-12-18 LAB
ANION GAP SERPL CALC-SCNC: 5 MMOL/L (ref 5–15)
BUN SERPL-MCNC: 34 MG/DL (ref 6–20)
BUN/CREAT SERPL: 21 (ref 12–20)
CALCIUM SERPL-MCNC: 8.9 MG/DL (ref 8.5–10.1)
CHLORIDE SERPL-SCNC: 102 MMOL/L (ref 97–108)
CO2 SERPL-SCNC: 31 MMOL/L (ref 21–32)
CREAT SERPL-MCNC: 1.63 MG/DL (ref 0.7–1.3)
GLUCOSE SERPL-MCNC: 94 MG/DL (ref 65–100)
POTASSIUM SERPL-SCNC: 3.2 MMOL/L (ref 3.5–5.1)
SODIUM SERPL-SCNC: 138 MMOL/L (ref 136–145)

## 2020-12-18 PROCEDURE — 80048 BASIC METABOLIC PNL TOTAL CA: CPT

## 2020-12-18 PROCEDURE — 74011250637 HC RX REV CODE- 250/637: Performed by: INTERNAL MEDICINE

## 2020-12-18 PROCEDURE — 36415 COLL VENOUS BLD VENIPUNCTURE: CPT

## 2020-12-18 PROCEDURE — 97116 GAIT TRAINING THERAPY: CPT

## 2020-12-18 PROCEDURE — 74011250636 HC RX REV CODE- 250/636: Performed by: INTERNAL MEDICINE

## 2020-12-18 PROCEDURE — 94760 N-INVAS EAR/PLS OXIMETRY 1: CPT

## 2020-12-18 PROCEDURE — 97530 THERAPEUTIC ACTIVITIES: CPT

## 2020-12-18 PROCEDURE — 99232 SBSQ HOSP IP/OBS MODERATE 35: CPT | Performed by: INTERNAL MEDICINE

## 2020-12-18 PROCEDURE — 65270000029 HC RM PRIVATE

## 2020-12-18 RX ADMIN — LISINOPRIL 2.5 MG: 5 TABLET ORAL at 08:42

## 2020-12-18 RX ADMIN — Medication 10 ML: at 22:00

## 2020-12-18 RX ADMIN — Medication 10 ML: at 17:25

## 2020-12-18 RX ADMIN — POLYETHYLENE GLYCOL 3350 17 G: 17 POWDER, FOR SOLUTION ORAL at 08:41

## 2020-12-18 RX ADMIN — DONEPEZIL HYDROCHLORIDE 5 MG: 5 TABLET, FILM COATED ORAL at 08:41

## 2020-12-18 RX ADMIN — DEXTROSE MONOHYDRATE 50 ML/HR: 5 INJECTION, SOLUTION INTRAVENOUS at 17:28

## 2020-12-18 RX ADMIN — HEPARIN SODIUM 5000 UNITS: 5000 INJECTION INTRAVENOUS; SUBCUTANEOUS at 17:25

## 2020-12-18 RX ADMIN — PANTOPRAZOLE SODIUM 40 MG: 40 TABLET, DELAYED RELEASE ORAL at 06:21

## 2020-12-18 RX ADMIN — FUROSEMIDE 20 MG: 10 INJECTION, SOLUTION INTRAMUSCULAR; INTRAVENOUS at 08:42

## 2020-12-18 RX ADMIN — HEPARIN SODIUM 5000 UNITS: 5000 INJECTION INTRAVENOUS; SUBCUTANEOUS at 06:18

## 2020-12-18 RX ADMIN — FUROSEMIDE 20 MG: 10 INJECTION, SOLUTION INTRAMUSCULAR; INTRAVENOUS at 17:26

## 2020-12-18 RX ADMIN — ASPIRIN 81 MG CHEWABLE TABLET 81 MG: 81 TABLET CHEWABLE at 08:41

## 2020-12-18 RX ADMIN — MEMANTINE HYDROCHLORIDE 5 MG: 10 TABLET ORAL at 08:41

## 2020-12-18 RX ADMIN — Medication 10 ML: at 06:20

## 2020-12-18 NOTE — PROGRESS NOTES
Nutrition Assessment     Type and Reason for Visit: Reassess    Nutrition Recommendations/Plan:   · Continue diet as ordered/tolerated. · Continue ensure shakes + magic cups. · Please document % meals and supplements consumed in flowsheet I/O's under intake. Nutrition Assessment:     12/18: Chart reviewed; pt sitting up in chair at time of visit. Pt had been snacking on peaches and gingerale left from lunch mago. PO intake seems to be improving. Pt commented that he was waiting for his daughter to arrive. Pt continues to tolerate a Cardiac diet + Ensure shakes and Magic Cups. Patient Vitals for the past 72 hrs:   % Diet Eaten   12/17/20 1754 75 %   12/17/20 1309 50 %   12/17/20 0942 50 %   12/15/20 1837 45 %   12/15/20 1520 25 %     Last Weight Metric  Weight Loss Metrics 12/18/2020 12/2/2020 12/1/2020 10/22/2020 8/5/2020 7/20/2020 7/20/2020   Today's Wt 129 lb 13.6 oz 123 lb 125 lb 131 lb 3.2 oz - 114 lb 10.2 oz 115 lb 11.2 oz   BMI 19.74 kg/m2 18.16 kg/m2 18.46 kg/m2 19.37 kg/m2 16.93 kg/m2 16.93 kg/m2 17.09 kg/m2     12/15: Pt admitted with respiratory failure, heart failure and FTT. PMH: CKD stage 3, dementia, CHF, GERD, HTN. Chart reviewed, pt sleeping soundly under covers. No family in the room to speak with. Even under blankets, it is obvious that pt is extremely cachectic. Noted untouched lunch tray. MST triggered for poor appetite and wt loss PTA. No wt source for current wt so may not be reliable. Will add additional PO supplements BID. Palliative has been consulted. Malnutrition Assessment:  Malnutrition Status: Severe malnutrition     Estimated Daily Nutrient Needs:  Energy (kcal):  MSJ 1900 (1216 x 1.3 +300 for wt gain)  Protein (g):  69-75g (1.2-1.3gPro/kg)       Fluid (ml/day):  1900mL    Nutrition Related Findings:  Meds: miralax, D5 @ 50ml/hr, lasix, protonix;  BM: 12/12      Current Nutrition Therapies:  DIET CARDIAC Regular  DIET NUTRITIONAL SUPPLEMENTS Breakfast; Magic Cups  DIET NUTRITIONAL SUPPLEMENTS Lunch, Dinner; Ensure [employer]    Anthropometric Measures:  · Height:  5' 8\" (172.7 cm)  · Current Body Wt:  57.7 kg (127 lb 3.3 oz)  · BMI: 19.3    Nutrition Diagnosis:   · Inadequate protein-energy intake related to cognitive or neurological impairment, other (specify)(poor appetite PTA) as evidenced by BMI, severe muscle loss, severe loss of subcutaneous fat    Nutrition Intervention:  Food and/or Nutrient Delivery: Continue current diet, Start oral nutrition supplement, Continue oral nutrition supplement  Nutrition Education and Counseling: No recommendations at this time  Coordination of Nutrition Care: Continue to monitor while inpatient    Goals:  PO intake at least 50% of meals + 240 ml ONS next 3-5 days       Nutrition Monitoring and Evaluation:   Behavioral-Environmental Outcomes:    Food/Nutrient Intake Outcomes: Diet advancement/tolerance, Food and nutrient intake  Physical Signs/Symptoms Outcomes: Biochemical data, Fluid status or edema, Nutrition focused physical findings, GI status    Discharge Planning:    Continue current diet, Continue oral nutrition supplement     Electronically signed by Cee Dolan RD on 12/18/2020 at 1:48 PM

## 2020-12-18 NOTE — PROGRESS NOTES
Problem: Mobility Impaired (Adult and Pediatric)  Goal: *Acute Goals and Plan of Care (Insert Text)  Description: FUNCTIONAL STATUS PRIOR TO ADMISSION: Pt reports independence w/ ambulation and denies history of falls. States he is independent w/ ADLs. Denies home O2 use. Pt oriented x2, admitting that his \"mind is going,\" therefore unsure accuracy of patient's report. HOME SUPPORT PRIOR TO ADMISSION: The patient lived with granddaughter however states granddaughter works full-time. Physical Therapy Goals  Initiated 12/17/2020  1. Patient will move from supine to sit and sit to supine , scoot up and down, and roll side to side in bed with independence within 7 day(s). 2.  Patient will transfer from bed to chair and chair to bed with supervision/set-up using the least restrictive device within 7 day(s). 3.  Patient will perform sit to stand with supervision/set-up within 7 day(s). 4.  Patient will ambulate with supervision/set-up for 200 feet with the least restrictive device within 7 day(s). 5.  Patient will ascend/descend 3 stairs with 1 handrail(s) with supervision/set-up within 7 day(s). Outcome: Progressing Towards Goal   PHYSICAL THERAPY TREATMENT  Patient: Antonino Sánchez Sr (80 y.o. male)  Date: 12/18/2020  Diagnosis: Acute respiratory failure with hypoxia (HCC) [J96.01]  Acute on chronic systolic heart failure (HCC) [I50.23]  SUHBA (acute kidney injury) ([facility] [address] [N17.9]  Pleural effusion [J90] Acute on chronic systolic heart failure (HCC)       Precautions:    Chart, physical therapy assessment, plan of care and goals were reviewed. ASSESSMENT  Patient continues with skilled PT services and is progressing towards goals. Pt remains pleasantly confused due to baseline dementia however extremely motivated and agreeable to participation with therapy. Pt fatigues quickly during activity, requiring frequent seated rest breaks b/t ambulation trials.  Gait stability remains fair overall secondary to increased trunk sway with multiple path deviations. Pt with x2 episodes of scissoring BLEs, resulting in minor LOB, requiring CGAx1 for balance check. Pt tolerated therapy session well and is likely nearing his baseline level. Continue to recommend pt return home w/ HHPT and 24hr assist of family. Current Level of Function Impacting Discharge (mobility/balance): CGAx1 during ambulation without device    Other factors to consider for discharge: baseline dementia, impaired higher level balance         PLAN :  Patient continues to benefit from skilled intervention to address the above impairments. Continue treatment per established plan of care. to address goals. Recommendation for discharge: (in order for the patient to meet his/her long term goals)  Physical therapy at least 2 days/week in the home w/ 24hr assist of family    This discharge recommendation:  Has been made in collaboration with the attending provider and/or case management    IF patient discharges home will need the following DME: none       SUBJECTIVE:   Patient stated It feels good to sit up in this chair.     OBJECTIVE DATA SUMMARY:   Critical Behavior:  Neurologic State: Alert  Orientation Level: Disoriented to time, Oriented to place, Oriented to situation, Oriented to time  Cognition: Appropriate decision making, Appropriate for age attention/concentration, Appropriate safety awareness, Follows commands     Functional Mobility Training:  Bed Mobility:  Rolling: Supervision  Supine to Sit: Supervision     Scooting: Supervision        Transfers:  Sit to Stand: Stand-by assistance;Contact guard assistance  Stand to Sit: Stand-by assistance;Contact guard assistance        Bed to Chair: Contact guard assistance                    Balance:  Sitting: Intact  Standing: Impaired  Standing - Static: Good  Standing - Dynamic : Fair  Ambulation/Gait Training:  Distance (ft): 200 Feet (ft)(120ft, 40ft, 40ft w/ seated rest breaks b/t)  Assistive Device: Gait belt  Ambulation - Level of Assistance: Contact guard assistance        Gait Abnormalities: Decreased step clearance;Trunk sway increased; Path deviations        Base of Support: Narrowed     Speed/Maureen: Pace decreased (<100 feet/min)  Step Length: Left shortened;Right shortened                      Therapeutic Exercises:   5x sit<>stand transfer  2x 10 standing dalton    Pain Rating:  Denied complaints of pain    Activity Tolerance:   Good    After treatment patient left in no apparent distress:   Sitting in chair, Call bell within reach, and Bed / chair alarm activated    COMMUNICATION/COLLABORATION:   The patients plan of care was discussed with: Registered nurse.      Ngozi Hernandez, PT, DPT   Time Calculation: 23 mins

## 2020-12-18 NOTE — PROGRESS NOTES
End of Shift Note    Bedside shift change report given to Edson Meigs (oncoming nurse) by Laurence Nugent (offgoing nurse). Report included the following information SBAR, Intake/Output, Recent Results and Cardiac Rhythm      Shift worked:  7p-7a     Shift summary and any significant changes:    Patient noted with low potassium this morning of 3.2. Concerns for physician to address: Potassium level and vtach x 8 beats during the night - asymptomatic   Zone phone for oncoming shift:  [phone]     Activity:  Activity Level: Up with Assistance  Number times ambulated in hallways past shift: 0  Number of times OOB to chair past shift: 0    Cardiac:   Cardiac Monitoring: Yes      Cardiac Rhythm: Paced    Access:   Current line(s): PIV     Genitourinary:   Urinary status: voiding    Respiratory:   O2 Device: Room air  Chronic home O2 use?: NO  Incentive spirometer at bedside: NO     GI:  Last Bowel Movement Date: 12/12/20  Current diet:  DIET CARDIAC Regular  DIET NUTRITIONAL SUPPLEMENTS Breakfast; Magic Cups  DIET NUTRITIONAL SUPPLEMENTS Lunch, Dinner; Ensure [employer]  Passing flatus: YES  Tolerating current diet: YES  % Diet Eaten: 75 %    Pain Management:   Patient states pain is manageable on current regimen: YES    Skin:  Newton Score: 19  Interventions: increase time out of bed    Patient Safety:  Fall Score:  Total Score: 3  Interventions: gripper socks, pt to call before getting OOB and stay with me (per policy)  High Fall Risk: Yes    Length of Stay:  Expected LOS: 4d 2h  Actual LOS: [facility]

## 2020-12-18 NOTE — PROGRESS NOTES
PROGRESS NOTE    NAME:  Marilu Lyons Sr   :   1931   MRN:   [id number]     Date/Time:  2020 6:02 AM  Subjective:   History:  Chart reviewed and patient seen and examined this AM and D/W his nurse and all events noted. He was admitted on  with increased SOB, CHF, anasarca and generalized failure to thrive. He has Dementia, Cardiomyopathy, CKD st 3 and other multiple medical problems and was recently taken off diuretics due to poor PO intake and deteriorating renal function. He this AM is lying flat in bed w/o oxygen and denies SOB, CP or other cardiac or respiratory c/o. He has no GI/ c/o. He has no neurologic c/o except he is \"weak but I almost feel like myself\". . His history is limited due to his dementia. He knows his name and knows that I'm his doctor and did finally remember my name and is correct on  and now knows that he is in [facility]; however otherwise confused.       Medications reviewed:  Current Facility-Administered Medications   Medication Dose Route Frequency    lisinopriL (PRINIVIL, ZESTRIL) tablet 2.5 mg  2.5 mg Oral DAILY    polyethylene glycol (MIRALAX) packet 17 g  17 g Oral DAILY    aspirin chewable tablet 81 mg  81 mg Oral DAILY    carvediloL (COREG) tablet 6.25 mg  6.25 mg Oral BID    donepeziL (ARICEPT) tablet 5 mg  5 mg Oral DAILY    memantine (NAMENDA) tablet 5 mg  5 mg Oral DAILY    pantoprazole (PROTONIX) tablet 40 mg  40 mg Oral ACB    sodium chloride (NS) flush 5-40 mL  5-40 mL IntraVENous Q8H    sodium chloride (NS) flush 5-40 mL  5-40 mL IntraVENous PRN    acetaminophen (TYLENOL) tablet 650 mg  650 mg Oral Q6H PRN    Or    acetaminophen (TYLENOL) suppository 650 mg  650 mg Rectal Q6H PRN    promethazine (PHENERGAN) tablet 12.5 mg  12.5 mg Oral Q6H PRN    Or    ondansetron (ZOFRAN) injection 4 mg  4 mg IntraVENous Q6H PRN    furosemide (LASIX) injection 20 mg  20 mg IntraVENous BID    heparin (porcine) injection 5,000 Units  5,000 Units SubCUTAneous Q12H    dextrose 5% infusion  50 mL/hr IntraVENous CONTINUOUS        Objective:   Vitals:  Visit Vitals  /62 (BP 1 Location: Left arm, BP Patient Position: At rest)   Pulse 75   Temp 97.5 °F (36.4 °C)   Resp 15   Ht 5' 8\" (1.727 m)   Wt 129 lb 13.6 oz (58.9 kg)   SpO2 96%   BMI 19.74 kg/m²    O2 Flow Rate (L/min): 2 l/min O2 Device: Room air Temp (24hrs), Av.1 °F (36.7 °C), Min:97.5 °F (36.4 °C), Max:98.6 °F (37 °C)      Last 24hr Input/Output:    Intake/Output Summary (Last 24 hours) at 2020 0602  Last data filed at 2020 2247  Gross per 24 hour   Intake 1875.53 ml   Output 875 ml   Net 1000.53 ml        PHYSICAL EXAM:  General:     Alert, cooperative, no distress, appears stated age. Head:    Normocephalic, without obvious abnormality, atraumatic. Eyes:    Conjunctivae/corneas clear. PERRLA  Nose:   Nares normal. No drainage or sinus tenderness. Throat:     Lips, mucosa, and tongue normal.  No Thrush  Neck:   Supple, symmetrical,  no adenopathy, thyroid: non tender     no carotid bruit and no JVD. Back:     Symmetric,  No CVA tenderness. Lungs:    Clear to auscultation bilaterally. No Wheezing or Rhonchi. No rales. Heart:    Regular rate and rhythm,  no murmur, rub or gallop. Abdomen:    Soft, non-tender. Not distended. Bowel sounds normal. No masses  Extremities:  Extremities normal, atraumatic, No cyanosis. No edema. No clubbing  Lymph nodes:  Cervical, supraclavicular normal.  Neurologic:  Generalized decreased strength, Alert and oriented X 3 but still confused.    Skin:                 No rash      Lab Data Reviewed:    Recent Results (from the past 24 hour(s))   METABOLIC PANEL, BASIC    Collection Time: 20  4:25 AM   Result Value Ref Range    Sodium 138 136 - 145 mmol/L    Potassium 3.2 (L) 3.5 - 5.1 mmol/L    Chloride 102 97 - 108 mmol/L    CO2 31 21 - 32 mmol/L    Anion gap 5 5 - 15 mmol/L    Glucose 94 65 - 100 mg/dL    BUN 34 (H) 6 - 20 MG/DL Creatinine 1.63 (H) 0.70 - 1.30 MG/DL    BUN/Creatinine ratio 21 (H) 12 - 20      GFR est AA 49 (L) >60 ml/min/1.73m2    GFR est non-AA 40 (L) >60 ml/min/1.73m2    Calcium 8.9 8.5 - 10.1 MG/DL         Assessment/Plan:     Principal Problem:    Acute on chronic systolic heart failure ([facility] [address] (12/14/2020)    Active Problems:    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, without long-term current use of insulin ([facility] [address] (7/18/2017)      Alzheimer disease ([facility] [address] (7/18/2017)      SSS (sick sinus syndrome) ([facility] [address] (6/2/2015)      Cardiomyopathy ([facility] [address] (11/7/2019)      Acute renal failure superimposed on stage 3 chronic kidney disease ([facility] [address] (7/20/2020)      Severe protein-calorie malnutrition ([facility] [address] (7/21/2020)      Pleural effusion (12/14/2020)      SUBHA (acute kidney injury) ([facility] [address] (12/14/2020)      Acute respiratory failure with hypoxia ([facility] [address] (12/14/2020)      Anasarca (12/15/2020)       ___________________________________________________  PLAN:    1. Continue diuresis with low dose lasix IV  2. Follow renal function (baseline 21/1.6 in Oct), (41/1.8 on 12/2 at office), (50/2.14 on adm) and today 34/1.63  3. Repeat Echo, Prior Efx on Echo 25% now 15-20% as below  ·     LV: Estimated LVEF is 15 - 20%. Dilated left ventricle. Mild septal wall hypertrophy. Severely reduced systolic function; Worse function in the inferior and lateral walls. · RV: Borderline low systolic function. Pacer/ICD present. · RA: Dilated right atrium. · AV: Mild to moderate aortic valve regurgitation is present. · MV: Mitral valve thickening. Moderate to severe mitral valve regurgitation is present. · TV: Moderate tricuspid valve regurgitation is present. · PV: Mild pulmonic valve regurgitation is present. PA: Pulmonary arterial systolic pressure is 50 mmHg. 4.  Follow CXR, effusion stable on chest x-ray 12/15  5. Nasal oxygen as needed  6.   Continue low dose Coreg with CHF, resumed low-dose ACE yesterday with decreased EF on echo and follow renal function closely  7. Continue Aricept and Namenda with dementia  8. Palliative consult note reviewed and I agree with DNR  9.   We will try to mobilize with PT, D/C planning        If need to contact me use hospital  [phone], DO NOT USE PERFECT SERVE    ___________________________________________________    Attending Physician: John Tate MD

## 2020-12-18 NOTE — PROGRESS NOTES
Problem: Pressure Injury - Risk of  Goal: *Prevention of pressure injury  Description: Document Newton Scale and appropriate interventions in the flowsheet. Outcome: Progressing Towards Goal  Note: Pressure Injury Interventions:  Sensory Interventions: Assess changes in LOC    Moisture Interventions: Absorbent underpads    Activity Interventions: Increase time out of bed    Mobility Interventions: HOB 30 degrees or less    Nutrition Interventions: Document food/fluid/supplement intake                     Problem: Patient Education: Go to Patient Education Activity  Goal: Patient/Family Education  Outcome: Progressing Towards Goal     Problem: Falls - Risk of  Goal: *Absence of Falls  Description: Document Louis Fall Risk and appropriate interventions in the flowsheet. Outcome: Progressing Towards Goal  Note: Fall Risk Interventions:  Mobility Interventions: Bed/chair exit alarm    Mentation Interventions: Bed/chair exit alarm    Medication Interventions: Patient to call before getting OOB    Elimination Interventions: Bed/chair exit alarm, Call light in reach, Patient to call for help with toileting needs, Stay With Me (per policy)              Problem: Patient Education: Go to Patient Education Activity  Goal: Patient/Family Education  Outcome: Progressing Towards Goal     Problem: Patient Education: Go to Patient Education Activity  Goal: Patient/Family Education  Outcome: Progressing Towards Goal  Patient is alert and oriented with some forgetfulness and intermittent confusion. Denies any pain or discomfort. Continues IVF as ordered. Patient noted with low blood pressure than usual and asymptomatic. Also received call from tele monitor stating that patient had 8 beats of v-tach. Denies any chest pain, etc. NP notified and awaiting response. No acute distress noted.

## 2020-12-18 NOTE — PROGRESS NOTES
End of Shift Note    Bedside shift change report given to Nicol Vázquez (oncoming nurse) by Aleah Esqueda (offgoing nurse). Report included the following information SBAR, Kardex and MAR    Shift worked:  7a-7p     Shift summary and any significant changes:     No significant changes this shift. Patient was up to the chair for all meals. No complaints of pain this shift. Concerns for physician to address:  none     Zone phone for oncoming shift:   [phone]       Activity:  Activity Level: Up with Assistance  Number times ambulated in hallways past shift: 0  Number of times OOB to chair past shift: 3    Cardiac:   Cardiac Monitoring: Yes      Cardiac Rhythm: Paced    Access:   Current line(s): PIV     Genitourinary:   Urinary status: voiding    Respiratory:   O2 Device: Room air  Chronic home O2 use?: NO  Incentive spirometer at bedside: NO     GI:  Last Bowel Movement Date: 12/12/20  Current diet:  DIET CARDIAC Regular  DIET NUTRITIONAL SUPPLEMENTS Breakfast; Magic Cups  DIET NUTRITIONAL SUPPLEMENTS Lunch, Dinner; Ensure [employer]  Passing flatus: YES  Tolerating current diet: YES  % Diet Eaten: 75 %    Pain Management:   Patient states pain is manageable on current regimen: N/A    Skin:  Newton Score: 19  Interventions: turn team, speciality bed and increase time out of bed    Patient Safety:  Fall Score:  Total Score: 3  Interventions: bed/chair alarm, assistive device (walker, cane, etc), gripper socks and pt to call before getting OOB  High Fall Risk: Yes    Length of Stay:  Expected LOS: 4d 2h  Actual LOS: [address]

## 2020-12-18 NOTE — PROGRESS NOTES
Problem: Pressure Injury - Risk of  Goal: *Prevention of pressure injury  Description: Document Newton Scale and appropriate interventions in the flowsheet. Outcome: Progressing Towards Goal  Note: Pressure Injury Interventions:  Sensory Interventions: Assess changes in LOC, Keep linens dry and wrinkle-free, Maintain/enhance activity level, Minimize linen layers, Monitor skin under medical devices, Pad between skin to skin    Moisture Interventions: Absorbent underpads    Activity Interventions: Increase time out of bed    Mobility Interventions: HOB 30 degrees or less    Nutrition Interventions: Document food/fluid/supplement intake                     Problem: Patient Education: Go to Patient Education Activity  Goal: Patient/Family Education  Outcome: Progressing Towards Goal     Problem: Falls - Risk of  Goal: *Absence of Falls  Description: Document Louis Fall Risk and appropriate interventions in the flowsheet.   Outcome: Progressing Towards Goal  Note: Fall Risk Interventions:  Mobility Interventions: Bed/chair exit alarm, Patient to call before getting OOB    Mentation Interventions: Bed/chair exit alarm    Medication Interventions: Patient to call before getting OOB    Elimination Interventions: Bed/chair exit alarm              Problem: Patient Education: Go to Patient Education Activity  Goal: Patient/Family Education  Outcome: Progressing Towards Goal     Problem: Patient Education: Go to Patient Education Activity  Goal: Patient/Family Education  Outcome: Progressing Towards Goal

## 2020-12-19 PROCEDURE — 74011250637 HC RX REV CODE- 250/637: Performed by: INTERNAL MEDICINE

## 2020-12-19 PROCEDURE — 74011250636 HC RX REV CODE- 250/636: Performed by: INTERNAL MEDICINE

## 2020-12-19 PROCEDURE — 65270000029 HC RM PRIVATE

## 2020-12-19 PROCEDURE — 99233 SBSQ HOSP IP/OBS HIGH 50: CPT | Performed by: INTERNAL MEDICINE

## 2020-12-19 RX ADMIN — HEPARIN SODIUM 5000 UNITS: 5000 INJECTION INTRAVENOUS; SUBCUTANEOUS at 17:05

## 2020-12-19 RX ADMIN — MEMANTINE HYDROCHLORIDE 5 MG: 10 TABLET ORAL at 10:18

## 2020-12-19 RX ADMIN — CARVEDILOL 6.25 MG: 6.25 TABLET, FILM COATED ORAL at 10:18

## 2020-12-19 RX ADMIN — ASPIRIN 81 MG CHEWABLE TABLET 81 MG: 81 TABLET CHEWABLE at 10:17

## 2020-12-19 RX ADMIN — Medication 10 ML: at 05:50

## 2020-12-19 RX ADMIN — DONEPEZIL HYDROCHLORIDE 5 MG: 5 TABLET, FILM COATED ORAL at 10:18

## 2020-12-19 RX ADMIN — PANTOPRAZOLE SODIUM 40 MG: 40 TABLET, DELAYED RELEASE ORAL at 05:51

## 2020-12-19 RX ADMIN — FUROSEMIDE 20 MG: 10 INJECTION, SOLUTION INTRAMUSCULAR; INTRAVENOUS at 17:05

## 2020-12-19 RX ADMIN — POLYETHYLENE GLYCOL 3350 17 G: 17 POWDER, FOR SOLUTION ORAL at 10:17

## 2020-12-19 RX ADMIN — FUROSEMIDE 20 MG: 10 INJECTION, SOLUTION INTRAMUSCULAR; INTRAVENOUS at 10:17

## 2020-12-19 RX ADMIN — Medication 10 ML: at 14:21

## 2020-12-19 RX ADMIN — LISINOPRIL 2.5 MG: 5 TABLET ORAL at 10:18

## 2020-12-19 RX ADMIN — HEPARIN SODIUM 5000 UNITS: 5000 INJECTION INTRAVENOUS; SUBCUTANEOUS at 05:49

## 2020-12-19 RX ADMIN — Medication 10 ML: at 22:04

## 2020-12-19 NOTE — PROGRESS NOTES
Problem: Pressure Injury - Risk of  Goal: *Prevention of pressure injury  Description: Document Newton Scale and appropriate interventions in the flowsheet. Outcome: Progressing Towards Goal  Note: Pressure Injury Interventions:  Sensory Interventions: Assess changes in LOC, Keep linens dry and wrinkle-free, Maintain/enhance activity level, Minimize linen layers, Monitor skin under medical devices, Pad between skin to skin    Moisture Interventions: Absorbent underpads    Activity Interventions: Increase time out of bed    Mobility Interventions: HOB 30 degrees or less    Nutrition Interventions: Document food/fluid/supplement intake                     Problem: Patient Education: Go to Patient Education Activity  Goal: Patient/Family Education  Outcome: Progressing Towards Goal     Problem: Falls - Risk of  Goal: *Absence of Falls  Description: Document Louis Fall Risk and appropriate interventions in the flowsheet.   Outcome: Progressing Towards Goal  Note: Fall Risk Interventions:  Mobility Interventions: Bed/chair exit alarm, Patient to call before getting OOB    Mentation Interventions: Bed/chair exit alarm    Medication Interventions: Patient to call before getting OOB    Elimination Interventions: Call light in reach, Patient to call for help with toileting needs, Toileting schedule/hourly rounds              Problem: Patient Education: Go to Patient Education Activity  Goal: Patient/Family Education  Outcome: Progressing Towards Goal     Problem: Patient Education: Go to Patient Education Activity  Goal: Patient/Family Education  Outcome: Progressing Towards Goal

## 2020-12-19 NOTE — PROGRESS NOTES
INTERNAL MEDICINE ATTENDING NOTE    S: Mr. Nilam Morales is a patient of Alfonso Cheatham MD and was seen by me today during rounds. He was admitted for FTT and dyspnea, anasarca due to CHF. At this time, he is resting in bed. He admits to feeling \"weak\". No SOB. ROS otherwise unremarkable except as noted elsewhere. O: Blood pressure 108/60, pulse 66, temperature 97.9 °F (36.6 °C), resp. rate 16, height 5' 8\" (1.727 m), weight 126 lb (57.2 kg), SpO2 100 %. Gen: Patient is in no acute distress. Lungs: CTAB. Heart: RRR. Abd: S, NT, ND, BS present. Extremities: Warm. No results found for this or any previous visit (from the past 12 hour(s)). Echo: EF 15 - 20%. ilated left ventricle. Mild septal wall hypertrophy. Severely reduced systolic function; Worse function in the inferior and lateral walls. A / P:   1. Acute respiratory failure with hypoxia ([address] (12/14/2020): Oxygen as needed. 2. Acute on chronic systolic heart failure ([address] (12/14/2020): Continue diuresis with furosemide. 3. Cardiomyopathy ([address] (11/7/2019): Echo as above. Continue low dose carvedilol. Continue ACEI. 4. Pleural effusion (12/14/2020) + Anasarca: CHF +/- hypoalbuminemia. 5. SUBHA (acute kidney injury) ([address] (12/14/2020) superimposed on stage 3 chronic kidney disease ([address] (7/20/2020): Improved today. Baseline creatinine is about 1.2.   6. Severe protein-calorie malnutrition ([address] (7/21/2020)  7. 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, without long-term current use of insulin ([address] (7/18/2017):   8. Alzheimer disease ([address] (7/18/2017): He is on aricept and namenda. 9. SSS (sick sinus syndrome) ([address] (6/2/2015)  10. CODE: DNR.       Kiko Damico MD, FACP  Best contact is via Pager: [phone], or via hospital  at [phone]

## 2020-12-19 NOTE — PROGRESS NOTES
Music Therapy Assessment  [address]    Zoe Cobb  [id number]     1/21/1931  80 y.o.  male    Patient Telephone Number: [phone] (home)   Mormonism Affiliation: [employer]uch[employer]   Language: English   Patient Active Problem List    Diagnosis Date Noted    Anasarca 12/15/2020    Acute on chronic systolic heart failure ([address] 12/14/2020    Pleural effusion 12/14/2020    SUBHA (acute kidney injury) ([address] 12/14/2020    Acute respiratory failure with hypoxia ([address] 12/14/2020    Weakness 12/02/2020    Bad dreams 12/02/2020    Hypotension 08/13/2020    Esophageal dyskinesia 07/23/2020    Severe protein-calorie malnutrition ([address] 07/21/2020    Weight loss 07/20/2020    Unsteady gait 07/20/2020    Dehydration 07/20/2020    Acute renal failure superimposed on stage 3 chronic kidney disease ([address] 07/20/2020    Paroxysmal VT ([address] 11/07/2019    Cardiomyopathy ([address] 11/07/2019    Alcohol withdrawal syndrome, with delirium ([address] 11/01/2019    A-fib ([address] 10/27/2019    Syncope 01/09/2019    Primary insomnia 01/09/2019    Mild depression (HCC) 06/12/2018    Acute bilateral low back pain without sciatica 05/30/2018    Alcoholism ([address] 97/80/8653    Metabolic encephalopathy 51/91/5968    TIA (transient ischemic attack) 04/12/2018    Medicare annual wellness visit, subsequent 09/01/2017    Diverticulosis 07/18/2017    Gastritis and duodenitis 07/18/2017    Internal hemorrhoids 07/18/2017    Hypertension with renal disease 07/18/2017    Mixed hyperlipidemia 07/18/2017    GI bleed 07/18/2017    Primary osteoarthritis involving multiple joints 07/18/2017    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, without long-term current use of insulin ([address] 07/18/2017    Back pain 07/18/2017    Anemia 07/18/2017    Alzheimer disease ([address] 07/18/2017    SSS (sick sinus syndrome) ([address] 06/02/2015    S/P cardiac pacemaker procedure 05/04/2015    S/P ablation of accessory bypass tract 05/04/2015    SVT (supraventricular tachycardia) (HCC)--s/p RFA 04/28/2015    Near syncope 03/11/2015    ASCVD (arteriosclerotic cardiovascular disease) 07/11/2014    Hypokalemia 07/11/2014    Right inguinal hernia 06/12/2014        Date: 12/19/2020            Total Time (in minutes): 25          MRM 2 GENERAL SURGERY    Mental Status:   [x] Alert [  ] Mayra Party [  ]  Confused  [  ] Minimally responsive  [  ] Sleeping    Communication Status: [  ] Impaired Speech [  ] Nonverbal -N/A    Physical Status:   [  ] Oxygen in use  [x] Hard of Hearing [  ] Vision Impaired  [  ] Ambulatory  [  ] Ambulatory with assistance [  ] Non-ambulatory     Music Preferences, Background: Likes music from the 52's and 63's and Gospel music.     Clinical Problem addressed: positive social interaction    Goal(s) met in session:  Physical/Pain management (Scale of 1-10):    Pre-session rating: Pt denied pain  Post-session rating: Pt denied pain   [  ] Increased relaxation   [  ] Affected breathing patterns  [  ] Decreased muscle tension   [  ] Decreased agitation  [  ] Affected heart rate    [  ] Increased alertness     Emotional/Psychological:  [x] Increased self-expression   [  ] Decreased aggressive behavior   [  ] Decreased feelings of stress  [  ] Discussed healthy coping skills     [x] Improved mood    [  ] Decreased withdrawn behavior     Social:  [  ] Decreased feelings of isolation/loneliness [x] Positive social interaction   [  ] Provided support and/or comfort for family/friends    Spiritual:  [x] Spiritual support    [  ] Expressed peace  [  ] Expressed cristhian    [  ] Discussed beliefs    Techniques Utilized (Check all that apply):   [  ] Procedural support MT [  ] Music for relaxation [x] Patient preferred music  [  ] Denise analysis  [x] Song choice  [  ] Music for validation  [  ] Entrainment  [x] Movement to music [  ] Guided visualization  [  ] Darío Cables  [  ] Patient instrument playing [  ] Morenita Montez writing  [  ] Shawn Thomas along   [  ] Keyana Ni  [  ] Sensory stimulation  [x] Active Listening  [x] Music for spiritual support [  ] Making of CDs as gifts    Session Observations:  Referred by Lilibeth Rodriguez, Palliative . Patient (pt) was alert, lying in bed and presented with a cheerful affect. This music therapy intern (MTI) introduced self and asked how the pt was feeling. He shared feeling a bit weak, but was feeling better overall. MTI asked about the pt's music preferences and he shared these. MTI sang and played Adonis Pereira to say I Love You with guitar. Pt increased self-expression in response to the music as evidenced by (AEB) tapping his toes and hands to the beat. He expressed enjoyment in the music and agreed to hear another song. MTI sang and played Iliana Aashish' Lean On Me with guitar. Pt further increased self expression AEB tapping his hands to the music while presenting a smile. Pt expressed enjoyment int he music and shared it made his day. MTI offered a final song and pt agreed to this. MTI sang and played Radonna Ficks with guitar. He closed his eyes briefly during the music, and expressed gratitude for the visit. He expressed an openness to a follow up the next time the MTI is back at the hospital. MTI shared a follow up will be attempted on Monday, 12/21. Dain Poe, Music Therapy Intern  Spiritual Care Services Dept.    Referral-based service

## 2020-12-20 LAB
ANION GAP SERPL CALC-SCNC: 0 MMOL/L (ref 5–15)
BASOPHILS # BLD: 0 K/UL (ref 0–0.1)
BASOPHILS NFR BLD: 1 % (ref 0–1)
BUN SERPL-MCNC: 29 MG/DL (ref 6–20)
BUN/CREAT SERPL: 18 (ref 12–20)
CALCIUM SERPL-MCNC: 8.9 MG/DL (ref 8.5–10.1)
CHLORIDE SERPL-SCNC: 100 MMOL/L (ref 97–108)
CO2 SERPL-SCNC: 35 MMOL/L (ref 21–32)
CREAT SERPL-MCNC: 1.59 MG/DL (ref 0.7–1.3)
DIFFERENTIAL METHOD BLD: ABNORMAL
EOSINOPHIL # BLD: 0.2 K/UL (ref 0–0.4)
EOSINOPHIL NFR BLD: 3 % (ref 0–7)
ERYTHROCYTE [DISTWIDTH] IN BLOOD BY AUTOMATED COUNT: 16.2 % (ref 11.5–14.5)
GLUCOSE SERPL-MCNC: 93 MG/DL (ref 65–100)
HCT VFR BLD AUTO: 36.6 % (ref 36.6–50.3)
HGB BLD-MCNC: 11.5 G/DL (ref 12.1–17)
IMM GRANULOCYTES # BLD AUTO: 0 K/UL (ref 0–0.04)
IMM GRANULOCYTES NFR BLD AUTO: 0 % (ref 0–0.5)
LYMPHOCYTES # BLD: 1.1 K/UL (ref 0.8–3.5)
LYMPHOCYTES NFR BLD: 20 % (ref 12–49)
MCH RBC QN AUTO: 26.9 PG (ref 26–34)
MCHC RBC AUTO-ENTMCNC: 31.4 G/DL (ref 30–36.5)
MCV RBC AUTO: 85.5 FL (ref 80–99)
MONOCYTES # BLD: 0.5 K/UL (ref 0–1)
MONOCYTES NFR BLD: 8 % (ref 5–13)
NEUTS SEG # BLD: 3.7 K/UL (ref 1.8–8)
NEUTS SEG NFR BLD: 68 % (ref 32–75)
NRBC # BLD: 0 K/UL (ref 0–0.01)
NRBC BLD-RTO: 0 PER 100 WBC
PLATELET # BLD AUTO: 213 K/UL (ref 150–400)
PMV BLD AUTO: 12.4 FL (ref 8.9–12.9)
POTASSIUM SERPL-SCNC: 4.7 MMOL/L (ref 3.5–5.1)
RBC # BLD AUTO: 4.28 M/UL (ref 4.1–5.7)
SODIUM SERPL-SCNC: 135 MMOL/L (ref 136–145)
WBC # BLD AUTO: 5.5 K/UL (ref 4.1–11.1)

## 2020-12-20 PROCEDURE — 74011250636 HC RX REV CODE- 250/636: Performed by: INTERNAL MEDICINE

## 2020-12-20 PROCEDURE — 99233 SBSQ HOSP IP/OBS HIGH 50: CPT | Performed by: INTERNAL MEDICINE

## 2020-12-20 PROCEDURE — 94760 N-INVAS EAR/PLS OXIMETRY 1: CPT

## 2020-12-20 PROCEDURE — 80048 BASIC METABOLIC PNL TOTAL CA: CPT

## 2020-12-20 PROCEDURE — 74011250637 HC RX REV CODE- 250/637: Performed by: INTERNAL MEDICINE

## 2020-12-20 PROCEDURE — 36415 COLL VENOUS BLD VENIPUNCTURE: CPT

## 2020-12-20 PROCEDURE — 77010033678 HC OXYGEN DAILY

## 2020-12-20 PROCEDURE — 85025 COMPLETE CBC W/AUTO DIFF WBC: CPT

## 2020-12-20 PROCEDURE — 65270000029 HC RM PRIVATE

## 2020-12-20 RX ADMIN — FUROSEMIDE 20 MG: 10 INJECTION, SOLUTION INTRAMUSCULAR; INTRAVENOUS at 17:01

## 2020-12-20 RX ADMIN — DONEPEZIL HYDROCHLORIDE 5 MG: 5 TABLET, FILM COATED ORAL at 08:56

## 2020-12-20 RX ADMIN — DEXTROSE MONOHYDRATE 50 ML/HR: 5 INJECTION, SOLUTION INTRAVENOUS at 17:01

## 2020-12-20 RX ADMIN — Medication 10 ML: at 06:28

## 2020-12-20 RX ADMIN — FUROSEMIDE 20 MG: 10 INJECTION, SOLUTION INTRAMUSCULAR; INTRAVENOUS at 09:01

## 2020-12-20 RX ADMIN — LISINOPRIL 2.5 MG: 5 TABLET ORAL at 08:55

## 2020-12-20 RX ADMIN — HEPARIN SODIUM 5000 UNITS: 5000 INJECTION INTRAVENOUS; SUBCUTANEOUS at 17:01

## 2020-12-20 RX ADMIN — POLYETHYLENE GLYCOL 3350 17 G: 17 POWDER, FOR SOLUTION ORAL at 08:55

## 2020-12-20 RX ADMIN — PANTOPRAZOLE SODIUM 40 MG: 40 TABLET, DELAYED RELEASE ORAL at 08:56

## 2020-12-20 RX ADMIN — HEPARIN SODIUM 5000 UNITS: 5000 INJECTION INTRAVENOUS; SUBCUTANEOUS at 06:28

## 2020-12-20 RX ADMIN — MEMANTINE HYDROCHLORIDE 5 MG: 10 TABLET ORAL at 08:56

## 2020-12-20 RX ADMIN — ASPIRIN 81 MG CHEWABLE TABLET 81 MG: 81 TABLET CHEWABLE at 08:55

## 2020-12-20 RX ADMIN — CARVEDILOL 6.25 MG: 6.25 TABLET, FILM COATED ORAL at 17:01

## 2020-12-20 NOTE — PROGRESS NOTES
INTERNAL MEDICINE ATTENDING NOTE    S: Mr. Etienne Littlejohn is a patient of Delroy Stoner MD and was seen by me today during rounds. He was admitted for FTT and dyspnea, anasarca due to CHF. At this time, he is resting in bed. No SOB. No pain  ROS otherwise unremarkable except as noted elsewhere. O: Blood pressure (!) 105/46, pulse (!) 52, temperature 97.7 °F (36.5 °C), resp. rate 16, height 5' 8\" (1.727 m), weight 120 lb 13 oz (54.8 kg), SpO2 100 %. Gen: Patient is in no acute distress. Lungs: CTAB. Heart: RRR. Abd: S, NT, ND, BS present. Extremities: Warm. Recent Results (from the past 12 hour(s))   CBC WITH AUTOMATED DIFF    Collection Time: 12/20/20  1:35 AM   Result Value Ref Range    WBC 5.5 4.1 - 11.1 K/uL    RBC 4.28 4.10 - 5.70 M/uL    HGB 11.5 (L) 12.1 - 17.0 g/dL    HCT 36.6 36.6 - 50.3 %    MCV 85.5 80.0 - 99.0 FL    MCH 26.9 26.0 - 34.0 PG    MCHC 31.4 30.0 - 36.5 g/dL    RDW 16.2 (H) 11.5 - 14.5 %    PLATELET 677 459 - 607 K/uL    MPV 12.4 8.9 - 12.9 FL    NRBC 0.0 0  WBC    ABSOLUTE NRBC 0.00 0.00 - 0.01 K/uL    NEUTROPHILS 68 32 - 75 %    LYMPHOCYTES 20 12 - 49 %    MONOCYTES 8 5 - 13 %    EOSINOPHILS 3 0 - 7 %    BASOPHILS 1 0 - 1 %    IMMATURE GRANULOCYTES 0 0.0 - 0.5 %    ABS. NEUTROPHILS 3.7 1.8 - 8.0 K/UL    ABS. LYMPHOCYTES 1.1 0.8 - 3.5 K/UL    ABS. MONOCYTES 0.5 0.0 - 1.0 K/UL    ABS. EOSINOPHILS 0.2 0.0 - 0.4 K/UL    ABS. BASOPHILS 0.0 0.0 - 0.1 K/UL    ABS. IMM.  GRANS. 0.0 0.00 - 0.04 K/UL    DF AUTOMATED     METABOLIC PANEL, BASIC    Collection Time: 12/20/20  5:00 AM   Result Value Ref Range    Sodium 135 (L) 136 - 145 mmol/L    Potassium 4.7 3.5 - 5.1 mmol/L    Chloride 100 97 - 108 mmol/L    CO2 35 (H) 21 - 32 mmol/L    Anion gap 0 (L) 5 - 15 mmol/L    Glucose 93 65 - 100 mg/dL    BUN 29 (H) 6 - 20 MG/DL    Creatinine 1.59 (H) 0.70 - 1.30 MG/DL    BUN/Creatinine ratio 18 12 - 20      GFR est AA 50 (L) >60 ml/min/1.73m2    GFR est non-AA 41 (L) >60 ml/min/1.73m2    Calcium 8.9 8.5 - 10.1 MG/DL        Echo: EF 15 - 20%. ilated left ventricle. Mild septal wall hypertrophy. Severely reduced systolic function; Worse function in the inferior and lateral walls. A / P:   1. Acute respiratory failure with hypoxia ([facility] (12/14/2020): Oxygen as needed. Improved. 2. Acute on chronic systolic heart failure ([facility] (12/14/2020): Continue diuresis with furosemide. 3. Cardiomyopathy ([facility] (11/7/2019): Echo as above. Continue low dose carvedilol. Continue ACEI. 4. Pleural effusion (12/14/2020) + Anasarca: CHF +/- hypoalbuminemia. 5. SUBHA (acute kidney injury) ([facility] (12/14/2020) superimposed on stage 3 chronic kidney disease ([facility] (7/20/2020): Improved today. Baseline creatinine is about 1.2.   6. Severe protein-calorie malnutrition ([facility] (7/21/2020):   7. 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, without long-term current use of insulin ([facility] (7/18/2017):   8. Alzheimer disease ([facility] (7/18/2017): He is on aricept and namenda. 9. SSS (sick sinus syndrome) ([facility] (6/2/2015)  10. CODE: DNR. 11. PT/OT  12. Discharge planning.       Nitin Concepcion MD, FACP  Best contact is via Pager: [phone], or via hospital  at [phone]

## 2020-12-21 ENCOUNTER — APPOINTMENT (OUTPATIENT)
Dept: GENERAL RADIOLOGY | Age: 85
DRG: 291 | End: 2020-12-21
Attending: INTERNAL MEDICINE
Payer: MEDICARE

## 2020-12-21 PROCEDURE — 94760 N-INVAS EAR/PLS OXIMETRY 1: CPT

## 2020-12-21 PROCEDURE — 74011250637 HC RX REV CODE- 250/637: Performed by: INTERNAL MEDICINE

## 2020-12-21 PROCEDURE — 71046 X-RAY EXAM CHEST 2 VIEWS: CPT

## 2020-12-21 PROCEDURE — 74011250636 HC RX REV CODE- 250/636: Performed by: INTERNAL MEDICINE

## 2020-12-21 PROCEDURE — 77010033678 HC OXYGEN DAILY

## 2020-12-21 PROCEDURE — 97116 GAIT TRAINING THERAPY: CPT

## 2020-12-21 PROCEDURE — 99232 SBSQ HOSP IP/OBS MODERATE 35: CPT | Performed by: INTERNAL MEDICINE

## 2020-12-21 PROCEDURE — 65270000029 HC RM PRIVATE

## 2020-12-21 RX ORDER — FUROSEMIDE 20 MG/1
20 TABLET ORAL DAILY
Status: DISCONTINUED | OUTPATIENT
Start: 2020-12-21 | End: 2020-12-23 | Stop reason: HOSPADM

## 2020-12-21 RX ADMIN — DONEPEZIL HYDROCHLORIDE 5 MG: 5 TABLET, FILM COATED ORAL at 10:13

## 2020-12-21 RX ADMIN — PANTOPRAZOLE SODIUM 40 MG: 40 TABLET, DELAYED RELEASE ORAL at 10:13

## 2020-12-21 RX ADMIN — HEPARIN SODIUM 5000 UNITS: 5000 INJECTION INTRAVENOUS; SUBCUTANEOUS at 06:31

## 2020-12-21 RX ADMIN — CARVEDILOL 6.25 MG: 6.25 TABLET, FILM COATED ORAL at 17:32

## 2020-12-21 RX ADMIN — Medication 10 ML: at 21:09

## 2020-12-21 RX ADMIN — HEPARIN SODIUM 5000 UNITS: 5000 INJECTION INTRAVENOUS; SUBCUTANEOUS at 17:32

## 2020-12-21 RX ADMIN — POLYETHYLENE GLYCOL 3350 17 G: 17 POWDER, FOR SOLUTION ORAL at 10:12

## 2020-12-21 RX ADMIN — DEXTROSE MONOHYDRATE 50 ML/HR: 5 INJECTION, SOLUTION INTRAVENOUS at 20:29

## 2020-12-21 RX ADMIN — FUROSEMIDE 20 MG: 20 TABLET ORAL at 13:52

## 2020-12-21 RX ADMIN — LISINOPRIL 2.5 MG: 5 TABLET ORAL at 13:52

## 2020-12-21 RX ADMIN — CARVEDILOL 6.25 MG: 6.25 TABLET, FILM COATED ORAL at 10:13

## 2020-12-21 RX ADMIN — MEMANTINE HYDROCHLORIDE 2.5 MG: 10 TABLET ORAL at 10:13

## 2020-12-21 RX ADMIN — ASPIRIN 81 MG CHEWABLE TABLET 81 MG: 81 TABLET CHEWABLE at 10:12

## 2020-12-21 NOTE — PROGRESS NOTES
Problem: Mobility Impaired (Adult and Pediatric)  Goal: *Acute Goals and Plan of Care (Insert Text)  Description: FUNCTIONAL STATUS PRIOR TO ADMISSION: Pt reports independence w/ ambulation and denies history of falls. States he is independent w/ ADLs. Denies home O2 use. Pt oriented x2, admitting that his \"mind is going,\" therefore unsure accuracy of patient's report. HOME SUPPORT PRIOR TO ADMISSION: The patient lived with granddaughter however states granddaughter works full-time. Physical Therapy Goals  Initiated 12/17/2020  1. Patient will move from supine to sit and sit to supine , scoot up and down, and roll side to side in bed with independence within 7 day(s). 2.  Patient will transfer from bed to chair and chair to bed with supervision/set-up using the least restrictive device within 7 day(s). 3.  Patient will perform sit to stand with supervision/set-up within 7 day(s). 4.  Patient will ambulate with supervision/set-up for 200 feet with the least restrictive device within 7 day(s). 5.  Patient will ascend/descend 3 stairs with 1 handrail(s) with supervision/set-up within 7 day(s). Outcome: Progressing Towards Goal   PHYSICAL THERAPY TREATMENT  Patient: Zenaida Hayward Sr (80 y.o. male)  Date: 12/21/2020  Diagnosis: Acute respiratory failure with hypoxia (HCC) [J96.01]  Acute on chronic systolic heart failure (HCC) [I50.23]  SUBHA (acute kidney injury) ([facility] [N17.9]  Pleural effusion [J90] Acute on chronic systolic heart failure (HCC)       Precautions: fall, alarm    Chart, physical therapy assessment, plan of care and goals were reviewed. ASSESSMENT  Patient continues with skilled PT services and is progressing towards goals. Pt amb with no device with CGA with occasional path deviation and one instance of crossing over. Pt tends to reach for rail of hallway and use it or furniture for support. He does state that he furniture walks at home.  Pt amb up and down 4 steps with L rail with CGA step over step up and light hand support step to step on descent. Pt returned to room to bathroom as he stated he felt he needed to toilet. However, after re-inspection, pt had been incontinent of stool throughout amb but had no awareness that he was so. Environmental services called. Nurse in with pt to complete hygiene. Pt sitting in chair with alarm activated. Current Level of Function Impacting Discharge (mobility/balance): SBA to CGA    Other factors to consider for discharge: CM reports pt has excellent family support         PLAN :  Patient continues to benefit from skilled intervention to address the above impairments. Continue treatment per established plan of care. to address goals. Recommendation for discharge: (in order for the patient to meet his/her long term goals)  Physical therapy at least 2 days/week in the home AND ensure 24 hr assist and/or supervision for safety with mobility    This discharge recommendation:  Has not yet been discussed the attending provider and/or case management    IF patient discharges home will need the following DME: patient owns DME required for discharge       SUBJECTIVE:   Patient stated I had no idea.     OBJECTIVE DATA SUMMARY:   Critical Behavior:  Neurologic State: Alert  Orientation Level: Disoriented to situation, Disoriented to time, Oriented to person, Oriented to place  Cognition: Follows commands     Functional Mobility Training:  Bed Mobility:  Rolling: Supervision  Supine to Sit: Supervision     Scooting: Supervision        Transfers:  Sit to Stand: Stand-by assistance  Stand to Sit: Stand-by assistance        Bed to Chair: Contact guard assistance                    Balance:  Sitting: Intact  Standing: Impaired  Standing - Static: Good  Standing - Dynamic : Fair  Ambulation/Gait Training:  Distance (ft): 200 Feet (ft)  Assistive Device: Gait belt  Ambulation - Level of Assistance: Contact guard assistance        Gait Abnormalities: Decreased step clearance;Scissoring;Trunk sway increased; Path deviations        Base of Support: Narrowed     Speed/Maureen: Pace decreased (<100 feet/min); Slow  Step Length: Left shortened;Right shortened          Stairs:  Number of Stairs Trained: 4  Stairs - Level of Assistance: Contact guard assistance   Rail Use: Left         Activity Tolerance:   Good    After treatment patient left in no apparent distress:   Sitting in chair, Call bell within reach, Bed / chair alarm activated, and RN present    COMMUNICATION/COLLABORATION:   The patients plan of care was discussed with: Registered nurse and Case management.      Alma Walter, PT   Time Calculation: 32 mins

## 2020-12-21 NOTE — PROGRESS NOTES
PROGRESS NOTE    NAME:  Pam Green Sr   :   1931   MRN:   [id number]     Date/Time:  2020 6:08 AM  Subjective:   History:  Chart reviewed and patient seen and examined this AM and D/W his nurse and all events noted. He was admitted on  with increased SOB, CHF, anasarca and generalized failure to thrive. He has Dementia, Cardiomyopathy, CKD st 3 and other multiple medical problems and was recently taken off diuretics due to poor PO intake and deteriorating renal function. He this AM is lying flat in bed w/o oxygen and denies SOB, CP or other cardiac or respiratory c/o. He has no GI/ c/o. He has no neurologic c/o except he is \"weak but I almost feel like myself\". . His history is limited due to his dementia. He knows his name and knows that I'm his doctor and did finally remember my name and is correct on  and now knows that he is in [facility]; however otherwise confused.  \"He helped a neighbor with his house this weekend\"      Medications reviewed:  Current Facility-Administered Medications   Medication Dose Route Frequency    lisinopriL (PRINIVIL, ZESTRIL) tablet 2.5 mg  2.5 mg Oral DAILY    polyethylene glycol (MIRALAX) packet 17 g  17 g Oral DAILY    aspirin chewable tablet 81 mg  81 mg Oral DAILY    carvediloL (COREG) tablet 6.25 mg  6.25 mg Oral BID    donepeziL (ARICEPT) tablet 5 mg  5 mg Oral DAILY    memantine (NAMENDA) tablet 5 mg  5 mg Oral DAILY    pantoprazole (PROTONIX) tablet 40 mg  40 mg Oral ACB    sodium chloride (NS) flush 5-40 mL  5-40 mL IntraVENous Q8H    sodium chloride (NS) flush 5-40 mL  5-40 mL IntraVENous PRN    acetaminophen (TYLENOL) tablet 650 mg  650 mg Oral Q6H PRN    Or    acetaminophen (TYLENOL) suppository 650 mg  650 mg Rectal Q6H PRN    promethazine (PHENERGAN) tablet 12.5 mg  12.5 mg Oral Q6H PRN    Or    ondansetron (ZOFRAN) injection 4 mg  4 mg IntraVENous Q6H PRN    furosemide (LASIX) injection 20 mg  20 mg IntraVENous BID    heparin (porcine) injection 5,000 Units  5,000 Units SubCUTAneous Q12H    dextrose 5% infusion  50 mL/hr IntraVENous CONTINUOUS        Objective:   Vitals:  Visit Vitals  BP (!) 103/59 (BP Patient Position: At rest)   Pulse 81   Temp 98.2 °F (36.8 °C)   Resp 16   Ht 5' 8\" (1.727 m)   Wt 121 lb 8 oz (55.1 kg)   SpO2 95%   BMI 18.47 kg/m²    O2 Flow Rate (L/min): 3 l/min O2 Device: Nasal cannula Temp (24hrs), Av.2 °F (36.8 °C), Min:97.7 °F (36.5 °C), Max:98.5 °F (36.9 °C)      Last 24hr Input/Output:    Intake/Output Summary (Last 24 hours) at 2020 0608  Last data filed at 2020 6784  Gross per 24 hour   Intake 720 ml   Output 1250 ml   Net -530 ml        PHYSICAL EXAM:  General:     Alert, cooperative, no distress, appears stated age. Head:    Normocephalic, without obvious abnormality, atraumatic. Eyes:    Conjunctivae/corneas clear. PERRLA  Nose:   Nares normal. No drainage or sinus tenderness. Throat:     Lips, mucosa, and tongue normal.  No Thrush  Neck:   Supple, symmetrical,  no adenopathy, thyroid: non tender     no carotid bruit and no JVD. Back:     Symmetric,  No CVA tenderness. Lungs:    Clear to auscultation bilaterally. No Wheezing or Rhonchi. No rales. Heart:    Regular rate and rhythm,  no murmur, rub or gallop. Abdomen:    Soft, non-tender. Not distended. Bowel sounds normal. No masses  Extremities:  Extremities normal, atraumatic, No cyanosis. No edema. No clubbing  Lymph nodes:  Cervical, supraclavicular normal.  Neurologic:  Generalized decreased strength, Alert and oriented X 3 but still confused. Skin:                 No rash      Lab Data Reviewed:    No results found for this or any previous visit (from the past 24 hour(s)).       Assessment/Plan:     Principal Problem:    Acute on chronic systolic heart failure ([facility] (2020)    Active Problems:    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, without long-term current use of insulin ([facility] (7/18/2017)      Alzheimer disease ([facility] (7/18/2017)      SSS (sick sinus syndrome) ([facility] (6/2/2015)      Cardiomyopathy ([facility] (11/7/2019)      Acute renal failure superimposed on stage 3 chronic kidney disease ([facility] (7/20/2020)      Severe protein-calorie malnutrition ([facility] (7/21/2020)      Pleural effusion (12/14/2020)      SUBHA (acute kidney injury) ([facility] (12/14/2020)      Acute respiratory failure with hypoxia ([facility] (12/14/2020)      Anasarca (12/15/2020)       ___________________________________________________  PLAN:    1. Continue diuresis with low dose Lasix IV  2. Follow renal function (baseline 21/1.6 in Oct), (41/1.8 on 12/2 at office), (50/2.14 on adm) and yesterday 29/1.59  3. Repeat Echo, Prior Efx on Echo 25% now 15-20% as below  ·     LV: Estimated LVEF is 15 - 20%. Dilated left ventricle. Mild septal wall hypertrophy. Severely reduced systolic function; Worse function in the inferior and lateral walls. · RV: Borderline low systolic function. Pacer/ICD present. · RA: Dilated right atrium. · AV: Mild to moderate aortic valve regurgitation is present. · MV: Mitral valve thickening. Moderate to severe mitral valve regurgitation is present. · TV: Moderate tricuspid valve regurgitation is present. · PV: Mild pulmonic valve regurgitation is present. PA: Pulmonary arterial systolic pressure is 50 mmHg. 4.  Follow CXR, effusion stable on chest x-ray 12/15, repeat today  5. Nasal oxygen as needed  6. Continue low dose Coreg with CHF, resumed low-dose ACE 12/17 with decreased EF on echo and follow renal function closely  7. Continue Aricept and Namenda with dementia  8. Palliative consult note reviewed and I agree with DNR  9.   We will try to mobilize with PT, D/C planning, possibly home tomorrow        If need to contact me use hospital  [phone], DO NOT USE PERFECT SERVE    ___________________________________________________    Attending Physician: Nella March MD

## 2020-12-21 NOTE — PROGRESS NOTES
End of Shift Note    Bedside shift change report given to 2020 Sarah Rosales (oncoming nurse) by Erica Ortiz, RN (offgoing nurse). Report included the following information SBAR, Kardex, Procedure Summary, Intake/Output, MAR, Accordion and Recent Results    Shift worked:  7a-7p     Shift summary and any significant changes:    10:15 Pts IV noted to be out of arm, small amt of IVF on sheet, likely had not been out long. Pts BP 91/42, held lisinipril and coreg, unable to give IV lasix call to Dr Benjamin Harris to inform. Return call from Dr Benjamin Harris, hold the coreg, give lisinopril and change lasix to po and give. CXR done. Otherwise uneventful shift. Concerns for physician to address:  none     Zone phone for oncoming shift:          Activity:  Activity Level: Up with Assistance  Number times ambulated in hallways past shift: 1  Number of times OOB to chair past shift: 1    Cardiac:   Cardiac Monitoring: Yes      Cardiac Rhythm: Paced    Access:   Current line(s): PIV     Genitourinary:   Urinary status: voiding    Respiratory:   O2 Device: Nasal cannula  Chronic home O2 use?: NO  Incentive spirometer at bedside: NO     GI:  Last Bowel Movement Date: 12/20/20  Current diet:  DIET CARDIAC Regular  DIET NUTRITIONAL SUPPLEMENTS Breakfast; Magic Cups  DIET NUTRITIONAL SUPPLEMENTS Lunch, Dinner; [employer]  Passing flatus: YES  Tolerating current diet: YES  % Diet Eaten: 75 %    Pain Management:   Patient states pain is manageable on current regimen: N/A    Skin:  Newton Score: 18  Interventions: increase time out of bed    Patient Safety:  Fall Score:  Total Score: 4  Interventions: bed/chair alarm, pt to call before getting OOB and tele sitter   High Fall Risk: Yes    Length of Stay:  Expected LOS: 4d 2h  Actual LOS: 7      Erica Ortiz, RN

## 2020-12-21 NOTE — PROGRESS NOTES
Physician Progress Note      PATIENT:               Jonathan Win  CSN #:                  [id number]  :                       1931  ADMIT DATE:       2020 8:20 AM  DISCH DATE:  RESPONDING  PROVIDER #:        Casandra Banks MD          QUERY TEXT:    Patient admitted with chf. Noted documentation of acute respiratory failure in 12/15 pn. Please indicate one of the following and document in the medical record: The medical record reflects the following:  Risk Factors: a/c chf, angel pl effusions  Clinical Indicators: 98%2L, rr 22-25, H&P-Lungs:    Clear to auscultation bilaterally. No Wheezing or Rhonchi. No rales. No Accessory muscle use. Treatment: O2,Baylee Prieto RN/CDI     Acute Respiratory Failure Clinical Indicators per  MS-DRG Training Guide and Quick Reference Guide:  pO2 < 60 mmHg or SpO2 (pulse oximetry) < 91% breathing room air  pCO2 > 50 and pH < 7.35  P/F ratio (pO2 / FIO2) < 300  pO2 decrease or pCO2 increase by 10 mmHg from baseline (if known)  Supplemental oxygen of 40% or more  Presence of respiratory distress, tachypnea, dyspnea, shortness of breath, wheezing  Unable to speak in complete sentences  Use of accessory muscles to breathe  Extreme anxiety and feeling of impending doom  Tripod position  Confusion/altered mental status/obtunded  Options provided:  -- Acute Respiratory Failure currently as evidenced by, Please document evidence.   -- Currently resolved Acute Respiratory Failure was evidenced by, Please document evidence  -- Acute Respiratory Failure ruled out after study  -- Other - I will add my own diagnosis  -- Disagree - Not applicable / Not valid  -- Disagree - Clinically unable to determine / Unknown  -- Refer to Clinical Documentation Reviewer    PROVIDER RESPONSE TEXT:    See my notes    Query created by: Cristino Zamarripa on 12/15/2020 11:41 AM      Electronically signed by:  Casandra Banks MD 2020 9:17 PM

## 2020-12-21 NOTE — PROGRESS NOTES
Music Therapy Assessment  [address]    Nereida Hernandez  [id number]     1/21/1931  80 y.o.  male    Patient Telephone Number: [phone] (home)   Judaism Affiliation: [place]   Language: English   Patient Active Problem List    Diagnosis Date Noted    Anasarca 12/15/2020    Acute on chronic systolic heart failure ([address] 12/14/2020    Pleural effusion 12/14/2020    SUBHA (acute kidney injury) ([address] 12/14/2020    Acute respiratory failure with hypoxia ([address] 12/14/2020    Weakness 12/02/2020    Bad dreams 12/02/2020    Hypotension 08/13/2020    Esophageal dyskinesia 07/23/2020    Severe protein-calorie malnutrition ([address] 07/21/2020    Weight loss 07/20/2020    Unsteady gait 07/20/2020    Dehydration 07/20/2020    Acute renal failure superimposed on stage 3 chronic kidney disease ([address] 07/20/2020    Paroxysmal VT ([address] 11/07/2019    Cardiomyopathy ([address] 11/07/2019    Alcohol withdrawal syndrome, with delirium ([address] 11/01/2019    A-fib ([address] 10/27/2019    Syncope 01/09/2019    Primary insomnia 01/09/2019    Mild depression (HCC) 06/12/2018    Acute bilateral low back pain without sciatica 05/30/2018    Alcoholism ([address] 13/54/1815    Metabolic encephalopathy 08/95/1317    TIA (transient ischemic attack) 04/12/2018    Medicare annual wellness visit, subsequent 09/01/2017    Diverticulosis 07/18/2017    Gastritis and duodenitis 07/18/2017    Internal hemorrhoids 07/18/2017    Hypertension with renal disease 07/18/2017    Mixed hyperlipidemia 07/18/2017    GI bleed 07/18/2017    Primary osteoarthritis involving multiple joints 07/18/2017    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, without long-term current use of insulin ([address] 07/18/2017    Back pain 07/18/2017    Anemia 07/18/2017    Alzheimer disease ([address] 07/18/2017    SSS (sick sinus syndrome) ([address] 06/02/2015    S/P cardiac pacemaker procedure 05/04/2015    S/P ablation of accessory bypass tract 05/04/2015    SVT (supraventricular tachycardia) (HCC)--s/p RFA 04/28/2015    Near syncope 03/11/2015    ASCVD (arteriosclerotic cardiovascular disease) 07/11/2014    Hypokalemia 07/11/2014    Right inguinal hernia 06/12/2014        Date: 12/21/2020            Total Time (in minutes): 33          MRM 2 GENERAL SURGERY    Mental Status:   [x] Alert [  ] Forgetful [x]  Confused  [  ] Minimally responsive  [  ] Sleeping    Communication Status: [  ] Impaired Speech [  ] Nonverbal -N/A    Physical Status:   [  ] Oxygen in use  [x] Hard of Hearing [  ] Vision Impaired  [  ] Ambulatory  [  ] Ambulatory with assistance [  ] Non-ambulatory -    Music Preferences, Background: Enjoys music from the 52's and 63's [employer], and [employer] music. Pt's favorite artist is Abigail Ferreira. 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     Clinical Problem addressed: Positive social interaction    Goal(s) met in session:  Physical/Pain management (Scale of 1-10):    Pre-session rating: Pt didn't report pain  Post-session rating: Pt didn't report on pain   [  ] Increased relaxation   [  ] Affected breathing patterns  [  ] Decreased muscle tension   [  ] Decreased agitation  [  ] Affected heart rate    [  ] Increased alertness     Emotional/Psychological:  [x] Increased self-expression   [  ] Decreased aggressive behavior   [  ] Decreased feelings of stress  [  ] Discussed healthy coping skills     [x] Improved mood    [  ] Decreased withdrawn behavior     Social:  [  ] Decreased feelings of isolation/loneliness [x] Positive social interaction   [  ] Provided support and/or comfort for family/friends    Spiritual:  [x] Spiritual support    [  ] Expressed peace  [  ] Expressed cristhian    [  ] Discussed beliefs    Techniques Utilized (Check all that apply):   [  ] Procedural support MT [  ] Music for relaxation [x] Patient preferred music  [  ] Denise analysis  [x] Song choice  [  ] Music for validation  [  ] Entrainment  [  ] Movement to music [  ] Guided visualization  [  ] Tomas Hobbs  [  ] Patient instrument playing [  ] Yvette Candelaria writing  [x] Sing along   [  ] Ney Rosen  [  ] Sensory stimulation  [x] Active Listening  [x] Music for spiritual support [  ] Making of CDs as gifts    Session Observations:  F/up visit; Patient (pt) was alert, sitting up in bed when this music therapy intern (MTI) entered the room. MTI re-introduced self to the pt and asked how he was feeling. Pt responded saying \"I'm not feeling myself\", and shared more about his health. Pt presented with confusion throughout the session as evidenced by (AEB) repeating the same questions and not recognizing the MTI from a previous visit. MTI provided active listening and offered a music therapy session and pt agreed to this. MTI sang and played [address] with sohail. Pt expressed enjoyment in the music and shared about his love for [place]. Pt also mentioned how important his family is to him, and shared about the family members he has lost over the years. Pt reminisced over memories he had with his older brother and the important role music played in their relationship. MTI sang and played Mulugeta Chapman' Hit the [facility] [address] [facility] with sohail. Pt responded saying Mulugeta Chapman was one of his favorite artists, and shared about the time he saw Mulugeta Chapman live in concert. MTI asked questions about the pt and he shared about his cristhian journey. He shared about being very involved in the Jewish when he was growing up, and the various singing Jewish groups he was apart of. MTI offered a hymn and pt agreed to this. MTI sang and played Sweet By and By with [employer], and invited the pt to sing along if he's like. Pt increased self-expression in response to the music AEB humming along with the MTI. He expressed enjoyment in the music and shared the session made him \"feel really good\". MTI asked if he needed anything further before exiting, and he declined this.      Nedra Sanchez Alexandr, [address] [facility] Dept.    Referral-based service

## 2020-12-22 LAB
ANION GAP SERPL CALC-SCNC: 3 MMOL/L (ref 5–15)
BUN SERPL-MCNC: 33 MG/DL (ref 6–20)
BUN/CREAT SERPL: 22 (ref 12–20)
CALCIUM SERPL-MCNC: 9 MG/DL (ref 8.5–10.1)
CHLORIDE SERPL-SCNC: 101 MMOL/L (ref 97–108)
CO2 SERPL-SCNC: 34 MMOL/L (ref 21–32)
CREAT SERPL-MCNC: 1.5 MG/DL (ref 0.7–1.3)
GLUCOSE BLD STRIP.AUTO-MCNC: 115 MG/DL (ref 65–100)
GLUCOSE BLD STRIP.AUTO-MCNC: 124 MG/DL (ref 65–100)
GLUCOSE BLD STRIP.AUTO-MCNC: 98 MG/DL (ref 65–100)
GLUCOSE SERPL-MCNC: 86 MG/DL (ref 65–100)
POTASSIUM SERPL-SCNC: 3.7 MMOL/L (ref 3.5–5.1)
SERVICE CMNT-IMP: ABNORMAL
SERVICE CMNT-IMP: ABNORMAL
SERVICE CMNT-IMP: NORMAL
SODIUM SERPL-SCNC: 138 MMOL/L (ref 136–145)

## 2020-12-22 PROCEDURE — 74011250637 HC RX REV CODE- 250/637: Performed by: INTERNAL MEDICINE

## 2020-12-22 PROCEDURE — 80048 BASIC METABOLIC PNL TOTAL CA: CPT

## 2020-12-22 PROCEDURE — 77010033678 HC OXYGEN DAILY

## 2020-12-22 PROCEDURE — 65270000029 HC RM PRIVATE

## 2020-12-22 PROCEDURE — 94760 N-INVAS EAR/PLS OXIMETRY 1: CPT

## 2020-12-22 PROCEDURE — 99232 SBSQ HOSP IP/OBS MODERATE 35: CPT | Performed by: INTERNAL MEDICINE

## 2020-12-22 PROCEDURE — 36415 COLL VENOUS BLD VENIPUNCTURE: CPT

## 2020-12-22 PROCEDURE — 74011250636 HC RX REV CODE- 250/636: Performed by: INTERNAL MEDICINE

## 2020-12-22 PROCEDURE — 82962 GLUCOSE BLOOD TEST: CPT

## 2020-12-22 RX ADMIN — HEPARIN SODIUM 5000 UNITS: 5000 INJECTION INTRAVENOUS; SUBCUTANEOUS at 06:33

## 2020-12-22 RX ADMIN — Medication 10 ML: at 08:43

## 2020-12-22 RX ADMIN — ASPIRIN 81 MG CHEWABLE TABLET 81 MG: 81 TABLET CHEWABLE at 08:39

## 2020-12-22 RX ADMIN — DEXTROSE MONOHYDRATE 50 ML/HR: 5 INJECTION, SOLUTION INTRAVENOUS at 21:00

## 2020-12-22 RX ADMIN — FUROSEMIDE 20 MG: 20 TABLET ORAL at 08:39

## 2020-12-22 RX ADMIN — Medication 10 ML: at 06:35

## 2020-12-22 RX ADMIN — PANTOPRAZOLE SODIUM 40 MG: 40 TABLET, DELAYED RELEASE ORAL at 06:35

## 2020-12-22 RX ADMIN — HEPARIN SODIUM 5000 UNITS: 5000 INJECTION INTRAVENOUS; SUBCUTANEOUS at 17:44

## 2020-12-22 RX ADMIN — DONEPEZIL HYDROCHLORIDE 5 MG: 5 TABLET, FILM COATED ORAL at 08:39

## 2020-12-22 RX ADMIN — Medication 10 ML: at 21:01

## 2020-12-22 RX ADMIN — POLYETHYLENE GLYCOL 3350 17 G: 17 POWDER, FOR SOLUTION ORAL at 08:40

## 2020-12-22 RX ADMIN — LISINOPRIL 2.5 MG: 5 TABLET ORAL at 08:39

## 2020-12-22 RX ADMIN — MEMANTINE HYDROCHLORIDE 5 MG: 10 TABLET ORAL at 08:39

## 2020-12-22 NOTE — PROGRESS NOTES
PROGRESS NOTE    NAME:  Todd Cooks Sr   :   1931   MRN:   [id number]     Date/Time:  2020 5:52 AM  Subjective:   History:  Chart reviewed and patient seen and examined this AM and D/W his nurse and all events noted. He was admitted on  with increased SOB, CHF, anasarca and generalized failure to thrive. He has Dementia, Cardiomyopathy, CKD st 3 and other multiple medical problems and was recently taken off diuretics due to poor PO intake and deteriorating renal function. He this AM is lying flat in bed w/o oxygen and denies SOB, CP or other cardiac or respiratory c/o. He has no GI/ c/o. He has no neurologic c/o except he is \"weak but I almost feel like myself\". . His history is limited due to his dementia. He knows his name and knows that I'm his doctor and occ. remembers my name and is correct on  and now knows that he is in [facility]; however otherwise confused.        Medications reviewed:  Current Facility-Administered Medications   Medication Dose Route Frequency    furosemide (LASIX) tablet 20 mg  20 mg Oral DAILY    lisinopriL (PRINIVIL, ZESTRIL) tablet 2.5 mg  2.5 mg Oral DAILY    polyethylene glycol (MIRALAX) packet 17 g  17 g Oral DAILY    aspirin chewable tablet 81 mg  81 mg Oral DAILY    carvediloL (COREG) tablet 6.25 mg  6.25 mg Oral BID    donepeziL (ARICEPT) tablet 5 mg  5 mg Oral DAILY    memantine (NAMENDA) tablet 5 mg  5 mg Oral DAILY    pantoprazole (PROTONIX) tablet 40 mg  40 mg Oral ACB    sodium chloride (NS) flush 5-40 mL  5-40 mL IntraVENous Q8H    sodium chloride (NS) flush 5-40 mL  5-40 mL IntraVENous PRN    acetaminophen (TYLENOL) tablet 650 mg  650 mg Oral Q6H PRN    Or    acetaminophen (TYLENOL) suppository 650 mg  650 mg Rectal Q6H PRN    promethazine (PHENERGAN) tablet 12.5 mg  12.5 mg Oral Q6H PRN    Or    ondansetron (ZOFRAN) injection 4 mg  4 mg IntraVENous Q6H PRN    heparin (porcine) injection 5,000 Units  5,000 Units SubCUTAneous Q12H    dextrose 5% infusion  50 mL/hr IntraVENous CONTINUOUS        Objective:   Vitals:  Visit Vitals  /73   Pulse 61   Temp 97.9 °F (36.6 °C)   Resp 16   Ht 5' 8\" (1.727 m)   Wt 121 lb 8 oz (55.1 kg)   SpO2 94%   BMI 18.47 kg/m²    O2 Flow Rate (L/min): 3 l/min O2 Device: Nasal cannula Temp (24hrs), Av.2 °F (36.8 °C), Min:97.8 °F (36.6 °C), Max:98.7 °F (37.1 °C)      Last 24hr Input/Output:    Intake/Output Summary (Last 24 hours) at 2020 1305  Last data filed at 2020 0941  Gross per 24 hour   Intake 238 ml   Output 1000 ml   Net -762 ml        PHYSICAL EXAM:  General:     Alert, cooperative, no distress, appears stated age. Head:    Normocephalic, without obvious abnormality, atraumatic. Eyes:    Conjunctivae/corneas clear. PERRLA  Nose:   Nares normal. No drainage or sinus tenderness. Throat:     Lips, mucosa, and tongue normal.  No Thrush  Neck:   Supple, symmetrical,  no adenopathy, thyroid: non tender     no carotid bruit and no JVD. Back:     Symmetric,  No CVA tenderness. Lungs:    Clear to auscultation bilaterally. No Wheezing or Rhonchi. No rales. Heart:    Regular rate and rhythm,  no murmur, rub or gallop. Abdomen:    Soft, non-tender. Not distended. Bowel sounds normal. No masses  Extremities:  Extremities normal, atraumatic, No cyanosis. No edema. No clubbing  Lymph nodes:  Cervical, supraclavicular normal.  Neurologic:  Generalized decreased strength, Alert and oriented X 3 but still confused.    Skin:                 No rash      Lab Data Reviewed:    Recent Results (from the past 24 hour(s))   METABOLIC PANEL, BASIC    Collection Time: 20  2:29 AM   Result Value Ref Range    Sodium 138 136 - 145 mmol/L    Potassium 3.7 3.5 - 5.1 mmol/L    Chloride 101 97 - 108 mmol/L    CO2 34 (H) 21 - 32 mmol/L    Anion gap 3 (L) 5 - 15 mmol/L    Glucose 86 65 - 100 mg/dL    BUN 33 (H) 6 - 20 MG/DL    Creatinine 1.50 (H) 0.70 - 1.30 MG/DL    BUN/Creatinine ratio 22 (H) 12 - 20      GFR est AA 53 (L) >60 ml/min/1.73m2    GFR est non-AA 44 (L) >60 ml/min/1.73m2    Calcium 9.0 8.5 - 10.1 MG/DL   GLUCOSE, POC    Collection Time: 12/22/20  7:25 AM   Result Value Ref Range    Glucose (POC) 98 65 - 100 mg/dL    Performed by Imelda Vidal (PCT)    GLUCOSE, POC    Collection Time: 12/22/20 11:55 AM   Result Value Ref Range    Glucose (POC) 124 (H) 65 - 100 mg/dL    Performed by Rj London (PCT)          Assessment/Plan:     Principal Problem:    Acute on chronic systolic heart failure ([address] (12/14/2020)    Active Problems:    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, without long-term current use of insulin ([address] (7/18/2017)      Alzheimer disease ([address] (7/18/2017)      SSS (sick sinus syndrome) ([address] (6/2/2015)      Cardiomyopathy ([address] (11/7/2019)      Acute renal failure superimposed on stage 3 chronic kidney disease ([address] (7/20/2020)      Severe protein-calorie malnutrition ([address] (7/21/2020)      Pleural effusion (12/14/2020)      SUBHA (acute kidney injury) ([address] (12/14/2020)      Acute respiratory failure with hypoxia ([address] (12/14/2020)      Anasarca (12/15/2020)       ___________________________________________________  PLAN:    1. Continue diuresis with low dose Lasix IV  2. Follow renal function (baseline 21/1.6 in Oct), (41/1.8 on 12/2 at office), (50/2.14 on adm) and now 33/1.50  3. Repeat Echo, Prior Efx on Echo 25% now 15-20% as below  ·     LV: Estimated LVEF is 15 - 20%. Dilated left ventricle. Mild septal wall hypertrophy. Severely reduced systolic function; Worse function in the inferior and lateral walls. · RV: Borderline low systolic function. Pacer/ICD present. · RA: Dilated right atrium. · AV: Mild to moderate aortic valve regurgitation is present. · MV: Mitral valve thickening. Moderate to severe mitral valve regurgitation is present. · TV: Moderate tricuspid valve regurgitation is present.   · PV: Mild pulmonic valve regurgitation is present. PA: Pulmonary arterial systolic pressure is 50 mmHg. 4.  Follow CXR, effusion stable on chest x-ray 12/15, repeat with continued effusions and atelectasis R base  5. Nasal oxygen as needed  6. Continue low dose Coreg with CHF, resumed low-dose ACE 12/17 with decreased EF on echo and follow renal function closely  7. Continue Aricept and Namenda with dementia  8. Palliative consult note reviewed and I agree with DNR  9.   We will try to mobilize with PT, D/C planning, possibly home tomorrow        If need to contact me use hospital  [phone], DO NOT USE PERFECT SERVE    ___________________________________________________    Attending Physician: Gal Velasquez MD

## 2020-12-22 NOTE — PALLIATIVE CARE
Goals are clear , will sign off. Please do not hesitate to reconsult us for any future Palliative need. Thank you for allowing us to participate in the care of this patient .

## 2020-12-22 NOTE — PROGRESS NOTES
1740- Pt with 11 beats of Vtach. Pt not symptomatic and currently eating in bed. Page to Dr. Zee Carney to notify.  states Dr. Marcia Ron is on call. 4519- Return call from Dr. Marcia Ron who states she does not cover inpatient. Will re page on call. 1839- Still no response. Paged on call again. [address] with Dr. eZe Carney and made aware of V-tach. States pt can come off tele. No other new orders were received. End of Shift Note    Bedside shift change report given to Hoda (oncoming nurse) by Jake Fine (offgoing nurse). Report included the following information SBAR, Kardex, Procedure Summary, Intake/Output, MAR and Recent Results    Shift worked:  7a-7p     Shift summary and any significant changes:     No complaints of pain. Pt slept throughout shift but awoke for meals. Pt tolerating diet. Refused to get up to chair. BP meds held due soft BP. Voiding using urinal.      Concerns for physician to address:  Discharge plan      Zone phone for oncoming shift:   [phone]        Activity:  Activity Level: Up with Assistance  Number times ambulated in hallways past shift: 0  Number of times OOB to chair past shift: 0    Cardiac:   Cardiac Monitoring: No- Discontinued   Cardiac Rhythm: Paced    Access:   Current line(s): PIV     Genitourinary:   Urinary status: voiding    GI:  Last Bowel Movement Date: 12/22/20  Current diet:  DIET CARDIAC Regular  DIET NUTRITIONAL SUPPLEMENTS Breakfast; Magic Cups  DIET NUTRITIONAL SUPPLEMENTS Lunch, Dinner; Ensure [employer]  Passing flatus: YES  Tolerating current diet: YES  % Diet Eaten: 90 %    Pain Management:   Patient states pain is manageable on current regimen: YES    Skin:  Newton Score: 19  Interventions: turn team, float heels and increase time out of bed    Patient Safety:  Fall Score:  Total Score: 4  Interventions: bed/chair alarm, assistive device (walker, cane, etc), gripper socks, pt to call before getting OOB and tele sitter   High Fall Risk: Yes    Length of Stay:  Expected LOS: 4d 2h  Actual LOS: [address]

## 2020-12-22 NOTE — PROGRESS NOTES
End of Shift Note    Bedside shift change report given to [id number] (oncoming nurse) by Ophelia Moura (offgoing nurse). Report included the following information SBAR, Kardex, Intake/Output, MAR and Recent Results    Shift worked:  7p-7a     Shift summary and any significant changes:     No changes noted     Concerns for physician to address:       Zone phone for oncoming shift:          Activity:  Activity Level: Up with Assistance  Number times ambulated in hallways past shift: 0  Number of times OOB to chair past shift: 1    Cardiac:   Cardiac Monitoring: Yes      Cardiac Rhythm: Paced    Access:   Current line(s): PIV     Genitourinary:   Urinary status: voiding    Respiratory:   O2 Device: Nasal cannula  Chronic home O2 use?: NO  Incentive spirometer at bedside: NO     GI:  Last Bowel Movement Date: 12/20/20  Current diet:  DIET CARDIAC Regular  DIET NUTRITIONAL SUPPLEMENTS Breakfast; Magic Cups  DIET NUTRITIONAL SUPPLEMENTS Lunch, Dinner; [employer]ure [employer]  Passing flatus: YES  Tolerating current diet: YES  % Diet Eaten: 65 %    Pain Management:   Patient states pain is manageable on current regimen: YES    Skin:  Newton Score: 19  Interventions: turn team and increase time out of bed    Patient Safety:  Fall Score:  Total Score: 4  Interventions: bed/chair alarm, gripper socks, pt to call before getting OOB and stay with me (per policy)  High Fall Risk: Yes    Length of Stay:  Expected LOS: 4d 2h  Actual LOS: [address] [facility]

## 2020-12-22 NOTE — PROGRESS NOTES
Comprehensive Nutrition Assessment    Type and Reason for Visit: Reassess    Nutrition Recommendations/Plan:   Continue diet and supplements    Nutrition Assessment:   Chart reviewed, pt remains confused. His appetite is fairly good and it appears he is consuming his supplements. Current intake is likely adequate to meet est needs. Labs reviewed, WNL. BM noted on Sunday. Patient Vitals for the past 72 hrs:   % Diet Eaten   12/22/20 0941 100 %   12/21/20 1020 65 %       Malnutrition Assessment:  Malnutrition Status:  Severe malnutrition    Context:  Chronic illness     Findings of the 6 clinical characteristics of malnutrition:   Energy Intake:  Mild decrease in energy intake (specify)  Weight Loss:  Unable to assess     Body Fat Loss:  7 - Severe body fat loss, Fat overlying ribs, Triceps, Orbital   Muscle Mass Loss:  7 - Severe muscle mass loss, Temples (temporalis), Clavicles (pectoralis &deltoids), Thigh (quadraceps), Scapula (trapezius)  Fluid Accumulation:  Unable to assess,     Strength:  Not performed         Estimated Daily Nutrient Needs:  Energy (kcal): MSJ 1900 (1216 x 1.3 +300 for wt gain); Weight Used for Energy Requirements: Current  Protein (g): 69-75g (1.2-1.3gPro/kg); Weight Used for Protein Requirements: Current  Fluid (ml/day): 1900mL; Method Used for Fluid Requirements: 1 ml/kcal      Nutrition Related Findings:  Meds: lasix, protonix, miralax, [email or web address].   BM 12/20      Wounds:    None       Current Nutrition Therapies:  DIET CARDIAC Regular  DIET NUTRITIONAL SUPPLEMENTS Breakfast; Magic Cups  DIET NUTRITIONAL SUPPLEMENTS Lunch, Dinner; Ensure [employer]    Anthropometric Measures:  · Height:  5' 8\" (172.7 cm)  · Current Body Wt:  57.7 kg (127 lb 3.3 oz)   · Ideal Body Wt:  154 lbs:  82.6 %   · BMI Category:  Underweight (BMI less than 22) age over 72       Nutrition Diagnosis:   · Inadequate protein-energy intake related to cognitive or neurological impairment, other (specify)(poor appetite PTA) as evidenced by BMI, severe muscle loss, severe loss of subcutaneous fat  Previous dx resolved. Nutrition Interventions:   Food and/or Nutrient Delivery: Continue current diet, Continue oral nutrition supplement  Nutrition Education and Counseling: No recommendations at this time  Coordination of Nutrition Care: Continue to monitor while inpatient    Goals:  Pt will consume >70% of meals/supplements in 4-6 days.        Nutrition Monitoring and Evaluation:   Behavioral-Environmental Outcomes:    Food/Nutrient Intake Outcomes: Diet advancement/tolerance, Food and nutrient intake  Physical Signs/Symptoms Outcomes: Biochemical data, Fluid status or edema, Nutrition focused physical findings, GI status    Discharge Planning:    Continue current diet, Continue oral nutrition supplement     Electronically signed by Joe Branch RD, [facility] on 12/22/2020 at 3:54 PM    Contact: [phone]

## 2020-12-23 VITALS
BODY MASS INDEX: 18.41 KG/M2 | HEIGHT: 68 IN | DIASTOLIC BLOOD PRESSURE: 43 MMHG | TEMPERATURE: 98.3 F | HEART RATE: 69 BPM | OXYGEN SATURATION: 100 % | WEIGHT: 121.5 LBS | SYSTOLIC BLOOD PRESSURE: 113 MMHG | RESPIRATION RATE: 16 BRPM

## 2020-12-23 PROCEDURE — 77010033678 HC OXYGEN DAILY

## 2020-12-23 PROCEDURE — 94760 N-INVAS EAR/PLS OXIMETRY 1: CPT

## 2020-12-23 PROCEDURE — 74011250637 HC RX REV CODE- 250/637: Performed by: INTERNAL MEDICINE

## 2020-12-23 PROCEDURE — 74011250636 HC RX REV CODE- 250/636: Performed by: INTERNAL MEDICINE

## 2020-12-23 PROCEDURE — 99239 HOSP IP/OBS DSCHRG MGMT >30: CPT | Performed by: INTERNAL MEDICINE

## 2020-12-23 RX ORDER — FUROSEMIDE 20 MG/1
TABLET ORAL
Qty: 30 TAB | Status: SHIPPED | OUTPATIENT
Start: 2020-12-23 | End: 2021-01-07

## 2020-12-23 RX ORDER — LISINOPRIL 2.5 MG/1
2.5 TABLET ORAL DAILY
Qty: 30 TAB | Status: SHIPPED | OUTPATIENT
Start: 2020-12-23 | End: 2021-01-07

## 2020-12-23 RX ADMIN — PANTOPRAZOLE SODIUM 40 MG: 40 TABLET, DELAYED RELEASE ORAL at 06:37

## 2020-12-23 RX ADMIN — ASPIRIN 81 MG CHEWABLE TABLET 81 MG: 81 TABLET CHEWABLE at 09:00

## 2020-12-23 RX ADMIN — DONEPEZIL HYDROCHLORIDE 5 MG: 5 TABLET, FILM COATED ORAL at 09:00

## 2020-12-23 RX ADMIN — HEPARIN SODIUM 5000 UNITS: 5000 INJECTION INTRAVENOUS; SUBCUTANEOUS at 06:35

## 2020-12-23 RX ADMIN — LISINOPRIL 2.5 MG: 5 TABLET ORAL at 09:00

## 2020-12-23 RX ADMIN — Medication 10 ML: at 06:38

## 2020-12-23 RX ADMIN — FUROSEMIDE 20 MG: 20 TABLET ORAL at 09:00

## 2020-12-23 RX ADMIN — POLYETHYLENE GLYCOL 3350 17 G: 17 POWDER, FOR SOLUTION ORAL at 09:00

## 2020-12-23 RX ADMIN — MEMANTINE HYDROCHLORIDE 5 MG: 10 TABLET ORAL at 09:00

## 2020-12-23 NOTE — PROGRESS NOTES
I have reviewed discharge instructions with the patient and the granddaughter over the phone. The patient and the granddaughter verbalized understanding. Granddaughter is not able to  until later on this evening. The patient is going to the waiting observation area on the third floor in the mean time. Patient is getting dressed and getting ready to be transported up. IV are taken out.

## 2020-12-23 NOTE — PROGRESS NOTES
VITOR Plan:                 *Home w/granddaughter & [facility]              *granddaughter to transport at d/c   *IM letter provided & placed on chart 12/21    Patient is discharging home today without any other needs or concerns. Follow-up appointment is on the AVS.  Granddaughter will pick pt up at 2pm.  CM informed granddaughter, that pt will be waiting on the 3rd floor in d/c waiting room/net center. Granddaughter acknowledged understanding. Patient is ready for d/c from CM standpoint. Care Management Interventions  PCP Verified by CM: Yes(Elsi Frey MD)  Last Visit to PCP: 12/03/20  Mode of Transport at Discharge:  Other (see comment)(granddaughter)  Transition of Care Consult (CM Consult): [address]: Yes  Discharge Durable Medical Equipment: No(no DME use)  Physical Therapy Consult: No  Occupational Therapy Consult: No  Speech Therapy Consult: No  Current Support Network: Relative's Home, Family Lives Nearby(Lives with granddaughter and two teenage great grandchildren in a one story home with 3 maria antonia)  Confirm Follow Up Transport: Family(granddaughter)  The Patient and/or Patient Representative was Provided with a Choice of Provider and Agrees with the Discharge Plan?: Yes  Name of the Patient Representative Who was Provided with a Choice of Provider and Agrees with the Discharge Plan: verbal consent per granddaughter  Freedom of Choice List was Provided with Basic Dialogue that Supports the Patient's Individualized Plan of Care/Goals, Treatment Preferences and Shares the Quality Data Associated with the Providers?: Yes  Discharge Location  Discharge Placement: Home with home health([facility])      Kayla Jackman  Ext [phone]

## 2020-12-23 NOTE — DISCHARGE INSTRUCTIONS
[address]  [phone]      Patient Discharge Instructions    Vani Marques / [id number] : 1931    Admitted 2020 Discharged: 2020     Principal Problem:    Acute on chronic systolic heart failure ([address] (2020)    Active Problems:    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, without long-term current use of insulin ([address] (2017)      Alzheimer disease ([address] (2017)      SSS (sick sinus syndrome) ([address] (2015)      Cardiomyopathy ([address] (2019)      Acute renal failure superimposed on stage 3 chronic kidney disease ([address] (2020)      Severe protein-calorie malnutrition ([address] (2020)      Pleural effusion (2020)      SUBHA (acute kidney injury) ([address] (2020)      Acute respiratory failure with hypoxia ([address] (2020)      Anasarca (12/15/2020)          No Known Allergies    · It is important that you take the medication exactly as they are prescribed. · Do not take other medications without consulting your doctor. What to do at Next Level of Care    Disposition:  Home    Recommended diet: Cardiac    Recommended activity: Up with assistance          Information obtained by :  I understand that if any problems occur once I am at home I am to contact my physician. I understand and acknowledge receipt of the instructions indicated above.                                                                                                                                            Physician's or R.N.'s Signature                                                                  Date/Time                                                                                                                                              Patient or Representative Signature                                                          Date/Time

## 2020-12-23 NOTE — PROGRESS NOTES
Discharge to home today. D/W his granddaughter who is his primary caretaker. F/U with me tomorrow AM if possible. 35 minutes spent on this D/C today.

## 2020-12-23 NOTE — PROGRESS NOTES
Transitions Of Care [facility])       HPI:  Tc Farfan Sr is a 80y.o. year old male who is here for a follow up visit for hospitalization transition of care. He was last seen by me on 12/2/2020 the office and on 12/23/2020 at time of hospital discharge . Discharged on: 12/23/2020    Diagnosis in hospital:   1. Acute on chronic systolic chronic heart failure. 2.  Severe cardiomyopathy, ejection fraction during this hospitalization 15%-20% by echocardiogram.  3.  Alzheimer's dementia. 4.  Diabetes. 5.  Sick sinus syndrome. 6.  Acute renal failure, staged on chronic kidney disease stage III. 7.  Severe protein calorie malnutrition. 8.  Pleural effusion. 9.  Acute respiratory failure with hypoxemia on admission. 10.  Anasarca. Complications in hospital: No complications    Medication changes: Started Lasix 20 mg daily and lisinopril 2.5 daily and discontinue losartan as well as his multivitamin    Discharge Summary reviewed. Today 12/24/2020      He reports the following: He is accompanied today at the visit by his granddaughter who is his primary caretaker. According to him and his daughter he ate well yesterday and did eat a good breakfast this morning. He is getting around now currently not having to have a walker or cane to get around. His granddaughter is with him. He does have physical therapy starting today as well. He denies any chest pain, shortness of breath, palpitations, PND, orthopnea or any cardiac or respiratory complaints except he had a little bit of a cough last night. He did not have to wake up because he was short of breath. He notes no current GI or  complaints. He notes no headaches, dizziness or neurologic complaints. There is no change in her chronic arthritic complaints and and no other complaints on complete review of systems.           Visit Vitals  /60 (BP 1 Location: Right arm, BP Patient Position: Sitting) Comment (BP Patient Position): Manual BP   Pulse (!) 58   Temp 98.6 °F (37 °C)   Resp 16   Ht 5' 8\" (1.727 m)   Wt 124 lb 11.2 oz (56.6 kg)   SpO2 100%   BMI 18.96 kg/m²       Historical Data    Past Medical History:   Diagnosis Date    Abdominal pain 7/18/2017    Alzheimer disease ([facility] 7/18/2017    Anemia 7/18/2017    Arthritis     Back pain 7/18/2017    Bradycardia 7/18/2017    CAD (coronary artery disease)     hx of MI    CKD (chronic kidney disease), stage II 7/18/2017    constipation     Depression 7/18/2017    Diabetes mellitus ([facility] 7/18/2017    Diverticulosis 7/18/2017    DJD (degenerative joint disease) 7/18/2017    Encounter for long-term (current) use of other medications 7/18/2017    Fatigue     Gastritis and duodenitis 7/18/2017    GERD (gastroesophageal reflux disease)     GI bleed 7/18/2017    Hearing loss     Hyperlipidemia 7/18/2017    Hypertension     Hypertension with renal disease 7/18/2017    Ill-defined condition     Dementia    Internal hemorrhoids 7/18/2017    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    S/P cardiac pacemaker procedure 5/4/2015    5/4/15 Medtronic dual chamber pacemaker implant     Weight loss     lost over 40 pounds last year- has no appetite       Past Surgical History:   Procedure Laterality Date    COLONOSCOPY N/A 6/22/2017    COLONOSCOPY performed by Lon Hatfield MD at [address] [place] [address]  6/22/2017         [address]  7/10/2014    right inguinal    HX OTHER SURGICAL      cystectomy from back    [place] EGD TRANSORAL BIOPSY SINGLE/MULTIPLE  2/14/2012         [place] UPPER GI ENDOSCOPY,W/DIR SUBMUC INJ  7/23/2020         UPPER GI ENDOSCOPY,BIOPSY  6/22/2017         UPPER GI ENDOSCOPY,BIOPSY  7/23/2020            Outpatient Encounter Medications as of 12/24/2020   Medication Sig Dispense Refill    furosemide (LASIX) 20 mg tablet One daily 30 Tab prn    lisinopriL (PRINIVIL, ZESTRIL) 2.5 mg tablet Take 1 Tab by mouth daily.  30 Tab prn  donepeziL (ARICEPT) 10 mg tablet TAKE 1/2 TABLET BY MOUTH EVERY DAY 30 Tab 2    acetaminophen (TYLENOL) 650 mg TbER Take 650 mg by mouth two (2) times a day.  memantine (NAMENDA) 10 mg tablet TAKE 1 TABLET BY MOUTH TWICE A  Tab 3    omeprazole (PRILOSEC) 20 mg capsule Take 1 Cap by mouth daily. 90 Cap 3    carvedilol (COREG) 6.25 mg tablet Take 1 Tab by mouth two (2) times a day. 180 Tab prn    aspirin 81 mg chewable tablet Take 1 Tab by mouth daily. 30 Tab 1    [DISCONTINUED] furosemide (LASIX) tablet 20 mg       [DISCONTINUED] lisinopriL (PRINIVIL, ZESTRIL) tablet 2.5 mg       [DISCONTINUED] polyethylene glycol (MIRALAX) packet 17 g       [DISCONTINUED] aspirin chewable tablet 81 mg       [DISCONTINUED] carvediloL (COREG) tablet 6.25 mg       [DISCONTINUED] donepeziL (ARICEPT) tablet 5 mg       [DISCONTINUED] memantine (NAMENDA) tablet 5 mg       [DISCONTINUED] pantoprazole (PROTONIX) tablet 40 mg       [DISCONTINUED] sodium chloride (NS) flush 5-40 mL       [DISCONTINUED] sodium chloride (NS) flush 5-40 mL       [DISCONTINUED] acetaminophen (TYLENOL) tablet 650 mg       [DISCONTINUED] acetaminophen (TYLENOL) suppository 650 mg       [DISCONTINUED] promethazine (PHENERGAN) tablet 12.5 mg       [DISCONTINUED] ondansetron (ZOFRAN) injection 4 mg       [DISCONTINUED] heparin (porcine) injection 5,000 Units       [DISCONTINUED] dextrose 5% infusion        No facility-administered encounter medications on file as of 12/24/2020.          No Known Allergies     Social History     Socioeconomic History    Marital status: LEGALLY      Spouse name: Not on file    Number of children: Not on file    Years of education: Not on file    Highest education level: Not on file   Occupational History    Not on file   Social Needs    Financial resource strain: Not on file    Food insecurity     Worry: Not on file     Inability: Not on file    Transportation needs     Medical: Not on file     Non-medical: Not on file   Tobacco Use    Smoking status: Former Smoker     Packs/day: 0.50     Years: 10.00     Pack years: 5.00     Start date: 7/12/1991    Smokeless tobacco: Never Used    Tobacco comment: quit 50 years ago   Substance and Sexual Activity    Alcohol use: Not Currently     Comment: Occasional beer    Drug use: No    Sexual activity: Not Currently   Lifestyle    Physical activity     Days per week: Not on file     Minutes per session: Not on file    Stress: Not on file   Relationships    Social connections     Talks on phone: Not on file     Gets together: Not on file     Attends Oriental orthodox service: Not on file     Active member of club or organization: Not on file     Attends meetings of clubs or organizations: Not on file     Relationship status: Not on file    Intimate partner violence     Fear of current or ex partner: Not on file     Emotionally abused: Not on file     Physically abused: Not on file     Forced sexual activity: Not on file   Other Topics Concern    Not on file   Social History Narrative    Not on file      REVIEW OF SYSTEMS:  General: negative for - chills or fever.   Still generally weak  ENT: negative for - headaches, nasal congestion or tinnitus  Eyes: no blurred or visual changes  Neck: No stiffness or swollen nodes  Respiratory: negative for -  hemoptysis, shortness of breath or wheezing positive for mild cough last night  Cardiovascular : negative for - chest pain, edema, palpitations or shortness of breath  Gastrointestinal: negative for - abdominal pain, blood in stools, heartburn or nausea/vomiting  Genito-Urinary: no dysuria, trouble voiding, or hematuria  Musculoskeletal: negative for - gait disturbance, joint pain, joint stiffness or joint swelling  Neurological: no TIA or stroke symptoms  Hematologic: no bruises, no bleeding  Lymphatic: no swollen glands  Integument: no lumps, mole changes, nail changes or rash  Endocrine:no malaise/lethargy poly uria or polydipsia or unexpected weight changes      Visit Vitals  /60 (BP 1 Location: Right arm, BP Patient Position: Sitting) Comment (BP Patient Position): Manual BP   Pulse (!) 58   Temp 98.6 °F (37 °C)   Resp 16   Ht 5' 8\" (1.727 m)   Wt 124 lb 11.2 oz (56.6 kg)   SpO2 100%   BMI 18.96 kg/m²     CONSTITUTIONAL: Thin frail black male, appears age appropriate  HEAD: normocephalic, atraumatic  EYES: sclera anicteric, PERRL, EOMI  ENMT:moist mucous membranes, pharynx clear          Nares: w/o erythema or edema  NECK: supple. Thyroid normal, No JVD or bruits  RESPIRATORY: Chest: clear to ascultation and percussion   CARDIOVASCULAR: Heart: regular rate and rhythm no murmurs, rubs or gallops, PMI not displaced, no thrill  GASTROINTESTINAL: Abdomen: non distended, soft, non-tender, bowel sounds normal  HEMATOLOGIC: no petechiae or purpura  LYMPHATIC: no lymphadenopathy  MUSCULOSKELETAL: Extremities: no edema or active synovitis, pulse 1+   BACK; no point or CVAT  INTEGUMENT: No unusual rashes or suspicious skin lesions noted. Nails appear normal.  NEUROLOGIC: non-focal exam alert and oriented x3 today although he is confused  MENTAL STATUS: alert and oriented, appropriate affect   PSYCHIATRIC: normal affect    ASSESSMENT:   1. Acute on chronic systolic heart failure ([address]    2. Acute renal failure superimposed on stage 3 chronic kidney disease, unspecified acute renal failure type, unspecified whether stage 3a or 3b CKD ([address]    3. Acute respiratory failure with hypoxia (HCC)    4. Anasarca    5. Pleural effusion    6. Cardiomyopathy, unspecified type ([address]    7. Hypertension with renal disease    8. ASCVD (arteriosclerotic cardiovascular disease)    9. Alzheimer's dementia with behavioral disturbance, unspecified timing of dementia onset ([address]    10. 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, without long-term current use of insulin ([address]      Pression  1.   Acute on chronic systolic CHF now compensated and a very tenuous state. EF in the hospital 15% to 20%. 2.  Acute renal failure on CKD stage III that was stable last few days of hospital so not repeated today. 3.  Acute respiratory failure O2 sat today at 100% on room air. 4.  Anasarca that currently seems to be controlled  5. Pleural effusion clinically his lungs are clear today I did not repeat a chest x-ray today. 6.  Cardiomyopathy has noted EF of 15 to 20% so continue low-dose lisinopril along with Coreg and Lasix were reviewed with his granddaughter. 7.  Hypertension that is well controlled  8. ASCVD clinically stable continue aspirin daily  9. Alzheimer's dementia that is stable so continue current medications. 10.  Diabetes that was not a problem during hospitalization. This is extremely high risk patient for repeat hospitalization. I have set him up for close follow-up in 2 to 3 weeks with the understanding I will see him sooner should it be a problem. All of that is reviewed in detail with his granddaughter who is with him and his primary caretaker. PLAN:  . No orders of the defined types were placed in this encounter. ATTENTION:   This medical record was transcribed using an electronic medical records system. Although proofread, it may and can contain electronic and spelling errors. Other human spelling and other errors may be present. Corrections may be executed at a later time. Please feel free to contact us for any clarifications as needed. Follow-up and Dispositions    · Return in about 3 weeks (around 1/14/2021). Denny Salamanca MD    No orders of the defined types were placed in this encounter. No results found for any visits on 12/24/20. I have reviewed the patient's medical history in detail and updated the computerized patient record. We had a prolonged discussion about these complex clinical issues and went over the various important aspects to consider.  All questions were answered. Advised him to call back or return to office if symptoms do not improve, change in nature, or persist.    He was given an after visit summary or informed of [employer] [facility] which includes patient instructions, diagnoses, current medications, & vitals. He expressed understanding with the diagnosis and plan.

## 2020-12-23 NOTE — DISCHARGE SUMMARY
[address] SUMMARY    Name:  Flor Dawn  MR#:  [id number]  :  1931  ACCOUNT #:  [de-identified]  ADMIT DATE:  2020  DISCHARGE DATE:  2020    FINAL DIAGNOSES:  1. Acute on chronic systolic chronic heart failure. 2.  Severe cardiomyopathy, ejection fraction during this hospitalization 15%-20% by echocardiogram.  3.  Alzheimer's dementia. 4.  Diabetes. 5.  Sick sinus syndrome. 6.  Acute renal failure, staged on chronic kidney disease stage III. 7.  Severe protein calorie malnutrition. 8.  Pleural effusion. 9.  Acute respiratory failure with hypoxemia on admission. 10.  Anasarca. CONSULTATIONS:  None. PROCEDURES:  None. For details of admission history and physical, please see admit note. HOSPITAL COURSE:  Briefly, the patient is an 70-year-old black male with advanced Alzheimer's dementia who presented to the emergency room with increasing shortness of breath and was found to have small bilateral pleural effusions as well as increased congestive heart failure as he had been recently seen in the office by me at which time he was not eating and he it was felt that he would become dehydrated as his renal function had deteriorated and his diuretics were discontinued. During the hospitalization here, he was gradually rehydrated as far as his renal function which was abnormal and creatinine returned to his baseline at 1.5. He had IV Lasix given at 20 mg a day initially and that was subsequently switched to p.o. Lasix 3 days prior to discharge and he maintained his status with no shortness of breath, did not require any oxygen, and was breathing quit well up to this point. Since he had progressed some with physical therapy that he would discharged home per request of his family rather than go to rehab, so he will be discharged home with followup with me hopefully in 1-2 days.     DISCHARGE MEDICATIONS:  His discharge medications will consist of a new medicine, Lasix 20 mg daily, and lisinopril 2.5 mg daily, and we will discontinue his losartan as well as his multivitamin. His other medicines will be continued the same as prior to admission which consist of Tylenol 650 by mouth 2 times daily as needed, aspirin 81 mg daily, Coreg 6.25 b.i.d., Aricept 10 mg one-half tablet by mouth daily, Namenda 10 mg b.i.d, Prilosec 20 mg daily. FOLLOWUP:  He will have followup by me in the office. This has been discussed with his granddaughter who is his primary care taker.   35 minutes spent on this discharge today    Rose Pena MD      [email or web address]  D:  12/23/2020 7:39  T:  12/23/2020 10:47  JOB #:  [id number]  CC:  [address]

## 2020-12-23 NOTE — PROGRESS NOTES
End of Shift Note    Bedside shift change report given to [id number] (oncoming nurse) by Miguel Angel Richardson (offgoing nurse). Report included the following information SBAR, Kardex, Intake/Output, MAR and Recent Results    Shift worked:  7p-7a     Shift summary and any significant changes:     No changes noted     Concerns for physician to address:       Zone phone for oncoming shift:          Activity:  Activity Level: Up with Assistance  Number times ambulated in hallways past shift: 0  Number of times OOB to chair past shift: 0    Cardiac:   Cardiac Monitoring: No      Cardiac Rhythm: Paced    Access:   Current line(s): PIV     Genitourinary:   Urinary status: voiding       GI:  Last Bowel Movement Date: 12/22/20  Current diet:  DIET CARDIAC Regular  DIET NUTRITIONAL SUPPLEMENTS Breakfast; Magic Cups  DIET NUTRITIONAL SUPPLEMENTS Lunch, Dinner; [employer]  Passing flatus: YES  Tolerating current diet: YES  % Diet Eaten: 90 %    Pain Management:   Patient states pain is manageable on current regimen: YES    Skin:  Newton Score: 19  Interventions: turn team    Patient Safety:  Fall Score:  Total Score: 4  Interventions: bed/chair alarm, gripper socks, pt to call before getting OOB and tele sitter   High Fall Risk: Yes    Length of Stay:  Expected LOS: 4d 2h  Actual LOS: [address]

## 2020-12-24 ENCOUNTER — HOME CARE VISIT (OUTPATIENT)
Dept: SCHEDULING | Facility: HOME HEALTH | Age: 85
End: 2020-12-24
Payer: MEDICARE

## 2020-12-24 ENCOUNTER — PATIENT OUTREACH (OUTPATIENT)
Dept: CASE MANAGEMENT | Age: 85
End: 2020-12-24

## 2020-12-24 ENCOUNTER — HOME CARE VISIT (OUTPATIENT)
Dept: HOME HEALTH SERVICES | Facility: HOME HEALTH | Age: 85
End: 2020-12-24
Payer: MEDICARE

## 2020-12-24 ENCOUNTER — OFFICE VISIT (OUTPATIENT)
Dept: INTERNAL MEDICINE CLINIC | Age: 85
End: 2020-12-24
Payer: MEDICARE

## 2020-12-24 VITALS
DIASTOLIC BLOOD PRESSURE: 60 MMHG | BODY MASS INDEX: 18.9 KG/M2 | HEIGHT: 68 IN | SYSTOLIC BLOOD PRESSURE: 130 MMHG | TEMPERATURE: 98.6 F | RESPIRATION RATE: 16 BRPM | HEART RATE: 58 BPM | WEIGHT: 124.7 LBS | OXYGEN SATURATION: 100 %

## 2020-12-24 DIAGNOSIS — N17.9 ACUTE RENAL FAILURE SUPERIMPOSED ON STAGE 3 CHRONIC KIDNEY DISEASE, UNSPECIFIED ACUTE RENAL FAILURE TYPE, UNSPECIFIED WHETHER STAGE 3A OR 3B CKD (HCC): ICD-10-CM

## 2020-12-24 DIAGNOSIS — I50.23 ACUTE ON CHRONIC SYSTOLIC HEART FAILURE (HCC): Primary | ICD-10-CM

## 2020-12-24 DIAGNOSIS — E11.22 CONTROLLED TYPE 2 DIABETES MELLITUS WITH STAGE 2 CHRONIC KIDNEY DISEASE, WITHOUT LONG-TERM CURRENT USE OF INSULIN (HCC): ICD-10-CM

## 2020-12-24 DIAGNOSIS — I25.10 ASCVD (ARTERIOSCLEROTIC CARDIOVASCULAR DISEASE): ICD-10-CM

## 2020-12-24 DIAGNOSIS — R60.1 ANASARCA: ICD-10-CM

## 2020-12-24 DIAGNOSIS — J96.01 ACUTE RESPIRATORY FAILURE WITH HYPOXIA (HCC): ICD-10-CM

## 2020-12-24 DIAGNOSIS — J90 PLEURAL EFFUSION: ICD-10-CM

## 2020-12-24 DIAGNOSIS — F02.81 ALZHEIMER'S DEMENTIA WITH BEHAVIORAL DISTURBANCE, UNSPECIFIED TIMING OF DEMENTIA ONSET: ICD-10-CM

## 2020-12-24 DIAGNOSIS — I12.9 HYPERTENSION WITH RENAL DISEASE: ICD-10-CM

## 2020-12-24 DIAGNOSIS — N18.2 CONTROLLED TYPE 2 DIABETES MELLITUS WITH STAGE 2 CHRONIC KIDNEY DISEASE, WITHOUT LONG-TERM CURRENT USE OF INSULIN (HCC): ICD-10-CM

## 2020-12-24 DIAGNOSIS — G30.9 ALZHEIMER'S DEMENTIA WITH BEHAVIORAL DISTURBANCE, UNSPECIFIED TIMING OF DEMENTIA ONSET: ICD-10-CM

## 2020-12-24 DIAGNOSIS — N18.30 ACUTE RENAL FAILURE SUPERIMPOSED ON STAGE 3 CHRONIC KIDNEY DISEASE, UNSPECIFIED ACUTE RENAL FAILURE TYPE, UNSPECIFIED WHETHER STAGE 3A OR 3B CKD (HCC): ICD-10-CM

## 2020-12-24 DIAGNOSIS — I42.9 CARDIOMYOPATHY, UNSPECIFIED TYPE (HCC): ICD-10-CM

## 2020-12-24 PROCEDURE — 3331090002 HH PPS REVENUE DEBIT

## 2020-12-24 PROCEDURE — 99496 TRANSJ CARE MGMT HIGH F2F 7D: CPT | Performed by: INTERNAL MEDICINE

## 2020-12-24 PROCEDURE — G8427 DOCREV CUR MEDS BY ELIG CLIN: HCPCS | Performed by: INTERNAL MEDICINE

## 2020-12-24 PROCEDURE — 400013 HH SOC

## 2020-12-24 PROCEDURE — G0151 HHCP-SERV OF PT,EA 15 MIN: HCPCS

## 2020-12-24 PROCEDURE — 3331090001 HH PPS REVENUE CREDIT

## 2020-12-24 NOTE — PROGRESS NOTES
Sanya Pollard is a 80 y.o. male    Chief Complaint   Patient presents with   [place]       Visit Vitals  /60 (BP 1 Location: Right arm, BP Patient Position: Sitting) Comment (BP Patient Position): Manual BP   Pulse (!) 58   Temp 98.6 °F (37 °C)   Resp 16   Ht 5' 8\" (1.727 m)   Wt 124 lb 11.2 oz (56.6 kg)   SpO2 100%   BMI 18.96 kg/m²           1. Have you been to the ER, urgent care clinic since your last visit? Hospitalized since your last visit? No     2. Have you seen or consulted any other health care providers outside of the [address] [place] [address] since your last visit? Include any pap smears or colon screening.  No

## 2020-12-25 VITALS
DIASTOLIC BLOOD PRESSURE: 60 MMHG | OXYGEN SATURATION: 98 % | RESPIRATION RATE: 16 BRPM | HEART RATE: 72 BPM | SYSTOLIC BLOOD PRESSURE: 130 MMHG

## 2020-12-25 DIAGNOSIS — I10 ESSENTIAL HYPERTENSION: ICD-10-CM

## 2020-12-25 DIAGNOSIS — I47.1 ATRIAL TACHYCARDIA (HCC): ICD-10-CM

## 2020-12-25 DIAGNOSIS — R63.4 UNEXPLAINED WEIGHT LOSS: ICD-10-CM

## 2020-12-25 DIAGNOSIS — R63.4 RAPID WEIGHT LOSS: ICD-10-CM

## 2020-12-25 DIAGNOSIS — R00.2 RAPID PALPITATIONS: ICD-10-CM

## 2020-12-25 PROCEDURE — 3331090001 HH PPS REVENUE CREDIT

## 2020-12-25 PROCEDURE — 3331090002 HH PPS REVENUE DEBIT

## 2020-12-26 PROCEDURE — 3331090002 HH PPS REVENUE DEBIT

## 2020-12-26 PROCEDURE — 3331090001 HH PPS REVENUE CREDIT

## 2020-12-27 PROCEDURE — 3331090002 HH PPS REVENUE DEBIT

## 2020-12-27 PROCEDURE — 3331090001 HH PPS REVENUE CREDIT

## 2020-12-28 ENCOUNTER — HOME CARE VISIT (OUTPATIENT)
Dept: SCHEDULING | Facility: HOME HEALTH | Age: 85
End: 2020-12-28
Payer: MEDICARE

## 2020-12-28 PROCEDURE — G0157 HHC PT ASSISTANT EA 15: HCPCS

## 2020-12-28 PROCEDURE — 3331090002 HH PPS REVENUE DEBIT

## 2020-12-28 PROCEDURE — 3331090001 HH PPS REVENUE CREDIT

## 2020-12-28 RX ORDER — AMLODIPINE BESYLATE 10 MG/1
TABLET ORAL
Qty: 90 TAB | Refills: 1 | Status: SHIPPED | OUTPATIENT
Start: 2020-12-28 | End: 2021-01-07

## 2020-12-29 VITALS
RESPIRATION RATE: 17 BRPM | TEMPERATURE: 98.2 F | DIASTOLIC BLOOD PRESSURE: 78 MMHG | OXYGEN SATURATION: 98 % | SYSTOLIC BLOOD PRESSURE: 124 MMHG | HEART RATE: 51 BPM

## 2020-12-29 PROCEDURE — 3331090001 HH PPS REVENUE CREDIT

## 2020-12-29 PROCEDURE — 3331090002 HH PPS REVENUE DEBIT

## 2020-12-29 NOTE — PROGRESS NOTES
Patient was admitted to [facility] on 2020 and discharged on 2020 for CHF. Outreach made within 2 business days of discharge: Yes    Top Discharge Challenges to be reviewed by the provider   Additional needs identified to be addressed with provider no  home health care- [facility]  Discussed COVID-19 related testing which was not done at this time. Test results were not done. Patient informed of results, if available? na   Method of communication with provider : none       Advance Care Planning:   Does patient have an Advance Directive:  yes; reviewed and current     Inpatient Readmission Risk score:   Was this a readmission? no   Patient stated reason for the admission: SOB  Patients top risk factors for readmission: lack of knowledge about disease, level of motivation and medical condition  Interventions to address risk factors: schedule and attend follow up appointments, participate with Kai Patrick PT, take medications as prescribed     Care Transition Nurse (CTN) contacted the family by telephone to perform post hospital discharge assessment. Verified name and  with family as identifiers. Provided introduction to self, and explanation of the CTN role. CTN reviewed discharge instructions, medical action plan and red flags with family who verbalized understanding. Family given an opportunity to ask questions and does not have any further questions or concerns at this time. The family agrees to contact the PCP office for questions related to their healthcare. Medication reconciliation was performed with family, who verbalizes understanding of administration of home medications. Advised obtaining a 90-day supply of all daily and as-needed medications.    Referral to Pharm D needed: no     Home Health/Outpatient orders at discharge: home health care, PT and [address]: [facility] Kai Patrick  Date of initial visit: 2020    Durable Medical Equipment ordered at discharge: none  [address] [facility] [address] [facility] [address]: na  Durable Medical Equipment received: anthony    Covid Risk Education    Patient has following risk factors of: diabetes and chronic kidney disease. Education provided regarding infection prevention, and signs and symptoms of COVID-19 and when to seek medical attention with family who verbalized understanding. Discussed exposure protocols and quarantine From CDC: Are you at higher risk for severe illness?  and given an opportunity for questions and concerns. The family agrees to contact the COVID-19 hotline 230-935-0602 or PCP office for questions related to COVID-19. For more information on steps you can take to protect yourself, see CDC's How to Protect Yourself     Patient/family/caregiver given information for [employer] and agrees to enroll na  Patient's preferred e-mail: na  Patient's preferred phone number: anthony    Discussed follow-up appointments. If no appointment was previously scheduled, appointment scheduling offered: no  [address] follow up appointment(s):   Future Appointments   Date Time Provider Sonia Irizarry   12/30/2020 10:00 AM Carlos Leetonia [facility]   1/4/2021 To Be Determined Carlos Leetonia [facility]   1/6/2021 To Be Determined Carlos Leetonia [facility]   1/11/2021 To Be Determined Carlos Leetonia [facility]   1/13/2021 To Be Determined [facility] [place] [address]   1/18/2021 To Be Determined [facility]rader, PT [address] [facility]   1/26/2021  9:40 AM Nivia Guajardo MD [facility]   6/10/2021  8:45 AM PACEMAKER, [facility]AM [facility]   1/5/2022 11:00 AM PACEMAKER, RCAM [facility] AM[facility]   1/5/2022 11:20 AM Meghan Wade, ANP [facility]     Non-[facility] follow up appointment(s):   Plan for follow-up call in 7-10 days based on severity of symptoms and risk factors. CTN provided contact information for future needs.     Goals Addressed                 This Visit's Progress     Prevent complications post hospitalization. 12/29/2020 Granddaughter report patient is feeling well,c/o back pain controlled with Tylenol. Denies chest pain, SOB, N/V/D, fever. Patient does not currently have a scale,educated on CHF S&S and when to call cardio . Granddaughter will monitor patient patient for CHF S&S and report at next outreach.  EMILY

## 2020-12-30 ENCOUNTER — HOME CARE VISIT (OUTPATIENT)
Dept: SCHEDULING | Facility: HOME HEALTH | Age: 85
End: 2020-12-30
Payer: MEDICARE

## 2020-12-30 PROCEDURE — G0157 HHC PT ASSISTANT EA 15: HCPCS

## 2020-12-30 PROCEDURE — 3331090001 HH PPS REVENUE CREDIT

## 2020-12-30 PROCEDURE — 3331090002 HH PPS REVENUE DEBIT

## 2020-12-31 ENCOUNTER — TELEPHONE (OUTPATIENT)
Dept: CARDIOLOGY CLINIC | Age: 85
End: 2020-12-31

## 2020-12-31 VITALS
SYSTOLIC BLOOD PRESSURE: 128 MMHG | HEART RATE: 63 BPM | OXYGEN SATURATION: 95 % | DIASTOLIC BLOOD PRESSURE: 80 MMHG | RESPIRATION RATE: 17 BRPM

## 2020-12-31 PROCEDURE — 3331090002 HH PPS REVENUE DEBIT

## 2020-12-31 PROCEDURE — 3331090001 HH PPS REVENUE CREDIT

## 2020-12-31 NOTE — TELEPHONE ENCOUNTER
Patient's granddaughter called. Reports that patient is having more fluid build up and has already called his PCP and waiting to hear back from them and home health is aware. Reports patient was recently discharged from hospital for CHF and was seen by Dr. Daly Abdi who is prescribing his medications for his heart failure. Advised her to work through Dr. Daly Abdi office and if patient has increased shortness of breath, he should go to ED.

## 2021-01-01 ENCOUNTER — PATIENT OUTREACH (OUTPATIENT)
Dept: CASE MANAGEMENT | Age: 86
End: 2021-01-01

## 2021-01-01 ENCOUNTER — OFFICE VISIT (OUTPATIENT)
Dept: INTERNAL MEDICINE CLINIC | Age: 86
End: 2021-01-01
Payer: MEDICARE

## 2021-01-01 ENCOUNTER — APPOINTMENT (OUTPATIENT)
Dept: GENERAL RADIOLOGY | Age: 86
DRG: 291 | End: 2021-01-01
Attending: EMERGENCY MEDICINE
Payer: MEDICARE

## 2021-01-01 ENCOUNTER — HOSPITAL ENCOUNTER (INPATIENT)
Age: 86
LOS: 6 days | Discharge: HOME HEALTH | DRG: 291 | End: 2021-01-07
Attending: EMERGENCY MEDICINE | Admitting: INTERNAL MEDICINE
Payer: MEDICARE

## 2021-01-01 VITALS
WEIGHT: 128.4 LBS | RESPIRATION RATE: 16 BRPM | SYSTOLIC BLOOD PRESSURE: 130 MMHG | TEMPERATURE: 98.3 F | HEIGHT: 68 IN | DIASTOLIC BLOOD PRESSURE: 72 MMHG | HEART RATE: 78 BPM | BODY MASS INDEX: 19.46 KG/M2 | OXYGEN SATURATION: 96 %

## 2021-01-01 DIAGNOSIS — G30.9 ALZHEIMER'S DEMENTIA WITH BEHAVIORAL DISTURBANCE, UNSPECIFIED TIMING OF DEMENTIA ONSET: ICD-10-CM

## 2021-01-01 DIAGNOSIS — F02.80 ALZHEIMER DISEASE (HCC): ICD-10-CM

## 2021-01-01 DIAGNOSIS — I25.10 ASCVD (ARTERIOSCLEROTIC CARDIOVASCULAR DISEASE): ICD-10-CM

## 2021-01-01 DIAGNOSIS — I42.9 CARDIOMYOPATHY, UNSPECIFIED TYPE (HCC): ICD-10-CM

## 2021-01-01 DIAGNOSIS — M79.644 PAIN OF RIGHT THUMB: ICD-10-CM

## 2021-01-01 DIAGNOSIS — I12.9 HYPERTENSION WITH RENAL DISEASE: Primary | ICD-10-CM

## 2021-01-01 DIAGNOSIS — N18.2 CONTROLLED TYPE 2 DIABETES MELLITUS WITH STAGE 2 CHRONIC KIDNEY DISEASE, WITHOUT LONG-TERM CURRENT USE OF INSULIN (HCC): ICD-10-CM

## 2021-01-01 DIAGNOSIS — E43 SEVERE PROTEIN-CALORIE MALNUTRITION (HCC): Chronic | ICD-10-CM

## 2021-01-01 DIAGNOSIS — E78.2 MIXED HYPERLIPIDEMIA: ICD-10-CM

## 2021-01-01 DIAGNOSIS — E11.22 CONTROLLED TYPE 2 DIABETES MELLITUS WITH STAGE 2 CHRONIC KIDNEY DISEASE, WITHOUT LONG-TERM CURRENT USE OF INSULIN (HCC): ICD-10-CM

## 2021-01-01 DIAGNOSIS — N17.9 ACUTE RENAL FAILURE SUPERIMPOSED ON STAGE 3 CHRONIC KIDNEY DISEASE, UNSPECIFIED ACUTE RENAL FAILURE TYPE, UNSPECIFIED WHETHER STAGE 3A OR 3B CKD (HCC): ICD-10-CM

## 2021-01-01 DIAGNOSIS — G30.9 ALZHEIMER DISEASE (HCC): ICD-10-CM

## 2021-01-01 DIAGNOSIS — I12.9 HYPERTENSION WITH RENAL DISEASE: ICD-10-CM

## 2021-01-01 DIAGNOSIS — I49.5 SSS (SICK SINUS SYNDROME) (HCC): ICD-10-CM

## 2021-01-01 DIAGNOSIS — I50.23 ACUTE ON CHRONIC SYSTOLIC HEART FAILURE (HCC): ICD-10-CM

## 2021-01-01 DIAGNOSIS — Z95.0 S/P CARDIAC PACEMAKER PROCEDURE: ICD-10-CM

## 2021-01-01 DIAGNOSIS — N18.30 ACUTE RENAL FAILURE SUPERIMPOSED ON STAGE 3 CHRONIC KIDNEY DISEASE, UNSPECIFIED ACUTE RENAL FAILURE TYPE, UNSPECIFIED WHETHER STAGE 3A OR 3B CKD (HCC): ICD-10-CM

## 2021-01-01 DIAGNOSIS — I50.23 ACUTE ON CHRONIC SYSTOLIC CONGESTIVE HEART FAILURE (HCC): ICD-10-CM

## 2021-01-01 DIAGNOSIS — J81.0 ACUTE PULMONARY EDEMA (HCC): Primary | ICD-10-CM

## 2021-01-01 DIAGNOSIS — F02.81 ALZHEIMER'S DEMENTIA WITH BEHAVIORAL DISTURBANCE, UNSPECIFIED TIMING OF DEMENTIA ONSET: ICD-10-CM

## 2021-01-01 PROBLEM — I50.9 CHF (CONGESTIVE HEART FAILURE) (HCC): Status: ACTIVE | Noted: 2021-01-01

## 2021-01-01 LAB
ALBUMIN SERPL-MCNC: 3.4 G/DL (ref 3.5–5)
ALBUMIN SERPL-MCNC: 3.5 G/DL (ref 3.5–5)
ALBUMIN/GLOB SERPL: 0.9 {RATIO} (ref 1.1–2.2)
ALBUMIN/GLOB SERPL: 1 {RATIO} (ref 1.1–2.2)
ALP SERPL-CCNC: 82 U/L (ref 45–117)
ALP SERPL-CCNC: 84 U/L (ref 45–117)
ALT SERPL-CCNC: 18 U/L (ref 12–78)
ALT SERPL-CCNC: 27 U/L (ref 12–78)
ANION GAP SERPL CALC-SCNC: 10 MMOL/L (ref 5–15)
ANION GAP SERPL CALC-SCNC: 5 MMOL/L (ref 5–15)
AST SERPL-CCNC: 20 U/L (ref 15–37)
AST SERPL-CCNC: 26 U/L (ref 15–37)
BASOPHILS # BLD: 0 K/UL (ref 0–0.1)
BASOPHILS NFR BLD: 1 % (ref 0–1)
BILIRUB SERPL-MCNC: 0.3 MG/DL (ref 0.2–1)
BILIRUB SERPL-MCNC: 1.1 MG/DL (ref 0.2–1)
BNP SERPL-MCNC: ABNORMAL PG/ML
BUN SERPL-MCNC: 31 MG/DL (ref 6–20)
BUN SERPL-MCNC: 36 MG/DL (ref 6–20)
BUN/CREAT SERPL: 15 (ref 12–20)
BUN/CREAT SERPL: 24 (ref 12–20)
CALCIUM SERPL-MCNC: 9.3 MG/DL (ref 8.5–10.1)
CALCIUM SERPL-MCNC: 9.6 MG/DL (ref 8.5–10.1)
CHLORIDE SERPL-SCNC: 104 MMOL/L (ref 97–108)
CHLORIDE SERPL-SCNC: 106 MMOL/L (ref 97–108)
CHOLEST SERPL-MCNC: 190 MG/DL
CK SERPL-CCNC: 120 U/L (ref 39–308)
CK SERPL-CCNC: 69 U/L (ref 39–308)
CO2 SERPL-SCNC: 24 MMOL/L (ref 21–32)
CO2 SERPL-SCNC: 29 MMOL/L (ref 21–32)
CREAT SERPL-MCNC: 1.49 MG/DL (ref 0.7–1.3)
CREAT SERPL-MCNC: 2.02 MG/DL (ref 0.7–1.3)
DIFFERENTIAL METHOD BLD: ABNORMAL
EOSINOPHIL # BLD: 0 K/UL (ref 0–0.4)
EOSINOPHIL NFR BLD: 0 % (ref 0–7)
ERYTHROCYTE [DISTWIDTH] IN BLOOD BY AUTOMATED COUNT: 15.4 % (ref 11.5–14.5)
EST. AVERAGE GLUCOSE BLD GHB EST-MCNC: 120 MG/DL
GLOBULIN SER CALC-MCNC: 3.4 G/DL (ref 2–4)
GLOBULIN SER CALC-MCNC: 3.8 G/DL (ref 2–4)
GLUCOSE BLD STRIP.AUTO-MCNC: 112 MG/DL (ref 65–100)
GLUCOSE SERPL-MCNC: 124 MG/DL (ref 65–100)
GLUCOSE SERPL-MCNC: 92 MG/DL (ref 65–100)
HBA1C MFR BLD: 5.8 % (ref 4–5.6)
HCT VFR BLD AUTO: 43.3 % (ref 36.6–50.3)
HDLC SERPL-MCNC: 58 MG/DL
HDLC SERPL: 3.3 {RATIO} (ref 0–5)
HGB BLD-MCNC: 13.5 G/DL (ref 12.1–17)
IMM GRANULOCYTES # BLD AUTO: 0 K/UL (ref 0–0.04)
IMM GRANULOCYTES NFR BLD AUTO: 0 % (ref 0–0.5)
LDLC SERPL CALC-MCNC: 116.2 MG/DL (ref 0–100)
LYMPHOCYTES # BLD: 1.1 K/UL (ref 0.8–3.5)
LYMPHOCYTES NFR BLD: 15 % (ref 12–49)
MAGNESIUM SERPL-MCNC: 2 MG/DL (ref 1.6–2.4)
MCH RBC QN AUTO: 27.6 PG (ref 26–34)
MCHC RBC AUTO-ENTMCNC: 31.2 G/DL (ref 30–36.5)
MCV RBC AUTO: 88.5 FL (ref 80–99)
MONOCYTES # BLD: 0.7 K/UL (ref 0–1)
MONOCYTES NFR BLD: 10 % (ref 5–13)
NEUTS SEG # BLD: 5.1 K/UL (ref 1.8–8)
NEUTS SEG NFR BLD: 74 % (ref 32–75)
NRBC # BLD: 0 K/UL (ref 0–0.01)
NRBC BLD-RTO: 0 PER 100 WBC
PLATELET # BLD AUTO: 294 K/UL (ref 150–400)
PMV BLD AUTO: 11.7 FL (ref 8.9–12.9)
POTASSIUM SERPL-SCNC: 3.9 MMOL/L (ref 3.5–5.1)
POTASSIUM SERPL-SCNC: 4.1 MMOL/L (ref 3.5–5.1)
PROT SERPL-MCNC: 6.8 G/DL (ref 6.4–8.2)
PROT SERPL-MCNC: 7.3 G/DL (ref 6.4–8.2)
RBC # BLD AUTO: 4.89 M/UL (ref 4.1–5.7)
SERVICE CMNT-IMP: ABNORMAL
SODIUM SERPL-SCNC: 138 MMOL/L (ref 136–145)
SODIUM SERPL-SCNC: 140 MMOL/L (ref 136–145)
TRIGL SERPL-MCNC: 79 MG/DL (ref ?–150)
TROPONIN I BLD-MCNC: 0.06 NG/ML (ref 0–0.08)
TROPONIN I SERPL-MCNC: 0.12 NG/ML
TROPONIN I SERPL-MCNC: 0.15 NG/ML
VLDLC SERPL CALC-MCNC: 15.8 MG/DL
WBC # BLD AUTO: 7 K/UL (ref 4.1–11.1)

## 2021-01-01 PROCEDURE — 93005 ELECTROCARDIOGRAM TRACING: CPT

## 2021-01-01 PROCEDURE — 36415 COLL VENOUS BLD VENIPUNCTURE: CPT

## 2021-01-01 PROCEDURE — 85025 COMPLETE CBC W/AUTO DIFF WBC: CPT

## 2021-01-01 PROCEDURE — 74011250637 HC RX REV CODE- 250/637: Performed by: EMERGENCY MEDICINE

## 2021-01-01 PROCEDURE — 84484 ASSAY OF TROPONIN QUANT: CPT

## 2021-01-01 PROCEDURE — 3331090001 HH PPS REVENUE CREDIT

## 2021-01-01 PROCEDURE — 74011250636 HC RX REV CODE- 250/636: Performed by: EMERGENCY MEDICINE

## 2021-01-01 PROCEDURE — 74011250637 HC RX REV CODE- 250/637: Performed by: INTERNAL MEDICINE

## 2021-01-01 PROCEDURE — G8536 NO DOC ELDER MAL SCRN: HCPCS | Performed by: INTERNAL MEDICINE

## 2021-01-01 PROCEDURE — 96374 THER/PROPH/DIAG INJ IV PUSH: CPT

## 2021-01-01 PROCEDURE — 1101F PT FALLS ASSESS-DOCD LE1/YR: CPT | Performed by: INTERNAL MEDICINE

## 2021-01-01 PROCEDURE — 99214 OFFICE O/P EST MOD 30 MIN: CPT | Performed by: INTERNAL MEDICINE

## 2021-01-01 PROCEDURE — 82962 GLUCOSE BLOOD TEST: CPT

## 2021-01-01 PROCEDURE — 82550 ASSAY OF CK (CPK): CPT

## 2021-01-01 PROCEDURE — 83735 ASSAY OF MAGNESIUM: CPT

## 2021-01-01 PROCEDURE — 65660000000 HC RM CCU STEPDOWN

## 2021-01-01 PROCEDURE — 83880 ASSAY OF NATRIURETIC PEPTIDE: CPT

## 2021-01-01 PROCEDURE — 3331090002 HH PPS REVENUE DEBIT

## 2021-01-01 PROCEDURE — G9717 DOC PT DX DEP/BP F/U NT REQ: HCPCS | Performed by: INTERNAL MEDICINE

## 2021-01-01 PROCEDURE — 74011250636 HC RX REV CODE- 250/636: Performed by: INTERNAL MEDICINE

## 2021-01-01 PROCEDURE — 99285 EMERGENCY DEPT VISIT HI MDM: CPT

## 2021-01-01 PROCEDURE — 80053 COMPREHEN METABOLIC PANEL: CPT

## 2021-01-01 PROCEDURE — G8420 CALC BMI NORM PARAMETERS: HCPCS | Performed by: INTERNAL MEDICINE

## 2021-01-01 PROCEDURE — G8427 DOCREV CUR MEDS BY ELIG CLIN: HCPCS | Performed by: INTERNAL MEDICINE

## 2021-01-01 PROCEDURE — 71045 X-RAY EXAM CHEST 1 VIEW: CPT

## 2021-01-01 RX ORDER — ONDANSETRON 2 MG/ML
4 INJECTION INTRAMUSCULAR; INTRAVENOUS
Status: DISCONTINUED | OUTPATIENT
Start: 2021-01-01 | End: 2021-01-07 | Stop reason: HOSPADM

## 2021-01-01 RX ORDER — SODIUM CHLORIDE 0.9 % (FLUSH) 0.9 %
5-40 SYRINGE (ML) INJECTION EVERY 8 HOURS
Status: DISCONTINUED | OUTPATIENT
Start: 2021-01-01 | End: 2021-01-07 | Stop reason: HOSPADM

## 2021-01-01 RX ORDER — HEPARIN SODIUM 5000 [USP'U]/ML
5000 INJECTION, SOLUTION INTRAVENOUS; SUBCUTANEOUS EVERY 12 HOURS
Status: DISCONTINUED | OUTPATIENT
Start: 2021-01-01 | End: 2021-01-07 | Stop reason: HOSPADM

## 2021-01-01 RX ORDER — POLYETHYLENE GLYCOL 3350 17 G/17G
17 POWDER, FOR SOLUTION ORAL DAILY PRN
Status: DISCONTINUED | OUTPATIENT
Start: 2021-01-01 | End: 2021-01-07 | Stop reason: HOSPADM

## 2021-01-01 RX ORDER — DEXTROSE 50 % IN WATER (D50W) INTRAVENOUS SYRINGE
12.5-25 AS NEEDED
Status: DISCONTINUED | OUTPATIENT
Start: 2021-01-01 | End: 2021-01-07 | Stop reason: HOSPADM

## 2021-01-01 RX ORDER — FUROSEMIDE 10 MG/ML
40 INJECTION INTRAMUSCULAR; INTRAVENOUS 2 TIMES DAILY
Status: DISCONTINUED | OUTPATIENT
Start: 2021-01-01 | End: 2021-01-05

## 2021-01-01 RX ORDER — FUROSEMIDE 10 MG/ML
20 INJECTION INTRAMUSCULAR; INTRAVENOUS
Status: COMPLETED | OUTPATIENT
Start: 2021-01-01 | End: 2021-01-01

## 2021-01-01 RX ORDER — MEMANTINE HYDROCHLORIDE 10 MG/1
TABLET ORAL
Qty: 180 TABLET | Refills: 3 | Status: SHIPPED | OUTPATIENT
Start: 2021-01-01 | End: 2021-01-01 | Stop reason: SDUPTHER

## 2021-01-01 RX ORDER — ACETAMINOPHEN 650 MG/1
650 SUPPOSITORY RECTAL
Status: DISCONTINUED | OUTPATIENT
Start: 2021-01-01 | End: 2021-01-07 | Stop reason: HOSPADM

## 2021-01-01 RX ORDER — SODIUM CHLORIDE 0.9 % (FLUSH) 0.9 %
5-40 SYRINGE (ML) INJECTION AS NEEDED
Status: DISCONTINUED | OUTPATIENT
Start: 2021-01-01 | End: 2021-01-07 | Stop reason: HOSPADM

## 2021-01-01 RX ORDER — PROMETHAZINE HYDROCHLORIDE 25 MG/1
12.5 TABLET ORAL
Status: DISCONTINUED | OUTPATIENT
Start: 2021-01-01 | End: 2021-01-07 | Stop reason: HOSPADM

## 2021-01-01 RX ORDER — MAGNESIUM SULFATE 100 %
4 CRYSTALS MISCELLANEOUS AS NEEDED
Status: DISCONTINUED | OUTPATIENT
Start: 2021-01-01 | End: 2021-01-07 | Stop reason: HOSPADM

## 2021-01-01 RX ORDER — INSULIN LISPRO 100 [IU]/ML
INJECTION, SOLUTION INTRAVENOUS; SUBCUTANEOUS
Status: DISCONTINUED | OUTPATIENT
Start: 2021-01-01 | End: 2021-01-07 | Stop reason: HOSPADM

## 2021-01-01 RX ORDER — CARVEDILOL 6.25 MG/1
6.25 TABLET ORAL 2 TIMES DAILY
Status: DISCONTINUED | OUTPATIENT
Start: 2021-01-01 | End: 2021-01-07 | Stop reason: HOSPADM

## 2021-01-01 RX ORDER — COLCHICINE 0.6 MG/1
0.6 TABLET ORAL DAILY
Qty: 30 TABLET | Refills: 2 | Status: SHIPPED | OUTPATIENT
Start: 2021-01-01 | End: 2022-01-01 | Stop reason: SDUPTHER

## 2021-01-01 RX ORDER — ACETAMINOPHEN 325 MG/1
650 TABLET ORAL
Status: DISCONTINUED | OUTPATIENT
Start: 2021-01-01 | End: 2021-01-07 | Stop reason: HOSPADM

## 2021-01-01 RX ORDER — MEMANTINE HYDROCHLORIDE 10 MG/1
TABLET ORAL
Qty: 60 TABLET | Refills: 0 | Status: SHIPPED | OUTPATIENT
Start: 2021-01-01 | End: 2021-01-01 | Stop reason: SDUPTHER

## 2021-01-01 RX ORDER — AMLODIPINE BESYLATE 5 MG/1
5 TABLET ORAL DAILY
Status: DISCONTINUED | OUTPATIENT
Start: 2021-01-02 | End: 2021-01-06

## 2021-01-01 RX ORDER — GUAIFENESIN 100 MG/5ML
81 LIQUID (ML) ORAL DAILY
Status: DISCONTINUED | OUTPATIENT
Start: 2021-01-02 | End: 2021-01-07 | Stop reason: HOSPADM

## 2021-01-01 RX ORDER — ENOXAPARIN SODIUM 100 MG/ML
30 INJECTION SUBCUTANEOUS DAILY
Status: DISCONTINUED | OUTPATIENT
Start: 2021-01-02 | End: 2021-01-01

## 2021-01-01 RX ORDER — FUROSEMIDE 20 MG/1
40 TABLET ORAL DAILY
Qty: 60 TABLET | Refills: 0 | Status: SHIPPED | OUTPATIENT
Start: 2021-01-01 | End: 2022-01-01

## 2021-01-01 RX ORDER — MEMANTINE HYDROCHLORIDE 10 MG/1
TABLET ORAL
Qty: 60 TABLET | Refills: 0 | Status: SHIPPED | OUTPATIENT
Start: 2021-01-01 | End: 2022-01-01 | Stop reason: SDUPTHER

## 2021-01-01 RX ADMIN — NITROGLYCERIN 0.5 INCH: 20 OINTMENT TOPICAL at 15:03

## 2021-01-01 RX ADMIN — FUROSEMIDE 40 MG: 10 INJECTION, SOLUTION INTRAMUSCULAR; INTRAVENOUS at 17:49

## 2021-01-01 RX ADMIN — CARVEDILOL 6.25 MG: 6.25 TABLET, FILM COATED ORAL at 17:49

## 2021-01-01 RX ADMIN — Medication 40 ML: at 21:01

## 2021-01-01 RX ADMIN — FUROSEMIDE 20 MG: 10 INJECTION, SOLUTION INTRAMUSCULAR; INTRAVENOUS at 14:57

## 2021-01-01 RX ADMIN — HEPARIN SODIUM 5000 UNITS: 5000 INJECTION INTRAVENOUS; SUBCUTANEOUS at 17:49

## 2021-01-01 RX ADMIN — Medication 10 ML: at 17:51

## 2021-01-01 NOTE — ED PROVIDER NOTES
EMERGENCY DEPARTMENT HISTORY AND PHYSICAL EXAM      Date: 1/1/2021  Patient Name: Catherine Burden Sr    History of Presenting Illness     Chief Complaint   Patient presents with    Shortness of Breath     Pt becomes increasling short of breath with ambulation and exertion. Sent by Urgent care for fluid build up on lungs. Hx of this. History Provided By: Patient and Caregiver    HPI: Benito Farfan Sr, 80 y.o. male presents to the ED with cc of shortness of breath. Patient has a history of cardiomyopathy with an EF of 15%, atrial fibrillation, pacemaker and dementia. He was admitted to the hospital on December 14 for acute on chronic heart failure. Discharged on December 23. His caregiver states that initially he was doing better and was able to do physical therapy. The last few days she has noticed increased shortness of breath with exertion. Patient was evaluated by dispatch health yesterday for orthopnea and dry cough. He states that he has had diminished activity over the last few days. Denies headache, dizziness, abdominal pain, nausea or vomiting. He also denies fever or chills. He states he had some chest tightness when he arrived in the ER today. That has resolved. He denies any radiation of the pain to the neck, back, jaw or arms. Cough is productive of clear sputum. There are no other complaints, changes, or physical findings at this time. PCP: Raj Coles MD    No current facility-administered medications on file prior to encounter. Current Outpatient Medications on File Prior to Encounter   Medication Sig Dispense Refill    amLODIPine (NORVASC) 10 mg tablet TAKE 1/2 TABLET BY MOUTH EVERY DAY 90 Tab 1    furosemide (LASIX) 20 mg tablet One daily 30 Tab prn    lisinopriL (PRINIVIL, ZESTRIL) 2.5 mg tablet Take 1 Tab by mouth daily.  30 Tab prn    donepeziL (ARICEPT) 10 mg tablet TAKE 1/2 TABLET BY MOUTH EVERY DAY 30 Tab 2    acetaminophen (TYLENOL) 650 mg TbER Take 650 mg by mouth two (2) times a day.  memantine (NAMENDA) 10 mg tablet TAKE 1 TABLET BY MOUTH TWICE A  Tab 3    omeprazole (PRILOSEC) 20 mg capsule Take 1 Cap by mouth daily. 90 Cap 3    carvedilol (COREG) 6.25 mg tablet Take 1 Tab by mouth two (2) times a day. 180 Tab prn    aspirin 81 mg chewable tablet Take 1 Tab by mouth daily.  30 Tab 1       Past History     Past Medical History:  Past Medical History:   Diagnosis Date    Abdominal pain 7/18/2017    Alzheimer disease ([facility] [address] 7/18/2017    Anemia 7/18/2017    Arthritis     Back pain 7/18/2017    Bradycardia 7/18/2017    CAD (coronary artery disease)     hx of MI    CKD (chronic kidney disease), stage II 7/18/2017    constipation     Depression 7/18/2017    Diabetes mellitus ([facility] [address] 7/18/2017    Diverticulosis 7/18/2017    DJD (degenerative joint disease) 7/18/2017    Encounter for long-term (current) use of other medications 7/18/2017    Fatigue     Gastritis and duodenitis 7/18/2017    GERD (gastroesophageal reflux disease)     GI bleed 7/18/2017    Hearing loss     Hyperlipidemia 7/18/2017    Hypertension     Hypertension with renal disease 7/18/2017    Ill-defined condition     Dementia    Internal hemorrhoids 7/18/2017    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    S/P cardiac pacemaker procedure 5/4/2015    5/4/15 [employer] dual chamber pacemaker implant     Weight loss     lost over 40 pounds last year- has no appetite       Past Surgical History:  Past Surgical History:   Procedure Laterality Date    COLONOSCOPY N/A 6/22/2017    COLONOSCOPY performed by Stephan Moreau MD at [facility],[address]  6/22/2017         [address]  7/10/2014    right inguinal    HX OTHER SURGICAL      cystectomy from back    VT EGD TRANSORAL BIOPSY SINGLE/MULTIPLE  2/14/2012         VT UPPER GI ENDOSCOPY,W/DIR SUBMUC INJ  7/23/2020         UPPER GI ENDOSCOPY,BIOPSY 2017         UPPER GI ENDOSCOPY,BIOPSY  2020            Family History:  Family History   Problem Relation Age of Onset    Hypertension Mother     Heart Disease Father     Cancer Other         son-cancer? unknown what type        Social History:  Social History     Tobacco Use    Smoking status: Former Smoker     Packs/day: 0.50     Years: 10.00     Pack years: 5.00     Start date: 1991    Smokeless tobacco: Never Used    Tobacco comment: quit 50 years ago   Substance Use Topics    Alcohol use: Not Currently     Comment: Occasional beer    Drug use: No       Allergies:  No Known Allergies      Review of Systems   Review of Systems   Constitutional: Negative for fever. HENT: Negative for congestion. Eyes: Negative. Respiratory: Positive for cough, chest tightness and shortness of breath. Cardiovascular: Negative for leg swelling. Gastrointestinal: Negative for abdominal pain. Endocrine: Negative for heat intolerance. Genitourinary: Negative for dysuria. Musculoskeletal: Negative for back pain. Skin: Negative for rash. Allergic/Immunologic: Negative for immunocompromised state. Neurological: Negative for dizziness. Hematological: Does not bruise/bleed easily. Psychiatric/Behavioral: Negative. All other systems reviewed and are negative. Physical Exam   Physical Exam  Vitals signs and nursing note reviewed. Constitutional:       General: He is not in acute distress. Appearance: He is well-developed. HENT:      Head: Normocephalic and atraumatic. Neck:      Musculoskeletal: Normal range of motion. Cardiovascular:      Rate and Rhythm: Normal rate and regular rhythm. Heart sounds: Normal heart sounds. Pulmonary:      Effort: Pulmonary effort is normal.      Breath sounds: Examination of the right-middle field reveals rales. Examination of the left-middle field reveals rales. Rales present.    Abdominal:      General: Bowel sounds are normal.      Palpations: Abdomen is soft. Musculoskeletal: Normal range of motion. Skin:     General: Skin is warm and dry. Neurological:      General: No focal deficit present. Mental Status: He is alert and oriented to person, place, and time. Coordination: Coordination normal.   Psychiatric:         Mood and Affect: Mood normal.         Behavior: Behavior normal.         Diagnostic Study Results     Labs -     Recent Results (from the past 12 hour(s))   EKG, 12 LEAD, INITIAL    Collection Time: 01/01/21  1:25 PM   Result Value Ref Range    Ventricular Rate 75 BPM    Atrial Rate 86 BPM    QRS Duration 164 ms    Q-T Interval 472 ms    QTC Calculation (Bezet) 527 ms    Calculated P Axis 58 degrees    Calculated R Axis -132 degrees    Calculated T Axis 54 degrees    Diagnosis       AV dual-paced rhythm  When compared with ECG of 14-DEC-2020 08:36,  No significant change was found     CBC WITH AUTOMATED DIFF    Collection Time: 01/01/21  1:43 PM   Result Value Ref Range    WBC 7.0 4.1 - 11.1 K/uL    RBC 4.89 4. 10 - 5.70 M/uL    HGB 13.5 12.1 - 17.0 g/dL    HCT 43.3 36.6 - 50.3 %    MCV 88.5 80.0 - 99.0 FL    MCH 27.6 26.0 - 34.0 PG    MCHC 31.2 30.0 - 36.5 g/dL    RDW 15.4 (H) 11.5 - 14.5 %    PLATELET 677 946 - 365 K/uL    MPV 11.7 8.9 - 12.9 FL    NRBC 0.0 0  WBC    ABSOLUTE NRBC 0.00 0.00 - 0.01 K/uL    NEUTROPHILS 74 32 - 75 %    LYMPHOCYTES 15 12 - 49 %    MONOCYTES 10 5 - 13 %    EOSINOPHILS 0 0 - 7 %    BASOPHILS 1 0 - 1 %    IMMATURE GRANULOCYTES 0 0.0 - 0.5 %    ABS. NEUTROPHILS 5.1 1.8 - 8.0 K/UL    ABS. LYMPHOCYTES 1.1 0.8 - 3.5 K/UL    ABS. MONOCYTES 0.7 0.0 - 1.0 K/UL    ABS. EOSINOPHILS 0.0 0.0 - 0.4 K/UL    ABS. BASOPHILS 0.0 0.0 - 0.1 K/UL    ABS. IMM.  GRANS. 0.0 0.00 - 0.04 K/UL    DF AUTOMATED     METABOLIC PANEL, COMPREHENSIVE    Collection Time: 01/01/21  1:43 PM   Result Value Ref Range    Sodium 138 136 - 145 mmol/L    Potassium 4.1 3.5 - 5.1 mmol/L    Chloride 104 97 - 108 mmol/L    CO2 24 21 - 32 mmol/L    Anion gap 10 5 - 15 mmol/L    Glucose 124 (H) 65 - 100 mg/dL    BUN 31 (H) 6 - 20 MG/DL    Creatinine 2.02 (H) 0.70 - 1.30 MG/DL    BUN/Creatinine ratio 15 12 - 20      GFR est AA 38 (L) >60 ml/min/1.73m2    GFR est non-AA 31 (L) >60 ml/min/1.73m2    Calcium 9.3 8.5 - 10.1 MG/DL    Bilirubin, total 1.1 (H) 0.2 - 1.0 MG/DL    ALT (SGPT) 27 12 - 78 U/L    AST (SGOT) 26 15 - 37 U/L    Alk. phosphatase 82 45 - 117 U/L    Protein, total 7.3 6.4 - 8.2 g/dL    Albumin 3.5 3.5 - 5.0 g/dL    Globulin 3.8 2.0 - 4.0 g/dL    A-G Ratio 0.9 (L) 1.1 - 2.2     CK W/ REFLX CKMB    Collection Time: 01/01/21  1:43 PM   Result Value Ref Range    CK 69 39 - 308 U/L   TROPONIN I    Collection Time: 01/01/21  1:43 PM   Result Value Ref Range    Troponin-I, Qt. 0.15 (H) <0.05 ng/mL   NT-PRO BNP    Collection Time: 01/01/21  1:43 PM   Result Value Ref Range    NT pro-BNP >35,000 (H) <450 PG/ML       Radiologic Studies -   XR CHEST PORT   Final Result   IMPRESSION:    Pulmonary vascular congestion and probable small pleural effusions. CT Results  (Last 48 hours)    None        CXR Results  (Last 48 hours)               01/01/21 1348  XR CHEST PORT Final result    Impression:  IMPRESSION:    Pulmonary vascular congestion and probable small pleural effusions. Narrative:  PORTABLE CHEST RADIOGRAPH/S: 1/1/2021 1:48 PM       INDICATION: Dyspnea. COMPARISON: 12/21/2020, 12/15/2020, CT chest 10/27/2019. TECHNIQUE: Portable frontal upright radiograph/s of the chest.       FINDINGS:    There is pulmonary vascular congestion without overt interstitial edema. There   are probably small pleural effusions. The central airways are patent. No   pneumothorax. A pacemaker is in place. Medical Decision Making   I am the first provider for this patient.     I reviewed the vital signs, available nursing notes, past medical history, past surgical history, family history and social history. Vital Signs-Reviewed the patient's vital signs. Patient Vitals for the past 12 hrs:   Temp Pulse Resp BP SpO2   01/01/21 1457  82  (!) 148/102    01/01/21 1318 97.5 °F (36.4 °C) 65 18 (!) 154/79 100 %       EKG interpretation: (Preliminary)  Rhythm: paced; and irregular. Rate (approx.): 75; Axis: ; MN interval: ; QRS interval: prolonged; ST/T wave: T wave inverted; Other findings: unchanged from previous ekg. Records Reviewed: Nursing Notes, Old Medical Records, Previous electrocardiograms, Previous Radiology Studies and Previous Laboratory Studies    Provider Notes (Medical Decision Making):   CHF, pneumonia, bronchitis, CAD    ED Course:   Initial assessment performed. The patients presenting problems have been discussed, and they are in agreement with the care plan formulated and outlined with them. I have encouraged them to ask questions as they arise throughout their visit. Consult note:    I discussed the patient's troponin with Dr. Gabriel Vaca, cardiology. He states that patient has dementia and has a EF of 15%. According to Dr. Beata Mcgee notes, the patient is not a candidate for any significant interventions. He states his troponin bump may be due to his pacemaker. He does not think the patient needs to be admitted for that reason alone. Progress note:      Patient has no urinary output with 20 mg of Lasix. Consult note:    Patient is being admitted by Dr. Kat Alatorre, hospitalist           }    Critical Care Time:   0    Disposition:  admit    DISCHARGE PLAN:  1. Current Discharge Medication List        2. Follow-up Information    None       3. Return to ED if worse     Diagnosis     Clinical Impression:   1. Acute pulmonary edema (HCC)        Attestations:    Keeley Padilla MD    Please note that this dictation was completed with [employer], the [employer] voice recognition software.   Quite often unanticipated grammatical, syntax, homophones, and other interpretive errors are inadvertently transcribed by the computer software. Please disregard these errors. Please excuse any errors that have escaped final proofreading. Thank you.

## 2021-01-01 NOTE — PROGRESS NOTES
Lovenox adjusted to heparin 5000 units sc every 12 hr for body weight < 60 kg per protocol    Rola Mcqueen, PHARMD

## 2021-01-01 NOTE — ED NOTES
TRANSFER - OUT REPORT:    Verbal report given to Leia(name) on Benito Farfan Sr  being transferred to [facility](unit) for routine progression of care       Report consisted of patients Situation, Background, Assessment and   Recommendations(SBAR). Information from the following report(s) SBAR, ED Summary and MAR was reviewed with the receiving nurse. Lines:   Peripheral IV 01/01/21 Left Antecubital (Active)   Site Assessment Clean, dry, & intact 01/01/21 1346   Phlebitis Assessment 0 01/01/21 1346   Infiltration Assessment 0 01/01/21 1346   Dressing Status Clean, dry, & intact 01/01/21 1346   Dressing Type Tape;Transparent 01/01/21 1346        Opportunity for questions and clarification was provided.       Patient transported with:   Monitor

## 2021-01-01 NOTE — H&P
Hospitalist Admission Note    NAME: Froylan Alpers Sr   :  1931   MRN:  [id number]     Date/Time:  2021 4:28 PM    Patient PCP: Kassandra Fox MD  ______________________________________________________________________  Given the patient's current clinical presentation, I have a high level of concern for decompensation if discharged from the emergency department. Complex decision making was performed, which includes reviewing the patient's available past medical records, laboratory results, and x-ray films. My assessment of this patient's clinical condition and my plan of care is as follows. Assessment / Plan:  Acute on chronic systolic heart failure POA  Severe cardiomyopathy POA, EF 15-20%  Sick sinus syndrome s/p ICD  Acute on chronic CKD stage III  Chest pain  History of hypertension        -Recent admission from - for CHF. Presented to hospital again with shortness of breath, worsening orthopnea.  -troponin 0.15, proBNP greater than 35,000  -Chest x-ray on admission showed pulmonary vascular congestion and small pleural effusion  -Currently denies any chest pain  -Cardiology has been consulted by the ED  -Lasix 40 IV twice daily, strict I's and O's, daily weight  -Does not look grossly volume overloaded    -Has been taking beta-blocker as needed, advised to take as instructed which is twice daily.  -We will have pacemaker interrogated    -Trend troponin, continue aspirin    History of Alzheimer dementia        Code Status: DNR  Surrogate Decision Maker:    DVT Prophylaxis: Lovenox  GI Prophylaxis: not indicated    Baseline: Ambulatory      Subjective:   CHIEF COMPLAINT: SOB    HISTORY OF PRESENT ILLNESS:     Baldo Martinez Sr is a 80 y.o.  male with a past medical history of end-stage CHF, dementia, CKD stage III, diabetes mellitus, hypertension presented to ED with chief complaint of shortness of breath.   Patient was recently discharged from our hospital on 12/23 when he was admitted for 9 days, for acute on chronic systolic CHF. Granddaughter at the bedside. She reports that he was doing fairly well until for past 2 days. He had worsening of shortness of breath with exertion for 2 days, was seen by dispatch health yesterday. Today he shortness of breath got worse and, he also had substernal chest pain, nonradiating in the morning, which is resolved. Endorses worsening orthopnea. Has cough with clear sputum, says is same as when he had a CHF exacerbation. Denies any fever, chills, palpitation. Patient is on beta-blocker, but has been taking as needed. We were asked to admit for work up and evaluation of the above problems.      Past Medical History:   Diagnosis Date    Abdominal pain 7/18/2017    [facility] 7/18/2017    Anemia 7/18/2017    Arthritis     Back pain 7/18/2017    Bradycardia 7/18/2017    CAD (coronary artery disease)     hx of MI    CKD (chronic kidney disease), stage II 7/18/2017    constipation     Depression 7/18/2017    Diabetes mellitus ([facility] 7/18/2017    Diverticulosis 7/18/2017    DJD (degenerative joint disease) 7/18/2017    Encounter for long-term (current) use of other medications 7/18/2017    Fatigue     Gastritis and duodenitis 7/18/2017    GERD (gastroesophageal reflux disease)     GI bleed 7/18/2017    Hearing loss     Hyperlipidemia 7/18/2017    Hypertension     Hypertension with renal disease 7/18/2017    Ill-defined condition     Dementia    Internal hemorrhoids 7/18/2017    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    S/P cardiac pacemaker procedure 5/4/2015    5/4/15 [employer] dual chamber pacemaker implant     Weight loss     lost over 40 pounds last year- has no appetite        Past Surgical History:   Procedure Laterality Date    COLONOSCOPY N/A 6/22/2017    COLONOS[facility]ayeli Thomas [facility] ENDOSCOPY    Monae Albright  2017         HX HERNIA REPAIR  7/10/2014    right inguinal    HX OTHER SURGICAL      cystectomy from back    CO EGD TRANSORAL BIOPSY SINGLE/MULTIPLE  2012         CO UPPER GI ENDOSCOPY,W/DIR SUBMUC INJ  2020         UPPER GI ENDOSCOPY,BIOPSY  2017         UPPER GI ENDOSCOPY,BIOPSY  2020            Social History     Tobacco Use    Smoking status: Former Smoker     Packs/day: 0.50     Years: 10.00     Pack years: 5.00     Start date: 1991    Smokeless tobacco: Never Used    Tobacco comment: quit 50 years ago   Substance Use Topics    Alcohol use: Not Currently     Comment: Occasional beer        Family History   Problem Relation Age of Onset    Hypertension Mother     Heart Disease Father     Cancer Other         son-cancer? unknown what type      No Known Allergies     Prior to Admission medications    Medication Sig Start Date End Date Taking? Authorizing Provider   amLODIPine (NORVASC) 10 mg tablet TAKE 1/2 TABLET BY MOUTH EVERY DAY 20   Nichole Ag MD   furosemide (LASIX) 20 mg tablet One daily 20   Nichole Ag MD   lisinopriL (PRINIVIL, ZESTRIL) 2.5 mg tablet Take 1 Tab by mouth daily. 20   Nichole Ag MD   donepeziL (ARICEPT) 10 mg tablet TAKE 1/2 TABLET BY MOUTH EVERY DAY 20   Nichole Ag MD   acetaminophen (TYLENOL) 650 mg TbER Take 650 mg by mouth two (2) times a day. Provider, Historical   memantine (NAMENDA) 10 mg tablet TAKE 1 TABLET BY MOUTH TWICE A DAY 20   Nichole Ag MD   omeprazole (PRILOSEC) 20 mg capsule Take 1 Cap by mouth daily. 10/22/20   Nichole Ag MD   carvedilol (COREG) 6.25 mg tablet Take 1 Tab by mouth two (2) times a day. 19   Nichole Ag MD   aspirin 81 mg chewable tablet Take 1 Tab by mouth daily.  4/15/18   Ivory Veloz MD       REVIEW OF SYSTEMS:     I am not able to complete the review of systems because: The patient is intubated and sedated    The patient has altered mental status due to his acute medical problems    The patient has baseline aphasia from prior stroke(s)    The patient has baseline dementia and is not reliable historian    The patient is in acute medical distress and unable to provide information           Total of 12 systems reviewed as follows:       POSITIVE= underlined text  Negative = text not underlined  General:  fever, chills, sweats, generalized weakness, weight loss/gain,      loss of appetite   Eyes:    blurred vision, eye pain, loss of vision, double vision  ENT:    rhinorrhea, pharyngitis   Respiratory:   cough, sputum production, SOB, GONGORA, wheezing, pleuritic pain   Cardiology:   chest pain, palpitations, orthopnea, PND, edema, syncope   Gastrointestinal:  abdominal pain , N/V, diarrhea, dysphagia, constipation, bleeding   Genitourinary:  frequency, urgency, dysuria, hematuria, incontinence   Muskuloskeletal :  arthralgia, myalgia, back pain  Hematology:  easy bruising, nose or gum bleeding, lymphadenopathy   Dermatological: rash, ulceration, pruritis, color change / jaundice  Endocrine:   hot flashes or polydipsia   Neurological:  headache, dizziness, confusion, focal weakness, paresthesia,     Speech difficulties, memory loss, gait difficulty  Psychological: Feelings of anxiety, depression, agitation    Objective:   VITALS:    Visit Vitals  BP (!) 148/102   Pulse 82   Temp 97.5 °F (36.4 °C)   Resp 18   Ht 5' 9\" (1.753 m)   Wt 56.7 kg (125 lb)   SpO2 100%   BMI 18.46 kg/m²       PHYSICAL EXAM:    General:    Alert, cooperative, no distress, appears stated age. HEENT: Atraumatic, anicteric sclerae, pink conjunctivae  Neck:  Supple, symmetrical,  thyroid: non tender  Lungs:   Minimal rales bilaterally, no wheezing  Chest wall:  No tenderness  No Accessory muscle use. Heart:   Regular  rhythm,  No  murmur , no edema in the legs  Abdomen:   Soft, non-tender. Not distended.   Bowel sounds normal  Extremities: No cyanosis. No clubbing,    Skin:     Not pale. Not Jaundiced  No rashes   Psych:  Good insight. Not depressed. Not anxious or agitated. Neurologic: EOMs intact. No facial asymmetry. No aphasia or slurred speech. Symmetrical strength, Sensation grossly intact. Alert and oriented X 4.     _______________________________________________________________________  Care Plan discussed with:    Comments   Patient x    Family      RN x    Care Manager                    Consultant:      _______________________________________________________________________  Expected  Disposition:   Home with Family x   HH/PT/OT/RN    SNF/LTC    COMPA    ________________________________________________________________________  TOTAL TIME:  36 Minutes    Critical Care Provided     Minutes non procedure based      Comments     Reviewed previous records   >50% of visit spent in counseling and coordination of care  Discussion with patient and/or family and questions answered       ________________________________________________________________________  Signed: Chauncey Rivero MD    Procedures: see electronic medical records for all procedures/Xrays and details which were not copied into this note but were reviewed prior to creation of Plan. LAB DATA REVIEWED:    Recent Results (from the past 24 hour(s))   EKG, 12 LEAD, INITIAL    Collection Time: 01/01/21  1:25 PM   Result Value Ref Range    Ventricular Rate 75 BPM    Atrial Rate 86 BPM    QRS Duration 164 ms    Q-T Interval 472 ms    QTC Calculation (Bezet) 527 ms    Calculated P Axis 58 degrees    Calculated R Axis -132 degrees    Calculated T Axis 54 degrees    Diagnosis       AV dual-paced rhythm  When compared with ECG of 14-DEC-2020 08:36,  No significant change was found     CBC WITH AUTOMATED DIFF    Collection Time: 01/01/21  1:43 PM   Result Value Ref Range    WBC 7.0 4.1 - 11.1 K/uL    RBC 4.89 4. 10 - 5.70 M/uL    HGB 13.5 12.1 - 17.0 g/dL    HCT 43.3 36.6 - 50.3 %    MCV 88.5 80.0 - 99.0 FL    MCH 27.6 26.0 - 34.0 PG    MCHC 31.2 30.0 - 36.5 g/dL    RDW 15.4 (H) 11.5 - 14.5 %    PLATELET 421 525 - 955 K/uL    MPV 11.7 8.9 - 12.9 FL    NRBC 0.0 0  WBC    ABSOLUTE NRBC 0.00 0.00 - 0.01 K/uL    NEUTROPHILS 74 32 - 75 %    LYMPHOCYTES 15 12 - 49 %    MONOCYTES 10 5 - 13 %    EOSINOPHILS 0 0 - 7 %    BASOPHILS 1 0 - 1 %    IMMATURE GRANULOCYTES 0 0.0 - 0.5 %    ABS. NEUTROPHILS 5.1 1.8 - 8.0 K/UL    ABS. LYMPHOCYTES 1.1 0.8 - 3.5 K/UL    ABS. MONOCYTES 0.7 0.0 - 1.0 K/UL    ABS. EOSINOPHILS 0.0 0.0 - 0.4 K/UL    ABS. BASOPHILS 0.0 0.0 - 0.1 K/UL    ABS. IMM. GRANS. 0.0 0.00 - 0.04 K/UL    DF AUTOMATED     METABOLIC PANEL, COMPREHENSIVE    Collection Time: 01/01/21  1:43 PM   Result Value Ref Range    Sodium 138 136 - 145 mmol/L    Potassium 4.1 3.5 - 5.1 mmol/L    Chloride 104 97 - 108 mmol/L    CO2 24 21 - 32 mmol/L    Anion gap 10 5 - 15 mmol/L    Glucose 124 (H) 65 - 100 mg/dL    BUN 31 (H) 6 - 20 MG/DL    Creatinine 2.02 (H) 0.70 - 1.30 MG/DL    BUN/Creatinine ratio 15 12 - 20      GFR est AA 38 (L) >60 ml/min/1.73m2    GFR est non-AA 31 (L) >60 ml/min/1.73m2    Calcium 9.3 8.5 - 10.1 MG/DL    Bilirubin, total 1.1 (H) 0.2 - 1.0 MG/DL    ALT (SGPT) 27 12 - 78 U/L    AST (SGOT) 26 15 - 37 U/L    Alk.  phosphatase 82 45 - 117 U/L    Protein, total 7.3 6.4 - 8.2 g/dL    Albumin 3.5 3.5 - 5.0 g/dL    Globulin 3.8 2.0 - 4.0 g/dL    A-G Ratio 0.9 (L) 1.1 - 2.2     CK W/ REFLX CKMB    Collection Time: 01/01/21  1:43 PM   Result Value Ref Range    CK 69 39 - 308 U/L   TROPONIN I    Collection Time: 01/01/21  1:43 PM   Result Value Ref Range    Troponin-I, Qt. 0.15 (H) <0.05 ng/mL   NT-PRO BNP    Collection Time: 01/01/21  1:43 PM   Result Value Ref Range    NT pro-BNP >35,000 (H) <450 PG/ML   POC TROPONIN-I    Collection Time: 01/01/21  3:57 PM   Result Value Ref Range    Troponin-I (POC) 0.06 0.00 - 0.08 ng/mL

## 2021-01-01 NOTE — PROGRESS NOTES
TRANSFER - IN REPORT:    Verbal report received from Maxwell Noriega, [facility] (name) on Benito Farfan Sr  being received from ED (unit) for routine progression of care      Report consisted of patients Situation, Background, Assessment and   Recommendations(SBAR). Information from the following report(s) SBAR, Intake/Output, Recent Results and Cardiac Rhythm Paced was reviewed with the receiving nurse. Opportunity for questions and clarification was provided. Assessment completed upon patients arrival to unit and care assumed. 1900 End of Shift Note    Bedside shift change report given to Reilly Correa RN (oncoming nurse) by Ignacia Fry (offgoing nurse). Report included the following information SBAR, Kardex, Intake/Output, MAR, Recent Results and Cardiac Rhythm Paced    Shift worked:  5pm-7pm     Shift summary and any significant changes:     CHF exac. IV lasix. Concerns for physician to address:  N/A     Zone phone for oncoming shift:   N/A       Activity:  Activity Level: Up with Assistance  Number times ambulated in hallways past shift: 0  Number of times OOB to chair past shift: 0    Cardiac:   Cardiac Monitoring: Yes      Cardiac Rhythm: Paced    Access:   Current line(s): PIV     Genitourinary:   Urinary status: voiding    Respiratory:   O2 Device: Room air  Chronic home O2 use?: NO  Incentive spirometer at bedside: N/A     GI:  Last Bowel Movement Date: 01/01/21  Current diet:  DIET CARDIAC Regular  Passing flatus: YES  Tolerating current diet: YES  % Diet Eaten: 50 %    Pain Management:   Patient states pain is manageable on current regimen: N/A    Skin:  Newton Score: 22  Interventions: increase time out of bed    Patient Safety:  Fall Score:  Total Score: 3  Interventions: bed/chair alarm  High Fall Risk: Yes    Length of Stay:  Expected LOS: - - -  Actual LOS: 0      Ignacia Fry

## 2021-01-02 LAB
ANION GAP SERPL CALC-SCNC: 9 MMOL/L (ref 5–15)
ATRIAL RATE: 86 BPM
BASOPHILS # BLD: 0 K/UL (ref 0–0.1)
BASOPHILS NFR BLD: 1 % (ref 0–1)
BUN SERPL-MCNC: 37 MG/DL (ref 6–20)
BUN/CREAT SERPL: 19 (ref 12–20)
CALCIUM SERPL-MCNC: 9.5 MG/DL (ref 8.5–10.1)
CALCULATED P AXIS, ECG09: 58 DEGREES
CALCULATED R AXIS, ECG10: -132 DEGREES
CALCULATED T AXIS, ECG11: 54 DEGREES
CHLORIDE SERPL-SCNC: 103 MMOL/L (ref 97–108)
CO2 SERPL-SCNC: 26 MMOL/L (ref 21–32)
CREAT SERPL-MCNC: 1.94 MG/DL (ref 0.7–1.3)
DIAGNOSIS, 93000: NORMAL
DIFFERENTIAL METHOD BLD: ABNORMAL
EOSINOPHIL # BLD: 0.1 K/UL (ref 0–0.4)
EOSINOPHIL NFR BLD: 1 % (ref 0–7)
ERYTHROCYTE [DISTWIDTH] IN BLOOD BY AUTOMATED COUNT: 15.4 % (ref 11.5–14.5)
GLUCOSE BLD STRIP.AUTO-MCNC: 108 MG/DL (ref 65–100)
GLUCOSE BLD STRIP.AUTO-MCNC: 135 MG/DL (ref 65–100)
GLUCOSE BLD STRIP.AUTO-MCNC: 93 MG/DL (ref 65–100)
GLUCOSE BLD STRIP.AUTO-MCNC: 96 MG/DL (ref 65–100)
GLUCOSE SERPL-MCNC: 99 MG/DL (ref 65–100)
HCT VFR BLD AUTO: 41.4 % (ref 36.6–50.3)
HGB BLD-MCNC: 13.1 G/DL (ref 12.1–17)
IMM GRANULOCYTES # BLD AUTO: 0 K/UL (ref 0–0.04)
IMM GRANULOCYTES NFR BLD AUTO: 0 % (ref 0–0.5)
LYMPHOCYTES # BLD: 1.4 K/UL (ref 0.8–3.5)
LYMPHOCYTES NFR BLD: 21 % (ref 12–49)
MAGNESIUM SERPL-MCNC: 2.2 MG/DL (ref 1.6–2.4)
MCH RBC QN AUTO: 27.1 PG (ref 26–34)
MCHC RBC AUTO-ENTMCNC: 31.6 G/DL (ref 30–36.5)
MCV RBC AUTO: 85.5 FL (ref 80–99)
MONOCYTES # BLD: 0.6 K/UL (ref 0–1)
MONOCYTES NFR BLD: 9 % (ref 5–13)
NEUTS SEG # BLD: 4.6 K/UL (ref 1.8–8)
NEUTS SEG NFR BLD: 68 % (ref 32–75)
NRBC # BLD: 0 K/UL (ref 0–0.01)
NRBC BLD-RTO: 0 PER 100 WBC
PLATELET # BLD AUTO: 276 K/UL (ref 150–400)
PMV BLD AUTO: 11.9 FL (ref 8.9–12.9)
POTASSIUM SERPL-SCNC: 4.1 MMOL/L (ref 3.5–5.1)
Q-T INTERVAL, ECG07: 472 MS
QRS DURATION, ECG06: 164 MS
QTC CALCULATION (BEZET), ECG08: 527 MS
RBC # BLD AUTO: 4.84 M/UL (ref 4.1–5.7)
SERVICE CMNT-IMP: ABNORMAL
SERVICE CMNT-IMP: ABNORMAL
SERVICE CMNT-IMP: NORMAL
SERVICE CMNT-IMP: NORMAL
SODIUM SERPL-SCNC: 138 MMOL/L (ref 136–145)
TROPONIN I SERPL-MCNC: 0.12 NG/ML
VENTRICULAR RATE, ECG03: 75 BPM
WBC # BLD AUTO: 6.8 K/UL (ref 4.1–11.1)

## 2021-01-02 PROCEDURE — 82962 GLUCOSE BLOOD TEST: CPT

## 2021-01-02 PROCEDURE — 83735 ASSAY OF MAGNESIUM: CPT

## 2021-01-02 PROCEDURE — 99222 1ST HOSP IP/OBS MODERATE 55: CPT | Performed by: INTERNAL MEDICINE

## 2021-01-02 PROCEDURE — 3331090001 HH PPS REVENUE CREDIT

## 2021-01-02 PROCEDURE — 74011250637 HC RX REV CODE- 250/637: Performed by: INTERNAL MEDICINE

## 2021-01-02 PROCEDURE — 84484 ASSAY OF TROPONIN QUANT: CPT

## 2021-01-02 PROCEDURE — 65660000000 HC RM CCU STEPDOWN

## 2021-01-02 PROCEDURE — 99232 SBSQ HOSP IP/OBS MODERATE 35: CPT | Performed by: FAMILY MEDICINE

## 2021-01-02 PROCEDURE — 80048 BASIC METABOLIC PNL TOTAL CA: CPT

## 2021-01-02 PROCEDURE — 74011250636 HC RX REV CODE- 250/636: Performed by: INTERNAL MEDICINE

## 2021-01-02 PROCEDURE — 36415 COLL VENOUS BLD VENIPUNCTURE: CPT

## 2021-01-02 PROCEDURE — 3331090002 HH PPS REVENUE DEBIT

## 2021-01-02 PROCEDURE — 85025 COMPLETE CBC W/AUTO DIFF WBC: CPT

## 2021-01-02 RX ADMIN — Medication 10 ML: at 17:16

## 2021-01-02 RX ADMIN — CARVEDILOL 6.25 MG: 6.25 TABLET, FILM COATED ORAL at 09:48

## 2021-01-02 RX ADMIN — AMLODIPINE BESYLATE 5 MG: 5 TABLET ORAL at 09:48

## 2021-01-02 RX ADMIN — FUROSEMIDE 40 MG: 10 INJECTION, SOLUTION INTRAMUSCULAR; INTRAVENOUS at 09:48

## 2021-01-02 RX ADMIN — ASPIRIN 81 MG: 81 TABLET, CHEWABLE ORAL at 09:48

## 2021-01-02 RX ADMIN — HEPARIN SODIUM 5000 UNITS: 5000 INJECTION INTRAVENOUS; SUBCUTANEOUS at 17:16

## 2021-01-02 RX ADMIN — Medication 10 ML: at 22:39

## 2021-01-02 RX ADMIN — CARVEDILOL 6.25 MG: 6.25 TABLET, FILM COATED ORAL at 17:16

## 2021-01-02 RX ADMIN — HEPARIN SODIUM 5000 UNITS: 5000 INJECTION INTRAVENOUS; SUBCUTANEOUS at 06:44

## 2021-01-02 RX ADMIN — FUROSEMIDE 40 MG: 10 INJECTION, SOLUTION INTRAMUSCULAR; INTRAVENOUS at 17:15

## 2021-01-02 NOTE — PROGRESS NOTES
General Daily Progress Note    Admit Date: 1/1/2021  Hospital day 2    Subjective:     Patient has no complaint of shortness of breath or chest pain this am. Says that breathing is much better than yesterday. ..   Medication side effects: none    Current Facility-Administered Medications   Medication Dose Route Frequency    sodium chloride (NS) flush 5-40 mL  5-40 mL IntraVENous Q8H    sodium chloride (NS) flush 5-40 mL  5-40 mL IntraVENous PRN    acetaminophen (TYLENOL) tablet 650 mg  650 mg Oral Q6H PRN    Or    acetaminophen (TYLENOL) suppository 650 mg  650 mg Rectal Q6H PRN    polyethylene glycol (MIRALAX) packet 17 g  17 g Oral DAILY PRN    promethazine (PHENERGAN) tablet 12.5 mg  12.5 mg Oral Q6H PRN    Or    ondansetron (ZOFRAN) injection 4 mg  4 mg IntraVENous Q6H PRN    aspirin chewable tablet 81 mg  81 mg Oral DAILY    carvediloL (COREG) tablet 6.25 mg  6.25 mg Oral BID    amLODIPine (NORVASC) tablet 5 mg  5 mg Oral DAILY    heparin (porcine) injection 5,000 Units  5,000 Units SubCUTAneous Q12H    furosemide (LASIX) injection 40 mg  40 mg IntraVENous BID    insulin lispro (HUMALOG) injection   SubCUTAneous AC&HS    glucose chewable tablet 16 g  4 Tab Oral PRN    dextrose (D50W) injection syrg 12.5-25 g  12.5-25 g IntraVENous PRN    glucagon (GLUCAGEN) injection 1 mg  1 mg IntraMUSCular PRN        Review of Systems  Review of systems not obtained due to patient factors. Objective:     Patient Vitals for the past 8 hrs:   BP Temp Pulse Resp SpO2   01/02/21 0302 138/89 97.7 °F (36.5 °C) 63 18 99 %   01/01/21 2344 138/77 97.5 °F (36.4 °C) 75 14 97 %     No intake/output data recorded.   12/31 1901 - 01/02 0700  In: 480 [P.O.:480]  Out: 975 [Urine:975]    Physical Exam:   Visit Vitals  /89 (BP Patient Position: At rest)   Pulse 63   Temp 97.7 °F (36.5 °C)   Resp 18   Ht 5' 9\" (1.753 m)   Wt 125 lb (56.7 kg)   SpO2 99%   BMI 18.46 kg/m²     Lungs: clear to auscultation bilaterally  Heart: regular rate and rhythm. 3/6 systolic murmur. Abdomen: soft, non-tender. Bowel sounds normal. No masses,  no organomegaly  Extremities: extremities normal, atraumatic, no cyanosis or edema      ECG: normal sinus rhythm     Data Review   Recent Results (from the past 24 hour(s))   EKG, 12 LEAD, INITIAL    Collection Time: 01/01/21  1:25 PM   Result Value Ref Range    Ventricular Rate 75 BPM    Atrial Rate 86 BPM    QRS Duration 164 ms    Q-T Interval 472 ms    QTC Calculation (Bezet) 527 ms    Calculated P Axis 58 degrees    Calculated R Axis -132 degrees    Calculated T Axis 54 degrees    Diagnosis       AV dual-paced rhythm  When compared with ECG of 14-DEC-2020 08:36,  No significant change was found     CBC WITH AUTOMATED DIFF    Collection Time: 01/01/21  1:43 PM   Result Value Ref Range    WBC 7.0 4.1 - 11.1 K/uL    RBC 4.89 4. 10 - 5.70 M/uL    HGB 13.5 12.1 - 17.0 g/dL    HCT 43.3 36.6 - 50.3 %    MCV 88.5 80.0 - 99.0 FL    MCH 27.6 26.0 - 34.0 PG    MCHC 31.2 30.0 - 36.5 g/dL    RDW 15.4 (H) 11.5 - 14.5 %    PLATELET 321 302 - 755 K/uL    MPV 11.7 8.9 - 12.9 FL    NRBC 0.0 0  WBC    ABSOLUTE NRBC 0.00 0.00 - 0.01 K/uL    NEUTROPHILS 74 32 - 75 %    LYMPHOCYTES 15 12 - 49 %    MONOCYTES 10 5 - 13 %    EOSINOPHILS 0 0 - 7 %    BASOPHILS 1 0 - 1 %    IMMATURE GRANULOCYTES 0 0.0 - 0.5 %    ABS. NEUTROPHILS 5.1 1.8 - 8.0 K/UL    ABS. LYMPHOCYTES 1.1 0.8 - 3.5 K/UL    ABS. MONOCYTES 0.7 0.0 - 1.0 K/UL    ABS. EOSINOPHILS 0.0 0.0 - 0.4 K/UL    ABS. BASOPHILS 0.0 0.0 - 0.1 K/UL    ABS. IMM.  GRANS. 0.0 0.00 - 0.04 K/UL    DF AUTOMATED     METABOLIC PANEL, COMPREHENSIVE    Collection Time: 01/01/21  1:43 PM   Result Value Ref Range    Sodium 138 136 - 145 mmol/L    Potassium 4.1 3.5 - 5.1 mmol/L    Chloride 104 97 - 108 mmol/L    CO2 24 21 - 32 mmol/L    Anion gap 10 5 - 15 mmol/L    Glucose 124 (H) 65 - 100 mg/dL    BUN 31 (H) 6 - 20 MG/DL    Creatinine 2.02 (H) 0.70 - 1.30 MG/DL BUN/Creatinine ratio 15 12 - 20      GFR est AA 38 (L) >60 ml/min/1.73m2    GFR est non-AA 31 (L) >60 ml/min/1.73m2    Calcium 9.3 8.5 - 10.1 MG/DL    Bilirubin, total 1.1 (H) 0.2 - 1.0 MG/DL    ALT (SGPT) 27 12 - 78 U/L    AST (SGOT) 26 15 - 37 U/L    Alk. phosphatase 82 45 - 117 U/L    Protein, total 7.3 6.4 - 8.2 g/dL    Albumin 3.5 3.5 - 5.0 g/dL    Globulin 3.8 2.0 - 4.0 g/dL    A-G Ratio 0.9 (L) 1.1 - 2.2     CK W/ REFLX CKMB    Collection Time: 01/01/21  1:43 PM   Result Value Ref Range    CK 69 39 - 308 U/L   TROPONIN I    Collection Time: 01/01/21  1:43 PM   Result Value Ref Range    Troponin-I, Qt. 0.15 (H) <0.05 ng/mL   NT-PRO BNP    Collection Time: 01/01/21  1:43 PM   Result Value Ref Range    NT pro-BNP >35,000 (H) <450 PG/ML   MAGNESIUM    Collection Time: 01/01/21  1:43 PM   Result Value Ref Range    Magnesium 2.0 1.6 - 2.4 mg/dL   POC TROPONIN-I    Collection Time: 01/01/21  3:57 PM   Result Value Ref Range    Troponin-I (POC) 0.06 0.00 - 0.08 ng/mL   TROPONIN I    Collection Time: 01/01/21  8:47 PM   Result Value Ref Range    Troponin-I, Qt. 0.12 (H) <0.05 ng/mL   GLUCOSE, POC    Collection Time: 01/01/21  9:00 PM   Result Value Ref Range    Glucose (POC) 112 (H) 65 - 100 mg/dL    Performed by Janae Tavera    CBC WITH AUTOMATED DIFF    Collection Time: 01/02/21  4:47 AM   Result Value Ref Range    WBC 6.8 4.1 - 11.1 K/uL    RBC 4.84 4.10 - 5.70 M/uL    HGB 13.1 12.1 - 17.0 g/dL    HCT 41.4 36.6 - 50.3 %    MCV 85.5 80.0 - 99.0 FL    MCH 27.1 26.0 - 34.0 PG    MCHC 31.6 30.0 - 36.5 g/dL    RDW 15.4 (H) 11.5 - 14.5 %    PLATELET 539 518 - 935 K/uL    MPV 11.9 8.9 - 12.9 FL    NRBC 0.0 0  WBC    ABSOLUTE NRBC 0.00 0.00 - 0.01 K/uL    NEUTROPHILS 68 32 - 75 %    LYMPHOCYTES 21 12 - 49 %    MONOCYTES 9 5 - 13 %    EOSINOPHILS 1 0 - 7 %    BASOPHILS 1 0 - 1 %    IMMATURE GRANULOCYTES 0 0.0 - 0.5 %    ABS. NEUTROPHILS 4.6 1.8 - 8.0 K/UL    ABS. LYMPHOCYTES 1.4 0.8 - 3.5 K/UL    ABS.  MONOCYTES 0.6 0.0 - 1.0 K/UL    ABS. EOSINOPHILS 0.1 0.0 - 0.4 K/UL    ABS. BASOPHILS 0.0 0.0 - 0.1 K/UL    ABS. IMM. GRANS. 0.0 0.00 - 0.04 K/UL    DF AUTOMATED     METABOLIC PANEL, BASIC    Collection Time: 01/02/21  4:47 AM   Result Value Ref Range    Sodium 138 136 - 145 mmol/L    Potassium 4.1 3.5 - 5.1 mmol/L    Chloride 103 97 - 108 mmol/L    CO2 26 21 - 32 mmol/L    Anion gap 9 5 - 15 mmol/L    Glucose 99 65 - 100 mg/dL    BUN 37 (H) 6 - 20 MG/DL    Creatinine 1.94 (H) 0.70 - 1.30 MG/DL    BUN/Creatinine ratio 19 12 - 20      GFR est AA 40 (L) >60 ml/min/1.73m2    GFR est non-AA 33 (L) >60 ml/min/1.73m2    Calcium 9.5 8.5 - 10.1 MG/DL   MAGNESIUM    Collection Time: 01/02/21  4:47 AM   Result Value Ref Range    Magnesium 2.2 1.6 - 2.4 mg/dL   TROPONIN I    Collection Time: 01/02/21  4:47 AM   Result Value Ref Range    Troponin-I, Qt. 0.12 (H) <0.05 ng/mL           Assessment:     Active Problems:    CHF (congestive heart failure) ([facility] (1/1/2021)        Plan:      1. CHF- symptomatically much better after receiving iv lasix. I/O 480/975  2. Cardiomyopathy , EF 15-20%  3. Moderate to severe MR and moderate AR  4. Alzheimers  5. Diabetes- stable on sliding scale.    5. Stage 3 CRF- cr stable at 1.94 this am.

## 2021-01-02 NOTE — CONSULTS
Cardiology Consult Note       Date of  Admission: 1/1/2021  1:27 PM     Admission type:Emergency     Subjective:     Mr. Singh Home is admitted with acute on chronic systolic HF. He has an EF of 15%, ICD in place. Hospitalized end of December, November. He is DNR. Previous discussions have determined him to be medical management only per patient/family wishes. C/o 2-3 weeks h/o SOB, PND. No edema. BNP > 73308. Non specific troponin elevation. He is feeling better today. Denies any chest pain, SOB, edema.      Patient Active Problem List    Diagnosis Date Noted    CHF (congestive heart failure) ([address] 01/01/2021    Anasarca 12/15/2020    Acute on chronic systolic heart failure ([address] 12/14/2020    Pleural effusion 12/14/2020    SUBHA (acute kidney injury) ([address] 12/14/2020    Acute respiratory failure with hypoxia ([address] 12/14/2020    Weakness 12/02/2020    Bad dreams 12/02/2020    Hypotension 08/13/2020    Esophageal dyskinesia 07/23/2020    Severe protein-calorie malnutrition ([address] 07/21/2020    Weight loss 07/20/2020    Unsteady gait 07/20/2020    Dehydration 07/20/2020    Acute renal failure superimposed on stage 3 chronic kidney disease ([address] 07/20/2020    Paroxysmal VT ([address] 11/07/2019    Cardiomyopathy ([address] 11/07/2019    Alcohol withdrawal syndrome, with delirium ([address] 11/01/2019    A-fib ([address] 10/27/2019    Syncope 01/09/2019    Primary insomnia 01/09/2019    Mild depression ([address] 06/12/2018    Acute bilateral low back pain without sciatica 05/30/2018    Alcoholism ([address] 55/38/2396    Metabolic encephalopathy 19/48/3998    TIA (transient ischemic attack) 04/12/2018    Medicare annual wellness visit, subsequent 09/01/2017    Diverticulosis 07/18/2017    Gastritis and duodenitis 07/18/2017    Internal hemorrhoids 07/18/2017    Hypertension with renal disease 07/18/2017    Mixed hyperlipidemia 07/18/2017    GI bleed 07/18/2017    Primary osteoarthritis involving multiple joints 07/18/2017    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, without long-term current use of insulin ([facility] 07/18/2017    Back pain 07/18/2017    Anemia 07/18/2017    Alzheimer disease ([facility] 07/18/2017    SSS (sick sinus syndrome) ([facility] 06/02/2015    S/P cardiac pacemaker procedure 05/04/2015    S/P ablation of accessory bypass tract 05/04/2015    SVT [facility]supraventricular tachycardia) ([facility]s/p RFA 04/28/2015    Near syncope 03/11/2015    ASCVD (arteriosclerotic cardiovascular disease) 07/11/2014    Hypokalemia 07/11/2014    Right inguinal hernia 06/12/2014      Jacek Angel MD  Past Medical History:   Diagnosis Date    Abdominal pain 7/18/2017    Alzheimer disease ([facility] 7/18/2017    Anemia 7/18/2017    Arthritis     Back pain 7/18/2017    Bradycardia 7/18/2017    CAD (coronary artery disease)     hx of MI    CKD (chronic kidney disease), stage II 7/18/2017    constipation     Depression 7/18/2017    Diabetes mellitus ([facility] 7/18/2017    Diverticulosis 7/18/2017    DJD (degenerative joint disease) 7/18/2017    Encounter for long-term (current) use of other medications 7/18/2017    Fatigue     Gastritis and duodenitis 7/18/2017    GERD (gastroesophageal reflux disease)     GI bleed 7/18/2017    Hearing loss     Hyperlipidemia 7/18/2017    Hypertension     Hypertension with renal disease 7/18/2017    Ill-defined condition     Dementia    Internal hemorrhoids 7/18/2017    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    S/P cardiac pacemaker procedure 5/4/2015    5/4/15 [employer]onic dual chamber pacemaker implant     Weight loss     lost over 40 pounds last year- has no appetite      Past Surgical History:   Procedure Laterality Date    COLONOSCOPY N/A 6/22/2017    COLONOSCOPY performed by Tracy Mcneill MD at [address] [place] [address]  6/22/2017         [address]  7/10/2014    right inguinal    HX OTHER SURGICAL      cystectomy from back    TX EGD TRANSORAL BIOPSY SINGLE/MULTIPLE  2012         TX UPPER GI ENDOSCOPY,W/DIR SUBMUC INJ  2020         UPPER GI ENDOSCOPY,BIOPSY  2017         UPPER GI ENDOSCOPY,BIOPSY  2020          No Known Allergies   Family History   Problem Relation Age of Onset    Hypertension Mother     Heart Disease Father     Cancer Other         son-cancer? unknown what type       Current Facility-Administered Medications   Medication Dose Route Frequency    sodium chloride (NS) flush 5-40 mL  5-40 mL IntraVENous Q8H    sodium chloride (NS) flush 5-40 mL  5-40 mL IntraVENous PRN    acetaminophen (TYLENOL) tablet 650 mg  650 mg Oral Q6H PRN    Or    acetaminophen (TYLENOL) suppository 650 mg  650 mg Rectal Q6H PRN    polyethylene glycol (MIRALAX) packet 17 g  17 g Oral DAILY PRN    promethazine (PHENERGAN) tablet 12.5 mg  12.5 mg Oral Q6H PRN    Or    ondansetron (ZOFRAN) injection 4 mg  4 mg IntraVENous Q6H PRN    aspirin chewable tablet 81 mg  81 mg Oral DAILY    carvediloL (COREG) tablet 6.25 mg  6.25 mg Oral BID    amLODIPine (NORVASC) tablet 5 mg  5 mg Oral DAILY    heparin (porcine) injection 5,000 Units  5,000 Units SubCUTAneous Q12H    furosemide (LASIX) injection 40 mg  40 mg IntraVENous BID    insulin lispro (HUMALOG) injection   SubCUTAneous AC&HS    glucose chewable tablet 16 g  4 Tab Oral PRN    dextrose (D50W) injection syrg 12.5-25 g  12.5-25 g IntraVENous PRN    glucagon (GLUCAGEN) injection 1 mg  1 mg IntraMUSCular PRN         Review of Symptoms:  A comprehensive review of systems was negative except for that written in the HPI. Physical Exam    Visit Vitals  BP (!) 141/86   Pulse 63   Temp 97.7 °F (36.5 °C)   Resp 18   Ht 5' 9\" (1.753 m)   Wt 125 lb (56.7 kg)   SpO2 97%   BMI 18.46 kg/m²     General Appearance:  Well developed, well nourished,alert and oriented x 3, and individual in no acute distress. Ears/Nose/Mouth/Throat:   Hearing grossly normal.         Neck: Supple. Chest:   Lungs clear to auscultation bilaterally. Cardiovascular:  Regular rate and rhythm, S1, S2 normal, no murmur. Abdomen:   Soft, non-tender, bowel sounds are active. Extremities: No edema bilaterally. Skin: Warm and dry. Cardiographics    Telemetry: paced rythm  ECG: paced rythm  Echocardiogram: Not done    Labs:   Recent Results (from the past 24 hour(s))   EKG, 12 LEAD, INITIAL    Collection Time: 01/01/21  1:25 PM   Result Value Ref Range    Ventricular Rate 75 BPM    Atrial Rate 86 BPM    QRS Duration 164 ms    Q-T Interval 472 ms    QTC Calculation (Bezet) 527 ms    Calculated P Axis 58 degrees    Calculated R Axis -132 degrees    Calculated T Axis 54 degrees    Diagnosis       AV dual-paced rhythm  When compared with ECG of 14-DEC-2020 08:36,  No significant change was found     CBC WITH AUTOMATED DIFF    Collection Time: 01/01/21  1:43 PM   Result Value Ref Range    WBC 7.0 4.1 - 11.1 K/uL    RBC 4.89 4. 10 - 5.70 M/uL    HGB 13.5 12.1 - 17.0 g/dL    HCT 43.3 36.6 - 50.3 %    MCV 88.5 80.0 - 99.0 FL    MCH 27.6 26.0 - 34.0 PG    MCHC 31.2 30.0 - 36.5 g/dL    RDW 15.4 (H) 11.5 - 14.5 %    PLATELET 295 213 - 346 K/uL    MPV 11.7 8.9 - 12.9 FL    NRBC 0.0 0  WBC    ABSOLUTE NRBC 0.00 0.00 - 0.01 K/uL    NEUTROPHILS 74 32 - 75 %    LYMPHOCYTES 15 12 - 49 %    MONOCYTES 10 5 - 13 %    EOSINOPHILS 0 0 - 7 %    BASOPHILS 1 0 - 1 %    IMMATURE GRANULOCYTES 0 0.0 - 0.5 %    ABS. NEUTROPHILS 5.1 1.8 - 8.0 K/UL    ABS. LYMPHOCYTES 1.1 0.8 - 3.5 K/UL    ABS. MONOCYTES 0.7 0.0 - 1.0 K/UL    ABS. EOSINOPHILS 0.0 0.0 - 0.4 K/UL    ABS. BASOPHILS 0.0 0.0 - 0.1 K/UL    ABS. IMM.  GRANS. 0.0 0.00 - 0.04 K/UL    DF AUTOMATED     METABOLIC PANEL, COMPREHENSIVE    Collection Time: 01/01/21  1:43 PM   Result Value Ref Range    Sodium 138 136 - 145 mmol/L    Potassium 4.1 3.5 - 5.1 mmol/L    Chloride 104 97 - 108 mmol/L CO2 24 21 - 32 mmol/L    Anion gap 10 5 - 15 mmol/L    Glucose 124 (H) 65 - 100 mg/dL    BUN 31 (H) 6 - 20 MG/DL    Creatinine 2.02 (H) 0.70 - 1.30 MG/DL    BUN/Creatinine ratio 15 12 - 20      GFR est AA 38 (L) >60 ml/min/1.73m2    GFR est non-AA 31 (L) >60 ml/min/1.73m2    Calcium 9.3 8.5 - 10.1 MG/DL    Bilirubin, total 1.1 (H) 0.2 - 1.0 MG/DL    ALT (SGPT) 27 12 - 78 U/L    AST (SGOT) 26 15 - 37 U/L    Alk.  phosphatase 82 45 - 117 U/L    Protein, total 7.3 6.4 - 8.2 g/dL    Albumin 3.5 3.5 - 5.0 g/dL    Globulin 3.8 2.0 - 4.0 g/dL    A-G Ratio 0.9 (L) 1.1 - 2.2     CK W/ REFLX CKMB    Collection Time: 01/01/21  1:43 PM   Result Value Ref Range    CK 69 39 - 308 U/L   TROPONIN I    Collection Time: 01/01/21  1:43 PM   Result Value Ref Range    Troponin-I, Qt. 0.15 (H) <0.05 ng/mL   NT-PRO BNP    Collection Time: 01/01/21  1:43 PM   Result Value Ref Range    NT pro-BNP >35,000 (H) <450 PG/ML   MAGNESIUM    Collection Time: 01/01/21  1:43 PM   Result Value Ref Range    Magnesium 2.0 1.6 - 2.4 mg/dL   POC TROPONIN-I    Collection Time: 01/01/21  3:57 PM   Result Value Ref Range    Troponin-I (POC) 0.06 0.00 - 0.08 ng/mL   TROPONIN I    Collection Time: 01/01/21  8:47 PM   Result Value Ref Range    Troponin-I, Qt. 0.12 (H) <0.05 ng/mL   GLUCOSE, POC    Collection Time: 01/01/21  9:00 PM   Result Value Ref Range    Glucose (POC) 112 (H) 65 - 100 mg/dL    Performed by Hedy Nogueira    CBC WITH AUTOMATED DIFF    Collection Time: 01/02/21  4:47 AM   Result Value Ref Range    WBC 6.8 4.1 - 11.1 K/uL    RBC 4.84 4.10 - 5.70 M/uL    HGB 13.1 12.1 - 17.0 g/dL    HCT 41.4 36.6 - 50.3 %    MCV 85.5 80.0 - 99.0 FL    MCH 27.1 26.0 - 34.0 PG    MCHC 31.6 30.0 - 36.5 g/dL    RDW 15.4 (H) 11.5 - 14.5 %    PLATELET 293 198 - 252 K/uL    MPV 11.9 8.9 - 12.9 FL    NRBC 0.0 0  WBC    ABSOLUTE NRBC 0.00 0.00 - 0.01 K/uL    NEUTROPHILS 68 32 - 75 %    LYMPHOCYTES 21 12 - 49 %    MONOCYTES 9 5 - 13 %    EOSINOPHILS 1 0 - 7 % BASOPHILS 1 0 - 1 %    IMMATURE GRANULOCYTES 0 0.0 - 0.5 %    ABS. NEUTROPHILS 4.6 1.8 - 8.0 K/UL    ABS. LYMPHOCYTES 1.4 0.8 - 3.5 K/UL    ABS. MONOCYTES 0.6 0.0 - 1.0 K/UL    ABS. EOSINOPHILS 0.1 0.0 - 0.4 K/UL    ABS. BASOPHILS 0.0 0.0 - 0.1 K/UL    ABS. IMM. GRANS. 0.0 0.00 - 0.04 K/UL    DF AUTOMATED     METABOLIC PANEL, BASIC    Collection Time: 01/02/21  4:47 AM   Result Value Ref Range    Sodium 138 136 - 145 mmol/L    Potassium 4.1 3.5 - 5.1 mmol/L    Chloride 103 97 - 108 mmol/L    CO2 26 21 - 32 mmol/L    Anion gap 9 5 - 15 mmol/L    Glucose 99 65 - 100 mg/dL    BUN 37 (H) 6 - 20 MG/DL    Creatinine 1.94 (H) 0.70 - 1.30 MG/DL    BUN/Creatinine ratio 19 12 - 20      GFR est AA 40 (L) >60 ml/min/1.73m2    GFR est non-AA 33 (L) >60 ml/min/1.73m2    Calcium 9.5 8.5 - 10.1 MG/DL   MAGNESIUM    Collection Time: 01/02/21  4:47 AM   Result Value Ref Range    Magnesium 2.2 1.6 - 2.4 mg/dL   TROPONIN I    Collection Time: 01/02/21  4:47 AM   Result Value Ref Range    Troponin-I, Qt. 0.12 (H) <0.05 ng/mL   GLUCOSE, POC    Collection Time: 01/02/21  7:46 AM   Result Value Ref Range    Glucose (POC) 93 65 - 100 mg/dL    Performed by Lynda Rodriguez         Assessment:     Assessment:         Hospital Problems  Date Reviewed: 12/24/2020          Codes Class Noted POA    CHF (congestive heart failure) ([facility] ICD-10-CM: I50.9  ICD-9-CM: 428.0  1/1/2021 Unknown               Plan:     CHF- acute on chronic systolic. Appears  to be improving. Continue diuresis. Watch renal function. Continue home therapy. Thank you for the consult. Will follow.     Ez Sheffield MD

## 2021-01-02 NOTE — PROGRESS NOTES
Patient pulled his IV, patient said \"so that what it is\" patient is very forgetful. Patient agreeable to new IV insertion    0445  Two Failed attempts to put in IV site, patient wants to take a break. Nurse Domo Barrientos successful in getting morning blood work, will ask another nurse to try IV access.     [address]  nurse put in new iv site on patient 22g x 1 attempt no complication

## 2021-01-03 LAB
ALBUMIN SERPL-MCNC: 2.5 G/DL (ref 3.5–5)
ALBUMIN/GLOB SERPL: 0.8 {RATIO} (ref 1.1–2.2)
ALP SERPL-CCNC: 61 U/L (ref 45–117)
ALT SERPL-CCNC: 25 U/L (ref 12–78)
ANION GAP SERPL CALC-SCNC: 8 MMOL/L (ref 5–15)
AST SERPL-CCNC: 45 U/L (ref 15–37)
BILIRUB SERPL-MCNC: 0.7 MG/DL (ref 0.2–1)
BUN SERPL-MCNC: 39 MG/DL (ref 6–20)
BUN/CREAT SERPL: 23 (ref 12–20)
CALCIUM SERPL-MCNC: 7.9 MG/DL (ref 8.5–10.1)
CHLORIDE SERPL-SCNC: 107 MMOL/L (ref 97–108)
CO2 SERPL-SCNC: 24 MMOL/L (ref 21–32)
CREAT SERPL-MCNC: 1.66 MG/DL (ref 0.7–1.3)
GLOBULIN SER CALC-MCNC: 3.3 G/DL (ref 2–4)
GLUCOSE BLD STRIP.AUTO-MCNC: 101 MG/DL (ref 65–100)
GLUCOSE BLD STRIP.AUTO-MCNC: 136 MG/DL (ref 65–100)
GLUCOSE BLD STRIP.AUTO-MCNC: 75 MG/DL (ref 65–100)
GLUCOSE BLD STRIP.AUTO-MCNC: 85 MG/DL (ref 65–100)
GLUCOSE SERPL-MCNC: 62 MG/DL (ref 65–100)
POTASSIUM SERPL-SCNC: 4.3 MMOL/L (ref 3.5–5.1)
PROT SERPL-MCNC: 5.8 G/DL (ref 6.4–8.2)
SERVICE CMNT-IMP: ABNORMAL
SERVICE CMNT-IMP: ABNORMAL
SERVICE CMNT-IMP: NORMAL
SERVICE CMNT-IMP: NORMAL
SODIUM SERPL-SCNC: 139 MMOL/L (ref 136–145)

## 2021-01-03 PROCEDURE — 74011250637 HC RX REV CODE- 250/637: Performed by: INTERNAL MEDICINE

## 2021-01-03 PROCEDURE — 3331090001 HH PPS REVENUE CREDIT

## 2021-01-03 PROCEDURE — 3331090002 HH PPS REVENUE DEBIT

## 2021-01-03 PROCEDURE — 65660000000 HC RM CCU STEPDOWN

## 2021-01-03 PROCEDURE — 99232 SBSQ HOSP IP/OBS MODERATE 35: CPT | Performed by: INTERNAL MEDICINE

## 2021-01-03 PROCEDURE — 82962 GLUCOSE BLOOD TEST: CPT

## 2021-01-03 PROCEDURE — 99232 SBSQ HOSP IP/OBS MODERATE 35: CPT | Performed by: FAMILY MEDICINE

## 2021-01-03 PROCEDURE — 74011250636 HC RX REV CODE- 250/636: Performed by: INTERNAL MEDICINE

## 2021-01-03 PROCEDURE — 80053 COMPREHEN METABOLIC PANEL: CPT

## 2021-01-03 PROCEDURE — 36415 COLL VENOUS BLD VENIPUNCTURE: CPT

## 2021-01-03 RX ADMIN — HEPARIN SODIUM 5000 UNITS: 5000 INJECTION INTRAVENOUS; SUBCUTANEOUS at 17:04

## 2021-01-03 RX ADMIN — CARVEDILOL 6.25 MG: 6.25 TABLET, FILM COATED ORAL at 17:04

## 2021-01-03 RX ADMIN — AMLODIPINE BESYLATE 5 MG: 5 TABLET ORAL at 09:02

## 2021-01-03 RX ADMIN — HEPARIN SODIUM 5000 UNITS: 5000 INJECTION INTRAVENOUS; SUBCUTANEOUS at 07:12

## 2021-01-03 RX ADMIN — FUROSEMIDE 40 MG: 10 INJECTION, SOLUTION INTRAMUSCULAR; INTRAVENOUS at 09:02

## 2021-01-03 RX ADMIN — FUROSEMIDE 40 MG: 10 INJECTION, SOLUTION INTRAMUSCULAR; INTRAVENOUS at 17:05

## 2021-01-03 RX ADMIN — Medication 10 ML: at 22:21

## 2021-01-03 RX ADMIN — CARVEDILOL 6.25 MG: 6.25 TABLET, FILM COATED ORAL at 09:02

## 2021-01-03 RX ADMIN — Medication 10 ML: at 07:13

## 2021-01-03 RX ADMIN — Medication 10 ML: at 14:00

## 2021-01-03 RX ADMIN — ASPIRIN 81 MG: 81 TABLET, CHEWABLE ORAL at 09:02

## 2021-01-03 NOTE — PROGRESS NOTES
End of Shift Note    Bedside shift change report given to Eliza Lopez RN (oncoming nurse) by Doron Funk (offgoing nurse). Report included the following information SBAR, Intake/Output, MAR, Recent Results and Cardiac Rhythm pACED    Shift worked:  7P-7A     Shift summary and any significant changes:     Acute confusion overnight (hx of dementia), able to re-orient. Concerns for physician to address:  Plans for treatment. Zone phone for oncoming shift:   [phone]       Activity:  Activity Level: Up ad bryson  Number times ambulated in hallways past shift: 0  Number of times OOB to chair past shift: 2    Cardiac:   Cardiac Monitoring: Yes      Cardiac Rhythm: Paced    Access:   Current line(s): PIV     Genitourinary:   Urinary status: voiding    Respiratory:   O2 Device: Room air  Chronic home O2 use?: NO  Incentive spirometer at bedside: NO     GI:  Last Bowel Movement Date: 01/01/21  Current diet:  DIET CARDIAC Regular  Passing flatus: YES  Tolerating current diet: YES  % Diet Eaten: 75 %    Pain Management:   Patient states pain is manageable on current regimen: YES    Skin:  Newton Score: 23  Interventions: increase time out of bed    Patient Safety:  Fall Score:  Total Score: 1  Interventions: bed/chair alarm, gripper socks and pt to call before getting OOB  High Fall Risk: Yes    Length of Stay:  Expected LOS: - - -  Actual LOS: [facility]

## 2021-01-03 NOTE — PROGRESS NOTES
Cardiology Progress Note      1/3/2021 9:25 AM    Admit Date: 1/1/2021    Admit Diagnosis: CHF (congestive heart failure) ([facility] [I50.9]      Subjective:     Benito Farfan Sr is feeling better. Back to baseline per patient. Renal function stable. Visit Vitals  /78   Pulse 64   Temp 97.2 °F (36.2 °C)   Resp 19   Ht 5' 9\" (1.753 m)   Wt 125 lb (56.7 kg)   SpO2 98%   BMI 18.46 kg/m²       Current Facility-Administered Medications   Medication Dose Route Frequency    sodium chloride (NS) flush 5-40 mL  5-40 mL IntraVENous Q8H    sodium chloride (NS) flush 5-40 mL  5-40 mL IntraVENous PRN    acetaminophen (TYLENOL) tablet 650 mg  650 mg Oral Q6H PRN    Or    acetaminophen (TYLENOL) suppository 650 mg  650 mg Rectal Q6H PRN    polyethylene glycol (MIRALAX) packet 17 g  17 g Oral DAILY PRN    promethazine (PHENERGAN) tablet 12.5 mg  12.5 mg Oral Q6H PRN    Or    ondansetron (ZOFRAN) injection 4 mg  4 mg IntraVENous Q6H PRN    aspirin chewable tablet 81 mg  81 mg Oral DAILY    carvediloL (COREG) tablet 6.25 mg  6.25 mg Oral BID    amLODIPine (NORVASC) tablet 5 mg  5 mg Oral DAILY    heparin (porcine) injection 5,000 Units  5,000 Units SubCUTAneous Q12H    furosemide (LASIX) injection 40 mg  40 mg IntraVENous BID    insulin lispro (HUMALOG) injection   SubCUTAneous AC&HS    glucose chewable tablet 16 g  4 Tab Oral PRN    dextrose (D50W) injection syrg 12.5-25 g  12.5-25 g IntraVENous PRN    glucagon (GLUCAGEN) injection 1 mg  1 mg IntraMUSCular PRN         Objective:      Physical Exam:  Visit Vitals  /78   Pulse 64   Temp 97.2 °F (36.2 °C)   Resp 19   Ht 5' 9\" (1.753 m)   Wt 125 lb (56.7 kg)   SpO2 98%   BMI 18.46 kg/m²     General Appearance:  Well developed, well nourished,alert and oriented x 3, and individual in no acute distress. Ears/Nose/Mouth/Throat:   Hearing grossly normal.         Neck: Supple. Chest:   Lungs clear to auscultation bilaterally.    Cardiovascular:  Regular rate and rhythm, S1, S2 normal, no murmur. Abdomen:   Soft, non-tender, bowel sounds are active. Extremities: No edema bilaterally. Skin: Warm and dry. Data Review:   Labs:    Recent Results (from the past 24 hour(s))   GLUCOSE, POC    Collection Time: 01/02/21 11:27 AM   Result Value Ref Range    Glucose (POC) 108 (H) 65 - 100 mg/dL    Performed by [address], POC    Collection Time: 01/02/21  4:29 PM   Result Value Ref Range    Glucose (POC) 135 (H) 65 - 100 mg/dL    Performed by [address], POC    Collection Time: 01/02/21 10:10 PM   Result Value Ref Range    Glucose (POC) 96 65 - 100 mg/dL    Performed by Michelle Kelley.    METABOLIC PANEL, COMPREHENSIVE    Collection Time: 01/03/21  3:51 AM   Result Value Ref Range    Sodium 139 136 - 145 mmol/L    Potassium 4.3 3.5 - 5.1 mmol/L    Chloride 107 97 - 108 mmol/L    CO2 24 21 - 32 mmol/L    Anion gap 8 5 - 15 mmol/L    Glucose 62 (L) 65 - 100 mg/dL    BUN 39 (H) 6 - 20 MG/DL    Creatinine 1.66 (H) 0.70 - 1.30 MG/DL    BUN/Creatinine ratio 23 (H) 12 - 20      GFR est AA 48 (L) >60 ml/min/1.73m2    GFR est non-AA 39 (L) >60 ml/min/1.73m2    Calcium 7.9 (L) 8.5 - 10.1 MG/DL    Bilirubin, total 0.7 0.2 - 1.0 MG/DL    ALT (SGPT) 25 12 - 78 U/L    AST (SGOT) 45 (H) 15 - 37 U/L    Alk. phosphatase 61 45 - 117 U/L    Protein, total 5.8 (L) 6.4 - 8.2 g/dL    Albumin 2.5 (L) 3.5 - 5.0 g/dL    Globulin 3.3 2.0 - 4.0 g/dL    A-G Ratio 0.8 (L) 1.1 - 2.2     GLUCOSE, POC    Collection Time: 01/03/21  7:46 AM   Result Value Ref Range    Glucose (POC) 75 65 - 100 mg/dL    Performed by Angelique Waters        Telemetry: paced      Assessment:     Hospital Problems  Date Reviewed: 12/24/2020          Codes Class Noted POA    CH[facility] (congestive [facility] failure) ([facility] ICD-10-CM: I50.9  ICD-9-CM: 428.0  1/1/2021 Unknown              Plan:     Continue diuresis. Home tomorrow?     Oriana Vale MD

## 2021-01-03 NOTE — PROGRESS NOTES
General Daily Progress Note    Admit Date: 1/1/2021  Hospital day 3    Subjective:     Patient has no complaint of shortness of breath or chest pain. Appears to be breathing comfortably. ..   Medication side effects: none    Current Facility-Administered Medications   Medication Dose Route Frequency    sodium chloride (NS) flush 5-40 mL  5-40 mL IntraVENous Q8H    sodium chloride (NS) flush 5-40 mL  5-40 mL IntraVENous PRN    acetaminophen (TYLENOL) tablet 650 mg  650 mg Oral Q6H PRN    Or    acetaminophen (TYLENOL) suppository 650 mg  650 mg Rectal Q6H PRN    polyethylene glycol (MIRALAX) packet 17 g  17 g Oral DAILY PRN    promethazine (PHENERGAN) tablet 12.5 mg  12.5 mg Oral Q6H PRN    Or    ondansetron (ZOFRAN) injection 4 mg  4 mg IntraVENous Q6H PRN    aspirin chewable tablet 81 mg  81 mg Oral DAILY    carvediloL (COREG) tablet 6.25 mg  6.25 mg Oral BID    amLODIPine (NORVASC) tablet 5 mg  5 mg Oral DAILY    heparin (porcine) injection 5,000 Units  5,000 Units SubCUTAneous Q12H    furosemide (LASIX) injection 40 mg  40 mg IntraVENous BID    insulin lispro (HUMALOG) injection   SubCUTAneous AC&HS    glucose chewable tablet 16 g  4 Tab Oral PRN    dextrose (D50W) injection syrg 12.5-25 g  12.5-25 g IntraVENous PRN    glucagon (GLUCAGEN) injection 1 mg  1 mg IntraMUSCular PRN        Review of Systems  Pertinent items are noted in HPI. Objective:     Patient Vitals for the past 8 hrs:   BP Temp Pulse Resp SpO2   01/03/21 0342 131/74 (!) 96.6 °F (35.9 °C) 60 18 97 %     No intake/output data recorded.   01/01 1901 - 01/03 0700  In: 480 [P.O.:480]  Out: 1701 [Urine:1700]    Physical Exam:   Visit Vitals  /74 (BP 1 Location: Left arm, BP Patient Position: At rest)   Pulse 60   Temp (!) 96.6 °F (35.9 °C)   Resp 18   Ht 5' 9\" (1.753 m)   Wt 125 lb (56.7 kg)   SpO2 97%   BMI 18.46 kg/m²     Lungs: clear to auscultation bilaterally  Heart: regular rate and rhythm, S1, S2 normal, no murmur, click, rub or gallop  Abdomen: soft, non-tender. Bowel sounds normal. No masses,  no organomegaly  Extremities: extremities normal, atraumatic, no cyanosis or edema      ECG: normal sinus rhythm     Data Review   Recent Results (from the past 24 hour(s))   GLUCOSE, POC    Collection Time: 01/02/21  7:46 AM   Result Value Ref Range    Glucose (POC) 93 65 - 100 mg/dL    Performed by [address], POC    Collection Time: 01/02/21 11:27 AM   Result Value Ref Range    Glucose (POC) 108 (H) 65 - 100 mg/dL    Performed by [address], POC    Collection Time: 01/02/21  4:29 PM   Result Value Ref Range    Glucose (POC) 135 (H) 65 - 100 mg/dL    Performed by Rayburn Felty    GLUCOSE, POC    Collection Time: 01/02/21 10:10 PM   Result Value Ref Range    Glucose (POC) 96 65 - 100 mg/dL    Performed by Candy Mcclain.    METABOLIC PANEL, COMPREHENSIVE    Collection Time: 01/03/21  3:51 AM   Result Value Ref Range    Sodium 139 136 - 145 mmol/L    Potassium 4.3 3.5 - 5.1 mmol/L    Chloride 107 97 - 108 mmol/L    CO2 24 21 - 32 mmol/L    Anion gap 8 5 - 15 mmol/L    Glucose 62 (L) 65 - 100 mg/dL    BUN 39 (H) 6 - 20 MG/DL    Creatinine 1.66 (H) 0.70 - 1.30 MG/DL    BUN/Creatinine ratio 23 (H) 12 - 20      GFR est AA 48 (L) >60 ml/min/1.73m2    GFR est non-AA 39 (L) >60 ml/min/1.73m2    Calcium 7.9 (L) 8.5 - 10.1 MG/DL    Bilirubin, total 0.7 0.2 - 1.0 MG/DL    ALT (SGPT) 25 12 - 78 U/L    AST (SGOT) 45 (H) 15 - 37 U/L    Alk. phosphatase 61 45 - 117 U/L    Protein, total 5.8 (L) 6.4 - 8.2 g/dL    Albumin 2.5 (L) 3.5 - 5.0 g/dL    Globulin 3.3 2.0 - 4.0 g/dL    A-G Ratio 0.8 (L) 1.1 - 2.2             Assessment:     Active Problems:    CHF (congestive heart failure) ([facility] (1/1/2021)        Plan:     1. CHF- symptomatically much better after receiving iv lasix. I/O 240/901    2. Cardiomyopathy , EF 15-20%  3. Moderate to severe MR and moderate AR  4. Alzheimers  5. Diabetes- stable on sliding scale.    5. Stage 3 CRF- cr stable at 1.66 this am.

## 2021-01-03 NOTE — PROGRESS NOTES
Pt is on 5mg daily donepezil  and 10mg memantine BID at home is there any reason pt not taking it here? Granddaughter is wanting to know why he is not. Can we start these medications? End of Shift Note    Bedside shift change report given to Riaz Tan Jean  (oncoming nurse) by Paola Helton (offgoing nurse). Report included the following information SBAR, Kardex and MAR    Shift worked: 115-7p     Shift summary and any significant changes:         Concerns for physician to address:  Pt is on 5mg daily donepezil  and 10mg memantine BID at home is there any reason pt not taking it here? Granddaughter is wanting to know why he is not. Can we start these medications? Zone phone for oncoming shift:   [phone]       Activity:  Activity Level: Up ad bryson, Up with Assistance  Number times ambulated in hallways past shift: 0  Number of times OOB to chair past shift: 0    Cardiac:   Cardiac Monitoring: Yes      Cardiac Rhythm: Paced    Access:   Current line(s): PIV     Genitourinary:   Urinary status: due to void    Respiratory:   O2 Device: Room air  Chronic home O2 use?: NO  Incentive spirometer at bedside: YES     GI:  Last Bowel Movement Date: 12/31/20  Current diet:  DIET CARDIAC Regular  Passing flatus: YES  Tolerating current diet: YES  % Diet Eaten: 75 %    Pain Management:   Patient states pain is manageable on current regimen: YES    Skin:  Newton Score: 21  Interventions: increase time out of bed    Patient Safety:  Fall Score:  Total Score: 1  Interventions: assistive device (walker, cane, etc), gripper socks and pt to call before getting OOB  High Fall Risk: Yes    Length of Stay:  Expected LOS: - - -  Actual LOS: [address]

## 2021-01-04 ENCOUNTER — HOME CARE VISIT (OUTPATIENT)
Dept: HOME HEALTH SERVICES | Facility: HOME HEALTH | Age: 86
End: 2021-01-04
Payer: MEDICARE

## 2021-01-04 ENCOUNTER — APPOINTMENT (OUTPATIENT)
Dept: GENERAL RADIOLOGY | Age: 86
DRG: 291 | End: 2021-01-04
Attending: INTERNAL MEDICINE
Payer: MEDICARE

## 2021-01-04 LAB
GLUCOSE BLD STRIP.AUTO-MCNC: 111 MG/DL (ref 65–100)
GLUCOSE BLD STRIP.AUTO-MCNC: 120 MG/DL (ref 65–100)
GLUCOSE BLD STRIP.AUTO-MCNC: 68 MG/DL (ref 65–100)
GLUCOSE BLD STRIP.AUTO-MCNC: 85 MG/DL (ref 65–100)
GLUCOSE BLD STRIP.AUTO-MCNC: 93 MG/DL (ref 65–100)
SERVICE CMNT-IMP: ABNORMAL
SERVICE CMNT-IMP: ABNORMAL
SERVICE CMNT-IMP: NORMAL

## 2021-01-04 PROCEDURE — 3331090001 HH PPS REVENUE CREDIT

## 2021-01-04 PROCEDURE — 99232 SBSQ HOSP IP/OBS MODERATE 35: CPT | Performed by: INTERNAL MEDICINE

## 2021-01-04 PROCEDURE — 82962 GLUCOSE BLOOD TEST: CPT

## 2021-01-04 PROCEDURE — 71046 X-RAY EXAM CHEST 2 VIEWS: CPT

## 2021-01-04 PROCEDURE — 3331090002 HH PPS REVENUE DEBIT

## 2021-01-04 PROCEDURE — 65660000000 HC RM CCU STEPDOWN

## 2021-01-04 PROCEDURE — 74011250637 HC RX REV CODE- 250/637: Performed by: INTERNAL MEDICINE

## 2021-01-04 PROCEDURE — 74011250636 HC RX REV CODE- 250/636: Performed by: INTERNAL MEDICINE

## 2021-01-04 PROCEDURE — 99233 SBSQ HOSP IP/OBS HIGH 50: CPT | Performed by: INTERNAL MEDICINE

## 2021-01-04 RX ADMIN — HEPARIN SODIUM 5000 UNITS: 5000 INJECTION INTRAVENOUS; SUBCUTANEOUS at 18:11

## 2021-01-04 RX ADMIN — CARVEDILOL 6.25 MG: 6.25 TABLET, FILM COATED ORAL at 09:08

## 2021-01-04 RX ADMIN — AMLODIPINE BESYLATE 5 MG: 5 TABLET ORAL at 09:08

## 2021-01-04 RX ADMIN — HEPARIN SODIUM 5000 UNITS: 5000 INJECTION INTRAVENOUS; SUBCUTANEOUS at 05:58

## 2021-01-04 RX ADMIN — FUROSEMIDE 40 MG: 10 INJECTION, SOLUTION INTRAMUSCULAR; INTRAVENOUS at 09:08

## 2021-01-04 RX ADMIN — Medication 10 ML: at 06:00

## 2021-01-04 RX ADMIN — ASPIRIN 81 MG: 81 TABLET, CHEWABLE ORAL at 09:08

## 2021-01-04 RX ADMIN — CARVEDILOL 6.25 MG: 6.25 TABLET, FILM COATED ORAL at 18:11

## 2021-01-04 RX ADMIN — Medication 10 ML: at 22:51

## 2021-01-04 NOTE — PROGRESS NOTES
PROGRESS NOTE    NAME:  Prashant Pollack    :   1931   MRN:   [id number]     Date/Time:  2021 5:29 AM  Subjective:   History:  Chart reviewed patient seen and examined and discussed with his nurse this AM and all events noted. He has a history of ASCVD, Cardiomyopathy, SSS S/P pacemaker, DM, HTN, Alzheimer's disease and Malnutrition and is admitted now with CHF despite treatment with diuretics. He had a recent admission with discharge on  when he presented to the time of congestive heart failure after having had his diuretics stopped because of poor p.o. intake and concern for dehydration and renal dysfunction. His diuretics were resumed that time and half his initial dose and he was discharged doing well and seen back in follow-up at which time he was still doing well. Unfortunately he had progressive shortness of breath starting shortly after that office visit and presented to the ER with recurrent congestive heart failure. He has been diuresed with IV Lasix and feels much better now. He denies any chest pain, palpitations, PND, orthopnea or other cardiac or respiratory complaints. He notes no current GI or  complaints. He notes no headaches, dizziness or neurologic complaints except he is generally weak. He does have underlying memory issues with his dementia. There are no other complaints on complete review of systems.       Medications reviewed:  Current Facility-Administered Medications   Medication Dose Route Frequency    sodium chloride (NS) flush 5-40 mL  5-40 mL IntraVENous Q8H    sodium chloride (NS) flush 5-40 mL  5-40 mL IntraVENous PRN    acetaminophen (TYLENOL) tablet 650 mg  650 mg Oral Q6H PRN    Or    acetaminophen (TYLENOL) suppository 650 mg  650 mg Rectal Q6H PRN    polyethylene glycol (MIRALAX) packet 17 g  17 g Oral DAILY PRN    promethazine (PHENERGAN) tablet 12.5 mg  12.5 mg Oral Q6H PRN    Or    ondansetron (ZOFRAN) injection 4 mg  4 mg IntraVENous Q6H PRN    aspirin chewable tablet 81 mg  81 mg Oral DAILY    carvediloL (COREG) tablet 6.25 mg  6.25 mg Oral BID    amLODIPine (NORVASC) tablet 5 mg  5 mg Oral DAILY    heparin (porcine) injection 5,000 Units  5,000 Units SubCUTAneous Q12H    furosemide (LASIX) injection 40 mg  40 mg IntraVENous BID    insulin lispro (HUMALOG) injection   SubCUTAneous AC&HS    glucose chewable tablet 16 g  4 Tab Oral PRN    dextrose (D50W) injection syrg 12.5-25 g  12.5-25 g IntraVENous PRN    glucagon (GLUCAGEN) injection 1 mg  1 mg IntraMUSCular PRN        Objective:   Vitals:  Visit Vitals  BP (!) 119/54   Pulse 65   Temp 97.6 °F (36.4 °C)   Resp 18   Ht 5' 9\" (1.753 m)   Wt 137 lb 5.6 oz (62.3 kg)   SpO2 100%   BMI 20.28 kg/m²      O2 Device: Room air Temp (24hrs), Av.3 °F (36.3 °C), Min:95.8 °F (35.4 °C), Max:98 °F (36.7 °C)      Last 24hr Input/Output:    Intake/Output Summary (Last 24 hours) at 2021 0532  Last data filed at 1/3/2021 1142  Gross per 24 hour   Intake    Output 350 ml   Net -350 ml        PHYSICAL EXAM:  General:     Alert, cooperative, no distress, appears stated age. Head:    Normocephalic, without obvious abnormality, atraumatic. Eyes:    Conjunctivae/corneas clear. PERRLA  Nose:   Nares normal. No drainage or sinus tenderness. Throat:     Lips, mucosa, and tongue normal.  No Thrush  Neck:   Supple, symmetrical,  no adenopathy, thyroid: non tender     no carotid bruit and no JVD. Back:     Symmetric,  No CVA tenderness. Lungs:    Clear to auscultation bilaterally. No Wheezing or Rhonchi. No rales. Heart:    Regular rate and rhythm,  no murmur, rub or gallop. Abdomen:    Soft, non-tender. Not distended. Bowel sounds normal. No masses  Extremities:  Extremities normal, atraumatic, No cyanosis. No edema. No clubbing  Lymph nodes:  Cervical, supraclavicular normal.  Neurologic:  Normal strength, Alert and oriented X 3.    Skin:                 No rash      Lab Data Reviewed:    Recent Results (from the past 24 hour(s))   GLUCOSE, POC    Collection Time: 01/03/21  7:46 AM   Result Value Ref Range    Glucose (POC) 75 65 - 100 mg/dL    Performed by [address], POC    Collection Time: 01/03/21 11:35 AM   Result Value Ref Range    Glucose (POC) 85 65 - 100 mg/dL    Performed by [address], POC    Collection Time: 01/03/21  4:31 PM   Result Value Ref Range    Glucose (POC) 101 (H) 65 - 100 mg/dL    Performed by Olaf Fu \"Kehinde\" PCT    GLUCOSE, POC    Collection Time: 01/03/21  9:09 PM   Result Value Ref Range    Glucose (POC) 136 (H) 65 - 100 mg/dL    Performed by Emily Luu          Assessment/Plan:     Principal Problem:    CHF (congestive heart failure) ([address] (1/1/2021)    Active Problems:    ASCVD (arteriosclerotic cardiovascular disease) (7/11/2014)      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)      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, without long-term current use of insulin ([facility] (7/18/2017)      Alzheimer disease ([facility] (7/18/2017)      S/P cardiac pacemaker procedure (5/4/2015)      Overview: 5/4/15 Medtronic dual chamber pacemaker implant            SSS (sick sinus syndrome) ([address] (6/2/2015)      Cardiomyopathy ([facility] (11/7/2019)      Severe protein-calorie malnutrition ([facility] (7/21/2020)       ___________________________________________________  PLAN:    1. Continue Lasix 40 IV every 12  2. Follow renal function closely  3. Continue Coreg with cardiomyopathy  4. Resume ACE/ARB as blood pressure tolerates  5. Continue aspirin with ASCVD  6. Norvasc for hypertension  7. PT OT mobilize  8.   Cardiology consult notes reviewed and agree with recommendations        If need to contact me use hospital  [phone], DO NOT USE PERFECT SERVE    ___________________________________________________    Attending Physician: Katrin Olvera MD

## 2021-01-04 NOTE — PROGRESS NOTES
Problem: Falls - Risk of  Goal: *Absence of Falls  Description: Document Davis Led Fall Risk and appropriate interventions in the flowsheet.   Outcome: Progressing Towards Goal  Note: Fall Risk Interventions:  Mobility Interventions: Assess mobility with egress test, Patient to call before getting OOB    Mentation Interventions: Increase mobility, More frequent rounding    Medication Interventions: Patient to call before getting OOB, Teach patient to arise slowly    Elimination Interventions: Urinal in reach              Problem: Pain  Goal: *Control of Pain  Outcome: Progressing Towards Goal

## 2021-01-04 NOTE — PROGRESS NOTES
.End of Shift Note    Bedside shift change report given to RADHA Mcmillan (oncoming nurse) by Nelson uLis (offgoing nurse). Report included the following information SBAR, Kardex, MAR and Recent Results    Shift worked:  7p-7a     Shift summary and any significant changes:     Vital signs stable and no c/o pain throughout shift. Patient scheduled AM lab work completed, awaiting results      Concerns for physician to address:  none      Zone phone for oncoming shift:   [phone]       Activity:  Activity Level: Up ad bryson  Number times ambulated in hallways past shift: 0  Number of times OOB to chair past shift: 0  Cardiac:   Cardiac Monitoring:yes     Cardiac Rhythm: Paced    Access:   Current line(s): PIV     Genitourinary:   Urinary status: voiding    Respiratory:   O2 Device: Room air  Chronic home O2 use?: N/A  Incentive spirometer at bedside: N/A     GI:  Last Bowel Movement Date: 12/31/20  Current diet:  DIET CARDIAC Regular  Passing flatus: YES  Tolerating current diet: YES  % Diet Eaten: 75 %    Pain Management:   Patient states pain is manageable on current regimen: N/A    Skin:  Newton Score: 22  Interventions: increase time out of bed    Patient Safety:  Fall Score:  Total Score: 1  Interventions: gripper socks  High Fall Risk: Yes    Length of Stay:  Expected LOS: - - -  Actual LOS: [address]

## 2021-01-04 NOTE — PROGRESS NOTES
Reason for Admission:  CHF                   RUR Score:  27                PCP: First and Last name:  Lee Diop MD   Name of Practice:    Are you a current patient: Yes/No: Yes   Approximate date of last visit:    Can you participate in a virtual visit if needed:     Do you (patient/family) have any concerns for transition/discharge? Not at this time                Plan for utilizing home health:  Not anticipated     Current Advanced Directive/Advance Care Plan: On file            Transition of Care Plan:  CM completed assessment with granddaughter via phone. Patient currently does not use any DME and is capable of bathing and dressing self. Patient has had [facility] in the past and would like to use again if needed. Granddaughter provides transportation and is pt's primary caregiver. Patients support system includes, granddaughter, daughter and 2 great grandchildren. CM will continue to follow. PCP-Dr Nicola Husain  Pharmacy-[facility] on [address] [place] [address] Road Management Interventions  PCP Verified by CM: Yes(Dorothy Frey MD)  Mode of Transport at Discharge:  Other (see comment)(granddaughter)  Discharge Durable Medical Equipment: No(No DME use)  Physical Therapy Consult: No  Occupational Therapy Consult: No  Speech Therapy Consult: No  Current Support Network: Relative's Home(Lives with granddaughter and two teenage great grandchildren in a one story home with 3 maria antonia)  Confirm Follow Up Transport: Family  Discharge Location  Discharge Placement: (Anticipate home w/family)     Readmission Assessment  Number of days since last admission?: 8-30 days  Previous disposition: Home with Home Health  Who is being interviewed?: Caregiver(granddaughter)  What was the patient's/caregiver's perception as to why they think they needed to return back to the hospital?: Other (Comment)  Did you visit your Primary Care Physician after you left the hospital, before you returned this time?: Yes  Did you see a specialist, such as Cardiac, Pulmonary, Orthopedic Physician, etc. after you left the hospital?: No  Who advised the patient to return to the hospital?: Self-referral  Does the patient report anything that got in the way of taking their medications?: No      Abi Bess  Ext [phone]    VITOR Plan:     *Home w/family   *granddaughter to transport at d/c

## 2021-01-04 NOTE — PROGRESS NOTES
End of Shift Note    Bedside shift change report given to [id number] (oncoming nurse) by Kate Cyr (offgoing nurse). Report included the following information SBAR, Kardex, Procedure Summary and MAR    Shift worked:  7a-7p     Shift summary and any significant changes:    Patient rested in recliner for most of shift. Off floor today for chest xray. No complaint of pain. Tolerating diet well. Charting in disaster mode. Uneventful shift. Concerns for physician to address: Granddaughter concerned about pt not taking some of his home medications. Memantine and lisinopril. Zone phone for oncoming shift:  [phone]       Activity:  Activity Level: Up with Assistance  Number times ambulated in hallways past shift: 0  Number of times OOB to chair past shift: 3    Cardiac:   Cardiac Monitoring: Yes      Cardiac Rhythm: Paced    Access:   Current line(s): PIV     Genitourinary:   Urinary status: due to void    Respiratory:   O2 Device: Room air  Chronic home O2 use?: NO  Incentive spirometer at bedside: N/A     GI:  Last Bowel Movement Date: 12/31/20  Current diet:  DIET CARDIAC Regular  Passing flatus: YES  Tolerating current diet: YES  % Diet Eaten: 100 %    Pain Management:   Patient states pain is manageable on current regimen: N/A    Skin:  Newton Score: 22  Interventions: float heels, increase time out of bed and limit briefs    Patient Safety:  Fall Score:  Total Score: 1  Interventions: assistive device (walker, cane, etc), gripper socks, pt to call before getting OOB and stay with me (per policy)  High Fall Risk: Yes    Length of Stay:  Expected LOS: 4d 2h  Actual LOS: [address] [place] [address]

## 2021-01-04 NOTE — PROGRESS NOTES
1/4/2021 1:01 PM    Admit Date: 1/1/2021    Admit Diagnosis:   CHF (congestive heart failure) ([facility] [I50.9]    Subjective:     Benito Farfan Sr denies chest pain or shortness of breath. Current Facility-Administered Medications   Medication Dose Route Frequency    sodium chloride (NS) flush 5-40 mL  5-40 mL IntraVENous Q8H    sodium chloride (NS) flush 5-40 mL  5-40 mL IntraVENous PRN    acetaminophen (TYLENOL) tablet 650 mg  650 mg Oral Q6H PRN    Or    acetaminophen (TYLENOL) suppository 650 mg  650 mg Rectal Q6H PRN    polyethylene glycol (MIRALAX) packet 17 g  17 g Oral DAILY PRN    promethazine (PHENERGAN) tablet 12.5 mg  12.5 mg Oral Q6H PRN    Or    ondansetron (ZOFRAN) injection 4 mg  4 mg IntraVENous Q6H PRN    aspirin chewable tablet 81 mg  81 mg Oral DAILY    carvediloL (COREG) tablet 6.25 mg  6.25 mg Oral BID    amLODIPine (NORVASC) tablet 5 mg  5 mg Oral DAILY    heparin (porcine) injection 5,000 Units  5,000 Units SubCUTAneous Q12H    furosemide (LASIX) injection 40 mg  40 mg IntraVENous BID    insulin lispro (HUMALOG) injection   SubCUTAneous AC&HS    glucose chewable tablet 16 g  4 Tab Oral PRN    dextrose (D50W) injection syrg 12.5-25 g  12.5-25 g IntraVENous PRN    glucagon (GLUCAGEN) injection 1 mg  1 mg IntraMUSCular PRN         Objective:      Physical Exam:    Visit Vitals  /68   Pulse 60   Temp 97.5 °F (36.4 °C)   Resp 16   Ht 5' 9\" (1.753 m)   Wt 137 lb 5.6 oz (62.3 kg)   SpO2 99%   BMI 20.28 kg/m²     Gen:  NAD  Mental Status - Alert. General Appearance - Not in acute distress. Chest and Lung Exam   Inspection: Accessory muscles - No use of accessory muscles in breathing. Auscultation:   Breath sounds: - Normal.   Cardiovascular   Inspection: Jugular vein - Bilateral - Inspection Normal.   Palpation/Percussion:   Apical Impulse: - Normal.   Auscultation: Rhythm - Regular. Heart Sounds - S1 WNL and S2 WNL. No S3 or S4.    Murmurs & Other Heart Sounds: Auscultation of the heart reveals - No Murmurs. Peripheral Vascular   Upper Extremity: Inspection - Bilateral - No Cyanotic nailbeds or Digital clubbing. Lower Extremity:   Palpation: Edema - Bilateral - No edema. Abdomen:   Soft, non-tender, bowel sounds are active. Neuro: A&O times 3, CN and motor grossly WNL    Data Review:   Recent Labs     01/02/21  0447 01/01/21  1343   WBC 6.8 7.0   HGB 13.1 13.5   HCT 41.4 43.3    294     Recent Labs     01/03/21  0351 01/02/21  0447 01/01/21  1343    138 138   K 4.3 4.1 4.1    103 104   CO2 24 26 24   GLU 62* 99 124*   BUN 39* 37* 31*   CREA 1.66* 1.94* 2.02*   CA 7.9* 9.5 9.3   MG  --  2.2 2.0   ALB 2.5*  --  3.5   TBILI 0.7  --  1.1*   ALT 25  --  27       Recent Labs     01/02/21 0447 01/01/21 2047 01/01/21  1343   TROIQ 0.12* 0.12* 0.15*         Intake/Output Summary (Last 24 hours) at 1/4/2021 1301  Last data filed at 1/4/2021 0400  Gross per 24 hour   Intake 700 ml   Output    Net 700 ml        Telemetry: paced      Assessment:     Principal Problem:    CHF (congestive heart failure) ([facility] (1/1/2021)    Active Problems:    ASCVD (arteriosclerotic cardiovascular disease) (7/11/2014)      S/P cardiac pacemaker procedure (5/4/2015)      Overview: 5/4/15 Medtronic dual chamber pacemaker implant            SSS (sick sinus syndrome) ([facility] (6/2/2015)      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)      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 Appears  to be euvolemic. Continue diuresis, coreg, no ACEI/ ARB/ Entresto due to renal dysfunction. Watch renal function. On admission, he was reportedly on Lasix 20 mg p.o. daily.   At discharge he will need at least 40 mg p.o. daily, with close monitoring of weights, follow-up with me within 1 to 2 weeks.     Hypertensioncontrolled    Sick sinus syndrome status post pacemakernormally functioning pacemaker by telemetry    Continue with conservative management considering advanced age and dementia.

## 2021-01-05 LAB
ALBUMIN SERPL-MCNC: 2.9 G/DL (ref 3.5–5)
ALBUMIN/GLOB SERPL: 1 {RATIO} (ref 1.1–2.2)
ALP SERPL-CCNC: 63 U/L (ref 45–117)
ALT SERPL-CCNC: 30 U/L (ref 12–78)
ANION GAP SERPL CALC-SCNC: 4 MMOL/L (ref 5–15)
AST SERPL-CCNC: 26 U/L (ref 15–37)
BILIRUB SERPL-MCNC: 0.8 MG/DL (ref 0.2–1)
BUN SERPL-MCNC: 40 MG/DL (ref 6–20)
BUN/CREAT SERPL: 24 (ref 12–20)
CALCIUM SERPL-MCNC: 8.7 MG/DL (ref 8.5–10.1)
CHLORIDE SERPL-SCNC: 101 MMOL/L (ref 97–108)
CO2 SERPL-SCNC: 34 MMOL/L (ref 21–32)
CREAT SERPL-MCNC: 1.65 MG/DL (ref 0.7–1.3)
GLOBULIN SER CALC-MCNC: 3 G/DL (ref 2–4)
GLUCOSE BLD STRIP.AUTO-MCNC: 143 MG/DL (ref 65–100)
GLUCOSE BLD STRIP.AUTO-MCNC: 75 MG/DL (ref 65–100)
GLUCOSE BLD STRIP.AUTO-MCNC: 87 MG/DL (ref 65–100)
GLUCOSE BLD STRIP.AUTO-MCNC: 96 MG/DL (ref 65–100)
GLUCOSE SERPL-MCNC: 70 MG/DL (ref 65–100)
POTASSIUM SERPL-SCNC: 3.1 MMOL/L (ref 3.5–5.1)
PROT SERPL-MCNC: 5.9 G/DL (ref 6.4–8.2)
SERVICE CMNT-IMP: ABNORMAL
SERVICE CMNT-IMP: NORMAL
SODIUM SERPL-SCNC: 139 MMOL/L (ref 136–145)

## 2021-01-05 PROCEDURE — 65660000000 HC RM CCU STEPDOWN

## 2021-01-05 PROCEDURE — 36415 COLL VENOUS BLD VENIPUNCTURE: CPT

## 2021-01-05 PROCEDURE — 74011250636 HC RX REV CODE- 250/636: Performed by: INTERNAL MEDICINE

## 2021-01-05 PROCEDURE — 82962 GLUCOSE BLOOD TEST: CPT

## 2021-01-05 PROCEDURE — 97116 GAIT TRAINING THERAPY: CPT

## 2021-01-05 PROCEDURE — 74011250637 HC RX REV CODE- 250/637: Performed by: NURSE PRACTITIONER

## 2021-01-05 PROCEDURE — 97161 PT EVAL LOW COMPLEX 20 MIN: CPT

## 2021-01-05 PROCEDURE — 3331090002 HH PPS REVENUE DEBIT

## 2021-01-05 PROCEDURE — 3331090001 HH PPS REVENUE CREDIT

## 2021-01-05 PROCEDURE — 99233 SBSQ HOSP IP/OBS HIGH 50: CPT | Performed by: INTERNAL MEDICINE

## 2021-01-05 PROCEDURE — 99232 SBSQ HOSP IP/OBS MODERATE 35: CPT | Performed by: INTERNAL MEDICINE

## 2021-01-05 PROCEDURE — 74011250637 HC RX REV CODE- 250/637: Performed by: INTERNAL MEDICINE

## 2021-01-05 PROCEDURE — 80053 COMPREHEN METABOLIC PANEL: CPT

## 2021-01-05 PROCEDURE — 74011636637 HC RX REV CODE- 636/637: Performed by: INTERNAL MEDICINE

## 2021-01-05 RX ORDER — POTASSIUM CHLORIDE 20 MEQ/1
40 TABLET, EXTENDED RELEASE ORAL
Status: COMPLETED | OUTPATIENT
Start: 2021-01-05 | End: 2021-01-05

## 2021-01-05 RX ORDER — FUROSEMIDE 40 MG/1
40 TABLET ORAL DAILY
Status: DISCONTINUED | OUTPATIENT
Start: 2021-01-06 | End: 2021-01-07 | Stop reason: HOSPADM

## 2021-01-05 RX ADMIN — AMLODIPINE BESYLATE 5 MG: 5 TABLET ORAL at 08:48

## 2021-01-05 RX ADMIN — FUROSEMIDE 40 MG: 10 INJECTION, SOLUTION INTRAMUSCULAR; INTRAVENOUS at 08:48

## 2021-01-05 RX ADMIN — POTASSIUM CHLORIDE 40 MEQ: 20 TABLET, EXTENDED RELEASE ORAL at 12:13

## 2021-01-05 RX ADMIN — CARVEDILOL 6.25 MG: 6.25 TABLET, FILM COATED ORAL at 08:48

## 2021-01-05 RX ADMIN — INSULIN LISPRO 2 UNITS: 100 INJECTION, SOLUTION INTRAVENOUS; SUBCUTANEOUS at 16:59

## 2021-01-05 RX ADMIN — HEPARIN SODIUM 5000 UNITS: 5000 INJECTION INTRAVENOUS; SUBCUTANEOUS at 17:00

## 2021-01-05 RX ADMIN — HEPARIN SODIUM 5000 UNITS: 5000 INJECTION INTRAVENOUS; SUBCUTANEOUS at 05:07

## 2021-01-05 RX ADMIN — Medication 10 ML: at 22:11

## 2021-01-05 RX ADMIN — Medication 10 ML: at 05:08

## 2021-01-05 RX ADMIN — ASPIRIN 81 MG: 81 TABLET, CHEWABLE ORAL at 08:48

## 2021-01-05 NOTE — PROGRESS NOTES
PROGRESS NOTE    NAME:  Kathrine Borden    :   1931   MRN:   [id number]     Date/Time:  2021 5:39 AM  Subjective:   History:  Chart reviewed patient seen and examined and discussed with his nurse this AM and all events noted. He has a history of ASCVD, Cardiomyopathy, SSS S/P pacemaker, DM, HTN, Alzheimer's disease and Malnutrition and is admitted now with CHF despite treatment with diuretics. He had a recent admission with discharge on  when he presented to the time of congestive heart failure after having had his diuretics stopped because of poor p.o. intake and concern for dehydration and renal dysfunction. His diuretics were resumed that time and half his initial dose and he was discharged doing well and seen back in follow-up at which time he was still doing well. Unfortunately he had progressive shortness of breath starting shortly after that office visit and presented to the ER with recurrent congestive heart failure. He has been diuresed with IV Lasix and feels much better now. He denies any chest pain, palpitations, PND, orthopnea or other cardiac or respiratory complaints. He notes no current GI or  complaints. He notes no headaches, dizziness or neurologic complaints except he is generally weak. He does have underlying memory issues with his dementia. There are no other complaints on complete review of systems.       Medications reviewed:  Current Facility-Administered Medications   Medication Dose Route Frequency    sodium chloride (NS) flush 5-40 mL  5-40 mL IntraVENous Q8H    sodium chloride (NS) flush 5-40 mL  5-40 mL IntraVENous PRN    acetaminophen (TYLENOL) tablet 650 mg  650 mg Oral Q6H PRN    Or    acetaminophen (TYLENOL) suppository 650 mg  650 mg Rectal Q6H PRN    polyethylene glycol (MIRALAX) packet 17 g  17 g Oral DAILY PRN    promethazine (PHENERGAN) tablet 12.5 mg  12.5 mg Oral Q6H PRN    Or    ondansetron (ZOFRAN) injection 4 mg  4 mg IntraVENous Q6H PRN    aspirin chewable tablet 81 mg  81 mg Oral DAILY    carvediloL (COREG) tablet 6.25 mg  6.25 mg Oral BID    amLODIPine (NORVASC) tablet 5 mg  5 mg Oral DAILY    heparin (porcine) injection 5,000 Units  5,000 Units SubCUTAneous Q12H    furosemide (LASIX) injection 40 mg  40 mg IntraVENous BID    insulin lispro (HUMALOG) injection   SubCUTAneous AC&HS    glucose chewable tablet 16 g  4 Tab Oral PRN    dextrose (D50W) injection syrg 12.5-25 g  12.5-25 g IntraVENous PRN    glucagon (GLUCAGEN) injection 1 mg  1 mg IntraMUSCular PRN        Objective:   Vitals:  Visit Vitals  /66   Pulse 60   Temp 97.9 °F (36.6 °C)   Resp 15   Ht 5' 9\" (1.753 m)   Wt 140 lb 6.9 oz (63.7 kg)   SpO2 96%   BMI 20.74 kg/m²      O2 Device: Room air Temp (24hrs), Av.6 °F (36.4 °C), Min:97.4 °F (36.3 °C), Max:97.9 °F (36.6 °C)      Last 24hr Input/Output:    Intake/Output Summary (Last 24 hours) at 2021 0539  Last data filed at 2021 5539  Gross per 24 hour   Intake 1200 ml   Output    Net 1200 ml        PHYSICAL EXAM:  General:     Alert, cooperative, no distress, appears stated age. Head:    Normocephalic, without obvious abnormality, atraumatic. Eyes:    Conjunctivae/corneas clear. PERRLA  Nose:   Nares normal. No drainage or sinus tenderness. Throat:     Lips, mucosa, and tongue normal.  No Thrush  Neck:   Supple, symmetrical,  no adenopathy, thyroid: non tender     no carotid bruit and no JVD. Back:     Symmetric,  No CVA tenderness. Lungs:    Clear to auscultation bilaterally. No Wheezing or Rhonchi. No rales. Heart:    Regular rate and rhythm,  no murmur, rub or gallop. Abdomen:    Soft, non-tender. Not distended. Bowel sounds normal. No masses  Extremities:  Extremities normal, atraumatic, No cyanosis. No edema. No clubbing  Lymph nodes:  Cervical, supraclavicular normal.  Neurologic:  Normal strength, Alert and oriented X 3.    Skin:                 No rash      Lab Data Reviewed:    Recent Results (from the past 24 hour(s))   GLUCOSE, POC    Collection Time: 01/04/21  6:42 AM   Result Value Ref Range    Glucose (POC) 68 65 - 100 mg/dL    Performed by Kendal Key    GLUCOSE, POC    Collection Time: 01/04/21  7:03 AM   Result Value Ref Range    Glucose (POC) 85 65 - 100 mg/dL    Performed by Kendal Key    GLUCOSE, POC    Collection Time: 01/04/21 11:28 AM   Result Value Ref Range    Glucose (POC) 120 (H) 65 - 100 mg/dL    Performed by Germán Mariano \"Kehinde\" PCT    GLUCOSE, POC    Collection Time: 01/04/21  4:12 PM   Result Value Ref Range    Glucose (POC) 111 (H) 65 - 100 mg/dL    Performed by Germán Mariano \"Kehinde\" PCT    GLUCOSE, POC    Collection Time: 01/04/21  9:29 PM   Result Value Ref Range    Glucose (POC) 93 65 - 100 mg/dL    Performed by Dimas Murrell (PCT)          Assessment/Plan:     Principal Problem:    CHF (congestive heart failure) ([facility] (1/1/2021)    Active Problems:    ASCVD (arteriosclerotic cardiovascular disease) (7/11/2014)      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)      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, without long-term current use of insulin ([facility] (7/18/2017)      Alzheimer disease ([facility] (7/18/2017)      S/P cardiac pacemaker procedure (5/4/2015)      Overview: 5/4/15 Medtronic dual chamber pacemaker implant            SSS (sick sinus syndrome) ([facility] (6/2/2015)      Cardiomy[facility] ([facility] (11/7/2019)      Severe protein-calorie malnutrition ([facility] (7/21/2020)       ___________________________________________________  PLAN:    1. Continue Lasix 40 IV every 12  2. Follow renal function closely  3. Continue Coreg with cardiomyopathy  4. Resume ACE/ARB as blood pressure tolerates  5. Continue aspirin with ASCVD  6. Norvasc for hypertension  7. PT/OT mobilize  8.   Cardiology consult notes reviewed and agree with recommendations        If need to contact me use hospital  [phone], DO NOT USE PERFECT SERVE    ___________________________________________________    Attending Physician: Sol Rank, MD

## 2021-01-05 NOTE — PROGRESS NOTES
VITOR Plan:                 *Home w/BS-PT&SN              *granddaughter to transport at d/c    3:04pm  [facility] informed CM that pt received a walker in July of 2020. Patient is not eligible for another walker until 2025. With therapy's recommendation and pt's consent, CM will send a referral for a r/w. CM will continue to follow.     Nettie Collins  Ext [phone]

## 2021-01-05 NOTE — PROGRESS NOTES
Physician Progress Note      Monica Blank  [facility] #:                  [id number]  :                       1931  ADMIT DATE:       2021 1:27 PM  [address] [place] DATE:  RESPONDING  PROVIDER #:        Pastor Guerline EVANS          QUERY TEXT:    Dr Claudene Providence:    Patient admitted with Acute on chronic Systolic CHF. Noted documentation of Severe protein-calorie malnutrition on . In order to support the diagnosis of Severe Protein Calorie Malnutrition, please include additional clinical indicators in your documentation. Or please document that the diagnosis has been ruled out. The medical record reflects the following:  Risk Factors: 89yoM w/ Cardiomyopathy, Alzheimer's Dementia, & acute CHF  Clinical Indicators: per nursing & Pharmacy calculated  BMI 20.3 kg/m2, Ht 5' 9\" (1.753 m), 125 lb  Noted BMI of 18.46 kg/m? in current PN. Treatment: no RD consult noted. Thank you,  Bharti Beth RN, CDI    Options provided:  -- Severe protein-calorie malnutrition  present as evidenced by  -- Severe protein-calorie malnutrition was ruled out  -- Other - I will add my own diagnosis  -- Disagree - Not applicable / Not valid  -- Disagree - Clinically unable to determine / Unknown  -- Refer to Clinical Documentation Reviewer    PROVIDER RESPONSE TEXT:    Severe protein-calorie malnutrition is present as evidenced by___Please document evidence.     Query created by: Sharon Pinto on 2021 3:54 PM      Electronically signed by:  Pastor Guerline EVANS 2021 1:23 PM

## 2021-01-05 NOTE — PROGRESS NOTES
Spoke with MD Paz who recommended stopping IV lasix twice daily and transitioning to 40mg PO as patient's potassium level is 3.1 and CO2 is 34.  This nurse informed MD Paz that NP Giovani already discontinued IV lasix and ordered PO Lasix daily.  MD Paz suggested to call MD Frey to let him review the medication changes and make sure he agreed with the change.  Called MD Frey, placed on hold for over 15 minutes.  Will re-attempt to contact at a later time.

## 2021-01-05 NOTE — PROGRESS NOTES
End of Shift Note    Bedside shift change report given to Gagandeep Linares (oncoming nurse) by Peri García (offgoing nurse). Report included the following information SBAR, Kardex, Procedure Summary and MAR    Shift worked:  7a-7p     Shift summary and any significant changes:    Patient up to recliner for most of shift after working with physical therapy this morning. Did very well and stated he thought a rolling walker would be very helpful for him at home once he is discharged. No complaint of pain, tolerating diet well. Charting in disaster mode, uneventful shift. Concerns for physician to address:    Zone phone for oncoming shift:  [id number]       Activity:  Activity Level: Up ad bryson  Number times ambulated in hallways past shift: 1  Number of times OOB to chair past shift: 1    Cardiac:   Cardiac Monitoring: Yes      Cardiac Rhythm: Paced    Access:   Current line(s): PIV     Genitourinary:   Urinary status: voiding    Respiratory:   O2 Device: Room air  Chronic home O2 use?: N/A  Incentive spirometer at bedside: N/A     GI:  Last Bowel Movement Date: (states it has been 3-4 days since last BM)  Current diet:  DIET CARDIAC Regular  Passing flatus: YES  Tolerating current diet: YES  % Diet Eaten: 85 %    Pain Management:   Patient states pain is manageable on current regimen: N/A    Skin:  Newton Score: 22  Interventions: float heels, increase time out of bed, PT/OT consult and limit briefs    Patient Safety:  Fall Score:  Total Score: 1  Interventions: assistive device (walker, cane, etc), gripper socks, pt to call before getting OOB and stay with me (per policy)  High Fall Risk: Yes    Length of Stay:  Expected LOS: 4d 2h  Actual LOS: [address] [facility]

## 2021-01-05 NOTE — PROGRESS NOTES
Orders received, chart reviewed and patient evaluated by physical therapy. Pending progression with skilled acute physical therapy, recommend:  Physical therapy at least 2 days/week in the home w/ 24hr assist of family as well as rolling walker    Recommend with nursing: OOB to chair 3x/day with x1 assist and ambulating with rolling walker. Thank you for your assistance. Full evaluation to follow.

## 2021-01-05 NOTE — PROGRESS NOTES
Problem: Mobility Impaired (Adult and Pediatric)  Goal: *Acute Goals and Plan of Care (Insert Text)  Description: FUNCTIONAL STATUS PRIOR TO ADMISSION: Pt reports independence w/ ambulation and denies history of falls. Recently discharged home from hospital and currently receiving HHPT. Hx of dementia. HOME SUPPORT PRIOR TO ADMISSION: The patient lived with granddaughter who provides 24hr assistance/supervision. Physical Therapy Goals  Initiated 1/5/2021  1. Patient will move from supine to sit and sit to supine , scoot up and down, and roll side to side in bed with independence within 7 day(s). 2.  Patient will transfer from bed to chair and chair to bed with supervision/set-up using the least restrictive device within 7 day(s). 3.  Patient will perform sit to stand with supervision/set-up within 7 day(s). 4.  Patient will ambulate with supervision/set-up for 150 feet with the least restrictive device within 7 day(s). Outcome: Progressing Towards Goal   PHYSICAL THERAPY EVALUATION  Patient: Nory Farfan Sr (80 y.o. male)  Date: 1/5/2021  Primary Diagnosis: CHF (congestive heart failure) ([facility]) [I50.9]        Precautions:        ASSESSMENT  Based on the objective data described below, the patient presents with baseline dementia, BLE weakness, impaired balance, decreased endurance/activity tolerance, and overall impaired functional mobility. Pt with impaired activity tolerance, fatiguing quickly during ambulation trials and requiring seated rest breaks b/t activity. Initial ambulation trial occurred without assistive device with pt requiring CGAx1 secondary to minor path deviations and minimal step height bilaterally (shuffled gait). Following seated rest break, pt ambulated an additional 80ft w/ RW and CGAx1, demonstrating significant improvement in gait stability. Pt also reporting feeling more secure with use of RW.  Given baseline dementia, recommend pt return home to familiar home environment with resumption of HHPT and continued use of RW. Current Level of Function Impacting Discharge (mobility/balance): CGAx1 with use of RW    Functional Outcome Measure: The patient scored 75/100 on the Barthel Index outcome measure which is indicative of mild impairment in ADLs and functional mobility. Other factors to consider for discharge: baseline dementia     Patient will benefit from skilled therapy intervention to address the above noted impairments. PLAN :  Recommendations and Planned Interventions: bed mobility training, transfer training, gait training, therapeutic exercises, patient and family training/education, and therapeutic activities      Frequency/Duration: Patient will be followed by physical therapy:  4 times a week to address goals. Recommendation for discharge: (in order for the patient to meet his/her long term goals)  Physical therapy at least 2 days/week in the home w/ 24hr assist from family    This discharge recommendation:  Has been made in collaboration with the attending provider and/or case management    IF patient discharges home will need the following DME: rolling walker         SUBJECTIVE:   Patient stated My legs feel weak.     OBJECTIVE DATA SUMMARY:   HISTORY:    Past Medical History:   Diagnosis Date    Abdominal pain 7/18/2017    [facility] 7/18/2017    Anemia 7/18/2017    Arthritis     Back pain 7/18/2017    Bradycardia 7/18/2017    CAD (coronary artery disease)     hx of MI    CKD (chronic kidney disease), stage II 7/18/2017    constipation     Depression 7/18/2017    Diabetes mellitus ([facility] 7/18/2017    Diverticulosis 7/18/2017    DJD (degenerative joint disease) 7/18/2017    Encounter for long-term (current) use of other medications 7/18/2017    Fatigue     Gastritis and duodenitis 7/18/2017    GERD (gastroesophageal reflux disease)     GI bleed 7/18/2017    Hearing loss     Hyperlipidemia 7/18/2017    Hypertension Hypertension with renal disease 7/18/2017    Ill-defined condition     Dementia    Internal hemorrhoids 7/18/2017    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    S/P cardiac pacemaker procedure 5/4/2015    5/4/15 Medtronic dual chamber pacemaker implant     Weight loss     lost over 40 pounds last year- has no appetite     Past Surgical History:   Procedure Laterality Date    COLONOSCOPY N/A 6/22/2017    COLONOSCOPY performed by Leslie Lawrence MD at [address] [place] [address]  6/22/2017         HX HERNIA REPAIR  7/10/2014    right inguinal    HX OTHER SURGICAL      cystectomy from back    NV EGD TRANSORAL BIOPSY SINGLE/MULTIPLE  2/14/2012         NV UPPER GI ENDOSCOPY,W/DIR SUBMUC INJ  7/23/2020         UPPER GI ENDOSCOPY,BIOPSY  6/22/2017         UPPER GI ENDOSCOPY,BIOPSY  7/23/2020            Personal factors and/or comorbidities impacting plan of care: dementia    Home Situation  Home Environment: Private residence  One/Two Story Residence: One story  Living Alone: No  Support Systems: Family member(s)  Patient Expects to be Discharged to[de-identified] Private residence  Current DME Used/Available at Home: None    EXAMINATION/PRESENTATION/DECISION MAKING:   Critical Behavior:  Neurologic State: Alert, Eyes open spontaneously  Orientation Level: Oriented to person, Oriented to place, Oriented to situation  Cognition: Follows commands     Hearing:   Auditory  Auditory Impairment: Hard of hearing, bilateral  Skin:  intact  Edema: none noted   Range Of Motion:  AROM: Within functional limits                       Strength:    Strength: Generally decreased, functional                    Tone & Sensation:   Tone: Normal              Sensation: Intact               Coordination:  Coordination: Within functional limits  Vision:      Functional Mobility:  Bed Mobility:  Rolling: Independent  Supine to Sit: Independent     Scooting: Independent  Transfers:  Sit to Stand: Contact guard assistance  Stand to Sit: Contact guard assistance        Bed to Chair: Contact guard assistance              Balance:   Sitting: Intact  Standing: Impaired  Standing - Static: Good  Standing - Dynamic : Fair  Ambulation/Gait Training:  Distance (ft): 200 Feet (ft)(120ft w/o device, 80ft w/ RW support)  Assistive Device: Gait belt;Walker, rolling(120ft w/o device, 80ft w/ RW support)  Ambulation - Level of Assistance: Contact guard assistance        Gait Abnormalities: Decreased step clearance; Path deviations        Base of Support: Narrowed     Speed/Maureen: Pace decreased (<100 feet/min)  Step Length: Left shortened;Right shortened                Functional Measure:  Barthel Index:    Bathin  Bladder: 10  Bowels: 10  Groomin  Dressing: 10  Feeding: 10  Mobility: 10  Stairs: 0  Toilet Use: 10  Transfer (Bed to Chair and Back): 10  Total: 75/100       The Barthel ADL Index: Guidelines  1. The index should be used as a record of what a patient does, not as a record of what a patient could do. 2. The main aim is to establish degree of independence from any help, physical or verbal, however minor and for whatever reason. 3. The need for supervision renders the patient not independent. 4. A patient's performance should be established using the best available evidence. Asking the patient, friends/relatives and nurses are the usual sources, but direct observation and common sense are also important. However direct testing is not needed. 5. Usually the patient's performance over the preceding 24-48 hours is important, but occasionally longer periods will be relevant. 6. Middle categories imply that the patient supplies over 50 per cent of the effort. 7. Use of aids to be independent is allowed. Bertina Aschoff., Barthel, D.W. (1444). Functional evaluation: the Barthel Index. [address] [facility] (14)2. LINDY Hall, Kaity San.Aminta Killen, [address] [place] [address] Ave ().  Measuring the change indisability after inpatient rehabilitation; comparison of the responsiveness of the Barthel Index and Functional [place] Measure. Journal of Neurology, Neurosurgery, and Psychiatry, 66(4), 368-572. GABRIELLE Smith.KATERINA, INES Albright, & Nazario Stewart M.A. (2004.) Assessment of post-stroke quality of life in cost-effectiveness studies: The usefulness of the Barthel Index and the EuroQoL-5D. Quality of Life Research, 15, 277-52           Physical Therapy Evaluation Charge Determination   History Examination Presentation Decision-Making   MEDIUM  Complexity : 1-2 comorbidities / personal factors will impact the outcome/ POC  MEDIUM Complexity : 3 Standardized tests and measures addressing body structure, function, activity limitation and / or participation in recreation  MEDIUM Complexity : Evolving with changing characteristics  MEDIUM Complexity : FOTO score of 26-74      Based on the above components, the patient evaluation is determined to be of the following complexity level: MEDIUM    Pain Rating:  Denied complaints of pain    Activity Tolerance:   Good    After treatment patient left in no apparent distress:   Sitting in chair and Call bell within reach    COMMUNICATION/EDUCATION:   The patients plan of care was discussed with: Registered nurse and Case management. Fall prevention education was provided and the patient/caregiver indicated understanding., Patient/family have participated as able in goal setting and plan of care. , and Patient/family agree to work toward stated goals and plan of care.     Thank you for this referral.  Milissa Leyden, PT, DPT   Time Calculation: 23 mins

## 2021-01-05 NOTE — HOME CARE
Please note that this patient was open to [facility] - INPATIENT services at the time of hospital admission. If services will be needed at discharge, please order to resume them.  Thank you, Colonel Brayden GARCIA, [phone]

## 2021-01-05 NOTE — PROGRESS NOTES
Problem: Falls - Risk of  Goal: *Absence of Falls  Description: Document Jared Lux Fall Risk and appropriate interventions in the flowsheet.   Outcome: Progressing Towards Goal  Note: Fall Risk Interventions:  Mobility Interventions: Communicate number of staff needed for ambulation/transfer, Patient to call before getting OOB    Mentation Interventions: Door open when patient unattended, Room close to nurse's station    Medication Interventions: Teach patient to arise slowly    Elimination Interventions: Call light in reach, Patient to call for help with toileting needs              Problem: Pain  Goal: *Control of Pain  Outcome: Progressing Towards Goal

## 2021-01-05 NOTE — PROGRESS NOTES
.End of Shift Note    Bedside shift change report given to [facility], RN (oncoming nurse) by Lynn Guerrero (offgoing nurse). Report included the following information SBAR, Kardex, MAR and Recent Results    Shift worked:  7p-7a     Shift summary and any significant changes:     Vital signs stable and no c/o pain throughout shift. Patient was able to tolerate meds and procedures and rested well. Concerns for physician to address:  Patient stated he has not had a BM in a few days and his bowel sounds are hypoactive    Zone phone for oncoming shift:   [phone]       Activity:  Activity Level: Up ad bryson  Number times ambulated in hallways past shift: 0  Number of times OOB to chair past shift:0  Cardiac:   Cardiac Monitoring: yes  Cardiac Rhythm: Paced    Access:   Current line(s): PIV     Genitourinary:   Urinary status: voiding    Respiratory:   O2 Device: Room air  Chronic home O2 use?: N/A  Incentive spirometer at bedside: N/A     GI:  Last Bowel Movement Date: (patient stated it has been a few days )  Current diet:  DIET CARDIAC Regular  Passing flatus: NO  Tolerating current diet: YES  % Diet Eaten: 100 %    Pain Management:   Patient states pain is manageable on current regimen: N/A    Skin:  Newton Score: 22  Interventions: increase time out of bed    Patient Safety:  Fall Score:  Total Score: 1  Interventions: gripper socks  High Fall Risk: Yes    Length of Stay:  Expected LOS: 4d 2h  Actual LOS: Tyree Benavidez

## 2021-01-05 NOTE — PROGRESS NOTES
1/5/2021 11:01 AM    Admit Date: 1/1/2021    Admit Diagnosis:   CHF (congestive heart failure) ([facility] [I50.9]    Subjective:     Benito Farfan Sr denies chest pain or shortness of breath. He is complaining of some mild lightheadedness with standing, BP stable 105/74. Ready to ambulate with PT.   VSS, labs steady   I/O incomplete, weight up 1 kg? Weight source not documented. Current Facility-Administered Medications   Medication Dose Route Frequency    [START ON 1/6/2021] furosemide (LASIX) tablet 40 mg  40 mg Oral DAILY    sodium chloride (NS) flush 5-40 mL  5-40 mL IntraVENous Q8H    sodium chloride (NS) flush 5-40 mL  5-40 mL IntraVENous PRN    acetaminophen (TYLENOL) tablet 650 mg  650 mg Oral Q6H PRN    Or    acetaminophen (TYLENOL) suppository 650 mg  650 mg Rectal Q6H PRN    polyethylene glycol (MIRALAX) packet 17 g  17 g Oral DAILY PRN    promethazine (PHENERGAN) tablet 12.5 mg  12.5 mg Oral Q6H PRN    Or    ondansetron (ZOFRAN) injection 4 mg  4 mg IntraVENous Q6H PRN    aspirin chewable tablet 81 mg  81 mg Oral DAILY    carvediloL (COREG) tablet 6.25 mg  6.25 mg Oral BID    amLODIPine (NORVASC) tablet 5 mg  5 mg Oral DAILY    heparin (porcine) injection 5,000 Units  5,000 Units SubCUTAneous Q12H    insulin lispro (HUMALOG) injection   SubCUTAneous AC&HS    glucose chewable tablet 16 g  4 Tab Oral PRN    dextrose (D50W) injection syrg 12.5-25 g  12.5-25 g IntraVENous PRN    glucagon (GLUCAGEN) injection 1 mg  1 mg IntraMUSCular PRN         Objective:      Physical Exam:    Visit Vitals  BP (!) 96/52 (BP 1 Location: Right arm, BP Patient Position: At rest)   Pulse 66   Temp 97.9 °F (36.6 °C)   Resp 16   Ht 5' 9\" (1.753 m)   Wt 140 lb 6.9 oz (63.7 kg)   SpO2 99%   BMI 20.74 kg/m²     Gen:  NAD  Mental Status - Alert. General Appearance - Not in acute distress. Chest and Lung Exam   Inspection: Accessory muscles - No use of accessory muscles in breathing.    Auscultation: Breath sounds: - Normal.   Cardiovascular   Inspection: Jugular vein - Bilateral - Inspection Normal.   Palpation/Percussion:   Apical Impulse: - Normal.   Auscultation: Rhythm - Regular. Heart Sounds - S1 WNL and S2 WNL. No S3 or S4. Murmurs & Other Heart Sounds: Auscultation of the heart reveals - No Murmurs. Peripheral Vascular   Upper Extremity: Inspection - Bilateral - No Cyanotic nailbeds or Digital clubbing. Lower Extremity:   Palpation: Edema - Bilateral - No edema. Abdomen:   Soft, non-tender, bowel sounds are active. Neuro: A&O times 3, CN and motor grossly WNL    Data Review:   No results for input(s): WBC, HGB, HCT, PLT, HGBEXT, HCTEXT, PLTEXT, HGBEXT, HCTEXT, PLTEXT in the last 72 hours. Recent Labs     01/05/21  0309 01/03/21  0351    139   K 3.1* 4.3    107   CO2 34* 24   GLU 70 62*   BUN 40* 39*   CREA 1.65* 1.66*   CA 8.7 7.9*   ALB 2.9* 2.5*   TBILI 0.8 0.7   ALT 30 25       No results for input(s): TROIQ, CPK, CKMB in the last 72 hours. Intake/Output Summary (Last 24 hours) at 1/5/2021 1918  Last data filed at 1/5/2021 1810  Gross per 24 hour   Intake 1250 ml   Output    Net 1250 ml        Telemetry: paced, freq. PVC's      Assessment:     Principal Problem:    CHF (congestive heart failure) ([facility] (1/1/2021)    Active Problems:    ASCVD (arteriosclerotic cardiovascular disease) (7/11/2014)      S/P cardiac pacemaker procedure (5/4/2015)      Overview: 5/4/15 Medtronic dual chamber pacemaker implant            SSS (sick sinus syndrome) ([facility] (6/2/2015)      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)      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, without long-term current use of insulin ([address] (7/18/2017)      Alzheimer disease ([facility] (7/18/2017)      Cardiomyopathy ([address] (11/7/2019)      Severe protein-calorie malnutrition ([facility] (7/21/2020)        Plan:   CHF- acute on chronic systolic, LVEF 98-89%. Appears  to be euvolemic.  Continue diuresis, coreg, no ACEI/ ARB/ Entresto due to renal dysfunction. Watch renal function. On admission, he was reportedly on Lasix 20 mg p.o. daily. At discharge he will need at least 40 mg p.o. daily, with close monitoring of weights, follow-up with me within 1 to 2 weeks (will schedule)  Repeat CXray improved, showed trace pleural effusion. Consider D/C of IV lasix and resume home PO dose starting tomorrow in preparation for discharge. ? Contraction alkalosis. Continue ASA     Hypertensioncontrolled  Continue coreg, norvasc    Sick sinus syndrome status post pacemakernormally functioning pacemaker by telemetry    Hypokalemic: K 3.1  -Replace K this am, closely monitor with diuresis  -Reduce diuretic dose to 40 mg PO daily if ok with attending     Continue with conservative management considering advanced age and dementia. Mt MCBRIDE,YQ  MAT,RN,AG-ACNP-BC     Patient seen and examined by me with the above nurse practitioner. I personally performed all components of the history, physical, and medical decision making and agree with the assessment and plan with minor modifications as noted. Appears to be getting dry- decrease lasix if OK with Dr. Ronit Aguila. Likely go from 20 to 40 lasix at DC. Thanks for the consult. I will sign off for now. Please call if any questions or concerns. F/u with me 1-2 weeks after DC- added to DC instrucitons.

## 2021-01-06 LAB
ALBUMIN SERPL-MCNC: 2.9 G/DL (ref 3.5–5)
ALBUMIN/GLOB SERPL: 1 {RATIO} (ref 1.1–2.2)
ALP SERPL-CCNC: 65 U/L (ref 45–117)
ALT SERPL-CCNC: 29 U/L (ref 12–78)
ANION GAP SERPL CALC-SCNC: 6 MMOL/L (ref 5–15)
AST SERPL-CCNC: 23 U/L (ref 15–37)
BILIRUB SERPL-MCNC: 1 MG/DL (ref 0.2–1)
BUN SERPL-MCNC: 36 MG/DL (ref 6–20)
BUN/CREAT SERPL: 21 (ref 12–20)
CALCIUM SERPL-MCNC: 8.8 MG/DL (ref 8.5–10.1)
CHLORIDE SERPL-SCNC: 102 MMOL/L (ref 97–108)
CO2 SERPL-SCNC: 32 MMOL/L (ref 21–32)
CREAT SERPL-MCNC: 1.69 MG/DL (ref 0.7–1.3)
GLOBULIN SER CALC-MCNC: 3 G/DL (ref 2–4)
GLUCOSE BLD STRIP.AUTO-MCNC: 117 MG/DL (ref 65–100)
GLUCOSE BLD STRIP.AUTO-MCNC: 140 MG/DL (ref 65–100)
GLUCOSE BLD STRIP.AUTO-MCNC: 51 MG/DL (ref 65–100)
GLUCOSE BLD STRIP.AUTO-MCNC: 57 MG/DL (ref 65–100)
GLUCOSE BLD STRIP.AUTO-MCNC: 63 MG/DL (ref 65–100)
GLUCOSE BLD STRIP.AUTO-MCNC: 73 MG/DL (ref 65–100)
GLUCOSE BLD STRIP.AUTO-MCNC: 84 MG/DL (ref 65–100)
GLUCOSE SERPL-MCNC: 70 MG/DL (ref 65–100)
POTASSIUM SERPL-SCNC: 3.5 MMOL/L (ref 3.5–5.1)
PROT SERPL-MCNC: 5.9 G/DL (ref 6.4–8.2)
SERVICE CMNT-IMP: ABNORMAL
SERVICE CMNT-IMP: NORMAL
SERVICE CMNT-IMP: NORMAL
SODIUM SERPL-SCNC: 140 MMOL/L (ref 136–145)

## 2021-01-06 PROCEDURE — 94760 N-INVAS EAR/PLS OXIMETRY 1: CPT

## 2021-01-06 PROCEDURE — 82962 GLUCOSE BLOOD TEST: CPT

## 2021-01-06 PROCEDURE — 80053 COMPREHEN METABOLIC PANEL: CPT

## 2021-01-06 PROCEDURE — 74011250637 HC RX REV CODE- 250/637: Performed by: NURSE PRACTITIONER

## 2021-01-06 PROCEDURE — 97116 GAIT TRAINING THERAPY: CPT

## 2021-01-06 PROCEDURE — 74011250636 HC RX REV CODE- 250/636: Performed by: INTERNAL MEDICINE

## 2021-01-06 PROCEDURE — 74011250637 HC RX REV CODE- 250/637: Performed by: INTERNAL MEDICINE

## 2021-01-06 PROCEDURE — 99232 SBSQ HOSP IP/OBS MODERATE 35: CPT | Performed by: INTERNAL MEDICINE

## 2021-01-06 PROCEDURE — 3331090001 HH PPS REVENUE CREDIT

## 2021-01-06 PROCEDURE — 65660000000 HC RM CCU STEPDOWN

## 2021-01-06 PROCEDURE — 74011636637 HC RX REV CODE- 636/637: Performed by: INTERNAL MEDICINE

## 2021-01-06 PROCEDURE — 36415 COLL VENOUS BLD VENIPUNCTURE: CPT

## 2021-01-06 PROCEDURE — 3331090002 HH PPS REVENUE DEBIT

## 2021-01-06 PROCEDURE — 97110 THERAPEUTIC EXERCISES: CPT

## 2021-01-06 RX ORDER — POTASSIUM CHLORIDE 20 MEQ/1
20 TABLET, EXTENDED RELEASE ORAL 2 TIMES DAILY
Status: DISCONTINUED | OUTPATIENT
Start: 2021-01-06 | End: 2021-01-07 | Stop reason: HOSPADM

## 2021-01-06 RX ORDER — DONEPEZIL HYDROCHLORIDE 5 MG/1
5 TABLET, FILM COATED ORAL DAILY
Status: DISCONTINUED | OUTPATIENT
Start: 2021-01-07 | End: 2021-01-07 | Stop reason: HOSPADM

## 2021-01-06 RX ORDER — LISINOPRIL 5 MG/1
5 TABLET ORAL DAILY
Status: DISCONTINUED | OUTPATIENT
Start: 2021-01-06 | End: 2021-01-07 | Stop reason: HOSPADM

## 2021-01-06 RX ORDER — MEMANTINE HYDROCHLORIDE 10 MG/1
10 TABLET ORAL 2 TIMES DAILY
Status: DISCONTINUED | OUTPATIENT
Start: 2021-01-06 | End: 2021-01-07 | Stop reason: HOSPADM

## 2021-01-06 RX ADMIN — Medication 10 ML: at 21:27

## 2021-01-06 RX ADMIN — LISINOPRIL 5 MG: 5 TABLET ORAL at 10:22

## 2021-01-06 RX ADMIN — HEPARIN SODIUM 5000 UNITS: 5000 INJECTION INTRAVENOUS; SUBCUTANEOUS at 06:11

## 2021-01-06 RX ADMIN — POTASSIUM CHLORIDE 20 MEQ: 20 TABLET, EXTENDED RELEASE ORAL at 10:22

## 2021-01-06 RX ADMIN — CARVEDILOL 6.25 MG: 6.25 TABLET, FILM COATED ORAL at 10:22

## 2021-01-06 RX ADMIN — ASPIRIN 81 MG: 81 TABLET, CHEWABLE ORAL at 10:22

## 2021-01-06 RX ADMIN — FUROSEMIDE 40 MG: 40 TABLET ORAL at 10:22

## 2021-01-06 RX ADMIN — HEPARIN SODIUM 5000 UNITS: 5000 INJECTION INTRAVENOUS; SUBCUTANEOUS at 17:59

## 2021-01-06 RX ADMIN — INSULIN LISPRO 2 UNITS: 100 INJECTION, SOLUTION INTRAVENOUS; SUBCUTANEOUS at 11:53

## 2021-01-06 RX ADMIN — Medication 10 ML: at 06:11

## 2021-01-06 RX ADMIN — MEMANTINE HYDROCHLORIDE 10 MG: 10 TABLET ORAL at 17:59

## 2021-01-06 RX ADMIN — POTASSIUM CHLORIDE 20 MEQ: 20 TABLET, EXTENDED RELEASE ORAL at 17:59

## 2021-01-06 NOTE — PROGRESS NOTES
Problem: Mobility Impaired (Adult and Pediatric)  Goal: *Acute Goals and Plan of Care (Insert Text)  Description: FUNCTIONAL STATUS PRIOR TO ADMISSION: Pt reports independence w/ ambulation and denies history of falls. Recently discharged home from hospital and currently receiving HHPT. Hx of dementia. HOME SUPPORT PRIOR TO ADMISSION: The patient lived with granddaughter who provides 24hr assistance/supervision. Physical Therapy Goals  Initiated 1/5/2021  1. Patient will move from supine to sit and sit to supine , scoot up and down, and roll side to side in bed with independence within 7 day(s). 2.  Patient will transfer from bed to chair and chair to bed with supervision/set-up using the least restrictive device within 7 day(s). 3.  Patient will perform sit to stand with supervision/set-up within 7 day(s). 4.  Patient will ambulate with supervision/set-up for 150 feet with the least restrictive device within 7 day(s). Outcome: Progressing Towards Goal   PHYSICAL THERAPY TREATMENT  Patient: Nai Farfan Sr (80 y.o. male)  Date: 1/6/2021  Diagnosis: CHF (congestive heart failure) ([facility]) [I50.9] CHF (congestive heart failure) ([facility] [address]       Precautions:    Chart, physical therapy assessment, plan of care and goals were reviewed. ASSESSMENT  Patient continues with skilled PT services and is progressing towards goals, demonstrating improved activity tolerance, improved BLE strength, and improved gait stability. Pt performed x2 ambulation trials of 90ft w/ RW and SBAx1. Gait speed decreased however steady overall with only minor path deviations noted. Pt with very narrowed MATTHEW during ambulation, placing pt at increased risk for scissoring BLEs/falling - VC to correct. Pt denied c/o dizziness with BP stable throughout however HR noted to be 42-47 bpm during therapy session. Pt remains appropriate to return home w/ HHPT, 24hr assist of family, and continued RW support.      Current Level of Function Impacting Discharge (mobility/balance): independent for bed mobility, SBA for sit<>stand transfer, SB/CGAx1 with use of RW during ambulation    Other factors to consider for discharge: baseline dementia         PLAN :  Patient continues to benefit from skilled intervention to address the above impairments. Continue treatment per established plan of care. to address goals. Recommendation for discharge: (in order for the patient to meet his/her long term goals)  Physical therapy at least 2 days/week in the home w/ continued 24hr assist of family    This discharge recommendation:  Has been made in collaboration with the attending provider and/or case management    IF patient discharges home will need the following DME: rolling walker       SUBJECTIVE:   Patient stated I'm feeling pretty good today.     OBJECTIVE DATA SUMMARY:   Critical Behavior:  Neurologic State: Alert, Eyes open spontaneously  Orientation Level: Oriented to person, Oriented to place, Oriented to situation  Cognition: Follows commands     Functional Mobility Training:  Bed Mobility:  Rolling: Independent  Supine to Sit: Independent     Scooting: Independent        Transfers:  Sit to Stand: Stand-by assistance  Stand to Sit: Stand-by assistance        Bed to Chair: Stand-by assistance;Contact guard assistance                    Balance:  Sitting: Intact  Standing: Impaired; With support(RW)  Standing - Static: Good;Constant support  Standing - Dynamic : Fair;Constant support  Ambulation/Gait Training:  Distance (ft): 180 Feet (ft)(90ft x2 w seated rest break bt)  Assistive Device: Walker, rolling;Gait belt  Ambulation - Level of Assistance: Stand-by assistance;Contact guard assistance        Gait Abnormalities: Decreased step clearance; Path deviations        Base of Support: Narrowed     Speed/Maureen: Pace decreased (<100 feet/min)  Step Length: Left shortened;Right shortened                             Therapeutic Exercises:   5x sit<>stand from bedside chair w/ SBA  2x 15 standing marches w/ RW support and SBA  2x 10 LAQ  Ankle pumps    Pain Rating:  Denied complaints of pain    Activity Tolerance:   Good    After treatment patient left in no apparent distress:   Sitting in chair and Call bell within reach    COMMUNICATION/COLLABORATION:   The patients plan of care was discussed with: Registered nurse and Case management.      Corine Small, PT, DPT   Time Calculation: 23 mins

## 2021-01-06 NOTE — CARDIO/PULMONARY
Cardiac Rehab Note: chart review CHF BUNDLE Per cardiology note on 1/5/21: 
\"CHF- acute on chronic systolic, LVEF 56-35%. Appears  to be euvolemic. Continue diuresis, coreg, no ACEI/ ARB/ Entresto due to renal dysfunction. Watch renal function. On admission, he was reportedly on Lasix 20 mg p.o. daily. At discharge he will need at least 40 mg p.o. daily, with close monitoring of weights, follow-up with me within 1 to 2 weeks (will schedule) Repeat CXray improved, showed trace pleural effusion. Consider D/C of IV lasix and resume home PO dose starting tomorrow in preparation for discharge. ? Contraction alkalosis. Continue ASA  
  
Hypertensioncontrolled Continue coreg, norvasc 
  
Sick sinus syndrome status post pacemakernormally functioning pacemaker by telemetry 
  
Hypokalemic: K 3.1 
-Replace K this am, closely monitor with diuresis 
-Reduce diuretic dose to 40 mg PO daily if ok with attending  
  
Continue with conservative management considering advanced age and dementia\" CP rehab will continue to monitor.

## 2021-01-06 NOTE — PROGRESS NOTES
End of Shift Note    Bedside shift change report given to [address] (oncoming nurse) by Nuvia Zapata (offgoing nurse). Report included the following information SBAR, Kardex, Intake/Output, MAR and Recent Results    Shift worked:  7p-7a     Shift summary and any significant changes:     Pt slept through the night. AM labs collected per order. Uneventful night. Concerns for physician to address:  None     Zone phone for oncoming shift:   [id number]       Activity:  Activity Level: Up ad bryson  Number times ambulated in hallways past shift: 0  Number of times OOB to chair past shift: 0    Cardiac:   Cardiac Monitoring: No      Cardiac Rhythm: Paced    Access:   Current line(s): PIV     Genitourinary:   Urinary status: voiding    Respiratory:   O2 Device: Room air  Chronic home O2 use?: NO  Incentive spirometer at bedside: NO     GI:  Last Bowel Movement Date: (states it has been 3-4 days since last BM)  Current diet:  DIET CARDIAC Regular  Passing flatus: YES  Tolerating current diet: YES  % Diet Eaten: 80 %    Pain Management:   Patient states pain is manageable on current regimen: YES    Skin:  Newton Score: 21  Interventions: increase time out of bed    Patient Safety:  Fall Score:  Total Score: 1  Interventions: gripper socks  High Fall Risk: Yes    Length of Stay:  Expected LOS: 4d 2h  Actual LOS: [facility]

## 2021-01-06 NOTE — PROGRESS NOTES
PROGRESS NOTE    NAME:  Hunter Ochoa Sr   :   1931   MRN:   [id number]     Date/Time:  2021 5:37 AM  Subjective:   History:  Chart reviewed patient seen and examined and discussed with his nurse this AM and all events noted. He has a history of ASCVD, Cardiomyopathy, SSS S/P pacemaker, DM, HTN, Alzheimer's disease and Malnutrition and is admitted now with CHF despite treatment with diuretics. He had a recent admission with discharge on  when he presented to the time of congestive heart failure after having had his diuretics stopped because of poor p.o. intake and concern for dehydration and renal dysfunction. His diuretics were resumed that time and half his initial dose and he was discharged doing well and seen back in follow-up at which time he was still doing well. Unfortunately he had progressive shortness of breath starting shortly after that office visit and presented to the ER with recurrent congestive heart failure. He has been diuresed with IV Lasix and feels much better now. He denies any chest pain, palpitations, PND, orthopnea or other cardiac or respiratory complaints. He notes no current GI or  complaints. He notes no headaches, dizziness or neurologic complaints except he is generally weak. He does have underlying memory issues with his dementia. There are no other complaints on complete review of systems.       Medications reviewed:  Current Facility-Administered Medications   Medication Dose Route Frequency    furosemide (LASIX) tablet 40 mg  40 mg Oral DAILY    sodium chloride (NS) flush 5-40 mL  5-40 mL IntraVENous Q8H    sodium chloride (NS) flush 5-40 mL  5-40 mL IntraVENous PRN    acetaminophen (TYLENOL) tablet 650 mg  650 mg Oral Q6H PRN    Or    acetaminophen (TYLENOL) suppository 650 mg  650 mg Rectal Q6H PRN    polyethylene glycol (MIRALAX) packet 17 g  17 g Oral DAILY PRN    promethazine (PHENERGAN) tablet 12.5 mg  12.5 mg Oral Q6H PRN    Or    ondansetron (ZOFRAN) injection 4 mg  4 mg IntraVENous Q6H PRN    aspirin chewable tablet 81 mg  81 mg Oral DAILY    carvediloL (COREG) tablet 6.25 mg  6.25 mg Oral BID    amLODIPine (NORVASC) tablet 5 mg  5 mg Oral DAILY    heparin (porcine) injection 5,000 Units  5,000 Units SubCUTAneous Q12H    insulin lispro (HUMALOG) injection   SubCUTAneous AC&HS    glucose chewable tablet 16 g  4 Tab Oral PRN    dextrose (D50W) injection syrg 12.5-25 g  12.5-25 g IntraVENous PRN    glucagon (GLUCAGEN) injection 1 mg  1 mg IntraMUSCular PRN        Objective:   Vitals:  Visit Vitals  BP 99/60 (BP 1 Location: Right arm, BP Patient Position: At rest)   Pulse 60   Temp 96.8 °F (36 °C)   Resp 16   Ht 5' 9\" (1.753 m)   Wt 140 lb 6.9 oz (63.7 kg)   SpO2 98%   BMI 20.74 kg/m²      O2 Device: Room air Temp (24hrs), Av.9 °F (36.6 °C), Min:96.8 °F (36 °C), Max:98.6 °F (37 °C)      Last 24hr Input/Output:    Intake/Output Summary (Last 24 hours) at 2021 0537  Last data filed at 2021 1810  Gross per 24 hour   Intake 550 ml   Output    Net 550 ml        PHYSICAL EXAM:  General:     Alert, cooperative, no distress, appears stated age. Head:    Normocephalic, without obvious abnormality, atraumatic. Eyes:    Conjunctivae/corneas clear. PERRLA  Nose:   Nares normal. No drainage or sinus tenderness. Throat:     Lips, mucosa, and tongue normal.  No Thrush  Neck:   Supple, symmetrical,  no adenopathy, thyroid: non tender     no carotid bruit and no JVD. Back:     Symmetric,  No CVA tenderness. Lungs:    Clear to auscultation bilaterally. No Wheezing or Rhonchi. No rales. Heart:    Regular rate and rhythm,  no murmur, rub or gallop. Abdomen:    Soft, non-tender. Not distended. Bowel sounds normal. No masses  Extremities:  Extremities normal, atraumatic, No cyanosis. No edema. No clubbing  Lymph nodes:  Cervical, supraclavicular normal.  Neurologic:  Normal strength, Alert and oriented X 3.    Skin: No rash      Lab Data Reviewed:    Recent Results (from the past 24 hour(s))   GLUCOSE, POC    Collection Time: 01/05/21  6:56 AM   Result Value Ref Range    Glucose (POC) 75 65 - 100 mg/dL    Performed by Sukh Myers (PCT)    GLUCOSE, POC    Collection Time: 01/05/21 12:08 PM   Result Value Ref Range    Glucose (POC) 96 65 - 100 mg/dL    Performed by Ulises Bell (PCT)    GLUCOSE, POC    Collection Time: 01/05/21  4:21 PM   Result Value Ref Range    Glucose (POC) 143 (H) 65 - 100 mg/dL    Performed by Krishan Perez RN    GLUCOSE, POC    Collection Time: 01/05/21  9:29 PM   Result Value Ref Range    Glucose (POC) 87 65 - 100 mg/dL    Performed by Ina Bosch          Assessment/Plan:     Principal Problem:    CHF (congestive heart failure) ([address] (1/1/2021)    Active Problems:    ASCVD (arteriosclerotic cardiovascular disease) (7/11/2014)      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)      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, without long-term current use of insulin ([facility] [address] (7/18/2017)      Alzheimer disease ([facility] [address] (7/18/2017)      S/P cardiac pacemaker procedure (5/4/2015)      Overview: 5/4/15 [employer] dual chamber pacemaker implant            SSS (sick sinus syndrome) ([facility] [address] (6/2/2015)      Cardiomyopathy ([facility] [address] (11/7/2019)      Severe protein-calorie malnutrition ([facility] [address] (7/21/2020)       ___________________________________________________  PLAN:    1. Continue Lasix 40 PO daily  2. Follow renal function closely, 36/1.69 this AM from 40/1.65 yesterday  3. Continue Coreg with cardiomyopathy  4. D/C Norvasc for HTN so can resume low dose Lisinopril with CM  5. Continue aspirin with ASCVD  6. Change Norvasc for hypertension to Lisinopril  7.  K up to 3.5 today from 3.1, Daily PO supplement and follow K  8. PT/OT mobilize  9. Cardiology consult notes reviewed and agree with recommendations  10.  Home tomorrow or Friday        If need to contact me use hospital  [phone], DO NOT USE PERFECT SERVE    ___________________________________________________    Attending Physician: Jewel Lefort, MD

## 2021-01-07 VITALS
DIASTOLIC BLOOD PRESSURE: 70 MMHG | WEIGHT: 140.43 LBS | HEIGHT: 69 IN | HEART RATE: 70 BPM | SYSTOLIC BLOOD PRESSURE: 116 MMHG | TEMPERATURE: 97.4 F | BODY MASS INDEX: 20.8 KG/M2 | OXYGEN SATURATION: 100 % | RESPIRATION RATE: 20 BRPM

## 2021-01-07 PROBLEM — F51.5 BAD DREAMS: Status: RESOLVED | Noted: 2020-12-02 | Resolved: 2021-01-07

## 2021-01-07 LAB
ALBUMIN SERPL-MCNC: 3.2 G/DL (ref 3.5–5)
ALBUMIN/GLOB SERPL: 1.1 {RATIO} (ref 1.1–2.2)
ALP SERPL-CCNC: 63 U/L (ref 45–117)
ALT SERPL-CCNC: 27 U/L (ref 12–78)
ANION GAP SERPL CALC-SCNC: 3 MMOL/L (ref 5–15)
AST SERPL-CCNC: 21 U/L (ref 15–37)
BILIRUB SERPL-MCNC: 0.9 MG/DL (ref 0.2–1)
BUN SERPL-MCNC: 30 MG/DL (ref 6–20)
BUN/CREAT SERPL: 21 (ref 12–20)
CALCIUM SERPL-MCNC: 9.4 MG/DL (ref 8.5–10.1)
CHLORIDE SERPL-SCNC: 103 MMOL/L (ref 97–108)
CO2 SERPL-SCNC: 31 MMOL/L (ref 21–32)
CREAT SERPL-MCNC: 1.46 MG/DL (ref 0.7–1.3)
GLOBULIN SER CALC-MCNC: 3 G/DL (ref 2–4)
GLUCOSE BLD STRIP.AUTO-MCNC: 122 MG/DL (ref 65–100)
GLUCOSE BLD STRIP.AUTO-MCNC: 79 MG/DL (ref 65–100)
GLUCOSE SERPL-MCNC: 76 MG/DL (ref 65–100)
MAGNESIUM SERPL-MCNC: 1.9 MG/DL (ref 1.6–2.4)
POTASSIUM SERPL-SCNC: 4.8 MMOL/L (ref 3.5–5.1)
PROT SERPL-MCNC: 6.2 G/DL (ref 6.4–8.2)
SERVICE CMNT-IMP: ABNORMAL
SERVICE CMNT-IMP: NORMAL
SODIUM SERPL-SCNC: 137 MMOL/L (ref 136–145)

## 2021-01-07 PROCEDURE — 83735 ASSAY OF MAGNESIUM: CPT

## 2021-01-07 PROCEDURE — 80053 COMPREHEN METABOLIC PANEL: CPT

## 2021-01-07 PROCEDURE — 82962 GLUCOSE BLOOD TEST: CPT

## 2021-01-07 PROCEDURE — 74011250637 HC RX REV CODE- 250/637: Performed by: INTERNAL MEDICINE

## 2021-01-07 PROCEDURE — 3331090002 HH PPS REVENUE DEBIT

## 2021-01-07 PROCEDURE — 3331090001 HH PPS REVENUE CREDIT

## 2021-01-07 PROCEDURE — 74011250637 HC RX REV CODE- 250/637: Performed by: NURSE PRACTITIONER

## 2021-01-07 PROCEDURE — 74011250636 HC RX REV CODE- 250/636: Performed by: INTERNAL MEDICINE

## 2021-01-07 PROCEDURE — 36415 COLL VENOUS BLD VENIPUNCTURE: CPT

## 2021-01-07 PROCEDURE — 99239 HOSP IP/OBS DSCHRG MGMT >30: CPT | Performed by: INTERNAL MEDICINE

## 2021-01-07 RX ORDER — LISINOPRIL 5 MG/1
5 TABLET ORAL DAILY
Qty: 30 TAB | Status: SHIPPED | OUTPATIENT
Start: 2021-01-07 | End: 2021-03-03

## 2021-01-07 RX ORDER — FUROSEMIDE 40 MG/1
TABLET ORAL
Qty: 30 TAB | Status: ON HOLD | OUTPATIENT
Start: 2021-01-07 | End: 2021-03-03 | Stop reason: SDUPTHER

## 2021-01-07 RX ADMIN — Medication 10 ML: at 05:59

## 2021-01-07 RX ADMIN — HEPARIN SODIUM 5000 UNITS: 5000 INJECTION INTRAVENOUS; SUBCUTANEOUS at 05:59

## 2021-01-07 RX ADMIN — FUROSEMIDE 40 MG: 40 TABLET ORAL at 09:01

## 2021-01-07 RX ADMIN — LISINOPRIL 5 MG: 5 TABLET ORAL at 09:01

## 2021-01-07 RX ADMIN — CARVEDILOL 6.25 MG: 6.25 TABLET, FILM COATED ORAL at 09:01

## 2021-01-07 RX ADMIN — MEMANTINE HYDROCHLORIDE 10 MG: 10 TABLET ORAL at 09:01

## 2021-01-07 RX ADMIN — POTASSIUM CHLORIDE 20 MEQ: 20 TABLET, EXTENDED RELEASE ORAL at 09:01

## 2021-01-07 RX ADMIN — DONEPEZIL HYDROCHLORIDE 5 MG: 5 TABLET, FILM COATED ORAL at 09:01

## 2021-01-07 RX ADMIN — ASPIRIN 81 MG: 81 TABLET, CHEWABLE ORAL at 09:01

## 2021-01-07 NOTE — PROGRESS NOTES
VITOR Plan:                 *Home w/BS-PT&SN              *granddaughter to transport at d/c   *IM letter signed & placed on chart 1/6    Patient is discharging home today without any concerns. Follow-up appointments are on the AVS.  Granddaughter confirmed pt does have a r/w at home. Patient is ready for d/c from CM standpoint. Care Management Interventions  PCP Verified by CM: Yes(Carlitos Frey MD)  Mode of Transport at Discharge:  Other (see comment)(granddaughter)  Transition of Care Consult (CM Consult): [address]: Yes  Discharge Durable Medical Equipment: No(No DME use-recieved r/w in July 2020)  Physical Therapy Consult: No  Occupational Therapy Consult: No  Speech Therapy Consult: No  Current Support Network: Relative's Home(Lives with granddaughter and two teenage great grandchildren in a one story home with 3 maria antonia)  Confirm Follow Up Transport: Family  Discharge Location  Discharge Placement: Home with home health([facility] w/BS)      Keyon Rosales  Ext [phone]

## 2021-01-07 NOTE — PROGRESS NOTES
Transitions Of Care       HPI:  Eladia Farfan Sr is a 80y.o. year old male who is here for a follow up visit for hospitalization transition of care. He was last seen by me on 12/24/2020 at the office and on 1/7/2021 at time of hospital discharge. Discharged on: 1/7/2021    Diagnosis in hospital:  1. Congestive heart failure  2. Cardiomyopathy  3. ASCVD  4. Hypertension  5. Diabetes mellitus  6. Alzheimer's dementia  7. Status post pacemaker placement for cardiac disease with sick sinus syndrome  8. Severe protein calorie malnutrition    Complications in hospital: None    Medication changes: Lasix was increased from 20 mg daily to 40 mg daily at discharge and lisinopril was increased from 2.5 mg daily to 5 mg daily. He will not be taking Norvasc which was not on his preadmission list of medications although was listed at some point on his medicine list.    Discharge Summary reviewed. Today 1/8/2021      He reports the following: He is here to visit today with his primary caretaker who is his granddaughter. She and he both said that he has been doing fine since discharge. He had no problems sleeping flat in bed last night. He notes no chest pain, shortness of breath, palpitations, PND, orthopnea, edema or other cardiorespiratory complaints. His appetite seems to be okay. He denies any nausea vomiting or GI complaints. He notes no  complaints. He notes no headaches, dizziness or neurologic complaints except some generalized weakness. He is scheduled to get home physical therapy which has not started yet since he just went home from the hospital yesterday. There are no changes of his chronic arthritic complaints and he has no other complaints on complete review of systems.           Visit Vitals  BP (!) 116/52 (BP 1 Location: Left arm, BP Patient Position: Sitting)   Pulse (!) 54   Temp 97.1 °F (36.2 °C)   Resp 18   Ht 5' 8\" (1.727 m)   Wt 117 lb 3.2 oz (53.2 kg)   SpO2 97%   BMI 17.82 kg/m²       Historical Data    Past Medical History:   Diagnosis Date   • Abdominal pain 7/18/2017   • Alzheimer disease (HCC) 7/18/2017   • Anemia 7/18/2017   • Arthritis    • Back pain 7/18/2017   • Bradycardia 7/18/2017   • CAD (coronary artery disease)     hx of MI   • CKD (chronic kidney disease), stage II 7/18/2017   • constipation    • Depression 7/18/2017   • Diabetes mellitus (HCC) 7/18/2017   • Diverticulosis 7/18/2017   • DJD (degenerative joint disease) 7/18/2017   • Encounter for long-term (current) use of other medications 7/18/2017   • Fatigue    • Gastritis and duodenitis 7/18/2017   • GERD (gastroesophageal reflux disease)    • GI bleed 7/18/2017   • Hearing loss    • Hyperlipidemia 7/18/2017   • Hypertension    • Hypertension with renal disease 7/18/2017   • Ill-defined condition     Dementia   • Internal hemorrhoids 7/18/2017   • 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   • S/P cardiac pacemaker procedure 5/4/2015    5/4/15 Medtronic dual chamber pacemaker implant    • Weight loss     lost over 40 pounds last year- has no appetite       Past Surgical History:   Procedure Laterality Date   • COLONOSCOPY N/A 6/22/2017    COLONOSCOPY performed by Spike Carlson MD at [facility] ENDOSCOPY   • COLONOSCOPY,MAXINE DIGGS,SNARE  6/22/2017        • HX HERNIA REPAIR  7/10/2014    right inguinal   • HX OTHER SURGICAL      cystectomy from back   • MT EGD TRANSORAL BIOPSY SINGLE/MULTIPLE  2/14/2012        • MT UPPER GI ENDOSCOPY,W/DIR SUBMUC INJ  7/23/2020        • UPPER GI ENDOSCOPY,BIOPSY  6/22/2017        • UPPER GI ENDOSCOPY,BIOPSY  7/23/2020            Outpatient Encounter Medications as of 1/8/2021   Medication Sig Dispense Refill   • furosemide (LASIX) 40 mg tablet 1 each morning 30 Tab prn   • lisinopriL (PRINIVIL, ZESTRIL) 5 mg tablet Take 1 Tab by mouth daily. 30 Tab prn   • donepeziL (ARICEPT) 10 mg tablet TAKE 1/2 TABLET BY MOUTH EVERY DAY 30 Tab 2   • acetaminophen (TYLENOL) 650  mg TbER Take 650 mg by mouth two (2) times a day.     • memantine (NAMENDA) 10 mg tablet TAKE 1 TABLET BY MOUTH TWICE A  Tab 3   • omeprazole (PRILOSEC) 20 mg capsule Take 1 Cap by mouth daily. 90 Cap 3   • carvedilol (COREG) 6.25 mg tablet Take 1 Tab by mouth two (2) times a day. 180 Tab prn   • aspirin 81 mg chewable tablet Take 1 Tab by mouth daily. 30 Tab 1     No facility-administered encounter medications on file as of 1/8/2021.         No Known Allergies     Social History     Socioeconomic History   • Marital status: LEGALLY      Spouse name: Not on file   • Number of children: Not on file   • Years of education: Not on file   • Highest education level: Not on file   Occupational History   • Not on file   Social Needs   • Financial resource strain: Not on file   • Food insecurity     Worry: Not on file     Inability: Not on file   • Transportation needs     Medical: Not on file     Non-medical: Not on file   Tobacco Use   • Smoking status: Former Smoker     Packs/day: 0.50     Years: 10.00     Pack years: 5.00     Start date: 7/12/1991   • Smokeless tobacco: Never Used   • Tobacco comment: quit 50 years ago   Substance and Sexual Activity   • Alcohol use: Not Currently     Comment: Occasional beer   • Drug use: No   • Sexual activity: Not Currently   Lifestyle   • Physical activity     Days per week: Not on file     Minutes per session: Not on file   • Stress: Not on file   Relationships   • Social connections     Talks on phone: Not on file     Gets together: Not on file     Attends [employer] service: Not on file     Active member of club or organization: Not on file     Attends meetings of clubs or organizations: Not on file     Relationship status: Not on file   • Intimate partner violence     Fear of current or ex partner: Not on file     Emotionally abused: Not on file     Physically abused: Not on file     Forced sexual activity: Not on file   Other Topics Concern   • Not on file  Social History Narrative    Not on file      REVIEW OF SYSTEMS:  General: negative for - chills or fever  ENT: negative for - headaches, nasal congestion or tinnitus  Eyes: no blurred or visual changes  Neck: No stiffness or swollen nodes  Respiratory: negative for - cough, hemoptysis, shortness of breath or wheezing  Cardiovascular : negative for - chest pain, edema, palpitations or shortness of breath  Gastrointestinal: negative for - abdominal pain, blood in stools, heartburn or nausea/vomiting  Genito-Urinary: no dysuria, trouble voiding, or hematuria  Musculoskeletal: negative for - gait disturbance, joint pain, joint stiffness or joint swelling  Neurological: no TIA or stroke symptoms  Hematologic: no bruises, no bleeding  Lymphatic: no swollen glands  Integument: no lumps, mole changes, nail changes or rash  Endocrine:no malaise/lethargy poly uria or polydipsia or unexpected weight changes      Visit Vitals  BP (!) 116/52 (BP 1 Location: Left arm, BP Patient Position: Sitting)   Pulse (!) 54   Temp 97.1 °F (36.2 °C)   Resp 18   Ht 5' 8\" (1.727 m)   Wt 117 lb 3.2 oz (53.2 kg)   SpO2 97%   BMI 17.82 kg/m²     CONSTITUTIONAL: Thin frail black male, appears age appropriate  HEAD: normocephalic, atraumatic  EYES: sclera anicteric, PERRL, EOMI  ENMT:moist mucous membranes, pharynx clear          Nares: w/o erythema or edema  NECK: supple. Thyroid normal, No JVD or bruits  RESPIRATORY: Chest: clear to ascultation and percussion   CARDIOVASCULAR: Heart: regular rate and rhythm no murmurs, rubs or gallops, PMI not displaced, no thrill  GASTROINTESTINAL: Abdomen: non distended, soft, non-tender, bowel sounds normal  HEMATOLOGIC: no petechiae or purpura  LYMPHATIC: no lymphadenopathy  MUSCULOSKELETAL: Extremities: no edema or active synovitis, pulse 1+   BACK; no point or CVAT  INTEGUMENT: No unusual rashes or suspicious skin lesions noted.  Nails appear normal.  NEUROLOGIC: non-focal exam   MENTAL STATUS: alert and oriented, appropriate affect   PSYCHIATRIC: normal affect    ASSESSMENT:   1. Acute on chronic systolic congestive heart failure (HCC)    2. Cardiomyopathy, unspecified type ([address]    3. Severe protein-calorie malnutrition ([address]    4. ASCVD (arteriosclerotic cardiovascular disease)    5. Alzheimer's dementia with behavioral disturbance, unspecified timing of dementia onset ([address]    6. Hypertension with renal disease      Impression  1. Acute on chronic systolic CHF that is compensated  2. Cardiomyopathy stable currently on diuretic with Lasix as well as ACE inhibitor and Coreg  3. Severe malnutrition encourage strong oral intake  4. ASCVD clinically stable  5. Alzheimer's dementia stable  6. Hypertension that is adequately controlled  All of the above was discussed with his granddaughter present with him today. We will continue his current medications. I will check his BMP today. I will recheck him again in 2 to 3 weeks. Note he has had high risk of repeat hospitalization so close follow-up is warranted if he has a problem prior that he is to notify me. PLAN:  .  Orders Placed This Encounter    METABOLIC PANEL, BASIC ([facility])         ATTENTION:   This medical record was transcribed using an electronic medical records system. Although proofread, it may and can contain electronic and spelling errors. Other human spelling and other errors may be present. Corrections may be executed at a later time. Please feel free to contact us for any clarifications as needed. Follow-up and Dispositions    · Return At prior appt.            Rafaela uBrleson MD    Orders Placed This Encounter    METABOLIC PANEL, BASIC [facility]          Results for orders placed or performed in visit on 71/42/35   METABOLIC PANEL, BASIC   Result Value Ref Range    Glucose 105 75 - 110 mg/dL    BUN 28.0 (H) 9.0 - 20.0 mg/dL    Creatinine 1.5 0.8 - 1.5 mg/dL    Sodium 141 137 - 145 mmol/L    Potassium 4.8 3.6 - 5.0 mmol/L    Chloride 104 98 - 107 mmol/L    CO2 28.0 22.0 - 32.0 mmol/L    Calcium 9.5 8.4 - 10.2 mg/dl    Anion gap 9 mmol/L    GFR est AA 53 <60 mL/min/1.73m2    GFR est non-AA 44 <60 mL/min/1.73m2       I have reviewed the patient's medical history in detail and updated the computerized patient record. We had a prolonged discussion about these complex clinical issues and went over the various important aspects to consider. All questions were answered. Advised him to call back or return to office if symptoms do not improve, change in nature, or persist.    He was given an after visit summary or informed of [employer] [facility] which includes patient instructions, diagnoses, current medications, & vitals. He expressed understanding with the diagnosis and plan.

## 2021-01-07 NOTE — DISCHARGE INSTRUCTIONS
[address]  [phone]      Patient Discharge Instructions    Geraldine Birch / [id number] : 1931    Admitted 2021 Discharged: 2021     Principal Problem:    CHF (congestive heart failure) ([address] (2021)    Active Problems:    ASCVD (arteriosclerotic cardiovascular disease) (2014)      Hypertension with renal disease (2017)      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, without long-term current use of insulin ([address] (2017)      Alzheimer disease ([address] (2017)      S/P cardiac pacemaker procedure (2015)      Overview: 5/4/15 Medtronic dual chamber pacemaker implant            SSS (sick sinus syndrome) ([address] (2015)      Cardiomyopathy ([address] (2019)      Severe protein-calorie malnutrition ([address] (2020)          No Known Allergies    · It is important that you take the medication exactly as they are prescribed. · Do not take other medications without consulting your doctor. What to do at Next Level of Care    Disposition:  Home    Recommended diet: Cardiac    Recommended activity usual          Information obtained by :  I understand that if any problems occur once I am at home I am to contact my physician. I understand and acknowledge receipt of the instructions indicated above.                                                                                                                                            Physician's or R.N.'s Signature                                                                  Date/Time                                                                                                                                              Patient or Representative Signature                                                          Date/Time

## 2021-01-07 NOTE — PROGRESS NOTES
End of Shift Note    Bedside shift change report given to Staci Espinoza (oncoming nurse) by Jodi Carnes (offgoing nurse). Report included the following information SBAR, Kardex, Intake/Output, MAR and Recent Results    Shift worked:  7p-7a     Shift summary and any significant changes:     Pt had uneventful shift. No reports of pain. Concerns for physician to address:  None     Zone phone for oncoming shift:   [phone]       Activity:  Activity Level: Up ad bryson  Number times ambulated in hallways past shift: 0  Number of times OOB to chair past shift: Bathroom    Cardiac:   Cardiac Monitoring: Not applicable      Cardiac Rhythm: Paced    Access:   Current line(s): PIV     Genitourinary:   Urinary status: voiding    Respiratory:   O2 Device: Room air  Chronic home O2 use?: N/A  Incentive spirometer at bedside: NO     GI:  Last Bowel Movement Date: (states it has been 3-4 days since last BM)  Current diet:  DIET CARDIAC Regular  Passing flatus: YES  Tolerating current diet: YES  % Diet Eaten: 80 %    Pain Management:   Patient states pain is manageable on current regimen: YES    Skin:  Newton Score: 21  Interventions: increase time out of bed    Patient Safety:  Fall Score:  Total Score: 1  Interventions: gripper socks and pt to call before getting OOB  High Fall Risk: Yes    Length of Stay:  Expected LOS: 4d 2h  Actual LOS: 6      Jodi Carnes

## 2021-01-08 ENCOUNTER — PATIENT OUTREACH (OUTPATIENT)
Dept: CASE MANAGEMENT | Age: 86
End: 2021-01-08

## 2021-01-08 ENCOUNTER — OFFICE VISIT (OUTPATIENT)
Dept: INTERNAL MEDICINE CLINIC | Age: 86
End: 2021-01-08
Payer: MEDICARE

## 2021-01-08 VITALS
DIASTOLIC BLOOD PRESSURE: 52 MMHG | HEART RATE: 54 BPM | TEMPERATURE: 97.1 F | RESPIRATION RATE: 18 BRPM | WEIGHT: 117.2 LBS | HEIGHT: 68 IN | BODY MASS INDEX: 17.76 KG/M2 | SYSTOLIC BLOOD PRESSURE: 116 MMHG | OXYGEN SATURATION: 97 %

## 2021-01-08 DIAGNOSIS — E43 SEVERE PROTEIN-CALORIE MALNUTRITION (HCC): Chronic | ICD-10-CM

## 2021-01-08 DIAGNOSIS — G30.9 ALZHEIMER'S DEMENTIA WITH BEHAVIORAL DISTURBANCE, UNSPECIFIED TIMING OF DEMENTIA ONSET: ICD-10-CM

## 2021-01-08 DIAGNOSIS — I42.9 CARDIOMYOPATHY, UNSPECIFIED TYPE (HCC): ICD-10-CM

## 2021-01-08 DIAGNOSIS — F02.81 ALZHEIMER'S DEMENTIA WITH BEHAVIORAL DISTURBANCE, UNSPECIFIED TIMING OF DEMENTIA ONSET: ICD-10-CM

## 2021-01-08 DIAGNOSIS — I12.9 HYPERTENSION WITH RENAL DISEASE: ICD-10-CM

## 2021-01-08 DIAGNOSIS — I50.23 ACUTE ON CHRONIC SYSTOLIC CONGESTIVE HEART FAILURE (HCC): Primary | ICD-10-CM

## 2021-01-08 DIAGNOSIS — I25.10 ASCVD (ARTERIOSCLEROTIC CARDIOVASCULAR DISEASE): ICD-10-CM

## 2021-01-08 LAB
ANION GAP SERPL CALC-SCNC: 9 MMOL/L
BUN SERPL-MCNC: 28 MG/DL (ref 9–20)
CALCIUM SERPL-MCNC: 9.5 MG/DL (ref 8.4–10.2)
CHLORIDE SERPL-SCNC: 104 MMOL/L (ref 98–107)
CO2 SERPL-SCNC: 28 MMOL/L (ref 22–32)
CREAT SERPL-MCNC: 1.5 MG/DL (ref 0.8–1.5)
GLUCOSE SERPL-MCNC: 105 MG/DL (ref 75–110)
POTASSIUM SERPL-SCNC: 4.8 MMOL/L (ref 3.6–5)
SODIUM SERPL-SCNC: 141 MMOL/L (ref 137–145)

## 2021-01-08 PROCEDURE — 3331090002 HH PPS REVENUE DEBIT

## 2021-01-08 PROCEDURE — G8427 DOCREV CUR MEDS BY ELIG CLIN: HCPCS | Performed by: INTERNAL MEDICINE

## 2021-01-08 PROCEDURE — G9717 DOC PT DX DEP/BP F/U NT REQ: HCPCS | Performed by: INTERNAL MEDICINE

## 2021-01-08 PROCEDURE — G8419 CALC BMI OUT NRM PARAM NOF/U: HCPCS | Performed by: INTERNAL MEDICINE

## 2021-01-08 PROCEDURE — 3331090001 HH PPS REVENUE CREDIT

## 2021-01-08 PROCEDURE — 99215 OFFICE O/P EST HI 40 MIN: CPT | Performed by: INTERNAL MEDICINE

## 2021-01-08 PROCEDURE — 1101F PT FALLS ASSESS-DOCD LE1/YR: CPT | Performed by: INTERNAL MEDICINE

## 2021-01-08 PROCEDURE — 80048 BASIC METABOLIC PNL TOTAL CA: CPT | Performed by: INTERNAL MEDICINE

## 2021-01-08 PROCEDURE — G8536 NO DOC ELDER MAL SCRN: HCPCS | Performed by: INTERNAL MEDICINE

## 2021-01-08 PROCEDURE — 1111F DSCHRG MED/CURRENT MED MERGE: CPT | Performed by: INTERNAL MEDICINE

## 2021-01-08 NOTE — PROGRESS NOTES
Chief Complaint   Patient presents with    Transitions Of Care     1. Have you been to the ER, urgent care clinic since your last visit? Hospitalized since your last visit? Admitted to [facility] 1-1-2021    2. Have you seen or consulted any other health care providers outside of the [address] [place] [address] since your last visit? Include any pap smears or colon screening.  No

## 2021-01-08 NOTE — PATIENT INSTRUCTIONS
Learning About ACE Inhibitors What are ACE inhibitors? ACE (angiotensin-converting enzyme) inhibitors are medicines that lower blood pressure. They stop the release of an enzyme. This enzyme makes your blood vessels smaller. Without it, your blood vessels relax and get bigger. This lowers your blood pressure. These medicines also increase how much water and salt go into your urine. This also lowers blood pressure. You may take this kind of medicine if you have high blood pressure. Or you may take it if you have heart problems, kidney problems, or diabetes. If you have coronary artery disease, this medicine can help prevent heart attacks and strokes. Examples · benazepril (Lotensin) · enalapril (Vasotec) · lisinopril (Prinivil, Zestril) · ramipril (Altace) This is not a complete list. 
Possible side effects Side effects may include: · A cough. · Low blood pressure. This can make you feel dizzy or weak. · Too much potassium in your body. · Swelling of your lips, tongue, or face. If the swelling is severe, you may need treatment right away. Severe swelling can make it hard to breathe, but this is rare. You may have other side effects or reactions not listed here. Check the information that comes with your medicine. What to know about taking this medicine · ACE inhibitors can cause a dry cough. Talk to your doctor if you have a dry cough. You may need a different medicine. · These medicines can cause an allergic reaction. This can cause a little swelling. Or it can cause red bumps on your skin that hurt. In rare cases, the swelling may make it hard for you to breathe. · Do not take this medicine if you are pregnant or plan to become pregnant. · Take your medicines exactly as prescribed. Call your doctor if you think you are having a problem with your medicine. · Check with your doctor or pharmacist before you use any other medicines. This includes over-the-counter medicines. Make sure your doctor knows all of the medicines, vitamins, herbal products, and supplements you take. Taking some medicines together can cause problems. · You may need regular blood tests. Where can you learn more? Go to http://www.gray.com/ Enter P050 in the search box to learn more about \"Learning About ACE Inhibitors. \" Current as of: December 16, 2019               Content Version: 12.6 © 9802-3215 [employer]. Care instructions adapted under license by [employer] (which disclaims liability or warranty for this information). If you have questions about a medical condition or this instruction, always ask your healthcare professional. [address] any warranty or liability for your use of this information.

## 2021-01-08 NOTE — DISCHARGE SUMMARY
[address] SUMMARY    Name:  Juana Manriquez  MR#:  [id number]  :  1931  ACCOUNT #:  [de-identified]  ADMIT DATE:  2021  DISCHARGE DATE:  2021    FINAL DIAGNOSES:  1. Congestive heart failure. 2.  Cardiomyopathy. 3.  Atherosclerotic coronary vascular disease. 4.  Hypertension. 5.  Diabetes. 6.  Alzheimer's disease. 7.  Status post cardiac pacemaker for sick sinus syndrome. 8.  Severe protein calorie malnutrition. CONSULTATIONS:  Cardiology, Dr. Dixie Ingram. PROCEDURES:  None. For details of admission history and physical, please see admit note. HOSPITAL COURSE:  Briefly, the patient is an 80-year-old black male, who presented to the emergency room with increasing shortness of breath and was found to be in acute pulmonary edema. He had recently been hospitalized and was discharged home on diuretics, however, despite that he had progressive increased symptoms after his followup at the office post last hospital discharge and as his symptoms progressed he was presented to the emergency room and was found to have the congestive heart failure and readmitted. He was started on IV Lasix 40 mg twice a day and had clearing of his respiratory status quickly. Followup chest x-ray revealed resolution of the pulmonary edema. He was followed for a 3 days post clearing and seen by physical therapy and progressively ambulated secondary to severe weakness. He also had his Lasix changed from IV to p.o. with his dose increased from 20 mg prior to admission to 40 mg at the time of discharge and his lisinopril was increased from 2.5 mg to 5 mg. With this, he seemed to be doing well over the 72 hours prior to discharge. It was felt at this point that maximal hospital benefit has been achieved.   I did discuss this with his granddaughter yesterday, who is his primary care taker, and he will be discharged to home to have close followup by be at the office. DISCHARGE MEDICATIONS:  Discharge medications will consist of an increase of the Lasix as noted to 40 mg daily and an increase of the lisinopril to 5 mg daily. He will not be taking Norvasc, which was on his prehospital list of medicines, all the way should not have been taking that prior to this admission anyway. His other discharge medicines, Tylenol 650 mg 2 times daily as needed, Prilosec 20 mg daily, aspirin 81 mg daily, Coreg 6.25 mg b.i.d., Aricept 5 mg daily, Namenda 10 mg b.i.d., and as noted the lisinopril and Lasix. DISCHARGE INSTRUCTIONS:  He will have followup by me in the office tomorrow and be closely followed up from that point on. Greater than 30 minutes spent in direct care of this patient at the time of discharge today.         Venancio De La Torre MD      [id number]  D:  01/07/2021 7:25  T:  01/08/2021 0:35  JOB #:  [id number]  CC:  MD Dov Carter MD

## 2021-01-09 ENCOUNTER — HOME CARE VISIT (OUTPATIENT)
Dept: SCHEDULING | Facility: HOME HEALTH | Age: 86
End: 2021-01-09
Payer: MEDICARE

## 2021-01-09 VITALS
DIASTOLIC BLOOD PRESSURE: 60 MMHG | OXYGEN SATURATION: 99 % | RESPIRATION RATE: 16 BRPM | BODY MASS INDEX: 20.8 KG/M2 | SYSTOLIC BLOOD PRESSURE: 104 MMHG | HEART RATE: 61 BPM | WEIGHT: 140.43 LBS | TEMPERATURE: 97 F | HEIGHT: 69 IN

## 2021-01-09 PROCEDURE — G0299 HHS/HOSPICE OF RN EA 15 MIN: HCPCS

## 2021-01-09 PROCEDURE — 3331090002 HH PPS REVENUE DEBIT

## 2021-01-09 PROCEDURE — 3331090001 HH PPS REVENUE CREDIT

## 2021-01-10 PROCEDURE — 3331090002 HH PPS REVENUE DEBIT

## 2021-01-10 PROCEDURE — 3331090001 HH PPS REVENUE CREDIT

## 2021-01-11 ENCOUNTER — HOME CARE VISIT (OUTPATIENT)
Dept: SCHEDULING | Facility: HOME HEALTH | Age: 86
End: 2021-01-11
Payer: MEDICARE

## 2021-01-11 VITALS
TEMPERATURE: 97.9 F | OXYGEN SATURATION: 100 % | DIASTOLIC BLOOD PRESSURE: 70 MMHG | RESPIRATION RATE: 18 BRPM | HEART RATE: 70 BPM | SYSTOLIC BLOOD PRESSURE: 116 MMHG

## 2021-01-11 PROCEDURE — 3331090001 HH PPS REVENUE CREDIT

## 2021-01-11 PROCEDURE — G0151 HHCP-SERV OF PT,EA 15 MIN: HCPCS

## 2021-01-11 PROCEDURE — 3331090002 HH PPS REVENUE DEBIT

## 2021-01-12 PROCEDURE — 3331090002 HH PPS REVENUE DEBIT

## 2021-01-12 PROCEDURE — 3331090001 HH PPS REVENUE CREDIT

## 2021-01-13 ENCOUNTER — PATIENT OUTREACH (OUTPATIENT)
Dept: CASE MANAGEMENT | Age: 86
End: 2021-01-13

## 2021-01-13 PROCEDURE — 3331090002 HH PPS REVENUE DEBIT

## 2021-01-13 PROCEDURE — 3331090001 HH PPS REVENUE CREDIT

## 2021-01-13 NOTE — PROGRESS NOTES
Goals Addressed                 This Visit's Progress     Prevent complications post hospitalization. 12/29/2020 Granddaughter report patient is feeling well,c/o back pain controlled with Tylenol. Denies chest pain, SOB, N/V/D, fever. Patient does not currently have a scale,educated on CHF S&S and when to call cardio . Granddaughter will monitor patient patient for CHF S&S and report at next outreach. [facility]    1/1-7/21 readmit to hospital for CHF    1/8/21 Granddaughter reports patient was seen by [facility] on 12/31/2020, patient is weak but doing ok. PCP appointment today, HH will resume care,and Lasix was increased. Denies chest pain, SOB, fever, N/V/D. Granddaughter will report changes to plan of care and monitor S&S of CHF to report at next outreach. [facility]    1/13/21 Granddaughter reports patient is doing well and participating with [facility] PT. Denies chest pain, SOB, fever, N/V/D. Granddaughter will continue to monitor CHF S&S and report at next outreach.  [facility]

## 2021-01-14 PROCEDURE — 3331090002 HH PPS REVENUE DEBIT

## 2021-01-14 PROCEDURE — 3331090001 HH PPS REVENUE CREDIT

## 2021-01-15 PROCEDURE — 3331090002 HH PPS REVENUE DEBIT

## 2021-01-15 PROCEDURE — 3331090001 HH PPS REVENUE CREDIT

## 2021-01-15 RX ORDER — CARVEDILOL 6.25 MG/1
6.25 TABLET ORAL 2 TIMES DAILY
Qty: 180 TAB | Status: SHIPPED | OUTPATIENT
Start: 2021-01-15 | End: 2022-01-01

## 2021-01-15 NOTE — TELEPHONE ENCOUNTER
RX refill request from the patient/pharmacy. Patient last seen 01- with labs, and next appt. scheduled for 01-  Requested Prescriptions     Pending Prescriptions Disp Refills    carvediloL (COREG) 6.25 mg tablet 180 Tab prn     Sig: Take 1 Tab by mouth two (2) times a day. Claribel Wood

## 2021-01-16 PROCEDURE — 3331090001 HH PPS REVENUE CREDIT

## 2021-01-16 PROCEDURE — 3331090002 HH PPS REVENUE DEBIT

## 2021-01-17 PROCEDURE — 3331090001 HH PPS REVENUE CREDIT

## 2021-01-17 PROCEDURE — 3331090002 HH PPS REVENUE DEBIT

## 2021-01-18 PROCEDURE — 3331090001 HH PPS REVENUE CREDIT

## 2021-01-18 PROCEDURE — 3331090002 HH PPS REVENUE DEBIT

## 2021-01-19 PROCEDURE — 3331090001 HH PPS REVENUE CREDIT

## 2021-01-19 PROCEDURE — 3331090002 HH PPS REVENUE DEBIT

## 2021-01-20 PROCEDURE — 3331090002 HH PPS REVENUE DEBIT

## 2021-01-20 PROCEDURE — 3331090001 HH PPS REVENUE CREDIT

## 2021-01-21 PROCEDURE — 3331090001 HH PPS REVENUE CREDIT

## 2021-01-21 PROCEDURE — 3331090002 HH PPS REVENUE DEBIT

## 2021-01-22 PROCEDURE — 3331090001 HH PPS REVENUE CREDIT

## 2021-01-22 PROCEDURE — 3331090002 HH PPS REVENUE DEBIT

## 2021-01-23 PROCEDURE — 400018 HH-NO PAY CLAIM PROCEDURE

## 2021-01-26 ENCOUNTER — PATIENT OUTREACH (OUTPATIENT)
Dept: CASE MANAGEMENT | Age: 86
End: 2021-01-26

## 2021-01-26 PROBLEM — J96.01 ACUTE RESPIRATORY FAILURE WITH HYPOXIA (HCC): Status: RESOLVED | Noted: 2020-12-14 | Resolved: 2021-01-26

## 2021-01-26 NOTE — PROGRESS NOTES
Chief Complaint   Patient presents with    Hypertension     3 month follow up    CHF    Diabetes       SUBJECTIVE:    Enrique Dias Sr is a 80 y.o. male current in follow-up today accompanied by his granddaughter for follow-up of his multiple medical problems include hypertension, diabetes, hyperlipidemia, ASCVD with a history of SVT, sick sinus syndrome post pacemaker, paroxysmal VT cardiomyopathy, CHF, CKD stage III, malnutrition and other multiple medical problems including dementia. He currently denies any chest pain, shortness of breath, palpitations, PND, orthopnea or other cardiac or respiratory complaints and his appetite is better and he has had no falls. He does have some chronic arthritis complaints but nothing new. He denies any GI or  complaints. He notes no headaches, dizziness or new neurologic complaints. There are no other complaints on complete review of systems. He is taking his medications. Current Outpatient Medications   Medication Sig Dispense Refill    carvediloL (COREG) 6.25 mg tablet Take 1 Tab by mouth two (2) times a day. 180 Tab prn    colchicine 0.6 mg tablet Take 0.6 mg by mouth daily.  furosemide (LASIX) 40 mg tablet 1 each morning (Patient taking differently: Take 40 mg by mouth daily. Patient has 20mg tablets- has not picked up 40mg tablet from pharmacy- 2 tablets daily) 30 Tab prn    lisinopriL (PRINIVIL, ZESTRIL) 5 mg tablet Take 1 Tab by mouth daily. (Patient taking differently: Take 2 Tabs by mouth daily. Patient has in home 2.5mg talbets-  2tablets daily unitl 5mg tablets picked up from pharmacy) 30 Tab prn    donepeziL (ARICEPT) 10 mg tablet TAKE 1/2 TABLET BY MOUTH EVERY DAY (Patient taking differently: Take 10 mg by mouth nightly. TAKE 1/2 TABLET BY MOUTH EVERY DAY) 30 Tab 2    acetaminophen (TYLENOL) 650 mg TbER Take 650 mg by mouth two (2) times a day.       memantine (NAMENDA) 10 mg tablet TAKE 1 TABLET BY MOUTH TWICE A  Tab 3    omeprazole (PRILOSEC) 20 mg capsule Take 1 Cap by mouth daily. 90 Cap 3    aspirin 81 mg chewable tablet Take 1 Tab by mouth daily.  27 Tab 1     Past Medical History:   Diagnosis Date    Abdominal pain 7/18/2017    Alzheimer disease ([facility] 7/18/2017    Anemia 7/18/2017    Arthritis     Back pain 7/18/2017    Bradycardia 7/18/2017    CAD (coronary artery disease)     hx of MI    CKD (chronic kidney disease), stage II 7/18/2017    constipation     Depression 7/18/2017    Diabetes mellitus ([facility] 7/18/2017    Diverticulosis 7/18/2017    DJD (degenerative joint disease) 7/18/2017    Encounter for long-term (current) use of other medications 7/18/2017    Fatigue     Gastritis and duodenitis 7/18/2017    GERD (gastroesophageal reflux disease)     GI bleed 7/18/2017    Hearing loss     Hyperlipidemia 7/18/2017    Hypertension     Hypertension with renal disease 7/18/2017    Ill-defined condition     Dementia    Internal hemorrhoids 7/18/2017    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    S/P cardiac pacemaker procedure 5/4/2015    5/4/15 Medtronic dual chamber pacemaker implant     Weight loss     lost over 40 pounds last year- has no appetite     Past Surgical History:   Procedure Laterality Date    COLONOSCOPY N/A 6/22/2017    COLONOSCOPY performed by Meri Ruano MD at [address] [place] [address]  6/22/2017         [address]  7/10/2014    right inguinal    HX OTHER SURGICAL      cystectomy from back    [place] E[place] TRANSORAL BIOPSY SINGLE/MULTIPLE  2/14/2012         [place] UPPER GI ENDOSCOPY,W/DIR SUBMUC INJ  7/23/2020         UPPER GI ENDOSCOPY,BIOPSY  6/22/2017         UPPER GI ENDOSCOPY,BIOPSY  7/23/2020          No Known Allergies    REVIEW OF SYSTEMS:  General: negative for - chills or fever, or weight loss or gain  ENT: negative for - headaches, nasal congestion or tinnitus  Eyes: no blurred or visual changes  Neck: No stiffness or swollen nodes  Respiratory: negative for - cough, hemoptysis, shortness of breath or wheezing  Cardiovascular : negative for - chest pain, edema, palpitations or shortness of breath  Gastrointestinal: negative for - abdominal pain, blood in stools, heartburn or nausea/vomiting  Genito-Urinary: no dysuria, trouble voiding, or hematuria  Musculoskeletal: negative for - gait disturbance, change of his chronic back or joint pain, joint stiffness or joint swelling  Neurological: no TIA or stroke symptoms  Hematologic: no bruises, no bleeding  Lymphatic: no swollen glands  Integument: no lumps, mole changes, nail changes or rash  Endocrine:no malaise/lethargy poly uria or polydipsia or unexpected weight changes        Social History     Socioeconomic History    Marital status: LEGALLY      Spouse name: Not on file    Number of children: Not on file    Years of education: Not on file    Highest education level: Not on file   Tobacco Use    Smoking status: Former Smoker     Packs/day: 0.50     Years: 10.00     Pack years: 5.00     Start date: 1991    Smokeless tobacco: Never Used    Tobacco comment: quit 50 years ago   Substance and Sexual Activity    Alcohol use: Not Currently     Comment: Occasional beer    Drug use: No    Sexual activity: Not Currently     Family History   Problem Relation Age of Onset    Hypertension Mother     Heart Disease Father     Cancer Other         son-cancer? unknown what type        OBJECTIVE:     Visit Vitals  /82 (BP 1 Location: Left arm, BP Patient Position: Sitting)   Pulse 78   Temp 97.2 °F (36.2 °C) (Oral)   Resp 17   Ht 5' 8\" (1.727 m)   Wt 121 lb 9.6 oz (55.2 kg)   SpO2 95%   BMI 18.49 kg/m²     CONSTITUTIONAL:   well nourished, appears age appropriate  EYES: sclera anicteric, PERRL, EOMI  ENMT:nares clear, moist mucous membranes, pharynx clear  NECK: supple.  Thyroid normal, No JVD or bruits  RESPIRATORY: Chest: clear to ascultation and percussion, normal inspiratory effort  CARDIOVASCULAR: Heart: regular rate and rhythm no murmurs, rubs or gallops, PMI not displaced, No thrills, no peripheral edema  GASTROINTESTINAL: Abdomen: non distended, soft, non tender, bowel sounds normal  HEMATOLOGIC: no purpura, petechiae or bruising  LYMPHATIC: No lymph node enlargemant  MUSCULOSKELETAL: Extremities: no active synovitis, pulse 1+. No diabetic foot changes noted. INTEGUMENT: No unusual rashes or suspicious skin lesions noted. Nails appear normal.  PERIPHERAL VASCULAR: normal pulses femoral, PT and DP  NEUROLOGIC: non-focal exam, A & O X 3. Mild decreased sensation and proprioception in all toes both feet and somewhat difficult to tell secondary to his underlying dementia. PSYCHIATRIC:, appropriate affect     ASSESSMENT:   1. Hypertension with renal disease    2. 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 Mixed hyperlipidemia    4. Primary osteoarthritis involving multiple joints    5. Alzheimer's dementia with behavioral disturbance, unspecified timing of dementia onset ([facility]    6. ASCVD (arteriosclerotic cardiovascular disease)    7. Cardiomyopathy, unspecified type ([address]    8. SSS (sick sinus syndrome) ([facility])    9. SVT (supraventricular tachycardia) ([facility])--s/p RFA    10. S/P cardiac pacemaker procedure    11. Acute on chronic systolic heart failure ([address]    12. Paroxysmal atrial fibrillation ([facility])    13. Paroxysmal VT ([address]    14. Acute on chronic systolic congestive heart failure ([address]    15. Alcoholism ([address]    16. Mild depression ([address]    17. Severe protein-calorie malnutrition ([address]    18. Anemia, unspecified type      Impression  1. Hypertension that is controlled so continue current therapy reviewed with he and his family. 2.  Diabetes repeat status pending and prior lab reviewed now make adjustments if necessary.   3.  Hyperlipidemia prior lab reviewed and repeat status pending and I will adjust if needed. 4. DJD that is chronic but stable  5. Alzheimer's disease that is stable  6. ASCVD clinically stable on aspirin daily  7. Cardiomyopathy stable  8. Sick sinus syndrome status post pacemaker  9. History of SVT with no recent symptoms  10. CHF compensated  11. Paroxysmal atrial fibrillation no recent symptoms  12. Paroxysmal VT no symptoms  13. Alcoholism with no recent alcohol intake  14. Depression that is stable  15. Malnutrition currently seems to be eating better  16. Anemia repeat status pending  All of this is discussed with his granddaughter. We will continue current medications and I will call with lab and make adjustments if necessary. Follow-up in 3 months or sooner if there are issues. PLAN:  .  Orders Placed This Encounter    CBC WITH AUTOMATED DIFF ([facility])    METABOLIC PANEL, COMPREHENSIVE ([facility])    LIPID PANEL ([facility])    CK ([facility])    HEMOGLOBIN A1C W/O EAG ([facility])         ATTENTION:   This medical record was transcribed using an electronic medical records system. Although proofread, it may and can contain electronic and spelling errors. Other human spelling and other errors may be present. Corrections may be executed at a later time. Please feel free to contact us for any clarifications as needed. Follow-up and Dispositions    · Return in about 3 months (around 4/27/2021). No results found for any visits on 01/27/21. Chaz Mack MD    The patient verbalized understanding of the problems and plans as explained.

## 2021-01-27 ENCOUNTER — OFFICE VISIT (OUTPATIENT)
Dept: INTERNAL MEDICINE CLINIC | Age: 86
End: 2021-01-27
Payer: MEDICARE

## 2021-01-27 VITALS
OXYGEN SATURATION: 95 % | HEART RATE: 78 BPM | WEIGHT: 121.6 LBS | SYSTOLIC BLOOD PRESSURE: 128 MMHG | DIASTOLIC BLOOD PRESSURE: 82 MMHG | HEIGHT: 68 IN | BODY MASS INDEX: 18.43 KG/M2 | RESPIRATION RATE: 17 BRPM | TEMPERATURE: 97.2 F

## 2021-01-27 DIAGNOSIS — I49.5 SSS (SICK SINUS SYNDROME) (HCC): ICD-10-CM

## 2021-01-27 DIAGNOSIS — E11.22 CONTROLLED TYPE 2 DIABETES MELLITUS WITH STAGE 2 CHRONIC KIDNEY DISEASE, WITHOUT LONG-TERM CURRENT USE OF INSULIN (HCC): ICD-10-CM

## 2021-01-27 DIAGNOSIS — I42.9 CARDIOMYOPATHY, UNSPECIFIED TYPE (HCC): ICD-10-CM

## 2021-01-27 DIAGNOSIS — I47.29 PAROXYSMAL VT: ICD-10-CM

## 2021-01-27 DIAGNOSIS — I47.1 SVT (SUPRAVENTRICULAR TACHYCARDIA) (HCC): ICD-10-CM

## 2021-01-27 DIAGNOSIS — I12.9 HYPERTENSION WITH RENAL DISEASE: Primary | ICD-10-CM

## 2021-01-27 DIAGNOSIS — E78.2 MIXED HYPERLIPIDEMIA: ICD-10-CM

## 2021-01-27 DIAGNOSIS — F32.A MILD DEPRESSION: ICD-10-CM

## 2021-01-27 DIAGNOSIS — I50.23 ACUTE ON CHRONIC SYSTOLIC CONGESTIVE HEART FAILURE (HCC): ICD-10-CM

## 2021-01-27 DIAGNOSIS — F02.81 ALZHEIMER'S DEMENTIA WITH BEHAVIORAL DISTURBANCE, UNSPECIFIED TIMING OF DEMENTIA ONSET: ICD-10-CM

## 2021-01-27 DIAGNOSIS — Z95.0 S/P CARDIAC PACEMAKER PROCEDURE: ICD-10-CM

## 2021-01-27 DIAGNOSIS — F10.20 ALCOHOLISM (HCC): ICD-10-CM

## 2021-01-27 DIAGNOSIS — E43 SEVERE PROTEIN-CALORIE MALNUTRITION (HCC): Chronic | ICD-10-CM

## 2021-01-27 DIAGNOSIS — G30.9 ALZHEIMER'S DEMENTIA WITH BEHAVIORAL DISTURBANCE, UNSPECIFIED TIMING OF DEMENTIA ONSET: ICD-10-CM

## 2021-01-27 DIAGNOSIS — I25.10 ASCVD (ARTERIOSCLEROTIC CARDIOVASCULAR DISEASE): ICD-10-CM

## 2021-01-27 DIAGNOSIS — N18.2 CONTROLLED TYPE 2 DIABETES MELLITUS WITH STAGE 2 CHRONIC KIDNEY DISEASE, WITHOUT LONG-TERM CURRENT USE OF INSULIN (HCC): ICD-10-CM

## 2021-01-27 DIAGNOSIS — I50.23 ACUTE ON CHRONIC SYSTOLIC HEART FAILURE (HCC): ICD-10-CM

## 2021-01-27 DIAGNOSIS — I48.0 PAROXYSMAL ATRIAL FIBRILLATION (HCC): ICD-10-CM

## 2021-01-27 DIAGNOSIS — D64.9 ANEMIA, UNSPECIFIED TYPE: ICD-10-CM

## 2021-01-27 DIAGNOSIS — M15.9 PRIMARY OSTEOARTHRITIS INVOLVING MULTIPLE JOINTS: ICD-10-CM

## 2021-01-27 LAB
A-G RATIO,AGRAT: 1.4 RATIO
ALBUMIN SERPL-MCNC: 3.6 G/DL (ref 3.9–5.4)
ALP SERPL-CCNC: 67 U/L (ref 38–126)
ALT SERPL-CCNC: 25 U/L (ref 0–50)
ANION GAP SERPL CALC-SCNC: 10 MMOL/L
AST SERPL W P-5'-P-CCNC: 37 U/L (ref 14–36)
BILIRUB SERPL-MCNC: 0.6 MG/DL (ref 0.2–1.3)
BUN SERPL-MCNC: 34 MG/DL (ref 9–20)
BUN/CREATININE RATIO,BUCR: 28 RATIO
CALCIUM SERPL-MCNC: 9.3 MG/DL (ref 8.4–10.2)
CHLORIDE SERPL-SCNC: 99 MMOL/L (ref 98–107)
CHOL/HDL RATIO,CHHD: 3 RATIO (ref 0–4)
CHOLEST SERPL-MCNC: 194 MG/DL (ref 0–200)
CK SERPL-CCNC: 46 U/L (ref 30–135)
CO2 SERPL-SCNC: 34 MMOL/L (ref 22–32)
CREAT SERPL-MCNC: 1.2 MG/DL (ref 0.8–1.5)
ERYTHROCYTE [DISTWIDTH] IN BLOOD BY AUTOMATED COUNT: 13.3 %
GLOBULIN,GLOB: 2.5
GLUCOSE SERPL-MCNC: 83 MG/DL (ref 75–110)
HBA1C MFR BLD HPLC: 5.6 % (ref 4–5.7)
HCT VFR BLD AUTO: 37.3 % (ref 37–51)
HDLC SERPL-MCNC: 77 MG/DL (ref 35–130)
HGB BLD-MCNC: 11.6 G/DL (ref 12–18)
LDL/HDL RATIO,LDHD: 1 RATIO
LDLC SERPL CALC-MCNC: 103 MG/DL (ref 0–130)
LYMPHOCYTES ABSOLUTE: 1.1 K/UL (ref 0.6–4.1)
LYMPHOCYTES NFR BLD: 26.4 % (ref 10–58.5)
MCH RBC QN AUTO: 26.8 PG (ref 26–32)
MCHC RBC AUTO-ENTMCNC: 31.1 G/DL (ref 30–36)
MCV RBC AUTO: 86.1 FL (ref 80–97)
MONOCYTES ABS-DIF,2141: 0.5 K/UL (ref 0–1.8)
MONOCYTES NFR BLD: 11 % (ref 0.1–24)
NEUTROPHILS # BLD: 62.6 % (ref 37–92)
NEUTROPHILS ABS,2156: 2.6 K/UL (ref 2–7.8)
PLATELET # BLD AUTO: 152 K/UL (ref 140–440)
POTASSIUM SERPL-SCNC: 3.8 MMOL/L (ref 3.6–5)
PROT SERPL-MCNC: 6.1 G/DL (ref 6.3–8.2)
RBC # BLD AUTO: 4.33 M/UL (ref 4.2–6.3)
SODIUM SERPL-SCNC: 143 MMOL/L (ref 137–145)
TRIGL SERPL-MCNC: 68 MG/DL (ref 0–200)
VLDLC SERPL CALC-MCNC: 14 MG/DL
WBC # BLD AUTO: 4.2 K/UL (ref 4.1–10.9)

## 2021-01-27 PROCEDURE — 85025 COMPLETE CBC W/AUTO DIFF WBC: CPT | Performed by: INTERNAL MEDICINE

## 2021-01-27 PROCEDURE — G8536 NO DOC ELDER MAL SCRN: HCPCS | Performed by: INTERNAL MEDICINE

## 2021-01-27 PROCEDURE — 99214 OFFICE O/P EST MOD 30 MIN: CPT | Performed by: INTERNAL MEDICINE

## 2021-01-27 PROCEDURE — 80053 COMPREHEN METABOLIC PANEL: CPT | Performed by: INTERNAL MEDICINE

## 2021-01-27 PROCEDURE — 1111F DSCHRG MED/CURRENT MED MERGE: CPT | Performed by: INTERNAL MEDICINE

## 2021-01-27 PROCEDURE — G9717 DOC PT DX DEP/BP F/U NT REQ: HCPCS | Performed by: INTERNAL MEDICINE

## 2021-01-27 PROCEDURE — 80061 LIPID PANEL: CPT | Performed by: INTERNAL MEDICINE

## 2021-01-27 PROCEDURE — 83036 HEMOGLOBIN GLYCOSYLATED A1C: CPT | Performed by: INTERNAL MEDICINE

## 2021-01-27 PROCEDURE — G8427 DOCREV CUR MEDS BY ELIG CLIN: HCPCS | Performed by: INTERNAL MEDICINE

## 2021-01-27 PROCEDURE — 1101F PT FALLS ASSESS-DOCD LE1/YR: CPT | Performed by: INTERNAL MEDICINE

## 2021-01-27 PROCEDURE — G8419 CALC BMI OUT NRM PARAM NOF/U: HCPCS | Performed by: INTERNAL MEDICINE

## 2021-01-27 PROCEDURE — 82550 ASSAY OF CK (CPK): CPT | Performed by: INTERNAL MEDICINE

## 2021-01-27 NOTE — PROGRESS NOTES
Chief Complaint   Patient presents with    Hypertension     3 month follow up    CHF    Diabetes     Visit Vitals  /82 (BP 1 Location: Left arm, BP Patient Position: Sitting)   Pulse 78   Temp 97.2 °F (36.2 °C) (Oral)   Resp 17   Ht 5' 8\" (1.727 m)   Wt 121 lb 9.6 oz (55.2 kg)   SpO2 95%   BMI 18.49 kg/m²     1. Have you been to the ER, urgent care clinic since your last visit? Hospitalized since your last visit? No    2. Have you seen or consulted any other health care providers outside of the [address] [place] [address] since your last visit? Include any pap smears or colon screening.  No

## 2021-01-27 NOTE — PATIENT INSTRUCTIONS
ACE Inhibitors: Care Instructions Your Care Instructions ACE (angiotensin-converting enzyme) inhibitors lower blood pressure. They also treat heart failure and prevent heart attacks and strokes. They block an enzyme that makes blood vessels narrow. As a result, the blood vessels relax and widen. This lowers blood pressure. These medicines also put more water and salt into the urine. This lowers blood pressure too. These medicines are a good choice for people with diabetes. They don't affect blood sugar levels, and they may protect the kidneys. Examples include: · Benazepril (Lotensin). · Lisinopril (Prinivil, Zestril). · Ramipril (Altace). Before you start taking this medicine, make sure your doctor knows if you take other medicines, especially water pills (diuretics) or potassium tablets. And tell your doctor if you use a salt substitute. You should not take an ACE inhibitor if you are pregnant or planning to become pregnant. You may need regular blood tests. Follow-up care is a key part of your treatment and safety. Be sure to make and go to all appointments, and call your doctor if you are having problems. It's also a good idea to know your test results and keep a list of the medicines you take. How can you care for yourself at home? · Take your medicines exactly as prescribed. Be sure to take your medicine every day. Call your doctor if you think you are having a problem with your medicine. · ACE inhibitors can cause a dry cough. If the cough is bad, talk to your doctor. You may need to try a different medicine. · ACE inhibitors can cause swelling of your lips, tongue, or face. If the swelling is severe, you may need treatment right away. Severe swelling can make it hard to breathe, but this is very rare. · Check with your doctor or pharmacist before you use any other medicines. This includes over-the-counter medicines. Make sure your doctor knows all of the medicines, vitamins, herbal products, and supplements you take. Taking some medicines together can cause problems. When should you call for help? Call your doctor now or seek immediate medical care if: 
  · You have swelling of your lips, tongue, or face.  
  · You think you are having problems with your medicine. Watch closely for changes in your health, and be sure to contact your doctor if you have any problems. Where can you learn more? Go to http://www.gray.com/ Enter L958 in the search box to learn more about \"ACE Inhibitors: Care Instructions. \" Current as of: December 16, 2019               Content Version: 12.6 © 3067-3514 [employer]. Care instructions adapted under license by [employer] (which disclaims liability or warranty for this information). If you have questions about a medical condition or this instruction, always ask your healthcare professional. [address] any warranty or liability for your use of this information.

## 2021-02-03 ENCOUNTER — PATIENT OUTREACH (OUTPATIENT)
Dept: CASE MANAGEMENT | Age: 86
End: 2021-02-03

## 2021-02-03 NOTE — PROGRESS NOTES
Goals      Prevent complications post hospitalization. 12/29/2020 Granddaughter report patient is feeling well,c/o back pain controlled with Tylenol. Denies chest pain, SOB, N/V/D, fever. Patient does not currently have a scale,educated on CHF S&S and when to call cardio . Granddaughter will monitor patient patient for CHF S&S and report at next outreach. [facility] [place]    1/1-7/21 readmit to hospital for CHF    1/8/21 Granddaughter reports patient was seen by [facility] on 12/31/2020, patient is weak but doing ok. PCP appointment today, HH will resume care,and Lasix was increased. Denies chest pain, SOB, fever, N/V/D. Granddaughter will report changes to plan of care and monitor S&S of CHF to report at next outreach. [facility] [place]    1/13/21 Granddaughter reports patient is doing well and participating with [facility]. Denies chest pain, SOB, fever, N/V/D. Granddaughter will continue to monitor CHF S&S and report at next outreach. [facility] [place]    1/26/21 Contact information left on voicemail requesting callback. [facility] [place]    2/3/21 Granddaughter report attendance of appointment on 1/27/21. Has dry cough no other issues or concerns ar this time. CTN will follow up with granddaughter in 14-21 days.  [facility] [place]

## 2021-02-16 ENCOUNTER — HOME CARE VISIT (OUTPATIENT)
Dept: HOME HEALTH SERVICES | Facility: HOME HEALTH | Age: 86
End: 2021-02-16
Payer: MEDICARE

## 2021-02-17 ENCOUNTER — PATIENT OUTREACH (OUTPATIENT)
Dept: CASE MANAGEMENT | Age: 86
End: 2021-02-17

## 2021-02-17 NOTE — PROGRESS NOTES
Goals Addressed                 This Visit's Progress    • Prevent complications post hospitalization.        12/29/2020 Granddaughter report patient is feeling well,c/o back pain controlled with Tylenol.Denies chest pain, SOB, N/V/D, fever. Patient does not currently have a scale,educated on CHF S&S and when to call cardio . Granddaughter will monitor patient patient for CHF S&S and report at next outreach. hospitals    1/1-7/21 readmit to hospital for CHF    1/8/21 Granddaughter reports patient was seen by  on 12/31/2020, patient is weak but doing ok. PCP appointment today, HH will resume care,and Lasix was increased. Denies chest pain, SOB, fever, N/V/D. Granddaughter will report changes to plan of care and monitor S&S of CHF to report at next outreach.hospitals    1/13/21 Granddaughter reports patient is doing well and participating with HH PT. Denies chest pain, SOB, fever, N/V/D. Granddaughter will continue to monitor CHF S&S and report at next outreach. hospitals    1/26/21 Contact information left on voicemail requesting callback.hospitals    2/3/21 Granddaughter report attendance of appointment on 1/27/21. Has dry cough no other issues or concerns ar this time. CTN will follow up with granddaughter in 14-21 days. hospitals    2/17/21 Contact information left on voicemail requesting return call.hospitals

## 2021-02-23 ENCOUNTER — PATIENT OUTREACH (OUTPATIENT)
Dept: CASE MANAGEMENT | Age: 86
End: 2021-02-23

## 2021-02-23 ENCOUNTER — TELEPHONE (OUTPATIENT)
Dept: CARDIOLOGY CLINIC | Age: 86
End: 2021-02-23

## 2021-02-23 NOTE — TELEPHONE ENCOUNTER
Please call patients granddaughter Liliana Yi) [phone]    Please call patient retaining fluid and needs to know how to adjust his medication      THanks  Lonnie Salazar

## 2021-02-23 NOTE — TELEPHONE ENCOUNTER
Patient granddaughter in not able to get the phelm out of his lungs his last weight 1/25/21 125 lbs confirmed on Lasix 40 mg advised need an appt with Dr FABIANO Adams .  Please advise thanks

## 2021-02-23 NOTE — PROGRESS NOTES
Goals Addressed                 This Visit's Progress     Prevent complications post hospitalization. 12/29/2020 Granddaughter report patient is feeling well,c/o back pain controlled with Tylenol. Denies chest pain, SOB, N/V/D, fever. Patient does not currently have a scale,educated on CHF S&S and when to call cardio . Granddaughter will monitor patient patient for CHF S&S and report at next outreach. [facility]    1/1-7/21 readmit to hospital for CHF    1/8/21 Granddaughter reports patient was seen by [facility] on 12/31/2020, patient is weak but doing ok. PCP appointment today, HH will resume care,and Lasix was increased. Denies chest pain, SOB, fever, N/V/D. Granddaughter will report changes to plan of care and monitor S&S of CHF to report at next outreach. [facility]    1/13/21 Granddaughter reports patient is doing well and participating with [facility]. Denies chest pain, SOB, fever, N/V/D. Granddaughter will continue to monitor CHF S&S and report at next outreach. [facility]    1/26/21 Contact information left on voicemail requesting callback. [facility]    2/3/21 Granddaughter report attendance of appointment on 1/27/21. Has dry cough no other issues or concerns ar this time. CTN will follow up with granddaughter in 14-21 days. [facility]    2/17/21 Contact information left on voicemail requesting return call. [facility]    2/23/21 Granddaughter contacted CTN to report patient is breathing heavy and sitting on side of bed instead of lying flat, denies edema in abdomen, feet, hands. unable to weight due to scale is broken. Granddaughter advised to contact cardiologist office for medication adjustment and given [facility] information. CTN will follo wup with granddaughter in 1-2 days.  [facility]

## 2021-02-24 ENCOUNTER — OFFICE VISIT (OUTPATIENT)
Dept: CARDIOLOGY CLINIC | Age: 86
DRG: 291 | End: 2021-02-24
Payer: MEDICARE

## 2021-02-24 ENCOUNTER — PATIENT OUTREACH (OUTPATIENT)
Dept: CASE MANAGEMENT | Age: 86
End: 2021-02-24

## 2021-02-24 ENCOUNTER — HOSPITAL ENCOUNTER (INPATIENT)
Age: 86
LOS: 7 days | Discharge: HOME HEALTH | DRG: 291 | End: 2021-03-03
Attending: EMERGENCY MEDICINE | Admitting: HOSPITALIST
Payer: MEDICARE

## 2021-02-24 ENCOUNTER — HOSPITAL ENCOUNTER (OUTPATIENT)
Dept: GENERAL RADIOLOGY | Age: 86
Discharge: HOME | DRG: 291 | End: 2021-02-24
Payer: MEDICARE

## 2021-02-24 ENCOUNTER — TELEPHONE (OUTPATIENT)
Dept: CARDIOLOGY CLINIC | Age: 86
End: 2021-02-24

## 2021-02-24 VITALS
OXYGEN SATURATION: 96 % | DIASTOLIC BLOOD PRESSURE: 82 MMHG | WEIGHT: 124.2 LBS | HEIGHT: 68 IN | BODY MASS INDEX: 18.82 KG/M2 | RESPIRATION RATE: 18 BRPM | HEART RATE: 78 BPM | SYSTOLIC BLOOD PRESSURE: 144 MMHG

## 2021-02-24 DIAGNOSIS — I10 ESSENTIAL HYPERTENSION: ICD-10-CM

## 2021-02-24 DIAGNOSIS — E11.22 CONTROLLED TYPE 2 DIABETES MELLITUS WITH STAGE 2 CHRONIC KIDNEY DISEASE, WITHOUT LONG-TERM CURRENT USE OF INSULIN (HCC): ICD-10-CM

## 2021-02-24 DIAGNOSIS — I42.9 CARDIOMYOPATHY, UNSPECIFIED TYPE (HCC): ICD-10-CM

## 2021-02-24 DIAGNOSIS — G30.9 ALZHEIMER'S DEMENTIA WITH BEHAVIORAL DISTURBANCE, UNSPECIFIED TIMING OF DEMENTIA ONSET: ICD-10-CM

## 2021-02-24 DIAGNOSIS — N18.30 ACUTE RENAL FAILURE SUPERIMPOSED ON STAGE 3 CHRONIC KIDNEY DISEASE, UNSPECIFIED ACUTE RENAL FAILURE TYPE, UNSPECIFIED WHETHER STAGE 3A OR 3B CKD (HCC): ICD-10-CM

## 2021-02-24 DIAGNOSIS — R06.09 DOE (DYSPNEA ON EXERTION): ICD-10-CM

## 2021-02-24 DIAGNOSIS — I49.5 SSS (SICK SINUS SYNDROME) (HCC): ICD-10-CM

## 2021-02-24 DIAGNOSIS — I42.9 CARDIOMYOPATHY, UNSPECIFIED TYPE (HCC): Primary | ICD-10-CM

## 2021-02-24 DIAGNOSIS — I48.0 PAROXYSMAL ATRIAL FIBRILLATION (HCC): ICD-10-CM

## 2021-02-24 DIAGNOSIS — F02.81 ALZHEIMER'S DEMENTIA WITH BEHAVIORAL DISTURBANCE, UNSPECIFIED TIMING OF DEMENTIA ONSET: ICD-10-CM

## 2021-02-24 DIAGNOSIS — N17.9 ACUTE RENAL FAILURE SUPERIMPOSED ON STAGE 3 CHRONIC KIDNEY DISEASE, UNSPECIFIED ACUTE RENAL FAILURE TYPE, UNSPECIFIED WHETHER STAGE 3A OR 3B CKD (HCC): ICD-10-CM

## 2021-02-24 DIAGNOSIS — Z95.0 S/P CARDIAC PACEMAKER PROCEDURE: ICD-10-CM

## 2021-02-24 DIAGNOSIS — N18.2 CONTROLLED TYPE 2 DIABETES MELLITUS WITH STAGE 2 CHRONIC KIDNEY DISEASE, WITHOUT LONG-TERM CURRENT USE OF INSULIN (HCC): ICD-10-CM

## 2021-02-24 DIAGNOSIS — I50.9 ACUTE ON CHRONIC CONGESTIVE HEART FAILURE, UNSPECIFIED HEART FAILURE TYPE (HCC): Primary | ICD-10-CM

## 2021-02-24 DIAGNOSIS — I25.10 ASCVD (ARTERIOSCLEROTIC CARDIOVASCULAR DISEASE): ICD-10-CM

## 2021-02-24 DIAGNOSIS — E43 SEVERE PROTEIN-CALORIE MALNUTRITION (HCC): Chronic | ICD-10-CM

## 2021-02-24 DIAGNOSIS — I50.23 ACUTE ON CHRONIC SYSTOLIC HEART FAILURE (HCC): ICD-10-CM

## 2021-02-24 DIAGNOSIS — Z95.0 CARDIAC PACEMAKER IN SITU: ICD-10-CM

## 2021-02-24 DIAGNOSIS — I47.29 PAROXYSMAL VT: ICD-10-CM

## 2021-02-24 DIAGNOSIS — E78.2 MIXED HYPERLIPIDEMIA: ICD-10-CM

## 2021-02-24 LAB
ALBUMIN SERPL-MCNC: 3.2 G/DL (ref 3.5–5)
ALBUMIN/GLOB SERPL: 0.9 {RATIO} (ref 1.1–2.2)
ALP SERPL-CCNC: 97 U/L (ref 45–117)
ALT SERPL-CCNC: 31 U/L (ref 12–78)
ANION GAP SERPL CALC-SCNC: 11 MMOL/L (ref 5–15)
AST SERPL-CCNC: 27 U/L (ref 15–37)
BASOPHILS # BLD: 0.1 K/UL (ref 0–0.1)
BASOPHILS NFR BLD: 1 % (ref 0–1)
BILIRUB SERPL-MCNC: 0.5 MG/DL (ref 0.2–1)
BUN SERPL-MCNC: 65 MG/DL (ref 6–20)
BUN/CREAT SERPL: 32 (ref 12–20)
CALCIUM SERPL-MCNC: 9.2 MG/DL (ref 8.5–10.1)
CHLORIDE SERPL-SCNC: 106 MMOL/L (ref 97–108)
CO2 SERPL-SCNC: 27 MMOL/L (ref 21–32)
CREAT SERPL-MCNC: 2.01 MG/DL (ref 0.7–1.3)
DIFFERENTIAL METHOD BLD: ABNORMAL
EOSINOPHIL # BLD: 0.1 K/UL (ref 0–0.4)
EOSINOPHIL NFR BLD: 2 % (ref 0–7)
ERYTHROCYTE [DISTWIDTH] IN BLOOD BY AUTOMATED COUNT: 14.9 % (ref 11.5–14.5)
GLOBULIN SER CALC-MCNC: 3.7 G/DL (ref 2–4)
GLUCOSE SERPL-MCNC: 140 MG/DL (ref 65–100)
HCT VFR BLD AUTO: 36.1 % (ref 36.6–50.3)
HGB BLD-MCNC: 11.4 G/DL (ref 12.1–17)
IMM GRANULOCYTES # BLD AUTO: 0 K/UL (ref 0–0.04)
IMM GRANULOCYTES NFR BLD AUTO: 0 % (ref 0–0.5)
LYMPHOCYTES # BLD: 1.1 K/UL (ref 0.8–3.5)
LYMPHOCYTES NFR BLD: 16 % (ref 12–49)
MCH RBC QN AUTO: 26.5 PG (ref 26–34)
MCHC RBC AUTO-ENTMCNC: 31.6 G/DL (ref 30–36.5)
MCV RBC AUTO: 84 FL (ref 80–99)
MONOCYTES # BLD: 0.6 K/UL (ref 0–1)
MONOCYTES NFR BLD: 9 % (ref 5–13)
NEUTS SEG # BLD: 5 K/UL (ref 1.8–8)
NEUTS SEG NFR BLD: 72 % (ref 32–75)
NRBC # BLD: 0.02 K/UL (ref 0–0.01)
NRBC BLD-RTO: 0.3 PER 100 WBC
PLATELET # BLD AUTO: 236 K/UL (ref 150–400)
PMV BLD AUTO: 12.2 FL (ref 8.9–12.9)
POTASSIUM SERPL-SCNC: 4.1 MMOL/L (ref 3.5–5.1)
PROT SERPL-MCNC: 6.9 G/DL (ref 6.4–8.2)
RBC # BLD AUTO: 4.3 M/UL (ref 4.1–5.7)
SODIUM SERPL-SCNC: 144 MMOL/L (ref 136–145)
TROPONIN I SERPL-MCNC: <0.05 NG/ML
WBC # BLD AUTO: 6.9 K/UL (ref 4.1–11.1)

## 2021-02-24 PROCEDURE — 93010 ELECTROCARDIOGRAM REPORT: CPT | Performed by: INTERNAL MEDICINE

## 2021-02-24 PROCEDURE — G9717 DOC PT DX DEP/BP F/U NT REQ: HCPCS | Performed by: INTERNAL MEDICINE

## 2021-02-24 PROCEDURE — 65270000029 HC RM PRIVATE

## 2021-02-24 PROCEDURE — 93005 ELECTROCARDIOGRAM TRACING: CPT

## 2021-02-24 PROCEDURE — G8427 DOCREV CUR MEDS BY ELIG CLIN: HCPCS | Performed by: INTERNAL MEDICINE

## 2021-02-24 PROCEDURE — G8420 CALC BMI NORM PARAMETERS: HCPCS | Performed by: INTERNAL MEDICINE

## 2021-02-24 PROCEDURE — 93005 ELECTROCARDIOGRAM TRACING: CPT | Performed by: INTERNAL MEDICINE

## 2021-02-24 PROCEDURE — 36415 COLL VENOUS BLD VENIPUNCTURE: CPT

## 2021-02-24 PROCEDURE — 80053 COMPREHEN METABOLIC PANEL: CPT

## 2021-02-24 PROCEDURE — 84484 ASSAY OF TROPONIN QUANT: CPT

## 2021-02-24 PROCEDURE — 99284 EMERGENCY DEPT VISIT MOD MDM: CPT

## 2021-02-24 PROCEDURE — 71046 X-RAY EXAM CHEST 2 VIEWS: CPT

## 2021-02-24 PROCEDURE — G0463 HOSPITAL OUTPT CLINIC VISIT: HCPCS | Performed by: INTERNAL MEDICINE

## 2021-02-24 PROCEDURE — G8536 NO DOC ELDER MAL SCRN: HCPCS | Performed by: INTERNAL MEDICINE

## 2021-02-24 PROCEDURE — 96374 THER/PROPH/DIAG INJ IV PUSH: CPT

## 2021-02-24 PROCEDURE — 99214 OFFICE O/P EST MOD 30 MIN: CPT | Performed by: INTERNAL MEDICINE

## 2021-02-24 PROCEDURE — 85025 COMPLETE CBC W/AUTO DIFF WBC: CPT

## 2021-02-24 PROCEDURE — 74011250636 HC RX REV CODE- 250/636: Performed by: EMERGENCY MEDICINE

## 2021-02-24 PROCEDURE — 1101F PT FALLS ASSESS-DOCD LE1/YR: CPT | Performed by: INTERNAL MEDICINE

## 2021-02-24 RX ORDER — MEMANTINE HYDROCHLORIDE 10 MG/1
5 TABLET ORAL DAILY
Status: DISCONTINUED | OUTPATIENT
Start: 2021-02-25 | End: 2021-03-03 | Stop reason: HOSPADM

## 2021-02-24 RX ORDER — ENOXAPARIN SODIUM 100 MG/ML
30 INJECTION SUBCUTANEOUS DAILY
Status: DISCONTINUED | OUTPATIENT
Start: 2021-02-25 | End: 2021-02-24

## 2021-02-24 RX ORDER — PANTOPRAZOLE SODIUM 40 MG/1
40 TABLET, DELAYED RELEASE ORAL
Status: DISCONTINUED | OUTPATIENT
Start: 2021-02-25 | End: 2021-03-03 | Stop reason: HOSPADM

## 2021-02-24 RX ORDER — FUROSEMIDE 10 MG/ML
40 INJECTION INTRAMUSCULAR; INTRAVENOUS DAILY
Status: DISCONTINUED | OUTPATIENT
Start: 2021-02-25 | End: 2021-02-25

## 2021-02-24 RX ORDER — SODIUM CHLORIDE 0.9 % (FLUSH) 0.9 %
5-40 SYRINGE (ML) INJECTION EVERY 8 HOURS
Status: DISCONTINUED | OUTPATIENT
Start: 2021-02-24 | End: 2021-03-03 | Stop reason: HOSPADM

## 2021-02-24 RX ORDER — PROMETHAZINE HYDROCHLORIDE 25 MG/1
12.5 TABLET ORAL
Status: DISCONTINUED | OUTPATIENT
Start: 2021-02-24 | End: 2021-03-03 | Stop reason: HOSPADM

## 2021-02-24 RX ORDER — ONDANSETRON 2 MG/ML
4 INJECTION INTRAMUSCULAR; INTRAVENOUS
Status: DISCONTINUED | OUTPATIENT
Start: 2021-02-24 | End: 2021-03-03 | Stop reason: HOSPADM

## 2021-02-24 RX ORDER — GUAIFENESIN 100 MG/5ML
81 LIQUID (ML) ORAL DAILY
Status: DISCONTINUED | OUTPATIENT
Start: 2021-02-25 | End: 2021-03-03 | Stop reason: HOSPADM

## 2021-02-24 RX ORDER — LISINOPRIL 5 MG/1
5 TABLET ORAL DAILY
Status: DISCONTINUED | OUTPATIENT
Start: 2021-02-25 | End: 2021-02-25

## 2021-02-24 RX ORDER — POLYETHYLENE GLYCOL 3350 17 G/17G
17 POWDER, FOR SOLUTION ORAL DAILY PRN
Status: DISCONTINUED | OUTPATIENT
Start: 2021-02-24 | End: 2021-03-03 | Stop reason: HOSPADM

## 2021-02-24 RX ORDER — CARVEDILOL 6.25 MG/1
6.25 TABLET ORAL 2 TIMES DAILY
Status: DISCONTINUED | OUTPATIENT
Start: 2021-02-25 | End: 2021-03-03 | Stop reason: HOSPADM

## 2021-02-24 RX ORDER — ACETAMINOPHEN 650 MG/1
650 SUPPOSITORY RECTAL
Status: DISCONTINUED | OUTPATIENT
Start: 2021-02-24 | End: 2021-03-03 | Stop reason: HOSPADM

## 2021-02-24 RX ORDER — SODIUM CHLORIDE 0.9 % (FLUSH) 0.9 %
5-40 SYRINGE (ML) INJECTION AS NEEDED
Status: DISCONTINUED | OUTPATIENT
Start: 2021-02-24 | End: 2021-03-03 | Stop reason: HOSPADM

## 2021-02-24 RX ORDER — DONEPEZIL HYDROCHLORIDE 5 MG/1
10 TABLET, FILM COATED ORAL
Status: DISCONTINUED | OUTPATIENT
Start: 2021-02-24 | End: 2021-02-25

## 2021-02-24 RX ORDER — ACETAMINOPHEN 325 MG/1
650 TABLET ORAL
Status: DISCONTINUED | OUTPATIENT
Start: 2021-02-24 | End: 2021-03-03 | Stop reason: HOSPADM

## 2021-02-24 RX ORDER — FUROSEMIDE 10 MG/ML
40 INJECTION INTRAMUSCULAR; INTRAVENOUS
Status: COMPLETED | OUTPATIENT
Start: 2021-02-24 | End: 2021-02-24

## 2021-02-24 RX ADMIN — FUROSEMIDE 40 MG: 10 INJECTION, SOLUTION INTRAVENOUS at 22:21

## 2021-02-24 NOTE — PROGRESS NOTES
Goals Addressed                 This Visit's Progress     Prevent complications post hospitalization. 12/29/2020 Granddaughter report patient is feeling well,c/o back pain controlled with Tylenol. Denies chest pain, SOB, N/V/D, fever. Patient does not currently have a scale,educated on CHF S&S and when to call cardio . Granddaughter will monitor patient patient for CHF S&S and report at next outreach. [facility]    1/1-7/21 readmit to hospital for CHF    1/8/21 Granddaughter reports patient was seen by [facility] on 12/31/2020, patient is weak but doing ok. PCP appointment today, HH will resume care,and Lasix was increased. Denies chest pain, SOB, fever, N/V/D. Granddaughter will report changes to plan of care and monitor S&S of CHF to report at next outreach. [facility]    1/13/21 Granddaughter reports patient is doing well and participating with [facility]. Denies chest pain, SOB, fever, N/V/D. Granddaughter will continue to monitor CHF S&S and report at next outreach. [facility]    1/26/21 Contact information left on voicemail requesting callback. [facility]    2/3/21 Granddaughter report attendance of appointment on 1/27/21. Has dry cough no other issues or concerns ar this time. CTN will follow up with granddaughter in 14-21 days. [facility]    2/17/21 Contact information left on voicemail requesting return call. [facility]    2/23/21 Granddaughter contacted CTN to report patient is breathing heavy and sitting on side of bed instead of lying flat, denies edema in abdomen, feet, hands. unable to weight due to scale is broken. Granddaughter advised to contact cardiologist office for medication adjustment and given [facility] information. CTN will follo wup with granddaughter in 1-2 days. [facility]    2/24/21 Per review of chart patient is scheduled to see cardiology today at [phone]. CTN will follow up with granddaughter in 1 day for changes in plan of care.  [facility]

## 2021-02-24 NOTE — PROGRESS NOTES
1. Have you been to the ER, urgent care clinic since your last visit? Hospitalized since your last visit? YES,1/1/21, [facility], CHF    2. Have you seen or consulted any other health care providers outside of the [address] [place] [address] since your last visit? Include any pap smears or colon screening.  No        Chief Complaint   Patient presents with    Cardiomyopathy     C/O CP, SOB, Headache

## 2021-02-24 NOTE — LETTER
2/24/2021 Patient: Marko Farfan Sr  
YOB: 1931 Date of Visit: 2/24/2021 Jonny Damon MD 
[address] [id number] [address] [employer] Dear Jonny Damon MD, Thank you for referring Mr. Pillo Winston Sr to [facility] for evaluation. My notes for this consultation are attached. If you have questions, please do not hesitate to call me. I look forward to following your patient along with you.  
 
 
Sincerely, 
 
Rubén Castillo MD

## 2021-02-24 NOTE — TELEPHONE ENCOUNTER
Vania Felipe called, patient has appt this afternoon. She thinks he may need to be admitted. When he lays down he is having trouble breathing.     Thanks,  Arnoldo

## 2021-02-24 NOTE — PROGRESS NOTES
Luis A Dupree, [facility]    Subjective/HPI:     Mr. Seth Harris is a 80 y.o. male is here for routine f/u. He has a PMHx of CAD, SVT with ablation, Cardiomyopathy, afib, HTN, SSS with pacemaker, Hyperlipidemia, DM, and Alzheimer's disease. His granddaughter provides ROS d/t patient's cognitive status. She reports for the past 3 days he has been giving them signs of fluid in his lungs. He will unbuckle his belt and he will make noises sounding like he is uncomfortable. Last night had had a bad night sleeping poorly d/t being sob. Today he would not lie down d/t feeling more sob when lying down. He is in pain from his neck down. He almost fell coming in to the office today d/t this discomfort.           PCP Provider  Areli Santa MD    Patient Active Problem List   Diagnosis Code    Right inguinal hernia K40.90    ASCVD (arteriosclerotic cardiovascular disease) I25.10    Hypokalemia E87.6    Near syncope R55    SVT (supraventricular tachycardia) ([facility])--s/p RFA I47.1    S/P cardiac pacemaker procedure Z95.0    S/P ablation of accessory bypass tract Z98.890    SSS (sick sinus syndrome) ([facility]) I49.5    Diverticulosis K57.90    Gastritis and duodenitis K29.90    Internal hemorrhoids K64.8    Hypertension with renal disease I12.9    Mixed hyperlipidemia E78.2    GI bleed K92.2    Primary osteoarthritis involving multiple joints M89.49    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, without long-term current use of insulin ([facility]) E11.22, N18.2    Back pain M54.9    Anemia D64.9    Alzheimer disease ([facility] [address] G30.9, F02.80    Medicare annual wellness visit, subsequent Z00.00    TIA (transient ischemic attack) C99.4    Metabolic encephalopathy X98.23    Alcoholism ([facility] [address] F10.20    Acute bilateral low back pain without sciatica M54.5    Mild depression ([facility]) F32.0    Syncope R55    Primary insomnia F51.01    A-fib ([facility]) I48.91    Alcohol withdrawal syndrome, with delirium ([facility]) F10.231    Paroxysmal VT ([facility]) I47.2    Cardiomyopathy ([facility]) I42.9    Weight loss R63.4    Unsteady gait R26.81    Dehydration E86.0    Acute renal failure superimposed on stage 3 chronic kidney disease ([facility]) N17.9, N18.30    Severe protein-calorie malnutrition ([facility]) E43    Esophageal dyskinesia K22.4    Hypotension I95.9    Weakness R53.1    Acute on chronic systolic heart failure ([facility]) I50.23    Pleural effusion J90    SUBHA (acute kidney injury) ([facility]) N17.9    Anasarca R60.1    CHF (congestive heart failure) ([facility]) I50.9       Social History     Tobacco Use    Smoking status: Former Smoker     Packs/day: 0.50     Years: 10.00     Pack years: 5.00     Start date: 7/12/1991    Smokeless tobacco: Never Used    Tobacco comment: quit 50 years ago   Substance Use Topics    Alcohol use: Not Currently     Comment: Occasional beer       Current Outpatient Medications   Medication Sig    carvediloL (COREG) 6.25 mg tablet Take 1 Tab by mouth two (2) times a day.  colchicine 0.6 mg tablet Take 0.6 mg by mouth daily.  furosemide (LASIX) 40 mg tablet 1 each morning (Patient taking differently: Take 40 mg by mouth daily.)    lisinopriL (PRINIVIL, ZESTRIL) 5 mg tablet Take 1 Tab by mouth daily.  donepeziL (ARICEPT) 10 mg tablet TAKE 1/2 TABLET BY MOUTH EVERY DAY (Patient taking differently: Take 10 mg by mouth nightly. TAKE 1/2 TABLET BY MOUTH EVERY DAY)    acetaminophen (TYLENOL) 650 mg TbER Take 650 mg by mouth two (2) times a day.  memantine (NAMENDA) 10 mg tablet TAKE 1 TABLET BY MOUTH TWICE A DAY    omeprazole (PRILOSEC) 20 mg capsule Take 1 Cap by mouth daily.  aspirin 81 mg chewable tablet Take 1 Tab by mouth daily. No current facility-administered medications for this visit. No Known Allergies     I have reviewed the problem list, allergy list, medical history, family, social history and medications.     Review of Symptoms:    Review of Systems Constitutional: Positive for malaise/fatigue. Respiratory: Positive for shortness of breath. Cardiovascular: Positive for chest pain, orthopnea and PND. Gastrointestinal: Negative for abdominal pain, diarrhea, nausea and vomiting. Musculoskeletal: Positive for back pain. Neurological: Positive for weakness. Negative for dizziness, speech change and headaches. Psychiatric/Behavioral: Positive for memory loss. Physical Exam:      General: In no acute distress, cooperative and alert  HEENT: No carotid bruits, no JVD, trach is midline. Neck Supple, PEERL, EOM intact. Heart:  reg rate and rhythm; normal S1/S2; +NIKUNJ, no gallops or rubs. Respiratory: Clear bilaterally x 4, no wheezing or rales  Abdomen:   Soft, non-tender, no distention, no masses. + BS. Extremities:  Normal cap refill, no cyanosis, atraumatic. No edema. Neuro: Alert, in w/c   Skin: Skin color is normal. No rashes or lesions. Non diaphoretic  Vascular: 2+ pulses symmetric in all extremities    Visit Vitals  BP (!) 144/82 (BP 1 Location: Left upper arm, BP Patient Position: Sitting, BP Cuff Size: Adult)   Pulse 78   Resp 18   Ht 5' 8\" (1.727 m)   Wt 124 lb 3.2 oz (56.3 kg)   SpO2 96%   BMI 18.88 kg/m²       Cardiographics    ECG: V paced. PVC    Echo 12/20  · LV: Estimated LVEF is 15 - 20%. Dilated left ventricle. Mild septal wall hypertrophy. Severely reduced systolic function; Worse function in the inferior and lateral walls. · RV: Borderline low systolic function. Pacer/ICD present. · RA: Dilated right atrium. · AV: Mild to moderate aortic valve regurgitation is present. · MV: Mitral valve thickening. Moderate to severe mitral valve regurgitation is present. · TV: Moderate tricuspid valve regurgitation is present. · PV: Mild pulmonic valve regurgitation is present. · PA: Pulmonary arterial systolic pressure is 50 mmHg.     Cardiology Labs:    Lab Results   Component Value Date/Time    Cholesterol, total 194 01/27/2021 09:56 AM    HDL Cholesterol 77 01/27/2021 09:56 AM    LDL, calculated 103 01/27/2021 09:56 AM    LDL, calculated 116 10/22/2020 01:35 PM    LDL, calculated 139 (H) 12/06/2019 11:06 AM    VLDL, calculated 16.4 10/29/2019 02:21 AM    VLDL 14 01/27/2021 09:56 AM    CHOL/HDL Ratio 3 01/27/2021 09:56 AM       Lab Results   Component Value Date/Time    Hemoglobin A1c 5.6 01/27/2021 09:56 AM    Hemoglobin A1c (POC) 5.6 06/12/2018 01:54 PM       Lab Results   Component Value Date/Time    Sodium 143 01/27/2021 09:56 AM    Potassium 3.8 01/27/2021 09:56 AM    Chloride 99 01/27/2021 09:56 AM    CO2 34.0 (H) 01/27/2021 09:56 AM    Glucose 83 01/27/2021 09:56 AM    BUN 34.0 (H) 01/27/2021 09:56 AM    Creatinine 1.2 01/27/2021 09:56 AM    BUN/Creatinine ratio 28 01/27/2021 09:56 AM    GFR est AA >60 01/27/2021 09:56 AM    GFR est non-AA 57 01/27/2021 09:56 AM    Calcium 9.3 01/27/2021 09:56 AM    Anion gap 10 01/27/2021 09:56 AM    Bilirubin, total 0.6 01/27/2021 09:56 AM    ALT (SGPT) 25 01/27/2021 09:56 AM    Alk. phosphatase 67 01/27/2021 09:56 AM    Protein, total 6.1 (L) 01/27/2021 09:56 AM    Albumin 3.6 (L) 01/27/2021 09:56 AM    Globulin 2.50 01/27/2021 09:56 AM    A-G Ratio 1.4 01/27/2021 09:56 AM       Orders Placed This Encounter    XR CHEST PA LAT     Standing Status:   Future     Standing Expiration Date:   4/00/6242    METABOLIC PANEL, COMPREHENSIVE    MAGNESIUM    CBC WITH AUTOMATED DIFF    BNP    AMB POC EKG ROUTINE W/ 12 LEADS, INTER & REP     Order Specific Question:   Reason for Exam:     Answer:   ROUTINE        Assessment:     Assessment:       ICD-10-CM ICD-9-CM    1. Cardiomyopathy, unspecified type ([facility] [address]  O04.7 040.9 METABOLIC PANEL, COMPREHENSIVE      MAGNESIUM      CBC WITH AUTOMATED DIFF      BNP      XR CHEST PA LAT   2.  ASCVD (arteriosclerotic cardiovascular disease)  I25.10 429.2 AMB POC EKG ROUTINE W/ 12 LEADS, INTER & REP     226.3 METABOLIC PANEL, COMPREHENSIVE      MAGNESIUM      CBC WITH AUTOMATED DIFF      BNP      XR CHEST PA LAT   3. Essential hypertension  Z38 083.9 METABOLIC PANEL, COMPREHENSIVE      MAGNESIUM      CBC WITH AUTOMATED DIFF      BNP      XR CHEST PA LAT   4. Paroxysmal atrial fibrillation ([facility])  U69.0 596.81 METABOLIC PANEL, COMPREHENSIVE      MAGNESIUM      CBC WITH AUTOMATED DIFF      BNP      XR CHEST PA LAT   5. SSS (sick sinus syndrome) ([facility])  K50.3 990.18 METABOLIC PANEL, COMPREHENSIVE      MAGNESIUM      CBC WITH AUTOMATED DIFF      BNP   6. Cardiac pacemaker in situ  U13.8 M46.73 METABOLIC PANEL, COMPREHENSIVE      MAGNESIUM      CBC WITH AUTOMATED DIFF      BNP        Plan:     Cardiomyopathy, unspecified type   Worsening symptoms in past 3 days indicating questionable volume overload. They do not weigh him at home. Weight up a few pounds from a previous appt. No indications of volume overload on exam. Evaluate with CXR and labs including BNP. Advised continue Lasix, BB and ACEi. If worsens recommend proceeding to ED. ASCVD (arteriosclerotic cardiovascular disease)  He is having pain in his upper torso, worse with twisting. V paced on EKG today. lexiscan stress test neg in 2017 for ischemia. Given his advanced age and Alzheimer's disease will hold on ischemic w/u.     3. Essential hypertension  BP mildly elevated, but appropriate for his age    3. Paroxysmal atrial fibrillation ([facility] [address]  In December he was seen by Dr Lance Mohamud with device interrogation showing he had up to 4 hours of atrial flutter with brief NSVT. At that time after discussion with his family given his dementia decision was made not to pursue aggressive management of AFL or NSVT with known cardiomyopathy. On BB and ASA. 5. SSS (sick sinus syndrome) ([facility] [address]  12/20 His device interrogation showed normal functioning, 74.3% AP for his sick sinus, 99.5% RVP for his chb. Continue f/u with Dr Lance Mohamud per his instructions. F/U dependent on results.          Hanna Barnes MD

## 2021-02-25 ENCOUNTER — PATIENT OUTREACH (OUTPATIENT)
Dept: CASE MANAGEMENT | Age: 86
End: 2021-02-25

## 2021-02-25 LAB
ANION GAP SERPL CALC-SCNC: 10 MMOL/L (ref 5–15)
ATRIAL RATE: 80 BPM
BUN SERPL-MCNC: 67 MG/DL (ref 6–20)
BUN/CREAT SERPL: 35 (ref 12–20)
CALCIUM SERPL-MCNC: 9.7 MG/DL (ref 8.5–10.1)
CALCULATED R AXIS, ECG10: 146 DEGREES
CALCULATED T AXIS, ECG11: 61 DEGREES
CHLORIDE SERPL-SCNC: 107 MMOL/L (ref 97–108)
CO2 SERPL-SCNC: 27 MMOL/L (ref 21–32)
COMMENT, HOLDF: NORMAL
CREAT SERPL-MCNC: 1.91 MG/DL (ref 0.7–1.3)
DIAGNOSIS, 93000: NORMAL
ERYTHROCYTE [DISTWIDTH] IN BLOOD BY AUTOMATED COUNT: 14.9 % (ref 11.5–14.5)
GLUCOSE SERPL-MCNC: 105 MG/DL (ref 65–100)
HCT VFR BLD AUTO: 36 % (ref 36.6–50.3)
HGB BLD-MCNC: 11.5 G/DL (ref 12.1–17)
MAGNESIUM SERPL-MCNC: 2.4 MG/DL (ref 1.6–2.4)
MCH RBC QN AUTO: 26.4 PG (ref 26–34)
MCHC RBC AUTO-ENTMCNC: 31.9 G/DL (ref 30–36.5)
MCV RBC AUTO: 82.6 FL (ref 80–99)
NRBC # BLD: 0.02 K/UL (ref 0–0.01)
NRBC BLD-RTO: 0.3 PER 100 WBC
P-R INTERVAL, ECG05: 116 MS
PLATELET # BLD AUTO: 221 K/UL (ref 150–400)
PMV BLD AUTO: 12.7 FL (ref 8.9–12.9)
POTASSIUM SERPL-SCNC: 3.9 MMOL/L (ref 3.5–5.1)
Q-T INTERVAL, ECG07: 482 MS
QRS DURATION, ECG06: 186 MS
QTC CALCULATION (BEZET), ECG08: 555 MS
RBC # BLD AUTO: 4.36 M/UL (ref 4.1–5.7)
SAMPLES BEING HELD,HOLD: NORMAL
SODIUM SERPL-SCNC: 144 MMOL/L (ref 136–145)
TROPONIN I SERPL-MCNC: <0.05 NG/ML
TROPONIN I SERPL-MCNC: <0.05 NG/ML
VENTRICULAR RATE, ECG03: 80 BPM
WBC # BLD AUTO: 6.9 K/UL (ref 4.1–11.1)

## 2021-02-25 PROCEDURE — 85027 COMPLETE CBC AUTOMATED: CPT

## 2021-02-25 PROCEDURE — 84484 ASSAY OF TROPONIN QUANT: CPT

## 2021-02-25 PROCEDURE — 36415 COLL VENOUS BLD VENIPUNCTURE: CPT

## 2021-02-25 PROCEDURE — 83735 ASSAY OF MAGNESIUM: CPT

## 2021-02-25 PROCEDURE — 65660000000 HC RM CCU STEPDOWN

## 2021-02-25 PROCEDURE — 74011250636 HC RX REV CODE- 250/636: Performed by: HOSPITALIST

## 2021-02-25 PROCEDURE — 74011250637 HC RX REV CODE- 250/637: Performed by: HOSPITALIST

## 2021-02-25 PROCEDURE — 80048 BASIC METABOLIC PNL TOTAL CA: CPT

## 2021-02-25 RX ORDER — DONEPEZIL HYDROCHLORIDE 5 MG/1
5 TABLET, FILM COATED ORAL DAILY
Status: DISCONTINUED | OUTPATIENT
Start: 2021-02-26 | End: 2021-03-03 | Stop reason: HOSPADM

## 2021-02-25 RX ORDER — FUROSEMIDE 10 MG/ML
40 INJECTION INTRAMUSCULAR; INTRAVENOUS 2 TIMES DAILY
Status: DISCONTINUED | OUTPATIENT
Start: 2021-02-25 | End: 2021-02-26

## 2021-02-25 RX ORDER — HEPARIN SODIUM 5000 [USP'U]/ML
5000 INJECTION, SOLUTION INTRAVENOUS; SUBCUTANEOUS EVERY 12 HOURS
Status: DISCONTINUED | OUTPATIENT
Start: 2021-02-25 | End: 2021-03-03 | Stop reason: HOSPADM

## 2021-02-25 RX ORDER — DONEPEZIL HYDROCHLORIDE 5 MG/1
5 TABLET, FILM COATED ORAL
Status: DISCONTINUED | OUTPATIENT
Start: 2021-02-25 | End: 2021-02-25

## 2021-02-25 RX ADMIN — Medication 10 ML: at 14:18

## 2021-02-25 RX ADMIN — DONEPEZIL HYDROCHLORIDE 10 MG: 5 TABLET, FILM COATED ORAL at 01:04

## 2021-02-25 RX ADMIN — HEPARIN SODIUM 5000 UNITS: 5000 INJECTION INTRAVENOUS; SUBCUTANEOUS at 21:07

## 2021-02-25 RX ADMIN — CARVEDILOL 6.25 MG: 6.25 TABLET, FILM COATED ORAL at 17:49

## 2021-02-25 RX ADMIN — CARVEDILOL 6.25 MG: 6.25 TABLET, FILM COATED ORAL at 09:23

## 2021-02-25 RX ADMIN — PANTOPRAZOLE SODIUM 40 MG: 40 TABLET, DELAYED RELEASE ORAL at 09:23

## 2021-02-25 RX ADMIN — ASPIRIN 81 MG: 81 TABLET, CHEWABLE ORAL at 09:23

## 2021-02-25 RX ADMIN — FUROSEMIDE 40 MG: 10 INJECTION, SOLUTION INTRAVENOUS at 09:24

## 2021-02-25 RX ADMIN — MEMANTINE HYDROCHLORIDE 5 MG: 10 TABLET ORAL at 10:35

## 2021-02-25 RX ADMIN — HEPARIN SODIUM 5000 UNITS: 5000 INJECTION INTRAVENOUS; SUBCUTANEOUS at 09:23

## 2021-02-25 RX ADMIN — FUROSEMIDE 40 MG: 10 INJECTION, SOLUTION INTRAVENOUS at 17:50

## 2021-02-25 RX ADMIN — Medication 10 ML: at 21:07

## 2021-02-25 RX ADMIN — Medication 10 ML: at 01:06

## 2021-02-25 NOTE — ED NOTES
Report given to Claudy Bañuelos RN. They were informed of patient chief complaint, current status, orders completed (to include IV access/medications/radiology testing), outstanding orders that still need to be completed, and the treatment plan. Ensured no questions or concerns regarding the patient prior to departure.

## 2021-02-25 NOTE — ED NOTES
TRANSFER - IN REPORT:    Verbal and bedside report received from [facility] (name) on Benito Farfan Sr.  Report consisted of patients Situation, Background, Assessment and   Recommendations(SBAR). Information from the following report(s) SBAR and ED Summary was reviewed with the receiving nurse. Opportunity for questions and clarification was provided. 1657: TRANSFER - OUT REPORT:    Verbal report given to Lois Washington (name) on Benito Farfan Sr  being transferred to Ortho (unit) for routine progression of care       Report consisted of patients Situation, Background, Assessment and   Recommendations(SBAR). Information from the following report(s) SBAR, ED Summary and Recent Results was reviewed with the receiving nurse. Lines:   Peripheral IV 02/25/21 Right Antecubital (Active)   Site Assessment Clean, dry, & intact 02/25/21 0121   Phlebitis Assessment 0 02/25/21 0121   Infiltration Assessment 0 02/25/21 0121   Dressing Status Clean, dry, & intact 02/25/21 0121       Peripheral IV 02/25/21 (Active)        Opportunity for questions and clarification was provided.       Patient transported with:   [employer]

## 2021-02-25 NOTE — ED TRIAGE NOTES
Pt presents w/ daughter. Pt daughter reports he has been having trouble w/ pulmonary edema. Pt daughter reports he was seen by cardiology and had a chest scan today in [facility]. New onset all over weakness x2 day, cardiology advised them to come to ED for possible admission.  Pt w/ hx of COPD

## 2021-02-25 NOTE — PROGRESS NOTES
Goals Addressed                 This Visit's Progress     Prevent complications post hospitalization. 12/29/2020 Granddaughter report patient is feeling well,c/o back pain controlled with Tylenol. Denies chest pain, SOB, N/V/D, fever. Patient does not currently have a scale,educated on CHF S&S and when to call cardio . Granddaughter will monitor patient patient for CHF S&S and report at next outreach. [facility]    1/1-7/21 readmit to hospital for CHF    1/8/21 Granddaughter reports patient was seen by [facility] on 12/31/2020, patient is weak but doing ok. PCP appointment today, HH will resume care,and Lasix was increased. Denies chest pain, SOB, fever, N/V/D. Granddaughter will report changes to plan of care and monitor S&S of CHF to report at next outreach. [facility]    1/13/21 Granddaughter reports patient is doing well and participating with [place] [facility]. Denies chest pain, SOB, fever, N/V/D. Granddaughter will continue to monitor CHF S&S and report at next outreach. [facility]    1/26/21 Contact information left on voicemail requesting callback. [facility]    2/3/21 Granddaughter report attendance of appointment on 1/27/21. Has dry cough no other issues or concerns ar this time. CTN will follow up with granddaughter in 14-21 days. [facility]    2/17/21 Contact information left on voicemail requesting return call. [facility]    2/23/21 Granddaughter contacted CTN to report patient is breathing heavy and sitting on side of bed instead of lying flat, denies edema in abdomen, feet, hands. unable to weight due to scale is broken. Granddaughter advised to contact cardiologist office for medication adjustment and given [facility] information. CTN will follo wup with granddaughter in 1-2 days. [facility]    2/24/21 Per review of chart patient is scheduled to see cardiology today at [phone]. CTN will follow up with granddaughter in 1 day for changes in plan of care.  [facility]    2/25/21 Per review of chart patient attended cardiology appt and was sent to the ED and is currently admitted. Contact information left on voicemail requesting a return call. CTN will follow up upon discharge.  EMILY

## 2021-02-25 NOTE — ED NOTES
Patient ambulated with pulse ox. Machine had difficulty reading but did log patient at 89%. Patient was unable to make it more than 3 steps without severe difficulty breathing and feeling increasingly weak.  Will notify MD.

## 2021-02-25 NOTE — PROGRESS NOTES
Transition of Care Plan:    Disposition: Home with family and possible need for [facility] (PT, OT, SN)   Follow up appointments:PCP, Cardiology  DME needed: None   Transportation at Discharge:Patient's grand daughter   Vanesa Love or means to access home:  Pt's family has access to home       IM Medicare letter:2nd IM letter needed before discharge   Caregiver Contact:  Granddajoseph Gerardo [phone]  Discharge Caregiver contacted prior to discharge? Unit CM to follow up     Reason for Admission:   CHF Excerbation                  RUR Score:     26 %, moderate risk for readmission             PCP: First and Last name:  Dr. Mike Arnold   Name of Practice:     Are you a current patient: Yes/No:  Yes   Approximate date of last visit:  1/27/21   Can you participate in a virtual visit if needed:  Yes    Do you (patient/family) have any concerns for transition/discharge? Not at this time              Plan for utilizing home health:  Patient open to [facility]  For PT, [place], and OT    Current Advanced Directive/Advance Care Plan:    Joesph Davis (ACP) Conversation    Date of Conversation: 2/24/2021  Conducted with: Patient with Decision Making Capacity and Healthcare Decision Maker: Named in Advance Directive or Healthcare Power of [address]:     Primary Decision Maker (Active): Kate Whipple - [phone]    Secondary Decision Maker: Ann Neville - Daughter - [phone]    Content/Action Overview:    Has ACP document(s) on file - reflects the patient's care preferences  Reviewed DNR/DNI and patient confirms current DNR status - completed forms on file (place new order if needed)    Length of Voluntary ACP Conversation in minutes:  16 minutes    Davonte Ray RN         Transition of Care Plan:        CM placed call to patient's grand daughter Phil Aleena Lee  to discuss discharge planning. Patient name, , and demographics all verified in chart. Patient lives in a one story home with his grand daughter Mook Serra who is his mPOA. Per patient granddaughter patient does not use any DME at baseline. Patient's grand daughter assist with ADLS as needed. Patient is active with his PCP every 3 months. Patient's Cardiologist is Dr. Nicolas Rosas and his EP MD for his pacemaker is Dr. Gage Cancino. Patient's grand daughter provides transportation. Patient has a [place] history with [facility]. Patient has no SNF or IPR history. Patient's preferred pharmacy is [facility] on [address]. Care Management Interventions  PCP Verified by CM: Yes  Last Visit to PCP: 21  Mode of Transport at Discharge: Other (see comment)(patient granddaughter to transport )  Transition of Care Consult (CM Consult): Discharge Planning  Physical Therapy Consult: No  Occupational Therapy Consult: No  Current Support Network: Relative's Home  Confirm Follow Up Transport: Family  The Plan for Transition of Care is Related to the Following Treatment Goals : Home with follow up appointments   Discharge Location  Discharge Placement: Home with family assistance    Unit CM to continue to follow for discharge needs and planning.     Tillman Cogan, BSN, RN, [facility]

## 2021-02-25 NOTE — H&P
Hospitalist Admission Note    NAME: Miguel Shelton Sr   :  1931   MRN:  [id number]     Date/Time:  2021 11:40 PM    Patient PCP: Jevon Calero MD  _____________________________________________________________________  Given the patient's current clinical presentation, I have a high level of concern for decompensation if discharged from the emergency department. Complex decision making was performed, which includes reviewing the patient's available past medical records, laboratory results, and x-ray films. My assessment of this patient's clinical condition and my plan of care is as follows. Assessment / Plan:  Per Pt daughter reports he has been having trouble  w/  SOB. was seen by cardiology today and advised to come to the ED if symptoms worsen. New onset all over weakness x2 day and total body pain. Acute on chronic systolic heart failure exacerbation  Chest x-ray Bilateral pleural effusions. ECG: V paced. PVC HR 80   SUBHA Cr 2.1  Could be 2/2 cardiorenal     Admit to hospitalist service CHF pathway telemetry  Check ProBNP  continue  Iv Lasix 40mg bid for now, BB and ACEi  strict I's and O's, daily weight   Card Richie Barry MD consulted    Hypertension controlled continue home medication    Paroxysmal atrial fibrillation   Per cardiology note today ;in December he was seen by Dr Angel Salazar with device interrogation showing he had up to 4 hours of atrial flutter with brief NSVT. At that time after discussion with his family given his dementia decision was made not to pursue aggressive management of AFL or NSVT with known cardiomyopathy. On BB and ASA. SSS (sick sinus syndrome)    His device interrogation showed normal functioning, 74.3% AP for his sick sinus, 99.5% RVP for his chb. Continue f/u with Dr Angel Salazar   -matthew .  Consulted for  pacemaker interrogated     History of Alzheimer dementia  Cont supportive care        Code Status: DNR  NOKBernetta Boxer [phone]   Jess Farfan Daughter   [phone]     DVT Prophylaxis: Subcu heparin  GI Prophylaxis: not indicated    Baseline: Dependent    Echo 12/20  · LV: Estimated LVEF is 15 - 20%. Dilated left ventricle. Mild septal wall hypertrophy. Severely reduced systolic function; Worse function in the inferior and lateral walls. · RV: Borderline low systolic function. Pacer/ICD present. · RA: Dilated right atrium. · AV: Mild to moderate aortic valve regurgitation is present. · MV: Mitral valve thickening. Moderate to severe mitral valve regurgitation is present. · TV: Moderate tricuspid valve regurgitation is present. · PV: Mild pulmonic valve regurgitation is present. · PA: Pulmonary arterial systolic pressure is 50 mmHg. Subjective:   CHIEF COMPLAINT:  SOB/GONGORA/PND    HISTORY OF PRESENT ILLNESS:     Indra Latham Sr is a 80 y.o.  male With with a past medical history of end-stage CHF, dementia, CKD stage III, diabetes mellitus, hypertension who presents Sent bu his card. For possible fluid overload and possible chf exacerbation. Patient takes lasix for CHF and recently had his dose increased, but patient is reporting difficulty breathing especially when lying down and on exertion. Granddaughter reports increasing coughing concurrent with the difficulty breathing . While in ED he was unable to make it more than 3 steps without severe difficulty breathing and feeling increasingly weak       Pt. has a PMHx of CAD, SVT with ablation, Cardiomyopathy, afib, HTN, SSS with pacemaker, Hyperlipidemia, DM, and Alzheimer's disease. He went to card . Clinic today She reporting that for the past 3 days he has been uncomfortable and sounds like he has fluid in his lungs. Last night had had a bad night sleeping poorly d/t being sob. Today he would not lie down d/t feeling more sob when lying down. No fever  Chills rigirs sick contact.        PCP Provider  Jacob Bragg MD      We were asked to admit for work up and evaluation of the above problems.      Past Medical History:   Diagnosis Date    Abdominal pain 7/18/2017    Alzheimer disease ([facility] 7/18/2017    Anemia 7/18/2017    Arthritis     Back pain 7/18/2017    Bradycardia 7/18/2017    CAD (coronary artery disease)     hx of MI    CKD (chronic kidney disease), stage II 7/18/2017    constipation     Depression 7/18/2017    Diabetes mellitus ([facility] 7/18/2017    Diverticulosis 7/18/2017    DJD (degenerative joint disease) 7/18/2017    Encounter for long-term (current) use of other medications 7/18/2017    Fatigue     Gastritis and duodenitis 7/18/2017    GERD (gastroesophageal reflux disease)     GI bleed 7/18/2017    Hearing loss     Hyperlipidemia 7/18/2017    Hypertension     Hypertension with renal disease 7/18/2017    Ill-defined condition     Dementia    Internal hemorrhoids 7/18/2017    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    S/P cardiac pacemaker procedure 5/4/2015    5/4/15 [employer]tronic dual chamber pacemaker implant     Weight loss     lost over 40 pounds last year- has no appetite        Past Surgical History:   Procedure Laterality Date    COLONOSCOPY N/A 6/22/2017    COLONOSCOPY performed by Romelia Gabriel MD at [address] [place] [address]  6/22/2017         [address]  7/10/2014    right inguinal    HX OTHER SURGICAL      cystectomy from back    MI EGD TRANSORAL BIOPSY SINGLE/MULTIPLE  2/14/2012         MI UPPER GI ENDOSCOPY,W/DIR SUBMUC INJ  7/23/2020         UPPER GI ENDOSCOPY,BIOPSY  6/22/2017         UPPER GI ENDOSCOPY,BIOPSY  7/23/2020            Social History     Tobacco Use    Smoking status: Former Smoker     Packs/day: 0.50     Years: 10.00     Pack years: 5.00     Start date: 7/12/1991    Smokeless tobacco: Never Used    Tobacco comment: quit 50 years ago   Substance Use Topics    Alcohol use: Not Currently     Comment: Occasional beer        Family History   Problem Relation Age of Onset    Hypertension Mother     Heart Disease Father     Cancer Other         son-cancer? unknown what type      No Known Allergies     Prior to Admission medications    Medication Sig Start Date End Date Taking? Authorizing Provider   carvediloL (COREG) 6.25 mg tablet Take 1 Tab by mouth two (2) times a day. 1/15/21   Carry MD Nicki   colchicine 0.6 mg tablet Take 0.6 mg by mouth daily. Provider, Historical   furosemide (LASIX) 40 mg tablet 1 each morning  Patient taking differently: Take 40 mg by mouth daily. 21   Carry MD Nicki   lisinopriL (PRINIVIL, ZESTRIL) 5 mg tablet Take 1 Tab by mouth daily. 21   Carry MD Nicki   donepeziL (ARICEPT) 10 mg tablet TAKE 1/2 TABLET BY MOUTH EVERY DAY  Patient taking differently: Take 10 mg by mouth nightly. TAKE 1/2 TABLET BY MOUTH EVERY DAY 20   Carry MD Nicki   acetaminophen (TYLENOL) 650 mg TbER Take 650 mg by mouth two (2) times a day. Provider, Historical   memantine (NAMENDA) 10 mg tablet TAKE 1 TABLET BY MOUTH TWICE A DAY 20   Carry MD Nicki   omeprazole (PRILOSEC) 20 mg capsule Take 1 Cap by mouth daily. 10/22/20   Carry MD Nicki   aspirin 81 mg chewable tablet Take 1 Tab by mouth daily. 4/15/18   Clara Anderson MD       REVIEW OF SYSTEMS:     I am not able to complete the review of systems because:    The patient is intubated and sedated    The patient has altered mental status due to his acute medical problems    The patient has baseline aphasia from prior stroke(s)    The patient has baseline dementia and is not reliable historian    The patient is in acute medical distress and unable to provide information           Total of 12 systems reviewed as follows:       POSITIVE= underlined text  Negative = text not underlined  General:  fever, chills, sweats, generalized weakness, weight loss/gain,      loss of appetite   Eyes:    blurred vision, eye pain, loss of vision, double vision  ENT:    rhinorrhea, pharyngitis   Respiratory:   cough, sputum production, SOB, GONGORA, wheezing, pleuritic pain   Cardiology:   chest pain, palpitations, orthopnea, PND, edema, syncope   Gastrointestinal:  abdominal pain , N/V, diarrhea, dysphagia, constipation, bleeding   Genitourinary:  frequency, urgency, dysuria, hematuria, incontinence   Muskuloskeletal :  arthralgia, myalgia, back pain  Hematology:  easy bruising, nose or gum bleeding, lymphadenopathy   Dermatological: rash, ulceration, pruritis, color change / jaundice  Endocrine:   hot flashes or polydipsia   Neurological:  headache, dizziness, confusion, focal weakness, paresthesia,     Speech difficulties, memory loss, gait difficulty  Psychological: Feelings of anxiety, depression, agitation    Objective:   VITALS:    Visit Vitals  BP (!) 141/80   Pulse 67   Temp 97.7 °F (36.5 °C)   Resp 25   SpO2 94%       PHYSICAL EXAM:    General:    Alert, cooperative, no distress, appears stated age. HEENT: Atraumatic, anicteric sclerae, pink conjunctivae     No oral ulcers, mucosa moist, throat clear, dentition fair  Neck:  Supple, symmetrical,  thyroid: non tender  Lungs:   Clear to auscultation bilaterally. No Wheezing or Rhonchi. No rales. Chest wall:  No tenderness  No Accessory muscle use. Heart:   Regular  rhythm,  No  murmur   No edema  Abdomen:   Soft, non-tender. Not distended. Bowel sounds normal  Extremities: No cyanosis. No clubbing,      Skin turgor normal, Capillary refill normal, Radial dial pulse 2+  Skin:     Not pale. Not Jaundiced  No rashes   Psych:  Good insight. Not depressed. Not anxious or agitated. Neurologic: EOMs intact. No facial asymmetry. No aphasia or slurred speech. Symmetrical strength, Sensation grossly intact.  Alert and oriented X 4.             _______________________________________________________________________  Care Plan discussed with:    Comments   Patient     Family      RN     Care Manager                    Consultant:      _______________________________________________________________________  Expected  Disposition:   Home with Family    HH/PT/OT/RN    SNF/LTC    COMPA    ________________________________________________________________________  TOTAL TIME:    Minutes    Critical Care Provided     Minutes non procedure based      Comments     Reviewed previous records   >50% of visit spent in counseling and coordination of care  Discussion with patient and/or family and questions answered       Given the patient's current clinical presentation, I have a high level of concern for decompensation if discharged from the ED. Complex decision making was performed which includes reviewing the patient's available past medical records, laboratory results, and Xray films. I have also directly communicated my plan and discussed this case with the involved ED physician.     ____________________________________________________________________  Nelson Holden MD    Procedures: see electronic medical records for all procedures/Xrays and details which were not copied into this note but were reviewed prior to creation of Plan. LAB DATA REVIEWED:    Recent Results (from the past 24 hour(s))   EKG, 12 LEAD, INITIAL    Collection Time: 02/24/21  7:37 PM   Result Value Ref Range    Ventricular Rate 80 BPM    Atrial Rate 80 BPM    P-R Interval 116 ms    QRS Duration 186 ms    Q-T Interval 482 ms    QTC Calculation (Bezet) 555 ms    Calculated R Axis 146 degrees    Calculated T Axis 61 degrees    Diagnosis       AV dual-paced rhythm with occasional premature ventricular complexes  When compared with ECG of 01-JAN-2021 13:25,  premature ventricular complexes are now present  Vent.  rate has increased BY   5 BPM     TROPONIN I    Collection Time: 02/24/21  8:05 PM   Result Value Ref Range    Troponin-I, Qt. <0.05 <0.05 ng/mL   CBC WITH AUTOMATED DIFF    Collection Time: 02/24/21  8:05 PM   Result Value Ref Range    WBC 6.9 4.1 - 11.1 K/uL    RBC 4.30 4. 10 - 5.70 M/uL    HGB 11.4 (L) 12.1 - 17.0 g/dL    HCT 36.1 (L) 36.6 - 50.3 %    MCV 84.0 80.0 - 99.0 FL    MCH 26.5 26.0 - 34.0 PG    MCHC 31.6 30.0 - 36.5 g/dL    RDW 14.9 (H) 11.5 - 14.5 %    PLATELET 371 425 - 931 K/uL    MPV 12.2 8.9 - 12.9 FL    NRBC 0.3 (H) 0  WBC    ABSOLUTE NRBC 0.02 (H) 0.00 - 0.01 K/uL    NEUTROPHILS 72 32 - 75 %    LYMPHOCYTES 16 12 - 49 %    MONOCYTES 9 5 - 13 %    EOSINOPHILS 2 0 - 7 %    BASOPHILS 1 0 - 1 %    IMMATURE GRANULOCYTES 0 0.0 - 0.5 %    ABS. NEUTROPHILS 5.0 1.8 - 8.0 K/UL    ABS. LYMPHOCYTES 1.1 0.8 - 3.5 K/UL    ABS. MONOCYTES 0.6 0.0 - 1.0 K/UL    ABS. EOSINOPHILS 0.1 0.0 - 0.4 K/UL    ABS. BASOPHILS 0.1 0.0 - 0.1 K/UL    ABS. IMM. GRANS. 0.0 0.00 - 0.04 K/UL    DF AUTOMATED     METABOLIC PANEL, COMPREHENSIVE    Collection Time: 02/24/21  8:05 PM   Result Value Ref Range    Sodium 144 136 - 145 mmol/L    Potassium 4.1 3.5 - 5.1 mmol/L    Chloride 106 97 - 108 mmol/L    CO2 27 21 - 32 mmol/L    Anion gap 11 5 - 15 mmol/L    Glucose 140 (H) 65 - 100 mg/dL    BUN 65 (H) 6 - 20 MG/DL    Creatinine 2.01 (H) 0.70 - 1.30 MG/DL    BUN/Creatinine ratio 32 (H) 12 - 20      GFR est AA 38 (L) >60 ml/min/1.73m2    GFR est non-AA 31 (L) >60 ml/min/1.73m2    Calcium 9.2 8.5 - 10.1 MG/DL    Bilirubin, total 0.5 0.2 - 1.0 MG/DL    ALT (SGPT) 31 12 - 78 U/L    AST (SGOT) 27 15 - 37 U/L    Alk.  phosphatase 97 45 - 117 U/L    Protein, total 6.9 6.4 - 8.2 g/dL    Albumin 3.2 (L) 3.5 - 5.0 g/dL    Globulin 3.7 2.0 - 4.0 g/dL    A-G Ratio 0.9 (L) 1.1 - 2.2

## 2021-02-25 NOTE — ED PROVIDER NOTES
EMERGENCY DEPARTMENT HISTORY AND PHYSICAL EXAM      Date: 2/24/2021  Patient Name: Kathrine Borden Sr    History of Presenting Illness     Chief Complaint   Patient presents with    Extremity Weakness     was unable to get out of car        History Provided By: Patient    HPI: Geraldine Birch, 80 y.o. male with a past medical history significant for CHF, CAD, medical problems as stated below presents to the ED with cc of moderate to severe diffuse weakness, shortness of breath, and leg swelling over the last several days. Patient does have increased swelling and this is very similar to his prior CHF exacerbations. Swelling in the lower extremities so great that he is unable to bear weight. It is painful for him to do so. He is also having severe shortness of breath with any exertion whatsoever. No other chest pain, fevers, cough, or any other associated symptoms. No other exacerbating ameliorating factors. There are no other complaints, changes, or physical findings at this time. PCP: Omar Carrera MD    No current facility-administered medications on file prior to encounter. Current Outpatient Medications on File Prior to Encounter   Medication Sig Dispense Refill    carvediloL (COREG) 6.25 mg tablet Take 1 Tab by mouth two (2) times a day. 180 Tab prn    colchicine 0.6 mg tablet Take 0.6 mg by mouth daily.  furosemide (LASIX) 40 mg tablet 1 each morning (Patient taking differently: Take 40 mg by mouth daily.) 30 Tab prn    lisinopriL (PRINIVIL, ZESTRIL) 5 mg tablet Take 1 Tab by mouth daily. 30 Tab prn    donepeziL (ARICEPT) 10 mg tablet TAKE 1/2 TABLET BY MOUTH EVERY DAY (Patient taking differently: Take 10 mg by mouth nightly. TAKE 1/2 TABLET BY MOUTH EVERY DAY) 30 Tab 2    acetaminophen (TYLENOL) 650 mg TbER Take 650 mg by mouth two (2) times a day.       memantine (NAMENDA) 10 mg tablet TAKE 1 TABLET BY MOUTH TWICE A  Tab 3    omeprazole (PRILOSEC) 20 mg capsule Take 1 Cap by mouth daily. 90 Cap 3    aspirin 81 mg chewable tablet Take 1 Tab by mouth daily.  30 Tab 1       Past History     Past Medical History:  Past Medical History:   Diagnosis Date    Abdominal pain 2017    Alzheimer disease ([facility] 2017    Anemia 2017    Arthritis     Back pain 2017    Bradycardia 2017    CAD (coronary artery disease)     hx of MI    CKD (chronic kidney disease), stage II 2017    constipation     Depression 2017    Diabetes mellitus ([facility] 2017    Diverticulosis 2017    DJD (degenerative joint disease) 2017    Encounter for long-term (current) use of other medications 2017    Fatigue     Gastritis and duodenitis 2017    GERD (gastroesophageal reflux disease)     GI bleed 2017    Hearing loss     Hyperlipidemia 2017    Hypertension     Hypertension with renal disease 2017    Ill-defined condition     Dementia    Internal hemorrhoids 2017    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    S/P cardiac pacemaker procedure 2015    5/4/15 Medtronic dual chamber pacemaker implant     Weight loss     lost over 40 pounds last year- has no appetite       Past Surgical History:  Past Surgical History:   Procedure Laterality Date    COLONOSCOPY N/A 2017    COLONOSCOPY performed by [facility]landon [facility] at [facility]  2017         [address]  7/10/2014    right inguinal    HX OTHER SURGICAL      cystectomy from back    SC EGD TRANSORAL BIOPSY SINGLE/MULTIPLE  2012         SC UPPER GI ENDOSCOPY,W/DIR SUBMUC INJ  2020         UPPER GI ENDOSCOPY,BIOPSY  2017         UPPER GI ENDOSCOPY,BIOPSY  2020            Family History:  Family History   Problem Relation Age of Onset    Hypertension Mother     Heart Disease Father     Cancer Other         son-cancer? unknown what type        Social History:  Social History     Tobacco Use    Smoking status: Former Smoker     Packs/day: 0.50     Years: 10.00     Pack years: 5.00     Start date: 7/12/1991    Smokeless tobacco: Never Used    Tobacco comment: quit 50 years ago   Substance Use Topics    Alcohol use: Not Currently     Comment: Occasional beer    Drug use: No       Allergies:  No Known Allergies      Review of Systems   Review of Systems   Constitutional: Negative for chills, diaphoresis, fatigue and fever. HENT: Negative for ear pain and sore throat. Eyes: Negative for pain and redness. Respiratory: Positive for shortness of breath. Negative for cough. Cardiovascular: Positive for leg swelling. Negative for chest pain. Gastrointestinal: Negative for abdominal pain, diarrhea, nausea and vomiting. Endocrine: Negative for cold intolerance and heat intolerance. Genitourinary: Negative for flank pain and hematuria. Musculoskeletal: Negative for back pain and neck stiffness. Skin: Negative for rash and wound. Neurological: Negative for dizziness, syncope and headaches. All other systems reviewed and are negative. Physical Exam   Physical Exam  Vitals signs and nursing note reviewed. Constitutional:       Appearance: He is well-developed. HENT:      Head: Normocephalic and atraumatic. Mouth/Throat:      Pharynx: No oropharyngeal exudate. Eyes:      Conjunctiva/sclera: Conjunctivae normal.      Pupils: Pupils are equal, round, and reactive to light. Neck:      Musculoskeletal: Normal range of motion. Cardiovascular:      Rate and Rhythm: Normal rate and regular rhythm. Heart sounds: No murmur. Pulmonary:      Effort: Pulmonary effort is normal. Tachypnea present. No respiratory distress. Breath sounds: Examination of the right-lower field reveals rales. Examination of the left-lower field reveals rales. Rales present. No wheezing.    Abdominal:      General: Bowel sounds are normal. There is no distension. Palpations: Abdomen is soft. Tenderness: There is no abdominal tenderness. Musculoskeletal: Normal range of motion. General: No deformity. Right lower le+ Edema present. Left lower le+ Edema present. Skin:     General: Skin is warm and dry. Findings: No rash. Neurological:      Mental Status: He is alert and oriented to person, place, and time. Coordination: Coordination normal.   Psychiatric:         Behavior: Behavior normal.         Diagnostic Study Results     Labs -     Recent Results (from the past 24 hour(s))   EKG, 12 LEAD, INITIAL    Collection Time: 21  7:37 PM   Result Value Ref Range    Ventricular Rate 80 BPM    Atrial Rate 80 BPM    P-R Interval 116 ms    QRS Duration 186 ms    Q-T Interval 482 ms    QTC Calculation (Bezet) 555 ms    Calculated R Axis 146 degrees    Calculated T Axis 61 degrees    Diagnosis       AV dual-paced rhythm with occasional premature ventricular complexes  When compared with ECG of 2021 13:25,  premature ventricular complexes are now present  Vent. rate has increased BY   5 BPM     TROPONIN I    Collection Time: 21  8:05 PM   Result Value Ref Range    Troponin-I, Qt. <0.05 <0.05 ng/mL   CBC WITH AUTOMATED DIFF    Collection Time: 21  8:05 PM   Result Value Ref Range    WBC 6.9 4.1 - 11.1 K/uL    RBC 4.30 4. 10 - 5.70 M/uL    HGB 11.4 (L) 12.1 - 17.0 g/dL    HCT 36.1 (L) 36.6 - 50.3 %    MCV 84.0 80.0 - 99.0 FL    MCH 26.5 26.0 - 34.0 PG    MCHC 31.6 30.0 - 36.5 g/dL    RDW 14.9 (H) 11.5 - 14.5 %    PLATELET 117 545 - 493 K/uL    MPV 12.2 8.9 - 12.9 FL    NRBC 0.3 (H) 0  WBC    ABSOLUTE NRBC 0.02 (H) 0.00 - 0.01 K/uL    NEUTROPHILS 72 32 - 75 %    LYMPHOCYTES 16 12 - 49 %    MONOCYTES 9 5 - 13 %    EOSINOPHILS 2 0 - 7 %    BASOPHILS 1 0 - 1 %    IMMATURE GRANULOCYTES 0 0.0 - 0.5 %    ABS. NEUTROPHILS 5.0 1.8 - 8.0 K/UL    ABS. LYMPHOCYTES 1.1 0.8 - 3.5 K/UL    ABS.  MONOCYTES 0.6 0.0 - 1.0 K/UL    ABS. EOSINOPHILS 0.1 0.0 - 0.4 K/UL    ABS. BASOPHILS 0.1 0.0 - 0.1 K/UL    ABS. IMM. GRANS. 0.0 0.00 - 0.04 K/UL    DF AUTOMATED     METABOLIC PANEL, COMPREHENSIVE    Collection Time: 02/24/21  8:05 PM   Result Value Ref Range    Sodium 144 136 - 145 mmol/L    Potassium 4.1 3.5 - 5.1 mmol/L    Chloride 106 97 - 108 mmol/L    CO2 27 21 - 32 mmol/L    Anion gap 11 5 - 15 mmol/L    Glucose 140 (H) 65 - 100 mg/dL    BUN 65 (H) 6 - 20 MG/DL    Creatinine 2.01 (H) 0.70 - 1.30 MG/DL    BUN/Creatinine ratio 32 (H) 12 - 20      GFR est AA 38 (L) >60 ml/min/1.73m2    GFR est non-AA 31 (L) >60 ml/min/1.73m2    Calcium 9.2 8.5 - 10.1 MG/DL    Bilirubin, total 0.5 0.2 - 1.0 MG/DL    ALT (SGPT) 31 12 - 78 U/L    AST (SGOT) 27 15 - 37 U/L    Alk. phosphatase 97 45 - 117 U/L    Protein, total 6.9 6.4 - 8.2 g/dL    Albumin 3.2 (L) 3.5 - 5.0 g/dL    Globulin 3.7 2.0 - 4.0 g/dL    A-G Ratio 0.9 (L) 1.1 - 2.2         Radiologic Studies -   No orders to display     CT Results  (Last 48 hours)    None        CXR Results  (Last 48 hours)               02/24/21 1725  XR CHEST PA LAT Final result    Impression:   impression: Bilateral pleural effusions. Narrative:  Clinical indication: Hypertension, A. fib. Frontal and lateral views of the chest obtained, comparison January 4, 2020. Cardiac pacemaker. Stable heart size. Mild bilateral pleural effusions. Medical Decision Making   I am the first provider for this patient. I reviewed the vital signs, available nursing notes, past medical history, past surgical history, family history and social history. Vital Signs-Reviewed the patient's vital signs. Patient Vitals for the past 12 hrs:   Temp Pulse Resp BP SpO2   02/24/21 2100 97.7 °F (36.5 °C) 89 23 137/81 94 %       Records Reviewed: Nursing records and medical records reviewed    MDM:  Patient presents with acute dyspnea.  DDx: asthma, copd, pna, pulmonary edema, acute bronchitis, ACS, ptx, pna. Will obtain EKG, labs, CXR, provide O2 as needed for hypoxia, treat symptomatically and reassess. Will continue to monitor closely in ED. Provider Notes (Medical Decision Making):   Patient is a 25-year-old male presenting with symptoms consistent with acute CHF exacerbation. Given his age and current duties we will admit him for diuresis and further work-up. ED Course:   Initial assessment performed. The patients presenting problems have been discussed, and they are in agreement with the care plan formulated and outlined with them. I have encouraged them to ask questions as they arise throughout their visit. Medications Administered     furosemide (LASIX) injection 40 mg     Admin Date  02/24/2021 Action  Given Dose  40 mg Route  IntraVENous Administered By  [facility]                    Critical Care:  None      Disposition:  Admit Note:  11:21 PM  Pt is being admitted by hospitalist. The results of their tests and reason(s) for their admission have been discussed with pt and/or available family. They convey agreement and understanding for the need to be admitted and for admission diagnosis. Diagnosis     Clinical Impression:   1. Acute on chronic congestive heart failure, unspecified heart failure type [facility])        Attestations:    Yen Membreno MD    Please note that this dictation was completed with [employer], the computer voice recognition software. Quite often unanticipated grammatical, syntax, homophones, and other interpretive errors are inadvertently transcribed by the computer software. Please disregard these errors. Please excuse any errors that have escaped final proofreading. Thank you.

## 2021-02-25 NOTE — ED NOTES
Assumed care of patient via triage. Patient takes lasix for pulmonary edema and recently had his dose increased, but patient is reporting difficulty breathing especially when lying down and on exertion. Granddaughter reports increasing coughing concurrent with the difficulty breathing. Patient at this time resting in NAD.

## 2021-02-26 PROBLEM — I50.32 CHRONIC DIASTOLIC HEART FAILURE (HCC): Status: ACTIVE | Noted: 2021-02-26

## 2021-02-26 PROCEDURE — 99223 1ST HOSP IP/OBS HIGH 75: CPT | Performed by: INTERNAL MEDICINE

## 2021-02-26 PROCEDURE — 74011250636 HC RX REV CODE- 250/636: Performed by: HOSPITALIST

## 2021-02-26 PROCEDURE — 65660000000 HC RM CCU STEPDOWN

## 2021-02-26 PROCEDURE — 74011250637 HC RX REV CODE- 250/637: Performed by: INTERNAL MEDICINE

## 2021-02-26 PROCEDURE — 74011250637 HC RX REV CODE- 250/637: Performed by: HOSPITALIST

## 2021-02-26 PROCEDURE — 74011250636 HC RX REV CODE- 250/636: Performed by: FAMILY MEDICINE

## 2021-02-26 PROCEDURE — 94760 N-INVAS EAR/PLS OXIMETRY 1: CPT

## 2021-02-26 PROCEDURE — 99232 SBSQ HOSP IP/OBS MODERATE 35: CPT | Performed by: FAMILY MEDICINE

## 2021-02-26 RX ORDER — FUROSEMIDE 10 MG/ML
40 INJECTION INTRAMUSCULAR; INTRAVENOUS DAILY
Status: DISCONTINUED | OUTPATIENT
Start: 2021-02-26 | End: 2021-02-28

## 2021-02-26 RX ADMIN — CARVEDILOL 6.25 MG: 6.25 TABLET, FILM COATED ORAL at 08:45

## 2021-02-26 RX ADMIN — Medication 10 ML: at 17:37

## 2021-02-26 RX ADMIN — MEMANTINE HYDROCHLORIDE 5 MG: 10 TABLET ORAL at 08:44

## 2021-02-26 RX ADMIN — HEPARIN SODIUM 5000 UNITS: 5000 INJECTION INTRAVENOUS; SUBCUTANEOUS at 23:00

## 2021-02-26 RX ADMIN — Medication 10 ML: at 22:20

## 2021-02-26 RX ADMIN — CARVEDILOL 6.25 MG: 6.25 TABLET, FILM COATED ORAL at 17:37

## 2021-02-26 RX ADMIN — FUROSEMIDE 40 MG: 10 INJECTION, SOLUTION INTRAVENOUS at 08:45

## 2021-02-26 RX ADMIN — Medication 10 ML: at 06:09

## 2021-02-26 RX ADMIN — ASPIRIN 81 MG: 81 TABLET, CHEWABLE ORAL at 08:44

## 2021-02-26 RX ADMIN — DONEPEZIL HYDROCHLORIDE 5 MG: 5 TABLET, FILM COATED ORAL at 08:44

## 2021-02-26 RX ADMIN — PANTOPRAZOLE SODIUM 40 MG: 40 TABLET, DELAYED RELEASE ORAL at 08:45

## 2021-02-26 RX ADMIN — HEPARIN SODIUM 5000 UNITS: 5000 INJECTION INTRAVENOUS; SUBCUTANEOUS at 08:41

## 2021-02-26 NOTE — PROGRESS NOTES
Bedside and Verbal shift change report given to Joanna RN (oncoming nurse) by Marcus Rivas RN (offgoing nurse). Report included the following information SBAR, Kardex, ED Summary, Procedure Summary, Intake/Output, MAR and Recent Results.

## 2021-02-26 NOTE — CARDIO/PULMONARY
Cardiac Rehab Note: chart review/referral  
 
Pt is a 79 yo  Admitted with CHF. PMHx of Alzheimers, heart disease. Pt with debility. CHF BUNDLE   
 
EF 20%  on 12/15/20 per echo. Current inpatient diet: DIET CARDIAC Per Cardiology entry recommendations for Palliative involvement.

## 2021-02-26 NOTE — CONSULTS
[facility] [address] [facility]     Date of  Admission: 2/24/2021  8:46 PM     Admission type:Emergency    Consult for: heart failure exacerbation    Consult by: hospitalist     Subjective:     uYmi Valles Sr is a 80 y.o. male admitted for CHF exacerbation ([address] [I50.9]  GONGORA (dyspnea on exertion) [R06.00]. Admitted with worsening SOB, feeling bad. Patient is pleasantly confused. Denies CP, SOB, \"just not feeling well\". Daughter states that he has been more SOB x 3 days, and not sleeping well. Seen by Dr. True Hester on 2/24/2021 in office. At that time decision made for no ischemic evaluation. Patient has had 3 hospital admissions since 12/4/2020    ECG AV pacing   NTproBNP > 11504, troponin neg  CXRay angel pl effusions    PMH:  CAD, SVT with ablation, Cardiomyopathy with chronic systolic/diastolic  HF (EF 48-81% 54/5836), medtronic BIVPPM, PAF, NSVT, HTN, alzheimers disease. Previous treatment/evaluation includes echocardiogram and stress thallium . Cardiac risk factors: sedentary life style, male gender, hypertension, stress.         Patient Active Problem List    Diagnosis Date Noted    Chronic diastolic heart failure ([address] 02/26/2021    GONGORA (dyspnea on exertion) 02/24/2021    CHF exacerbation ([address] 02/24/2021    CHF (congestive heart failure) ([address] 01/01/2021    Anasarca 12/15/2020    Acute on chronic systolic heart failure ([address] 12/14/2020    Pleural effusion 12/14/2020    SUBHA (acute kidney injury) ([address] 12/14/2020    Weakness 12/02/2020    Hypotension 08/13/2020    Esophageal dyskinesia 07/23/2020    Severe protein-calorie malnutrition ([address] 07/21/2020    Weight loss 07/20/2020    Unsteady gait 07/20/2020    Dehydration 07/20/2020    Acute renal failure superimposed on stage 3 chronic kidney disease ([address] 07/20/2020    Paroxysmal VT ([address] 11/07/2019    Cardiomyopathy ([address] 11/07/2019    Alcohol withdrawal syndrome, with delirium ([address] 11/01/2019    A-fib ([facility] 10/27/2019    Syncope 01/09/2019    Primary insomnia 01/09/2019    Mild depression (HCC) 06/12/2018    Acute bilateral low back pain without sciatica 05/30/2018    Alcoholism ([address] 46/07/9028    Metabolic encephalopathy 52/74/6964    TIA (transient ischemic attack) 04/12/2018    Medicare annual wellness visit, subsequent 09/01/2017    Diverticulosis 07/18/2017    Gastritis and duodenitis 07/18/2017    Internal hemorrhoids 07/18/2017    Hypertension with renal disease 07/18/2017    Mixed hyperlipidemia 07/18/2017    GI bleed 07/18/2017    Primary osteoarthritis involving multiple joints 07/18/2017    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, without long-term current use of insulin ([address] 07/18/2017    Back pain 07/18/2017    Anemia 07/18/2017    Alzheimer disease ([facility] 07/18/2017    SSS (sick sinus syndrome) ([facility] 06/02/2015    S/P cardiac pacemaker procedure 05/04/2015    S/P ablation of accessory bypass tract 05/04/2015    SVT (supraventricular tachycardia) ([facility]s/p RFA 04/28/2015    Near syncope 03/11/2015    ASCVD (arteriosclerotic cardiovascular disease) 07/11/2014    Hypokalemia 07/11/2014    Right inguinal hernia 06/12/2014      Kd Ma MD  Past Medical History:   Diagnosis Date    Abdominal pain 7/18/2017    Alzheimer disease ([facility] 7/18/2017    Anemia 7/18/2017    Arthritis     Back pain 7/18/2017    Bradycardia 7/18/2017    CAD (coronary artery disease)     hx of MI    Chronic diastolic heart failure ([address] 2/26/2021    CKD (chronic kidney disease), stage II 7/18/2017    constipation     Depression 7/18/2017    Diabetes mellitus ([address] 7/18/2017    Diverticulosis 7/18/2017    DJD (degenerative joint disease) 7/18/2017    Encounter for long-term (current) use of other medications 7/18/2017    Fatigue     Gastritis and duodenitis 7/18/2017    GERD (gastroesophageal reflux disease)     GI bleed 7/18/2017    Hearing loss     Hyperlipidemia 2017    Hypertension     Hypertension with renal disease 2017    Ill-defined condition     Dementia    Internal hemorrhoids 2017    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    S/P cardiac pacemaker procedure 2015    5/4/15 Medtronic dual chamber pacemaker implant     Weight loss     lost over 40 pounds last year- has no appetite      Social History     Socioeconomic History    Marital status: LEGALLY      Spouse name: Not on file    Number of children: Not on file    Years of education: Not on file    Highest education level: Not on file   Tobacco Use    Smoking status: Former Smoker     Packs/day: 0.50     Years: 10.00     Pack years: 5.00     Start date: 1991    Smokeless tobacco: Never Used    Tobacco comment: quit 50 years ago   Substance and Sexual Activity    Alcohol use: Not Currently     Comment: Occasional beer    Drug use: No    Sexual activity: Not Currently     No Known Allergies   Family History   Problem Relation Age of Onset    Hypertension Mother     Heart Disease Father     Cancer Other         son-cancer? unknown what type       Current Facility-Administered Medications   Medication Dose Route Frequency    furosemide (LASIX) injection 40 mg  40 mg IntraVENous DAILY    heparin (porcine) injection 5,000 Units  5,000 Units SubCUTAneous Q12H    donepeziL (ARICEPT) tablet 5 mg  5 mg Oral DAILY    aspirin chewable tablet 81 mg  81 mg Oral DAILY    carvediloL (COREG) tablet 6.25 mg  6.25 mg Oral BID    memantine (NAMENDA) tablet 5 mg  5 mg Oral DAILY    pantoprazole (PROTONIX) tablet 40 mg  40 mg Oral ACB    sodium chloride (NS) flush 5-40 mL  5-40 mL IntraVENous Q8H    sodium chloride (NS) flush 5-40 mL  5-40 mL IntraVENous PRN    acetaminophen (TYLENOL) tablet 650 mg  650 mg Oral Q6H PRN    Or    acetaminophen (TYLENOL) suppository 650 mg  650 mg Rectal Q6H PRN    polyethylene glycol (MIRALAX) packet 17 g  17 g Oral DAILY PRN    promethazine (PHENERGAN) tablet 12.5 mg  12.5 mg Oral Q6H PRN    Or    ondansetron (ZOFRAN) injection 4 mg  4 mg IntraVENous Q6H PRN        Review of Symptoms: unable to obtain due to confusion      Objective:      Visit Vitals  BP (!) 116/54   Pulse 62   Temp 97.9 °F (36.6 °C)   Resp 18   Wt 117 lb 4.6 oz (53.2 kg)   SpO2 98%   BMI 17.83 kg/m²       Physical:   General: elderly cachectic AAM in no acute distress  Heart: RRR, 6-8/8 holosystolic m, +JVD, no carotid bruits   Lungs: basilar rales  Abdomen: Soft, +BS, NTND   Extremities: LE angel +DP/PT, no edema   Neurologic: Grossly normal    Data Review:   Recent Labs     02/25/21  0359 02/24/21 2005   WBC 6.9 6.9   HGB 11.5* 11.4*   HCT 36.0* 36.1*    236     Recent Labs     02/25/21  0359 02/24/21 2005    144   K 3.9 4.1    106   CO2 27 27   * 140*   BUN 67* 65*   CREA 1.91* 2.01*   CA 9.7 9.2   MG 2.4  --    ALB  --  3.2*   TBILI  --  0.5   ALT  --  31       Recent Labs     02/25/21  0359 02/25/21 0119 02/24/21 2005   TROIQ <0.05 <0.05 <0.05         Intake/Output Summary (Last 24 hours) at 2/26/2021 1208  Last data filed at 2/26/2021 1111  Gross per 24 hour   Intake 240 ml   Output 900 ml   Net -660 ml        Cardiographics    Telemetry: BIV paced  ECG: BIV paced with no acute change    Echocardiogram: 12/2020  · LV: Estimated LVEF is 15 - 20%. Dilated left ventricle. Mild septal wall hypertrophy. Severely reduced systolic function; Worse function in the inferior and lateral walls. · RV: Borderline low systolic function. Pacer/ICD present. · RA: Dilated right atrium. · AV: Mild to moderate aortic valve regurgitation is present. · MV: Mitral valve thickening. Moderate to severe mitral valve regurgitation is present. · TV: Moderate tricuspid valve regurgitation is present. · PV: Mild pulmonic valve regurgitation is present.   · PA: Pulmonary arterial systolic pressure is 50 mmHg. CXRAY: bili pl effusions       Assessment:       Principal Problem:    Acute on chronic systolic heart failure ([address] (12/14/2020)    Active Problems:    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, without long-term current use of insulin ([address] (7/18/2017)      Alzheimer disease ([address] (7/18/2017)      Cardiomyopathy ([address] (11/7/2019)      Pleural effusion (12/14/2020)      GONGORA (dyspnea on exertion) (2/24/2021)      CHF exacerbation (HCC) (2/24/2021)      Chronic diastolic heart failure ([address] (2/26/2021)         Plan:     Cardiomyopathy with acute on chronic systolic/diastolic HF (EF 08-90% 19/7946)  Agree with IV diuresis with angel pl effusions - neg 600cc and multiple unmeasurable outputs  Clinically, does not appear volume overloaded  Continue on Coreg, ACEI on hold due to renal dysfunction  Monitor I/Os, daily weights and labs  Given his advanced age and Alzheimer's disease will hold on ischemic w/u. With his multiple admissions in the last few months, and his wosening HF, recommend palliative involvement and possibly hospice.       Essential hypertension  Stable, continue on BB, diuretic     Paroxysmal atrial fibrillation  BIVPP interrogated in 12/2020 noting 4 hours aflutter and NSVT - decision with family not to pursue aggressive management  Will have Medtronic check device today - he may be having more frequent episodes that can be managed with medication adjustments. On no anticoagulation due to dementia and high risk of fall  Continue on ASA    Thank you for consulting [address], [place]       [address] [facility]    2/26/2021         Patient seen, examined by me personally. Plan discussed as detailed. Agree with note as outlined by  NP. I confirm findings in history and physical exam. No additional findings noted. Agree with plan as outlined above. Conservative medical management given his advanced age, dementia.     Diaz Dinning Alberta Russo MD

## 2021-02-26 NOTE — PROGRESS NOTES
Hospitalist Progress Note    NAME: Maldonado Gonzalez Sr   :  1931   MRN:  [id number]     Interim Hospital Summary: 80 y.o. male whom presented on 2021 with      Assessment / Plan:    Acute on chronic systolic HF (EF 19%)  S/P PPM/ICD for SSS  S/P AVNRT ablation  HTN  -presents with worsening orthopnea, GONGORA and oxygen sats in the 70s at home  -CXR Bilateral pleural effusions  -continue IV lasix  -holding ACE due to SUBHA  -continue coreg  -consult her cardiologist    Patient of Dr Sheldon Trujillo, will turn over care in AM    SUBHA on CKD3  -likely cardiorenal   -holding ACEi    Dementia  -continue namenda and aricept      Code status: DNR  Prophylaxis: Hep SQ  Recommended Disposition:  PT, OT, RN       Subjective:     Chief Complaint / Reason for Physician Visit  Follow up of CHF  Chart reviewed in detail. Discussed with RN events overnight. Review of Systems:  Symptom Y/N Comments  Symptom Y/N Comments   Fever/Chills    Chest Pain     Poor Appetite    Edema     Cough    Abdominal Pain     Sputum    Joint Pain     SOB/GONGORA y   Pruritis/Rash     Nausea/vomit    Tolerating PT/OT     Diarrhea    Tolerating Diet     Constipation    Other       Could NOT obtain due to:      PO intake:   Patient Vitals for the past 72 hrs:   % Diet Eaten   21 0800 25 %     Objective:     VITALS:   Last 24hrs VS reviewed since prior progress note.  Most recent are:  Patient Vitals for the past 24 hrs:   Temp Pulse Resp BP SpO2   21 1923 97.8 °F (36.6 °C) 66 16 (!) 140/87 99 %   21 1526 97.5 °F (36.4 °C) 64 16 (!) 136/92 100 %   21 1200  61 18 (!) 142/95    21 1100  65 18 130/84    21 1000  66 24 (!) 139/91    21 0800 97.8 °F (36.6 °C) 80 25 137/80 98 %   21 0703 97.6 °F (36.4 °C) 74 20 120/79    21 0600  73 17 130/72    21 0543  72 21 126/78    21 0400  74 21 (!) 170/80    21 0330  75 20 (!) 151/94  02/25/21 0300  69 29 (!) 144/94 98 %   02/25/21 0120  72 13 (!) 144/72 100 %   02/24/21 2315  67 25 (!) 141/80    02/24/21 2300  69 23 135/88    02/24/21 2245  60 27 (!) 140/79    02/24/21 2230  64 27 138/84    02/24/21 2215  66 19 (!) 138/91    02/24/21 2200  81 29 (!) 141/86    02/24/21 2100 97.7 °F (36.5 °C) 89 23 137/81 94 %       Intake/Output Summary (Last 24 hours) at 2/25/2021 2048  Last data filed at 2/25/2021 9827  Gross per 24 hour   Intake    Output 800 ml   Net -800 ml        I had a face to face encounter, and independently examined this patient on 2/25/2021, as outlined below:  PHYSICAL EXAM:  General: WD, WN. Alert, cooperative, no acute distress    EENT:  EOMI. Anicteric sclerae. MMM  Resp:  CTA bilaterally, no wheezing or rales. No accessory muscle use  CV:  Regular  rhythm,  trace edema  GI:  Soft, Non distended, Non tender. +Bowel sounds  Neurologic:  Alert, normal speech,   Psych:   anxious nor agitated  Skin:  No rashes. No jaundice    Reviewed most current lab test results and cultures  YES  Reviewed most current radiology test results   YES  Review and summation of old records today    NO  Reviewed patient's current orders and MAR    YES  PMH/SH reviewed - no change compared to H&P  ________________________________________________________________________  Care Plan discussed with:    Comments   Patient x    Family  x flynn   RN x    Care Manager     Consultant                        Multidiciplinary team rounds were held today with , nursing, pharmacist and clinical coordinator. Patient's plan of care was discussed; medications were reviewed and discharge planning was addressed.      ________________________________________________________________________  Total NON critical care TIME:   25  Minutes    Total CRITICAL CARE TIME Spent:   Minutes non procedure based      Comments   >50% of visit spent in counseling and coordination of care x     This includes time during multidisciplinary rounds if indicated above   ________________________________________________________________________  Yas Butler MD     Procedures: see electronic medical records for all procedures/Xrays and details which were not copied into this note but were reviewed prior to creation of Plan. LABS:  I reviewed today's most current labs and imaging studies.   Pertinent labs include:  Recent Labs     02/25/21  0359 02/24/21 2005   WBC 6.9 6.9   HGB 11.5* 11.4*   HCT 36.0* 36.1*    236     Recent Labs     02/25/21 0359 02/24/21 2005    144   K 3.9 4.1    106   CO2 27 27   * 140*   BUN 67* 65*   CREA 1.91* 2.01*   CA 9.7 9.2   MG 2.4  --    ALB  --  3.2*   TBILI  --  0.5   ALT  --  31

## 2021-02-26 NOTE — PROGRESS NOTES
General Daily Progress Note    Admit Date: 2/24/2021  Hospital day 2    Subjective:     Patient has no complaint of shortness of breath this am. Says that he is feeling much better than yesterday. Daughter says that he is very independent, usually walks around on his own without problems, but has become too weak to walk on his own. Was also having problems with dyspnea when he laid down. .   Medication side effects: none    Current Facility-Administered Medications   Medication Dose Route Frequency    heparin (porcine) injection 5,000 Units  5,000 Units SubCUTAneous Q12H    furosemide (LASIX) injection 40 mg  40 mg IntraVENous BID    donepeziL (ARICEPT) tablet 5 mg  5 mg Oral DAILY    aspirin chewable tablet 81 mg  81 mg Oral DAILY    carvediloL (COREG) tablet 6.25 mg  6.25 mg Oral BID    memantine (NAMENDA) tablet 5 mg  5 mg Oral DAILY    pantoprazole (PROTONIX) tablet 40 mg  40 mg Oral ACB    sodium chloride (NS) flush 5-40 mL  5-40 mL IntraVENous Q8H    sodium chloride (NS) flush 5-40 mL  5-40 mL IntraVENous PRN    acetaminophen (TYLENOL) tablet 650 mg  650 mg Oral Q6H PRN    Or    acetaminophen (TYLENOL) suppository 650 mg  650 mg Rectal Q6H PRN    polyethylene glycol (MIRALAX) packet 17 g  17 g Oral DAILY PRN    promethazine (PHENERGAN) tablet 12.5 mg  12.5 mg Oral Q6H PRN    Or    ondansetron (ZOFRAN) injection 4 mg  4 mg IntraVENous Q6H PRN        Review of Systems  Pertinent items are noted in HPI. Objective:     Patient Vitals for the past 8 hrs:   BP Temp Pulse Resp SpO2   02/26/21 0310 137/75 98.2 °F (36.8 °C) 67 16 99 %     No intake/output data recorded. 02/24 1901 - 02/26 0700  In: -   Out: 1400 [Urine:1400]    Physical Exam:   Visit Vitals  /75   Pulse 67   Temp 98.2 °F (36.8 °C)   Resp 16   SpO2 99%     Lungs: rales R base, L base  Heart: regular rate and rhythm, S1, S2 normal 3/6 systolic murmur at base of heart. Abdomen: soft, non-tender.  Bowel sounds normal. No masses, no organomegaly  Extremities: extremities normal, atraumatic, no cyanosis or edema. No pulses. No femoral bruits. ECG: normal sinus rhythm     Data Review No results found for this or any previous visit (from the past 24 hour(s)). Assessment:     Active Problems:    GONGORA (dyspnea on exertion) (2/24/2021)      CHF exacerbation ([address] (2/24/2021)        Plan:     1. Acute on chronic CHF, symptomatically better. Continue iv lasix for now. 2. Hx cardiomyopathy, EF 15-20 %  3. Hx Mitral regurgitation  4. BUN/CR somewhat up off baseline. ACEI  On hold. Probably partly due to diuretics. Will decrease lasix 40 mg iv bid to once daily. Follow bmp daily.

## 2021-02-27 LAB
ANION GAP SERPL CALC-SCNC: 9 MMOL/L (ref 5–15)
BUN SERPL-MCNC: 62 MG/DL (ref 6–20)
BUN/CREAT SERPL: 31 (ref 12–20)
CALCIUM SERPL-MCNC: 9.1 MG/DL (ref 8.5–10.1)
CHLORIDE SERPL-SCNC: 106 MMOL/L (ref 97–108)
CO2 SERPL-SCNC: 28 MMOL/L (ref 21–32)
CREAT SERPL-MCNC: 1.99 MG/DL (ref 0.7–1.3)
GLUCOSE SERPL-MCNC: 91 MG/DL (ref 65–100)
POTASSIUM SERPL-SCNC: 3.6 MMOL/L (ref 3.5–5.1)
SODIUM SERPL-SCNC: 143 MMOL/L (ref 136–145)

## 2021-02-27 PROCEDURE — 80048 BASIC METABOLIC PNL TOTAL CA: CPT

## 2021-02-27 PROCEDURE — 74011250636 HC RX REV CODE- 250/636: Performed by: FAMILY MEDICINE

## 2021-02-27 PROCEDURE — 94760 N-INVAS EAR/PLS OXIMETRY 1: CPT

## 2021-02-27 PROCEDURE — 74011250636 HC RX REV CODE- 250/636: Performed by: HOSPITALIST

## 2021-02-27 PROCEDURE — 99233 SBSQ HOSP IP/OBS HIGH 50: CPT | Performed by: INTERNAL MEDICINE

## 2021-02-27 PROCEDURE — 36415 COLL VENOUS BLD VENIPUNCTURE: CPT

## 2021-02-27 PROCEDURE — 65660000000 HC RM CCU STEPDOWN

## 2021-02-27 PROCEDURE — 74011250637 HC RX REV CODE- 250/637: Performed by: HOSPITALIST

## 2021-02-27 PROCEDURE — 74011250637 HC RX REV CODE- 250/637: Performed by: INTERNAL MEDICINE

## 2021-02-27 RX ADMIN — ASPIRIN 81 MG: 81 TABLET, CHEWABLE ORAL at 08:12

## 2021-02-27 RX ADMIN — HEPARIN SODIUM 5000 UNITS: 5000 INJECTION INTRAVENOUS; SUBCUTANEOUS at 22:22

## 2021-02-27 RX ADMIN — Medication 10 ML: at 15:01

## 2021-02-27 RX ADMIN — PANTOPRAZOLE SODIUM 40 MG: 40 TABLET, DELAYED RELEASE ORAL at 08:12

## 2021-02-27 RX ADMIN — Medication 10 ML: at 05:30

## 2021-02-27 RX ADMIN — CARVEDILOL 6.25 MG: 6.25 TABLET, FILM COATED ORAL at 17:01

## 2021-02-27 RX ADMIN — MEMANTINE HYDROCHLORIDE 5 MG: 10 TABLET ORAL at 08:11

## 2021-02-27 RX ADMIN — Medication 10 ML: at 22:23

## 2021-02-27 RX ADMIN — DONEPEZIL HYDROCHLORIDE 5 MG: 5 TABLET, FILM COATED ORAL at 08:11

## 2021-02-27 RX ADMIN — FUROSEMIDE 40 MG: 10 INJECTION, SOLUTION INTRAVENOUS at 08:11

## 2021-02-27 RX ADMIN — HEPARIN SODIUM 5000 UNITS: 5000 INJECTION INTRAVENOUS; SUBCUTANEOUS at 08:11

## 2021-02-27 RX ADMIN — CARVEDILOL 6.25 MG: 6.25 TABLET, FILM COATED ORAL at 08:11

## 2021-02-27 NOTE — PROGRESS NOTES
2/27/2021 4:01 PM    Admit Date: 2/24/2021    Admit Diagnosis:   CHF exacerbation (HCC) [I50.9];GONGORA (dyspnea on exertion) [R06.00]    Subjective:     Benito Farfan Sr denies chest pain or shortness of breath. Lying flat, speaking in full sentences. Current Facility-Administered Medications   Medication Dose Route Frequency    furosemide (LASIX) injection 40 mg  40 mg IntraVENous DAILY    heparin (porcine) injection 5,000 Units  5,000 Units SubCUTAneous Q12H    donepeziL (ARICEPT) tablet 5 mg  5 mg Oral DAILY    aspirin chewable tablet 81 mg  81 mg Oral DAILY    carvediloL (COREG) tablet 6.25 mg  6.25 mg Oral BID    memantine (NAMENDA) tablet 5 mg  5 mg Oral DAILY    pantoprazole (PROTONIX) tablet 40 mg  40 mg Oral ACB    sodium chloride (NS) flush 5-40 mL  5-40 mL IntraVENous Q8H    sodium chloride (NS) flush 5-40 mL  5-40 mL IntraVENous PRN    acetaminophen (TYLENOL) tablet 650 mg  650 mg Oral Q6H PRN    Or    acetaminophen (TYLENOL) suppository 650 mg  650 mg Rectal Q6H PRN    polyethylene glycol (MIRALAX) packet 17 g  17 g Oral DAILY PRN    promethazine (PHENERGAN) tablet 12.5 mg  12.5 mg Oral Q6H PRN    Or    ondansetron (ZOFRAN) injection 4 mg  4 mg IntraVENous Q6H PRN         Objective:      Physical Exam:    Visit Vitals  BP (!) 107/55 (BP Patient Position: At rest)   Pulse 100   Temp 97.4 °F (36.3 °C)   Resp 18   Wt 114 lb 10.2 oz (52 kg)   SpO2 99%   BMI 17.43 kg/m²     Gen:  NAD  Mental Status - Alert. General Appearance - Not in acute distress. Chest and Lung Exam   Inspection: Accessory muscles - No use of accessory muscles in breathing. Auscultation:   Breath sounds: - Normal.   Cardiovascular   Inspection: Jugular vein - Bilateral - Inspection Normal.   Palpation/Percussion:   Apical Impulse: - Normal.   Auscultation: Rhythm - Regular. Heart Sounds - S1 WNL and S2 WNL. No S3 or S4. Murmurs & Other Heart Sounds: Auscultation of the heart reveals - No Murmurs.    Peripheral Vascular   Upper Extremity: Inspection - Bilateral - No Cyanotic nailbeds or Digital clubbing. Lower Extremity:   Palpation: Edema - Bilateral - No edema. Abdomen:   Soft, non-tender, bowel sounds are active. Neuro: A&O times 3, CN and motor grossly WNL    Data Review:   Recent Labs     02/25/21  0359 02/24/21 2005   WBC 6.9 6.9   HGB 11.5* 11.4*   HCT 36.0* 36.1*    236     Recent Labs     02/27/21  0412 02/25/21 0359 02/24/21 2005    144 144   K 3.6 3.9 4.1    107 106   CO2 28 27 27   GLU 91 105* 140*   BUN 62* 67* 65*   CREA 1.99* 1.91* 2.01*   CA 9.1 9.7 9.2   MG  --  2.4  --    ALB  --   --  3.2*   TBILI  --   --  0.5   ALT  --   --  31       Recent Labs     02/25/21 0359 02/25/21 0119 02/24/21 2005   TROIQ <0.05 <0.05 <0.05         Intake/Output Summary (Last 24 hours) at 2/27/2021 1601  Last data filed at 2/27/2021 1557  Gross per 24 hour   Intake 720 ml   Output 1150 ml   Net -430 ml        Telemetry: AV sequential pacing with PVCs      Assessment:     Principal Problem:    Acute on chronic systolic heart failure ([facility] (12/14/2020)    Active Problems:    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, without long-term current use of insulin ([address] (7/18/2017)      Alzheimer disease ([facility] (7/18/2017)      Cardiomyopathy ([facility] (11/7/2019)      Pleural effusion (12/14/2020)      GONGORA (dyspnea on exertion) (2/24/2021)      CHF exacerbation (HCC) (2/24/2021)      Chronic diastolic heart failure ([address] (2/26/2021)        Plan:     Cardiomyopathy with acute on chronic systolic/diastolic HF (EF 17-51% 03/5120)  neg 1.6 L and multiple unmeasurable outputs  Clinically, does not appear volume overloaded, lying flat, speaking in full sentences  Will change to p.o. Lasix tomorrow at a slightly higher dose of 60 mg daily with parameters to hold if creatinine elevated or BP low.   Continue on Coreg, ACEI on hold due to renal dysfunction  Monitor I/Os, daily weights and labs  Given his advanced age and Alzheimer's disease will hold on ischemic w/u.   With his multiple admissions in the last few months, and his wosening HF, recommend palliative involvement and possibly eventually hospice.       Essential hypertension  Stable, continue on BB, diuretic     Paroxysmal atrial fibrillation  [facility] interrogated in 12/2020 noting 4 hours aflutter and NSVT - decision with family not to pursue aggressive management  [employer] checked device on 2/26/2021. Normally functioning device. Less than 0.1% AT AF burden. No significant ventricular tachycardia. OptiVol was elevated 12/31/2022 2/8/2021, now normal, consistent with resolved volume overload.   On no anticoagulation due to dementia and high risk of fall  Continue on ASA    Hypokalemia:  Replete today

## 2021-02-27 NOTE — PROGRESS NOTES
Comprehensive Nutrition Assessment    Type and Reason for Visit: Initial(low BMI)    Nutrition Recommendations/Plan:   Continue current diet  RD to add chocolate Glucerna BID to promote intake and wt gain    Please document % meals and supplements consumed in flowsheet I/O's under intake     Nutrition Assessment:      Chart reviewed for low BMI. Pt noted for CHF, pleural effusion, cardiomyopathy, DM2, CKD2, Alzheimer's dementia, HTN, pacemaker, debility, diuresing. Good intake per flow sheet. Pt reports a fair appetite, though not eating as much as he used to PTA. He lives with his grand daughter, she cooks for him. Pt reports wt loss and becoming weaker. EMR shows 7lb (5.8%) wt lost over the past month, severe for the time frame. NFPE reveals severe muscle and fat wasting, meeting ASPEN criteria for severe malnutrition. Pt agreeable to ONS to promote intake and wt gain.    Patient Vitals for the past 72 hrs:   % Diet Eaten   02/27/21 1215 75 %   02/27/21 0811 75 %   02/26/21 1916 100 %   02/26/21 1400 75 %   02/26/21 0849 40 %   02/25/21 0800 25 %     Wt Readings from Last 10 Encounters:   02/27/21 52 kg (114 lb 10.2 oz)   02/24/21 56.3 kg (124 lb 3.2 oz)   01/27/21 55.2 kg (121 lb 9.6 oz)   01/09/21 63.7 kg (140 lb 6.9 oz)   01/08/21 53.2 kg (117 lb 3.2 oz)   01/05/21 63.7 kg (140 lb 6.9 oz)   12/24/20 56.6 kg (124 lb 11.2 oz)   12/21/20 55.1 kg (121 lb 8 oz)   12/02/20 55.8 kg (123 lb)   12/01/20 56.7 kg (125 lb)   ]    Malnutrition Assessment:  Malnutrition Status:  Severe malnutrition    Context:  Chronic illness     Findings of the 6 clinical characteristics of malnutrition:   Energy Intake:  Mild decrease in energy intake (specify)  Weight Loss:  7 - Greater than 5% over 1 month     Body Fat Loss:  7 - Severe body fat loss, Buccal region, Fat overlying ribs, Triceps   Muscle Mass Loss:  7 - Severe muscle mass loss, Calf (gastrocnemius), Clavicles (pectoralis &deltoids), Hand (interosseous), Scapula (trapezius), Temples (temporalis), Thigh (quadraceps)  Fluid Accumulation:  1 - Mild, Extremities   Strength:  Not performed         Estimated Daily Nutrient Needs:  Energy (kcal): 1800 kcal (BMR x 1.3 + 300); Weight Used for Energy Requirements: Current  Protein (g): 62-73g (1.2-1.4g/kg); Weight Used for Protein Requirements: Current  Fluid (ml/day): 1900mL; Method Used for Fluid Requirements: 1 ml/kcal      Nutrition Related Findings:  Labs reviewed. Meds: lasix. Edema 1+BLE. BM 2/25.       Wounds:    None       Current Nutrition Therapies:  DIET CARDIAC Regular; FR 1200ML    Anthropometric Measures:  · Height:  5' 8\" (172.7 cm)  · Current Body Wt:  52 kg (114 lb 10.2 oz)    · Ideal Body Wt:  154 lbs:  74.4 %   · BMI Category:  Underweight (BMI less than 22) age over 72       Nutrition Diagnosis:   · Severe malnutrition, In context of chronic illness related to cognitive or neurological impairment, inadequate protein-energy intake as evidenced by weight loss greater than or equal to 5% in 1 month, severe muscle loss, severe loss of subcutaneous fat      Nutrition Interventions:   Food and/or Nutrient Delivery: Continue current diet, Start oral nutrition supplement  Nutrition Education and Counseling: No recommendations at this time  Coordination of Nutrition Care: Continue to monitor while inpatient    Goals:  PO intake >60% meals and ONS next 3-5 days       Nutrition Monitoring and Evaluation:   Behavioral-Environmental Outcomes: None identified  Food/Nutrient Intake Outcomes: Food and nutrient intake, Supplement intake  Physical Signs/Symptoms Outcomes: Biochemical data, Nutrition focused physical findings, Weight, GI status, Fluid status or edema    Discharge Planning:    Continue current diet, Continue oral nutrition supplement     Electronically signed by Ignacia Nichols RD on 2/27/2021 at 5:18 PM    Contact: [id number]  Pager [phone]

## 2021-02-27 NOTE — PROGRESS NOTES
INTERNAL MEDICINE ATTENDING NOTE    S: Mr. Neal Ortiz is a patient of Ivonne Stone MD and was seen by me today during rounds. At this time, he is resting comfortably. The patient has some SOB today, better than admission. ROS otherwise unremarkable except as noted elsewhere. O: Blood pressure (!) 147/85, pulse (!) 57, temperature 97.6 °F (36.4 °C), resp. rate 18, weight 117 lb 4.6 oz (53.2 kg), SpO2 99 %. Gen: Patient is in no acute distress. Lungs: Faint rales bases. Heart: RRR. Abd: S, NT, ND, BS present. Extremities: Warm. Recent Results (from the past 12 hour(s))   METABOLIC PANEL, BASIC    Collection Time: 02/27/21  4:12 AM   Result Value Ref Range    Sodium 143 136 - 145 mmol/L    Potassium 3.6 3.5 - 5.1 mmol/L    Chloride 106 97 - 108 mmol/L    CO2 28 21 - 32 mmol/L    Anion gap 9 5 - 15 mmol/L    Glucose 91 65 - 100 mg/dL    BUN 62 (H) 6 - 20 MG/DL    Creatinine 1.99 (H) 0.70 - 1.30 MG/DL    BUN/Creatinine ratio 31 (H) 12 - 20      GFR est AA 38 (L) >60 ml/min/1.73m2    GFR est non-AA 32 (L) >60 ml/min/1.73m2    Calcium 9.1 8.5 - 10.1 MG/DL        A / P:   1. Acute on chronic systolic heart failure ([address] (12/14/2020). Pleural effusion likely related to this: Diuresing. Cardiology following. 2. Cardiomyopathy ([address] (11/7/2019): EF 15-20%  3. 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, without long-term current use of insulin ([address] (7/18/2017)  4. Alzheimer disease ([address] (7/18/2017)  5. HTN: A little high this morning. 6. Paroxysmal A Fib / SSS: Pacemaker.    7. DNR     Yenni Srivastava MD, FACP  Best contact is via Pager: [phone], or via hospital  at [phone]

## 2021-02-27 NOTE — ROUTINE PROCESS
Bedside shift change report given to [address] [place] [address] (oncoming nurse) by Albert ADIGLE RN (offgoing nurse). Report included the following information SBAR, Kardex and MAR.

## 2021-02-28 LAB
ANION GAP SERPL CALC-SCNC: 6 MMOL/L (ref 5–15)
BUN SERPL-MCNC: 57 MG/DL (ref 6–20)
BUN/CREAT SERPL: 30 (ref 12–20)
CALCIUM SERPL-MCNC: 9.2 MG/DL (ref 8.5–10.1)
CHLORIDE SERPL-SCNC: 104 MMOL/L (ref 97–108)
CO2 SERPL-SCNC: 31 MMOL/L (ref 21–32)
CREAT SERPL-MCNC: 1.93 MG/DL (ref 0.7–1.3)
GLUCOSE SERPL-MCNC: 81 MG/DL (ref 65–100)
POTASSIUM SERPL-SCNC: 3.3 MMOL/L (ref 3.5–5.1)
SODIUM SERPL-SCNC: 141 MMOL/L (ref 136–145)

## 2021-02-28 PROCEDURE — 74011250636 HC RX REV CODE- 250/636: Performed by: FAMILY MEDICINE

## 2021-02-28 PROCEDURE — 94760 N-INVAS EAR/PLS OXIMETRY 1: CPT

## 2021-02-28 PROCEDURE — 80048 BASIC METABOLIC PNL TOTAL CA: CPT

## 2021-02-28 PROCEDURE — 74011250637 HC RX REV CODE- 250/637: Performed by: INTERNAL MEDICINE

## 2021-02-28 PROCEDURE — 65660000000 HC RM CCU STEPDOWN

## 2021-02-28 PROCEDURE — 99233 SBSQ HOSP IP/OBS HIGH 50: CPT | Performed by: INTERNAL MEDICINE

## 2021-02-28 PROCEDURE — 74011250637 HC RX REV CODE- 250/637: Performed by: HOSPITALIST

## 2021-02-28 PROCEDURE — 74011250636 HC RX REV CODE- 250/636: Performed by: HOSPITALIST

## 2021-02-28 PROCEDURE — 36415 COLL VENOUS BLD VENIPUNCTURE: CPT

## 2021-02-28 RX ORDER — POTASSIUM CHLORIDE 750 MG/1
40 TABLET, FILM COATED, EXTENDED RELEASE ORAL
Status: COMPLETED | OUTPATIENT
Start: 2021-02-28 | End: 2021-02-28

## 2021-02-28 RX ADMIN — CARVEDILOL 6.25 MG: 6.25 TABLET, FILM COATED ORAL at 08:41

## 2021-02-28 RX ADMIN — MEMANTINE HYDROCHLORIDE 5 MG: 10 TABLET ORAL at 08:41

## 2021-02-28 RX ADMIN — PANTOPRAZOLE SODIUM 40 MG: 40 TABLET, DELAYED RELEASE ORAL at 08:41

## 2021-02-28 RX ADMIN — DONEPEZIL HYDROCHLORIDE 5 MG: 5 TABLET, FILM COATED ORAL at 08:41

## 2021-02-28 RX ADMIN — FUROSEMIDE 40 MG: 10 INJECTION, SOLUTION INTRAVENOUS at 08:41

## 2021-02-28 RX ADMIN — ASPIRIN 81 MG: 81 TABLET, CHEWABLE ORAL at 08:41

## 2021-02-28 RX ADMIN — Medication 10 ML: at 22:00

## 2021-02-28 RX ADMIN — POTASSIUM CHLORIDE 40 MEQ: 750 TABLET, FILM COATED, EXTENDED RELEASE ORAL at 17:28

## 2021-02-28 RX ADMIN — HEPARIN SODIUM 5000 UNITS: 5000 INJECTION INTRAVENOUS; SUBCUTANEOUS at 08:41

## 2021-02-28 RX ADMIN — HEPARIN SODIUM 5000 UNITS: 5000 INJECTION INTRAVENOUS; SUBCUTANEOUS at 21:00

## 2021-02-28 RX ADMIN — CARVEDILOL 6.25 MG: 6.25 TABLET, FILM COATED ORAL at 17:28

## 2021-02-28 RX ADMIN — Medication 10 ML: at 14:00

## 2021-02-28 NOTE — ROUTINE PROCESS
Bedside shift change report given to [address] [place] [address] (oncoming nurse) by Danette DAIGLERN (offgoing nurse). Report included the following information SBAR, Kardex and MAR.

## 2021-02-28 NOTE — PROGRESS NOTES
INTERNAL MEDICINE ATTENDING NOTE    S: Mr. Ely Santiago is a patient of Kael Barney MD and was seen by me today during rounds. At this time, he is resting comfortably. The patient has some SOB today, better than admission. ROS otherwise unremarkable except as noted elsewhere. O: Blood pressure 124/79, pulse (!) 116, temperature 97.7 °F (36.5 °C), resp. rate 18, height 5' 8\" (1.727 m), weight 114 lb 6.7 oz (51.9 kg), SpO2 96 %. Gen: Patient is in no acute distress. Lungs: Faint rales bases. Heart: RRR. Abd: S, NT, ND, BS present. Extremities: Warm. Recent Results (from the past 12 hour(s))   METABOLIC PANEL, BASIC    Collection Time: 02/28/21  3:48 AM   Result Value Ref Range    Sodium 141 136 - 145 mmol/L    Potassium 3.3 (L) 3.5 - 5.1 mmol/L    Chloride 104 97 - 108 mmol/L    CO2 31 21 - 32 mmol/L    Anion gap 6 5 - 15 mmol/L    Glucose 81 65 - 100 mg/dL    BUN 57 (H) 6 - 20 MG/DL    Creatinine 1.93 (H) 0.70 - 1.30 MG/DL    BUN/Creatinine ratio 30 (H) 12 - 20      GFR est AA 40 (L) >60 ml/min/1.73m2    GFR est non-AA 33 (L) >60 ml/min/1.73m2    Calcium 9.2 8.5 - 10.1 MG/DL        A / P:   1. Acute on chronic systolic heart failure ([facility] (12/14/2020). Pleural effusion likely related to this: Diuresing. Cardiology following. 2. Cardiomyopathy ([facility] [address] (11/7/2019): EF 15-20%  3. 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, without long-term current use of insulin ([address] (7/18/2017)  4. Alzheimer disease ([address] (7/18/2017)  5. HTN: A little high this morning. 6. Paroxysmal A Fib / SSS: Pacemaker.    7. DNR     Aura Morris MD, FACP  Best contact is via Pager: [phone], or via hospital  at [phone]

## 2021-03-01 LAB
ANION GAP SERPL CALC-SCNC: 6 MMOL/L (ref 5–15)
BUN SERPL-MCNC: 48 MG/DL (ref 6–20)
BUN/CREAT SERPL: 28 (ref 12–20)
CALCIUM SERPL-MCNC: 8.9 MG/DL (ref 8.5–10.1)
CHLORIDE SERPL-SCNC: 104 MMOL/L (ref 97–108)
CO2 SERPL-SCNC: 30 MMOL/L (ref 21–32)
CREAT SERPL-MCNC: 1.73 MG/DL (ref 0.7–1.3)
GLUCOSE BLD STRIP.AUTO-MCNC: 118 MG/DL (ref 65–100)
GLUCOSE BLD STRIP.AUTO-MCNC: 119 MG/DL (ref 65–100)
GLUCOSE BLD STRIP.AUTO-MCNC: 123 MG/DL (ref 65–100)
GLUCOSE BLD STRIP.AUTO-MCNC: 136 MG/DL (ref 65–100)
GLUCOSE SERPL-MCNC: 82 MG/DL (ref 65–100)
POTASSIUM SERPL-SCNC: 3.8 MMOL/L (ref 3.5–5.1)
SERVICE CMNT-IMP: ABNORMAL
SODIUM SERPL-SCNC: 140 MMOL/L (ref 136–145)

## 2021-03-01 PROCEDURE — 97162 PT EVAL MOD COMPLEX 30 MIN: CPT

## 2021-03-01 PROCEDURE — 99233 SBSQ HOSP IP/OBS HIGH 50: CPT | Performed by: INTERNAL MEDICINE

## 2021-03-01 PROCEDURE — 36415 COLL VENOUS BLD VENIPUNCTURE: CPT

## 2021-03-01 PROCEDURE — 74011250636 HC RX REV CODE- 250/636: Performed by: HOSPITALIST

## 2021-03-01 PROCEDURE — 74011250637 HC RX REV CODE- 250/637: Performed by: HOSPITALIST

## 2021-03-01 PROCEDURE — 82962 GLUCOSE BLOOD TEST: CPT

## 2021-03-01 PROCEDURE — 94760 N-INVAS EAR/PLS OXIMETRY 1: CPT

## 2021-03-01 PROCEDURE — 74011250637 HC RX REV CODE- 250/637: Performed by: INTERNAL MEDICINE

## 2021-03-01 PROCEDURE — 65660000000 HC RM CCU STEPDOWN

## 2021-03-01 PROCEDURE — 97116 GAIT TRAINING THERAPY: CPT

## 2021-03-01 PROCEDURE — 80048 BASIC METABOLIC PNL TOTAL CA: CPT

## 2021-03-01 RX ADMIN — DONEPEZIL HYDROCHLORIDE 5 MG: 5 TABLET, FILM COATED ORAL at 09:54

## 2021-03-01 RX ADMIN — ASPIRIN 81 MG: 81 TABLET, CHEWABLE ORAL at 09:53

## 2021-03-01 RX ADMIN — PANTOPRAZOLE SODIUM 40 MG: 40 TABLET, DELAYED RELEASE ORAL at 09:54

## 2021-03-01 RX ADMIN — CARVEDILOL 6.25 MG: 6.25 TABLET, FILM COATED ORAL at 09:53

## 2021-03-01 RX ADMIN — HEPARIN SODIUM 5000 UNITS: 5000 INJECTION INTRAVENOUS; SUBCUTANEOUS at 09:54

## 2021-03-01 RX ADMIN — HEPARIN SODIUM 5000 UNITS: 5000 INJECTION INTRAVENOUS; SUBCUTANEOUS at 21:52

## 2021-03-01 RX ADMIN — MEMANTINE HYDROCHLORIDE 5 MG: 10 TABLET ORAL at 09:53

## 2021-03-01 RX ADMIN — FUROSEMIDE 60 MG: 40 TABLET ORAL at 09:54

## 2021-03-01 RX ADMIN — CARVEDILOL 6.25 MG: 6.25 TABLET, FILM COATED ORAL at 17:21

## 2021-03-01 RX ADMIN — Medication 10 ML: at 13:40

## 2021-03-01 RX ADMIN — Medication 10 ML: at 06:28

## 2021-03-01 NOTE — PROGRESS NOTES
Spiritual Care Assessment/Progress Note  [facility]      NAME: Froylan Alpers Sr      MRN: [id number]  AGE: 80 y.o. SEX: male  Holiness Affiliation: Christian   Language: English     3/1/2021     Total Time (in minutes): 5     Spiritual Assessment begun in [facility] through conversation with:         []Patient        [] Family    [] Friend(s)        Reason for Consult: Initial/Spiritual assessment, patient floor     Spiritual beliefs: (Please include comment if needed)     [] Identifies with a cristhian tradition:         [] Supported by a cristhian community:            [] Claims no spiritual orientation:           [] Seeking spiritual identity:                [] Adheres to an individual form of spirituality:           [x] Not able to assess:                           Identified resources for coping:      [] Prayer                               [] Music                  [] Guided Imagery     [] Family/friends                 [] Pet visits     [] Devotional reading                         [x] Unknown     [] Other:                                               Interventions offered during this visit: (See comments for more details)    Patient Interventions: Initial visit           Plan of Care:     [] Support spiritual and/or cultural needs    [] Support AMD and/or advance care planning process      [] Support grieving process   [] Coordinate Rites and/or Rituals    [] Coordination with community clergy   [] No spiritual needs identified at this time   [] Detailed Plan of Care below (See Comments)  [] Make referral to Music Therapy  [] Make referral to Pet Therapy     [] Make referral to Addiction services  [] Make referral to [facility]  [] Make referral to Spiritual Care Partner  [] No future visits requested        [x] Follow up upon further referrals     Comments:     Initial Spiritual Care Assessment attempt for Mr. Melvin Milan in [id number]. Upon arrival, patient was appeared resting.   respected his privacy. Unable to assess.      [id number] [facility] Kofi Marino., M.S., Th.M.  Spiritual Care Provider   Paging Service [phone])

## 2021-03-01 NOTE — PROGRESS NOTES
Orders received, chart reviewed and patient evaluated by physical therapy. Pending progression with skilled acute physical therapy, recommend:  Therapy up to 5 days/week in SNF setting or intensive home health therapy program pending families ability to be with pt 24/7. Recommend with nursing OOB to chair 3x/day and walking daily with 1 assist and walker. Thank you for completing as able in order to maintain patient strength, endurance and independence. Full evaluation to follow.

## 2021-03-01 NOTE — ROUTINE PROCESS
End of Shift Note Bedside shift change report given to Barbara Celaya (oncoming nurse) by Tom Conroy RN (offgoing nurse). Report included the following information SBAR, Kardex, Intake/Output, MAR and Accordion Shift worked:  7-7 Shift summary and any significant changes:  
  None Concerns for physician to address:  
  
Zone phone for oncoming shift:   [id number] Activity: 
Activity Level: Up with Assistance Number times ambulated in hallways past shift: 0 Number of times OOB to chair past shift: 0 Cardiac:  
Cardiac Monitoring: Yes     
Cardiac Rhythm: Paced Access:  
Current line(s): PIV Genitourinary:  
Urinary status: voiding Respiratory:  
O2 Device: Room air Chronic home O2 use?: NO Incentive spirometer at bedside: NO 
  
GI: 
Last Bowel Movement Date: 02/25/21 Current diet:  DIET CARDIAC Regular; FR 1200ML 
DIET NUTRITIONAL SUPPLEMENTS Breakfast, Dinner; Glucerna Shake DIET ONE TIME MESSAGE Passing flatus: YES Tolerating current diet: YES 
% Diet Eaten: 75 % Pain Management:  
Patient states pain is manageable on current regimen: YES Skin: 
Newton Score: 19 Interventions: increase time out of bed Patient Safety: 
Fall Score: Total Score: 5 Interventions: bed/chair alarm, assistive device (walker, cane, etc) and gripper socks High Fall Risk: Yes Length of Stay: 
Expected LOS: 4d 2h Actual LOS: 4 Tom Conroy RN

## 2021-03-01 NOTE — PROGRESS NOTES
3/1/2021 4:01 PM    Admit Date: 2/24/2021    Admit Diagnosis:   CHF exacerbation (HCC) [I50.9];GONGORA (dyspnea on exertion) [R06.00]    Subjective:     Benito Farfan Sr denies chest pain or shortness of breath. Current Facility-Administered Medications   Medication Dose Route Frequency    furosemide (LASIX) tablet 60 mg  60 mg Oral DAILY    heparin (porcine) injection 5,000 Units  5,000 Units SubCUTAneous Q12H    donepeziL (ARICEPT) tablet 5 mg  5 mg Oral DAILY    aspirin chewable tablet 81 mg  81 mg Oral DAILY    carvediloL (COREG) tablet 6.25 mg  6.25 mg Oral BID    memantine (NAMENDA) tablet 5 mg  5 mg Oral DAILY    pantoprazole (PROTONIX) tablet 40 mg  40 mg Oral ACB    sodium chloride (NS) flush 5-40 mL  5-40 mL IntraVENous Q8H    sodium chloride (NS) flush 5-40 mL  5-40 mL IntraVENous PRN    acetaminophen (TYLENOL) tablet 650 mg  650 mg Oral Q6H PRN    Or    acetaminophen (TYLENOL) suppository 650 mg  650 mg Rectal Q6H PRN    polyethylene glycol (MIRALAX) packet 17 g  17 g Oral DAILY PRN    promethazine (PHENERGAN) tablet 12.5 mg  12.5 mg Oral Q6H PRN    Or    ondansetron (ZOFRAN) injection 4 mg  4 mg IntraVENous Q6H PRN         Objective:      Physical Exam:    Visit Vitals  BP (!) 106/59   Pulse 61   Temp 97.9 °F (36.6 °C)   Resp 18   Ht 5' 8\" (1.727 m)   Wt 111 lb 12.4 oz (50.7 kg)   SpO2 100%   BMI 17.00 kg/m²     Physical:   General: elderly cachectic AAM in no acute distress  Heart: RRR, 5-1/2 holosystolic m, +JVD, no carotid bruits   Lungs: basilar rales  Abdomen: Soft, +BS, NTND   Extremities: LE angel +DP/PT, no edema   Neurologic: Grossly normal      Data Review:   No results for input(s): WBC, HGB, HCT, PLT, HGBEXT, HCTEXT, PLTEXT, HGBEXT, HCTEXT, PLTEXT in the last 72 hours.   Recent Labs     03/01/21  0339 02/28/21  0348 02/27/21  0412    141 143   K 3.8 3.3* 3.6    104 106   CO2 30 31 28   GLU 82 81 91   BUN 48* 57* 62*   CREA 1.73* 1.93* 1.99*   CA 8.9 9.2 9.1 No results for input(s): TROIQ, CPK, CKMB in the last 72 hours. Intake/Output Summary (Last 24 hours) at 3/1/2021 1346  Last data filed at 3/1/2021 1342  Gross per 24 hour   Intake 790 ml   Output 800 ml   Net -10 ml        Telemetry: AV sequential pacing with PVCs      Assessment:     Principal Problem:    Acute on chronic systolic heart failure ([address] (12/14/2020)    Active Problems:    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, without long-term current use of insulin ([address] (7/18/2017)      Alzheimer disease ([address] (7/18/2017)      Cardiomyopathy ([address] (11/7/2019)      Pleural effusion (12/14/2020)      GONGORA (dyspnea on exertion) (2/24/2021)      CHF exacerbation (HCC) (2/24/2021)      Chronic diastolic heart failure ([address] (2/26/2021)        Plan:     Cardiomyopathy with acute on chronic systolic/diastolic HF (EF 24-64% 16/2510)  neg 1.5 L and multiple unmeasurable outputs, down 3#s. Clinically, does not appear volume overloaded  Continue on Coreg,Lasix 60mg daily, ACEI on hold due to renal dysfunction  Monitor I/Os, daily weights and labs  Given his advanced age and Alzheimer's disease will hold on ischemic w/u.   With his multiple admissions in the last few months, and his wosening HF, recommend palliative involvement and possibly eventually hospice.       Essential hypertension  Stable, continue on BB, diuretic     Paroxysmal atrial fibrillation  [facility] interrogated in 12/2020 noting 4 hours aflutter and NSVT - decision with family not to pursue aggressive management  Medtronic checked device on 2/26/2021. Normally functioning device. Less than 0.1% AT AF burden. No significant ventricular tachycardia. OptiVol was elevated 12/31/2020, 2/8/2021, now normal, consistent with resolved volume overload.   On no anticoagulation due to dementia and high risk of fall  Continue on ASA    Hypokalemia:  Replete today      Almas Cedeno [facility]  Cardiology    Patient seen and examined by me with the above nurse practitioner. I personally performed all components of the history, physical, and medical decision making and agree with the assessment and plan with minor modifications as noted. Heart failure is well compensated currently. Increase Lasix to 60 mg daily at discharge.

## 2021-03-01 NOTE — PROGRESS NOTES
INTERNAL MEDICINE ATTENDING NOTE    S: Mr. Juanito Palmer is a patient of Alberto Garcia MD and was seen by me today during rounds. At this time, he is resting comfortably. Breathing has improved. ROS otherwise unremarkable except as noted elsewhere. O: Blood pressure 132/77, pulse 61, temperature 97.5 °F (36.4 °C), resp. rate 18, height 5' 8\" (1.727 m), weight 114 lb 6.7 oz (51.9 kg), SpO2 95 %. Gen: Patient is in no acute distress. Lungs: Faint rales bases. Heart: RRR. Abd: S, NT, ND, BS present. Extremities: Warm. Recent Results (from the past 12 hour(s))   METABOLIC PANEL, BASIC    Collection Time: 03/01/21  3:39 AM   Result Value Ref Range    Sodium 140 136 - 145 mmol/L    Potassium 3.8 3.5 - 5.1 mmol/L    Chloride 104 97 - 108 mmol/L    CO2 30 21 - 32 mmol/L    Anion gap 6 5 - 15 mmol/L    Glucose 82 65 - 100 mg/dL    BUN 48 (H) 6 - 20 MG/DL    Creatinine 1.73 (H) 0.70 - 1.30 MG/DL    BUN/Creatinine ratio 28 (H) 12 - 20      GFR est AA 45 (L) >60 ml/min/1.73m2    GFR est non-AA 37 (L) >60 ml/min/1.73m2    Calcium 8.9 8.5 - 10.1 MG/DL   GLUCOSE, POC    Collection Time: 03/01/21  7:36 AM   Result Value Ref Range    Glucose (POC) 136 (H) 65 - 100 mg/dL    Performed by Yao Lopez (PCT)         A / P:   1. Acute on chronic systolic heart failure ([address] (12/14/2020). Pleural effusion likely related to this: Diuresing. Cardiology following. 2. Cardiomyopathy ([address] (11/7/2019): EF 15-20%  3. 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, without long-term current use of insulin ([address] (7/18/2017)  4. Alzheimer disease ([address] (7/18/2017)  5. HTN: A little high this morning. 6. Paroxysmal A Fib / SSS: Pacemaker.    7. DNR     Ankush Caban MD, FACP  Best contact is via Pager: [phone], or via hospital  at [phone]

## 2021-03-01 NOTE — PROGRESS NOTES
Problem: Mobility Impaired (Adult and Pediatric)  Goal: *Acute Goals and Plan of Care (Insert Text)  Description: FUNCTIONAL STATUS PRIOR TO ADMISSION: Patient was independent and active without use of DME.    HOME SUPPORT PRIOR TO ADMISSION: The patient lived with granddaughter and her children. Physical Therapy Goals  Initiated 3/1/2021  1. Patient will move from supine to sit and sit to supine  in bed with independence within 7 day(s). 2.  Patient will transfer from bed to chair and chair to bed with modified independence using the least restrictive device within 7 day(s). 3.  Patient will perform sit to stand with modified independence within 7 day(s). 4.  Patient will ambulate with modified independence for 75 feet with the least restrictive device within 7 day(s). 5.  Patient will ascend/descend 2 stairs with B handrail(s) with modified independence within 7 day(s). Outcome: Not Met    PHYSICAL THERAPY EVALUATION  Patient: Marie Farfan  (80 y.o. male)  Date: 3/1/2021  Primary Diagnosis: CHF exacerbation ([facility] [I50.9]  GONGORA (dyspnea on exertion) [R06.00]        Precautions:   Fall      ASSESSMENT  Based on the objective data described below, the patient presents with generalized weakness, impaired balance and altered gait. Pt was received in supine on RA and cleared by nursing to mobilize. VSS during session. He was able to come to the EOB without assistance. Stood without AD and ambulated into the workman. Noted crouched posture and scissoring without AD. Provided pt with RW and gait improved. He was left sitting up in the chair at the end of the session. Current Level of Function Impacting Discharge (mobility/balance): CGA    Functional Outcome Measure: The patient scored Total: 60/100 on the Barthel Index which is indicative of 40% impaired ability to care for basic self needs/dependency on others.      Other factors to consider for discharge: unsure how much support he has at home Patient will benefit from skilled therapy intervention to address the above noted impairments. PLAN :  Recommendations and Planned Interventions: bed mobility training, transfer training, gait training, therapeutic exercises, patient and family training/education, and therapeutic activities      Frequency/Duration: Patient will be followed by physical therapy:  4 times a week to address goals. Recommendation for discharge: (in order for the patient to meet his/her long term goals)  Physical therapy at least 2 days/week in the home AND ensure assist and/or supervision for safety with mobility. If family is not able to provide 24/7 may need SNF    This discharge recommendation:  Has not yet been discussed the attending provider and/or case management    IF patient discharges home will need the following DME: rolling walker         SUBJECTIVE:   Patient stated it feels good to sit up.     OBJECTIVE DATA SUMMARY:   HISTORY:    Past Medical History:   Diagnosis Date    Abdominal pain 7/18/2017    Alzheimer disease ([facility] 7/18/2017    Anemia 7/18/2017    Arthritis     Back pain 7/18/2017    Bradycardia 7/18/2017    CAD (coronary artery disease)     hx of MI    Chronic diastolic heart failure ([facility] 2/26/2021    CKD (chronic kidney disease), stage II 7/18/2017    constipation     Depression 7/18/2017    Diabetes mellitus ([facility] 7/18/2017    Diverticulosis 7/18/2017    DJD (degenerative joint disease) 7/18/2017    Encounter for long-term (current) use of other medications 7/18/2017    Fatigue     Gastritis and duodenitis 7/18/2017    GERD (gastroesophageal reflux disease)     GI bleed 7/18/2017    Hearing loss     Hyperlipidemia 7/18/2017    Hypertension     Hypertension with renal disease 7/18/2017    Ill-defined condition     Dementia    Internal hemorrhoids 7/18/2017    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    S/P cardiac pacemaker procedure 5/4/2015    5/4/15 Medtronic dual chamber pacemaker implant     Weight loss     lost over 40 pounds last year- has no appetite     Past Surgical History:   Procedure Laterality Date    COLONOSCOPY N/A 6/22/2017    COLONOSCOPY performed by Elsy Navarro MD at [facility]  6/22/2017         HX HERNIA REPAIR  7/10/2014    right inguinal    HX OTHER SURGICAL      cystectomy from back    CA EGD TRANSORAL BIOPSY SINGLE/MULTIPLE  2/14/2012         CA UPPER GI ENDOSCOPY,W/DIR SUBMUC INJ  7/23/2020         UPPER GI ENDOSCOPY,BIOPSY  6/22/2017         UPPER GI ENDOSCOPY,BIOPSY  7/23/2020            Personal factors and/or comorbidities impacting plan of care:     Home Situation  Home Environment: Private residence  # Steps to Enter: 2  Rails to Enter: Yes  Hand Rails : Bilateral(wide)  One/Two Story Residence: Two story  # of Interior Steps: 12  Living Alone: No  Support Systems: Family member(s)  Current DME Used/Available at Home: None  Tub or Shower Type: Tub/Shower combination    EXAMINATION/PRESENTATION/DECISION MAKING:   Critical Behavior:  Neurologic State: Alert, Confused, Eyes open spontaneously  Orientation Level: Oriented to person, Oriented to place, Disoriented to time, Disoriented to situation        Hearing:   Auditory  Auditory Impairment: None  Skin:  intact  Edema: none  Range Of Motion:  AROM: Generally decreased, functional           PROM: Generally decreased, functional           Strength:    Strength: Generally decreased, functional                    Tone & Sensation:   Tone: Normal              Sensation: Intact               Coordination:  Coordination: Within functional limits  Vision:      Functional Mobility:  Bed Mobility:  Rolling: Stand-by assistance  Supine to Sit: Stand-by assistance     Scooting: Stand-by assistance  Transfers:  Sit to Stand: Contact guard assistance  Stand to Sit: Contact guard assistance                       Balance:   Sitting: Intact  Standing: Impaired  Standing - Static: Constant support;Good  Standing - Dynamic : Constant support; Fair  Ambulation/Gait Training:  Distance (ft): 50 Feet (ft)  Assistive Device: Gait belt;Walker, rolling  Ambulation - Level of Assistance: Contact guard assistance        Gait Abnormalities: Decreased step clearance;Scissoring;Shuffling gait        Base of Support: Narrowed     Speed/Maureen: Pace decreased (<100 feet/min); Shuffled  Step Length: Left shortened;Right shortened                  Functional Measure:  Barthel Index:    Bathin  Bladder: 5  Bowels: 10  Groomin  Dressin  Feeding: 10  Mobility: 5  Stairs: 5  Toilet Use: 5  Transfer (Bed to Chair and Back): 10  Total: 60/100       The Barthel ADL Index: Guidelines  1. The index should be used as a record of what a patient does, not as a record of what a patient could do. 2. The main aim is to establish degree of independence from any help, physical or verbal, however minor and for whatever reason. 3. The need for supervision renders the patient not independent. 4. A patient's performance should be established using the best available evidence. Asking the patient, friends/relatives and nurses are the usual sources, but direct observation and common sense are also important. However direct testing is not needed. 5. Usually the patient's performance over the preceding 24-48 hours is important, but occasionally longer periods will be relevant. 6. Middle categories imply that the patient supplies over 50 per cent of the effort. 7. Use of aids to be independent is allowed. Frederica Del., Barthel, D.W. (0048). Functional evaluation: the Barthel Index. [address] [facility] (14)2. LINDY Maria, Myrla Holter.Danny.[facility], [address] [place] [address] (). Measuring the change indisability after inpatient rehabilitation; comparison of the responsiveness of the Barthel Index and Functional [place] Measure. Journal of Neurology, Neurosurgery, and Psychiatry, 66(4), 938-547.   Noemí Rendon NUNU, INES Albright, & Rell Smith M.A. (2004.) Assessment of post-stroke quality of life in cost-effectiveness studies: The usefulness of the Barthel Index and the EuroQoL-5D. Quality of Life Research, 15, 194-07        Physical Therapy Evaluation Charge Determination   History Examination Presentation Decision-Making   HIGH Complexity :3+ comorbidities / personal factors will impact the outcome/ POC  MEDIUM Complexity : 3 Standardized tests and measures addressing body structure, function, activity limitation and / or participation in recreation  MEDIUM Complexity : Evolving with changing characteristics  Other outcome measures barthel  MEDIUM      Based on the above components, the patient evaluation is determined to be of the following complexity level: MEDIUM    Pain Ratin/10    Activity Tolerance:   Good    After treatment patient left in no apparent distress:   Sitting in chair, Call bell within reach, and Bed / chair alarm activated    COMMUNICATION/EDUCATION:   The patients plan of care was discussed with: Registered nurse. Fall prevention education was provided and the patient/caregiver indicated understanding. and Patient/family agree to work toward stated goals and plan of care.     Thank you for this referral.  Melissa Lawson, PT, DPT   Time Calculation: 16 mins

## 2021-03-01 NOTE — PROGRESS NOTES
End of Shift Note    Bedside shift change report given to Harman PHILLIPS (oncoming nurse) by Tess Rabago (offgoing nurse). Report included the following information SBAR, Kardex, Intake/Output, MAR, Recent Results, Med Rec Status and Cardiac Rhythm Paced    Shift worked:  [id number]     Shift summary and any significant changes:     Patient slept most of night. Bed alarm on as patient walked to bathroom alone, patient reminded to call and to use urinal.      Concerns for physician to address:  dc plan? Zone phone for oncoming shift:   [phone]       Activity:  Activity Level: Up with Assistance  Number times ambulated in hallways past shift: 0  Number of times OOB to chair past shift: 0    Cardiac:   Cardiac Monitoring: Yes      Cardiac Rhythm: Paced    Access:   Current line(s): PIV     Genitourinary:   Urinary status: voiding    Respiratory:   O2 Device: Room air  Chronic home O2 use?: NO  Incentive spirometer at bedside: NO     GI:  Last Bowel Movement Date: 02/25/21  Current diet:  DIET CARDIAC Regular; FR 1200ML  DIET NUTRITIONAL SUPPLEMENTS Breakfast, Dinner; Glucerna Shake  DIET ONE TIME MESSAGE  Passing flatus: YES  Tolerating current diet: YES  % Diet Eaten: 75 %    Pain Management:   Patient states pain is manageable on current regimen: YES    Skin:  Newton Score: 19  Interventions: float heels and increase time out of bed    Patient Safety:  Fall Score:  Total Score: 5  Interventions: bed/chair alarm, gripper socks and pt to call before getting OOB  High Fall Risk: Yes    Length of Stay:  Expected LOS: 4d 2h  Actual LOS: [facility]

## 2021-03-02 ENCOUNTER — HOME HEALTH ADMISSION (OUTPATIENT)
Dept: HOME HEALTH SERVICES | Facility: HOME HEALTH | Age: 86
End: 2021-03-02
Payer: MEDICARE

## 2021-03-02 LAB
ALBUMIN SERPL-MCNC: 3.1 G/DL (ref 3.5–5)
ALBUMIN/GLOB SERPL: 0.9 {RATIO} (ref 1.1–2.2)
ALP SERPL-CCNC: 88 U/L (ref 45–117)
ALT SERPL-CCNC: 26 U/L (ref 12–78)
ANION GAP SERPL CALC-SCNC: 5 MMOL/L (ref 5–15)
AST SERPL-CCNC: 25 U/L (ref 15–37)
BASOPHILS # BLD: 0.1 K/UL (ref 0–0.1)
BASOPHILS NFR BLD: 1 % (ref 0–1)
BILIRUB SERPL-MCNC: 0.5 MG/DL (ref 0.2–1)
BUN SERPL-MCNC: 42 MG/DL (ref 6–20)
BUN/CREAT SERPL: 23 (ref 12–20)
CALCIUM SERPL-MCNC: 9.3 MG/DL (ref 8.5–10.1)
CHLORIDE SERPL-SCNC: 104 MMOL/L (ref 97–108)
CO2 SERPL-SCNC: 30 MMOL/L (ref 21–32)
CREAT SERPL-MCNC: 1.81 MG/DL (ref 0.7–1.3)
DIFFERENTIAL METHOD BLD: ABNORMAL
EOSINOPHIL # BLD: 0.3 K/UL (ref 0–0.4)
EOSINOPHIL NFR BLD: 5 % (ref 0–7)
ERYTHROCYTE [DISTWIDTH] IN BLOOD BY AUTOMATED COUNT: 15.6 % (ref 11.5–14.5)
GLOBULIN SER CALC-MCNC: 3.6 G/DL (ref 2–4)
GLUCOSE BLD STRIP.AUTO-MCNC: 105 MG/DL (ref 65–100)
GLUCOSE BLD STRIP.AUTO-MCNC: 117 MG/DL (ref 65–100)
GLUCOSE BLD STRIP.AUTO-MCNC: 129 MG/DL (ref 65–100)
GLUCOSE BLD STRIP.AUTO-MCNC: 95 MG/DL (ref 65–100)
GLUCOSE SERPL-MCNC: 119 MG/DL (ref 65–100)
HCT VFR BLD AUTO: 37.9 % (ref 36.6–50.3)
HGB BLD-MCNC: 11.9 G/DL (ref 12.1–17)
IMM GRANULOCYTES # BLD AUTO: 0 K/UL (ref 0–0.04)
IMM GRANULOCYTES NFR BLD AUTO: 1 % (ref 0–0.5)
LYMPHOCYTES # BLD: 1.3 K/UL (ref 0.8–3.5)
LYMPHOCYTES NFR BLD: 20 % (ref 12–49)
MCH RBC QN AUTO: 26.6 PG (ref 26–34)
MCHC RBC AUTO-ENTMCNC: 31.4 G/DL (ref 30–36.5)
MCV RBC AUTO: 84.8 FL (ref 80–99)
MONOCYTES # BLD: 0.6 K/UL (ref 0–1)
MONOCYTES NFR BLD: 9 % (ref 5–13)
NEUTS SEG # BLD: 4.4 K/UL (ref 1.8–8)
NEUTS SEG NFR BLD: 64 % (ref 32–75)
NRBC # BLD: 0.02 K/UL (ref 0–0.01)
NRBC BLD-RTO: 0.3 PER 100 WBC
PLATELET # BLD AUTO: 278 K/UL (ref 150–400)
PMV BLD AUTO: 11.1 FL (ref 8.9–12.9)
POTASSIUM SERPL-SCNC: 3.7 MMOL/L (ref 3.5–5.1)
PROT SERPL-MCNC: 6.7 G/DL (ref 6.4–8.2)
RBC # BLD AUTO: 4.47 M/UL (ref 4.1–5.7)
SERVICE CMNT-IMP: ABNORMAL
SERVICE CMNT-IMP: NORMAL
SODIUM SERPL-SCNC: 139 MMOL/L (ref 136–145)
WBC # BLD AUTO: 6.6 K/UL (ref 4.1–11.1)

## 2021-03-02 PROCEDURE — 74011250636 HC RX REV CODE- 250/636: Performed by: HOSPITALIST

## 2021-03-02 PROCEDURE — 99233 SBSQ HOSP IP/OBS HIGH 50: CPT | Performed by: INTERNAL MEDICINE

## 2021-03-02 PROCEDURE — 85025 COMPLETE CBC W/AUTO DIFF WBC: CPT

## 2021-03-02 PROCEDURE — 80053 COMPREHEN METABOLIC PANEL: CPT

## 2021-03-02 PROCEDURE — 94760 N-INVAS EAR/PLS OXIMETRY 1: CPT

## 2021-03-02 PROCEDURE — 74011250637 HC RX REV CODE- 250/637: Performed by: INTERNAL MEDICINE

## 2021-03-02 PROCEDURE — 97116 GAIT TRAINING THERAPY: CPT

## 2021-03-02 PROCEDURE — 97165 OT EVAL LOW COMPLEX 30 MIN: CPT

## 2021-03-02 PROCEDURE — 74011250637 HC RX REV CODE- 250/637: Performed by: HOSPITALIST

## 2021-03-02 PROCEDURE — 65660000000 HC RM CCU STEPDOWN

## 2021-03-02 PROCEDURE — 36415 COLL VENOUS BLD VENIPUNCTURE: CPT

## 2021-03-02 PROCEDURE — 97110 THERAPEUTIC EXERCISES: CPT

## 2021-03-02 PROCEDURE — 82962 GLUCOSE BLOOD TEST: CPT

## 2021-03-02 PROCEDURE — 97530 THERAPEUTIC ACTIVITIES: CPT

## 2021-03-02 RX ADMIN — CARVEDILOL 6.25 MG: 6.25 TABLET, FILM COATED ORAL at 09:53

## 2021-03-02 RX ADMIN — CARVEDILOL 6.25 MG: 6.25 TABLET, FILM COATED ORAL at 17:04

## 2021-03-02 RX ADMIN — ASPIRIN 81 MG: 81 TABLET, CHEWABLE ORAL at 09:53

## 2021-03-02 RX ADMIN — PANTOPRAZOLE SODIUM 40 MG: 40 TABLET, DELAYED RELEASE ORAL at 09:54

## 2021-03-02 RX ADMIN — DONEPEZIL HYDROCHLORIDE 5 MG: 5 TABLET, FILM COATED ORAL at 09:53

## 2021-03-02 RX ADMIN — Medication 10 ML: at 15:59

## 2021-03-02 RX ADMIN — Medication 10 ML: at 21:40

## 2021-03-02 RX ADMIN — Medication 10 ML: at 06:59

## 2021-03-02 RX ADMIN — HEPARIN SODIUM 5000 UNITS: 5000 INJECTION INTRAVENOUS; SUBCUTANEOUS at 09:55

## 2021-03-02 RX ADMIN — HEPARIN SODIUM 5000 UNITS: 5000 INJECTION INTRAVENOUS; SUBCUTANEOUS at 21:38

## 2021-03-02 RX ADMIN — MEMANTINE HYDROCHLORIDE 5 MG: 10 TABLET ORAL at 09:54

## 2021-03-02 RX ADMIN — FUROSEMIDE 60 MG: 40 TABLET ORAL at 09:54

## 2021-03-02 NOTE — PROGRESS NOTES
INTERNAL MEDICINE ATTENDING NOTE    S: Mr. Juanito Palmer is a patient of Alberto Garcia MD and was seen by me today during rounds. At this time, he is resting comfortably. Breathing has improved. ROS otherwise unremarkable except as noted elsewhere. O: Blood pressure 123/70, pulse 60, temperature 97.8 °F (36.6 °C), resp. rate 18, height 5' 8\" (1.727 m), weight 111 lb 12.4 oz (50.7 kg), SpO2 96 %. Gen: Patient is in no acute distress. Lungs: Faint rales bases. Heart: RRR. Abd: S, NT, ND, BS present. Extremities: Warm. Recent Results (from the past 12 hour(s))   GLUCOSE, POC    Collection Time: 03/01/21  8:58 PM   Result Value Ref Range    Glucose (POC) 118 (H) 65 - 100 mg/dL    Performed by Sheryl Marshall    GLUCOSE, POC    Collection Time: 03/02/21  7:26 AM   Result Value Ref Range    Glucose (POC) 95 65 - 100 mg/dL    Performed by Louie Wick         A / P:   1. Acute on chronic systolic heart failure ([address] (12/14/2020). Pleural effusion likely related to this: Diuresing. Cardiology following. 2. Cardiomyopathy ([address] (11/7/2019): EF 15-20%  3. 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, without long-term current use of insulin ([address] (7/18/2017)  4. Alzheimer disease ([address] (7/18/2017)  5. HTN: A little high this morning. 6. Paroxysmal A Fib / SSS: Pacemaker. 7. Severe protein calorie malnutrition. 8. DNR  9. Based on therapy recommendations, we'll look into SNF.       Ankush Caban MD, FACP  Best contact is via Pager: [phone], or via hospital  at [phone]

## 2021-03-02 NOTE — PROGRESS NOTES
Occupational Therapy    Orders received, chart reviewed and patient evaluated by occupational therapy. Pending progression with skilled acute occupational therapy, recommend:  Occupational therapy at least 2 days/week in the home AND ensure assist and/or supervision for safety with dynamic ADL participation, shower transfers, and IADLs     Recommend with nursing ADLs with supervision/setup, OOB to chair 3x/day and toileting via functional mobility to and from bathroom with one-person assist and walker. Thank you for completing as able in order to maintain patient strength, endurance and independence. Full evaluation to follow.      Cindy French, OTR/L

## 2021-03-02 NOTE — PROGRESS NOTES
Bedside and Verbal shift change report given to [address] [facility] Nirav (oncoming nurse) by Marilu Gonzalez (offgoing nurse). Report included the following information SBAR, Kardex, Intake/Output, MAR and Recent Results.

## 2021-03-02 NOTE — PROGRESS NOTES
I forgot to state yesterday that the patient is okay for discharge from a cardiac standpoint and that we will sign off for now. Please call if we can assist further during this hospitalization. Follow-up with me within 1 to 2 weeks.

## 2021-03-02 NOTE — PROGRESS NOTES
Transition of Care Plan:  Disposition: Home with family and [facility]    Follow up appointments:PCP, Cardiology  DME needed: n/a  Transportation at Discharge:Patient's grand daughter   [place] or means to access home:  Pt's family has access to home       IM Medicare letter:2nd IM letter needed before discharge   Caregiver Contact:  dajoseph Farfan [phone]  Discharge Caregiver contacted prior to discharge?  yes    CM acknowledged consult for SNF placement. CM contacted pt's granddaughter An Farfan [phone], to discuss d/c planning. Per gdtr she does not want patient going to a rehab facility. Gdtr reported that patient will return home with home health again as he has in the past.     Gdtr mentioned that patient does not need HH nursing as she helps him with his medication and everything HH nursing does. Gdtr also mentioned that patient does not need occupational therapy as he is fully independent at baseline except for his medication administration which gdtr takes care of. Gdtr expressed that patient ambulates independently at baseline and only starts to have gait abnormalities when he is sick.     Gdtr confirmed that patient has 24/7 supervision/assistance at home. Gdtr works for herself and has a very flexible schedule. [facility] also has upper aged teenage children that are home with patient when gdtr has to leave the home for a short amount of time.     Gdtr agreed for CM to send referral to [facility] for physical therapy only. Verbal consent obtained for FOC. FRANCIA has sent referral.      KEILA Hicks,   [phone]

## 2021-03-02 NOTE — PROGRESS NOTES
End of Shift Note    Bedside shift change report given to [place] (oncoming nurse) by Ignacia Valetnine (offgoing nurse). Report included the following information SBAR, Kardex, Intake/Output, MAR, Recent Results, Med Rec Status and Cardiac Rhythm Paced    Shift worked:  [id number]     Shift summary and any significant changes:     No changes overnight, patient slept most of night     Concerns for physician to address:  dc?     Zone phone for oncoming shift:           Activity:  Activity Level: Up with Assistance  Number times ambulated in hallways past shift: 0  Number of times OOB to chair past shift: 0    Cardiac:   Cardiac Monitoring: Yes      Cardiac Rhythm: Paced    Access:   Current line(s): PIV     Genitourinary:   Urinary status: voiding    Respiratory:   O2 Device: Room air  Chronic home O2 use?: NO  Incentive spirometer at bedside: NO     GI:  Last Bowel Movement Date: 02/25/21  Current diet:  DIET CARDIAC Regular; FR 1200ML  DIET NUTRITIONAL SUPPLEMENTS Breakfast, Dinner; Glucerna Shake  DIET ONE TIME MESSAGE  Passing flatus: YES  Tolerating current diet: YES  % Diet Eaten: 100 %    Pain Management:   Patient states pain is manageable on current regimen: YES    Skin:  Newton Score: 19  Interventions: increase time out of bed and foam dressing    Patient Safety:  Fall Score:  Total Score: 5  Interventions: bed/chair alarm, assistive device (walker, cane, etc) and gripper socks  High Fall Risk: Yes    Length of Stay:  Expected LOS: 4d 2h  Actual LOS: [address]

## 2021-03-02 NOTE — PROGRESS NOTES
Problem: Mobility Impaired (Adult and Pediatric)  Goal: *Acute Goals and Plan of Care (Insert Text)  Description: FUNCTIONAL STATUS PRIOR TO ADMISSION: Patient was independent and active without use of DME.    HOME SUPPORT PRIOR TO ADMISSION: The patient lived with granddaughter and her children. Physical Therapy Goals  Initiated 3/1/2021  1. Patient will move from supine to sit and sit to supine  in bed with independence within 7 day(s). 2.  Patient will transfer from bed to chair and chair to bed with modified independence using the least restrictive device within 7 day(s). 3.  Patient will perform sit to stand with modified independence within 7 day(s). 4.  Patient will ambulate with modified independence for 75 feet with the least restrictive device within 7 day(s). 5.  Patient will ascend/descend 2 stairs with B handrail(s) with modified independence within 7 day(s). Outcome: Progressing Towards Goal   PHYSICAL THERAPY TREATMENT  Patient: Miguel Shelton Sr (80 y.o. male)  Date: 3/2/2021    Diagnosis: CHF exacerbation ([address] [I50.9]        GONGORA (dyspnea on exertion) [R06.00]         Acute on chronic systolic heart failure (HCC)    Precautions: Fall    Chart, physical therapy assessment, plan of care and goals were reviewed. ASSESSMENT: Patient continues with skilled PT services and is progressing towards goals, no LOB or SOB, did well with bed mob and transfers, good motivation, does well with ther-ex, vc's for safety and proper RW use. Current Level of Function Impacting Discharge (mobility/balance): Stand-by assistance;Contact guard assistance         PLAN : Patient continues to benefit from skilled intervention to address the above impairments. Continue treatment per established plan of care  to address goals.     Recommendation for discharge: (in order for the patient to meet his/her long term goals) Physical therapy at least 2 days/week in the home AND ensure assist and/or supervision for safety with mobility,  If family is not able to provide 24/7 may need SNF    This discharge recommendation: Has been made in collaboration with the attending provider and/or case management    IF patient discharges home will need the following DME: rolling walker     OBJECTIVE DATA SUMMARY:     Critical Behavior:  Neurologic State: Alert, Appropriate for age  Orientation Level: Appropriate for age, Oriented X4    Functional Mobility Training:  Bed Mobility:  Rolling: Supervision  Supine to Sit: Supervision  Scooting: Supervision  Level of Assistance: Supervision  Interventions: Verbal cues    Transfers:  Sit to Stand: Stand-by assistance  Stand to Sit: Stand-by assistance  Bed to Chair: Stand-by assistance;Contact guard assistance  Interventions: Verbal cues; Tactile cues  Level of Assistance: Stand-by assistance;Contact guard assistance    Balance:  Sitting: Intact; Without support  Standing: Intact; With support  Standing - Static: Good;Constant support  Standing - Dynamic : Good;Constant support    Ambulation/Gait Training:  Distance (ft): 200 Feet (ft)  Assistive Device: Gait belt;Walker, rolling  Ambulation - Level of Assistance: Contact guard assistance;Stand-by assistance;Assist x1  Gait Abnormalities: Decreased step clearance  Right Side Weight Bearing: Full  Left Side Weight Bearing: Full  Base of Support: Narrowed  Speed/Maureen: Pace decreased (<100 feet/min)  Step Length: Left shortened;Right shortened    Therapeutic Exercises:   sitting  EXERCISE   Sets   Reps   Active Active Assist   Passive   Comments   Ankle pumps 1 10 [x] [] [] bilat   Heel raises 1 10 [x] [] [] \"   Toe tap 1 10 [x] [] [] \"   Knee ext 1 10 [x] [] [] \"   Hip flex 1 10 [x] [] [] \"     Pain Ratin    Activity Tolerance: Good    After treatment patient left in no apparent distress: Sitting in chair and Call bell within reach    COMMUNICATION/COLLABORATION:   The patients plan of care was discussed with: Registered nurse.     Opal Mckenna, DAJA   Time Calculation: 30 mins

## 2021-03-02 NOTE — PROGRESS NOTES
Problem: Self Care Deficits Care Plan (Adult)  Goal: *Acute Goals and Plan of Care (Insert Text)  Description:   FUNCTIONAL STATUS PRIOR TO ADMISSION: Patient was independent and active without use of DME.     HOME SUPPORT: The patient lived with granddaughter and her children but did not require assist for BADLs. Patient's granddaughter largely responsible for IADLs. Occupational Therapy Goals  Initiated 3/2/2021  1. Patient will perform standing grooming with supervision/set-up using most appropriate DME prn within 7 day(s). 2.  Patient will perform upper body dressing and lower body dressing with supervision/set-up using most appropriate DME prn within 7 day(s). 3.  Patient will perform toilet transfers with supervision/set-up using most appropriate DME prn within 7 day(s). 4.  Patient will perform all aspects of toileting with supervision/set-up using most appropriate DME prn within 7 day(s). 5.  Patient will participate in upper extremity therapeutic exercise/activities with supervision/set-up for 10 minutes within 7 day(s). Outcome: Progressing Towards Goal   OCCUPATIONAL THERAPY EVALUATION  Patient: Noah Farfan Sr ([address] y.o. male)  Date: 3/2/2021  Primary Diagnosis: CHF exacerbation ([facility] [I50.9]  GONGORA (dyspnea on exertion) [R06.00]        Precautions:  Fall    ASSESSMENT  Based on the objective data described below, the patient presents with generalized weakness, limited activity tolerance, and impaired higher level balance s/p admission for GONGORA and CHF exacerbation. Of note, patient with history of Alzheimer's dementia and with LVEF 15-20%. This date, patient received in bedside chair on room air, agreeable to session. Patient ambulatory with CGA and RW, demonstrating fair carryover of education regarding best hand placement pre- and post-transfer for safety.  Provided red theraband and instructed on series of exercises for upper body strengthening with patient demonstrating 2 exercises of 5 reps. Anticipate patient will progress well with regular mobilization and activity while in acute setting - if supervision/assist is available for safety, likely able to discharge home with [facility] and family support. Current Level of Function Impacting Discharge (ADLs/self-care): up to min A    Functional Outcome Measure: The patient scored 60/100 on the Barthel Index. Other factors to consider for discharge: h/o Alzheimer's dementia; LVEF 15-20%     Patient will benefit from skilled therapy intervention to address the above noted impairments. PLAN :  Recommendations and Planned Interventions: self care training, functional mobility training, therapeutic exercise, balance training, therapeutic activities, endurance activities, patient education, and home safety training    Frequency/Duration: Patient will be followed by occupational therapy 3 times a week to address goals. Recommendation for discharge: (in order for the patient to meet his/her long term goals)  Occupational therapy at least 2 days/week in the home AND ensure assist and/or supervision for safety with functional mobility, dynamic ADLs, and IADLs    This discharge recommendation:  Has not yet been discussed the attending provider and/or case management    IF patient discharges home will need the following DME: TBD       SUBJECTIVE:   Patient stated I feel shaky. ..weak.     OBJECTIVE DATA SUMMARY:   HISTORY:   Past Medical History:   Diagnosis Date    Abdominal pain 7/18/2017    Alzheimer disease ([facility] 7/18/2017    Anemia 7/18/2017    Arthritis     Back pain 7/18/2017    Bradycardia 7/18/2017    CAD (coronary artery disease)     hx of MI    Chronic diastolic heart failure ([address] 2/26/2021    CKD (chronic kidney disease), stage II 7/18/2017    constipation     Depression 7/18/2017    Diabetes mellitus ([facility] 7/18/2017    Diverticulosis 7/18/2017    DJD (degenerative joint disease) 7/18/2017    Encounter for long-term (current) use of other medications 7/18/2017    Fatigue     Gastritis and duodenitis 7/18/2017    GERD (gastroesophageal reflux disease)     GI bleed 7/18/2017    Hearing loss     Hyperlipidemia 7/18/2017    Hypertension     Hypertension with renal disease 7/18/2017    Ill-defined condition     Dementia    Internal hemorrhoids 7/18/2017    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    S/P cardiac pacemaker procedure 5/4/2015    5/4/15 Medtronic dual chamber pacemaker implant     Weight loss     lost over 40 pounds last year- has no appetite     Past Surgical History:   Procedure Laterality Date    COLONOSCOPY N/A 6/22/2017    COLONOSCOPY performed by Gurdeep Cochran MD at [facility]  6/22/2017         [address]  7/10/2014    right inguinal    HX OTHER SURGICAL      cystectomy from back    [facility] EGD TRANSORAL BIOPSY SINGLE/MULTIPLE  2/14/2012         [place] UPPER GI ENDOSCOPY,W/ [address] INJ  7/23/2020         UPPER GI ENDOSCOPY,BIOPSY  6/22/2017         UPPER GI ENDOSCOPY,BIOPSY  7/23/2020            Expanded or extensive additional review of patient history:     Home Situation  Home Environment: Private residence  # Steps to Enter: 2  Rails to Enter: Yes  Hand Rails : Bilateral  One/Two Story Residence: Two story  # of Interior Steps: 12  Living Alone: No  Support Systems: Family member(s)  Current DME Used/Available at Home: None  Tub or Shower Type: Tub/Shower combination    Hand dominance: Right    EXAMINATION OF PERFORMANCE DEFICITS:  Cognitive/Behavioral Status:  Neurologic State: Alert  Orientation Level: Oriented to person;Oriented to place  Cognition: Follows commands;Decreased attention/concentration  Perception: Appears intact  Perseveration: No perseveration noted  Safety/Judgement: Decreased awareness of need for safety;Decreased insight into deficits; Fall prevention    Hearing:   Auditory  Auditory Impairment: None    Vision/Perceptual:     Acuity: Within Defined Limits    Corrective Lenses: Reading glasses    Range of Motion:  AROM: Generally decreased, functional  PROM: Generally decreased, functional    Strength:  Strength: Generally decreased, functional    Coordination:  Coordination: Within functional limits  Fine Motor Skills-Upper: Left Intact; Right Intact    Gross Motor Skills-Upper: Left Intact; Right Intact    Tone & Sensation:  Tone: Normal  Sensation: Intact    Balance:  Sitting: Intact  Standing: Impaired; Without support  Standing - Static: Good;Constant support  Standing - Dynamic : Good;Fair;Constant support    Functional Mobility and Transfers for ADLs:  Bed Mobility:  Rolling: Supervision  Supine to Sit: Supervision  Scooting: Supervision    Transfers:  Sit to Stand: Contact guard assistance  Stand to Sit: Contact guard assistance  Bed to Chair: Contact guard assistance; Adaptive equipment  Bathroom Mobility: Contact guard assistance  Toilet Transfer : Contact guard assistance; Adaptive equipment    ADL Assessment:  Feeding: Setup    Oral Facial Hygiene/Grooming: Setup    Upper Body Dressing: Setup    Lower Body Dressing: Minimum assistance    Toileting: Minimum assistance    ADL Intervention and task modifications: Toileting Assistance  Clothing Management: Minimal assistance    Lower Body Dressing Assistance  Dressing Assistance: Stand-by assistance  Socks: Stand-by assistance  Leg Crossed Method Used: Yes  Position Performed: Seated in chair  Cues: Verbal cues provided    Cognitive Retraining  Safety/Judgement: Decreased awareness of need for safety;Decreased insight into deficits; Fall prevention    Therapeutic Exercise:  Provided patient with red theraband and educated on HEP to facilitate upper body strengthening - chest rows x5, BUE abduction x5.     Functional Measure:  Barthel Index:    Bathin  Bladder: 5  Bowels: 10  Groomin  Dressin  Feeding: 10  Mobility: 5  Stairs: 5  Toilet Use: 5  Transfer (Bed to Chair and Back): 10  Total: 60/100        The Barthel ADL Index: Guidelines  1. The index should be used as a record of what a patient does, not as a record of what a patient could do. 2. The main aim is to establish degree of independence from any help, physical or verbal, however minor and for whatever reason. 3. The need for supervision renders the patient not independent. 4. A patient's performance should be established using the best available evidence. Asking the patient, friends/relatives and nurses are the usual sources, but direct observation and common sense are also important. However direct testing is not needed. 5. Usually the patient's performance over the preceding 24-48 hours is important, but occasionally longer periods will be relevant. 6. Middle categories imply that the patient supplies over 50 per cent of the effort. 7. Use of aids to be independent is allowed. Espinoza Morrison, Barthel, D.W. (1679). Functional evaluation: the Barthel Index. [address] [facility] (14)2. LINDY Russell Se, Kevin James, Isabella Douglas., Mcclellan, [facility] (1999). Measuring the change indisability after inpatient rehabilitation; comparison of the responsiveness of the Barthel Index and Functional [place] Measure. Journal of Neurology, Neurosurgery, and Psychiatry, 66(4), 933-397. Arash Scott, [place].KATERINA, INES Albright, & Luiza Mcarthur MJustynA. (2004.) Assessment of post-stroke quality of life in cost-effectiveness studies: The usefulness of the Barthel Index and the EuroQoL-5D.  Quality of Life Research, 15, 673-70         Occupational Therapy Evaluation Charge Determination   History Examination Decision-Making   LOW Complexity : Brief history review  LOW Complexity : 1-3 performance deficits relating to physical, cognitive , or psychosocial skils that result in activity limitations and / or participation restrictions  LOW Complexity : No comorbidities that affect functional and no verbal or physical assistance needed to complete eval tasks       Based on the above components, the patient evaluation is determined to be of the following complexity level: LOW   Pain Rating:  No reports. Activity Tolerance:   Fair    After treatment patient left in no apparent distress:    Sitting in chair, Call bell within reach, and Bed / chair alarm activated    COMMUNICATION/EDUCATION:   The patients plan of care was discussed with: Physical therapist and Registered nurse. Home safety education was provided and the patient/caregiver indicated understanding., Patient/family have participated as able in goal setting and plan of care. , and Patient/family agree to work toward stated goals and plan of care.     Thank you for this referral.  Arya Oropeza, OT  Time Calculation: 21 mins

## 2021-03-03 VITALS
SYSTOLIC BLOOD PRESSURE: 119 MMHG | RESPIRATION RATE: 18 BRPM | HEIGHT: 68 IN | DIASTOLIC BLOOD PRESSURE: 63 MMHG | TEMPERATURE: 98 F | HEART RATE: 61 BPM | WEIGHT: 115.08 LBS | BODY MASS INDEX: 17.44 KG/M2 | OXYGEN SATURATION: 96 %

## 2021-03-03 LAB
GLUCOSE BLD STRIP.AUTO-MCNC: 108 MG/DL (ref 65–100)
GLUCOSE BLD STRIP.AUTO-MCNC: 118 MG/DL (ref 65–100)
SERVICE CMNT-IMP: ABNORMAL
SERVICE CMNT-IMP: ABNORMAL

## 2021-03-03 PROCEDURE — 94760 N-INVAS EAR/PLS OXIMETRY 1: CPT

## 2021-03-03 PROCEDURE — 99239 HOSP IP/OBS DSCHRG MGMT >30: CPT | Performed by: INTERNAL MEDICINE

## 2021-03-03 PROCEDURE — 74011250636 HC RX REV CODE- 250/636: Performed by: HOSPITALIST

## 2021-03-03 PROCEDURE — 74011250637 HC RX REV CODE- 250/637: Performed by: INTERNAL MEDICINE

## 2021-03-03 PROCEDURE — 82962 GLUCOSE BLOOD TEST: CPT

## 2021-03-03 PROCEDURE — 74011250637 HC RX REV CODE- 250/637: Performed by: HOSPITALIST

## 2021-03-03 RX ORDER — FUROSEMIDE 20 MG/1
60 TABLET ORAL DAILY
Qty: 90 TAB | Refills: 2 | Status: SHIPPED | OUTPATIENT
Start: 2021-03-03 | End: 2021-03-05 | Stop reason: SDUPTHER

## 2021-03-03 RX ADMIN — MEMANTINE HYDROCHLORIDE 5 MG: 10 TABLET ORAL at 08:43

## 2021-03-03 RX ADMIN — CARVEDILOL 6.25 MG: 6.25 TABLET, FILM COATED ORAL at 08:42

## 2021-03-03 RX ADMIN — PANTOPRAZOLE SODIUM 40 MG: 40 TABLET, DELAYED RELEASE ORAL at 08:44

## 2021-03-03 RX ADMIN — Medication 10 ML: at 06:59

## 2021-03-03 RX ADMIN — FUROSEMIDE 60 MG: 40 TABLET ORAL at 08:42

## 2021-03-03 RX ADMIN — HEPARIN SODIUM 5000 UNITS: 5000 INJECTION INTRAVENOUS; SUBCUTANEOUS at 08:42

## 2021-03-03 RX ADMIN — ASPIRIN 81 MG: 81 TABLET, CHEWABLE ORAL at 08:42

## 2021-03-03 RX ADMIN — DONEPEZIL HYDROCHLORIDE 5 MG: 5 TABLET, FILM COATED ORAL at 08:43

## 2021-03-03 NOTE — PROGRESS NOTES
Transition of Care Plan:  Disposition: Home with family and [facility]    Follow up appointments:PCP, Cardiology  DME needed: n/a  Transportation at Discharge:Patient's grand daughter- to be at hospital around 3:00pm    Keys or means to access home:  Pt's family has access to home       IM Medicare letter: provided   Caregiver Contact:  Granddaughter Krys Joya [phone]  Discharge Caregiver contacted prior to discharge?  yes    Clear for d/c from CM standpoint    9:00am  CM acknowledged d/c order. CM spoke with Dr. Minoo Gerardo who provided verbal consent to order [facility] PT as pt's granddaughter does not want pt going to SNF and will be able to provide 24/7 assistance at home. [facility] has accepted patient. AVS updated. PCP and cardiology appointment on AVS.     CM contacted pt's granddaughter who reported she would not be able to be at hospital until about 3:00pm. Granddaughter will need to be present for d/c instructions. Gdtr provided verbal consent for 2nd IM. Copy of 2nd IM letter placed in pt's room.      Sarah Vargas, [address], [address]

## 2021-03-03 NOTE — DISCHARGE INSTRUCTIONS
PATIENT DISCHARGE INSTRUCTIONS      PATIENT DISCHARGE INSTRUCTIONS    Lashanda Bucio  / [id number] : 1931    Admitted 2021 Discharged: 3/3/2021       · It is important that you take the medication exactly as they are prescribed. · Keep your medication in the bottles provided by the pharmacist and keep a list of the medication names, dosages, and times to be taken in your wallet. · Do not take other medications without consulting your doctor.      What to do at Home    Recommended Diet: Cardiac Diet    Recommended Activity: PT/OT per [facility]

## 2021-03-03 NOTE — ROUTINE PROCESS
Bedside shift change report given to Ladan Virgen (oncoming nurse) by Satish Reagan (offgoing nurse). Report included the following information SBAR, Procedure Summary, Intake/Output, MAR, Recent Results and Cardiac Rhythm Paced.

## 2021-03-03 NOTE — PROGRESS NOTES
Problem: Falls - Risk of  Goal: *Absence of Falls  Description: Document Nickie Jauregui Fall Risk and appropriate interventions in the flowsheet. Outcome: Progressing Towards Goal  Note: Fall Risk Interventions:  Mobility Interventions: Bed/chair exit alarm    Mentation Interventions: Bed/chair exit alarm    Medication Interventions: Bed/chair exit alarm    Elimination Interventions: Call light in reach    History of Falls Interventions: Bed/chair exit alarm         Problem: Activity Intolerance  Goal: *Oxygen saturation during activity within specified parameters  Outcome: Progressing Towards Goal     Problem:  Activity Intolerance  Goal: *Oxygen saturation during activity within specified parameters  Outcome: Progressing Towards Goal

## 2021-03-03 NOTE — DISCHARGE SUMMARY
Physician Discharge Summary     Patient ID:  Malissa Navarrete Sr  [id number]  6 y.o.  1/21/1931    Admit date: 2/24/2021    Discharge date: 3/3/2021    Admission Diagnoses: CHF exacerbation ([address] [I50.9];GONGORA (dyspnea on exertion) [R06.00]    Discharge Diagnoses:  Principal Diagnosis Acute on chronic systolic heart failure (HCC)                                              Principal Problem:    Acute on chronic systolic heart failure ([address] (12/14/2020)    Active Problems:    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, without long-term current use of insulin ([address] (7/18/2017)      Alzheimer disease ([address] (7/18/2017)      Cardiomyopathy ([address] (11/7/2019)      Pleural effusion (12/14/2020)      GONGORA (dyspnea on exertion) (2/24/2021)      CHF exacerbation (HCC) (2/24/2021)      Chronic diastolic heart failure ([address] (2/26/2021)       Consults: Cardiology    Significant Diagnostic Studies:     CXR: Bilateral pleural effusions. Hospital Course: Mr. Kenn Marte is a patient of Samia Torrez MD who presented with the problems above. See the initial H and P for full details. CHIEF COMPLAINT:  SOB/GONGORA/PND     HISTORY OF PRESENT ILLNESS:     Mony Fierro Sr is a 80 y.o.  male With with a past medical history of end-stage CHF, dementia, CKD stage III, diabetes mellitus, hypertension who presents [facility] card. For possible fluid overload and possible chf exacerbation. Patient takes lasix for CHF and recently had his dose increased, but patient is reporting difficulty breathing especially when lying down and on exertion. Granddaughter reports increasing coughing concurrent with the difficulty breathing . While in ED he was unable to make it more than 3 steps without severe difficulty breathing and feeling increasingly weak         Pt. has a PMHx of CAD, SVT with ablation, Cardiomyopathy, afib, HTN, SSS with pacemaker, Hyperlipidemia, DM, and Alzheimer's disease.    He went to card . Clinic today She reporting that for the past 3 days he has been uncomfortable and sounds like he has fluid in his lungs. Last night had had a bad night sleeping poorly d/t being sob. Today he would not lie down d/t feeling more sob when lying down. No fever  Chills rigirs sick contact. By problem. ..    1. Acute on chronic systolic heart failure ([facility] (12/14/2020). Pleural effusion likely related to this: Cardiology has been following. He has diuresed and is clinically improved. Home lasix dose will be increased to 60 mg daily. 2. Cardiomyopathy ([facility] (11/7/2019): EF 15-20%  3. 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, without long-term current use of insulin ([facility] (7/18/2017)  4. Alzheimer disease ([facility] (7/18/2017): Ongoing, severe. 5. HTN: Last BP was fine. Continue current medications. 6. Paroxysmal A Fib / SSS: Pacemaker. 7. Severe protein calorie malnutrition. 8. DNR  9. Therapy recommendations notwithstanding, family declines SNF and he was sent home with home health. Discharge Exam:  Blood pressure 119/63, pulse 61, temperature 98 °F (36.7 °C), resp. rate 18, height 5' 8\" (1.727 m), weight 115 lb 1.3 oz (52.2 kg), SpO2 96 %. Gen: Patient is in no acute distress. Lungs: Faint rales bases. Heart: RRR. Abd: S, NT, ND, BS present. Extremities: Warm. Disposition: home    Patient Instructions:   Current Discharge Medication List      CONTINUE these medications which have CHANGED    Details   furosemide (LASIX) 20 mg tablet Take 3 Tabs by mouth daily. 1 each morning  Qty: 90 Tab, Refills: 2         CONTINUE these medications which have NOT CHANGED    Details   carvediloL (COREG) 6.25 mg tablet Take 1 Tab by mouth two (2) times a day. Qty: 180 Tab, Refills: prn      colchicine 0.6 mg tablet Take 0.6 mg by mouth daily.       donepeziL (ARICEPT) 10 mg tablet TAKE 1/2 TABLET BY MOUTH EVERY DAY  Qty: 30 Tab, Refills: 2    Associated Diagnoses: Alzheimer's disease of other onset without behavioral disturbance (HCC)      acetaminophen (TYLENOL) 650 mg TbER Take 650 mg by mouth two (2) times a day. memantine (NAMENDA) 10 mg tablet TAKE 1 TABLET BY MOUTH TWICE A DAY  Qty: 180 Tab, Refills: 3      omeprazole (PRILOSEC) 20 mg capsule Take 1 Cap by mouth daily. Qty: 90 Cap, Refills: 3    Associated Diagnoses: Gastroesophageal reflux disease without esophagitis      aspirin 81 mg chewable tablet Take 1 Tab by mouth daily. Qty: 30 Tab, Refills: 1         STOP taking these medications (until further notice)       lisinopriL (PRINIVIL, ZESTRIL) 5 mg tablet Comments:   Reason for Stopping: Renal failure            Activity: PT/OT Eval and Treat  Diet: Cardiac Diet    Follow-up With  Details  Why  Contact Info   Chon Martinez MD  Schedule an appointment as soon as possible for a visit in 1 week    [address] [place] [address]  [phone]   Kailee Lord MD  Schedule an appointment as soon as possible for a visit    [facility]  [phone]         Signed:  Chikis Sanders MD  3/3/2021  8:19 AM    Note: Greater than 30 minutes were spent on activities related to this discharge.

## 2021-03-04 ENCOUNTER — PATIENT OUTREACH (OUTPATIENT)
Dept: CASE MANAGEMENT | Age: 86
End: 2021-03-04

## 2021-03-04 ENCOUNTER — HOME CARE VISIT (OUTPATIENT)
Dept: SCHEDULING | Facility: HOME HEALTH | Age: 86
End: 2021-03-04
Payer: MEDICARE

## 2021-03-04 VITALS
OXYGEN SATURATION: 99 % | TEMPERATURE: 98 F | DIASTOLIC BLOOD PRESSURE: 60 MMHG | RESPIRATION RATE: 17 BRPM | HEART RATE: 50 BPM | SYSTOLIC BLOOD PRESSURE: 110 MMHG

## 2021-03-04 PROCEDURE — 400018 HH-NO PAY CLAIM PROCEDURE

## 2021-03-04 PROCEDURE — 3331090002 HH PPS REVENUE DEBIT

## 2021-03-04 PROCEDURE — G0151 HHCP-SERV OF PT,EA 15 MIN: HCPCS

## 2021-03-04 PROCEDURE — 3331090001 HH PPS REVENUE CREDIT

## 2021-03-04 PROCEDURE — 400013 HH SOC

## 2021-03-05 PROCEDURE — 3331090002 HH PPS REVENUE DEBIT

## 2021-03-05 PROCEDURE — 3331090001 HH PPS REVENUE CREDIT

## 2021-03-05 RX ORDER — FUROSEMIDE 20 MG/1
40 TABLET ORAL DAILY
Qty: 90 TAB | Refills: 2 | Status: SHIPPED | OUTPATIENT
Start: 2021-03-05 | End: 2021-01-01

## 2021-03-06 PROCEDURE — 3331090001 HH PPS REVENUE CREDIT

## 2021-03-06 PROCEDURE — 3331090002 HH PPS REVENUE DEBIT

## 2021-03-07 PROCEDURE — 3331090002 HH PPS REVENUE DEBIT

## 2021-03-07 PROCEDURE — 3331090001 HH PPS REVENUE CREDIT

## 2021-03-08 ENCOUNTER — PATIENT OUTREACH (OUTPATIENT)
Dept: CASE MANAGEMENT | Age: 86
End: 2021-03-08

## 2021-03-08 PROBLEM — E86.0 DEHYDRATION: Status: RESOLVED | Noted: 2020-07-20 | Resolved: 2021-03-08

## 2021-03-08 PROBLEM — I50.9 CHF EXACERBATION (HCC): Status: RESOLVED | Noted: 2021-02-24 | Resolved: 2021-03-08

## 2021-03-08 PROBLEM — I50.32 CHRONIC DIASTOLIC HEART FAILURE (HCC): Status: RESOLVED | Noted: 2021-02-26 | Resolved: 2021-03-08

## 2021-03-08 PROBLEM — I50.9 CHF (CONGESTIVE HEART FAILURE) (HCC): Status: RESOLVED | Noted: 2021-01-01 | Resolved: 2021-03-08

## 2021-03-08 PROCEDURE — 3331090002 HH PPS REVENUE DEBIT

## 2021-03-08 PROCEDURE — 3331090001 HH PPS REVENUE CREDIT

## 2021-03-09 ENCOUNTER — OFFICE VISIT (OUTPATIENT)
Dept: INTERNAL MEDICINE CLINIC | Age: 86
End: 2021-03-09
Payer: MEDICARE

## 2021-03-09 ENCOUNTER — HOME CARE VISIT (OUTPATIENT)
Dept: SCHEDULING | Facility: HOME HEALTH | Age: 86
End: 2021-03-09
Payer: MEDICARE

## 2021-03-09 VITALS
TEMPERATURE: 98.1 F | SYSTOLIC BLOOD PRESSURE: 120 MMHG | RESPIRATION RATE: 18 BRPM | HEART RATE: 76 BPM | DIASTOLIC BLOOD PRESSURE: 70 MMHG | OXYGEN SATURATION: 99 %

## 2021-03-09 DIAGNOSIS — E43 SEVERE PROTEIN-CALORIE MALNUTRITION (HCC): Chronic | ICD-10-CM

## 2021-03-09 DIAGNOSIS — Z95.0 S/P CARDIAC PACEMAKER PROCEDURE: ICD-10-CM

## 2021-03-09 DIAGNOSIS — I49.5 SSS (SICK SINUS SYNDROME) (HCC): ICD-10-CM

## 2021-03-09 DIAGNOSIS — G30.9 ALZHEIMER'S DEMENTIA WITH BEHAVIORAL DISTURBANCE, UNSPECIFIED TIMING OF DEMENTIA ONSET: ICD-10-CM

## 2021-03-09 DIAGNOSIS — F02.81 ALZHEIMER'S DEMENTIA WITH BEHAVIORAL DISTURBANCE, UNSPECIFIED TIMING OF DEMENTIA ONSET: ICD-10-CM

## 2021-03-09 DIAGNOSIS — I25.10 ASCVD (ARTERIOSCLEROTIC CARDIOVASCULAR DISEASE): ICD-10-CM

## 2021-03-09 DIAGNOSIS — N18.2 CONTROLLED TYPE 2 DIABETES MELLITUS WITH STAGE 2 CHRONIC KIDNEY DISEASE, WITHOUT LONG-TERM CURRENT USE OF INSULIN (HCC): ICD-10-CM

## 2021-03-09 DIAGNOSIS — J90 PLEURAL EFFUSION: ICD-10-CM

## 2021-03-09 DIAGNOSIS — I12.9 HYPERTENSION WITH RENAL DISEASE: ICD-10-CM

## 2021-03-09 DIAGNOSIS — I50.23 ACUTE ON CHRONIC SYSTOLIC HEART FAILURE (HCC): Primary | ICD-10-CM

## 2021-03-09 DIAGNOSIS — I42.9 CARDIOMYOPATHY, UNSPECIFIED TYPE (HCC): ICD-10-CM

## 2021-03-09 DIAGNOSIS — E11.22 CONTROLLED TYPE 2 DIABETES MELLITUS WITH STAGE 2 CHRONIC KIDNEY DISEASE, WITHOUT LONG-TERM CURRENT USE OF INSULIN (HCC): ICD-10-CM

## 2021-03-09 PROCEDURE — 99496 TRANSJ CARE MGMT HIGH F2F 7D: CPT | Performed by: INTERNAL MEDICINE

## 2021-03-09 PROCEDURE — 3331090001 HH PPS REVENUE CREDIT

## 2021-03-09 PROCEDURE — G0151 HHCP-SERV OF PT,EA 15 MIN: HCPCS

## 2021-03-09 PROCEDURE — G8427 DOCREV CUR MEDS BY ELIG CLIN: HCPCS | Performed by: INTERNAL MEDICINE

## 2021-03-09 PROCEDURE — 3331090002 HH PPS REVENUE DEBIT

## 2021-03-09 NOTE — PROGRESS NOTES
Transitions Of Care ([facility])       HPI:  Lydia Butler Sr is a 80y.o. year old male who is here for a follow up visit for hospitalization transition of care. He was last seen by me on 1/27/2021. Discharged on: 3/3/2021    Diagnosis in hospital:  1. Acute on chronic systolic CHF  2. Cardiomyopathy  3. ASCVD  4. Sick sinus syndrome status post pacemaker  5. Alzheimer's dementia  6. Diabetes mellitus  7. Hypertension  8. CKD stage III    Complications in hospital: No complications    Medication changes: Lasix increased to 60 mg daily and lisinopril discontinued    Discharge Summary reviewed. 3/9/2021      He reports the following: He is accompanied by his granddaughter at the visit today and he seems to be doing quite well at the present time he does not note any increased shortness of breath and his energy level is improving. He denies any chest pain, palpitations, PND, orthopnea or other cardiac or respiratory complaints. He notes no current GI or  complaints. He notes no headaches, dizziness or neurologic complaints. He is taking his medications as prescribed. He does have his chronic arthritic complaints without change. The remainder review of systems is negative. This is all confirmed by his granddaughter present with him today.           Visit Vitals  /73 (BP 1 Location: Left upper arm, BP Patient Position: Sitting, BP Cuff Size: Adult)   Pulse 62   Temp 98 °F (36.7 °C) (Oral)   Resp 16   Ht 5' 8\" (1.727 m)   SpO2 96%   BMI 17.50 kg/m²       Historical Data    Past Medical History:   Diagnosis Date    Abdominal pain 7/18/2017    Alzheimer disease ([address] 7/18/2017    Anemia 7/18/2017    Arthritis     Back pain 7/18/2017    Bradycardia 7/18/2017    CAD (coronary artery disease)     hx of MI    Chronic diastolic heart failure ([address] 2/26/2021    CKD (chronic kidney disease), stage II 7/18/2017    constipation     Depression 7/18/2017    Diabetes mellitus ([address] 7/18/2017    Diverticulosis 7/18/2017    DJD (degenerative joint disease) 7/18/2017    Encounter for long-term (current) use of other medications 7/18/2017    Fatigue     Gastritis and duodenitis 7/18/2017    GERD (gastroesophageal reflux disease)     GI bleed 7/18/2017    Hearing loss     Hyperlipidemia 7/18/2017    Hypertension     Hypertension with renal disease 7/18/2017    Ill-defined condition     Dementia    Internal hemorrhoids 7/18/2017    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    S/P cardiac pacemaker procedure 5/4/2015    5/4/15 Medtronic dual chamber pacemaker implant     Thyroid disease     Weight loss     lost over 40 pounds last year- has no appetite       Past Surgical History:   Procedure Laterality Date    COLONOSCOPY N/A 6/22/2017    COLONOSCOPY performed by Leslie Lawrence MD at [address] [place] [address]  6/22/2017         [address]  7/10/2014    right inguinal    HX OTHER SURGICAL      cystectomy from back    [place] EGD TRANSORAL BIOPSY SINGLE/MULTIPLE  2/14/2012         MN UPPER GI ENDOSCOPY,W/DIR SUBMUC INJ  7/23/2020         UPPER GI ENDOSCOPY,BIOPSY  6/22/2017         UPPER GI ENDOSCOPY,BIOPSY  7/23/2020            Outpatient Encounter Medications as of 3/9/2021   Medication Sig Dispense Refill    carvediloL (COREG) 6.25 mg tablet Take 1 Tab by mouth two (2) times a day. 180 Tab prn    colchicine 0.6 mg tablet Take 0.6 mg by mouth daily. TAKE ONE TABLET AS NEEDED FOR GOUT      donepeziL (ARICEPT) 10 mg tablet TAKE 1/2 TABLET BY MOUTH EVERY DAY (Patient taking differently: Take 10 mg by mouth nightly. TAKE 1/2 TABLET BY MOUTH EVERY DAY) 30 Tab 2    acetaminophen (TYLENOL) 650 mg TbER Take 650 mg by mouth two (2) times a day. TAKE BY MOUTH AS NEEDED FOR PAIN       memantine (NAMENDA) 10 mg tablet TAKE 1 TABLET BY MOUTH TWICE A  Tab 3    omeprazole (PRILOSEC) 20 mg capsule Take 1 Cap by mouth daily.  90 Cap 3    aspirin 81 mg chewable tablet Take 1 Tab by mouth daily. 30 Tab 1    furosemide (LASIX) 20 mg tablet Take 2 Tabs by mouth daily. 1 each morning by mouth (Patient taking differently: Take 60 mg by mouth daily. 1 each morning by mouth ) 90 Tab 2    [DISCONTINUED] lisinopriL (PRINIVIL, ZESTRIL) 5 mg tablet Take 5 mg by mouth daily. Take one tablet by mouth daily       No facility-administered encounter medications on file as of 3/9/2021.          No Known Allergies     Social History     Socioeconomic History    Marital status: LEGALLY      Spouse name: Not on file    Number of children: Not on file    Years of education: Not on file    Highest education level: Not on file   Occupational History    Not on file   Social Needs    Financial resource strain: Not on file    Food insecurity     Worry: Not on file     Inability: Not on file    Transportation needs     Medical: Not on file     Non-medical: Not on file   Tobacco Use    Smoking status: Former Smoker     Packs/day: 0.50     Years: 10.00     Pack years: 5.00     Start date: 7/12/1991    Smokeless tobacco: Never Used    Tobacco comment: quit 50 years ago   Substance and Sexual Activity    Alcohol use: Not Currently     Comment: Occasional beer    Drug use: No    Sexual activity: Not Currently   Lifestyle    Physical activity     Days per week: Not on file     Minutes per session: Not on file    Stress: Not on file   Relationships    Social connections     Talks on phone: Not on file     Gets together: Not on file     Attends Congregational service: Not on file     Active member of club or organization: Not on file     Attends meetings of clubs or organizations: Not on file     Relationship status: Not on file    Intimate partner violence     Fear of current or ex partner: Not on file     Emotionally abused: Not on file     Physically abused: Not on file     Forced sexual activity: Not on file   Other Topics Concern    Not on file   Social History Narrative    Not on file      REVIEW OF SYSTEMS:  General: negative for - chills or fever  ENT: negative for - headaches, nasal congestion or tinnitus  Eyes: no blurred or visual changes  Neck: No stiffness or swollen nodes  Respiratory: negative for - cough, hemoptysis, shortness of breath or wheezing  Cardiovascular : negative for - chest pain, edema, palpitations or shortness of breath  Gastrointestinal: negative for - abdominal pain, blood in stools, heartburn or nausea/vomiting  Genito-Urinary: no dysuria, trouble voiding, or hematuria  Musculoskeletal: negative for - gait disturbance, joint pain, joint stiffness or joint swelling  Neurological: no TIA or stroke symptoms  Hematologic: no bruises, no bleeding  Lymphatic: no swollen glands  Integument: no lumps, mole changes, nail changes or rash  Endocrine:no malaise/lethargy poly uria or polydipsia or unexpected weight changes      Visit Vitals  /73 (BP 1 Location: Left upper arm, BP Patient Position: Sitting, BP Cuff Size: Adult)   Pulse 62   Temp 98 °F (36.7 °C) (Oral)   Resp 16   Ht 5' 8\" (1.727 m)   SpO2 96%   BMI 17.50 kg/m²     CONSTITUTIONAL: well , well nourished, appears age appropriate  HEAD: normocephalic, atraumatic  EYES: sclera anicteric, PERRL, EOMI  ENMT:moist mucous membranes, pharynx clear          Nares: w/o erythema or edema  NECK: supple. Thyroid normal, No JVD or bruits  RESPIRATORY: Chest: clear to ascultation and percussion   CARDIOVASCULAR: Heart: regular rate and rhythm no murmurs, rubs or gallops, PMI not displaced, no thrill  GASTROINTESTINAL: Abdomen: non distended, soft, non-tender, bowel sounds normal  HEMATOLOGIC: no petechiae or purpura  LYMPHATIC: no lymphadenopathy  MUSCULOSKELETAL: Extremities: no edema or active synovitis, pulse 1+   BACK; no point or CVAT  INTEGUMENT: No unusual rashes or suspicious skin lesions noted.  Nails appear normal.  NEUROLOGIC: non-focal exam   MENTAL STATUS: alert and oriented, appropriate affect   PSYCHIATRIC: normal affect    ASSESSMENT:   1. Acute on chronic systolic heart failure (HCC)    2. Cardiomyopathy, unspecified type ([address]    3. SSS (sick sinus syndrome) ([address]    4. S/P cardiac pacemaker procedure    5. Alzheimer's dementia with behavioral disturbance, unspecified timing of dementia onset ([address]    6. ASCVD (arteriosclerotic cardiovascular disease)    7. 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, without long-term current use of insulin ([address]    8. Hypertension with renal disease    9. Pleural effusion    10. Severe protein-calorie malnutrition (HCC)      Impression  1. Acute on chronic systolic CHF appears to be compensated now  2. Cardiomyopathy currently stable but off lisinopril secondary to blood pressure problems  3. Sick sinus syndrome status post pacemaker doing well  4. Alzheimer's dementia stable   5. ASCVD clinically stable does not appear to have any angina symptoms  6. Diabetes mellitus stable per he and his granddaughter  9. Hypertension that is controlled  8. Pleural effusion resolved at time of discharge  9. Severe protein calorie malnutrition although he feels like his appetite is improving so hopefully this will improve  I will recheck him again myself in 2 weeks or sooner should the be a problem. No lab studies done today as our power is currently out secondary to several lines by construction workers and thus we will do those on his next visit. PLAN:  . No orders of the defined types were placed in this encounter. ATTENTION:   This medical record was transcribed using an electronic medical records system. Although proofread, it may and can contain electronic and spelling errors. Other human spelling and other errors may be present. Corrections may be executed at a later time. Please feel free to contact us for any clarifications as needed.       Follow-up and Dispositions    · Return in about 2 weeks (around 3/23/2021). Jud Stone MD    No orders of the defined types were placed in this encounter. No results found for any visits on 03/09/21. I have reviewed the patient's medical history in detail and updated the computerized patient record. We had a prolonged discussion about these complex clinical issues and went over the various important aspects to consider. All questions were answered. Advised him to call back or return to office if symptoms do not improve, change in nature, or persist.    He was given an after visit summary or informed of [employer] [facility] which includes patient instructions, diagnoses, current medications, & vitals. He expressed understanding with the diagnosis and plan.

## 2021-03-10 VITALS
RESPIRATION RATE: 16 BRPM | OXYGEN SATURATION: 96 % | HEART RATE: 62 BPM | TEMPERATURE: 98 F | DIASTOLIC BLOOD PRESSURE: 73 MMHG | HEIGHT: 68 IN | BODY MASS INDEX: 17.5 KG/M2 | SYSTOLIC BLOOD PRESSURE: 115 MMHG

## 2021-03-10 PROCEDURE — 3331090002 HH PPS REVENUE DEBIT

## 2021-03-10 PROCEDURE — 3331090001 HH PPS REVENUE CREDIT

## 2021-03-10 NOTE — PROGRESS NOTES
HIPAA verified by two patient identifiers. Vlad Akins is a 80 y.o. male    Chief Complaint   Patient presents with    Transitions Of Care     [facility]       Visit Vitals  /73 (BP 1 Location: Left upper arm, BP Patient Position: Sitting, BP Cuff Size: Adult)   Pulse 62   Temp 98 °F (36.7 °C) (Oral)   Resp 16   Ht 5' 8\" (1.727 m)   SpO2 96%   BMI 17.50 kg/m²       Pain Scale: 0 - No pain/10  Pain Location:       Health Maintenance Due   Topic Date Due    Eye Exam Retinal or Dilated  Never done    COVID-19 Vaccine (1 of 2) Never done    DTaP/Tdap/Td series (1 - Tdap) Never done    Shingrix Vaccine Age 50> (1 of 2) Never done    GLAUCOMA SCREENING Q2Y  Never done         Coordination of Care Questionnaire:  :   1) Have you been to an emergency room, urgent care, or hospitalized since your last visit? If yes, where when, and reason for visit? yes   3/3/2021  [facility]     2. Have seen or consulted any other health care provider since your last visit? If yes, where when, and reason for visit? NO      Patient is accompanied by adult caretaker I have received verbal consent from [address]  to discuss any/all medical information while they are present in the room.

## 2021-03-10 NOTE — PATIENT INSTRUCTIONS
ACE Inhibitors: Care Instructions Your Care Instructions ACE (angiotensin-converting enzyme) inhibitors lower blood pressure. They also treat heart failure and prevent heart attacks and strokes. They block an enzyme that makes blood vessels narrow. As a result, the blood vessels relax and widen. This lowers blood pressure. These medicines also put more water and salt into the urine. This lowers blood pressure too. These medicines are a good choice for people with diabetes. They don't affect blood sugar levels, and they may protect the kidneys. Examples include: · Benazepril (Lotensin). · Lisinopril (Prinivil, Zestril). · Ramipril (Altace). Before you start taking this medicine, make sure your doctor knows if you take other medicines, especially water pills (diuretics) or potassium tablets. And tell your doctor if you use a salt substitute. You should not take an ACE inhibitor if you are pregnant or planning to become pregnant. You may need regular blood tests. Follow-up care is a key part of your treatment and safety. Be sure to make and go to all appointments, and call your doctor if you are having problems. It's also a good idea to know your test results and keep a list of the medicines you take. How can you care for yourself at home? · Take your medicines exactly as prescribed. Be sure to take your medicine every day. Call your doctor if you think you are having a problem with your medicine. · ACE inhibitors can cause a dry cough. If the cough is bad, talk to your doctor. You may need to try a different medicine. · ACE inhibitors can cause swelling of your lips, tongue, or face. If the swelling is severe, you may need treatment right away. Severe swelling can make it hard to breathe, but this is very rare. · Check with your doctor or pharmacist before you use any other medicines. This includes over-the-counter medicines.  Make sure your doctor knows all of the medicines, vitamins, herbal products, and supplements you take. Taking some medicines together can cause problems. When should you call for help? Call your doctor now or seek immediate medical care if: 
  · You have swelling of your lips, tongue, or face.  
  · You think you are having problems with your medicine. Watch closely for changes in your health, and be sure to contact your doctor if you have any problems. Where can you learn more? Go to http://www.gray.com/ Enter H053 in the search box to learn more about \"ACE Inhibitors: Care Instructions. \" Current as of: December 16, 2019               Content Version: 12.6 © 7833-3724 [employer]. Care instructions adapted under license by [employer] (which disclaims liability or warranty for this information). If you have questions about a medical condition or this instruction, always ask your healthcare professional. [address] any warranty or liability for your use of this information.

## 2021-03-11 ENCOUNTER — HOME CARE VISIT (OUTPATIENT)
Dept: SCHEDULING | Facility: HOME HEALTH | Age: 86
End: 2021-03-11
Payer: MEDICARE

## 2021-03-11 VITALS
HEART RATE: 78 BPM | RESPIRATION RATE: 17 BRPM | SYSTOLIC BLOOD PRESSURE: 120 MMHG | DIASTOLIC BLOOD PRESSURE: 70 MMHG | TEMPERATURE: 98.4 F | OXYGEN SATURATION: 99 %

## 2021-03-11 PROCEDURE — 3331090002 HH PPS REVENUE DEBIT

## 2021-03-11 PROCEDURE — G0151 HHCP-SERV OF PT,EA 15 MIN: HCPCS

## 2021-03-11 PROCEDURE — 3331090001 HH PPS REVENUE CREDIT

## 2021-03-12 PROCEDURE — 3331090001 HH PPS REVENUE CREDIT

## 2021-03-12 PROCEDURE — 3331090002 HH PPS REVENUE DEBIT

## 2021-03-13 PROCEDURE — 3331090001 HH PPS REVENUE CREDIT

## 2021-03-13 PROCEDURE — 3331090002 HH PPS REVENUE DEBIT

## 2021-03-14 PROCEDURE — 3331090001 HH PPS REVENUE CREDIT

## 2021-03-14 PROCEDURE — 3331090002 HH PPS REVENUE DEBIT

## 2021-03-15 PROCEDURE — 3331090002 HH PPS REVENUE DEBIT

## 2021-03-15 PROCEDURE — 3331090001 HH PPS REVENUE CREDIT

## 2021-03-16 ENCOUNTER — HOME CARE VISIT (OUTPATIENT)
Dept: SCHEDULING | Facility: HOME HEALTH | Age: 86
End: 2021-03-16
Payer: MEDICARE

## 2021-03-16 VITALS
HEART RATE: 76 BPM | OXYGEN SATURATION: 99 % | SYSTOLIC BLOOD PRESSURE: 110 MMHG | DIASTOLIC BLOOD PRESSURE: 60 MMHG | RESPIRATION RATE: 17 BRPM | TEMPERATURE: 97.5 F

## 2021-03-16 PROCEDURE — G0151 HHCP-SERV OF PT,EA 15 MIN: HCPCS

## 2021-03-16 PROCEDURE — 3331090001 HH PPS REVENUE CREDIT

## 2021-03-16 PROCEDURE — 3331090002 HH PPS REVENUE DEBIT

## 2021-03-17 PROCEDURE — 3331090002 HH PPS REVENUE DEBIT

## 2021-03-17 PROCEDURE — 3331090001 HH PPS REVENUE CREDIT

## 2021-03-18 ENCOUNTER — HOME CARE VISIT (OUTPATIENT)
Dept: SCHEDULING | Facility: HOME HEALTH | Age: 86
End: 2021-03-18
Payer: MEDICARE

## 2021-03-18 VITALS
HEART RATE: 69 BPM | OXYGEN SATURATION: 99 % | DIASTOLIC BLOOD PRESSURE: 65 MMHG | TEMPERATURE: 98.4 F | RESPIRATION RATE: 17 BRPM | SYSTOLIC BLOOD PRESSURE: 110 MMHG

## 2021-03-18 PROCEDURE — 3331090001 HH PPS REVENUE CREDIT

## 2021-03-18 PROCEDURE — G0151 HHCP-SERV OF PT,EA 15 MIN: HCPCS

## 2021-03-18 PROCEDURE — 3331090002 HH PPS REVENUE DEBIT

## 2021-03-19 PROCEDURE — 3331090002 HH PPS REVENUE DEBIT

## 2021-03-19 PROCEDURE — 3331090001 HH PPS REVENUE CREDIT

## 2021-03-20 PROCEDURE — 3331090002 HH PPS REVENUE DEBIT

## 2021-03-20 PROCEDURE — 3331090001 HH PPS REVENUE CREDIT

## 2021-03-21 PROCEDURE — 3331090001 HH PPS REVENUE CREDIT

## 2021-03-21 PROCEDURE — 3331090002 HH PPS REVENUE DEBIT

## 2021-03-22 PROCEDURE — 3331090002 HH PPS REVENUE DEBIT

## 2021-03-22 PROCEDURE — 3331090001 HH PPS REVENUE CREDIT

## 2021-03-23 ENCOUNTER — HOME CARE VISIT (OUTPATIENT)
Dept: SCHEDULING | Facility: HOME HEALTH | Age: 86
End: 2021-03-23
Payer: MEDICARE

## 2021-03-23 ENCOUNTER — HOME CARE VISIT (OUTPATIENT)
Dept: HOME HEALTH SERVICES | Facility: HOME HEALTH | Age: 86
End: 2021-03-23
Payer: MEDICARE

## 2021-03-23 VITALS
TEMPERATURE: 98 F | SYSTOLIC BLOOD PRESSURE: 110 MMHG | RESPIRATION RATE: 17 BRPM | OXYGEN SATURATION: 99 % | DIASTOLIC BLOOD PRESSURE: 60 MMHG | HEART RATE: 63 BPM

## 2021-03-23 PROCEDURE — G0151 HHCP-SERV OF PT,EA 15 MIN: HCPCS

## 2021-03-23 PROCEDURE — 3331090001 HH PPS REVENUE CREDIT

## 2021-03-23 PROCEDURE — 3331090002 HH PPS REVENUE DEBIT

## 2021-03-24 PROCEDURE — 3331090002 HH PPS REVENUE DEBIT

## 2021-03-24 PROCEDURE — 3331090001 HH PPS REVENUE CREDIT

## 2021-03-25 ENCOUNTER — HOME CARE VISIT (OUTPATIENT)
Dept: SCHEDULING | Facility: HOME HEALTH | Age: 86
End: 2021-03-25
Payer: MEDICARE

## 2021-03-25 VITALS
TEMPERATURE: 97.4 F | DIASTOLIC BLOOD PRESSURE: 70 MMHG | RESPIRATION RATE: 17 BRPM | SYSTOLIC BLOOD PRESSURE: 120 MMHG | HEART RATE: 72 BPM | OXYGEN SATURATION: 99 %

## 2021-03-25 PROCEDURE — 3331090002 HH PPS REVENUE DEBIT

## 2021-03-25 PROCEDURE — 3331090001 HH PPS REVENUE CREDIT

## 2021-03-25 PROCEDURE — G0151 HHCP-SERV OF PT,EA 15 MIN: HCPCS

## 2021-03-26 PROCEDURE — 3331090002 HH PPS REVENUE DEBIT

## 2021-03-26 PROCEDURE — 3331090001 HH PPS REVENUE CREDIT

## 2021-03-27 PROCEDURE — 3331090001 HH PPS REVENUE CREDIT

## 2021-03-27 PROCEDURE — 3331090002 HH PPS REVENUE DEBIT

## 2021-03-28 PROCEDURE — 3331090001 HH PPS REVENUE CREDIT

## 2021-03-28 PROCEDURE — 3331090002 HH PPS REVENUE DEBIT

## 2021-03-29 PROCEDURE — 3331090001 HH PPS REVENUE CREDIT

## 2021-03-29 PROCEDURE — 3331090002 HH PPS REVENUE DEBIT

## 2021-03-30 ENCOUNTER — HOME CARE VISIT (OUTPATIENT)
Dept: SCHEDULING | Facility: HOME HEALTH | Age: 86
End: 2021-03-30
Payer: MEDICARE

## 2021-03-30 VITALS
TEMPERATURE: 98.4 F | OXYGEN SATURATION: 99 % | DIASTOLIC BLOOD PRESSURE: 70 MMHG | HEART RATE: 66 BPM | SYSTOLIC BLOOD PRESSURE: 120 MMHG | RESPIRATION RATE: 17 BRPM

## 2021-03-30 PROCEDURE — 3331090002 HH PPS REVENUE DEBIT

## 2021-03-30 PROCEDURE — 3331090001 HH PPS REVENUE CREDIT

## 2021-03-30 PROCEDURE — G0151 HHCP-SERV OF PT,EA 15 MIN: HCPCS

## 2021-03-31 PROCEDURE — 3331090001 HH PPS REVENUE CREDIT

## 2021-03-31 PROCEDURE — 3331090002 HH PPS REVENUE DEBIT

## 2021-04-01 ENCOUNTER — HOME CARE VISIT (OUTPATIENT)
Dept: SCHEDULING | Facility: HOME HEALTH | Age: 86
End: 2021-04-01
Payer: MEDICARE

## 2021-04-01 VITALS
HEART RATE: 90 BPM | TEMPERATURE: 98.5 F | RESPIRATION RATE: 17 BRPM | DIASTOLIC BLOOD PRESSURE: 80 MMHG | OXYGEN SATURATION: 99 % | SYSTOLIC BLOOD PRESSURE: 120 MMHG

## 2021-04-01 PROCEDURE — 3331090002 HH PPS REVENUE DEBIT

## 2021-04-01 PROCEDURE — G0151 HHCP-SERV OF PT,EA 15 MIN: HCPCS

## 2021-04-01 PROCEDURE — 3331090001 HH PPS REVENUE CREDIT

## 2021-04-02 PROCEDURE — 3331090001 HH PPS REVENUE CREDIT

## 2021-04-02 PROCEDURE — 3331090002 HH PPS REVENUE DEBIT

## 2021-04-03 PROCEDURE — 3331090002 HH PPS REVENUE DEBIT

## 2021-04-03 PROCEDURE — 3331090001 HH PPS REVENUE CREDIT

## 2021-04-03 PROCEDURE — 400018 HH-NO PAY CLAIM PROCEDURE

## 2021-04-04 PROCEDURE — 3331090002 HH PPS REVENUE DEBIT

## 2021-04-04 PROCEDURE — 3331090001 HH PPS REVENUE CREDIT

## 2021-04-05 PROCEDURE — 3331090001 HH PPS REVENUE CREDIT

## 2021-04-05 PROCEDURE — 3331090002 HH PPS REVENUE DEBIT

## 2021-04-06 ENCOUNTER — HOME CARE VISIT (OUTPATIENT)
Dept: SCHEDULING | Facility: HOME HEALTH | Age: 86
End: 2021-04-06
Payer: MEDICARE

## 2021-04-06 VITALS
TEMPERATURE: 97.4 F | HEART RATE: 62 BPM | OXYGEN SATURATION: 99 % | RESPIRATION RATE: 16 BRPM | SYSTOLIC BLOOD PRESSURE: 120 MMHG | DIASTOLIC BLOOD PRESSURE: 70 MMHG

## 2021-04-06 PROCEDURE — G0151 HHCP-SERV OF PT,EA 15 MIN: HCPCS

## 2021-04-06 PROCEDURE — 400013 HH SOC

## 2021-04-06 PROCEDURE — 3331090002 HH PPS REVENUE DEBIT

## 2021-04-06 PROCEDURE — 3331090001 HH PPS REVENUE CREDIT

## 2021-04-07 PROCEDURE — 3331090002 HH PPS REVENUE DEBIT

## 2021-04-07 PROCEDURE — 3331090001 HH PPS REVENUE CREDIT

## 2021-04-07 NOTE — PROGRESS NOTES
[facility] Remote transmission of pacemaker and/or ICD, or implanted heart monitor shows normal cardiac device function. No  arrhythmias recorded. 1 PMT recorded. Follow up in 3 months via [address].   Ov 7/7 Problem: Self Care Deficits Care Plan (Adult)  Goal: *Acute Goals and Plan of Care (Insert Text)  Description:   FUNCTIONAL STATUS PRIOR TO ADMISSION: Patient was independent and active without use of DME. Reports no available equipment at home. HOME SUPPORT: The patient lived with granddaughter to provide assistance. Per patient, he was able to complete BADLs independently but depended on granddaughter for IADLs. Patient states granddaughter works during the day (?). Occupational Therapy Goals  Initiated 7/22/2020; weekly re-assessment 7/29/2020, goals updated below. 1.  Patient will perform standing grooming with SBA using RW prn within 7 day(s). MET. Upgrade to mod I for at least 5 minutes  2. Patient will collect ADL items from various surface levels with CGA for balance and using RW prn within 7 day(s). Continue. 4.  Patient will perform toilet transfers with CGA using RW prn within 7 day(s). MET. Upgrade to MOD I  5. Patient will perform all aspects of toileting with CGA using RW prn for balance within 7 day(s). MET. Upgrade to MOD I  6. Patient will participate in upper extremity therapeutic exercise/activities with supervision/set-up for 10 minutes within 7 day(s). Continue. Goals added 7/29/2020:  7. Patient will perform bathing, standing and sitting PRN, with supervision/set-up within 7 days. 8.  Patient will perform lower body dressing with MOD I within 7 days. Outcome: Progressing Towards Goal     OCCUPATIONAL THERAPY TREATMENT  Patient: Gay Salazar Sr (80 y.o. male)  Date: 7/31/2020  Diagnosis: Syncope [R55]   Syncope  Procedure(s) (LRB):  ESOPHAGOGASTRODUODENOSCOPY (EGD) (N/A)  ESOPHAGOGASTRODUODENAL (EGD) BIOPSY (N/A)  INJECTION (N/A) 8 Days Post-Op  Precautions:    Chart, occupational therapy assessment, plan of care, and goals were reviewed. ASSESSMENT  Patient continues with skilled OT services and is progressing towards goals.   Patient using RW with verbal cues to improve balance with mobility. Reviewed walker sfety and performed seated exercises in chair. Will need continued supervision at home and would benefit form HH. Current Level of Function Impacting Discharge (ADLs): Min A    Other factors to consider for discharge: none         PLAN :  Patient continues to benefit from skilled intervention to address the above impairments. Continue treatment per established plan of care. to address goals. Recommend with staff: use RW    Recommend next OT session: continue POC    Recommendation for discharge: (in order for the patient to meet his/her long term goals)  Occupational therapy at least 2 days/week in the home AND ensure assist and/or supervision for safety with ADLS/mobility    This discharge recommendation:  Has been made in collaboration with the attending provider and/or case management    IF patient discharges home will need the following DME: patient owns DME required for discharge       SUBJECTIVE:   Patient stated my grand babies keep me busy.     OBJECTIVE DATA SUMMARY:   Cognitive/Behavioral Status:  Neurologic State: Alert;Eyes open spontaneously  Orientation Level: Oriented to person;Oriented to situation;Disoriented to time;Disoriented to place  Cognition: Poor safety awareness             Functional Mobility and Transfers for ADLs:  Bed Mobility:  Supine to Sit: Supervision; Additional time;Bed Modified(HOB elevated)  Scooting: Supervision; Additional time    Transfers:  Sit to Stand: Stand-by assistance     Bed to Chair: Contact guard assistance;Stand-by assistance; Additional time; Adaptive equipment(use of RW)    Balance:  Sitting: Intact  Standing: Impaired  Standing - Static: Good  Standing - Dynamic : Good;Constant support    ADL Intervention:  Patient instructed to not have anything in his hands when walking with RW    Therapeutic Exercises:   8 oz ensure bottle for shoulder horizontal abd/add, shoulder flexion, elbow flexion    Pain:  None repored    Activity Tolerance:   Good  Please refer to the flowsheet for vital signs taken during this treatment.     After treatment patient left in no apparent distress:   Sitting in chair, Call bell within reach, and Bed / chair alarm activated    COMMUNICATION/COLLABORATION:   The patients plan of care was discussed with: Physical therapist.     Saadia Auguste  Time Calculation: 11 mins

## 2021-04-08 ENCOUNTER — HOME CARE VISIT (OUTPATIENT)
Dept: SCHEDULING | Facility: HOME HEALTH | Age: 86
End: 2021-04-08
Payer: MEDICARE

## 2021-04-08 VITALS
HEART RATE: 62 BPM | TEMPERATURE: 98.4 F | SYSTOLIC BLOOD PRESSURE: 120 MMHG | DIASTOLIC BLOOD PRESSURE: 70 MMHG | OXYGEN SATURATION: 98 % | RESPIRATION RATE: 17 BRPM

## 2021-04-08 PROCEDURE — 3331090002 HH PPS REVENUE DEBIT

## 2021-04-08 PROCEDURE — 3331090001 HH PPS REVENUE CREDIT

## 2021-04-08 PROCEDURE — G0151 HHCP-SERV OF PT,EA 15 MIN: HCPCS

## 2021-04-09 PROCEDURE — 3331090002 HH PPS REVENUE DEBIT

## 2021-04-09 PROCEDURE — 3331090001 HH PPS REVENUE CREDIT

## 2021-04-10 PROCEDURE — 3331090002 HH PPS REVENUE DEBIT

## 2021-04-10 PROCEDURE — 3331090001 HH PPS REVENUE CREDIT

## 2021-04-11 PROCEDURE — 3331090001 HH PPS REVENUE CREDIT

## 2021-04-11 PROCEDURE — 3331090002 HH PPS REVENUE DEBIT

## 2021-04-12 PROCEDURE — 3331090002 HH PPS REVENUE DEBIT

## 2021-04-12 PROCEDURE — 3331090001 HH PPS REVENUE CREDIT

## 2021-04-13 ENCOUNTER — HOME CARE VISIT (OUTPATIENT)
Dept: SCHEDULING | Facility: HOME HEALTH | Age: 86
End: 2021-04-13
Payer: MEDICARE

## 2021-04-13 VITALS
HEART RATE: 89 BPM | OXYGEN SATURATION: 99 % | TEMPERATURE: 97.4 F | SYSTOLIC BLOOD PRESSURE: 120 MMHG | DIASTOLIC BLOOD PRESSURE: 80 MMHG | RESPIRATION RATE: 17 BRPM

## 2021-04-13 PROCEDURE — 3331090001 HH PPS REVENUE CREDIT

## 2021-04-13 PROCEDURE — G0151 HHCP-SERV OF PT,EA 15 MIN: HCPCS

## 2021-04-13 PROCEDURE — 3331090002 HH PPS REVENUE DEBIT

## 2021-04-14 PROCEDURE — 3331090001 HH PPS REVENUE CREDIT

## 2021-04-14 PROCEDURE — 3331090002 HH PPS REVENUE DEBIT

## 2021-04-15 ENCOUNTER — HOME CARE VISIT (OUTPATIENT)
Dept: SCHEDULING | Facility: HOME HEALTH | Age: 86
End: 2021-04-15
Payer: MEDICARE

## 2021-04-15 VITALS
SYSTOLIC BLOOD PRESSURE: 120 MMHG | RESPIRATION RATE: 17 BRPM | HEART RATE: 70 BPM | DIASTOLIC BLOOD PRESSURE: 75 MMHG | TEMPERATURE: 97.6 F | OXYGEN SATURATION: 99 %

## 2021-04-15 PROCEDURE — G0151 HHCP-SERV OF PT,EA 15 MIN: HCPCS

## 2021-04-15 PROCEDURE — 3331090001 HH PPS REVENUE CREDIT

## 2021-04-15 PROCEDURE — 3331090002 HH PPS REVENUE DEBIT

## 2021-04-16 PROCEDURE — 3331090001 HH PPS REVENUE CREDIT

## 2021-04-16 PROCEDURE — 3331090002 HH PPS REVENUE DEBIT

## 2021-04-17 PROCEDURE — 3331090001 HH PPS REVENUE CREDIT

## 2021-04-17 PROCEDURE — 3331090002 HH PPS REVENUE DEBIT

## 2021-04-18 PROCEDURE — 3331090002 HH PPS REVENUE DEBIT

## 2021-04-18 PROCEDURE — 3331090001 HH PPS REVENUE CREDIT

## 2021-04-19 PROCEDURE — 3331090001 HH PPS REVENUE CREDIT

## 2021-04-19 PROCEDURE — 3331090002 HH PPS REVENUE DEBIT

## 2021-04-20 ENCOUNTER — HOME CARE VISIT (OUTPATIENT)
Dept: SCHEDULING | Facility: HOME HEALTH | Age: 86
End: 2021-04-20
Payer: MEDICARE

## 2021-04-20 VITALS
HEART RATE: 62 BPM | SYSTOLIC BLOOD PRESSURE: 120 MMHG | DIASTOLIC BLOOD PRESSURE: 70 MMHG | TEMPERATURE: 97.9 F | RESPIRATION RATE: 17 BRPM | OXYGEN SATURATION: 99 %

## 2021-04-20 PROCEDURE — 3331090002 HH PPS REVENUE DEBIT

## 2021-04-20 PROCEDURE — 3331090001 HH PPS REVENUE CREDIT

## 2021-04-20 PROCEDURE — G0151 HHCP-SERV OF PT,EA 15 MIN: HCPCS

## 2021-04-21 PROCEDURE — 3331090002 HH PPS REVENUE DEBIT

## 2021-04-21 PROCEDURE — 3331090001 HH PPS REVENUE CREDIT

## 2021-04-22 ENCOUNTER — HOME CARE VISIT (OUTPATIENT)
Dept: SCHEDULING | Facility: HOME HEALTH | Age: 86
End: 2021-04-22
Payer: MEDICARE

## 2021-04-22 VITALS
HEART RATE: 63 BPM | DIASTOLIC BLOOD PRESSURE: 70 MMHG | TEMPERATURE: 98.6 F | RESPIRATION RATE: 17 BRPM | OXYGEN SATURATION: 99 % | SYSTOLIC BLOOD PRESSURE: 120 MMHG

## 2021-04-22 PROCEDURE — G0151 HHCP-SERV OF PT,EA 15 MIN: HCPCS

## 2021-04-22 PROCEDURE — 3331090002 HH PPS REVENUE DEBIT

## 2021-04-22 PROCEDURE — 3331090001 HH PPS REVENUE CREDIT

## 2021-04-23 PROCEDURE — 3331090002 HH PPS REVENUE DEBIT

## 2021-04-23 PROCEDURE — 3331090001 HH PPS REVENUE CREDIT

## 2021-04-24 PROCEDURE — 3331090002 HH PPS REVENUE DEBIT

## 2021-04-24 PROCEDURE — 3331090001 HH PPS REVENUE CREDIT

## 2021-04-25 PROCEDURE — 3331090002 HH PPS REVENUE DEBIT

## 2021-04-25 PROCEDURE — 3331090001 HH PPS REVENUE CREDIT

## 2021-04-26 PROCEDURE — 3331090001 HH PPS REVENUE CREDIT

## 2021-04-26 PROCEDURE — 3331090002 HH PPS REVENUE DEBIT

## 2021-04-27 ENCOUNTER — HOME CARE VISIT (OUTPATIENT)
Dept: SCHEDULING | Facility: HOME HEALTH | Age: 86
End: 2021-04-27
Payer: MEDICARE

## 2021-04-27 ENCOUNTER — HOME CARE VISIT (OUTPATIENT)
Dept: HOME HEALTH SERVICES | Facility: HOME HEALTH | Age: 86
End: 2021-04-27
Payer: MEDICARE

## 2021-04-27 VITALS — HEART RATE: 72 BPM | OXYGEN SATURATION: 99 % | TEMPERATURE: 97.7 F | RESPIRATION RATE: 17 BRPM

## 2021-04-27 PROCEDURE — 3331090002 HH PPS REVENUE DEBIT

## 2021-04-27 PROCEDURE — 3331090001 HH PPS REVENUE CREDIT

## 2021-04-27 PROCEDURE — G0151 HHCP-SERV OF PT,EA 15 MIN: HCPCS

## 2021-04-27 NOTE — PROGRESS NOTES
Chief Complaint   Patient presents with    Hypertension     3 month f/up    Chronic Kidney Disease    Diabetes    Dementia       SUBJECTIVE:    Alisha Farfan Sr is a 80 y.o. male returns in follow-up for his medical problems to include hypertension, diabetes, hyperlipidemia, ASCVD, cardiomyopathy, history of SVT, history of paroxysmal atrial fibrillation, history of paroxysmal VT, status post pacemaker placement, prior TIA, compensated CHF, diverticulosis, history of GI bleed from gastritis and duodenitis, Alzheimer's dementia, history of alcoholism with alcohol withdrawal syndrome/DTs, CKD stage III, DJD and other multiple medical problems. He is accompanied by his granddaughter at the visit today. He apparently is taking his medications and doing relatively well. There is no complaint of chest pain, shortness of breath, palpitations, PND, orthopnea or other cardiac or respiratory complaints. There are no GI or  complaints. He notes no headaches, dizziness or neurologic complaints other than the memory deficit which is stable without progression. There are no current active arthritic complaints no other complaints on complete review of systems. Current Outpatient Medications   Medication Sig Dispense Refill    furosemide (LASIX) 20 mg tablet Take 2 Tabs by mouth daily. 1 each morning by mouth (Patient taking differently: Take 60 mg by mouth daily. 1 each morning by mouth ) 90 Tab 2    carvediloL (COREG) 6.25 mg tablet Take 1 Tab by mouth two (2) times a day. 180 Tab prn    colchicine 0.6 mg tablet Take 0.6 mg by mouth daily. TAKE ONE TABLET AS NEEDED FOR GOUT      donepeziL (ARICEPT) 10 mg tablet TAKE 1/2 TABLET BY MOUTH EVERY DAY (Patient taking differently: Take 10 mg by mouth nightly. TAKE 1/2 TABLET BY MOUTH EVERY DAY) 30 Tab 2    acetaminophen (TYLENOL) 650 mg TbER Take 650 mg by mouth two (2) times a day.  TAKE BY MOUTH AS NEEDED FOR PAIN       memantine (NAMENDA) 10 mg tablet TAKE 1 TABLET BY MOUTH TWICE A  Tab 3    omeprazole (PRILOSEC) 20 mg capsule Take 1 Cap by mouth daily. 90 Cap 3    aspirin 81 mg chewable tablet Take 1 Tab by mouth daily.  27 Tab 1     Past Medical History:   Diagnosis Date    Abdominal pain 7/18/2017    Alzheimer disease ([facility] 7/18/2017    Anemia 7/18/2017    Arthritis     Back pain 7/18/2017    Bradycardia 7/18/2017    CAD (coronary artery disease)     hx of MI    Chronic diastolic heart failure ([facility] 2/26/2021    CKD (chronic kidney disease), stage II 7/18/2017    constipation     Depression 7/18/2017    Diabetes mellitus ([facility] 7/18/2017    Diverticulosis 7/18/2017    DJD (degenerative joint disease) 7/18/2017    Encounter for long-term (current) use of other medications 7/18/2017    Fatigue     Gastritis and duodenitis 7/18/2017    GERD (gastroesophageal reflux disease)     GI bleed 7/18/2017    Hearing loss     Hyperlipidemia 7/18/2017    Hypertension     Hypertension with renal disease 7/18/2017    Ill-defined condition     Dementia    Internal hemorrhoids 7/18/2017    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    S/P cardiac pacemaker procedure 5/4/2015    5/4/15 Medtronic dual chamber pacemaker implant     Thyroid disease     Weight loss     lost over 40 pounds last year- has no appetite     Past Surgical History:   Procedure Laterality Date    COLONOSCOPY N/A 6/22/2017    COLONOSCOPY performed by Robb Monahan MD at [address] [place] [address]  6/22/2017         [address]  7/10/2014    right inguinal    HX OTHER SURGICAL      cystectomy from back    [place] EGD TRANSORAL BIOPSY SINGLE/MULTIPLE  2/14/2012         [place] UPPER GI ENDOSCOPY,W/DIR SUBMUC INJ  7/23/2020         UPPER GI ENDOSCOPY,BIOPSY  6/22/2017         UPPER GI ENDOSCOPY,BIOPSY  7/23/2020          No Known Allergies    REVIEW OF SYSTEMS:  General: negative for - chills or fever, or weight loss or gain  ENT: negative for - headaches, nasal congestion or tinnitus  Eyes: no blurred or visual changes  Neck: No stiffness or swollen nodes  Respiratory: negative for - cough, hemoptysis, shortness of breath or wheezing  Cardiovascular : negative for - chest pain, edema, palpitations or shortness of breath  Gastrointestinal: negative for - abdominal pain, blood in stools, heartburn or nausea/vomiting  Genito-Urinary: no dysuria, trouble voiding, or hematuria  Musculoskeletal: negative for - gait disturbance, joint pain, joint stiffness or joint swelling  Neurological: no TIA or stroke symptoms  Hematologic: no bruises, no bleeding  Lymphatic: no swollen glands  Integument: no lumps, mole changes, nail changes or rash  Endocrine:no malaise/lethargy poly uria or polydipsia or unexpected weight changes        Social History     Socioeconomic History    Marital status: LEGALLY      Spouse name: Not on file    Number of children: Not on file    Years of education: Not on file    Highest education level: Not on file   Tobacco Use    Smoking status: Former Smoker     Packs/day: 0.50     Years: 10.00     Pack years: 5.00     Start date: 1991    Smokeless tobacco: Never Used    Tobacco comment: quit 50 years ago   Substance and Sexual Activity    Alcohol use: Not Currently     Comment: Occasional beer    Drug use: No    Sexual activity: Not Currently     Family History   Problem Relation Age of Onset    Hypertension Mother     Heart Disease Father     Cancer Other         son-cancer? unknown what type        OBJECTIVE:     Visit Vitals  /72 (BP 1 Location: Left upper arm, BP Patient Position: Sitting, BP Cuff Size: Adult)   Pulse 66   Temp 98.7 °F (37.1 °C)   Resp 18   Ht 5' 8\" (1.727 m)   Wt 123 lb 8 oz (56 kg)   SpO2 98%   BMI 18.78 kg/m²     CONSTITUTIONAL:   well nourished, appears age appropriate  EYES: sclera anicteric, PERRL, EOMI  ENMT:nares clear, moist mucous membranes, pharynx clear  NECK: supple. Thyroid normal, No JVD or bruits  RESPIRATORY: Chest: clear to ascultation and percussion, normal inspiratory effort  CARDIOVASCULAR: Heart: regular rate and rhythm no murmurs, rubs or gallops, PMI not displaced, No thrills, no peripheral edema  GASTROINTESTINAL: Abdomen: non distended, soft, non tender, bowel sounds normal  HEMATOLOGIC: no purpura, petechiae or bruising  LYMPHATIC: No lymph node enlargemant  MUSCULOSKELETAL: Extremities: no active synovitis, pulse 1+   INTEGUMENT: No unusual rashes or suspicious skin lesions noted. Nails appear normal.  PERIPHERAL VASCULAR: normal pulses femoral, PT and DP  NEUROLOGIC: non-focal exam, A & O X 3  PSYCHIATRIC:, appropriate affect     ASSESSMENT:   1. Hypertension with renal disease    2. 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, without long-term current use of insulin ([facility]    3. Mixed hyperlipidemia    4. ASCVD (arteriosclerotic cardiovascular disease)    5. Primary osteoarthritis involving multiple joints    6. Alzheimer disease ([facility]    7. Cardiomyopathy, unspecified type ([facility]    8. SSS (sick sinus syndrome) ([facility]    9. S/P cardiac pacemaker procedure    10. SVT (supraventricular tachycardia) (HCC)--s/p RFA    11. Paroxysmal atrial fibrillation (HCC)    12. Acute on chronic systolic heart failure ([address]    13. Alcoholism ([address]    14. Alcohol withdrawal syndrome, with delirium ([address]    15. SUBHA (acute kidney injury) ([address]    16. Paroxysmal VT ([address]    17. Mild depression ([address]    18. Severe protein-calorie malnutrition (HCC)      Impression  1. Hypertension that is controlled so continue current therapy reviewed with he and his granddaughter. 2.  Diabetes mellitus repeat status pending and prior lab reviewed now make adjustments if necessary. 3.  Hyperlipidemia prior lab reviewed repeat status pending I will adjust if needed. 4.  ASCVD clinically stable continue aspirin daily  5.   Cardiomyopathy that seems to be stable  6. Paroxysmal atrial fibrillation stable with no palpitations  7. History of PSVT stable without any complications  8. History of paroxysmal V. tach tach no recent symptoms  9. Status post pacemaker placement  10. DJD that is stable  11. Alzheimer's dementia stable  12   CHF compensated  13. Alcoholism currently he claims not to drink  14   History of alcohol withdrawal syndrome no recent symptoms  15. Prior acute kidney injury when he was in the hospital no recent symptoms  16. Depression that is stable  17. Protein calorie malnutrition his weight is stable since his last visit I encouraged p.o. intake and recommended Ensure  He seems to be medically stable at the present time so we will make no changes in medicine. I will recheck him again in 3 months or sooner should it be a problem. This is discussed with his granddaughter present with him today. PLAN:  .  Orders Placed This Encounter    METABOLIC PANEL, COMPREHENSIVE    LIPID PANEL    CK    CBC WITH AUTOMATED DIFF    AMB POC HEMOGLOBIN A1C         ATTENTION:   This medical record was transcribed using an electronic medical records system. Although proofread, it may and can contain electronic and spelling errors. Other human spelling and other errors may be present. Corrections may be executed at a later time. Please feel free to contact us for any clarifications as needed. Follow-up and Dispositions    · Return in about 3 months (around 7/28/2021). No results found for any visits on 04/28/21. Michelle Pineda MD    The patient verbalized understanding of the problems and plans as explained.

## 2021-04-28 ENCOUNTER — OFFICE VISIT (OUTPATIENT)
Dept: INTERNAL MEDICINE CLINIC | Age: 86
End: 2021-04-28
Payer: MEDICARE

## 2021-04-28 VITALS
HEART RATE: 66 BPM | WEIGHT: 123.5 LBS | DIASTOLIC BLOOD PRESSURE: 72 MMHG | OXYGEN SATURATION: 98 % | HEIGHT: 68 IN | RESPIRATION RATE: 18 BRPM | BODY MASS INDEX: 18.72 KG/M2 | TEMPERATURE: 98.7 F | SYSTOLIC BLOOD PRESSURE: 130 MMHG

## 2021-04-28 DIAGNOSIS — N18.2 CONTROLLED TYPE 2 DIABETES MELLITUS WITH STAGE 2 CHRONIC KIDNEY DISEASE, WITHOUT LONG-TERM CURRENT USE OF INSULIN (HCC): ICD-10-CM

## 2021-04-28 DIAGNOSIS — F02.80 ALZHEIMER DISEASE (HCC): ICD-10-CM

## 2021-04-28 DIAGNOSIS — E43 SEVERE PROTEIN-CALORIE MALNUTRITION (HCC): Chronic | ICD-10-CM

## 2021-04-28 DIAGNOSIS — I47.29 PAROXYSMAL VT: ICD-10-CM

## 2021-04-28 DIAGNOSIS — M15.9 PRIMARY OSTEOARTHRITIS INVOLVING MULTIPLE JOINTS: ICD-10-CM

## 2021-04-28 DIAGNOSIS — F32.A MILD DEPRESSION: ICD-10-CM

## 2021-04-28 DIAGNOSIS — I50.23 ACUTE ON CHRONIC SYSTOLIC HEART FAILURE (HCC): ICD-10-CM

## 2021-04-28 DIAGNOSIS — I48.0 PAROXYSMAL ATRIAL FIBRILLATION (HCC): ICD-10-CM

## 2021-04-28 DIAGNOSIS — I49.5 SSS (SICK SINUS SYNDROME) (HCC): ICD-10-CM

## 2021-04-28 DIAGNOSIS — E78.2 MIXED HYPERLIPIDEMIA: ICD-10-CM

## 2021-04-28 DIAGNOSIS — F10.20 ALCOHOLISM (HCC): ICD-10-CM

## 2021-04-28 DIAGNOSIS — I42.9 CARDIOMYOPATHY, UNSPECIFIED TYPE (HCC): ICD-10-CM

## 2021-04-28 DIAGNOSIS — Z95.0 S/P CARDIAC PACEMAKER PROCEDURE: ICD-10-CM

## 2021-04-28 DIAGNOSIS — I25.10 ASCVD (ARTERIOSCLEROTIC CARDIOVASCULAR DISEASE): ICD-10-CM

## 2021-04-28 DIAGNOSIS — I12.9 HYPERTENSION WITH RENAL DISEASE: Primary | ICD-10-CM

## 2021-04-28 DIAGNOSIS — I47.1 SVT (SUPRAVENTRICULAR TACHYCARDIA) (HCC): ICD-10-CM

## 2021-04-28 DIAGNOSIS — E11.22 CONTROLLED TYPE 2 DIABETES MELLITUS WITH STAGE 2 CHRONIC KIDNEY DISEASE, WITHOUT LONG-TERM CURRENT USE OF INSULIN (HCC): ICD-10-CM

## 2021-04-28 DIAGNOSIS — N17.9 AKI (ACUTE KIDNEY INJURY) (HCC): ICD-10-CM

## 2021-04-28 DIAGNOSIS — G30.9 ALZHEIMER DISEASE (HCC): ICD-10-CM

## 2021-04-28 DIAGNOSIS — F10.931 ALCOHOL WITHDRAWAL SYNDROME, WITH DELIRIUM (HCC): ICD-10-CM

## 2021-04-28 LAB
ALBUMIN SERPL-MCNC: 3.7 G/DL (ref 3.5–5)
ALBUMIN/GLOB SERPL: 1.2 {RATIO} (ref 1.1–2.2)
ALP SERPL-CCNC: 74 U/L (ref 45–117)
ALT SERPL-CCNC: 24 U/L (ref 12–78)
ANION GAP SERPL CALC-SCNC: 7 MMOL/L (ref 5–15)
AST SERPL-CCNC: 27 U/L (ref 15–37)
BASOPHILS # BLD: 0.1 K/UL (ref 0–0.1)
BASOPHILS NFR BLD: 1 % (ref 0–1)
BILIRUB SERPL-MCNC: 0.6 MG/DL (ref 0.2–1)
BUN SERPL-MCNC: 38 MG/DL (ref 6–20)
BUN/CREAT SERPL: 25 (ref 12–20)
CALCIUM SERPL-MCNC: 9.5 MG/DL (ref 8.5–10.1)
CHLORIDE SERPL-SCNC: 102 MMOL/L (ref 97–108)
CHOLEST SERPL-MCNC: 234 MG/DL
CK SERPL-CCNC: 89 U/L (ref 39–308)
CO2 SERPL-SCNC: 33 MMOL/L (ref 21–32)
CREAT SERPL-MCNC: 1.55 MG/DL (ref 0.7–1.3)
DIFFERENTIAL METHOD BLD: ABNORMAL
EOSINOPHIL # BLD: 0.1 K/UL (ref 0–0.4)
EOSINOPHIL NFR BLD: 3 % (ref 0–7)
ERYTHROCYTE [DISTWIDTH] IN BLOOD BY AUTOMATED COUNT: 15.6 % (ref 11.5–14.5)
GLOBULIN SER CALC-MCNC: 3.1 G/DL (ref 2–4)
GLUCOSE SERPL-MCNC: 90 MG/DL (ref 65–100)
HBA1C MFR BLD HPLC: 5.7 % (ref 4.5–5.7)
HCT VFR BLD AUTO: 37.1 % (ref 36.6–50.3)
HDLC SERPL-MCNC: 73 MG/DL
HDLC SERPL: 3.2 {RATIO} (ref 0–5)
HGB BLD-MCNC: 11.4 G/DL (ref 12.1–17)
IMM GRANULOCYTES # BLD AUTO: 0 K/UL (ref 0–0.04)
IMM GRANULOCYTES NFR BLD AUTO: 0 % (ref 0–0.5)
LDLC SERPL CALC-MCNC: 141.8 MG/DL (ref 0–100)
LIPID PROFILE,FLP: ABNORMAL
LYMPHOCYTES # BLD: 1 K/UL (ref 0.8–3.5)
LYMPHOCYTES NFR BLD: 21 % (ref 12–49)
MCH RBC QN AUTO: 26.7 PG (ref 26–34)
MCHC RBC AUTO-ENTMCNC: 30.7 G/DL (ref 30–36.5)
MCV RBC AUTO: 86.9 FL (ref 80–99)
MONOCYTES # BLD: 0.4 K/UL (ref 0–1)
MONOCYTES NFR BLD: 8 % (ref 5–13)
NEUTS SEG # BLD: 3.2 K/UL (ref 1.8–8)
NEUTS SEG NFR BLD: 67 % (ref 32–75)
NRBC # BLD: 0 K/UL (ref 0–0.01)
NRBC BLD-RTO: 0 PER 100 WBC
PLATELET # BLD AUTO: 243 K/UL (ref 150–400)
PMV BLD AUTO: 11 FL (ref 8.9–12.9)
POTASSIUM SERPL-SCNC: 3.6 MMOL/L (ref 3.5–5.1)
PROT SERPL-MCNC: 6.8 G/DL (ref 6.4–8.2)
RBC # BLD AUTO: 4.27 M/UL (ref 4.1–5.7)
SODIUM SERPL-SCNC: 142 MMOL/L (ref 136–145)
TRIGL SERPL-MCNC: 96 MG/DL (ref ?–150)
VLDLC SERPL CALC-MCNC: 19.2 MG/DL
WBC # BLD AUTO: 4.9 K/UL (ref 4.1–11.1)

## 2021-04-28 PROCEDURE — 1101F PT FALLS ASSESS-DOCD LE1/YR: CPT | Performed by: INTERNAL MEDICINE

## 2021-04-28 PROCEDURE — G8536 NO DOC ELDER MAL SCRN: HCPCS | Performed by: INTERNAL MEDICINE

## 2021-04-28 PROCEDURE — 83036 HEMOGLOBIN GLYCOSYLATED A1C: CPT | Performed by: INTERNAL MEDICINE

## 2021-04-28 PROCEDURE — 3331090001 HH PPS REVENUE CREDIT

## 2021-04-28 PROCEDURE — 3331090002 HH PPS REVENUE DEBIT

## 2021-04-28 PROCEDURE — 99214 OFFICE O/P EST MOD 30 MIN: CPT | Performed by: INTERNAL MEDICINE

## 2021-04-28 PROCEDURE — G8420 CALC BMI NORM PARAMETERS: HCPCS | Performed by: INTERNAL MEDICINE

## 2021-04-28 PROCEDURE — G8427 DOCREV CUR MEDS BY ELIG CLIN: HCPCS | Performed by: INTERNAL MEDICINE

## 2021-04-28 NOTE — PATIENT INSTRUCTIONS
ACE Inhibitors: Care Instructions Your Care Instructions ACE (angiotensin-converting enzyme) inhibitors lower blood pressure. They also treat heart failure and prevent heart attacks and strokes. They block an enzyme that makes blood vessels narrow. As a result, the blood vessels relax and widen. This lowers blood pressure. These medicines also put more water and salt into the urine. This lowers blood pressure too. These medicines are a good choice for people with diabetes. They don't affect blood sugar levels, and they may protect the kidneys. Examples include: · Benazepril (Lotensin). · Lisinopril (Prinivil, Zestril). · Ramipril (Altace). Before you start taking this medicine, make sure your doctor knows if you take other medicines, especially water pills (diuretics) or potassium tablets. And tell your doctor if you use a salt substitute. You should not take an ACE inhibitor if you are pregnant or planning to become pregnant. You may need regular blood tests. Follow-up care is a key part of your treatment and safety. Be sure to make and go to all appointments, and call your doctor if you are having problems. It's also a good idea to know your test results and keep a list of the medicines you take. How can you care for yourself at home? · Take your medicines exactly as prescribed. Be sure to take your medicine every day. Call your doctor if you think you are having a problem with your medicine. · ACE inhibitors can cause a dry cough. If the cough is bad, talk to your doctor. You may need to try a different medicine. · ACE inhibitors can cause swelling of your lips, tongue, or face. If the swelling is severe, you may need treatment right away. Severe swelling can make it hard to breathe, but this is very rare. · Check with your doctor or pharmacist before you use any other medicines. This includes over-the-counter medicines.  Make sure your doctor knows all of the medicines, vitamins, herbal products, and supplements you take. Taking some medicines together can cause problems. When should you call for help? Call your doctor now or seek immediate medical care if: 
  · You have swelling of your lips, tongue, or face.  
  · You think you are having problems with your medicine. Watch closely for changes in your health, and be sure to contact your doctor if you have any problems. Where can you learn more? Go to http://www.gray.com/ Enter B128 in the search box to learn more about \"ACE Inhibitors: Care Instructions. \" Current as of: August 31, 2020               Content Version: 12.8 © 8875-8209 [employer]. Care instructions adapted under license by [employer] (which disclaims liability or warranty for this information). If you have questions about a medical condition or this instruction, always ask your healthcare professional. [address] any warranty or liability for your use of this information.

## 2021-04-28 NOTE — PROGRESS NOTES
Chief Complaint   Patient presents with    Hypertension     3 month f/up    Chronic Kidney Disease    Diabetes    Dementia     1. Have you been to the ER, urgent care clinic since your last visit? Hospitalized since your last visit? No    2. Have you seen or consulted any other health care providers outside of the [address] [place] [address] since your last visit? Include any pap smears or colon screening. Doing PT once weekly.

## 2021-04-29 ENCOUNTER — HOME CARE VISIT (OUTPATIENT)
Dept: SCHEDULING | Facility: HOME HEALTH | Age: 86
End: 2021-04-29
Payer: MEDICARE

## 2021-04-29 VITALS
TEMPERATURE: 97.2 F | RESPIRATION RATE: 17 BRPM | HEART RATE: 67 BPM | DIASTOLIC BLOOD PRESSURE: 70 MMHG | OXYGEN SATURATION: 99 % | SYSTOLIC BLOOD PRESSURE: 120 MMHG

## 2021-04-29 PROCEDURE — 3331090001 HH PPS REVENUE CREDIT

## 2021-04-29 PROCEDURE — G0151 HHCP-SERV OF PT,EA 15 MIN: HCPCS

## 2021-04-29 PROCEDURE — 3331090002 HH PPS REVENUE DEBIT

## 2021-06-02 NOTE — PROGRESS NOTES
Goals Addressed                 This Visit's Progress     COMPLETED: Prevent complications post hospitalization. 12/29/2020 Granddaughter report patient is feeling well,c/o back pain controlled with Tylenol. Denies chest pain, SOB, N/V/D, fever. Patient does not currently have a scale,educated on CHF S&S and when to call cardio . Granddaughter will monitor patient patient for CHF S&S and report at next outreach. [facility] [place]    1/1-7/21 readmit to hospital for CHF    1/8/21 Granddaughter reports patient was seen by [facility] on 12/31/2020, patient is weak but doing ok. PCP appointment today, HH will resume care,and Lasix was increased. Denies chest pain, SOB, fever, N/V/D. Granddaughter will report changes to plan of care and monitor S&S of CHF to report at next outreach. [facility] [place]    1/13/21 Granddaughter reports patient is doing well and participating with [place] [facility]. Denies chest pain, SOB, fever, N/V/D. Granddaughter will continue to monitor CHF S&S and report at next outreach. [facility] [place]    1/26/21 Contact information left on voicemail requesting callback. [facility] [place]    2/3/21 Granddaughter report attendance of appointment on 1/27/21. Has dry cough no other issues or concerns ar this time. CTN will follow up with granddaughter in 14-21 days. [facility] [place]    2/17/21 Contact information left on voicemail requesting return call. [facility] [place]    2/23/21 Granddaughter contacted CTN to report patient is breathing heavy and sitting on side of bed instead of lying flat, denies edema in abdomen, feet, hands. unable to weight due to scale is broken. Granddaughter advised to contact cardiologist office for medication adjustment and given [facility] information. CTN will follo wup with granddaughter in 1-2 days. [facility] [place]    2/24/21 Per review of chart patient is scheduled to see cardiology today at [phone]. CTN will follow up with granddaughter in 1 day for changes in plan of care.  [facility] [place]    2/25/21 Per review of chart patient attended cardiology appt and was sent to the ED and is currently admitted. Contact information left on voicemail requesting a return call. CTN will follow up upon discharge. EM[facility]    6/2/21 Per review of chart patient has had no ED or hospital admissions 90 days post discharge. Goal complete.  EMILY

## 2021-08-03 PROBLEM — R55 SYNCOPE: Status: RESOLVED | Noted: 2019-01-09 | Resolved: 2021-01-01

## 2021-08-04 NOTE — TELEPHONE ENCOUNTER
RX refill request from the patient/pharmacy. Patient last seen 04- with labs, and next appt. scheduled for 08-  Requested Prescriptions     Pending Prescriptions Disp Refills    memantine (NAMENDA) 10 mg tablet 180 Tablet 3     Sig: TAKE 1 TABLET BY MOUTH TWICE A DAY   .

## 2021-08-04 NOTE — PROGRESS NOTES
Chief Complaint   Patient presents with    Hypertension     3 month follow up    Diabetes    Cholesterol Problem       SUBJECTIVE:    Gato Farfan Sr is a 80 y.o. male who returns in follow-up of his medical problems include hypertension, hyperlipidemia, ASCVD, history of SVT with sick sinus syndrome status post pacemaker, cardiomyopathy, diabetes, Alzheimer's dementia, CKD stage III and other multiple medical problems. He had a fall 3 days ago but does not think he blacked out. He does not remember that he had a staring he thinks he got himself up. He denies any head trauma. He does note his right thumb is bothering him since she had a fall and he does note some tenderness and swelling. He denies any chest pain, shortness of breath, palpitations, PND, orthopnea or other cardiac or respiratory complaints. He notes no GI or  complaints. He notes no headaches, dizziness or neurologic complaints. He has no arthritic complaints other than the right thumb pain which is actually traumatic. The remainder review of systems is negative. Current Outpatient Medications   Medication Sig Dispense Refill    colchicine 0.6 mg tablet Take 1 Tablet by mouth daily. TAKE ONE TABLET AS NEEDED FOR GOUT 30 Tablet 2    memantine (NAMENDA) 10 mg tablet TAKE 1 TABLET BY MOUTH TWICE A  Tablet 3    furosemide (LASIX) 20 mg tablet Take 2 Tabs by mouth daily. 1 each morning by mouth (Patient taking differently: Take 60 mg by mouth daily. 1 each morning by mouth ) 90 Tab 2    carvediloL (COREG) 6.25 mg tablet Take 1 Tab by mouth two (2) times a day. 180 Tab prn    acetaminophen (TYLENOL) 650 mg TbER Take 650 mg by mouth two (2) times a day. TAKE BY MOUTH AS NEEDED FOR PAIN       omeprazole (PRILOSEC) 20 mg capsule Take 1 Cap by mouth daily. 90 Cap 3    aspirin 81 mg chewable tablet Take 1 Tab by mouth daily.  30 Tab 1     Past Medical History:   Diagnosis Date    Abdominal pain 7/18/2017    Alzheimer disease ([facility] 7/18/2017    Anemia 7/18/2017    Arthritis     Back pain 7/18/2017    Bradycardia 7/18/2017    CAD (coronary artery disease)     hx of MI    Chronic diastolic heart failure ([facility] 2/26/2021    CKD (chronic kidney disease), stage II 7/18/2017    constipation     Depression 7/18/2017    Diabetes mellitus ([facility] 7/18/2017    Diverticulosis 7/18/2017    DJD (degenerative joint disease) 7/18/2017    Encounter for long-term (current) use of other medications 7/18/2017    Fatigue     Gastritis and duodenitis 7/18/2017    GERD (gastroesophageal reflux disease)     GI bleed 7/18/2017    Hearing loss     Hyperlipidemia 7/18/2017    Hypertension     Hypertension with renal disease 7/18/2017    Ill-defined condition     Dementia    Internal hemorrhoids 7/18/2017    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    S/P cardiac pacemaker procedure 5/4/2015    5/4/15 Medtronic dual chamber pacemaker implant     Thyroid disease     Weight loss     lost over 40 pounds last year- has no appetite     Past Surgical History:   Procedure Laterality Date    COLONOSCOPY N/A 6/22/2017    COLONOSCOPY performed by Mariola Huber MD at [address] [place] [address]  6/22/2017         [address]  7/10/2014    right inguinal    HX OTHER SURGICAL      cystectomy from back    [place] EGD TRANSORAL BIOPSY SINGLE/MULTIPLE  2/14/2012         [place] UPPER GI ENDOSCOPY,W/DIR SUBMUC INJ  7/23/2020         UPPER GI ENDOSCOPY,BIOPSY  6/22/2017         UPPER GI ENDOSCOPY,BIOPSY  7/23/2020          No Known Allergies    REVIEW OF SYSTEMS:  General: negative for - chills or fever, or weight loss or gain  ENT: negative for - headaches, nasal congestion or tinnitus  Eyes: no blurred or visual changes  Neck: No stiffness or swollen nodes  Respiratory: negative for - cough, hemoptysis, shortness of breath or wheezing  Cardiovascular : negative for - chest pain, edema, palpitations or shortness of breath  Gastrointestinal: negative for - abdominal pain, blood in stools, heartburn or nausea/vomiting  Genito-Urinary: no dysuria, trouble voiding, or hematuria  Musculoskeletal: negative for - gait disturbance, joint pain, joint stiffness or joint swelling. Right thumb pain post fall  Neurological: no TIA or stroke symptoms  Hematologic: no bruises, no bleeding  Lymphatic: no swollen glands  Integument: no lumps, mole changes, nail changes or rash  Endocrine:no malaise/lethargy poly uria or polydipsia or unexpected weight changes        Social History     Socioeconomic History    Marital status: LEGALLY      Spouse name: Not on file    Number of children: Not on file    Years of education: Not on file    Highest education level: Not on file   Tobacco Use    Smoking status: Former Smoker     Packs/day: 0.50     Years: 10.00     Pack years: 5.00     Start date: 7/12/1991    Smokeless tobacco: Never Used    Tobacco comment: quit 50 years ago   Vaping Use    Vaping Use: Never used   Substance and Sexual Activity    Alcohol use: Not Currently     Comment: Occasional beer    Drug use: No    Sexual activity: Not Currently     Social Determinants of Health     Financial Resource Strain:     Difficulty of Paying Living Expenses:    Food Insecurity:     Worried About Running Out of Food in the Last Year:     Ran Out of Food in the Last Year:    Transportation Needs:     Lack of Transportation (Medical):      Lack of Transportation (Non-Medical):    Physical Activity:     Days of Exercise per Week:     Minutes of Exercise per Session:    Stress:     Feeling of Stress :    Social Connections:     Frequency of Communication with Friends and Family:     Frequency of Social Gatherings with Friends and Family:     Attends [employer] Services:     Active Member of Clubs or Organizations:     Attends Club or Organization Meetings:     Marital Status:      Family History   Problem Relation Age of Onset    Hypertension Mother     Heart Disease Father     Cancer Other         son-cancer? unknown what type        OBJECTIVE:     Visit Vitals  /72 (BP 1 Location: Left upper arm, BP Patient Position: Sitting, BP Cuff Size: Adult)   Pulse 78   Temp 98.3 °F (36.8 °C) (Temporal)   Resp 16   Ht 5' 8\" (1.727 m)   Wt 128 lb 6.4 oz (58.2 kg)   SpO2 96%   BMI 19.52 kg/m²     CONSTITUTIONAL:   well nourished, appears age appropriate  EYES: sclera anicteric, PERRL, EOMI  ENMT:nares clear, moist mucous membranes, pharynx clear  NECK: supple. Thyroid normal, No JVD or bruits  RESPIRATORY: Chest: clear to ascultation and percussion, normal inspiratory effort  CARDIOVASCULAR: Heart: regular rate and rhythm no murmurs, rubs or gallops, PMI not displaced, No thrills, no peripheral edema  GASTROINTESTINAL: Abdomen: non distended, soft, non tender, bowel sounds normal  HEMATOLOGIC: no purpura, petechiae or bruising  LYMPHATIC: No lymph node enlargemant  MUSCULOSKELETAL: Extremities: no active synovitis, pulse 1+. Right thumb with mild soft tissue swelling with slight tenderness to palpation but no obvious bony deformity  INTEGUMENT: No unusual rashes or suspicious skin lesions noted. Nails appear normal.  PERIPHERAL VASCULAR: normal pulses femoral, PT and DP  NEUROLOGIC: non-focal exam, A & O X 3  PSYCHIATRIC:, appropriate affect     ASSESSMENT:   1. Hypertension with renal disease    2. 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 Mixed hyperlipidemia    4. ASCVD (arteriosclerotic cardiovascular disease)    5. Alzheimer disease ([address]    6. Cardiomyopathy, unspecified type ([address]    7. Pain of right thumb      Impression  1. Hypertension that is controlled so continue current therapy reviewed with him. 2.  Diabetes repeat status pending and prior lab review not make adjustments if necessary.   3.  Hyperlipidemia prior lab reviewed repeat status pending and I will adjust if needed. 4.  ASCVD clinically stable continue aspirin daily  5. Alzheimer's dementia stable  6. Cardiomyopathy currently stable  7   Pain right thumb post fall we will get an x-ray of this to make sure there is no fracture  Follow-up in 3 months or sooner should the be a problem. I will call his granddaughter who he stays with with the lab results. PLAN:  .  Orders Placed This Encounter    XR THUMB RT MIN 2 V    METABOLIC PANEL, COMPREHENSIVE    LIPID PANEL    CK    HEMOGLOBIN A1C WITH EAG    colchicine 0.6 mg tablet         ATTENTION:   This medical record was transcribed using an electronic medical records system. Although proofread, it may and can contain electronic and spelling errors. Other human spelling and other errors may be present. Corrections may be executed at a later time. Please feel free to contact us for any clarifications as needed. Follow-up and Dispositions    · Return in about 3 months (around 11/5/2021). No results found for any visits on 08/05/21. Mendel Avina MD    The patient verbalized understanding of the problems and plans as explained.

## 2021-08-05 PROBLEM — M79.644 PAIN OF RIGHT THUMB: Status: ACTIVE | Noted: 2021-01-01

## 2021-08-05 NOTE — PATIENT INSTRUCTIONS

## 2021-08-05 NOTE — PROGRESS NOTES
Chief Complaint   Patient presents with    Hypertension     3 month follow up    Diabetes    Cholesterol Problem     Visit Vitals  /72 (BP 1 Location: Left upper arm, BP Patient Position: Sitting, BP Cuff Size: Adult)   Pulse 78   Temp 98.3 °F (36.8 °C) (Temporal)   Resp 16   Ht 5' 8\" (1.727 m)   Wt 128 lb 6.4 oz (58.2 kg)   SpO2 96%   BMI 19.52 kg/m²     1. Have you been to the ER, urgent care clinic since your last visit? Hospitalized since your last visit? No    2. Have you seen or consulted any other health care providers outside of the [address] [place] [address] since your last visit? Include any pap smears or colon screening.  No

## 2021-08-19 NOTE — PROGRESS NOTES
Patient was admitted to [facility] on 21 and discharged on 21 for CHF.     Outreach made within 2 business days of discharge: Yes    Top Discharge Challenges to be reviewed by the provider   Additional needs identified to be addressed with provider no  home health care- SN PT  Discussed COVID-19 related testing which was not done at this time. Test results were not done. Patient informed of results, if available? na   Method of communication with provider : none       Advance Care Planning:   Does patient have an Advance Directive:  yes; reviewed and current     Inpatient Readmission Risk score:   Was this a readmission? yes   Patient stated reason for the admission: fluid build up  Patients top risk factors for readmission: level of motivation, medical condition and medication management  Interventions to address risk factors: schedule and attend follow up appointments, take medications as prescribed, monitor CHF S&S, participate with HH    Care Transition Nurse (CTN) contacted the family by telephone to perform post hospital discharge assessment. Verified name and  with family as identifiers. Provided introduction to self, and explanation of the CTN role.     CTN reviewed discharge instructions, medical action plan and red flags with family who verbalized understanding. Family given an opportunity to ask questions and does not have any further questions or concerns at this time. The family agrees to contact the PCP office for questions related to their healthcare.     Medication reconciliation was performed with family, who verbalizes understanding of administration of home medications. Advised obtaining a 90-day supply of all daily and as-needed medications.   Referral to Pharm D needed: no     Home Health/Outpatient orders at discharge: home health care, PT and SN  Home Health company: [employer]  Date of initial visit: LEWIS 21    Durable Medical Equipment ordered at discharge:  none    Covid Risk Education    Patient has following risk factors of: heart failure, diabetes and chronic kidney disease. Education provided regarding infection prevention, and signs and symptoms of COVID-19 and when to seek medical attention with family who verbalized understanding. Discussed exposure protocols and quarantine From CDC: Are you at higher risk for severe illness?  and given an opportunity for questions and concerns. The family agrees to contact the COVID-19 hotline [phone] or PCP office for questions related to COVID-19. For more information on steps you can take to protect yourself, see CDC's How to Protect Yourself     Patient/family/caregiver given information for [employer] and agrees to enroll na  Patient's preferred e-mail: na  Patient's preferred phone number: na    Discussed follow-up appointments. If no appointment was previously scheduled, appointment scheduling offered: miki  [address] follow up appointment(s):   Future Appointments   Date Time Provider Sonia Irizarry   1/8/2021 10:50 AM Veronica Barney MD [facility] BS AMB   1/8/2021 To Be Determined Nikki Franco RN [address]   1/20/2021  3:00 PM Rexene Fothergill, MD [facility] BS AMB   1/26/2021  9:40 AM Veronica Barney MD [facility] BS AMB   6/10/2021  8:45 AM PACEMAKER, RCA [facility] BS AMB   1/5/2022 11:00 AM PACEMAKER, RCA [facility] BS AMB   1/5/2022 11:20 AM Meghan Wade, ANP [facility] BS AMB     Non-[facility] follow up appointment(s):   Plan for follow-up call in 3-5 days based on severity of symptoms and risk factors. CTN provided contact information for future needs. Goals Addressed                 This Visit's Progress     Prevent complications post hospitalization. 12/29/2020 Granddaughter report patient is feeling well,c/o back pain controlled with Tylenol. Denies chest pain, SOB, N/V/D, fever. Patient does not currently have a scale,educated on CHF S&S and when to call cardio .  Granddaughter will monitor patient patient for CHF S&S and report at next outreach. EMILY    1/1-7/21 readmit to hospital for CHF    1/8/21 Granddaughter reports patient was seen by [facility] on 12/31/2020, patient is weak but doing ok. PCP appointment today, HH will resume care,and Lasix was increased. Denies chest pain, SOB, fever, N/V/D. Granddaughter will report changes to plan of care and monitor S&S of CHF to report at next outreach. EMILY No

## 2021-09-17 NOTE — TELEPHONE ENCOUNTER
RX refill request from the patient/pharmacy. Patient last seen 08- with labs, and next appt. scheduled for 10-  Requested Prescriptions     Pending Prescriptions Disp Refills    memantine (NAMENDA) 10 mg tablet 180 Tablet 3     Sig: TAKE 1 TABLET BY MOUTH TWICE A DAY   .

## 2021-10-04 PROBLEM — I50.22 SYSTOLIC CHF, CHRONIC (HCC): Status: ACTIVE | Noted: 2020-12-14

## 2021-11-22 NOTE — PROGRESS NOTES
Spoke to granddaughter on phone as I was at bedside . Assured her that the cereal and milk had arrived as a late dietary arrival and we will be assisting and encouraging pt to eat. Updated her that GI MD has been consulted as well as PT and OT. Also educated granddaughter on us implementing a tele sitter for safety and to maintain IV as he had pulled out his line prior to shift start. She agreed to it's use. She states he had a 1:1 last admission and she would like to see if we could get that. I explained that we are using a tele sitter for safety, but that I would let my director know that she would like to have 1:1 due to his 'dementia\". Home

## 2021-11-23 NOTE — TELEPHONE ENCOUNTER
PCP: Jevon Calero MD    Last appt: 10/5/2021  Future Appointments   Date Time Provider Sonia Irizarry   1/5/2022 11:00 AM PACEMAKER, [facility] BS AMB   1/5/2022 11:20 AM GET Roldan BS AMB       Last refilled:3/5/21    Requested Prescriptions     Pending Prescriptions Disp Refills    furosemide (LASIX) 20 mg tablet [Pharmacy Med Name: FUROSEMIDE 20 MG TABLET] 60 Tablet 0     Sig: TAKE 2 TABS BY MOUTH DAILY.  [id number] [facility]

## 2021-11-24 NOTE — TELEPHONE ENCOUNTER
PCP: Kael Barney MD    Last appt: 10/5/2021  Future Appointments   Date Time Provider Sonia Irizarry   1/5/2022 11:00 AM PACEMAKER, [facility] ANG AMB   1/5/2022 11:20 AM GET Caicedo BS AMB       Last refilled:9/17/21    Requested Prescriptions     Pending Prescriptions Disp Refills    memantine (NAMENDA) 10 mg tablet 60 Tablet 0     Sig: TAKE 1 TABLET BY MOUTH TWICE A DAY

## 2021-11-29 NOTE — TELEPHONE ENCOUNTER
RX refill request from the patient/pharmacy. Patient last seen 08- with labs, and does not have a f/up appointment.   Requested Prescriptions     Pending Prescriptions Disp Refills    memantine (NAMENDA) 10 mg tablet 60 Tablet 0     Sig: TAKE 1 TABLET BY MOUTH TWICE A DAY

## 2022-01-01 ENCOUNTER — HOME CARE VISIT (OUTPATIENT)
Dept: SCHEDULING | Facility: HOME HEALTH | Age: 87
End: 2022-01-01
Payer: MEDICARE

## 2022-01-01 ENCOUNTER — APPOINTMENT (OUTPATIENT)
Dept: CT IMAGING | Age: 87
DRG: 065 | End: 2022-01-01
Attending: EMERGENCY MEDICINE
Payer: MEDICARE

## 2022-01-01 ENCOUNTER — APPOINTMENT (OUTPATIENT)
Dept: GENERAL RADIOLOGY | Age: 87
DRG: 871 | End: 2022-01-01
Attending: EMERGENCY MEDICINE
Payer: MEDICARE

## 2022-01-01 ENCOUNTER — APPOINTMENT (OUTPATIENT)
Dept: GENERAL RADIOLOGY | Age: 87
DRG: 871 | End: 2022-01-01
Attending: STUDENT IN AN ORGANIZED HEALTH CARE EDUCATION/TRAINING PROGRAM
Payer: MEDICARE

## 2022-01-01 ENCOUNTER — HOSPICE ADMISSION (OUTPATIENT)
Dept: HOSPICE | Facility: HOSPICE | Age: 87
End: 2022-01-01
Payer: MEDICARE

## 2022-01-01 ENCOUNTER — HOME CARE VISIT (OUTPATIENT)
Dept: HOME HEALTH SERVICES | Facility: HOME HEALTH | Age: 87
End: 2022-01-01
Payer: MEDICARE

## 2022-01-01 ENCOUNTER — PATIENT OUTREACH (OUTPATIENT)
Dept: CASE MANAGEMENT | Age: 87
End: 2022-01-01

## 2022-01-01 ENCOUNTER — HOME HEALTH ADMISSION (OUTPATIENT)
Dept: HOME HEALTH SERVICES | Facility: HOME HEALTH | Age: 87
End: 2022-01-01
Payer: MEDICARE

## 2022-01-01 ENCOUNTER — HOSPITAL ENCOUNTER (INPATIENT)
Age: 87
LOS: 3 days | Discharge: HOME HEALTH | DRG: 065 | End: 2022-02-24
Attending: EMERGENCY MEDICINE | Admitting: INTERNAL MEDICINE
Payer: MEDICARE

## 2022-01-01 ENCOUNTER — HOME CARE VISIT (OUTPATIENT)
Dept: HOSPICE | Facility: HOSPICE | Age: 87
End: 2022-01-01
Payer: MEDICARE

## 2022-01-01 ENCOUNTER — APPOINTMENT (OUTPATIENT)
Dept: NON INVASIVE DIAGNOSTICS | Age: 87
DRG: 065 | End: 2022-01-01
Attending: INTERNAL MEDICINE
Payer: MEDICARE

## 2022-01-01 ENCOUNTER — APPOINTMENT (OUTPATIENT)
Dept: CT IMAGING | Age: 87
DRG: 871 | End: 2022-01-01
Attending: STUDENT IN AN ORGANIZED HEALTH CARE EDUCATION/TRAINING PROGRAM
Payer: MEDICARE

## 2022-01-01 ENCOUNTER — APPOINTMENT (OUTPATIENT)
Dept: CT IMAGING | Age: 87
DRG: 871 | End: 2022-01-01
Attending: EMERGENCY MEDICINE
Payer: MEDICARE

## 2022-01-01 ENCOUNTER — OFFICE VISIT (OUTPATIENT)
Dept: INTERNAL MEDICINE CLINIC | Age: 87
End: 2022-01-01
Payer: MEDICARE

## 2022-01-01 ENCOUNTER — APPOINTMENT (OUTPATIENT)
Dept: VASCULAR SURGERY | Age: 87
DRG: 871 | End: 2022-01-01
Attending: STUDENT IN AN ORGANIZED HEALTH CARE EDUCATION/TRAINING PROGRAM
Payer: MEDICARE

## 2022-01-01 ENCOUNTER — HOSPITAL ENCOUNTER (INPATIENT)
Age: 87
LOS: 17 days | Discharge: HOSPICE | DRG: 871 | End: 2022-05-15
Attending: EMERGENCY MEDICINE | Admitting: STUDENT IN AN ORGANIZED HEALTH CARE EDUCATION/TRAINING PROGRAM
Payer: MEDICARE

## 2022-01-01 VITALS
TEMPERATURE: 97.6 F | SYSTOLIC BLOOD PRESSURE: 105 MMHG | RESPIRATION RATE: 17 BRPM | OXYGEN SATURATION: 99 % | HEART RATE: 73 BPM | DIASTOLIC BLOOD PRESSURE: 65 MMHG

## 2022-01-01 VITALS
OXYGEN SATURATION: 96 % | DIASTOLIC BLOOD PRESSURE: 68 MMHG | SYSTOLIC BLOOD PRESSURE: 120 MMHG | HEART RATE: 81 BPM | RESPIRATION RATE: 17 BRPM | TEMPERATURE: 97.8 F

## 2022-01-01 VITALS
OXYGEN SATURATION: 99 % | TEMPERATURE: 97.3 F | HEART RATE: 67 BPM | RESPIRATION RATE: 17 BRPM | SYSTOLIC BLOOD PRESSURE: 120 MMHG | DIASTOLIC BLOOD PRESSURE: 70 MMHG

## 2022-01-01 VITALS
DIASTOLIC BLOOD PRESSURE: 75 MMHG | OXYGEN SATURATION: 98 % | SYSTOLIC BLOOD PRESSURE: 115 MMHG | HEART RATE: 82 BPM | RESPIRATION RATE: 17 BRPM | TEMPERATURE: 97.4 F

## 2022-01-01 VITALS
RESPIRATION RATE: 18 BRPM | SYSTOLIC BLOOD PRESSURE: 130 MMHG | OXYGEN SATURATION: 99 % | TEMPERATURE: 98.2 F | DIASTOLIC BLOOD PRESSURE: 80 MMHG | HEART RATE: 84 BPM

## 2022-01-01 VITALS
BODY MASS INDEX: 16.37 KG/M2 | SYSTOLIC BLOOD PRESSURE: 110 MMHG | HEART RATE: 64 BPM | TEMPERATURE: 98 F | WEIGHT: 108 LBS | HEIGHT: 68 IN | DIASTOLIC BLOOD PRESSURE: 68 MMHG | RESPIRATION RATE: 18 BRPM | OXYGEN SATURATION: 97 %

## 2022-01-01 VITALS
SYSTOLIC BLOOD PRESSURE: 140 MMHG | TEMPERATURE: 97.9 F | DIASTOLIC BLOOD PRESSURE: 85 MMHG | HEART RATE: 77 BPM | OXYGEN SATURATION: 97 % | RESPIRATION RATE: 17 BRPM

## 2022-01-01 VITALS
TEMPERATURE: 97.9 F | SYSTOLIC BLOOD PRESSURE: 100 MMHG | DIASTOLIC BLOOD PRESSURE: 60 MMHG | OXYGEN SATURATION: 95 % | HEART RATE: 83 BPM

## 2022-01-01 VITALS
TEMPERATURE: 97 F | OXYGEN SATURATION: 98 % | SYSTOLIC BLOOD PRESSURE: 125 MMHG | RESPIRATION RATE: 17 BRPM | HEART RATE: 71 BPM | DIASTOLIC BLOOD PRESSURE: 75 MMHG

## 2022-01-01 VITALS — SYSTOLIC BLOOD PRESSURE: 110 MMHG | DIASTOLIC BLOOD PRESSURE: 60 MMHG | HEART RATE: 60 BPM | RESPIRATION RATE: 17 BRPM

## 2022-01-01 VITALS
DIASTOLIC BLOOD PRESSURE: 57 MMHG | WEIGHT: 128 LBS | HEART RATE: 67 BPM | RESPIRATION RATE: 18 BRPM | TEMPERATURE: 98.2 F | HEIGHT: 68 IN | OXYGEN SATURATION: 100 % | SYSTOLIC BLOOD PRESSURE: 107 MMHG | BODY MASS INDEX: 19.4 KG/M2

## 2022-01-01 VITALS
SYSTOLIC BLOOD PRESSURE: 127 MMHG | HEART RATE: 73 BPM | HEIGHT: 68 IN | TEMPERATURE: 97.9 F | BODY MASS INDEX: 20.11 KG/M2 | RESPIRATION RATE: 18 BRPM | DIASTOLIC BLOOD PRESSURE: 66 MMHG | WEIGHT: 132.7 LBS | OXYGEN SATURATION: 100 %

## 2022-01-01 VITALS
DIASTOLIC BLOOD PRESSURE: 80 MMHG | HEART RATE: 61 BPM | RESPIRATION RATE: 19 BRPM | TEMPERATURE: 98 F | SYSTOLIC BLOOD PRESSURE: 140 MMHG | OXYGEN SATURATION: 99 %

## 2022-01-01 VITALS
HEART RATE: 78 BPM | SYSTOLIC BLOOD PRESSURE: 136 MMHG | OXYGEN SATURATION: 98 % | DIASTOLIC BLOOD PRESSURE: 71 MMHG | TEMPERATURE: 97.7 F

## 2022-01-01 VITALS
DIASTOLIC BLOOD PRESSURE: 76 MMHG | TEMPERATURE: 97 F | SYSTOLIC BLOOD PRESSURE: 120 MMHG | OXYGEN SATURATION: 98 % | HEART RATE: 77 BPM | RESPIRATION RATE: 17 BRPM

## 2022-01-01 VITALS
HEART RATE: 74 BPM | DIASTOLIC BLOOD PRESSURE: 79 MMHG | OXYGEN SATURATION: 99 % | SYSTOLIC BLOOD PRESSURE: 132 MMHG | TEMPERATURE: 98.3 F

## 2022-01-01 VITALS — DIASTOLIC BLOOD PRESSURE: 78 MMHG | SYSTOLIC BLOOD PRESSURE: 132 MMHG | TEMPERATURE: 97.6 F | HEART RATE: 62 BPM

## 2022-01-01 VITALS
OXYGEN SATURATION: 98 % | SYSTOLIC BLOOD PRESSURE: 125 MMHG | DIASTOLIC BLOOD PRESSURE: 70 MMHG | HEART RATE: 89 BPM | TEMPERATURE: 97 F | RESPIRATION RATE: 17 BRPM

## 2022-01-01 VITALS
SYSTOLIC BLOOD PRESSURE: 123 MMHG | OXYGEN SATURATION: 100 % | TEMPERATURE: 97.6 F | DIASTOLIC BLOOD PRESSURE: 72 MMHG | HEART RATE: 65 BPM

## 2022-01-01 VITALS
OXYGEN SATURATION: 98 % | RESPIRATION RATE: 17 BRPM | SYSTOLIC BLOOD PRESSURE: 120 MMHG | TEMPERATURE: 98.1 F | HEART RATE: 77 BPM | DIASTOLIC BLOOD PRESSURE: 80 MMHG

## 2022-01-01 VITALS
RESPIRATION RATE: 17 BRPM | DIASTOLIC BLOOD PRESSURE: 80 MMHG | SYSTOLIC BLOOD PRESSURE: 140 MMHG | TEMPERATURE: 98 F | HEART RATE: 86 BPM | OXYGEN SATURATION: 98 %

## 2022-01-01 VITALS
DIASTOLIC BLOOD PRESSURE: 70 MMHG | RESPIRATION RATE: 17 BRPM | OXYGEN SATURATION: 98 % | TEMPERATURE: 97.1 F | HEART RATE: 71 BPM | SYSTOLIC BLOOD PRESSURE: 130 MMHG

## 2022-01-01 VITALS — OXYGEN SATURATION: 86 % | RESPIRATION RATE: 24 BRPM | HEART RATE: 70 BPM

## 2022-01-01 VITALS
RESPIRATION RATE: 18 BRPM | HEART RATE: 70 BPM | DIASTOLIC BLOOD PRESSURE: 80 MMHG | TEMPERATURE: 97.7 F | SYSTOLIC BLOOD PRESSURE: 142 MMHG

## 2022-01-01 VITALS
RESPIRATION RATE: 17 BRPM | DIASTOLIC BLOOD PRESSURE: 70 MMHG | TEMPERATURE: 97 F | HEART RATE: 76 BPM | SYSTOLIC BLOOD PRESSURE: 120 MMHG | OXYGEN SATURATION: 98 %

## 2022-01-01 VITALS
OXYGEN SATURATION: 100 % | DIASTOLIC BLOOD PRESSURE: 60 MMHG | HEART RATE: 67 BPM | SYSTOLIC BLOOD PRESSURE: 110 MMHG | TEMPERATURE: 98.7 F | RESPIRATION RATE: 16 BRPM

## 2022-01-01 VITALS
RESPIRATION RATE: 17 BRPM | OXYGEN SATURATION: 98 % | SYSTOLIC BLOOD PRESSURE: 135 MMHG | DIASTOLIC BLOOD PRESSURE: 80 MMHG | HEART RATE: 77 BPM | TEMPERATURE: 98 F

## 2022-01-01 VITALS — DIASTOLIC BLOOD PRESSURE: 62 MMHG | SYSTOLIC BLOOD PRESSURE: 121 MMHG | HEART RATE: 64 BPM | TEMPERATURE: 97.8 F

## 2022-01-01 VITALS — RESPIRATION RATE: 16 BRPM | DIASTOLIC BLOOD PRESSURE: 58 MMHG | TEMPERATURE: 98.4 F | SYSTOLIC BLOOD PRESSURE: 112 MMHG

## 2022-01-01 VITALS
RESPIRATION RATE: 17 BRPM | TEMPERATURE: 98 F | OXYGEN SATURATION: 98 % | HEART RATE: 97 BPM | HEART RATE: 72 BPM | DIASTOLIC BLOOD PRESSURE: 75 MMHG | OXYGEN SATURATION: 95 % | TEMPERATURE: 98.1 F | SYSTOLIC BLOOD PRESSURE: 115 MMHG

## 2022-01-01 VITALS — DIASTOLIC BLOOD PRESSURE: 71 MMHG | SYSTOLIC BLOOD PRESSURE: 119 MMHG | HEART RATE: 62 BPM | TEMPERATURE: 97.8 F

## 2022-01-01 VITALS
DIASTOLIC BLOOD PRESSURE: 74 MMHG | HEART RATE: 71 BPM | TEMPERATURE: 97.9 F | SYSTOLIC BLOOD PRESSURE: 131 MMHG | OXYGEN SATURATION: 99 %

## 2022-01-01 VITALS
DIASTOLIC BLOOD PRESSURE: 55 MMHG | DIASTOLIC BLOOD PRESSURE: 80 MMHG | HEART RATE: 76 BPM | HEART RATE: 78 BPM | OXYGEN SATURATION: 100 % | SYSTOLIC BLOOD PRESSURE: 140 MMHG | SYSTOLIC BLOOD PRESSURE: 100 MMHG | RESPIRATION RATE: 16 BRPM | RESPIRATION RATE: 16 BRPM | OXYGEN SATURATION: 97 % | TEMPERATURE: 97.8 F | TEMPERATURE: 98.3 F

## 2022-01-01 VITALS
DIASTOLIC BLOOD PRESSURE: 58 MMHG | HEART RATE: 72 BPM | SYSTOLIC BLOOD PRESSURE: 105 MMHG | TEMPERATURE: 98.5 F | OXYGEN SATURATION: 97 % | RESPIRATION RATE: 17 BRPM

## 2022-01-01 VITALS — RESPIRATION RATE: 17 BRPM | HEART RATE: 77 BPM | TEMPERATURE: 98 F | OXYGEN SATURATION: 98 %

## 2022-01-01 VITALS — RESPIRATION RATE: 17 BRPM | TEMPERATURE: 96.8 F | SYSTOLIC BLOOD PRESSURE: 110 MMHG | DIASTOLIC BLOOD PRESSURE: 60 MMHG

## 2022-01-01 DIAGNOSIS — G30.8 OTHER ALZHEIMER'S DISEASE (CODE) (HCC): ICD-10-CM

## 2022-01-01 DIAGNOSIS — G30.9 ALZHEIMER DISEASE (HCC): ICD-10-CM

## 2022-01-01 DIAGNOSIS — E78.2 MIXED HYPERLIPIDEMIA: ICD-10-CM

## 2022-01-01 DIAGNOSIS — E43 SEVERE PROTEIN-CALORIE MALNUTRITION (HCC): Chronic | ICD-10-CM

## 2022-01-01 DIAGNOSIS — E55.9 VITAMIN D DEFICIENCY: ICD-10-CM

## 2022-01-01 DIAGNOSIS — F02.80 ALZHEIMER DISEASE (HCC): ICD-10-CM

## 2022-01-01 DIAGNOSIS — Z00.00 MEDICARE ANNUAL WELLNESS VISIT, SUBSEQUENT: ICD-10-CM

## 2022-01-01 DIAGNOSIS — F10.931 ALCOHOL WITHDRAWAL SYNDROME, WITH DELIRIUM (HCC): ICD-10-CM

## 2022-01-01 DIAGNOSIS — K21.9 GASTROESOPHAGEAL REFLUX DISEASE WITHOUT ESOPHAGITIS: ICD-10-CM

## 2022-01-01 DIAGNOSIS — F32.A MILD DEPRESSION: ICD-10-CM

## 2022-01-01 DIAGNOSIS — A41.9 SEVERE SEPSIS (HCC): Primary | ICD-10-CM

## 2022-01-01 DIAGNOSIS — I49.5 SSS (SICK SINUS SYNDROME) (HCC): ICD-10-CM

## 2022-01-01 DIAGNOSIS — M15.9 PRIMARY OSTEOARTHRITIS INVOLVING MULTIPLE JOINTS: ICD-10-CM

## 2022-01-01 DIAGNOSIS — I50.22 SYSTOLIC CHF, CHRONIC (HCC): ICD-10-CM

## 2022-01-01 DIAGNOSIS — J96.01 ACUTE RESPIRATORY FAILURE WITH HYPOXIA (HCC): ICD-10-CM

## 2022-01-01 DIAGNOSIS — I42.9 CARDIOMYOPATHY, UNSPECIFIED TYPE (HCC): ICD-10-CM

## 2022-01-01 DIAGNOSIS — K57.90 DIVERTICULOSIS: ICD-10-CM

## 2022-01-01 DIAGNOSIS — N18.2 CONTROLLED TYPE 2 DIABETES MELLITUS WITH STAGE 2 CHRONIC KIDNEY DISEASE, WITHOUT LONG-TERM CURRENT USE OF INSULIN (HCC): ICD-10-CM

## 2022-01-01 DIAGNOSIS — Z95.0 S/P CARDIAC PACEMAKER PROCEDURE: ICD-10-CM

## 2022-01-01 DIAGNOSIS — R65.20 SEVERE SEPSIS (HCC): Primary | ICD-10-CM

## 2022-01-01 DIAGNOSIS — I63.9 CEREBROVASCULAR ACCIDENT (CVA), UNSPECIFIED MECHANISM (HCC): ICD-10-CM

## 2022-01-01 DIAGNOSIS — E11.22 CONTROLLED TYPE 2 DIABETES MELLITUS WITH STAGE 2 CHRONIC KIDNEY DISEASE, WITHOUT LONG-TERM CURRENT USE OF INSULIN (HCC): ICD-10-CM

## 2022-01-01 DIAGNOSIS — I63.9 ACUTE CVA (CEREBROVASCULAR ACCIDENT) (HCC): Primary | ICD-10-CM

## 2022-01-01 DIAGNOSIS — J69.0 ASPIRATION PNEUMONIA, UNSPECIFIED ASPIRATION PNEUMONIA TYPE, UNSPECIFIED LATERALITY, UNSPECIFIED PART OF LUNG (HCC): ICD-10-CM

## 2022-01-01 DIAGNOSIS — I12.9 HYPERTENSION WITH RENAL DISEASE: ICD-10-CM

## 2022-01-01 DIAGNOSIS — I25.10 ASCVD (ARTERIOSCLEROTIC CARDIOVASCULAR DISEASE): ICD-10-CM

## 2022-01-01 DIAGNOSIS — R47.1 DYSARTHRIA: Primary | ICD-10-CM

## 2022-01-01 DIAGNOSIS — I47.1 SVT (SUPRAVENTRICULAR TACHYCARDIA) (HCC): ICD-10-CM

## 2022-01-01 DIAGNOSIS — F10.20 ALCOHOLISM (HCC): ICD-10-CM

## 2022-01-01 DIAGNOSIS — E87.0 HYPERNATREMIA: ICD-10-CM

## 2022-01-01 DIAGNOSIS — I48.0 PAROXYSMAL ATRIAL FIBRILLATION (HCC): ICD-10-CM

## 2022-01-01 DIAGNOSIS — N17.9 ACUTE KIDNEY INJURY (HCC): ICD-10-CM

## 2022-01-01 LAB
25(OH)D3 SERPL-MCNC: 31.9 NG/ML (ref 30–100)
ALBUMIN SERPL-MCNC: 2.1 G/DL (ref 3.5–5)
ALBUMIN SERPL-MCNC: 2.2 G/DL (ref 3.5–5)
ALBUMIN SERPL-MCNC: 2.3 G/DL (ref 3.5–5)
ALBUMIN SERPL-MCNC: 2.8 G/DL (ref 3.5–5)
ALBUMIN SERPL-MCNC: 2.9 G/DL (ref 3.5–5)
ALBUMIN SERPL-MCNC: 2.9 G/DL (ref 3.5–5)
ALBUMIN/GLOB SERPL: 0.6 {RATIO} (ref 1.1–2.2)
ALBUMIN/GLOB SERPL: 0.7 {RATIO} (ref 1.1–2.2)
ALBUMIN/GLOB SERPL: 0.7 {RATIO} (ref 1.1–2.2)
ALBUMIN/GLOB SERPL: 0.8 {RATIO} (ref 1.1–2.2)
ALBUMIN/GLOB SERPL: 0.8 {RATIO} (ref 1.1–2.2)
ALP SERPL-CCNC: 111 U/L (ref 45–117)
ALP SERPL-CCNC: 113 U/L (ref 45–117)
ALP SERPL-CCNC: 129 U/L (ref 45–117)
ALP SERPL-CCNC: 129 U/L (ref 45–117)
ALP SERPL-CCNC: 130 U/L (ref 45–117)
ALP SERPL-CCNC: 144 U/L (ref 45–117)
ALP SERPL-CCNC: 150 U/L (ref 45–117)
ALP SERPL-CCNC: 161 U/L (ref 45–117)
ALP SERPL-CCNC: 163 U/L (ref 45–117)
ALP SERPL-CCNC: 186 U/L (ref 45–117)
ALT SERPL-CCNC: 27 U/L (ref 12–78)
ALT SERPL-CCNC: 29 U/L (ref 12–78)
ALT SERPL-CCNC: 32 U/L (ref 12–78)
ALT SERPL-CCNC: 36 U/L (ref 12–78)
ALT SERPL-CCNC: 42 U/L (ref 12–78)
ALT SERPL-CCNC: 42 U/L (ref 12–78)
ALT SERPL-CCNC: 45 U/L (ref 12–78)
ALT SERPL-CCNC: 48 U/L (ref 12–78)
ALT SERPL-CCNC: 53 U/L (ref 12–78)
ALT SERPL-CCNC: 60 U/L (ref 12–78)
ANION GAP BLD CALC-SCNC: 13 MMOL/L (ref 10–20)
ANION GAP SERPL CALC-SCNC: 10 MMOL/L (ref 5–15)
ANION GAP SERPL CALC-SCNC: 10 MMOL/L (ref 5–15)
ANION GAP SERPL CALC-SCNC: 11 MMOL/L (ref 5–15)
ANION GAP SERPL CALC-SCNC: 15 MMOL/L (ref 5–15)
ANION GAP SERPL CALC-SCNC: 4 MMOL/L (ref 5–15)
ANION GAP SERPL CALC-SCNC: 4 MMOL/L (ref 5–15)
ANION GAP SERPL CALC-SCNC: 6 MMOL/L (ref 5–15)
ANION GAP SERPL CALC-SCNC: 6 MMOL/L (ref 5–15)
ANION GAP SERPL CALC-SCNC: 7 MMOL/L (ref 5–15)
ANION GAP SERPL CALC-SCNC: 8 MMOL/L (ref 5–15)
ANION GAP SERPL CALC-SCNC: 9 MMOL/L (ref 5–15)
APPEARANCE UR: CLEAR
ARTERIAL PATENCY WRIST A: YES
AST SERPL-CCNC: 111 U/L (ref 15–37)
AST SERPL-CCNC: 114 U/L (ref 15–37)
AST SERPL-CCNC: 31 U/L (ref 15–37)
AST SERPL-CCNC: 37 U/L (ref 15–37)
AST SERPL-CCNC: 41 U/L (ref 15–37)
AST SERPL-CCNC: 43 U/L (ref 15–37)
AST SERPL-CCNC: 44 U/L (ref 15–37)
AST SERPL-CCNC: 45 U/L (ref 15–37)
AST SERPL-CCNC: 46 U/L (ref 15–37)
AST SERPL-CCNC: 50 U/L (ref 15–37)
ATRIAL RATE: 68 BPM
ATRIAL RATE: 99 BPM
B PERT DNA SPEC QL NAA+PROBE: NOT DETECTED
BACTERIA SPEC CULT: ABNORMAL
BACTERIA SPEC CULT: NORMAL
BACTERIA URNS QL MICRO: NEGATIVE /HPF
BASE DEFICIT BLDA-SCNC: 0.9 MMOL/L
BASOPHILS # BLD: 0 K/UL (ref 0–0.1)
BASOPHILS # BLD: 0.1 K/UL (ref 0–0.1)
BASOPHILS # BLD: 0.1 K/UL (ref 0–0.1)
BASOPHILS NFR BLD: 0 % (ref 0–1)
BASOPHILS NFR BLD: 1 % (ref 0–1)
BASOPHILS NFR BLD: 1 % (ref 0–1)
BASOPHILS NFR BLD: 2 % (ref 0–1)
BDY SITE: NORMAL
BILIRUB DIRECT SERPL-MCNC: 0.3 MG/DL (ref 0–0.2)
BILIRUB SERPL-MCNC: 0.4 MG/DL (ref 0.2–1)
BILIRUB SERPL-MCNC: 0.6 MG/DL (ref 0.2–1)
BILIRUB SERPL-MCNC: 0.6 MG/DL (ref 0.2–1)
BILIRUB SERPL-MCNC: 0.7 MG/DL (ref 0.2–1)
BILIRUB SERPL-MCNC: 0.7 MG/DL (ref 0.2–1)
BILIRUB SERPL-MCNC: 0.8 MG/DL (ref 0.2–1)
BILIRUB SERPL-MCNC: 0.8 MG/DL (ref 0.2–1)
BILIRUB SERPL-MCNC: 1.2 MG/DL (ref 0.2–1)
BILIRUB SERPL-MCNC: 1.3 MG/DL (ref 0.2–1)
BILIRUB SERPL-MCNC: 1.4 MG/DL (ref 0.2–1)
BILIRUB UR QL: NEGATIVE
BNP SERPL-MCNC: ABNORMAL PG/ML (ref 0–450)
BORDETELLA PARAPERTUSSIS PCR, BORPAR: NOT DETECTED
BUN SERPL-MCNC: 22 MG/DL (ref 6–20)
BUN SERPL-MCNC: 24 MG/DL (ref 6–20)
BUN SERPL-MCNC: 28 MG/DL (ref 6–20)
BUN SERPL-MCNC: 34 MG/DL (ref 6–20)
BUN SERPL-MCNC: 38 MG/DL (ref 6–20)
BUN SERPL-MCNC: 39 MG/DL (ref 6–20)
BUN SERPL-MCNC: 40 MG/DL (ref 6–20)
BUN SERPL-MCNC: 42 MG/DL (ref 6–20)
BUN SERPL-MCNC: 45 MG/DL (ref 6–20)
BUN SERPL-MCNC: 51 MG/DL (ref 6–20)
BUN SERPL-MCNC: 53 MG/DL (ref 6–20)
BUN SERPL-MCNC: 56 MG/DL (ref 6–20)
BUN SERPL-MCNC: 65 MG/DL (ref 6–20)
BUN/CREAT SERPL: 16 (ref 12–20)
BUN/CREAT SERPL: 20 (ref 12–20)
BUN/CREAT SERPL: 24 (ref 12–20)
BUN/CREAT SERPL: 25 (ref 12–20)
BUN/CREAT SERPL: 26 (ref 12–20)
BUN/CREAT SERPL: 27 (ref 12–20)
BUN/CREAT SERPL: 27 (ref 12–20)
BUN/CREAT SERPL: 28 (ref 12–20)
BUN/CREAT SERPL: 30 (ref 12–20)
BUN/CREAT SERPL: 30 (ref 12–20)
BUN/CREAT SERPL: 31 (ref 12–20)
C PNEUM DNA SPEC QL NAA+PROBE: NOT DETECTED
CA-I BLD-MCNC: 1.15 MMOL/L (ref 1.12–1.32)
CALCIUM SERPL-MCNC: 8.4 MG/DL (ref 8.5–10.1)
CALCIUM SERPL-MCNC: 8.4 MG/DL (ref 8.5–10.1)
CALCIUM SERPL-MCNC: 8.5 MG/DL (ref 8.5–10.1)
CALCIUM SERPL-MCNC: 8.6 MG/DL (ref 8.5–10.1)
CALCIUM SERPL-MCNC: 8.6 MG/DL (ref 8.5–10.1)
CALCIUM SERPL-MCNC: 8.9 MG/DL (ref 8.5–10.1)
CALCIUM SERPL-MCNC: 9 MG/DL (ref 8.5–10.1)
CALCIUM SERPL-MCNC: 9.2 MG/DL (ref 8.5–10.1)
CALCIUM SERPL-MCNC: 9.2 MG/DL (ref 8.5–10.1)
CALCIUM SERPL-MCNC: 9.5 MG/DL (ref 8.5–10.1)
CALCULATED P AXIS, ECG09: 72 DEGREES
CALCULATED R AXIS, ECG10: -140 DEGREES
CALCULATED R AXIS, ECG10: -146 DEGREES
CALCULATED T AXIS, ECG11: 83 DEGREES
CALCULATED T AXIS, ECG11: 90 DEGREES
CHLORIDE BLD-SCNC: 114 MMOL/L (ref 100–108)
CHLORIDE SERPL-SCNC: 102 MMOL/L (ref 97–108)
CHLORIDE SERPL-SCNC: 104 MMOL/L (ref 97–108)
CHLORIDE SERPL-SCNC: 105 MMOL/L (ref 97–108)
CHLORIDE SERPL-SCNC: 105 MMOL/L (ref 97–108)
CHLORIDE SERPL-SCNC: 106 MMOL/L (ref 97–108)
CHLORIDE SERPL-SCNC: 112 MMOL/L (ref 97–108)
CHLORIDE SERPL-SCNC: 114 MMOL/L (ref 97–108)
CHOLEST SERPL-MCNC: 136 MG/DL
CHOLEST SERPL-MCNC: 156 MG/DL
CHOLEST SERPL-MCNC: 84 MG/DL
CK SERPL-CCNC: 41 U/L (ref 39–308)
CO2 BLD-SCNC: 34 MMOL/L (ref 19–24)
CO2 SERPL-SCNC: 20 MMOL/L (ref 21–32)
CO2 SERPL-SCNC: 25 MMOL/L (ref 21–32)
CO2 SERPL-SCNC: 26 MMOL/L (ref 21–32)
CO2 SERPL-SCNC: 27 MMOL/L (ref 21–32)
CO2 SERPL-SCNC: 29 MMOL/L (ref 21–32)
CO2 SERPL-SCNC: 29 MMOL/L (ref 21–32)
CO2 SERPL-SCNC: 30 MMOL/L (ref 21–32)
CO2 SERPL-SCNC: 31 MMOL/L (ref 21–32)
CO2 SERPL-SCNC: 32 MMOL/L (ref 21–32)
CO2 SERPL-SCNC: 34 MMOL/L (ref 21–32)
COLOR UR: ABNORMAL
COMMENT, HOLDF: NORMAL
CREAT SERPL-MCNC: 1.18 MG/DL (ref 0.7–1.3)
CREAT SERPL-MCNC: 1.22 MG/DL (ref 0.7–1.3)
CREAT SERPL-MCNC: 1.36 MG/DL (ref 0.7–1.3)
CREAT SERPL-MCNC: 1.43 MG/DL (ref 0.7–1.3)
CREAT SERPL-MCNC: 1.49 MG/DL (ref 0.7–1.3)
CREAT SERPL-MCNC: 1.51 MG/DL (ref 0.7–1.3)
CREAT SERPL-MCNC: 1.57 MG/DL (ref 0.7–1.3)
CREAT SERPL-MCNC: 1.6 MG/DL (ref 0.7–1.3)
CREAT SERPL-MCNC: 1.66 MG/DL (ref 0.7–1.3)
CREAT SERPL-MCNC: 1.71 MG/DL (ref 0.7–1.3)
CREAT SERPL-MCNC: 1.72 MG/DL (ref 0.7–1.3)
CREAT SERPL-MCNC: 1.83 MG/DL (ref 0.7–1.3)
CREAT SERPL-MCNC: 1.89 MG/DL (ref 0.7–1.3)
CREAT SERPL-MCNC: 1.94 MG/DL (ref 0.7–1.3)
CREAT SERPL-MCNC: 2.04 MG/DL (ref 0.7–1.3)
CREAT SERPL-MCNC: 2.12 MG/DL (ref 0.7–1.3)
CREAT SERPL-MCNC: 2.14 MG/DL (ref 0.7–1.3)
CREAT SERPL-MCNC: 2.35 MG/DL (ref 0.7–1.3)
CREAT SERPL-MCNC: 3.01 MG/DL (ref 0.7–1.3)
CREAT UR-MCNC: 1.8 MG/DL (ref 0.6–1.3)
CREAT UR-MCNC: 66 MG/DL
DATE LAST DOSE: ABNORMAL
DIAGNOSIS, 93000: NORMAL
DIAGNOSIS, 93000: NORMAL
DIFFERENTIAL METHOD BLD: ABNORMAL
ECHO AR MAX VEL PISA: 3.6 M/S
ECHO AV MEAN GRADIENT: 4 MMHG
ECHO AV MEAN VELOCITY: 0.9 M/S
ECHO AV PEAK GRADIENT: 7 MMHG
ECHO AV PEAK VELOCITY: 1.4 M/S
ECHO AV REGURGITANT PHT: 408.1 MILLISECOND
ECHO AV VELOCITY RATIO: 0.43
ECHO AV VTI: 33.7 CM
ECHO LV E' LATERAL VELOCITY: 5 CM/S
ECHO LV E' SEPTAL VELOCITY: 3 CM/S
ECHO LVOT AV VTI INDEX: 0.28
ECHO LVOT MEAN GRADIENT: 1 MMHG
ECHO LVOT PEAK GRADIENT: 1 MMHG
ECHO LVOT PEAK VELOCITY: 0.6 M/S
ECHO LVOT VTI: 9.3 CM
ECHO MV A VELOCITY: 1.07 M/S
ECHO MV E DECELERATION TIME (DT): 179.8 MS
ECHO MV E VELOCITY: 0.7 M/S
ECHO MV E/A RATIO: 0.65
ECHO MV E/E' LATERAL: 14
ECHO MV E/E' RATIO (AVERAGED): 18.67
ECHO MV E/E' SEPTAL: 23.33
ECHO MV LVOT VTI INDEX: 3.19
ECHO MV MAX VELOCITY: 1 M/S
ECHO MV MEAN GRADIENT: 1 MMHG
ECHO MV MEAN VELOCITY: 0.6 M/S
ECHO MV PEAK GRADIENT: 4 MMHG
ECHO MV REGURGITANT PEAK GRADIENT: 41 MMHG
ECHO MV REGURGITANT PEAK VELOCITY: 3.2 M/S
ECHO MV VTI: 29.7 CM
ECHO PV MAX VELOCITY: 0.9 M/S
ECHO PV MEAN GRADIENT: 2 MMHG
ECHO PV MEAN VELOCITY: 0.6 M/S
ECHO PV PEAK GRADIENT: 3 MMHG
ECHO TV REGURGITANT MAX VELOCITY: 2.56 M/S
ECHO TV REGURGITANT PEAK GRADIENT: 26 MMHG
EOSINOPHIL # BLD: 0 K/UL (ref 0–0.4)
EOSINOPHIL # BLD: 0.1 K/UL (ref 0–0.4)
EOSINOPHIL # BLD: 0.2 K/UL (ref 0–0.4)
EOSINOPHIL # BLD: 0.3 K/UL (ref 0–0.4)
EOSINOPHIL NFR BLD: 0 % (ref 0–7)
EOSINOPHIL NFR BLD: 1 % (ref 0–7)
EOSINOPHIL NFR BLD: 2 % (ref 0–7)
EOSINOPHIL NFR BLD: 2 % (ref 0–7)
EOSINOPHIL NFR BLD: 5 % (ref 0–7)
EPITH CASTS URNS QL MICRO: ABNORMAL /LPF
ERYTHROCYTE [DISTWIDTH] IN BLOOD BY AUTOMATED COUNT: 14.5 % (ref 11.5–14.5)
ERYTHROCYTE [DISTWIDTH] IN BLOOD BY AUTOMATED COUNT: 15.7 % (ref 11.5–14.5)
ERYTHROCYTE [DISTWIDTH] IN BLOOD BY AUTOMATED COUNT: 15.9 % (ref 11.5–14.5)
ERYTHROCYTE [DISTWIDTH] IN BLOOD BY AUTOMATED COUNT: 16 % (ref 11.5–14.5)
ERYTHROCYTE [DISTWIDTH] IN BLOOD BY AUTOMATED COUNT: 16 % (ref 11.5–14.5)
ERYTHROCYTE [DISTWIDTH] IN BLOOD BY AUTOMATED COUNT: 17.9 % (ref 11.5–14.5)
ERYTHROCYTE [DISTWIDTH] IN BLOOD BY AUTOMATED COUNT: 17.9 % (ref 11.5–14.5)
ERYTHROCYTE [DISTWIDTH] IN BLOOD BY AUTOMATED COUNT: 18 % (ref 11.5–14.5)
ERYTHROCYTE [DISTWIDTH] IN BLOOD BY AUTOMATED COUNT: 18.2 % (ref 11.5–14.5)
ERYTHROCYTE [DISTWIDTH] IN BLOOD BY AUTOMATED COUNT: 18.5 % (ref 11.5–14.5)
ERYTHROCYTE [DISTWIDTH] IN BLOOD BY AUTOMATED COUNT: 18.6 % (ref 11.5–14.5)
ERYTHROCYTE [DISTWIDTH] IN BLOOD BY AUTOMATED COUNT: 19.4 % (ref 11.5–14.5)
EST. AVERAGE GLUCOSE BLD GHB EST-MCNC: 111 MG/DL
FLUAV H1 2009 PAND RNA SPEC QL NAA+PROBE: NOT DETECTED
FLUAV H1 RNA SPEC QL NAA+PROBE: NOT DETECTED
FLUAV H3 RNA SPEC QL NAA+PROBE: NOT DETECTED
FLUAV RNA SPEC QL NAA+PROBE: NOT DETECTED
FLUAV SUBTYP SPEC NAA+PROBE: NOT DETECTED
FLUBV RNA SPEC QL NAA+PROBE: NOT DETECTED
FLUBV RNA SPEC QL NAA+PROBE: NOT DETECTED
FLUID CULTURE, SPNG2: NORMAL
GAS FLOW.O2 O2 DELIVERY SYS: 3 L/MIN
GLOBULIN SER CALC-MCNC: 3.2 G/DL (ref 2–4)
GLOBULIN SER CALC-MCNC: 3.4 G/DL (ref 2–4)
GLOBULIN SER CALC-MCNC: 3.5 G/DL (ref 2–4)
GLOBULIN SER CALC-MCNC: 3.6 G/DL (ref 2–4)
GLOBULIN SER CALC-MCNC: 3.7 G/DL (ref 2–4)
GLOBULIN SER CALC-MCNC: 3.7 G/DL (ref 2–4)
GLOBULIN SER CALC-MCNC: 4.2 G/DL (ref 2–4)
GLUCOSE BLD STRIP.AUTO-MCNC: 104 MG/DL (ref 65–117)
GLUCOSE BLD STRIP.AUTO-MCNC: 134 MG/DL (ref 65–117)
GLUCOSE BLD STRIP.AUTO-MCNC: 158 MG/DL (ref 74–106)
GLUCOSE BLD STRIP.AUTO-MCNC: 78 MG/DL (ref 65–117)
GLUCOSE BLD STRIP.AUTO-MCNC: 81 MG/DL (ref 65–117)
GLUCOSE SERPL-MCNC: 101 MG/DL (ref 65–100)
GLUCOSE SERPL-MCNC: 109 MG/DL (ref 65–100)
GLUCOSE SERPL-MCNC: 111 MG/DL (ref 65–100)
GLUCOSE SERPL-MCNC: 128 MG/DL (ref 65–100)
GLUCOSE SERPL-MCNC: 155 MG/DL (ref 65–100)
GLUCOSE SERPL-MCNC: 162 MG/DL (ref 65–100)
GLUCOSE SERPL-MCNC: 81 MG/DL (ref 65–100)
GLUCOSE SERPL-MCNC: 82 MG/DL (ref 65–100)
GLUCOSE SERPL-MCNC: 84 MG/DL (ref 65–100)
GLUCOSE SERPL-MCNC: 85 MG/DL (ref 65–100)
GLUCOSE SERPL-MCNC: 94 MG/DL (ref 65–100)
GLUCOSE SERPL-MCNC: 96 MG/DL (ref 65–100)
GLUCOSE SERPL-MCNC: 99 MG/DL (ref 65–100)
GLUCOSE UR STRIP.AUTO-MCNC: NEGATIVE MG/DL
HADV DNA SPEC QL NAA+PROBE: NOT DETECTED
HBA1C MFR BLD: 5.5 % (ref 4–5.6)
HCO3 BLDA-SCNC: 23 MMOL/L (ref 22–26)
HCOV 229E RNA SPEC QL NAA+PROBE: NOT DETECTED
HCOV HKU1 RNA SPEC QL NAA+PROBE: NOT DETECTED
HCOV NL63 RNA SPEC QL NAA+PROBE: NOT DETECTED
HCOV OC43 RNA SPEC QL NAA+PROBE: NOT DETECTED
HCT VFR BLD AUTO: 30.8 % (ref 36.6–50.3)
HCT VFR BLD AUTO: 31.5 % (ref 36.6–50.3)
HCT VFR BLD AUTO: 33.9 % (ref 36.6–50.3)
HCT VFR BLD AUTO: 33.9 % (ref 36.6–50.3)
HCT VFR BLD AUTO: 34.7 % (ref 36.6–50.3)
HCT VFR BLD AUTO: 35.9 % (ref 36.6–50.3)
HCT VFR BLD AUTO: 36.7 % (ref 36.6–50.3)
HCT VFR BLD AUTO: 36.9 % (ref 36.6–50.3)
HCT VFR BLD AUTO: 38.5 % (ref 36.6–50.3)
HCT VFR BLD AUTO: 40 % (ref 36.6–50.3)
HCT VFR BLD AUTO: 40.2 % (ref 36.6–50.3)
HCT VFR BLD AUTO: 40.9 % (ref 36.6–50.3)
HDLC SERPL-MCNC: 29 MG/DL
HDLC SERPL-MCNC: 39 MG/DL
HDLC SERPL-MCNC: 58 MG/DL
HDLC SERPL: 2.7 {RATIO} (ref 0–5)
HDLC SERPL: 2.9 {RATIO} (ref 0–5)
HDLC SERPL: 3.5 {RATIO} (ref 0–5)
HGB BLD-MCNC: 10 G/DL (ref 12.1–17)
HGB BLD-MCNC: 10.1 G/DL (ref 12.1–17)
HGB BLD-MCNC: 10.5 G/DL (ref 12.1–17)
HGB BLD-MCNC: 10.7 G/DL (ref 12.1–17)
HGB BLD-MCNC: 10.8 G/DL (ref 12.1–17)
HGB BLD-MCNC: 11.1 G/DL (ref 12.1–17)
HGB BLD-MCNC: 11.6 G/DL (ref 12.1–17)
HGB BLD-MCNC: 12.3 G/DL (ref 12.1–17)
HGB BLD-MCNC: 12.4 G/DL (ref 12.1–17)
HGB BLD-MCNC: 9.7 G/DL (ref 12.1–17)
HGB UR QL STRIP: NEGATIVE
HMPV RNA SPEC QL NAA+PROBE: NOT DETECTED
HPIV1 RNA SPEC QL NAA+PROBE: NOT DETECTED
HPIV2 RNA SPEC QL NAA+PROBE: NOT DETECTED
HPIV3 RNA SPEC QL NAA+PROBE: NOT DETECTED
HPIV4 RNA SPEC QL NAA+PROBE: NOT DETECTED
IMM GRANULOCYTES # BLD AUTO: 0 K/UL
IMM GRANULOCYTES # BLD AUTO: 0 K/UL (ref 0–0.04)
IMM GRANULOCYTES # BLD AUTO: 0 K/UL (ref 0–0.04)
IMM GRANULOCYTES # BLD AUTO: 0.1 K/UL (ref 0–0.04)
IMM GRANULOCYTES NFR BLD AUTO: 0 %
IMM GRANULOCYTES NFR BLD AUTO: 0 % (ref 0–0.5)
IMM GRANULOCYTES NFR BLD AUTO: 1 % (ref 0–0.5)
INR PPP: 1.1 (ref 0.9–1.1)
KETONES UR QL STRIP.AUTO: NEGATIVE MG/DL
L PNEUMO1 AG UR QL IA: NEGATIVE
LACTATE BLD-SCNC: 3.08 MMOL/L (ref 0.4–2)
LACTATE BLD-SCNC: 3.72 MMOL/L (ref 0.4–2)
LACTATE SERPL-SCNC: 1.6 MMOL/L (ref 0.4–2)
LACTATE SERPL-SCNC: 3.8 MMOL/L (ref 0.4–2)
LDLC SERPL CALC-MCNC: 44.8 MG/DL (ref 0–100)
LDLC SERPL CALC-MCNC: 79.8 MG/DL (ref 0–100)
LDLC SERPL CALC-MCNC: 82.4 MG/DL (ref 0–100)
LEUKOCYTE ESTERASE UR QL STRIP.AUTO: NEGATIVE
LYMPHOCYTES # BLD: 0.4 K/UL (ref 0.8–3.5)
LYMPHOCYTES # BLD: 0.5 K/UL (ref 0.8–3.5)
LYMPHOCYTES # BLD: 0.5 K/UL (ref 0.8–3.5)
LYMPHOCYTES # BLD: 0.6 K/UL (ref 0.8–3.5)
LYMPHOCYTES # BLD: 0.7 K/UL (ref 0.8–3.5)
LYMPHOCYTES # BLD: 0.7 K/UL (ref 0.8–3.5)
LYMPHOCYTES # BLD: 0.8 K/UL (ref 0.8–3.5)
LYMPHOCYTES # BLD: 0.8 K/UL (ref 0.8–3.5)
LYMPHOCYTES # BLD: 1 K/UL (ref 0.8–3.5)
LYMPHOCYTES NFR BLD: 11 % (ref 12–49)
LYMPHOCYTES NFR BLD: 13 % (ref 12–49)
LYMPHOCYTES NFR BLD: 27 % (ref 12–49)
LYMPHOCYTES NFR BLD: 4 % (ref 12–49)
LYMPHOCYTES NFR BLD: 5 % (ref 12–49)
LYMPHOCYTES NFR BLD: 6 % (ref 12–49)
LYMPHOCYTES NFR BLD: 6 % (ref 12–49)
LYMPHOCYTES NFR BLD: 7 % (ref 12–49)
LYMPHOCYTES NFR BLD: 8 % (ref 12–49)
M PNEUMO DNA SPEC QL NAA+PROBE: NOT DETECTED
MAGNESIUM SERPL-MCNC: 2 MG/DL (ref 1.6–2.4)
MAGNESIUM SERPL-MCNC: 2.1 MG/DL (ref 1.6–2.4)
MCH RBC QN AUTO: 26.2 PG (ref 26–34)
MCH RBC QN AUTO: 26.5 PG (ref 26–34)
MCH RBC QN AUTO: 26.6 PG (ref 26–34)
MCH RBC QN AUTO: 26.7 PG (ref 26–34)
MCH RBC QN AUTO: 26.9 PG (ref 26–34)
MCH RBC QN AUTO: 27.1 PG (ref 26–34)
MCH RBC QN AUTO: 27.2 PG (ref 26–34)
MCH RBC QN AUTO: 27.3 PG (ref 26–34)
MCH RBC QN AUTO: 27.4 PG (ref 26–34)
MCH RBC QN AUTO: 27.6 PG (ref 26–34)
MCHC RBC AUTO-ENTMCNC: 27.8 G/DL (ref 30–36.5)
MCHC RBC AUTO-ENTMCNC: 29.8 G/DL (ref 30–36.5)
MCHC RBC AUTO-ENTMCNC: 29.8 G/DL (ref 30–36.5)
MCHC RBC AUTO-ENTMCNC: 30.1 G/DL (ref 30–36.5)
MCHC RBC AUTO-ENTMCNC: 30.1 G/DL (ref 30–36.5)
MCHC RBC AUTO-ENTMCNC: 30.2 G/DL (ref 30–36.5)
MCHC RBC AUTO-ENTMCNC: 30.3 G/DL (ref 30–36.5)
MCHC RBC AUTO-ENTMCNC: 30.6 G/DL (ref 30–36.5)
MCHC RBC AUTO-ENTMCNC: 31 G/DL (ref 30–36.5)
MCHC RBC AUTO-ENTMCNC: 31.1 G/DL (ref 30–36.5)
MCHC RBC AUTO-ENTMCNC: 31.5 G/DL (ref 30–36.5)
MCHC RBC AUTO-ENTMCNC: 31.7 G/DL (ref 30–36.5)
MCV RBC AUTO: 85.6 FL (ref 80–99)
MCV RBC AUTO: 86.7 FL (ref 80–99)
MCV RBC AUTO: 87 FL (ref 80–99)
MCV RBC AUTO: 87.7 FL (ref 80–99)
MCV RBC AUTO: 87.8 FL (ref 80–99)
MCV RBC AUTO: 88.1 FL (ref 80–99)
MCV RBC AUTO: 88.1 FL (ref 80–99)
MCV RBC AUTO: 88.7 FL (ref 80–99)
MCV RBC AUTO: 88.9 FL (ref 80–99)
MCV RBC AUTO: 90.4 FL (ref 80–99)
MCV RBC AUTO: 91.1 FL (ref 80–99)
MCV RBC AUTO: 98.3 FL (ref 80–99)
MONOCYTES # BLD: 0.3 K/UL (ref 0–1)
MONOCYTES # BLD: 0.4 K/UL (ref 0–1)
MONOCYTES # BLD: 0.5 K/UL (ref 0–1)
MONOCYTES # BLD: 0.5 K/UL (ref 0–1)
MONOCYTES # BLD: 0.6 K/UL (ref 0–1)
MONOCYTES # BLD: 0.6 K/UL (ref 0–1)
MONOCYTES # BLD: 0.7 K/UL (ref 0–1)
MONOCYTES # BLD: 0.8 K/UL (ref 0–1)
MONOCYTES # BLD: 0.8 K/UL (ref 0–1)
MONOCYTES NFR BLD: 10 % (ref 5–13)
MONOCYTES NFR BLD: 3 % (ref 5–13)
MONOCYTES NFR BLD: 5 % (ref 5–13)
MONOCYTES NFR BLD: 6 % (ref 5–13)
MONOCYTES NFR BLD: 7 % (ref 5–13)
MONOCYTES NFR BLD: 7 % (ref 5–13)
MONOCYTES NFR BLD: 9 % (ref 5–13)
NEUTS BAND NFR BLD MANUAL: 9 % (ref 0–6)
NEUTS SEG # BLD: 11.3 K/UL (ref 1.8–8)
NEUTS SEG # BLD: 2.1 K/UL (ref 1.8–8)
NEUTS SEG # BLD: 4.6 K/UL (ref 1.8–8)
NEUTS SEG # BLD: 5.2 K/UL (ref 1.8–8)
NEUTS SEG # BLD: 7.1 K/UL (ref 1.8–8)
NEUTS SEG # BLD: 7.9 K/UL (ref 1.8–8)
NEUTS SEG # BLD: 7.9 K/UL (ref 1.8–8)
NEUTS SEG # BLD: 9.2 K/UL (ref 1.8–8)
NEUTS SEG # BLD: 9.3 K/UL (ref 1.8–8)
NEUTS SEG NFR BLD: 56 % (ref 32–75)
NEUTS SEG NFR BLD: 74 % (ref 32–75)
NEUTS SEG NFR BLD: 78 % (ref 32–75)
NEUTS SEG NFR BLD: 79 % (ref 32–75)
NEUTS SEG NFR BLD: 82 % (ref 32–75)
NEUTS SEG NFR BLD: 86 % (ref 32–75)
NEUTS SEG NFR BLD: 86 % (ref 32–75)
NEUTS SEG NFR BLD: 87 % (ref 32–75)
NEUTS SEG NFR BLD: 88 % (ref 32–75)
NITRITE UR QL STRIP.AUTO: NEGATIVE
NRBC # BLD: 0 K/UL (ref 0–0.01)
NRBC # BLD: 0.02 K/UL (ref 0–0.01)
NRBC # BLD: 0.04 K/UL (ref 0–0.01)
NRBC # BLD: 0.05 K/UL (ref 0–0.01)
NRBC # BLD: 0.14 K/UL (ref 0–0.01)
NRBC BLD-RTO: 0 PER 100 WBC
NRBC BLD-RTO: 0.3 PER 100 WBC
NRBC BLD-RTO: 0.3 PER 100 WBC
NRBC BLD-RTO: 0.4 PER 100 WBC
NRBC BLD-RTO: 0.4 PER 100 WBC
NRBC BLD-RTO: 0.6 PER 100 WBC
NRBC BLD-RTO: 1.6 PER 100 WBC
ORGANISM ID, SPNG3: NORMAL
P-R INTERVAL, ECG05: 124 MS
P-R INTERVAL, ECG05: 136 MS
PCO2 BLDA: 35 MMHG (ref 35–45)
PH BLDA: 7.43 [PH] (ref 7.35–7.45)
PH UR STRIP: 5 [PH] (ref 5–8)
PHOSPHATE SERPL-MCNC: 2.4 MG/DL (ref 2.6–4.7)
PLATELET # BLD AUTO: 140 K/UL (ref 150–400)
PLATELET # BLD AUTO: 141 K/UL (ref 150–400)
PLATELET # BLD AUTO: 156 K/UL (ref 150–400)
PLATELET # BLD AUTO: 157 K/UL (ref 150–400)
PLATELET # BLD AUTO: 158 K/UL (ref 150–400)
PLATELET # BLD AUTO: 219 K/UL (ref 150–400)
PLATELET # BLD AUTO: 254 K/UL (ref 150–400)
PLATELET # BLD AUTO: 263 K/UL (ref 150–400)
PLATELET # BLD AUTO: 265 K/UL (ref 150–400)
PLATELET # BLD AUTO: 284 K/UL (ref 150–400)
PLATELET # BLD AUTO: 294 K/UL (ref 150–400)
PLATELET # BLD AUTO: 304 K/UL (ref 150–400)
PLATELET COMMENTS,PCOM: ABNORMAL
PLATELET COMMENTS,PCOM: ABNORMAL
PLEASE NOTE, SPNG4: NORMAL
PMV BLD AUTO: 11.3 FL (ref 8.9–12.9)
PMV BLD AUTO: 11.6 FL (ref 8.9–12.9)
PMV BLD AUTO: 11.6 FL (ref 8.9–12.9)
PMV BLD AUTO: 11.8 FL (ref 8.9–12.9)
PMV BLD AUTO: 11.9 FL (ref 8.9–12.9)
PMV BLD AUTO: 11.9 FL (ref 8.9–12.9)
PMV BLD AUTO: 12.6 FL (ref 8.9–12.9)
PMV BLD AUTO: 12.9 FL (ref 8.9–12.9)
PMV BLD AUTO: 13.7 FL (ref 8.9–12.9)
PO2 BLDA: 92 MMHG (ref 80–100)
POTASSIUM BLD-SCNC: 3.5 MMOL/L (ref 3.5–5.5)
POTASSIUM SERPL-SCNC: 2.8 MMOL/L (ref 3.5–5.1)
POTASSIUM SERPL-SCNC: 3.3 MMOL/L (ref 3.5–5.1)
POTASSIUM SERPL-SCNC: 3.4 MMOL/L (ref 3.5–5.1)
POTASSIUM SERPL-SCNC: 3.6 MMOL/L (ref 3.5–5.1)
POTASSIUM SERPL-SCNC: 3.8 MMOL/L (ref 3.5–5.1)
POTASSIUM SERPL-SCNC: 3.9 MMOL/L (ref 3.5–5.1)
POTASSIUM SERPL-SCNC: 3.9 MMOL/L (ref 3.5–5.1)
POTASSIUM SERPL-SCNC: 4 MMOL/L (ref 3.5–5.1)
POTASSIUM SERPL-SCNC: 4.1 MMOL/L (ref 3.5–5.1)
POTASSIUM SERPL-SCNC: 4.3 MMOL/L (ref 3.5–5.1)
POTASSIUM SERPL-SCNC: 5.4 MMOL/L (ref 3.5–5.1)
PROCALCITONIN SERPL-MCNC: 0.57 NG/ML
PROT SERPL-MCNC: 5.5 G/DL (ref 6.4–8.2)
PROT SERPL-MCNC: 5.5 G/DL (ref 6.4–8.2)
PROT SERPL-MCNC: 5.7 G/DL (ref 6.4–8.2)
PROT SERPL-MCNC: 5.9 G/DL (ref 6.4–8.2)
PROT SERPL-MCNC: 6.5 G/DL (ref 6.4–8.2)
PROT SERPL-MCNC: 6.5 G/DL (ref 6.4–8.2)
PROT SERPL-MCNC: 7.1 G/DL (ref 6.4–8.2)
PROT UR STRIP-MCNC: 30 MG/DL
PROTHROMBIN TIME: 11.1 SEC (ref 9–11.1)
Q-T INTERVAL, ECG07: 452 MS
Q-T INTERVAL, ECG07: 504 MS
QRS DURATION, ECG06: 164 MS
QRS DURATION, ECG06: 168 MS
QTC CALCULATION (BEZET), ECG08: 535 MS
QTC CALCULATION (BEZET), ECG08: 580 MS
RBC # BLD AUTO: 3.51 M/UL (ref 4.1–5.7)
RBC # BLD AUTO: 3.68 M/UL (ref 4.1–5.7)
RBC # BLD AUTO: 3.85 M/UL (ref 4.1–5.7)
RBC # BLD AUTO: 3.91 M/UL (ref 4.1–5.7)
RBC # BLD AUTO: 3.99 M/UL (ref 4.1–5.7)
RBC # BLD AUTO: 4.04 M/UL (ref 4.1–5.7)
RBC # BLD AUTO: 4.05 M/UL (ref 4.1–5.7)
RBC # BLD AUTO: 4.07 M/UL (ref 4.1–5.7)
RBC # BLD AUTO: 4.18 M/UL (ref 4.1–5.7)
RBC # BLD AUTO: 4.26 M/UL (ref 4.1–5.7)
RBC # BLD AUTO: 4.53 M/UL (ref 4.1–5.7)
RBC # BLD AUTO: 4.64 M/UL (ref 4.1–5.7)
RBC #/AREA URNS HPF: ABNORMAL /HPF (ref 0–5)
RBC MORPH BLD: ABNORMAL
REPORTED DOSE,DOSE: ABNORMAL UNITS
REPORTED DOSE/TIME,TMG: ABNORMAL
RSV RNA SPEC QL NAA+PROBE: NOT DETECTED
RV+EV RNA SPEC QL NAA+PROBE: NOT DETECTED
S PNEUM AG SPEC QL LA: NEGATIVE
SAMPLES BEING HELD,HOLD: NORMAL
SAO2 % BLD: 97 % (ref 92–97)
SAO2% DEVICE SAO2% SENSOR NAME: NORMAL
SARS-COV-2 PCR, COVPCR: NOT DETECTED
SARS-COV-2, COV2: NOT DETECTED
SERVICE CMNT-IMP: ABNORMAL
SERVICE CMNT-IMP: NORMAL
SODIUM BLD-SCNC: 160 MMOL/L (ref 136–145)
SODIUM SERPL-SCNC: 137 MMOL/L (ref 136–145)
SODIUM SERPL-SCNC: 139 MMOL/L (ref 136–145)
SODIUM SERPL-SCNC: 140 MMOL/L (ref 136–145)
SODIUM SERPL-SCNC: 141 MMOL/L (ref 136–145)
SODIUM SERPL-SCNC: 141 MMOL/L (ref 136–145)
SODIUM SERPL-SCNC: 142 MMOL/L (ref 136–145)
SODIUM SERPL-SCNC: 143 MMOL/L (ref 136–145)
SODIUM SERPL-SCNC: 144 MMOL/L (ref 136–145)
SODIUM SERPL-SCNC: 148 MMOL/L (ref 136–145)
SODIUM SERPL-SCNC: 150 MMOL/L (ref 136–145)
SODIUM SERPL-SCNC: 154 MMOL/L (ref 136–145)
SODIUM SERPL-SCNC: 156 MMOL/L (ref 136–145)
SODIUM SERPL-SCNC: 156 MMOL/L (ref 136–145)
SODIUM UR-SCNC: 40 MMOL/L
SP GR UR REFRACTOMETRY: 1.01
SPECIMEN SITE: NORMAL
SPECIMEN SOURCE: NORMAL
SPECIMEN SOURCE: NORMAL
SPECIMEN, SPNG1: NORMAL
T4 FREE SERPL-MCNC: 1.2 NG/DL (ref 0.8–1.5)
T4 SERPL-MCNC: 8 UG/DL (ref 4.5–12.1)
TRIGL SERPL-MCNC: 51 MG/DL (ref ?–150)
TRIGL SERPL-MCNC: 78 MG/DL (ref ?–150)
TRIGL SERPL-MCNC: 86 MG/DL (ref ?–150)
TROPONIN-HIGH SENSITIVITY: 148 NG/L (ref 0–76)
TROPONIN-HIGH SENSITIVITY: 162 NG/L (ref 0–76)
TSH SERPL DL<=0.05 MIU/L-ACNC: 18.2 UIU/ML (ref 0.36–3.74)
TSH SERPL DL<=0.05 MIU/L-ACNC: 5.89 UIU/ML (ref 0.36–3.74)
UA: UC IF INDICATED,UAUC: ABNORMAL
UROBILINOGEN UR QL STRIP.AUTO: 0.2 EU/DL (ref 0.2–1)
VANCOMYCIN SERPL-MCNC: 14.6 UG/ML
VANCOMYCIN SERPL-MCNC: 14.7 UG/ML
VANCOMYCIN SERPL-MCNC: 22.9 UG/ML
VANCOMYCIN SERPL-MCNC: 3.8 UG/ML
VANCOMYCIN SERPL-MCNC: <0.8 UG/ML
VANCOMYCIN TROUGH SERPL-MCNC: 15.5 UG/ML (ref 5–10)
VENTRICULAR RATE, ECG03: 68 BPM
VENTRICULAR RATE, ECG03: 99 BPM
VLDLC SERPL CALC-MCNC: 10.2 MG/DL
VLDLC SERPL CALC-MCNC: 15.6 MG/DL
VLDLC SERPL CALC-MCNC: 17.2 MG/DL
WBC # BLD AUTO: 10.5 K/UL (ref 4.1–11.1)
WBC # BLD AUTO: 10.7 K/UL (ref 4.1–11.1)
WBC # BLD AUTO: 12.9 K/UL (ref 4.1–11.1)
WBC # BLD AUTO: 3.7 K/UL (ref 4.1–11.1)
WBC # BLD AUTO: 4.6 K/UL (ref 4.1–11.1)
WBC # BLD AUTO: 4.9 K/UL (ref 4.1–11.1)
WBC # BLD AUTO: 6.2 K/UL (ref 4.1–11.1)
WBC # BLD AUTO: 6.4 K/UL (ref 4.1–11.1)
WBC # BLD AUTO: 6.7 K/UL (ref 4.1–11.1)
WBC # BLD AUTO: 8.7 K/UL (ref 4.1–11.1)
WBC # BLD AUTO: 9 K/UL (ref 4.1–11.1)
WBC # BLD AUTO: 9.2 K/UL (ref 4.1–11.1)
WBC URNS QL MICRO: ABNORMAL /HPF (ref 0–4)

## 2022-01-01 PROCEDURE — 74011250637 HC RX REV CODE- 250/637: Performed by: STUDENT IN AN ORGANIZED HEALTH CARE EDUCATION/TRAINING PROGRAM

## 2022-01-01 PROCEDURE — 3331090001 HH PPS REVENUE CREDIT

## 2022-01-01 PROCEDURE — 3331090002 HH PPS REVENUE DEBIT

## 2022-01-01 PROCEDURE — 77010033678 HC OXYGEN DAILY

## 2022-01-01 PROCEDURE — 83735 ASSAY OF MAGNESIUM: CPT

## 2022-01-01 PROCEDURE — G0299 HHS/HOSPICE OF RN EA 15 MIN: HCPCS

## 2022-01-01 PROCEDURE — 97535 SELF CARE MNGMENT TRAINING: CPT | Performed by: OCCUPATIONAL THERAPIST

## 2022-01-01 PROCEDURE — 85025 COMPLETE CBC W/AUTO DIFF WBC: CPT

## 2022-01-01 PROCEDURE — 0651 HSPC ROUTINE HOME CARE

## 2022-01-01 PROCEDURE — 65270000032 HC RM SEMIPRIVATE

## 2022-01-01 PROCEDURE — 94761 N-INVAS EAR/PLS OXIMETRY MLT: CPT

## 2022-01-01 PROCEDURE — 82962 GLUCOSE BLOOD TEST: CPT

## 2022-01-01 PROCEDURE — 74011000250 HC RX REV CODE- 250: Performed by: STUDENT IN AN ORGANIZED HEALTH CARE EDUCATION/TRAINING PROGRAM

## 2022-01-01 PROCEDURE — 97530 THERAPEUTIC ACTIVITIES: CPT

## 2022-01-01 PROCEDURE — 74011250636 HC RX REV CODE- 250/636: Performed by: STUDENT IN AN ORGANIZED HEALTH CARE EDUCATION/TRAINING PROGRAM

## 2022-01-01 PROCEDURE — 83880 ASSAY OF NATRIURETIC PEPTIDE: CPT

## 2022-01-01 PROCEDURE — 80053 COMPREHEN METABOLIC PANEL: CPT

## 2022-01-01 PROCEDURE — 99285 EMERGENCY DEPT VISIT HI MDM: CPT

## 2022-01-01 PROCEDURE — 51798 US URINE CAPACITY MEASURE: CPT

## 2022-01-01 PROCEDURE — 83605 ASSAY OF LACTIC ACID: CPT

## 2022-01-01 PROCEDURE — 3331090003 HH PPS REVENUE ADJ

## 2022-01-01 PROCEDURE — 36415 COLL VENOUS BLD VENIPUNCTURE: CPT

## 2022-01-01 PROCEDURE — 70496 CT ANGIOGRAPHY HEAD: CPT

## 2022-01-01 PROCEDURE — G0153 HHCP-SVS OF S/L PATH,EA 15MN: HCPCS

## 2022-01-01 PROCEDURE — 93005 ELECTROCARDIOGRAM TRACING: CPT

## 2022-01-01 PROCEDURE — 74011000258 HC RX REV CODE- 258: Performed by: STUDENT IN AN ORGANIZED HEALTH CARE EDUCATION/TRAINING PROGRAM

## 2022-01-01 PROCEDURE — 84295 ASSAY OF SERUM SODIUM: CPT

## 2022-01-01 PROCEDURE — 97530 THERAPEUTIC ACTIVITIES: CPT | Performed by: PHYSICAL THERAPIST

## 2022-01-01 PROCEDURE — G0151 HHCP-SERV OF PT,EA 15 MIN: HCPCS

## 2022-01-01 PROCEDURE — G0155 HHCP-SVS OF CSW,EA 15 MIN: HCPCS

## 2022-01-01 PROCEDURE — 0202U NFCT DS 22 TRGT SARS-COV-2: CPT

## 2022-01-01 PROCEDURE — G8419 CALC BMI OUT NRM PARAM NOF/U: HCPCS | Performed by: INTERNAL MEDICINE

## 2022-01-01 PROCEDURE — 80061 LIPID PANEL: CPT

## 2022-01-01 PROCEDURE — 70450 CT HEAD/BRAIN W/O DYE: CPT

## 2022-01-01 PROCEDURE — 99239 HOSP IP/OBS DSCHRG MGMT >30: CPT | Performed by: INTERNAL MEDICINE

## 2022-01-01 PROCEDURE — 36600 WITHDRAWAL OF ARTERIAL BLOOD: CPT

## 2022-01-01 PROCEDURE — G0156 HHCP-SVS OF AIDE,EA 15 MIN: HCPCS

## 2022-01-01 PROCEDURE — 80202 ASSAY OF VANCOMYCIN: CPT

## 2022-01-01 PROCEDURE — HOSPICE MEDICATION HC HH HOSPICE MEDICATION

## 2022-01-01 PROCEDURE — 400013 HH SOC

## 2022-01-01 PROCEDURE — 74011250636 HC RX REV CODE- 250/636: Performed by: EMERGENCY MEDICINE

## 2022-01-01 PROCEDURE — 74011250637 HC RX REV CODE- 250/637: Performed by: INTERNAL MEDICINE

## 2022-01-01 PROCEDURE — 80048 BASIC METABOLIC PNL TOTAL CA: CPT

## 2022-01-01 PROCEDURE — 97165 OT EVAL LOW COMPLEX 30 MIN: CPT | Performed by: OCCUPATIONAL THERAPIST

## 2022-01-01 PROCEDURE — 85027 COMPLETE CBC AUTOMATED: CPT

## 2022-01-01 PROCEDURE — 97116 GAIT TRAINING THERAPY: CPT

## 2022-01-01 PROCEDURE — 94762 N-INVAS EAR/PLS OXIMTRY CONT: CPT

## 2022-01-01 PROCEDURE — 95816 EEG AWAKE AND DROWSY: CPT | Performed by: PSYCHIATRY & NEUROLOGY

## 2022-01-01 PROCEDURE — 74011250636 HC RX REV CODE- 250/636: Performed by: INTERNAL MEDICINE

## 2022-01-01 PROCEDURE — G8427 DOCREV CUR MEDS BY ELIG CLIN: HCPCS | Performed by: INTERNAL MEDICINE

## 2022-01-01 PROCEDURE — 74011000250 HC RX REV CODE- 250: Performed by: INTERNAL MEDICINE

## 2022-01-01 PROCEDURE — 2709999900 HC NON-CHARGEABLE SUPPLY

## 2022-01-01 PROCEDURE — 97535 SELF CARE MNGMENT TRAINING: CPT

## 2022-01-01 PROCEDURE — 65660000000 HC RM CCU STEPDOWN

## 2022-01-01 PROCEDURE — 92610 EVALUATE SWALLOWING FUNCTION: CPT | Performed by: SPEECH-LANGUAGE PATHOLOGIST

## 2022-01-01 PROCEDURE — 80047 BASIC METABLC PNL IONIZED CA: CPT

## 2022-01-01 PROCEDURE — 84145 PROCALCITONIN (PCT): CPT

## 2022-01-01 PROCEDURE — 87040 BLOOD CULTURE FOR BACTERIA: CPT

## 2022-01-01 PROCEDURE — 99223 1ST HOSP IP/OBS HIGH 75: CPT | Performed by: PSYCHIATRY & NEUROLOGY

## 2022-01-01 PROCEDURE — 81001 URINALYSIS AUTO W/SCOPE: CPT

## 2022-01-01 PROCEDURE — 99215 OFFICE O/P EST HI 40 MIN: CPT | Performed by: INTERNAL MEDICINE

## 2022-01-01 PROCEDURE — 74011000258 HC RX REV CODE- 258: Performed by: EMERGENCY MEDICINE

## 2022-01-01 PROCEDURE — 71045 X-RAY EXAM CHEST 1 VIEW: CPT

## 2022-01-01 PROCEDURE — 87899 AGENT NOS ASSAY W/OPTIC: CPT

## 2022-01-01 PROCEDURE — 92610 EVALUATE SWALLOWING FUNCTION: CPT

## 2022-01-01 PROCEDURE — 85610 PROTHROMBIN TIME: CPT

## 2022-01-01 PROCEDURE — 74011000250 HC RX REV CODE- 250: Performed by: EMERGENCY MEDICINE

## 2022-01-01 PROCEDURE — 77030040361 HC SLV COMPR DVT MDII -B

## 2022-01-01 PROCEDURE — G0152 HHCP-SERV OF OT,EA 15 MIN: HCPCS

## 2022-01-01 PROCEDURE — 93306 TTE W/DOPPLER COMPLETE: CPT

## 2022-01-01 PROCEDURE — 96365 THER/PROPH/DIAG IV INF INIT: CPT

## 2022-01-01 PROCEDURE — 93971 EXTREMITY STUDY: CPT | Performed by: INTERNAL MEDICINE

## 2022-01-01 PROCEDURE — 84300 ASSAY OF URINE SODIUM: CPT

## 2022-01-01 PROCEDURE — 74011000636 HC RX REV CODE- 636: Performed by: EMERGENCY MEDICINE

## 2022-01-01 PROCEDURE — 99233 SBSQ HOSP IP/OBS HIGH 50: CPT | Performed by: INTERNAL MEDICINE

## 2022-01-01 PROCEDURE — 93971 EXTREMITY STUDY: CPT

## 2022-01-01 PROCEDURE — 82565 ASSAY OF CREATININE: CPT

## 2022-01-01 PROCEDURE — 84443 ASSAY THYROID STIM HORMONE: CPT

## 2022-01-01 PROCEDURE — 87636 SARSCOV2 & INF A&B AMP PRB: CPT

## 2022-01-01 PROCEDURE — 84484 ASSAY OF TROPONIN QUANT: CPT

## 2022-01-01 PROCEDURE — 3331090004 HSPC SERVICE INTENSITY ADD-ON

## 2022-01-01 PROCEDURE — 3336500001 HSPC ELECTION

## 2022-01-01 PROCEDURE — 83036 HEMOGLOBIN GLYCOSYLATED A1C: CPT

## 2022-01-01 PROCEDURE — 92507 TX SP LANG VOICE COMM INDIV: CPT | Performed by: SPEECH-LANGUAGE PATHOLOGIST

## 2022-01-01 PROCEDURE — 82803 BLOOD GASES ANY COMBINATION: CPT

## 2022-01-01 PROCEDURE — G0439 PPPS, SUBSEQ VISIT: HCPCS | Performed by: INTERNAL MEDICINE

## 2022-01-01 PROCEDURE — 4A03X5D MEASUREMENT OF ARTERIAL FLOW, INTRACRANIAL, EXTERNAL APPROACH: ICD-10-PCS | Performed by: RADIOLOGY

## 2022-01-01 PROCEDURE — 97110 THERAPEUTIC EXERCISES: CPT | Performed by: PHYSICAL THERAPIST

## 2022-01-01 PROCEDURE — 99284 EMERGENCY DEPT VISIT MOD MDM: CPT

## 2022-01-01 PROCEDURE — 1101F PT FALLS ASSESS-DOCD LE1/YR: CPT | Performed by: INTERNAL MEDICINE

## 2022-01-01 PROCEDURE — 82570 ASSAY OF URINE CREATININE: CPT

## 2022-01-01 PROCEDURE — 97161 PT EVAL LOW COMPLEX 20 MIN: CPT | Performed by: PHYSICAL THERAPIST

## 2022-01-01 PROCEDURE — 87449 NOS EACH ORGANISM AG IA: CPT

## 2022-01-01 PROCEDURE — 51702 INSERT TEMP BLADDER CATH: CPT

## 2022-01-01 PROCEDURE — 97161 PT EVAL LOW COMPLEX 20 MIN: CPT

## 2022-01-01 PROCEDURE — 74011250636 HC RX REV CODE- 250/636: Performed by: HOSPITALIST

## 2022-01-01 PROCEDURE — 84100 ASSAY OF PHOSPHORUS: CPT

## 2022-01-01 PROCEDURE — 99223 1ST HOSP IP/OBS HIGH 75: CPT | Performed by: NURSE PRACTITIONER

## 2022-01-01 PROCEDURE — 80076 HEPATIC FUNCTION PANEL: CPT

## 2022-01-01 PROCEDURE — 1111F DSCHRG MED/CURRENT MED MERGE: CPT | Performed by: INTERNAL MEDICINE

## 2022-01-01 PROCEDURE — 74011250637 HC RX REV CODE- 250/637: Performed by: HOSPITALIST

## 2022-01-01 PROCEDURE — 400014 HH F/U

## 2022-01-01 PROCEDURE — 92526 ORAL FUNCTION THERAPY: CPT | Performed by: SPEECH-LANGUAGE PATHOLOGIST

## 2022-01-01 PROCEDURE — 0042T CT CODE NEURO PERF W CBF: CPT

## 2022-01-01 PROCEDURE — 84439 ASSAY OF FREE THYROXINE: CPT

## 2022-01-01 PROCEDURE — 97530 THERAPEUTIC ACTIVITIES: CPT | Performed by: OCCUPATIONAL THERAPIST

## 2022-01-01 PROCEDURE — 400018 HH-NO PAY CLAIM PROCEDURE

## 2022-01-01 PROCEDURE — 74230 X-RAY XM SWLNG FUNCJ C+: CPT

## 2022-01-01 PROCEDURE — 93000 ELECTROCARDIOGRAM COMPLETE: CPT | Performed by: INTERNAL MEDICINE

## 2022-01-01 PROCEDURE — G8536 NO DOC ELDER MAL SCRN: HCPCS | Performed by: INTERNAL MEDICINE

## 2022-01-01 PROCEDURE — 93306 TTE W/DOPPLER COMPLETE: CPT | Performed by: INTERNAL MEDICINE

## 2022-01-01 PROCEDURE — 92523 SPEECH SOUND LANG COMPREHEN: CPT

## 2022-01-01 PROCEDURE — 92611 MOTION FLUOROSCOPY/SWALLOW: CPT | Performed by: SPEECH-LANGUAGE PATHOLOGIST

## 2022-01-01 RX ORDER — LEVOTHYROXINE SODIUM 50 UG/1
50 TABLET ORAL
Qty: 30 TABLET | Refills: 5 | Status: SHIPPED | OUTPATIENT
Start: 2022-01-01

## 2022-01-01 RX ORDER — ONDANSETRON 4 MG/1
4 TABLET, ORALLY DISINTEGRATING ORAL
Status: DISCONTINUED | OUTPATIENT
Start: 2022-01-01 | End: 2022-01-01 | Stop reason: HOSPADM

## 2022-01-01 RX ORDER — CLOPIDOGREL BISULFATE 75 MG/1
75 TABLET ORAL DAILY
Qty: 90 TABLET | Refills: 1 | OUTPATIENT
Start: 2022-01-01

## 2022-01-01 RX ORDER — COLCHICINE 0.6 MG/1
0.6 TABLET ORAL DAILY
Qty: 30 TABLET | Refills: 0 | Status: SHIPPED | OUTPATIENT
Start: 2022-01-01 | End: 2022-01-01

## 2022-01-01 RX ORDER — AMOXICILLIN AND CLAVULANATE POTASSIUM 875; 125 MG/1; MG/1
1 TABLET, FILM COATED ORAL EVERY 12 HOURS
Status: DISCONTINUED | OUTPATIENT
Start: 2022-01-01 | End: 2022-01-01 | Stop reason: DRUGHIGH

## 2022-01-01 RX ORDER — POTASSIUM CHLORIDE 7.45 MG/ML
10 INJECTION INTRAVENOUS
Status: COMPLETED | OUTPATIENT
Start: 2022-01-01 | End: 2022-01-01

## 2022-01-01 RX ORDER — LISINOPRIL 5 MG/1
5 TABLET ORAL DAILY
COMMUNITY
End: 2022-01-01

## 2022-01-01 RX ORDER — ACETAMINOPHEN 650 MG/1
650 SUPPOSITORY RECTAL
Status: DISCONTINUED | OUTPATIENT
Start: 2022-01-01 | End: 2022-01-01 | Stop reason: HOSPADM

## 2022-01-01 RX ORDER — FUROSEMIDE 20 MG/1
TABLET ORAL
Qty: 60 TABLET | Refills: 5 | Status: SHIPPED | OUTPATIENT
Start: 2022-01-01 | End: 2022-01-01

## 2022-01-01 RX ORDER — SODIUM CHLORIDE, SODIUM LACTATE, POTASSIUM CHLORIDE, CALCIUM CHLORIDE 600; 310; 30; 20 MG/100ML; MG/100ML; MG/100ML; MG/100ML
50 INJECTION, SOLUTION INTRAVENOUS CONTINUOUS
Status: DISCONTINUED | OUTPATIENT
Start: 2022-01-01 | End: 2022-01-01

## 2022-01-01 RX ORDER — AZITHROMYCIN 250 MG/1
500 TABLET, FILM COATED ORAL
Status: COMPLETED | OUTPATIENT
Start: 2022-01-01 | End: 2022-01-01

## 2022-01-01 RX ORDER — ATORVASTATIN CALCIUM 40 MG/1
40 TABLET, FILM COATED ORAL
Qty: 30 TABLET | Refills: 2 | Status: SHIPPED | OUTPATIENT
Start: 2022-01-01 | End: 2022-01-01

## 2022-01-01 RX ORDER — AZITHROMYCIN 250 MG/1
500 TABLET, FILM COATED ORAL DAILY
Status: DISCONTINUED | OUTPATIENT
Start: 2022-01-01 | End: 2022-01-01

## 2022-01-01 RX ORDER — MEMANTINE HYDROCHLORIDE 10 MG/1
5 TABLET ORAL 2 TIMES DAILY
Status: DISCONTINUED | OUTPATIENT
Start: 2022-01-01 | End: 2022-01-01 | Stop reason: HOSPADM

## 2022-01-01 RX ORDER — POLYETHYLENE GLYCOL 3350 17 G/17G
17 POWDER, FOR SOLUTION ORAL DAILY PRN
Status: DISCONTINUED | OUTPATIENT
Start: 2022-01-01 | End: 2022-01-01 | Stop reason: HOSPADM

## 2022-01-01 RX ORDER — POTASSIUM CHLORIDE 750 MG/1
20 TABLET, FILM COATED, EXTENDED RELEASE ORAL
Status: COMPLETED | OUTPATIENT
Start: 2022-01-01 | End: 2022-01-01

## 2022-01-01 RX ORDER — AZITHROMYCIN 250 MG/1
250 TABLET, FILM COATED ORAL DAILY
Status: DISCONTINUED | OUTPATIENT
Start: 2022-01-01 | End: 2022-01-01

## 2022-01-01 RX ORDER — MEMANTINE HYDROCHLORIDE 10 MG/1
5 TABLET ORAL 2 TIMES DAILY
Qty: 30 TABLET | Refills: 0 | Status: SHIPPED | OUTPATIENT
Start: 2022-01-01

## 2022-01-01 RX ORDER — OMEPRAZOLE 20 MG/1
20 CAPSULE, DELAYED RELEASE ORAL DAILY
Qty: 7 CAPSULE | Refills: 0 | Status: SHIPPED | OUTPATIENT
Start: 2022-01-01 | End: 2022-01-01

## 2022-01-01 RX ORDER — LINEZOLID 600 MG/1
600 TABLET, FILM COATED ORAL EVERY 12 HOURS
Status: COMPLETED | OUTPATIENT
Start: 2022-01-01 | End: 2022-01-01

## 2022-01-01 RX ORDER — CLOPIDOGREL BISULFATE 75 MG/1
75 TABLET ORAL DAILY
Qty: 90 TABLET | Refills: 0 | Status: SHIPPED | OUTPATIENT
Start: 2022-01-01 | End: 2022-01-01

## 2022-01-01 RX ORDER — SODIUM CHLORIDE, SODIUM LACTATE, POTASSIUM CHLORIDE, CALCIUM CHLORIDE 600; 310; 30; 20 MG/100ML; MG/100ML; MG/100ML; MG/100ML
100 INJECTION, SOLUTION INTRAVENOUS CONTINUOUS
Status: DISCONTINUED | OUTPATIENT
Start: 2022-01-01 | End: 2022-01-01

## 2022-01-01 RX ORDER — MEMANTINE HYDROCHLORIDE 10 MG/1
TABLET ORAL
Qty: 60 TABLET | Refills: 0 | Status: SHIPPED | OUTPATIENT
Start: 2022-01-01 | End: 2022-01-01 | Stop reason: SDUPTHER

## 2022-01-01 RX ORDER — DEXTROSE MONOHYDRATE 50 MG/ML
50 INJECTION, SOLUTION INTRAVENOUS CONTINUOUS
Status: DISCONTINUED | OUTPATIENT
Start: 2022-01-01 | End: 2022-01-01

## 2022-01-01 RX ORDER — AZITHROMYCIN 250 MG/1
500 TABLET, FILM COATED ORAL DAILY
Status: COMPLETED | OUTPATIENT
Start: 2022-01-01 | End: 2022-01-01

## 2022-01-01 RX ORDER — ACETAMINOPHEN 325 MG/1
650 TABLET ORAL
Status: DISCONTINUED | OUTPATIENT
Start: 2022-01-01 | End: 2022-01-01 | Stop reason: HOSPADM

## 2022-01-01 RX ORDER — MEMANTINE HYDROCHLORIDE 10 MG/1
10 TABLET ORAL 2 TIMES DAILY
Status: DISCONTINUED | OUTPATIENT
Start: 2022-01-01 | End: 2022-01-01

## 2022-01-01 RX ORDER — GUAIFENESIN 100 MG/5ML
81 LIQUID (ML) ORAL DAILY
Status: DISCONTINUED | OUTPATIENT
Start: 2022-01-01 | End: 2022-01-01 | Stop reason: HOSPADM

## 2022-01-01 RX ORDER — SODIUM CHLORIDE 0.9 % (FLUSH) 0.9 %
5-40 SYRINGE (ML) INJECTION EVERY 8 HOURS
Status: DISCONTINUED | OUTPATIENT
Start: 2022-01-01 | End: 2022-01-01 | Stop reason: HOSPADM

## 2022-01-01 RX ORDER — FUROSEMIDE 40 MG/1
40 TABLET ORAL DAILY
Status: DISCONTINUED | OUTPATIENT
Start: 2022-01-01 | End: 2022-01-01 | Stop reason: HOSPADM

## 2022-01-01 RX ORDER — SODIUM CHLORIDE 0.9 % (FLUSH) 0.9 %
5-10 SYRINGE (ML) INJECTION AS NEEDED
Status: DISCONTINUED | OUTPATIENT
Start: 2022-01-01 | End: 2022-01-01 | Stop reason: SDUPTHER

## 2022-01-01 RX ORDER — DEXTROSE MONOHYDRATE 50 MG/ML
100 INJECTION, SOLUTION INTRAVENOUS CONTINUOUS
Status: DISCONTINUED | OUTPATIENT
Start: 2022-01-01 | End: 2022-01-01

## 2022-01-01 RX ORDER — PANTOPRAZOLE SODIUM 40 MG/1
40 TABLET, DELAYED RELEASE ORAL
Status: DISCONTINUED | OUTPATIENT
Start: 2022-01-01 | End: 2022-01-01 | Stop reason: HOSPADM

## 2022-01-01 RX ORDER — ATORVASTATIN CALCIUM 40 MG/1
40 TABLET, FILM COATED ORAL
Status: DISCONTINUED | OUTPATIENT
Start: 2022-01-01 | End: 2022-01-01 | Stop reason: HOSPADM

## 2022-01-01 RX ORDER — DEXTROSE MONOHYDRATE AND SODIUM CHLORIDE 5; .45 G/100ML; G/100ML
50 INJECTION, SOLUTION INTRAVENOUS CONTINUOUS
Status: DISCONTINUED | OUTPATIENT
Start: 2022-01-01 | End: 2022-01-01

## 2022-01-01 RX ORDER — DONEPEZIL HYDROCHLORIDE 5 MG/1
10 TABLET, FILM COATED ORAL DAILY
Status: DISCONTINUED | OUTPATIENT
Start: 2022-01-01 | End: 2022-01-01

## 2022-01-01 RX ORDER — METRONIDAZOLE 500 MG/1
500 TABLET ORAL EVERY 12 HOURS
Status: DISCONTINUED | OUTPATIENT
Start: 2022-01-01 | End: 2022-01-01

## 2022-01-01 RX ORDER — MELATONIN
1000 DAILY
Status: DISCONTINUED | OUTPATIENT
Start: 2022-01-01 | End: 2022-01-01 | Stop reason: HOSPADM

## 2022-01-01 RX ORDER — DONEPEZIL HYDROCHLORIDE 5 MG/1
10 TABLET, FILM COATED ORAL
Status: DISCONTINUED | OUTPATIENT
Start: 2022-01-01 | End: 2022-01-01 | Stop reason: HOSPADM

## 2022-01-01 RX ORDER — MEMANTINE HYDROCHLORIDE 10 MG/1
10 TABLET ORAL DAILY
Status: DISCONTINUED | OUTPATIENT
Start: 2022-01-01 | End: 2022-01-01 | Stop reason: HOSPADM

## 2022-01-01 RX ORDER — LISINOPRIL 5 MG/1
TABLET ORAL
Qty: 90 TABLET | Refills: 33 | Status: SHIPPED | OUTPATIENT
Start: 2022-01-01 | End: 2022-01-01

## 2022-01-01 RX ORDER — DONEPEZIL HYDROCHLORIDE 10 MG/1
TABLET, FILM COATED ORAL
Qty: 7 TABLET | Refills: 0 | Status: SHIPPED | OUTPATIENT
Start: 2022-01-01 | End: 2022-01-01

## 2022-01-01 RX ORDER — LEVOTHYROXINE SODIUM 50 UG/1
50 TABLET ORAL
Status: DISCONTINUED | OUTPATIENT
Start: 2022-01-01 | End: 2022-01-01 | Stop reason: HOSPADM

## 2022-01-01 RX ORDER — MEMANTINE HYDROCHLORIDE 10 MG/1
5 TABLET ORAL 2 TIMES DAILY
Status: DISCONTINUED | OUTPATIENT
Start: 2022-01-01 | End: 2022-01-01

## 2022-01-01 RX ORDER — PANTOPRAZOLE SODIUM 40 MG/1
40 GRANULE, DELAYED RELEASE ORAL
Status: DISCONTINUED | OUTPATIENT
Start: 2022-01-01 | End: 2022-01-01 | Stop reason: CLARIF

## 2022-01-01 RX ORDER — MEMANTINE HYDROCHLORIDE 10 MG/1
TABLET ORAL
Qty: 60 TABLET | Refills: 0 | Status: ON HOLD | OUTPATIENT
Start: 2022-01-01 | End: 2022-01-01 | Stop reason: SDUPTHER

## 2022-01-01 RX ORDER — FUROSEMIDE 20 MG/1
40 TABLET ORAL DAILY
Qty: 30 TABLET | Refills: 0 | Status: SHIPPED | OUTPATIENT
Start: 2022-01-01 | End: 2022-01-01

## 2022-01-01 RX ORDER — DEXTROSE MONOHYDRATE 100 MG/ML
250 INJECTION, SOLUTION INTRAVENOUS ONCE
Status: ACTIVE | OUTPATIENT
Start: 2022-01-01 | End: 2022-01-01

## 2022-01-01 RX ORDER — CARVEDILOL 6.25 MG/1
TABLET ORAL
Qty: 180 TABLET | Refills: 1 | Status: SHIPPED | OUTPATIENT
Start: 2022-01-01 | End: 2022-01-01

## 2022-01-01 RX ORDER — MORPHINE SULFATE ORAL SOLUTION 10 MG/5ML
5 SOLUTION ORAL
Status: DISCONTINUED | OUTPATIENT
Start: 2022-01-01 | End: 2022-01-01 | Stop reason: HOSPADM

## 2022-01-01 RX ORDER — MEMANTINE HYDROCHLORIDE 10 MG/1
5 TABLET ORAL ONCE
Status: COMPLETED | OUTPATIENT
Start: 2022-01-01 | End: 2022-01-01

## 2022-01-01 RX ORDER — CLOPIDOGREL BISULFATE 75 MG/1
75 TABLET ORAL DAILY
Status: DISCONTINUED | OUTPATIENT
Start: 2022-01-01 | End: 2022-01-01 | Stop reason: HOSPADM

## 2022-01-01 RX ORDER — METRONIDAZOLE 500 MG/100ML
500 INJECTION, SOLUTION INTRAVENOUS EVERY 12 HOURS
Status: DISCONTINUED | OUTPATIENT
Start: 2022-01-01 | End: 2022-01-01

## 2022-01-01 RX ORDER — MAG HYDROX/ALUMINUM HYD/SIMETH 200-200-20
30 SUSPENSION, ORAL (FINAL DOSE FORM) ORAL
Status: DISCONTINUED | OUTPATIENT
Start: 2022-01-01 | End: 2022-01-01 | Stop reason: HOSPADM

## 2022-01-01 RX ORDER — AMOXICILLIN AND CLAVULANATE POTASSIUM 500; 125 MG/1; MG/1
1 TABLET, FILM COATED ORAL EVERY 12 HOURS
Status: COMPLETED | OUTPATIENT
Start: 2022-01-01 | End: 2022-01-01

## 2022-01-01 RX ORDER — CLOPIDOGREL BISULFATE 75 MG/1
75 TABLET ORAL DAILY
Qty: 30 TABLET | Refills: 2 | Status: SHIPPED | OUTPATIENT
Start: 2022-01-01 | End: 2022-01-01 | Stop reason: SDUPTHER

## 2022-01-01 RX ORDER — LEVOFLOXACIN 5 MG/ML
750 INJECTION, SOLUTION INTRAVENOUS ONCE
Status: COMPLETED | OUTPATIENT
Start: 2022-01-01 | End: 2022-01-01

## 2022-01-01 RX ORDER — FUROSEMIDE 10 MG/ML
20 INJECTION INTRAMUSCULAR; INTRAVENOUS ONCE
Status: COMPLETED | OUTPATIENT
Start: 2022-01-01 | End: 2022-01-01

## 2022-01-01 RX ORDER — DONEPEZIL HYDROCHLORIDE 10 MG/1
TABLET, FILM COATED ORAL
Qty: 30 TABLET | Refills: 5 | Status: SHIPPED | OUTPATIENT
Start: 2022-01-01 | End: 2022-01-01

## 2022-01-01 RX ORDER — MELATONIN
1000 DAILY
Qty: 30 TABLET | Refills: 5 | Status: SHIPPED | OUTPATIENT
Start: 2022-01-01

## 2022-01-01 RX ORDER — COLCHICINE 0.6 MG/1
0.6 TABLET ORAL DAILY
Qty: 30 TABLET | Refills: 0 | Status: SHIPPED | OUTPATIENT
Start: 2022-01-01 | End: 2022-01-01 | Stop reason: SDUPTHER

## 2022-01-01 RX ORDER — LINEZOLID 600 MG/1
600 TABLET, FILM COATED ORAL EVERY 12 HOURS
Status: DISCONTINUED | OUTPATIENT
Start: 2022-01-01 | End: 2022-01-01

## 2022-01-01 RX ORDER — SODIUM CHLORIDE 0.9 % (FLUSH) 0.9 %
5-40 SYRINGE (ML) INJECTION AS NEEDED
Status: DISCONTINUED | OUTPATIENT
Start: 2022-01-01 | End: 2022-01-01 | Stop reason: HOSPADM

## 2022-01-01 RX ORDER — COLCHICINE 0.6 MG/1
0.6 TABLET ORAL DAILY
Qty: 30 TABLET | Refills: 0 | Status: ON HOLD | OUTPATIENT
Start: 2022-01-01 | End: 2022-01-01 | Stop reason: SDUPTHER

## 2022-01-01 RX ORDER — ONDANSETRON 2 MG/ML
4 INJECTION INTRAMUSCULAR; INTRAVENOUS
Status: DISCONTINUED | OUTPATIENT
Start: 2022-01-01 | End: 2022-01-01 | Stop reason: HOSPADM

## 2022-01-01 RX ORDER — ENOXAPARIN SODIUM 100 MG/ML
40 INJECTION SUBCUTANEOUS EVERY 24 HOURS
Status: DISCONTINUED | OUTPATIENT
Start: 2022-01-01 | End: 2022-01-01 | Stop reason: HOSPADM

## 2022-01-01 RX ORDER — COLCHICINE 0.6 MG/1
0.6 TABLET ORAL DAILY
Qty: 30 TABLET | Refills: 2 | Status: SHIPPED | OUTPATIENT
Start: 2022-01-01 | End: 2022-01-01 | Stop reason: SDUPTHER

## 2022-01-01 RX ORDER — LORAZEPAM 0.5 MG/1
0.25 TABLET ORAL ONCE
Status: COMPLETED | OUTPATIENT
Start: 2022-01-01 | End: 2022-01-01

## 2022-01-01 RX ORDER — FUROSEMIDE 10 MG/ML
40 INJECTION INTRAMUSCULAR; INTRAVENOUS ONCE
Status: COMPLETED | OUTPATIENT
Start: 2022-01-01 | End: 2022-01-01

## 2022-01-01 RX ORDER — PANTOPRAZOLE SODIUM 40 MG/1
40 TABLET, DELAYED RELEASE ORAL
Status: DISCONTINUED | OUTPATIENT
Start: 2022-01-01 | End: 2022-01-01

## 2022-01-01 RX ADMIN — LEVOTHYROXINE SODIUM 50 MCG: 50 TABLET ORAL at 08:55

## 2022-01-01 RX ADMIN — FUROSEMIDE 40 MG: 40 TABLET ORAL at 10:37

## 2022-01-01 RX ADMIN — MEMANTINE 5 MG: 10 TABLET ORAL at 19:30

## 2022-01-01 RX ADMIN — DONEPEZIL HYDROCHLORIDE 10 MG: 5 TABLET, FILM COATED ORAL at 22:04

## 2022-01-01 RX ADMIN — SODIUM ZIRCONIUM CYCLOSILICATE 15 G: 10 POWDER, FOR SUSPENSION ORAL at 11:17

## 2022-01-01 RX ADMIN — PANTOPRAZOLE SODIUM 40 MG: 40 TABLET, DELAYED RELEASE ORAL at 09:33

## 2022-01-01 RX ADMIN — ASPIRIN 81 MG 81 MG: 81 TABLET ORAL at 09:09

## 2022-01-01 RX ADMIN — LINEZOLID 600 MG: 600 TABLET ORAL at 18:30

## 2022-01-01 RX ADMIN — ACETAMINOPHEN 650 MG: 325 TABLET ORAL at 08:10

## 2022-01-01 RX ADMIN — CEFTRIAXONE SODIUM 1 G: 1 INJECTION, POWDER, FOR SOLUTION INTRAMUSCULAR; INTRAVENOUS at 09:33

## 2022-01-01 RX ADMIN — MEMANTINE 10 MG: 10 TABLET ORAL at 17:36

## 2022-01-01 RX ADMIN — LORAZEPAM 0.25 MG: 0.5 TABLET ORAL at 04:05

## 2022-01-01 RX ADMIN — METRONIDAZOLE 500 MG: 500 TABLET ORAL at 21:32

## 2022-01-01 RX ADMIN — Medication 1000 UNITS: at 09:21

## 2022-01-01 RX ADMIN — LEVOTHYROXINE SODIUM 50 MCG: 50 TABLET ORAL at 08:28

## 2022-01-01 RX ADMIN — LEVOTHYROXINE SODIUM 50 MCG: 50 TABLET ORAL at 08:37

## 2022-01-01 RX ADMIN — Medication 1000 UNITS: at 08:10

## 2022-01-01 RX ADMIN — LEVOTHYROXINE SODIUM 50 MCG: 50 TABLET ORAL at 09:09

## 2022-01-01 RX ADMIN — ACETAMINOPHEN 650 MG: 325 TABLET ORAL at 21:07

## 2022-01-01 RX ADMIN — Medication 1000 UNITS: at 08:57

## 2022-01-01 RX ADMIN — LEVOTHYROXINE SODIUM 50 MCG: 50 TABLET ORAL at 08:34

## 2022-01-01 RX ADMIN — Medication 1000 UNITS: at 09:09

## 2022-01-01 RX ADMIN — METRONIDAZOLE 500 MG: 500 TABLET ORAL at 23:50

## 2022-01-01 RX ADMIN — ASPIRIN 81 MG 81 MG: 81 TABLET ORAL at 08:34

## 2022-01-01 RX ADMIN — Medication 1000 UNITS: at 08:53

## 2022-01-01 RX ADMIN — LEVOTHYROXINE SODIUM 50 MCG: 50 TABLET ORAL at 08:53

## 2022-01-01 RX ADMIN — AMOXICILLIN AND CLAVULANATE POTASSIUM 1 TABLET: 500; 125 TABLET, FILM COATED ORAL at 20:56

## 2022-01-01 RX ADMIN — SODIUM CHLORIDE, PRESERVATIVE FREE 10 ML: 5 INJECTION INTRAVENOUS at 23:02

## 2022-01-01 RX ADMIN — Medication 1000 UNITS: at 08:51

## 2022-01-01 RX ADMIN — MEMANTINE 10 MG: 10 TABLET ORAL at 18:09

## 2022-01-01 RX ADMIN — MEMANTINE 10 MG: 10 TABLET ORAL at 08:10

## 2022-01-01 RX ADMIN — CEFTRIAXONE SODIUM 1 G: 1 INJECTION, POWDER, FOR SOLUTION INTRAMUSCULAR; INTRAVENOUS at 09:17

## 2022-01-01 RX ADMIN — LANSOPRAZOLE 30 MG: KIT at 08:58

## 2022-01-01 RX ADMIN — SODIUM CHLORIDE, PRESERVATIVE FREE 10 ML: 5 INJECTION INTRAVENOUS at 13:54

## 2022-01-01 RX ADMIN — AMOXICILLIN AND CLAVULANATE POTASSIUM 1 TABLET: 500; 125 TABLET, FILM COATED ORAL at 21:32

## 2022-01-01 RX ADMIN — CEFEPIME HYDROCHLORIDE 2 G: 2 INJECTION, POWDER, FOR SOLUTION INTRAMUSCULAR; INTRAVENOUS at 13:53

## 2022-01-01 RX ADMIN — SODIUM CHLORIDE, PRESERVATIVE FREE 10 ML: 5 INJECTION INTRAVENOUS at 15:05

## 2022-01-01 RX ADMIN — CLOPIDOGREL BISULFATE 75 MG: 75 TABLET ORAL at 09:33

## 2022-01-01 RX ADMIN — METRONIDAZOLE 500 MG: 500 TABLET ORAL at 08:10

## 2022-01-01 RX ADMIN — SODIUM CHLORIDE, PRESERVATIVE FREE 10 ML: 5 INJECTION INTRAVENOUS at 21:36

## 2022-01-01 RX ADMIN — LANSOPRAZOLE 30 MG: KIT at 09:34

## 2022-01-01 RX ADMIN — SODIUM CHLORIDE, PRESERVATIVE FREE 10 ML: 5 INJECTION INTRAVENOUS at 16:36

## 2022-01-01 RX ADMIN — ACETAMINOPHEN 650 MG: 325 TABLET ORAL at 13:17

## 2022-01-01 RX ADMIN — MEMANTINE 10 MG: 10 TABLET ORAL at 17:08

## 2022-01-01 RX ADMIN — ONDANSETRON 4 MG: 4 TABLET, ORALLY DISINTEGRATING ORAL at 17:59

## 2022-01-01 RX ADMIN — LANSOPRAZOLE 30 MG: KIT at 08:34

## 2022-01-01 RX ADMIN — SODIUM CHLORIDE, PRESERVATIVE FREE 10 ML: 5 INJECTION INTRAVENOUS at 21:48

## 2022-01-01 RX ADMIN — MEMANTINE 10 MG: 10 TABLET ORAL at 08:34

## 2022-01-01 RX ADMIN — ASPIRIN 81 MG 81 MG: 81 TABLET ORAL at 08:53

## 2022-01-01 RX ADMIN — Medication 1000 UNITS: at 09:13

## 2022-01-01 RX ADMIN — METRONIDAZOLE 500 MG: 500 TABLET ORAL at 08:37

## 2022-01-01 RX ADMIN — VANCOMYCIN HYDROCHLORIDE 1000 MG: 1 INJECTION, POWDER, LYOPHILIZED, FOR SOLUTION INTRAVENOUS at 17:39

## 2022-01-01 RX ADMIN — ENOXAPARIN SODIUM 40 MG: 100 INJECTION SUBCUTANEOUS at 22:04

## 2022-01-01 RX ADMIN — MEMANTINE 5 MG: 10 TABLET ORAL at 17:35

## 2022-01-01 RX ADMIN — CEFTRIAXONE SODIUM 1 G: 1 INJECTION, POWDER, FOR SOLUTION INTRAMUSCULAR; INTRAVENOUS at 10:13

## 2022-01-01 RX ADMIN — MEMANTINE 5 MG: 10 TABLET ORAL at 08:28

## 2022-01-01 RX ADMIN — MEMANTINE 10 MG: 10 TABLET ORAL at 08:21

## 2022-01-01 RX ADMIN — METRONIDAZOLE 500 MG: 500 TABLET ORAL at 11:21

## 2022-01-01 RX ADMIN — MEMANTINE 10 MG: 10 TABLET ORAL at 17:38

## 2022-01-01 RX ADMIN — MEMANTINE HYDROCHLORIDE 10 MG: 10 TABLET ORAL at 10:37

## 2022-01-01 RX ADMIN — POLYETHYLENE GLYCOL 3350 17 G: 17 POWDER, FOR SOLUTION ORAL at 17:50

## 2022-01-01 RX ADMIN — VANCOMYCIN HYDROCHLORIDE 500 MG: 500 INJECTION, POWDER, LYOPHILIZED, FOR SOLUTION INTRAVENOUS at 17:37

## 2022-01-01 RX ADMIN — FUROSEMIDE 40 MG: 40 TABLET ORAL at 09:33

## 2022-01-01 RX ADMIN — LEVOTHYROXINE SODIUM 50 MCG: 50 TABLET ORAL at 10:14

## 2022-01-01 RX ADMIN — METRONIDAZOLE 500 MG: 500 TABLET ORAL at 08:34

## 2022-01-01 RX ADMIN — SODIUM CHLORIDE, PRESERVATIVE FREE 10 ML: 5 INJECTION INTRAVENOUS at 06:06

## 2022-01-01 RX ADMIN — MEMANTINE 5 MG: 10 TABLET ORAL at 08:53

## 2022-01-01 RX ADMIN — POTASSIUM CHLORIDE 10 MEQ: 10 INJECTION, SOLUTION INTRAVENOUS at 17:59

## 2022-01-01 RX ADMIN — MEMANTINE 10 MG: 10 TABLET ORAL at 08:51

## 2022-01-01 RX ADMIN — MEMANTINE 10 MG: 10 TABLET ORAL at 08:53

## 2022-01-01 RX ADMIN — POTASSIUM CHLORIDE 10 MEQ: 10 INJECTION, SOLUTION INTRAVENOUS at 11:56

## 2022-01-01 RX ADMIN — METRONIDAZOLE 500 MG: 500 INJECTION, SOLUTION INTRAVENOUS at 10:21

## 2022-01-01 RX ADMIN — ACETAMINOPHEN 650 MG: 325 TABLET ORAL at 06:06

## 2022-01-01 RX ADMIN — MEMANTINE 10 MG: 10 TABLET ORAL at 09:09

## 2022-01-01 RX ADMIN — LEVOTHYROXINE SODIUM 50 MCG: 50 TABLET ORAL at 09:13

## 2022-01-01 RX ADMIN — SODIUM CHLORIDE, PRESERVATIVE FREE 10 ML: 5 INJECTION INTRAVENOUS at 06:33

## 2022-01-01 RX ADMIN — CLOPIDOGREL BISULFATE 75 MG: 75 TABLET ORAL at 10:37

## 2022-01-01 RX ADMIN — CLOPIDOGREL BISULFATE 75 MG: 75 TABLET ORAL at 10:23

## 2022-01-01 RX ADMIN — POTASSIUM CHLORIDE 10 MEQ: 10 INJECTION, SOLUTION INTRAVENOUS at 14:13

## 2022-01-01 RX ADMIN — CEFTRIAXONE SODIUM 1 G: 1 INJECTION, POWDER, FOR SOLUTION INTRAMUSCULAR; INTRAVENOUS at 10:47

## 2022-01-01 RX ADMIN — SODIUM CHLORIDE, PRESERVATIVE FREE 10 ML: 5 INJECTION INTRAVENOUS at 06:08

## 2022-01-01 RX ADMIN — METRONIDAZOLE 500 MG: 500 TABLET ORAL at 09:13

## 2022-01-01 RX ADMIN — SODIUM CHLORIDE, PRESERVATIVE FREE 10 ML: 5 INJECTION INTRAVENOUS at 22:00

## 2022-01-01 RX ADMIN — SODIUM CHLORIDE, PRESERVATIVE FREE 10 ML: 5 INJECTION INTRAVENOUS at 05:59

## 2022-01-01 RX ADMIN — MEMANTINE 5 MG: 10 TABLET ORAL at 18:00

## 2022-01-01 RX ADMIN — VANCOMYCIN HYDROCHLORIDE 500 MG: 500 INJECTION, POWDER, LYOPHILIZED, FOR SOLUTION INTRAVENOUS at 23:40

## 2022-01-01 RX ADMIN — VANCOMYCIN HYDROCHLORIDE 750 MG: 750 INJECTION, POWDER, LYOPHILIZED, FOR SOLUTION INTRAVENOUS at 23:50

## 2022-01-01 RX ADMIN — MEMANTINE 10 MG: 10 TABLET ORAL at 18:00

## 2022-01-01 RX ADMIN — FUROSEMIDE 40 MG: 40 TABLET ORAL at 10:23

## 2022-01-01 RX ADMIN — LEVOTHYROXINE SODIUM 50 MCG: 50 TABLET ORAL at 08:27

## 2022-01-01 RX ADMIN — SODIUM CHLORIDE, PRESERVATIVE FREE 10 ML: 5 INJECTION INTRAVENOUS at 13:55

## 2022-01-01 RX ADMIN — PANTOPRAZOLE SODIUM 40 MG: 40 TABLET, DELAYED RELEASE ORAL at 16:23

## 2022-01-01 RX ADMIN — Medication 1000 UNITS: at 08:21

## 2022-01-01 RX ADMIN — ONDANSETRON 4 MG: 4 TABLET, ORALLY DISINTEGRATING ORAL at 09:13

## 2022-01-01 RX ADMIN — PANTOPRAZOLE SODIUM 40 MG: 40 TABLET, DELAYED RELEASE ORAL at 10:23

## 2022-01-01 RX ADMIN — AMOXICILLIN AND CLAVULANATE POTASSIUM 1 TABLET: 500; 125 TABLET, FILM COATED ORAL at 08:51

## 2022-01-01 RX ADMIN — PANTOPRAZOLE SODIUM 40 MG: 40 TABLET, DELAYED RELEASE ORAL at 09:14

## 2022-01-01 RX ADMIN — VANCOMYCIN HYDROCHLORIDE 750 MG: 750 INJECTION, POWDER, LYOPHILIZED, FOR SOLUTION INTRAVENOUS at 12:55

## 2022-01-01 RX ADMIN — SODIUM CHLORIDE, PRESERVATIVE FREE 10 ML: 5 INJECTION INTRAVENOUS at 21:55

## 2022-01-01 RX ADMIN — AMOXICILLIN AND CLAVULANATE POTASSIUM 1 TABLET: 500; 125 TABLET, FILM COATED ORAL at 21:27

## 2022-01-01 RX ADMIN — MEMANTINE 10 MG: 10 TABLET ORAL at 09:21

## 2022-01-01 RX ADMIN — ASPIRIN 81 MG 81 MG: 81 TABLET ORAL at 08:56

## 2022-01-01 RX ADMIN — VANCOMYCIN HYDROCHLORIDE 500 MG: 500 INJECTION, POWDER, LYOPHILIZED, FOR SOLUTION INTRAVENOUS at 23:24

## 2022-01-01 RX ADMIN — MEMANTINE 5 MG: 10 TABLET ORAL at 18:17

## 2022-01-01 RX ADMIN — SODIUM CHLORIDE, PRESERVATIVE FREE 10 ML: 5 INJECTION INTRAVENOUS at 14:19

## 2022-01-01 RX ADMIN — LEVOTHYROXINE SODIUM 50 MCG: 50 TABLET ORAL at 08:21

## 2022-01-01 RX ADMIN — ATORVASTATIN CALCIUM 40 MG: 40 TABLET, FILM COATED ORAL at 22:04

## 2022-01-01 RX ADMIN — Medication 1000 UNITS: at 08:27

## 2022-01-01 RX ADMIN — AZITHROMYCIN MONOHYDRATE 500 MG: 250 TABLET ORAL at 09:14

## 2022-01-01 RX ADMIN — SODIUM CHLORIDE, PRESERVATIVE FREE 10 ML: 5 INJECTION INTRAVENOUS at 16:33

## 2022-01-01 RX ADMIN — LANSOPRAZOLE 30 MG: KIT at 08:55

## 2022-01-01 RX ADMIN — CEFTRIAXONE SODIUM 1 G: 1 INJECTION, POWDER, FOR SOLUTION INTRAMUSCULAR; INTRAVENOUS at 10:14

## 2022-01-01 RX ADMIN — MEMANTINE 10 MG: 10 TABLET ORAL at 17:30

## 2022-01-01 RX ADMIN — SODIUM CHLORIDE, PRESERVATIVE FREE 10 ML: 5 INJECTION INTRAVENOUS at 13:00

## 2022-01-01 RX ADMIN — LANSOPRAZOLE 30 MG: KIT at 09:01

## 2022-01-01 RX ADMIN — ASPIRIN 81 MG 81 MG: 81 TABLET ORAL at 08:57

## 2022-01-01 RX ADMIN — LANSOPRAZOLE 30 MG: KIT at 09:21

## 2022-01-01 RX ADMIN — POTASSIUM CHLORIDE 10 MEQ: 10 INJECTION, SOLUTION INTRAVENOUS at 16:26

## 2022-01-01 RX ADMIN — DEXTROSE MONOHYDRATE 100 ML/HR: 50 INJECTION, SOLUTION INTRAVENOUS at 00:50

## 2022-01-01 RX ADMIN — ASPIRIN 81 MG 81 MG: 81 TABLET ORAL at 09:20

## 2022-01-01 RX ADMIN — PANTOPRAZOLE SODIUM 40 MG: 40 TABLET, DELAYED RELEASE ORAL at 10:37

## 2022-01-01 RX ADMIN — SODIUM CHLORIDE, POTASSIUM CHLORIDE, SODIUM LACTATE AND CALCIUM CHLORIDE 50 ML/HR: 600; 310; 30; 20 INJECTION, SOLUTION INTRAVENOUS at 22:03

## 2022-01-01 RX ADMIN — LANSOPRAZOLE 30 MG: KIT at 08:10

## 2022-01-01 RX ADMIN — Medication 1000 UNITS: at 08:34

## 2022-01-01 RX ADMIN — SODIUM CHLORIDE, PRESERVATIVE FREE 10 ML: 5 INJECTION INTRAVENOUS at 21:40

## 2022-01-01 RX ADMIN — Medication 1000 UNITS: at 08:37

## 2022-01-01 RX ADMIN — LINEZOLID 600 MG: 600 TABLET ORAL at 17:35

## 2022-01-01 RX ADMIN — METRONIDAZOLE 500 MG: 500 TABLET ORAL at 21:05

## 2022-01-01 RX ADMIN — FUROSEMIDE 40 MG: 10 INJECTION, SOLUTION INTRAVENOUS at 11:34

## 2022-01-01 RX ADMIN — MEMANTINE 10 MG: 10 TABLET ORAL at 17:33

## 2022-01-01 RX ADMIN — ASPIRIN 81 MG 81 MG: 81 TABLET ORAL at 08:28

## 2022-01-01 RX ADMIN — SODIUM CHLORIDE, PRESERVATIVE FREE 10 ML: 5 INJECTION INTRAVENOUS at 02:16

## 2022-01-01 RX ADMIN — ENOXAPARIN SODIUM 40 MG: 100 INJECTION SUBCUTANEOUS at 22:06

## 2022-01-01 RX ADMIN — SODIUM CHLORIDE, PRESERVATIVE FREE 10 ML: 5 INJECTION INTRAVENOUS at 23:04

## 2022-01-01 RX ADMIN — LINEZOLID 600 MG: 600 TABLET ORAL at 04:00

## 2022-01-01 RX ADMIN — AZITHROMYCIN MONOHYDRATE 500 MG: 250 TABLET ORAL at 09:13

## 2022-01-01 RX ADMIN — METRONIDAZOLE 500 MG: 500 TABLET ORAL at 08:27

## 2022-01-01 RX ADMIN — MEMANTINE 10 MG: 10 TABLET ORAL at 17:10

## 2022-01-01 RX ADMIN — MEMANTINE 10 MG: 10 TABLET ORAL at 08:56

## 2022-01-01 RX ADMIN — DONEPEZIL HYDROCHLORIDE 10 MG: 5 TABLET, FILM COATED ORAL at 10:37

## 2022-01-01 RX ADMIN — LANSOPRAZOLE 30 MG: KIT at 11:18

## 2022-01-01 RX ADMIN — ONDANSETRON 4 MG: 4 TABLET, ORALLY DISINTEGRATING ORAL at 08:37

## 2022-01-01 RX ADMIN — DEXTROSE MONOHYDRATE 75 ML/HR: 50 INJECTION, SOLUTION INTRAVENOUS at 01:00

## 2022-01-01 RX ADMIN — SODIUM CHLORIDE, PRESERVATIVE FREE 10 ML: 5 INJECTION INTRAVENOUS at 15:04

## 2022-01-01 RX ADMIN — CEFTRIAXONE SODIUM 1 G: 1 INJECTION, POWDER, FOR SOLUTION INTRAMUSCULAR; INTRAVENOUS at 09:22

## 2022-01-01 RX ADMIN — LEVOTHYROXINE SODIUM 50 MCG: 50 TABLET ORAL at 08:57

## 2022-01-01 RX ADMIN — MEMANTINE 5 MG: 10 TABLET ORAL at 08:57

## 2022-01-01 RX ADMIN — Medication 1000 UNITS: at 09:14

## 2022-01-01 RX ADMIN — IOPAMIDOL 100 ML: 755 INJECTION, SOLUTION INTRAVENOUS at 19:05

## 2022-01-01 RX ADMIN — METRONIDAZOLE 500 MG: 500 TABLET ORAL at 10:14

## 2022-01-01 RX ADMIN — LINEZOLID 600 MG: 600 TABLET ORAL at 06:02

## 2022-01-01 RX ADMIN — LINEZOLID 600 MG: 600 TABLET ORAL at 08:28

## 2022-01-01 RX ADMIN — ASPIRIN 81 MG 81 MG: 81 TABLET ORAL at 08:27

## 2022-01-01 RX ADMIN — Medication 1000 UNITS: at 08:28

## 2022-01-01 RX ADMIN — SODIUM CHLORIDE, PRESERVATIVE FREE 10 ML: 5 INJECTION INTRAVENOUS at 21:14

## 2022-01-01 RX ADMIN — LANSOPRAZOLE 30 MG: KIT at 09:26

## 2022-01-01 RX ADMIN — CEFTRIAXONE SODIUM 1 G: 1 INJECTION, POWDER, FOR SOLUTION INTRAMUSCULAR; INTRAVENOUS at 09:44

## 2022-01-01 RX ADMIN — LEVOTHYROXINE SODIUM 50 MCG: 50 TABLET ORAL at 08:51

## 2022-01-01 RX ADMIN — AMOXICILLIN AND CLAVULANATE POTASSIUM 1 TABLET: 500; 125 TABLET, FILM COATED ORAL at 08:28

## 2022-01-01 RX ADMIN — LEVOTHYROXINE SODIUM 50 MCG: 50 TABLET ORAL at 09:20

## 2022-01-01 RX ADMIN — SODIUM CHLORIDE, PRESERVATIVE FREE 10 ML: 5 INJECTION INTRAVENOUS at 14:45

## 2022-01-01 RX ADMIN — LEVOFLOXACIN 750 MG: 5 INJECTION, SOLUTION INTRAVENOUS at 15:14

## 2022-01-01 RX ADMIN — ASPIRIN 81 MG 81 MG: 81 TABLET ORAL at 10:14

## 2022-01-01 RX ADMIN — LANSOPRAZOLE 30 MG: KIT at 09:36

## 2022-01-01 RX ADMIN — SODIUM CHLORIDE, PRESERVATIVE FREE 10 ML: 5 INJECTION INTRAVENOUS at 14:06

## 2022-01-01 RX ADMIN — LANSOPRAZOLE 30 MG: KIT at 09:02

## 2022-01-01 RX ADMIN — METRONIDAZOLE 500 MG: 500 TABLET ORAL at 21:48

## 2022-01-01 RX ADMIN — ONDANSETRON 4 MG: 4 TABLET, ORALLY DISINTEGRATING ORAL at 18:55

## 2022-01-01 RX ADMIN — MEMANTINE 10 MG: 10 TABLET ORAL at 17:40

## 2022-01-01 RX ADMIN — CEFTRIAXONE SODIUM 1 G: 1 INJECTION, POWDER, FOR SOLUTION INTRAMUSCULAR; INTRAVENOUS at 08:28

## 2022-01-01 RX ADMIN — LINEZOLID 600 MG: 600 TABLET ORAL at 18:00

## 2022-01-01 RX ADMIN — METRONIDAZOLE 500 MG: 500 TABLET ORAL at 08:21

## 2022-01-01 RX ADMIN — CEFTRIAXONE SODIUM 1 G: 1 INJECTION, POWDER, FOR SOLUTION INTRAMUSCULAR; INTRAVENOUS at 10:51

## 2022-01-01 RX ADMIN — ACETAMINOPHEN 650 MG: 325 TABLET ORAL at 08:57

## 2022-01-01 RX ADMIN — LANSOPRAZOLE 30 MG: KIT at 09:17

## 2022-01-01 RX ADMIN — POTASSIUM CHLORIDE 20 MEQ: 750 TABLET, FILM COATED, EXTENDED RELEASE ORAL at 16:27

## 2022-01-01 RX ADMIN — ONDANSETRON 4 MG: 4 TABLET, ORALLY DISINTEGRATING ORAL at 09:04

## 2022-01-01 RX ADMIN — METRONIDAZOLE 500 MG: 500 INJECTION, SOLUTION INTRAVENOUS at 02:16

## 2022-01-01 RX ADMIN — MEMANTINE 10 MG: 10 TABLET ORAL at 09:42

## 2022-01-01 RX ADMIN — SODIUM CHLORIDE, PRESERVATIVE FREE 10 ML: 5 INJECTION INTRAVENOUS at 21:46

## 2022-01-01 RX ADMIN — ASPIRIN 81 MG 81 MG: 81 TABLET ORAL at 08:10

## 2022-01-01 RX ADMIN — CEFTRIAXONE SODIUM 1 G: 1 INJECTION, POWDER, FOR SOLUTION INTRAMUSCULAR; INTRAVENOUS at 10:34

## 2022-01-01 RX ADMIN — SODIUM CHLORIDE, POTASSIUM CHLORIDE, SODIUM LACTATE AND CALCIUM CHLORIDE 50 ML/HR: 600; 310; 30; 20 INJECTION, SOLUTION INTRAVENOUS at 18:19

## 2022-01-01 RX ADMIN — ACETAMINOPHEN 650 MG: 325 TABLET ORAL at 08:53

## 2022-01-01 RX ADMIN — LANSOPRAZOLE 30 MG: KIT at 09:07

## 2022-01-01 RX ADMIN — CEFTRIAXONE SODIUM 1 G: 1 INJECTION, POWDER, FOR SOLUTION INTRAMUSCULAR; INTRAVENOUS at 14:00

## 2022-01-01 RX ADMIN — DEXTROSE MONOHYDRATE 75 ML/HR: 50 INJECTION, SOLUTION INTRAVENOUS at 17:46

## 2022-01-01 RX ADMIN — LANSOPRAZOLE 30 MG: KIT at 09:13

## 2022-01-01 RX ADMIN — MEMANTINE 10 MG: 10 TABLET ORAL at 10:14

## 2022-01-01 RX ADMIN — METRONIDAZOLE 500 MG: 500 INJECTION, SOLUTION INTRAVENOUS at 10:50

## 2022-01-01 RX ADMIN — METRONIDAZOLE 500 MG: 500 TABLET ORAL at 09:09

## 2022-01-01 RX ADMIN — MEMANTINE 10 MG: 10 TABLET ORAL at 09:13

## 2022-01-01 RX ADMIN — AZITHROMYCIN MONOHYDRATE 500 MG: 250 TABLET ORAL at 10:14

## 2022-01-01 RX ADMIN — METRONIDAZOLE 500 MG: 500 INJECTION, SOLUTION INTRAVENOUS at 21:28

## 2022-01-01 RX ADMIN — SODIUM CHLORIDE 500 ML: 9 INJECTION, SOLUTION INTRAVENOUS at 23:27

## 2022-01-01 RX ADMIN — SODIUM CHLORIDE, PRESERVATIVE FREE 10 ML: 5 INJECTION INTRAVENOUS at 14:40

## 2022-01-01 RX ADMIN — VANCOMYCIN HYDROCHLORIDE 750 MG: 750 INJECTION, POWDER, LYOPHILIZED, FOR SOLUTION INTRAVENOUS at 23:01

## 2022-01-01 RX ADMIN — MEMANTINE 10 MG: 10 TABLET ORAL at 08:37

## 2022-01-01 RX ADMIN — DEXTROSE AND SODIUM CHLORIDE 50 ML/HR: 5; 450 INJECTION, SOLUTION INTRAVENOUS at 10:51

## 2022-01-01 RX ADMIN — ASPIRIN 81 MG 81 MG: 81 TABLET ORAL at 08:38

## 2022-01-01 RX ADMIN — SODIUM CHLORIDE, PRESERVATIVE FREE 10 ML: 5 INJECTION INTRAVENOUS at 06:51

## 2022-01-01 RX ADMIN — SODIUM CHLORIDE, PRESERVATIVE FREE 10 ML: 5 INJECTION INTRAVENOUS at 13:43

## 2022-01-01 RX ADMIN — LEVOTHYROXINE SODIUM 50 MCG: 50 TABLET ORAL at 08:10

## 2022-01-01 RX ADMIN — ACETAMINOPHEN 650 MG: 325 TABLET ORAL at 08:34

## 2022-01-01 RX ADMIN — METRONIDAZOLE 500 MG: 500 TABLET ORAL at 12:29

## 2022-01-01 RX ADMIN — LANSOPRAZOLE 30 MG: KIT at 07:30

## 2022-01-01 RX ADMIN — ATORVASTATIN CALCIUM 40 MG: 40 TABLET, FILM COATED ORAL at 22:06

## 2022-01-01 RX ADMIN — MEMANTINE HYDROCHLORIDE 10 MG: 10 TABLET ORAL at 09:33

## 2022-01-01 RX ADMIN — METRONIDAZOLE 500 MG: 500 TABLET ORAL at 21:40

## 2022-01-01 RX ADMIN — METRONIDAZOLE 500 MG: 500 TABLET ORAL at 21:53

## 2022-01-01 RX ADMIN — VANCOMYCIN HYDROCHLORIDE 750 MG: 750 INJECTION, POWDER, LYOPHILIZED, FOR SOLUTION INTRAVENOUS at 01:30

## 2022-01-01 RX ADMIN — MEMANTINE HYDROCHLORIDE 10 MG: 10 TABLET ORAL at 10:23

## 2022-01-01 RX ADMIN — SODIUM CHLORIDE, POTASSIUM CHLORIDE, SODIUM LACTATE AND CALCIUM CHLORIDE 100 ML/HR: 600; 310; 30; 20 INJECTION, SOLUTION INTRAVENOUS at 11:21

## 2022-01-01 RX ADMIN — SODIUM CHLORIDE, PRESERVATIVE FREE 10 ML: 5 INJECTION INTRAVENOUS at 06:53

## 2022-01-01 RX ADMIN — ASPIRIN 81 MG 81 MG: 81 TABLET ORAL at 09:13

## 2022-01-01 RX ADMIN — DEXTROSE MONOHYDRATE 50 ML/HR: 50 INJECTION, SOLUTION INTRAVENOUS at 00:17

## 2022-01-01 RX ADMIN — DEXTROSE MONOHYDRATE 100 ML/HR: 50 INJECTION, SOLUTION INTRAVENOUS at 14:16

## 2022-01-01 RX ADMIN — Medication 1000 UNITS: at 08:56

## 2022-01-01 RX ADMIN — SODIUM CHLORIDE, PRESERVATIVE FREE 10 ML: 5 INJECTION INTRAVENOUS at 05:39

## 2022-01-01 RX ADMIN — Medication 1000 UNITS: at 10:14

## 2022-01-01 RX ADMIN — POTASSIUM CHLORIDE 10 MEQ: 10 INJECTION, SOLUTION INTRAVENOUS at 21:29

## 2022-01-01 RX ADMIN — MEMANTINE 5 MG: 10 TABLET ORAL at 18:10

## 2022-01-01 RX ADMIN — SODIUM CHLORIDE, PRESERVATIVE FREE 10 ML: 5 INJECTION INTRAVENOUS at 21:28

## 2022-01-01 RX ADMIN — ASPIRIN 81 MG 81 MG: 81 TABLET ORAL at 08:21

## 2022-01-01 RX ADMIN — ENOXAPARIN SODIUM 40 MG: 100 INJECTION SUBCUTANEOUS at 22:52

## 2022-01-01 RX ADMIN — METRONIDAZOLE 500 MG: 500 TABLET ORAL at 22:24

## 2022-01-01 RX ADMIN — AMOXICILLIN AND CLAVULANATE POTASSIUM 1 TABLET: 500; 125 TABLET, FILM COATED ORAL at 16:46

## 2022-01-01 RX ADMIN — SODIUM CHLORIDE, PRESERVATIVE FREE 10 ML: 5 INJECTION INTRAVENOUS at 05:27

## 2022-01-01 RX ADMIN — ASPIRIN 81 MG 81 MG: 81 TABLET ORAL at 08:51

## 2022-01-01 RX ADMIN — MEMANTINE 10 MG: 10 TABLET ORAL at 17:12

## 2022-01-01 RX ADMIN — MAGNESIUM HYDROXIDE/ALUMINUM HYDROXICE/SIMETHICONE 30 ML: 120; 1200; 1200 SUSPENSION ORAL at 14:45

## 2022-01-01 RX ADMIN — METRONIDAZOLE 500 MG: 500 TABLET ORAL at 21:46

## 2022-01-01 RX ADMIN — ACETAMINOPHEN 650 MG: 325 TABLET ORAL at 02:12

## 2022-01-01 RX ADMIN — FUROSEMIDE 20 MG: 10 INJECTION, SOLUTION INTRAVENOUS at 11:52

## 2022-01-01 RX ADMIN — SODIUM CHLORIDE, PRESERVATIVE FREE 10 ML: 5 INJECTION INTRAVENOUS at 10:50

## 2022-02-03 NOTE — TELEPHONE ENCOUNTER
PCP: Jackson Guadalupe MD    Last appt: 10/5/2021  Future Appointments   Date Time Provider Sonia Irizarry   2/7/2022  9:40 AM Jackson Guadalupe MD [facility] BS AMB       Last refilled:    Requested Prescriptions     Pending Prescriptions Disp Refills    colchicine 0.6 mg tablet 30 Tablet 2     Sig: Take 1 Tablet by mouth daily.  TAKE ONE TABLET AS NEEDED FOR GOUT
Yes

## 2022-02-06 PROBLEM — E55.9 VITAMIN D DEFICIENCY: Status: ACTIVE | Noted: 2022-01-01

## 2022-02-10 NOTE — TELEPHONE ENCOUNTER
RX refill request from the patient/pharmacy. Patient last seen 08- with labs, and next appt. scheduled for 02-  Requested Prescriptions     Pending Prescriptions Disp Refills    memantine (NAMENDA) 10 mg tablet 60 Tablet 0     Sig: TAKE 1 TABLET BY MOUTH TWICE A DAY   .

## 2022-02-10 NOTE — TELEPHONE ENCOUNTER
PCP: Percy Merritt MD    Last appt: 10/5/2021  Future Appointments   Date Time Provider Sonia Irizarry   2/14/2022 11:10 AM Percy Merritt MD [facility] AMB       Requested Prescriptions     Pending Prescriptions Disp Refills    memantine (NAMENDA) 10 mg tablet 60 Tablet 0     Sig: TAKE 1 TABLET BY MOUTH TWICE A DAY       Prior labs and Blood pressures:  BP Readings from Last 3 Encounters:   08/05/21 130/72   04/29/21 120/70   04/28/21 130/72     Lab Results   Component Value Date/Time    Sodium 140 08/05/2021 12:28 PM    Potassium 3.9 08/05/2021 12:28 PM    Chloride 106 08/05/2021 12:28 PM    CO2 29 08/05/2021 12:28 PM    Anion gap 5 08/05/2021 12:28 PM    Glucose 92 08/05/2021 12:28 PM    BUN 36 (H) 08/05/2021 12:28 PM    Creatinine 1.49 (H) 08/05/2021 12:28 PM    BUN/Creatinine ratio 24 (H) 08/05/2021 12:28 PM    GFR est AA 54 (L) 08/05/2021 12:28 PM    GFR est non-AA 44 (L) 08/05/2021 12:28 PM    Calcium 9.6 08/05/2021 12:28 PM     Lab Results   Component Value Date/Time    Hemoglobin A1c 5.8 (H) 08/05/2021 12:28 PM    Hemoglobin A1c (POC) 5.7 04/28/2021 11:03 AM     Lab Results   Component Value Date/Time    Cholesterol, total 190 08/05/2021 12:28 PM    Cholesterol (POC) 197.0 06/12/2018 01:54 PM    HDL Cholesterol 58 08/05/2021 12:28 PM    HDL Cholesterol (POC) 66.0 06/12/2018 01:54 PM    LDL Cholesterol (POC) 101.8 06/12/2018 01:54 PM    LDL, calculated 116.2 (H) 08/05/2021 12:28 PM    VLDL, calculated 15.8 08/05/2021 12:28 PM    Triglyceride 79 08/05/2021 12:28 PM    Triglycerides (POC) 146.0 06/12/2018 01:54 PM    CHOL/HDL Ratio 3.3 08/05/2021 12:28 PM     No results found for: Bedelia Gram, [id number]    Lab Results   Component Value Date/Time    TSH 7.03 (H) 10/29/2019 02:21 AM    TSH, 3rd generation 5.17 10/22/2020 01:35 PM

## 2022-02-13 PROBLEM — N17.9 ACUTE RENAL FAILURE SUPERIMPOSED ON STAGE 3 CHRONIC KIDNEY DISEASE (HCC): Status: RESOLVED | Noted: 2020-07-20 | Resolved: 2022-01-01

## 2022-02-13 PROBLEM — N17.9 AKI (ACUTE KIDNEY INJURY) (HCC): Status: RESOLVED | Noted: 2020-12-14 | Resolved: 2022-01-01

## 2022-02-13 PROBLEM — N18.30 ACUTE RENAL FAILURE SUPERIMPOSED ON STAGE 3 CHRONIC KIDNEY DISEASE (HCC): Status: RESOLVED | Noted: 2020-07-20 | Resolved: 2022-01-01

## 2022-02-14 NOTE — TELEPHONE ENCOUNTER
RX refill request from the patient/pharmacy. Patient last seen 08- with labs, and next appt. scheduled for 02-  Requested Prescriptions     Pending Prescriptions Disp Refills    donepeziL (ARICEPT) 10 mg tablet [Pharmacy Med Name: DONEPEZIL HCL 10 MG TABLET] 90 Tablet 0     Sig: TAKE 1 TABLET BY MOUTH EVERY DAY   .

## 2022-02-14 NOTE — TELEPHONE ENCOUNTER
RX refill request from the patient/pharmacy. Patient last seen 08- with labs, and next appt. scheduled for 02-  Requested Prescriptions     Pending Prescriptions Disp Refills    furosemide (LASIX) 20 mg tablet [Pharmacy Med Name: FUROSEMIDE 20 MG TABLET] 30 Tablet 0     Sig: TAKE 2 TABS BY MOUTH DAILY. 1 EACH MORNING BY MOUTH   .

## 2022-02-15 NOTE — TELEPHONE ENCOUNTER
RX refill request from the patient/pharmacy. Patient last seen 08- with labs, and next appt. scheduled for 02-  Requested Prescriptions     Pending Prescriptions Disp Refills    colchicine 0.6 mg tablet 30 Tablet 0     Sig: Take 1 Tablet by mouth daily. TAKE ONE TABLET AS NEEDED FOR GOUT   .

## 2022-02-15 NOTE — TELEPHONE ENCOUNTER
PCP: Percy Merritt MD    Last appt: 10/5/2021  Future Appointments   Date Time Provider Sonia Irizarry   2/17/2022 11:20 AM Percy Merritt MD [facility] AMB       Requested Prescriptions     Pending Prescriptions Disp Refills    colchicine 0.6 mg tablet 30 Tablet 2     Sig: Take 1 Tablet by mouth daily.  TAKE ONE TABLET AS NEEDED FOR GOUT       Prior labs and Blood pressures:  BP Readings from Last 3 Encounters:   08/05/21 130/72   04/29/21 120/70   04/28/21 130/72     Lab Results   Component Value Date/Time    Sodium 140 08/05/2021 12:28 PM    Potassium 3.9 08/05/2021 12:28 PM    Chloride 106 08/05/2021 12:28 PM    CO2 29 08/05/2021 12:28 PM    Anion gap 5 08/05/2021 12:28 PM    Glucose 92 08/05/2021 12:28 PM    BUN 36 (H) 08/05/2021 12:28 PM    Creatinine 1.49 (H) 08/05/2021 12:28 PM    BUN/Creatinine ratio 24 (H) 08/05/2021 12:28 PM    GFR est AA 54 (L) 08/05/2021 12:28 PM    GFR est non-AA 44 (L) 08/05/2021 12:28 PM    Calcium 9.6 08/05/2021 12:28 PM     Lab Results   Component Value Date/Time    Hemoglobin A1c 5.8 (H) 08/05/2021 12:28 PM    Hemoglobin A1c (POC) 5.7 04/28/2021 11:03 AM     Lab Results   Component Value Date/Time    Cholesterol, total 190 08/05/2021 12:28 PM    Cholesterol (POC) 197.0 06/12/2018 01:54 PM    HDL Cholesterol 58 08/05/2021 12:28 PM    HDL Cholesterol (POC) 66.0 06/12/2018 01:54 PM    LDL Cholesterol (POC) 101.8 06/12/2018 01:54 PM    LDL, calculated 116.2 (H) 08/05/2021 12:28 PM    VLDL, calculated 15.8 08/05/2021 12:28 PM    Triglyceride 79 08/05/2021 12:28 PM    Triglycerides (POC) 146.0 06/12/2018 01:54 PM    CHOL/HDL Ratio 3.3 08/05/2021 12:28 PM     No results found for: JOESPH Armstrong    Lab Results   Component Value Date/Time    TSH 7.03 (H) 10/29/2019 02:21 AM    TSH, 3rd generation 5.17 10/22/2020 01:35 PM

## 2022-02-21 PROBLEM — I63.9 ACUTE CVA (CEREBROVASCULAR ACCIDENT) (HCC): Status: ACTIVE | Noted: 2022-01-01

## 2022-02-21 NOTE — ED PROVIDER NOTES
EMERGENCY DEPARTMENT HISTORY AND PHYSICAL EXAM      Date: 2/21/2022  Patient Name: Divine Early Sr    History of Presenting Illness     No chief complaint on file. History Provided By: Patient    HPI: Barron Farfan Sr, 80 y.o. male with PMHx as noted below presents the emergency department with chief complaint of aphasia. History is limited secondary to inability to speak. History obtained from patient's granddaughter. She reports that he was last seen well at approximately noon. When she came home today noted that he was unable to respond to her so brought him here for evaluation. Otherwise reports a prior to noon had been in his usual baseline which is conversant and able to care for himself.       PCP: Francisca Lozano MD    Current Facility-Administered Medications   Medication Dose Route Frequency Provider Last Rate Last Admin    dextrose 10% infusion 250 mL  250 mL IntraVENous ONCE Angel Palomares MD        [START ON 2/22/2022] donepeziL (ARICEPT) tablet 10 mg  10 mg Oral DAILY MD Zoë Sargent ON 2/22/2022] furosemide (LASIX) tablet 40 mg  40 mg Oral DAILY Angel Palomares MD        [START ON 2/22/2022] memantine (NAMENDA) tablet 10 mg  10 mg Oral DAILY MD Zoë Sargent ON 2/22/2022] pantoprazole (PROTONIX) tablet 40 mg  40 mg Oral ACB Elva Erickson MD        acetaminophen (TYLENOL) tablet 650 mg  650 mg Oral Q4H PRN Angel Palomares MD        Or   [facility] acetaminophen (TYLENOL) solution 650 mg  650 mg Per NG tube Q4H PRN Angel Palomares MD        Or   [facility] acetaminophen (TYLENOL) suppository 650 mg  650 mg Rectal Q4H PRN Angel Palomares MD        [START ON 2/22/2022] clopidogreL (PLAVIX) tablet 75 mg  75 mg Oral DAILY Elva Villaseñor MD        polyethylene glycol (MIRALAX) packet 17 g  17 g Oral DAILY PRN Angel Palomares MD        enoxaparin (LOVENOX) injection 40 mg  40 mg SubCUTAneous Q24H Angel Palomares MD   40 mg at 02/21/22 3322    sodium chloride 0.9 % bolus infusion 500 mL  500 mL IntraVENous NOW Bienvenido Marin MD   Held at 02/21/22 2223    dextrose 5% infusion  50 mL/hr IntraVENous CONTINUOUS Bradford Hand MD           Past History     Past Medical History:  Past Medical History:   Diagnosis Date    Abdominal pain 7/18/2017    Alzheimer disease ([facility] 7/18/2017    Anemia 7/18/2017    Arthritis     Back pain 7/18/2017    Bradycardia 7/18/2017    CAD (coronary artery disease)     hx of MI    Chronic diastolic heart failure ([facility] 2/26/2021    CKD (chronic kidney disease), stage II 7/18/2017    constipation     Depression 7/18/2017    Diabetes mellitus ([facility] 7/18/2017    Diverticulosis 7/18/2017    DJD (degenerative joint disease) 7/18/2017    Encounter for long-term (current) use of other medications 7/18/2017    Fatigue     Gastritis and duodenitis 7/18/2017    GERD (gastroesophageal reflux disease)     GI bleed 7/18/2017    Hearing loss     Hyperlipidemia 7/18/2017    Hypertension     Hypertension with renal disease 7/18/2017    Ill-defined condition     Dementia    Internal hemorrhoids 7/18/2017    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    S/P cardiac pacemaker procedure 5/4/2015    5/4/15 Medtronic dual chamber pacemaker implant     Thyroid disease     Weight loss     lost over 40 pounds last year- has no appetite       Past Surgical History:  Past Surgical History:   Procedure Laterality Date    COLONOSCOPY N/A 6/22/2017    COLONOSCOPY performed by Ely Ferreira MD at [address] [place] [address],[address]  6/22/2017         [address]  7/10/2014    right inguinal    HX OTHER SURGICAL      cystectomy from back    [place] EGD TRANSORAL BIOPSY SINGLE/MULTIPLE  2/14/2012         [place] UPPER GI ENDOSCOPY,W/DIR SUBMUC INJ  7/23/2020         UPPER GI ENDOSCOPY,BIOPSY  6/22/2017         UPPER GI ENDOSCOPY,BIOPSY  7/23/2020            Family History:  Family History   Problem Relation Age of Onset    Hypertension Mother     Heart Disease Father     Cancer Other         son-cancer? unknown what type        Social History:  Social History     Tobacco Use    Smoking status: Former Smoker     Packs/day: 0.50     Years: 10.00     Pack years: 5.00     Start date: 1991    Smokeless tobacco: Never Used    Tobacco comment: quit 50 years ago   Vaping Use    Vaping Use: Never used   Substance Use Topics    Alcohol use: Not Currently     Comment: Occasional beer    Drug use: No       Allergies:  No Known Allergies      Review of Systems   Review of Systems   Unable to perform ROS: Patient nonverbal       Physical Exam   Physical Exam    GENERAL: alert and oriented, no acute distress  EYES: PEERL, No injection, discharge or icterus. ENT: Mucous membranes pink and moist.  NECK: Supple  LUNGS: Airway patent. Non-labored respirations. Breath sounds clear with good air entry bilaterally. HEART: Regular rate and rhythm. No peripheral edema  ABDOMEN: Non-distended and non-tender, without guarding or rebound. SKIN:  warm, dry  EXTREMITIES: Without swelling, tenderness or deformity, symmetric with normal ROM  NEUROLOGICAL: Alert, cranial nerves II through XII intact with exception of mild right facial droop, expressive aphasia noted, patient will follow commands and moving all extremities.       Diagnostic Study Results     Labs -     Recent Results (from the past 12 hour(s))   GLUCOSE, POC    Collection Time: 22  6:41 PM   Result Value Ref Range    Glucose (POC) 78 65 - 117 mg/dL    Performed by Alicia Nazario EDT    CBC WITH AUTOMATED DIFF    Collection Time: 22  6:59 PM   Result Value Ref Range    WBC 6.2 4.1 - 11.1 K/uL    RBC 4.53 4.10 - 5.70 M/uL    HGB 12.3 12.1 - 17.0 g/dL    HCT 40.2 36.6 - 50.3 %    MCV 88.7 80.0 - 99.0 FL    MCH 27.2 26.0 - 34.0 PG    MCHC 30.6 30.0 - 36.5 g/dL    RDW 16.0 (H) 11.5 - 14.5 %    PLATELET 925 460 - 858 K/uL    MPV 11.6 8.9 - 12.9 FL    NRBC 0.0 0  WBC    ABSOLUTE NRBC 0.00 0.00 - 0.01 K/uL    NEUTROPHILS 74 32 - 75 %    LYMPHOCYTES 13 12 - 49 %    MONOCYTES 10 5 - 13 %    EOSINOPHILS 2 0 - 7 %    BASOPHILS 1 0 - 1 %    IMMATURE GRANULOCYTES 0 0.0 - 0.5 %    ABS. NEUTROPHILS 4.6 1.8 - 8.0 K/UL    ABS. LYMPHOCYTES 0.8 0.8 - 3.5 K/UL    ABS. MONOCYTES 0.6 0.0 - 1.0 K/UL    ABS. EOSINOPHILS 0.1 0.0 - 0.4 K/UL    ABS. BASOPHILS 0.0 0.0 - 0.1 K/UL    ABS. IMM. GRANS. 0.0 0.00 - 0.04 K/UL    DF AUTOMATED     METABOLIC PANEL, COMPREHENSIVE    Collection Time: 02/21/22  6:59 PM   Result Value Ref Range    Sodium 144 136 - 145 mmol/L    Potassium 3.6 3.5 - 5.1 mmol/L    Chloride 106 97 - 108 mmol/L    CO2 34 (H) 21 - 32 mmol/L    Anion gap 4 (L) 5 - 15 mmol/L    Glucose 99 65 - 100 mg/dL    BUN 38 (H) 6 - 20 MG/DL    Creatinine 1.49 (H) 0.70 - 1.30 MG/DL    BUN/Creatinine ratio 26 (H) 12 - 20      GFR est AA 54 (L) >60 ml/min/1.73m2    GFR est non-AA 44 (L) >60 ml/min/1.73m2    Calcium 9.2 8.5 - 10.1 MG/DL    Bilirubin, total 0.7 0.2 - 1.0 MG/DL    ALT (SGPT) 53 12 - 78 U/L    AST (SGOT) 44 (H) 15 - 37 U/L    Alk. phosphatase 111 45 - 117 U/L    Protein, total 7.1 6.4 - 8.2 g/dL    Albumin 2.9 (L) 3.5 - 5.0 g/dL    Globulin 4.2 (H) 2.0 - 4.0 g/dL    A-G Ratio 0.7 (L) 1.1 - 2.2     PROTHROMBIN TIME + INR    Collection Time: 02/21/22  6:59 PM   Result Value Ref Range    INR 1.1 0.9 - 1.1      Prothrombin time 11.1 9.0 - 11.1 sec   EKG, 12 LEAD, INITIAL    Collection Time: 02/21/22  7:23 PM   Result Value Ref Range    Ventricular Rate 68 BPM    Atrial Rate 68 BPM    P-R Interval 136 ms    QRS Duration 168 ms    Q-T Interval 504 ms    QTC Calculation (Bezet) 535 ms    Calculated R Axis -140 degrees    Calculated T Axis 83 degrees    Diagnosis       AV dual-paced rhythm with frequent premature ventricular complexes  When compared with ECG of 24-FEB-2021 19:37,  Vent.  rate has decreased BY  12 BPM         Radiologic Studies -   CTA CODE NEURO HEAD AND NECK W CONT         CT CODE NEURO PERF W CBF         CT CODE NEURO HEAD WO CONTRAST   Final Result      Small low-attenuation areas in the left occipital and inferior parietal lobes,   consistent with areas of nonhemorrhagic infarction of indeterminate age,   possibly acute. Background of white matter hypodensity consistent with chronic small vessel   ischemic change. CXR Results  (Last 48 hours)    None            Medical Decision Making     IKeyur MD am the first provider for this patient and am the attending of record for this patient encounter. I reviewed the vital signs, available nursing notes, past medical history, past surgical history, family history and social history. Vital Signs-Reviewed the patient's vital signs. Patient Vitals for the past 12 hrs:   Temp Pulse Resp BP SpO2   02/21/22 2130  61 21 126/64    02/21/22 2033  67 18 115/61 99 %   02/21/22 1832 98.5 °F (36.9 °C) 85 18 (!) 138/55 96 %       EKG interpretation: (Preliminary)  Rhythm: Paced rhythm. Rate (approx.): 68.  was interpreted by Griffin Zhou MD,ED Provider. Records Reviewed: Nursing Notes and Old Medical Records    Provider Notes (Medical Decision Making): On presentation, the patient is well appearing but does appear to have expressive aphasia. Patient is outside of the TPA window his last known well was approximately 6 hours prior to arrival however was made a level 2 stroke alert. Patient evaluated by teleneurology and in agreement that he would not be a TPA candidate. CT head showed a possible occipital infarction, CT angiogram with no large vessel occlusions and no perfusion deficit noted on CTP. Remainder of the labs were nondiagnostic and appear to be at patient's baseline. Patient does take daily aspirin. We will plan to admit for further stroke work-up. ED Course:   Initial assessment performed.  The patients presenting problems have been discussed with the family, and they are in agreement with the care plan formulated and outlined with them. I have encouraged them to ask questions as they arise throughout their visit. Medications   dextrose 10% infusion 250 mL (has no administration in time range)   donepeziL (ARICEPT) tablet 10 mg (has no administration in time range)   furosemide (LASIX) tablet 40 mg (has no administration in time range)   memantine (NAMENDA) tablet 10 mg (has no administration in time range)   pantoprazole (PROTONIX) tablet 40 mg (has no administration in time range)   acetaminophen (TYLENOL) tablet 650 mg (has no administration in time range)     Or   acetaminophen (TYLENOL) solution 650 mg (has no administration in time range)     Or   acetaminophen (TYLENOL) suppository 650 mg (has no administration in time range)   clopidogreL (PLAVIX) tablet 75 mg (has no administration in time range)   polyethylene glycol (MIRALAX) packet 17 g (has no administration in time range)   enoxaparin (LOVENOX) injection 40 mg (40 mg SubCUTAneous Given 2/21/22 2252)   sodium chloride 0.9 % bolus infusion 500 mL (0 mL IntraVENous Held 2/21/22 2223)   dextrose 5% infusion (has no administration in time range)   iopamidoL (ISOVUE-370) 76 % injection 100 mL (100 mL IntraVENous Given 2/21/22 1905)       PROGRESS:  The patient has been re-evaluated. Reviewed available results with patient's family and have counseled them on diagnosis and care plan. They have expressed understanding, and all their questions have been answered. They agree with plan for admission. CONSULT:  Herlinda Corcoran MD spoke with the hospitalist.  Discussed HPI and PE, available diagnostic tests and clinical findings. He is in agreement with care plans as outlined and will evaluate for admission     Admit Note  Patient is being admitted to the hospitalist.  The results of their tests and reasons for their admission have been discussed with available family.   They convey agreement and understanding for the need to be admitted and for their admission diagnosis. Consultation has been made with the inpatient physician specialist for hospitalization. Disposition:  admission    PLAN:  1. Admit     Diagnosis     Clinical Impression:   1. Acute stroke  2. Dysarthria  3. Kidney disease    Please note that this dictation was completed with Dragon, computer voice recognition software. Quite often unanticipated grammatical, syntax, homophones, and other interpretive errors are inadvertently transcribed by the computer software. Please disregard these errors. Additionally, please excuse any errors that have escaped final proofreading.

## 2022-02-22 NOTE — PROGRESS NOTES
Transition of Care Plan:    RUR: 17%  Disposition: home with granddaughter, [facility]   Follow up appointments: PCP, specialists as indicated   DME needed: N/A, has a rollator and wheelchair  Transportation at Discharge: family will transport home   [place] or means to access home: yes        Medicare Letter: to be reviewed prior to d/c   Is patient a BCPI-A Bundle: No            If yes, was Bundle Letter given?:    Is patient a [place] and connected with the [place]? Yes, not connected to the [place] (not registered)               If yes, was [place] transfer form completed and [facility] notified? [facility]giver Contact: [facility] [phone]  Discharge Caregiver contacted prior to discharge? yes  Care Conference needed?: No    Reason for Admission: acute CVA                       RUR Score: 17%                 PCP: First and Last name:   Hardik Méndez MD     Name of Practice:    Are you a current patient: Yes/No: yes   Approximate date of last visit: August 2021   Can you participate in a virtual visit if needed: yes with family assistance     Do you (patient/family) have any concerns for transition/discharge? None voiced at this time                  Plan for utilizing home health:   Yes, [facility]    Current Advanced Directive/Advance Care Plan:  Full Code    Advance Care Planning     General Advance Care Planning (ACP) Conversation    Date of Conversation: 2/22/22    Conducted with: Healthcare Decision Maker: Named in Advance Directive or Healthcare Power of [facility]:     Primary Decision Maker (Active): Volodymyr Banks - [phone]    Secondary Decision Maker: Yesica Cabral - Daughter - [phone]  Click here to complete [address] including selection of the Healthcare Decision Maker Relationship (ie \"Primary\")      Today we documented Decision Maker(s) consistent with ACP documents on file. Content/Action Overview:    Has ACP document(s) on file - reflects the patient's care preferences  Reviewed DNR/DNI and patient elects Full Code (Attempt Resuscitation)    Length of Voluntary ACP Conversation in minutes:  <16 minutes (Non-Billable)    Miasharon Diamelani         Transition of Care Plan:  Home with family, [facility]    Chart reviewed. Attempted to meet with pt at bedside to complete assessment today. Noted past medical history of dementia at baseline. Pt currently MICAH for testing at time of CM visit. Spoke with family at bedside, Rich Mas, who is pt's primary caregiver and mPOA. Verified contact information and demographics. Pt lives with his grand-daughter in a one level home with 6 HELEN. Pt is typically able to perform ADLs with supervision from family. He uses a rollator for ambulation when needed. He also has access to a wheelchair which was owned previously by family. Oscar Baptiste assists pt with medication management, finances, transportation, and meal preparation. PCP is Dr. Genevieve Gutiérrez with last visit in August 2021. Preferred pharmacy is [facility] on [address]. Family states they will transport pt home at d/c. No prior hx of SNF/rehab stays reported at this time. Pt with hx of [facility] for home health services. Pt does not have prescription insurance coverage. Family notes that pt is a , but has not received care at [facility] in the past. Call placed to [facility] flow center for verification. Per staff, pt is not registered with the [facility] and is not service connected. Should family want to pursue registration, they must take copy of pt's [phone] form to central registration after d/c. Discussed recommendations for [facility] services at d/c. Oscar Baptiste prefers to use [facility] at discharge. Referral sent via [facility]. [facility] accepted referral. Information added to AVS for review. Family requesting resources as to where to rent a hospital bed. Will continue to follow. Care Management Interventions  PCP Verified by CM:  Yes  Palliative Care Criteria Met (RRAT>21 & CHF Dx)?: No  Mode of Transport at Discharge:  Other (see comment) (family)  Transition of Care Consult (CM Consult): Discharge Planning  Discharge Durable Medical Equipment: No  Physical Therapy Consult: Yes  Occupational Therapy Consult: Yes  Speech Therapy Consult: Yes  Support Systems: Child(turner),Other Family Member(s)  Confirm Follow Up Transport: Family  The Plan for Transition of Care is Related to the Following Treatment Goals : [facility] AT [facility]  The Patient and/or Patient Representative was Provided with a Choice of Provider and Agrees with the Discharge Plan?: Yes  Name of the Patient Representative Who was Provided with a Choice of Provider and Agrees with the Discharge Plan: granddaughter Alonso Obando  [facility] of Choice List was Provided with Basic Dialogue that Supports the Patient's Individualized Plan of Care/Goals, Treatment Preferences and Shares the Quality Data Associated with the Providers?: No  [facility] Resource Information Provided?: No  Discharge Location  Patient Expects to be Discharged to[de-identified] Home with home health [facility]    Sage Raines, [address] [facility], [address] [facility]

## 2022-02-22 NOTE — PROGRESS NOTES
End of Shift Note    Bedside shift change report given to Spencer CarlisleRN (oncoming nurse) by Keshawn Wild RN (offgoing nurse). Report included the following information SBAR, Kardex, ED Summary and MAR    Shift worked:  Nights    Shift summary and any significant changes:     Pt in bed all night, failed swallow screen placed NPO and consult for speech has been placed. Labs drawn unable to collect urine sample. Pt had period confusion throughout the night.       Concerns for physician to address:  see above    Zone phone for oncoming shift:   [phone]     Patient Information  Nicolasa Herman Sr  80 y.o.  2/21/2022  6:38 PM by Leann Moreira MD. Ashia Farfan Sr was admitted from Home    Problem List  Patient Active Problem List    Diagnosis Date Noted    Acute CVA (cerebrovascular accident) ([address] 02/21/2022    Vitamin D deficiency 02/06/2022    Pain of right thumb 08/05/2021    GONGORA (dyspnea on exertion) 02/24/2021    Anasarca 21/62/7343    Systolic CHF, chronic (HCC) 12/14/2020    Pleural effusion 12/14/2020    Weakness 12/02/2020    Hypotension 08/13/2020    Esophageal dyskinesia 07/23/2020    Severe protein-calorie malnutrition (HCC) 07/21/2020    Weight loss 07/20/2020    Unsteady gait 07/20/2020    Paroxysmal VT ([address] 11/07/2019    Cardiomyopathy ([address] 11/07/2019    Alcohol withdrawal syndrome, with delirium ([address] 11/01/2019    A-fib ([address] 10/27/2019    Primary insomnia 01/09/2019    Mild depression ([address] 06/12/2018    Acute bilateral low back pain without sciatica 05/30/2018    Alcoholism ([address] 79/29/6839    Metabolic encephalopathy 82/76/1821    TIA (transient ischemic attack) 04/12/2018    Medicare annual wellness visit, subsequent 09/01/2017    Diverticulosis 07/18/2017    Gastritis and duodenitis 07/18/2017    Internal hemorrhoids 07/18/2017    Hypertension with renal disease 07/18/2017    Mixed hyperlipidemia 07/18/2017    GI bleed 07/18/2017    Primary osteoarthritis involving multiple joints 07/18/2017    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, without long-term current use of insulin ([facility] 07/18/2017    Back pain 07/18/2017    Anemia 07/18/2017    Alzheimer disease ([facility] 07/18/2017    SSS (sick sinus syndrome) ([facility] 06/02/2015    S/P cardiac pacemaker procedure 05/04/2015    S/P ablation of accessory bypass tract 05/04/2015    SVT (supraventricular tachycardia) ([facility]s/p RFA 04/28/2015    Near syncope 03/11/2015    ASCVD (arteriosclerotic cardiovascular disease) 07/11/2014    Hypokalemia 07/11/2014    Right inguinal hernia 06/12/2014     Past Medical History:   Diagnosis Date    Abdominal pain 7/18/2017    Alzheimer disease ([facility] 7/18/2017    Anemia 7/18/2017    Arthritis     Back pain 7/18/2017    Bradycardia 7/18/2017    CAD (coronary artery disease)     hx of MI    Chronic diastolic heart failure ([facility] 2/26/2021    CKD (chronic kidney disease), stage II 7/18/2017    constipation     Depression 7/18/2017    Diabetes mellitus ([facility] 7/18/2017    Diverticulosis 7/18/2017    DJD (degenerative joint disease) 7/18/2017    Encounter for long-term (current) use of other medications 7/18/2017    Fatigue     Gastritis and duodenitis 7/18/2017    GERD (gastroesophageal reflux disease)     GI bleed 7/18/2017    Hearing loss     Hyperlipidemia 7/18/2017    Hypertension     Hypertension with renal disease 7/18/2017    Ill-defined condition     Dementia    Internal hemorrhoids 7/18/2017    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    S/P cardiac pacemaker procedure 5/4/2015    5/4/15 Medtronic dual chamber pacemaker implant     Thyroid disease     Weight loss     lost over 40 pounds last year- has no appetite       Core Measures:  CVA: Yes Yes  CHF:No Not applicable  PNA:Yes Not applicable    Activity:  Activity Level: Bed Rest  Number times ambulated in hallways past shift: 0  Number of times OOB to chair past shift: 0    Cardiac:   Cardiac Monitoring: Yes           Access:   Current line(s): PIV   Central Line? No Placement date   PICC LINE? No Placement date     Genitourinary:   Urinary status: voiding and external catheter  Urinary Catheter? No     Respiratory:   O2 Device: None (Room air)  Chronic home O2 use?: NO  Incentive spirometer at bedside: N/A       GI:  Last Bowel Movement Date:  (Prior to admission )  Current diet:  DIET NPO  Passing flatus: YES  Tolerating current diet: YES       Pain Management:   Patient states pain is manageable on current regimen: YES    Skin:  [place] Score: 15  Interventions: turn team, speciality bed, float heels, increase time out of bed, foam dressing, limit briefs and internal/external urinary devices    Patient Safety:  Fall Score:  Total Score: 2  Interventions: bed/chair alarm, assistive device (walker, cane, etc), gripper socks, pt to call before getting OOB and stay with me (per policy)     @Rollbelt  @dexterity to release roll belt  Yes/No ( must document dexterity  here by stating Yes or No here, otherwise this is a restraint and must follow restraint documentation policy.)    DVT prophylaxis:  DVT prophylaxis Med- Yes  DVT prophylaxis SCD or LISSY- No     Wounds: (If Applicable)  Wounds- Not applicable  Location None     Active Consults:  IP CONSULT TO NEUROLOGY    Length of Stay:  Expected LOS: - - -  Actual LOS: 1  Discharge Plan: Yes Unknown       Benito Ordonez RN

## 2022-02-22 NOTE — H&P
Hospitalist Admission Note    NAME: Nicolasa Herman Sr   :  1931   MRN:  [id number]     Date/Time:  2022 10:05 PM    Patient PCP: Alex Bush MD  ________________________________________________________________________    My assessment of this patient's clinical condition and my plan of care is as follows. 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 Initiate CVA protocol. Check MRI of the brain. Check 2D echocardiogram.  Follow-up on final results of CTA. -Check hemoglobin A1c and lipid panel  -He is already on aspirin at home so will change to Plavix  -Consult PT OT and speech therapy. Consult neurology.  -Start diet if he passes swallow eval    Alzheimer's dementia  GERD  Hypertension  -Continue Aricept, PPI  -Continue Lasix    History of CKD stage III  -Creatinine is close to baseline of around 1.4    Mild hypoglycemia  -Currently blood sugar is 78. Will start low-dose dextrose. Check serial blood sugars. Code Status: Full code  Surrogate Decision Maker: Granddaughter Elissa Jose    DVT Prophylaxis: Lovenox  GI Prophylaxis: not indicated    Baseline: From home, lives with granddaughter, independent of ADLs has mild dementia at baseline        Subjective:   CHIEF COMPLAINT: Aphasia    HISTORY OF PRESENT ILLNESS:     Jaquelin Camarena Sr is a 80 y.o.   male who presents with past medical history of dementia, hypertension is coming the hospital chief complaints of difficulty speaking. Patient is a poor historian secondary to dementia so information is obtained by talking to patient's daughter and also granddaughter who are at the bedside. According to them, patient is partially dependent on others for activities of daily living and at baseline can walk without a walker and can feed himself but needs assistance with meal preparation. Is currently living with patient's granddaughter.   He was noted to have speech difficulty and also comprehension problems. He did not specifically report any weakness, tingling or numbness. On arrival to ED, he was noted to have stable vitals. On labs noted to have normal CBC. BMP shows a creatinine of 1.49. LFTs are normal.  CT head shows possibility of acute left occipital and inferior parietal lobe CVA. CTA shows no large vessel occlusion. We were asked to admit for work up and evaluation of the above problems.      Past Medical History:   Diagnosis Date    Abdominal pain 7/18/2017    Alzheimer disease ([facility] 7/18/2017    Anemia 7/18/2017    Arthritis     Back pain 7/18/2017    Bradycardia 7/18/2017    CAD (coronary artery disease)     hx of MI    Chronic diastolic heart failure ([facility] 2/26/2021    CKD (chronic kidney disease), stage II 7/18/2017    constipation     Depression 7/18/2017    Diabetes [facility] 7/18/2017    Diverticulosis 7/18/2017    DJD (degenerative joint disease) 7/18/2017    Encounter for long-term (current) use of other medications 7/18/2017    Fatigue     Gastritis and duodenitis 7/18/2017    GERD (gastroesophageal reflux disease)     GI bleed 7/18/2017    Hearing loss     Hyperlipidemia 7/18/2017    Hypertension     Hypertension with renal disease 7/18/2017    Ill-defined condition     Dementia    Internal hemorrhoids 7/18/2017    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    S/P cardiac pacemaker procedure 5/4/2015    5/4/15 Medtronic dual chamber pacemaker implant     Thyroid disease     Weight loss     lost over 40 pounds last year- has no appetite        Past Surgical History:   Procedure Laterality Date    COLONOSCOPY N/A 6/22/2017    COLONOSCOPY performed by Alvarado Carpio MD at [address] [place] [address]  6/22/2017         [address]  7/10/2014    right inguinal    HX OTHER SURGICAL      cystectomy from back    NV EGD TRANSORAL BIOPSY SINGLE/MULTIPLE 2012         AR UPPER GI ENDOSCOPY,W/DIR SUBMUC INJ  2020         UPPER GI ENDOSCOPY,BIOPSY  2017         UPPER GI ENDOSCOPY,BIOPSY  2020            Social History     Tobacco Use    Smoking status: Former Smoker     Packs/day: 0.50     Years: 10.00     Pack years: 5.00     Start date: 1991    Smokeless tobacco: Never Used    Tobacco comment: quit 50 years ago   Substance Use Topics    Alcohol use: Not Currently     Comment: Occasional beer        Family History   Problem Relation Age of Onset    Hypertension Mother     Heart Disease Father     Cancer Other         son-cancer? unknown what type      No Known Allergies     Prior to Admission medications    Medication Sig Start Date End Date Taking? Authorizing Provider   colchicine 0.6 mg tablet Take 1 Tablet by mouth daily. TAKE ONE TABLET AS NEEDED FOR GOUT 2/15/22  Yes Jason Jordan MD   donepeziL (ARICEPT) 10 mg tablet TAKE 1 TABLET BY MOUTH EVERY DAY 22  Yes Jason Jordan MD   omeprazole (PRILOSEC) 20 mg capsule Take 1 Capsule by mouth daily. 22  Yes Jason Jordan MD   furosemide (LASIX) 20 mg tablet TAKE 2 TABS BY MOUTH DAILY. [address] [place] [address] MOUTH 22  Yes Jason Jordan MD   memantine (NAMENDA) 10 mg tablet TAKE 1 TABLET BY MOUTH TWICE A DAY 2/10/22  Yes Jason Jordan MD   lisinopriL (PRINIVIL, ZESTRIL) 5 mg tablet TAKE 1 TABLET BY MOUTH EVERY DAY 22  Yes Jason Jordan MD   carvediloL (COREG) 6.25 mg tablet Take 1 Tab by mouth two (2) times a day. 1/15/21  Yes Jason Jordan MD   aspirin 81 mg chewable tablet Take 1 Tab by mouth daily. 4/15/18  Yes Darya Montalvo MD   acetaminophen (TYLENOL) 650 mg TbER Take 650 mg by mouth two (2) times a day. TAKE BY MOUTH AS NEEDED FOR PAIN     Provider, Historical       REVIEW OF SYSTEMS:     I am not able to complete the review of systems because:    The patient is intubated and sedated    The patient has altered mental status due to his acute medical problems    The patient has baseline aphasia from prior stroke(s)   y The patient has baseline dementia and is not reliable historian    The patient is in acute medical distress and unable to provide information           Total of 12 systems reviewed as follows:       POSITIVE= underlined text  Negative = text not underlined  General:  fever, chills, sweats, generalized weakness, weight loss/gain,      loss of appetite   Eyes:    blurred vision, eye pain, loss of vision, double vision  ENT:    rhinorrhea, pharyngitis   Respiratory:   cough, sputum production, SOB, GONGORA, wheezing, pleuritic pain   Cardiology:   chest pain, palpitations, orthopnea, PND, edema, syncope   Gastrointestinal:  abdominal pain , N/V, diarrhea, dysphagia, constipation, bleeding   Genitourinary:  frequency, urgency, dysuria, hematuria, incontinence   Muskuloskeletal :  arthralgia, myalgia, back pain  Hematology:  easy bruising, nose or gum bleeding, lymphadenopathy   Dermatological: rash, ulceration, pruritis, color change / jaundice  Endocrine:   hot flashes or polydipsia   Neurological:  headache, dizziness, confusion, focal weakness, paresthesia,     Speech difficulties, memory loss, gait difficulty  Psychological: Feelings of anxiety, depression, agitation    Objective:   VITALS:    Visit Vitals  /64   Pulse 61   Temp 98.5 °F (36.9 °C)   Resp 21   Ht 5' 8\" (1.727 m)   SpO2 99%   BMI 19.52 kg/m²       PHYSICAL EXAM:    General:    no distress, appears stated age. HEENT: Atraumatic, anicteric sclerae, pink conjunctivae     No oral ulcers, mucosa moist, throat clear, dentition fair  Neck:  Supple, symmetrical,  thyroid: non tender  Lungs:   Clear to auscultation bilaterally. No Wheezing or Rhonchi. No rales. Chest wall:  No tenderness  No Accessory muscle use. Heart:   Regular  rhythm,  No  murmur   No edema  Abdomen:   Soft, non-tender. Not distended.   Bowel sounds normal  Extremities: No cyanosis. No clubbing,      Skin turgor normal, Capillary refill normal, Radial dial pulse 2+  Skin:     Not pale. Not Jaundiced  No rashes   Psych:  Not anxious or agitated. Neurologic: EOMs intact. Right-sided facial droop present, aphasia present, Alert and awake, mild weakness of right leg, sensation to light touch intact    _______________________________________________________________________  Care Plan discussed with:    Comments   Patient y    Family      RN y    Care Manager                    Consultant:      _______________________________________________________________________  Expected  Disposition:   Home with Family y   HH/PT/OT/RN    SNF/LTC    COMPA    ________________________________________________________________________  TOTAL TIME:  61  Minutes    Critical Care Provided     Minutes non procedure based      Comments    y Reviewed previous records   >50% of visit spent in counseling and coordination of care y Discussion with patient and/or family and questions answered       ________________________________________________________________________  Signed: José Luis Thomas MD    Procedures: see electronic medical records for all procedures/Xrays and details which were not copied into this note but were reviewed prior to creation of Plan.     LAB DATA REVIEWED:    Recent Results (from the past 24 hour(s))   GLUCOSE, POC    Collection Time: 02/21/22  6:41 PM   Result Value Ref Range    Glucose (POC) 78 65 - 117 mg/dL    Performed by el?a EDT    CBC WITH AUTOMATED DIFF    Collection Time: 02/21/22  6:59 PM   Result Value Ref Range    WBC 6.2 4.1 - 11.1 K/uL    RBC 4.53 4.10 - 5.70 M/uL    HGB 12.3 12.1 - 17.0 g/dL    HCT 40.2 36.6 - 50.3 %    MCV 88.7 80.0 - 99.0 FL    MCH 27.2 26.0 - 34.0 PG    MCHC 30.6 30.0 - 36.5 g/dL    RDW 16.0 (H) 11.5 - 14.5 %    PLATELET 942 109 - 454 K/uL    MPV 11.6 8.9 - 12.9 FL    NRBC 0.0 0  WBC    ABSOLUTE NRBC 0.00 0.00 - 0.01 K/uL    NEUTROPHILS 74 32 - 75 %    LYMPHOCYTES 13 12 - 49 %    MONOCYTES 10 5 - 13 %    EOSINOPHILS 2 0 - 7 %    BASOPHILS 1 0 - 1 %    IMMATURE GRANULOCYTES 0 0.0 - 0.5 %    ABS. NEUTROPHILS 4.6 1.8 - 8.0 K/UL    ABS. LYMPHOCYTES 0.8 0.8 - 3.5 K/UL    ABS. MONOCYTES 0.6 0.0 - 1.0 K/UL    ABS. EOSINOPHILS 0.1 0.0 - 0.4 K/UL    ABS. BASOPHILS 0.0 0.0 - 0.1 K/UL    ABS. IMM. GRANS. 0.0 0.00 - 0.04 K/UL    DF AUTOMATED     METABOLIC PANEL, COMPREHENSIVE    Collection Time: 02/21/22  6:59 PM   Result Value Ref Range    Sodium 144 136 - 145 mmol/L    Potassium 3.6 3.5 - 5.1 mmol/L    Chloride 106 97 - 108 mmol/L    CO2 34 (H) 21 - 32 mmol/L    Anion gap 4 (L) 5 - 15 mmol/L    Glucose 99 65 - 100 mg/dL    BUN 38 (H) 6 - 20 MG/DL    Creatinine 1.49 (H) 0.70 - 1.30 MG/DL    BUN/Creatinine ratio 26 (H) 12 - 20      GFR est AA 54 (L) >60 ml/min/1.73m2    GFR est non-AA 44 (L) >60 ml/min/1.73m2    Calcium 9.2 8.5 - 10.1 MG/DL    Bilirubin, total 0.7 0.2 - 1.0 MG/DL    ALT (SGPT) 53 12 - 78 U/L    AST (SGOT) 44 (H) 15 - 37 U/L    Alk. phosphatase 111 45 - 117 U/L    Protein, total 7.1 6.4 - 8.2 g/dL    Albumin 2.9 (L) 3.5 - 5.0 g/dL    Globulin 4.2 (H) 2.0 - 4.0 g/dL    A-G Ratio 0.7 (L) 1.1 - 2.2     PROTHROMBIN TIME + INR    Collection Time: 02/21/22  6:59 PM   Result Value Ref Range    INR 1.1 0.9 - 1.1      Prothrombin time 11.1 9.0 - 11.1 sec   EKG, 12 LEAD, INITIAL    Collection Time: 02/21/22  7:23 PM   Result Value Ref Range    Ventricular Rate 68 BPM    Atrial Rate 68 BPM    P-R Interval 136 ms    QRS Duration 168 ms    Q-T Interval 504 ms    QTC Calculation (Bezet) 535 ms    Calculated R Axis -140 degrees    Calculated T Axis 83 degrees    Diagnosis       AV dual-paced rhythm with frequent premature ventricular complexes  When compared with ECG of 24-FEB-2021 19:37,  Vent.  rate has decreased BY  12 BPM

## 2022-02-22 NOTE — PROGRESS NOTES
Problem: Self Care Deficits Care Plan (Adult)  Goal: *Acute Goals and Plan of Care (Insert Text)  Description: FUNCTIONAL STATUS PRIOR TO ADMISSION: Grand-daughter provided all information this session due to pts difficulty with verbalization. Patient was modified independent using a rolling walker for functional mobility intermittently (grand-daughter reports he only needs RW when he is sick). Has 24/7 supervision from family. Pt is able to get in/out of tub shower without assist, family checks on him intermittently but he is able to bathe without assist. He requires cuing to perform ADLs, eat his meals, and take his meds. Family assists with meal prep and medication management. HOME SUPPORT PRIOR TO ADMISSION: lives with granddaughter and her children whom provide 24/7 assist    Occupational Therapy Goals:  Initiated 2/22/2022  1. Patient will perform grooming standing with supervision within 7 days. 2. Patient will perform dressing with supervision within 7 days. 3. Patient will perform toileting with supervision within 7 days. 4. Patient will transfer from toilet with supervision using the least restrictive device and appropriate durable medical equipment within 7 days. 2/22/2022 1331 by APRIL Knox/L  Outcome: Not Met  OCCUPATIONAL THERAPY EVALUATION  Patient: Sheryl Hurley Sr (80 y.o. male)  Date: 2/22/2022  Primary Diagnosis: Acute CVA (cerebrovascular accident) [facility]) [I63.9]        Precautions:   Fall    ASSESSMENT  Based on the objective data described below, the patient presents with inability to verbalize, [place], decreased attention and general weakness. BUE are functional and testing of UE was difficult due to communication deficits. He has not overt hemiparesis. Pt responds better to gestures and visual cues than verbal requests. Pt was smiling during session and granddaughter whom his caregiver provided information on PLOF.   He needs reminders at times to perform tasks at baseline. Overall pt is performing mobility at a min assist level and ADLs at supervision to min assist level. All vitals were stable. Family was requesting a hospital bed at discharge and case management was notified. Current Level of Function Impacting Discharge (ADLs/self-care): minimal assist for short distance mobility with rolling walker    Feeding: Supervision    Oral Facial Hygiene/Grooming: Stand-by assistance    Bathing: Minimum assistance    Upper Body Dressing: Setup    Lower Body Dressing: Minimum assistance    Toileting: Minimum assistance    Functional Outcome Measure: The patient scored Total A-D  Total A-D (Motor Function): 65/66 on the Fugl-Lu Assessment which is indicative of mild impairment in upper extremity functional status. Other factors to consider for discharge: family reports that they feel comfortable providing assist at discharge and that pt will have 24/7 assist     Patient will benefit from skilled therapy intervention to address the above noted impairments. PLAN :  Recommendations and Planned Interventions: self care training, functional mobility training, therapeutic exercise, therapeutic activities, cognitive retraining, neuromuscular re-education, patient education, home safety training, and family training/education    Frequency/Duration: Patient will be followed by occupational therapy 5 times a week to address goals.     Recommendation for discharge: (in order for the patient to meet his/her long term goals)  Occupational therapy at least 2 days/week in the home AND ensure assist and/or supervision for safety with mobility and ADLS    This discharge recommendation:  Has been made in collaboration with the attending provider and/or case management    IF patient discharges home will need the following DME: issued urinal for home use       SUBJECTIVE:   Patient stated: Non verbal this session    OBJECTIVE DATA SUMMARY:   HISTORY:   Past Medical History: Diagnosis Date    Abdominal pain 7/18/2017    Alzheimer disease ([facility] [address] 7/18/2017    Anemia 7/18/2017    Arthritis     Back pain 7/18/2017    Bradycardia 7/18/2017    CAD (coronary artery disease)     hx of MI    Chronic diastolic heart failure ([facility] [address] 2/26/2021    CKD (chronic kidney disease), stage II 7/18/2017    constipation     Depression 7/18/2017    Diabetes mellitus ([facility] [address] 7/18/2017    Diverticulosis 7/18/2017    DJD (degenerative joint disease) 7/18/2017    Encounter for long-term (current) use of other medications 7/18/2017    Fatigue     Gastritis and duodenitis 7/18/2017    GERD (gastroesophageal reflux disease)     GI bleed 7/18/2017    Hearing loss     Hyperlipidemia 7/18/2017    Hypertension     Hypertension with renal disease 7/18/2017    Ill-defined condition     Dementia    Internal hemorrhoids 7/18/2017    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    S/P cardiac pacemaker procedure 5/4/2015    5/4/15 Medtronic dual chamber pacemaker implant     Thyroid disease     Weight loss     lost over 40 pounds last year- has no appetite     Past Surgical History:   Procedure Laterality Date    COLONOSCOPY N/A 6/22/2017    COLONOSCOPY performed by Cyn Alonso MD at [facility]  6/22/2017         HX HERNIA REPAIR  7/10/2014    right inguinal    HX OTHER SURGICAL      cystectomy from back    VT EGD TRANSORAL BIOPSY SINGLE/MULTIPLE  2/14/2012         VT UPPER GI ENDOSCOPY,W/ [address] INJ  7/23/2020         UPPER GI ENDOSCOPY,BIOPSY  6/22/2017         UPPER GI ENDOSCOPY,BIOPSY  7/23/2020          Expanded or extensive additional review of patient history:     Home Situation  Home Environment: Private residence  # Steps to Enter: 3  Rails to Enter: Yes  Hand Rails : Bilateral  Wheelchair Ramp: No  One/Two Story Residence: One story  Living Alone: No  Support Systems: Other Family Member(s),Child(turner)  Patient Expects to be Discharged to[de-identified] Home with home health  Current DME Used/Available at Home: Adelaida Rota, rolling,Wheelchair  Tub or Shower Type: Tub/Shower combination    Hand dominance: Right    EXAMINATION OF PERFORMANCE DEFICITS:  Cognitive/Behavioral Status:  Neurologic State: Alert  Orientation Level: Oriented to person  Cognition: Decreased attention/concentration;Decreased command following (pt didn't verbalize this session)  Perception: Cues to maintain midline in standing  Perseveration: No perseveration noted  Safety/Judgement: Fall prevention      Hearing: Auditory  Auditory Impairment: Hard of hearing, bilateral    Vision/Perceptual:                           Acuity:  (grossly intact)    Corrective Lenses: Glasses    Range of Motion:    AROM: Generally decreased, functional                         Strength:    Strength: Generally decreased, functional                Coordination:  Coordination: Generally decreased, functional  Fine Motor Skills-Upper: Left Intact; Right Intact    Gross Motor Skills-Upper: Left Intact; Right Intact    Tone & Sensation:    Tone: Normal                         Balance:  Sitting: Intact  Standing: Impaired  Standing - Static: Good;Constant support  Standing - Dynamic : Fair;Constant support    Functional Mobility and Transfers for ADLs:  Bed Mobility:  Rolling: Supervision; Additional time  Supine to Sit: Supervision; Additional time  Scooting: Contact guard assistance    Transfers:  Sit to Stand: Minimum assistance;Assist x2  Stand to Sit: Minimum assistance  Bed to Chair: Minimum assistance  Toilet Transfer : Minimum assistance    ADL Assessment:  Feeding: Supervision    Oral Facial Hygiene/Grooming: Stand-by assistance    Bathing: Minimum assistance    Upper Body Dressing: Setup    Lower Body Dressing: Minimum assistance    Toileting: Minimum assistance                ADL Intervention and task modifications:  Donned and doffed socks seated at bedside chair bending forward method with supervision.       Cognitive Retraining  Safety/Judgement: Fall prevention    Functional Measure:  Fugl-Lu Assessment of Motor Recovery after Stroke:   Reflex Activity  Flexors/Biceps/Fingers: Can be elicited  Extensors/Triceps: Can be elicited  Reflex Subtotal: 4    Volitional Movement Within Synergies  Shoulder Retraction: Full  Shoulder Elevation: Full  Shoulder Abduction (90 degrees): Full  Shoulder External Rotation: Full  Elbow Flexion: Full  Forearm Supination: Full  Shoulder Adduction/Internal Rotation: Full  Elbow Extension: Full  Forearm Pronation: Full  Subtotal: 18    Volitional Movement Mixing Synergies  Hand to Lumbar Spine: Full  Shoulder Flexion (0-90 degrees): Full  Pronation-Supination: Full  Subtotal: 6    Volitional Movement With Little or No Synergy  Shoulder Abduction (0-90 degrees): Full  Shoulder Flexion ( degrees): Full  Pronation/Supination: Full  Subtotal : 6    Normal Reflex Activity  Biceps, Triceps, Finger Flexors: Full  Subtotal : 2    Upper Extremity Total   Upper Extremity Total: 36    Wrist  Stability at 15 Degree Dorsiflexion: Full  Repeated Dorsiflexion/ Volar Flexion: Full  Stability at 15 Degree Dorsiflexion: Full  Repeated Dorsiflexion/ Volar Flexion: Full  Circumduction: Full  Wrist Total: 10    Hand  Mass Flexion: Full  Mass Extension: Full  Grasp A: Full  Grasp B: Full  Grasp C: Full  Grasp D: Full  Grasp E: Full  Hand Total: 14    Coordination/Speed  Tremor: None  Dysmetria: Slight  Time: <1s  Coordination/Speed Total : 5    Total A-D  Total A-D (Motor Function): 65/66     This is a reliable/valid measure of arm function after a neurological event. It has established value to characterize functional status and for measuring spontaneous and therapy-induced recovery; tests proximal and distal motor functions. Fugl-Lu Assessment - UE scores recorded between five and 30 days post neurologic event can be used to predict UE recovery at six months post neurologic event.   Severe = 0-21 points Moderately Severe = 22-33 points   Moderate = 34-47 points   Mild = 48-66 points  Page MOISE Cunha, SKYLAR Gary, & DAPHNE Cooney (1992). Measurement of motor recovery after stroke: Outcome assessment and sample size requirements. Stroke, 23, pp. 9286-3792.   ------------------------------------------------------------------------------------------------------------------------------------------------------------------  MCID:  Stroke:   Eliz Bowden et al, 2001; n = 171; mean age 79 (6) years; assessed within 16 (12) days of stroke, Acute Stroke)  FMA Motor Scores from Admission to Discharge   10 point increase in FMA Upper Extremity = 1.5 change in discharge FIM   10 point increase in FMA Lower Extremity = 1.9 change in discharge FIM  MDC:   Stroke:   Jacek Richards et al, 2008, n = 14, mean age = 59.9 (14.6) years, assessed on average 14 (6.5) months post stroke, Chronic Stroke)   FMA = 5.2 points for the Upper Extremity portion of the assessment         Barthel Index:  Bathin  Bladder: 5  Bowels: 10  Groomin  Dressin  Feedin  Mobility: 0  Stairs: 0  Toilet Use: 0  Transfer (Bed to Chair and Back): 10  Total: 40/100      The Barthel ADL Index: Guidelines  1. The index should be used as a record of what a patient does, not as a record of what a patient could do. 2. The main aim is to establish degree of independence from any help, physical or verbal, however minor and for whatever reason. 3. The need for supervision renders the patient not independent. 4. A patient's performance should be established using the best available evidence. Asking the patient, friends/relatives and nurses are the usual sources, but direct observation and common sense are also important. However direct testing is not needed. 5. Usually the patient's performance over the preceding 24-48 hours is important, but occasionally longer periods will be relevant.   6. Middle categories imply that the patient supplies over 50 per cent of the effort. 7. Use of aids to be independent is allowed. Score Interpretation (from [facility])    Independent   60-79 Minimally independent   40-59 Partially dependent   20-39 Very dependent   <20 Totally dependent     -Danny Perez., Barthel, D.W. (1965). Functional evaluation: the Barthel Index. [address] ([address] (). The Barthel activities of daily living index: self-reporting versus actual performance in the old (> or = 75 years). Journal of [address] [place] [address] 45(7), 14 [facility], J.KIMF, Jasmina Gambino., Maritza Casas. (1999). Measuring the change in disability after inpatient rehabilitation; comparison of the responsiveness of the Barthel Index and Functional McPherson Measure. Journal of Neurology, Neurosurgery, and Psychiatry, 66(4), 542-823. Suzanna Dias, N.J.A, INES Albright, & Maria C Rose MJustynA. (2004) Assessment of post-stroke quality of life in cost-effectiveness studies: The usefulness of the Barthel Index and the EuroQoL-5D. Quality of Life Research, 15, 020-83         Occupational Therapy Evaluation Charge Determination   History Examination Decision-Making   MEDIUM Complexity : Expanded review of history including physical, cognitive and psychosocial  history  MEDIUM Complexity : 3-5 performance deficits relating to physical, cognitive , or psychosocial skils that result in activity limitations and / or participation restrictions MEDIUM Complexity : Patient may present with comorbidities that affect occupational performnce.  Miniml to moderate modification of tasks or assistance (eg, physical or verbal ) with assesment(s) is necessary to enable patient to complete evaluation       Based on the above components, the patient evaluation is determined to be of the following complexity level: MEDIUM  Pain Ratin/10    Activity Tolerance:   Fair    After treatment patient left in no apparent distress:    Sitting in chair, Call bell within reach, Bed / chair alarm activated, and Caregiver / family present    COMMUNICATION/EDUCATION:   The patients plan of care was discussed with: Physical therapist, Registered nurse, and patient and his granddaughter . Patient was educated regarding his deficit(s) of aphasia as this relates to his possible diagnosis of CVA. He demonstrated Guarded understanding as evidenced by inability to verbalize or indicate understanding. Family was verbally educated on the BE FAST acronym for signs/symptoms of CVA and TIA. BE FAST was written on patient's communication board  for visual education and reinforcement. All questions answered with patient indicating good understanding. Home safety education was provided and the patient/caregiver indicated understanding. and Patient/family agree to work toward stated goals and plan of care. This patients plan of care is appropriate for delegation to [facility].     Thank you for this referral.  Alexandrea Mena, OTR/L  Time Calculation: 13 mins

## 2022-02-22 NOTE — PROGRESS NOTES
Problem: Mobility Impaired (Adult and Pediatric)  Goal: *Acute Goals and Plan of Care (Insert Text)  Description:   FUNCTIONAL STATUS PRIOR TO ADMISSION: Grand-daughter provided all information. Patient was modified independent using a rolling walker for functional mobility intermittently (grand-daughter reports he only needs RW when he is sick). Has 24/7 supervision from family. Performs all general mobility without physical assist. Able to get in/out of tub shower without assist, family checks on him intermittently but he is able to bathe without assist. He requires cuing to perform ADLs, eat his meals, and take his meds. Family assists with meal prep and medication management. HOME SUPPORT PRIOR TO ADMISSION: The patient lived with grand-daughter and her children who provide 24/7 caregiver assist.     Physical Therapy Goals  Initiated 2/22/2022  1. Patient will move from supine to sit and sit to supine , scoot up and down, and roll side to side in bed with modified independence within 7 day(s). 2.  Patient will transfer from bed to chair and chair to bed with supervision/set-up using the least restrictive device within 7 day(s). 3.  Patient will perform sit to stand with supervision/set-up within 7 day(s). 4.  Patient will ambulate with supervision/set-up for 50 feet with the least restrictive device within 7 day(s). 5.  Patient will ascend/descend 6 stairs with bilat handrail(s) with minimal assistance/contact guard assist within 7 day(s). 6.  Patient will improve Sanches Balance score by 7 points within 7 days.   Outcome: Not Met   PHYSICAL THERAPY EVALUATION- NEURO POPULATION  Patient: Meseret Farfan  (80 y.o. male)  Date: 2/22/2022  Primary Diagnosis: Acute CVA (cerebrovascular accident) [facility]) [I63.9]       Precautions:  Fall    ASSESSMENT  Based on the objective data described below, the patient presents with aphasia with limited verbalization during assessment, impaired bed mobility, impaired transfers, impaired standing balance, impaired gait mechanics, decreased activity tolerance, and decreased overall independence following admission for stroke workup due to speech difficulties. CT shows possible acute L occipital and inferior parietal lobe infarct. PmHx significant for Alzheimer dementia, MI, pacemaker, and hx of CVA. Patient able to state his name with quiet difficulty and unable to verbalize at any other time though using head nods for communication. Follows all commands accurately. Patient completes bed mobility with supervision requiring increased time to complete transfer, scooting CGA, sit>stand Min Ax2, stand>sit Min A, bed>chair with RW Min A. Patient demonstrates very narrow MATTHEW and shuffling gait pattern requiring assist with walker management. Patient is below baseline functioning status and will require continued skilled PT services to address above impairments and improve overall functional status. Recommend HH therapies at discharge and 24/7 caregiver assist.     Current Level of Function Impacting Discharge (mobility/balance): supervision bed mobility; Min Ax2 sit>stand; Min A bed>chair with RW    Functional Outcome Measure: The patient scored Total: 6/56 on the [facility] Assessment which is indicative of high fall risk. Other factors to consider for discharge: Alzheimer dementia with 24/7 caregiver support at baseline     Patient will benefit from skilled therapy intervention to address the above noted impairments. PLAN :  Recommendations and Planned Interventions: bed mobility training, transfer training, gait training, therapeutic exercises, neuromuscular re-education, patient and family training/education and therapeutic activities      Frequency/Duration: Patient will be followed by physical therapy:  5 times a week to address goals.     Recommendation for discharge: (in order for the patient to meet his/her long term goals)  Physical therapy at least 2 days/week in the home AND ensure assist and/or supervision for safety with all functional mobility and ADLs    This discharge recommendation:  Has been made in collaboration with the attending provider and/or case management    IF patient discharges home will need the following DME: defer to [place] therapies      SUBJECTIVE:   Patient stated Benito. .... Berryom.     OBJECTIVE DATA SUMMARY:   HISTORY:    Past Medical History:   Diagnosis Date    Abdominal pain 7/18/2017    Alzheimer disease ([facility] 7/18/2017    Anemia 7/18/2017    Arthritis     Back pain 7/18/2017    Bradycardia 7/18/2017    CAD (coronary artery disease)     hx of MI    Chronic diastolic heart failure ([facility] 2/26/2021    CKD (chronic kidney disease), stage II 7/18/2017    constipation     Depression 7/18/2017    Diabetes mellitus ([facility] 7/18/2017    Diverticulosis 7/18/2017    DJD (degenerative joint disease) 7/18/2017    Encounter for long-term (current) use of other medications 7/18/2017    Fatigue     Gastritis and duodenitis 7/18/2017    GERD (gastroesophageal reflux disease)     GI bleed 7/18/2017    Hearing loss     Hyperlipidemia 7/18/2017    Hypertension     Hypertension with renal disease 7/18/2017    Ill-defined condition     Dementia    Internal hemorrhoids 7/18/2017    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    S/P cardiac pacemaker procedure 5/4/2015    5/4/15 Medtronic dual chamber pacemaker implant     Thyroid disease     Weight loss     lost over 40 pounds last year- has no appetite     Past Surgical History:   Procedure Laterality Date    COLONOSCOPY N/A 6/22/2017    COL[facility]COPY performed [facility] Mari Ford [facility]  6/22/2017         [address]  7/10/2014    right inguinal    HX OTHER SURGICAL      cystectomy from back    [place] EGD TRANSORAL BIOPSY SINGLE/MULTIPLE  2/14/2012         [place] UPPER GI ENDOSCOPY,W/ [address] INJ  7/23/2020         UPPER GI ENDOSCOPY,BIOPSY  6/22/2017         UPPER GI ENDOSCOPY,BIOPSY  7/23/2020          Personal factors and/or comorbidities impacting plan of care: Alzheimer dementia, MI, pacemaker, and hx of CVA    Home Situation  Home Environment: Private residence  # Steps to Enter: 3  Rails to Enter: Yes  Hand Rails : Bilateral  Wheelchair Ramp: No  One/Two Story Residence: One story  Living Alone: No  Support Systems: Other Family Member(s),Child(turner)  Patient Expects to be Discharged to[de-identified] Home with home health  Current DME Used/Available at Home: [employer], rolling,Wheelchair  Tub or Shower Type: Tub/Shower combination    EXAMINATION/PRESENTATION/DECISION MAKING:   Critical Behavior:  Neurologic State: Alert  Orientation Level: Oriented to person  Cognition: Follows commands     Hearing: Auditory  Auditory Impairment: Hard of hearing, bilateral    Range Of Motion:  AROM: Generally decreased, functional  RLE Assessment (WDL): Within defined limits  LLE Assessment (WDL): Within defined limits  Strength:    Strength: Generally decreased, functional  RLE Assessment (WDL): Within defined limits  LLE Assessment (WDL): Within defined limits     Tone & Sensation:   Tone: Normal  RLE Assessment (WDL): Within defined limits  LLE Assessment (WDL): Within defined limits    Coordination:  Coordination: Generally decreased, functional  Functional Mobility:  Bed Mobility:  Rolling: Supervision; Additional time  Supine to Sit: Supervision; Additional time  Scooting: Contact guard assistance  Transfers:  Sit to Stand: Minimum assistance;Assist x2  Stand to Sit: Minimum assistance  Bed to Chair: Minimum assistance  Balance:   Sitting: Intact  Standing: Impaired  Standing - Static: Good;Constant support  Standing - Dynamic : Fair;Constant support  Ambulation/Gait Training:  Distance (ft): 5 Feet (ft)  Assistive Device: Walker, rolling;Gait belt  Ambulation - Level of Assistance: Minimal assistance  Gait Abnormalities: Decreased step clearance;Shuffling gait  Base of Support: Narrowed  Speed/Maureen: Slow;Shuffled  Step Length: Right shortened;Left shortened    Functional Measure  Sanches Balance Test:    Sitting to Standin  Standing Unsupported: 0  Sitting with Back Unsupported: 4  Standing to Sittin  Transfers: 1  Standing Unsupported with Eyes Closed: 0  Standing Unsupported with Feet Together: 0  Reach Forward with Outstretched Arm: 0   Object: 0  Turn to Look Over Shoulders: 0  Turn 360 Degrees: 0  Alternate Foot on Step/Stool: 0  Standing Unsupported One Foot in Front: 0  Stand on One Le  Total:          56=Maximum possible score;   0-20=High fall risk  21-40=Moderate fall risk   41-56=Low fall risk        Physical Therapy Evaluation Charge Determination   History Examination Presentation Decision-Making   HIGH Complexity :3+ comorbidities / personal factors will impact the outcome/ POC  MEDIUM Complexity : 3 Standardized tests and measures addressing body structure, function, activity limitation and / or participation in recreation  LOW Complexity : Stable, uncomplicated  Other outcome measures Sanches Balance Test   HIGH       Based on the above components, the patient evaluation is determined to be of the following complexity level: LOW     Activity Tolerance:   Fair and SpO2 stable on RA      After treatment patient left in no apparent distress:   Sitting in chair, Call bell within reach, Bed / chair alarm activated and Caregiver / family present    COMMUNICATION/EDUCATION:   The patients plan of care was discussed with: Occupational therapist, Registered nurse and Case management. Patient and/or family BE FAST acronym education was deferred as patient is confused and aphasic. Fall prevention education was provided and the patient/caregiver indicated understanding., Patient/family have participated as able in goal setting and plan of care.  and Patient/family agree to work toward stated goals and plan of care.    Thank you for this referral.  Leticia Armstrong, PT   Time Calculation: 39 mins

## 2022-02-22 NOTE — CONSULTS
Date of Consultation:  February 22, 2022    Referring Physician: Dr. Lupe Lacey     Reason for Consultation: Left occipital stroke    CC: Difficulty speaking    History of Present Illness:   Floyde Cogan Hansom Sr is a 80 y.o. male with a history of Alzheimer's disease, hypertension diabetes and CAD who is currently admitted to the hospital after he developed difficulty with speaking. History is obtained from chart review as patient was unable to provide any history. It appears that he is normally independent with ADLs and can walk with a walker however yesterday it was noted that he had difficulty with speech and difficulty with comprehension. There is no evidence of weakness in his arms or legs. He was brought to the emergency room for further work-up. Here he was evaluated for possible stroke including a noncontrast head CT which showed acute infarction in the left occipital and parietal lobes. He did have a CTA head and neck which showed no large vessel occlusion or perfusion deficit. He was admitted to the hospital for further work-up    This morning patient has no complaints however he was not speaking. He was only able to shake his head yes. It is unclear if he truly comprehended what was being said.     Past Medical History:   Diagnosis Date    Abdominal pain 7/18/2017    Alzheimer disease ([address] 7/18/2017    Anemia 7/18/2017    Arthritis     Back pain 7/18/2017    Bradycardia 7/18/2017    CAD (coronary artery disease)     hx of MI    Chronic diastolic heart failure ([address] 2/26/2021    CKD (chronic kidney disease), stage II 7/18/2017    constipation     Depression 7/18/2017    Diabetes mellitus ([address] 7/18/2017    Diverticulosis 7/18/2017    DJD (degenerative joint disease) 7/18/2017    Encounter for long-term (current) use of other medications 7/18/2017    Fatigue     Gastritis and duodenitis 7/18/2017    GERD (gastroesophageal reflux disease)     GI bleed 7/18/2017    Hearing loss     Hyperlipidemia 2017    Hypertension     Hypertension with renal disease 2017    Ill-defined condition     Dementia    Internal hemorrhoids 2017    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    S/P cardiac pacemaker procedure 2015    5/4/15 Medtronic dual chamber pacemaker implant     Thyroid disease     Weight loss     lost over 40 pounds last year- has no appetite        Past Surgical History:   Procedure Laterality Date    COLONOSCOPY N/A 2017    COLONOSCOPY performed by Terrance Rubin MD at [address] [place] [address]  2017         [address]  7/10/2014    right inguinal    HX OTHER SURGICAL      cystectomy from back    [place] EGD TRANSORAL BIOPSY SINGLE/MULTIPLE  2012         [place] UPPER GI ENDOSCOPY,W/DIR [address]BMUC INJ  2020         UPPER GI ENDOSCOPY,BIOPSY  2017         UPPER GI ENDOSCOPY,BIOPSY  2020             Family History   Problem Relation Age of Onset    Hypertension Mother     Heart Disease Father     Cancer Other         son-cancer? unknown what type         Social History     Tobacco Use    Smoking status: Former Smoker     Packs/day: 0.50     Years: 10.00     Pack years: 5.00     Start date: 1991    Smokeless tobacco: Never Used    Tobacco comment: quit 50 years ago   Substance Use Topics    Alcohol use: Not Currently     Comment: Occasional beer        No Known Allergies     Prior to Admission medications    Medication Sig Start Date End Date Taking? Authorizing Provider   colchicine 0.6 mg tablet Take 1 Tablet by mouth daily. TAKE ONE TABLET AS NEEDED FOR GOUT 2/15/22  Yes Andrew Pacheco MD   donepeziL (ARICEPT) 10 mg tablet TAKE 1 TABLET BY MOUTH EVERY DAY 22  Yes Andrew Pacheco MD   omeprazole (PRILOSEC) 20 mg capsule Take 1 Capsule by mouth daily. 22  Yes Andrew Pacheco MD   furosemide (LASIX) 20 mg tablet TAKE 2 TABS BY MOUTH DAILY.  [address] MORNING BY MOUTH 2/14/22  Yes Andrew Pacheco MD   memantine (NAMENDA) 10 mg tablet TAKE 1 TABLET BY MOUTH TWICE A DAY 2/10/22  Yes Andrew Pacheco MD   lisinopriL (PRINIVIL, ZESTRIL) 5 mg tablet TAKE 1 TABLET BY MOUTH EVERY DAY 2/1/22  Yes Andrew Pacheco MD   carvediloL (COREG) 6.25 mg tablet Take 1 Tab by mouth two (2) times a day. 1/15/21  Yes Andrew Pacheco MD   aspirin 81 mg chewable tablet Take 1 Tab by mouth daily. 4/15/18  Yes Kimberly Boles MD   acetaminophen (TYLENOL) 650 mg TbER Take 650 mg by mouth two (2) times a day. TAKE BY MOUTH AS NEEDED FOR PAIN     Provider, Historical       Review of Systems:    [x]Unable to obtain  ROS due to  [x]mental status change  []sedated   []intubated      PHYSICAL EXAMINATION:   Visit Vitals  BP (!) 139/92 (BP 1 Location: Right upper arm, BP Patient Position: Supine)   Pulse 64   Temp (!) 96.3 °F (35.7 °C)   Resp 16   Ht 5' 8\" (1.727 m)   Wt 128 lb (58.1 kg)   SpO2 100%   BMI 19.46 kg/m²       Physical Exam:  General:  no acute distress  Neck: no carotid bruits  Lungs: clear to auscultation  Heart:  no murmurs, regular rate and rhythm   Lower extremity: no edema    Neurological exam:  Mental Status: Awake, alert, however he did not have any verbal output. He did nod his head yes to all questions. Attention and Concentration: able to state the days of the week backwards   Speech and Language: No verbal output.   He did follow one-step commands however this was not consistent  Fund of knowledge was preserved    Cranial nerves: II-XII  Pupils equal and reactive, visual fields intact by threat  Extraocular movements intact, no evidence of nystagmus or ptosis   Facial sensation intact   Facial movements symmetric   Hearing intact to soft rub bilaterally   Shoulder shrug symmetric and strong   Tongue protrusion full and midline without fasciculation or atrophy    Motor:   Normal tone and Bulk   Drift: No evidence of pronator drift     Strength testing:  He did not participate in formal strength testing however he was able to raise his upper and lower extremities antigravity. There was no drift. Reflexes:   Biceps Triceps  Brachiorad Patellar Achilles Plantar Hoffmans   Right  1 1 1 1 1 Down Neg   Left  1 1 1 1 1 Down Neg      Cerebellar testing: He was unable to participate with finger-nose-finger    Gait: Gait was deferred over concerns for patient safety    LAB DATA REVIEWED:    Lab Results   Component Value Date/Time    Hemoglobin A1c 5.5 02/22/2022 02:59 AM    Sodium 142 02/22/2022 02:59 AM    Potassium 3.3 (L) 02/22/2022 02:59 AM    Chloride 106 02/22/2022 02:59 AM    Glucose 94 02/22/2022 02:59 AM    BUN 34 (H) 02/22/2022 02:59 AM    Creatinine 1.43 (H) 02/22/2022 02:59 AM    Calcium 9.0 02/22/2022 02:59 AM    WBC 6.4 02/22/2022 02:59 AM    HCT 36.9 02/22/2022 02:59 AM    HGB 11.1 (L) 02/22/2022 02:59 AM    PLATELET 520 68/16/5815 02:59 AM       Stroke workup    MRI Brain  Pending    CTA head  Independent review of the CTA head neck reveals some mild atherosclerosis in the anterior and posterior circulation. His perfusion imaging was also reviewed and it showed a possible area of perfusion mismatch in the left frontal lobe. TTE:   Pending    Stroke labs:    HgBA1c    Lab Results   Component Value Date/Time    Hemoglobin A1c 5.5 02/22/2022 02:59 AM       LDL   Lab Results   Component Value Date/Time    LDL, calculated 79.8 02/22/2022 02:59 AM       EKG: Paced rhythm      Assessment and Plan:   Nicolasa Herman Sr is a 80 y.o. male with a history of Alzheimer's disease, hypertension diabetes and CAD who is currently admitted to the hospital after he developed difficulty with speaking, found to have a left parieto-occipital lobe infarction    Left parieto-occipital lobe infarction: Etiology of this is likely due to large vessel atherosclerosis versus atrial fibrillation. His CTA head and neck revealed only mild atherosclerosis.   - Recommend maintaining BP goal is less than 140/90 (this is the long term goal)   - would recommend to continue aspirin 81mg daily for secondary stroke prevention. He should also be placed on Plavix 75 mg daily for a total of 21 days.  - Risk factor modification: LDL 79 and hemoglobin A1c 5.5%   - if LDL > 70, would start atorvastatin 40mg daily    - please check hepatic panel prior to initiation of statin  - Would obtain MRI brain without contrast to rule out acute ischemia. It is not clear if his pacemaker is MRI compatible  - if MRI cannot be done,I would repeat a noncontrast head CT as his perfusion imaging did reveal an area of mismatch in the left frontal lobe  - please obtain TTE to rule out intracardiac thrombus   - would monitor on telemetry to rule out arrhythmias    - please obtain PT/OT and speech consultations     We discussed extensively the importance of lifestyle modification including smoking cessation, diet, and incorporating exercise into daily routine. Thank you for the consult we will continue to follow.   Please call with questions      Avis Ordaz MD

## 2022-02-22 NOTE — PROGRESS NOTES
Patient has a Pacemaker device that is not MRI conditional.  Patient cannot have an MRI due to this device.

## 2022-02-22 NOTE — PROGRESS NOTES
Speech Pathology Note    Formal SLP evaluation complete. Recommend Easy to Chew/Thin Liquids. Full note to follow. RN educated re: SLP evaluation. Thank you.     Angelic Palomino M.S., CF-SLP

## 2022-02-22 NOTE — PROGRESS NOTES
Hospitalist Progress Note    NAME: Ellis Farfan Sr   :  1931   MRN:  [id number]       Assessment / Plan:    Acute left occipital and inferior parietal lobe CVA  -CT shows possibility of left occipital CVA. -Continue CVA protocol. -MRI pending, family is not sure if his pacemaker is MRI compatible  . -CTA did not show any large vessel occlusion  -Echocardiogram pending  -LDL is 7928, A1c is 5.5  -He is already on aspirin at home and it was transitioned to Plavix on admission  -Consult PT OT and speech therapy.    -Neuro consult pending.  -We will add atorvastatin     Alzheimer's dementia  GERD  Hypertension  -Continue Aricept, PPI  -Continue Lasix     History of CKD stage III  -Creatinine is close to baseline of around 1.4     Mild hypoglycemia  - blood sugars, stop IV dextrose . -A1c is 5.5        Code Status: Full code  Surrogate Decision Maker: Zayra Gallegos     DVT Prophylaxis: Lovenox  GI Prophylaxis: not indicated     Baseline: From home, lives with granddaughter, independent of ADLs has mild dementia at baseline      Recommended Disposition:  PT, OT, RN     Subjective:     Chief Complaint / Reason for Physician Visit  \" Denies any active complaints dysarthria seems to have resolved\". Discussed with RN events overnight. Review of Systems:  Symptom Y/N Comments  Symptom Y/N Comments   Fever/Chills n   Chest Pain n    Poor Appetite n   Edema n    Cough n   Abdominal Pain n    Sputum n   Joint Pain     SOB/GONGORA n   Pruritis/Rash     Nausea/vomit n   Tolerating PT/OT     Diarrhea n   Tolerating Diet     Constipation n   Other       Could NOT obtain due to:      Objective:     VITALS:   Last 24hrs VS reviewed since prior progress note.  Most recent are:  Patient Vitals for the past 24 hrs:   Temp Pulse Resp BP SpO2   22 1502 (!) 96.3 °F (35.7 °C) 64 16 (!) 139/92 100 %   22 1126    (!) 112/56    22 1045  62  135/60    22 1035  68  128/60    22 0834 97.4 °F (36.3 °C) 60 14 (!) 112/56 96 %   02/21/22 2300 97.8 °F (36.6 °C) 76 19 127/62 99 %   02/21/22 2130  61 21 126/64    02/21/22 2033  67 18 115/61 99 %   02/21/22 1832 98.5 °F (36.9 °C) 85 18 (!) 138/55 96 %     No intake or output data in the 24 hours ending 02/22/22 1532     PHYSICAL EXAM:  General: WD, WN. Alert, cooperative, no acute distress    EENT:  EOMI. Anicteric sclerae. MMM  Resp:  CTA bilaterally, no wheezing or rales. No accessory muscle use  CV:  Regular  rhythm,  No edema  GI:  Soft, Non distended, Non tender.  +Bowel sounds  Neurologic:  Alert and oriented X 3, normal speech,   Psych:   Good insight. Not anxious nor agitated  Skin:  No rashes. No jaundice    Reviewed most current lab test results and cultures  YES  Reviewed most current radiology test results   YES  Review and summation of old records today    NO  Reviewed patient's current orders and MAR    YES  PMH/SH reviewed - no change compared to H&P  ________________________________________________________________________  Care Plan discussed with:    Comments   Patient x    Family  x    RN x    Care Manager     Consultant  x  neurologist                    x Multidiciplinary team rounds were held today with , nursing, pharmacist and clinical coordinator. Patient's plan of care was discussed; medications were reviewed and discharge planning was addressed. ________________________________________________________________________  Total NON critical care TIME:  35 Minutes    Total CRITICAL CARE TIME Spent:   Minutes non procedure based      Comments   >50% of visit spent in counseling and coordination of care x    ________________________________________________________________________  Farzad Romero MD     Procedures: see electronic medical records for all procedures/Xrays and details which were not copied into this note but were reviewed prior to creation of Plan.       LABS:  I reviewed today's most current labs and imaging studies.   Pertinent labs include:  Recent Labs     02/22/22  0259 02/21/22 1859   WBC 6.4 6.2   HGB 11.1* 12.3   HCT 36.9 40.2    304     Recent Labs     02/22/22 0259 02/21/22 1859    144   K 3.3* 3.6    106   CO2 29 34*   GLU 94 99   BUN 34* 38*   CREA 1.43* 1.49*   CA 9.0 9.2   ALB  --  2.9*   TBILI  --  0.7   ALT  --  53   INR  --  1.1       Signed: Elvie Valenzuela MD

## 2022-02-22 NOTE — PROGRESS NOTES

## 2022-02-22 NOTE — PROGRESS NOTES
Problem: Dysphagia (Adult)  Goal: *Acute Goals and Plan of Care (Insert Text)  Description: Speech Pathology Goals  Initiated 2/22/2022    1. Patient will tolerate Easy to Chew/Thin Liquids without signs of aspiration within 7 days. Outcome: Progressing Towards Goal     SPEECH LANGUAGE PATHOLOGY BEDSIDE SWALLOW AND SPEECH EVALUATIONS  Patient: Lucas Farfan Sr (80 y.o. male)  Date: 2/22/2022  Primary Diagnosis: Acute CVA (cerebrovascular accident) [facility]) [I63.9]        Precautions:   Fall    ASSESSMENT :  Based on the objective data described below, the patient presents with a functional oropharyngeal swallow. Head CT revealed possibly acute left occipital and inferior parietal lobe infarcts. MRI unable to be completed due to pacemaker, per chart review. Per RN, nightshift reported patient with anterior spillage with thin liquids. At time of SLP visit, patient demonstrated his ability to create adequate labial seal. Mildly prolonged mastication observed, yet patient achieved adequate oral clearance. Patient tolerated all consistencies without signs of aspiration. Patient admitted with chief complaint of aphasia. Per chart review, patient's \"usual baseline is conversant. \" At the time of SLP visit, patient with limited verbal output. Patient demonstrated his ability to complete automatic speech tasks independently given minimal cues. Patient demonstrated difficulty with responsive and confrontation naming tasks, as well as consistently following 1-step commands. Per chart review, patient with baseline of Alzheimer's disease. Given patient's history of CVA, CHF, and dementia, patient is at risk for aspiration. Recommend Easy to Chew/Thin Liquids with aspiration precautions outlined below. RN educated re: SLP evaluation. Patient will benefit from skilled intervention to address the above impairments. Patients rehabilitation potential is considered to be guarded given patient history of dementia.      PLAN :  Recommendations and Planned Interventions:  -- Easy to Chew/Thin Liquid  -- Medication as tolerated  -- 1:1 Assistance, as needed  -- Sitting Upright  -- Small Bites/Sips    Frequency/Duration: Patient will be followed by speech-language pathology 3 times a week to address goals. Discharge Recommendations: To Be Determined     SUBJECTIVE:   Patient stated Benito Farfan when asked his name.     OBJECTIVE:     Past Medical History:   Diagnosis Date    Abdominal pain 7/18/2017    Alzheimer disease ([facility] 7/18/2017    Anemia 7/18/2017    Arthritis     Back pain 7/18/2017    Bradycardia 7/18/2017    CAD (coronary artery disease)     hx of MI    Chronic diastolic heart failure ([facility] 2/26/2021    CKD (chronic kidney disease), stage II 7/18/2017    constipation     Depression 7/18/2017    Diabetes mellitus ([facility] 7/18/2017    Diverticulosis 7/18/2017    DJD (degenerative joint disease) 7/18/2017    Encounter for long-term (current) use of other medications 7/18/2017    Fatigue     Gastritis and duodenitis 7/18/2017    GERD (gastroesophageal reflux disease)     GI bleed 7/18/2017    Hearing loss     Hyperlipidemia 7/18/2017    Hypertension     Hypertension with renal disease 7/18/2017    Ill-defined condition     Dementia    Internal hemorrhoids 7/18/2017    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    S/P cardiac pacemaker procedure 5/4/2015    5/4/15 Medtronic dual chamber pacemaker implant     Thyroid disease     Weight loss     lost over 40 pounds last year- has no appetite     Past Surgical History:   Procedure Laterality Date    COLONOSCOPY N/A 6/22/2017    COLONOSCOPY performed by Carolann Araujo MD at [address] [place] [address]  6/22/2017         HX HERNIA REPAIR  7/10/2014    right inguinal    HX OTHER SURGICAL      cystectomy from back    OR EGD TRANSORAL BIOPSY SINGLE/MULTIPLE  2/14/2012         OR UPPER GI ENDOSCOPY,W/DIR SUBMUC INJ  7/23/2020         UPPER GI ENDOSCOPY,BIOPSY  6/22/2017         UPPER GI ENDOSCOPY,BIOPSY  7/23/2020          Prior Level of Function/Home Situation:  Home Situation  Home Environment: Private residence  # Steps to Enter: 3  Rails to Enter: Yes  Hand Rails : Bilateral  Wheelchair Ramp: No  One/Two Story Residence: One story  Living Alone: No  Support Systems: Child(turner),Other Family Member(s)  Patient Expects to be Discharged to[de-identified] Home with home health [facility]  Current DME Used/Available at Home: Walker, rolling,Wheelchair  Tub or Shower Type: Tub/Shower combination    Diet prior to admission: Unable to determine given that patient is a poor historian  Current Diet: NPO     Cognitive and Communication Status:  Neurologic State: Alert  Orientation Level: Oriented to person,Disoriented to place,Disoriented to time  Cognition: Decreased attention/concentration,Decreased command following  Perception: Cues to maintain midline in standing  Perseveration: No perseveration noted  Safety/Judgement: Not assessed    Swallowing Evaluation:   Oral Assessment:  Oral Assessment  Labial: No impairment  Dentition: Natural  Oral Hygiene: Dry oral mucosa  Lingual: No impairment  Velum: No impairment  Mandible: No impairment    P.O. Trials:  Patient Position: Upright in bed  Vocal quality prior to P.O.: No impairment  Consistency Presented: Ice chips; Thin liquid;Puree; Solid  How Presented: Self-fed/presented;Straw;Successive swallows;Spoon     Bolus Acceptance: No impairment  Bolus Formation/Control: No impairment (Mild delay yet functional)     Propulsion: Delayed (# of seconds)  Oral Residue: None  Initiation of Swallow: No impairment  Laryngeal Elevation: Functional  Aspiration Signs/Symptoms: None  Pharyngeal Phase Characteristics: No impairment, issues, or problems         Comments: Belching observed following PO intake, suspect esophageal    Oral Phase Severity: Other (comment) (WFL)  Pharyngeal Phase Severity : Other (comment) (WFL)    NOMS:   The NOMS functional outcome measure was used to quantify this patient's level of swallowing impairment. Based on the NOMS, the patient was determined to be at level 7 for swallow function. NOMS Swallowing Levels:  Level 1 (CN): NPO  Level 2 (CM): NPO but takes consistency in therapy  Level 3 (CL): Takes less than 50% of nutrition p.o. and continues with nonoral feedings; and/or safe with mod cues; and/or max diet restriction  Level 4 (CK): Safe swallow but needs mod cues; and/or mod diet restriction; and/or still requires some nonoral feeding/supplements  Level 5 (CJ): Safe swallow with min diet restriction; and/or needs min cues  Level 6 (CI): Independent with p.o.; rare cues; usually self cues; may need to avoid some foods or needs extra time  Level 7 ([address] [place] [address]): Independent for all p.o.  FISH. (2003). National Outcomes Measurement System (NOMS): Adult Speech-Language Pathology User's Guide. Speech/Language Evaluation  Motor Speech:  Oral-Motor Structure/Motor Speech  Labial: No impairment  Dentition: Natural  Oral Hygiene: Dry oral mucosa  Lingual: No impairment  Velum: No impairment  Mandible: No impairment    Language Comprehension and Expression:  Auditory Comprehension  Auditory Impairment: Yes  Response to Basic Yes/No Questions (%): 25 %  One-Step Basic Commands (%): 60 %  Verbal Expression  Primary Mode of Expression: Verbal  Automatic Speech Task: No impairment  Automatic speech task cueing type: Verbal  Naming: Impaired  Confrontation (%): 25 %  Responsive (%): 50 %          Voice:                 Vocal Quality: No impairment                               NOMS: The NOMS functional outcome measure was used to quantify this patient's level of expressive language impairment. Based on the NOMS, the patient was determined to be at level 3 for spoken language. expression. NOMS Spoken Language Expression:  Level 1 (CN): Verbalizations not meaningful to anyone. Level 2 (CM): Few attempts accurate/appropriate.   Max cues to elicit automatic/imitative words/phrases. Level 3 (CL): Communication partner responsible for communication; Mod cues for words/phrases meaningful in context  Level 4 (CK): Initiate during simple, routine activities w/familiar partner. Mod cues to produce simple sentences  Level 5 (Gera Au): Initiates communication with familiar and unfamiliar partners. Min cues for complex sentences. Level 6 (CI): Communicates for most activities. Some limitations still present for vocational/social activities. Rarely cued for complex info  Level 7 [facility]): Independent communication. FISH. (2003). National Outcomes Measurement System (NOMS): Adult Speech-Language Pathology User's Guide. Pain:  Pain Scale 1: Numeric (0 - 10)  Pain Intensity 1: 0       After treatment:   Patient left in no apparent distress in bed, Call bell within reach, and Nursing notified    COMMUNICATION/EDUCATION:   Patient was educated regarding his deficit(s) of WFL swallow. He demonstrated guarded understanding as evidenced by patient history of dementia. The patient's plan of care including recommendations, planned interventions, and recommended diet changes were discussed with: Registered nurse. Patient/family have participated as able in goal setting and plan of care. Patient/family agree to work toward stated goals and plan of care.     Thank you for this referral.  Veronica Bowden, APOLONIA  Time Calculation: 23 mins

## 2022-02-22 NOTE — ED NOTES
TRANSFER - OUT REPORT:    Verbal report given to RADHA Lira(name) on Benito Farfan Sr  being transferred to [facility](unit) for routine progression of care       Report consisted of patients Situation, Background, Assessment and   Recommendations(SBAR). Information from the following report(s) SBAR, Kardex and Recent Results was reviewed with the receiving nurse. Lines:   Peripheral IV 30/37/95 Left Basilic (Active)   Site Assessment Clean, dry, & intact 02/21/22 1858   Phlebitis Assessment 0 02/21/22 1858   Infiltration Assessment 0 02/21/22 1858   Dressing Status Clean, dry, & intact 02/21/22 1858   Dressing Type Tape;Transparent 02/21/22 1858   Hub Color/Line Status Green;Flushed;Patent 02/21/22 1858   Action Taken Blood drawn 02/21/22 1858        Opportunity for questions and clarification was provided.       Patient transported with:   [employer]

## 2022-02-22 NOTE — PROGRESS NOTES
Radiology   CTA final  IMPRESSION:  1. No larger medium medium arterial occlusion demonstrated in the arch, neck and head arteries. 2. Possible diminished cortical enhancement left anterior lateral frontal lobe/operculum. Consider MRI evaluation for possible infarct.

## 2022-02-23 NOTE — PROGRESS NOTES
INTERNAL MEDICINE ATTENDING NOTE     Patient Name: Daniel Farfan    : 1931   Admit: 2022    ASSESSMENT / PLAN    1. Acute CVA (cerebrovascular accident) ([facility] (2022): L occipital and L inferior parietal lobe. Neurology following. PT, OT, speech Therapy. 2. Cardiomyopathy ([facility] (2019): EF 15-20%: Stable. Continue lasix. 3. Controlled type 2 diabetes mellitus with stage 3 chronic kidney disease, without long-term current use of insulin ([facility] (2017)  4. Alzheimer disease ([facility] (2017): Continue aricept and namenda. 5. HTN: Continue current treatments. 6. Paroxysmal A Fib / SSS: s/p pacemaker. 7. Severe protein calorie malnutrition. 8. GERD: PPI. 9. Full code. SUBJECTIVE: Mr. Attila Carreon was seen by me today during rounds. At this time, he is resting comfortably. He answers yes-no questions; speech is otherwise not intelligible to me. ROS otherwise unremarkable except as noted elsewhere. OBJECTIVE: Blood pressure 128/63, pulse 61, temperature 96.8 °F (36 °C), resp. rate 18, height 5' 8\" (1.727 m), weight 128 lb (58.1 kg), SpO2 100 %. Gen: Patient is in no acute distress. Lungs: CTAB. Heart: RRR. Abd: S, NT, ND, BS present. Exremities: Warm. Neuro: Facial droop and aphasia noted.      Recent Results (from the past 12 hour(s))   CBC W/O DIFF    Collection Time: 22  1:33 AM   Result Value Ref Range    WBC 4.6 4.1 - 11.1 K/uL    RBC 4.07 (L) 4.10 - 5.70 M/uL    HGB 11.1 (L) 12.1 - 17.0 g/dL    HCT 40.0 36.6 - 50.3 %    MCV 98.3 80.0 - 99.0 FL    MCH 27.3 26.0 - 34.0 PG    MCHC 27.8 (L) 30.0 - 36.5 g/dL    RDW 15.9 (H) 11.5 - 14.5 %    PLATELET 012 725 - 888 K/uL    MPV 11.8 8.9 - 12.9 FL    NRBC 0.0 0  WBC    ABSOLUTE NRBC 0.00 0.00 - 8.24 K/uL   METABOLIC PANEL, BASIC    Collection Time: 22  1:33 AM   Result Value Ref Range    Sodium 139 136 - 145 mmol/L    Potassium 3.8 3.5 - 5.1 mmol/L    Chloride 106 97 - 108 mmol/L CO2 27 21 - 32 mmol/L    Anion gap 6 5 - 15 mmol/L    Glucose 81 65 - 100 mg/dL    BUN 28 (H) 6 - 20 MG/DL    Creatinine 1.18 0.70 - 1.30 MG/DL    BUN/Creatinine ratio 24 (H) 12 - 20      GFR est AA >60 >60 ml/min/1.73m2    GFR est non-AA 58 (L) >60 ml/min/1.73m2    Calcium 8.5 8.5 - 10.1 MG/DL        CT code neuro:   Small low-attenuation areas in the left occipital and inferior parietal lobes,  consistent with areas of nonhemorrhagic infarction of indeterminate age,  possibly acute. Background of white matter hypodensity consistent with chronic small vessel ischemic change. CTA code neuro:   1. Negative CT perfusion study. 2. No acute large vessel occlusion or flow-limiting stenosis. Perla Sorto MD, FACP  Best contact is via Pager: [phone], or via hospital  at [phone]. I can also be reached by Perfect Serve.

## 2022-02-23 NOTE — PROGRESS NOTES
Patient in bed, on high umaña's position, eating dinner. Call bell and phone at bed side. Bed alarm on.

## 2022-02-23 NOTE — PROGRESS NOTES
Problem: Self Care Deficits Care Plan (Adult)  Goal: *Acute Goals and Plan of Care (Insert Text)  Description: FUNCTIONAL STATUS PRIOR TO ADMISSION: Grand-daughter provided all information this session due to pts difficulty with verbalization. Patient was modified independent using a rolling walker for functional mobility intermittently (grand-daughter reports he only needs RW when he is sick). Has 24/7 supervision from family. Pt is able to get in/out of tub shower without assist, family checks on him intermittently but he is able to bathe without assist. He requires cuing to perform ADLs, eat his meals, and take his meds. Family assists with meal prep and medication management. HOME SUPPORT PRIOR TO ADMISSION: lives with granddaughter and her children whom provide 24/7 assist    Occupational Therapy Goals:  Initiated 2/22/2022  1. Patient will perform grooming standing with supervision within 7 days. 2. Patient will perform dressing with supervision within 7 days. 3. Patient will perform toileting with supervision within 7 days. 4. Patient will transfer from toilet with supervision using the least restrictive device and appropriate durable medical equipment within 7 days. Outcome: Progressing Towards Goal   OCCUPATIONAL THERAPY TREATMENT  Patient: Cristiana Farfan  (80 y.o. male)  Date: 2/23/2022  Diagnosis: Acute CVA (cerebrovascular accident) [facility]) [I63.9] <principal problem not specified>       Precautions: Fall  Chart, occupational therapy assessment, plan of care, and goals were reviewed. ASSESSMENT  Patient continues with skilled OT services and is progressing towards goals. Pt was attempting to verbalize today and was able to say thank you. He does become frustrated with is difficulty with communicating. He was in bathroom upon arrival seated on commode with PT assisting pt.   Pt was able to doff socks seated on commode with increased time but needed min assist to don new socks as he was becoming frustrated. Min assist for walker management and flexed posture with standing to wash hands at sink and reminders to use soap. Pt will need assist with ADLS and mobility at discharge and family reports that they can provide the level of assist needed. Recommend return to home with 24/7 assist and home health services. Current Level of Function Impacting Discharge (ADLs): CGA/min assist mobility with RW  Grooming  Washing Hands: Contact guard assistance (standing at sink with verbal cues for remebering soap)    Lower Body Dressing Assistance  Protective Undergarmet: Minimum assistance  Socks: Minimum assistance (increased time)    Toileting  Bladder Hygiene: Stand-by assistance  Clothing Management: Minimum assistance    Other factors to consider for discharge: needs assist with all mobility and majority of ADLS at this time         PLAN :  Patient continues to benefit from skilled intervention to address the above impairments. Continue treatment per established plan of care to address goals. Recommend with staff: frequent rounding on pt/continence checks, bed/chair alarm, assist with mobility and ADLS    Recommend next OT session: standing balance, problem solving, ADLS    Recommendation for discharge: (in order for the patient to meet his/her long term goals)  Occupational therapy at least 2 days/week in the home AND ensure assist and/or supervision for safety with mobility and ADLS    This discharge recommendation:  Has been made in collaboration with the attending provider and/or case management    IF patient discharges home will need the following DME: rolling walker, bedside commode       SUBJECTIVE:   Patient stated Thank you.     OBJECTIVE DATA SUMMARY:   Cognitive/Behavioral Status:  Neurologic State: Alert;Confused  Orientation Level: Oriented to person;Disoriented to situation  Cognition: Decreased attention/concentration;Decreased command following  Perception: Cues to maintain midline in sitting  Perseveration: No perseveration noted  Safety/Judgement: Fall prevention;Decreased awareness of need for safety;Decreased insight into deficits; Decreased awareness of need for assistance    Functional Mobility and Transfers for ADLs:  Bed Mobility:  Scooting: Contact guard assistance    Transfers:  Sit to Stand: Contact guard assistance  Functional Transfers  Bathroom Mobility: Minimum assistance (walker management, flexed posture)  Bed to Chair: Contact guard assistance;Minimum assistance; Additional time; Other (comment) (with RW)    Balance:  Sitting: Intact  Standing: Impaired  Standing - Static: Fair  Standing - Dynamic : Fair    ADL Intervention:       Grooming  Washing Hands: Contact guard assistance (standing at sink with verbal cues for remebering soap)    Lower Body Dressing Assistance  Protective Undergarmet: Minimum assistance  Socks: Minimum assistance (increased time)    Toileting  Bladder Hygiene: Stand-by assistance  Clothing Management: Minimum assistance    Cognitive Retraining  Orientation Retraining: Situation;Place  Safety/Judgement: Fall prevention;Decreased awareness of need for safety;Decreased insight into deficits; Decreased awareness of need for assistance      Pain:  0/10    Activity Tolerance:   Fair and requires rest breaks    After treatment patient left in no apparent distress:   Sitting in chair, Call bell within reach, and Bed / chair alarm activated    COMMUNICATION/COLLABORATION:   The patients plan of care was discussed with: Physical therapist, Registered nurse, and patient .      Dolly Farah OTR/L  Time Calculation: 16 mins

## 2022-02-23 NOTE — PROGRESS NOTES
End of Shift Note    Bedside shift change report given to Lui Gamble RN (oncoming nurse) by Jayson Petit RN (offgoing nurse). Report included the following information SBAR, Kardex and Recent Results    Shift worked: Night    Shift summary and any significant changes:    Uneventful  night except for periodic confusion and impulsiveness, CHG bath done, made comfortable in bed, and bed alarm put on zone two.        Concerns for physician to address: None   Zone phone for oncoming shift:  [phone]     Patient Information  Deon Ibarra Sr  80 y.o.  2/21/2022  6:38 PM by Luisa Goel MD. Alli Farfan Sr was admitted from Home    Problem List  Patient Active Problem List    Diagnosis Date Noted    Acute CVA (cerebrovascular accident) ([address] 02/21/2022    Vitamin D deficiency 02/06/2022    Pain of right thumb 08/05/2021    GONGORA (dyspnea on exertion) 02/24/2021    Anasarca 82/78/5151    Systolic CHF, chronic (HCC) 12/14/2020    Pleural effusion 12/14/2020    Weakness 12/02/2020    Hypotension 08/13/2020    Esophageal dyskinesia 07/23/2020    Severe protein-calorie malnutrition (HCC) 07/21/2020    Weight loss 07/20/2020    Unsteady gait 07/20/2020    Paroxysmal VT ([address] 11/07/2019    Cardiomyopathy ([address] 11/07/2019    Alcohol withdrawal syndrome, with delirium ([address] 11/01/2019    A-fib ([address] 10/27/2019    Primary insomnia 01/09/2019    Mild depression ([address] 06/12/2018    Acute bilateral low back pain without sciatica 05/30/2018    Alcoholism ([address] 90/62/3496    Metabolic encephalopathy 41/81/9599    TIA (transient ischemic attack) 04/12/2018    Medicare annual wellness visit, subsequent 09/01/2017    Diverticulosis 07/18/2017    Gastritis and duodenitis 07/18/2017    Internal hemorrhoids 07/18/2017    Hypertension with renal disease 07/18/2017    Mixed hyperlipidemia 07/18/2017    GI bleed 07/18/2017    Primary osteoarthritis involving multiple joints 07/18/2017    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, without long-term current use of insulin ([facility] 07/18/2017    Back pain 07/18/2017    Anemia 07/18/2017    Alzheimer disease ([facility] 07/18/2017    SSS (sick sinus syndrome) ([facility] 06/02/2015    S/P cardiac pacemaker procedure 05/04/2015    S/P ablation of accessory bypass tract 05/04/2015    SVT (supraventricular tachycardia) ([facility])--s/p RFA 04/28/2015    Near syncope 03/11/2015    ASCVD (arteriosclerotic cardiovascular disease) 07/11/2014    Hypokalemia 07/11/2014    Right inguinal hernia 06/12/2014     Past Medical History:   Diagnosis Date    Abdominal pain 7/18/2017    Alzheimer disease ([facility] 7/18/2017    Anemia 7/18/2017    Arthritis     Back pain 7/18/2017    Bradycardia 7/18/2017    CAD (coronary artery disease)     hx of MI    Chronic diastolic heart failure ([facility] 2/26/2021    CKD (chronic kidney disease), stage II 7/18/2017    constipation     Depression 7/18/2017    Diabetes mellitus ([facility] 7/18/2017    Diverticulosis 7/18/2017    DJD (degenerative joint disease) 7/18/2017    Encounter for long-term (current) use of other medications 7/18/2017    Fatigue     Gastritis and duodenitis 7/18/2017    GERD (gastroesophageal reflux disease)     GI bleed 7/18/2017    Hearing loss     Hyperlipidemia 7/18/2017    Hypertension     Hypertension with renal disease 7/18/2017    Ill-defined condition     Dementia    Internal hemorrhoids 7/18/2017    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    S/P cardiac pacemaker procedure 5/4/2015    5/4/15 Medtronic dual chamber pacemaker implant     Thyroid disease     Weight loss     lost over 40 pounds last year- has no appetite       Core Measures:  CVA: Yes Yes  CHF:Yes No  PNA:No No    Activity:  Activity Level: Bed Rest  Number times ambulated in hallways past shift: 0  Number of times OOB to chair past shift: 1    Cardiac:   Cardiac Monitoring:  Yes Cardiac Rhythm: Ventricular Paced    Access:   Current line(s): PIV   Central Line? No    Genitourinary:   Urinary status: incontinent and external catheter  Urinary Catheter? No     Respiratory:   O2 Device: None (Room air)  Chronic home O2 use?: NO  Incentive spirometer at bedside: NO       GI:  Last Bowel Movement Date:  (prior to admission)  Current diet:  ADULT DIET Easy to Chew  DIET ONE TIME MESSAGE  Passing flatus: YES  Tolerating current diet: YES       Pain Management:   Patient states pain is manageable on current regimen: YES    Skin:  Newton Score: 15  Interventions: turn team, float heels, increase time out of bed, limit briefs and nutritional support     Patient Safety:  Fall Score:  Total Score: 3  Interventions: bed/chair alarm, assistive device (walker, cane, etc), gripper socks, pt to call before getting OOB and stay with me (per policy)  High Fall Risk: Yes    DVT prophylaxis:  DVT prophylaxis Med- No  DVT prophylaxis SCD or LISSY- Yes     Wounds: (If Applicable)  Wounds- N    Active Consults:  IP CONSULT TO NEUROLOGY    Length of Stay:  Expected LOS: 2d 21h  Actual LOS: 2  Discharge Plan: 2/23/22      Mario Preciado RN

## 2022-02-23 NOTE — PROGRESS NOTES
The patent foramen ovale is consistent with transitional anatomy. The left to right shunt is hemodynamically insignificant.  There is a good chance that this will close spontaneously over time.      Transition of Care Plan:     RUR: 17%  Disposition: home with granddaughter, [address]   Follow up appointments: PCP, specialists as indicated   DME needed: N/A, has a rollator and wheelchair  Transportation at Discharge: family will transport home   [place] or means to access home: yes       IM Medicare Letter: to be reviewed prior to d/c   Is patient a BCPI-A Bundle: No                       If yes, was Bundle Letter given?:    Is patient a [place] and connected with the [place]? Yes, not connected to the [place] (not registered)               If yes, was [employer] transfer form completed and VA notified? Caregiver Contact: [facility] [phone]  Discharge Caregiver contacted prior to discharge? yes  Care Conference needed?: No     Reason for Admission: acute CVA                        RUR Score: 17%                 PCP:    First and Last name:   Delroy Reddy MD                 Name of Practice:               Are you a current patient: Yes/No: yes              Approximate date of last visit: August 2021              Can you participate in a virtual visit if needed: yes with family assistance      Do you (patient/family) have any concerns for transition/discharge? None voiced at this time                  Plan for utilizing home health:   Yes, [address]         Transition of Care Plan:  Home with family, [facility]      Pt lives with his grand-daughter in a one level home with 6 HELEN. Pt is typically able to perform ADLs with supervision from family. He uses a rollator for ambulation when needed. He also has access to a wheelchair which was owned previously by family. Eliseo Xiong assists pt with medication management, finances, transportation, and meal preparation.     Discussed recommendations for [place] services at d/c. Eliseo Xiong prefers to use [address] at discharge. Referral sent via connect care. [address] accepted referral. Information added to AVS for review.      2/23/22:  CM received call from Patient Advocate.  Apparently analilia Najera Press called PA with concerns over not hearing from MD so CM was asked to reach out to MD and analilia. Dr. Kelsie Hyman is pt's PCP and would normally follow pt in hospital but is out of town this week. Pt was seen by Hospitalist service then by Dr. Bree Reece today. CM sent Perfect Serve to Dr. Aj Morgan then called analilia but by then Dr. Aj Morgan had already called analilia with update and issues were resolved. PLAN: DC home w/ family in next 24-48 hrs w/ [address] (PT only). Family to transport. Analilia prefers Thurs AM for PCP f/u if possible.     Alirio Armijo, MSW

## 2022-02-23 NOTE — PROGRESS NOTES
Patient's grand daughter was called back, concerned about being discharged today, stating it's too soon. Reported that patient was just recovering from GERD and had poor fluid intake, appreciated the fluids the patient received yesterday thru IV though patient had pulled IV. Needs staff support until patient fully recovered. Appreciates if patient be discharged in a couple of days instead.

## 2022-02-23 NOTE — PROGRESS NOTES
End of Shift Note    Bedside shift change report given to Kenroy Parks, RN (oncoming nurse) by Christiano Ribeiro RN (offgoing nurse). Report included the following information SBAR, Kardex and Recent Results    Shift worked: 7a-7p   Shift summary and any significant changes:    Unable to have MRI due to incompatible pacemaker.         Concerns for physician to address: None   Zone phone for oncoming shift:  [phone]     Patient Information  Dakota Doctor Sr  80 y.o.  2/21/2022  6:38 PM by Cecile Cardoso MD. Brayan Farfan Sr was admitted from Home    Problem List  Patient Active Problem List    Diagnosis Date Noted    Acute CVA (cerebrovascular accident) ([address] 02/21/2022    Vitamin D deficiency 02/06/2022    Pain of right thumb 08/05/2021    GONGORA (dyspnea on exertion) 02/24/2021    Anasarca 56/73/4268    Systolic CHF, chronic ([address] 12/14/2020    Pleural effusion 12/14/2020    Weakness 12/02/2020    Hypotension 08/13/2020    Esophageal dyskinesia 07/23/2020    Severe protein-calorie malnutrition ([address] 07/21/2020    Weight loss 07/20/2020    Unsteady gait 07/20/2020    Paroxysmal VT ([address] 11/07/2019    Cardiomyopathy ([address] 11/07/2019    Alcohol withdrawal syndrome, with delirium ([address] 11/01/2019    A-fib ([address] 10/27/2019    Primary insomnia 01/09/2019    Mild depression ([address] 06/12/2018    Acute bilateral low back pain without sciatica 05/30/2018    Alcoholism ([address] 57/43/8748    Metabolic encephalopathy 41/79/4770    TIA (transient ischemic attack) 04/12/2018    Medicare annual wellness visit, subsequent 09/01/2017    Diverticulosis 07/18/2017    Gastritis and duodenitis 07/18/2017    Internal hemorrhoids 07/18/2017    Hypertension with renal disease 07/18/2017    Mixed hyperlipidemia 07/18/2017    GI bleed 07/18/2017    Primary osteoarthritis involving multiple joints 07/18/2017    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, without long-term current use of insulin ([facility] 07/18/2017    Back pain 07/18/2017    Anemia 07/18/2017    Alzheimer disease ([facility] 07/18/2017    SSS (sick sinus syndrome) ([facility] 06/02/2015    S/P cardiac pacemaker procedure 05/04/2015    S/P ablation of accessory bypass tract 05/04/2015    SVT (supraventricular tachycardia) (HCC)--s/p RFA 04/28/2015    Near syncope 03/11/2015    ASCVD (arteriosclerotic cardiovascular disease) 07/11/2014    Hypokalemia 07/11/2014    Right inguinal hernia 06/12/2014     Past Medical History:   Diagnosis Date    Abdominal pain 7/18/2017    Alzheimer disease ([facility] 7/18/2017    Anemia 7/18/2017    Arthritis     Back pain 7/18/2017    Bradycardia 7/18/2017    CAD (coronary artery disease)     hx of MI    Chronic diastolic heart failure ([facility] 2/26/2021    CKD (chronic kidney disease), stage II 7/18/2017    constipation     Depression 7/18/2017    Diabetes mellitus ([facility] 7/18/2017    Diverticulosis 7/18/2017    DJD (degenerative joint disease) 7/18/2017    Encounter for long-term (current) use of other medications 7/18/2017    Fatigue     Gastritis and duodenitis 7/18/2017    GERD (gastroesophageal reflux disease)     GI bleed 7/18/2017    Hearing loss     Hyperlipidemia 7/18/2017    Hypertension     Hypertension with renal disease 7/18/2017    Ill-defined condition     Dementia    Internal hemorrhoids 7/18/2017    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    S/P cardiac pacemaker procedure 5/4/2015    5/4/15 Medtronic dual chamber pacemaker implant     Thyroid disease     Weight loss     lost over 40 pounds last year- has no appetite       Core Measures:  CVA: Yes Yes  CHF:Yes No  PNA:No No    Activity:  Activity Level: Bed Rest  Number times ambulated in hallways past shift: 0  Number of times OOB to chair past shift: 1    Cardiac:   Cardiac Monitoring: Yes           Access:   Current line(s): PIV   Central Line?  No    Genitourinary:   Urinary status: incontinent and external catheter  Urinary Catheter? No     Respiratory:   O2 Device: None (Room air)  Chronic home O2 use?: NO  Incentive spirometer at bedside: NO       GI:  Last Bowel Movement Date:  (prior to admission)  Current diet:  ADULT DIET Easy to Chew  DIET ONE TIME MESSAGE  Passing flatus: YES  Tolerating current diet: YES       Pain Management:   Patient states pain is manageable on current regimen: YES    Skin:  Newton Score: 16  Interventions: turn team, float heels, increase time out of bed, limit briefs and nutritional support     Patient Safety:  Fall Score:  Total Score: 3  Interventions: bed/chair alarm, assistive device (walker, cane, etc), gripper socks, pt to call before getting OOB and stay with me (per policy)  High Fall Risk: Yes    DVT prophylaxis:  DVT prophylaxis Med- No  DVT prophylaxis SCD or LISSY- Yes     Wounds: (If Applicable)  Wounds- N    Active Consults:  IP CONSULT TO NEUROLOGY    Length of Stay:  Expected LOS: 2d 21h  Actual LOS: 1  Discharge Plan: 2/23/22      Hannah Gibbs RN

## 2022-02-23 NOTE — PROGRESS NOTES
Problem: Dysphagia (Adult)  Goal: *Acute Goals and Plan of Care (Insert Text)  Description: Speech Pathology Goals  Initiated 2/22/2022    1. Patient will tolerate Easy to Chew/Thin Liquids without signs of aspiration within 7 days. Outcome: Progressing Towards Goal     Problem: Communication Impaired (Adult)  Goal: *Acute Goals and Plan of Care (Insert Text)  Description: Speech Therapy Goals  Initiated 2/23/2022  1. Patient will follow 1 step commands given minimal verbal cues with 80% accuracy within 7 days. 2.  Patient will answer simple yes/no questions with 80% accuracy within 7 days. 3.  Patient will complete confrontation and responsive naming with 80% accuracy given minimal verbal cues within 7 days. 4.  Patient will communicate biographical information with 80% accuracy given minimal verbal cues within 7 days. Outcome: Not Met    SPEECH LANGUAGE PATHOLOGY DYSPHAGIA AND SPEECH TREATMENT  Patient: Volodymyr Farfan  (80 y.o. male)  Date: 2/23/2022  Diagnosis: Acute CVA (cerebrovascular accident) ([facility] [I63.9] <principal problem not specified>       Precautions:  Fall    ASSESSMENT:  Patient tolerated medication whole with water, straw sips of water and solids without overt s/s aspiration. Patient demonstrated chewing of pills despite cues to swallow whole. Mastication and oral transit of solid cracker was mildly slow but thorough. Given bedside presentation patient remains safe for easy to chew diet and thin liquids. Per report patient able to answer questions and speak in full sentences prior to admit. This date patient demonstrates impaired ability to follow simple commands, answer biographical questions or name confrontationally. Goals added for communication deficits.      PLAN:  Recommendations and Planned Interventions:  Easy to chew diet  Thin liquids  SLP to follow for dysphagia and speech/language treatment  Patient continues to benefit from skilled intervention to address the above impairments. Continue treatment per established plan of care. Discharge Recommendations: To Be Determined     SUBJECTIVE:   Patient frequently laughing in response to questions. RN at bedside for medication administration. OBJECTIVE:   Cognitive and Communication Status:  Neurologic State: Alert,Eyes open to stimulus  Orientation Level: Oriented to person,Unable to verbalize  Cognition: Decreased command following  Perception: Cues to maintain midline in standing  Perseveration: No perseveration noted  Safety/Judgement: Not assessed    Dysphagia Treatment and Interventions:  Oral Assessment:  Oral Assessment  Dentition: Extractions;Limited;Natural;Poor  P.O. Trials:  Patient Position: Upright in bed  Vocal quality prior to P.O.: No impairment  Consistency Presented: Pill/Tablet; Solid; Thin liquid  How Presented: Self-fed/presented;Straw;Successive swallows     Bolus Acceptance: No impairment  Bolus Formation/Control: No impairment     Propulsion: Delayed (# of seconds)  Oral Residue: None  Initiation of Swallow: No impairment  Laryngeal Elevation: Functional  Aspiration Signs/Symptoms: None                      Speech Treatment and Interventions: Motor Speech:                                   Language Comprehension and Expression:  Auditory Comprehension   Response to Basic Yes/No Questions (%): 50 %  One-Step Basic Commands (%): 25 %  Verbal Expression  Verbal Expression  Naming: Impaired  Confrontation (%): 25 %        After treatment:   Patient left in no apparent distress in bed, Call bell within reach, and Nursing notified    COMMUNICATION/EDUCATION:   Patient was educated regarding role of SLP and POC. Patient laughed. The patient's plan of care including recommendations, planned interventions, and recommended diet changes were discussed with: Registered nurse.        APOLONIA Bonilla  Time Calculation: 12 mins

## 2022-02-23 NOTE — PROGRESS NOTES
Problem: Mobility Impaired (Adult and Pediatric)  Goal: *Acute Goals and Plan of Care (Insert Text)  Description:   FUNCTIONAL STATUS PRIOR TO ADMISSION: Grand-daughter provided all information. Patient was modified independent using a rolling walker for functional mobility intermittently (grand-daughter reports he only needs RW when he is sick). Has 24/7 supervision from family. Performs all general mobility without physical assist. Able to get in/out of tub shower without assist, family checks on him intermittently but he is able to bathe without assist. He requires cuing to perform ADLs, eat his meals, and take his meds. Family assists with meal prep and medication management. HOME SUPPORT PRIOR TO ADMISSION: The patient lived with grand-daughter and her children who provide 24/7 caregiver assist.     Physical Therapy Goals  Initiated 2/22/2022  1. Patient will move from supine to sit and sit to supine , scoot up and down, and roll side to side in bed with modified independence within 7 day(s). 2.  Patient will transfer from bed to chair and chair to bed with supervision/set-up using the least restrictive device within 7 day(s). 3.  Patient will perform sit to stand with supervision/set-up within 7 day(s). 4.  Patient will ambulate with supervision/set-up for 50 feet with the least restrictive device within 7 day(s). 5.  Patient will ascend/descend 6 stairs with bilat handrail(s) with minimal assistance/contact guard assist within 7 day(s). 6.  Patient will improve Sanches Balance score by 7 points within 7 days. Outcome: Progressing Towards Goal   PHYSICAL THERAPY TREATMENT  Patient: Meseret Smartalisa Vences (80 y.o. male)  Date: 2/23/2022  Diagnosis: Acute CVA (cerebrovascular accident) [facility]) [I63.9] <principal problem not specified>       Precautions: Fall  Chart, physical therapy assessment, plan of care and goals were reviewed.     ASSESSMENT  Patient continues with skilled PT services and is progressing towards goals. Patient resting in bed upon arrival, remains aphasic though agreeable to PT session. Patient does attempt to verbalize and appears to get frustrated when he cannot vocalize what he is trying to say, therapist able to determine patient needs to use restroom. Completes supine>sit with supervision and cuing for sequencing. Demonstrates good sitting balance with no unsteadiness, neutral alignment. Completes sit<>stand with CGA and cuing for hand placement. Ambulation 2x15ft with RW CGA and assist with walker negotiation when approaching surfaces. Demonstrates equal step length with decreased step length. Toilet transfer CGA with cuing for use of grab bar. Returned to bedside chair with chair alarm on and call light in place, set-up tray of food for patient to eat lunch. Recommend 24/7 caregiver assist and [facility] therapy services. Current Level of Function Impacting Discharge (mobility/balance): supervision bed mobility; CGA transfers; CGA ambulation 2x15ft RW    Other factors to consider for discharge: Alzheimer dementia at baseline; aphasic         PLAN :  Patient continues to benefit from skilled intervention to address the above impairments. Continue treatment per established plan of care. to address goals. Recommendation for discharge: (in order for the patient to meet his/her long term goals)  Physical therapy at least 2 days/week in the home AND ensure assist and/or supervision for safety with all functional mobility and ADLs    This discharge recommendation:  Has been made in collaboration with the attending provider and/or case management    IF patient discharges home will need the following DME: patient owns DME required for discharge     SUBJECTIVE:   Patient stated I. .. just... [facility].     OBJECTIVE DATA SUMMARY:   Critical Behavior:  Neurologic State: Alert,Confused  Orientation Level: Oriented to person,Disoriented to situation  Cognition: Decreased attention/concentration,Decreased command following  Safety/Judgement: Fall prevention,Decreased awareness of need for safety,Decreased insight into deficits,Decreased awareness of need for assistance  Functional Mobility Training:  Bed Mobility:  Rolling: Supervision  Supine to Sit: Supervision  Scooting: Contact guard assistance  Transfers:  Sit to Stand: Contact guard assistance  Stand to Sit: Contact guard assistance (verbal cues for hand placement)  Bed to Chair: Contact guard assistance;Minimum assistance; Additional time; Other (comment) (with RW)  Balance:  Sitting: Intact  Standing: Impaired  Standing - Static: Fair  Standing - Dynamic : Fair  Ambulation/Gait Training:  Distance (ft): 15 Feet (ft) (x2)  Assistive Device: Walker, rolling;Gait belt  Ambulation - Level of Assistance: Contact guard assistance  Gait Abnormalities: Decreased step clearance  Base of Support: Narrowed  Speed/Maureen: Pace decreased (<100 feet/min)  Step Length: Right shortened;Left shortened    Activity Tolerance:   Fair and requires rest breaks    After treatment patient left in no apparent distress:   Sitting in chair, Call bell within reach and Bed / chair alarm activated    COMMUNICATION/COLLABORATION:   The patients plan of care was discussed with: Occupational therapist and Registered nurse. Patient and/or family was not educated on the BE FAST acronym as he remains aphasic and confused with Alzheimer dementia at baseline.      Leticia Armstrong, PT   Time Calculation: 27 mins

## 2022-02-24 NOTE — PROGRESS NOTES
0700: Bedside shift change report given to [address] [place] [address] (oncoming nurse) by Krysta Jones (offgoing nurse). Report included the following information SBAR and Kardex. Patient's granddaughter obtained wheelchair from . Transported patient out of room to exit during shift report without communicating with nurse staff. No AVS education provided. Called grandaughter with no answer. Called daughter but was hung up on when discussed importance of AVS review.

## 2022-02-24 NOTE — DISCHARGE INSTRUCTIONS
PATIENT DISCHARGE INSTRUCTIONS      PATIENT DISCHARGE INSTRUCTIONS    Pablo Herrmann  / [id number] : 1931    Admitted 2022 Discharged: 2022       · It is important that you take the medication exactly as they are prescribed. · Keep your medication in the bottles provided by the pharmacist and keep a list of the medication names, dosages, and times to be taken in your wallet. · Do not take other medications without consulting your doctor. What to do at Home    Recommended Diet: Cardiac Diet, soft foods, thin liquids per speech therapy recommendations.      Recommended Activity: PT/OT, speech therapy per [address]

## 2022-02-24 NOTE — PROGRESS NOTES
Bedside and Verbal shift change report given to Radha Fisher (oncoming nurse) by Shelia Hicks RN (offgoing nurse). Report included the following information SBAR, Kardex, Intake/Output, MAR and Recent Results.

## 2022-02-24 NOTE — PROGRESS NOTES
Transition of Care Plan:     RUR: 15%  Disposition: home with granddaughter, [address]   Follow up appointments: PCP, specialists as indicated   DME needed: N/A, has a rollator and wheelchair  Transportation at [address] will transport home, ETA 7PM  [place] or means to access home: yes       IM Medicare Letter: reviewed on 2/24/22   Is patient a BCPI-A Bundle: No                       If yes, was Bundle Letter given?:    Is patient a  and connected with the [facility]?  Yes, not connected to the [place] (not registered)               If yes, was [employer] transfer form completed and VA notified? Caregiver Contact: An Cr ([facility]) [phone]  Discharge Caregiver contacted prior to y [address] needed?: No    Chart reviewed. CM aware of discharge order. Call placed to granddaughter, Fer Lawrence, to inform of d/c order and confirm transportation home today. Per [facility], family cannot transport pt home until 7PM this evening. She stated that it was short notice and she has several prior engagements planned today. Inquired if CM could assist with setting up alternative transportation arrangements and/or another family member could  pt any sooner than Antonio Shane stated there is no one else to assist with his d/c today and no one will be home to receive him. Met with pt at bedside to finalize and review d/c plan. He is aware he is being discharged home later today and that granddaughter will pick him up. CM delivered [facility] letter. Signed copy placed into chart. [address] letter signed. Pt had no further questions/concerns at this time. PCP appointment scheduled with Dr. Arron Arambula for 3/3 @ 9:20AM. See AVS for details. Ready for d/c from CM standpoint. Delay in d/c: family cannot  pt for d/c until 7PM.     Care Management Interventions  PCP Verified by CM: Yes  Palliative Care Criteria Met (RRAT>21 & CHF Dx)?: No  Mode of Transport at Discharge:  Other (see comment) (family)  Hospital Transport Time of Discharge: 1900  Transition of Care Consult (CM Consult): Discharge Planning  Discharge Durable Medical Equipment: No  Physical Therapy Consult: Yes  Occupational Therapy Consult: Yes  Speech Therapy Consult: Yes  Support Systems: Child(turner),Other Family Member(s),Caregiver/Home Care Staff  Confirm Follow Up Transport: Family  The Plan for Transition of Care is Related to the Following Treatment Goals : [address]  The Patient and/or Patient Representative was Provided with a Choice of Provider and Agrees with the Discharge Plan?: Yes  Name of the Patient Representative Who was Provided with a Choice of Provider and Agrees with the Discharge Plan: granddaughter  Freedom of Choice List was Provided with Basic Dialogue that Supports the Patient's Individualized Plan of Care/Goals, Treatment Preferences and Shares the Quality Data Associated with the Providers?: Yes   Resource Information Provided?: No  Discharge Location  Patient Expects to be Discharged to[de-identified] Home with home health [facility]    CIERRA Spencer   Care Manager, [address] [facility]  [phone]

## 2022-02-24 NOTE — DISCHARGE SUMMARY
Physician Discharge Summary     Patient ID:  Senait Dodge Sr  [id number]  80 y.o.  1/21/1931    Admit date: 2/21/2022    Discharge date: 2/24/2022    Admission Diagnoses: Acute CVA (cerebrovascular accident) [facility]) [I63.9]    Discharge Diagnoses:  Principal Diagnosis <principal problem not specified>                                              Active Problems:    Acute CVA (cerebrovascular accident) ([address] (2/21/2022)       Consults: Neurology    Significant Diagnostic Studies:      CT code neuro:   Small low-attenuation areas in the left occipital and inferior parietal lobes,  consistent with areas of nonhemorrhagic infarction of indeterminate age,  possibly acute. Background of white matter hypodensity consistent with chronic small vessel ischemic change.     CTA code neuro:   1. Negative CT perfusion study. 2. No acute large vessel occlusion or flow-limiting stenosis. Echo:     Left Ventricle: Left ventricle is dilated. Normal wall thickness. Moderate global hypokinesis present. Reduced left ventricular systolic function with a visually estimated EF of 15 - 20%. Grade I diastolic dysfunction with normal LAP.   Left Atrium: Left atrium is moderately dilated.   Interatrial Septum: No interatrial shunt visualized on color Doppler or saline contrast study.   Aortic Valve: Moderate transvalvular regurgitation.   Mitral Valve: Mildly thickened leaflet. Moderate transvalvular regurgitation.         Hospital Course: Mr. Eduardo Barba is a patient of Eileen Cool MD who presented with the problems above. See the initial H and P for full details. CHIEF COMPLAINT: Aphasia     HISTORY OF PRESENT ILLNESS:     Angel Armendariz Sr is a 80 y.o.   male who presents with past medical history of dementia, hypertension is coming the hospital chief complaints of difficulty speaking.   Patient is a poor historian secondary to dementia so information is obtained by talking to patient's daughter and also granddaughter who are at the bedside. According to them, patient is partially dependent on others for activities of daily living and at baseline can walk without a walker and can feed himself but needs assistance with meal preparation. Is currently living with patient's granddaughter. He was noted to have speech difficulty and also comprehension problems. He did not specifically report any weakness, tingling or numbness.     On arrival to ED, he was noted to have stable vitals. On labs noted to have normal CBC. BMP shows a creatinine of 1.49. LFTs are normal.  CT head shows possibility of acute left occipital and inferior parietal lobe CVA. CTA shows no large vessel occlusion. By problem. ..    1. Acute CVA (cerebrovascular accident) ([facility] (2/21/2022): L occipital and L inferior parietal lobe. Neurology has been following. PT, OT, speech therapy evaluations reviewed, and home PT/OT and speech therapy recommended post discharge. 2. Cardiomyopathy ([facility] (11/7/2019): EF 15-20%: Stable. Continue lasix. 3. Controlled type 2 diabetes mellitus with stage 3 chronic kidney disease, without long-term current use of insulin ([facility] (7/18/2017)  4. Alzheimer disease ([facility] (7/18/2017): Continue aricept and namenda. 5. HTN: Continue current treatments. 6. Paroxysmal A Fib / SSS: s/p pacemaker. 7. Underweight / Protein calorie malnutrition. 8. GERD: PPI. 9. Full code. Discharge Exam:  See progress notes. Disposition: home    Patient Instructions:   Current Discharge Medication List      START taking these medications    Details   atorvastatin (LIPITOR) 40 mg tablet Take 1 Tablet by mouth nightly. Qty: 30 Tablet, Refills: 2      clopidogreL (PLAVIX) 75 mg tab Take 1 Tablet by mouth daily. Qty: 30 Tablet, Refills: 2         CONTINUE these medications which have NOT CHANGED    Details   colchicine 0.6 mg tablet Take 1 Tablet by mouth daily.  TAKE ONE TABLET AS NEEDED FOR GOUT  Qty: 30 Tablet, Refills: 0 donepeziL (ARICEPT) 10 mg tablet TAKE 1 TABLET BY MOUTH EVERY DAY  Qty: 7 Tablet, Refills: 0    Comments: DX Code Needed  . Associated Diagnoses: Other Alzheimer's disease (CODE) ([address]      omeprazole (PRILOSEC) 20 mg capsule Take 1 Capsule by mouth daily. Qty: 7 Capsule, Refills: 0    Associated Diagnoses: Gastroesophageal reflux disease without esophagitis      furosemide (LASIX) 20 mg tablet TAKE 2 TABS BY MOUTH DAILY. 1 EACH MORNING BY MOUTH  Qty: 30 Tablet, Refills: 0      memantine (NAMENDA) 10 mg tablet TAKE 1 TABLET BY MOUTH TWICE A DAY  Qty: 60 Tablet, Refills: 0    Comments: Pt needs appointment for additional refills      acetaminophen (TYLENOL) 650 mg TbER Take 650 mg by mouth two (2) times a day. TAKE BY MOUTH AS NEEDED FOR PAIN          STOP taking these medications       lisinopriL (PRINIVIL, ZESTRIL) 5 mg tablet Comments:   Reason for Stopping:         carvediloL (COREG) 6.25 mg tablet Comments:   Reason for Stopping:         aspirin 81 mg chewable tablet Comments:   Reason for Stopping:             Activity: PT/OT and speech therapy per Home Health  Diet: Cardiac Diet; soft foods, thin liquids. Follow-up With  Details  Why  Contact Info   [address]  Call in 1 day  this is your home health provider   [facility] [address] [facility]   Ana Maria Bowden MD  In 1 week    [address] [facility] [phone]   Tra Chapman MD    as directed  [address] [phone]         Signed:  Gerhardt Champion, MD  2/24/2022  8:50 AM    Note: Greater than 30 minutes were spent on activities related to this discharge.

## 2022-02-24 NOTE — PROGRESS NOTES
End of Shift Note    Bedside shift change report given to  Bernis Fabry, RN (oncoming nurse) by Espinoza Razo RN (offgoing nurse). Report included the following information SBAR, Kardex and Recent Results    Shift worked: Night    Shift summary and any significant changes:    Uneventful  Night, periodic confusion and impulsiveness, made comfortable in bed, and bed alarm put on zone two.        Concerns for physician to address: None   Zone phone for oncoming shift:  [phone]     Patient Information  Celia Howard Sr  80 y.o.  2/21/2022  6:38 PM by Jonathan James MD. Romelia Farfan Sr was admitted from Home    Problem List  Patient Active Problem List    Diagnosis Date Noted    Acute CVA (cerebrovascular accident) ([address] 02/21/2022    Vitamin D deficiency 02/06/2022    Pain of right thumb 08/05/2021    GONGORA (dyspnea on exertion) 02/24/2021    Anasarca 88/41/3492    Systolic CHF, chronic (HCC) 12/14/2020    Pleural effusion 12/14/2020    Weakness 12/02/2020    Hypotension 08/13/2020    Esophageal dyskinesia 07/23/2020    Severe protein-calorie malnutrition ([address] 07/21/2020    Weight loss 07/20/2020    Unsteady gait 07/20/2020    Paroxysmal VT ([address] 11/07/2019    Cardiomyopathy ([address] 11/07/2019    Alcohol withdrawal syndrome, with delirium ([address] 11/01/2019    A-fib ([address] 10/27/2019    Primary insomnia 01/09/2019    Mild depression ([address] 06/12/2018    Acute bilateral low back pain without sciatica 05/30/2018    Alcoholism ([address] 89/47/5850    Metabolic encephalopathy 05/17/6425    TIA (transient ischemic attack) 04/12/2018    Medicare annual wellness visit, subsequent 09/01/2017    Diverticulosis 07/18/2017    Gastritis and duodenitis 07/18/2017    Internal hemorrhoids 07/18/2017    Hypertension with renal disease 07/18/2017    Mixed hyperlipidemia 07/18/2017    GI bleed 07/18/2017    Primary osteoarthritis involving multiple joints 07/18/2017    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, without long-term current use of insulin ([facility] 07/18/2017    Back pain 07/18/2017    Anemia 07/18/2017    Alzheimer disease ([facility] 07/18/2017    SSS (sick sinus syndrome) ([facility] 06/02/2015    S/P cardiac pacemaker procedure 05/04/2015    S/P ablation of accessory bypass tract 05/04/2015    SVT (supraventricular tachycardia) ([facility])--s/p RFA 04/28/2015    Near syncope 03/11/2015    ASCVD (arteriosclerotic cardiovascular disease) 07/11/2014    Hypokalemia 07/11/2014    Right inguinal hernia 06/12/2014     Past Medical History:   Diagnosis Date    Abdominal pain 7/18/2017    Alzheimer disease ([facility] 7/18/2017    Anemia 7/18/2017    Arthritis     Back pain 7/18/2017    Bradycardia 7/18/2017    CAD (coronary artery disease)     hx of MI    Chronic diastolic heart failure ([facility] 2/26/2021    CKD (chronic kidney disease), stage II 7/18/2017    constipation     Depression 7/18/2017    Diabetes mellitus ([facility] 7/18/2017    Diverticulosis 7/18/2017    DJD (degenerative joint disease) 7/18/2017    Encounter for long-term (current) use of other medications 7/18/2017    Fatigue     Gastritis and duodenitis 7/18/2017    GERD (gastroesophageal reflux disease)     GI bleed 7/18/2017    Hearing loss     Hyperlipidemia 7/18/2017    Hypertension     Hypertension with renal disease 7/18/2017    Ill-defined condition     Dementia    Internal hemorrhoids 7/18/2017    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    S/P cardiac pacemaker procedure 5/4/2015    5/4/15 [employer] dual chamber pacemaker implant     Thyroid disease     Weight loss     lost over 40 pounds last year- has no appetite       Core Measures:  CVA: Yes Yes  CHF:Yes No  PNA:No No    Activity:  Activity Level:  Up with Assistance  Number times ambulated in hallways past shift: 0  Number of times OOB to chair past shift: 1    Cardiac:   Cardiac Monitoring: Yes      Cardiac Rhythm: Ventricular Paced    Access:   Current line(s): PIV   Central Line? No    Genitourinary:   Urinary status: incontinent and external catheter  Urinary Catheter? No     Respiratory:   O2 Device: None (Room air)  Chronic home O2 use?: NO  Incentive spirometer at bedside: NO       GI:  Last Bowel Movement Date:  (prior to admission)  Current diet:  DIET ONE TIME MESSAGE  ADULT DIET Easy to Chew; NO MEAT! Passing flatus: YES  Tolerating current diet: YES       Pain Management:   Patient states pain is manageable on current regimen: YES    Skin:  Newton Score: 16  Interventions: turn team, float heels, increase time out of bed, limit briefs and nutritional support     Patient Safety:  Fall Score:  Total Score: 3  Interventions: bed/chair alarm, assistive device (walker, cane, etc), gripper socks, pt to call before getting OOB and stay with me (per policy)  High Fall Risk: Yes    DVT prophylaxis:  DVT prophylaxis Med- No  DVT prophylaxis SCD or LISSY- Yes     Wounds: (If Applicable)  Wounds- N    Active Consults:  IP CONSULT TO NEUROLOGY    Length of Stay:  Expected LOS: 2d 21h  Actual LOS: 3  Discharge Plan: 2/23/22      Faizan Robison RN

## 2022-02-24 NOTE — PROGRESS NOTES
Problem: Patient Education: Go to Patient Education Activity  Goal: Patient/Family Education  Outcome: Resolved/Met     Problem: TIA/CVA Stroke: 0-24 hours  Goal: Off Pathway (Use only if patient is Off Pathway)  Outcome: Resolved/Met  Goal: Activity/Safety  Outcome: Resolved/Met  Goal: Consults, if ordered  Outcome: Resolved/Met  Goal: Diagnostic Test/Procedures  Outcome: Resolved/Met  Goal: Nutrition/Diet  Outcome: Resolved/Met  Goal: Discharge Planning  Outcome: Resolved/Met  Goal: Medications  Outcome: Resolved/Met  Goal: Respiratory  Outcome: Resolved/Met  Goal: Treatments/Interventions/Procedures  Outcome: Resolved/Met  Goal: Minimize risk of bleeding post-thrombolytic infusion  Outcome: Resolved/Met  Goal: Monitor for complications post-thrombolytic infusion  Outcome: Resolved/Met  Goal: Psychosocial  Outcome: Resolved/Met  Goal: *Hemodynamically stable  Outcome: Resolved/Met  Goal: *Neurologically stable  Description: Absence of additional neurological deficits    Outcome: Resolved/Met  Goal: *Verbalizes anxiety and depression are reduced or absent  Outcome: Resolved/Met  Goal: *Absence of Signs of Aspiration on Current Diet  Outcome: Resolved/Met  Goal: *Absence of deep venous thrombosis signs and symptoms(Stroke Metric)  Outcome: Resolved/Met  Goal: *Ability to perform ADLs and demonstrates progressive mobility and function  Outcome: Resolved/Met  Goal: *Stroke education started(Stroke Metric)  Outcome: Resolved/Met  Goal: *Dysphagia screen performed(Stroke Metric)  Outcome: Resolved/Met  Goal: *Rehab consulted(Stroke Metric)  Outcome: Resolved/Met     Problem: TIA/CVA Stroke: Day 2 Until Discharge  Goal: Off Pathway (Use only if patient is Off Pathway)  Outcome: Resolved/Met  Goal: Activity/Safety  Outcome: Resolved/Met  Goal: Diagnostic Test/Procedures  Outcome: Resolved/Met  Goal: Nutrition/Diet  Outcome: Resolved/Met  Goal: Discharge Planning  Outcome: Resolved/Met  Goal: Medications  Outcome: Resolved/Met  Goal: Respiratory  Outcome: Resolved/Met  Goal: Treatments/Interventions/Procedures  Outcome: Resolved/Met  Goal: Psychosocial  Outcome: Resolved/Met  Goal: *Verbalizes anxiety and depression are reduced or absent  Outcome: Resolved/Met  Goal: *Absence of aspiration  Outcome: Resolved/Met  Goal: *Absence of deep venous thrombosis signs and symptoms(Stroke Metric)  Outcome: Resolved/Met  Goal: *Optimal pain control at patient's stated goal  Outcome: Resolved/Met  Goal: *Tolerating diet  Outcome: Resolved/Met  Goal: *Ability to perform ADLs and demonstrates progressive mobility and function  Outcome: Resolved/Met  Goal: *Stroke education continued(Stroke Metric)  Outcome: Resolved/Met     Problem: Ischemic Stroke: Discharge Outcomes  Goal: *Verbalizes anxiety and depression are reduced or absent  Outcome: Resolved/Met  Goal: *Verbalize understanding of risk factor modification(Stroke Metric)  Outcome: Resolved/Met  Goal: *Hemodynamically stable  Outcome: Resolved/Met  Goal: *Absence of aspiration pneumonia  Outcome: Resolved/Met  Goal: *Aware of needed dietary changes  Outcome: Resolved/Met  Goal: *Verbalize understanding of prescribed medications including anti-coagulants, anti-lipid, and/or anti-platelets(Stroke Metric)  Outcome: Resolved/Met  Goal: *Tolerating diet  Outcome: Resolved/Met  Goal: *Aware of follow-up diagnostics related to anticoagulants  Outcome: Resolved/Met  Goal: *Ability to perform ADLs and demonstrates progressive mobility and function  Outcome: Resolved/Met  Goal: *Absence of DVT(Stroke Metric)  Outcome: Resolved/Met  Goal: *Absence of aspiration  Outcome: Resolved/Met  Goal: *Optimal pain control at patient's stated goal  Outcome: Resolved/Met  Goal: *Home safety concerns addressed  Outcome: Resolved/Met  Goal: *Describes available resources and support systems  Outcome: Resolved/Met  Goal: *Verbalizes understanding of activation of EMS(911) for stroke symptoms(Stroke Metric)  Outcome: Resolved/Met  Goal: *Understands and describes signs and symptoms to report to providers(Stroke Metric)  Outcome: Resolved/Met  Goal: *Neurolgocially stable (absence of additional neurological deficits)  Outcome: Resolved/Met  Goal: *Verbalizes importance of follow-up with primary care physician(Stroke Metric)  Outcome: Resolved/Met  Goal: *Smoking cessation discussed,if applicable(Stroke Metric)  Outcome: Resolved/Met  Goal: *Depression screening completed(Stroke Metric)  Outcome: Resolved/Met     Problem: Patient Education: Go to Patient Education Activity  Goal: Patient/Family Education  Outcome: Resolved/Met     Problem: TIA/CVA Stroke: 0-24 hours  Goal: Off Pathway (Use only if patient is Off Pathway)  Outcome: Resolved/Met  Goal: Activity/Safety  Outcome: Resolved/Met  Goal: Consults, if ordered  Outcome: Resolved/Met  Goal: Diagnostic Test/Procedures  Outcome: Resolved/Met  Goal: Nutrition/Diet  Outcome: Resolved/Met  Goal: Discharge Planning  Outcome: Resolved/Met  Goal: Medications  Outcome: Resolved/Met  Goal: Respiratory  Outcome: Resolved/Met  Goal: Treatments/Interventions/Procedures  Outcome: Resolved/Met  Goal: Minimize risk of bleeding post-thrombolytic infusion  Outcome: Resolved/Met  Goal: Monitor for complications post-thrombolytic infusion  Outcome: Resolved/Met  Goal: Psychosocial  Outcome: Resolved/Met  Goal: *Hemodynamically stable  Outcome: Resolved/Met  Goal: *Neurologically stable  Description: Absence of additional neurological deficits    Outcome: Resolved/Met  Goal: *Verbalizes anxiety and depression are reduced or absent  Outcome: Resolved/Met  Goal: *Absence of Signs of Aspiration on Current Diet  Outcome: Resolved/Met  Goal: *Absence of deep venous thrombosis signs and symptoms(Stroke Metric)  Outcome: Resolved/Met  Goal: *Ability to perform ADLs and demonstrates progressive mobility and function  Outcome: Resolved/Met  Goal: *Stroke education started(Stroke Metric)  Outcome: Resolved/Met  Goal: *Dysphagia screen performed(Stroke Metric)  Outcome: Resolved/Met  Goal: *Rehab consulted(Stroke Metric)  Outcome: Resolved/Met     Problem: TIA/CVA Stroke: Day 2 Until Discharge  Goal: Off Pathway (Use only if patient is Off Pathway)  Outcome: Resolved/Met  Goal: Activity/Safety  Outcome: Resolved/Met  Goal: Diagnostic Test/Procedures  Outcome: Resolved/Met  Goal: Nutrition/Diet  Outcome: Resolved/Met  Goal: Discharge Planning  Outcome: Resolved/Met  Goal: Medications  Outcome: Resolved/Met  Goal: Respiratory  Outcome: Resolved/Met  Goal: Treatments/Interventions/Procedures  Outcome: Resolved/Met  Goal: Psychosocial  Outcome: Resolved/Met  Goal: *Verbalizes anxiety and depression are reduced or absent  Outcome: Resolved/Met  Goal: *Absence of aspiration  Outcome: Resolved/Met  Goal: *Absence of deep venous thrombosis signs and symptoms(Stroke Metric)  Outcome: Resolved/Met  Goal: *Optimal pain control at patient's stated goal  Outcome: Resolved/Met  Goal: *Tolerating diet  Outcome: Resolved/Met  Goal: *Ability to perform ADLs and demonstrates progressive mobility and function  Outcome: Resolved/Met  Goal: *Stroke education continued(Stroke Metric)  Outcome: Resolved/Met     Problem: Ischemic Stroke: Discharge Outcomes  Goal: *Verbalizes anxiety and depression are reduced or absent  Outcome: Resolved/Met  Goal: *Verbalize understanding of risk factor modification(Stroke Metric)  Outcome: Resolved/Met  Goal: *Hemodynamically stable  Outcome: Resolved/Met  Goal: *Absence of aspiration pneumonia  Outcome: Resolved/Met  Goal: *Aware of needed dietary changes  Outcome: Resolved/Met  Goal: *Verbalize understanding of prescribed medications including anti-coagulants, anti-lipid, and/or anti-platelets(Stroke Metric)  Outcome: Resolved/Met  Goal: *Tolerating diet  Outcome: Resolved/Met  Goal: *Aware of follow-up diagnostics related to anticoagulants  Outcome: Resolved/Met  Goal: *Ability to perform ADLs and demonstrates progressive mobility and function  Outcome: Resolved/Met  Goal: *Absence of DVT(Stroke Metric)  Outcome: Resolved/Met  Goal: *Absence of aspiration  Outcome: Resolved/Met  Goal: *Optimal pain control at patient's stated goal  Outcome: Resolved/Met  Goal: *Home safety concerns addressed  Outcome: Resolved/Met  Goal: *Describes available resources and support systems  Outcome: Resolved/Met  Goal: *Verbalizes understanding of activation of EMS(911) for stroke symptoms(Stroke Metric)  Outcome: Resolved/Met  Goal: *Understands and describes signs and symptoms to report to providers(Stroke Metric)  Outcome: Resolved/Met  Goal: *Neurolgocially stable (absence of additional neurological deficits)  Outcome: Resolved/Met  Goal: *Verbalizes importance of follow-up with primary care physician(Stroke Metric)  Outcome: Resolved/Met  Goal: *Smoking cessation discussed,if applicable(Stroke Metric)  Outcome: Resolved/Met  Goal: *Depression screening completed(Stroke Metric)  Outcome: Resolved/Met

## 2022-02-25 NOTE — ACP (ADVANCE CARE PLANNING)
Advance Care Planning     General Advance Care Planning (ACP) Conversation      Date of Conversation: 2/25/2022  Conducted with: Patient with Decision Making Capacity and Healthcare Decision Maker: Named in Advance Directive or Healthcare Power of [address]:     Primary Decision Maker (Active): Allyssa Augustin - [phone]    Secondary Decision Maker: James Sanchez - Daughter - [phone]  Click here to complete [address] including selection of the Healthcare Decision Maker Relationship (ie \"Primary\")      Today we documented Decision Maker(s) consistent with ACP documents on file. Content/Action Overview:    Has ACP document(s) on file - reflects the patient's care preferences           Length of Voluntary ACP Conversation in minutes:  <16 minutes (Non-Billable)    Jennifer Castellanos RN

## 2022-02-25 NOTE — PROGRESS NOTES
Physician Progress Note      PATIENT:               Radha Wheeler  CSN #:                  [id number]  :                       1931  ADMIT DATE:       2022 6:38 PM  [address] [place] DATE:        2022 7:47 PM  RESPONDING  PROVIDER #:        Indiana Yang MD          QUERY TEXT:    Pt admitted with Acute CVA. Noted documentation of Severe protein calorie malnutrition. In order to support the diagnosis of severe malnutrition please include additional clinical indicators in your documentation. or document the following: The medical record reflects the following:  Risk Factors: 89yoM with periods of intermittent confusion, Oriented x2, BMI 19 , Alzheimer's disease  Clinical Indicators: Ht: 5' 8\" (172.7 cm), Last Wt: 58.1 kg (128 lb), BMI: 19.46 kg/m? Treatment: Regular soft chew diet. ASPEN Criteria:  https://aspenjournals. onlinelibrary. strickland. com/doi/full/10.1177/0786166585909225    Thank you,  Bubba Craig RN,  Options provided:  -- Severe Protein calorie malnutrition present as evidenced by  -- Underweight with BMI 19.46 kg/m2  -- Other - I will add my own diagnosis  -- Disagree - Not applicable / Not valid  -- Disagree - Clinically unable to determine / Unknown  -- Refer to Clinical Documentation Reviewer    PROVIDER RESPONSE TEXT:    This patient is underweight with a BMI of 19.6 kg/m2.     Query created by: Ernesto Baptiste on 2022 1:48 PM      Electronically signed by:  Indiana Yang MD 2022 8:11 AM

## 2022-02-25 NOTE — PROGRESS NOTES
Care Transitions Initial Call    Call within 2 business days of discharge: Yes     Patient: Sunny Root Sr Patient : 1931 MRN: [id number]    Last Discharge [address]       Complaint Diagnosis Description Type Department Provider    22 poss stroke Dysarthria . .. ED to Hosp-Admission (Discharged) (ADMIT) Juan Diego Carlos MD; Jennett Dubin,... Was this an external facility discharge? No    Challenges to be reviewed by the provider   Additional needs identified to be addressed with provider: yes  medications- started on lipitor and plavix, grandaughter thinks patient may have uti. will call home health and see about getting order for ua and culture. also gave number to dispatch health if home health unable to come promptly         Method of communication with provider : chart routing    Discussed COVID-19 related testing which was not done at this time. Test results were not done. Patient informed of results, if available? Not done     Advance Care Planning:   Does patient have an Advance Directive:  yes; reviewed and current     Inpatient Readmission Risk score: Unplanned Readmit Risk Score: 14.5 ( )    Was this a readmission? no   Patient stated reason for the admission: possible stroke    Patients top risk factors for readmission: functional physical ability and medication management   Interventions to address risk factors: Obtained and reviewed discharge summary and/or continuity of care documents    Care Transition Nurse (CTN) contacted the family by telephone to perform post hospital discharge assessment. Verified name and  with family as identifiers. Provided introduction to self, and explanation of the CTN role. CTN reviewed discharge instructions, medical action plan and red flags with family who verbalized understanding. Were discharge instructions available to patient? yes.  Reviewed appropriate site of care based on symptoms and resources available to patient including: PCP, [address] . Family given an opportunity to ask questions and does not have any further questions or concerns at this time. The family agrees to contact the PCP office for questions related to their healthcare. Medication reconciliation was performed with family, who verbalizes understanding of administration of home medications. Advised obtaining a 90-day supply of all daily and as-needed medications. Referral to Pharm D needed: no     Home Health/Outpatient orders at discharge: home health care, PT and [address]  Date of initial visit: 2/26/22    Durable Medical Equipment ordered at discharge: None      Covid Risk Education    Educated patient about risk for severe COVID-19 due to risk factors according to CDC guidelines. CTN reviewed discharge instructions, medical action plan and red flag symptoms with the family who verbalized understanding. Discussed COVID vaccination status: no. Education provided on COVID-19 vaccination as appropriate. Discussed exposure protocols and quarantine with CDC Guidelines. Family was given an opportunity to verbalize any questions and concerns and agrees to contact CTN or health care provider for questions related to their healthcare. Was patient discharged with a pulse oximeter? no. Discussed and confirmed pulse oximeter discharge instructions and when to notify provider or seek emergency care. Discussed follow-up appointments. If no appointment was previously scheduled, appointment scheduling offered: no. Is follow up appointment scheduled within 7 days of discharge? yes.    [address] follow up appointment(s):   Future Appointments   Date Time Provider Sonia Irizarry   2/28/2022 To Be Determined [facility]IA [facility] [address] [facility]   3/3/2022  9:20 AM Yvonne Hernandez MD [facility] BS [facility]     Non-BS follow up appointment(s): none at this time    Plan for follow-up call in 5-7 days based on severity of symptoms and risk factors. Plan for next call: self management-taking plavix and lipitor and did patient get checked for uti. and medication management-. CTN provided contact information for future needs. Goals Addressed                 This Visit's Progress     Prevent complications post hospitalization. 2/25/22     [facility] thinking patient has [facility]i.  Ctn advised to push po fluids    [facility]   [facility] to see patient for pt and ot.  Ctn to follow up with patient in one week.    [id number] Sel[address] Jackson RN

## 2022-03-08 NOTE — PROGRESS NOTES
Care Transitions Follow Up Call    Challenges to be reviewed by the provider   Additional needs identified to be addressed with provider: yes  granddaughter would like urine checked for UTI at pcp appt on 3/10/22. Method of communication with provider : chart routing    Care Transition Nurse (CTN) contacted the family by telephone to follow up after admission on 22. Verified name and  with family as identifiers. Addressed changes since last contact: home health care- being seen by pt and ot. walked outside this week  Follow up appointment completed? no.   Was follow up appointment scheduled within 7 days of discharge? no.   appt on 3/10/22, 14 days. CTN reviewed discharge instructions, medical action plan and red flags with family and discussed any barriers to care and/or understanding of plan of care after discharge. Discussed appropriate site of care based on symptoms and resources available to patient including: PCP. The family agrees to contact the PCP office for questions related to their healthcare.      Patients top risk factors for readmission: functional cognitive ability, functional physical ability and medication management   Interventions to address risk factors: Scheduled appointment with PCP-3/10/22 and Obtained and reviewed discharge summary and/or continuity of care documents    [facility] follow up appointment(s):   Future Appointments   Date Time Provider Sonia Irizarry   3/9/2022 To Be Determined Jaylyn Smoke,  [address]   3/10/2022 11:00 AM Kaiden Santoyo, PT [address] [place] [address]   3/10/2022 11:20 AM Will Najera MD [facility] BS AMB   3/10/2022 12:15 PM APOLONIA Sommer [place] [address]   3/14/2022 To Be Determined Jaylyn Smoke,  [address]   3/14/2022 To Be Determined APOLONIA Sommer [facility] [address]   3/15/2022 To Be Determined Kaiden Santoyo, [address] [place] [address] [address]   3/16/2022 To Be Determined Jaylyn Smoke, OT [address] 3/16/2022 To Be Determined APOLONIA Goode [facility]   3/17/2022 To Be Determined Westminster Chadd [address] [facility]   3/21/2022 To Be Determined APOLONIA Goode [facility]   3/22/2022 To Be Determined Robert Simpson OT [place] [address] [address] [facility]   3/22/2022 To Be Determined Westminster Chadd [address] [facility]   3/23/2022 To Be Determined APOLONIA Goode [facility]   3/24/2022 To Be Determined Robert Simpson,  [place] [address] [address] [facility]   3/24/2022 To Be Determined Westminster Chadd [facility]   3/28/2022 To Be Determined APOLONIA Goode [facility]   3/29/2022 To Be Determined Westminster Chadd [address] [facility]   3/30/2022 To Be Determined APOLONIA Goode [facility]   3/31/2022 To Be Determined [facility]   4/5/2022 To Be Determined Westminster [place] [address] [address] [facility]   4/7/2022 To Be Determined Andriy Gross PT [facility]     [facility] none at this time. CTN provided contact information for future needs. Plan for follow-up call in 5-7 days based on severity of symptoms and risk factors. Plan for next call: follow up appointment-3/10/22 with pcp     Goals Addressed                 This Visit's Progress     Prevent complications post hospitalization. On track     2/25/22     Patient grandaughter to call home health or dispatch for thinking patient has a uti.  Ctn advised to push po fluids    Grandaughter to check bp on patient daily   [facility] to see patient for pt and ot.  Ctn to follow up with patient in one week.  Taisha Woo RN    3/8/22     Ctn advised to push po fluids    Grandaughter to check bp on patient daily   [facility] to see patient for pt and ot.  Ctn to follow up with patient in one week.     Patient has pcp appt on 3/10/22   Taisha Woo RN  [facility]

## 2022-03-09 NOTE — TELEPHONE ENCOUNTER
PCP: Geovani Oviedo MD    Last appt: 3/3/2022  Future Appointments   Date Time Provider Sonia Irizarry   3/9/2022 11:30 AM Aurora Batres OT [facility]   3/10/2022 11:00 AM Juli Mcadams PT [place] [address]   3/10/2022 11:20 AM Geovani Oviedo MD [facility] BS AMB   3/10/2022 12:15 PM APOLONIA Harris [facility]   3/14/2022 To Be Determined Aurora Batres,  S[facility] [facility] [address]   3/14/2022 To Be Determined APOLONIA Harris [facility]   3/15/2022 To Be Determined Tank [facility]   3/16/2022 To Be Determined Aurora Batres,  SNA[address] [facility] [address]   3/16/2022 To Be Determined APOLONIA Harris [facility]   3/17/2022 To Be Determined Tank [facility]   3/21/2022 To Be Determined APOLONIA Harris [facility]   3/22/2022 To Be Determined Aurora Batres,  SNA[address] [facility] [address]   3/22/2022 To Be Determined Tank Kamila [facility]   3/23/2022 To Be Determined APOLONIA Harris [facility]   3/24/2022 To Be Determined Aurora Batres OT [place] [address]   3/24/2022 To Be Determined Tank Kamila [facility]   3/28/2022 To Be Determined APOLONIA Harris [facility]   3/29/2022 To Be Determined [facility] [place] [address]   3/30/2022 To Be Determined Carolann Rucker SLP [facility]   3/31/2022 To Be Determined [facility]   4/5/2022 To Be Determined [facility]   4/7/2022 To Be Determined Juli Mcadams, PT [facility]       Requested Prescriptions     Pending Prescriptions Disp Refills    colchicine 0.6 mg tablet 30 Tablet 0     Sig: Take 1 Tablet by mouth daily.  TAKE ONE TABLET AS NEEDED FOR GOUT    memantine (NAMENDA) 10 mg tablet 60 Tablet 0     Sig: TAKE 1 TABLET BY MOUTH TWICE A DAY       Prior labs and Blood pressures:  BP Readings from Last 3 Encounters:   03/08/22 119/71   03/08/22 115/75   03/07/22 110/60     Lab Results   Component Value Date/Time    Sodium 139 02/24/2022 01:58 AM    Potassium 3.6 02/24/2022 01:58 AM    Chloride 105 02/24/2022 01:58 AM    CO2 30 02/24/2022 01:58 AM    Anion gap 4 (L) 02/24/2022 01:58 AM    Glucose 85 02/24/2022 01:58 AM    BUN 24 (H) 02/24/2022 01:58 AM    Creatinine 1.22 02/24/2022 01:58 AM    BUN/Creatinine ratio 20 02/24/2022 01:58 AM    GFR est AA >60 02/24/2022 01:58 AM    GFR est non-AA 56 (L) 02/24/2022 01:58 AM    Calcium 8.6 02/24/2022 01:58 AM     Lab Results   Component Value Date/Time    Hemoglobin A1c 5.5 02/22/2022 02:59 AM    Hemoglobin A1c (POC) 5.7 04/28/2021 11:03 AM     Lab Results   Component Value Date/Time    Cholesterol, total 136 02/22/2022 02:59 AM    Cholesterol (POC) 197.0 06/12/2018 01:54 PM    HDL Cholesterol 39 02/22/2022 02:59 AM    HDL Cholesterol (POC) 66.0 06/12/2018 01:54 PM    LDL Cholesterol (POC) 101.8 06/12/2018 01:54 PM    LDL, calculated 79.8 02/22/2022 02:59 AM    VLDL, calculated 17.2 02/22/2022 02:59 AM    Triglyceride 86 02/22/2022 02:59 AM    Triglycerides (POC) 146.0 06/12/2018 01:54 PM    CHOL/HDL Ratio 3.5 02/22/2022 02:59 AM     No results found for: JOESPH Chavez    Lab Results   Component Value Date/Time    TSH 7.03 (H) 10/29/2019 02:21 AM    TSH, 3rd generation 5.17 10/22/2020 01:35 PM

## 2022-03-09 NOTE — TELEPHONE ENCOUNTER
PCP: Mercedes Londono MD    Last appt: 3/3/2022  Future Appointments   Date Time Provider Sonia Irizarry   3/9/2022 11:30 AM Foster Plate, OT [facility]   3/10/2022 11:00 AM Chris [place] [id number]   3/10/2022 11:20 AM Mercedes Londono MD [facility] AMB   3/10/2022 12:15 PM APOLONIA Levine [place]   3/14/2022 To Be Determined Foster Plate,  [place] [id number]   3/14/2022 To Be Determined APOLONIA Levine [facility] [place] [facility] [address]   3/15/2022 To Be Determined [facility] [place] [facility] [address]   3/16/2022 To Be Determined Foster Plate,  [place] [id number]   3/16/2022 To Be Determined APOLONIA Levine [facility] [place] [facility] [address]   3/17/2022 To Be Determined [facility] [place] [facility] [address]   3/21/2022 To Be Determined APOLONIA Levine [facility] [place] [facility] [address]   3/22/2022 To Be Determined Foster Plate,  [place] [id number]   3/22/2022 To Be Determined [facility] [place] [facility] [address]   3/23/2022 To Be Determined APOLONIA Levine [facility] [place] [facility] [address]   3/24/2022 To Be Determined Foster Plate, OT [place] [id number]   3/24/2022 [facility] [address]   3/28/2022 To Be Determined APOLONIA Levine [facility] [address]   3/29/2022 To Be Determined [place] [id number]   3/30/2022 To Be Determined APOLONIA Levine [facility] [address]   3/31/2022 To Be Determined [facility] [address]   4/5/2022 To Be Determined [facility]   4/7/2022 To Be Determined Chris West PT [facility]       Requested Prescriptions     Pending Prescriptions Disp Refills    colchicine 0.6 mg tablet 30 Tablet 0     Sig: Take 1 Tablet by mouth daily.  TAKE ONE TABLET AS NEEDED FOR GOUT    memantine (NAMENDA) 10 mg tablet 60 Tablet 0     Sig: TAKE 1 TABLET BY MOUTH TWICE A DAY         Other Comments:

## 2022-03-10 NOTE — PATIENT INSTRUCTIONS
Medicare Wellness Visit, Male    The best way to live healthy is to have a lifestyle where you eat a well-balanced diet, exercise regularly, limit alcohol use, and quit all forms of tobacco/nicotine, if applicable. Regular preventive services are another way to keep healthy. Preventive services (vaccines, screening tests, monitoring & exams) can help personalize your care plan, which helps you manage your own care. Screening tests can find health problems at the earliest stages, when they are easiest to treat. [facility] follows the current, evidence-based guidelines published by the [facility] ([facility]) when recommending preventive services for our patients. Because we follow these guidelines, sometimes recommendations change over time as research supports it. (For example, a prostate screening blood test is no longer routinely recommended for men with no symptoms). Of course, you and your doctor may decide to screen more often for some diseases, based on your risk and co-morbidities (chronic disease you are already diagnosed with). Preventive services for you include:  - Medicare offers their members a free annual wellness visit, which is time for you and your primary care provider to discuss and plan for your preventive service needs. Take advantage of this benefit every year!  -All adults over age 72 should receive the recommended pneumonia vaccines. Current USPSTF guidelines recommend a series of two vaccines for the best pneumonia protection.   -All adults should have a flu vaccine yearly and tetanus vaccine every 10 years.  -All adults age 48 and older should receive the shingles vaccines (series of two vaccines).        -All adults age 38-68 who are overweight should have a diabetes screening test once every three years.   -Other screening tests & preventive services for persons with diabetes include: an eye exam to screen for diabetic retinopathy, a kidney function test, a foot exam, and stricter control over your cholesterol.   -Cardiovascular screening for adults with routine risk involves an electrocardiogram (ECG) at intervals determined by the provider.   -Colorectal cancer screening should be done for adults age 54-65 with no increased risk factors for colorectal cancer. There are a number of acceptable methods of screening for this type of cancer. Each test has its own benefits and drawbacks. Discuss with your provider what is most appropriate for you during your annual wellness visit. The different tests include: colonoscopy (considered the best screening method), a fecal occult blood test, a fecal DNA test, and sigmoidoscopy.  -All adults born between [facility] should be screened once for Hepatitis C.  -An Abdominal Aortic Aneurysm (AAA) Screening is recommended for men age 73-68 who has ever smoked in their lifetime.      Here is a list of your current Health Maintenance items (your personalized list of preventive services) with a due date:  Health Maintenance Due   Topic Date Due    COVID-19 Vaccine (1) Never done    Eye Exam  Never done    DTaP/Tdap/Td  (1 - Tdap) Never done    Shingles Vaccine (1 of 2) Never done    Yearly Flu Vaccine (1) 09/01/2021    Albumin Urine Test  10/22/2021    Annual Well Visit  10/23/2021    Diabetic Foot Care  01/27/2022    Depression Monitoring  03/10/2022

## 2022-03-10 NOTE — PROGRESS NOTES
This is a Subsequent Medicare Annual Wellness Visit providing Personalized Prevention Plan Services (PPPS) (Performed 12 months after initial AWV and PPPS )    I have reviewed the patient's medical history in detail and updated the computerized patient record. He returns today accompanied by his granddaughter who is now living with for his Medicare subsequent annual wellness examination and screening questionnaire. He is also here in follow-up of his recent hospitalization when he was hospitalized for a recurrent CVA and discharged about 2 weeks ago as well as follow-up for his recurrent urinary tract infections, ASCVD, chronic systolic CHF, cardiomyopathy, paroxysmal atrial fibrillation, paroxysmal VT, prior TIA, sick sinus syndrome, diverticulosis, diabetes mellitus, hyperlipidemia, Alzheimer's dementia, alcoholism and prior hospitalization withdrawal syndrome, severe protein calorie malnutrition, history of depression and other multiple medical problems. According to his granddaughter with him he seems to be doing okay since he is home and eating better and is picking up a little weight. She has noted noes other significant symptoms since he has been home. She has taken all of his alcohol away and he does not drink alcohol now. To direct questioning he denies any chest pain, shortness of breath, palpitations, PND, orthopnea or other cardiac or respiratory complaints. He notes no current GI or  complaints. He notes no headaches, dizziness or neurologic complaints. He does have the memory issues that have not changed and generalized weakness but no focal weakness. There are no current active arthritic complaints and there are no other complaints on complete processes.     History     Past Medical History:   Diagnosis Date    Abdominal pain 7/18/2017    Alzheimer disease ([facility] 7/18/2017    Anemia 7/18/2017    Arthritis     Back pain 7/18/2017    Bradycardia 7/18/2017    CAD (coronary artery disease)     hx of MI    [facility] 2/26/2021    CKD (chronic kidney disease), stage II 7/18/2017    constipation     Depression 7/18/2017    Diabetes mellitus ([facility] 7/18/2017    Diverticulosis 7/18/2017    DJD (degenerative joint disease) 7/18/2017    Encounter for long-term (current) use of other medications 7/18/2017    Fatigue     Gastritis and duodenitis 7/18/2017    GERD (gastroesophageal reflux disease)     GI bleed 7/18/2017    Hearing loss     Hyperlipidemia 7/18/2017    Hypertension     Hypertension with renal disease 7/18/2017    Ill-defined condition     Dementia    Internal hemorrhoids 7/18/2017    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    S/P cardiac pacemaker procedure 5/4/2015    5/4/15 Medtronic dual chamber pacemaker implant     Thyroid disease     Weight loss     lost over 40 pounds last year- has no appetite      Past Surgical History:   Procedure Laterality Date    COLONOSCOPY N/A 6/22/2017    COLONOSCOPY performed by Mari Ford MD at [address] [place] [address]  6/22/2017         [address]  7/10/2014    right inguinal    HX OTHER SURGICAL      cystectomy from back    [place] EGD TRANSORAL BIOPSY SINGLE/MULTIPLE  2/14/2012         [place] UPPER GI ENDOSCOPY,W/DIR SUBMUC INJ  7/23/2020         UPPER GI ENDOSCOPY,BIOPSY  6/22/2017         UPPER GI ENDOSCOPY,BIOPSY  7/23/2020          Social History     Tobacco Use    Smoking status: Former Smoker     Packs/day: 0.50     Years: 10.00     Pack years: 5.00     Start date: 7/12/1991    Smokeless tobacco: Never Used    Tobacco comment: quit 50 years ago   Vaping Use    Vaping Use: Never used   Substance Use Topics    Alcohol use: Not Currently     Comment: Occasional beer    Drug use: No     Current Outpatient Medications   Medication Sig Dispense Refill    colchicine 0.6 mg tablet Take 1 Tablet by mouth daily.  TAKE ONE TABLET AS NEEDED FOR GOUT 30 Tablet 0    memantine (NAMENDA) 10 mg tablet TAKE 1 TABLET BY MOUTH TWICE A DAY 60 Tablet 0    donepeziL (ARICEPT) 10 mg tablet TAKE 1 TABLET BY MOUTH EVERY DAY 7 Tablet 0    atorvastatin (LIPITOR) 40 mg tablet Take 1 Tablet by mouth nightly. 30 Tablet 2    clopidogreL (PLAVIX) 75 mg tab Take 1 Tablet by mouth daily. 30 Tablet 2    omeprazole (PRILOSEC) 20 mg capsule Take 1 Capsule by mouth daily. 7 Capsule 0    furosemide (LASIX) 20 mg tablet TAKE 2 TABS BY MOUTH DAILY. 1 [address] At [facility] (Patient taking differently: Take 40 mg by mouth daily. 2 each morning by mouth ) 30 Tablet 0    acetaminophen (TYLENOL) 650 mg TbER Take 650 mg by mouth two (2) times a day.  TAKE BY MOUTH AS NEEDED FOR mild to moderat pain (330-6/10 pain scale       No Known Allergies  Family History   Problem Relation Age of Onset    Hypertension Mother     Heart Disease Father     Cancer Other         son-cancer? unknown what type        Patient Active Problem List    Diagnosis    Systolic CHF, chronic ([address]    Hypertension with renal disease    Mixed hyperlipidemia    Primary osteoarthritis involving multiple joints    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, without long-term current use of insulin ([address]    Alzheimer disease ([address]    ASCVD (arteriosclerotic cardiovascular disease)    Cardiomyopathy ([address]    A-fib ([address]    Diverticulosis    [facility]SS [facility]) [facility]    S/P cardiac pacemaker procedure     5/4/15 Medtronic dual chamber pacemaker implant        SVT (supraventricular tachycardia) ([facility])--s/p RFA    Acute CVA (cerebrovascular accident) ([address]    Vitamin D deficiency    Pain of right thumb    GONGORA (dyspnea on exertion)    Anasarca    Pleural effusion    Weakness    Hypotension    Esophageal dyskinesia    Severe protein-calorie malnutrition (HCC)    Weight loss    Unsteady gait    Paroxysmal VT (HCC)    Alcohol withdrawal syndrome, with delirium [facility]    Primary insomnia    Mild depression ([facility]    Acute bilateral low back pain without sciatica    Alcoholism ([facility]    Metabolic encephalopathy    TIA (transient ischemic attack)    Medicare annual wellness visit, subsequent    Gastritis and duodenitis    Internal hemorrhoids    GI bleed    Back pain    Anemia    S/P ablation of accessory bypass tract     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      Near syncope    Hypokalemia    Right inguinal hernia       Patient Care Team:  Moo oCughlin MD as PCP - General (Internal Medicine)  Moo Coughlin MD as PCP - [facility]  Hugh Regalado MD as Physician (Cardiology)  Sangita Mancilla MD as Physician (Cardiology)  Nathanael Castleman, GET (Cardiology)  Cheryl Hassan MD as Physician (Cardiology)  Radha Matthews RN as Care Transitions Nurse    Depression Risk Factor Screening:     3 most recent [facility] [address] [facility] 3/10/2022   Little interest or pleasure in doing things Not at all   Feeling down, depressed, irritable, or hopeless Not at all   Total Score PHQ 2 0     Alcohol Risk Factor Screening: You do not drink alcohol or very rarely. Functional Ability and Level of Safety:     Fall Risk     Fall Risk Assessment, last 12 mths 3/10/2022   Able to walk? Yes   Fall in past 12 months? 0   Do you feel unsteady? 0   Are you worried about falling 0   Number of falls in past 12 months -   Fall with injury? -       Hearing Loss   mild    Activities of Daily Living   Partial assistance.    ADL Assessment 3/10/2022   Feeding yourself No Help Needed   Getting from bed to chair No Help Needed   Getting dressed Help Needed   Bathing or showering Help Needed   Walk across the room (includes cane/walker) Help Needed   Using the telphone Help Needed   Taking your medications Help Needed   Preparing meals Help Needed   Managing money (expenses/bills) Help Needed   Moderately strenuous housework (laundry) Help Needed   Shopping for personal items (toiletries/medicines) Help Needed   Shopping for groceries Help Needed   Driving Help Needed   Climbing a flight of stairs Help Needed   Getting to places beyond walking distances Help Needed       Abuse Screen   Patient is not abused    Social History     Social History Narrative    Not on file       Review of Systems      ROS:    Constitutional: He denies fevers, weight loss, sweats. Eyes: No blurred or double vision. ENT: No difficulty with swallowing, taste, speech or smell. Neck: no stiffness or swelling  Respiratory: No cough wheezing or shortness of breath. Cardiovascular: Denies chest pain, palpitations, unexplained indigestion or syncope. Gastrointestinal:  No changes in bowel movements, no abdominal pain, no bloating. Genitourinary:  He denies frequency, nocturia or stranguria. Extremities: No joint pain, stiffness or swelling. Neurological:  No numbness, tingling, burring paresthesias. No syncope, dizziness or frequent headache. Positive generalized nonfocal weakness  Lymphatic: no adenopathy noted  Hematologic: no easy bruising or bleeding gums  Skin:  No recent rashes or mole changes. Psychiatric/Behavioral:  Negative for depression. Physical Examination     Evaluation of Cognitive Function:  Mood/affect:  happy  Appearance: age appropriate  Family member/caregiver input: Granddaughter. Vitals:    03/10/22 1139   BP: 110/68   Pulse: 64   Resp: 18   Temp: 98 °F (36.7 °C)   TempSrc: Oral   SpO2: 97%   Weight: 108 lb (49 kg)   Height: 5' 8\" (1.727 m)   PainSc:   0 - No pain        PHYSICAL EXAM:    General appearance - alert, thin frail-appearing black male, and in no distress  Mental status - alert, oriented to person, place, and time  HEENT:  Ears - bilateral TM's and external ear canals clear  Eyes - pupillary responses were normal.  Extraocular muscle function intact. Lids and conjunctiva not injected. Fundoscopic exam revealed sharp disc margins. eye movements intact  Pharynx- clear with teeth in good repair. No masses were noted  Neck - supple without thyromegaly or burit. No JVD noted  Lungs - clear to auscultation and percussion  Cardiac- normal rate, regular rhythm without murmurs. PMI not displaced. No gallop, rub or click  Abdomen - flat, soft, non-tender without palpable organomegaly or mass. No pulsatile mass was felt, and not bruit was heard. Bowel sounds were active  : Circumcised, Testes descended w/o masses  Rectal: normal sphincter tone, prostate 1+ enlarged, smooth and nontender without nodules, no masses, stool brown and hemacult negative  Extremities -  no clubbing cyanosis or edema  Lymphatics - no palpable lymphadenopathy, no hepatosplenomegaly  Hematologic: no petechiae or purpura  Peripheral vascular -Femoral, Dorsalis pedis and posterior tibial pulses felt without difficulty  Skin - no rash or unusual mole change noted  Neurological - Cranial nerves II-XII grossly intact. Motor strength 5/5. DTR's 2+ and symmetric. Station and gait with no assistive device but with contact guarding  Back exam - full range of motion, no tenderness, palpable spasm or pain on motion  Musculoskeletal - no joint tenderness, deformity or swelling        Results for orders placed or performed during the hospital encounter of 02/21/22   CBC WITH AUTOMATED DIFF   Result Value Ref Range    WBC 6.2 4.1 - 11.1 K/uL    RBC 4.53 4.10 - 5.70 M/uL    HGB 12.3 12.1 - 17.0 g/dL    HCT 40.2 36.6 - 50.3 %    MCV 88.7 80.0 - 99.0 FL    MCH 27.2 26.0 - 34.0 PG    MCHC 30.6 30.0 - 36.5 g/dL    RDW 16.0 (H) 11.5 - 14.5 %    PLATELET 900 195 - 787 K/uL    MPV 11.6 8.9 - 12.9 FL    NRBC 0.0 0  WBC    ABSOLUTE NRBC 0.00 0.00 - 0.01 K/uL    NEUTROPHILS 74 32 - 75 %    LYMPHOCYTES 13 12 - 49 %    MONOCYTES 10 5 - 13 %    EOSINOPHILS 2 0 - 7 %    BASOPHILS 1 0 - 1 %    IMMATURE GRANULOCYTES 0 0.0 - 0.5 %    ABS. NEUTROPHILS 4.6 1.8 - 8.0 K/UL    ABS.  LYMPHOCYTES 0.8 0.8 - 3.5 K/UL    ABS. MONOCYTES 0.6 0.0 - 1.0 K/UL    ABS. EOSINOPHILS 0.1 0.0 - 0.4 K/UL    ABS. BASOPHILS 0.0 0.0 - 0.1 K/UL    ABS. IMM. GRANS. 0.0 0.00 - 0.04 K/UL    DF AUTOMATED     METABOLIC PANEL, COMPREHENSIVE   Result Value Ref Range    Sodium 144 136 - 145 mmol/L    Potassium 3.6 3.5 - 5.1 mmol/L    Chloride 106 97 - 108 mmol/L    CO2 34 (H) 21 - 32 mmol/L    Anion gap 4 (L) 5 - 15 mmol/L    Glucose 99 65 - 100 mg/dL    BUN 38 (H) 6 - 20 MG/DL    Creatinine 1.49 (H) 0.70 - 1.30 MG/DL    BUN/Creatinine ratio 26 (H) 12 - 20      GFR est AA 54 (L) >60 ml/min/1.73m2    GFR est non-AA 44 (L) >60 ml/min/1.73m2    Calcium 9.2 8.5 - 10.1 MG/DL    Bilirubin, total 0.7 0.2 - 1.0 MG/DL    ALT (SGPT) 53 12 - 78 U/L    AST (SGOT) 44 (H) 15 - 37 U/L    Alk.  phosphatase 111 45 - 117 U/L    Protein, total 7.1 6.4 - 8.2 g/dL    Albumin 2.9 (L) 3.5 - 5.0 g/dL    Globulin 4.2 (H) 2.0 - 4.0 g/dL    A-G Ratio 0.7 (L) 1.1 - 2.2     PROTHROMBIN TIME + INR   Result Value Ref Range    INR 1.1 0.9 - 1.1      Prothrombin time 11.1 9.0 - 11.1 sec   LIPID PANEL   Result Value Ref Range    Cholesterol, total 136 <200 MG/DL    Triglyceride 86 <150 MG/DL    HDL Cholesterol 39 MG/DL    LDL, calculated 79.8 0 - 100 MG/DL    VLDL, calculated 17.2 MG/DL    CHOL/HDL Ratio 3.5 0.0 - 5.0     HEMOGLOBIN A1C WITH EAG   Result Value Ref Range    Hemoglobin A1c 5.5 4.0 - 5.6 %    Est. average glucose 111 mg/dL   CBC W/O DIFF   Result Value Ref Range    WBC 6.4 4.1 - 11.1 K/uL    RBC 4.05 (L) 4.10 - 5.70 M/uL    HGB 11.1 (L) 12.1 - 17.0 g/dL    HCT 36.9 36.6 - 50.3 %    MCV 91.1 80.0 - 99.0 FL    MCH 27.4 26.0 - 34.0 PG    MCHC 30.1 30.0 - 36.5 g/dL    RDW 16.0 (H) 11.5 - 14.5 %    PLATELET 545 228 - 060 K/uL    MPV 11.3 8.9 - 12.9 FL    NRBC 0.0 0  WBC    ABSOLUTE NRBC 0.00 0.00 - 2.05 K/uL   METABOLIC PANEL, BASIC   Result Value Ref Range    Sodium 142 136 - 145 mmol/L    Potassium 3.3 (L) 3.5 - 5.1 mmol/L    Chloride 106 97 - 108 mmol/L    CO2 29 21 - 32 mmol/L    Anion gap 7 5 - 15 mmol/L    Glucose 94 65 - 100 mg/dL    BUN 34 (H) 6 - 20 MG/DL    Creatinine 1.43 (H) 0.70 - 1.30 MG/DL    BUN/Creatinine ratio 24 (H) 12 - 20      GFR est AA 56 (L) >60 ml/min/1.73m2    GFR est non-AA 46 (L) >60 ml/min/1.73m2    Calcium 9.0 8.5 - 10.1 MG/DL   CBC W/O DIFF   Result Value Ref Range    WBC 4.6 4.1 - 11.1 K/uL    RBC 4.07 (L) 4.10 - 5.70 M/uL    HGB 11.1 (L) 12.1 - 17.0 g/dL    HCT 40.0 36.6 - 50.3 %    MCV 98.3 80.0 - 99.0 FL    MCH 27.3 26.0 - 34.0 PG    MCHC 27.8 (L) 30.0 - 36.5 g/dL    RDW 15.9 (H) 11.5 - 14.5 %    PLATELET 091 823 - 033 K/uL    MPV 11.8 8.9 - 12.9 FL    NRBC 0.0 0  WBC    ABSOLUTE NRBC 0.00 0.00 - 8.64 K/uL   METABOLIC PANEL, BASIC   Result Value Ref Range    Sodium 139 136 - 145 mmol/L    Potassium 3.8 3.5 - 5.1 mmol/L    Chloride 106 97 - 108 mmol/L    CO2 27 21 - 32 mmol/L    Anion gap 6 5 - 15 mmol/L    Glucose 81 65 - 100 mg/dL    BUN 28 (H) 6 - 20 MG/DL    Creatinine 1.18 0.70 - 1.30 MG/DL    BUN/Creatinine ratio 24 (H) 12 - 20      GFR est AA >60 >60 ml/min/1.73m2    GFR est non-AA 58 (L) >60 ml/min/1.73m2    Calcium 8.5 8.5 - 10.1 MG/DL   CBC W/O DIFF   Result Value Ref Range    WBC 4.9 4.1 - 11.1 K/uL    RBC 4.18 4.10 - 5.70 M/uL    HGB 11.1 (L) 12.1 - 17.0 g/dL    HCT 36.7 36.6 - 50.3 %    MCV 87.8 80.0 - 99.0 FL    MCH 26.6 26.0 - 34.0 PG    MCHC 30.2 30.0 - 36.5 g/dL    RDW 15.7 (H) 11.5 - 14.5 %    PLATELET 733 177 - 464 K/uL    MPV 11.9 8.9 - 12.9 FL    NRBC 0.0 0  WBC    ABSOLUTE NRBC 0.00 0.00 - 0.97 K/uL   METABOLIC PANEL, BASIC   Result Value Ref Range    Sodium 139 136 - 145 mmol/L    Potassium 3.6 3.5 - 5.1 mmol/L    Chloride 105 97 - 108 mmol/L    CO2 30 21 - 32 mmol/L    Anion gap 4 (L) 5 - 15 mmol/L    Glucose 85 65 - 100 mg/dL    BUN 24 (H) 6 - 20 MG/DL    Creatinine 1.22 0.70 - 1.30 MG/DL    BUN/Creatinine ratio 20 12 - 20      GFR est AA >60 >60 ml/min/1.73m2    GFR est non-AA 56 (L) >60 ml/min/1.73m2 Calcium 8.6 8.5 - 10.1 MG/DL   GLUCOSE, POC   Result Value Ref Range    Glucose (POC) 78 65 - 117 mg/dL    Performed by Chrissy BOSE    GLUCOSE, POC   Result Value Ref Range    Glucose (POC) 104 65 - 117 mg/dL    Performed by Anderson Cruz RN    GLUCOSE, POC   Result Value Ref Range    Glucose (POC) 134 (H) 65 - 117 mg/dL    Performed by Pernell Lynn (PCT)    GLUCOSE, POC   Result Value Ref Range    Glucose (POC) 81 65 - 117 mg/dL    Performed by Claudell Goodnight RN    EKG, 12 LEAD, INITIAL   Result Value Ref Range    Ventricular Rate 68 BPM    Atrial Rate 68 BPM    P-R Interval 136 ms    QRS Duration 168 ms    Q-T Interval 504 ms    QTC Calculation (Bezet) 535 ms    Calculated R Axis -140 degrees    Calculated T Axis 83 degrees    Diagnosis       AV dual-paced rhythm with frequent premature ventricular complexes  When compared with ECG of 24-FEB-2021 19:37,  Vent.  rate has decreased BY  12 BPM  Confirmed by Roney Harrington MD, Jenness Fraction ([id number]) on 2/22/2022 10:23:19 AM     ECHO ADULT COMPLETE   Result Value Ref Range    LVOT Peak Gradient 1 mmHg    LVOT Mean Gradient 1 mmHg    LVOT Peak Velocity 0.6 m/s    LVOT VTI 9.3 cm    AR .1 millisecond    AR Max Velocity PISA 3.6 m/s    AV Peak Gradient 7 mmHg    AV Mean Gradient 4 mmHg    AV Peak Velocity 1.4 m/s    AV Mean Velocity 0.9 m/s    AV VTI 33.7 cm    MV A Velocity 1.07 m/s    MV E Wave Deceleration Time 179.8 ms    MV E Velocity 0.70 m/s    LV E' Lateral Velocity 5 cm/s    LV E' Septal Velocity 3 cm/s    MR Peak Gradient 41 mmHg    MR Peak Velocity 3.2 m/s    MV Peak Gradient 4 mmHg    MV Mean Gradient 1 mmHg    MV Max Velocity 1.0 m/s    MV Mean Velocity 0.6 m/s    MV VTI 29.7 cm    PV Peak Gradient 3 mmHg    PV Mean Gradient 2 mmHg    PV Max Velocity 0.9 m/s    PV Mean Velocity 0.6 m/s    TR Peak Gradient 26 mmHg    TR Max Velocity 2.56 m/s    MV E/A 0.65     E/E' Ratio (Averaged) 18.67     E/E' Lateral 14.00     E/E' Septal 23.33     AV Velocity Ratio 0. 37     LVOT:AV VTI Index 0.28     MV:LVOT VTI Index 3.19        Advice/Referrals/Counseling   Education and counseling provided:  Are appropriate based on today's review and evaluation  End-of-Life planning (with patient's consent)  Pneumococcal Vaccine  Influenza Vaccine  Colorectal cancer screening tests      Assessment/Plan     ASSESSMENT:   1. Acute [facility] accident) ([facility]    2. Hypertension with renal disease    3. 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, without long-term current use of insulin ([facility]    4. Mixed hyperlipidemia    5. Primary osteoarthritis involving multiple joints    6. Alzheimer disease ([facility]    7. ASCVD (arteriosclerotic cardiovascular disease)    8. Systolic CHF, chronic (HCC)    9. Paroxysmal atrial fibrillation ([facility]    10. Cardiomyopathy, unspecified type ([facility]    11. SSS (sick sinus syndrome) ([facility]    12. S/P cardiac pacemaker procedure    13. SVT (supraventricular tachycardia) (HCC)--s/p RFA    14. Diverticulosis    15. Alcohol withdrawal syndrome, with delirium ([facility]    16. Alcoholism ([facility]    17. Severe protein-calorie malnutrition ([facility]    18. Vitamin D deficiency    19. Mild depression ([facility]    20. Medicare annual wellness visit, subsequent      Impression  1. Recent hospitalization recurrent CVA seems to be stable from that  2. Hypertension that is controlled so continue current therapy reviewed with he and his granddaughter  3. Diabetes mellitus repeat status is pending will make adjustments if necessary. His recent A1c during hospitalization was normal at 5.5 so that is not repeated. 4.  Hyperlipidemia prior lab reviewed repeat status pending I will adjust if necessary. 5.  DJD that is stable  6. Alzheimer's dementia stable  7   ASCVD clinically stable. EKG obtained today paced rhythm he has had no recent angina symptoms  8. Congestive heart failure compensated  9. Paroxysmal atrial fibrillation now with paced rhythm  10. Cardiomyopathy stable  11. Sick sinus syndrome as noted with pacemaker and now  12. Pacemaker  13. History of SVT no recurrence  14. Diverticulosis no recent symptoms  15. History of alcohol withdrawal syndrome currently does not drink according to his granddaughter as she has had all the alcohol. 16.  Alcoholism currently in remission  17. Severe protein calorie malnutrition weight today is 108 pounds which is 20 pounds less than he was on last seen in August at the office. 18.  Vitamin D deficiency repeat status is pending  19. Depression that seems to be controlled  Medicare subsequent annual wellness examination screening questionnaires completed today. The results were reviewed with him and his questions were answered. Lifestyle recommendations and modifications discussed and made. This is all discussed with his granddaughter with him. Follow-up with me scheduled again for 3 months with a clear understanding I will see him sooner if there is a problem. I did not schedule earlier follow-up as he does have home health and care at home and somewhat difficult getting him into the office. 45 minutes spent in direct care of this patient today not including time spent on Medicare wellness examination or procedures. PLAN:  .  Orders Placed This Encounter    CBC WITH AUTOMATED DIFF    METABOLIC PANEL, COMPREHENSIVE    LIPID PANEL    CK    T4 (THYROXINE)    TSH 3RD GENERATION    URINALYSIS W/ REFLEX CULTURE    VITAMIN D, 25 HYDROXY    MICROALBUMIN, UR, RAND W/ MICROALB/CREAT RATIO    AMB POC EKG ROUTINE W/ 12 LEADS, INTER & REP         ATTENTION:   This medical record was transcribed using an electronic medical records system. Although proofread, it may and can contain electronic and spelling errors. Other human spelling and other errors may be present. Corrections may be executed at a later time. Please feel free to contact us for any clarifications as needed.       Follow-up and Dispositions    · Return in about 3 months (around 6/10/2022). Mary Cody MD    Recommended healthy diet low in carbohydrates, fats, sodium and cholesterol. Recommended regular cardiovascular exercise 3-6 times per week for 30-60 minutes daily. Current Outpatient Medications   Medication Sig Dispense Refill    colchicine 0.6 mg tablet Take 1 Tablet by mouth daily. TAKE ONE TABLET AS NEEDED FOR GOUT 30 Tablet 0    memantine (NAMENDA) 10 mg tablet TAKE 1 TABLET BY MOUTH TWICE A DAY 60 Tablet 0    donepeziL (ARICEPT) 10 mg tablet TAKE 1 TABLET BY MOUTH EVERY DAY 7 Tablet 0    atorvastatin (LIPITOR) 40 mg tablet Take 1 Tablet by mouth nightly. 30 Tablet 2    clopidogreL (PLAVIX) 75 mg tab Take 1 Tablet by mouth daily. 30 Tablet 2    omeprazole (PRILOSEC) 20 mg capsule Take 1 Capsule by mouth daily. 7 Capsule 0    furosemide (LASIX) 20 mg tablet TAKE 2 TABS BY MOUTH DAILY. 1 [address] At [facility] (Patient taking differently: Take 40 mg by mouth daily. 2 each morning by mouth ) 30 Tablet 0    acetaminophen (TYLENOL) 650 mg TbER Take 650 mg by mouth two (2) times a day. TAKE BY MOUTH AS NEEDED FOR mild to moderat pain (3/30-6/10 pain scale         No results found for any visits on 03/10/22. Verbal and written instructions (see AVS) provided. Patient expresses understanding of diagnosis and treatment plan.     Mary Cody MD

## 2022-03-10 NOTE — PROGRESS NOTES
HIPAA verified by two patient identifiers. Johnathon Bishop is a 80 y.o. male    Chief Complaint   Patient presents with   [employer] Annual Wellness Visit       Visit Vitals  /68 (BP 1 Location: Left upper arm, BP Patient Position: Sitting, BP Cuff Size: Adult)   Pulse 64   Temp 98 °F (36.7 °C) (Oral)   Resp 18   Ht 5' 8\" (1.727 m)   Wt 108 lb (49 kg)   SpO2 97%   BMI 16.42 kg/m²       Pain Scale: 0 - No pain/10  Pain Location:       Health Maintenance Due   Topic Date Due    COVID-19 Vaccine (1) Never done    Eye Exam Retinal or Dilated  Never done    DTaP/Tdap/Td series (1 - Tdap) Never done    Shingrix Vaccine Age 50> (1 of 2) Never done    Flu Vaccine (1) 09/01/2021    MICROALBUMIN Q1  10/22/2021    Medicare Yearly Exam  10/23/2021    Foot Exam Q1  01/27/2022    Depression Monitoring  03/10/2022         Coordination of Care Questionnaire:  :   1) Have you been to an emergency room, urgent care, or hospitalized since your last visit? If yes, where when, and reason for visit? yes   2[facility]21-2/24 [facility]       2. Have seen or consulted any other health care provider since your last visit? If yes, where when, and reason for visit? NO      Patient is accompanied by daughter I have received verbal consent from [address]  to discuss any/all medical information while they are present in the room.

## 2022-03-14 NOTE — TELEPHONE ENCOUNTER
RX refill request from the patient/pharmacy. Patient last seen 03- with labs, and next appt. scheduled for 06-  Requested Prescriptions     Pending Prescriptions Disp Refills    cholecalciferol (VITAMIN D3) (1000 Units /25 mcg) tablet 30 Tablet 5     Sig: Take 1 Tablet by mouth daily.  levothyroxine (SYNTHROID) 50 mcg tablet 30 Tablet 5     Sig: Take 1 Tablet by mouth Daily (before breakfast).    Yariel Smith

## 2022-03-14 NOTE — PROGRESS NOTES
Hypothyroid so start Synthroid . 05 every day, Vitamin D low so start 1000 U every day. Discussed with patient's grand daughter. Rx's sent to patient's pharmacy.

## 2022-03-16 NOTE — PROGRESS NOTES
Care Transitions Outreach Attempt    Call within 2 business days of discharge: Yes   Attempted to reach patient for transitions of care follow up. Unable to reach patient. Ctn left message with name and number. Loretta Cash RN, CPN - Care Transition Nurse- [phone]      Patient: Sunny Root Sr Patient : 1931 MRN: [id number]    [facility] Dis[facility]       Complaint Diagnosis Description Type Department Provider    22 poss stroke Dysarthria . .. ED to Hosp-Admission (Discharged) (ADMIT) Juan Diego Carlos MD; Jennett Dubin,... Was this an external facility discharge? No Discharge Facility: [facility]      Noted following upcoming appointments from discharge chart review:   [address] follow up appointment(s):   Future Appointments   Date Time Provider Sonia Irizarry   3/16/2022 11:30 AM Jodee Sevilla OT [address] [facility]   3/16/2022  1:00 PM APOLONIA Cam [facility]   3/17/2022 12:00 PM Timbo Loera, PT [address] [facility]   3/21/2022 To Be Determined APOLONIA Cam [facility]   3/22/2022 To Be Determined Jodee Sevilla OT [place] [address]   3/22/2022 To Be Determined Uma Durand [address] [place] [address]   3/23/2022 To Be Determined APOLONIA Cam [facility]   3/24/2022 To Be Determined Jodee Sevilla  [place] [address]   3/24/2022 To Be Determined Timbo Loera, [address] [facility]   3/28/2022 To Be Determined APOLONIA Cam [facility]   3/29/2022 To Be Determined Timbo Loera [address] [facility]   3/30/2022 To Be Determined APOLONIA Cam [facility]   3/31/2022 [place] [address] [address] [facility]   2022 To Be Determined [place] [address] [address] [facility]   2022 To Be Determined [place] [address] [address] [facility]   2022 10:30 AM Miguel A Gross MD [facility] AMB     Non-[facility] follow up appointment(s): none at this time.

## 2022-03-25 NOTE — PROGRESS NOTES
Patient has graduated from the Transitions of Care Coordination  program on 3/25/22. Patient/family has the ability to self-manage at this time Care management goals have been completed. Patient was not referred to the [facility] team for further management. Goals Addressed                 This Visit's Progress     COMPLETED: Prevent complications post hospitalization. On track     2/25/22     Patient grandaughter to call home health or dispatch for thinking patient has a uti.  Ctn advised to push po fluids    Grandaughter to check bp on patient daily   [facility].  Ctn to follow up with patient in one week.  Didi Snyder RN    3/8/22     Ctn advised to push po fluids    Grandaughter to check bp on patient daily   [facility] home health to see patient for pt and ot.  Ctn to follow up with patient in one week.  Patient has pcp appt on 3/10/22   Didi Snyder RN    3/25/22   Patient has had no readmissions for 30 days. [facility] RN               Patient has Care Transition Nurse's contact information for any further questions, concerns, or needs.   Patients upcoming visits:    Future Appointments   Date Time Provider Sonia Irizarry   3/29/2022 To Be Determined Oly Henning [address]   3/29/2022 12:30 PM APOLONIA Portillo [facility]   3/31/2022 To Be Determined Oly Henning [address] [place] [address] [address] [facility]   4/5/2022 To Be Determined mechelle Esparza[address]   4/5/2022 To Be Determined APOLONIA Portillo [address]   4/[facility]20[facility] [address]   6/23/2022 10:30 AM Delroy Reddy MD [facility] AMB You can access the FollowMyHealth Patient Portal offered by [facility] by registering at the following website: [email or web address]. By joining [facility]’s FollowMyHealth portal, you will also be able to view your health information using other applications (apps) compatible with our system.

## 2022-04-05 NOTE — CASE COMMUNICATION
Patient was notified he is cleared for his up coming hernia surgery. Clearance faxed to PCP per patient's request    Please complete form for all falls whether observed or not observed    Fall observed by [facility] Staff? NO    Describe Event (please include location of fall and may copy and paste from visit note):Per caregiver patient had a fall in the living room while ambulating without his walker with no assistance. Per caregiver patient has a habit of walking without his walker if not supervised all the time. Per granddaughter patient was able to get up  with assistance from her 15year-old son. Patient reported no injuries due to the fall. PT evaluated the patient with no injuries noted. PT reeducated the granddaughter that the patient must be supervised all the time and must use the FWW with assistance from the caregiver when up and ambulating. Per granddaughter they assist the patient all the time, but it happens sometimes, but never had a fall. Granddaughter was able to repeat back  all the safety precautions to prevent falls with 100% accuracy including patient must be assisted all the time while ambulating/doing transfers with using the walker all the time. PT will continue to focus on balance challenging exercises in standing and gait training using the [address]- so that the patient can ambulate with less risk of falling    Document any re-training or treatment plan modifications indicated and the patient/caregiver r esponse (may copy and paste from visit note):PT reeducated the granddaughter that the patient must be supervised all the time and must use the FWW with assistance from the caregiver when up and ambulating. Per granddaughter they assist the patient all the time, but it happens sometimes, but never had a fall. Granddaughter was able to repeat back all the safety precautions to prevent falls with 100% accuracy including patient must be ass isted all the time while ambulating/doing transfers with using the walker all the time.  PT will continue to focus on balance challenging exercises in standing and gait training using the FWW- so that the patient can ambulate with less risk of falling    Assistive Devices used by patient prior to fall:NO      Was this equipment in use at time of fall? NO    Injury? NO If yes, describe:    Emergent Care Received (EMS, [facility])? NO, If yes, desc ribe:      Was patient identified as High Risk prior to fall?  YES

## 2022-04-11 NOTE — TELEPHONE ENCOUNTER
RX refill request from the patient/pharmacy. Patient last seen 03- with labs, and next appt. scheduled for 06-  Requested Prescriptions     Pending Prescriptions Disp Refills    donepeziL (ARICEPT) 10 mg tablet [Pharmacy Med Name: DONEPEZIL HCL 10 MG TABLET] 30 Tablet 5     Sig: TAKE 1 TABLET BY MOUTH EVERY DAY    carvediloL (COREG) 6.25 mg tablet [Pharmacy Med Name: CARVEDILOL 6.25 MG TABLET] 180 Tablet 1     Sig: TAKE 1 TABLET BY MOUTH TWICE A DAY    furosemide (LASIX) 20 mg tablet [Pharmacy Med Name: FUROSEMIDE 20 MG TABLET] 60 Tablet 5     Sig: TAKE 2 TABLETS BY MOUTH EVERY DAY IN THE MORNING   .

## 2022-04-12 NOTE — TELEPHONE ENCOUNTER
PCP: Juan West MD    Last appt: 3/10/2022  Future Appointments   Date Time Provider Sonia Irizarry   4/14/2022 12:00 PM Tosin Fonseca PT [facility]   4/19/2022 To Be Determined Jair Mead [facility]   4/21/2022 To Be Determined Tosin Fonseca PT [facility]   6/23/2022 10:30 AM Juan West MD [facility] AMB       Requested Prescriptions     Pending Prescriptions Disp Refills    memantine (NAMENDA) 10 mg tablet 60 Tablet 0     Sig: TAKE 1 TABLET BY MOUTH TWICE A DAY       Prior labs and Blood pressures:  BP Readings from Last 3 Encounters:   04/07/22 130/80   04/05/22 (!) 140/80   03/31/22 135/80     Lab Results   Component Value Date/Time    Sodium 140 03/10/2022 01:03 PM    Potassium 4.0 03/10/2022 01:03 PM    Chloride 102 03/10/2022 01:03 PM    CO2 32 03/10/2022 01:03 PM    Anion gap 6 03/10/2022 01:03 PM    Glucose 84 03/10/2022 01:03 PM    BUN 22 (H) 03/10/2022 01:03 PM    Creatinine 1.36 (H) 03/10/2022 01:03 PM    BUN/Creatinine ratio 16 03/10/2022 01:03 PM    GFR est AA 60 (L) 03/10/2022 01:03 PM    GFR est non-AA 49 (L) 03/10/2022 01:03 PM    Calcium 9.5 03/10/2022 01:03 PM     Lab Results   Component Value Date/Time    Hemoglobin A1c 5.5 02/22/2022 02:59 AM    Hemoglobin A1c (POC) 5.7 04/28/2021 11:03 AM     Lab Results   Component Value Date/Time    Cholesterol, total 156 03/10/2022 01:03 PM    Cholesterol (POC) 197.0 06/12/2018 01:54 PM    HDL Cholesterol 58 03/10/2022 01:03 PM    HDL Cholesterol (POC) 66.0 06/12/2018 01:54 PM    LDL Cholesterol (POC) 101.8 06/12/2018 01:54 PM    LDL, calculated 82.4 03/10/2022 01:03 PM    VLDL, calculated 15.6 03/10/2022 01:03 PM    Triglyceride 78 03/10/2022 01:03 PM    Triglycerides (POC) 146.0 06/12/2018 01:54 PM    CHOL/HDL Ratio 2.7 03/10/2022 01:03 PM     Lab Results   Component Value Date/Time    Vitamin D 25-Hydroxy 31.9 03/10/2022 01:03 PM       Lab Results   Component Value Date/Time    TSH 18.20 (H) 03/10/2022 01:03 PM    TSH, 3rd generation 5.17 10/22/2020 01:35 PM

## 2022-04-26 NOTE — TELEPHONE ENCOUNTER
PCP: Lavinia Limon MD    Last appt: 3/10/2022  Future Appointments   Date Time Provider Sonia Irizarry   4/26/2022 11:00 AM Judy Alfredo PT [address]   4/28/2022 12:00 PM [facility]   5/3/2022 To Be Determined [address]   5/5/2022 To Be Determined [address]   5/10/2022 To Be Determined [facility]   5/12/2022 To Be Determined [address]   5/17/2022 To Be Determined [facility]   5/19/2022 To Be Determined [facility]   6/23/2022 10:30 AM Lavinia Limon MD [facility] AMB       Requested Prescriptions     Pending Prescriptions Disp Refills    clopidogreL (PLAVIX) 75 mg tab 90 Tablet 0     Sig: Take 1 Tablet by mouth daily for 90 days.          Other Comments:

## 2022-04-28 PROBLEM — E87.0 HYPERNATREMIA: Status: ACTIVE | Noted: 2022-01-01

## 2022-04-28 NOTE — HOME CARE
Please note that this patient was open to [facility] - INPATIENT services at the time of hospital admission. If services will be needed at discharge, please order to resume them.  Thank you, Tasneem Falcon RN, [phone]

## 2022-04-28 NOTE — PROGRESS NOTES
Transition of Care Plan:  *RUR: 19%     * Disposition- Home with Grand-daughter and [facility]  * Transportation at Discharge: Family to transport home  * IM/MOONS/OBS/FOC letter: Rachael Carlin  * Appointment with PCP  * DME needed: N/A, has a rollator and wheelchair     Additional Resources:  * Dispatch [address]     Primary Decision Maker (Active): Manuel Reynoso - [phone]    Secondary Decision Maker: Regan Nelson - Daughter - [phone]    Content/Action Overview: Has ACP document(s) on file - reflects the patient's care preferences. Transition of Care Plan:     CM opened case for assessment of D/C planning needs, CM reviewed chart. Patient has decision maker grand-daughter Ms. Sheryl Ernandez CM attempt to call left message to return call CM to follow-up at a later. CM review chart per old CM chart patient \"history of dementia at baseline. Radha Zelaya primary caregiver and mPOA. Pt lives with his grand-daughter in a one level home with 6 HELEN. Pt is typically able to perform ADLs with supervision from family. He uses a rollator for ambulation when needed. He also has access to a wheelchair which was owned previously by family. Bernie Adjutant assists pt with medication management, finances, transportation, and meal preparation. PCP is Dr. Raz Zavaleta with last visit in August 2021. Preferred pharmacy is [facility] on [address]. No prior hx of SNF/rehab. Pt with hx of [address] for home health services. Pt does not have prescription insurance coverage. Family notes that pt is a , but has not received care at [facility] in the past and is not service connected. \"    Valarie Pratt   [phone]

## 2022-04-28 NOTE — ED NOTES
Pt continues to rest quietly in bed in position of comfort. Updated on plan of care. Call bell within reach.  Granddaughter at bedside

## 2022-04-28 NOTE — H&P
Hospitalist Admission Note    NAME: Medina Whitaker    :  1931   MRN:  [id number]   Room Number: ER03/03  @ [address] [facility]     Date/Time:  2022 4:46 PM    Patient PCP: Hemalatha Cormier MD  ______________________________________________________________________  Given the patient's current clinical presentation, I have a high level of concern for decompensation if discharged from the emergency department. Complex decision making was performed, which includes reviewing the patient's available past medical records, laboratory results, and x-ray films. My assessment of this patient's clinical condition and my plan of care is as follows. Assessment / Plan: Active Problems:    Hypernatremia (2022)      Hypernatremia POA   Free water deficit 2.8 L   - D5w infusion at 75 cc/hr. Monitor for fluid overload  - sodium check Q8H      physical deconditioning POA    - PT/OT    Failure to thrive   Severe malnutrition POA  - nutrition consult       Alzheimer's dementia POA  - continue memantine   - Palliative consult       Bilateral pneumonia POA  Community acquired vs aspiration. CXR interpreted independently - bilateral hazy opacities. COVID, Influenza negative     - Ceftriaxone, azithromycin, metronidazole  - check urine legionella, urine strep, resp PCR       Elevated troponin POA   Chronic systolic congestive heart failure POA  S/p pacemaker. Troponin 162-148. ECHO 2022 EF 15-20%. - Check EKG, telemetry   - no acute intervention needed       SUBHA on CKD POA  Baseline 1.2-1.4. Current BUN/Cr 53/1. 94. suspect pre-renal due to hypovolemia.   - Gentle IV hydration   - Strict Is and Os, avoid nephrotoxic meds, renally dose meds     Hypothyroidism POA   Lab Results   Component Value Date/Time    TSH 18.20 (H) 03/10/2022 01:03 PM    TSH, 3rd generation 5.17 10/22/2020 01:35 PM     - recheck TSH  - continue levothyroxin      CAD POA  S/p AVNRT Ablation POA  HTN POA  HLD POA  - holding lisinopril, furosemide due to SUBHA   - holding clopidogrel, statin due to advanced age and limited mortality benefit       There is no height or weight on file to calculate BMI. Code Status:DNR   Surrogate Decision Maker: alcira     DVT Prophylaxis: SCD's  GI Prophylaxis: not indicated  Baseline: uses rollator        Subjective:   CHIEF COMPLAINT: weakness     HISTORY OF PRESENT ILLNESS:     Silvia Coyne Sr is a 80 y.o.  male with PMH of CAD, GERD,DJD, GI Bleed  who presents to ED with c/o deconditioning. Patient physical therapist attempted to work with them today and noted patient to be significantly weaker than baseline. Patient's granddaughter has noted that patient has been gradually declining over the past 2 weeks, intermittently confused, combative. She thinks he is on too many medications and that is causing some of these symptoms. Patient at baseline has been instructed to use rollator. We were asked to admit for work up and evaluation of the above problems.      Past Medical History:   Diagnosis Date    Abdominal pain 7/18/2017    Alzheimer disease ([facility] [address] 7/18/2017    Anemia 7/18/2017    Arthritis     Back pain 7/18/2017    Bradycardia 7/18/2017    CAD (coronary artery disease)     hx of MI    Chronic diastolic heart failure ([facility] [address] 2/26/2021    CKD (chronic kidney disease), stage II 7/18/2017    constipation     Depression 7/18/2017    Diabetes mellitus ([facility] [address] 7/18/2017    Diverticulosis 7/18/2017    DJD (degenerative joint disease) 7/18/2017    Encounter for long-term (current) use of other medications 7/18/2017    Fatigue     Gastritis and duodenitis 7/18/2017    GERD (gastroesophageal reflux disease)     GI bleed 7/18/2017    Hearing loss     Hyperlipidemia 7/18/2017    Hypertension     Hypertension with renal disease 7/18/2017    Ill-defined condition     Dementia    Internal hemorrhoids 7/18/2017    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    S/P cardiac pacemaker procedure 2015    5/4/15 Medtronic dual chamber pacemaker implant     Thyroid disease     Weight loss     lost over 40 pounds last year- has no appetite        Past Surgical History:   Procedure Laterality Date    COLONOSCOPY N/A 2017    COLONOSCOPY performed by Paola Laureano MD at [address] [facility]  2017         [address]  7/10/2014    right inguinal    HX OTHER SURGICAL      cystectomy from back    [place] EGD TRANSORAL BIOPSY SINGLE/MULTIPLE  2012         [place] UPPER GI ENDOSCOPY,W/DIR SUBMUC INJ  2020         UPPER GI ENDOSCOPY,BIOPSY  2017         UPPER GI ENDOSCOPY,BIOPSY  2020            Social History     Tobacco Use    Smoking status: Former Smoker     Packs/day: 0.50     Years: 10.00     Pack years: 5.00     Start date: 1991    Smokeless tobacco: Never Used    Tobacco comment: quit 50 years ago   Substance Use Topics    Alcohol use: Not Currently     Comment: Occasional beer        Family History   Problem Relation Age of Onset    Hypertension Mother     Heart Disease Father     Cancer Other         son-cancer? unknown what type      No Known Allergies     Prior to Admission medications    Medication Sig Start Date End Date Taking? Authorizing Provider   clopidogreL (PLAVIX) 75 mg tab Take 1 Tablet by mouth daily for 90 days. 22 Yes Hussain Barber MD   memantine (NAMENDA) 10 mg tablet TAKE 1 TABLET BY MOUTH TWICE A DAY 22  Yes Hussain Barber MD   donepeziL (ARICEPT) 10 mg tablet TAKE 1 TABLET BY MOUTH EVERY DAY 22  Yes Hussain Barber MD   furosemide (LASIX) 20 mg tablet TAKE 2 TABLETS BY MOUTH EVERY DAY IN THE MORNING 22  Yes Hussain Barber MD   levothyroxine (SYNTHROID) 50 mcg tablet Take 1 Tablet by mouth Daily (before breakfast).  3/14/22  Yes Hussain Barber MD   colchicine 0.6 mg tablet Take 1 Tablet by mouth daily. TAKE ONE TABLET AS NEEDED FOR GOUT 3/9/22  Yes Trinh Vaughn MD   atorvastatin (LIPITOR) 40 mg tablet Take 1 Tablet by mouth nightly. 2/24/22  Yes Sergio Irving MD   omeprazole (PRILOSEC) 20 mg capsule Take 1 Capsule by mouth daily. 2/14/22  Yes Trinh Vaughn MD   carvediloL (COREG) 6.25 mg tablet TAKE 1 TABLET BY MOUTH TWICE A DAY  Patient not taking: Reported on 4/28/2022 4/11/22   Trinh Vaughn MD   cholecalciferol (VITAMIN D3) (1000 Units /25 mcg) tablet Take 1 Tablet by mouth daily. Patient not taking: Reported on 4/28/2022 3/14/22   Trinh Vaughn MD   acetaminophen (TYLENOL) 650 mg TbER Take 650 mg by mouth two (2) times a day. TAKE BY MOUTH AS NEEDED FOR mild to moderat pain (3/30-6/10 pain scale    Provider, Historical       REVIEW OF SYSTEMS:     I am not able to complete the review of systems because:    The patient is intubated and sedated    The patient has altered mental status due to his acute medical problems    The patient has baseline aphasia from prior stroke(s)   x The patient has baseline dementia and is not reliable historian    The patient is in acute medical distress and unable to provide information           Total of 12 systems reviewed as follows:       POSITIVE= underlined text  Negative = text not underlined  General:  fever, chills, sweats, generalized weakness, weight loss/gain,      loss of appetite   Eyes:    blurred vision, eye pain, loss of vision, double vision  ENT:    rhinorrhea, pharyngitis   Respiratory:   cough, sputum production, SOB, GONGORA, wheezing, pleuritic pain   Cardiology:   chest pain, palpitations, orthopnea, PND, edema, syncope   Gastrointestinal:  abdominal pain , N/V, diarrhea, dysphagia, constipation, bleeding   Genitourinary:  frequency, urgency, dysuria, hematuria, incontinence   Muskuloskeletal :  arthralgia, myalgia, back pain  Hematology:  easy bruising, nose or gum bleeding, lymphadenopathy   Dermatological: rash, ulceration, pruritis, color change / jaundice  Endocrine:   hot flashes or polydipsia   Neurological:  headache, dizziness, confusion, focal weakness, paresthesia,     Speech difficulties, memory loss, gait difficulty  Psychological: Feelings of anxiety, depression, agitation    Objective:   VITALS:    Visit Vitals  /83   Pulse 83   Temp 98.4 °F (36.9 °C)   Resp 29   SpO2 95%       PHYSICAL EXAM:    General:    Alert, cooperative, no distress, appears stated age. HEENT: Atraumatic, anicteric sclerae, pink conjunctivae     No oral ulcers, mucosa moist, throat clear, dentition fair  Neck:  Supple, symmetrical,  thyroid: non tender  Lungs:   Clear to auscultation bilaterally. No Wheezing or Rhonchi. No rales. Chest wall:  No tenderness  No Accessory muscle use. Heart:   Regular  rhythm,  No  murmur   No edema  Abdomen:   Soft, non-tender. Not distended. Bowel sounds normal  Extremities: No cyanosis. No clubbing,      Skin turgor normal, Capillary refill normal, Radial dial pulse 2+  Skin:     Not pale. Not Jaundiced  No rashes   Psych:  Fair insight. Not depressed. Not anxious or agitated. Neurologic: EOMs intact. No facial asymmetry. No aphasia or slurred speech. Symmetrical strength, Sensation grossly intact.  Alert and oriented X 2.     ______________________________________________________________________    Care Plan discussed with:  Patient/Family, Nurse and     Expected  Disposition:  Home w/Family  ________________________________________________________________________  TOTAL TIME:  35 Minutes    Critical Care Provided     Minutes non procedure based      Comments    x Reviewed previous records   >50% of visit spent in counseling and coordination of care x Discussion with patient and/or family and questions answered       ________________________________________________________________________  Signed: Zachary Akers MD    Procedures: see electronic medical records for all procedures/Xrays and details which were not copied into this note but were reviewed prior to creation of Plan. LAB DATA REVIEWED:    Recent Results (from the past 24 hour(s))   METABOLIC PANEL, COMPREHENSIVE    Collection Time: 04/28/22  1:22 PM   Result Value Ref Range    Sodium 156 (H) 136 - 145 mmol/L    Potassium 3.4 (L) 3.5 - 5.1 mmol/L    Chloride 114 (H) 97 - 108 mmol/L    CO2 31 21 - 32 mmol/L    Anion gap 11 5 - 15 mmol/L    Glucose 162 (H) 65 - 100 mg/dL    BUN 53 (H) 6 - 20 MG/DL    Creatinine 1.94 (H) 0.70 - 1.30 MG/DL    BUN/Creatinine ratio 27 (H) 12 - 20      GFR est AA 40 (L) >60 ml/min/1.73m2    GFR est non-AA 33 (L) >60 ml/min/1.73m2    Calcium 9.2 8.5 - 10.1 MG/DL    Bilirubin, total 1.4 (H) 0.2 - 1.0 MG/DL    ALT (SGPT) 60 12 - 78 U/L    AST (SGOT) 50 (H) 15 - 37 U/L    Alk. phosphatase 186 (H) 45 - 117 U/L    Protein, total 6.5 6.4 - 8.2 g/dL    Albumin 2.8 (L) 3.5 - 5.0 g/dL    Globulin 3.7 2.0 - 4.0 g/dL    A-G Ratio 0.8 (L) 1.1 - 2.2     CBC WITH AUTOMATED DIFF    Collection Time: 04/28/22  1:22 PM   Result Value Ref Range    WBC 9.0 4.1 - 11.1 K/uL    RBC 4.64 4.10 - 5.70 M/uL    HGB 12.4 12.1 - 17.0 g/dL    HCT 40.9 36.6 - 50.3 %    MCV 88.1 80.0 - 99.0 FL    MCH 26.7 26.0 - 34.0 PG    MCHC 30.3 30.0 - 36.5 g/dL    RDW 18.5 (H) 11.5 - 14.5 %    PLATELET 890 236 - 706 K/uL    MPV 12.6 8.9 - 12.9 FL    NRBC 0.6 (H) 0  WBC    ABSOLUTE NRBC 0.05 (H) 0.00 - 0.01 K/uL    NEUTROPHILS 87 (H) 32 - 75 %    LYMPHOCYTES 6 (L) 12 - 49 %    MONOCYTES 6 5 - 13 %    EOSINOPHILS 0 0 - 7 %    BASOPHILS 0 0 - 1 %    IMMATURE GRANULOCYTES 1 (H) 0.0 - 0.5 %    ABS. NEUTROPHILS 7.9 1.8 - 8.0 K/UL    ABS. LYMPHOCYTES 0.5 (L) 0.8 - 3.5 K/UL    ABS. MONOCYTES 0.5 0.0 - 1.0 K/UL    ABS. EOSINOPHILS 0.0 0.0 - 0.4 K/UL    ABS. BASOPHILS 0.0 0.0 - 0.1 K/UL    ABS. IMM.  GRANS. 0.1 (H) 0.00 - 0.04 K/UL    DF SMEAR SCANNED      RBC COMMENTS ANISOCYTOSIS  1+       COVID-19 WITH INFLUENZA A/B    Collection Time: 04/28/22  1:22 PM   Result Value Ref Range    SARS-CoV-2 by PCR Not detected NOTD      Influenza A by PCR Not detected      Influenza B by PCR Not detected     POC CHEM8    Collection Time: 04/28/22  1:37 PM   Result Value Ref Range    CO2, POC 34 (H) 19 - 24 MMOL/L    Glucose,  (H) 74 - 106 MG/DL    Creatinine, POC 1.8 (H) 0.6 - 1.3 MG/DL    GFRAA, POC 43 (L) >60 ml/min/1.73m2    GFRNA, POC 36 (L) >60 ml/min/1.73m2    Sodium,  (H) 136 - 145 MMOL/L    Potassium, POC 3.5 3.5 - 5.5 MMOL/L    Calcium, ionized (POC) 1.15 1.12 - 1.32 mmol/L    Chloride,  (H) 100 - 108 MMOL/L    Anion gap, POC 13 10 - 20     POC LACTIC ACID    Collection Time: 04/28/22  1:37 PM   Result Value Ref Range    Lactic Acid (POC) 3.72 (HH) 0.40 - 2.00 mmol/L   TROPONIN-HIGH SENSITIVITY    Collection Time: 04/28/22  1:41 PM   Result Value Ref Range    Troponin-High Sensitivity 162 (HH) 0 - 76 ng/L   SAMPLES BEING HELD    Collection Time: 04/28/22  1:41 PM   Result Value Ref Range    SAMPLES BEING HELD BLOOD      COMMENT        Add-on orders for these samples will be processed based on acceptable specimen integrity and analyte stability, which may vary by analyte.    POC LACTIC ACID    Collection Time: 04/28/22  3:35 PM   Result Value Ref Range    Lactic Acid (POC) 3.08 (HH) 0.40 - 2.00 mmol/L   TROPONIN-HIGH SENSITIVITY    Collection Time: 04/28/22  3:38 PM   Result Value Ref Range    Troponin-High Sensitivity 148 (HH) 0 - 76 ng/L

## 2022-04-28 NOTE — ED NOTES
Assumed care of this pt. Pt resting on stretcher, visitor at bedside. Denies pain at this time. Pt and family up to date on plan of care.

## 2022-04-28 NOTE — ED NOTES
Verbal shift change report given to Kristin Odell RN (oncoming nurse) by Mariely Allison RN (offgoing nurse). Report included the following information SBAR and ED Summary.

## 2022-04-28 NOTE — TELEPHONE ENCOUNTER
PCP: Carmita Lira MD    Last appt: 3/10/2022  Future Appointments   Date Time Provider Sonia Irizarry   5/3/2022 To Be Determined Tamika Ward,  [place] [address] [facility]   5/5/2022 To Be Determined Stacey Guzman [address] [place] [address] [facility]   5/10/2022 To Be Determined [place] [address] [facility]   5/12/2022 To Be Determined [place] [address] [facility]   5/17/2022 To Be Determined [place] [address] [address]   5/19/2022 To Be Determined [facility]   6/23/2022 10:30 AM Carmita Lira MD [facility] BS AMB       Requested Prescriptions     Pending Prescriptions Disp Refills    colchicine 0.6 mg tablet 30 Tablet 0     Sig: Take 1 Tablet by mouth daily.  TAKE ONE TABLET AS NEEDED FOR GOUT         Other Comments:

## 2022-04-28 NOTE — ED PROVIDER NOTES
EMERGENCY DEPARTMENT HISTORY AND PHYSICAL EXAM      Date: 4/28/2022  Patient Name: Franklin Robison Sr    History of Presenting Illness     Chief Complaint   Patient presents with    Extremity Weakness       History Provided By: Patient, EMS and Caregiver    HPI: Benito Farfan Sr, 80 y.o. male presents to the ED with cc of weakness. 20-year-old male with a history of dementia, CAD, CHF on Lasix, CKD presents emergency department with a chief plaint of generalized weakness. Patient arrives via EMS accompanied by family. Patient's granddaughter provides majority of history. Over the past week patient has been bothered by \"GERD. \"  He has been unable to take his medications. Reports coughing and generalized weakness. Today physical therapy noted patient more lethargic, generally weak. Patient afebrile for EMS but tachypneic. Normal glucose. Normal blood pressure. Poor Plath. 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 There are no other complaints, changes, or physical findings at this time. PCP: Siddharth Mejia MD    No current facility-administered medications on file prior to encounter. Current Outpatient Medications on File Prior to Encounter   Medication Sig Dispense Refill    lisinopriL (PRINIVIL, ZESTRIL) 5 mg tablet Take 5 mg by mouth daily.  clopidogreL (PLAVIX) 75 mg tab Take 1 Tablet by mouth daily for 90 days. 90 Tablet 0    memantine (NAMENDA) 10 mg tablet TAKE 1 TABLET BY MOUTH TWICE A DAY 60 Tablet 0    furosemide (LASIX) 20 mg tablet TAKE 2 TABLETS BY MOUTH EVERY DAY IN THE MORNING 60 Tablet 5    cholecalciferol (VITAMIN D3) (1000 Units /25 mcg) tablet Take 1 Tablet by mouth daily. 30 Tablet 5    levothyroxine (SYNTHROID) 50 mcg tablet Take 1 Tablet by mouth Daily (before breakfast). 30 Tablet 5    colchicine 0.6 mg tablet Take 1 Tablet by mouth daily.  TAKE ONE TABLET AS NEEDED FOR GOUT 30 Tablet 0    atorvastatin (LIPITOR) 40 mg tablet Take 1 Tablet by mouth nightly. 30 Tablet 2    [DISCONTINUED] donepeziL (ARICEPT) 10 mg tablet TAKE 1 TABLET BY MOUTH EVERY DAY 30 Tablet 5    [DISCONTINUED] carvediloL (COREG) 6.25 mg tablet TAKE 1 TABLET BY MOUTH TWICE A DAY (Patient not taking: Reported on 4/28/2022) 180 Tablet 1    [DISCONTINUED] omeprazole (PRILOSEC) 20 mg capsule Take 1 Capsule by mouth daily. 7 Capsule 0    [DISCONTINUED] acetaminophen (TYLENOL) 650 mg TbER Take 650 mg by mouth two (2) times a day.  TAKE BY MOUTH AS NEEDED FOR mild to moderat pain (3/30-6/10 pain scale         Past History     Past Medical History:  Past Medical History:   Diagnosis Date    Abdominal pain 7/18/2017    Alzheimer disease ([facility] 7/18/2017    Anemia 7/18/2017    Arthritis     Back pain 7/18/2017    Bradycardia 7/18/2017    CAD (coronary artery disease)     hx of MI    Chronic diastolic heart failure ([facility] 2/26/2021    CKD (chronic kidney disease), stage II 7/18/2017    constipation     Depression 7/18/2017    Diabetes mellitus ([facility] 7/18/2017    Diverticulosis 7/18/2017    DJD (degenerative joint disease) 7/18/2017    Encounter for long-term (current) use of other medications 7/18/2017    Fatigue     Gastritis and duodenitis 7/18/2017    GERD (gastroesophageal reflux disease)     GI bleed 7/18/2017    Hearing loss     Hyperlipidemia 7/18/2017    Hypertension     Hypertension with renal disease 7/18/2017    Ill-defined condition     Dementia    Internal hemorrhoids 7/18/2017    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    S/P cardiac pacemaker procedure 5/4/2015    5/4/15 Medtronic dual chamber pacemaker implant     Thyroid disease     Weight loss     lost over 40 pounds last year- has no appetite       Past Surgical History:  Past Surgical History:   Procedure Laterality Date    COLONOSCOPY N/A 6/22/2017    COLONOSCOPY performed by Stephen Leventhal, MD at [address] [place] [address]  6/22/2017         HX HERNIA REPAIR  7/10/2014    right inguinal    HX OTHER SURGICAL      cystectomy from back    AR EGD TRANSORAL BIOPSY SINGLE/MULTIPLE  2012         AR UPPER GI ENDOSCOPY,W/DIR SUBMUC INJ  2020         UPPER GI ENDOSCOPY,BIOPSY  2017         UPPER GI ENDOSCOPY,BIOPSY  2020            Family History:  Family History   Problem Relation Age of Onset    Hypertension Mother     Heart Disease Father     Cancer Other         son-cancer? unknown what type        Social History:  Social History     Tobacco Use    Smoking status: Former Smoker     Packs/day: 0.50     Years: 10.00     Pack years: 5.00     Start date: 1991    Smokeless tobacco: Never Used    Tobacco comment: quit 50 years ago   Vaping Use    Vaping Use: Never used   Substance Use Topics    Alcohol use: Not Currently     Comment: Occasional beer    Drug use: No       Allergies:  No Known Allergies      Review of Systems   Review of Systems   Unable to perform ROS: Dementia       Physical Exam   Physical Exam  Vitals and nursing note reviewed. Constitutional:       Comments: 29-year-old male, resting in bed, appears chronically ill   HENT:      Head: Normocephalic and atraumatic. Nose: Nose normal.      Mouth/Throat:      Mouth: Mucous membranes are dry. Eyes:      Pupils: Pupils are equal, round, and reactive to light. Cardiovascular:      Rate and Rhythm: Regular rhythm. Tachycardia present. Pulses: Normal pulses. Heart sounds: No murmur heard. Pulmonary:      Effort: Respiratory distress present. Abdominal:      General: Abdomen is flat. There is no distension. Palpations: Abdomen is soft. Musculoskeletal:         General: Normal range of motion. Cervical back: Normal range of motion. Right lower leg: No edema. Left lower leg: No edema. Skin:     General: Skin is warm and dry. Capillary Refill: Capillary refill takes less than 2 seconds.    Neurological: General: No focal deficit present. Mental Status: He is alert. Motor: Weakness (Generalized) present. Psychiatric:         Mood and Affect: Mood normal.         Diagnostic Study Results     Labs -     Recent Results (from the past 12 hour(s))   METABOLIC PANEL, COMPREHENSIVE    Collection Time: 04/28/22  1:22 PM   Result Value Ref Range    Sodium 156 (H) 136 - 145 mmol/L    Potassium 3.4 (L) 3.5 - 5.1 mmol/L    Chloride 114 (H) 97 - 108 mmol/L    CO2 31 21 - 32 mmol/L    Anion gap 11 5 - 15 mmol/L    Glucose 162 (H) 65 - 100 mg/dL    BUN 53 (H) 6 - 20 MG/DL    Creatinine 1.94 (H) 0.70 - 1.30 MG/DL    BUN/Creatinine ratio 27 (H) 12 - 20      GFR est AA 40 (L) >60 ml/min/1.73m2    GFR est non-AA 33 (L) >60 ml/min/1.73m2    Calcium 9.2 8.5 - 10.1 MG/DL    Bilirubin, total 1.4 (H) 0.2 - 1.0 MG/DL    ALT (SGPT) 60 12 - 78 U/L    AST (SGOT) 50 (H) 15 - 37 U/L    Alk. phosphatase 186 (H) 45 - 117 U/L    Protein, total 6.5 6.4 - 8.2 g/dL    Albumin 2.8 (L) 3.5 - 5.0 g/dL    Globulin 3.7 2.0 - 4.0 g/dL    A-G Ratio 0.8 (L) 1.1 - 2.2     CBC WITH AUTOMATED DIFF    Collection Time: 04/28/22  1:22 PM   Result Value Ref Range    WBC 9.0 4.1 - 11.1 K/uL    RBC 4.64 4.10 - 5.70 M/uL    HGB 12.4 12.1 - 17.0 g/dL    HCT 40.9 36.6 - 50.3 %    MCV 88.1 80.0 - 99.0 FL    MCH 26.7 26.0 - 34.0 PG    MCHC 30.3 30.0 - 36.5 g/dL    RDW 18.5 (H) 11.5 - 14.5 %    PLATELET 889 119 - 256 K/uL    MPV 12.6 8.9 - 12.9 FL    NRBC 0.6 (H) 0  WBC    ABSOLUTE NRBC 0.05 (H) 0.00 - 0.01 K/uL    NEUTROPHILS 87 (H) 32 - 75 %    LYMPHOCYTES 6 (L) 12 - 49 %    MONOCYTES 6 5 - 13 %    EOSINOPHILS 0 0 - 7 %    BASOPHILS 0 0 - 1 %    IMMATURE GRANULOCYTES 1 (H) 0.0 - 0.5 %    ABS. NEUTROPHILS 7.9 1.8 - 8.0 K/UL    ABS. LYMPHOCYTES 0.5 (L) 0.8 - 3.5 K/UL    ABS. MONOCYTES 0.5 0.0 - 1.0 K/UL    ABS. EOSINOPHILS 0.0 0.0 - 0.4 K/UL    ABS. BASOPHILS 0.0 0.0 - 0.1 K/UL    ABS. IMM.  GRANS. 0.1 (H) 0.00 - 0.04 K/UL    DF SMEAR SCANNED      RBC COMMENTS ANISOCYTOSIS  1+       COVID-19 WITH INFLUENZA A/B    Collection Time: 04/28/22  1:22 PM   Result Value Ref Range    SARS-CoV-2 by PCR Not detected NOTD      Influenza A by PCR Not detected      Influenza B by PCR Not detected     POC CHEM8    Collection Time: 04/28/22  1:37 PM   Result Value Ref Range    CO2, POC 34 (H) 19 - 24 MMOL/L    Glucose,  (H) 74 - 106 MG/DL    Creatinine, POC 1.8 (H) 0.6 - 1.3 MG/DL    GFRAA, POC 43 (L) >60 ml/min/1.73m2    GFRNA, POC 36 (L) >60 ml/min/1.73m2    Sodium,  (H) 136 - 145 MMOL/L    Potassium, POC 3.5 3.5 - 5.5 MMOL/L    Calcium, ionized (POC) 1.15 1.12 - 1.32 mmol/L    Chloride,  (H) 100 - 108 MMOL/L    Anion gap, POC 13 10 - 20     POC LACTIC ACID    Collection Time: 04/28/22  1:37 PM   Result Value Ref Range    Lactic Acid (POC) 3.72 (HH) 0.40 - 2.00 mmol/L   TROPONIN-HIGH SENSITIVITY    Collection Time: 04/28/22  1:41 PM   Result Value Ref Range    Troponin-High Sensitivity 162 (HH) 0 - 76 ng/L   SAMPLES BEING HELD    Collection Time: 04/28/22  1:41 PM   Result Value Ref Range    SAMPLES BEING HELD BLOOD      COMMENT        Add-on orders for these samples will be processed based on acceptable specimen integrity and analyte stability, which may vary by analyte.    POC LACTIC ACID    Collection Time: 04/28/22  3:35 PM   Result Value Ref Range    Lactic Acid (POC) 3.08 (HH) 0.40 - 2.00 mmol/L   TROPONIN-HIGH SENSITIVITY    Collection Time: 04/28/22  3:38 PM   Result Value Ref Range    Troponin-High Sensitivity 148 (HH) 0 - 76 ng/L   TSH 3RD GENERATION    Collection Time: 04/28/22  3:38 PM   Result Value Ref Range    TSH 5.89 (H) 0.36 - 3.74 uIU/mL   URINALYSIS W/ REFLEX CULTURE    Collection Time: 04/28/22  5:00 PM    Specimen: Urine   Result Value Ref Range    Color YELLOW/STRAW      Appearance CLEAR CLEAR      Specific gravity 1.015      pH (UA) 5.0 5.0 - 8.0      Protein 30 (A) NEG mg/dL    Glucose Negative NEG mg/dL    Ketone Negative NEG mg/dL Bilirubin Negative NEG      Blood Negative NEG      Urobilinogen 0.2 0.2 - 1.0 EU/dL    Nitrites Negative NEG      Leukocyte Esterase Negative NEG      WBC 0-4 0 - 4 /hpf    RBC 0-5 0 - 5 /hpf    Epithelial cells FEW FEW /lpf    Bacteria Negative NEG /hpf    UA:UC IF INDICATED CULTURE NOT INDICATED BY UA RESULT CNI     SODIUM    Collection Time: 04/28/22  5:47 PM   Result Value Ref Range    Sodium 156 (H) 136 - 145 mmol/L       Radiologic Studies -   CT HEAD WO CONT   Final Result   No acute intracranial process identified      XR CHEST PORT   Final Result   Bilateral hazy airspace opacities worse on the right which may represent   asymmetric pulmonary edema versus infection including possibly Covid 19   infection. CT Results  (Last 48 hours)               04/28/22 1409  CT HEAD WO CONT Final result    Impression:  No acute intracranial process identified       Narrative:  EXAM: CT HEAD WO CONT       INDICATION: Acute mental status change       COMPARISON: 2/21/2022. CONTRAST: None. TECHNIQUE: Unenhanced CT of the head was performed using 5 mm images. Brain and   bone windows were generated. Coronal and sagittal reformats. CT dose reduction   was achieved through use of a standardized protocol tailored for this   examination and automatic exposure control for dose modulation. FINDINGS:   The ventricles and sulci are stable in size, shape and configuration. . There is   stable periventricular white matter hypodensity. There is no intracranial   hemorrhage, extra-axial collection, or mass effect. The basilar cisterns are   open. No CT evidence of acute infarct. The bone windows demonstrate no abnormalities. The visualized portions of the   paranasal sinuses and mastoid air cells are clear.                CXR Results  (Last 48 hours)               04/28/22 1323  XR CHEST PORT Final result    Impression:  Bilateral hazy airspace opacities worse on the right which may represent asymmetric pulmonary edema versus infection including possibly Covid 19   infection. Narrative:  EXAM: XR CHEST PORT       INDICATION: Eval for Infiltrate       COMPARISON: Chest February 24, 2021       FINDINGS: A portable AP radiograph of the chest was obtained at 1311 hours. There is an implanted pacemaker which is unchanged in appearance. The heart is   mildly enlarged but stable. New, bilateral hazy airspace opacities are noted,   worse on the right. There is a suspected small right-sided effusion. No   pneumothorax is seen. Medical Decision Making   I am the first provider for this patient. I reviewed the vital signs, available nursing notes, past medical history, past surgical history, family history and social history. Vital Signs-Reviewed the patient's vital signs. Patient Vitals for the past 12 hrs:   Temp Pulse Resp BP SpO2   04/28/22 1842 97.8 °F (36.6 °C)       04/28/22 1750     96 %   04/28/22 1544  83 29 126/83    04/28/22 1434  88 16     04/28/22 1422  86 15     04/28/22 1303 98.4 °F (36.9 °C) 83 22 (!) 130/52 95 %     Records Reviewed: Nursing Notes and Old Medical Records    Provider Notes (Medical Decision Making):     19-year-old male presents emergency department chief complaint of generalized weakness. On arrival, poor Plath, does appear to desaturate. I am most concerned for delirium, less likely acute CVA. No focal deficits. Concern for underlying sepsis. Will check sepsis order set and provide empiric antibiotics given patient's tachypnea. Check basic labs hold blood cultures. Chest x-ray. ED Course:   Initial assessment performed. The patients presenting problems have been discussed, and they are in agreement with the care plan formulated and outlined with them. I have encouraged them to ask questions as they arise throughout their visit.     ED Course as of 04/28/22 1843   Thu Apr 28, 2022   1308 Preliminary EKG interpreted by me.  Shows paced rhythm with a HR of 99. No ST elevations or depressions concerning for ischemia. Normal intervals. ST segment depressions not significantly changed from prior EKGs. [MB]   1311 Discussed with patient's granddaughter, patient is DNR/DNI. [MB]   5 ED [facility] ED SEPSIS NOTE:     1:43 PM The patient now meets criteria for: Severe Sepsis    Fluid resuscitation with: Patient does not require a 30cc/kg bolus because they do not meet criteria for septic shock (SBP<90 or decreased by >40 mmHG from baseline, MAP<65, Lactate 4 or greater). Due to concern for rapidly advancing infection and deterioration of patient's condition, antibiotics are started STAT and cultures ordered. [MB]   2320 CBC shows normal white count but does have a left shift. CMP notable for hyponatremia as well as SUBHA likely due to prerenal etiology as patient not been drinking well. We will continue to hydrate gently. [MB]   5321 Chest x-ray does show an infiltrate, this should be covered with Levaquin and cefepime. [MB]   1420 Troponin elevated likely due to SUBHA. [MB]   1442 SARS-CoV-2 by PCR: Not detected [MB]   1612 Troponin downtrending. [MB]      ED Course User Index  [MB] Lamberto Gil MD     Critical Care Time:   CRITICAL CARE NOTE :    1:07 PM    IMPENDING DETERIORATION -Respiratory, Cardiovascular, CNS, Metabolic and Renal  ASSOCIATED RISK FACTORS - Hypoxia, Metabolic changes and Dehydration  MANAGEMENT- Bedside Assessment  INTERPRETATION -  Xrays, ECG and Blood Pressure  INTERVENTIONS - hemodynamic mngmt and Metobolic interventions  CASE REVIEW - Hospitalist/Intensivist, Nursing and Family  TREATMENT RESPONSE -Stable  PERFORMED BY - Self    NOTES   :  I have spent 55 minutes of critical care time involved in lab review, consultations with specialist, family decision- making, bedside attention and documentation. This time excludes time spent in any separate billed procedures.   During this entire length of time I was immediately available to the patient . Jordyn Bal MD      Disposition:    Admitted    Diagnosis     Clinical Impression:   1. Severe sepsis ([facility]    2. Aspiration pneumonia, unspecified aspiration pneumonia type, unspecified laterality, unspecified part of lung ([address]    3. Acute respiratory failure with hypoxia (HCC)    4. Hypernatremia    5. Acute kidney injury [facility])        Attestations:    Jordyn Bal MD    Please note that this dictation was completed with [employer], the computer voice recognition software. Quite often unanticipated grammatical, syntax, homophones, and other interpretive errors are inadvertently transcribed by the computer software. Please disregard these errors. Please excuse any errors that have escaped final proofreading. Thank you.

## 2022-04-28 NOTE — ED NOTES
Pt presents to ED via EMS after the pts physical therapist called with concern for the pts well being. The physical therapist stated to EMS that the pt is unable to do any activities which he was previously able to do two days ago. The pt does not follow commands very well but is making attempt. The pts granddaughter just showed up at the hospital and is reporting that the pts mobility and capability to take care of himself has declined the past few weeks. The granddaughter reports that the pt has dementia and she believes it as progressed. Granddaughter also believes that the pts medications need to be altered. The pt seems to be falling asleep if not stimulated. RR even and unlabored, skin is warm and dry. Assessment completed and pt updated on plan of care. Call bell in reach. Emergency Department Nursing Plan of Care       The Nursing Plan of Care is developed from the Nursing assessment and Emergency Department Attending provider initial evaluation. The plan of care may be reviewed in the ED Provider note.     The Plan of Care was developed with the following considerations:   Patient / Family readiness to learn indicated by:verbalized understanding  Persons(s) to be included in education: patient and family  Barriers to Learning/Limitations:No    Signed     Dimitri Mckeon RN    4/28/2022   12:57 PM

## 2022-04-28 NOTE — ED NOTES
Ultrasound IV by Edith  :  Procedure Note    Patient meets criteria for US IV insertion. Ultrasound IV education provided to patient. Opportunities for questions given. IV ultrasound technique used for PIV placement:  20gauge 1.75 in [facility]  location. 1 X Attempt(s). Flushed with ease; vigorous blood return. Procedure tolerated well. Primary RN aware of IV placement and added to LDA.                                 Carla Mayo RN

## 2022-04-28 NOTE — PROGRESS NOTES
[facility] Admission Pharmacy Medication Reconciliation     Information obtained from: ALOSKO data/ Young: Yes     Comments/recommendations:  Unable to perform medication reconciliation with patient due to patient condition at this time. PTA list was updated based on active prescriptions on RxQuery. Active prescriptions have been confirmed by [facility]. All prescriptions were last filled in April 2022, with the exception of lisinopril 5 mg (Last Filled 2/6/22; 90DS; Qty 90)  Please note: Compliance could not be assessed. Please interpret PTA list with caution. Medication changes (since last review): Added  Lisinopril 5 mg Tablets     Removed  Acetaminophen  Carvedilol- Last Filled September 2021  Donepezil- Last Filled February 2022 (7 days supply)  Omeprazole- On hold, never filled     Adjusted  None     The [facility] Page Mage Program () was accessed to determine fill history of any controlled medications. No record found. 1RxQuery pharmacy benefit data reflects medications filled and processed through the patient's insurance, however                this data does NOT capture whether the medication was picked up or is currently being taken by the patient. Patient allergies: Allergies as of 04/28/2022    (No Known Allergies)     Prior to Admission Medications   Prescriptions Last Dose Informant Patient Reported? Taking?   atorvastatin (LIPITOR) 40 mg tablet   No Yes   Sig: Take 1 Tablet by mouth nightly. cholecalciferol (VITAMIN D3) (1000 Units /25 mcg) tablet   No Yes   Sig: Take 1 Tablet by mouth daily. clopidogreL (PLAVIX) 75 mg tab   No Yes   Sig: Take 1 Tablet by mouth daily for 90 days. colchicine 0.6 mg tablet   No Yes   Sig: Take 1 Tablet by mouth daily.  TAKE ONE TABLET AS NEEDED FOR GOUT   furosemide (LASIX) 20 mg tablet   No Yes   Sig: TAKE 2 TABLETS BY MOUTH EVERY DAY IN THE MORNING   levothyroxine (SYNTHROID) 50 mcg tablet   No Yes Sig: Take 1 Tablet by mouth Daily (before breakfast). lisinopriL (PRINIVIL, ZESTRIL) 5 mg tablet   Yes Yes   Sig: Take 5 mg by mouth daily.    memantine (NAMENDA) 10 mg tablet   No Yes   Sig: TAKE 1 TABLET BY MOUTH TWICE A DAY      Facility-Administered Medications: None     Thank you,     Misael Rubio, PharmD   Contact: [phone]

## 2022-04-29 NOTE — PROGRESS NOTES
TRANSFER - IN REPORT:    Verbal report received from [address] on Benito Farfan Sr  being received from  ED for routine progression of care      Report consisted of patients Situation, Background, Assessment and   Recommendations(SBAR). Information from the following report(s) SBAR, Kardex, Intake/Output, MAR and Recent Results was reviewed with the receiving nurse. Opportunity for questions and clarification was provided. Assessment completed upon patients arrival to unit and care assumed.

## 2022-04-29 NOTE — PROGRESS NOTES
2115  Reported from ER that the patient was a DNR, unable to locate the patients DNR form in the chart. Called patients granddaughter Stephanie Bermudez at [phone] x [phone], with no answer. Message left on the phone. Needing to verify this information & see if the patients granddaughter has a copy. Tele hospitalist called, nurse spoke with him about a possible palliative consult. He is wanting verification of DNR. Will continue to call patients granddaughter. 2120  Patient has a Advance Medical Directive in the chart, contacted the second point of contact that is noted Nadine Manuel at [phone]. This is not the correct phone number for this person, another person answered the phone, they did not know who Mr. Galo Hsu was. No medical information was shared in this conversation. 2125  A home number was found for Ms. Arabella Villar in the chart. [phone], she answered. She reported that the phone number ended in [phone] is no longer her phone number. She also reported that the patient does have a signed DNR form that was completed at [facility]. She reports having a copy at her home & will be sending a copy over for the chart. The form will be updated once it is received.

## 2022-04-29 NOTE — PROGRESS NOTES
[facility] Dosing Services: Vancomycin Dosing Progress Note    Consult for dosing of vancomycin by Dr. Karen Rojas  Indication: bloodstream infection  Day of Therapy: 1    Ht Readings from Last 1 Encounters:   04/28/22 172.7 cm (68\")     Wt Readings from Last 1 Encounters:   04/28/22 49.9 kg (110 lb 0.2 oz)       Plan:  1. Start with loading dose of vancomycin 1000 mg IV x 1  2. Follow with maintenance dose of vancomycin 500 mg IV every 24 hours  3. (For patients with CrCl ? 29 mL/min, fluctuating renal function, or SUBHA regardless of the site of infection, a random vancomycin concentration will be collected during the anticipated interval (24 hours), and additional doses should be administered once the concentration is anticipated to be ? 15mg/L)  4. Dose calculated to approximate a Target AUC/YE of 437 [400-600] Trough of 15.0 mcg/mL  5. Plan:  Assess SCr daily. Draw random level @ 18:00 on 4/30    Pharmacy to follow daily and will make changes to dose and/or frequency based on clinical status. Date Dose & Interval Measured level (mcg/mL) AUC/YE and Extrapolated trough (mcg/mL)                         Other Antimicrobial  (not dosed by pharmacist)   CTX 1g IV q24h  Azithromycin 500 mg PO daily  Metronidazole 500 mg PO q12h   Cultures     4/28 blood cx: gram + cocci chains 2/4   Serum Creatinine     Lab Results   Component Value Date/Time    Creatinine 1.66 (H) 04/29/2022 09:51 AM    Creatinine, POC 1.8 (H) 04/28/2022 01:37 PM       Creatinine Clearance Estimated Creatinine Clearance: 20.5 mL/min (A) (based on SCr of 1.66 mg/dL (H)). Procalcitonin    Lab Results   Component Value Date/Time    Procalcitonin 0.57 04/29/2022 09:51 AM      Temp   97.1 °F (36.2 °C)     WBC Lab Results   Component Value Date/Time    WBC 10.7 04/29/2022 09:51 AM          Pharmacy will follow the patient on a daily basis and make adjustments based on patient's clinical status.     Thank you, Piotr Mcqueen

## 2022-04-29 NOTE — PROGRESS NOTES
2136) Telemed physician was notified of getting orders for lactic acid and sodium. Below is the message sent: Patient is a new admit from ED who came with complain of weakness which started days ago per report. Patient's lactic acid at 3pm is 3.08, Sodium at 5pm is 156. Please can I get an order for Sodium even though he will have one at 6am tomorrow and also lactic acid. Thank you. 2040) Patient was admitted to the [facility] unit with complain of weakness per nurses report. 2045) Admission assessment done. Patient is very lethargic. Unable to obtain answers to admission  questions. Dual skin assessment done with RADHA Overton.    2146) [address] [place] [address] Dr notified. Below is the message sent: Good evening , please I just ordered for the lactic acid and sodium. Will need your consent. Thank you.    21 ) Blood drawn for sodium and lactic acid and sent to the lab.    2252) Patient refused bedtime medication. 2306) Telemed physician was notified of patient's lactic acid and sodium result. Below is the message sent: Patient lactic acid result is 3.8, Sodium is 154. Thank you.    2327) Order received from MD to give a bag of 500 ml 0.9% Sodium chloride infusion bolus at a rate of 500 ml/hr.    0243) Good morning , Please writer is unable to get blood drawn for the lactic acid and sodium. 3 nurses have tried multiple times to get blood draw but it were  unsuccessful. Please can I get your feedback. 0246) DR Kahlil Saunders called and stated that, blood draw can be done in the morning.     0256) Telemed physician was notified again to try arterial stick due to sodium and lactic acid level being high. Below is the message sent: Melissa Solano Dr, Please how about trying to do arterial stick or do you still want us to wait till morning. I'm concern about his sodium and Lactic acid level. Please your feedback.  Thank you.     1133) Dr Kahlil Saunders called and stated that, he is not ok with the  arterial stick unless it has to do with potassium. He insisted that the blood she be drawn in the morning. Dr is aware of patient's lactic acid level of 3.8 and sodium level of 154. Writer will keep monitoring  the patient. 0405) Patient is resting comfortably in bed. VS taken. No distress noted at this time. 0715)  Bedside and Verbal shift change report given to [address] [place] [address] Leif (oncoming nurse) by Radha Wall (offgoing nurse). Report included the following information SBAR, Kardex, Intake/Output, MAR, Recent Results and Cardiac Rhythm- Ventricular Paced.

## 2022-04-29 NOTE — BH NOTES
Reason for Admission:    Patient presented in ED with complaint of generalized weakness. He is diagnosed with hyponatremia, failure to thrive and physical deconditioning. RUR Score:     19%                Plan for utilizing home health:   Pt was receiving home health prior to hospitalization. Need going forward is to be determined       PCP: First and Last name:  Ewelina Saucedo MD     Name of Practice:    Are you a current patient: Yes/No:    Approximate date of last visit:    Can you participate in a virtual visit with your PCP:                     Current Advanced Directive/Advance Care Plan: DNR      Healthcare Decision Maker:   Click here to complete [address] including selection of the Healthcare Decision Maker Relationship (ie \"Primary\")             Primary Decision Maker (Active): Francisca Coon - [phone]    Secondary Decision Maker: Rosita Villalobos Daughter - [phone]                  Transition of Care Plan:   CM went to patient's room and he was not responsive. CM called patient's grand-daughter, Janette Hancock who verbalized the desire for patient to be discharged home and she knows that he will be on hospice. However, she wants him to be returned to his \"baseline\" prior to coming home. She reports a decline in his condition during the last two weeks which she attributes to acid reflux and the need for medication adjustment. Also sees lack of fluid as a contributing factor in his decline. She emphasized that she wants patient to return to his baseline prior to her taking him home. Patient lives with his grand-daughter and her children. 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   She believes that he will regain his previous level of functioning after his medications are adjusted and he is given fluids during this hospitalization. CM reviewed the IMM letter with the grand-daughter who verbalized understanding and gave CM permission to sign it. The original copy will be placed in patient's room for grand-daughter and a copy will be put on the chart to be scanned. Care Management Interventions  PCP Verified by CM:  Yes  Palliative Care Criteria Met (RRAT>21 & CHF Dx)?: Yes  Palliative Consult Recommended?: Yes  Mode of Transport at Discharge: Self  Transition of Care Consult (CM Consult): Discharge Planning  Health Maintenance Reviewed: Yes  Support Systems: Other Family Member(s)  Confirm Follow Up Transport: Self  The Plan for Transition of Care is Related to the Following Treatment Goals : medical stability (To be determined)  The Patient and/or Patient Representative was Provided with a Choice of Provider and Agrees with the Discharge Plan?: Yes  Freedom of Choice List was Provided with Basic Dialogue that Supports the Patient's Individualized Plan of Care/Goals, Treatment Preferences and Shares the Quality Data Associated with the Providers?: No  [place] Resource Information Provided?: No  Discharge Location  Patient Expects to be Discharged to[de-identified] Home with hospice

## 2022-04-29 NOTE — PROGRESS NOTES
Problem: Self Care Deficits Care Plan (Adult)  Goal: *Acute Goals and Plan of Care (Insert Text)  Description: FUNCTIONAL STATUS PRIOR TO ADMISSION: Per chart pt lives with his granddaughter, performed ADLs and functional mobility with supervision and uses rollator and w/c. Chart stated significant decline in function over the past few weeks with worsening dementia. HOME SUPPORT: The patient lived with granddaughter and required assist for ADLs. Occupational Therapy Goals  Initiated 4/29/2022  1. Patient will perform self-feeding with minimal assistance/contact guard assist within 7 day(s). 2.  Patient will perform face washing with minimal assistance/contact guard assist within 7 day(s). 3.  Patient will perform toilet transfers with maximal assistance within 7 day(s). 4.  Patient will participate in upper extremity therapeutic exercise/activities with minimal assistance/contact guard assist for 10 minutes within 7 day(s). Outcome: Progressing Towards Goal     OCCUPATIONAL THERAPY EVALUATION  Patient: Marla Levy Sr (80 y.o. male)  Date: 4/29/2022  Primary Diagnosis: Hypernatremia [E87.0]        Precautions:  DNR,Fall    ASSESSMENT  Based on the objective data described below, the patient presents with impaired cognition and command following, decreased strength, endurance, mobility, coordination, balance and safety. He currently requires total A for ADLs and functional mobility. Per chart, palliative has been consulted for recent significant decline in function due to worsening dementia. Current Level of Function Impacting Discharge (ADLs/self-care): total A    Functional Outcome Measure: The patient scored Total: 0/100 on the Barthel Index outcome measure. Other factors to consider for discharge: see above     Patient will benefit from skilled therapy intervention to address the above noted impairments.        PLAN :  Recommendations and Planned Interventions: self care training, functional mobility training, therapeutic exercise, balance training, therapeutic activities, cognitive retraining, endurance activities, neuromuscular re-education, patient education, home safety training, and family training/education    Frequency/Duration: Patient will be followed by occupational therapy 2 times a week to address goals. Recommendation for discharge: (in order for the patient to meet his/her long term goals)  Therapy up to 5 days/week in SNF setting     This discharge recommendation:  Has not yet been discussed the attending provider and/or case management    IF patient discharges home will need the following DME: TBD       SUBJECTIVE:   Patient stated I need water.     OBJECTIVE DATA SUMMARY:   HISTORY:   Past Medical History:   Diagnosis Date    Abdominal pain 7/18/2017    Alzheimer disease ([facility] 7/18/2017    Anemia 7/18/2017    Arthritis     Back pain 7/18/2017    Bradycardia 7/18/2017    CAD (coronary artery disease)     hx of MI    Chronic diastolic heart failure ([facility] 2/26/2021    CKD (chronic kidney disease), stage II 7/18/2017    constipation     Depression 7/18/2017    Diabetes mellitus ([facility] 7/18/2017    Diverticulosis 7/18/2017    DJD (degenerative joint disease) 7/18/2017    Encounter for long-term (current) use of other medications 7/18/2017    Fatigue     Gastritis and duodenitis 7/18/2017    GERD (gastroesophageal reflux disease)     GI bleed 7/18/2017    Hearing loss     Hyperlipidemia 7/18/2017    Hypertension     Hypertension with renal disease 7/18/2017    Ill-defined condition     Dementia    Internal hemorrhoids 7/18/2017    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    S/P cardiac pacemaker procedure 5/4/2015    5/4/15 Medtronic dual chamber pacemaker implant     Thyroid disease     Weight loss     lost over 40 pounds last year- has no appetite     Past Surgical History:   Procedure Laterality Date    COLONOSCOPY N/A 6/22/2017    COLONOSCOPY performed by June Paget, MD at [address] [facility]  6/22/2017         HX HERNIA REPAIR  7/10/2014    right inguinal    HX OTHER SURGICAL      cystectomy from back    SC EGD TRANSORAL BIOPSY SINGLE/MULTIPLE  2/14/2012         SC UPPER GI ENDOSCOPY,W/DIR SUBMUC INJ  7/23/2020         UPPER GI ENDOSCOPY,BIOPSY  6/22/2017         UPPER GI ENDOSCOPY,BIOPSY  7/23/2020            Expanded or extensive additional review of patient history:     Home Situation  Home Environment: Private residence  # Steps to Enter: 6  One/Two Story Residence: One story  Living Alone: No  Support Systems: Other Family Member(s) (pt lives with granddaughter)  Patient Expects to be Discharged to[de-identified] Home with family assistance  Current DME Used/Available at Home: Walker, rollator,Wheelchair    Hand dominance: Right    EXAMINATION OF PERFORMANCE DEFICITS:  Cognitive/Behavioral Status:  Neurologic State: Drowsy; Eyes open to voice  Orientation Level: Oriented to person  Cognition: Decreased command following  Perception: Appears intact  Perseveration: No perseveration noted  Safety/Judgement: Decreased awareness of environment;Decreased awareness of need for assistance;Decreased awareness of need for safety;Decreased insight into deficits; Fall prevention    Hearing: Auditory  Auditory Impairment: Hard of hearing, bilateral    Vision/Perceptual:    Acuity:  (unable to follow commands to assess)         Range of Motion:  AROM: Generally decreased, functional  PROM: Generally decreased, functional                      Strength:  Strength: Generally decreased, functional (UEs 3-/5)                Coordination:  Coordination: Grossly decreased, non-functional  Fine Motor Skills-Upper: Left Impaired;Right Impaired    Gross Motor Skills-Upper: Left Intact; Right Intact    Tone & Sensation:  Tone: Normal                         Balance:  Sitting: With support; Impaired  Sitting - Static: Poor (constant support)  Sitting - Dynamic: Poor (constant support)    Functional Mobility and Transfers for ADLs:  Bed Mobility:  Rolling: Maximum assistance; Additional time;Assist x1  Supine to Sit: Total assistance; Additional time;Assist x1  Sit to Supine: Total assistance; Additional time;Assist x1  Scooting: Total assistance; Additional time;Assist x1    Transfers:  Sit to Stand:  (pt declined participation)  Toilet Transfer : Total assistance (bed level)    ADL Assessment:  Feeding: Total assistance    Oral Facial Hygiene/Grooming: Total assistance    Bathing: Total assistance    Upper Body Dressing: Total assistance    Lower Body Dressing: Total assistance    Toileting: Total assistance    Cognitive Retraining  Safety/Judgement: Decreased awareness of environment;Decreased awareness of need for assistance;Decreased awareness of need for safety;Decreased insight into deficits; Fall prevention    Functional Measure:    Barthel Index:  Bathin  Bladder: 0  Bowels: 0  Groomin  Dressin  Feedin  Mobility: 0  Stairs: 0  Toilet Use: 0  Transfer (Bed to Chair and Back): 0  Total: 0/100      The Barthel ADL Index: Guidelines  1. The index should be used as a record of what a patient does, not as a record of what a patient could do. 2. The main aim is to establish degree of independence from any help, physical or verbal, however minor and for whatever reason. 3. The need for supervision renders the patient not independent. 4. A patient's performance should be established using the best available evidence. Asking the patient, friends/relatives and nurses are the usual sources, but direct observation and common sense are also important. However direct testing is not needed. 5. Usually the patient's performance over the preceding 24-48 hours is important, but occasionally longer periods will be relevant. 6. Middle categories imply that the patient supplies over 50 per cent of the effort. 7. Use of aids to be independent is allowed.     Score Interpretation (from [facility])    Independent   60-79 Minimally independent   40-59 Partially dependent   20-39 Very dependent   <20 Totally dependent     -Danny Perez., Barthel, D.W. (1965). Functional evaluation: the Barthel Index. [address] ([address] (1997). The Barthel activities of daily living index: self-reporting versus actual performance in the old (> or = 75 years). Journal [address] 45(7), [facility], LINDY, Nazario Covington., Viktoriya Fabian. (1999). Measuring the change in disability after inpatient rehabilitation; comparison of the responsiveness of the Barthel Index and Functional Frio Measure. Journal of Neurology, Neurosurgery, and Psychiatry, 66(4), 140-777. NUNU Londono, INES Albright, & Le Emery, MJustynA. (2004) Assessment of post-stroke quality of life in cost-effectiveness studies: The usefulness of the Barthel Index and the EuroQoL-5D. Quality of Life Research, 15, 699-44     Occupational Therapy Evaluation Charge Determination   History Examination Decision-Making   LOW Complexity : Brief history review  HIGH Complexity : 5 or more performance deficits relating to physical, cognitive , or psychosocial skils that result in activity limitations and / or participation restrictions HIGH Complexity : Patient presents with comorbidities that affect occupational performance.  Signifigant modification of tasks or assistance (eg, physical or verbal) with assessment (s) is necessary to enable patient to complete evaluation       Based on the above components, the patient evaluation is determined to be of the following complexity level: LOW   Pain Rating:  No signs of pain    Activity Tolerance:   Poor    After treatment patient left in no apparent distress:    Supine in bed, Call bell within reach, and Bed / chair alarm activated    COMMUNICATION/EDUCATION:   The patients plan of care was discussed with: Physical therapist, Speech therapist, and Registered nurse. Patient is unable to participate in goal setting and plan of care. This patients plan of care is appropriate for delegation to [facility].     Thank you for this referral.  David Chen OT  Time Calculation: 14 mins

## 2022-04-29 NOTE — PROGRESS NOTES
Perfect-serve Dr Justo Schroeder for critical lab values. Blood cultures is gram positive for Cocci in chain.

## 2022-04-29 NOTE — ED NOTES
TRANSFER - OUT REPORT:    Verbal report given to RADHA Milner (name) on Benito Fafran Sr  being transferred to [facility] [address] (unit) for routine progression of care       Report consisted of patients Situation, Background, Assessment and   Recommendations(SBAR). Information from the following report(s) SBAR, ED Summary, Intake/Output, MAR and Recent Results was reviewed with the receiving nurse. Lines:   Peripheral IV 04/28/22 Left Antecubital (Active)   Site Assessment Clean, dry, & intact 04/28/22 1348   Phlebitis Assessment 0 04/28/22 1348   Infiltration Assessment 0 04/28/22 1348   Dressing Status Clean, dry, & intact 04/28/22 1348   Hub Color/Line Status Pink 04/28/22 1348   Action Taken Blood drawn 04/28/22 1348   Alcohol Cap Used No 04/28/22 1348       Peripheral IV 04/28/22 Right Antecubital (Active)   Site Assessment Clean, dry, & intact 04/28/22 1349   Phlebitis Assessment 0 04/28/22 1349   Infiltration Assessment 0 04/28/22 1349   Dressing Status Clean, dry, & intact 04/28/22 1349   Hub Color/Line Status Pink 04/28/22 1349   Action Taken Blood drawn 04/28/22 1349   Alcohol Cap Used No 04/28/22 1349        Opportunity for questions and clarification was provided.       Patient transported with:   Monitor  O2 @ 2 liters  Patient-specific medications from Pharmacy (Levothyroxine)  Registered Nurse

## 2022-04-29 NOTE — PROGRESS NOTES
Lazarus Abed, NP is Palliative Care on call. States that she will come see patient tomorrow. Stated that if patient is in extreme pain and the overnight hospitalist does not feel comfortable giving anything for pain, to call back. She will look at his chart in the morning and come by.

## 2022-04-29 NOTE — PROGRESS NOTES
Hospitalist Progress Note    NAME: Sarah Cormier Sr   :  1931   MRN:  [id number]   Room Number:  052/79  @ [facility]     Please note that this dictation was completed with [employer], the computer voice recognition software. Quite often unanticipated grammatical, syntax, homophones, and other interpretive errors are inadvertently transcribed by the computer software. Please disregard these errors. Please excuse any errors that have escaped final proofreading. Interim Hospital Summary: 80 y.o. male whom presented on 2022 with      Assessment / Plan:    Acute metabolic encephalopathy   - CTH neg for acute process   - suspected d/t Hypernatremia and aspiration PNA  - Cont D5W  - Cont ABX   - aspiration precautions   - speech therapy       Hypernatremia POA  resolving   Free water deficit 2.8 L   - D5w infusion at 100 cc/hr. 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 CXR interpreted independently - bilateral hazy opacities. COVID, Influenza negative. Lactic acid 3.8     -Ceftriaxone, azithromycin, metronidazole  -Check procalcitonin  -Trend lactic acid  - check urine legionella, urine strep, resp PCR         Elevated troponin POA   Chronic systolic congestive heart failure POA  S/p pacemaker. Troponin 162-148. ECHO 2022 EF 15-20%. -EKG with atrial sensed ventricular paced rhythm     - no acute intervention needed         SUBHA on CKD POA  Baseline 1.2-1.4. Current BUN/Cr 53/1. 94. suspect pre-renal due to hypovolemia.   - Gentle IV hydration   - Strict Is and Os, avoid nephrotoxic meds, renally dose meds      Hypothyroidism POA   -TSH 5.89  - continue levothyroxin        CAD POA  S/p AVNRT Ablation POA  HTN POA  HLD POA  - holding lisinopril, furosemide due to SUBHA         There is no height or weight on file to calculate BMI. Code Status:DNR   Surrogate Decision Maker: alcira      DVT Prophylaxis: SCD's  GI Prophylaxis: not indicated  Baseline: uses rollator                 Subjective:     Chief Complaint / Reason for Physician Visit  Sleeping   Discussed with RN events overnight. Review of Systems:  No fevers, chills, appetite change, cough, sputum production, shortness of breath, dyspnea on exertion, nausea, vomitting, diarrhea, constipation, chest pain, leg edema, abdominal pain, joint pain, rash, itching. Tolerating PT/OT. Tolerating diet. Objective:     VITALS:   Last 24hrs VS reviewed since prior progress note. Most recent are:  Patient Vitals for the past 24 hrs:   Temp Pulse Resp BP SpO2   04/29/22 0748 98.2 °F (36.8 °C) 78 20 112/67 100 %   04/29/22 0405 97.6 °F (36.4 °C) 82 22 124/74 97 %   04/29/22 0400  74      04/29/22 0000  85      04/28/22 2353 98 °F (36.7 °C) 79 25 127/71 97 %   04/28/22 2100 97.7 °F (36.5 °C) 85 23 (!) 132/91    04/28/22 2000  75      04/28/22 1928 98 °F (36.7 °C) 74 25 119/82 100 %   04/28/22 1842 97.8 °F (36.6 °C)       04/28/22 1750     96 %   04/28/22 1544  83 29 126/83    04/28/22 1434  88 16     04/28/22 1422  86 15     04/28/22 1303 98.4 °F (36.9 °C) 83 22 (!) 130/52 95 %       Intake/Output Summary (Last 24 hours) at 4/29/2022 0817  Last data filed at 4/29/2022 0405  Gross per 24 hour   Intake 150 ml   Output 1100 ml   Net -950 ml        PHYSICAL EXAM:  General: , no acute distress    EENT:  EOMI. Anicteric sclerae. MMM  Resp:  CTA bilaterally, no wheezing or rales.   No accessory muscle use  CV:  Regular  rhythm,  normal S1/S2, no murmurs rubs gallops, No edema  GI:  Soft, Non distended, Non tender. +Bowel sounds  Neurologic:  Sleeping   Psych:   Sleeping   Skin:  No rashes. No jaundice    Reviewed most current lab test results and cultures  YES  Reviewed most current radiology test results   YES  Review and summation of old records today    NO  Reviewed patient's current orders and MAR    YES  PMH/SH reviewed - no change compared to H&P  ________________________________________________________________________  Care Plan discussed with:    Comments   Patient x    Family      RN x    Care Manager     Consultant  x Palliative care                     x Multidiciplinary team rounds were held today with , nursing, pharmacist and clinical coordinator. Patient's plan of care was discussed; medications were reviewed and discharge planning was addressed. ________________________________________________________________________  Total NON critical care TIME:  25   Minutes    Total CRITICAL CARE TIME Spent:   Minutes non procedure based      Comments   >50% of visit spent in counseling and coordination of care x    ________________________________________________________________________  Guadalupe Larsen MD     Procedures: see electronic medical records for all procedures/Xrays and details which were not copied into this note but were reviewed prior to creation of Plan. LABS:  I reviewed today's most current labs and imaging studies.   Pertinent labs include:  Recent Labs     04/28/22  1322   WBC 9.0   HGB 12.4   HCT 40.9        Recent Labs     04/28/22  2212 04/28/22  1747 04/28/22  1322   * 156* 156*   K  --   --  3.4*   CL  --   --  114*   CO2  --   --  31   GLU  --   --  162*   BUN  --   --  53*   CREA  --   --  1.94*   CA  --   --  9.2   ALB  --   --  2.8*   TBILI  --   --  1.4*   ALT  --   --  60       Signed: Guadalupe Larsen MD

## 2022-04-29 NOTE — CONSULTS
[facility]: [phone] ([id number])    Patient Name: Jose David Hayes Sr  YOB: 1931    Date of Initial Consult: 4/28/2022  Reason for Consult: Care Decisions  Requesting Provider: Cornel Lubin MD  Primary Care Physician: Grant Fox MD     SUMMARY:   Jose David Hayes Sr is a 80 y.o. with a past history of dementia, CAD, GERD, DJD, and GI Bleed who was admitted on 4/28/2022 from home with a diagnosis of hypernatremia and physical deconditioning. Baseline:  Mr Jackie Jerez was walking prior to this admission (working with PT)- able to help with transfers etc.  His granddaughter is hoping he can get back to this level of function before he comes home as she is unsure if she will be able to care for him if he is bedbound   PALLIATIVE DIAGNOSES:   1. Goals of care  2. Somnolence  3. Delirium  4. Physical Debility  5. Frailty     PLAN:   1. Met with Mr Jackie Jerez first- he is known to our team from an admission at [address] [facility] in 2020, but he has declined significantly since then. He cracked open one eye for me, that's the only response I got. Nursing shared that he was very interactive with his granddaughter, but for us its pretty minimal  2. I spoke with his granddaughter at length- she is very supportive and wants to keep Mr Farfan in her home, but she also admits to not knowing a whole lot about the progression of dementia. She shared that she feels he needs closer oversight with his health, as she feels in a state of damage control all the time (he doesn't have a lot of critical comorbid conditions, be enough little things that make him feel bad), and also feels that he is being overprescribed (in a review of his outpatient meds, I think some deprescribing is definitely possible)  3.  I offered her a referral to [facility]- both with the intent for goal oriented care, but with hospice the overreaching goal to be to keep him at home and treat his progressive dementia in more of quality vs quantity perspective. She is very open to hospice support, as Mr Chris physical therapist had brought this up to her a few times as well, so she has had time to consider it. 4. Even so, she needs a lot of support in regards to this hospital stay. She asked me if he can stay here for a week- I think her intent was for him to be as tuned up as he can be before he comes back home, but her expectations for recovery might be a lofty. We talked about delirium, and how hospital environments can worsen dementia, and she understands this, but she also wants to make sure that we get him as far as we can before he is sent home. She will likely need a lot of education and support through this process. 5. She is willing to come to the hospital for meals, as he is not consistently eating for the nurses. She lives just around the corner, so can come over at any time. I asked nursing to reach out to her in order to coordinate this. 6. For now, will follow along peripherally for support, but will defer day to day to the medical team and hospice. I did talk to the attending about streamlining his home meds before discharge. 7. Initial consult note routed to primary continuity provider and/or primary health care team members  8. Communicated plan of care with: Palliative [facility] Team     GOALS OF CARE / TREATMENT PREFERENCES:     GOALS OF CARE:  Patient/Health Care Proxy Stated Goals:  Other (comment) (\"tune up\" then home iwth hospice)    TREATMENT PREFERENCES:   Code Status: DNR    Advance Care Planning:  [x] The Pall Med Interdisciplinary Team has updated the ACP Navigator with [address] and Patient Capacity      Primary Decision Maker (Active): Gladis Bell - [phone]    Secondary Decision Maker: Jonathon Tierney - [place] - [phone]    Medical Interventions: Limited additional interventions       Other:    As far as possible, the palliative care team has discussed with patient / health care proxy about goals of care / treatment preferences for patient. HISTORY:     History obtained from: chart, granddaughter    CHIEF COMPLAINT: None    HPI/SUBJECTIVE:    The patient is:   [] Verbal and participatory  [x] Non-participatory due to:     Clinical Pain Assessment (nonverbal scale for severity on nonverbal patients):   Clinical Pain Assessment  Severity: 0          Duration: for how long has pt been experiencing pain (e.g., 2 days, 1 month, years)  Frequency: how often pain is an issue (e.g., several times per day, once every few days, constant)     FUNCTIONAL ASSESSMENT:     Palliative Performance Scale (PPS):  PPS: 30       PSYCHOSOCIAL/SPIRITUAL SCREENING:     Palliative IDT has assessed this patient for cultural preferences / practices and a referral made as appropriate to needs (Cultural Services, Patient Advocacy, Ethics, etc.)    Any spiritual / Religion concerns:  [] Yes /  [x] No   If \"Yes\" to discuss with pastoral care during IDT     Does caregiver feel burdened by caring for their loved one:   [] Yes /  [x] No /  [] No Caregiver Present    If \"Yes\" to discuss with social work during IDT    Anticipatory grief assessment:   [x] Normal  / [] Maladaptive     If \"Maladaptive\" to discuss with social work during IDT    ESAS Anxiety: Anxiety: 0    ESAS Depression: Depression: 0        REVIEW OF SYSTEMS:     Positive and pertinent negative findings in ROS are noted above in HPI. The following systems were [x] reviewed / [] unable to be reviewed as noted in HPI  Other findings are noted below. Systems: constitutional, ears/nose/mouth/throat, respiratory, gastrointestinal, genitourinary, musculoskeletal, integumentary, neurologic, psychiatric, endocrine. Positive findings noted below.   Modified ESAS Completed by: provider   Fatigue: 8     Depression: 0 Pain: 0   Anxiety: 0 Nausea: 0   Anorexia: 8 Dyspnea: 0                    PHYSICAL EXAM: From RN flowsheet:  Wt Readings from Last 3 Encounters:   04/28/22 110 lb 0.2 oz (49.9 kg)   03/10/22 108 lb (49 kg)   02/22/22 128 lb (58.1 kg)     Blood pressure 106/63, pulse 87, temperature 97.1 °F (36.2 °C), resp. rate 20, height 5' 8\" (1.727 m), weight 110 lb 0.2 oz (49.9 kg), SpO2 99 %.     Pain Scale 1: Visual  Pain Intensity 1: 0                 Last bowel movement, if known:     Constitutional: sleeping, frail, in no distress  Eyes: pupils equal, anicteric  ENMT: no nasal discharge, moist mucous membranes  Cardiovascular: regular rhythm, distal pulses intact  Respiratory: breathing not labored, symmetric  Gastrointestinal: soft non-tender, +bowel sounds  Musculoskeletal: no deformity, no tenderness to palpation  Skin: warm, dry  Other:       HISTORY:     Active Problems:    Hypernatremia (4/28/2022)      Past Medical History:   Diagnosis Date    Abdominal pain 7/18/2017    Alzheimer disease ([facility] 7/18/2017    Anemia 7/18/2017    Arthritis     Back pain 7/18/2017    Bradycardia 7/18/2017    CAD (coronary artery disease)     hx of MI    Chronic diastolic heart failure ([facility] 2/26/2021    CKD (chronic kidney disease), stage II 7/18/2017    constipation     Depression 7/18/2017    Diabetes mellitus ([facility] 7/18/2017    Diverticulosis 7/18/2017    DJD (degenerative joint disease) 7/18/2017    Encounter for long-term (current) use of other medications 7/18/2017    Fatigue     Gastritis and duodenitis 7/18/2017    GERD (gastroesophageal reflux disease)     GI bleed 7/18/2017    Hearing loss     Hyperlipidemia 7/18/2017    Hypertension     Hypertension with renal disease 7/18/2017    Ill-defined condition     Dementia    Internal hemorrhoids 7/18/2017    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    S/P cardiac pacemaker procedure 5/4/2015    5/4/15 Medtronic dual chamber pacemaker implant     Thyroid disease     Weight loss     lost over 40 pounds last year- has no appetite      Past Surgical History:   Procedure Laterality Date    COLONOSCOPY N/A 2017    COLONOSCOPY performed by Charito Lugo MD at [facility]  2017         HX HERNIA REPAIR  7/10/2014    right inguinal    HX OTHER SURGICAL      cystectomy from back    [place] EGD TRANSORAL BIOPSY SINGLE/MULTIPLE  2012         [place] UPPER GI ENDOSCOPY,W/DIR SUBMUC INJ  2020         UPPER GI ENDOSCOPY,BIOPSY  2017         UPPER GI ENDOSCOPY,BIOPSY  2020           Family History   Problem Relation Age of Onset    Hypertension Mother     Heart Disease Father     Cancer Other         son-cancer? unknown what type       History reviewed, no pertinent family history.   Social History     Tobacco Use    Smoking status: Former Smoker     Packs/day: 0.50     Years: 10.00     Pack years: 5.00     Start date: 1991    Smokeless tobacco: Never Used    Tobacco comment: quit 50 years ago   Substance Use Topics    Alcohol use: Not Currently     Comment: Occasional beer     No Known Allergies   Current Facility-Administered Medications   Medication Dose Route Frequency    cefTRIAXone (ROCEPHIN) 1 g in 0.9% sodium chloride (MBP/ADV) 50 mL MBP  1 g IntraVENous Q24H    [START ON 2022] azithromycin (ZITHROMAX) tablet 500 mg  500 mg Oral DAILY    potassium chloride 10 mEq in 100 ml IVPB  10 mEq IntraVENous Q2H    [START ON 2022] aspirin chewable tablet 81 mg  81 mg Oral DAILY    sodium chloride (NS) flush 5-10 mL  5-10 mL IntraVENous PRN    cholecalciferol (VITAMIN D3) (1000 Units /25 mcg) tablet 1,000 Units  1,000 Units Oral DAILY    levothyroxine (SYNTHROID) tablet 50 mcg  50 mcg Oral ACB    memantine (NAMENDA) tablet 10 mg  10 mg Oral BID    pantoprazole (PROTONIX) tablet 40 mg  40 mg Oral ACB&D    sodium chloride (NS) flush 5-40 mL  5-40 mL IntraVENous Q8H    sodium chloride (NS) flush 5-40 mL  5-40 mL IntraVENous PRN    acetaminophen (TYLENOL) tablet 650 mg  650 mg Oral Q6H PRN    Or    acetaminophen (TYLENOL) suppository 650 mg  650 mg Rectal Q6H PRN    polyethylene glycol (MIRALAX) packet 17 g  17 g Oral DAILY PRN    ondansetron (ZOFRAN ODT) tablet 4 mg  4 mg Oral Q8H PRN    Or    ondansetron (ZOFRAN) injection 4 mg  4 mg IntraVENous Q6H PRN    dextrose 5% infusion  100 mL/hr IntraVENous CONTINUOUS    metroNIDAZOLE (FLAGYL) tablet 500 mg  500 mg Oral Q12H          LAB AND IMAGING FINDINGS:     Lab Results   Component Value Date/Time    WBC 10.7 04/29/2022 09:51 AM    HGB 10.5 (L) 04/29/2022 09:51 AM    PLATELET 800 64/55/2524 09:51 AM     Lab Results   Component Value Date/Time    Sodium 150 (H) 04/29/2022 09:51 AM    Potassium 2.8 (L) 04/29/2022 09:51 AM    Chloride 112 (H) 04/29/2022 09:51 AM    CO2 29 04/29/2022 09:51 AM    BUN 45 (H) 04/29/2022 09:51 AM    Creatinine 1.66 (H) 04/29/2022 09:51 AM    Calcium 8.5 04/29/2022 09:51 AM    Magnesium 2.1 04/29/2022 09:51 AM    Phosphorus 2.4 (L) 04/29/2022 09:51 AM      Lab Results   Component Value Date/Time    Alk. phosphatase 150 (H) 04/29/2022 09:51 AM    Protein, total 5.7 (L) 04/29/2022 09:51 AM    Albumin 2.2 (L) 04/29/2022 09:51 AM    Globulin 3.5 04/29/2022 09:51 AM     Lab Results   Component Value Date/Time    INR 1.1 02/21/2022 06:59 PM    Prothrombin time 11.1 02/21/2022 06:59 PM    aPTT 27.8 10/29/2019 02:21 AM      No results found for: IRON, FE, TIBC, IBCT, PSAT, FERR   No results found for: PH, PCO2, PO2  No components found for: [place]   Lab Results   Component Value Date/Time    CK 41 03/10/2022 01:03 PM    CK - MB <1.0 07/20/2020 04:42 PM                Total time: 70  Counseling / coordination time, spent as noted above: 50  > 50% counseling / coordination?: y    Prolonged service was provided for  []30 min   []75 min in face to face time in the presence of the patient, spent as noted above.   Time Start:   Time End:   Note: this can only be billed with 05671 (initial) or 72256 (follow up). If multiple start / stop times, list each separately.

## 2022-04-29 NOTE — PROGRESS NOTES
0730) Two attempts by Griffin Hauser, RN for labwork  0830) Attempts by Ruthie Washington for blood work. Pt not currently eating. 200) Five attempt to draw bloodwork this morning-- would you like an arterial stick? Pt is not attempting to eat even applesauce--unable to give oral medication. Possibly consider hospice. [facility]) Order for arterial stick  0850) Perfect[facility] Dr. Ninoanastasia Conneras family is at bedside. He is opening his eyes for her so we might be able to give oral medications. 1030) IDR with Dr. Nedra Marie (MD), Aysha Matthews (pharmacist), Joe Green (), Meghan (nurse supervisor), [place] (RN) and Griffin Hauser (RN) to discuss plan of care including order for O2 for comfort, pt ate some food for granddaughter, granddaughter concern about care for him with increasing dementia, all to palliative  [facility]) Discuss with Noemy palliative care, pt ate breakfast with granddaughter. 1400) Discuss with palliative, granddaughter interested in starting consult to hospice but not comfort measures only--would like to optimize life. [phone])  Return call to Ms. Rhonda Ortiz; discuss dinner served at 5 to 5:30

## 2022-04-29 NOTE — HOSPICE
[facility] Those in Need  [phone]     Patient Name: Miroslava Bowers Sr  YOB: 1931  Age: 80 y.o. [facility] RN Note:  Hospice consult received, reviewing chart. Will follow up with Unit Nurse and Care Manager to discuss plan of care, patient status and discharge disposition within the hour. Spoke with granddaughter and she would like to take him back home. She verbalized she is hoping he will have some functional improvement before he returns. We will need to have equipment of bed, table and O2 delivered before discharge. Thank you for the opportunity to be of service to this patient.   [address] Liaison  [phone] c  [phone] o

## 2022-04-29 NOTE — PROGRESS NOTES
Problem: Mobility Impaired (Adult and Pediatric)  Goal: *Acute Goals and Plan of Care (Insert Text)  Description: FUNCTIONAL STATUS PRIOR TO ADMISSION: Per chart pt lives with his granddaughter, performed ADLs and functional mobility with supervision and uses rollator and w/c. Chart stated significant decline in function over the past few weeks with worsening dementia. HOME SUPPORT: The patient lived with granddaughter. Physical Therapy Goals  Initiated 4/29/2022  1. Patient will move from supine to sit and sit to supine in bed with moderate assistance  within 7 day(s). 2.  Patient will transfer from bed to chair and chair to bed with maximal assistance using the least restrictive device within 7 day(s). 3.  Patient will perform sit to stand with maximal assistance within 7 day(s). 4.  Patient will ambulate with maximal assistance for 10 feet with the least restrictive device within 7 day(s). Outcome: Not Met     PHYSICAL THERAPY EVALUATION  Patient: Jared Farfan Sr (80 y.o. male)  Date: 4/29/2022  Primary Diagnosis: Hypernatremia [E87.0]        Precautions:  DNR,Fall    ASSESSMENT  Based on the objective data described below, the patient presents with decreased activity tolerance and functional mobility. Patient received sleeping in bed but able to easily wake to verbal and tactile cues. Patient pleasant and agreeable to PT evaluation and PROM. Patient unable to participate in bed mobility this PM, but performed rolling and supine<>sit transfers with maximal to total A during OT evaluation in AM. Patient reports that PROM of BUE/BLEs felt good. Per chart, palliative has been consulted for recent significant decline in function due to worsening dementia. Current Level of Function Impacting Discharge (mobility/balance): max-total A    Other factors to consider for discharge: cognition     Patient will benefit from skilled therapy intervention to address the above noted impairments.        PLAN :  Recommendations and Planned Interventions: bed mobility training, transfer training, gait training, therapeutic exercises, neuromuscular re-education, patient and family training/education, and therapeutic activities      Frequency/Duration: Patient will be followed by physical therapy:  2 times a week to address goals.     Recommendation for discharge: (in order for the patient to meet his/her long term goals)  Therapy up to 5 days/week in SNF setting    This discharge recommendation:  Has been made in collaboration with the attending provider and/or case management    IF patient discharges home will need the following DME: patient owns DME required for discharge         SUBJECTIVE:   Patient stated That feels good about PROM    OBJECTIVE DATA SUMMARY:   HISTORY:    Past Medical History:   Diagnosis Date    Abdominal pain 7/18/2017    Alzheimer disease ([facility] 7/18/2017    Anemia 7/18/2017    Arthritis     Back pain 7/18/2017    Bradycardia 7/18/2017    CAD (coronary artery disease)     hx of MI    Chronic diastolic heart failure ([facility] 2/26/2021    CKD (chronic kidney disease), stage II 7/18/2017    constipation     Depression 7/18/2017    Diabetes mellitus ([facility] 7/18/2017    Diverticulosis 7/18/2017    DJD (degenerative joint disease) 7/18/2017    Encounter for long-term (current) use of other medications 7/18/2017    Fatigue     Gastritis and duodenitis 7/18/2017    GERD (gastroesophageal reflux disease)     GI bleed 7/18/2017    Hearing loss     Hyperlipidemia 7/18/2017    Hypertension     Hypertension with renal disease 7/18/2017    Ill-defined condition     Dementia    Internal hemorrhoids 7/18/2017    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    S/P cardiac pacemaker procedure 5/4/2015    5/4/15 Medtronic dual chamber pacemaker implant     Thyroid disease     Weight loss     lost over 40 pounds last year- has no appetite     Past Surgical History:   Procedure Laterality Date COLONOSCOPY N/A 6/22/2017    COLONOSCOPY performed by Nel Sher MD at [address] [facility] [facility]  6/22/2017         HX HERNIA REPAIR  7/10/2014    right inguinal    HX OTHER SURGICAL      cystectomy from back    [place] EGD TRANSORAL BIOPSY SINGLE/MULTIPLE  2/14/2012         [place] UPPER GI ENDOSCOPY,W/DIR SUBMUC INJ  7/23/2020         UPPER GI ENDOSCOPY,BIOPSY  6/22/2017         UPPER GI ENDOSCOPY,BIOPSY  7/23/2020            Home Situation  Home Environment: Private residence  # Steps to Enter: 6  One/Two Story Residence: One story  Living Alone: No  Support Systems: Other Family Member(s) (pt lives with granddaughter)  Patient Expects to be Discharged to[de-identified] Home with family assistance  Current DME Used/Available at Home: Kenton Trevino, rollator,Wheelchair    EXAMINATION/PRESENTATION/DECISION MAKING:   Critical Behavior:  Neurologic State: Lurdes Spittle open to voice  Orientation Level: Oriented to person  Cognition: Decreased command following  Safety/Judgement: Decreased awareness of environment,Decreased awareness of need for assistance,Decreased awareness of need for safety,Decreased insight into deficits,Fall prevention  Hearing: Auditory  Auditory Impairment: Hard of hearing, bilateral    Range Of Motion:  AROM: Generally decreased, functional    PROM: Generally decreased, functional    Strength:    Strength: Generally decreased, functional    Tone & Sensation:   Tone: Normal    Coordination:  Coordination: Grossly decreased, non-functional  Vision:   Acuity:  (unable to follow commands to assess)  Functional Mobility:  Bed Mobility:  Rolling: Maximum assistance; Additional time;Assist x1  Supine to Sit: Total assistance; Additional time;Assist x1  Sit to Supine: Total assistance; Additional time;Assist x1  Scooting: Total assistance; Additional time;Assist x1    Balance:   Sitting: With support; Impaired  Sitting - Static: Poor (constant support)  Sitting - Dynamic: Poor (constant support)      Therapeutic Exercises:       EXERCISE   Sets   Reps   Active Active Assist   Passive Self ROM   Comments   Ankle DF/PF  10 [] [] [x] [] B   Knee flexion/extension  10 [] [] [x] [] B   Hip flexion  10 [] [] [x] [] B   Wrist flexion/extension  15 [] [] [x] [] B   Elbow flexion/extension  10 [] [] [x] [] B   Shoulder flexion  10 [] [] [x] [] B      [] [] [] []       [] [] [] []       [] [] [] []       [] [] [] []         Functional Measure:  Barthel Index:    Bathin  Bladder: 0  Bowels: 0  Groomin  Dressin  Feedin  Mobility: 0  Stairs: 0  Toilet Use: 0  Transfer (Bed to Chair and Back): 0  Total: 0/100       The Barthel ADL Index: Guidelines  1. The index should be used as a record of what a patient does, not as a record of what a patient could do. 2. The main aim is to establish degree of independence from any help, physical or verbal, however minor and for whatever reason. 3. The need for supervision renders the patient not independent. 4. A patient's performance should be established using the best available evidence. Asking the patient, friends/relatives and nurses are the usual sources, but direct observation and common sense are also important. However direct testing is not needed. 5. Usually the patient's performance over the preceding 24-48 hours is important, but occasionally longer periods will be relevant. 6. Middle categories imply that the patient supplies over 50 per cent of the effort. 7. Use of aids to be independent is allowed. Score Interpretation (from [address] [address])    Independent   60-79 Minimally independent   40-59 Partially dependent   20-39 Very dependent   <20 Totally dependent     -Danny Perez., Barthel, D.W. (1965). Functional evaluation: the Barthel Index. [facility] ([address] (1997). The Barthel activities of daily living index: self-reporting versus actual performance in the old (> or = 75 years).  Journal of [address] [place] [address] 45(7), [facility], [facility] HospitalJustynJustyn, Scott Membreno., Dana Samuels. (1999). Measuring the change in disability after inpatient rehabilitation; comparison of the responsiveness of the Barthel Index and Functional Braxton Measure. Journal of Neurology, Neurosurgery, and Psychiatry, 66(4), 231-984. NUNU Lorenz, INES Albright, & Yared Cunningham M.A. (2004) Assessment of post-stroke quality of life in cost-effectiveness studies: The usefulness of the Barthel Index and the EuroQoL-5D. Quality of Life Research, 13, 436-33        Based on the above components, the patient evaluation is determined to be of the following complexity level: LOW     Pain Rating:  No pain reported    Activity Tolerance:   Poor    After treatment patient left in no apparent distress:   Supine in bed, Call bell within reach, Bed / chair alarm activated, and Side rails x 3    COMMUNICATION/EDUCATION:   The patients plan of care was discussed with: Occupational therapist, Registered nurse, and Certified nursing assistant/patient care technician. Fall prevention education was provided and the patient/caregiver indicated understanding., Patient/family have participated as able in goal setting and plan of care. , and Patient/family agree to work toward stated goals and plan of care.     Thank you for this referral.  Trudi Zafar, PT   Time Calculation: 17 mins

## 2022-04-29 NOTE — PROGRESS NOTES
VITOR    RUR 21 %     Discussed patient with Hospice Nurse and Dr. Janett May for home with Hospice- Granddaughter is the main caregiver. She has lots of concerns- feels everyone trying to rush her grandfather out of the hospital- she indicates he is not at based line. Indicates not being herd about what she has concerns for- mention his stay at [address] - follow-up with community provider. Questions about medications ( dementia medications,Gerd --- . His functional level significant changes in the past two weeks. Patient was being followed by [facility]. Dr. Vanesa Sauer to call on tomorrow and follow-up with granddaughter   Continue IV ABX, IV Fluids -     To follow-up on Monday with everyone for next steps. Patient is Medicare only- listed on her face sheet. Check the Medicaid line and no Medicaid found. To check to see if family has applied or reason not qualify.      [address] CIERRA RN   [phone]

## 2022-04-29 NOTE — PROGRESS NOTES
Problem: Dysphagia (Adult)  Goal: *Acute Goals and Plan of Care (Insert Text)  Description: Speech Therapy Goals  Initiated 4/29/2022  1. Patient will tolerate puree diet with thin liquids without signs/symptoms of aspiration given moderate cues within 7 day(s). Outcome: Progressing Towards Goal       SPEECH LANGUAGE PATHOLOGY BEDSIDE SWALLOW EVALUATION  Patient: Irma Lancaster Sr (80 y.o. male)  Date: 4/29/2022  Primary Diagnosis: Hypernatremia [E87.0]        Precautions:   DNR,Fall    ASSESSMENT :  Based on the objective data described below, the patient presents with AMS following decline over past few weeks, and CXR which could indicate aspiration. He has had limited alertness but was alert when I saw him. Some oral holding with purees but eventually initiates swallow with no oral residue. Difficulty with lip seal around cup, improves with hand over hand A. Mild throat clearing noted. Able to drink with straw but may be at risk of taking overly large sips: stop straws if coughing noted. Given the time and effort needed for small bites of puree, expect he will get most nutrition via liquid. Patient will benefit from skilled intervention to address the above impairments. Patients rehabilitation potential is considered to be Guarded     PLAN :  Recommendations and Planned Interventions:  --Puree diet, thin liquids  --Upright for PO  --Small bites of puree  --Will need feeding A  --Straws okay  --Encourage liquid caleories as eating purees is effortful and slow  --Meds crushed is likely safest, though he may be defensive about swallowing poor tasting meds. Small pills may go best whole in puree.   -If goals become hospice, patient can be given foods desired including preferred foods, favorite items, but expect this patient would be most comfortable with items that are pureed, smooth , or liquid.    Frequency/Duration: Patient will be followed by speech-language pathology 2 times a week to address goals.  Discharge Recommendations:  To Be determine, not likely to need SLP at discharge     SUBJECTIVE:   Patient stated no clearly when asked if wanted more PO    OBJECTIVE:     Past Medical History:   Diagnosis Date    Abdominal pain 7/18/2017    Alzheimer disease ([facility] 7/18/2017    Anemia 7/18/2017    Arthritis     Back pain 7/18/2017    Bradycardia 7/18/2017    CAD (coronary artery disease)     hx of MI    Chronic diastolic heart failure ([facility] 2/26/2021    CKD (chronic kidney disease), stage II 7/18/2017    constipation     Depression 7/18/2017    Diabetes mellitus ([facility] 7/18/2017    Diverticulosis 7/18/2017    DJD (degenerative joint disease) 7/18/2017    Encounter for long-term (current) use of other medications 7/18/2017    Fatigue     Gastritis and duodenitis 7/18/2017    GERD (gastroesophageal reflux disease)     GI bleed 7/18/2017    Hearing loss     Hyperlipidemia 7/18/2017    Hypertension     Hypertension with renal disease 7/18/2017    Ill-defined condition     Dementia    Internal hemorrhoids 7/18/2017    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  6/22/2017         [address]  7/10/2014    right inguinal    HX OTHER SURGICAL      cystectomy from back    [place] TRANSORAL BIOPSY SINGLE/MULTIPLE  2/14/2012         [place] UPPER GI ENDOSCOPY,W/DIR SUBMUC INJ  7/23/2020         UPPER GI ENDOSCOPY,BIOPSY  6/22/2017         UPPER GI ENDOSCOPY,BIOPSY  7/23/2020          Prior Level of Function/Home Situation:   Home Situation  Home Environment: Private residence  # Steps to Enter: 6  One/Two Story Residence: One story  Living Alone: No  Support Systems: Other Family Member(s)  Patient Expects to be Discharged to[de-identified] Home with hospice  Current DME Used/Available at Home: Walker, rollator,Wheelchair  Diet prior to admission: unknown  Current Diet:  regular   Cognitive and Communication Status:  Neurologic State: Alert  Orientation Level: Unable to verbalize  Cognition: No command following  Perception:  (fixed gaze)  Perseveration:  (vocalizing, slight moaning)  Safety/Judgement: Decreased awareness of environment  Oral Assessment:  Oral Assessment  Labial: Decreased seal  Dentition: Natural;Limited  Oral Hygiene: clean, moist especially considering mouth breathing  Lingual:  (narrow, small, did not follow commands to assess)  P.O. Trials:  Patient Position: upright in bed  Vocal quality prior to P.O.: No impairment  Consistency Presented: Puree; Thin liquid  How Presented: SLP-fed/presented;Cup/sip;Spoon     Bolus Acceptance:  (anterior spillage with cup sip with SLP holding cup, keeps lip slack. Better seal with hand over hand)  Bolus Formation/Control: Impaired  Type of Impairment: Delayed;Poor  Propulsion: Delayed (# of seconds)  Oral Residue: None  Initiation of Swallow: Delayed (# of seconds)  Laryngeal Elevation: Functional  Aspiration Signs/Symptoms: Delayed cough/throat clear (mild, occasional)  Pharyngeal Phase Characteristics: No impairment, issues, or problems                      NOMS:   The NOMS functional outcome measure was used to quantify this patient's level of swallowing impairment. Based on the NOMS, the patient was determined to be at level 4 for swallow function       NOMS Swallowing Levels:  Level 1 (CN): NPO  Level 2 (CM): NPO but takes consistency in therapy  Level 3 (CL): Takes less than 50% of nutrition p.o. and continues with nonoral feedings; and/or safe with mod cues; and/or max diet restriction  Level 4 (CK):  Safe swallow but needs mod cues; and/or mod diet restriction; and/or still requires some nonoral feeding/supplements  Level 5 (CJ): Safe swallow with min diet restriction; and/or needs min cues  Level 6 (CI): Independent with p.o.; rare cues; usually self cues; may need to avoid some foods or needs extra time  Level 7 ([address] [place] [address]): Independent for all p.o.  FISH. (2003). National Outcomes Measurement System (NOMS): Adult Speech-Language Pathology User's Guide. Pain:             After treatment:   Patient left in no apparent distress in bed, Call bell within reach, and Nursing notified    COMMUNICATION/EDUCATION:   Patient was educated regarding his deficit(s) of dysphagia as this relates to his diagnosis of AMS. He demonstrated Poor -  understanding as evidenced by nonverbal status. The patient's plan of care including recommendations, planned interventions, and recommended diet changes were discussed with: Registered nurse. Patient is unable to participate in goal setting and plan of care.     Thank you for this referral.  Izabel Montoya, SLP  Time Calculation: 20 mins

## 2022-04-30 NOTE — PROGRESS NOTES
[facility] Dosing Services: Vancomycin Daily Dosing Note    Consult for dosing of vancomycin by Dr. Spencer Alatorre  Indication: bloodstream infection  Day of Therapy: 2    Ht Readings from Last 1 Encounters:   04/28/22 172.7 cm (68\")     Wt Readings from Last 1 Encounters:   04/30/22 51 kg (112 lb 8 oz)       Vancomycin:    (Loading dose of vancomycin 1000 mg IV x 1 given 4/29 @ 17:39)  Follow with maintenance dose of vancomycin 500 mg IV every 24 hours  (For patients with CrCl ? 29 mL/min, fluctuating renal function, or SUBHA regardless of the site of infection, a random vancomycin concentration will be collected during the anticipated interval (24 hours), and additional doses should be administered once the concentration is anticipated to be ? 15mg/L)  Dose calculated to approximate a Target AUC/YE of 437 [400-600] Trough of 15.0 mcg/mL  Plan: Assess SCr daily. Random level @ 18:00 on 4/30     Dose administration notes:  Doses given appropriately as scheduled      Date Dose & Interval Measured level AUC/YE and Extrapolated trough (mcg/mL)                         Other Antimicrobial   (not dosed by pharmacist) CTX 1g IV q24h  Azithromycin 500 mg PO daily (CAP)  Metronidazole 500 mg PO q12h (CAP)   Cultures 4/30: blood cx - pending  4/28: possible alpha streptococcus and staphylococcus species in first bottle and gram-positive cocci in chains second bottle   Serum Creatinine Lab Results   Component Value Date/Time    Creatinine 1.57 (H) 04/30/2022 03:45 AM    Creatinine, POC 1.8 (H) 04/28/2022 01:37 PM       Creatinine Clearance Estimated Creatinine Clearance: 22.1 mL/min (A) (based on SCr of 1.57 mg/dL (H)).    Temp Temp: 98.1 °F (36.7 °C)     WBC Lab Results   Component Value Date/Time    WBC 12.9 (H) 04/30/2022 03:45 AM      Procalcitonin  Lab Results   Component Value Date/Time    Procalcitonin 0.57 04/29/2022 09:51 AM        Pharmacy will follow the patient on a daily basis and make adjustments based on patient's clinical status.     Thank you,   Piotr Rodgers

## 2022-04-30 NOTE — PROGRESS NOTES
Bedside\"} shift change report given to Kathie GARCIA (oncoming nurse) by Richard Bellamy RN (offgoing nurse). Report included the following information SBAR, MAR, Kardex, intake and output. 2000-  Assume care, pt. Is resting in bed quietly. pt. Assessment and vitals completed. Pt. Denies pain or discomfort. 2130- pt. Medications administered without any difficulties. 2250- pt resting quietly in bed. 2315- pt. Was noted with luis antonio buckley asked about pain, pt sated he is not in pain. Reposition and turning. Pt. Stated he feels better. Sharon Joseph MD notify for abnormal lab; pt. blood culture came back positive for Gram cocci in chain from R AC. Awaiting for response. 0040- no new orders. 0330- labs/ blood culture collected. Vitals done  Pt. Was weigh with two pillows    0630- pt. Had bath, gown changed, bed pad changed. Pt resting in bed comfortably    0700- pt. Pull IV out. Dressing in place. No bleeding noted. 0715: Bedside\"} shift change report given to [address] [place] [address] (oncoming nurse) by Noa GARCIA (Lakkia) (offgoing nurse). Report included the following information SBAR, MAR, Kardex, intake/output and lab result.

## 2022-04-30 NOTE — PROGRESS NOTES
8:09 AM   Attempted to insert PIV, unsuccessful, will call ED RN for assistance. 11:02 AM  New PIV placed on LA (#20). Patient's daughter in room feeding patient. 11:30 AM   Land removed, external cath placed. 2:24 PM     Patient started to pull the L PIV, Coban dressing re-applied. Patient also had a bowel movement, cleaned and underpad changed.

## 2022-04-30 NOTE — PROGRESS NOTES
Bedside and Verbal shift change report given to Karol Regalado RN (oncoming nurse) by Lucina Pimentel RN (offgoing nurse). Report included the following information SBAR, Kardex, Intake/Output, MAR and Recent Results.

## 2022-04-30 NOTE — PROGRESS NOTES
Problem: Aspiration - Risk of  Goal: *Absence of aspiration  Outcome: Progressing Towards Goal     Problem: Patient Education: Go to Patient Education Activity  Goal: Patient/Family Education  Outcome: Progressing Towards Goal     Problem: Pressure Injury - Risk of  Goal: *Prevention of pressure injury  Description: Document Newton Scale and appropriate interventions in the flowsheet. Outcome: Progressing Towards Goal  Note: Pressure Injury Interventions:  Sensory Interventions: Assess changes in LOC    Moisture Interventions: Absorbent underpads,Apply protective barrier, creams and emollients    Activity Interventions: Pressure redistribution bed/mattress(bed type)    Mobility Interventions: PT/OT evaluation    Nutrition Interventions: Document food/fluid/supplement intake    Friction and Shear Interventions: Apply protective barrier, creams and emollients                Problem: Patient Education: Go to Patient Education Activity  Goal: Patient/Family Education  Outcome: Progressing Towards Goal     Problem: Falls - Risk of  Goal: *Absence of Falls  Description: Document Louis Fall Risk and appropriate interventions in the flowsheet.   Outcome: Progressing Towards Goal  Note: Fall Risk Interventions:  Mobility Interventions: Bed/chair exit alarm    Mentation Interventions: Door open when patient unattended    Medication Interventions: Bed/chair exit alarm    Elimination Interventions: Bed/chair exit alarm              Problem: Patient Education: Go to Patient Education Activity  Goal: Patient/Family Education  Outcome: Progressing Towards Goal     Problem: Patient Education: Go to Patient Education Activity  Goal: Patient/Family Education  Outcome: Progressing Towards Goal     Problem: Patient Education: Go to Patient Education Activity  Goal: Patient/Family Education  Outcome: Progressing Towards Goal     Problem: Patient Education: Go to Patient Education Activity  Goal: Patient/Family Education  Outcome: Progressing Towards Goal

## 2022-04-30 NOTE — PROGRESS NOTES
Spiritual Care Assessment/Progress Note  Súldayobrian Guillaume      NAME: Carmen Alves Sr      MRN: [id number]  AGE: 80 y.o.  SEX: male  Jew Affiliation: Hinduism   Language: English     4/30/2022     Total Time (in minutes): 18     Spiritual Assessment begun in [address] through conversation with:         [x]Patient        [] Family    [] Friend(s)        Reason for Consult: Palliative Care, Initial/Spiritual Assessment     Spiritual beliefs: (Please include comment if needed)     [x] Identifies with a cristhian tradition:   Hinduism      [] Supported by a cristhian community:            [] Claims no spiritual orientation:           [] Seeking spiritual identity:                [] Adheres to an individual form of spirituality:           [] Not able to assess:                           Identified resources for coping:      [x] Prayer                               [] Music                  [] Guided Imagery     [x] Family/friends                 [] Pet visits     [] Devotional reading                         [] Unknown     [] Other:                                         Interventions offered during this visit: (See comments for more details)    Patient Interventions: Affirmation of emotions/emotional suffering,Affirmation of cristhian,Iconic (affirming the presence of God/Higher Power),Initial/Spiritual assessment, patient floor,Prayer (assurance of)           Plan of Care:     [x] Support spiritual and/or cultural needs    [] Support AMD and/or advance care planning process      [] Support grieving process   [] Coordinate Rites and/or Rituals    [] Coordination with community clergy   [] No spiritual needs identified at this time   [] Detailed Plan of Care below (See Comments)  [] Make referral to Music Therapy  [] Make referral to Pet Therapy     [] Make referral to Addiction services  [] Make referral to [facility]  [] Make referral to Spiritual Care Partner  [] No future visits requested [x] Contact Spiritual Care for further referrals     Comments:  Reviewed chart prior to visit on Med Surg Tele for spiritual assessment. No family/friends present. Chart note indicated daughter was here earlier today helping to feed patient. PCT was in room checking vitals. Mr Gabriel Keith was laying in bread, appearing weak, but seemed to perk up when  greeted him and introduced self and role. Offered bedside presence, affirmed patient's cristhian in God and offered assurance of prayer. No other concerns were expressed by patient.    available upon referral by nurse or by family/patient request.         JACY Bergman, [facility], Staff [address]    [address] Paging Service  [phone]

## 2022-04-30 NOTE — PROGRESS NOTES
[facility] Pharmacist Medication Substitution    The pharmacist substituted the medication pantoprazole 40 mg tablet with lansoprazole 30 mg oral suspension in accordance with P&T Policy.

## 2022-04-30 NOTE — PROGRESS NOTES
Performed bladder scan on patient, had only 84 ml. Notified Dr. Rylie Dewitt. Ordered blood for Vanc and Na drawn, sent to lab.

## 2022-04-30 NOTE — PROGRESS NOTES
Hospitalist Progress Note    NAME: Lynn Patel Sr   :  1931   MRN:  [id number]   Room Number:  757/66  @ [facility]     Please note that this dictation was completed with Tyrese Feliciano, the computer voice recognition software. Quite often unanticipated grammatical, syntax, homophones, and other interpretive errors are inadvertently transcribed by the computer software. Please disregard these errors. Please excuse any errors that have escaped final proofreading.     Interim Hospital Summary: 80 y.o. male whom presented on 2022 with      Assessment / Plan:    #Acute metabolic encephalopathy POA improving  - CTH neg for acute process   - suspected d/t Hypernatremia , bacteremia and aspiration PNA  -will treat underlying cause      -Continue antibiotic for bacteremia  -Continue maintenance fluid  -Frequent redirection's      #Gram-positive cocci bacteremia  #Bandemia  -Blood culture 2022 possible alpha septal coccus and staphylococcal species in first bottle and gram-positive cocci in chains second bottle  -Currently on ceftriaxone and vancomycin  -CBC bands 9  -Repeat blood cultures pending      -Continue antibiotic  -Follow-up TTE  -Follow-up repeat blood cultures    #Suspected aspiration pneumonia  -Chest x-ray reviewed dependently admission bilaterally due to pulmonary opacity worse on right kidney with apparent asymmetric pulmonary edema versus infection  -COVID-19 negative  -Procalcitonin 0.57    -Continue antibiotic for aspiration pneumonia  -Speech therapy on board appreciate help  -Puréed and thin liquids  -Aspiration precautions    #Hypernatremia POA resolved  -Continue gentle maintenance fluid      #Physical deconditioning POA   - PT/OT     Failure to thrive   Severe malnutrition POA  - nutrition consulted       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 Troponin 162-148. ECHO 2/2022 EF 15-20%. -EKG with atrial sensed ventricular paced rhythm     - no acute intervention needed         SUBHA on CKD POA resolved   Baseline 1.2-1.4. Current BUN/Cr 53/1. 94. suspect pre-renal due to hypovolemia. - Strict Is and Os, avoid nephrotoxic meds, renally dose meds      Hypothyroidism POA   -TSH 5.89  - continue levothyroxin        CAD POA  S/p AVNRT Ablation POA  HTN POA  HLD POA  - holding lisinopril, furosemide due to SUBHA and dehydration         There is no height or weight on file to calculate BMI. Code Status:DNR   Surrogate Decision Maker: alcira      DVT Prophylaxis: SCD's  GI Prophylaxis: not indicated  Baseline: uses rollator                 Subjective:     Chief Complaint / Reason for Physician Visit   awake but confused   Discussed with RN events overnight. Review of Systems:  No fevers, chills, appetite change, cough, sputum production, shortness of breath, dyspnea on exertion, nausea, vomitting, diarrhea, constipation, chest pain, leg edema, abdominal pain, joint pain, rash, itching. Tolerating PT/OT. Tolerating diet. Objective:     VITALS:   Last 24hrs VS reviewed since prior progress note.  Most recent are:  Patient Vitals for the past 24 hrs:   Temp Pulse Resp BP SpO2   04/30/22 0746 98.1 °F (36.7 °C) 77 18 (!) 109/54 (!) 88 %   04/30/22 0400  83      04/30/22 0357 98.1 °F (36.7 °C) 87 19 114/70 98 %   04/30/22 0000  75      04/29/22 2339 97.8 °F (36.6 °C) 85 20 111/64 98 %   04/29/22 2000 97.9 °F (36.6 °C) 72 20 109/70 100 %   04/29/22 1613 97.1 °F (36.2 °C) 87 20 106/63 99 %   04/29/22 1504  77      04/29/22 1134 98.1 °F (36.7 °C) 76 19 122/71 100 %   04/29/22 1109  78          Intake/Output Summary (Last 24 hours) at 4/30/2022 7363  Last data filed at 4/29/2022 1900  Gross per 24 hour   Intake    Output 325 ml   Net -325 ml        PHYSICAL EXAM:  General: , no acute distress    EENT:  EOMI. Anicteric sclerae. MMM  Resp:  CTA bilaterally, no wheezing or rales. No accessory muscle use  CV:  Regular  rhythm,  normal S1/S2, no murmurs rubs gallops, No edema  GI:  Soft, Non distended, Non tender. +Bowel sounds  Neurologic:  AO x 1   Psych:   Not agitated or anxious  Skin:  No rashes. No jaundice    Reviewed most current lab test results and cultures  YES  Reviewed most current radiology test results   YES  Review and summation of old records today    NO  Reviewed patient's current orders and MAR    YES  PMH/SH reviewed - no change compared to H&P  ________________________________________________________________________  Care Plan discussed with:    Comments   Patient x    Family      RN x    Care Manager     Consultant                        x Multidiciplinary team rounds were held today with , nursing, pharmacist and clinical coordinator. Patient's plan of care was discussed; medications were reviewed and discharge planning was addressed. ________________________________________________________________________  Total NON critical care TIME:  25   Minutes    Total CRITICAL CARE TIME Spent:   Minutes non procedure based      Comments   >50% of visit spent in counseling and coordination of care x    ________________________________________________________________________  Rimma Rios MD     Procedures: see electronic medical records for all procedures/Xrays and details which were not copied into this note but were reviewed prior to creation of Plan. LABS:  I reviewed today's most current labs and imaging studies.   Pertinent labs include:  Recent Labs     04/30/22  0345 04/29/22  0951 04/28/22  1322   WBC 12.9* 10.7 9.0   HGB 10.7* 10.5* 12.4   HCT 35.9* 33.9* 40.9    158 263     Recent Labs     04/30/22  0345 04/29/22  1706 04/29/22  0951 04/28/22  1747 04/28/22  1322    148* 150*   < > 156*   K 3.6  --  2.8*  --  3.4*     --  112*  --  114*   CO2 27  --  29  --  31   *  --  155*  --  162*   BUN 40*  --  45*  --  53*   CREA 1.57*  --  1.66*  --  1.94*   CA 8.5  --  8.5  --  9.2   MG  --   --  2.1  --   --    PHOS  --   --  2.4*  --   --    ALB 2.1*  --  2.2*  --  2.8*   TBILI 1.2*  --  1.3*  --  1.4*   ALT 42  --  48  --  60    < > = values in this interval not displayed.        Signed: Yuly Hooper MD

## 2022-04-30 NOTE — PROGRESS NOTES
Bedside shift change report given to Kathie GARCIA (oncoming nurse) by Gagandeep Aguilar RN(offgoing nurse). Report included the following information SBAR, Kardex, MAR, intake/output    1940- pt. Pull PIV out. Dressing in place. Pt lying in bed trying to pull SCD, and external catheter out. Assessment and vitals done. Pt lying in bed    2000-  Granddaughter at bed side. 2030- New IV inserted. Pt. Trying to pull the IV out. IV has been wrap with adhesive tape. 2200- Meds administered  As ordered. Pt. Tolerated it well    2340- pt has been bladder scan, had 267 ml . Tele MD made aware waiting for a response. 0020- Per Dr Messi Regalado order to rescan pt in few hours. 6011- incontinent care provided. Pt. resting quietly in bed. 0110-Pt. Is awake and confused worried but finances and family, stated who is taking of my girls    0200- pt. Awake. Pt. Is confused \" stated who is taking care of my girls\" redirection and distraction provided with no success. 83027- Per Tele MD, new order for Ativan and cardiac monitoring has been ordered    0400- pt has voided 400 ml elvia urine out. 0400- Ativan administered as ordered     0500-pt. Resting quietly in bed    0600- incontinent care provided. Bed pad, bed sheet and gown changed. 9463- pt. Resting comfortably in bed.     shift change report given to  Thelma Pizano RN (oncoming nurse) by Ann Mendoza RN(offgoing nurse).  Report included the following informationSBAR, lab result, Kardex, MAR

## 2022-05-01 NOTE — PROGRESS NOTES
Notified by RN, \"Pt has been Vtach, PVCs, some ST depression. Vitals stable. pt is resting comfortably in bed. \" Chart reviewed, Pt with chronic severe systolic CHF typically v-paced, is DNR.  - hold IVF  - add mag to AM labs  - d/c telemetry (Pt with agitation earlier this evening)

## 2022-05-01 NOTE — PROGRESS NOTES
Bedside shift change report given to Meme Veliz  (oncoming nurse) by Manuela Brady (offgoing nurse). Report included the following information SBAR, Kardex and MAR.     9926 Patient in bed resting, medication administered, assisted with breakfast well tolerated. [address] Patient in bed resting, no needs expressed. Visitor at bedside. 1120 Patient in bed resting, no need expressed. [phone] Patient in bed resting, assisted with lunch. 1320 patient resting quietly in bed, incontinence care provided. 1530 patient sleeping soundly, no needs at this time. 1633 patient sleeping quietly. No needs expressed. 1735 patient in bed resting, nurse at bedside. Bedside shift change report given to Kathie (oncoming nurse) by Meme Veliz (offgoing nurse). Report included the following information SBAR, Intake/Output and MAR.

## 2022-05-01 NOTE — PROGRESS NOTES
Hospitalist Progress Note    NAME: Rachael Rivers Sr   :  1931   MRN:  [id number]   Room Number:  360/37  @ [facility]     Please note that this dictation was completed with Jeanette Larry, the computer voice recognition software. Quite often unanticipated grammatical, syntax, homophones, and other interpretive errors are inadvertently transcribed by the computer software. Please disregard these errors. Please excuse any errors that have escaped final proofreading. Interim Hospital Summary: 80 y.o. male whom presented on 2022 with      Assessment / Plan:        #Gram-positive cocci bacteremia  #Bandemia resolved  -Blood culture 2022 possible alpha septal coccus and staphylococcal species in first bottle and gram-positive cocci in chains second bottle  -Currently on ceftriaxone and vancomycin  -CBC bands 9  -Repeat blood cultures pending      -Continue antibiotic  -Follow-up TTE  -Follow-up repeat blood cultures    #Suspected aspiration pneumonia  -Chest x-ray reviewed dependently admission bilaterally due to pulmonary opacity worse on right kidney with apparent asymmetric pulmonary edema versus infection  -Repeat chest x-ray with unchanged opacity in right malleoli region and right lower lobe suspicious for airway disease. Unchanged right pleural effusion.   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     Alzheimer's dementia POA  - continue memantine   - Palliative consulted, appreciate help        Elevated troponin POA flat trend  Chronic systolic congestive heart failure POA  S/p pacemaker. Troponin 162-148. ECHO 2/2022 EF 15-20%. -EKG with atrial sensed ventricular paced rhythm     - no acute intervention needed         SUBHA on CKD POA resolved   Baseline 1.2-1.4. Current BUN/Cr 53/1. 94. suspect pre-renal due to hypovolemia. - Strict Is and Os, avoid nephrotoxic meds, renally dose meds      Hypothyroidism POA   -TSH 5.89  - continue levothyroxin        CAD POA  S/p AVNRT Ablation POA  HTN POA  HLD POA  - holding lisinopril, furosemide due to SUBHA         There is no height or weight on file to calculate BMI. Code Status:DNR   Surrogate Decision Maker: alcira      DVT Prophylaxis: SCD's  GI Prophylaxis: not indicated  Baseline: uses rollator                 Subjective:     Chief Complaint / Reason for Physician Visit   awake but confused   Discussed with RN events overnight. Review of Systems:  No fevers, chills, appetite change, cough, sputum production, shortness of breath, dyspnea on exertion, nausea, vomitting, diarrhea, constipation, chest pain, leg edema, abdominal pain, joint pain, rash, itching. Tolerating PT/OT. Tolerating diet. Objective:     VITALS:   Last 24hrs VS reviewed since prior progress note.  Most recent are:  Patient Vitals for the past 24 hrs:   Temp Pulse Resp BP SpO2   05/01/22 0805 97.7 °F (36.5 °C)  16 105/66 97 %   05/01/22 0420 97.6 °F (36.4 °C) 93 16 109/69 99 %   04/30/22 1947 97.5 °F (36.4 °C) 89 19 (!) 115/49 99 %   04/30/22 1600 98 °F (36.7 °C) 90 16 108/63 97 %   04/30/22 1158 98 °F (36.7 °C) 90 16 107/67 97 %       Intake/Output Summary (Last 24 hours) at 5/1/2022 0844  Last data filed at 5/1/2022 0409  Gross per 24 hour   Intake 468.33 ml   Output 701 ml   Net -232.67 ml        PHYSICAL EXAM:  General: , no acute distress    EENT:  EOMI. Anicteric sclerae. MMM  Resp:  CTA bilaterally, no wheezing or rales. No accessory muscle use  CV:  Regular  rhythm,  normal S1/S2, no murmurs rubs gallops, No edema  GI:  Soft, Non distended, Non tender. +Bowel sounds  Neurologic:  AO x 1   Psych:   Not agitated or anxious  Skin:  No rashes. No jaundice    Reviewed most current lab test results and cultures  YES  Reviewed most current radiology test results   YES  Review and summation of old records today    NO  Reviewed patient's current orders and MAR    YES  PMH/SH reviewed - no change compared to H&P  ________________________________________________________________________  Care Plan discussed with:    Comments   Patient x    Family      RN x    Care Manager     Consultant                        x Multidiciplinary team rounds were held today with , nursing, pharmacist and clinical coordinator. Patient's plan of care was discussed; medications were reviewed and discharge planning was addressed. ________________________________________________________________________  Total NON critical care TIME:  25   Minutes    Total CRITICAL CARE TIME Spent:   Minutes non procedure based      Comments   >50% of visit spent in counseling and coordination of care x    ________________________________________________________________________  Neomia MD Yasmany     Procedures: see electronic medical records for all procedures/Xrays and details which were not copied into this note but were reviewed prior to creation of Plan. LABS:  I reviewed today's most current labs and imaging studies.   Pertinent labs include:  Recent Labs     05/01/22 0426 04/30/22  0345 04/29/22  0951   WBC 10.5 12.9* 10.7   HGB 10.8* 10.7* 10.5*   HCT 34.7* 35.9* 33.9*   * 157 158     Recent Labs     05/01/22  0426 04/30/22  1624 04/30/22  0345 04/29/22  1706 04/29/22  0951    139 141   < > 150*   K 3.9  --  3.6  -- 2.8*     --  106  --  112*   CO2 27  --  27  --  29   *  --  111*  --  155*   BUN 39*  --  40*  --  45*   CREA 1.60*  --  1.57*  --  1.66*   CA 8.4*  --  8.5  --  8.5   MG 2.0  --   --   --  2.1   PHOS  --   --   --   --  2.4*   ALB 2.2*  --  2.1*  --  2.2*   TBILI 0.8  --  1.2*  --  1.3*   ALT 45  --  42  --  48    < > = values in this interval not displayed.        Signed: Dora Toledo MD

## 2022-05-01 NOTE — PROGRESS NOTES
Telemedicine cross cover:    pt. has been very confused and restless throughout night. he is worried about his family.  redirection has been provided several time with no improvement.     - 0.25 mg ativan orally once    - monitor vitals    - tele monitor

## 2022-05-01 NOTE — PROGRESS NOTES
Comprehensive Nutrition Assessment    Type and Reason for Visit: Initial,Consult    Nutrition Recommendations/Plan:   1. Diet as tolerated  2. RD will add supplements for nutrition     Malnutrition Assessment:  Malnutrition Status:     severe  Context:   chronic    Findings of the 6 clinical characteristics of malnutrition:   Energy Intake:   < 75% x 6 months  Weight Loss:      > 20% x 1 year  Body Fat Loss:   ,   severe  Muscle Mass Loss:   ,  moderate  Fluid Accumulation:   ,  n/a   Strength:    weak    Nutrition Assessment:    (P) PT admitted with hypernatremia (free water deficit 2.8 L), hypokalemia, frailty, debility. Hx notable for ASCVD, SVT, diverticulosis, GERD, GIB, HTN/renal dz, DM CkD-II, HLD, hypothyroidism, dementia, BL PNA both POA. Palliative consulted, al wants to speak with hospice  Pt meets ASPEN criteria for severe malnutrition.     Meets Criteria for Chronic Malnutrition   [x] Severe Malnutrition, as evidenced by:   [x] Severe muscle wasting, loss of subcutaneous fat   [x] Nutritional intake of <75% of recommended intake for >1 month   [x] Weight loss of  >5% in 1 month, >7.5% in 3 months, >10% in 6 months, >20% in 1 year   [] Severe edema   []Moderate Malnutrition, as evidenced by:   [] Mild muscle wasting, loss of subcutaneous fat   [] Nutritional intake <75% of recommended intake for >1 month   [] Weight loss of  5% in 1 month, 7.5% in 3 months, 10% in 6 months, or 20% in 1 year   [] Mild edema          Nutrition Related Findings:    (P) PT tolerating diet Wound Type: (P) Moisture associate skin damage    Current Nutrition Intake & Therapies:  Average Meal Intake: (P) 26-50%  Average Supplement Intake: (P) None ordered  ADULT DIET Dysphagia - Pureed; VEGETARIAN  ADULT ORAL NUTRITION SUPPLEMENT Breakfast, Lunch, Dinner; Standard 4 oz    Anthropometric Measures:  Height: (P) 5' 8\" (172.7 cm)  Ideal Body Weight (IBW): (P) 154 lbs ((P) 70 kg)     Current Body Wt:  (P) 51.4 kg (113 lb 4 oz), (P) 73.5 % IBW.  (P) Bed scale  Current BMI (kg/m2): (P) 17.2                          BMI Category: (P) Underweight (BMI less than 22) age over 72    Estimated Daily Nutrient Needs:  Energy Requirements Based On: (P) Kcal/kg  Weight Used for Energy Requirements: (P) Current  Energy (kcal/day): (P) 2056  Weight Used for Protein Requirements: (P) Current  Protein (g/day): (P) 77.1  Method Used for Fluid Requirements: (P) Other (comment) (MD order)  Fluid (ml/day): (P) 1800    Nutrition Diagnosis:   · (P) Inadequate protein-energy intake,Increased nutrient needs,Severe malnutrition,In context of chronic illness,In context of social or environmental circumstances,Underweight,Unintended weight loss,Limited adherence to nutrition-related recommendations related to (P) cognitive or neurological impairment,inadequate protein-energy intake,psychological cause or life stress,increased demand for energy/nutrients as evidenced by (P) intake 26-50%,poor intake prior to admission,BMI,weight loss greater than or equal to 20% in 1 year,severe loss of subcutaneous fat,moderate muscle loss,reduced  strength,lab values,constipation    Nutrition Interventions:   Food and/or Nutrient Delivery: (P) Modify current diet,Start oral nutrition supplement  Nutrition Education/Counseling: (P) Education not indicated  Coordination of Nutrition Care: (P) Continue to monitor while inpatient  Plan of Care discussed with: (P) RN    Goals:  Previous Goal Met: (P) Progressing toward goal(s)  Goals: (P) PO intake 50% or greater,by next RD assessment       Nutrition Monitoring and Evaluation:   Behavioral-Environmental Outcomes: (P) Knowledge or skill,Readiness for change  Food/Nutrient Intake Outcomes: (P) Food and nutrient intake,Supplement intake  Physical Signs/Symptoms Outcomes: (P) Biochemical data,Meal time behavior,Weight,Nutrition focused physical findings    Discharge Planning:    (P) Too soon to determine    Ciaran Huffman, PhD, RD, [address]   Contact: [phone]

## 2022-05-01 NOTE — PROGRESS NOTES
[facility] Dosing Services: Vancomycin Daily Dosing Note    Consult for dosing of vancomycin by Dr. Karen Rojas  Indication: bloodstream infection  Day of Therapy: 3    Ht Readings from Last 1 Encounters:   05/01/22 172.7 cm (68\")     Wt Readings from Last 1 Encounters:   05/01/22 51.4 kg (113 lb 4 oz)     Vancomycin:  1. Current maintenance dose: vancomycin 500 mg IV every 24 hours   2. Last level 3.8 mcg/mL with   3. Plan: Assess SCr daily. Change vancomycin to 750 mg IV q24h. Random level in AM    Dose administration notes:  Doses given appropriately as scheduled      Date Dose & Interval Measured level AUC/YE and Extrapolated trough (mcg/mL)   5/1/2022 500 mg IV q24h 3.8 354 / 13.3                   Other Antimicrobial   (not dosed by pharmacist) CTX 1g IV q24h  Azithromycin 500 mg PO daily (CAP)  Metronidazole 500 mg PO q12h (CAP)   Cultures 4/30: blood cx - NG @ 21 hours  4/28: blood cx:    2 of 2: streptococcus parasanguinis and streptococcus anginosus   1 of 2 staphylococcus epidermidis  4/28: blood cx:   2 of 2: granulicatella adiacens and streptoococcus gordonii   1 of 2: rothia mucilaginosa     Serum Creatinine Lab Results   Component Value Date/Time    Creatinine 1.60 (H) 05/01/2022 04:26 AM    Creatinine, POC 1.8 (H) 04/28/2022 01:37 PM       Creatinine Clearance Estimated Creatinine Clearance: 21.9 mL/min (A) (based on SCr of 1.6 mg/dL (H)). Temp Temp: 97.5 °F (36.4 °C)     WBC Lab Results   Component Value Date/Time    WBC 10.5 05/01/2022 04:26 AM      Procalcitonin  Lab Results   Component Value Date/Time    Procalcitonin 0.57 04/29/2022 09:51 AM        Pharmacy will follow the patient on a daily basis and make adjustments based on patient's clinical status.     Thank you,   Piotr Mcqueen 80

## 2022-05-01 NOTE — HOSPICE
[facility]  [phone]    Nursing Note   Patient Name: Minh Garcia Sr  YOB: 1931  Age: 80 y.o. [address] Candace Ted RN Note:      Spoke with Masoud Gerber by phone for hospice follow-up. Ms Misael Miguel shared that she is happy that the hospital plans to keep patient for 4-6 weeks for IV abx treatment and when patient is ready to come home, she will want hospice services. Shared that the [facility] team will continue to follow patient while at [facility] and provided contact information. If there are any questions, please call the main [facility] number at [phone]. Thank you for the opportunity to be of service to this patient and his family.

## 2022-05-01 NOTE — PROGRESS NOTES
Problem: Aspiration - Risk of  Goal: *Absence of aspiration  Outcome: Progressing Towards Goal     Problem: Pressure Injury - Risk of  Goal: *Prevention of pressure injury  Description: Document Newton Scale and appropriate interventions in the flowsheet.   Outcome: Progressing Towards Goal  Note: Pressure Injury Interventions:  Sensory Interventions: Assess changes in LOC    Moisture Interventions: Absorbent underpads    Activity Interventions: Pressure redistribution bed/mattress(bed type)    Mobility Interventions: PT/OT evaluation    Nutrition Interventions: Document food/fluid/supplement intake    Friction and Shear Interventions: Apply protective barrier, creams and emollients

## 2022-05-01 NOTE — PROGRESS NOTES
Problem: Aspiration - Risk of  Goal: *Absence of aspiration  Outcome: Progressing Towards Goal     Problem: Patient Education: Go to Patient Education Activity  Goal: Patient/Family Education  Outcome: Progressing Towards Goal     Problem: Pressure Injury - Risk of  Goal: *Prevention of pressure injury  Description: Document Newton Scale and appropriate interventions in the flowsheet. Outcome: Progressing Towards Goal  Note: Pressure Injury Interventions:  Sensory Interventions: Assess changes in LOC    Moisture Interventions: Absorbent underpads,Apply protective barrier, creams and emollients    Activity Interventions: Pressure redistribution bed/mattress(bed type)    Mobility Interventions: PT/OT evaluation    Nutrition Interventions: Document food/fluid/supplement intake    Friction and Shear Interventions: Apply protective barrier, creams and emollients                Problem: Patient Education: Go to Patient Education Activity  Goal: Patient/Family Education  Outcome: Progressing Towards Goal     Problem: Falls - Risk of  Goal: *Absence of Falls  Description: Document Louis Fall Risk and appropriate interventions in the flowsheet.   Outcome: Progressing Towards Goal  Note: Fall Risk Interventions:  Mobility Interventions: Bed/chair exit alarm    Mentation Interventions: Adequate sleep, hydration, pain control    Medication Interventions: Patient to call before getting OOB    Elimination Interventions: Call light in reach              Problem: Patient Education: Go to Patient Education Activity  Goal: Patient/Family Education  Outcome: Progressing Towards Goal     Problem: Patient Education: Go to Patient Education Activity  Goal: Patient/Family Education  Outcome: Progressing Towards Goal     Problem: Patient Education: Go to Patient Education Activity  Goal: Patient/Family Education  Outcome: Progressing Towards Goal     Problem: Patient Education: Go to Patient Education Activity  Goal: Patient/Family Education  Outcome: Progressing Towards Goal

## 2022-05-01 NOTE — PROGRESS NOTES
This RN in to passn evening meds. IV abx spiked, & PIV attempted to be flushed. IV access infiltrated, removed from pt & avatar. Pt very hard stick.  Will notify primary RN & pharmacy

## 2022-05-01 NOTE — PROGRESS NOTES
pt. has  been Vtach, PVCs, some ST depression. Vitals stable. pt is resting comfortably in  Bed    0513- per DR. Fabian's order d/c tele, hold off  IV Fluid and mag to AM lab

## 2022-05-02 NOTE — PROGRESS NOTES
Bedside\"} shift change report given to  [address] [facility] (oncoming nurse) by Jesse Pa (offgoing nurse). Report included the following information SBAR, Kardex, MAR, intake/output. 2000- Assume care. Pt. IV out. Assessment and Vital complted    2015- ED nurse tried inserting an IV unsuccessful. 2045- New IV inserted. Pt. Resting comfortably in bed.    2220- Medication administered as ordered. Pt. Tolerated it well. 2245- incontinent done. Pt resting in bed quietly. No distress noted. 2306- pt. Resting quietly in bed. 0110- pt. Sleeping. No s/s distress noted    0249- pt. Resting quietly in bed    0440- pt. Resting quietly in bed. Lab collected    0510- incontinent care done. Bed pads, diaper changed. 0630- pt. Resting quietly in bed. Bedside\"} shift change report given to  Rosanne GARCIA (oncoming nurse) by  Moose Yang RN (offgoing nurse).  Report included the following information SBAR, output/intake,kardex, MAR

## 2022-05-02 NOTE — PROGRESS NOTES
VITOR     RUR 21 %     IDR Rounds again today with MD and team   Waiting decision from ID about ABX. Plan   ABX   Neurology Consult   Home Care Vs Hospice   Possible SNF   Transportation   Second IMM Letter     Met with Granddaughter at the bedside with hospice. Still was concerned about his medications- asked for list of medications again - to discuss with MD for concerns and changes -   She indicates no one is listening to her concerned. She indicates his Dementia /Cognition needs to be improved- medications to manage. He was on Namenda and  Donepozil    She indicates problems with GERD- questions the right medications to manages this. She indicates he was able to do more for himself- up until a few days ago- up with therapy - able to feed himself. .     Asked about help in the home- she is the primary caregiver and indicates others to assist.   Asked about Medicaid since he has only Medicare Listed. She indicates his income is a little over $2000.00. He does not have Medicare Part D. Did not qualify for Medicaid. PT Consult - saw patient- to follow along with them for next steps. Discussed next steps if needs for IV ABX-Possible Home vs SNF- indicates this would depend -     Addendum: 6:07 PM   MD and I met with Granddaughter- Reviewed medications and what he is currently getting - all in agreement. Discussed the need for IABX per ID recommendations  ( IV vs PO regimen)  Need for treatment until May 14, 2022. To have Neurology consult to evaluate and look at medications     To follow-up post the above consult and discuss again  About the ABX.      [address] CIERRA RN   [phone]

## 2022-05-02 NOTE — ED NOTES
Ultrasound IV by Edith :  Procedure Note    Patient meets criteria for US IV insertion. Ultrasound IV education provided to patient. Opportunities for questions given. IV ultrasound technique used for PIV placement:  20gauge 1.75 in BD Nexiva  R basilic location. 1 X Attempt(s). Flushed with ease; vigorous blood return. Procedure tolerated well. Primary RN aware of IV placement and added to LDA.                                 Todd Bryson RN

## 2022-05-02 NOTE — PROGRESS NOTES
1929 Pt resting comfortably at this time. VS WNL slgihtly hypotensive. Informed RADHA Vázquez. Someone from ED came to insert IV unsuccessfully. No further needs at this time. [address] with repositioning pt to left side. Raised pt up in bed. Pt tolerated well and seemed to be in a good mood. No acute distress noted at this time. Call bell in reach, bed alarm on and rails up x3. No further needs at this time.

## 2022-05-02 NOTE — PROGRESS NOTES
Notified Wound nurse of skin tear areas on right AC inferior to IV site.  Writer dressed the site and Renee Paniagua will do normal wound nurse assessment next shift she is working

## 2022-05-02 NOTE — PROGRESS NOTES
Hospitalist Progress Note    NAME: Neeraj Poe Sr   :  1931   MRN:  [id number]   Room Number:  787/14  @ [facility]     Please note that this dictation was completed with [facility], the computer voice recognition software. Quite often unanticipated grammatical, syntax, homophones, and other interpretive errors are inadvertently transcribed by the computer software. Please disregard these errors. Please excuse any errors that have escaped final proofreading. Interim Hospital Summary: 80 y.o. male whom presented on 2022 with      Assessment / Plan:        #Polymicrobial bacteremia POA   #Bandemia resolved  -Blood culture 2022 positive for Streptococcus, Rothia, Staph epidermidis  -Currently on ceftriaxone and vancomycin  -Repeat blood cultures negative for 2 days      -Continue antibiotic  -ID consulted for duration of antibiotic and de-escalation    #Suspected aspiration pneumonia  -Chest x-ray reviewed dependently admission bilaterally due to pulmonary opacity worse on right kidney with apparent asymmetric pulmonary edema versus infection  -Repeat chest x-ray with unchanged opacity in right malleoli region and right lower lobe suspicious for airway disease. Unchanged right pleural effusion.   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     Alzheimer's dementia POA  - continue memantine   - Palliative consulted, appreciate help        Elevated troponin POA flat trend  Chronic systolic congestive heart failure POA  S/p pacemaker. Troponin 162-148. ECHO 2/2022 EF 15-20%. -EKG with atrial sensed ventricular paced rhythm     - no acute intervention needed         SUBHA on CKD POA resolved   Baseline 1.2-1.4. Current BUN/Cr 53/1. 94. suspect pre-renal due to hypovolemia. - Strict Is and Os, avoid nephrotoxic meds, renally dose meds      Hypothyroidism POA   -TSH 5.89  - continue levothyroxin        CAD POA  S/p AVNRT Ablation POA  HTN POA  HLD POA  - holding lisinopril, furosemide due to SUBHA         There is no height or weight on file to calculate BMI. Code Status:DNR   Surrogate Decision Maker: alcira      DVT Prophylaxis: SCD's  GI Prophylaxis: not indicated  Baseline: uses rollator                 Subjective:     Chief Complaint / Reason for Physician Visit   awake but confused   Discussed with RN events overnight. Review of Systems:  No fevers, chills, appetite change, cough, sputum production, shortness of breath, dyspnea on exertion, nausea, vomitting, diarrhea, constipation, chest pain, leg edema, abdominal pain, joint pain, rash, itching. Tolerating PT/OT. Tolerating diet. Objective:     VITALS:   Last 24hrs VS reviewed since prior progress note. Most recent are:  Patient Vitals for the past 24 hrs:   Temp Pulse Resp BP SpO2   05/02/22 0810 97.6 °F (36.4 °C) 78 18 118/81 97 %   05/01/22 1929 97.8 °F (36.6 °C) 86 16 (!) 126/59 96 %   05/01/22 1526 97.5 °F (36.4 °C) 86 16 104/67 100 %   05/01/22 1217 97.6 °F (36.4 °C) 65 16 105/82 100 %       Intake/Output Summary (Last 24 hours) at 5/2/2022 0901  Last data filed at 5/1/2022 2125  Gross per 24 hour   Intake 70 ml   Output 400 ml   Net -330 ml        PHYSICAL EXAM:  General: , no acute distress    EENT:  EOMI. Anicteric sclerae.  MMM  Resp:  CTA bilaterally, no wheezing or rales. No accessory muscle use  CV:  Regular  rhythm,  normal S1/S2, no murmurs rubs gallops, No edema  GI:  Soft, Non distended, Non tender. +Bowel sounds  Neurologic:  AO x 1   Psych:   Not agitated or anxious  Skin:  No rashes. No jaundice    Reviewed most current lab test results and cultures  YES  Reviewed most current radiology test results   YES  Review and summation of old records today    NO  Reviewed patient's current orders and MAR    YES  PMH/SH reviewed - no change compared to H&P  ________________________________________________________________________  Care Plan discussed with:    Comments   Patient x    Family      RN x    Care Manager     Consultant                        x Multidiciplinary team rounds were held today with , nursing, pharmacist and clinical coordinator. Patient's plan of care was discussed; medications were reviewed and discharge planning was addressed. ________________________________________________________________________  Total NON critical care TIME:  25   Minutes    Total CRITICAL CARE TIME Spent:   Minutes non procedure based      Comments   >50% of visit spent in counseling and coordination of care x    ________________________________________________________________________  Romain Wright MD     Procedures: see electronic medical records for all procedures/Xrays and details which were not copied into this note but were reviewed prior to creation of Plan. LABS:  I reviewed today's most current labs and imaging studies.   Pertinent labs include:  Recent Labs     05/02/22  0429 05/01/22  0426 04/30/22  0345   WBC 9.2 10.5 12.9*   HGB 10.0* 10.8* 10.7*   HCT 31.5* 34.7* 35.9*   * 140* 157     Recent Labs     05/02/22  0429 05/01/22  0426 04/30/22  1624 04/30/22  0345 04/30/22  0345 04/29/22  1706 04/29/22  0951    141 139   < > 141   < > 150*   K 3.9 3.9  --   --  3.6  --  2.8*    105  --   --  106  --  112*   CO2 26 27  --   --  27 --  29   * 109*  --   --  111*  --  155*   BUN 42* 39*  --   --  40*  --  45*   CREA 1.51* 1.60*  --   --  1.57*  --  1.66*   CA 8.5 8.4*  --   --  8.5  --  8.5   MG  --  2.0  --   --   --   --  2.1   PHOS  --   --   --   --   --   --  2.4*   ALB 2.2* 2.2*  --   --  2.1*  --  2.2*   TBILI 0.8 0.8  --   --  1.2*  --  1.3*   ALT 42 45  --   --  42  --  48    < > = values in this interval not displayed.        Signed: Vijay Macias MD

## 2022-05-02 NOTE — PROGRESS NOTES
Late Entry for 4/29/22    Skin assessment for patient w/ tiki score of 11-12. No skin breakdown noted. Patient sits up on side of bed independently. Confused and uncooperative. Will order specialty bed.

## 2022-05-02 NOTE — PROGRESS NOTES
I responded along with security and the nursing supervisor to the patient's room when staff reported the patient's family member was \"ripping cords off the wall & taking pictures. \" A code atlas was called. The patient's granddaughter reported that she was removing the chords because he was \"all tangled\" and that had \"been that way since last night\" the patient's gown had also been removed. She had also unlocked and moved the bed & unplugged the bed, disabling the bed alarm. The granddaughter was asked to step out while we assisted the patient. The patient was unharmed, had no complaints, was redressed, and vitals taken. Orders verified (ie: tele was d/c and pt on room air). I spoke with MsJustyn Bautista (DELVIN), RADHA Sanchez, and RADHA Yang as well as conducting a chart review to address patient needs and family member concerns. 0510: pt received a bath & new gown per documentation. Per overnight RN, pt had no chord or tubing of any kind on his bed at this time. 0630: RADHA Yang conducted purposeful rounding at this time. 0715: Bedside report given and RADHA Yang reports this took place at bedside with both RNs present. 9021-3332: During this timeframe the bed alarm went off and both Ms Eddie Perkins and Marv Double R Leif responded to bedside. Granddaughter also present at this time. Pt stated he was \"getting up to go to work. \" Pt was repositioned, gown straightened, and no chords present at this time other than call bell in reach. Family member both aware of, and participated in this interaction. Roughly 6633: Code atlas called. Family member made no attempt to call staff to assist with concerns regarding chords/ room, ect.

## 2022-05-02 NOTE — PROGRESS NOTES
Problem: Aspiration - Risk of  Goal: *Absence of aspiration  Outcome: Progressing Towards Goal     Problem: Pressure Injury - Risk of  Goal: *Prevention of pressure injury  Description: Document Newton Scale and appropriate interventions in the flowsheet. Outcome: Progressing Towards Goal  Note: Pressure Injury Interventions:  Sensory Interventions: Assess changes in LOC,Assess need for specialty bed,Monitor skin under medical devices    Moisture Interventions: Internal/External urinary devices,Check for incontinence Q2 hours and as needed    Activity Interventions: Assess need for specialty bed    Mobility Interventions: Pressure redistribution bed/mattress (bed type)    Nutrition Interventions: Offer support with meals,snacks and hydration    Friction and Shear Interventions: Apply protective barrier, creams and emollients,Foam dressings/transparent film/skin sealants                Problem: Falls - Risk of  Goal: *Absence of Falls  Description: Document Louis Fall Risk and appropriate interventions in the flowsheet.   Outcome: Progressing Towards Goal  Note: Fall Risk Interventions:  Mobility Interventions: Bed/chair exit alarm    Mentation Interventions: Bed/chair exit alarm,Door open when patient unattended,Room close to nurse's station    Medication Interventions: Teach patient to arise slowly    Elimination Interventions: Call light in reach,Urinal in reach              Problem: Patient Education: Go to Patient Education Activity  Goal: Patient/Family Education  Outcome: Progressing Towards Goal

## 2022-05-02 NOTE — HOSPICE
[facility]  [phone]    Nursing Note   Patient Name: Nuvia Topete Sr  YOB: 1931  Age: 80 y.o. [facility] RN Note:      1300:  Met with Vladimir Frye Quinton/granddaughter/decision-maker at bedside along with Agata/FRANCIA. Discussed Goals of Care and Vladimir Frye shared that she wants the patient to receive treatment at this time and hopefully return to baseline and come home. Reviewed hospice services and what is provided under the Medicare Hospice guidelines. Vladimir Frye verbalized understanding and stated that once patient is home and if he needs hospice, they will call us. Provided hospice pamphlet with the [employer] 24/7 contact number. If there are any questions, please call the main [employer] number at [phone]. Thank you for the opportunity to be of service to this patient and his family.

## 2022-05-02 NOTE — PROGRESS NOTES
Problem: Aspiration - Risk of  Goal: *Absence of aspiration  Outcome: Progressing Towards Goal     Problem: Patient Education: Go to Patient Education Activity  Goal: Patient/Family Education  Outcome: Progressing Towards Goal     Problem: Pressure Injury - Risk of  Goal: *Prevention of pressure injury  Description: Document Newton Scale and appropriate interventions in the flowsheet. Outcome: Progressing Towards Goal  Note: Pressure Injury Interventions:  Sensory Interventions: Assess changes in LOC    Moisture Interventions: Absorbent underpads    Activity Interventions: PT/OT evaluation    Mobility Interventions: PT/OT evaluation    Nutrition Interventions: Document food/fluid/supplement intake    Friction and Shear Interventions: Apply protective barrier, creams and emollients                Problem: Patient Education: Go to Patient Education Activity  Goal: Patient/Family Education  Outcome: Progressing Towards Goal     Problem: Falls - Risk of  Goal: *Absence of Falls  Description: Document Louis Fall Risk and appropriate interventions in the flowsheet.   Outcome: Progressing Towards Goal  Note: Fall Risk Interventions:  Mobility Interventions: Bed/chair exit alarm    Mentation Interventions: Adequate sleep, hydration, pain control    Medication Interventions: Bed/chair exit alarm    Elimination Interventions: Call light in reach              Problem: Patient Education: Go to Patient Education Activity  Goal: Patient/Family Education  Outcome: Progressing Towards Goal     Problem: Patient Education: Go to Patient Education Activity  Goal: Patient/Family Education  Outcome: Progressing Towards Goal     Problem: Patient Education: Go to Patient Education Activity  Goal: Patient/Family Education  Outcome: Progressing Towards Goal     Problem: Patient Education: Go to Patient Education Activity  Goal: Patient/Family Education  Outcome: Progressing Towards Goal

## 2022-05-02 NOTE — PROGRESS NOTES
9810) Bedside shift change report given to Santos Arevalo RN (oncoming nurse) by , RN (offgoing nurse). Report included the following information SBAR, Kardex, Intake/Output, MAR and Recent Results. Pt resting in bed at this time. Call bell within reach. 0820) Pt assessed and vital signs stable. Pt has no c/o pain at this time. Pt calm and resting in bed, A+O to person and place. Scheduled  meds crushed and given in applesauce. Pt left resitng in bed with family at the bedside assisting with feeding. Radha Myers RN in the room at this time discussing plan of care with family. 21 ) Interdisciplinary team rounds were held 5/2/2022 with the following team members:Care Management, Nursing, Pharmacy and Physician. Plan of care discussed. See clinical pathway and/or care plan for interventions and desired outcomes. 1040) Incontinence care provided. Pt sitting up in bed at this time. Urinal and call bell within reach. Bed alarm on.     1120) Pt resting in bed at this time. Ange Smith attempting to start new IV.     1255) Vanc hung and infusing. Message sent to pharmacy regarding retiming IV antibiotics. IV flushed and patent. Pt's grandaughter present at the bedside with CM and hospice nurse. 1415) Pt moved to speciality bed. No incontinence care required at this time. Bladder scanned and 300 ml at this time. Will continue to assess. 1530) Pt reassessed and no changes to note. Vital signs stable and pt has no c/o pain at this time. Pt left resting in bed. 1735) Scheduled med given. Rafaela Daley RN at the bedside assisting pt with eating dinner. 1820) Incontinence care provided. Pt left resting in bed at this time. 1915) Bedside shift change report given to Marilin Salinas RN (oncoming nurse) by Santos Arevalo RN (offgoing nurse). Report included the following information SBAR, Kardex, Intake/Output, MAR and Recent Results.

## 2022-05-03 NOTE — PROGRESS NOTES
Palliative Medicine  [facility]: [phone] ([phone]  [facility]: [phone] ([phone])    Telephone call made to grand daughter, Jenae Marie / EsmerKATERINA to re-introduce self as member of the Palliative team (this writer had met her and patient in 2020 at [address]) and an extra layer of support. Asked if we could meet this week regarding patient and goals of care. From notes it appears that she is very close to patient (he raised her) and would like continued treatment for him. There may a part of her that is not fully able to accept some of patient's declines and degenerative nature of his dementia. Plan is to meet her on Friday 5/6 at 11 am at [facility].

## 2022-05-03 NOTE — PROGRESS NOTES
Bedside and Verbal shift change report given to [address] [place] [address] (oncoming nurse) by Laura Mcgee (offgoing nurse). Report included the following information SBAR, Kardex, Intake/Output, MAR, Recent Results and Med Rec Status.

## 2022-05-03 NOTE — PROGRESS NOTES
Problem: Mobility Impaired (Adult and Pediatric)  Goal: *Acute Goals and Plan of Care (Insert Text)  Description: FUNCTIONAL STATUS PRIOR TO ADMISSION: Per chart pt lives with his granddaughter, performed ADLs and functional mobility with supervision and uses rollator and w/c. Chart stated significant decline in function over the past few weeks with worsening dementia. HOME SUPPORT: The patient lived with granddaughter. Physical Therapy Goals  Initiated 4/29/2022  1. Patient will move from supine to sit and sit to supine in bed with moderate assistance  within 7 day(s). 2.  Patient will transfer from bed to chair and chair to bed with maximal assistance using the least restrictive device within 7 day(s). 3.  Patient will perform sit to stand with maximal assistance within 7 day(s). 4.  Patient will ambulate with maximal assistance for 10 feet with the least restrictive device within 7 day(s). Outcome: Progressing Towards Goal     PHYSICAL THERAPY TREATMENT  Patient: Katherine Farfan  (80 y.o. male)  Date: 5/3/2022  Diagnosis: Hypernatremia [E87.0] <principal problem not specified>       Precautions: DNR,Fall  Chart, physical therapy assessment, plan of care and goals were reviewed. ASSESSMENT  Patient continues with skilled PT services and is progressing towards goals. Patient more alert compared to evaluation. Patient pleasant and agreeable to therapy. Patient required moderate assistance for bed mobility. He practiced two sit to stand transfers with maximal assistance. Improved performance on second stand trial. Patient took 3 side steps along side of bed with maximal assistance. Bilateral knee buckling noted. Recommend rolling walker next session. Current Level of Function Impacting Discharge (mobility/balance): mod to max A    Other factors to consider for discharge: cognition         PLAN :  Patient continues to benefit from skilled intervention to address the above impairments. Continue treatment per established plan of care. to address goals. Recommendation for discharge: (in order for the patient to meet his/her long term goals)  Therapy up to 5 days/week in SNF setting    This discharge recommendation:  Has been made in collaboration with the attending provider and/or case management    IF patient discharges home will need the following DME: to be determined (TBD)       SUBJECTIVE:   Patient stated Thank you.     OBJECTIVE DATA SUMMARY:   Critical Behavior:  Neurologic State: Suzie Raina open to stimulus  Orientation Level: Oriented to person,Disoriented to situation,Disoriented to place,Disoriented to time  Cognition: Follows commands  Safety/Judgement: Decreased awareness of environment    Functional Mobility Training:  Bed Mobility:  Rolling: Moderate assistance;Assist x1  Supine to Sit: Moderate assistance;Assist x1  Sit to Supine: Moderate assistance;Assist x1  Scooting: Moderate assistance;Assist x1    Transfers:  Sit to Stand: Maximum assistance;Assist x1  Stand to Sit: Maximum assistance;Assist x1    Balance:  Sitting: Impaired; With support  Sitting - Static: Fair (occasional)  Sitting - Dynamic: Fair (occasional)  Standing: Impaired; With support  Standing - Static: Poor;Constant support  Standing - Dynamic : Poor;Constant support    Ambulation/Gait Training:  Distance (ft): 3 Feet (ft) (side steps)  Assistive Device: Gait belt  Ambulation - Level of Assistance: Maximum assistance;Assist x1  Gait Abnormalities: Decreased step clearance;Trunk sway increased  Base of Support: Narrowed  Speed/Maureen: Slow;Shuffled  Step Length: Right shortened;Left shortened      Therapeutic Exercises:       EXERCISE   Sets   Reps   Active Active Assist   Passive Self ROM   Comments   Ankle Pumps  10 [x] [] [] []    Long arc quads  10 [x] [] [] []    Seated hip flexion  10 [x] [] [] []    Seated hamstring stretch  2 [] [] [x] []       [] [] [] []       [] [] [] []       [] [] [] []       [] [] [] []       [] [] [] []       [] [] [] []         Pain Rating:  No pain    Activity Tolerance:   Good    After treatment patient left in no apparent distress:   Supine in bed, Call bell within reach, Bed / chair alarm activated, and Side rails x 3    COMMUNICATION/COLLABORATION:   The patients plan of care was discussed with: Registered nurse, Case management, and Certified nursing assistant/patient care technician.      Albaro Dumont, PT   Time Calculation: 30 mins

## 2022-05-03 NOTE — PROGRESS NOTES
Hospitalist Progress Note    NAME: Jose David Hayes Sr   :  1931   MRN:  [id number]   Room Number:  944/11  @ [address] [place] [address]     Please note that this dictation was completed with [employer], the computer voice recognition software. Quite often unanticipated grammatical, syntax, homophones, and other interpretive errors are inadvertently transcribed by the computer software. Please disregard these errors. Please excuse any errors that have escaped final proofreading. Interim Hospital Summary: 80 y.o. male whom presented on 2022 with      Assessment / Plan:        #Polymicrobial bacteremia POA   #Bandemia resolved  -Blood culture 2022 positive for Streptococcus, Rothia, Staph epidermidis  -Currently on ceftriaxone and vancomycin  -Repeat blood cultures negative      -Discussed case with infectious disease. Appreciate help. Dr Christelle Yoo   -Patient will need 2 weeks of IV antibiotics from last negative blood culture EOT 2022      #Suspected aspiration pneumonia  #Mild oropharyngeal dysphagia  -Chest x-ray reviewed dependently admission bilaterally due to pulmonary opacity worse on right kidney with apparent asymmetric pulmonary edema versus infection  -Repeat chest x-ray with unchanged opacity in right malleoli region and right lower lobe suspicious for airway disease. Unchanged right pleural effusion.   Decrease in background edema  -COVID-19 negative  -Procalcitonin 0.57  -Modified barium swallow without any clear aspiration    -Continue antibiotic for aspiration pneumonia  -Speech therapy on board appreciate help  -Soft and bite-size diet and thin liquids  -Aspiration precautions    #Acute metabolic encephalopathy POA resolved   - CTH neg for acute process   - suspected d/t Hypernatremia , bacteremia and aspiration PN    #Hypernatremia POA resolved      #Physical deconditioning POA   - PT/OT     Failure to thrive   Severe protein calorie malnutrition  Severe malnutrition POA  - nutrition consulted       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     Alzheimer's dementia POA  - continue memantine   - Palliative consulted, appreciate help  -Neurology on board appreciate help        Elevated troponin POA flat trend  Chronic systolic congestive heart failure POA  S/p pacemaker. Troponin 162-148. ECHO 2/2022 EF 15-20%. -EKG with atrial sensed ventricular paced rhythm     - no acute intervention needed         SUBHA on CKD POA resolved   Baseline 1.2-1.4. Current BUN/Cr 53/1. 94. suspect pre-renal due to hypovolemia. - Strict Is and Os, avoid nephrotoxic meds, renally dose meds      Hypothyroidism POA   -TSH 5.89  - continue levothyroxin        CAD POA  S/p AVNRT Ablation POA  HTN POA  HLD POA  - holding lisinopril, furosemide due to SUBHA         There is no height or weight on file to calculate BMI. Code Status:DNR   Surrogate Decision Maker: alcira      DVT Prophylaxis: SCD's  GI Prophylaxis: not indicated  Baseline: uses rollator                 Subjective:     Chief Complaint / Reason for Physician Visit   awake but confused   Discussed with RN events overnight. Review of Systems:  No fevers, chills, appetite change, cough, sputum production, shortness of breath, dyspnea on exertion, nausea, vomitting, diarrhea, constipation, chest pain, leg edema, abdominal pain, joint pain, rash, itching. Tolerating PT/OT. Tolerating diet. Objective:     VITALS:   Last 24hrs VS reviewed since prior progress note.  Most recent are:  Patient Vitals for the past 24 hrs:   Temp Pulse Resp BP SpO2   05/03/22 0351 97.5 °F (36.4 °C) 73 18 134/67 96 %   05/02/22 2148 97.6 °F (36.4 °C) 91 18 116/62 98 %       Intake/Output Summary (Last 24 hours) at 5/3/2022 0845  Last data filed at 5/3/2022 0130  Gross per 24 hour   Intake 370 ml   Output    Net 370 ml        PHYSICAL EXAM:  General: , no acute distress    EENT:  EOMI. Anicteric sclerae. MMM  Resp:  CTA bilaterally, no wheezing or rales. No accessory muscle use  CV:  Regular  rhythm,  normal S1/S2, no murmurs rubs gallops, No edema  GI:  Soft, Non distended, Non tender. +Bowel sounds  Neurologic:  AO x 1   Psych:   Not agitated or anxious  Skin:  No rashes. No jaundice    Reviewed most current lab test results and cultures  YES  Reviewed most current radiology test results   YES  Review and summation of old records today    NO  Reviewed patient's current orders and MAR    YES  PMH/SH reviewed - no change compared to H&P  ________________________________________________________________________  Care Plan discussed with:    Comments   Patient x    Family      RN x    Care Manager     Consultant                        x Multidiciplinary team rounds were held today with , nursing, pharmacist and clinical coordinator. Patient's plan of care was discussed; medications were reviewed and discharge planning was addressed. ________________________________________________________________________  Total NON critical care TIME:  25   Minutes    Total CRITICAL CARE TIME Spent:   Minutes non procedure based      Comments   >50% of visit spent in counseling and coordination of care x    ________________________________________________________________________  Zhou Paulson MD     Procedures: see electronic medical records for all procedures/Xrays and details which were not copied into this note but were reviewed prior to creation of Plan. LABS:  I reviewed today's most current labs and imaging studies.   Pertinent labs include:  Recent Labs     05/02/22 0429 05/01/22 0426   WBC 9.2 10.5   HGB 10.0* 10.8*   HCT 31.5* 34.7*   * 140*     Recent Labs     05/02/22 0429 05/01/22 0426 04/30/22  1624    141 139   K 3.9 3.9  --     105  --    CO2 26 27  --    * 109*  --    BUN 42* 39*  --    CREA 1.51* 1.60*  --    CA 8.5 8.4*  --    MG  --  2.0  --    ALB 2.2* 2.2*  --    TBILI 0.8 0.8  --    ALT 42 45  --        Signed: Deepa Rucker MD

## 2022-05-03 NOTE — PROGRESS NOTES
Problem: Aspiration - Risk of  Goal: *Absence of aspiration  Outcome: Progressing Towards Goal     Problem: Patient Education: Go to Patient Education Activity  Goal: Patient/Family Education  Outcome: Progressing Towards Goal     Problem: Pressure Injury - Risk of  Goal: *Prevention of pressure injury  Description: Document Newton Scale and appropriate interventions in the flowsheet. Outcome: Progressing Towards Goal  Note: Pressure Injury Interventions:  Sensory Interventions: Assess changes in LOC,Assess need for specialty bed,Monitor skin under medical devices    Moisture Interventions: Internal/External urinary devices,Check for incontinence Q2 hours and as needed    Activity Interventions: Assess need for specialty bed    Mobility Interventions: Pressure redistribution bed/mattress (bed type)    Nutrition Interventions: Offer support with meals,snacks and hydration    Friction and Shear Interventions: Apply protective barrier, creams and emollients,Foam dressings/transparent film/skin sealants                Problem: Patient Education: Go to Patient Education Activity  Goal: Patient/Family Education  Outcome: Progressing Towards Goal     Problem: Falls - Risk of  Goal: *Absence of Falls  Description: Document Louis Fall Risk and appropriate interventions in the flowsheet.   Outcome: Progressing Towards Goal  Note: Fall Risk Interventions:  Mobility Interventions: Bed/chair exit alarm    Mentation Interventions: Bed/chair exit alarm,Door open when patient unattended,Room close to nurse's station    Medication Interventions: Teach patient to arise slowly    Elimination Interventions: Call light in reach,Urinal in reach              Problem: Patient Education: Go to Patient Education Activity  Goal: Patient/Family Education  Outcome: Progressing Towards Goal     Problem: Patient Education: Go to Patient Education Activity  Goal: Patient/Family Education  Outcome: Progressing Towards Goal     Problem: Patient Education: Go to Patient Education Activity  Goal: Patient/Family Education  Outcome: Progressing Towards Goal     Problem: Patient Education: Go to Patient Education Activity  Goal: Patient/Family Education  Outcome: Progressing Towards Goal     Problem: Aspiration - Risk of  Goal: *Absence of aspiration  Outcome: Progressing Towards Goal     Problem: Patient Education: Go to Patient Education Activity  Goal: Patient/Family Education  Outcome: Progressing Towards Goal     Problem: Pressure Injury - Risk of  Goal: *Prevention of pressure injury  Description: Document Newton Scale and appropriate interventions in the flowsheet. Outcome: Progressing Towards Goal  Note: Pressure Injury Interventions:  Sensory Interventions: Assess changes in LOC,Assess need for specialty bed,Monitor skin under medical devices    Moisture Interventions: Internal/External urinary devices,Check for incontinence Q2 hours and as needed    Activity Interventions: Assess need for specialty bed    Mobility Interventions: Pressure redistribution bed/mattress (bed type)    Nutrition Interventions: Offer support with meals,snacks and hydration    Friction and Shear Interventions: Apply protective barrier, creams and emollients,Foam dressings/transparent film/skin sealants                Problem: Patient Education: Go to Patient Education Activity  Goal: Patient/Family Education  Outcome: Progressing Towards Goal     Problem: Falls - Risk of  Goal: *Absence of Falls  Description: Document Louis Fall Risk and appropriate interventions in the flowsheet.   Outcome: Progressing Towards Goal  Note: Fall Risk Interventions:  Mobility Interventions: Bed/chair exit alarm    Mentation Interventions: Bed/chair exit alarm,Door open when patient unattended,Room close to nurse's station    Medication Interventions: Teach patient to arise slowly    Elimination Interventions: Call light in reach,Urinal in reach              Problem: Patient Education: Go to Patient Education Activity  Goal: Patient/Family Education  Outcome: Progressing Towards Goal     Problem: Patient Education: Go to Patient Education Activity  Goal: Patient/Family Education  Outcome: Progressing Towards Goal     Problem: Patient Education: Go to Patient Education Activity  Goal: Patient/Family Education  Outcome: Progressing Towards Goal     Problem: Patient Education: Go to Patient Education Activity  Goal: Patient/Family Education  Outcome: Progressing Towards Goal     Problem: Aspiration - Risk of  Goal: *Absence of aspiration  Outcome: Progressing Towards Goal     Problem: Patient Education: Go to Patient Education Activity  Goal: Patient/Family Education  Outcome: Progressing Towards Goal     Problem: Pressure Injury - Risk of  Goal: *Prevention of pressure injury  Description: Document Newton Scale and appropriate interventions in the flowsheet. Outcome: Progressing Towards Goal  Note: Pressure Injury Interventions:  Sensory Interventions: Assess changes in LOC,Assess need for specialty bed,Monitor skin under medical devices    Moisture Interventions: Internal/External urinary devices,Check for incontinence Q2 hours and as needed    Activity Interventions: Assess need for specialty bed    Mobility Interventions: Pressure redistribution bed/mattress (bed type)    Nutrition Interventions: Offer support with meals,snacks and hydration    Friction and Shear Interventions: Apply protective barrier, creams and emollients,Foam dressings/transparent film/skin sealants                Problem: Patient Education: Go to Patient Education Activity  Goal: Patient/Family Education  Outcome: Progressing Towards Goal     Problem: Falls - Risk of  Goal: *Absence of Falls  Description: Document Louis Fall Risk and appropriate interventions in the flowsheet.   Outcome: Progressing Towards Goal  Note: Fall Risk Interventions:  Mobility Interventions: Bed/chair exit alarm    Mentation Interventions: Bed/chair exit alarm,Door open when patient unattended,Room close to nurse's station    Medication Interventions: Teach patient to arise slowly    Elimination Interventions: Call light in reach,Urinal in reach              Problem: Patient Education: Go to Patient Education Activity  Goal: Patient/Family Education  Outcome: Progressing Towards Goal     Problem: Patient Education: Go to Patient Education Activity  Goal: Patient/Family Education  Outcome: Progressing Towards Goal     Problem: Patient Education: Go to Patient Education Activity  Goal: Patient/Family Education  Outcome: Progressing Towards Goal     Problem: Patient Education: Go to Patient Education Activity  Goal: Patient/Family Education  Outcome: Progressing Towards Goal

## 2022-05-03 NOTE — PROGRESS NOTES
Problem: Dysphagia (Adult)  Goal: *Acute Goals and Plan of Care (Insert Text)  Description: Speech Therapy Goals  Initiated 4/29/2022  1. Patient will tolerate puree diet with thin liquids without signs/symptoms of aspiration given moderate cues within 7 day(s). MET increase to soft and bite sized. Outcome: Progressing Towards Goal     SPEECH PATHOLOGY MODIFIED BARIUM SWALLOW STUDY  Patient: Ayaz Barraza Sr (80 y.o. male)  Date: 5/3/2022  Primary Diagnosis: Hypernatremia [E87.0]        Precautions:   DNR,Fall    ASSESSMENT :  Based on the objective data described below, the patient presents with mild oropharyngeal dysphagia characterized by prolonged mastication but complete mastication of solids, slow oral transit with solids, mildly reduced tongue base retraction/pharyngeal constriction and mild pharyngeal swallow delay. Dysphagia resulted in mild vallecular residue with purees and penetration of thin liquids after the swallow. Penetration cleared independently. No aspiration observed. Given MBS feel patient is safe for soft and bite sized diet, thin liquids. Suspect swallow skills and PO intake will mirror overall strength/weakness and mentation/alertness. Patient will benefit from skilled intervention to address the above impairments. Patients rehabilitation potential is considered to be Fair     PLAN :  Recommendations and Planned Interventions:  Soft and bite sized diet  Thin liquids  Sit fully upright for PO  Small bites  Alternate solids and liquids  Medication whole as tolerated  Frequency/Duration: Patient will be followed by speech-language pathology 1 time a week to address goals. Discharge Recommendations: To Be Determined     SUBJECTIVE:   Patient stated Alright. MD requesting MBS due to concern of aspiration.     OBJECTIVE:     Past Medical History:   Diagnosis Date    Abdominal pain 7/18/2017    Alzheimer disease ([facility] [address] 7/18/2017    Anemia 7/18/2017    Arthritis     Back pain 7/18/2017    Bradycardia 7/18/2017    CAD (coronary artery disease)     hx of MI    Chronic diastolic heart failure ([facility] 2/26/2021    CKD (chronic kidney disease), stage II 7/18/2017    constipation     Depression 7/18/2017    Diabetes mellitus ([facility] [address] 7/18/2017    Diverticulosis 7/18/2017    DJD (degenerative joint disease) 7/18/2017    Encounter for long-term (current) use of other medications 7/18/2017    Fatigue     Gastritis and duodenitis 7/18/2017    GERD (gastroesophageal reflux disease)     GI bleed 7/18/2017    Hearing loss     Hyperlipidemia 7/18/2017    Hypertension     Hypertension with renal disease 7/18/2017    Ill-defined condition     Dementia    Internal hemorrhoids 7/18/2017    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    S/P cardiac pacemaker procedure 5/4/2015    5/4/15 Medtronic dual chamber pacemaker implant     Thyroid disease     Weight loss     lost over 40 pounds last year- has no appetite     Past Surgical History:   Procedure Laterality Date    COLONOSCOPY N/A 6/22/2017    COLONOSCOPY performed by Pantera Washington MD at [address] [place] [address],[address]  6/22/2017         HX HERNIA REPAIR  7/10/2014    right inguinal    HX OTHER SURGICAL      cystectomy from back    AK EGD TRANSORAL BIOPSY SINGLE/MULTIPLE  2/14/2012         AK UPPER GI ENDOSCOPY,W/DIR SUBMUC INJ  7/23/2020         UPPER GI ENDOSCOPY,BIOPSY  6/22/2017         UPPER GI ENDOSCOPY,BIOPSY  7/23/2020          Prior Level of Function/Home Situation:   Home Situation  Home Environment: Private residence  # Steps to Enter: 6  One/Two Story Residence: One story  Living Alone: No  Support Systems: Other Family Member(s)  Patient Expects to be Discharged to[de-identified] Home with hospice  Current DME Used/Available at Home: [place], rollator,Wheelchair  Diet prior to admission:   Current Diet:  Pureed, thin   Radiologist: Dr. Sanchez Room: Lateral;Fluoro  Patient Position: Upright in [facility]    Trial 1: Trial 2:   Consistency Presented: Puree; Thin liquid Consistency Presented: Solid   How Presented: Self-fed/presented;SLP-fed/presented;Spoon;Straw;Successive swallows How Presented: SLP-fed/presented         Bolus Acceptance: No impairment Bolus Acceptance: No impairment   Bolus Formation/Control: No impairment:   Bolus Formation/Control: Impaired: Delayed;Mastication   Propulsion: No impairment Propulsion: Delayed (# of seconds)   Oral Residue: None Oral Residue: None   Initiation of Swallow: Triggered at base of tongue;Triggered at vallecula Initiation of Swallow: Triggered at base of tongue   Timing: No impairment Timing: No impairment   Penetration: Trace; After swallow Penetration: None   Aspiration/Timing: No evidence of aspiration Aspiration/Timing: No evidence of aspiration   Pharyngeal Clearance: Vallecular residue; Less than 10%;10-50% Pharyngeal Clearance: Vallecular residue; Less than 10%   Attempted Modifications: Alternate liquids/solids     Effective Modifications: Alternate liquids/solids                     Decreased Tongue Base Retraction?: Yes  Laryngeal Elevation: WFL (within functional limits)  Aspiration/Penetration Score: 2 (Penetration/No residue-Contrast enters the airway penetrates, remains above the folds/cords, and is cleared)             Oral Phase Severity: Mild  Pharyngeal Phase Severity: Mild  NOMS:   The NOMS functional outcome measure was used to quantify this patient's level of swallowing impairment. Based on the NOMS, the patient was determined to be at level 5 for swallow function         NOMS Swallowing Levels:  Level 1 (CN): NPO  Level 2 (CM): NPO but takes consistency in therapy  Level 3 (CL): Takes less than 50% of nutrition p.o. and continues with nonoral feedings; and/or safe with mod cues; and/or max diet restriction  Level 4 (CK):  Safe swallow but needs mod cues; and/or mod diet restriction; and/or still requires some nonoral feeding/supplements  Level 5 (CJ): Safe swallow with min diet restriction; and/or needs min cues  Level 6 (CI): Independent with p.o.; rare cues; usually self cues; may need to avoid some foods or needs extra time  Level 7 ([address] [place] [address]): Independent for all p.o.  FISH. (2003). National Outcomes Measurement System (NOMS): Adult Speech-Language Pathology User's Guide. COMMUNICATION/EDUCATION:   Patient was educated regarding role of SLP, diet and POC. Patient nodded. Suspect patient would benefit from further education. The patients plan of care including findings from [facility], recommendations, planned interventions, and recommended diet changes were discussed with: Registered nurse and Physician. Patient/family have participated as able in goal setting and plan of care.     Thank you for this referral.  Isamar Moreira, SLP  Time Calculation: 30 mins

## 2022-05-04 NOTE — PROGRESS NOTES
Problem: Mobility Impaired (Adult and Pediatric)  Goal: *Acute Goals and Plan of Care (Insert Text)  Description: FUNCTIONAL STATUS PRIOR TO ADMISSION: Per chart pt lives with his granddaughter, performed ADLs and functional mobility with supervision and uses rollator and w/c. Chart stated significant decline in function over the past few weeks with worsening dementia. HOME SUPPORT: The patient lived with granddaughter. Physical Therapy Goals  Initiated 4/29/2022  1. Patient will move from supine to sit and sit to supine in bed with moderate assistance  within 7 day(s). 2.  Patient will transfer from bed to chair and chair to bed with maximal assistance using the least restrictive device within 7 day(s). 3.  Patient will perform sit to stand with maximal assistance within 7 day(s). 4.  Patient will ambulate with maximal assistance for 10 feet with the least restrictive device within 7 day(s). Outcome: Progressing Towards Goal   PHYSICAL THERAPY TREATMENT  Patient: Jere Farfan Sr (80 y.o. male)  Date: 5/4/2022  Diagnosis: Hypernatremia [E87.0] <principal problem not specified>       Precautions: DNR,Fall  Chart, physical therapy assessment, plan of care and goals were reviewed. ASSESSMENT  Patient continues with skilled PT services and is progressing slowly towards goals. He is able to participate in intervention but requires additional time d/t delayed cognition and impaired command following. He responds best to simple and direct commands for activity. He was received finishing with nursing PCT following a BM and was rolling side to side with total assistance. Facilitated transfer to EOB requiring assist to initiate each step and completed with maximum assistance. Initial sitting balance was far to good and improved after reaching trials and seated ADLs. Completed sit to stand from EOB to a RW requiring moderate assistance from a mildly elevated surface.  Noted a right lateral LOB while side stepping to [facility] and patient unable to correct without moderate assistance. Returned to sitting after ~60 seconds stance with impaired eccentric control to EOB. Returned to supine in sidelying with a pillow between his knees for pressure relief. He is able to participate in session and is making slow gains towards his goals. Current Level of Function Impacting Discharge (mobility/balance): max to total assist for bed mobility, moderate assist sit to stand and fatigues quickly             PLAN :  Patient continues to benefit from skilled intervention to address the above impairments. Continue treatment per established plan of care. to address goals. Recommendation for discharge: (in order for the patient to meet his/her long term goals)  Therapy up to 5 days/week in SNF setting    This discharge recommendation:  Has been made in collaboration with the attending provider and/or case management    IF patient discharges home will need the following DME: to be determined (TBD)       SUBJECTIVE:   Patient stated nothing contributory. OBJECTIVE DATA SUMMARY:   Critical Behavior:  Neurologic State: Alert  Orientation Level: Oriented to person,Oriented to place,Disoriented to time,Disoriented to situation  Cognition: Follows commands  Safety/Judgement: Decreased awareness of environment  Functional Mobility Training:  Bed Mobility:  Rolling: Total assistance  Supine to Sit: Maximum assistance; Additional time  Sit to Supine: Moderate assistance  Scooting: Maximum assistance; Additional time        Transfers:  Sit to Stand: Moderate assistance; Additional time  Stand to Sit: Moderate assistance (poorly controlled descent)                             Balance:  Sitting: Impaired  Sitting - Static: Good (unsupported)  Sitting - Dynamic: Fair (occasional)  Standing: Impaired; With support  Standing - Static: Constant support; Fair  Standing - Dynamic : Poor;Constant support  Ambulation/Gait Training:  Distance (ft): (3 side steps to [facility])  Assistive Device: Gait belt;Walker, rolling  Ambulation - Level of Assistance: Maximum assistance        Gait Abnormalities: Shuffling gait        Base of Support: Narrowed     Speed/Maureen: Slow;Shuffled  Step Length: Left shortened;Right shortened       Therapeutic Exercises:   Seated reaching tasks across midline and outside of MATTHEW  Pain Rating:      Activity Tolerance:   Fair    After treatment patient left in no apparent distress:   Supine in bed and Call bell within reach    COMMUNICATION/COLLABORATION:   The patients plan of care was discussed with: Registered nurse.      Michael Coles PT, DPT   Time Calculation: 24 mins

## 2022-05-04 NOTE — PROGRESS NOTES
Attempted to obtained blood draw from patient . Unsuccessful with 2 staff nurses and unsuccessful with arterial stick.

## 2022-05-04 NOTE — PROGRESS NOTES
1918 Pt resting comfortably at this time. Pt does not appear to be in any acute distress at this time. VS slightly hypotensive. Louann RN made aware. Pt denies pain and discomfort at this time. No further needs at this time. 2011 Pt resting comfortably. Repositioned him to right side. No incontinent care needed at this time. Does not appear to be in any acute distress. No further needs at this time. 2200 Pt repositioned to left side. Incontinence care provided. Medium soft BM. Pt tolerated fairly well. No acute distress noted at this time. No further needs at this time. Pt resting comfortably at this time.

## 2022-05-04 NOTE — PROGRESS NOTES
Patient noted in bed very drowsy, difficult to arouse. Patient responds to pain. Vitals signs obtained and 02 sats reading in the low 80's. Patient placed on 3 lpm. O2 sats up to %. Dr. Mp Boyd notified, orders placed an initiated. Bedside  shift change report given to Evens GARCIA (oncoming nurse) by Omari Styles RN (offgoing nurse). Report included the following information SBAR, Kardex, Intake/Output and MAR and Recent results.

## 2022-05-04 NOTE — PROGRESS NOTES
Hospitalist Progress Note    NAME: Sarah Canela Sr   :  1931   MRN:  [id number]   Room Number:  612/11  @ [facility]     Please note that this dictation was completed with Cliff Cole, the computer voice recognition software. Quite often unanticipated grammatical, syntax, homophones, and other interpretive errors are inadvertently transcribed by the computer software. Please disregard these errors. Please excuse any errors that have escaped final proofreading. Interim Hospital Summary: 80 y.o. male whom presented on 2022 with      Assessment / Plan:        # Acute Hypoxic resp failure not POA ? Aspiration    - CXR stat   - ABG   - Supplemental Oxygen   - NPO   - Suction As needed   - maintenance IVF       #Polymicrobial bacteremia POA   #Bandemia resolved  -Blood culture 2022 positive for Streptococcus, Rothia, Staph epidermidis  -Currently on ceftriaxone and vancomycin  -Repeat blood cultures negative      -Discussed case with infectious disease. Appreciate help. Dr Hemal Valdez   -Patient will need 2 weeks of IV antibiotics from last negative blood culture EOT 2022      #Suspected aspiration pneumonia  #Mild oropharyngeal dysphagia  -Chest x-ray reviewed dependently admission bilaterally due to pulmonary opacity worse on right kidney with apparent asymmetric pulmonary edema versus infection  -Repeat chest x-ray with unchanged opacity in right malleoli region and right lower lobe suspicious for airway disease. Unchanged right pleural effusion.   Decrease in background edema  -COVID-19 negative  -Procalcitonin 0.57  -Modified barium swallow without any clear aspiration    -Continue antibiotic for aspiration pneumonia  -Speech therapy on board appreciate help  -Soft and bite-size diet and thin liquids  -Aspiration precautions    #Acute metabolic encephalopathy POA resolved   - CTH neg for acute process   - suspected d/t Hypernatremia , bacteremia and aspiration PN    #Hypernatremia POA resolved      #Physical deconditioning POA   - PT/OT     Failure to thrive   Severe protein calorie malnutrition  Severe malnutrition POA  - nutrition consulted       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     Alzheimer's dementia POA  - continue memantine   - Palliative consulted, appreciate help  -Neurology on board appreciate help        Elevated troponin POA flat trend  Chronic systolic congestive heart failure POA  S/p pacemaker. Troponin 162-148. ECHO 2/2022 EF 15-20%. -EKG with atrial sensed ventricular paced rhythm     - no acute intervention needed         SUBHA on CKD POA resolved   Baseline 1.2-1.4. Current BUN/Cr 53/1. 94. suspect pre-renal due to hypovolemia. - Strict Is and Os, avoid nephrotoxic meds, renally dose meds      Hypothyroidism POA   -TSH 5.89  - continue levothyroxin        CAD POA  S/p AVNRT Ablation POA  HTN POA  HLD POA  - holding lisinopril, furosemide due to SUBHA         There is no height or weight on file to calculate BMI. Code Status:DNR   Surrogate Decision Maker: [facility]      DVT Prophylaxis: SCD's  GI Prophylaxis: not indicated  Baseline: uses rollator                 Subjective:     Chief Complaint / Reason for Physician Visit   awake but confused   Discussed with RN events overnight. Review of Systems:  No fevers, chills, appetite change, cough, sputum production, shortness of breath, dyspnea on exertion, nausea, vomitting, diarrhea, constipation, chest pain, leg edema, abdominal pain, joint pain, rash, itching. Tolerating PT/OT. Tolerating diet. Objective:     VITALS:   Last 24hrs VS reviewed since prior progress note.  Most recent are:  Patient Vitals for the past 24 hrs:   Temp Pulse Resp BP SpO2   05/04/22 0820    (!) 103/45    05/04/22 0738 97.9 °F (36.6 °C) 68 18 95/76 98 %   05/03/22 2026 97.2 °F (36.2 °C) 66 16 (!) 120/54 100 %   05/03/22 0900 97.5 °F (36.4 °C) 98 18 134/75 98 %       Intake/Output Summary (Last 24 hours) at 5/4/2022 0827  Last data filed at 5/3/2022 1242  Gross per 24 hour   Intake 360 ml   Output    Net 360 ml        PHYSICAL EXAM:  General: , no acute distress    EENT:  EOMI. Anicteric sclerae. MMM  Resp:  CTA bilaterally, no wheezing or rales. No accessory muscle use  CV:  Regular  rhythm,  normal S1/S2, no murmurs rubs gallops, No edema  GI:  Soft, Non distended, Non tender. +Bowel sounds  Neurologic:  AO x 1   Psych:   Not agitated or anxious  Skin:  No rashes. No jaundice    Reviewed most current lab test results and cultures  YES  Reviewed most current radiology test results   YES  Review and summation of old records today    NO  Reviewed patient's current orders and MAR    YES  PMH/SH reviewed - no change compared to H&P  ________________________________________________________________________  Care Plan discussed with:    Comments   Patient x    Family      RN x    Care Manager     Consultant                        x Multidiciplinary team rounds were held today with , nursing, pharmacist and clinical coordinator. Patient's plan of care was discussed; medications were reviewed and discharge planning was addressed. ________________________________________________________________________  Total NON critical care TIME:  25   Minutes    Total CRITICAL CARE TIME Spent:   Minutes non procedure based      Comments   >50% of visit spent in counseling and coordination of care x    ________________________________________________________________________  Deepa Rucker MD     Procedures: see electronic medical records for all procedures/Xrays and details which were not copied into this note but were reviewed prior to creation of Plan. LABS:  I reviewed today's most current labs and imaging studies.   Pertinent labs include:  Recent Labs     05/02/22  0429   WBC 9.2   HGB 10.0* HCT 31.5*   *     Recent Labs     05/02/22  0429      K 3.9      CO2 26   *   BUN 42*   CREA 1.51*   CA 8.5   ALB 2.2*   TBILI 0.8   ALT 42       Signed: Mandy Tang MD

## 2022-05-04 NOTE — PROGRESS NOTES
Problem: Self Care Deficits Care Plan (Adult)  Goal: *Acute Goals and Plan of Care (Insert Text)  Description: FUNCTIONAL STATUS PRIOR TO ADMISSION: Per chart pt lives with his granddaughter, performed ADLs and functional mobility with supervision and uses rollator and w/c. Chart stated significant decline in function over the past few weeks with worsening dementia. HOME SUPPORT: The patient lived with granddaughter and required assist for ADLs. Occupational Therapy Goals  Initiated 4/29/2022; Weekly Re-assessment 5/4/2022  1. Patient will perform self-feeding with minimal assistance/contact guard assist within 7 day(s). -Continue  2. Patient will perform face washing with minimal assistance/contact guard assist within 7 day(s). -Continue  3. Patient will perform toilet transfers with maximal assistance within 7 day(s). -Continue  4. Patient will participate in upper extremity therapeutic exercise/activities with minimal assistance/contact guard assist for 10 minutes within 7 day(s). -Continue      Outcome: Progressing Towards Goal    OCCUPATIONAL THERAPY TREATMENT/WEEKLY RE-ASSESSMENT  Patient: Calvin Khan Sr (80 y.o. male)  Date: 5/4/2022  Diagnosis: Hypernatremia [E87.0] <principal problem not specified>       Precautions: DNR,Fall  Chart, occupational therapy assessment, plan of care, and goals were reviewed. ASSESSMENT  Patient continues with skilled OT services and is slowly progressing towards goals. Pt's safe functional independence continues to be limited by decreased problem solving/sequencing/safety/task initiation, decreased strength/endurance, decreased mobility/balance, decreased activity tolerance, with all goals continued at their current levels as they remain appropriate at this time. Pt able to advance dynamic seated balance to fair and was noted with good ability to complete oral hygiene with Min A and Max verbal cues for sequencing.   Pt with good response to simple, directive cues for task engagement. Reporting therapist believes pt would benefit from SNF rehab upon discharge, with acute OT to continue to follow during acute hospitalization. Current Level of Function Impacting Discharge (ADLs): Total A    Other factors to consider for discharge: Confusion         PLAN :  Goals have been updated based on progression since last assessment. Patient continues to benefit from skilled intervention to address the above impairments. Continue to follow patient 2 times a week to address goals. Recommend with staff: Active ADL engagement, frequent positional changes    Recommend next OT session: POC progression    Recommendation for discharge: (in order for the patient to meet his/her long term goals)  Therapy up to 5 days/week in SNF setting    This discharge recommendation:  Has been made in collaboration with the attending provider and/or case management    IF patient discharges home will need the following DME: TBD       SUBJECTIVE:   Patient stated oh yes.   when asked if it feels good sitting at EOB    OBJECTIVE DATA SUMMARY:   Cognitive/Behavioral Status:  Neurologic State: Alert  Orientation Level: Oriented to person;Disoriented to place; Disoriented to situation;Disoriented to time  Cognition: Follows commands;Decreased attention/concentration  Perception: Cues to maintain midline in sitting  Perseveration: No perseveration noted  Safety/Judgement: Decreased awareness of environment;Decreased awareness of need for assistance;Decreased awareness of need for safety;Decreased insight into deficits    Functional Mobility and Transfers for ADLs:  Bed Mobility:  Rolling: Total assistance  Supine to Sit: Maximum assistance; Additional time  Sit to Supine: Moderate assistance  Scooting: Maximum assistance; Additional time    Balance:  Sitting: Impaired  Sitting - Static: Good (unsupported)  Sitting - Dynamic: Fair (occasional)    ADL Intervention:  Grooming  Grooming Assistance: Minimum assistance  Position Performed: Seated edge of bed  Washing Hands: Set-up; Supervision  Brushing Teeth: Minimum assistance  Cues: Verbal cues provided (Max cues for task engagement)    Toileting  Toileting Assistance: Total assistance(dependent)  Bladder Hygiene: Total assistance (dependent)  Bowel Hygiene: Total assistance (dependent)  Clothing Management: Total assistance (dependent)  Cues: Verbal cues provided (for hand placement/rolling)  Adaptive Equipment:  (bed rails)    Cognitive Retraining  Safety/Judgement: Decreased awareness of environment;Decreased awareness of need for assistance;Decreased awareness of need for safety;Decreased insight into deficits    Pain:  No c/o pain    Activity Tolerance:   Fair, Poor, and requires frequent rest breaks    After treatment patient left in no apparent distress:   Supine in bed, Call bell within reach, Bed / chair alarm activated, and Side rails x 3    COMMUNICATION/COLLABORATION:   The patients plan of care was discussed with: Physical therapist, Registered nurse, and Certified nursing assistant/patient care technician.      Liane Azul OT  Time Calculation: 28 mins

## 2022-05-04 NOTE — PROGRESS NOTES
0) Perfect serve Dr. Sarah Patterson pulse ox  1638) Perfect serve Dr. Eaton Cecil 98% on 3L of oxygen when checked on the earlobe. Unable to get a good pleth on the fingers--fingernails pale.

## 2022-05-05 NOTE — PROGRESS NOTES
0925  Pt transferred off unit to CT in bed, on O2 via NC at 4L. Pt trasnferred by this RN, Rossy Sahni (transport) and 3 student nurses. Pt stable, no s/sx of distress. 2368  CT imaging completed. 0940  Pt returned to unit and room, hooked up to wall O2 at 4L NC, hooked back up continuous pulse Ox, SCD's plugged into wall, specialty air mattress plugged in. Pt slept through transfer, CT imaging, and transfer back to bed and unit. No s/sx of distress. 46  Pt's primary nurse informed of pt return to room and updates provided.

## 2022-05-05 NOTE — HOSPICE
Hospice Liaison Note:    Chart reviewed for updates in plan of care. Noted patient increased oxygen needs over night. Plan: Hospice will visit bedside later this am to offer Hospice support/education. Please call Hospice team to offer support for patient, family or staff. Thank you for your coordination with the hospice plan of care      11:50: Update received from bedside nurse, Adele Robles RN. Per bedside assessment, patient appears restless and distressed. Brow is furrowed, patient moving frequently in bed without obvious purpose. Pt is not opening his eyes or responding to nurses bedside. This is a change from yesterday. Pt was sent to CT due to status change. 12:00: Dr. Christophe Heredia provided bedside update. Dr. Christophe Heredia reports patient status change over past 24 hours. Medical team has planned meeting with patient's granddaughter, Elizabeth Dobbins this afternoon to discuss goals of care. Per charge nurse, granddaughter was upset during one visit while at [facility] with care. Plan to review with Dr. Melchor Wilkerson Director. Request medical team provide PRN dosing for agitation. 12:20: Reviewed with Dr. Ludmila Mckeon. Patient continues to receive multiple IV antibiotics for treatments. If patient and family are in agreement for full comfort care, including stopping IV antibiotics, patient may meet GIP for General Inpatient Hospice Services. 15:00: Attending requests Hospice be available to meet with medical team and pt's granddaughter this afternoon. Hospice nurse will be present. Hospice social worker will be available as needed. 16:32:  Bedside meeting with patient, his granddaughter, Elizabeth Dobbins, [address], and Attending Dr. Christophe Heredia. Dr. Christophe Heredia provided medical update of the past 24 hours: patient has not eaten or taking oral fluids or medications since 17:00 yesterday. Pt has not been alert, and has been \"asleep\" all day.  Adal Joseph states that pt tolerated foods yesterday, and questions why patient appears to be declining from yesterday. Ramya Joseph states that she would like to speak to other members of the medical team to ask questions and options for further treatments. Dr. Zahraa Oh offered that Neuro has visited patient and did not have recommendations for alternative treatments. Pt has HF and now requiring oxygen to maintain stable oxygen levels. PT granddaughter was not open to conversation with Hospice nurse at this time, although pt appears to be restless during this meeting, granddaughter ended this group conversation with stating \"Thank you for your time\". She requests attending have medical team contact her. Ramya Joseph states she plans to return home within the next few minutes, and is available for phone calls from medical team. Ramya Joseph requests patient receive Phycial therapy as well as speech therapy to improve eating habits. Hospice is available to offer support/education for family, if they are interested.     Lucy Kim RN, [place] [address] Nurse Liaison  [phone] Mobile  [phone] Office  Available on Perfect Serve            Lucy Kim, [facility], Nikhil Jackson Nurse Liaison  [phone] Mobile  [phone] Office

## 2022-05-05 NOTE — PROGRESS NOTES
Hospitalist Progress Note    NAME: Vincent Dickinson Sr   :  1931   MRN:  [id number]   Room Number:  150/77  @ [facility]     Please note that this dictation was completed with Christy Beaulieu, the computer voice recognition software. Quite often unanticipated grammatical, syntax, homophones, and other interpretive errors are inadvertently transcribed by the computer software. Please disregard these errors. Please excuse any errors that have escaped final proofreading. Interim Hospital Summary: 80 y.o. male whom presented on 2022 with      Assessment / Plan:        #Acute Hypoxic resp failure not POA ? Aspiration vs acute on chronic systolic heart failure exacerbation  #Worsening metabolic encephalopathy not POA   #SUBHA on CKD 3b ? Cardiorenal versus prerenal  -Repeat CT head negative for acute process  -Repeat chest x-ray with moderate right pleural effusion. Right lung interstitial patchy airspace consolidation. Left pleural space normal left lung is clear  -BNP given 35,000. Creatinine 1.89 today up from 1.51. No improvement with gentle hydration  -ABG PO2 92 on 3 L  -Difficult to gauge patient volume status given recent EF of 15% , elevated BNP and  Patient  not eating and drinking due to underlying worsening decompensation of dementia. Will give one-time Lasix to see if it patient's oxygen status improve.   Patient has a very poor prognosis.  -Discussed the case with his neurologist Dr. Laura Tesfaye, at this point patient is not eating or taking medication, not to do much from neurology standpoint for his advanced dementia      -Continue supplemental oxygen  -One-time Lasix  -Strict ins and outs  -Daily weight  -Aspiration precautions  -Continue antibiotics  -Trend creatinine      #Polymicrobial bacteremia POA   #Bandemia resolved  -Blood culture 2022 positive for Streptococcus, Rothia, Staph epidermidis  -Currently on ceftriaxone and vancomycin  -Repeat blood cultures negative      -Discussed case with infectious disease. Appreciate help. Dr Paul Aguillon   -Patient will need 2 weeks of IV antibiotics from last negative blood culture EOT 5/14/2022      #Suspected aspiration pneumonia  #Mild oropharyngeal dysphagia  -Chest x-ray reviewed dependently admission bilaterally due to pulmonary opacity worse on right kidney with apparent asymmetric pulmonary edema versus infection  -Repeat chest x-ray with unchanged opacity in right malleoli region and right lower lobe suspicious for airway disease. Unchanged right pleural effusion. 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     Alzheimer's dementia POA  - continue memantine   - Palliative consulted, appreciate help  -Neurology on board appreciate help        Elevated troponin POA flat trend  Chronic systolic congestive heart failure POA  S/p pacemaker. Troponin 162-148. ECHO 2/2022 EF 15-20%. -EKG with atrial sensed ventricular paced rhythm     - no acute intervention needed              Hypothyroidism POA   -TSH 5.89  - continue levothyroxin        CAD POA  S/p AVNRT Ablation POA  HTN POA  HLD POA  - holding lisinopril, furosemide due to SUBHA         There is no height or weight on file to calculate BMI.   Code Status:DNR   Surrogate Decision Maker: alcira      DVT Prophylaxis: SCD's  GI Prophylaxis: not indicated  Baseline: uses rollator                 Subjective:     Chief Complaint / Carlean Balloon for Physician Visit  Sleeping requiring oxygen to maintain sats above 90  Discussed with RN events overnight. Review of Systems:  No fevers, chills, appetite change, cough, sputum production, shortness of breath, dyspnea on exertion, nausea, vomitting, diarrhea, constipation, chest pain, leg edema, abdominal pain, joint pain, rash, itching. Tolerating PT/OT. Tolerating diet. Objective:     VITALS:   Last 24hrs VS reviewed since prior progress note. Most recent are:  Patient Vitals for the past 24 hrs:   Temp Pulse Resp BP SpO2   05/05/22 1056     100 %   05/05/22 0751 98 °F (36.7 °C) 80 12 118/77 91 %   05/05/22 0400 97.3 °F (36.3 °C) 84 16 114/63 98 %   05/04/22 2013     99 %   05/04/22 1918 97.6 °F (36.4 °C) 86 16 (!) 111/52 99 %   05/04/22 1637     98 %   05/04/22 1545 97.1 °F (36.2 °C) 84 14 (!) 100/53 98 %     No intake or output data in the 24 hours ending 05/05/22 1111     PHYSICAL EXAM:  General: Drowsy, no acute distress    EENT:  EOMI. Anicteric sclerae. MMM  Resp:  CTA bilaterally, no wheezing or rales. No accessory muscle use  CV:  Regular  rhythm,  normal S1/S2, no murmurs rubs gallops, No edema  GI:  Soft, Non distended, Non tender. +Bowel sounds  Neurologic:  Drowsy  Psych:   Not agitated or anxious  Skin:  No rashes. No jaundice    Reviewed most current lab test results and cultures  YES  Reviewed most current radiology test results   YES  Review and summation of old records today    NO  Reviewed patient's current orders and MAR    YES  PMH/SH reviewed - no change compared to H&P  ________________________________________________________________________  Care Plan discussed with:    Comments   Patient x    Family      RN x    Care Manager     Consultant                        x Multidiciplinary team rounds were held today with , nursing, pharmacist and clinical coordinator.   Patient's plan of care was discussed; medications were reviewed and discharge planning was addressed. ________________________________________________________________________  Total NON critical care TIME:  25   Minutes    Total CRITICAL CARE TIME Spent:   Minutes non procedure based      Comments   >50% of visit spent in counseling and coordination of care x    ________________________________________________________________________  Guadalupe Larsen MD     Procedures: see electronic medical records for all procedures/Xrays and details which were not copied into this note but were reviewed prior to creation of Plan. LABS:  I reviewed today's most current labs and imaging studies.   Pertinent labs include:  Recent Labs     05/05/22  0848   WBC 6.7   HGB 9.7*   HCT 30.8*        Recent Labs     05/05/22  0848 05/04/22  1553 05/04/22  0936    139  --    K 4.1 4.3  --     104  --    CO2 27 20*  --    GLU 82 128*  --    BUN 56* 51*  --    CREA 1.89*  1.83* 1.72* 1.71*   CA 8.6 8.4*  --    ALB 2.3*  --   --    TBILI 0.7  --   --    ALT 36  --   --        Signed: Guadalupe Larsen MD

## 2022-05-05 NOTE — PROGRESS NOTES
0710) Bedside shift change report given to Rock Topete, RN (oncoming nurse) by Gerhardt Lux, RN (offgoing nurse). Report included the following information SBAR, Kardex and MAR.   3233) Willie Canas NPO for possible aspiration. Should we consult speech again? Also would you like to change Flagyl to IV? Thanks! (return message to repeat swallow test prior to oral medications; plan for new labs)  Da Henry) Willie Coleman. I have increased oxygen to 4L for oxygen 88%   0834) Discuss labs with Dr. Leena Hemphill; not not awake enough for oral medications, plan for CT of head. 0840) pt would not open eyes for oral care after sitting up to 80 degree angle--oral medications held. Pt would not sip water when stimulated--kept NPI  1030) IDR with Dr. Leena Hemphill (MD), Ann Romo (pharmacist), Curt Jones and Cheko Cam (), Faizan Nelson ([facility]),  and Rock Topete (RN) to discuss plan of care including consider transfer to PCU for hypoxia, discuss palliative with family, midline needed for discharge if kept on antibiotics, lasix for fluid overload, continue to monitor Vanc levels. 1100) discuss meeting with granddaughter at [phone]) Hospice RN at bedside--discuss pacemaker and planned meeting with alcira at [phone]) Bladder scan 75 mL  1417) Willie Canas slightly more alert now. Still has not urinated today even after Lasix. Bladder scan was 74mL. Discuss pt oxygen between 85% and 97% on room air---pt is pulling off the nasal cannula  1730) Pt granddaughter at bedside. Pt alert--drinking Ensure and icewater with family. Pt swallowing well; water through a straw  1805) Willie Hemphill-- Should we keep pt NPO for now? He was able to swallow one pill with ice water but cleared his throat afterwards. Pt still has not had any urine output for my shift  1808) Order pureed diet.    1830) pt ate a few bites of chocolate icecream  1920) Bedside shift change report given to Gerhardt Lux, RN (oncoming nurse) by Drew Mora RN (offgoing nurse). Report included the following information SBAR, Kardex and MAR.

## 2022-05-05 NOTE — PROGRESS NOTES
Care Management:    Transition of Care Plan:     · RUR: 20%  · Disposition: TBD after family meeting today  · Transportation: BLS    Attended IDR rounds today. MD would like to meet with patient's granddaughter and case management together to discuss goals of care. CM to check with nursing homes to see if they are able to administer IV Zyvox or IV Dapto at SNF. If plan is to continue antibiotics, patient would be on one of the two mentioned antibiotics, plus IV ceftriaxone and IV flagyl until 5-14-22. Patient has a mid-line. Spoke with Evangelista Batres ([phone]). She is liaison for [address] [place] [address], [facility], [facility], and [employer]. They are able to administer Daptomyacin, Ceftriaxone, and Flagyl. They are able to manage a mid-line. This CM called aggie - Jason Rudolph ([phone]). She works until 3:30 PM. She can be at hospital a little after 4:00 PM. Will plan meeting for 4:15 PM. Granddaughter will let RN know when she arrives. Updated RN. She will contact MD and CM coordinator granddaughter arrives. Updated MD and CM coordinator.     CIERRA Molina

## 2022-05-05 NOTE — FAMILY MEETING
Participant in the family meeting  David Arreola [phone]     Jennifer Machado, RN -    Deneise Apgar, [place] Nurse       Discussed with   the medical power of  about the prognosis of patient. Updated medical power of  about last 24 hours of patient being drowsy, unable to eat and drink, difficulty with breathing and  requiring oxygen. Granddaughter was updated about concerns of patient getting decompensated with poor prognosis given his medical comorbidities including history of recent stroke,  advanced stage heart failure , advanced dementia,  aspiration pneumonia and bacteremia. Updated granddaughter that given these medical comorbidities patient may not be able to come back to his baseline and prognosis remains very poor at this point. I have also communicated that I have discussed with  about the management of advanced dementia. Per staff neurologist at this point dementia management would not change the course.     Discussed with staff neurologist to update the patient's medical power of  upon her request.       Jennifer Steen MD

## 2022-05-05 NOTE — PROGRESS NOTES
[facility] Dosing Services: Antimicrobial Stewardship Daily Doc  Consult for dosing of vancomycin by Dr. Karen Rojas  Indication: bloodstream infection  Final Day of Therapy: 5/14/22    Ht Readings from Last 1 Encounters:   05/01/22 172.7 cm (68\")        Wt Readings from Last 1 Encounters:   05/05/22 49.4 kg (109 lb)      Vancomycin therapy:  Current maintenance dose: vancomycin 750 mg IV every 24 hours   Last level: 22.9 mcg/mL on 5/4 corresponds to an   Dose calculated to approximate a target AUC/YE of 400-600    Plan: SCr has increased. Vancomycin level on 5/4 (suspect actually drawn at 15:53, not 09:36 as charted) was above target range. Will hold vancomycin at this time and dose based on levels. Dose administration notes:   Doses given appropriately as scheduled    Date Dose & Interval Measured (mcg/mL) Extrapolated (mcg/mL)   5/1 500 mg IV q24h 3.8 354   5/4 750 mg IV q24h 22.9 756   ? ? ? ? Other Antimicrobial   (not dosed by pharmacist) Ceftriaxone 1 g IV q24h  Metronidazole 500 mg IV q12h   Cultures 4/30: blood cx - NG @ 5 days  4/28: blood cx:   2 of 2: streptococcus parasanguinis and streptococcus anginosus  1 of 2 staphylococcus epidermidis  4/28: blood cx:  2 of 2: granulicatella adiacens and streptoococcus gordonii  1 of 2: rothia mucilaginosa   Serum Creatinine Lab Results   Component Value Date/Time    Creatinine 1.83 (H) 05/05/2022 08:48 AM    Creatinine 1.89 (H) 05/05/2022 08:48 AM    Creatinine, POC 1.8 (H) 04/28/2022 01:37 PM       Creatinine Clearance Estimated Creatinine Clearance: 18.4 mL/min (A) (based on SCr of 1.83 mg/dL (H)).    Temp Temp: 97.5 °F (36.4 °C)     WBC Lab Results   Component Value Date/Time    WBC 6.7 05/05/2022 08:48 AM      Procalcitonin Lab Results   Component Value Date/Time    Procalcitonin 0.57 04/29/2022 09:51 AM      For Antifungals, Metronidazole and Nafcillin: Lab Results   Component Value Date/Time    ALT (SGPT) 36 05/05/2022 08:48 AM    AST (SGOT) 31 05/05/2022 08:48 AM    Alk.  phosphatase 129 (H) 05/05/2022 08:48 AM    Bilirubin, total 0.7 05/05/2022 08:48 AM        Thank you,  Beth Magallanes, PharmD, BCPS  [phone]

## 2022-05-05 NOTE — PROGRESS NOTES
VITOR   RUR  21 %     IDR Rounds this am with MD and team    Meeting with granddaughter , Dr. Rylie Dewitt and Hospice Nurse this afternoon. Dr. Rylie Dewitt provided updates on condition in past 24 hours. Need for oxygen, poor po intake. Not taking medications. She had questions about reason -why-  Indicates he was eating some yesterday  And even her mother was here to see him. Still some questions about Medications   Asked about Neurology - information given to have Neurology to call her. Post meeting   Came out of the room indicates he was taking some fluids from her. Had nursing to go into the room. To see if he is able to his medications. She indicates she will be back later on to check on him.        We will follow-up in am.     Ariel NORTON RN   [phone]

## 2022-05-05 NOTE — PROGRESS NOTES
Physician Progress Note      Glenis Garza  CSN #:                  [id number]  :                       1931  ADMIT DATE:       2022 12:48 PM  [address] [place] DATE:  RESPONDING  PROVIDER #:        Mitchel Pérez MD          QUERY TEXT:    Pt admitted with C/o weakness. Pt noted to have rr: 22; lac acid: 3.72; and + Blood cultures w/ STREPTOCOCCUS PARASANGUINISAND STREPTOCOCCUS  ANGINOSUS. Patient also noted to have Bilateral hazy airspace opacities worse on the right per CXR. If possible, please document in the progress notes and discharge summary if you are evaluating and /or treating any of the following: The medical record reflects the following:  Risk Factors: Hx: CAD, GERD,DJD, GI Bleed  Clinical Indicators: rr: 22-29. ... Jonas Pesa POC Lac acid: 3.72 ^ 3.08. .. .. Jonas Pesa wbc: 9.0. ..^ 12.9; bands: 7.9. ..^ 11.3; Na+: 156; K+: 3.4 ^ 2.8; Bun/Cr: 53/1.94 ^ 1.66 ^ 1.57 ..^ 1.51; Bili: 1.3; alk phos: 186; Lac acid: 3.8 ^ 1.6; procal: 0.57; trop. hs: 162 ^ 148; RSV PCR: neg. ....... COVID: neg. ..... Jonas Pesa BCx: STREPTOCOCCUS PARASANGUINISAND STREPTOCOCCUS  ANGINOSUS GROWING IN BOTH BOTTLES DRAWN. ...... Jonas Pesa Repeat BCx: Granulicatella adiacens AND STREPTOCOCCUS GORDONII GROWING IN BOTH BOTTLES DRAWN. ...... Jonas Pesa 3rd Set BCx: Neg. Jonas Pesa Jonas Pesa Jonas Pesa Jonas Pesa cxr: Bilateral hazy airspace opacities worse on the right which may represent asymmetric pulmonary edema versus infection including possibly Covid 19 infection. ...... Jonas Pesa Repeat CXR: Unchanged patchy opacification in the right perihilar region and in the right lower lobe suspicious for airspace disease; Unchanged mild right pleural effusion. Jonas Pesa Jonas Pesa Jonas Pesa Noted: Continue antibiotic for aspiration pneumonia    Treatment: Labs; BCx; Repeat BCx; CXR; repeat CXR; Lac acid; IV Levaquin; IV Cefepime; IVF; COVID r/o; SP; Aspiration precautions;  Soft and bite-size diet and thin liquids  Options provided:  -- Sepsis, present on admission  -- Sepsis, present on admission now resolved  -- Aspiration pneumonia without Sepsis  -- Sepsis was ruled out  -- Polymicrobial bacteremia, only POA  -- Other - I will add my own diagnosis  -- Disagree - Not applicable / Not valid  -- Disagree - Clinically unable to determine / Unknown  -- Refer to Clinical Documentation Reviewer    PROVIDER RESPONSE TEXT:    This patient was treated for sepsis this admission which was present on admission and is now currently resolved.     Query created by: Giovany Gil on 5/5/2022 9:15 AM      Electronically signed by:  Everton Thompson MD 5/5/2022 9:59 AM

## 2022-05-05 NOTE — PROGRESS NOTES
Care plan reviewed, patient is progressing towards goals. Problem: Aspiration - Risk of  Goal: *Absence of aspiration  Outcome: Progressing Towards Goal     Problem: Patient Education: Go to Patient Education Activity  Goal: Patient/Family Education  Outcome: Progressing Towards Goal     Problem: Pressure Injury - Risk of  Goal: *Prevention of pressure injury  Description: Document Newton Scale and appropriate interventions in the flowsheet. Outcome: Progressing Towards Goal  Note: Pressure Injury Interventions:  Sensory Interventions: Check visual cues for pain,Float heels,Keep linens dry and wrinkle-free,Minimize linen layers    Moisture Interventions: Absorbent underpads,Check for incontinence Q2 hours and as needed    Activity Interventions: Assess need for specialty bed    Mobility Interventions: Assess need for specialty bed,Pressure redistribution bed/mattress (bed type),Turn and reposition approx. every two hours(pillow and wedges)    Nutrition Interventions: Document food/fluid/supplement intake    Friction and Shear Interventions: HOB 30 degrees or less                Problem: Patient Education: Go to Patient Education Activity  Goal: Patient/Family Education  Outcome: Progressing Towards Goal     Problem: Falls - Risk of  Goal: *Absence of Falls  Description: Document Louis Fall Risk and appropriate interventions in the flowsheet.   Outcome: Progressing Towards Goal  Note: Fall Risk Interventions:  Mobility Interventions: Bed/chair exit alarm,Patient to call before getting OOB    Mentation Interventions: Bed/chair exit alarm,Door open when patient unattended    Medication Interventions: Teach patient to arise slowly,Patient to call before getting OOB,Evaluate medications/consider consulting pharmacy    Elimination Interventions: Call light in reach,Patient to call for help with toileting needs              Problem: Patient Education: Go to Patient Education Activity  Goal: Patient/Family Education  Outcome: Progressing Towards Goal     Problem: Patient Education: Go to Patient Education Activity  Goal: Patient/Family Education  Outcome: Progressing Towards Goal     Problem: Patient Education: Go to Patient Education Activity  Goal: Patient/Family Education  Outcome: Progressing Towards Goal     Problem: Patient Education: Go to Patient Education Activity  Goal: Patient/Family Education  Outcome: Progressing Towards Goal

## 2022-05-06 NOTE — PROGRESS NOTES
3540- bedside shift change report given to jerzy (oncoming nurse) by kit (offgoing nurse). Report included the following information SBAR, karadex and overnight events. Patient resting in bed. Intermittent moaning but not verbalizing needs. [phone]- patient seen and assessed. Granddaughter at bedside feeding patient. Patient eat 3/4 of breakfast. Patient is alert and realizes he is in the hospital. Patient has no currently complaints and is pleasant at this time. Check for incontinence patient is dry and clean at this time. Patient unable to verbalize patient education completed with alcira    [phone]- rounds completed with team. Notified patient eat more this am and more alert. Notified team patient coughing after food and drink likely aspirating seen previously by speech. Neurology needs to speak with granddaughter regarding dementia MD duong to reach out. 1039- palliative care at bedside speaking with patient. 1130- PT and OT at bedside working with patient. Assigned RN in code elaine but RN Yosvany Ambriz provided pt with bedside commode as patient stated he needs to have a bm. PT/OT assisted with urinal but patient did not have a BM.     1227- offered to feed patient. Patient sleeping at this time. Patient asked rn to come back later. 1345- patient sleeping at this time    1417- patient repositioned to right side. No complaints. Offer to heat up patient's lunch and feed him. Patient refused to eat    [phone]- finished feeding patient. Patient eat 25% of dinner but drank 2 ginger ale and an ice tea. Patient said that's enough for now. Will offer more food in a bit. EEG at bedside. 1715- per MD Wayne Villalpando ok to d/c continuous pulse-ox. 1719- continuous pulse-ox d/c    1806- granddaughter at bedside with patient. EEG almost complete    1953- changed of shift completed with mallika ulrich.  Patient resting comfortably

## 2022-05-06 NOTE — PROGRESS NOTES
Hospitalist Progress Note    NAME: Jed Gomez Sr   :  1931   MRN:  [id number]   Room Number:  230/64  @ [facility]     Please note that this dictation was completed with Jeramie Carpio, the computer voice recognition software. Quite often unanticipated grammatical, syntax, homophones, and other interpretive errors are inadvertently transcribed by the computer software. Please disregard these errors. Please excuse any errors that have escaped final proofreading. Interim Hospital Summary: 80 y.o. male whom presented on 2022 with      Assessment / Plan:        #Acute Hypoxic resp failure not POA ? Aspiration vs acute on chronic systolic heart failure exacerbation  #Worsening metabolic encephalopathy not POA   #SUBHA on CKD 3b ? Cardiorenal versus prerenal  -Repeat CT head negative for acute process  -Repeat chest x-ray with moderate right pleural effusion. Right lung interstitial patchy airspace consolidation. Left pleural space normal left lung is clear  -BNP given 35,000. Creatinine 1.89 today up from 1.51. No improvement with gentle hydration  -ABG PO2 92 on 3 L  -Difficult to gauge patient volume status given recent EF of 15% , elevated BNP and  Patient  not eating and drinking due to underlying worsening decompensation of dementia. Will give one-time Lasix to see if it patient's oxygen status improve.   Patient has a very poor prognosis.  -Discussed the case with his neurologist Dr. Darryl eMehan, at this point patient is not eating or taking medication, not to do much from neurology standpoint for his advanced dementia      -Continue supplemental oxygen  -One-time Lasix  -Strict ins and outs  -Daily weight  -Aspiration precautions  -Continue antibiotics  -Trend creatinine      #Polymicrobial bacteremia POA   #Bandemia resolved  -Blood culture 2022 positive for Streptococcus, Rothia, Staph epidermidis  -Currently on ceftriaxone and vancomycin  -Repeat blood cultures negative      -Discussed case with infectious disease. Appreciate help. Dr Syd Stewart   -Patient will need 2 weeks of IV antibiotics from last negative blood culture EOT 5/14/2022      #Suspected aspiration pneumonia  #Mild oropharyngeal dysphagia  -Chest x-ray reviewed dependently admission bilaterally due to pulmonary opacity worse on right kidney with apparent asymmetric pulmonary edema versus infection  -Repeat chest x-ray with unchanged opacity in right malleoli region and right lower lobe suspicious for airway disease. Unchanged right pleural effusion. 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     Alzheimer's dementia POA  - continue memantine   - Palliative consulted, appreciate help  -Neurology on board appreciate help        Elevated troponin POA flat trend  Chronic systolic congestive heart failure POA  S/p pacemaker. Troponin 162-148. ECHO 2/2022 EF 15-20%. -EKG with atrial sensed ventricular paced rhythm     - no acute intervention needed              Hypothyroidism POA   -TSH 5.89  - continue levothyroxin        CAD POA  S/p AVNRT Ablation POA  HTN POA  HLD POA  - holding lisinopril, furosemide due to SUBHA         There is no height or weight on file to calculate BMI.   Code Status:DNR   Surrogate Decision Maker: alcira      DVT Prophylaxis: SCD's  GI Prophylaxis: not indicated  Baseline: uses rollator                 Subjective:     Chief Complaint / Catherine Portillo for Physician Visit  Sleeping requiring oxygen to maintain sats above 90  Discussed with RN events overnight. Review of Systems:  No fevers, chills, appetite change, cough, sputum production, shortness of breath, dyspnea on exertion, nausea, vomitting, diarrhea, constipation, chest pain, leg edema, abdominal pain, joint pain, rash, itching. Tolerating PT/OT. Tolerating diet. Objective:     VITALS:   Last 24hrs VS reviewed since prior progress note. Most recent are:  Patient Vitals for the past 24 hrs:   Temp Pulse Resp BP SpO2   05/06/22 0810 97.2 °F (36.2 °C) 88 16 124/61 97 %   05/06/22 0437 97.1 °F (36.2 °C) 63 16 116/66 97 %   05/05/22 1942 97.3 °F (36.3 °C) 62 14 107/64 99 %   05/05/22 1806     99 %   05/05/22 1805     93 %   05/05/22 1511 97.5 °F (36.4 °C) 65 13 114/64 100 %   05/05/22 1150 97.5 °F (36.4 °C) 65 16 117/73 100 %   05/05/22 1148     100 %   05/05/22 1145     92 %   05/05/22 1143     (!) 88 %   05/05/22 1137  63   100 %   05/05/22 1056     100 %     No intake or output data in the 24 hours ending 05/06/22 1009     PHYSICAL EXAM:  General: Drowsy, no acute distress    EENT:  EOMI. Anicteric sclerae. MMM  Resp:  CTA bilaterally, no wheezing or rales. No accessory muscle use  CV:  Regular  rhythm,  normal S1/S2, no murmurs rubs gallops, No edema  GI:  Soft, Non distended, Non tender. +Bowel sounds  Neurologic:  Drowsy  Psych:   Not agitated or anxious  Skin:  No rashes.   No jaundice    Reviewed most current lab test results and cultures  YES  Reviewed most current radiology test results   YES  Review and summation of old records today    NO  Reviewed patient's current orders and MAR    YES  PMH/SH reviewed - no change compared to H&P  ________________________________________________________________________  Care Plan discussed with:    Comments   Patient x    Family      RN x    Care Manager     Consultant                        x Multidiciplinary team rounds were held today with , nursing, pharmacist and clinical coordinator. Patient's plan of care was discussed; medications were reviewed and discharge planning was addressed. ________________________________________________________________________  Total NON critical care TIME:  25   Minutes    Total CRITICAL CARE TIME Spent:   Minutes non procedure based      Comments   >50% of visit spent in counseling and coordination of care x    ________________________________________________________________________  Bessy Achayra MD     Procedures: see electronic medical records for all procedures/Xrays and details which were not copied into this note but were reviewed prior to creation of Plan. LABS:  I reviewed today's most current labs and imaging studies.   Pertinent labs include:  Recent Labs     05/05/22  0848   WBC 6.7   HGB 9.7*   HCT 30.8*        Recent Labs     05/05/22  0848 05/04/22  1553 05/04/22  0936    139  --    K 4.1 4.3  --     104  --    CO2 27 20*  --    GLU 82 128*  --    BUN 56* 51*  --    CREA 1.89*  1.83* 1.72* 1.71*   CA 8.6 8.4*  --    ALB 2.3*  --   --    TBILI 0.7  --   --    ALT 36  --   --        Signed: Bessy Acharya MD

## 2022-05-06 NOTE — PROGRESS NOTES
Problem: Aspiration - Risk of  Goal: *Absence of aspiration  Outcome: Progressing Towards Goal     Problem: Patient Education: Go to Patient Education Activity  Goal: Patient/Family Education  Outcome: Progressing Towards Goal     Problem: Pressure Injury - Risk of  Goal: *Prevention of pressure injury  Description: Document Newton Scale and appropriate interventions in the flowsheet. Outcome: Progressing Towards Goal  Note: Pressure Injury Interventions:  Sensory Interventions: Assess changes in LOC,Assess need for specialty bed,Chair cushion,Check visual cues for pain,Float heels,Keep linens dry and wrinkle-free,Discuss PT/OT consult with provider,Maintain/enhance activity level,Minimize linen layers,Monitor skin under medical devices,Pad between skin to skin,Pressure redistribution bed/mattress (bed type),Turn and reposition approx. every two hours (pillows and wedges if needed)    Moisture Interventions: Absorbent underpads,Apply protective barrier, creams and emollients,Assess need for specialty bed,Check for incontinence Q2 hours and as needed,Limit adult briefs,Maintain skin hydration (lotion/cream),Minimize layers,Moisture barrier,Offer toileting Q_hr    Activity Interventions: Assess need for specialty bed,Increase time out of bed,Chair cushion,Pressure redistribution bed/mattress(bed type),PT/OT evaluation    Mobility Interventions: PT/OT evaluation,Pressure redistribution bed/mattress (bed type),HOB 30 degrees or less,Assess need for specialty bed,Float heels,Turn and reposition approx.  every two hours(pillow and wedges)    Nutrition Interventions: Document food/fluid/supplement intake,Discuss nutritional consult with provider,Offer support with meals,snacks and hydration    Friction and Shear Interventions: Apply protective barrier, creams and emollients,Feet elevated on foot rest,Foam dressings/transparent film/skin sealants,HOB 30 degrees or less,Lift sheet,Minimize layers                Problem: Patient Education: Go to Patient Education Activity  Goal: Patient/Family Education  Outcome: Progressing Towards Goal     Problem: Falls - Risk of  Goal: *Absence of Falls  Description: Document Neal Stearns Fall Risk and appropriate interventions in the flowsheet.   Outcome: Progressing Towards Goal  Note: Fall Risk Interventions:  Mobility Interventions: Assess mobility with egress test,Communicate number of staff needed for ambulation/transfer,OT consult for ADLs,Patient to call before getting OOB,PT Consult for mobility concerns,PT Consult for assist device competence,Utilize walker, cane, or other assistive device,Strengthening exercises (ROM-active/passive)    Mentation Interventions: Adequate sleep, hydration, pain control,Door open when patient unattended,Evaluate medications/consider consulting pharmacy,Eyeglasses and hearing aids,Familiar objects from home,Family/sitter at bedside,Increase mobility,More frequent rounding,Reorient patient,Room close to nurse's station,Toileting rounds,Update white board    Medication Interventions: Assess postural VS orthostatic hypotension,Bed/chair exit alarm,Evaluate medications/consider consulting pharmacy,Patient to call before getting OOB,Teach patient to arise slowly    Elimination Interventions: Call light in reach,Toileting schedule/hourly rounds,Urinal in reach              Problem: Patient Education: Go to Patient Education Activity  Goal: Patient/Family Education  Outcome: Progressing Towards Goal     Problem: Patient Education: Go to Patient Education Activity  Goal: Patient/Family Education  Outcome: Progressing Towards Goal     Problem: Patient Education: Go to Patient Education Activity  Goal: Patient/Family Education  Outcome: Progressing Towards Goal     Problem: Patient Education: Go to Patient Education Activity  Goal: Patient/Family Education  Outcome: Progressing Towards Goal

## 2022-05-06 NOTE — PROGRESS NOTES
Care plan reviewed, patient progressing towards goals. Problem: Aspiration - Risk of  Goal: *Absence of aspiration  Outcome: Progressing Towards Goal     Problem: Patient Education: Go to Patient Education Activity  Goal: Patient/Family Education  Outcome: Progressing Towards Goal     Problem: Pressure Injury - Risk of  Goal: *Prevention of pressure injury  Description: Document Newton Scale and appropriate interventions in the flowsheet. Outcome: Progressing Towards Goal  Note: Pressure Injury Interventions:  Sensory Interventions: Assess changes in LOC,Check visual cues for pain    Moisture Interventions: Absorbent underpads,Check for incontinence Q2 hours and as needed    Activity Interventions: Assess need for specialty bed,PT/OT evaluation,Increase time out of bed    Mobility Interventions: Pressure redistribution bed/mattress (bed type),Turn and reposition approx. every two hours(pillow and wedges)    Nutrition Interventions: Document food/fluid/supplement intake,Offer support with meals,snacks and hydration    Friction and Shear Interventions: HOB 30 degrees or less                Problem: Patient Education: Go to Patient Education Activity  Goal: Patient/Family Education  Outcome: Progressing Towards Goal     Problem: Falls - Risk of  Goal: *Absence of Falls  Description: Document Louis Fall Risk and appropriate interventions in the flowsheet. Outcome: Progressing Towards Goal  Note: Fall Risk Interventions:  Mobility Interventions: Communicate number of staff needed for ambulation/transfer    Mentation Interventions: Bed/chair exit alarm,Door open when patient unattended    Medication Interventions: Evaluate medications/consider consulting pharmacy    Elimination Interventions:  Toileting schedule/hourly rounds              Problem: Patient Education: Go to Patient Education Activity  Goal: Patient/Family Education  Outcome: Progressing Towards Goal     Problem: Patient Education: Go to Patient Education Activity  Goal: Patient/Family Education  Outcome: Progressing Towards Goal     Problem: Patient Education: Go to Patient Education Activity  Goal: Patient/Family Education  Outcome: Progressing Towards Goal     Problem: Patient Education: Go to Patient Education Activity  Goal: Patient/Family Education  Outcome: Progressing Towards Goal

## 2022-05-06 NOTE — PROGRESS NOTES
Patient asleep when this nurse went to do initial assessment, patient would not open his eyes. Patient's grandaughter walked in and patient woke up, started talking to his grand daughter. [address] daughter asked patient if he wanted to drink some water, he said yes, she gave it to him and they talked for a while before she left. Since his grand daughter left he has become withdrawn again.

## 2022-05-06 NOTE — PROGRESS NOTES
Problem: Mobility Impaired (Adult and Pediatric)  Goal: *Acute Goals and Plan of Care (Insert Text)  Description: FUNCTIONAL STATUS PRIOR TO ADMISSION: Per chart pt lives with his granddaughter, performed ADLs and functional mobility with supervision and uses rollator and w/c. Chart stated significant decline in function over the past few weeks with worsening dementia. HOME SUPPORT: The patient lived with granddaughter. Physical Therapy Goals  Initiated 4/29/2022  1. Patient will move from supine to sit and sit to supine in bed with moderate assistance  within 7 day(s). 2.  Patient will transfer from bed to chair and chair to bed with maximal assistance using the least restrictive device within 7 day(s). 3.  Patient will perform sit to stand with maximal assistance within 7 day(s). MET  4. Patient will ambulate with maximal assistance for 10 feet with the least restrictive device within 7 day(s). Updated 5/6/2022  1. Patient will move from supine to sit and sit to supine in bed with moderate assistance  within 7 day(s). 2.  Patient will transfer from bed to chair and chair to bed with maximal assistance using the least restrictive device within 7 day(s). 3.  Patient will perform sit to stand with moderate assistance within 7 day(s). 4.  Patient will ambulate with maximal assistance for 10 feet with the least restrictive device within 7 day(s). Outcome: Progressing Towards Goal     PHYSICAL THERAPY TREATMENT  Patient: Audi Farfan  (80 y.o. male)  Date: 5/6/2022  Diagnosis: Hypernatremia [E87.0] <principal problem not specified>       Precautions: DNR,Fall  Chart, physical therapy assessment, plan of care and goals were reviewed. ASSESSMENT  Patient continues with skilled PT services and is progressing towards goals. Patient pleasant and agreeable to therapy. Patient required moderate assist x2 for bed mobility and transfers.  Patient took 3 side steps along bed with maximal assistance. Patient with bilateral knee buckling but responded well to cues for upright posture. Current Level of Function Impacting Discharge (mobility/balance): mod to max A    Other factors to consider for discharge: cognition         PLAN :  Patient continues to benefit from skilled intervention to address the above impairments. Continue treatment per established plan of care. to address goals. Recommendation for discharge: (in order for the patient to meet his/her long term goals)  Therapy up to 5 days/week in SNF setting    This discharge recommendation:  Has been made in collaboration with the attending provider and/or case management    IF patient discharges home will need the following DME: to be determined (TBD)       SUBJECTIVE:   Patient stated Oh thank you.     OBJECTIVE DATA SUMMARY:   Critical Behavior:  Neurologic State: Alert,Confused,Eyes open spontaneously  Orientation Level: Oriented to place,Oriented to person,Disoriented to time,Disoriented to situation  Cognition: Decreased attention/concentration,Decreased command following,Impaired decision making,Memory loss,Poor safety awareness  Safety/Judgement: Decreased awareness of environment,Decreased awareness of need for assistance,Decreased awareness of need for safety,Decreased insight into deficits    Functional Mobility Training:  Bed Mobility:     Supine to Sit: Moderate assistance;Assist x2  Sit to Supine: Moderate assistance;Assist x2  Scooting: Moderate assistance;Assist x2    Transfers:  Sit to Stand: Moderate assistance;Assist x2  Stand to Sit: Moderate assistance    Balance:  Sitting: Impaired  Sitting - Static: Good (unsupported)  Sitting - Dynamic: Fair (occasional)  Standing: Impaired; With support  Standing - Static: Constant support;Fair;Poor  Standing - Dynamic : Constant support;Poor    Ambulation/Gait Training:  Distance (ft): 3 Feet (ft) (3 side steps x2)  Assistive Device: Gait belt;Walker, rolling  Ambulation - Level of Assistance: Maximum assistance  Gait Abnormalities: Shuffling gait  Base of Support: Narrowed  Speed/Maureen: Slow;Shuffled  Step Length: Right shortened;Left shortened      Pain Rating:  No pain    Activity Tolerance:   Good    After treatment patient left in no apparent distress:   Supine in bed, Call bell within reach, Bed / chair alarm activated, and Side rails x 3    COMMUNICATION/COLLABORATION:   The patients plan of care was discussed with: Physical therapist and Registered nurse.      José Miguel Finney, PT   Time Calculation: 17 mins

## 2022-05-06 NOTE — PROGRESS NOTES
Palliative Medicine  [place]: [phone] ([phone]  [facility]: [phone] ([phone])    Patient's grand daughter, Nazario Lira, was to meet with this writer at 6 m, however learned that she came in this morning. A voicemail was left for her to contact this writer and let her know that I was available to meet with her today. She did not come in. Dread daughter had a meeting with the treatment team yesterday; she is asking for a neurological workup and wants him to get all his antibiotics before he returns home. She is hoping for some functional recovery, unsure if that will really happen. Patient's mental state does wax and wane. LCSW met with patient, who is pleasant and oriented to person and place (he did state that he was at [facility]\" - (unsure if he has received services there in the past and if he may qualify for any assistance through them). Patient stated that he lives with his wife (not the case), when asked who else he lives with, he did say \"my grand daughter\" (he calls her Armidain). Patient is pleasant, was verbally interactive; LCSW understood him about 75%of the time, very superficial conversation for the most part. He seems content with his life. He shared when asked that he worked [employer] when he was younger. He also noted that he is \"ready\" when his time comes (in reference to dying). IDT attended this morning, patient having a better day in that he ate about half his breakfast and seemed to be doing okay. He does have times when he is groaning/ appears to be hurting, usually evenings and nights, likely sundowning. Treatment team is working with Nazario Lira to see when patient can return home. She is not ready for him to come home until some of the further work up is completed. Patient is appropriate for hospice services based on his diagnosis of dementia, continuing cognitive and functional declines, family does not appear to be ready for this level of care.

## 2022-05-06 NOTE — HOSPICE
Hospice Liaison Note:    Chart reviewed for updates in plan of care. Plan: Available to provide Hospice support/education at patient and family request.    Please call Hospice team to offer support for patient, family or staff, or status changes    Thank you for your coordination with the hospice plan of care. 11:29: Phone call to CM, [employer]. VM left with request to call Hospice nurse.       Bull Van RN, [place] [id number] Nurse Liaison  [phone] Mobile  [phone] Office

## 2022-05-07 NOTE — PROGRESS NOTES
Hospitalist Progress Note    NAME: Desire Anaya Sr   :  1931   MRN:  [id number]   Room Number:  897/76  @ [facility]     Please note that this dictation was completed with Nereida Cruz, the computer voice recognition software. Quite often unanticipated grammatical, syntax, homophones, and other interpretive errors are inadvertently transcribed by the computer software. Please disregard these errors. Please excuse any errors that have escaped final proofreading. Interim Hospital Summary: 80 y.o. male whom presented on 2022 with      Assessment / Plan:        #Acute Hypoxic resp failure not POA ? Aspiration vs acute on chronic systolic heart failure exacerbation  #Worsening metabolic encephalopathy not POA   #SUBHA on CKD 3b ? Cardiorenal versus prerenal  -Repeat CT head negative for acute process  -Repeat chest x-ray with moderate right pleural effusion. Right lung interstitial patchy airspace consolidation. Left pleural space normal left lung is clear  -BNP given 35,000. Creatinine 1.89 today up from 1.51. No improvement with gentle hydration  -ABG PO2 92 on 3 L  -Difficult to gauge patient volume status given recent EF of 15% , elevated BNP and  Patient  not eating and drinking due to underlying worsening decompensation of dementia. Will give one-time Lasix to see if it patient's oxygen status improve.   Patient has a very poor prognosis.  -Discussed the case with his neurologist Dr. Vinita Gonzalez, at this point patient is not eating or taking medication, not to do much from neurology standpoint for his advanced dementia      -Continue supplemental oxygen  -One-time Lasix  -Strict ins and outs  -Daily weight  -Aspiration precautions  -Continue antibiotics  -Trend creatinine      #Polymicrobial bacteremia POA   #Bandemia resolved  -Blood culture 2022 positive for Streptococcus, Rothia, Staph epidermidis  -Currently on ceftriaxone and vancomycin  -Repeat blood cultures negative      -Discussed case with infectious disease. Appreciate help. Dr Moffett Liter   -Patient will need 2 weeks of IV antibiotics from last negative blood culture EOT 5/14/2022      #Suspected aspiration pneumonia  #Mild oropharyngeal dysphagia  -Chest x-ray reviewed dependently admission bilaterally due to pulmonary opacity worse on right kidney with apparent asymmetric pulmonary edema versus infection  -Repeat chest x-ray with unchanged opacity in right malleoli region and right lower lobe suspicious for airway disease. Unchanged right pleural effusion. 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     Alzheimer's dementia POA  - continue memantine   - Palliative consulted, appreciate help  -Neurology on board appreciate help        Elevated troponin POA flat trend  Chronic systolic congestive heart failure POA  S/p pacemaker. Troponin 162-148. ECHO 2/2022 EF 15-20%. -EKG with atrial sensed ventricular paced rhythm     - no acute intervention needed              Hypothyroidism POA   -TSH 5.89  - continue levothyroxin        CAD POA  S/p AVNRT Ablation POA  HTN POA  HLD POA  - holding lisinopril, furosemide due to SUBHA         There is no height or weight on file to calculate BMI.   Code Status:DNR   Surrogate Decision Maker: alcira      DVT Prophylaxis: SCD's  GI Prophylaxis: not indicated  Baseline: uses rollator                 Subjective:     Chief Complaint / Kaley Enriquez for Physician Visit  No acute issues. Discussed with RN events overnight. Review of Systems:  No fevers, chills, appetite change, cough, sputum production, shortness of breath, dyspnea on exertion, nausea, vomitting, diarrhea, constipation, chest pain, leg edema, abdominal pain, joint pain, rash, itching. Tolerating PT/OT. Tolerating diet. Objective:     VITALS:   Last 24hrs VS reviewed since prior progress note. Most recent are:  Patient Vitals for the past 24 hrs:   Temp Pulse Resp BP SpO2   05/07/22 0840 97.4 °F (36.3 °C) 78 18 (!) 113/55    05/06/22 2027  63  130/69 97 %   05/06/22 1918 97.3 °F (36.3 °C) 72 16 108/70 100 %       Intake/Output Summary (Last 24 hours) at 5/7/2022 1022  Last data filed at 5/7/2022 0257  Gross per 24 hour   Intake 960 ml   Output    Net 960 ml        PHYSICAL EXAM:  General: Drowsy, no acute distress    EENT:  EOMI. Anicteric sclerae. MMM  Resp:  CTA bilaterally, no wheezing or rales. No accessory muscle use  CV:  Regular  rhythm,  normal S1/S2, no murmurs rubs gallops, No edema  GI:  Soft, Non distended, Non tender. +Bowel sounds  Neurologic:  Drowsy  Psych:   Not agitated or anxious  Skin:  No rashes. No jaundice    Reviewed most current lab test results and cultures  YES  Reviewed most current radiology test results   YES  Review and summation of old records today    NO  Reviewed patient's current orders and MAR    YES  PMH/ reviewed - no change compared to H&P  ________________________________________________________________________  Care Plan discussed with:    Comments   Patient x    Family      RN x    Care Manager     Consultant                        x Multidiciplinary team rounds were held today with , nursing, pharmacist and clinical coordinator. Patient's plan of care was discussed; medications were reviewed and discharge planning was addressed.      ________________________________________________________________________  Total NON critical care TIME:  25   Minutes    Total CRITICAL CARE TIME Spent:   Minutes non procedure based      Comments   >50% of visit spent in counseling and coordination of care x    ________________________________________________________________________  Bushra Duarte MD     Procedures: see electronic medical records for all procedures/Xrays and details which were not copied into this note but were reviewed prior to creation of Plan. LABS:  I reviewed today's most current labs and imaging studies.   Pertinent labs include:  Recent Labs     05/05/22  0848   WBC 6.7   HGB 9.7*   HCT 30.8*        Recent Labs     05/07/22  0335 05/05/22  0848 05/04/22  1553   NA  --  143 139   K  --  4.1 4.3   CL  --  106 104   CO2  --  27 20*   GLU  --  82 128*   BUN  --  56* 51*   CREA 2.04* 1.89*  1.83* 1.72*   CA  --  8.6 8.4*   ALB  --  2.3*  --    TBILI  --  0.7  --    ALT  --  36  --        Signed: Bushra Duarte MD

## 2022-05-07 NOTE — CONSULTS
[facility] and [address] at [facility] at [address] [place] [address], [address] [facility] 5[address] [address] [facility], [address] [place] [address]  [phone] Office  [phone] Facsimile           Referring: Dr. Jenny Goltz    Chief Complaint   Patient presents with    Extremity Weakness     We are asked to see this 80-year-old gentleman in neurologic consultation regarding dementia. The patient was admitted to the hospital on 28 April with hypernatremia, physical deconditioning, failure to thrive and bilateral pneumonias as well as chronic congestive heart failure and a history of Alzheimer's. Since being admitted he has been seen by palliative care and was said to be working with physical therapy and able to help with transfers at his baseline. His granddaughter apparently would like to keep him at home. He has been evaluated for hospice care. His granddaughter apparently was not open on the fifth to continue discussions with hospice. Patient was seen in February 22 of this year at [facility] where he came in after having difficulty with speaking. 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 CTA of the head and neck was unremarkable. It was recommended to have him on aspirin with Plavix for 21 days and then aspirin and control lipids. Internal medicine note from today notes that he had acute hypoxic respiratory failure question aspiration versus acute on chronic systolic heart failure exacerbation as well as metabolic encephalopathy and acute kidney injury with chronic kidney disease. His ejection fraction was noted to be 15% and he had a BNP of 35,000 with a creatinine of 1.89. He also has polymicrobial bacteremia with cultures growing out several different species.   Chest x-ray has demonstrated cardiomegaly with interstitial airspace consolidation in the right lung with a right-sided pleural effusion. Metabolic panel from the fifth with a BUN and creatinine 56 and 1.9  CBC from the fifth with normal white count and a hemoglobin of 9.7  BMP from the fifth again greater than 35,000    Head CT from April 28, 2022 with nothing acute just age-related changes. Head CT repeated today with small vessel change and nothing acute    Patient is presently on Namenda 10 mg twice daily as well as aspirin 81 mg. Antibiotics include Flagyl and Rocephin. His nurse was very helpful today including recounting his history as well as helping with the robot device. The patient is unable to provide any meaningful history. His nurse does note that he has the above-stated history and that his family's wishes are for him to try to get back to baseline and return to home and at this point have been a bit hesitant to engage hospice.     Past Medical History:   Diagnosis Date    Abdominal pain 7/18/2017    Alzheimer disease ([address] 7/18/2017    Anemia 7/18/2017    Arthritis     Back pain 7/18/2017    Bradycardia 7/18/2017    CAD (coronary artery disease)     hx of MI    Chronic diastolic heart failure ([address] 2/26/2021    CKD (chronic kidney disease), stage II 7/18/2017    constipation     Depression 7/18/2017    Diabetes mellitus ([address] 7/18/2017    Diverticulosis 7/18/2017    DJD (degenerative joint disease) 7/18/2017    Encounter for long-term (current) use of other medications 7/18/2017    Fatigue     Gastritis and duodenitis 7/18/2017    GERD (gastroesophageal reflux disease)     GI bleed 7/18/2017    Hearing loss     Hyperlipidemia 7/18/2017    Hypertension     Hypertension with renal disease 7/18/2017    Ill-defined condition     Dementia    Internal hemorrhoids 7/18/2017    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    S/P cardiac pacemaker procedure 5/4/2015    5/4/15 Medtronic dual chamber pacemaker implant     Thyroid disease     Weight loss lost over 40 pounds last year- has no appetite       Past Surgical History:   Procedure Laterality Date    COLONOSCOPY N/A 6/22/2017    COLONOSCOPY performed by Dann Pollack MD at [facility]  6/22/2017         [address]  7/10/2014    right inguinal    HX OTHER SURGICAL      cystectomy from back    [place] E[place] TRANSORAL BIOPSY SINGLE/MULTIPLE  2/14/2012         [place] UPPER GI ENDOSCOPY,W/DIR SUBMUC INJ  7/23/2020         UPPER GI ENDOSCOPY,BIOPSY  6/22/2017         UPPER GI ENDOSCOPY,BIOPSY  7/23/2020            Current Facility-Administered Medications   Medication Dose Route Frequency Provider Last Rate Last Admin    metroNIDAZOLE (FLAGYL) tablet 500 mg  500 mg Oral Q12H Eliseo Conklin MD   500 mg at 05/07/22 1229    vancomycin - Pharmacist dosing based on levels   Other Rx Dosing/Monitoring Jayashree Mir MD        alum-mag hydroxide-simeth (MYLANTA) oral suspension 30 mL  30 mL Oral Q4H PRN Jayashree Mir MD   30 mL at 05/04/22 1445    cefTRIAXone (ROCEPHIN) 1 g in 0.9% sodium chloride (MBP/ADV) 50 mL MBP  1 g IntraVENous Q24H Eliseo Conklin  mL/hr at 05/07/22 1034 1 g at 05/07/22 1034    aspirin chewable tablet 81 mg  81 mg Oral DAILY Jayashree Mir MD   81 mg at 05/07/22 0920    lansoprazole compounding kit (PREVACID) 3 mg/mL oral suspension 30 mg  30 mg Oral ACB Jayashree Mir MD   30 mg at 05/07/22 5883    cholecalciferol (VITAMIN D3) (1000 Units /25 mcg) tablet 1,000 Units  1,000 Units Oral DAILY Eliseo Conklin MD   1,000 Units at 05/07/22 0921    levothyroxine (SYNTHROID) tablet 50 mcg  50 mcg Oral ACB Eliseo Conklin MD   50 mcg at 05/07/22 0920    memantine (NAMENDA) tablet 10 mg  10 mg Oral BID Eliseo Conklin MD   10 mg at 05/07/22 0921    sodium chloride (NS) flush 5-40 mL  5-40 mL IntraVENous Q8H Eliseo Conklin MD   10 mL at 05/07/22 0527    sodium chloride (NS) flush 5-40 mL  5-40 mL IntraVENous PRN Clifm Butter, MD  acetaminophen (TYLENOL) tablet 650 mg  650 mg Oral Q6H PRN Mita Sosa MD   650 mg at 22 0606    Or    acetaminophen (TYLENOL) suppository 650 mg  650 mg Rectal Q6H PRN Mita Sosa MD        polyethylene glycol (MIRALAX) packet 17 g  17 g Oral DAILY PRN Mita Sosa MD   17 g at 22 1750    ondansetron (ZOFRAN ODT) tablet 4 mg  4 mg Oral Q8H PRN Mita Sosa MD   4 mg at 22 7645    Or    ondansetron (ZOFRAN) injection 4 mg  4 mg IntraVENous Q6H PRN Mita Sosa MD            No Known Allergies    Social History     Tobacco Use    Smoking status: Former Smoker     Packs/day: 0.50     Years: 10.00     Pack years: 5.00     Start date: 1991    Smokeless tobacco: Never Used    Tobacco comment: quit 48 years ago   Vaping Use    Vaping Use: Never used   Substance Use Topics    Alcohol use: Not Currently     Comment: Occasional beer    Drug use: No       Family History   Problem Relation Age of Onset    Hypertension Mother     Heart Disease Father     Cancer Other         son-cancer? unknown what type        Review of Systems  Pertinent positives and negatives as noted. Examination  Visit Vitals  BP (!) 113/55   Pulse 78   Temp 97.4 °F (36.3 °C)   Resp 18   Ht 5' 8\" (1.727 m)   Wt 49.4 kg (109 lb)   SpO2 100%   BMI 16.57 kg/m²   He is a pleasantly demented gentleman who is lying in bed comfortable appearing. He is hard of hearing. He tells me he believes he is at a football game and when I ask him who was playing he says he does not recall. He is unsure of the month and I give him a list and he says April into May as the appropriate month. When I ask him what holiday or special day were celebrating tomorrow he tells me Thanksgiving. He is unable to tell me the name of the president even with a clue. He cannot recall any president when I asked him to tell me the last president he recalls.   He is able to follow simple commands and he will protrude his tongue. He will show me 3 fingers but when I ask him to show me his thumb on his other hand he shows me 3 fingers on both hands. He has no overt facial asymmetry. His globes are midline. He has tongue protrusion in the midline. He is able to raise his arms and there is no overt asymmetry. Impression/Plan  70-year-old gentleman with longstanding dementia hospitalized with failure to thrive, acute kidney injury also with marked congestive heart failure and bacteremia and it is not out of the ordinary that his underlying cognitive deficits would be affected by his ongoing systemic illnesses. At this juncture he has had unremarkable imaging and certainly he has multiple medical issues ongoing. From a neurologic standpoint there is really unfortunately not much else to offer this gentleman. He is on Namenda and the goal of memory modifying medications is to hopefully slow the decline but nothing will reverse. His GFR is 41 and as long as his GFR remains greater than 30 the 10 mg twice daily dose of Namenda is appropriate however if he has declining kidney function then he should have his Namenda decreased to a dose of 5 mg twice daily  Continue the good supportive care and treatment of underlying conditions that he is receiving  It does seem that with everything this gentleman has against him it would not be inappropriate to have the additional care of hospice particularly if his family wishes him to be at home that would certainly afford them greater resources    Will follow-up as needed    Malinda Lerma MD          This note was created using voice recognition software. Despite editing, there may be syntax errors.

## 2022-05-07 NOTE — PROGRESS NOTES
Problem: Aspiration - Risk of  Goal: *Absence of aspiration  Outcome: Progressing Towards Goal     Problem: Pressure Injury - Risk of  Goal: *Prevention of pressure injury  Description: Document Newton Scale and appropriate interventions in the flowsheet. Outcome: Progressing Towards Goal  Note: Pressure Injury Interventions:  Sensory Interventions: Assess changes in LOC,Assess need for specialty bed,Keep linens dry and wrinkle-free    Moisture Interventions: Apply protective barrier, creams and emollients,Minimize layers    Activity Interventions: Pressure redistribution bed/mattress(bed type)    Mobility Interventions: Pressure redistribution bed/mattress (bed type)    Nutrition Interventions: Offer support with meals,snacks and hydration    Friction and Shear Interventions: Minimize layers,Feet elevated on foot rest,Foam dressings/transparent film/skin sealants                Problem: Falls - Risk of  Goal: *Absence of Falls  Description: Document Louis Fall Risk and appropriate interventions in the flowsheet.   Outcome: Progressing Towards Goal  Note: Fall Risk Interventions:  Mobility Interventions: Utilize walker, cane, or other assistive device    Mentation Interventions: Bed/chair exit alarm,Door open when patient unattended,Reorient patient,Room close to nurse's station    Medication Interventions: Teach patient to arise slowly    Elimination Interventions: Bed/chair exit alarm,Call light in reach

## 2022-05-07 NOTE — PROGRESS NOTES
[facility] Dosing Services: Antimicrobial Stewardship Daily Doc  Consult for dosing of vancomycin by Dr. Garces Roles  Indication: bloodstream infection  Final Day of Therapy: 5/14/22    Ht Readings from Last 1 Encounters:   05/01/22 172.7 cm (68\")        Wt Readings from Last 1 Encounters:   05/06/22 49.4 kg (109 lb)      Vancomycin therapy:  Current maintenance dose: dose by levels   Last level: 14.7 mcg/mL on 5/7/22  Dose calculated to approximate a target trough </= 15 mg/L    Plan: Today's random level is therapeutic and is approximately 55 hours post last dose (5/4 @ 2300). Patient has only cleared by 8.2 mcg/ml in ~60 hours from last level and creatinine continues to uptrend. Will give a 10 mg/kg dose of 500 mg x 1  tonight at 2300, approximately 72 hours after last dose. Date Dose & Interval Measured (mcg/mL) Extrapolated (mcg/mL)   5/1 500 mg IV q24h 3.8 354   5/4 750 mg IV q24h 22.9 756   5/7 s/p 750 mg 5/4 @ 2300 14.7 ? Other Antimicrobial   (not dosed by pharmacist) Ceftriaxone 1 g IV q24h  Metronidazole 500 mg IV q12h   Cultures 4/30: blood cx - NG @ 5 days  4/28: blood cx:   2 of 2: streptococcus parasanguinis and streptococcus anginosus  1 of 2 staphylococcus epidermidis  4/28: blood cx:  2 of 2: granulicatella adiacens and streptoococcus gordonii  1 of 2: rothia mucilaginosa   Serum Creatinine Lab Results   Component Value Date/Time    Creatinine 2.04 (H) 05/07/2022 03:35 AM    Creatinine, POC 1.8 (H) 04/28/2022 01:37 PM       Creatinine Clearance Estimated Creatinine Clearance: 16.5 mL/min (A) (based on SCr of 2.04 mg/dL (H)).    Temp Temp: 97.4 °F (36.3 °C)     WBC Lab Results   Component Value Date/Time    WBC 6.7 05/05/2022 08:48 AM      Procalcitonin Lab Results   Component Value Date/Time    Procalcitonin 0.57 04/29/2022 09:51 AM      For Antifungals, Metronidazole and Nafcillin: Lab Results   Component Value Date/Time    ALT (SGPT) 36 05/05/2022 08:48 AM    AST (SGOT) 31 05/05/2022 08:48 AM    Alk.  phosphatase 129 (H) 05/05/2022 08:48 AM    Bilirubin, total 0.7 05/05/2022 08:48 AM        Ovidio Nyhan, PharmD Contact: [phone]

## 2022-05-07 NOTE — PROGRESS NOTES
Problem: Aspiration - Risk of  Goal: *Absence of aspiration  Outcome: Progressing Towards Goal     Problem: Pressure Injury - Risk of  Goal: *Prevention of pressure injury  Description: Document Newton Scale and appropriate interventions in the flowsheet. Outcome: Progressing Towards Goal  Note: Pressure Injury Interventions:  Sensory Interventions: Assess need for specialty bed,Check visual cues for pain,Float heels,Minimize linen layers,Pad between skin to skin,Turn and reposition approx. every two hours (pillows and wedges if needed)    Moisture Interventions: Absorbent underpads,Check for incontinence Q2 hours and as needed,Minimize layers,Offer toileting Q_hr    Activity Interventions: Assess need for specialty bed,Increase time out of bed,Chair cushion,Pressure redistribution bed/mattress(bed type),PT/OT evaluation    Mobility Interventions: Turn and reposition approx. every two hours(pillow and wedges)    Nutrition Interventions: Offer support with meals,snacks and hydration,Document food/fluid/supplement intake    Friction and Shear Interventions: Lift sheet,Minimize layers                Problem: Patient Education: Go to Patient Education Activity  Goal: Patient/Family Education  Outcome: Progressing Towards Goal     Problem: Falls - Risk of  Goal: *Absence of Falls  Description: Document Louis Fall Risk and appropriate interventions in the flowsheet.   Outcome: Progressing Towards Goal  Note: Fall Risk Interventions:  Mobility Interventions: Bed/chair exit alarm,Communicate number of staff needed for ambulation/transfer    Mentation Interventions: Eyeglasses and hearing aids,Bed/chair exit alarm,Door open when patient unattended,Adequate sleep, hydration, pain control,Reorient patient    Medication Interventions: Bed/chair exit alarm    Elimination Interventions: Bed/chair exit alarm,Call light in reach,Toileting schedule/hourly rounds              Problem: Patient Education: Go to Patient Education Activity  Goal: Patient/Family Education  Outcome: Progressing Towards Goal     Problem: Patient Education: Go to Patient Education Activity  Goal: Patient/Family Education  Note: Poor appetite. Not consuming much food, refused any dinner. Beverage intake was improved on day shift per report.

## 2022-05-07 NOTE — PROGRESS NOTES
1317) Bedside shift change report given to Otilia Avina, RN and Adal Obando RN (oncoming nurse) by Marc oliveira RN (offgoing nurse). Report included the following information SBAR, Kardex, Intake/Output and MAR.     0840) Pt assessed and vital signs stable. Pt ripped IV out last night, will attempt to start new one. Pt has no c/o pain at this time. Johanna Mora LPN assisting pt with feeding at this time. Will given meds when pt finishes meal    0920) Scheduled meds given crushed in apple sauce. Spoke with pharmacy who is quetioning IV vs. PO flagyl. Will discuss with MD    1010) Interdisciplinary team rounds were held 5/7/2022 with the following team members:Nursing and Physician. Plan of care discussed. See clinical pathway and/or care plan for interventions and desired outcomes. 1015) New 22 G started in right hand by Merrell Seip, RN. IV antibiotics hung and infusing. Pt repositioned in bed and stated no additional needs at this time. 1145) Pt resting in bed at this time. IV antibiotic completed. IV flushed and capped. Pt stated no additional needs and left resting in bed.     1230) PO flagyl given. Pt satting 100% on 2 l/min NC. O2 decreased to 2 l/min, will continue to assess. EMILY Edwards at the bedside assisting pt with feeding. 1345) IV flushed and patent. Pt O2 dropped to 80% while pt talking. O2 increased to 3 l/min. Pt had no s/sx of distress during desaturation. Pt stated no additional needs and left resting in bed at this time. 1410) Teleneurologist robot in the room at this time for neuro consult. 1525) Pt reassessed and no changes to note. Vital signs not obtained r/t pt's extended stay status. Pt resting in bed but had removed SCD's. SCD's removed from pt and set aside at this time. Pt stated no additional needs at this time and left resting in bed. Pt repositioned supine. 1630) Pt resting in bed at this time watching TV.     1720) Scheduled meds to be given by Adal Obando LPN.  Assisting pt with feeding at this time. 1745) Pt vomitting at this time. Pt vomitted 10 cc of clear emesis. Lungs sound course, O2 95% and HR 98 bpm. CHG bath provided. Incontinence care given. Zofran given and MD notified. Pt stated \"I am feeing better now\" after CHG bath and incontinence care. Pt pulled out IV. No access at this time. 1915) Bedside shift change report given to Keanu Aragon RN (oncoming nurse) by Bakari Gan RN and Kacie Michaud LPN (offgoing nurse). Report included the following information SBAR, Kardex, Intake/Output and MAR.

## 2022-05-08 NOTE — PROGRESS NOTES
1449) Bedside shift change report given to Adalgisa Patel, RN and Barbi Blanco LPN (oncoming nurse) by Fidelia Strauss RN (offgoing nurse). Report included the following information SBAR, Kardex, Intake/Output and MAR. Pt awake in bed at this time. Call bell within reach. O2 100% at this time on 4L/min    0800) Pt assessed and vital signs stable. Pt c/o pain to abdomen, prn tylenol given. Pt satting 94% on 4 L/min O2 via NC. Pt repositioned in bed. No incontinence care required at this time. Scheduled meds given and pt left resting in bed at this time. 46) Family at the bedside assisting pt with feeding. Visual pain score 2/10 at this time. Water pitcher refilled. Family and pt stated no additional needs at this time. 1000)  Pt repositioned and stated \"I am tired\". Lights dimmed. Rocephin hung and infusing. Labs drawn and sent down. Pt repositioned on right side. 1045) Interdisciplinary team rounds were held 5/8/2022 with the following team members:Nursing and Physician.     Plan of care discussed. See clinical pathway and/or care plan for interventions and desired outcomes. 1115) Pt asleep in bed at this time. 1215) Pt assisted with feeding. Pt ate ~40% of meal and 100% of insure. Granddaughter dropped off homemade food, left at the bedside for dinner. No incontinence care required at this time. Pt repositioned supine. 1300) Pt getting xray at this time. 1400) IV flushed and patent. Pt repositioned on left side. 1530) Pt reassessed and no changes to note. Vital signs not obtained r/t pt's extended stay status. Pt resting in bed at this time. Incontinence care provided. CHG bath given. SCD's placed on bilateral legs. And pt repositioned supine. 36) Pt assisted with feeding and ate about 30% of food provided by family. No incontinence care required at this time. Scheduled meds given. [phone] Pt's granddaughter called for updates on this pt.  Granddaughter did not know MRN nor was her name on pt chart. Stated that I was unable to give out information about pt r/t HIPPA. Granddaughter was tearful and stated she just wanted to know what was going on. This writer stated that she should call other granddaughter to figure out reason for admission and plan of care. 1910) Bedside shift change report given to Gloria Valdez RN (oncoming nurse) by Valery Hernández and Jb Jackson RN (offgoing nurse). Report included the following information SBAR, Kardex, Intake/Output, MAR and Recent Results. During bedside report granddaughter at the bedside and stated \"I need to know what medications yall are giving him that are knocking him out and making him sleep. Granddaughter made aware pt only received tylenol today and has no sedatives or opiates ordered. She expressed concerns that pt has stages of being nonverbal and decreased mentation then times of more alertness. This writer explained we performed chest xray and labs are ordered for tonight. Vital signs have been stable. Printed today's MAR and attempted to give to granddaughter, but had already left. MAR left at the bedside. Supervisor made aware.

## 2022-05-08 NOTE — PROGRESS NOTES
Bedside\"} shift change report given to sydnee GARCIA (oncoming nurse) by Giovanni Adkins (offgoing nurse). Report included the following information SBAR, Kardex, intake/output, MAR. Pt. was seen bedside resting quietly. Vitals and assessment done. Pt does not have IV access. 2010-pt. become restless, agitated yelling out redirection was effective. 2120- New IV place 20g left hand. Per nursing supervisor mitten applied, pt can removed, no order is needed. 2145- Upon assessment, pt. had remove the mitten and trying to touch the IV site. 2230- medication administered as ordered. incontinent care and new gown. 0010- pt. Rounded on. Reposition and turning done    0145- staff present. Pt resting yelling for \"Muf\"    0230-  Staff present pt in bed confused, restless  Yelling for  \"Muf\"    [phone]- pt has calm down occasion calling for \"muf\"     [phone]- incontinent care done. Pt resting in bed comfortably. 2680- pt resting I bed quietly. Bedside\"} shift change report given to Rosanne GARCIA (oncoming nurse) by Alberto Mercado RN (offgoing nurse).  Report included the following information SBAR, Kardex, MAR, intake /output

## 2022-05-08 NOTE — PROGRESS NOTES
Problem: Aspiration - Risk of  Goal: *Absence of aspiration  Outcome: Progressing Towards Goal     Problem: Pressure Injury - Risk of  Goal: *Prevention of pressure injury  Description: Document Newton Scale and appropriate interventions in the flowsheet. Outcome: Progressing Towards Goal  Note: Pressure Injury Interventions:  Sensory Interventions: Assess changes in LOC    Moisture Interventions: Absorbent underpads,Apply protective barrier, creams and emollients,Check for incontinence Q2 hours and as needed    Activity Interventions: PT/OT evaluation,Pressure redistribution bed/mattress(bed type)    Mobility Interventions: HOB 30 degrees or less,Turn and reposition approx. every two hours(pillow and wedges)    Nutrition Interventions: Document food/fluid/supplement intake    Friction and Shear Interventions: Apply protective barrier, creams and emollients,Transfer aides:transfer board/Carmen lift/ceiling lift,Transferring/repositioning devices                Problem: Falls - Risk of  Goal: *Absence of Falls  Description: Document Louis Fall Risk and appropriate interventions in the flowsheet.   Outcome: Progressing Towards Goal  Note: Fall Risk Interventions:  Mobility Interventions: Utilize walker, cane, or other assistive device    Mentation Interventions: Door open when patient unattended,More frequent rounding,Reorient patient,Room close to nurse's station,Update white board    Medication Interventions: Teach patient to arise slowly    Elimination Interventions: Call light in reach              Problem: Patient Education: Go to Patient Education Activity  Goal: Patient/Family Education  Outcome: Progressing Towards Goal

## 2022-05-08 NOTE — PROGRESS NOTES
Problem: Aspiration - Risk of  Goal: *Absence of aspiration  Outcome: Progressing Towards Goal     Problem: Patient Education: Go to Patient Education Activity  Goal: Patient/Family Education  Outcome: Progressing Towards Goal     Problem: Pressure Injury - Risk of  Goal: *Prevention of pressure injury  Description: Document Newton Scale and appropriate interventions in the flowsheet. Outcome: Progressing Towards Goal  Note: Pressure Injury Interventions:  Sensory Interventions: Assess changes in LOC    Moisture Interventions: Apply protective barrier, creams and emollients    Activity Interventions: Pressure redistribution bed/mattress(bed type)    Mobility Interventions: HOB 30 degrees or less    Nutrition Interventions: Document food/fluid/supplement intake    Friction and Shear Interventions: Minimize layers                Problem: Patient Education: Go to Patient Education Activity  Goal: Patient/Family Education  Outcome: Progressing Towards Goal     Problem: Falls - Risk of  Goal: *Absence of Falls  Description: Document Louis Fall Risk and appropriate interventions in the flowsheet.   Outcome: Progressing Towards Goal  Note: Fall Risk Interventions:  Mobility Interventions: Bed/chair exit alarm    Mentation Interventions: Adequate sleep, hydration, pain control    Medication Interventions: Teach patient to arise slowly    Elimination Interventions: Bed/chair exit alarm,Call light in reach              Problem: Patient Education: Go to Patient Education Activity  Goal: Patient/Family Education  Outcome: Progressing Towards Goal     Problem: Patient Education: Go to Patient Education Activity  Goal: Patient/Family Education  Outcome: Progressing Towards Goal     Problem: Patient Education: Go to Patient Education Activity  Goal: Patient/Family Education  Outcome: Progressing Towards Goal     Problem: Patient Education: Go to Patient Education Activity  Goal: Patient/Family Education  Outcome: Progressing Towards Goal

## 2022-05-08 NOTE — PROGRESS NOTES
0100-ER nurse Nikunj Urbano, RN placed an IV for pt in A earlier in the night so that Med Surg staff could have IV access. I wrapped IV with Co-Ban to secure IV stayed in placed and that the pt didn't pull or tamper with it. Pt had pulled out and tampered with previous few IVs. When reassessed, pt had attempted to claw IV out of arm. The Co-Ban was partially removed but IV was intact. There were 5 claw marks on pt's arm arm the IV site. Rn notified.

## 2022-05-09 NOTE — PROGRESS NOTES
Problem: Mobility Impaired (Adult and Pediatric)  Goal: *Acute Goals and Plan of Care (Insert Text)  Description: FUNCTIONAL STATUS PRIOR TO ADMISSION: Per chart pt lives with his granddaughter, performed ADLs and functional mobility with supervision and uses rollator and w/c. Chart stated significant decline in function over the past few weeks with worsening dementia. HOME SUPPORT: The patient lived with granddaughter. Physical Therapy Goals  Initiated 4/29/2022  1. Patient will move from supine to sit and sit to supine in bed with moderate assistance  within 7 day(s). 2.  Patient will transfer from bed to chair and chair to bed with maximal assistance using the least restrictive device within 7 day(s). 3.  Patient will perform sit to stand with maximal assistance within 7 day(s). MET  4. Patient will ambulate with maximal assistance for 10 feet with the least restrictive device within 7 day(s). Updated 5/6/2022  1. Patient will move from supine to sit and sit to supine in bed with moderate assistance  within 7 day(s). 2.  Patient will transfer from bed to chair and chair to bed with maximal assistance using the least restrictive device within 7 day(s). 3.  Patient will perform sit to stand with moderate assistance within 7 day(s). 4.  Patient will ambulate with maximal assistance for 10 feet with the least restrictive device within 7 day(s). Outcome: Progressing Towards Goal    PHYSICAL THERAPY TREATMENT  Patient: Gato Farfan  (80 y.o. male)  Date: 5/9/2022  Diagnosis: Hypernatremia [E87.0] <principal problem not specified>       Precautions: DNR,Fall  Chart, physical therapy assessment, plan of care and goals were reviewed. ASSESSMENT  Patient continues with skilled PT services and is progressing towards goals. Patient agreeable to therapy. Patient required maximal assist x2 for bed mobility. Patient with right sided lean in sitting which improved with time.  He stood with maximal assist x2. Patient returned to supine and repositioned for comfort and pressure relief. Current Level of Function Impacting Discharge (mobility/balance): max A    Other factors to consider for discharge: cognition         PLAN :  Patient continues to benefit from skilled intervention to address the above impairments. Continue treatment per established plan of care. to address goals. Recommendation for discharge: (in order for the patient to meet his/her long term goals)  Therapy up to 5 days/week in SNF setting    This discharge recommendation:  Has been made in collaboration with the attending provider and/or case management    IF patient discharges home will need the following DME: to be determined (TBD)       SUBJECTIVE:   Patient stated Thank you.     OBJECTIVE DATA SUMMARY:   Critical Behavior:  Neurologic State: Alert  Orientation Level: Oriented to person  Cognition: Follows commands  Safety/Judgement: Decreased awareness of environment,Decreased awareness of need for assistance,Decreased awareness of need for safety,Decreased insight into deficits    Functional Mobility Training:  Bed Mobility:     Supine to Sit: Maximum assistance;Assist x2  Sit to Supine: Maximum assistance;Assist x2  Scooting: Maximum assistance;Assist x2     Transfers:  Sit to Stand: Maximum assistance;Assist x2  Stand to Sit: Maximum assistance;Assist x2    Balance:  Sitting: Impaired  Sitting - Static: Fair (occasional); Poor (constant support)  Sitting - Dynamic: Poor (constant support)  Standing: Impaired; With support  Standing - Static: Constant support;Poor  Standing - Dynamic : Constant support;Poor    Pain Rating:  No pain    Activity Tolerance:   Fair    After treatment patient left in no apparent distress:   Supine in bed, Call bell within reach, Bed / chair alarm activated, and Side rails x 3    COMMUNICATION/COLLABORATION:   The patients plan of care was discussed with: Occupational therapist and Registered nurse.     Annabel Fiore, PT   Time Calculation: 13 mins

## 2022-05-09 NOTE — PROGRESS NOTES
Problem: Aspiration - Risk of  Goal: *Absence of aspiration  Outcome: Progressing Towards Goal     Problem: Patient Education: Go to Patient Education Activity  Goal: Patient/Family Education  Outcome: Progressing Towards Goal     Problem: Pressure Injury - Risk of  Goal: *Prevention of pressure injury  Description: Document Newton Scale and appropriate interventions in the flowsheet. Outcome: Progressing Towards Goal  Note: Pressure Injury Interventions:  Sensory Interventions: Assess changes in LOC    Moisture Interventions: Absorbent underpads,Apply protective barrier, creams and emollients,Check for incontinence Q2 hours and as needed    Activity Interventions: PT/OT evaluation,Pressure redistribution bed/mattress(bed type)    Mobility Interventions: HOB 30 degrees or less,Turn and reposition approx. every two hours(pillow and wedges)    Nutrition Interventions: Document food/fluid/supplement intake    Friction and Shear Interventions: Apply protective barrier, creams and emollients,Transfer aides:transfer board/Carmen lift/ceiling lift,Transferring/repositioning devices                Problem: Patient Education: Go to Patient Education Activity  Goal: Patient/Family Education  Outcome: Progressing Towards Goal     Problem: Falls - Risk of  Goal: *Absence of Falls  Description: Document Louis Fall Risk and appropriate interventions in the flowsheet.   Outcome: Progressing Towards Goal  Note: Fall Risk Interventions:  Mobility Interventions: Bed/chair exit alarm    Mentation Interventions: Adequate sleep, hydration, pain control    Medication Interventions: Teach patient to arise slowly    Elimination Interventions: Call light in reach              Problem: Patient Education: Go to Patient Education Activity  Goal: Patient/Family Education  Outcome: Progressing Towards Goal     Problem: Patient Education: Go to Patient Education Activity  Goal: Patient/Family Education  Outcome: Progressing Towards Goal Problem: Patient Education: Go to Patient Education Activity  Goal: Patient/Family Education  Outcome: Progressing Towards Goal     Problem: Patient Education: Go to Patient Education Activity  Goal: Patient/Family Education  Outcome: Progressing Towards Goal

## 2022-05-09 NOTE — PROGRESS NOTES
0715 Shift report received from [facility]. Report included SBAR, Kardex, MAR and any events over night.

## 2022-05-09 NOTE — PROGRESS NOTES
Palliative Medicine  [place]: [phone] ([phone])  [facility]: [phone] ([phone])    Telephone call made to patient's grand daughter/ mPOA Arnulfo Root, to touch base with her and let her know that she missed meeting with me last Friday. Arnulfo Root is not ready to accept patient's age related and cognitive/functional declines related to his dementia. She notes she wants to know why \"his eyes roll back sometimes\" and his breathing changing, noting \"I need some data\". She asked about the neurology consult, gave her a brief synopsis, but she would benefit from hearing from the neurologist or hospitalist about the results being age related and disease related declines. Arnulfo Root may be scared of patient dying in her home, especially if she has younger children there. She did not specifically note this, but she does not seem comfortable with patient changes in functioning at this time. Discussed with her that hospice team could be very supportive and helpful to her and patient and what patient needs is consistency, familiar people and care at home. She noted that she would call me back and hung up (unsure if she had a work related issue). Have sent message via PS to Dr Hanna Staley. Nothing further for Palliative team at this time. Will see if Arnulfo Root calls back.

## 2022-05-09 NOTE — PROGRESS NOTES
Problem: Aspiration - Risk of  Goal: *Absence of aspiration  Outcome: Progressing Towards Goal     Problem: Pressure Injury - Risk of  Goal: *Prevention of pressure injury  Description: Document Newton Scale and appropriate interventions in the flowsheet.   Outcome: Progressing Towards Goal  Note: Pressure Injury Interventions:  Sensory Interventions: Assess changes in LOC,Keep linens dry and wrinkle-free,Minimize linen layers    Moisture Interventions: Absorbent underpads,Minimize layers,Maintain skin hydration (lotion/cream)    Activity Interventions: PT/OT evaluation,Increase time out of bed    Mobility Interventions: HOB 30 degrees or less,Pressure redistribution bed/mattress (bed type)    Nutrition Interventions: Document food/fluid/supplement intake    Friction and Shear Interventions: Apply protective barrier, creams and emollients,Minimize layers                Problem: Patient Education: Go to Patient Education Activity  Goal: Patient/Family Education  Outcome: Progressing Towards Goal     Problem: Patient Education: Go to Patient Education Activity  Goal: Patient/Family Education  Outcome: Progressing Towards Goal     Problem: Patient Education: Go to Patient Education Activity  Goal: Patient/Family Education  Outcome: Progressing Towards Goal

## 2022-05-09 NOTE — PROGRESS NOTES
Hospitalist Progress Note    NAME: Miroslava Bowers Sr   :  1931   MRN:  [id number]   Room Number:  170  @ [facility]     Please note that this dictation was completed with Radha Silva, the computer voice recognition software. Quite often unanticipated grammatical, syntax, homophones, and other interpretive errors are inadvertently transcribed by the computer software. Please disregard these errors. Please excuse any errors that have escaped final proofreading. Interim Hospital Summary: 80 y.o. male whom presented on 2022 with      Assessment / Plan:        #Acute Hypoxic resp failure not POA ? Aspiration vs acute on chronic systolic heart failure exacerbation  #Worsening metabolic encephalopathy not POA   #SUBHA on CKD 3b ? Cardiorenal versus prerenal  -Repeat CT head negative for acute process  -Repeat chest x-ray with moderate right pleural effusion. Right lung interstitial patchy airspace consolidation. Left pleural space normal left lung is clear  -BNP given 35,000. Creatinine 1.89 today up from 1.51. No improvement with gentle hydration  -ABG PO2 92 on 3 L  -Difficult to gauge patient volume status given recent EF of 15% , elevated BNP and  Patient  not eating and drinking due to underlying worsening decompensation of dementia. Will give one-time Lasix to see if it patient's oxygen status improve.   Patient has a very poor prognosis.  -Discussed the case with his neurologist Dr. Saldivar Needs, at this point patient is not eating or taking medication, not to do much from neurology standpoint for his advanced dementia    -Continue supplemental oxygen  -One-time Lasix  -Strict ins and outs  -Daily weight  -Aspiration precautions  -Continue antibiotics  -Trend creatinine      #Polymicrobial bacteremia POA   #Bandemia resolved  -Blood culture 2022 positive for Streptococcus, Rothia, Staph epidermidis  -Currently on ceftriaxone and vancomycin  -Repeat blood cultures negative    -Discussed case with infectious disease. Appreciate help. Dr Meghann Dunne   -Patient will need 2 weeks of IV antibiotics from last negative blood culture EOT 5/14/2022      #Suspected aspiration pneumonia  #Mild oropharyngeal dysphagia  -Chest x-ray reviewed dependently admission bilaterally due to pulmonary opacity worse on right kidney with apparent asymmetric pulmonary edema versus infection  -Repeat chest x-ray with unchanged opacity in right malleoli region and right lower lobe suspicious for airway disease. Unchanged right pleural effusion. 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     Alzheimer's dementia POA  - continue memantine   - Palliative consulted, appreciate help  -Neurology on board appreciate help        Elevated troponin POA flat trend  Chronic systolic congestive heart failure POA  S/p pacemaker. Troponin 162-148. ECHO 2/2022 EF 15-20%. -EKG with atrial sensed ventricular paced rhythm     - no acute intervention needed              Hypothyroidism POA   -TSH 5.89  - continue levothyroxin        CAD POA  S/p AVNRT Ablation POA  HTN POA  HLD POA  - holding lisinopril, furosemide due to SUBHA         There is no height or weight on file to calculate BMI.   Code Status:DNR   Surrogate Decision Maker: alcira      DVT Prophylaxis: SCD's  GI Prophylaxis: not indicated  Baseline: uses rollator                 Subjective:     Chief Complaint / Reason for Physician Visit  No acute issues. Discussed with RN events overnight. Review of Systems:  No fevers, chills, appetite change, cough, sputum production, shortness of breath, dyspnea on exertion, nausea, vomitting, diarrhea, constipation, chest pain, leg edema, abdominal pain, joint pain, rash, itching. Tolerating PT/OT. Tolerating diet. Objective:     VITALS:   Last 24hrs VS reviewed since prior progress note. Most recent are:  Patient Vitals for the past 24 hrs:   Temp Pulse Resp BP SpO2   05/09/22 0729 98.6 °F (37 °C) 66 18 120/66 100 %   05/08/22 2005 97.5 °F (36.4 °C) 66 18 126/64 100 %       Intake/Output Summary (Last 24 hours) at 5/9/2022 1010  Last data filed at 5/8/2022 1730  Gross per 24 hour   Intake 500 ml   Output    Net 500 ml        PHYSICAL EXAM:  General: Awake alert no acute distress    EENT:  EOMI. Anicteric sclerae. MMM  Resp:  CTA bilaterally, no wheezing or rales. No accessory muscle use  CV:  Regular  rhythm,  normal S1/S2, no murmurs rubs gallops, No edema  GI:  Soft, Non distended, Non tender. +Bowel sounds  Neurologic:  Drowsy  Psych:   Not agitated or anxious  Skin:  No rashes. No jaundice    Reviewed most current lab test results and cultures  YES  Reviewed most current radiology test results   YES  Review and summation of old records today    NO  Reviewed patient's current orders and MAR    YES  PMH/SH reviewed - no change compared to H&P  ________________________________________________________________________  Care Plan discussed with:    Comments   Patient x    Family      RN x    Care Manager     Consultant                        x Multidiciplinary team rounds were held today with , nursing, pharmacist and clinical coordinator. Patient's plan of care was discussed; medications were reviewed and discharge planning was addressed.      ________________________________________________________________________  Total NON critical care TIME:  25 Minutes    Total CRITICAL CARE TIME Spent:   Minutes non procedure based      Comments   >50% of visit spent in counseling and coordination of care x    ________________________________________________________________________  Joyce Baxter MD     Procedures: see electronic medical records for all procedures/Xrays and details which were not copied into this note but were reviewed prior to creation of Plan. LABS:  I reviewed today's most current labs and imaging studies.   Pertinent labs include:  Recent Labs     05/09/22  0500   WBC 8.7   HGB 10.1*   HCT 33.9*        Recent Labs     05/09/22  0500 05/08/22  1010 05/07/22  0335     --   --    K 5.4*  --   --      --   --    CO2 25  --   --    GLU 96  --   --    BUN 65*  --   --    CREA 2.12* 2.14* 2.04*   CA 8.9  --   --    ALB 2.2*  2.2*  --   --    TBILI 0.6  0.6  --   --    ALT 29  27  --   --        Signed: Joyce Baxter MD

## 2022-05-09 NOTE — PROGRESS NOTES
Problem: Self Care Deficits Care Plan (Adult)  Goal: *Acute Goals and Plan of Care (Insert Text)  Description: FUNCTIONAL STATUS PRIOR TO ADMISSION: Per chart pt lives with his granddaughter, performed ADLs and functional mobility with supervision and uses rollator and w/c. Chart stated significant decline in function over the past few weeks with worsening dementia. HOME SUPPORT: The patient lived with granddaughter and required assist for ADLs. Occupational Therapy Goals  Initiated 4/29/2022; Weekly Re-assessment 5/4/2022  1. Patient will perform self-feeding with minimal assistance/contact guard assist within 7 day(s). -Continue  2. Patient will perform face washing with minimal assistance/contact guard assist within 7 day(s). -Continue  3. Patient will perform toilet transfers with maximal assistance within 7 day(s). -Continue  4. Patient will participate in upper extremity therapeutic exercise/activities with minimal assistance/contact guard assist for 10 minutes within 7 day(s). -Continue      Outcome: Progressing Towards Goal   OCCUPATIONAL THERAPY TREATMENT  Patient: Luis Daniel Farfan  (80 y.o. male)  Date: 5/9/2022  Diagnosis: Hypernatremia [E87.0] <principal problem not specified>       Precautions: DNR,Fall  Chart, occupational therapy assessment, plan of care, and goals were reviewed. ASSESSMENT  Patient continues with skilled OT services and is slowly progressing towards goals. Nursing cleared for OT. Received on 4L O2. Patient oriented to self only. Supine > sit with max AX2 with impaired sitting balance initially with R lateral lean. Increased sitting balance with additional time at EOB and he participated with washing face with SBA. Sit > stand with max AX2, unable to progress to sidestepping. Patient requesting to return to supine and beginning to lay down. X2 assist sit > supine.       SpO2 to 88% with EOB activity on 4 L, with return to 90-96% with seated rest.      Current Level of Function Impacting Discharge (ADLs): grooming SBA at EOB, functional mobility max Ax2 for bed level    Other factors to consider for discharge: Patient was living with granddaughter and was supervision with rollator/wc with some assist with ADL tasks. Recommend return home with assistance vs SNF pending family ability to provide care and wishes for patient. PLAN :  Patient continues to benefit from skilled intervention to address the above impairments. Continue treatment per established plan of care to address goals. Recommend with staff: reposition every two hours, bed in chair for meals as tolerated, encourage self feeding at highest level of indep    Recommend next OT session: per POC    Recommendation for discharge: (in order for the patient to meet his/her long term goals)  Therapy up to 5 days/week in SNF setting    This discharge recommendation:  Has not yet been discussed the attending provider and/or case management    IF patient discharges home will need the following DME: bedside commode, hospital bed, and mechanical lift       SUBJECTIVE:   Patient stated [place].   When asked where he is from. OBJECTIVE DATA SUMMARY:   Cognitive/Behavioral Status:  Neurologic State: Alert  Orientation Level: Oriented to person;Disoriented to time;Disoriented to situation;Disoriented to place  Cognition: Follows commands             Functional Mobility and Transfers for ADLs:  Bed Mobility:  Supine to Sit: Maximum assistance;Assist x2  Sit to Supine: Maximum assistance;Assist x2  Scooting: Maximum assistance;Assist x2    Transfers:  Sit to Stand: Maximum assistance;Assist x2          Balance:  Sitting: Impaired  Sitting - Static: Fair (occasional); Poor (constant support)  Sitting - Dynamic: Poor (constant support)  Standing: Impaired; With support  Standing - Static: Constant support;Poor  Standing - Dynamic : Constant support;Poor    ADL Intervention:       Grooming  Washing Face: Stand-by assistance            Pain:  No c/o pain    Activity Tolerance:   Fair and Poor--4 L O2    After treatment patient left in no apparent distress:   Supine in bed, Call bell within reach, and Bed / chair alarm activated    COMMUNICATION/COLLABORATION:   The patients plan of care was discussed with: Physical therapist and Registered nurse.      Lin Reece OT  Time Calculation: 12 mins

## 2022-05-09 NOTE — PROGRESS NOTES
VITOR     RUR 21 %     Plan   Home with Hospice vs [facility] G[facility]  DME   Second IMM   Check on [place] Benefits   Transportation    IDR Rounds with MD and team again this am.   Continue plan of care- xray of hand some swelling   Continue IV ABX. Neurology did see patient -the weekend- Dr. Slick Abreu to follow-up - information to be shared with granddaughter. Reviewed Palliative Care note form earlier today. May need another patient care conference   To follow-up with everyone again. Agata Godfrey MSW RN   [phone]       Addendum: 5:40PM    Met with patient's granddaughter and Dr. Slick Abreu in the room. She discussed his condition. The neurology consult. Discussed next steps. Plans for transition post his iV ABX. She is concerned about taking him home - today was the first time she has been here she has really acknowledge this, See Palliative Care Note. We discussed options again- Home with Hospice - SNF placement. She wants to focus on taking him home with services. We discussed applying for Medicaid again- benefits- will be denied over resources unless he is accepting LTC. She is interested in Hospice . To call them and followfor Home Hospice. He will need a hospital bed-     We dicussed [facility]- gave information to call at least to discuss if they are accepting application for their Emergency Home Care Program.   We discussed him being a - she was not able to provide that much information- she indicates he is a  but no connection with them since he has been with her. She is not sure if he is service connection. She gave me permission to call the [place] to see if he is registered in the system. She is aware if he is in the system his information will need to be updated-  If not she will need to provide information to complete the eligibility process. .      He has been in her home for  4 years.  She has been his main caregiver-along with some support from her mother- she  Has  3 children - 2 teen agers and a 1year old-the two teen agers has been a great support also her mother helps at times. Essentially she is the only one. There is a another granddaughter that called to the unit- phone call transferred to the room. She indicates this one upsets her grandfather. She was allowing him to come to her home but had to banned her from coming recently. Not sure of the full story behind this situation. To follow-up in the am. Regarding the above.      [address] CIERRA RN   [phone]

## 2022-05-09 NOTE — PROGRESS NOTES
1910) Bedside and Verbal shift change report given to [address] (oncoming nurse) by Aries Parish (offgoing nurse). Report included the following information SBAR, Kardex, Intake/Output and MAR.    1950) PM assessment done. Patient is sleeping and was unable to answer question. Patient is responsive to voice. No signs of pain detected. Bilateral upper extremities swollen and elevated on pillows. 2132) Bedtime medications crushed and given with apple sauce and tolerated well. 2232) Patient was rounded on. Patient is sleeping comfortably in bed.     0000) Patient was rounded on. Patient is sleeping comfortably in bed.      0410) Writer was unable to draw labs for Vanc random due to swollen upper extremities. Patient is resting in bed.     0540) Incontinent care done. Patient was incontinent of urine and bowel. Patient had a brown, soft, medium bowel movement. New brief and bed pad in place. 0725) Bedside and Verbal shift change report given to Oxana Loo (oncoming nurse) by Dorothy [address] (offgoing nurse). Report included the following information SBAR, Kardex, Intake/Output and MAR.

## 2022-05-09 NOTE — PROGRESS NOTES
Labs collected. 0700- pt. Resting comfortable in bed. Bedside shift change report given to [address] (oncoming nurse) by Shirley Lindquist RN(offgoing nurse).  Report included the following information SBAR, MAR, Kardex, intake/output

## 2022-05-10 NOTE — PROGRESS NOTES
CM called the [address] [place] [address] Krystal Yadav ([phone]) and spoke with Ms. Fabiano Fernandes. CM provided her with this pt's name and last 4 digits of his SSN to see if he is in the [place] \"system\". She confirmed that he is not in the [place] \"system\". CM asked if pt is eligible for VA benefits and she stated that he would have to put an application in before eligibility determination could be made. Rajeev Schulz

## 2022-05-10 NOTE — PROGRESS NOTES
1920) Bedside and Verbal shift change report given to Dorothy [address] (oncoming nurse) by RADHA Edwards (offgoing nurse). Report included the following information SBAR, Kardex, Intake/Output and MAR.    1930) Scheduled Memantine 5 mg tablet crushed and given with apple sauce. 2020) PM assessment done. Patient is sleeping and was unable to answer question. Patient is responsive to voice. No signs of pain detected. Bilateral upper extremities swollen and elevated on pillows. Bilateral SCDs on.      2140) Bedtime medications crushed and given with apple sauce and tolerated well. 2235) Patient was rounded on. Patient is resting comfortably in bed.     2324) Scheduled Vancomycin 500 mg in 0.9% sodium chloride 100 ml IVPB infusing. 0021) Patient was rounded on. Patient is now sleeping comfortably in bed.     8830) Patient was rounded on. Patient is now sleeping comfortably in bed.     0302) Writer was unable to draw morning labs for due to swollen upper extremities. Patient is resting in bed.     0445) Patient was  rounded on. Patient is now sleeping comfortably in bed.     0600) Incontinent care done. Patient was incontinent of urine and bowel. Patient had a brown, formed, medium bowel movement. New brief and bed pad in place.     0730) Bedside and Verbal shift change report given to [facility] (oncoming nurse) by Dorothy [address] (offgoing nurse). Report included the following information SBAR, Kardex, Intake/Output and MAR.

## 2022-05-10 NOTE — PROGRESS NOTES
Hospitalist Progress Note    NAME: Rachael Rivers    :  1931   MRN:  [id number]   Room Number:  528/83  @ [facility]     Please note that this dictation was completed with Jeanette Larry, the computer voice recognition software. Quite often unanticipated grammatical, syntax, homophones, and other interpretive errors are inadvertently transcribed by the computer software. Please disregard these errors. Please excuse any errors that have escaped final proofreading. Interim Hospital Summary: 80 y.o. male whom presented on 2022 with      Assessment / Plan:        #Acute Hypoxic resp failure not POA ? Aspiration vs acute on chronic systolic heart failure exacerbation  #Worsening metabolic encephalopathy not POA   #SUBHA on CKD 3b ? Cardiorenal versus prerenal  -Repeat CT head negative for acute process  -Repeat chest x-ray with moderate right pleural effusion. Right lung interstitial patchy airspace consolidation. Left pleural space normal left lung is clear  -BNP given 35,000. Creatinine 1.89 today up from 1.51. No improvement with gentle hydration  -ABG PO2 92 on 3 L  -Difficult to gauge patient volume status given recent EF of 15% , elevated BNP and  Patient  not eating and drinking due to underlying worsening decompensation of dementia. Will give one-time Lasix to see if it patient's oxygen status improve.   Patient has a very poor prognosis.  -Discussed the case with his neurologist Dr. Alex Hammond, at this point patient is not eating or taking medication, not to do much from neurology standpoint for his advanced dementia      -Continue supplemental oxygen  -Lasix 40 mg IV one time  -Strict ins and outs  -Daily weight  -Aspiration precautions  -Continue antibiotics  -Trend creatinine      Polymicrobial bacteremia POA   Bandemia resolved  -Blood culture 2022 positive for Streptococcus, Rothia, Staph epidermidis  -Currently on ceftriaxone and vancomycin  -Repeat blood cultures negative    -Discussed case with infectious disease. Appreciate help. Dr Colton Haq   -Patient will need 2 weeks of IV antibiotics from last negative blood culture EOT 5/14/2022      Suspected aspiration pneumonia  Mild oropharyngeal dysphagia  -Chest x-ray reviewed dependently admission bilaterally due to pulmonary opacity worse on right kidney with apparent asymmetric pulmonary edema versus infection  -Repeat chest x-ray with unchanged opacity in right malleoli region and right lower lobe suspicious for airway disease. Unchanged right pleural effusion. 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     Alzheimer's dementia POA  - continue memantine, dose was decreased to 5 mg BID due to renal impairment. However patient's grand-daughter has demanded that it be changed back to 10 mg BID (PTA dosing). Risks of increased dosing with altered renal function discussed. - Palliative consulted, appreciate help  -Neurology on board appreciate help        Elevated troponin POA flat trend  Chronic systolic congestive heart failure POA  S/p pacemaker. Troponin 162-148. ECHO 2/2022 EF 15-20%.    -EKG with atrial sensed ventricular paced rhythm     - no acute intervention needed              Hypothyroidism POA   -TSH 5.89  - continue levothyroxin        CAD POA  S/p AVNRT Ablation POA  HTN POA  HLD POA  - holding lisinopril, furosemide due to SUBHA         There is no height or weight on file to calculate BMI. Code Status:DNR   Surrogate Decision Maker: alcira      DVT Prophylaxis: SCD's  GI Prophylaxis: not indicated  Baseline: uses rollator                 Subjective:     Chief Complaint / Reason for Physician Visit  No acute issues. Discussed with RN events overnight. Review of Systems:  No fevers, chills, appetite change, cough, sputum production, shortness of breath, dyspnea on exertion, nausea, vomitting, diarrhea, constipation, chest pain, leg edema, abdominal pain, joint pain, rash, itching. Tolerating PT/OT. Tolerating diet. Objective:     VITALS:   Last 24hrs VS reviewed since prior progress note. Most recent are:  Patient Vitals for the past 24 hrs:   Temp Pulse Resp BP SpO2   05/10/22 0800 97.6 °F (36.4 °C) 77 16 128/64 100 %   05/09/22 1950     100 %   05/09/22 1926 97.3 °F (36.3 °C) 64 18 105/66 100 %     No intake or output data in the 24 hours ending 05/10/22 1026     PHYSICAL EXAM:  General: Awake alert no acute distress    EENT:  EOMI. Anicteric sclerae. MMM  Resp:  CTA bilaterally, no wheezing or rales. No accessory muscle use  CV:  Regular  rhythm,  normal S1/S2, no murmurs rubs gallops, No edema  GI:  Soft, Non distended, Non tender. +Bowel sounds  Neurologic:  Drowsy  Psych:   Not agitated or anxious  Skin:  No rashes. No jaundice    Reviewed most current lab test results and cultures  YES  Reviewed most current radiology test results   YES  Review and summation of old records today    NO  Reviewed patient's current orders and MAR    YES  PMH/ reviewed - no change compared to H&P  ________________________________________________________________________  Care Plan discussed with:    Comments   Patient x    Family      RN x    Care Manager     Consultant                        x Multidiciplinary team rounds were held today with , nursing, pharmacist and clinical coordinator.   Patient's plan of care was discussed; medications were reviewed and discharge planning was addressed. ________________________________________________________________________  Total NON critical care TIME:  25   Minutes    Total CRITICAL CARE TIME Spent:   Minutes non procedure based      Comments   >50% of visit spent in counseling and coordination of care x    ________________________________________________________________________  Eilleen Lennox, MD     Procedures: see electronic medical records for all procedures/Xrays and details which were not copied into this note but were reviewed prior to creation of Plan. LABS:  I reviewed today's most current labs and imaging studies.   Pertinent labs include:  Recent Labs     05/09/22  0500   WBC 8.7   HGB 10.1*   HCT 33.9*        Recent Labs     05/09/22  0500 05/08/22  1010     --    K 5.4*  --      --    CO2 25  --    GLU 96  --    BUN 65*  --    CREA 2.12* 2.14*   CA 8.9  --    ALB 2.2*  2.2*  --    TBILI 0.6  0.6  --    ALT 29  27  --        Signed: Eilleen Lennox, MD

## 2022-05-10 NOTE — PROGRESS NOTES
Problem: Pressure Injury - Risk of  Goal: *Prevention of pressure injury  Description: Document Newton Scale and appropriate interventions in the flowsheet.   Outcome: Progressing Towards Goal  Note: Pressure Injury Interventions:  Sensory Interventions: Assess changes in LOC    Moisture Interventions: Absorbent underpads,Apply protective barrier, creams and emollients    Activity Interventions: PT/OT evaluation    Mobility Interventions: HOB 30 degrees or less    Nutrition Interventions: Document food/fluid/supplement intake    Friction and Shear Interventions: Apply protective barrier, creams and emollients                Problem: Aspiration - Risk of  Goal: *Absence of aspiration  Outcome: Progressing Towards Goal

## 2022-05-10 NOTE — PROGRESS NOTES
[facility] Dosing Services: Antimicrobial Stewardship Daily Doc  Consult for dosing of vancomycin by Dr. Christophe Heredia  Indication: bloodstream infection  Final Day of Therapy: 5/14/22    Ht Readings from Last 1 Encounters:   05/01/22 172.7 cm (68\")        Wt Readings from Last 1 Encounters:   05/06/22 49.4 kg (109 lb)      Vancomycin therapy:  No current maintenance dose due to renal function  Last level: 14.6 mcg/mL on 5/10/22  Dose calculated to approximate a target level </= 15 mcg/mL     Plan: Vancomycin level therapeutic today and is approximately 64 hours post last dose (5/7 @ 2340). Will give 500 mg X 1 tonight at 2300, 72 hours post last dose. Scr increased today. Will continue to dose based on levels and monitor renal function. Date Dose & Interval Measured (mcg/mL) Extrapolated (mcg/mL)   5/1 500 mg IV q24h 3.8 354   5/4 750 mg IV q24h 22.9 756   5/7 s/p 750 mg 5/4 @ 2300 14.7 inaccurate   5/10 500 mg IV x 1 14.6      Other Antimicrobial   (not dosed by pharmacist) Ceftriaxone 1 g IV q24h  Metronidazole 500 mg IV q12h   Cultures 4/30: blood cx - NG @ final  4/28: blood cx:   2 of 2: streptococcus parasanguinis and streptococcus anginosus  1 of 2 staphylococcus epidermidis  4/28: blood cx:  2 of 2: granulicatella adiacens and streptoococcus gordonii  1 of 2: rothia mucilaginosa   Serum Creatinine Lab Results   Component Value Date/Time    Creatinine 2.12 (H) 05/09/2022 05:00 AM    Creatinine, POC 1.8 (H) 04/28/2022 01:37 PM       Creatinine Clearance Estimated Creatinine Clearance: 15.9 mL/min (A) (based on SCr of 2.12 mg/dL (H)).    Temp Temp: 98.6 °F (37 °C)     WBC Lab Results   Component Value Date/Time    WBC 8.7 05/09/2022 05:00 AM      Procalcitonin Lab Results   Component Value Date/Time    Procalcitonin 0.57 04/29/2022 09:51 AM      For Antifungals, Metronidazole and Nafcillin: Lab Results   Component Value Date/Time    ALT (SGPT) 29 05/09/2022 05:00 AM    ALT (SGPT) 27 05/09/2022 05:00 AM AST (SGOT) 45 (H) 05/09/2022 05:00 AM    AST (SGOT) 41 (H) 05/09/2022 05:00 AM    Alk. phosphatase 130 (H) 05/09/2022 05:00 AM    Alk.  phosphatase 129 (H) 05/09/2022 05:00 AM    Bilirubin, total 0.6 05/09/2022 05:00 AM    Bilirubin, total 0.6 05/09/2022 05:00 AM

## 2022-05-11 NOTE — PROGRESS NOTES
Problem: Mobility Impaired (Adult and Pediatric)  Goal: *Acute Goals and Plan of Care (Insert Text)  Description: FUNCTIONAL STATUS PRIOR TO ADMISSION: Per chart pt lives with his granddaughter, performed ADLs and functional mobility with supervision and uses rollator and w/c. Chart stated significant decline in function over the past few weeks with worsening dementia. HOME SUPPORT: The patient lived with granddaughter. Physical Therapy Goals  Initiated 4/29/2022  1. Patient will move from supine to sit and sit to supine in bed with moderate assistance  within 7 day(s). 2.  Patient will transfer from bed to chair and chair to bed with maximal assistance using the least restrictive device within 7 day(s). 3.  Patient will perform sit to stand with maximal assistance within 7 day(s). MET  4. Patient will ambulate with maximal assistance for 10 feet with the least restrictive device within 7 day(s). Updated 5/6/2022; Reviewed and updated 5/11/22:  1. Patient will move from supine to sit and sit to supine in bed with moderate assistance  within 7 day(s). 2.  Patient will transfer from bed to chair and chair to bed with maximal assistance using the least restrictive device within 7 day(s). 3.  Patient will perform sit to stand with moderate assistance within 7 day(s). Outcome: Progressing Towards Goal     PHYSICAL THERAPY TREATMENT  Weekly reassessment  Patient: Karina Love Adolph Vences (80 y.o. male)  Date: 5/11/2022  Diagnosis: Hypernatremia [E87.0] <principal problem not specified>       Precautions: DNR,Fall  Chart, physical therapy assessment, plan of care and goals were reviewed. ASSESSMENT  Patient continues with skilled PT services and is progressing towards goals. Patient performed bed mobility with maximal assist x2. Patient attempted standing with total assist. Patient unable to achieve upright. Patient fatigued by end of session and repositioned for comfort and pressure relief. Current Level of Function Impacting Discharge (mobility/balance): max A X2    Other factors to consider for discharge: cognition          PLAN :  Patient continues to benefit from skilled intervention to address the above impairments. Continue treatment per established plan of care. to address goals. Recommendation for discharge: (in order for the patient to meet his/her long term goals)  Therapy up to 5 days/week in SNF setting    This discharge recommendation:  Has been made in collaboration with the attending provider and/or case management    IF patient discharges home will need the following DME: hospital bed       SUBJECTIVE:   Patient stated I'm okay.     OBJECTIVE DATA SUMMARY:   Critical Behavior:  Neurologic State: Alert,Drowsy  Orientation Level: Oriented to person  Cognition: Decreased attention/concentration,Poor safety awareness,Follows commands  Safety/Judgement: Decreased awareness of environment,Decreased awareness of need for assistance,Decreased awareness of need for safety,Decreased insight into deficits  Functional Mobility Training:  Bed Mobility:     Supine to Sit: Maximum assistance;Assist x2  Sit to Supine: Maximum assistance;Assist x2  Scooting: Maximum assistance;Assist x2    Transfers:  Sit to Stand: Total assistance  Stand to Sit: Total assistance    Balance:  Sitting: Impaired  Sitting - Static: Fair (occasional)  Sitting - Dynamic: Poor (constant support)  Standing: Impaired; With support  Standing - Static: Constant support;Poor  Standing - Dynamic : Constant support;Poor      Pain Rating:  No pain    Activity Tolerance:   Fair    After treatment patient left in no apparent distress:   Supine in bed, Call bell within reach, Bed / chair alarm activated, and Side rails x 3    COMMUNICATION/COLLABORATION:   The patients plan of care was discussed with: Occupational therapist and Registered nurse.      Omid Carmen, PT   Time Calculation: 13 mins

## 2022-05-11 NOTE — PROGRESS NOTES
Hospitalist Progress Note    NAME: Franklin Robison Sr   :  1931   MRN:  [id number]   Room Number:  268/18  @ [facility]     Please note that this dictation was completed with Parvin Lange, the computer voice recognition software. Quite often unanticipated grammatical, syntax, homophones, and other interpretive errors are inadvertently transcribed by the computer software. Please disregard these errors. Please excuse any errors that have escaped final proofreading. Interim Hospital Summary: 80 y.o. male whom presented on 2022 with      Assessment / Plan:        #Acute Hypoxic resp failure not POA ? Aspiration vs acute on chronic systolic heart failure exacerbation  #Worsening metabolic encephalopathy not POA   #SUBHA on CKD 3b ? Cardiorenal versus prerenal  -Repeat CT head negative for acute process  -Repeat chest x-ray with moderate right pleural effusion. Right lung interstitial patchy airspace consolidation. Left pleural space normal left lung is clear  -BNP given 35,000. Creatinine 1.89 today up from 1.51. No improvement with gentle hydration  -ABG PO2 92 on 3 L  -Difficult to gauge patient volume status given recent EF of 15% , elevated BNP and  Patient  not eating and drinking due to underlying worsening decompensation of dementia.   Patient has a very poor prognosis.  -Discussed the case with his neurologist, not to do much from neurology standpoint for his advanced dementia      -Continue supplemental oxygen  -Lasix as needed   -Strict ins and outs  -Daily weight  -Aspiration precautions  -Continue antibiotics  -Trend creatinine    Upper arm swelling   Duplex neg for DVT   Suspect third spacing d/t hypoalbuminemia   Elevate UE       Polymicrobial bacteremia POA   Bandemia resolved  -Blood culture 2022 positive for Streptococcus, Rothia, Staph epidermidis  -Currently on ceftriaxone and vancomycin  -Repeat blood cultures negative    -Discussed case with infectious disease. Appreciate help. Dr Adeline Alexis   -Patient completed course with IV antibiotics till 5/11/2022  -No IV access due to being difficult stick  - D/w Dr Adeline Alexis okay to change IV Abx for PO to complete last three days, appreciate help       Suspected aspiration pneumonia  Mild oropharyngeal dysphagia  -Chest x-ray reviewed dependently admission bilaterally due to pulmonary opacity worse on right kidney with apparent asymmetric pulmonary edema versus infection  -Repeat chest x-ray with unchanged opacity in right malleoli region and right lower lobe suspicious for airway disease. Unchanged right pleural effusion. Decrease in background edema  -COVID-19 negative  -Procalcitonin 0.57  -Modified barium swallow without any clear aspiration    -Continue antibiotic for aspiration pneumonia  -Speech therapy on board appreciate help  -Aspiration precautions  -Poor prognosis ( mPOA does not want peg tube placement as this time and would prefer oral nutrition )       Hypernatremia POA resolved     Physical deconditioning POA   - PT/OT     Failure to thrive   Severe protein calorie malnutrition  Severe malnutrition POA  - nutrition consulted   - D/w mPOA that patient is not able have adequate nutrition, alternative enteral feed might be needed like Peg tube placement, per mPOA they dont want Peg tube for alternative feed source, they want to see if patient can have oral nutrient encouraged       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     Alzheimer's dementia POA  - continue memantine, dose was decreased to 5 mg BID due to renal impairment. However patient's grand-daughter has demanded that it be changed back to 10 mg BID (PTA dosing). Risks of increased dosing with altered renal function discussed.    - Palliative consulted, appreciate help  -Neurology on board appreciate help        Elevated troponin POA flat trend  Chronic systolic congestive heart failure POA  S/p pacemaker. Troponin 162-148. ECHO 2/2022 EF 15-20%. -EKG with atrial sensed ventricular paced rhythm     - no acute intervention needed              Hypothyroidism POA   -TSH 5.89  - continue levothyroxin        CAD POA  S/p AVNRT Ablation POA  HTN POA  HLD POA  - holding lisinopril, furosemide due to SUBHA     Hx of Gout   - hold colchicine due to SUBHA         There is no height or weight on file to calculate BMI. Code Status:DNR   Surrogate Decision Maker: alcira      DVT Prophylaxis: SCD's  GI Prophylaxis: not indicated  Baseline: uses rollator                 Subjective:     Chief Complaint / Reason for Physician Visit  No acute issues. Discussed with RN events overnight. Review of Systems:  No fevers, chills, appetite change, cough, sputum production, shortness of breath, dyspnea on exertion, nausea, vomitting, diarrhea, constipation, chest pain, leg edema, abdominal pain, joint pain, rash, itching. Tolerating PT/OT. Tolerating diet. Objective:     VITALS:   Last 24hrs VS reviewed since prior progress note. Most recent are:  Patient Vitals for the past 24 hrs:   Temp Pulse Resp BP SpO2   05/11/22 0836 (!) 96 °F (35.6 °C)  16 (!) 122/56 95 %   05/11/22 0807 (!) 96 °F (35.6 °C)       05/10/22 2333     96 %   05/10/22 2020     97 %   05/10/22 1935 97.4 °F (36.3 °C) 61 17 124/65 97 %   05/10/22 1134  72  138/60      No intake or output data in the 24 hours ending 05/11/22 0855     PHYSICAL EXAM:  General: Awake alert no acute distress    EENT:  EOMI. Anicteric sclerae. MMM  Resp:  CTA bilaterally, no wheezing or rales. No accessory muscle use  CV:  Regular  rhythm,  normal S1/S2, no murmurs rubs gallops, No edema  GI:  Soft, Non distended, Non tender. +Bowel sounds  Neurologic:  Drowsy  Psych:   Not agitated or anxious  Skin:  No rashes.   No jaundice    Reviewed most current lab test results and cultures YES  Reviewed most current radiology test results   YES  Review and summation of old records today    NO  Reviewed patient's current orders and MAR    YES  PMH/SH reviewed - no change compared to H&P  ________________________________________________________________________  Care Plan discussed with:    Comments   Patient x    Family      RN x    Care Manager     Consultant                        x Multidiciplinary team rounds were held today with , nursing, pharmacist and clinical coordinator. Patient's plan of care was discussed; medications were reviewed and discharge planning was addressed. ________________________________________________________________________  Total NON critical care TIME:  25   Minutes    Total CRITICAL CARE TIME Spent:   Minutes non procedure based      Comments   >50% of visit spent in counseling and coordination of care x    ________________________________________________________________________  Neomia MD Yasmany     Procedures: see electronic medical records for all procedures/Xrays and details which were not copied into this note but were reviewed prior to creation of Plan. LABS:  I reviewed today's most current labs and imaging studies.   Pertinent labs include:  Recent Labs     05/09/22  0500   WBC 8.7   HGB 10.1*   HCT 33.9*        Recent Labs     05/10/22  1508 05/09/22  0500 05/08/22  1010   NA  --  139  --    K  --  5.4*  --    CL  --  104  --    CO2  --  25  --    GLU  --  96  --    BUN  --  65*  --    CREA 2.35* 2.12* 2.14*   CA  --  8.9  --    ALB  --  2.2*  2.2*  --    TBILI  --  0.6  0.6  --    ALT  --  29 27  --        Signed: Tyrell Jade MD

## 2022-05-11 NOTE — PROGRESS NOTES
Problem: Aspiration - Risk of  Goal: *Absence of aspiration  Outcome: Progressing Towards Goal     Problem: Patient Education: Go to Patient Education Activity  Goal: Patient/Family Education  Outcome: Progressing Towards Goal     Problem: Pressure Injury - Risk of  Goal: *Prevention of pressure injury  Description: Document Newton Scale and appropriate interventions in the flowsheet. Outcome: Progressing Towards Goal  Note: Pressure Injury Interventions:  Sensory Interventions: Assess changes in LOC,Assess need for specialty bed,Discuss PT/OT consult with provider,Float heels,Keep linens dry and wrinkle-free,Maintain/enhance activity level,Minimize linen layers,Monitor skin under medical devices,Pad between skin to skin,Pressure redistribution bed/mattress (bed type),Turn and reposition approx. every two hours (pillows and wedges if needed)    Moisture Interventions: Absorbent underpads,Apply protective barrier, creams and emollients,Check for incontinence Q2 hours and as needed,Maintain skin hydration (lotion/cream),Moisture barrier,Minimize layers,Offer toileting Q_hr    Activity Interventions: Assess need for specialty bed,Increase time out of bed,Pressure redistribution bed/mattress(bed type),PT/OT evaluation    Mobility Interventions: Assess need for specialty bed,HOB 30 degrees or less,Pressure redistribution bed/mattress (bed type),PT/OT evaluation,Turn and reposition approx.  every two hours(pillow and wedges)    Nutrition Interventions: Document food/fluid/supplement intake,Discuss nutritional consult with provider,Offer support with meals,snacks and hydration    Friction and Shear Interventions: Apply protective barrier, creams and emollients,HOB 30 degrees or less,Lift sheet,Minimize layers,Transferring/repositioning devices                Problem: Patient Education: Go to Patient Education Activity  Goal: Patient/Family Education  Outcome: Progressing Towards Goal     Problem: Falls - Risk of  Goal: *Absence of Falls  Description: Document Esther King Fall Risk and appropriate interventions in the flowsheet.   Outcome: Progressing Towards Goal  Note: Fall Risk Interventions:  Mobility Interventions: Assess mobility with egress test,Bed/chair exit alarm,Communicate number of staff needed for ambulation/transfer,OT consult for ADLs,Patient to call before getting OOB,PT Consult for mobility concerns,PT Consult for assist device competence,Strengthening exercises (ROM-active/passive),Utilize walker, cane, or other assistive device    Mentation Interventions: Adequate sleep, hydration, pain control,Bed/chair exit alarm,Door open when patient unattended,Evaluate medications/consider consulting pharmacy,Eyeglasses and hearing aids,Familiar objects from home,Family/sitter at bedside,Increase mobility,More frequent rounding,Reorient patient,Room close to nurse's station,Toileting rounds,Update white board    Medication Interventions: Assess postural VS orthostatic hypotension,Bed/chair exit alarm,Evaluate medications/consider consulting pharmacy,Patient to call before getting OOB,Teach patient to arise slowly    Elimination Interventions: Bed/chair exit alarm,Call light in reach,Elevated toilet seat,Patient to call for help with toileting needs,Toilet paper/wipes in reach,Toileting schedule/hourly rounds,Urinal in reach              Problem: Patient Education: Go to Patient Education Activity  Goal: Patient/Family Education  Outcome: Progressing Towards Goal     Problem: Patient Education: Go to Patient Education Activity  Goal: Patient/Family Education  Outcome: Progressing Towards Goal     Problem: Patient Education: Go to Patient Education Activity  Goal: Patient/Family Education  Outcome: Progressing Towards Goal     Problem: Patient Education: Go to Patient Education Activity  Goal: Patient/Family Education  Outcome: Progressing Towards Goal     Problem: Patient Education: Go to Patient Education Activity  Goal: Patient/Family Education  Outcome: Progressing Towards Goal

## 2022-05-11 NOTE — PROGRESS NOTES
0730- bedside shift change report given to jerzy (oncoming nurse) by Abhishek Burris (offgoing nurse). Report included the following information SBAR, karadex and overnight events. Patient dry heavy overnight and again this am.      8389- patient resting in bed. No new complaints at this time. Granddaughter at bedside she fed patient oatmeal and and an ensure for breakfast. IV infiltrated pulled. Granddaughter requesting no new IV and no labs this am. Will speak with MD regarding po antibiotics    1020- round completed with MD domínguez. Notified granddaughter refusing labs and new IV at this time. Unable to give am IV antibiotics. MD to speak with granddaughter regarding poc. 1216- granddaughter at bedside at feeding lunch. 1223- notified Md Santos Old granddaughter is here if he would like to discuss antibiotics and labs. 36- Per MD domínguez granddaughter agreeable to labs and requesting ultrasound guided IV. 1248- per ED no one in the ED to do ultrasound guided IV. Notified MD domínguez to look into antbiotics    - patient resting in bed at this time. No complaints    1410- attempted labs x2. Perfect serve to MD domínguez- Granddaughter refusing with out ultrasound big note written in room. attempted labs x2. can you order art stick? granddaughter left note inquiring about gout meds and asking what we are doing about swelling in arms. Thanks. 1441- per Md domínguez arterial stick ok for labs. respiratory paged for arterial stick for labs. 1453- respiratory at bedside attempting arterial stick. 1506- respiratory therapy attempted arterial sticks 2x unable to obtain. MD domínguez notified. Per MD elevate arms for fluids. Per ID ok to switch antibiotics to PO     1525- spoke to MD domínguez. Ok to hold off on labs at this time and iv. Will elevated arms. MD domínguez to call patient's granddaughter regarding GOC. Patient eating very little and third spacing.  Granddaughter is feeding him but he seems to aspirate with feeds. HE is lethargic throughout most of the day. 5- granddaughter at bedside. Patient eat 4 spoonfuls of soup and a potatoes. Meds given    1944- shift change report given to raciel (oncoming nurse) by Corrina Carranza (offgoing nurse). Report included the following information SBAR, karadex and daytime events.

## 2022-05-11 NOTE — PROGRESS NOTES
Problem: Self Care Deficits Care Plan (Adult)  Goal: *Acute Goals and Plan of Care (Insert Text)  Description: FUNCTIONAL STATUS PRIOR TO ADMISSION: Per chart pt lives with his granddaughter, performed ADLs and functional mobility with supervision and uses rollator and w/c. Chart stated significant decline in function over the past few weeks with worsening dementia. HOME SUPPORT: The patient lived with granddaughter and required assist for ADLs. Occupational Therapy Goals  Initiated 4/29/2022; Weekly Re-assessment 5/4/2022. Weekly reassessment 5/11/22; all goals continued. 1.  Patient will perform self-feeding with minimal assistance/contact guard assist within 7 day(s). -Continue  2. Patient will perform face washing with minimal assistance/contact guard assist within 7 day(s). -Continue  3. Patient will perform toilet transfers with maximal assistance within 7 day(s). -Continue  4. Patient will participate in upper extremity therapeutic exercise/activities with minimal assistance/contact guard assist for 10 minutes within 7 day(s). -Continue      OCCUPATIONAL THERAPY TREATMENT  Patient: Jackeline Farfan  (80 y.o. male)  Date: 5/11/2022  Diagnosis: Hypernatremia [E87.0] <principal problem not specified>      Precautions: DNR,Fall  Chart, occupational therapy assessment, plan of care, and goals were reviewed. ASSESSMENT  Patient continues with skilled OT services and is progressing towards goals. Patient was able to sit unsupported edge of bed today, intermittently without support. Due to inability to follow commands, ongoing confusion, and fluctuating levels of arousal, patient continues to require assistance of two persons for supine<>sit and attempts at standing. Recommend co treat with physical therapy for safety with transfers. Patient did become more alert when he was transitioned to chair position.      Current Level of Function Impacting Discharge (ADLs): dependent for all aspects    Other factors to consider for discharge: disorientated, two person assist for transfers        PLAN :  Patient continues to benefit from skilled intervention to address the above impairments. Continue treatment per established plan of care to address goals. Recommend with staff: Two person assist for transfers. Requires total assist for feeding; chair position for all meals. Recommend next OT session: Ongoing co treat recommeded. Improve his tolerance for sitting edge of bed and progress standing as appropriate for decreased burden of care. Recommendation for discharge: (in order for the patient to meet his/her long term goals)  To be determined, pending progress and family plan/availability for care. This discharge recommendation:  Has not yet been discussed the attending provider and/or case management    IF patient discharges home will need the following DME: pending plan of care and progress. SUBJECTIVE:  Patient stated \"No.\"    OBJECTIVE DATA SUMMARY:  Cognitive/Behavioral Status:  Neurologic State: Confused;Drowsy  Orientation Level: Disoriented X4  Cognition: Decreased command following;Decreased attention/concentration; Impaired decision making        Safety/Judgement: Lack of insight into deficits    Functional Mobility and Transfers for ADLs:  Bed Mobility:  Rolling: Maximum assistance;Assist x1  Supine to Sit: Maximum assistance;Assist x2  Sit to Supine: Maximum assistance;Assist x2  Scooting: Maximum assistance;Assist x2    Transfers:  Sit to Stand: Total assistance        Balance:  Sitting: Impaired  Sitting - Static: Fair (occasional)  Sitting - Dynamic: Poor (constant support)  Standing: Impaired; With support  Standing - Static: Constant support;Poor  Standing - Dynamic : Constant support;Poor    ADL Intervention:  Feeding  Feeding Assistance: Total assistance (dependent)  Food to Mouth: Total assistance (dependent)  Drink to Mouth:  Total assistance (dependent)      Cognitive Retraining  Safety/Judgement: Lack of insight into deficits      Pain:  No indications of pain. Flacc 0/10.     Activity Tolerance:   Poor    After treatment patient left in no apparent distress:   Bed / chair alarm activated, bed in chair position, call bed in reach, feet in dorsiflexion with pillow support, SCD's    COMMUNICATION/COLLABORATION:  The patient's plan of care was discussed with: Physical therapist.     Oriana Johnston OT  Time Calculation: 17 mins       Outcome: Progressing Towards Goal

## 2022-05-12 NOTE — HOSPICE
Hospice RN Note: hospice team notified by palliative SW about hospice discussions with MPOA today. This RN called Jason Rudolph at [phone] to schedule time to meet at hospital tomorrow. No answer, VM left with return contact info. Will continue to attempt to make contact with MPOA.                     Donnell Sanchez RN, BSN, [facility]  Clinical Nurse Liaison  [phone] (o)

## 2022-05-12 NOTE — PROGRESS NOTES
VITOR   RUR 21%    IDR rounds again this am with MD and Team   Continue plan of care  MD to follow--up with granddaughter had some questions about his medications. Questions about ABX. Plans to transition home with [facility] or Home Hospice. Expect patient to need oxygen. Addendum :   Received call from Susan Caruso indicates she is patient's granddaughter -from the other side of the family. She indicates , crying she wants to see her grandfather- was told she could not visit since not on the list. She called earlier in the week also and the call was transferred in the room to speak to the patients other granddaughter who is his caretaker. At that time she states she had limited visit due to upsetting her grandfather. This granddaughter - indicates patient raised her and she has not seen him since [facility] April. She indicates she had requested some pictures and they were [place] [facility] for her to . She indicates she has tried taking to her cousin with no success. Asked if there was any other family or friends that could coordinate a visit. She indicates she has talked to a  about the situation -was told to talk to someone at the facility. To follow-up with Administration     To follow-up with  Caregiver regarding transition plan. She has requested additional days to plan to take her grandfather home and to see how he progress the next few days.      Agata Parra--Sonido NORTON RN    [phone]    Not sure of the whole situations- both are indicating problems -

## 2022-05-12 NOTE — PROGRESS NOTES
Verbal shift change report given to James Mares, RN (oncoming nurse) by Chloé Pratt RN (offgoing nurse). Report included the following information SBAR, Kardex, Intake/Output, MAR and Recent Results. 0330:  Labs drawn by respiratory. 5840: Informed by lab that specimen was contaminated.

## 2022-05-12 NOTE — PROGRESS NOTES
1953 Pt resting comfortably at this time. VS slight hypotensive. Informed Teresa GARCIA. Pt does not appear to be in any acute distress at this time. Unsure why the VS documented at 0001 as they were done at [address] [place] [address]. No further needs at this time.

## 2022-05-12 NOTE — CONSULTS
This is an \"e-consult\" with data obtained by chart review and by discussion with the requesting physician. I did not directly examine or interview the patient. Consult Date: 5/12/2022    IP CONSULT TO NEPHROLOGY  Consult performed by: Shiraz Pereira MD  Consult ordered by: Nubia Moura MD        HISTORY OF PRESENT ILLNESS:  History obtained from the chart. Adore Silva Sr is a 80 y.o. male with PMH of CAD, GERD,DJD, GI Bleed  who presents to ED with c/o deconditioning. He presented with hypernatremia which has improved. He does have cardiomyopathy with EF of 15-20%, he also has baseline CKD stage 3 and now has been having progressive decline in his renal function so nephrology service is consulted. There was concern for volume overload and he was receiving furosemide which is held now, his last dose was on 5/10/2022  His urine output has not been documented well.     Subjective     Past Medical History:   Diagnosis Date    Abdominal pain 7/18/2017    Alzheimer disease ([facility] [address] 7/18/2017    Anemia 7/18/2017    Arthritis     Back pain 7/18/2017    Bradycardia 7/18/2017    CAD (coronary artery disease)     hx of MI    Chronic diastolic heart failure ([address] 2/26/2021    CKD (chronic kidney disease), stage II 7/18/2017    constipation     Depression 7/18/2017    Diabetes mellitus ([address] 7/18/2017    Diverticulosis 7/18/2017    DJD (degenerative joint disease) 7/18/2017    Encounter for long-term (current) use of other medications 7/18/2017    Fatigue     Gastritis and duodenitis 7/18/2017    GERD (gastroesophageal reflux disease)     GI bleed 7/18/2017    Hearing loss     Hyperlipidemia 7/18/2017    Hypertension     Hypertension with renal disease 7/18/2017    Ill-defined condition     Dementia    Internal hemorrhoids 7/18/2017    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    S/P cardiac pacemaker procedure 5/4/2015    5/4/15 Medtronic dual chamber pacemaker implant     Thyroid disease     Weight loss     lost over 40 pounds last year- has no appetite      Past Surgical History:   Procedure Laterality Date    COLONOSCOPY N/A 2017    COLONOSCOPY performed by Eber Ron MD at [facility]  2017         [address]  7/10/2014    right inguinal    HX OTHER SURGICAL      cystectomy from back    VT EGD TRANSORAL BIOPSY SINGLE/MULTIPLE  2012         VT UPPER GI ENDOSCOPY,W/DIR SUBMUC INJ  2020         UPPER GI ENDOSCOPY,BIOPSY  2017         UPPER GI ENDOSCOPY,BIOPSY  2020          Family History   Problem Relation Age of Onset    Hypertension Mother     Heart Disease Father     Cancer Other         son-cancer? unknown what type       Social History     Tobacco Use    Smoking status: Former Smoker     Packs/day: 0.50     Years: 10.00     Pack years: 5.00     Start date: 1991    Smokeless tobacco: Never Used    Tobacco comment: quit 50 years ago   Substance Use Topics    Alcohol use: Not Currently     Comment: Occasional beer       Current Facility-Administered Medications   Medication Dose Route Frequency Provider Last Rate Last Admin    linezolid (ZYVOX) tablet 600 mg  600 mg Oral Q12H Brooklynn Narvaez MD   600 mg at 22 0400    memantine (NAMENDA) tablet 5 mg  5 mg Oral BID Nitesh Caceres MD        amoxicillin-clavulanate (AUGMENTIN) 500-125 mg per tablet 1 Tablet  1 Tablet Oral Q12H Nitesh Caceres MD   1 Tablet at 22 0851    alum-mag hydroxide-simeth (MYLANTA) oral suspension 30 mL  30 mL Oral Q4H PRN Nitesh Caceres MD   30 mL at 22 1445    aspirin chewable tablet 81 mg  81 mg Oral DAILY Nitesh Caceres MD   81 mg at 22 0851    lansoprazole compounding kit (PREVACID) 3 mg/mL oral suspension 30 mg  30 mg Oral ACB Nitesh Caceres MD   30 mg at 22 9422    cholecalciferol (VITAMIN D3) (1000 Units /25 mcg) tablet 1,000 Units  1,000 Units Oral DAILY Mita Sosa MD   1,000 Units at 05/12/22 0851    levothyroxine (SYNTHROID) tablet 50 mcg  50 mcg Oral ACB Mita Sosa MD   50 mcg at 05/12/22 0851    sodium chloride (NS) flush 5-40 mL  5-40 mL IntraVENous Q8H Mita Sosa MD   10 mL at 05/11/22 2200    sodium chloride (NS) flush 5-40 mL  5-40 mL IntraVENous PRN Mita Sosa MD        acetaminophen (TYLENOL) tablet 650 mg  650 mg Oral Q6H PRN Mita Sosa MD   650 mg at 05/11/22 2107    Or    acetaminophen (TYLENOL) suppository 650 mg  650 mg Rectal Q6H PRN Mita Sosa MD        polyethylene glycol (MIRALAX) packet 17 g  17 g Oral DAILY PRN Mita Sosa MD   17 g at 04/29/22 1750    ondansetron (ZOFRAN ODT) tablet 4 mg  4 mg Oral Q8H PRN Mita Sosa MD   4 mg at 05/12/22 2681    Or    ondansetron (ZOFRAN) injection 4 mg  4 mg IntraVENous Q6H PRN Mita Sosa MD            Review of Systems  Not performed    Objective     Vital signs for last 24 hours:  Visit Vitals  BP (!) 117/58 (BP 1 Location: Right upper arm, BP Patient Position: At rest)   Pulse 63   Temp 97.1 °F (36.2 °C)   Resp 18   Ht 5' 8\" (1.727 m)   Wt 22.6 kg (49 lb 13.2 oz)   SpO2 95%   BMI 7.58 kg/m²       Intake/Output this shift:  Current Shift: No intake/output data recorded. Last 3 Shifts: No intake/output data recorded.     Data Review:   Recent Results (from the past 24 hour(s))   VANCOMYCIN, TROUGH    Collection Time: 05/12/22  2:00 AM   Result Value Ref Range    Vancomycin,trough 15.5 (H) 5.0 - 10.0 ug/mL    Reported dose date NOT PROVIDED      Reported dose time: NOT PROVIDED      Reported dose: NOT PROVIDED UNITS   CREATININE    Collection Time: 05/12/22  2:00 AM   Result Value Ref Range    Creatinine 3.01 (H) 0.70 - 1.30 MG/DL    GFR est AA 24 (L) >60 ml/min/1.73m2    GFR est non-AA 20 (L) >60 ml/min/1.73m2   SODIUM    Collection Time: 05/12/22  2:00 AM   Result Value Ref Range    Sodium 137 136 - 145 mmol/L Physical Exam    No performed    ASSESSMENT/PLAN:  1. SUBHA Stage 1 on CKD stage 3, probably cardiorenal syndrome. He initially had volume depletion as evident by hypernatremia which has resolved. Now he has worsening renal function which is likely cardio-renal. Check UA and urine sodium, if low then this will indicate decreased renal perfusion. He may need inotrope support and furosemide to improve renal perfusion. Do bladder scan and get renal ultrasound to rule out any obstructive pathology. 2. Given his advanced age and other co-morbidities he is not a good candidate for renal replacement therapy.

## 2022-05-12 NOTE — PROGRESS NOTES
Speech pathology  Attempted to see patient for dysphagia treatment. Patient briefly opened eyes though not focused. Sat patient fully upright and provided sternal rub. Presented straw to lips and applied tactile cueing. Presented a tsp of pudding to lips as well; however, with both presentations, patient unarousable and kept his eyes closed. At this time, patient is unarousable and unable to accept PO. Note concern for aspiration; however, also note concerns with degree of lethargy. Any attempts to feed patient when not FULLY alert and FULLY upright will place him at increased aspiration risk and any further diet modification will not change this. Patient with overall age appropriate swallow on MBS that was completed 5/3/22 when he was placed on a soft and bite sized diet/ thin liquids. Note his diet had been changed to puree/thins since then. Unlikely swallow function truly declined that significantly but rather lethargy and improper feeding are contributing to ?possible aspiration episodes. SLP will plan to follow along; however, little to add if lethargy remains a limiting factor (he should be kept NPO when too lethargic to safely eat) Recommend ONLY offering PO when FULLY alert, actively accepting and sitting fully upright. Encourage discussion regarding goals of care including continuation of PO for pleasure despite aspiration risk if continued (no further diet modification will eliminate his aspiration risk).         Yvon Bravo M.S. CCC-SLP

## 2022-05-12 NOTE — PROGRESS NOTES
Hospitalist Progress Note    NAME: Veronica Mclean    :  1931   MRN:  [id number]   Room Number:  215/76  @ [facility]     Please note that this dictation was completed with Hayden Centeno, the computer voice recognition software. Quite often unanticipated grammatical, syntax, homophones, and other interpretive errors are inadvertently transcribed by the computer software. Please disregard these errors. Please excuse any errors that have escaped final proofreading. Interim Hospital Summary: 80 y.o. male whom presented on 2022 with      Assessment / Plan:    #Acute Hypoxic resp failure not POA ? Aspiration vs acute on chronic systolic heart failure exacerbation  #Worsening metabolic encephalopathy not POA   #SUBHA on CKD 3b ? Cardiorenal versus prerenal  -Repeat CT head negative for acute process  -Repeat chest x-ray with moderate right pleural effusion. Right lung interstitial patchy airspace consolidation. Left pleural space normal left lung is clear  -BNP given 35,000. Creatinine 1.89 today up from 1.51. No improvement with gentle hydration  -ABG PO2 92 on 3 L  -Difficult to gauge patient volume status given recent EF of 15% , elevated BNP and  Patient  not eating and drinking due to underlying worsening decompensation of dementia.   Patient has a very poor prognosis.  -Discussed the case with his neurologist, not to do much from neurology standpoint for his advanced dementia      -Continue supplemental oxygen  -Lasix as needed   -Strict ins and outs  -Daily weight  -Aspiration precautions  -Continue antibiotics  -Trend creatinine  - Nephrology consulted     Upper arm swelling   Duplex neg for DVT   Suspect third spacing d/t hypoalbuminemia   Elevate UE       Polymicrobial bacteremia POA   Bandemia resolved  -Blood culture 2022 positive for Streptococcus, Rothia, Staph epidermidis  -Currently on ceftriaxone and vancomycin  -Repeat blood cultures negative    -Discussed case with infectious disease. Appreciate help. Dr Rajeev Lawrence   -Patient completed course with IV antibiotics till 5/11/2022  -No IV access due to being difficult stick- 5/11/2022   - D/w Dr Rajeev Lawrence okay to change IV Abx for PO to complete last three days, appreciate help - 5/11/20222      Suspected aspiration pneumonia  Mild oropharyngeal dysphagia  -Chest x-ray reviewed dependently admission bilaterally due to pulmonary opacity worse on right kidney with apparent asymmetric pulmonary edema versus infection  -Repeat chest x-ray with unchanged opacity in right malleoli region and right lower lobe suspicious for airway disease. Unchanged right pleural effusion. Decrease in background edema  -COVID-19 negative  -Procalcitonin 0.57  -Modified barium swallow without any clear aspiration    -Continue antibiotic for aspiration pneumonia  -Speech therapy on board appreciate help  -Aspiration precautions  -Poor prognosis ( mPOA does not want peg tube placement as this time and would prefer oral nutrition )       Hypernatremia POA resolved     Physical deconditioning POA   - PT/OT     Failure to thrive   Severe protein calorie malnutrition  Severe malnutrition POA  - nutrition consulted   - D/w mPOA that patient is not able have adequate nutrition, alternative enteral feed might be needed like Peg tube placement, per mPOA they dont want Peg tube for alternative feed source, they want to see if patient can have oral nutrient encouraged       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     Alzheimer's dementia POA  - continue memantine, dose was decreased to 5 mg BID due to renal impairment. - Palliative consulted, appreciate help  -Neurology on board appreciate help        Elevated troponin POA flat trend  Chronic systolic congestive heart failure POA  S/p pacemaker. Troponin 162-148. ECHO 2/2022 EF 15-20%.    -EKG with atrial sensed ventricular paced rhythm     - no acute intervention needed              Hypothyroidism POA   -TSH 5.89  - continue levothyroxin        CAD POA  S/p AVNRT Ablation POA  HTN POA  HLD POA  - holding lisinopril, furosemide due to SUBHA     Hx of Gout   - hold colchicine due to SUBHA         There is no height or weight on file to calculate BMI. Code Status:DNR   Surrogate Decision Maker: alcira      DVT Prophylaxis: SCD's  GI Prophylaxis: not indicated  Baseline: uses rollator                 Subjective:     Chief Complaint / Reason for Physician Visit  Patient coughing food   Discussed with RN events overnight. Review of Systems:  No fevers, chills, appetite change, cough, sputum production, shortness of breath, dyspnea on exertion, nausea, vomitting, diarrhea, constipation, chest pain, leg edema, abdominal pain, joint pain, rash, itching. Tolerating PT/OT. Tolerating diet. Objective:     VITALS:   Last 24hrs VS reviewed since prior progress note. Most recent are:  Patient Vitals for the past 24 hrs:   Temp Pulse Resp BP SpO2   05/12/22 0710 97.1 °F (36.2 °C) 63 18 (!) 117/58 95 %   05/11/22 2000     94 %   05/11/22 1953 97.7 °F (36.5 °C) 65 18 (!) 115/47 94 %   05/11/22 0836 (!) 96 °F (35.6 °C) 61 16 (!) 122/56 95 %     No intake or output data in the 24 hours ending 05/12/22 0835     PHYSICAL EXAM:  General:  no acute distress    EENT:  EOMI. Anicteric sclerae. MMM  Resp:  Crackles on right side  CV:  Regular  rhythm,  normal S1/S2, no murmurs rubs gallops, No edema  GI:  Soft, Non distended, Non tender. +Bowel sounds  Neurologic:  Drowsy  Psych:   Not agitated or anxious  Skin:  No rashes.   No jaundice    Reviewed most current lab test results and cultures  YES  Reviewed most current radiology test results   YES  Review and summation of old records today    NO  Reviewed patient's current orders and MAR    YES  PMH/SH reviewed - no change compared to H&P  ________________________________________________________________________  Care Plan discussed with:    Comments   Patient x    Family      RN x    Care Manager     Consultant                        x Multidiciplinary team rounds were held today with , nursing, pharmacist and clinical coordinator. Patient's plan of care was discussed; medications were reviewed and discharge planning was addressed. ________________________________________________________________________  Total NON critical care TIME:  25   Minutes    Total CRITICAL CARE TIME Spent:   Minutes non procedure based      Comments   >50% of visit spent in counseling and coordination of care x    ________________________________________________________________________  Massimo Armstrong MD     Procedures: see electronic medical records for all procedures/Xrays and details which were not copied into this note but were reviewed prior to creation of Plan. LABS:  I reviewed today's most current labs and imaging studies. Pertinent labs include:  No results for input(s): WBC, HGB, HCT, PLT, HGBEXT, HCTEXT, PLTEXT, HGBEXT, HCTEXT, PLTEXT in the last 72 hours.   Recent Labs     05/10/22  1508   CREA 2.35*       Signed: Massimo Armstrong MD

## 2022-05-12 NOTE — PROGRESS NOTES
ID  Chart and labs reviewed  D/w Dr.Bangash Goodwin to complete therapy with Augmentin/Zyvox p.o. Aspiration precautions. Full consult to follow.

## 2022-05-12 NOTE — PROGRESS NOTES
This cm attempted to contact the pt's granddaughter; Malaika Flores [phone] to discuss discharge planning; no answer- this cm left a HIPPA compliant message. This cm will f/u tomorrow morning.      CM: [address]. MSW,   [phone]

## 2022-05-12 NOTE — PROGRESS NOTES
Palliative Medicine  [facility]: [phone]  [facility]: [phone]    Patient chart reviewed, patient is declining functionally and physically , as is to be expected, grand daughter, Kyung Ley,  is having a very hard time accepting these declines and is asking for various interventions which may not be appropriate. She seems to come in daily to feed patient and provide support to him. She is very invested in patient getting \"better\", not realistic about age related and dementia related behaviors that are normal in most people with these issues. Kyung Ley has not followed up in terms of meeting with this writer, she also hung up the phone another time when she did not like what she was hearing (age related and dementia education). Attempted to call her today, to offer support and see what her goals for patient are. Speech note regarding possible aspiration noted, patient likely would benefit from hospice support and a focus on comfort, but mPOA is not understanding and accepting of this. Message left on phone to contact this writer. 3:35 pm Kyung Ley did call back. Extended conversation with her to try to offer her support and education about patient condition, aspiration, risks of feeding, what he needs at this time: time with family, touch, music, loved ones. She needed to vent about his declines and relates it to a change in his medication regimen (Namenda), had a hard time accepting that these are normal age related declines and related to his dementia. She did tell me that she just spoke to patient advocacy. Actively listened to her, offered emotional support and encouraged that she focus on patient comfort at this time and involve hospice. Asked her about support for her, she said she has no one, asked her what it would be like to care for her grand father at home with her children there; she noted, \"I have to do it, he's my grandfather\".  Let her know that hospice would be a support to them both, but that she also needs to care for herself. Radha Palma is agreeable to hospice at home, she would like to meet with the hospice team tomorrow at the hospital. 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 She is willing to take him home. LCSW reached out to hospice team at [facility] RN) to see if they can reach back out to Radha Palma for planning home hospice. Have communicated above with Dr Yvette Dooley via PS.

## 2022-05-12 NOTE — PROGRESS NOTES
Problem: Aspiration - Risk of  Goal: *Absence of aspiration  Outcome: Progressing Towards Goal     Problem: Patient Education: Go to Patient Education Activity  Goal: Patient/Family Education  Outcome: Progressing Towards Goal     Problem: Pressure Injury - Risk of  Goal: *Prevention of pressure injury  Description: Document Newton Scale and appropriate interventions in the flowsheet. Outcome: Progressing Towards Goal  Note: Pressure Injury Interventions:  Sensory Interventions: Assess changes in LOC,Assess need for specialty bed,Avoid rigorous massage over bony prominences,Discuss PT/OT consult with provider,Check visual cues for pain,Keep linens dry and wrinkle-free,Maintain/enhance activity level,Minimize linen layers,Monitor skin under medical devices,Pressure redistribution bed/mattress (bed type),Turn and reposition approx. every two hours (pillows and wedges if needed)    Moisture Interventions: Absorbent underpads,Apply protective barrier, creams and emollients,Assess need for specialty bed,Check for incontinence Q2 hours and as needed,Limit adult briefs,Maintain skin hydration (lotion/cream),Minimize layers,Moisture barrier,Offer toileting Q_hr    Activity Interventions: Increase time out of bed,Pressure redistribution bed/mattress(bed type),PT/OT evaluation,Chair cushion,Assess need for specialty bed    Mobility Interventions: Assess need for specialty bed,Chair cushion,HOB 30 degrees or less,Pressure redistribution bed/mattress (bed type),PT/OT evaluation,Turn and reposition approx.  every two hours(pillow and wedges)    Nutrition Interventions: Document food/fluid/supplement intake,Discuss nutritional consult with provider,Offer support with meals,snacks and hydration    Friction and Shear Interventions: Feet elevated on foot rest,Foam dressings/transparent film/skin sealants,HOB 30 degrees or less,Lift team/patient mobility team,Apply protective barrier, creams and emollients                Problem: Patient Education: Go to Patient Education Activity  Goal: Patient/Family Education  Outcome: Progressing Towards Goal     Problem: Falls - Risk of  Goal: *Absence of Falls  Description: Document Morene Hole Fall Risk and appropriate interventions in the flowsheet.   Outcome: Progressing Towards Goal  Note: Fall Risk Interventions:  Mobility Interventions: Assess mobility with egress test,Bed/chair exit alarm,Communicate number of staff needed for ambulation/transfer,Mechanical lift,OT consult for ADLs,Patient to call before getting OOB,PT Consult for mobility concerns,PT Consult for assist device competence,Strengthening exercises (ROM-active/passive),Utilize walker, cane, or other assistive device    Mentation Interventions: Adequate sleep, hydration, pain control,Bed/chair exit alarm,Evaluate medications/consider consulting pharmacy,Door open when patient unattended,Eyeglasses and hearing aids,Familiar objects from home,Family/sitter at bedside,Gait belt with transfers/ambulation,Increase mobility,More frequent rounding,Reorient patient,Room close to nurse's station,Toileting rounds,Update white board    Medication Interventions: Assess postural VS orthostatic hypotension,Bed/chair exit alarm,Evaluate medications/consider consulting pharmacy,Patient to call before getting OOB,Teach patient to arise slowly    Elimination Interventions: Bed/chair exit alarm,Call light in reach,Elevated toilet seat,Patient to call for help with toileting needs,Urinal in reach,Toilet paper/wipes in reach,Toileting schedule/hourly rounds              Problem: Patient Education: Go to Patient Education Activity  Goal: Patient/Family Education  Outcome: Progressing Towards Goal     Problem: Patient Education: Go to Patient Education Activity  Goal: Patient/Family Education  Outcome: Progressing Towards Goal     Problem: Patient Education: Go to Patient Education Activity  Goal: Patient/Family Education  Outcome: Progressing Towards Goal Problem: Patient Education: Go to Patient Education Activity  Goal: Patient/Family Education  Outcome: Progressing Towards Goal

## 2022-05-12 NOTE — PROGRESS NOTES
Discuss patient's  current condition with nephrologist Dr. Rayshawn Roach nephrology and staff cardiologist Dr. Keli Domingo, given patient's advanced stage heart failure and worsening kidney function due to poor perfusion d/t Heart failure,  Patient's medical condition is deteriorating. Due to deteriorating kidney function, patient is  not a good candidate for diuresis at this time, may need inotropic support to support diuresis but given his overall medical condition it would be more harm than good to the patient in providing inotropic support for example with dobutamine. Concerns related to adverse effect with dobutamine include:  ventricular arrhythmias, including nonsustained ventricular tachycardia and supraventricular arrhythmias. Sudden cardiac death has also been observed with dobutamine ventricular ectopy and tachycardia may also be noted. Not an ideal candidate for inotropic support with milrinone given worsening kidney function      At this point patient is very lethargic and unable to tolerate food which makes him very high risk for aspiration. I have discussed with the medical power of  about patient's deteriorating condition. Medical power of  recommended to give IV fluids and Namenda. Discussed with medical power of  that we cannot give IV fluids given his pleural effusion and advanced stage heart failure that will put him into fluid overload and make the patient  very uncomfortable. I have also discussed that I cannot give him any Namenda 10 mg twice daily given his worsening kidney function as it has to be renally dosed. Medical power of  stated that during her time this morning nobody was with the patient. Discussed with staff who stated that they have rounded on patient during her stay. Medical power of  expressed frustration regarding medical prognosis and recommended not to contact her.   Medical power of  stated that she would like to speak with patient advocate for further communication. I was communicated by patient advocate that MPOA want patient to be discharged. I called MPOA again to clarify whether they want patient to be discharged home with home hospice or discharge home with oxygen/hospital bed. Did not answer my call , voicemail left    Valentine Ignacio MD          Update   Discussed with Fabrizio Batres from palliative care.   I was updated that [facility] would like to meet with hospice tomorrow at the hospital.      Valentine Ignacio MD

## 2022-05-12 NOTE — PROGRESS NOTES
D/w Risk, given worsening kidney function will decrease Namenda to prevent doing more harm to the patient. Medical power of  wanted full dose of Namenda but given declining kidney function it would be doing more harm to the patient.   At this point is not medically indicated to give full dose as medication has to be adjusted for renal function    Genevia Sicard, MD

## 2022-05-12 NOTE — PROGRESS NOTES
5090- patient seen and assessed. Granddaughter at bedside feeding patient breakfast. Granddaughter upset about patient swelling to arms explained that lasik will not be restarted d/t kidney function and will pass along to MD. Patient denies nausea but granddaughter requesting for him given prn zofran 4mg po. Per granddaughter patient expressed that \"his butt hole hurts\" check buttock no hemorrohoid or open sores. Patient moving bowels and is not constipated. Patient granddaughter stated that \"fluid extravasation to arms explained swelling like to poor nutritional status. Arms elevated. Granddaughter requested cold packs q 10-15 minutes on and off. Place packs on at her request.     1332- per granddaugher 20 pedialyte drank and pack of oatmeal    0909- finished passing patient am meds    1035- rounds completed with MD domínguez notified of education provided to granddaughter and granddaughter insisting dictating orders and patient care. MD to consult ethics. Re-consulted speech as patient likely aspirating. Notified labs contaminated maida to look into. 1107- patient currently resting in bed. 1145- spoke to MD domínguez patient does not need additional labs this am or IV access. 26- volunteer alberto assisted feeding patient. Patient took 7 bites before refusing additional food. 1311- pct attempted to feed patient. Patient is not interested in food at this time    1326- speech at bedside assessing patient. 1330- speech could not get patient to open eyes for swallow screen very lethargic. 1430- notified MD domínguez of worsening lethargy. MD saw patient at bedside and to call granddaughter. Patient bathed, bm cleaned up, brief changed, incontinence care, gown/linens changed. Repositioned. Feet and arms elevated. 12- MD domínguez spoke to patient's daughter. 1622- micky check on patient granddaughter at bedside.  Speaking with care manager jasson    1700- mallika ayala checked on patient 1800- patient turned to left side. meds given as patient more awake to take. Check brief dry at this time    1851- pct check on patient. 1930- shift change report given to kit (oncoming nurse) by Lucia Miller (offgoing nurse). Report included the following information SBAR, karadex and events from the day.  Dicussed family issues and goc meeting potentially in am.

## 2022-05-12 NOTE — PROGRESS NOTES
Problem: Pressure Injury - Risk of  Goal: *Prevention of pressure injury  Description: Document Newton Scale and appropriate interventions in the flowsheet. Outcome: Progressing Towards Goal  Note: Pressure Injury Interventions:  Sensory Interventions: Assess changes in LOC    Moisture Interventions: Absorbent underpads    Activity Interventions: Assess need for specialty bed    Mobility Interventions: Assess need for specialty bed    Nutrition Interventions: Document food/fluid/supplement intake    Friction and Shear Interventions: Apply protective barrier, creams and emollients                Problem: Falls - Risk of  Goal: *Absence of Falls  Description: Document Louis Fall Risk and appropriate interventions in the flowsheet.   Outcome: Progressing Towards Goal  Note: Fall Risk Interventions:  Mobility Interventions: Assess mobility with egress test    Mentation Interventions: Adequate sleep, hydration, pain control    Medication Interventions: Assess postural VS orthostatic hypotension    Elimination Interventions: Bed/chair exit alarm

## 2022-05-12 NOTE — CONSULTS
Consult received and discussed case with hospitalist.    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 He has either a defibrillator or a pacemaker, but unfortunately there are no records in epic to confirm the details regarding that, nor is there any record of any cardiology consultations or follow-up in epic. He is admitted with failure to thrive, advanced dementia, and malnutrition after hospitalization a couple of months ago at [facility] for a stroke. He has advanced renal insufficiency and lowish blood pressure which makes the institution of guideline directed medical therapy for his low ejection fraction nearly impossible. I do not think he is a candidate for advanced heart failure therapy including inotropes and he certainly would be a very poor candidate for dialysis. There has been a suggestion for hospice care and both hospice and palliative have seen the patient during this hospitalization. I would agree that hospice is an appropriate disposition for him under these unfortunate circumstances.

## 2022-05-13 NOTE — PROGRESS NOTES
VITOR   RUR   IDR Rounds flaviaiimelani this am with MD and team.   Alonso Nails 68 with Granddaughter today at 13 Noon. Addendum: 12:57PM   Hospice met with granddaughter today. They will order DME to be delivered tomorrow. Plans for discharge Home on Sunday.  11:00AM.  Will arrange Ambulance Transportation     Agata NORTON RN   [phone]

## 2022-05-13 NOTE — PROGRESS NOTES
1937 Pt visiting with family at this time. VS slightly hypotensive. Informed Breanna GARCIA. Pt does not appear to be in any acute distress at this time. No further needs at this time.

## 2022-05-13 NOTE — PROGRESS NOTES
Hospitalist Progress Note    NAME: Ayaz Barraza Sr   :  1931   MRN:  [id number]   Room Number:  43618  @ [facility]     Please note that this dictation was completed with Aretha Witt, the computer voice recognition software. Quite often unanticipated grammatical, syntax, homophones, and other interpretive errors are inadvertently transcribed by the computer software. Please disregard these errors. Please excuse any errors that have escaped final proofreading. Interim Hospital Summary: 80 y.o. male whom presented on 2022 with      Assessment / Plan:      #Acute Hypoxic resp failure not POA ? Aspiration vs acute on chronic systolic heart failure exacerbation  #Worsening metabolic encephalopathy not POA   #SUBHA on CKD 3b ? Cardiorenal versus prerenal  -Repeat CT head negative for acute process  -Repeat chest x-ray with moderate right pleural effusion. Right lung interstitial patchy airspace consolidation. Left pleural space normal left lung is clear  -BNP given 35,000. Creatinine 1.89 today up from 1.51. No improvement with gentle hydration  -ABG PO2 92 on 3 L  -Difficult to gauge patient volume status given recent EF of 15% , elevated BNP and  Patient  not eating and drinking due to underlying worsening decompensation of dementia. Patient has a very poor prognosis.  -Discussed the case with his neurologist, not to do much from neurology standpoint for his advanced dementia  -Discussed patient's  current condition with nephrologist Dr. Pam Calvo nephrology and staff cardiologist Dr. Nena Wu, given patient's advanced stage heart failure and worsening kidney function due to poor perfusion d/t Heart failure,  Patient's medical condition is deteriorating.   Due to deteriorating kidney function, patient was  not a good candidate for diuresis at this time, may needed inotropic support to support diuresis but given his overall medical condition it would have been more harm than good to the patient in providing inotropic support for example with dobutamine. Concerns related to adverse effect with dobutamine includeded  ventricular arrhythmias, including nonsustained ventricular tachycardia and supraventricular arrhythmias. Sudden cardiac death has also been observed with dobutamine ventricular ectopy and tachycardia may also be noted. Not an ideal candidate for inotropic support with milrinone given worsening kidney function        At that point patient was very lethargic and unable to tolerate food which made him very high risk for aspiration. I had discussed with the medical power of  about patient's deteriorating condition. Medical power of  recommended to give IV fluids and Namenda. Discussed with medical power of  that we could give IV fluids given his pleural effusion and advanced stage heart failure that would put him into fluid overload and would make the patient  very uncomfortable. I had also discussed that I could not give him any Namenda 10 mg twice daily given his worsening kidney function as it has to be renally dosed       -Palliative care discussed with medical power of  and patient was made home hospice.             Upper arm swelling   Duplex neg for DVT   Suspect third spacing d/t hypoalbuminemia   Elevate UE         Polymicrobial bacteremia POA   Bandemia resolved  -Blood culture 4/21/2022 positive for Streptococcus, Rothia, Staph epidermidis  -Currently on ceftriaxone and vancomycin  -Repeat blood cultures negative     -Discussed case with infectious disease. Appreciate help.  Dr Rosalino Crowder   -Patient completed course with  antibiotics till 5/13/2022          Suspected aspiration pneumonia  Mild oropharyngeal dysphagia  -Chest x-ray reviewed dependently admission bilaterally due to pulmonary opacity worse on right kidney with apparent asymmetric pulmonary edema versus infection  -Repeat chest x-ray with unchanged opacity in right malleoli region and right lower lobe suspicious for airway disease. Unchanged right pleural effusion. Decrease in background edema  -COVID-19 negative  -Procalcitonin 0.57  -Modified barium swallow without any clear aspiration  -Patient was assessed by speech therapy and was deemed very high risk for aspiration due to lethargy and metabolic encephalopathy. Recommendation would to give patient oral food for comfort measures          Hypernatremia POA resolved     Physical deconditioning POA     Failure to thrive   Severe protein calorie malnutrition  Severe malnutrition POA  - nutrition consulted   -Patient to be discharged with home on home hospice and and to be fed for comfort measures.        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 - Palliative consulted, appreciate help  -Neurology on board appreciate help        Elevated troponin POA flat trend  Chronic systolic congestive heart failure POA  S/p pacemaker. Troponin 162-148. ECHO 2/2022 EF 15-20%. -EKG with atrial sensed ventricular paced rhythm  -Case discussed with cardiologist patient is not a good candidate of any therapy for advanced heart failure  -Patient to be discharged with home hospice           Hypothyroidism POA   -TSH 5.89  - continue levothyroxin        CAD POA  S/p AVNRT Ablation POA  HTN POA  HLD POA  - holding lisinopril, furosemide due to SUBHA      Hx of Gout   - hold colchicine due to SUBHA               Code status: DNR  Prophylaxis: Hep SQ  Recommended Disposition: Home w/ hospice      Subjective:     Chief Complaint / Reason for Physician Visit  lethargic. Discussed with RN events overnight.      Review of Systems:  No fevers, chills, appetite change, cough, sputum production, shortness of breath, dyspnea on exertion, nausea, vomitting, diarrhea, constipation, chest pain, leg edema, abdominal pain, joint pain, rash, itching. Tolerating PT/OT. Tolerating diet. Objective:     VITALS:   Last 24hrs VS reviewed since prior progress note. Most recent are:  Patient Vitals for the past 24 hrs:   Temp Pulse Resp BP SpO2   05/13/22 1207 97.3 °F (36.3 °C) (!) 58 14 (!) 125/53 100 %   05/13/22 0712 97.1 °F (36.2 °C) (!) 58 14 (!) 101/46 98 %   05/12/22 1937 96.8 °F (36 °C) 75 18 (!) 124/51 97 %       Intake/Output Summary (Last 24 hours) at 5/13/2022 1343  Last data filed at 5/13/2022 0801  Gross per 24 hour   Intake 708 ml   Output    Net 708 ml        PHYSICAL EXAM:  General:  no acute distress    EENT:  EOMI. Anicteric sclerae. MMM  Resp:  Coarse breathing bilaterally  CV:  Regular  rhythm,  normal S1/S2, no murmurs rubs gallops, No edema  GI:  Soft, Non distended, Non tender. +Bowel sounds  Neurologic:  Lethargic difficult to arouse  Psych:   Lethargic   skin:  No rashes. No jaundice    Reviewed most current lab test results and cultures  YES  Reviewed most current radiology test results   YES  Review and summation of old records today    NO  Reviewed patient's current orders and MAR    YES  PMH/ reviewed - no change compared to H&P  ________________________________________________________________________  Care Plan discussed with:    Comments   Patient x    Family      RN xx    Care Manager     Consultant  x Hospice                     x Multidiciplinary team rounds were held today with , nursing, pharmacist and clinical coordinator. Patient's plan of care was discussed; medications were reviewed and discharge planning was addressed.      ________________________________________________________________________  Total NON critical care TIME: 25 Minutes    Total CRITICAL CARE TIME Spent:   Minutes non procedure based      Comments   >50% of visit spent in counseling and coordination of care x    ________________________________________________________________________  Yu Farrell MD Procedures: see electronic medical records for all procedures/Xrays and details which were not copied into this note but were reviewed prior to creation of Plan. LABS:  I reviewed today's most current labs and imaging studies. Pertinent labs include:  No results for input(s): WBC, HGB, HCT, PLT, HGBEXT, HCTEXT, PLTEXT in the last 72 hours.   Recent Labs     05/12/22  0200 05/10/22  1508     --    CREA 3.01* 2.35*       Signed: Dora Toledo MD

## 2022-05-13 NOTE — PROGRESS NOTES
2150 Patient's grand daughter is feeding patient and patient is coughing a lot, this nurse went in and told the grand daughter that she need to stop feeding patient because it seems like he is not able to swallow and the way he is coughing he could aspirate. Patient's grand daughter told this nurse that\" I know what I am doing, I don't play that. Patient was still coughing and his grand daughter lower the head of the bed, this nurse went in and raised the head of the bed.

## 2022-05-13 NOTE — PROGRESS NOTES
Care plan reviewed, patient progressing towards goals  Problem: Aspiration - Risk of  Goal: *Absence of aspiration  Outcome: Progressing Towards Goal     Problem: Patient Education: Go to Patient Education Activity  Goal: Patient/Family Education  Outcome: Progressing Towards Goal     Problem: Pressure Injury - Risk of  Goal: *Prevention of pressure injury  Description: Document Newton Scale and appropriate interventions in the flowsheet. Outcome: Progressing Towards Goal  Note: Pressure Injury Interventions:  Sensory Interventions: Assess need for specialty bed,Assess changes in LOC,Check visual cues for pain,Float heels,Minimize linen layers    Moisture Interventions: Absorbent underpads,Assess need for specialty bed,Minimize layers,Limit adult briefs    Activity Interventions: Assess need for specialty bed,Increase time out of bed,PT/OT evaluation,Pressure redistribution bed/mattress(bed type)    Mobility Interventions: Assess need for specialty bed,HOB 30 degrees or less,Float heels,PT/OT evaluation,Pressure redistribution bed/mattress (bed type),Turn and reposition approx. every two hours(pillow and wedges)    Nutrition Interventions: Document food/fluid/supplement intake,Offer support with meals,snacks and hydration    Friction and Shear Interventions: Apply protective barrier, creams and emollients,Feet elevated on foot rest,Foam dressings/transparent film/skin sealants,HOB 30 degrees or less,Minimize layers                Problem: Patient Education: Go to Patient Education Activity  Goal: Patient/Family Education  Outcome: Progressing Towards Goal     Problem: Falls - Risk of  Goal: *Absence of Falls  Description: Document Louis Fall Risk and appropriate interventions in the flowsheet.   Outcome: Progressing Towards Goal  Note: Fall Risk Interventions:  Mobility Interventions: Bed/chair exit alarm,Communicate number of staff needed for ambulation/transfer,OT consult for ADLs,Patient to call before getting OOB,PT Consult for mobility concerns    Mentation Interventions: Bed/chair exit alarm,Door open when patient unattended,More frequent rounding,Reorient patient    Medication Interventions: Assess postural VS orthostatic hypotension,Bed/chair exit alarm,Evaluate medications/consider consulting pharmacy,Patient to call before getting OOB,Teach patient to arise slowly    Elimination Interventions:  Toileting schedule/hourly rounds              Problem: Patient Education: Go to Patient Education Activity  Goal: Patient/Family Education  Outcome: Progressing Towards Goal     Problem: Patient Education: Go to Patient Education Activity  Goal: Patient/Family Education  Outcome: Progressing Towards Goal     Problem: Patient Education: Go to Patient Education Activity  Goal: Patient/Family Education  Outcome: Progressing Towards Goal     Problem: Patient Education: Go to Patient Education Activity  Goal: Patient/Family Education  Outcome: Progressing Towards Goal

## 2022-05-13 NOTE — HOSPICE
Hospice RN Note:     Hospice order expires today; please place new order so that we may continue to follow patient. This RN received call back from [address] this AM and she plans to be bedside at 12PM today to meet with hospice. 1330: RENAA in agreement of home hospice admission planned for Sunil 5/15 at 11AM.   CM to set up transport for 10AM.     Full preadmission note to follow once transport has been arranged.            Dalia Nguyen RN, BSN, [facility]  Clinical Nurse Liaison  [phone] (H)  [phone] (o)
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
## 2022-05-14 NOTE — PROGRESS NOTES
Hospitalist Progress Note    NAME: Adrienne Kelley Sr   :  1931   MRN:  [id number]   Room Number:  05316  @ [facility]     Please note that this dictation was completed with [employer] Agent, the computer voice recognition software. Quite often unanticipated grammatical, syntax, homophones, and other interpretive errors are inadvertently transcribed by the computer software. Please disregard these errors. Please excuse any errors that have escaped final proofreading. Interim Hospital Summary: 80 y.o. male whom presented on 2022 with      Assessment / Plan:      #Acute Hypoxic resp failure not POA ? Aspiration vs acute on chronic systolic heart failure exacerbation  #Worsening metabolic encephalopathy not POA   #SUBHA on CKD 3b ? Cardiorenal versus prerenal  -Repeat CT head negative for acute process  -Repeat chest x-ray with moderate right pleural effusion. Right lung interstitial patchy airspace consolidation. Left pleural space normal left lung is clear  -BNP given 35,000. Creatinine 1.89 today up from 1.51. No improvement with gentle hydration  -ABG PO2 92 on 3 L  -Difficult to gauge patient volume status given recent EF of 15% , elevated BNP and  Patient  not eating and drinking due to underlying worsening decompensation of dementia. Patient has a very poor prognosis.  -Discussed the case with his neurologist, not to do much from neurology standpoint for his advanced dementia  -Discussed patient's  current condition with nephrologist Dr. Annabel Alexis nephrology and staff cardiologist Dr. Ashley Matute, given patient's advanced stage heart failure and worsening kidney function due to poor perfusion d/t Heart failure,  Patient's medical condition is deteriorating.   Due to deteriorating kidney function, patient was  not a good candidate for diuresis at this time, may needed inotropic support to support diuresis but given his overall medical condition it would have been more harm than good to the patient in providing inotropic support for example with dobutamine. Concerns related to adverse effect with dobutamine includeded  ventricular arrhythmias, including nonsustained ventricular tachycardia and supraventricular arrhythmias. Sudden cardiac death has also been observed with dobutamine ventricular ectopy and tachycardia may also be noted. Not an ideal candidate for inotropic support with milrinone given worsening kidney function        At that point patient was very lethargic and unable to tolerate food which made him very high risk for aspiration. I had discussed with the medical power of  about patient's deteriorating condition.     -Palliative care discussed with medical power of  and patient was made home hospice.             Upper arm swelling   Duplex neg for DVT   Suspect third spacing d/t hypoalbuminemia   Elevate UE         Polymicrobial bacteremia POA   Bandemia resolved  -Blood culture 4/21/2022 positive for Streptococcus, Rothia, Staph epidermidis  -Currently on ceftriaxone and vancomycin  -Repeat blood cultures negative     -Discussed case with infectious disease. Appreciate help. Dr Marito Choi   -Patient completed course with  antibiotics till 5/13/2022          Suspected aspiration pneumonia  Mild oropharyngeal dysphagia  -Chest x-ray reviewed dependently admission bilaterally due to pulmonary opacity worse on right kidney with apparent asymmetric pulmonary edema versus infection  -Repeat chest x-ray with unchanged opacity in right malleoli region and right lower lobe suspicious for airway disease. Unchanged right pleural effusion. Decrease in background edema  -COVID-19 negative  -Procalcitonin 0.57  -Modified barium swallow without any clear aspiration  -Patient was assessed by speech therapy and was deemed very high risk for aspiration due to lethargy and metabolic encephalopathy.   Recommendation would to give patient oral food for comfort measures          Hypernatremia POA resolved     Physical deconditioning POA     Failure to thrive   Severe protein calorie malnutrition  Severe malnutrition POA  - nutrition consulted   -Patient to be discharged with home on home hospice and and to be fed for comfort measures.        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 - Palliative consulted, appreciate help  -Neurology on board appreciate help        Elevated troponin POA flat trend  Chronic systolic congestive heart failure POA  S/p pacemaker. Troponin 162-148. ECHO 2/2022 EF 15-20%. -EKG with atrial sensed ventricular paced rhythm  -Case discussed with cardiologist patient is not a good candidate of any therapy for advanced heart failure  -Patient to be discharged with home hospice           Hypothyroidism POA   -TSH 5.89  - continue levothyroxin        CAD POA  S/p AVNRT Ablation POA  HTN POA  HLD POA  - holding lisinopril, furosemide due to SUBHA      Hx of Gout   - hold colchicine due to SUBHA               Code status: DNR  Prophylaxis: Hep SQ  Recommended Disposition: Home w/ hospice      Subjective:     Chief Complaint / Reason for Physician Visit  lethargic. Discussed with RN events overnight. Review of Systems:  No fevers, chills, appetite change, cough, sputum production, shortness of breath, dyspnea on exertion, nausea, vomitting, diarrhea, constipation, chest pain, leg edema, abdominal pain, joint pain, rash, itching. Tolerating PT/OT. Tolerating diet. Objective:     VITALS:   Last 24hrs VS reviewed since prior progress note.  Most recent are:  Patient Vitals for the past 24 hrs:   Temp Pulse Resp BP SpO2   05/14/22 0755 98.1 °F (36.7 °C) 68 16 (!) 113/59 97 %   05/13/22 2006 98.5 °F (36.9 °C) 70 16 (!) 114/56 97 %   05/13/22 1625 97 °F (36.1 °C) 62 14 120/60 100 %   05/13/22 1207 97.3 °F (36.3 °C) (!) 58 14 (!) 125/53 100 %     No intake or output data in the 24 hours ending 05/14/22 0911     PHYSICAL EXAM:  General:  no acute distress    EENT:  EOMI. Anicteric sclerae. MMM  Resp:  Coarse breathing bilaterally  CV:  Regular  rhythm,  normal S1/S2, no murmurs rubs gallops, No edema  GI:  Soft, Non distended, Non tender. +Bowel sounds  Neurologic:  Lethargic difficult to arouse  Psych:   Lethargic   skin:  No rashes. No jaundice    Reviewed most current lab test results and cultures  YES  Reviewed most current radiology test results   YES  Review and summation of old records today    NO  Reviewed patient's current orders and MAR    YES  PMH/SH reviewed - no change compared to H&P  ________________________________________________________________________  Care Plan discussed with:    Comments   Patient x    Family      RN xx    Care Manager     Consultant  x Hospice                     x Multidiciplinary team rounds were held today with , nursing, pharmacist and clinical coordinator. Patient's plan of care was discussed; medications were reviewed and discharge planning was addressed. ________________________________________________________________________  Total NON critical care TIME: 25 Minutes    Total CRITICAL CARE TIME Spent:   Minutes non procedure based      Comments   >50% of visit spent in counseling and coordination of care x    ________________________________________________________________________  Judi Cortes MD     Procedures: see electronic medical records for all procedures/Xrays and details which were not copied into this note but were reviewed prior to creation of Plan. LABS:  I reviewed today's most current labs and imaging studies. Pertinent labs include:  No results for input(s): WBC, HGB, HCT, PLT, HGBEXT, HCTEXT, PLTEXT, HGBEXT, HCTEXT, PLTEXT in the last 72 hours.   Recent Labs     05/12/22  0200      CREA 3.01*       Signed: Judi Cortes, MD

## 2022-05-14 NOTE — PROGRESS NOTES
Problem: Aspiration - Risk of  Goal: *Absence of aspiration  Outcome: Progressing Towards Goal     Problem: Patient Education: Go to Patient Education Activity  Goal: Patient/Family Education  Outcome: Progressing Towards Goal     Problem: Pressure Injury - Risk of  Goal: *Prevention of pressure injury  Description: Document Newton Scale and appropriate interventions in the flowsheet. Outcome: Progressing Towards Goal  Note: Pressure Injury Interventions:  Sensory Interventions: Assess changes in LOC    Moisture Interventions: Absorbent underpads,Apply protective barrier, creams and emollients    Activity Interventions: Assess need for specialty bed    Mobility Interventions: Assess need for specialty bed    Nutrition Interventions: Document food/fluid/supplement intake    Friction and Shear Interventions: Apply protective barrier, creams and emollients                Problem: Patient Education: Go to Patient Education Activity  Goal: Patient/Family Education  Outcome: Progressing Towards Goal     Problem: Falls - Risk of  Goal: *Absence of Falls  Description: Document Corie Manning Fall Risk and appropriate interventions in the flowsheet.   Outcome: Progressing Towards Goal  Note: Fall Risk Interventions:  Mobility Interventions: Bed/chair exit alarm    Mentation Interventions: Bed/chair exit alarm    Medication Interventions: Patient to call before getting OOB    Elimination Interventions: Call light in reach              Problem: Patient Education: Go to Patient Education Activity  Goal: Patient/Family Education  Outcome: Progressing Towards Goal     Problem: Patient Education: Go to Patient Education Activity  Goal: Patient/Family Education  Outcome: Progressing Towards Goal     Problem: Patient Education: Go to Patient Education Activity  Goal: Patient/Family Education  Outcome: Progressing Towards Goal     Problem: Patient Education: Go to Patient Education Activity  Goal: Patient/Family Education  Outcome: Progressing Towards Goal

## 2022-05-14 NOTE — PROGRESS NOTES
Problem: Aspiration - Risk of  Goal: *Absence of aspiration  Outcome: Progressing Towards Goal     Problem: Pressure Injury - Risk of  Goal: *Prevention of pressure injury  Description: Document Newton Scale and appropriate interventions in the flowsheet.   Outcome: Progressing Towards Goal  Note: Pressure Injury Interventions:  Sensory Interventions: Assess changes in LOC    Moisture Interventions: Absorbent underpads    Activity Interventions: Assess need for specialty bed    Mobility Interventions: Assess need for specialty bed    Nutrition Interventions: Document food/fluid/supplement intake    Friction and Shear Interventions: Apply protective barrier, creams and emollients

## 2022-05-14 NOTE — PROGRESS NOTES
Bedside\"} shift change report given to  Juan August RN (oncoming nurse) by Lucía Gupta (offgoing nurse). Report included the following information SBAR, MAR, Intake/output, Kardex,     Pt. Had bath grandaughter assisted with bath.   Currently at bedside

## 2022-05-14 NOTE — PROGRESS NOTES
VITOR     RUR  18 %     IDR rounds with MD and nursing this am   CATHERINE home with [facility] 5-15-22     Met with patient's granddaughter again this am to finalize transition to home in the am   Discussed Ambulance transportation- patient has Medicare A&B- transportation benefits -cannot guarantee coverage- Ambulance co to bill Medicare for payment. She indicate she know he maybe be at his baseline now- was able to take some po this am. She indicates she was here most of the night-and she has been praying - God has given her some peace. Hospice to have DME delivered today. Here mother is at the home waiting to be delivered. We discussed the call from her Cousin- the patient's other granddaughter - not in agreement to allow a visit at this time. She and her mother requested no information to be given out- she indicates they as a family will  Handle this outside the hospital.     Called [facility]  [phone] requested Ambulance for  at 10;00AM.     CM will follow-up post discharge early next week. Second IMM Letter issued.      [facility] [address] CIERRA RN   [phone]

## 2022-05-14 NOTE — HOSPICE
[facility]  [phone]    Hospice Nursing PRE-Admission   Discharge Summary  Patient Name: Alexandre Cabrera Sr  YOB: 1931  Age: 80 y.o. Date of PLANNED Hospice Admission: 5/15/22 at 11:00a  Hospice Attending: Dr. Ellis Rubin Physician: Miracle Farfan MD     Home Hospice Address:  [address] [place] [address], [address] [place] [id number]    Primary Contact and Phone: al Hloland ([phone])      ADVANCE CARE PLANNING    Code Status: DNR  Durable DNR: [x]  Yes  []  No  Advance Care Planning 2022   Patient's Healthcare Decision Maker is: -   Primary Decision Maker Name -   Primary Decision Maker Phone Number -   Primary Decision Maker Relationship to Patient -   Confirm Advance Directive Yes, not on file   Patient Would Like to Complete Advance Directive -   Does the patient have other document types -       Mormonism: Buddhist   Home: D    HOSPICE SUMMARY     Verbal CTI of terminal diagnosis with life expectancy of 6 months or less received from: Dr. Jimmy Faulkner    For the Hospice Diagnosis of: End Stage Cardiac Disease  Per MD notes:  Acute Hypoxic resp failure not POA ?  Aspiration vs acute on chronic systolic heart failure exacerbation  Worsening metabolic encephalopathy not POA   SUBHA on CKD 3b ?  Cardiorenal versus prerenal  -Repeat CT head negative for acute process  -Repeat chest x-ray with moderate right pleural effusion.  Right lung interstitial patchy airspace consolidation.  Left pleural space normal left lung is clear  -BNP given 35,000.  Creatinine 1.89 today up from 1.51.  No improvement with gentle hydration  -ABG PO2 92 on 3 L  -Difficult to gauge patient volume status given recent EF of 15% , elevated BNP and  Patient  not eating and drinking due to underlying worsening decompensation of dementia.  Patient has a very poor prognosis.  -Discussed the case with his neurologist, not to do much from neurology standpoint for his advanced dementia  -Discussed patient's  current condition with nephrologist Dr. Nj Stokes nephrology and staff cardiologist Dr. Rea Garces patient's advanced stage heart failure and worsening kidney function due to poor perfusion d/t Heart failure,  Patient's medical condition is deteriorating.  Due to deteriorating kidney function, patient was  not a good candidate for diuresis at this time, may needed inotropic support to support diuresis but given his overall medical condition it would have been more harm than good to the patient in providing inotropic support for example with dobutamine.  Concerns related to adverse effect with dobutamine includeded  ventricular arrhythmias, including nonsustained ventricular tachycardia and supraventricular arrhythmias.  Sudden cardiac death has also been observed with dobutamine ventricular ectopy and tachycardia may also be noted.  Not an ideal candidate for inotropic support with milrinone given worsening kidney function     At that point patient was very lethargic and unable to tolerate food which made him very high risk for aspiration.  I had discussed with the medical power of  about patient's deteriorating condition.    Upper arm swelling   Duplex neg for DVT   Suspect third spacing d/t hypoalbuminemia   Elevate UE      Polymicrobial bacteremia POA   Bandemia resolved  -Blood culture 4/21/2022 positive for Streptococcus, Rothia, Staph epidermidis  -Currently on ceftriaxone and vancomycin  -Repeat blood cultures negative  -Discussed case with infectious disease.  Appreciate help.  Dr Ingrid Carmona   -Patient completed course with  antibiotics till 5/13/2022     Suspected aspiration pneumonia  Mild oropharyngeal dysphagia  -Chest x-ray reviewed dependently admission bilaterally due to pulmonary opacity worse on right kidney with apparent asymmetric pulmonary edema versus infection  -Repeat chest x-ray with unchanged opacity in right malleoli region and right lower lobe suspicious for airway disease.  Unchanged right pleural effusion.  Decrease in background edema  -COVID-19 negative  -Procalcitonin 0.57  -Modified barium swallow without any clear aspiration  -Patient was assessed by speech therapy and was deemed very high risk for aspiration due to lethargy and metabolic encephalopathy. Recommendation would to give patient oral food for comfort measures     Hypernatremia POA resolved  Physical deconditioning POA  Failure to thrive   Severe protein calorie malnutrition  Severe malnutrition POA  - nutrition consulted   -Patient to be discharged with home on home hospice and and to be fed for comfort measures.       NCD: Requested/Declined      CLINICAL INFORMATION   Allergies: No Known Allergies      Currently this patient has:  [x] Wounds: Right Lower arm, skin tears      COVID Screening: Negative (4/28/22)    ASSESSMENT & PLAN     1. Symptom Issues Identified: Confusion, weakness, coarse lung sounds, poor po intake    2. Spiritual Issues Identified: Nondenominational cristhian    3. Psych/ Social/ Emotional Issues Identified: Lives with granddtr Nancy Fuchs and receives care from his daughter Airam Kimble as well. Other grandtr is not to receive any information per their request.    CARE COORDINATION     1. Hospice Consents: signed and scanned    2. DME Ordered/Company/Delivery Plan: [facility] delivery confirmed: bed, overlay, rails, OBT, O2    3. Symptom Kit and other Medications Needs: Ordered through [address] [email or web address] for delivery 5/14    4. Home Admission Reservation: 5/15/22 at 11:00a    5. Transportation by: [facility]   Scheduled for 5/15 10:00a     6. Verbal Report/Handoff given to: Tammie De Santiago and Kate Hutchinson    [facility]. [phone][facility] 2nd floor room 209    8.  Supplies/Wound Care: chux, briefs, dressings

## 2022-05-14 NOTE — PROGRESS NOTES
Bedside\"} shift change report given to Edwin Charles RN (oncoming nurse) by Migue Jennings RN(offgoing nurse).  Report included the following information SBAR, MAR, Kardex, intake/output

## 2022-05-14 NOTE — PROGRESS NOTES
Bedside shift change report given to Dom Baptiste (oncoming nurse) by Kellie Gonzalez (offgoing nurse). Report included the following information SBAR, Intake/Output and MAR.     4367 Patient resting in bed due medication administered. Relative at bedside. 0930 Patient rounded on relative at bedside, incontinent care provided. 1100 Patient rounded on assisted with positioning and made comfortable. 1400 Patient rounded on, relatives at bedside. Denied needs. 1520 Patient rounded on, relative at bedside. 1755 Patient resting quietly in bed, received due medication, relative at bedside. Bedside shift change report given to Kathie (oncoming nurse) by Dom Baptiste (offgoing nurse).  Report included the following information SBAR, Intake/Output and MAR.

## 2022-05-15 NOTE — DISCHARGE SUMMARY
Hospitalist Discharge Summary     Patient ID:  Diogenes Marie Sr  [id number]  81 y.o.  1/21/1931    PCP on record: Madeleine Carranza MD    Admit date: 4/28/2022  Discharge date and time: 5/15/2022     Patient discharged home with home hospice, please contact hospice for further recommendation    Please note that this dictation was completed with [employer], the computer voice recognition software. Quite often unanticipated grammatical, syntax, homophones, and other interpretive errors are inadvertently transcribed by the computer software. Please disregard these errors. Please excuse any errors that have escaped final proofreading. Admission Diagnoses: Hypernatremia [E87.0]    Discharge Diagnoses: Active Problems:    Hypernatremia (4/28/2022)      Goals of care, counseling/discussion ()      Somnolence ()      Delirium ()      Physical debility ()      Frailty ()           Hospital Course:       #Acute Hypoxic resp failure not POA ?  Aspiration vs acute on chronic systolic heart failure exacerbation  #Worsening metabolic encephalopathy not POA   #SUBHA on CKD 3b ?  Cardiorenal versus prerenal  -Repeat CT head negative for acute process  -Repeat chest x-ray with moderate right pleural effusion.  Right lung interstitial patchy airspace consolidation.  Left pleural space normal left lung is clear  -BNP given 35,000.  Creatinine 1.89 today up from 1.51.  No improvement with gentle hydration  -ABG PO2 92 on 3 L  -Difficult to gauge patient volume status given recent EF of 15% , elevated BNP and  Patient was not eating and drinking due to underlying worsening decompensation of dementia.  Patient had a very poor prognosis.  -Discussed the case with his neurologist, not to do much from neurology standpoint for his advanced dementia  -Discussed patient's current condition with nephrologist Dr. Angus Ward nephrology and staff cardiologist Dr. Yordy Kulkarni patient's advanced stage heart failure and worsening kidney function due to poor perfusion d/t Heart failure,  Patient's medical condition was deteriorating.  Due to deteriorating kidney function, patient was also not a good candidate for diuresis at that time, may needed inotropic support to support diuresis but given his overall medical condition it would have been more harm than good to the patient in providing inotropic support forexample with dobutamine.  Concerns related to adverse effect with dobutamine includeded  ventricular arrhythmias, including nonsustained ventricular tachycardia and supraventricular arrhythmias.  Sudden cardiac death has also been observed with dobutamine ventricular ectopy and tachycardia may also be noted.  Not an ideal candidate for inotropic support with milrinone given worsening kidney function        At that point patient was very lethargic and unable to tolerate food which made him very high risk for aspiration.  I had discussed with the medical power of  about patient's deteriorating condition.      -Palliative care discussed with medical power of  and patient was made home hospice.  -Patient to be discharged home with home hospice, further care to be provided by home hospice              Upper arm swelling   Duplex neg for DVT   Suspect third spacing d/t hypoalbuminemia   Elevate UE   Patient be discharged home with home hospice        Polymicrobial bacteremia POA   Bandemia resolved  -Blood culture 4/21/2022 positive for Streptococcus, Rothia, Staph epidermidis  -Repeat blood cultures negative     -Discussed case with infectious disease.  Appreciate help.  Dr Keturah Jarrett   -Patient completed course with  antibiotics            Suspected aspiration pneumonia  Mild oropharyngeal dysphagia  -Chest x-ray reviewed dependently admission bilaterally due to pulmonary opacity worse on right kidney with apparent asymmetric pulmonary edema versus infection  -Repeat chest x-ray with unchanged opacity in right malleoli region and right lower lobe suspicious for airway disease.  Unchanged right pleural effusion.  Decrease in background edema  -COVID-19 negative  -Procalcitonin 0.57  -Modified barium swallow without any clear aspiration  -Patient was assessed by speech therapy and was deemed very high risk for aspiration due to lethargy and metabolic encephalopathy. Recommendation would to give patient oral food for comfort measures           Hypernatremia POA resolved     Physical deconditioning POA     Failure to thrive   Severe protein calorie malnutrition  Severe malnutrition POA  - nutrition consulted   -Patient to be discharged with home on home hospice and and to be fed for comfort measures.        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 - Palliative consulted, appreciate help  -Neurology on board appreciate help        Elevated troponin POA flat trend  Chronic systolic congestive heart failure POA  S/p pacemaker. Troponin 162-148. ECHO 2/2022 EF 15-20%.    -EKG with atrial sensed ventricular paced rhythm  -Case discussed with cardiologist patient is not a good candidate of any therapy for advanced heart failure  -Patient to be discharged with home hospice           Hypothyroidism POA   -TSH 5.89  - continue levothyroxin        CAD POA  S/p AVNRT Ablation POA  HTN POA  HLD POA  - holding lisinopril, furosemide due to SUBHA      Hx of Gout   - hold colchicine due to SUBHA         CONSULTATIONS:  IP CONSULT TO PALLIATIVE CARE - PROVIDER  IP CONSULT TO INFECTIOUS DISEASES  IP CONSULT TO NEUROLOGY  IP CONSULT TO NEPHROLOGY  IP CONSULT TO CARDIOLOGY    Excerpted HPI from H&P of Bharathi Saez MD:  Darlin Galicia Sr is a 80 y.o.  male with PMH of CAD, GERD,DJD, GI Bleed  who presents to ED with c/o deconditioning. Patient physical therapist attempted to work with them today and noted patient to be significantly weaker than baseline. Patient's granddaughter has noted that patient has been gradually declining over the past 2 weeks, intermittently confused, combative. She thinks he is on too many medications and that is causing some of these symptoms. Patient at baseline has been instructed to use rollator    ______________________________________________________________________  DISCHARGE SUMMARY/HOSPITAL COURSE:  for full details see H&P, daily progress notes, labs, consult notes. _______________________________________________________________________  Patient seen and examined by me on discharge day. Pertinent Findings:  Gen:    Not in distress  Chest: Coarse breathing  CVS:   Regular rhythm. No edema  Abd:  Soft, not distended, not tender  Neuro: Altered  _______________________________________________________________________  DISCHARGE MEDICATIONS:   Current Discharge Medication List      START taking these medications    Details   lansoprazole compounding kit (PREVACID) 3 mg/mL oral suspension Take 10 mL by mouth Daily (before breakfast). Indications: gastroesophageal reflux disease  Qty: 1 Kit, Refills: 1  Start date: 5/15/2022         CONTINUE these medications which have CHANGED    Details   memantine (NAMENDA) 10 mg tablet Take 0.5 Tablets by mouth two (2) times a day. TAKE 1 TABLET BY MOUTH TWICE A DAY  Qty: 30 Tablet, Refills: 0  Start date: 5/15/2022         CONTINUE these medications which have NOT CHANGED    Details   cholecalciferol (VITAMIN D3) (1000 Units /25 mcg) tablet Take 1 Tablet by mouth daily. Qty: 30 Tablet, Refills: 5      levothyroxine (SYNTHROID) 50 mcg tablet Take 1 Tablet by mouth Daily (before breakfast).   Qty: 30 Tablet, Refills: 5      oxygen-air delivery systems ( CLASSIC OXYGEN CONCENTRATOR) 2 L by Nasal route as needed for PRN Reason (Other) (dyspnea). Start date: 5/13/2022      haloperidol (HALDOL) 2 mg/mL oral concentrate Take 1 mg by mouth every four (4) hours as needed for Agitation. Start date: 5/13/2022      hyoscyamine SL (LEVSIN/SL) 0.125 mg SL tablet Take 0.125 mg by mouth every four (4) hours as needed for Secretions. Start date: 5/13/2022      morphine (ROXANOL) 100 mg/5 mL (20 mg/mL) concentrated solution Take 5 mg by mouth every three (3) hours as needed for Pain or Shortness of Breath. Start date: 5/13/2022      LORazepam (ATIVAN) 0.5 mg tablet Take 0.5 mg by mouth every six (6) hours as needed for Anxiety. Start date: 5/13/2022      prochlorperazine (COMPAZINE) 10 mg tablet Take 10 mg by mouth every six (6) hours as needed for Nausea. Start date: 5/13/2022         STOP taking these medications       lisinopriL (PRINIVIL, ZESTRIL) 5 mg tablet Comments:   Reason for Stopping:         clopidogreL (PLAVIX) 75 mg tab Comments:   Reason for Stopping:         furosemide (LASIX) 20 mg tablet Comments:   Reason for Stopping:         atorvastatin (LIPITOR) 40 mg tablet Comments:   Reason for Stopping:         acetaminophen (TYLENOL) 650 mg suppository Comments:   Reason for Stopping:         bisacodyL (DULCOLAX) 10 mg supp Comments:   Reason for Stopping:         colchicine 0.6 mg tablet Comments:   Reason for Stopping:               My Recommended Diet, Activity, Wound Care, and follow-up labs are listed in the patient's Discharge Insturctions which I have personally completed and reviewed.     _______________________________________________________________________  DISPOSITION:     Home with Family:    Home with HH/PT/OT/RN:    SNF/LTC:    COMPA:    OTHER:        Condition at Discharge: Home with home hospice  _______________________________________________________________________  Follow up with:   PCP : Monique Burgos MD  Follow-up Information     Follow up With Specialties Details [address] [facility] On 5/15/2022 The Hospice Nurse plans to be at the home 11:00AM  5-15-22 - Equipment to be deliver 5-14-22  [address]    Liset Cole MD Internal Medicine Physician   My  [address] [address] [phone]                Total time in minutes spent coordinating this discharge (includes going over instructions, follow-up, prescriptions, and preparing report for sign off to her PCP) :55minutes    Signed:  Randi Grey MD

## 2022-05-15 NOTE — PROGRESS NOTES
Bedside\"} shift change report given to Kathie GARCIA (oncoming nurse) by Merly Smith (offgoing nurse). Report included the following information  SBAR, MAR, kardex, intake/output      Pt. Alert, no indication of pain or discomfort. 02 sat . Pt resting comfortably in bed. Granddaughter assisted with incontinent care. She is at bedside.

## 2022-05-15 NOTE — PROGRESS NOTES
Problem: Aspiration - Risk of  Goal: *Absence of aspiration  Outcome: Progressing Towards Goal     Problem: Patient Education: Go to Patient Education Activity  Goal: Patient/Family Education  Outcome: Progressing Towards Goal     Problem: Pressure Injury - Risk of  Goal: *Prevention of pressure injury  Description: Document Newton Scale and appropriate interventions in the flowsheet. Outcome: Progressing Towards Goal  Note: Pressure Injury Interventions:  Sensory Interventions: Assess changes in LOC    Moisture Interventions: Apply protective barrier, creams and emollients,Absorbent underpads    Activity Interventions: Assess need for specialty bed    Mobility Interventions: Assess need for specialty bed    Nutrition Interventions: Document food/fluid/supplement intake    Friction and Shear Interventions: Apply protective barrier, creams and emollients                Problem: Patient Education: Go to Patient Education Activity  Goal: Patient/Family Education  Outcome: Progressing Towards Goal     Problem: Falls - Risk of  Goal: *Absence of Falls  Description: Document Jamee Locket Fall Risk and appropriate interventions in the flowsheet.   Outcome: Progressing Towards Goal  Note: Fall Risk Interventions:  Mobility Interventions: Bed/chair exit alarm    Mentation Interventions: Bed/chair exit alarm    Medication Interventions: Bed/chair exit alarm    Elimination Interventions: Call light in reach              Problem: Patient Education: Go to Patient Education Activity  Goal: Patient/Family Education  Outcome: Progressing Towards Goal     Problem: Patient Education: Go to Patient Education Activity  Goal: Patient/Family Education  Outcome: Progressing Towards Goal     Problem: Patient Education: Go to Patient Education Activity  Goal: Patient/Family Education  Outcome: Progressing Towards Goal     Problem: Patient Education: Go to Patient Education Activity  Goal: Patient/Family Education  Outcome: Progressing Towards Goal

## 2022-05-15 NOTE — PROGRESS NOTES
Bedside\"} shift change report given to Giovanni Calles RN (oncoming nurse) by Gisell Martínez RN(offgoing nurse).  Report included the following information SBAR, MAR, Kardex, intake/output

## 2022-05-15 NOTE — HOSPICE
[facility]  [facility] Those in [facility]  [phone]  Patient Name: Lissette Lee  YOB: 1931  Age: 80                                                         Principle Hospice Diagnosis: End Stage Cardiac Disease    Diagnoses RELATED to the terminal prognosis:   Respiratory Failure   SUBHA on CKD III  Hypertension  SVT  Pacemaker  Severe protein calorie malnutrition  Dementia   Mild oropharyngeal dysphagia      Unrelated Diagnosis:   Metabolic Encephalopathy  Polymicrobial bacteremia  Hypernatremia   Failure to thrive   Hx ETOH  Depression    Date of Hospice Admission: 05/15/2022    Hospice Attending Elected by Patient: Dr Rivka Granados    Primary Care Physician: Netta Diaz MD    Admitting RN: Jasiel Silverio RN  Nurse CM:  Merly Short  : Marty Jurado  :  Sudheer Melgar DNR: Yes      Service: NO     Home: TBD    Direct Observation:    Patient received in bed. Patient was lethargic, arousal to voice and touch. Unable to assess orientation because patient was nonverbal.  Patient's RR 24 and had increase WOB using abdomen. Bilateral upper lobes diminished; bilateral lower lobes scattered crackles with rhonchi. SpO2 was 86% on room air, placed on 2L oxygen NC. Unable to obtain blood pressure. Patient has thready pulses upper and lower extremities. Patients' bilateral arms are +3 pitting edema, abdomen is swollen, and semi-soft, active bowel sounds in all quadrants. Patient is incontinent x 2. Unable to assess ROM. Patient will grab your hand and can follow some commands, but he is not consistent. Patient previously was using a walker but has declined too bedbound.        ER Visits/ Hospitalizations in past year: 6 admissions    Onset Date of Hospice Diagnosis:  10/2019    Summary of Disease Progression Leading to Hospice Diagnosis:  (note per Dr Baldemar Agustin III cardiologist) Patient appears to have end-stage cardiomyopathy with relatively recent echo showing an ejection fraction less than 20%. He has either a defibrillator or a pacemaker, but unfortunately there are no records in epic to confirm the details regarding that, nor is there any record of any cardiology consultations or follow-up in epic. He is admitted with failure to thrive, advanced dementia, and malnutrition after hospitalization a couple of months ago at [facility] for a stroke. He has advanced renal insufficiency and lowish blood pressure which makes the institution of guideline directed medical therapy for his low ejection fraction nearly impossible. I do not think he is a candidate for advanced heart failure therapy including inotropes and he certainly would be a very poor candidate for dialysis. There has been a suggestion for hospice care and both hospice and palliative have seen the patient during this hospitalization. I would agree that hospice is an appropriate disposition for him under these unfortunate circumstances. Electronically signed by Danish Dominguez MD at 05/12/22 8673  HISTORY OF PRESENT ILLNESS: (per note Dr Marjorie Friedman) Finesse Ramon is a 80 y.o. male with PMH of CAD, GERD,DJD, GI Bleed who presents to ED with c/o deconditioning. Patient physical therapist attempted to work with them today and noted patient to be significantly weaker than baseline. Patient's granddaughter has noted that patient has been gradually declining over the past 2 weeks, intermittently confused, combative. She thinks he is on too many medications and that is causing some of these symptoms. Patient at baseline has been instructed to use rollator. Labs/Diagnostics: Echo 2/22 Left Ventricle: Left ventricle is dilated. Normal wall thickness. Moderate global hypokinesis present. Reduced left ventricular systolic function with a visually estimated EF of 15 - 20%. Grade I diastolic dysfunction with normal LAP.  Left Atrium: Left atrium is moderately dilated. Interatrial Septum: No interatrial shunt visualized on color Doppler or saline contrast study. Aortic Valve: Moderate transvalvular regurgitation. Mitral Valve: Mildly thickened leaflet. Moderate transvalvular regurgitation. Use LCD Guidelines and list features:       ____X____  1. At the time of initial certification or recertification for hospice, the patient is or has been                           already optimally treated for heart disease or is not a candidate for a surgical procedure or                           has declined a procedure. (Optimally treated means that patients who are not on                           vasodilators have a medical reason for refusing these drugs, e.g., hypotension or renal                           disease.)  _____X___  2. The patient is classified as New York Heart Association (NYHA) Class IV and may have                           significant symptoms of heart failure or angina at rest. (Class IV patients with heart disease                           have an inability to carry on any physical activity without discomfort. Symptoms of heart                           failure or of the anginal syndrome may be present even at rest. If any physical activity is                           undertaken, discomfort is increased.) Significant congestive heart failure may be                          documented by an ejection fraction of ?20%, but is not required if not already available.  ________  3. Documentation of the following factors will support but is not required to establish                           eligibility for hospice care:                            ________  a. Treatment resistant symptomatic supraventricular or ventricular arrhythmias;                          ________  b. History of cardiac arrest or resuscitation;                          ________  c. History of unexplained syncope;                          ________  d.  Brain embolism of cardiac origin;                          ________  e. Concomitant HIV disease. SPIRITUAL/Social/Emotional/psych: [place] to assess      Dr. Mccord Hearing on behalf of Dr. Derik Bowser contacted, agrees to serve as attending provider for hospice and provided verbal certification of terminal illness with prognosis of 6 months or less life expectancy. Orders for hospice admission, medications and plan of treatment received. Medication reconciliation completed with Carly Gonzalez NP      MEDS:  I have reviewed the patient's medication list with MD and identified the following:  Nonformulary medications: Namenda (covering as secondary Dx for Dementia is related to aspiration Pneumonia and caregiver is persistent on keeping medication because she feels like it will accelerate his decline.   She would have liked it to be increased to 20mg but has agreed to 5mg BID due to kidney function.)  Unrelated medications:  n/a  IDT communication to include MD, , SW, [address] [place] [address] and support team.

## 2022-05-15 NOTE — PROGRESS NOTES
Bedside shift change report given to Dom Baptiste  (oncoming nurse) by Kellie Gonzalez (offgoing nurse). Report included the following information SBAR, Intake/Output and MAR.     0900 patient in bed resting, relative at bedside, due medication administered. 1000 Reviewed discharge instructions with pt granddaughter including follow-up appointments, medications and side effects. Hospice care instructions signs/symptoms of stroke and heart attack, and MyChart information. Pt granddaughter expressed understanding. Belongings with patient. Left the unit via stretcher with ambulance services and granddaughter.

## 2022-05-20 NOTE — HOSPICE
Patient recieved in home with several family members present. Patient is reponsive to verbal stimuliation. Speech is incomprehensible. Per granddaughter, patient aspirated last night. Suction equipment is not functioning properly, serviced requested from [facility]. Med rec completed. DNR posted above patients bed during visit. Instructed tobi to call hospice with any needs/concerns.

## 2022-05-20 NOTE — HOSPICE
LMSW and RADHA Espinoza met with patient, patient's granddaughter Jonathan Sanchez and daughter Betsy Wilder in their home. Patient was bedbound and able to vocalize but had predominantly unintelligible speech. Granddaughter shared that she has been caring for patient for 4 years in her home with her 3 children. Jonathan Sanchez stated that the patient applied for Medicaid last year but was denied. LMSW offered volunteer services to provide an hour of respite per week. LMSW discussed available caregiving options. Jonathan Sanchez stated that she would like to have help in caring for the patient but would like to wait a week or two because of how many new people are coming to the home as a result of the initiation of hospice services. LMSW offered music therapy as Jonathan Sanchez mentioned patient enjoys jazz. Jonathan Sanchez stated that she would like to initiate MT services. LMSW confirmed presence of DDNR in home and encouraged posting it to be visible. LMSW inquired about final arrangements. Jonathan Sanchez stated that arrangements are TBD at this time. LMSW informed Jonathan Sanchez that patient's primary Jazz Bulla will return next week. LMSW will provide handoff to primary SW. SW team will continue to be available to offer support and address needs that arise.

## 2022-05-23 ENCOUNTER — HOME CARE VISIT (OUTPATIENT)
Dept: HOSPICE | Facility: HOSPICE | Age: 87
End: 2022-05-23
Payer: MEDICARE

## 2022-05-23 PROCEDURE — 3331090001 HH PPS REVENUE CREDIT

## 2022-05-23 PROCEDURE — 3331090002 HH PPS REVENUE DEBIT

## 2022-05-23 NOTE — HOSPICE
TC received from [address] reporting she reached out to family to [facility] visit and was informed the patient passed yesterday. Hospice was not contacted. Granddaughter reports that pt used [facility]  home.  home contacted for TOD: 4:21 PM.  home states the documentation was received from [facility] and pt had been transported to [facility] before being pronounced. Equipment requested for pickup.

## 2022-05-24 ENCOUNTER — HOME CARE VISIT (OUTPATIENT)
Dept: HOSPICE | Facility: HOSPICE | Age: 87
End: 2022-05-24
Payer: MEDICARE

## 2022-05-24 PROCEDURE — 3331090001 HH PPS REVENUE CREDIT

## 2022-05-24 PROCEDURE — 3331090002 HH PPS REVENUE DEBIT

## 2022-05-25 PROCEDURE — 3331090003 HH PPS REVENUE ADJ

## 2022-05-25 PROCEDURE — 3331090002 HH PPS REVENUE DEBIT

## 2022-05-25 PROCEDURE — 3331090001 HH PPS REVENUE CREDIT

## 2022-05-27 ENCOUNTER — TELEPHONE (OUTPATIENT)
Dept: INTERNAL MEDICINE CLINIC | Age: 87
End: 2022-05-27

## 2022-06-25 RX ORDER — GLUCOSAMINE SULFATE 1500 MG
POWDER IN PACKET (EA) ORAL
Qty: 90 CAPSULE | Refills: 1 | OUTPATIENT
Start: 2022-06-25

## 2022-06-25 RX ORDER — LEVOTHYROXINE SODIUM 50 UG/1
TABLET ORAL
Qty: 90 TABLET | Refills: 1 | OUTPATIENT
Start: 2022-06-25

## 2023-02-22 NOTE — PROGRESS NOTES
1. Have you been to the ER, urgent care clinic since your last visit? Hospitalized since your last visit? No    2. Have you seen or consulted any other health care providers outside of the [address] [place] [address] since your last visit? Include any pap smears or colon screening. No    No Advanced Medical Directive. Appetite not as good as it was. Wt down about 3 lbs. Cimzia Counseling:  I discussed with the patient the risks of Cimzia including but not limited to immunosuppression, allergic reactions and infections.  The patient understands that monitoring is required including a PPD at baseline and must alert us or the primary physician if symptoms of infection or other concerning signs are noted.

## (undated) DEVICE — CONTAINER SPEC 20 ML LID NEUT BUFF FORMALIN 10 % POLYPR STS

## (undated) DEVICE — SYR 3ML LL TIP 1/10ML GRAD --

## (undated) DEVICE — TRAP SUC MUCOUS 70ML -- MEDICHOICE MEDLINE

## (undated) DEVICE — BAG SPEC BIOHZRD 10 X 10 IN --

## (undated) DEVICE — (D)SYR 10ML 1/5ML GRAD NSAF -- PKGING CHANGE USE ITEM 338027

## (undated) DEVICE — FORCEPS BX L160CM DIA8MM GRSP DISECT CUP TIP NONLOCKING ROT

## (undated) DEVICE — Device: Brand: MEDEX

## (undated) DEVICE — Device

## (undated) DEVICE — NEEDLE HYPO 18GA L1.5IN PNK S STL HUB POLYPR SHLD REG BVL

## (undated) DEVICE — BLOCK BITE ENDOSCP AD 21 MM W/ DIL BLU LF DISP

## (undated) DEVICE — CATH IV AUTOGRD BC PNK 20GA 25 -- INSYTE

## (undated) DEVICE — TOWEL 4 PLY TISS 19X30 SUE WHT

## (undated) DEVICE — Z DISCONTINUED PER MEDLINE LINE GAS SAMPLING O2/CO2 LNG AD 13 FT NSL W/ TBNG FILTERLINE

## (undated) DEVICE — 1200 GUARD II KIT W/5MM TUBE W/O VAC TUBE: Brand: GUARDIAN

## (undated) DEVICE — ELECTRODE,RADIOTRANSLUCENT,FOAM,5PK: Brand: MEDLINE

## (undated) DEVICE — SOLIDIFIER MEDC 1200ML -- CONVERT TO 356117

## (undated) DEVICE — MEDI-VAC YANK SUCT HNDL W/TPRD BULBOUS TIP: Brand: CARDINAL HEALTH

## (undated) DEVICE — BASIN EMSIS 16OZ GRAPHITE PLAS KID SHP MOLD GRAD FOR ORAL

## (undated) DEVICE — BASIN EMESIS 500CC ROSE 250/CS 60/PLT: Brand: MEDEGEN MEDICAL PRODUCTS, LLC

## (undated) DEVICE — NEONATAL-ADULT SPO2 SENSOR: Brand: NELLCOR

## (undated) DEVICE — STRAINER URIN CALC RNL MSH -- CONVERT TO ITEM 357634

## (undated) DEVICE — SET ADMIN 16ML TBNG L100IN 2 Y INJ SITE IV PIGGY BK DISP

## (undated) DEVICE — SENSOR OXMTR AD PED DISP NSL ALAR SPO2 XHALE ASSURANCE

## (undated) DEVICE — NON-REM POLYHESIVE PATIENT RETURN ELECTRODE: Brand: VALLEYLAB

## (undated) DEVICE — SNARE ENDOSCP M L240CM W27MM SHTH DIA2.4MM CHN 2.8MM OVL

## (undated) DEVICE — YANKAUER,TAPERED BULBOUS TIP,W/O VENT: Brand: MEDLINE

## (undated) DEVICE — SYRINGE 50ML E/T

## (undated) DEVICE — KENDALL RADIOLUCENT FOAM MONITORING ELECTRODE RECTANGULAR SHAPE: Brand: KENDALL

## (undated) DEVICE — NEEDLE SCLERO 25GA L240CM OD0.51MM ID0.24MM EXTN L4MM SHTH

## (undated) DEVICE — SYR 10ML LUER LOK 1/5ML GRAD --